# Patient Record
Sex: MALE | Race: WHITE | Employment: OTHER | URBAN - NONMETROPOLITAN AREA
[De-identification: names, ages, dates, MRNs, and addresses within clinical notes are randomized per-mention and may not be internally consistent; named-entity substitution may affect disease eponyms.]

---

## 2017-01-06 ENCOUNTER — OFFICE VISIT - GICH (OUTPATIENT)
Dept: INTERNAL MEDICINE | Facility: OTHER | Age: 82
End: 2017-01-06

## 2017-01-06 ENCOUNTER — HISTORY (OUTPATIENT)
Dept: INTERNAL MEDICINE | Facility: OTHER | Age: 82
End: 2017-01-06

## 2017-01-06 DIAGNOSIS — R10.9 ABDOMINAL PAIN: ICD-10-CM

## 2017-01-06 DIAGNOSIS — R63.4 ABNORMAL WEIGHT LOSS: ICD-10-CM

## 2017-01-06 DIAGNOSIS — G47.00 INSOMNIA: ICD-10-CM

## 2017-01-06 DIAGNOSIS — Z87.898 PERSONAL HISTORY OF OTHER SPECIFIED CONDITIONS (CODE): ICD-10-CM

## 2017-01-06 DIAGNOSIS — F41.9 ANXIETY DISORDER: ICD-10-CM

## 2017-01-06 DIAGNOSIS — G62.9 POLYNEUROPATHY: ICD-10-CM

## 2017-01-06 LAB
A/G RATIO - HISTORICAL: 1.5 (ref 1–2)
ABSOLUTE BASOPHILS - HISTORICAL: 0.1 THOU/CU MM
ABSOLUTE EOSINOPHILS - HISTORICAL: 0.2 THOU/CU MM
ABSOLUTE LYMPHOCYTES - HISTORICAL: 1.1 THOU/CU MM (ref 0.9–2.9)
ABSOLUTE MONOCYTES - HISTORICAL: 0.5 THOU/CU MM
ABSOLUTE NEUTROPHILS - HISTORICAL: 3.8 THOU/CU MM (ref 1.7–7)
ALBUMIN SERPL-MCNC: 4.1 G/DL (ref 3.5–5.7)
ALP SERPL-CCNC: 73 IU/L (ref 34–104)
ALT (SGPT) - HISTORICAL: 27 IU/L (ref 7–52)
AMYLASE SERPL-CCNC: 30 IU/L (ref 29–103)
ANION GAP - HISTORICAL: 7 (ref 5–18)
AST SERPL-CCNC: 23 IU/L (ref 13–39)
BASOPHILS # BLD AUTO: 1.3 %
BILIRUB SERPL-MCNC: 0.7 MG/DL (ref 0.3–1)
BUN SERPL-MCNC: 13 MG/DL (ref 7–25)
BUN/CREAT RATIO - HISTORICAL: 17
CALCIUM SERPL-MCNC: 9.7 MG/DL (ref 8.6–10.3)
CHLORIDE SERPLBLD-SCNC: 103 MMOL/L (ref 98–107)
CO2 SERPL-SCNC: 29 MMOL/L (ref 21–31)
CREAT SERPL-MCNC: 0.76 MG/DL (ref 0.7–1.3)
EOSINOPHIL NFR BLD AUTO: 3.5 %
ERYTHROCYTE [DISTWIDTH] IN BLOOD BY AUTOMATED COUNT: 12.5 % (ref 11.5–15.5)
GFR IF NOT AFRICAN AMERICAN - HISTORICAL: >60 ML/MIN/1.73M2
GLOBULIN - HISTORICAL: 2.7 G/DL (ref 2–3.7)
GLUCOSE SERPL-MCNC: 93 MG/DL (ref 70–105)
HCT VFR BLD AUTO: 41.9 % (ref 37–53)
HEMOGLOBIN: 14.5 G/DL (ref 13.5–17.5)
LYMPHOCYTES NFR BLD AUTO: 19.5 % (ref 20–44)
MCH RBC QN AUTO: 29.2 PG (ref 26–34)
MCHC RBC AUTO-ENTMCNC: 34.6 G/DL (ref 32–36)
MCV RBC AUTO: 85 FL (ref 80–100)
MONOCYTES NFR BLD AUTO: 8.1 %
NEUTROPHILS NFR BLD AUTO: 67.4 % (ref 42–72)
PLATELET # BLD AUTO: 131 THOU/CU MM (ref 140–440)
PMV BLD: 9.3 FL (ref 6.5–11)
POTASSIUM SERPL-SCNC: 4.3 MMOL/L (ref 3.5–5.1)
PROT SERPL-MCNC: 6.8 G/DL (ref 6.4–8.9)
RED BLOOD COUNT - HISTORICAL: 4.96 MIL/CU MM (ref 4.3–5.9)
SODIUM SERPL-SCNC: 139 MMOL/L (ref 133–143)
TSH - HISTORICAL: 1.25 UIU/ML (ref 0.34–5.6)
WHITE BLOOD COUNT - HISTORICAL: 5.6 THOU/CU MM (ref 4.5–11)

## 2017-01-06 ASSESSMENT — PATIENT HEALTH QUESTIONNAIRE - PHQ9: SUM OF ALL RESPONSES TO PHQ QUESTIONS 1-9: 15

## 2017-01-10 ENCOUNTER — COMMUNICATION - GICH (OUTPATIENT)
Dept: INTERNAL MEDICINE | Facility: OTHER | Age: 82
End: 2017-01-10

## 2017-01-12 ENCOUNTER — ANTICOAGULATION - GICH (OUTPATIENT)
Dept: INTERNAL MEDICINE | Facility: OTHER | Age: 82
End: 2017-01-12

## 2017-01-12 DIAGNOSIS — I48.0 PAROXYSMAL ATRIAL FIBRILLATION (H): ICD-10-CM

## 2017-01-12 LAB — INR - HISTORICAL: 2.4

## 2017-01-17 ENCOUNTER — HOSPITAL ENCOUNTER (OUTPATIENT)
Dept: RADIOLOGY | Facility: OTHER | Age: 82
End: 2017-01-17
Attending: INTERNAL MEDICINE

## 2017-01-17 ENCOUNTER — TRANSFERRED RECORDS (OUTPATIENT)
Dept: HEALTH INFORMATION MANAGEMENT | Facility: CLINIC | Age: 82
End: 2017-01-17

## 2017-01-17 DIAGNOSIS — R63.4 ABNORMAL WEIGHT LOSS: ICD-10-CM

## 2017-01-18 ENCOUNTER — COMMUNICATION - GICH (OUTPATIENT)
Dept: SURGERY | Facility: OTHER | Age: 82
End: 2017-01-18

## 2017-01-18 ENCOUNTER — COMMUNICATION - GICH (OUTPATIENT)
Dept: INTERNAL MEDICINE | Facility: OTHER | Age: 82
End: 2017-01-18

## 2017-01-18 DIAGNOSIS — R10.13 EPIGASTRIC PAIN: ICD-10-CM

## 2017-01-19 ENCOUNTER — COMMUNICATION - GICH (OUTPATIENT)
Dept: SURGERY | Facility: OTHER | Age: 82
End: 2017-01-19

## 2017-01-19 ENCOUNTER — COMMUNICATION - GICH (OUTPATIENT)
Dept: INTERNAL MEDICINE | Facility: OTHER | Age: 82
End: 2017-01-19

## 2017-01-27 ENCOUNTER — AMBULATORY - GICH (OUTPATIENT)
Dept: SCHEDULING | Facility: OTHER | Age: 82
End: 2017-01-27

## 2017-01-30 ENCOUNTER — COMMUNICATION - GICH (OUTPATIENT)
Dept: SURGERY | Facility: OTHER | Age: 82
End: 2017-01-30

## 2017-01-30 ENCOUNTER — HOSPITAL ENCOUNTER (OUTPATIENT)
Dept: SURGERY | Facility: OTHER | Age: 82
Discharge: HOME | End: 2017-01-30
Attending: SURGERY | Admitting: SURGERY

## 2017-01-30 ENCOUNTER — TRANSFERRED RECORDS (OUTPATIENT)
Dept: HEALTH INFORMATION MANAGEMENT | Facility: CLINIC | Age: 82
End: 2017-01-30

## 2017-01-30 ENCOUNTER — SURGERY (OUTPATIENT)
Dept: SURGERY | Facility: OTHER | Age: 82
End: 2017-01-30

## 2017-01-30 ENCOUNTER — COMMUNICATION - GICH (OUTPATIENT)
Dept: INTERNAL MEDICINE | Facility: OTHER | Age: 82
End: 2017-01-30

## 2017-01-30 DIAGNOSIS — R10.13 EPIGASTRIC PAIN: ICD-10-CM

## 2017-01-30 DIAGNOSIS — K29.60 OTHER GASTRITIS WITHOUT BLEEDING: ICD-10-CM

## 2017-01-30 LAB
INR - HISTORICAL: 1.2
PROTIME - HISTORICAL: 12.4 SEC (ref 11.9–15.2)

## 2017-02-02 ENCOUNTER — COMMUNICATION - GICH (OUTPATIENT)
Dept: INTERNAL MEDICINE | Facility: OTHER | Age: 82
End: 2017-02-02

## 2017-02-10 ENCOUNTER — COMMUNICATION - GICH (OUTPATIENT)
Dept: INTERNAL MEDICINE | Facility: OTHER | Age: 82
End: 2017-02-10

## 2017-02-10 ENCOUNTER — OFFICE VISIT - GICH (OUTPATIENT)
Dept: SURGERY | Facility: OTHER | Age: 82
End: 2017-02-10

## 2017-02-10 ENCOUNTER — ANTICOAGULATION - GICH (OUTPATIENT)
Dept: INTERNAL MEDICINE | Facility: OTHER | Age: 82
End: 2017-02-10

## 2017-02-10 ENCOUNTER — OFFICE VISIT - GICH (OUTPATIENT)
Dept: INTERNAL MEDICINE | Facility: OTHER | Age: 82
End: 2017-02-10

## 2017-02-10 DIAGNOSIS — G62.9 POLYNEUROPATHY: ICD-10-CM

## 2017-02-10 DIAGNOSIS — G47.00 INSOMNIA: ICD-10-CM

## 2017-02-10 DIAGNOSIS — I48.0 PAROXYSMAL ATRIAL FIBRILLATION (H): ICD-10-CM

## 2017-02-10 DIAGNOSIS — F41.9 ANXIETY DISORDER: ICD-10-CM

## 2017-02-10 DIAGNOSIS — F41.1 GENERALIZED ANXIETY DISORDER: ICD-10-CM

## 2017-02-10 LAB — INR - HISTORICAL: 1.9

## 2017-02-10 ASSESSMENT — ANXIETY QUESTIONNAIRES
2. NOT BEING ABLE TO STOP OR CONTROL WORRYING: NEARLY EVERY DAY
6. BECOMING EASILY ANNOYED OR IRRITABLE: NEARLY EVERY DAY
7. FEELING AFRAID AS IF SOMETHING AWFUL MIGHT HAPPEN: SEVERAL DAYS
5. BEING SO RESTLESS THAT IT IS HARD TO SIT STILL: NEARLY EVERY DAY
4. TROUBLE RELAXING: NEARLY EVERY DAY
1. FEELING NERVOUS, ANXIOUS, OR ON EDGE: NEARLY EVERY DAY
3. WORRYING TOO MUCH ABOUT DIFFERENT THINGS: NEARLY EVERY DAY
GAD7 TOTAL SCORE: 19

## 2017-02-10 ASSESSMENT — PATIENT HEALTH QUESTIONNAIRE - PHQ9: SUM OF ALL RESPONSES TO PHQ QUESTIONS 1-9: 17

## 2017-03-03 ENCOUNTER — COMMUNICATION - GICH (OUTPATIENT)
Dept: INTERNAL MEDICINE | Facility: OTHER | Age: 82
End: 2017-03-03

## 2017-03-03 DIAGNOSIS — G47.00 INSOMNIA: ICD-10-CM

## 2017-03-10 ENCOUNTER — ANTICOAGULATION - GICH (OUTPATIENT)
Dept: INTERNAL MEDICINE | Facility: OTHER | Age: 82
End: 2017-03-10

## 2017-03-10 ENCOUNTER — OFFICE VISIT - GICH (OUTPATIENT)
Dept: INTERNAL MEDICINE | Facility: OTHER | Age: 82
End: 2017-03-10

## 2017-03-10 ENCOUNTER — HISTORY (OUTPATIENT)
Dept: INTERNAL MEDICINE | Facility: OTHER | Age: 82
End: 2017-03-10

## 2017-03-10 ENCOUNTER — TRANSFERRED RECORDS (OUTPATIENT)
Dept: HEALTH INFORMATION MANAGEMENT | Facility: CLINIC | Age: 82
End: 2017-03-10

## 2017-03-10 DIAGNOSIS — G47.00 INSOMNIA: ICD-10-CM

## 2017-03-10 DIAGNOSIS — F41.1 GENERALIZED ANXIETY DISORDER: ICD-10-CM

## 2017-03-10 DIAGNOSIS — M10.00 IDIOPATHIC GOUT: ICD-10-CM

## 2017-03-10 DIAGNOSIS — G62.9 POLYNEUROPATHY: ICD-10-CM

## 2017-03-10 DIAGNOSIS — R10.13 EPIGASTRIC PAIN: ICD-10-CM

## 2017-03-10 DIAGNOSIS — I10 ESSENTIAL (PRIMARY) HYPERTENSION: ICD-10-CM

## 2017-03-10 DIAGNOSIS — I48.0 PAROXYSMAL ATRIAL FIBRILLATION (H): ICD-10-CM

## 2017-03-10 LAB — INR - HISTORICAL: 2.2

## 2017-03-20 ENCOUNTER — COMMUNICATION - GICH (OUTPATIENT)
Dept: INTERNAL MEDICINE | Facility: OTHER | Age: 82
End: 2017-03-20

## 2017-03-20 DIAGNOSIS — I48.0 PAROXYSMAL ATRIAL FIBRILLATION (H): ICD-10-CM

## 2017-03-22 ENCOUNTER — ANTICOAGULATION THERAPY VISIT (OUTPATIENT)
Dept: ANTICOAGULATION | Facility: CLINIC | Age: 82
End: 2017-03-22
Payer: MEDICARE

## 2017-03-22 ENCOUNTER — OFFICE VISIT (OUTPATIENT)
Dept: FAMILY MEDICINE | Facility: CLINIC | Age: 82
End: 2017-03-22
Payer: MEDICARE

## 2017-03-22 ENCOUNTER — RADIANT APPOINTMENT (OUTPATIENT)
Dept: GENERAL RADIOLOGY | Facility: CLINIC | Age: 82
End: 2017-03-22
Attending: FAMILY MEDICINE
Payer: MEDICARE

## 2017-03-22 VITALS
BODY MASS INDEX: 24.92 KG/M2 | SYSTOLIC BLOOD PRESSURE: 120 MMHG | TEMPERATURE: 97.3 F | WEIGHT: 146 LBS | HEIGHT: 64 IN | HEART RATE: 72 BPM | DIASTOLIC BLOOD PRESSURE: 70 MMHG

## 2017-03-22 DIAGNOSIS — I48.20 CHRONIC ATRIAL FIBRILLATION (H): ICD-10-CM

## 2017-03-22 DIAGNOSIS — G62.9 PERIPHERAL POLYNEUROPATHY: ICD-10-CM

## 2017-03-22 DIAGNOSIS — F41.9 ANXIETY: ICD-10-CM

## 2017-03-22 DIAGNOSIS — F10.10 ALCOHOL ABUSE: ICD-10-CM

## 2017-03-22 DIAGNOSIS — I48.20 CHRONIC ATRIAL FIBRILLATION (H): Primary | ICD-10-CM

## 2017-03-22 DIAGNOSIS — M1A.9XX0 CHRONIC GOUT, UNSPECIFIED CAUSE, UNSPECIFIED SITE: ICD-10-CM

## 2017-03-22 DIAGNOSIS — R07.89 CHEST WALL PAIN: Primary | ICD-10-CM

## 2017-03-22 DIAGNOSIS — Z79.01 LONG-TERM (CURRENT) USE OF ANTICOAGULANTS: ICD-10-CM

## 2017-03-22 DIAGNOSIS — Z79.01 CHRONIC ANTICOAGULATION: ICD-10-CM

## 2017-03-22 DIAGNOSIS — R07.89 CHEST WALL PAIN: ICD-10-CM

## 2017-03-22 DIAGNOSIS — R52 PAIN: ICD-10-CM

## 2017-03-22 DIAGNOSIS — R63.4 WEIGHT LOSS: ICD-10-CM

## 2017-03-22 DIAGNOSIS — I10 ESSENTIAL HYPERTENSION WITH GOAL BLOOD PRESSURE LESS THAN 140/90: ICD-10-CM

## 2017-03-22 DIAGNOSIS — E78.5 HYPERLIPIDEMIA LDL GOAL <130: ICD-10-CM

## 2017-03-22 PROBLEM — R39.9 LOWER URINARY TRACT SYMPTOMS (LUTS): Status: ACTIVE | Noted: 2017-03-22

## 2017-03-22 PROBLEM — G47.00 PERSISTENT INSOMNIA: Status: ACTIVE | Noted: 2017-03-22

## 2017-03-22 LAB
ALBUMIN SERPL-MCNC: 4 G/DL (ref 3.4–5)
ALP SERPL-CCNC: 93 U/L (ref 40–150)
ALT SERPL W P-5'-P-CCNC: 56 U/L (ref 0–70)
ANION GAP SERPL CALCULATED.3IONS-SCNC: 6 MMOL/L (ref 3–14)
AST SERPL W P-5'-P-CCNC: 29 U/L (ref 0–45)
BILIRUB SERPL-MCNC: 0.5 MG/DL (ref 0.2–1.3)
BUN SERPL-MCNC: 14 MG/DL (ref 7–30)
CALCIUM SERPL-MCNC: 8.7 MG/DL (ref 8.5–10.1)
CHLORIDE SERPL-SCNC: 102 MMOL/L (ref 94–109)
CHOLEST SERPL-MCNC: 166 MG/DL
CO2 SERPL-SCNC: 30 MMOL/L (ref 20–32)
CREAT SERPL-MCNC: 0.7 MG/DL (ref 0.66–1.25)
ERYTHROCYTE [DISTWIDTH] IN BLOOD BY AUTOMATED COUNT: 13.3 % (ref 10–15)
GFR SERPL CREATININE-BSD FRML MDRD: NORMAL ML/MIN/1.7M2
GLUCOSE SERPL-MCNC: 94 MG/DL (ref 70–99)
HCT VFR BLD AUTO: 42.7 % (ref 40–53)
HDLC SERPL-MCNC: 53 MG/DL
HGB BLD-MCNC: 14.1 G/DL (ref 13.3–17.7)
LDLC SERPL CALC-MCNC: 93 MG/DL
MCH RBC QN AUTO: 28.7 PG (ref 26.5–33)
MCHC RBC AUTO-ENTMCNC: 33 G/DL (ref 31.5–36.5)
MCV RBC AUTO: 87 FL (ref 78–100)
NONHDLC SERPL-MCNC: 113 MG/DL
PLATELET # BLD AUTO: 128 10E9/L (ref 150–450)
POTASSIUM SERPL-SCNC: 4.1 MMOL/L (ref 3.4–5.3)
PROT SERPL-MCNC: 7.3 G/DL (ref 6.8–8.8)
RBC # BLD AUTO: 4.92 10E12/L (ref 4.4–5.9)
SODIUM SERPL-SCNC: 138 MMOL/L (ref 133–144)
TRIGL SERPL-MCNC: 99 MG/DL
TSH SERPL DL<=0.05 MIU/L-ACNC: 1.51 MU/L (ref 0.4–4)
WBC # BLD AUTO: 6.1 10E9/L (ref 4–11)

## 2017-03-22 PROCEDURE — 80061 LIPID PANEL: CPT | Performed by: FAMILY MEDICINE

## 2017-03-22 PROCEDURE — 85027 COMPLETE CBC AUTOMATED: CPT | Performed by: FAMILY MEDICINE

## 2017-03-22 PROCEDURE — 82607 VITAMIN B-12: CPT | Performed by: FAMILY MEDICINE

## 2017-03-22 PROCEDURE — 71100 X-RAY EXAM RIBS UNI 2 VIEWS: CPT | Mod: RT

## 2017-03-22 PROCEDURE — 36415 COLL VENOUS BLD VENIPUNCTURE: CPT | Performed by: FAMILY MEDICINE

## 2017-03-22 PROCEDURE — 80053 COMPREHEN METABOLIC PANEL: CPT | Performed by: FAMILY MEDICINE

## 2017-03-22 PROCEDURE — 71020 XR CHEST 2 VW: CPT

## 2017-03-22 PROCEDURE — 84443 ASSAY THYROID STIM HORMONE: CPT | Performed by: FAMILY MEDICINE

## 2017-03-22 PROCEDURE — 99204 OFFICE O/P NEW MOD 45 MIN: CPT | Performed by: FAMILY MEDICINE

## 2017-03-22 PROCEDURE — 99207 ZZC NO CHARGE NURSE ONLY: CPT | Performed by: REGISTERED NURSE

## 2017-03-22 RX ORDER — PANTOPRAZOLE SODIUM 40 MG/1
40 TABLET, DELAYED RELEASE ORAL DAILY
Qty: 30 TABLET | Refills: 1 | COMMUNITY
Start: 2017-03-22 | End: 2017-05-12

## 2017-03-22 RX ORDER — MULTIPLE VITAMINS W/ MINERALS TAB 9MG-400MCG
1 TAB ORAL
Qty: 100 TABLET | Refills: 3 | COMMUNITY
Start: 2017-03-22

## 2017-03-22 RX ORDER — COLCHICINE 0.6 MG/1
0.6 TABLET ORAL DAILY
Qty: 30 TABLET | Refills: 0 | COMMUNITY
Start: 2017-03-22 | End: 2017-05-12

## 2017-03-22 RX ORDER — TEMAZEPAM 30 MG
30 CAPSULE ORAL
Qty: 15 CAPSULE | COMMUNITY
Start: 2017-03-22 | End: 2017-07-09

## 2017-03-22 RX ORDER — METOPROLOL SUCCINATE 25 MG/1
25 TABLET, EXTENDED RELEASE ORAL EVERY EVENING
Qty: 90 TABLET | Refills: 1 | COMMUNITY
Start: 2017-03-22 | End: 2018-11-30

## 2017-03-22 RX ORDER — WARFARIN SODIUM 2 MG/1
2 TABLET ORAL DAILY
Qty: 30 TABLET | COMMUNITY
Start: 2017-03-22 | End: 2017-04-24

## 2017-03-22 RX ORDER — NORTRIPTYLINE HCL 25 MG
25 CAPSULE ORAL AT BEDTIME
Qty: 30 CAPSULE | Refills: 1 | COMMUNITY
Start: 2017-03-22 | End: 2017-05-12

## 2017-03-22 ASSESSMENT — ANXIETY QUESTIONNAIRES
5. BEING SO RESTLESS THAT IT IS HARD TO SIT STILL: NEARLY EVERY DAY
3. WORRYING TOO MUCH ABOUT DIFFERENT THINGS: NEARLY EVERY DAY
GAD7 TOTAL SCORE: 18
2. NOT BEING ABLE TO STOP OR CONTROL WORRYING: NEARLY EVERY DAY
6. BECOMING EASILY ANNOYED OR IRRITABLE: MORE THAN HALF THE DAYS
1. FEELING NERVOUS, ANXIOUS, OR ON EDGE: NEARLY EVERY DAY
7. FEELING AFRAID AS IF SOMETHING AWFUL MIGHT HAPPEN: SEVERAL DAYS

## 2017-03-22 ASSESSMENT — PATIENT HEALTH QUESTIONNAIRE - PHQ9: 5. POOR APPETITE OR OVEREATING: NEARLY EVERY DAY

## 2017-03-22 NOTE — PROGRESS NOTES
ANTICOAGULATION FOLLOW-UP CLINIC VISIT    Patient Name:  Nabor Cunningham  Date:  3/22/2017  Contact Type:  new referral/chart review    SUBJECTIVE:     Patient Findings     Comments Per visit note today: '..Pacemaker placed 6 months ago for atrial fibrillation. Taking warfarin for stroke prevention..'           OBJECTIVE    No results found for: INR, [id number], [id number], 2AGN, F2    ASSESSMENT / PLAN  No question data found.  Anticoagulation Summary as of 3/22/2017     INR goal 2.0-3.0   Today's INR    Maintenance plan No maintenance plan   Full instructions No maintenance plan   Next INR check 3/30/2017   Target end date Indefinite    Indications   Chronic anticoagulation [Z79.01]  Chronic atrial fibrillation (H) [I48.2]  Long-term (current) use of anticoagulants [Z79.01] [Z79.01]         Anticoagulation Episode Summary     INR check location     Preferred lab     Send INR reminders to [facility] CLINIC POOL    Comments *      Anticoagulation Care Providers     Provider Role Specialty Phone number    Heron Mayo MD [facility] [phone]            See the Encounter Report to view Anticoagulation Flowsheet and Dosing Calendar (Go to Encounters tab in chart review, and find the Anticoagulation Therapy Visit)    ACC intro letter sent to pt.    Kirsyt Myers RN

## 2017-03-22 NOTE — MR AVS SNAPSHOT
After Visit Summary   3/22/2017    Nabor Cunningham    MRN: [id number]           Patient Information     Date Of Birth          1935        Visit Information        Provider Department      3/22/2017 11:00 AM Heron Mayo MD [facility]        Today's Diagnoses     Chest wall pain    -  1    Anxiety        Alcohol abuse        Chronic atrial fibrillation (H)        Weight loss        Peripheral polyneuropathy (H)        Chronic gout, unspecified cause, unspecified site        Essential hypertension with goal blood pressure less than 140/90        Hyperlipidemia LDL goal <130          Care Instructions    ASSESSMENT:   (R07.89) Chest wall pain  (primary encounter diagnosis)  Comment:   Plan: XR Ribs & Chest Bilateral G/E 4 Views        Check chest x-ray and rib pains.      (F41.9) Anxiety  Comment: not doing well  Plan: Medication can help.    We will obtain records.     (F10.10) Alcohol abuse  Comment:   Plan: I recommend avoiding alcohol.     (I48.2) Chronic atrial fibrillation (H)  Comment:   Plan: INR CLINIC REFERRAL        Schedule an appointment with INR clinic to monitor INR.     (R63.4) Weight loss  Comment: uncertain cause.   Plan: Check blood tests.     (G62.9) Peripheral polyneuropathy (H)  Comment: some chronic foot pain.     (M1A.9XX0) Chronic gout, unspecified cause, unspecified site  Comment: has been doing OK on the colchicine   Plan: No change in current treatment plan.     (I10) Essential hypertension with goal blood pressure less than 140/90  Comment: doing OK  Plan: No change in current treatment plan.     (E78.5) Hyperlipidemia LDL goal <130  Comment:   Plan: Check non-fasting blood tests today.         Follow-ups after your visit        Additional Services     INR CLINIC REFERRAL       Your provider has referred you to INR Services.    Please be aware that coverage of these services is subject to the terms and limitations of your health insurance plan.   "Call member services at your health plan with any benefit or coverage questions.    Indication for Anticoagulation: Atrial Fibrillation  If nonstandard INR is desired, indicate goal range and explanation:   Expected Duration of Therapy: Lifetime                  Who to contact     If you have questions or need follow up information about today's clinic visit or your schedule please contact [facility] directly at [phone].  Normal or non-critical lab and imaging results will be communicated to you by MyChart, letter or phone within 4 business days after the clinic has received the results. If you do not hear from us within 7 days, please contact the clinic through MyChart or phone. If you have a critical or abnormal lab result, we will notify you by phone as soon as possible.  Submit refill requests through [employer] or call your pharmacy and they will forward the refill request to us. Please allow 3 business days for your refill to be completed.          Additional Information About Your Visit        [employer] Information     [employer] lets you send messages to your doctor, view your test results, renew your prescriptions, schedule appointments and more. To sign up, go to [email or web address] . Click on \"Log in\" on the left side of the screen, which will take you to the Welcome page. Then click on \"Sign up Now\" on the right side of the page.     You will be asked to enter the access code listed below, as well as some personal information. Please follow the directions to create your username and password.     Your access code is: [email or web address]  Expires: 2017 12:20 PM     Your access code will  in 90 days. If you need help or a new code, please call your [place] clinic or [phone].        Care EveryWhere ID     This is your Care EveryWhere ID. This could be used by other organizations to access your [place] medical records  [id number]        Your Vitals Were     Pulse Temperature " "Height BMI (Body Mass Index)          72 97.3  F (36.3  C) (Tympanic) 5' 3.5\" (1.613 m) 25.46 kg/m2         Blood Pressure from Last 3 Encounters:   03/22/17 120/70    Weight from Last 3 Encounters:   03/22/17 146 lb (66.2 kg)              We Performed the Following     CBC with platelets     Comprehensive metabolic panel (BMP + Alb, Alk Phos, ALT, AST, Total. Bili, TP)     INR CLINIC REFERRAL     Lipid panel reflex to direct LDL     TSH     Vitamin B12        Primary Care Provider Office Phone # Fax #    Heron Mayo [phone]       [facility] [address] [facility] [address]        Thank you!     Thank you for choosing [facility]  for your care. Our goal is always to provide you with excellent care. Hearing back from our patients is one way we can continue to improve our services. Please take a few minutes to complete the written survey that you may receive in the mail after your visit with us. Thank you!             Your Updated Medication List - Protect others around you: Learn how to safely use, store and throw away your medicines at www.disposemymeds.org.          This list is accurate as of: 3/22/17 12:20 PM.  Always use your most recent med list.                   Brand Name Dispense Instructions for use    B-12 1000 MCG Tbcr     100 tablet    Take 1,000 mcg by mouth daily       COLCRYS 0.6 MG tablet   Generic drug:  colchicine     30 tablet    Take 1 tablet (0.6 mg) by mouth daily       metoprolol 25 MG 24 hr tablet    TOPROL-XL    90 tablet    Take 1 tablet (25 mg) by mouth daily       Multi-vitamin Tabs tablet     100 tablet    Take 1 tablet by mouth daily       nortriptyline 25 MG capsule    PAMELOR    30 capsule    Take 1 capsule (25 mg) by mouth At Bedtime       PROTONIX 40 MG EC tablet   Generic drug:  pantoprazole     30 tablet    Take 1 tablet (40 mg) by mouth daily Take 30-60 minutes before a meal.       temazepam 30 MG capsule    RESTORIL "    15 capsule    Take 1 capsule (30 mg) by mouth nightly as needed for sleep       warfarin 2 MG tablet    COUMADIN    30 tablet    Take 1 tablet (2 mg) by mouth daily Take 6 mg by mouth 3 days a week and 8 mg 4 days per week as directed by INR  Clinic.

## 2017-03-22 NOTE — PROGRESS NOTES
"  SUBJECTIVE:                                                    Nabor Cunningham is a 81 year old male who presents to clinic today for the following health issues:  Chief Complaint   Patient presents with     Establish Care    \"Chalino\".  New Patient/Transfer of Care.  Accompanied today by daughter Lexy.  Was living [place] miles out of [place], [place].  .  Spouse in [facility].   She has moved down to this area a couple weeks ago.   Now living with son.  [place] [facility]  Will have his own apartment there.     Current issues.   Ribs sore at surgery site.  He had surgery for non-cancer lung lump.  This was 36 years ago.  Pain started there in the past month or two.     Weight loss.  Usual weight has been about 172#.   He has been losing weight in the past 4 months or so.    Wt Readings from Last 5 Encounters:   03/22/17 146 lb (66.2 kg)   Eats when hungry.   Sounds erratic.       Drinking beer since teenage years.  Drinking 90 beers in a week (three cases of #30) per week.   Was off alcohol for a while in the past year.  Currently started 1-2 drinks a day.   Seems to be doing worse since he quit drinking beer a year ago.       Pacemaker placed 6 months ago for atrial fibrillation.   Taking warfarin for stroke prevention.     \"Nerves are shot\".  Feels shaky.  Depressed.  Frightened.  Scared of hospitals, pills.  Has to recheck billfold, faucets, doors.  Concerned about driving.   Hearing music in his head that does not stop.  No voices.    Has had panic attacks in the past.   Has not been treated for depression.    Has seen psychologist 3-4 months ago.  He has tried medication for this-multiple meds.  Has not found one that worked.  Has not seen psychiatrist.  Has been in the hospital for nerves.   Some concern for early dementia.   Does not recall if he has been on mirtazapine.     Can't see as well as he could.  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  Has new eyeglasses.  Has had three " "surgeries.      He has nerve pain in both feet.  Taking nortriptyline for pain.   He has numbness and pain.   Also taking sleeping pill at bedtime.  Takes temazepam for months.     He has a history of gout.  No flares since taking the colchicine.  Location: ball of foot.     There is no problem list on file for this patient.       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     ROS:General: POSITIVE for:, poor appetite.  Sleeps poorly.  Weight loss.  Unable to read-illiterate.    Eyes: POSITIVE for:, vision not so good.  Worse when he gets nervous.   ENT: No problems with ears, nose or throat.  No difficulty swallowing.  Throat feels \"funny\" with swallowing. (suprasternal notch area)   Resp: No coughing, wheezing or shortness of breath  CV: No chest pains or palpitations.  Has a history of afib.   GI: POSITIVE for:, heartburn or reflux.  Has had gastroscopy which was OK.  Has had appy.  Some hemorrhoids-surgery in the past. Takes mineral oil for constipation.  : No urinary frequency or dysuria, bladder or kidney problems  Musculoskeletal: POSITIVE  for:, pain in feet.   Neurologic: POSITIVE for:, peripheral neuropathy.  Occasional headache from not getting sleep.   Psychiatric: see above   Heme/immune/allergy: POSITIVE  for:, chronic anticoagulation.  Endocrine: No history of thyroid disease, diabetes or other endocrine disorders  Skin: No rashes,worrisome lesions or skin problems    OBJECTIVE:Blood pressure 120/70, pulse 72, temperature 97.3  F (36.3  C), temperature source Tympanic, height 5' 3.5\" (1.613 m), weight 146 lb (66.2 kg). BMI=Body mass index is 25.46 kg/(m^2).  GENERAL APPEARANCE ADULT: Alert, no acute distress  EYES:Left pupil irregular from previous surgery.  EOM normal, conjunctiva and lids normal  HENT: Ears and TMs normal, oral mucosa and posterior oropharynx " normal  NECK: No adenopathy,masses or thyromegaly  RESP: lungs clear to auscultation   CV: irregular rhythm-irregularly irregular, rate-normal, no murmur  ABDOMEN: soft, no organomegaly, masses or tenderness   (male): penis, scrotum, testes normal, no hernias  MS: no peripheral edema.  Feet warm and normal color.   Heberden's nodes on several DIP joints.   Scar right lateral chest wall from past surgery.   He has hammer toes.  Callous at ball of left foot.   NEURO: Alert, oriented, speech and mentation normal, Cranial nerves 2-12 are normal., Sensation is normal monofilament in feet.      ASSESSMENT:   (R07.89) Chest wall pain  (primary encounter diagnosis)  Comment:   Plan: XR Ribs & Chest Bilateral G/E 4 Views        Check chest x-ray and rib pains.      (F41.9) Anxiety  Comment: not doing well  Plan: Medication can help.  He states he has tried many meds and psychology.  Not interested in more talk therapy.  Does not like to take medication.   We will obtain records.  I would suggest trial medication again.  I can see what has been tried in the past.  mirtazapine might be a good choice.  Consider antipsychotic with the music.     (F10.10) Alcohol abuse  Comment:   Plan: I recommend avoiding alcohol.     (I48.2) Chronic atrial fibrillation (H)  Comment:   Plan: INR CLINIC REFERRAL        Schedule an appointment with INR clinic to monitor INR.     (R63.4) Weight loss  Comment: uncertain cause.   Plan: Check blood tests.     (G62.9) Peripheral polyneuropathy (H)  Comment: some chronic foot pain.   Callous trimmed today.     (M1A.9XX0) Chronic gout, unspecified cause, unspecified site  Comment: has been doing OK on the colchicine   Plan: No change in current treatment plan.     (I10) Essential hypertension with goal blood pressure less than 140/90  Comment: doing OK  Plan: No change in current treatment plan.     (E78.5) Hyperlipidemia LDL goal <130  Comment:   Plan: Check non-fasting blood tests today.    Time  spent during visit=55 minutes over half in obtaining history for new patient.

## 2017-03-22 NOTE — MR AVS SNAPSHOT
Nabor Cunningham   3/22/2017   Anticoagulation Therapy Visit    Description:  81 year old male   Provider:  Kirsty Myers, RN   Department:  [facility]           INR as of 3/22/2017     Today's INR No new INR was available at the time of this encounter.      Anticoagulation Summary as of 3/22/2017     INR goal 2.0-3.0   Today's INR No new INR was available at the time of this encounter.   Full instructions No maintenance plan   Next INR check 3/30/2017    Indications   Chronic anticoagulation [Z79.01]  Chronic atrial fibrillation (H) [I48.2]  Long-term (current) use of anticoagulants [Z79.01] [Z79.01]         Description     Take 1 tablet (2 mg) by mouth daily Take 6 mg by mouth 3 days a week and 8 mg 4 days per week as directed by INR  Clinic.      Your next Anticoagulation Clinic appointment(s)     Apr 06, 2017  2:00 PM CDT   [facility]agulation Visit with [facility]   [facility]    [address] [place] [id number]   [phone]              March 2017 Details    Sun Mon Tue Wed Thu Fri Sat        1               2               3               4                 5               6               7               8               9               10               11                 12               13               14               15               16               17               18                 19               20               21               22         See details      23               24               25                 26               27               28               29               30            31                 Date Details   03/22 This INR check       Date of next INR:  3/30/2017

## 2017-03-22 NOTE — NURSING NOTE
"Chief Complaint   Patient presents with     Establish Care       Initial /70  Pulse 72  Temp 97.3  F (36.3  C) (Tympanic)  Ht 5' 3.5\" (1.613 m)  Wt 146 lb (66.2 kg)  BMI 25.46 kg/m2 Estimated body mass index is 25.46 kg/(m^2) as calculated from the following:    Height as of this encounter: 5' 3.5\" (1.613 m).    Weight as of this encounter: 146 lb (66.2 kg).  Medication Reconciliation: complete    Health Maintenance that is potentially due pending provider review:          "

## 2017-03-22 NOTE — PATIENT INSTRUCTIONS
ASSESSMENT:   (R07.89) Chest wall pain  (primary encounter diagnosis)  Comment:   Plan: XR Ribs & Chest Bilateral G/E 4 Views        Check chest x-ray and rib pains.      (F41.9) Anxiety  Comment: not doing well  Plan: Medication can help.    We will obtain records.     (F10.10) Alcohol abuse  Comment:   Plan: I recommend avoiding alcohol.     (I48.2) Chronic atrial fibrillation (H)  Comment:   Plan: INR CLINIC REFERRAL        Schedule an appointment with INR clinic to monitor INR.     (R63.4) Weight loss  Comment: uncertain cause.   Plan: Check blood tests.     (G62.9) Peripheral polyneuropathy (H)  Comment: some chronic foot pain.     (M1A.9XX0) Chronic gout, unspecified cause, unspecified site  Comment: has been doing OK on the colchicine   Plan: No change in current treatment plan.     (I10) Essential hypertension with goal blood pressure less than 140/90  Comment: doing OK  Plan: No change in current treatment plan.     (E78.5) Hyperlipidemia LDL goal <130  Comment:   Plan: Check non-fasting blood tests today.

## 2017-03-23 LAB — VIT B12 SERPL-MCNC: 969 PG/ML (ref 193–986)

## 2017-03-23 ASSESSMENT — PATIENT HEALTH QUESTIONNAIRE - PHQ9: SUM OF ALL RESPONSES TO PHQ QUESTIONS 1-9: 24

## 2017-03-23 ASSESSMENT — ANXIETY QUESTIONNAIRES: GAD7 TOTAL SCORE: 18

## 2017-03-23 NOTE — PROGRESS NOTES
Please call daughter Lexy.  Chalino's chest x-ray did not show any rib abnormalities except for part of the 6th rib missing from previous surgery.  Heart mildly enlarged.   All his blood tests looked good including a B12 level, chemistries, blood counts and lipids.   PLAN: follow-up in a couple weeks as planned.  I did get records from his previous clinic.

## 2017-03-29 ENCOUNTER — COMMUNICATION - GICH (OUTPATIENT)
Dept: INTERNAL MEDICINE | Facility: OTHER | Age: 82
End: 2017-03-29

## 2017-03-29 DIAGNOSIS — M10.00 IDIOPATHIC GOUT: ICD-10-CM

## 2017-03-31 ENCOUNTER — COMMUNICATION - GICH (OUTPATIENT)
Dept: INTERNAL MEDICINE | Facility: OTHER | Age: 82
End: 2017-03-31

## 2017-04-06 ENCOUNTER — ANTICOAGULATION THERAPY VISIT (OUTPATIENT)
Dept: ANTICOAGULATION | Facility: CLINIC | Age: 82
End: 2017-04-06
Payer: MEDICARE

## 2017-04-06 DIAGNOSIS — Z79.01 CHRONIC ANTICOAGULATION: ICD-10-CM

## 2017-04-06 DIAGNOSIS — Z79.01 LONG-TERM (CURRENT) USE OF ANTICOAGULANTS: ICD-10-CM

## 2017-04-06 DIAGNOSIS — I48.20 CHRONIC ATRIAL FIBRILLATION (H): ICD-10-CM

## 2017-04-06 LAB — INR POINT OF CARE: 2.3 (ref 0.86–1.14)

## 2017-04-06 PROCEDURE — 36416 COLLJ CAPILLARY BLOOD SPEC: CPT

## 2017-04-06 PROCEDURE — 85610 PROTHROMBIN TIME: CPT | Mod: QW

## 2017-04-06 NOTE — PROGRESS NOTES
ANTICOAGULATION INITIAL CLINIC VISIT    Patient Name:  Nabor Cunningham  Date:  4/6/2017  Referred by: Dr. Mayo  Contact Type:  Face to Face    SUBJECTIVE:  Coumadin education was completed today.  Topics covered include:  -Introduction to coumadin Pt has been on warfarin 8 months so he refused education.  -Proper Administration-Takes warfarin in the evening  -INR Testing  -Sign/Symptoms of Bleeding  -Signs/Symptoms of Clot Formation or Stroke  -Dietary Intake of Vitamin K- Occasional V8 and lettuce but per his daughter he doesn't really eat greens  -Drug Interactions  -Anticoagulation Identification (bracelet, necklace or wallet card) Med card in wallet, however writer did not see it.  -Future Surgery  -Effects of Alcohol, Tobacco, and Exercise on Coumadin-pt's alcohol intake is unclear he states 14 beer weekly and his daughter coughed and rolled her eyes at this statement. His history is 90 beers weekly.    He did not want education nor the education material as he already has it.       Patient Findings     Comments Pt states he doesn't need education as he has been on warfarin 8 months or so. He presents today with his daughter Tejal. Patient was moved here from [facility]. His spouse is in [facility]. He lives by himself but relies on his children heavily. Tejal states that he is calling his son to his apartment two times a day for medication management and other things, she asks writer about Home Care. Nabor brought several of his medications in today and told writer to set them up for the next 4 weeks. This writer explained that it is not a service that would be provided during the regular 15 minute visit and advise the pt and his daughter to talk to Dr. Mayo about a home care referral if med set up is needed, writer set his warfarin today as a one time courtesy but will be unable to do this every visit moving forward.  Mr. Cunningham stated that he is confused  and that the way [place] does things is not the way [facility] did things and he mumbles under his breath. He was ken and rude several times during the visit, speaking rudely to his daughter and writer. Writer reviewed lifestyle habits pertaining to warfarin to get a cleat assessment of the patient. He was angry he had to answer questions. His daughter states he really does not eat any greens, he states he drinks V8 on occasion and eats some lettuce on sandwiches. He states he drinks about 14 beers per week now however, when he stated that, his daughter rolled her eyes and coughed loudly in disagreement.. Writer reviewed his chart regarding alcohol abuse, it states he formerly drank 90 beers weekly.           OBJECTIVE    INR Protime   Date Value Ref Range Status   04/06/2017 2.3 (A) 0.86 - 1.14 Final       ASSESSMENT / PLAN  No question data found.  Anticoagulation Summary as of 4/6/2017     INR goal 2.0-3.0   Today's INR 2.3   Maintenance plan 6 mg (2 mg x 3) on Mon, Wed, Fri; 8 mg (2 mg x 4) all other days   Full instructions 6 mg on Mon, Wed, Fri; 8 mg all other days   Weekly total 50 mg   Plan last modified Swathi Antonio RN (4/6/2017)   Next INR check 4/24/2017   Target end date Indefinite    Indications   Chronic anticoagulation [Z79.01]  Chronic atrial fibrillation (H) [I48.2]  Long-term (current) use of anticoagulants [Z79.01] [Z79.01]         Anticoagulation Episode Summary     INR check location     Preferred lab     Send INR reminders to [facility]    Comments * New patient letter sent 3-22-17      Anticoagulation Care Providers     Provider Role Specialty Phone number    Heron Mayo MD [facility] [phone]            See the Encounter Report to view Anticoagulation Flowsheet and Dosing Calendar (Go to Encounters tab in chart review, and find the Anticoagulation Therapy Visit)    Writer printed an extra copy of the AVS and had the  schedule  his next visit on Monday so his son could bring him.    Swathi Antonio RN

## 2017-04-06 NOTE — MR AVS SNAPSHOT
Nabor Cunningham   4/6/2017 2:00 PM   Anticoagulation Therapy Visit    Description:  81 year old male   Provider:  [facility]   Department:  [facility]           INR as of 4/6/2017     Today's INR 2.3      Anticoagulation Summary as of 4/6/2017     INR goal 2.0-3.0   Today's INR 2.3   Full instructions 6 mg on Mon, Wed, Fri; 8 mg all other days   Next INR check 4/24/2017    Indications   Chronic anticoagulation [Z79.01]  Chronic atrial fibrillation (H) [I48.2]  Long-term (current) use of anticoagulants [Z79.01] [Z79.01]         Description     Warfarin dose: 6mg MWF and 8mg the rest of the days of the week.        Contact Numbers     Please call [phone] to cancel and/or reschedule your appointment.  Please call [phone] with any problems or questions regarding your therapy          April 2017 Details    Sun Mon Tue Wed Thu Fri Sat           1                 2               3               4               5               6      8 mg   See details      7      6 mg         8      8 mg           9      8 mg         10      6 mg         11      8 mg         12      6 mg         13      8 mg         14      6 mg         15      8 mg           16      8 mg         17      6 mg         18      8 mg         19      6 mg         20      8 mg         21      6 mg         22      8 mg           23      8 mg         24            25               26               27               28               29                 30                      Date Details   04/06 This INR check       Date of next INR:  4/24/2017         How to take your warfarin dose     To take:  6 mg Take 3 of the 2 mg tablets.    To take:  8 mg Take 4 of the 2 mg tablets.

## 2017-04-07 ENCOUNTER — OFFICE VISIT (OUTPATIENT)
Dept: FAMILY MEDICINE | Facility: CLINIC | Age: 82
End: 2017-04-07
Payer: MEDICARE

## 2017-04-07 VITALS
SYSTOLIC BLOOD PRESSURE: 100 MMHG | BODY MASS INDEX: 25.7 KG/M2 | WEIGHT: 147.4 LBS | TEMPERATURE: 96.7 F | DIASTOLIC BLOOD PRESSURE: 50 MMHG | HEART RATE: 72 BPM

## 2017-04-07 DIAGNOSIS — F33.1 MODERATE EPISODE OF RECURRENT MAJOR DEPRESSIVE DISORDER (H): ICD-10-CM

## 2017-04-07 DIAGNOSIS — H93.13 TINNITUS, BILATERAL: ICD-10-CM

## 2017-04-07 DIAGNOSIS — G62.9 PERIPHERAL POLYNEUROPATHY: ICD-10-CM

## 2017-04-07 DIAGNOSIS — E53.8 VITAMIN B12 DEFICIENCY (NON ANEMIC): ICD-10-CM

## 2017-04-07 DIAGNOSIS — F41.9 ANXIETY: Primary | ICD-10-CM

## 2017-04-07 DIAGNOSIS — M79.672 LEFT FOOT PAIN: ICD-10-CM

## 2017-04-07 DIAGNOSIS — H91.93 HEARING LOSS, BILATERAL: ICD-10-CM

## 2017-04-07 PROCEDURE — 99213 OFFICE O/P EST LOW 20 MIN: CPT | Performed by: FAMILY MEDICINE

## 2017-04-07 RX ORDER — MIRTAZAPINE 15 MG/1
7.5 TABLET, FILM COATED ORAL AT BEDTIME
Qty: 15 TABLET | Refills: 1 | Status: SHIPPED | OUTPATIENT
Start: 2017-04-07 | End: 2017-04-14

## 2017-04-07 NOTE — MR AVS SNAPSHOT
After Visit Summary   4/7/2017    Nabor Cunningham    MRN: [id number]           Patient Information     Date Of Birth          1935        Visit Information        Provider Department      4/7/2017 9:40 AM Heron Mayo MD [facility]        Today's Diagnoses     Anxiety    -  1    Moderate episode of recurrent major depressive disorder (H)        Vitamin B12 deficiency (non anemic)        Tinnitus, bilateral        Hearing loss, bilateral        Peripheral polyneuropathy (H)        Left foot pain          Care Instructions    ASSESSMENT:   (F41.9) Anxiety  (primary encounter diagnosis)  Comment:   Plan: HOME CARE NURSING REFERRAL        Start mirtazapine at 1/2 pill (7.5mg) once daily at bedtime.  This will help for anxiety and depression.   See me again in a month.    Call if side effects or problems with medication.     (F33.1) Moderate episode of recurrent major depressive disorder (H)  Comment: see above   Plan: mirtazapine (REMERON) 15 MG tablet, HOME CARE         NURSING REFERRAL          (E53.8) Vitamin B12 deficiency (non anemic)  Comment:   Plan: Cyanocobalamin (B-12) 1000 MCG TBCR        Refills    (H93.13) Tinnitus, bilateral  Comment:   Plan: AUDIOLOGY ADULT REFERRAL        Schedule an appointment with audiologist for hearing evaluation     (H91.93) Hearing loss, bilateral  Comment:   Plan: AUDIOLOGY ADULT REFERRAL          (G62.9) Peripheral polyneuropathy (H)  Comment: chronic foot pain  Plan: Continue current nortriptyline.  We might in crease in future    (M79.832) Left foot pain  Comment:   Plan: PODIATRY/FOOT & ANKLE SURGERY REFERRAL        Schedule an appointment with the foot specialist.        Follow-ups after your visit        Additional Services     AUDIOLOGY ADULT REFERRAL       Your provider has referred you to: [facility] [phone]   [email or web address]    Specialty Testing:  Audiogram  w/Tymps and Reflexes (Comprehensive Audiology Evaluation)  Hearing loss, possible tinnitus.            HOME CARE NURSING REFERRAL       **Order classes of: [place] [facility], [facility] and [facility] will route to the Home Care and Hospice Referral Pool.  Home Care or Hospice will then contact the patient to schedule their appointment.**    If you do not hear from Home Care and Hospice, or you would like to call to schedule, please call the referring place of service that your provider has listed below.  ______________________________________________________________________    Your provider has referred you to: [facility]: [facility] [phone]   [email or web address]    Extended Emergency Contact Information  Primary Emergency Contact: [email or web address]  Address: [address]  Home Phone: [phone]  Relation: Daughter    Patient Anticipated Discharge Date: na   RN, PT, HHA to begin 24 - 48 hours after discharge.  PLEASE EVALUATE AND TREAT (Evaluation timeline is 24 - 48 hrs. Please call if there is need for a variance to this timeline).    REASON FOR REFERRAL: Assessment & Treatment: RN.  Needs help with med set up.     ADDITIONAL SERVICES NEEDED:     OTHER PERTINENT INFORMATION: Patient was last seen by provider on 4/7/2017 for multiple problems including depression and anxiety.    Current Outpatient Prescriptions:  Cyanocobalamin (B-12) 1000 MCG TBCR, Take 1,000 mcg by mouth daily, Disp: 100 tablet, Rfl: 3  mirtazapine (REMERON) 15 MG tablet, Take 0.5 tablets (7.5 mg) by mouth At Bedtime, Disp: 15 tablet, Rfl: 1  colchicine (COLCRYS) 0.6 MG tablet, Take 0.6 mg by mouth daily Reported on 4/7/2017.  Insurance did not cover, so taking another brand, Disp: 30 tablet, Rfl: 0  metoprolol (TOPROL-XL) 25 MG 24 hr tablet, Take 1 tablet (25 mg) by mouth daily, Disp: 90 tablet, Rfl: 1  multivitamin, therapeutic with  minerals (MULTI-VITAMIN) TABS tablet, Take 1 tablet by mouth daily, Disp: 100 tablet, Rfl: 3  nortriptyline (PAMELOR) 25 MG capsule, Take 1 capsule (25 mg) by mouth At Bedtime, Disp: 30 capsule, Rfl: 1  pantoprazole (PROTONIX) 40 MG EC tablet, Take 1 tablet (40 mg) by mouth daily Take 30-60 minutes before a meal., Disp: 30 tablet, Rfl: 1  temazepam (RESTORIL) 30 MG capsule, Take 1 capsule (30 mg) by mouth nightly as needed for sleep, Disp: 15 capsule, Rfl:   warfarin (COUMADIN) 2 MG tablet, Take 1 tablet (2 mg) by mouth daily Take 6 mg by mouth 3 days a week and 8 mg 4 days per week as directed by INR  Clinic., Disp: 30 tablet, Rfl:       Patient Active Problem List:     Anxiety     Alcohol abuse     Chronic atrial fibrillation (H)     Weight loss     Peripheral polyneuropathy (H)     Chronic gout, unspecified cause, unspecified site     Essential hypertension with goal blood pressure less than 140/90     Hyperlipidemia LDL goal <130     Persistent insomnia     Lower urinary tract symptoms (LUTS)     Chronic anticoagulation     Long-term (current) use of anticoagulants [Z79.01]      Documentation of Face to Face and Certification for Home Health Services    I certify that patient, Nabor Cunningham is under my care and that I, or a Nurse Practitioner or Physician's Assistant working with me, had a face-to-face encounter that meets the physician face-to-face encounter requirements with this patient on: 4/7/2017 .    This encounter with the patient was in whole, or in part, for the following medical condition, which is the primary reason for Home Health Care: depression and anxiety.    I certify that, based on my findings, the following services are medically necessary Home Health Services: Nursing    My clinical findings support the need for the above services because: Nurse is needed: To provide assessment and oversight required in the home to assure adherence to the medical plan due to: med compliance..    Further, I  certify that my clinical findings support that this patient is homebound (i.e. absences from home require considerable and taxing effort and are for medical reasons or [employer] services or infrequently or of short duration when for other reasons) because: does not drive, lives alone.    Based on the above findings, I certify that this patient is confined to the home and needs intermittent skilled nursing care, physical therapy and/or speech therapy.  The patient is under my care, and I have initiated the establishment of the plan of care.  This patient will be followed by a physician who will periodically review the plan of care.    Physician/Provider to provide follow up care: Heron Mayo certified Physician at time of discharge:     Please be aware that coverage of these services is subject to the terms and limitations of your health insurance plan.  Call member services at your health plan with any benefit or coverage questions.            PODIATRY/FOOT & ANKLE SURGERY REFERRAL       Your provider has referred you to: [facility]: [facility] [phone]   [email or web address]    Chronic metatarsal pain.     Please be aware that coverage of these services is subject to the terms and limitations of your health insurance plan.  Call member services at your health plan with any benefit or coverage questions.      Please bring the following to your appointment:  >>   Any x-rays, CTs or MRIs which have been performed.  Contact the facility where they were done to arrange for  prior to your scheduled appointment.    >>   List of current medications   >>   This referral request   >>   Any documents/labs given to you for this referral                  Your next 10 appointments already scheduled     Apr 24, 2017 11:15 AM CDT   Anticoagulation Visit with NB ANTI COAG   [facility]    [address] [place] [id number]   [phone]              Who to contact     If you have questions or need follow up information about today's clinic visit or your schedule please contact [facility] directly at [phone].  Normal or non-critical lab and imaging results will be communicated to you by MyChart, letter or phone within 4 business days after the clinic has received the results. If you do not hear from us within 7 days, please contact the clinic through MyChart or phone. If you have a critical or abnormal lab result, we will notify you by phone as soon as possible.  Submit refill requests through [employer] or call your pharmacy and they will forward the refill request to us. Please allow 3 business days for your refill to be completed.          Additional Information About Your Visit        MyChart Information     [employer] lets you send messages to your doctor, view your test results, renew your prescriptions, schedule appointments and more. To sign up, go to [email or web address] . Click on \"Log in\" on the left side of the screen, which will take you to the Welcome page. Then click on \"Sign up Now\" on the right side of the page.     You will be asked to enter the access code listed below, as well as some personal information. Please follow the directions to create your username and password.     Your access code is: [email or web address]  Expires: 2017 12:20 PM     Your access code will  in 90 days. If you need help or a new code, please call your [facility] or [phone].        Care EveryWhere ID     This is your Care EveryWhere ID. This could be used by other organizations to access your [place] medical records  [id number]        Your Vitals Were     Pulse Temperature BMI (Body Mass Index)             72 96.7  F (35.9  C) (Tympanic) 25.7 kg/m2          Blood Pressure from Last 3 Encounters:   17 100/50   17 120/70    Weight from Last 3 Encounters: "   04/07/17 147 lb 6.4 oz (66.9 kg)   03/22/17 146 lb (66.2 kg)              We Performed the Following     AUDIOLOGY ADULT REFERRAL     HOME CARE NURSING REFERRAL     PODIATRY/FOOT & ANKLE SURGERY REFERRAL          Today's Medication Changes          These changes are accurate as of: 4/7/17 10:54 AM.  If you have any questions, ask your nurse or doctor.               Start taking these medicines.        Dose/Directions    mirtazapine 15 MG tablet   Commonly known as:  REMERON   Used for:  Moderate episode of recurrent major depressive disorder (H)   Started by:  Heron Mayo MD        Dose:  7.5 mg   Take 0.5 tablets (7.5 mg) by mouth At Bedtime   Quantity:  15 tablet   Refills:  1            Where to get your medicines      These medications were sent to [id number] [place], [place] - [address]  [address] [address]    Hours:  Closed 10-16-08 business to [facility] Phone:  [phone]     B-12 1000 MCG Tbcr    mirtazapine 15 MG tablet                Primary Care Provider Office Phone # Fax #    Heron Mayo [phone]       [facility] [address] [facility] [address]        Thank you!     Thank you for choosing [facility]  for your care. Our goal is always to provide you with excellent care. Hearing back from our patients is one way we can continue to improve our services. Please take a few minutes to complete the written survey that you may receive in the mail after your visit with us. Thank you!             Your Updated Medication List - Protect others around you: Learn how to safely use, store and throw away your medicines at www.disposemymeds.org.          This list is accurate as of: 4/7/17 10:54 AM.  Always use your most recent med list.                   Brand Name Dispense Instructions for use    B-12 1000 MCG Tbcr     100 tablet    Take 1,000 mcg by mouth daily       COLCRYS 0.6 MG tablet   Generic  drug:  colchicine     30 tablet    Take 0.6 mg by mouth daily Reported on 4/7/2017. Insurance did not cover, so taking another brand       metoprolol 25 MG 24 hr tablet    TOPROL-XL    90 tablet    Take 1 tablet (25 mg) by mouth daily       mirtazapine 15 MG tablet    REMERON    15 tablet    Take 0.5 tablets (7.5 mg) by mouth At Bedtime       Multi-vitamin Tabs tablet     100 tablet    Take 1 tablet by mouth daily       nortriptyline 25 MG capsule    PAMELOR    30 capsule    Take 1 capsule (25 mg) by mouth At Bedtime       PROTONIX 40 MG EC tablet   Generic drug:  pantoprazole     30 tablet    Take 1 tablet (40 mg) by mouth daily Take 30-60 minutes before a meal.       temazepam 30 MG capsule    RESTORIL    15 capsule    Take 1 capsule (30 mg) by mouth nightly as needed for sleep       warfarin 2 MG tablet    COUMADIN    30 tablet    Take 1 tablet (2 mg) by mouth daily Take 6 mg by mouth 3 days a week and 8 mg 4 days per week as directed by INR  Clinic.

## 2017-04-07 NOTE — PROGRESS NOTES
"  SUBJECTIVE:                                                    Nabor Cunningham is a 81 year old male who presents to clinic today for the following health issues:    Joint Pain     Onset: past few days.     Description:   Location: right wrist/ hand   Character: Dull ache, stiffness     Intensity: moderate    Progression of Symptoms: same    Accompanying Signs & Symptoms:  Other symptoms: swelling and redness   History:   Previous similar pain: no       Precipitating factors:   Trauma or overuse: YES- was on the ground trying to find the cat under the bed.  Usually uses a pillow, though didn't this time.  Pushed himself up.     Alleviating factors:  Improved by: nothing       Therapies Tried and outcome:ice yesterday       Medication Followup of Nortriptyline    Taking Medication as prescribed: yes    Side Effects:  None    Medication Helping Symptoms:  NO-still having pain.  What about increasing to 30 mg?      Has pounding pain plantar left foot.    Aggravating factors: walking or standing.   Currently on 25mg.    Feels like a sharp pain.  More with weight bearing.     Referral for Home Care to set up medications?  Currently son setting up medications.  Has been needing reminders to take medication.   He has pill boxes.     \"Nerves\" still bother him.  If gets anxious-stomach bothers.  If \"calm and cool\" does better.  Gets anxious going out.     Living in [facility].    Drinking beer about one beer a day.     Needs refills on B12  Had bruising and swelling right forearm.   Continuing music in head.   He has not had hearing tested.   He hears tunes which can change.  One of the songs is the \"[place] Po[place]\".  Gets louder on weekends.  More of a problem when alone.     Onset of symptoms: 2 and 1/2 to 3 weeks.     Patient Active Problem List   Diagnosis     Anxiety     Alcohol abuse     Chronic atrial fibrillation (H)     Weight loss     Peripheral polyneuropathy (H)     Chronic gout, unspecified cause, unspecified " site     Essential hypertension with goal blood pressure less than 140/90     Hyperlipidemia LDL goal <130     Persistent insomnia     Lower urinary tract symptoms (LUTS)     Chronic anticoagulation     Long-term (current) use of anticoagulants [Z79.01]      OBJECTIVE:Blood pressure 100/50, pulse 72, temperature 96.7  F (35.9  C), temperature source Tympanic, weight 147 lb 6.4 oz (66.9 kg). BMI=Body mass index is 25.7 kg/(m^2).  GENERAL APPEARANCE ADULT: Alert, no acute distress  NECK: No adenopathy,masses or thyromegaly  RESP: lungs clear to auscultation   CV: normal rate, regular rhythm, no murmur or gallop  MS: extremities normal, no peripheral edema.  Callous ball of left foot and prominent metatarsal heads.  Left toes do not touch ground standing.  Tender metatarsal heads.   PSYCH: mentation appears normal., affect and mood normal, anxious     ASSESSMENT:   (F41.9) Anxiety  (primary encounter diagnosis)  Comment:   Plan: HOME CARE NURSING REFERRAL        Start mirtazapine at 1/2 pill (7.5mg) once daily at bedtime.  This will help for anxiety and depression.   See me again in a month.    Call if side effects or problems with medication.     (F33.1) Moderate episode of recurrent major depressive disorder (H)  Comment: see above   Plan: mirtazapine (REMERON) 15 MG tablet, HOME CARE         NURSING REFERRAL          (E53.8) Vitamin B12 deficiency (non anemic)  Comment:   Plan: Cyanocobalamin (B-12) 1000 MCG TBCR        Refills    (H93.13) Tinnitus, bilateral  Comment:   Plan: AUDIOLOGY ADULT REFERRAL        Schedule an appointment with audiologist for hearing evaluation     (H91.93) Hearing loss, bilateral  Comment:   Plan: AUDIOLOGY ADULT REFERRAL          (G62.9) Peripheral polyneuropathy (H)  Comment: chronic foot pain  Plan: Continue current nortriptyline.  We might in crease in future    (M79.672) Left foot pain  Comment:   Plan: PODIATRY/FOOT & ANKLE SURGERY REFERRAL        Schedule an appointment with the  foot specialist.

## 2017-04-07 NOTE — NURSING NOTE
"Chief Complaint   Patient presents with     Recheck Medication     Musculoskeletal Problem     right wrist     Referral     Home care        Initial /50 (BP Location: Right arm, Cuff Size: Adult Regular)  Pulse 72  Temp 96.7  F (35.9  C) (Tympanic)  Wt 147 lb 6.4 oz (66.9 kg)  BMI 25.7 kg/m2 Estimated body mass index is 25.7 kg/(m^2) as calculated from the following:    Height as of 3/22/17: 5' 3.5\" (1.613 m).    Weight as of this encounter: 147 lb 6.4 oz (66.9 kg).  Medication Reconciliation: complete    Health Maintenance that is potentially due pending provider review:  NONE    N/a  Pao Lambert M.A.        "

## 2017-04-07 NOTE — PATIENT INSTRUCTIONS
ASSESSMENT:   (F41.9) Anxiety  (primary encounter diagnosis)  Comment:   Plan: HOME CARE NURSING REFERRAL        Start mirtazapine at 1/2 pill (7.5mg) once daily at bedtime.  This will help for anxiety and depression.   See me again in a month.    Call if side effects or problems with medication.     (F33.1) Moderate episode of recurrent major depressive disorder (H)  Comment: see above   Plan: mirtazapine (REMERON) 15 MG tablet, HOME CARE         NURSING REFERRAL          (E53.8) Vitamin B12 deficiency (non anemic)  Comment:   Plan: Cyanocobalamin (B-12) 1000 MCG TBCR        Refills    (H93.13) Tinnitus, bilateral  Comment:   Plan: AUDIOLOGY ADULT REFERRAL        Schedule an appointment with audiologist for hearing evaluation     (H91.93) Hearing loss, bilateral  Comment:   Plan: AUDIOLOGY ADULT REFERRAL          (G62.9) Peripheral polyneuropathy (H)  Comment: chronic foot pain  Plan: Continue current nortriptyline.  We might in crease in future    (X99.755) Left foot pain  Comment:   Plan: PODIATRY/FOOT & ANKLE SURGERY REFERRAL        Schedule an appointment with the foot specialist.

## 2017-04-10 ENCOUNTER — OFFICE VISIT (OUTPATIENT)
Dept: FAMILY MEDICINE | Facility: CLINIC | Age: 82
End: 2017-04-10
Payer: MEDICARE

## 2017-04-10 ENCOUNTER — RADIANT APPOINTMENT (OUTPATIENT)
Dept: GENERAL RADIOLOGY | Facility: CLINIC | Age: 82
End: 2017-04-10
Attending: NURSE PRACTITIONER
Payer: MEDICARE

## 2017-04-10 ENCOUNTER — TELEPHONE (OUTPATIENT)
Dept: FAMILY MEDICINE | Facility: CLINIC | Age: 82
End: 2017-04-10

## 2017-04-10 VITALS
TEMPERATURE: 98.6 F | DIASTOLIC BLOOD PRESSURE: 84 MMHG | HEART RATE: 98 BPM | OXYGEN SATURATION: 96 % | SYSTOLIC BLOOD PRESSURE: 140 MMHG

## 2017-04-10 DIAGNOSIS — M79.89 SWELLING OF RIGHT HAND: ICD-10-CM

## 2017-04-10 DIAGNOSIS — M25.531 RIGHT WRIST PAIN: Primary | ICD-10-CM

## 2017-04-10 DIAGNOSIS — M25.531 RIGHT WRIST PAIN: ICD-10-CM

## 2017-04-10 DIAGNOSIS — M10.9 ACUTE GOUTY ARTHRITIS: ICD-10-CM

## 2017-04-10 PROBLEM — F33.1 MODERATE EPISODE OF RECURRENT MAJOR DEPRESSIVE DISORDER (H): Status: ACTIVE | Noted: 2017-04-10

## 2017-04-10 PROCEDURE — 73130 X-RAY EXAM OF HAND: CPT | Mod: RT

## 2017-04-10 PROCEDURE — 99213 OFFICE O/P EST LOW 20 MIN: CPT | Performed by: NURSE PRACTITIONER

## 2017-04-10 RX ORDER — PREDNISONE 20 MG/1
TABLET ORAL
Qty: 20 TABLET | Refills: 0 | Status: SHIPPED | OUTPATIENT
Start: 2017-04-10 | End: 2017-04-22

## 2017-04-10 NOTE — TELEPHONE ENCOUNTER
S-(situation): Pts family declined home care.    B-(background): A referral was sent through [employer] for the patient requesting a home care assessment.    A-(assessment): [facility] left messages for the patient's daughter over the weekend. This SN then talked with the daughter on the phone today. This SN talked with her about homecare requirements (homebound, skilled need). Per talking with the daughter, they are wanting someone to assist with medication set up (which is not a skilled need per Medicare). This SN stated that [facility] would be able to assess the patient in person to more fully determine whether he would qualify for services, but the daughter declined stating they aren't wanting anything beyond medication set up. This SN provided [facility] private pay rates and also provided contact information for [facility] and [facility] as two potential options for assistance with medication setup. This SN asked if the patient has any other paysource including [place]. Per the daughter, the patient had [place] when he lived in [place] different county, but he just moved to [facility] [place] [facility].    R-(recommendations): In talking with the family, the patient would benefit from care coordination by either SN/SW at the clinic level. Please enter an order for care coordination so that the patient/family can be assisted with finding out any additional community resources that would benefit them. Thank you for the referral.

## 2017-04-10 NOTE — PROGRESS NOTES
SUBJECTIVE:                                                    Nabor Cunningham is a 81 year old male who presents to clinic today for the following health issues:      Musculoskeletal problem/pain      Duration: happened last night     Description  Location: right hand, wrist and half way up arm     Intensity:  moderate    Accompanying signs and symptoms: numbness, warmth, swelling and redness    History  Previous similar problem: no   Previous evaluation:  none    Precipitating or alleviating factors:  Trauma or overuse: YES- put all his weight on this arm   Aggravating factors include: moving it too much     Therapies tried and outcome: ice, tylenol      Strained it a couple of days ago leaning on it looking under bed for his cat. Got worse last night after he stretched it around behind him to use his toilet paper. Now red and swollen.      Problem list and histories reviewed & adjusted, as indicated.  Additional history: as documented    Patient Active Problem List   Diagnosis     Anxiety     Alcohol abuse     Chronic atrial fibrillation (H)     Weight loss     Peripheral polyneuropathy (H)     Chronic gout, unspecified cause, unspecified site     Essential hypertension with goal blood pressure less than 140/90     Hyperlipidemia LDL goal <130     Persistent insomnia     Lower urinary tract symptoms (LUTS)     Chronic anticoagulation     Long-term (current) use of anticoagulants [Z79.01]     History reviewed. No pertinent surgical history.    Social History   Substance Use Topics     Smoking status: Former Smoker     Years: 20.00     Types: Cigarettes     Smokeless tobacco: Not on file     Alcohol use Not on file     History reviewed. No pertinent family history.      Current Outpatient Prescriptions   Medication Sig Dispense Refill     predniSONE (DELTASONE) 20 MG tablet Take 3 tabs (60 mg) by mouth daily x 3 days, 2 tabs (40 mg) daily x 3 days, 1 tab (20 mg) daily x 3 days, then 1/2 tab (10 mg) x 3 days. 20  tablet 0     Cyanocobalamin (B-12) 1000 MCG TBCR Take 1,000 mcg by mouth daily 100 tablet 3     mirtazapine (REMERON) 15 MG tablet Take 0.5 tablets (7.5 mg) by mouth At Bedtime 15 tablet 1     colchicine (COLCRYS) 0.6 MG tablet Take 0.6 mg by mouth daily Reported on 4/7/2017.  Insurance did not cover, so taking another brand 30 tablet 0     metoprolol (TOPROL-XL) 25 MG 24 hr tablet Take 1 tablet (25 mg) by mouth daily 90 tablet 1     multivitamin, therapeutic with minerals (MULTI-VITAMIN) TABS tablet Take 1 tablet by mouth daily 100 tablet 3     nortriptyline (PAMELOR) 25 MG capsule Take 1 capsule (25 mg) by mouth At Bedtime 30 capsule 1     pantoprazole (PROTONIX) 40 MG EC tablet Take 1 tablet (40 mg) by mouth daily Take 30-60 minutes before a meal. 30 tablet 1     temazepam (RESTORIL) 30 MG capsule Take 1 capsule (30 mg) by mouth nightly as needed for sleep 15 capsule      warfarin (COUMADIN) 2 MG tablet Take 1 tablet (2 mg) by mouth daily Take 6 mg by mouth 3 days a week and 8 mg 4 days per week as directed by INR  Clinic. 30 tablet      Allergies   Allergen Reactions     Penicillins      Trazodone      Labs reviewed in EPIC    Reviewed and updated as needed this visit by clinical staff  Tobacco  Allergies  Meds  Problems  Med Hx  Surg Hx  Fam Hx  Soc Hx        Reviewed and updated as needed this visit by Provider  Allergies  Meds  Problems         ROS:  Constitutional, HEENT, cardiovascular, pulmonary, GI, , musculoskeletal, neuro, skin, endocrine and psych systems are negative, except as otherwise noted.    OBJECTIVE:                                                    /84  Pulse 98  Temp 98.6  F (37  C) (Tympanic)  SpO2 96%  There is no height or weight on file to calculate BMI.  GENERAL: healthy, alert and no distress, nontoxic in appearance  EYES: Eyes grossly normal to inspection,  conjunctivae and sclerae normal  HENT: normocephalic  NECK: supple with full ROM  RESP: lungs clear to  auscultation - no rales, rhonchi or wheezes  CV: regular rate and rhythm, normal S1 S2, no S3 or S4, no murmur, click or rub, no peripheral edema ABDOMEN: soft, nontender  MS: right wrist is swollen with redness and warmth. Extends up forearm a couple of inches and down into hand which is also mildly swollen and hot.  Reviewed case and xrays with Dr. Mayo. Most likely gout flare. Will tx with pred since he is on coumadiin.    Diagnostic Test Results:  No results found for this or any previous visit (from the past 24 hour(s)).     ASSESSMENT/PLAN:                                                      Problem List Items Addressed This Visit     None      Visit Diagnoses     Right wrist pain    -  Primary    Acute gouty arthritis        Relevant Medications    predniSONE (DELTASONE) 20 MG tablet               Patient Instructions     Take your prednisone as directed.     Follow up with Dr. Mayo this week if your symptoms worsen.    Otherwise see him next week if symptoms do not completely resolve.      Indications for emergent return to emergency department discussed with patient, who verbalized good understanding and agreement.  Patient understands the limitations of today's evaluation.         Treating Gout Attacks  Gout is a disease that affects the joints. Left untreated, it can lead to painful foot deformity and even kidney problems. But, by treating gout early, you can relieve pain and help prevent future problems. Gout can usually be treated with medication and proper diet. In severe cases, surgery may be needed.  Gout attacks are painful and often happen more than once. Taking medications may reduce pain and prevent attacks in the future. There are also some things you can do at home to relieve symptoms.    Medications for Gout  Your health care provider may prescribe a daily medication to reduce levels of uric acid. This may help prevent gout attacks. Other medications can help relieve pain and swelling during  an attack. Be sure to take your medication as directed.  What You Can Do  Below are some things you can do at home to relieve gout symptoms. Your health care provider may have other tips.    Rest the painful joint as much as you can.    Raise the painful joint so it is at a level higher than your heart.  How Can I Prevent Gout?  With a little effort, you may be able to prevent gout attacks in the future. Here are some things you can do:    Avoid alcohol and foods that trigger gout.    Avoid foods high in purines    Certain meats (red meat, processed meat, turkey)    Organ meats (kidney, liver, sweetbread)    Shellfish (lobster, crab, shrimp, scallop, mussel)    Certain fish (anchovy, sardine, herring, mackerel)    Take any medications prescribed by your health care provider.    Lose weight if you need to.    Control blood pressure and cholesterol.    Drink plenty of water to help flush uric acid from your body.    5250-0820 [employer]. [address] [place] [address]. All rights reserved. This information is not intended as a substitute for professional medical care. Always follow your healthcare professional's instructions.        Gout Diet  Gout is a painful condition caused by an excess of uric acid, a waste product made by the body. Uric acid forms crystals that collect in the joints. This brings on symptoms of joint pain and swelling. This is called a gout attack. Often, medications and diet changes are combined to manage gout. Below are some guidelines for changing your diet to help you manage gout and prevent attacks. Your health care provider will help you determine the best eating plan for you.     Limiting or avoiding certain foods can help prevent gout attacks.   Eating to manage gout  Weight loss for those who are overweight may help reduce gout attacks.  Eat less of these foods  Eating too many foods containing purines may raise the levels of uric acid in your body. This raises  your risk for a gout attack. Try to limit these foods and drinks:    Alcohol, such as beer and red wine. You may be told to avoid alcohol completely.    Soft drinks that contain sugar or high fructose corn syrup    Certain fish, including anchovies, sardines, fish eggs, and herring    Certain meats, such as red meat, hot dogs, luncheon meats, and turkey    Organ meats, such as liver, kidneys, and sweetbreads    Legumes, such as dried beans and peas    Mushrooms, spinach, asparagus, and cauliflower    Other high fat foods such as gravy, whole milk, and high fat cheeses  Eat more of these foods  Other foods may be helpful for people with gout. Add some of these foods to your diet:    Dark berries, such as blueberries, blackberries, and cherries. These contain chemicals that may lower uric acid.    Tofu, a source of protein made from soy. Studies have shown that it may be a better choice than meat for people with gout.    Omega fatty acids. These are found in some fatty fish such as salmon, certain oils (flax, olive, or nut), and nuts themselves. Omega fatty acids may help prevent inflammation due to gout.    Dairy products that are low-fat or fat-free, such as cheese and yogurt    Complex carbohydrate foods, including whole grains, brown rice, oats, and beans    Coffee, in moderation  Follow-up care  Follow up with your health care provider, or as advised.  When to seek medical advice  Call your health care provider right away if any of these occur:    Return of gout symptoms, usually at night:    Severe pain, swelling, and heat in a joint, especially the base of the big toe    Affected joint is hard to move    Skin of the affected joint is purple or red    Fever of 100.4 F (38 C) or higher    Pain that doesn't get better even with prescribed medicine     5793-8144 [employer]. [address] [place] [address], [place] [address]. All rights reserved. This information is not intended as a substitute for  professional medical care. Always follow your healthcare professional's instructions.        Eating to Prevent Gout  Gout is a painful form of arthritis caused by an excess of uric acid. This is a waste product made by the body. It builds up in the body and forms crystals that collect in the joints, bringing on a gout attack. Alcohol and certain foods can trigger a gout attack. Below are some guidelines for changing your diet to help you manage gout. Your healthcare provider can work with you to determine the best eating plan for you. Know that diet is only one part of managing gout. Take your medicines as prescribed and follow the other guidelines your healthcare provider has given you.  Foods to limit  Eating too many foods containing purines may increase the levels of uric acid in your body and increase your risk for a gout attack. It may be best to limit these high-purine foods:    Alcohol (beer, red wine). You may be told to avoid alcohol completely.    Certain fish (anchovies, sardines, fish roes, herring, tuna, mussels, codfish, scallops, trout, and jim)    Certain meats (red meat, processed meat, hughes, turkey, wild game, and goose)    Sauces and gravies made with meat    Organ meats (such as liver, kidneys, sweetbreads, and tripe)    Legumes (such as dried beans, peas)    Mushrooms, spinach, asparagus, and cauliflower    Yeast and yeast extract supplements  Foods to try  Some foods may be helpful for people with gout. You may want to try adding some of the following foods to your diet:    Dark berries: These include blueberries, blackberries, and cherries. These berries contain chemicals that may lower uric acid.    Tofu: Tofu, which is made from soy, is a good source of protein. Studies have shown that it may be a better choice than meat for people with gout.    Omega fatty acids: These acids are found in fatty fish (such as salmon), certain oils (such as flax, olive, or nut oils), or nuts. They may  help prevent inflammation due to gout.  The following guidelines are recommended by the American Medical Association for people with gout. Your diet should be:    High in fiber, whole grains, fruits, and vegetables.    Low in protein (15% of calories should come from protein. Choose lean sources such as soy, lean meats, and poultry).    Low in fat (no more than 30% of calories should come from fat, with only 10% coming from animal fat).     9294-8453 [employer]. [address] [place] [address]. All rights reserved. This information is not intended as a substitute for professional medical care. Always follow your healthcare professional's instructions.          SAMUEL Harvey SAME DAY PROVIDER  [facility]

## 2017-04-10 NOTE — MR AVS SNAPSHOT
After Visit Summary   4/10/2017    Nabor Cunningham    MRN: [id number]           Patient Information     Date Of Birth          1935        Visit Information        Provider Department      4/10/2017 1:40 PM Shyanne Scott APRN [facility]        Today's Diagnoses     Right wrist pain    -  1    Acute gouty arthritis          Care Instructions    Take your prednisone as directed.     Follow up with Dr. Mayo this week if your symptoms worsen.    Otherwise see him next week if symptoms do not completely resolve.      Indications for emergent return to emergency department discussed with patient, who verbalized good understanding and agreement.  Patient understands the limitations of today's evaluation.         Treating Gout Attacks  Gout is a disease that affects the joints. Left untreated, it can lead to painful foot deformity and even kidney problems. But, by treating gout early, you can relieve pain and help prevent future problems. Gout can usually be treated with medication and proper diet. In severe cases, surgery may be needed.  Gout attacks are painful and often happen more than once. Taking medications may reduce pain and prevent attacks in the future. There are also some things you can do at home to relieve symptoms.    Medications for Gout  Your health care provider may prescribe a daily medication to reduce levels of uric acid. This may help prevent gout attacks. Other medications can help relieve pain and swelling during an attack. Be sure to take your medication as directed.  What You Can Do  Below are some things you can do at home to relieve gout symptoms. Your health care provider may have other tips.    Rest the painful joint as much as you can.    Raise the painful joint so it is at a level higher than your heart.  How Can I Prevent Gout?  With a little effort, you may be able to prevent gout attacks in the future. Here are some things you can  do:    Avoid alcohol and foods that trigger gout.    Avoid foods high in purines    Certain meats (red meat, processed meat, turkey)    Organ meats (kidney, liver, sweetbread)    Shellfish (lobster, crab, shrimp, scallop, mussel)    Certain fish (anchovy, sardine, herring, mackerel)    Take any medications prescribed by your health care provider.    Lose weight if you need to.    Control blood pressure and cholesterol.    Drink plenty of water to help flush uric acid from your body.    2029-2863 [employer]. [address] [place] [address]. All rights reserved. This information is not intended as a substitute for professional medical care. Always follow your healthcare professional's instructions.        Gout Diet  Gout is a painful condition caused by an excess of uric acid, a waste product made by the body. Uric acid forms crystals that collect in the joints. This brings on symptoms of joint pain and swelling. This is called a gout attack. Often, medications and diet changes are combined to manage gout. Below are some guidelines for changing your diet to help you manage gout and prevent attacks. Your health care provider will help you determine the best eating plan for you.     Limiting or avoiding certain foods can help prevent gout attacks.   Eating to manage gout  Weight loss for those who are overweight may help reduce gout attacks.  Eat less of these foods  Eating too many foods containing purines may raise the levels of uric acid in your body. This raises your risk for a gout attack. Try to limit these foods and drinks:    Alcohol, such as beer and red wine. You may be told to avoid alcohol completely.    Soft drinks that contain sugar or high fructose corn syrup    Certain fish, including anchovies, sardines, fish eggs, and herring    Certain meats, such as red meat, hot dogs, luncheon meats, and turkey    Organ meats, such as liver, kidneys, and sweetbreads    Legumes, such as dried  beans and peas    Mushrooms, spinach, asparagus, and cauliflower    Other high fat foods such as gravy, whole milk, and high fat cheeses  Eat more of these foods  Other foods may be helpful for people with gout. Add some of these foods to your diet:    Dark berries, such as blueberries, blackberries, and cherries. These contain chemicals that may lower uric acid.    Tofu, a source of protein made from soy. Studies have shown that it may be a better choice than meat for people with gout.    Omega fatty acids. These are found in some fatty fish such as salmon, certain oils (flax, olive, or nut), and nuts themselves. Omega fatty acids may help prevent inflammation due to gout.    Dairy products that are low-fat or fat-free, such as cheese and yogurt    Complex carbohydrate foods, including whole grains, brown rice, oats, and beans    Coffee, in moderation  Follow-up care  Follow up with your health care provider, or as advised.  When to seek medical advice  Call your health care provider right away if any of these occur:    Return of gout symptoms, usually at night:    Severe pain, swelling, and heat in a joint, especially the base of the big toe    Affected joint is hard to move    Skin of the affected joint is purple or red    Fever of 100.4 F (38 C) or higher    Pain that doesn't get better even with prescribed medicine     5024-4182 [employer]. [address] [place], [place] [id number]. All rights reserved. This information is not intended as a substitute for professional medical care. Always follow your healthcare professional's instructions.        Eating to Prevent Gout  Gout is a painful form of arthritis caused by an excess of uric acid. This is a waste product made by the body. It builds up in the body and forms crystals that collect in the joints, bringing on a gout attack. Alcohol and certain foods can trigger a gout attack. Below are some guidelines for changing your diet to help you manage  gout. Your healthcare provider can work with you to determine the best eating plan for you. Know that diet is only one part of managing gout. Take your medicines as prescribed and follow the other guidelines your healthcare provider has given you.  Foods to limit  Eating too many foods containing purines may increase the levels of uric acid in your body and increase your risk for a gout attack. It may be best to limit these high-purine foods:    Alcohol (beer, red wine). You may be told to avoid alcohol completely.    Certain fish (anchovies, sardines, fish roes, herring, tuna, mussels, codfish, scallops, trout, and jim)    Certain meats (red meat, processed meat, hughes, turkey, wild game, and goose)    Sauces and gravies made with meat    Organ meats (such as liver, kidneys, sweetbreads, and tripe)    Legumes (such as dried beans, peas)    Mushrooms, spinach, asparagus, and cauliflower    Yeast and yeast extract supplements  Foods to try  Some foods may be helpful for people with gout. You may want to try adding some of the following foods to your diet:    Dark berries: These include blueberries, blackberries, and cherries. These berries contain chemicals that may lower uric acid.    Tofu: Tofu, which is made from soy, is a good source of protein. Studies have shown that it may be a better choice than meat for people with gout.    Omega fatty acids: These acids are found in fatty fish (such as salmon), certain oils (such as flax, olive, or nut oils), or nuts. They may help prevent inflammation due to gout.  The following guidelines are recommended by the American Medical Association for people with gout. Your diet should be:    High in fiber, whole grains, fruits, and vegetables.    Low in protein (15% of calories should come from protein. Choose lean sources such as soy, lean meats, and poultry).    Low in fat (no more than 30% of calories should come from fat, with only 10% coming from animal fat).      1651-3467 [employer]. [address] [place] [address], [place] [address]. All rights reserved. This information is not intended as a substitute for professional medical care. Always follow your healthcare professional's instructions.              Follow-ups after your visit        Follow-up notes from your care team     See patient instructions section of the AVS Return in about 1 week (around 4/17/2017) for RN Blood Pressure Check, Follow up with your primary care provider.      Your next 10 appointments already scheduled     Apr 12, 2017 10:20 AM CDT   [facility] Chencho [facility]a D[facility]    [address] [place] [id number]   [phone]            Apr 24, 2017 11:15 AM CDT   Anticoagulation Visit with NB ANTI COAVIRIDIANA   [facility]    [address] [place] [id number] [phone]            May 05, 2017  9:40 AM CDT   SHORT with Heron Mayo MD   [facility]    [address] [place] [id number]   [phone]              Who to contact     If you have questions or need follow up information about today's clinic visit or your schedule please contact [facility] directly at [phone].  Normal or non-critical lab and imaging results will be communicated to you by MyChart, letter or phone within 4 business days after the clinic has received the results. If you do not hear from us within 7 days, please contact the clinic through MyChart or phone. If you have a critical or abnormal lab result, we will notify you by phone as soon as possible.  Submit refill requests through [employer] or call your pharmacy and they will forward the refill request to us. Please allow 3 business days for your refill to be completed.          Additional Information About Your Visit        MyChart Information   "   [employer] lets you send messages to your doctor, view your test results, renew your prescriptions, schedule appointments and more. To sign up, go to [email or web address] . Click on \"Log in\" on the left side of the screen, which will take you to the Welcome page. Then click on \"Sign up Now\" on the right side of the page.     You will be asked to enter the access code listed below, as well as some personal information. Please follow the directions to create your username and password.     Your access code is: [email or web address]  Expires: 2017 12:20 PM     Your access code will  in 90 days. If you need help or a new code, please call your [place] clinic or [phone].        Care EveryWhere ID     This is your Care EveryWhere ID. This could be used by other organizations to access your [place] medical records  [id number]        Your Vitals Were     Pulse Temperature Pulse Oximetry             98 98.6  F (37  C) (Tympanic) 96%          Blood Pressure from Last 3 Encounters:   04/10/17 140/84   17 100/50   17 120/70    Weight from Last 3 Encounters:   17 147 lb 6.4 oz (66.9 kg)   17 146 lb (66.2 kg)              Today, you had the following     No orders found for display         Today's Medication Changes          These changes are accurate as of: 4/10/17  3:06 PM.  If you have any questions, ask your nurse or doctor.               Start taking these medicines.        Dose/Directions    predniSONE 20 MG tablet   Commonly known as:  DELTASONE   Used for:  Acute gouty arthritis   Started by:  Shyanne Scott APRN CNP        Take 3 tabs (60 mg) by mouth daily x 3 days, 2 tabs (40 mg) daily x 3 days, 1 tab (20 mg) daily x 3 days, then 1/2 tab (10 mg) x 3 days.   Quantity:  20 tablet   Refills:  0            Where to get your medicines      These medications were sent to [id number] - [facility] [address] [facility]  [address] [facility] [address]    Hours:  " Closed 10-16-08 business to [facility] Phone:  [phone]     predniSONE 20 MG tablet                Primary Care Provider Office Phone # Fax #    Heron Mayo [phone]       [facility] 1935 [address] [facility] [address]        Thank you!     Thank you for choosing [facility]  for your care. Our goal is always to provide you with excellent care. Hearing back from our patients is one way we can continue to improve our services. Please take a few minutes to complete the written survey that you may receive in the mail after your visit with us. Thank you!             Your Updated Medication List - Protect others around you: Learn how to safely use, store and throw away your medicines at www.disposemymeds.org.          This list is accurate as of: 4/10/17  3:06 PM.  Always use your most recent med list.                   Brand Name Dispense Instructions for use    B-12 1000 MCG Tbcr     100 tablet    Take 1,000 mcg by mouth daily       COLCRYS 0.6 MG tablet   Generic drug:  colchicine     30 tablet    Take 0.6 mg by mouth daily Reported on 4/7/2017. Insurance did not cover, so taking another brand       metoprolol 25 MG 24 hr tablet    TOPROL-XL    90 tablet    Take 1 tablet (25 mg) by mouth daily       mirtazapine 15 MG tablet    REMERON    15 tablet    Take 0.5 tablets (7.5 mg) by mouth At Bedtime       Multi-vitamin Tabs tablet     100 tablet    Take 1 tablet by mouth daily       nortriptyline 25 MG capsule    PAMELOR    30 capsule    Take 1 capsule (25 mg) by mouth At Bedtime       predniSONE 20 MG tablet    DELTASONE    20 tablet    Take 3 tabs (60 mg) by mouth daily x 3 days, 2 tabs (40 mg) daily x 3 days, 1 tab (20 mg) daily x 3 days, then 1/2 tab (10 mg) x 3 days.       PROTONIX 40 MG EC tablet   Generic drug:  pantoprazole     30 tablet    Take 1 tablet (40 mg) by mouth daily Take 30-60 minutes before a meal.       temazepam 30 MG capsule     RESTORIL    15 capsule    Take 1 capsule (30 mg) by mouth nightly as needed for sleep       warfarin 2 MG tablet    COUMADIN    30 tablet    Take 1 tablet (2 mg) by mouth daily Take 6 mg by mouth 3 days a week and 8 mg 4 days per week as directed by INR  Clinic.

## 2017-04-10 NOTE — NURSING NOTE
"Chief Complaint   Patient presents with     Musculoskeletal Problem     /84  Pulse 98  Temp 98.6  F (37  C) (Tympanic)  SpO2 96% Estimated body mass index is 25.7 kg/(m^2) as calculated from the following:    Height as of 3/22/17: 5' 3.5\" (1.613 m).    Weight as of 4/7/17: 147 lb 6.4 oz (66.9 kg).  bp completed using cuff size: regular      Health Maintenance that is potentially due pending provider review:  Tetanus and pneumonia shot        "

## 2017-04-10 NOTE — PATIENT INSTRUCTIONS
Take your prednisone as directed.     Follow up with Dr. Mayo this week if your symptoms worsen.    Otherwise see him next week if symptoms do not completely resolve.      Indications for emergent return to emergency department discussed with patient, who verbalized good understanding and agreement.  Patient understands the limitations of today's evaluation.         Treating Gout Attacks  Gout is a disease that affects the joints. Left untreated, it can lead to painful foot deformity and even kidney problems. But, by treating gout early, you can relieve pain and help prevent future problems. Gout can usually be treated with medication and proper diet. In severe cases, surgery may be needed.  Gout attacks are painful and often happen more than once. Taking medications may reduce pain and prevent attacks in the future. There are also some things you can do at home to relieve symptoms.    Medications for Gout  Your health care provider may prescribe a daily medication to reduce levels of uric acid. This may help prevent gout attacks. Other medications can help relieve pain and swelling during an attack. Be sure to take your medication as directed.  What You Can Do  Below are some things you can do at home to relieve gout symptoms. Your health care provider may have other tips.    Rest the painful joint as much as you can.    Raise the painful joint so it is at a level higher than your heart.  How Can I Prevent Gout?  With a little effort, you may be able to prevent gout attacks in the future. Here are some things you can do:    Avoid alcohol and foods that trigger gout.    Avoid foods high in purines    Certain meats (red meat, processed meat, turkey)    Organ meats (kidney, liver, sweetbread)    Shellfish (lobster, crab, shrimp, scallop, mussel)    Certain fish (anchovy, sardine, herring, mackerel)    Take any medications prescribed by your health care provider.    Lose weight if you need to.    Control blood pressure  and cholesterol.    Drink plenty of water to help flush uric acid from your body.    4859-5480 [employer]. [address] [place] [address], [place] [address]. All rights reserved. This information is not intended as a substitute for professional medical care. Always follow your healthcare professional's instructions.        Gout Diet  Gout is a painful condition caused by an excess of uric acid, a waste product made by the body. Uric acid forms crystals that collect in the joints. This brings on symptoms of joint pain and swelling. This is called a gout attack. Often, medications and diet changes are combined to manage gout. Below are some guidelines for changing your diet to help you manage gout and prevent attacks. Your health care provider will help you determine the best eating plan for you.     Limiting or avoiding certain foods can help prevent gout attacks.   Eating to manage gout  Weight loss for those who are overweight may help reduce gout attacks.  Eat less of these foods  Eating too many foods containing purines may raise the levels of uric acid in your body. This raises your risk for a gout attack. Try to limit these foods and drinks:    Alcohol, such as beer and red wine. You may be told to avoid alcohol completely.    Soft drinks that contain sugar or high fructose corn syrup    Certain fish, including anchovies, sardines, fish eggs, and herring    Certain meats, such as red meat, hot dogs, luncheon meats, and turkey    Organ meats, such as liver, kidneys, and sweetbreads    Legumes, such as dried beans and peas    Mushrooms, spinach, asparagus, and cauliflower    Other high fat foods such as gravy, whole milk, and high fat cheeses  Eat more of these foods  Other foods may be helpful for people with gout. Add some of these foods to your diet:    Dark berries, such as blueberries, blackberries, and cherries. These contain chemicals that may lower uric acid.    Tofu, a source of protein made from  soy. Studies have shown that it may be a better choice than meat for people with gout.    Omega fatty acids. These are found in some fatty fish such as salmon, certain oils (flax, olive, or nut), and nuts themselves. Omega fatty acids may help prevent inflammation due to gout.    Dairy products that are low-fat or fat-free, such as cheese and yogurt    Complex carbohydrate foods, including whole grains, brown rice, oats, and beans    Coffee, in moderation  Follow-up care  Follow up with your health care provider, or as advised.  When to seek medical advice  Call your health care provider right away if any of these occur:    Return of gout symptoms, usually at night:    Severe pain, swelling, and heat in a joint, especially the base of the big toe    Affected joint is hard to move    Skin of the affected joint is purple or red    Fever of 100.4 F (38 C) or higher    Pain that doesn't get better even with prescribed medicine     8484-0286 [employer]. [address] [place] [address]. All rights reserved. This information is not intended as a substitute for professional medical care. Always follow your healthcare professional's instructions.        Eating to Prevent Gout  Gout is a painful form of arthritis caused by an excess of uric acid. This is a waste product made by the body. It builds up in the body and forms crystals that collect in the joints, bringing on a gout attack. Alcohol and certain foods can trigger a gout attack. Below are some guidelines for changing your diet to help you manage gout. Your healthcare provider can work with you to determine the best eating plan for you. Know that diet is only one part of managing gout. Take your medicines as prescribed and follow the other guidelines your healthcare provider has given you.  Foods to limit  Eating too many foods containing purines may increase the levels of uric acid in your body and increase your risk for a gout attack. It may  be best to limit these high-purine foods:    Alcohol (beer, red wine). You may be told to avoid alcohol completely.    Certain fish (anchovies, sardines, fish roes, herring, tuna, mussels, codfish, scallops, trout, and jim)    Certain meats (red meat, processed meat, hughes, turkey, wild game, and goose)    Sauces and gravies made with meat    Organ meats (such as liver, kidneys, sweetbreads, and tripe)    Legumes (such as dried beans, peas)    Mushrooms, spinach, asparagus, and cauliflower    Yeast and yeast extract supplements  Foods to try  Some foods may be helpful for people with gout. You may want to try adding some of the following foods to your diet:    Dark berries: These include blueberries, blackberries, and cherries. These berries contain chemicals that may lower uric acid.    Tofu: Tofu, which is made from soy, is a good source of protein. Studies have shown that it may be a better choice than meat for people with gout.    Omega fatty acids: These acids are found in fatty fish (such as salmon), certain oils (such as flax, olive, or nut oils), or nuts. They may help prevent inflammation due to gout.  The following guidelines are recommended by the American Medical Association for people with gout. Your diet should be:    High in fiber, whole grains, fruits, and vegetables.    Low in protein (15% of calories should come from protein. Choose lean sources such as soy, lean meats, and poultry).    Low in fat (no more than 30% of calories should come from fat, with only 10% coming from animal fat).     3281-0244 [employer]. [address] [place] [address], [place] [address]. All rights reserved. This information is not intended as a substitute for professional medical care. Always follow your healthcare professional's instructions.

## 2017-04-11 ENCOUNTER — CARE COORDINATION (OUTPATIENT)
Dept: CARE COORDINATION | Facility: CLINIC | Age: 82
End: 2017-04-11

## 2017-04-11 NOTE — LETTER
Health Care Home - Access Care Plan    About Me  Patient Name:  Nabor Cunningham    YOB: 1935  Age:                            81 year old   Mary MRN:         [id number] Telephone Information:     Home Phone [phone]   Mobile Not on file.       Address:    [address] [address] Email address:  No e-mail address on record      Emergency Contact(s)  Name Relationship Lgmaisha Grd Work Phone Home Phone Mobile Phone   JACKSON YATES Daughter   [phone]              Health Maintenance:      My Access Plan  Medical Emergency 911   Questions or concerns during clinic hours Primary Clinic Line, I will call the clinic directly: Primary Clinic: [facility]- [phone]   24 Hour Appointment Line [phone] or  [phone])  (toll free)   24 Hour Nurse Line [phone] (toll free)   Questions or concerns outside clinic hours 24 Hour Appointment Line, I will call the after-hours on-call line:   [facility] [phone] or [phone] ([phone]) (toll-free)   Preferred Urgent Care     Preferred Hospital     Preferred Pharmacy [id number] [place], [place] - [address]     Behavioral Health Crisis Line Crisis Connection, [phone] or [phone]     My Care Team Members  Patient Care Team       Relationship Specialty Notifications Start End    Heron Mayo MD PCP - General Family Practice  3/2/17     Phone: [phone] Fax: [phone]         [facility] [address] [facility] [address]    Rani Sarkar LSW Clinic Care Coordinator  Admissions 4/11/17     Phone: [phone] Fax: [phone]            My Medical and Care Information  Problem List   Patient Active Problem List   Diagnosis     Anxiety     Alcohol abuse     Chronic atrial fibrillation (H)     Weight loss     Peripheral polyneuropathy (H)     Chronic gout, unspecified cause, unspecified site     Essential hypertension with goal  blood pressure less than 140/90     Hyperlipidemia LDL goal <130     Persistent insomnia     Lower urinary tract symptoms (LUTS)     Chronic anticoagulation     Long-term (current) use of anticoagulants [Z79.01]     Moderate episode of recurrent major depressive disorder (H)      Current Medications and Allergies:  See printed Medication Report

## 2017-04-11 NOTE — PROGRESS NOTES
Clinic Care Coordination Contact  [facility]/Voicemail    Referral Source: Other, specify (home care - pt declined their services)  Clinical Data: Care Coordinator Outreach  Outreach attempted x 1.  Left message on voicemail of daughter as pt did not answerwith call back information and requested return call. - no authorization to discuss protected health information noted in the chart.   Plan: Care Coordinator will mail out care coordination introduction letter with care coordinator contact information and explanation of care coordination services. Care Coordinator will try to reach patient again in 1-2 business days.  TONE Lord, Clinic Care Coordinator 4/11/2017   3:13 PM  [phone]

## 2017-04-11 NOTE — LETTER
[place] [id number] [address] [address]  [phone]      April 11, 2017      Nabor Cunningham  [address] [address]      Dear Nabor,    I am the Clinic Care Coordinator that works with your primary care provider's clinic. I have been trying to reach you recently to introduce Clinic Care Coordination and to see if there was anything I could assist you with.  Below is a description of what Clinic Care Coordination is and how I can further assist you.     The Clinic Care Coordinator role is a Registered Nurse and/or  who understands the health care system. The goal of Clinic Care Coordination is to help you manage your health and improve access to the [facility] in the most efficient manner.  The Registered Nurse can assist you in meeting your health care goals by providing education, coordinating services, and strengthening the communication among your providers. The  can assist you with financial, behavioral, psychosocial, and chemical dependency and counseling/psychiatric resources.    Please feel free to keep this letter and contact information to contact me at [phone] with any further questions or concerns that may arise. We at [facility] are focused on providing you with the highest-quality healthcare experience possible and that all starts with you.     Sincerely,       Rani Sarkar, [facility]  Clinic Care Coordination  [phone]      Enclosed: I have enclosed a copy of a 24 Hour Access Plan. This has helpful phone numbers for you to call when needed. Please keep this in an easy to access place to use as needed.

## 2017-04-12 ENCOUNTER — OFFICE VISIT (OUTPATIENT)
Dept: PODIATRY | Facility: CLINIC | Age: 82
End: 2017-04-12
Payer: MEDICARE

## 2017-04-12 ENCOUNTER — ANTICOAGULATION - GICH (OUTPATIENT)
Dept: INTERNAL MEDICINE | Facility: OTHER | Age: 82
End: 2017-04-12

## 2017-04-12 VITALS — BODY MASS INDEX: 25.1 KG/M2 | HEIGHT: 64 IN | WEIGHT: 147 LBS

## 2017-04-12 DIAGNOSIS — L84 CALLUS OF FOOT: Primary | ICD-10-CM

## 2017-04-12 DIAGNOSIS — I48.0 PAROXYSMAL ATRIAL FIBRILLATION (H): ICD-10-CM

## 2017-04-12 DIAGNOSIS — M79.672 LEFT FOOT PAIN: ICD-10-CM

## 2017-04-12 DIAGNOSIS — M20.42 HAMMER TOE OF LEFT FOOT: ICD-10-CM

## 2017-04-12 PROCEDURE — 99203 OFFICE O/P NEW LOW 30 MIN: CPT | Performed by: PODIATRIST

## 2017-04-12 NOTE — PATIENT INSTRUCTIONS
CALLUS / CORNS / IPKs    When there is excessive friction or pressure on the skin, the body responds by making the skin thicker to protect the deeper structures from becoming exposed. While this works well to protect the deeper structures, the thickened skin can increase pressure and pain.    CALLUS: Flat, diffuse thickening are simple calluses and they are usually caused by friction. Often these are the result of rubbing on a shoe or going barefoot.    CORNS: Calluses with a central core between the toes are called corns. These result from prominent joints on adjacent toes rubbing together. Theses are a symptom of bone malalignment and will always recur unless the underlying bones are addressed surgically.    IPKs: Calluses with a central core on the ball of the foot are usually IPKs (intractable plantar keratosis). These are caused by excessive pressure from the metatarsals, the bones that make up the ball of the foot. Often one of these bones is too long or too prominent.  Again, these will always recur unless the underlying bone issue is addressed. There is no cure for these. They will either go away by themselves, recur, or more could develop.    ROUTINE MAINTENANCE  1. File them down with a pumice stone or callus file a couple times a week.   2. An electric callus removing device. Amope Pedi Perfect Electronic Pedicure Foot File and Callus Remover can be a good option.   3. Lotion can be applied to soften the callus. A urea based cream such as Kersal or Vanicream or thicker cream with shea butter are good options.  4. Toe spacers or toe covers can be used for corns, gel pads can be used for other lesions on the bottom of the foot.   If there is a surgical pathology noted, such as a prominent bone, often this needs to be addressed surgically to minimize recurrence. However, sometimes the lesion simply migrates to another spot after surgery, so it is not a guaranteed cure.

## 2017-04-12 NOTE — LETTER
4/12/2017       RE: Nabor Cunningham  [address] [facility] [address]           Dear Colleague,    Thank you for referring your patient, Nabor Cunningham, to the [facility]. Please see a copy of my visit note below.    PATIENT HISTORY:  Nabor Cunningham is a 81 year old male who presents to clinic for a painful left foot .  The patient describes the pain as sharp stabbing.  The patient relates pain is located on the ball of the left foot.  The patient relates the pain has been present for the past several months.  The patient relates pain with ambulation.  The patient has tried shaving the callus with little relief.       REVIEW OF SYSTEMS:  Constitutional, HEENT, cardiovascular, pulmonary, GI, , musculoskeletal, neuro, skin, endocrine and psych systems are negative, except as otherwise noted.     PAST MEDICAL HISTORY:   Past Medical History:   Diagnosis Date     Anxiety      Gout      Hyperlipemia      Hypertension      Insomnia      Paroxysmal atrial fibrillation (H)         PAST SURGICAL HISTORY: No past surgical history on file.     MEDICATIONS:   Current Outpatient Prescriptions:      predniSONE (DELTASONE) 20 MG tablet, Take 3 tabs (60 mg) by mouth daily x 3 days, 2 tabs (40 mg) daily x 3 days, 1 tab (20 mg) daily x 3 days, then 1/2 tab (10 mg) x 3 days., Disp: 20 tablet, Rfl: 0     Cyanocobalamin (B-12) 1000 MCG TBCR, Take 1,000 mcg by mouth daily, Disp: 100 tablet, Rfl: 3     mirtazapine (REMERON) 15 MG tablet, Take 0.5 tablets (7.5 mg) by mouth At Bedtime, Disp: 15 tablet, Rfl: 1     colchicine (COLCRYS) 0.6 MG tablet, Take 0.6 mg by mouth daily Reported on 4/7/2017. Insurance did not cover, so taking another brand, Disp: 30 tablet, Rfl: 0     metoprolol (TOPROL-XL) 25 MG 24 hr tablet, Take 1 tablet (25 mg) by mouth daily, Disp: 90 tablet, Rfl: 1     multivitamin, therapeutic with minerals (MULTI-VITAMIN) TABS tablet, Take 1 tablet by mouth daily, Disp: 100 tablet, Rfl: 3      "nortriptyline (PAMELOR) 25 MG capsule, Take 1 capsule (25 mg) by mouth At Bedtime, Disp: 30 capsule, Rfl: 1     pantoprazole (PROTONIX) 40 MG EC tablet, Take 1 tablet (40 mg) by mouth daily Take 30-60 minutes before a meal., Disp: 30 tablet, Rfl: 1     temazepam (RESTORIL) 30 MG capsule, Take 1 capsule (30 mg) by mouth nightly as needed for sleep, Disp: 15 capsule, Rfl:      warfarin (COUMADIN) 2 MG tablet, Take 1 tablet (2 mg) by mouth daily Take 6 mg by mouth 3 days a week and 8 mg 4 days per week as directed by INR  Clinic., Disp: 30 tablet, Rfl:      ALLERGIES:    Allergies   Allergen Reactions     Penicillins      Trazodone         SOCIAL HISTORY:   Social History     Social History     Marital status:      Spouse name: N/A     Number of children: N/A     Years of education: N/A     Occupational History     Not on file.     Social History Main Topics     Smoking status: Former Smoker     Years: 20.00     Types: Cigarettes     Smokeless tobacco: Not on file     Alcohol use Not on file     Drug use: No     Sexual activity: Not on file     Other Topics Concern     Not on file     Social History Narrative        FAMILY HISTORY: No family history on file.     EXAM:Vitals: Ht 1.613 m (5' 3.5\")  Wt 66.7 kg (147 lb)  BMI 25.63 kg/m2  BMI= Body mass index is 25.63 kg/(m^2).        General appearance: Patient is alert and fully cooperative with history & exam.  No sign of distress is noted during the visit.     Psychiatric: Affect is pleasant & appropriate.  Patient appears motivated to improve health.     Respiratory: Breathing is regular & unlabored while sitting.     HEENT: Hearing is intact to spoken word.  Speech is clear.  No gross evidence of visual impairment that would impact ambulation.     Dermatologic: Skin is intact to both lower extremities without significant lesions, rash or abrasion.  No paronychia or evidence of soft tissue infection is noted.     Vascular: DP & PT pulses are intact & regular " bilaterally.  No significant edema or varicosities noted.  CFT and skin temperature is normal to both lower extremities.     Neurologic: Lower extremity sensation is intact to light touch.  No evidence of weakness or contracture in the lower extremities.  No evidence of neuropathy.     Musculoskeletal: Patient is ambulatory without assistive device or brace.  No gross ankle deformity noted.  No foot or ankle joint effusion is noted.      Noted pain on palpation of hyperkeratotic skin lesion located on the plantar aspect of the left third metatarsophalangeal joint.  No surrounding erythema noted. No subdermal hemorrhage noted. One notes hammertoe deformities of the lesser toes on the left foot.       ASSESSMENT / PLAN:     ICD-10-CM    1. Callus of foot L84    2. Left foot pain M79.672    3. Hammer toe of left foot M20.42        I have explained to Nabor  about the conditions.  We discussed the nature of the condition as well as the treatment plan and expected length of recovery.  At this time, the hyperkeratotic skin lesion was sharply debrided with a #10 blade down to healthy epidermis. No bleeding noted. The patient was advised to wear silicone shoe insert to better offload the pressure to the underlying metatarsal heads. The patient was instructed to return to the office if the callus returns as painful.      Disclaimer: This note consists of symbols derived from keyboarding, dictation and/or voice recognition software. As a result, there may be errors in the script that have gone undetected. Please consider this when interpreting information found in this chart.       BEAU Guevara D.P.M., F.TOBIAS.C.F.A.S.        Again, thank you for allowing me to participate in the care of your patient.        Sincerely,              Chencho Guevara DPM

## 2017-04-12 NOTE — PROGRESS NOTES
PATIENT HISTORY:  Nabor Cunningham is a 81 year old male who presents to clinic for a painful left foot .  The patient describes the pain as sharp stabbing.  The patient relates pain is located on the ball of the left foot.  The patient relates the pain has been present for the past several months.  The patient relates pain with ambulation.  The patient has tried shaving the callus with little relief.       REVIEW OF SYSTEMS:  Constitutional, HEENT, cardiovascular, pulmonary, GI, , musculoskeletal, neuro, skin, endocrine and psych systems are negative, except as otherwise noted.     PAST MEDICAL HISTORY:   Past Medical History:   Diagnosis Date     Anxiety      Gout      Hyperlipemia      Hypertension      Insomnia      Paroxysmal atrial fibrillation (H)         PAST SURGICAL HISTORY: No past surgical history on file.     MEDICATIONS:   Current Outpatient Prescriptions:      predniSONE (DELTASONE) 20 MG tablet, Take 3 tabs (60 mg) by mouth daily x 3 days, 2 tabs (40 mg) daily x 3 days, 1 tab (20 mg) daily x 3 days, then 1/2 tab (10 mg) x 3 days., Disp: 20 tablet, Rfl: 0     Cyanocobalamin (B-12) 1000 MCG TBCR, Take 1,000 mcg by mouth daily, Disp: 100 tablet, Rfl: 3     mirtazapine (REMERON) 15 MG tablet, Take 0.5 tablets (7.5 mg) by mouth At Bedtime, Disp: 15 tablet, Rfl: 1     colchicine (COLCRYS) 0.6 MG tablet, Take 0.6 mg by mouth daily Reported on 4/7/2017. Insurance did not cover, so taking another brand, Disp: 30 tablet, Rfl: 0     metoprolol (TOPROL-XL) 25 MG 24 hr tablet, Take 1 tablet (25 mg) by mouth daily, Disp: 90 tablet, Rfl: 1     multivitamin, therapeutic with minerals (MULTI-VITAMIN) TABS tablet, Take 1 tablet by mouth daily, Disp: 100 tablet, Rfl: 3     nortriptyline (PAMELOR) 25 MG capsule, Take 1 capsule (25 mg) by mouth At Bedtime, Disp: 30 capsule, Rfl: 1     pantoprazole (PROTONIX) 40 MG EC tablet, Take 1 tablet (40 mg) by mouth daily Take 30-60 minutes before a meal., Disp: 30 tablet, Rfl:  "1     temazepam (RESTORIL) 30 MG capsule, Take 1 capsule (30 mg) by mouth nightly as needed for sleep, Disp: 15 capsule, Rfl:      warfarin (COUMADIN) 2 MG tablet, Take 1 tablet (2 mg) by mouth daily Take 6 mg by mouth 3 days a week and 8 mg 4 days per week as directed by INR  Clinic., Disp: 30 tablet, Rfl:      ALLERGIES:    Allergies   Allergen Reactions     Penicillins      Trazodone         SOCIAL HISTORY:   Social History     Social History     Marital status:      Spouse name: N/A     Number of children: N/A     Years of education: N/A     Occupational History     Not on file.     Social History Main Topics     Smoking status: Former Smoker     Years: 20.00     Types: Cigarettes     Smokeless tobacco: Not on file     Alcohol use Not on file     Drug use: No     Sexual activity: Not on file     Other Topics Concern     Not on file     Social History Narrative        FAMILY HISTORY: No family history on file.     EXAM:Vitals: Ht 1.613 m (5' 3.5\")  Wt 66.7 kg (147 lb)  BMI 25.63 kg/m2  BMI= Body mass index is 25.63 kg/(m^2).        General appearance: Patient is alert and fully cooperative with history & exam.  No sign of distress is noted during the visit.     Psychiatric: Affect is pleasant & appropriate.  Patient appears motivated to improve health.     Respiratory: Breathing is regular & unlabored while sitting.     HEENT: Hearing is intact to spoken word.  Speech is clear.  No gross evidence of visual impairment that would impact ambulation.     Dermatologic: Skin is intact to both lower extremities without significant lesions, rash or abrasion.  No paronychia or evidence of soft tissue infection is noted.     Vascular: DP & PT pulses are intact & regular bilaterally.  No significant edema or varicosities noted.  CFT and skin temperature is normal to both lower extremities.     Neurologic: Lower extremity sensation is intact to light touch.  No evidence of weakness or contracture in the lower " extremities.  No evidence of neuropathy.     Musculoskeletal: Patient is ambulatory without assistive device or brace.  No gross ankle deformity noted.  No foot or ankle joint effusion is noted.      Noted pain on palpation of hyperkeratotic skin lesion located on the plantar aspect of the left third metatarsophalangeal joint.  No surrounding erythema noted. No subdermal hemorrhage noted. One notes hammertoe deformities of the lesser toes on the left foot.       ASSESSMENT / PLAN:     ICD-10-CM    1. Callus of foot L84    2. Left foot pain M79.672    3. Hammer toe of left foot M20.42        I have explained to Nabor  about the conditions.  We discussed the nature of the condition as well as the treatment plan and expected length of recovery.  At this time, the hyperkeratotic skin lesion was sharply debrided with a #10 blade down to healthy epidermis. No bleeding noted. The patient was advised to wear silicone shoe insert to better offload the pressure to the underlying metatarsal heads. The patient was instructed to return to the office if the callus returns as painful.      Disclaimer: This note consists of symbols derived from keyboarding, dictation and/or voice recognition software. As a result, there may be errors in the script that have gone undetected. Please consider this when interpreting information found in this chart.       BEAU Guevara D.P.M., FTRINIDAD.C.F.A.S.

## 2017-04-12 NOTE — MR AVS SNAPSHOT
After Visit Summary   4/12/2017    Nabor Cunningham    MRN: [id number]           Patient Information     Date Of Birth          1935        Visit Information        Provider Department      4/12/2017 10:20 AM Chencho Guevara DPM [facility]        Today's Diagnoses     Callus of foot    -  1    Left foot pain        Hammer toe of left foot          Care Instructions    CALLUS / CORNS / IPKs    When there is excessive friction or pressure on the skin, the body responds by making the skin thicker to protect the deeper structures from becoming exposed. While this works well to protect the deeper structures, the thickened skin can increase pressure and pain.    CALLUS: Flat, diffuse thickening are simple calluses and they are usually caused by friction. Often these are the result of rubbing on a shoe or going barefoot.    CORNS: Calluses with a central core between the toes are called corns. These result from prominent joints on adjacent toes rubbing together. Theses are a symptom of bone malalignment and will always recur unless the underlying bones are addressed surgically.    IPKs: Calluses with a central core on the ball of the foot are usually IPKs (intractable plantar keratosis). These are caused by excessive pressure from the metatarsals, the bones that make up the ball of the foot. Often one of these bones is too long or too prominent.  Again, these will always recur unless the underlying bone issue is addressed. There is no cure for these. They will either go away by themselves, recur, or more could develop.    ROUTINE MAINTENANCE  1. File them down with a pumice stone or callus file a couple times a week.   2. An electric callus removing device. Amope Pedi Perfect Electronic Pedicure Foot File and Callus Remover can be a good option.   3. Lotion can be applied to soften the callus. A urea based cream such as Kersal or Vanicream or thicker cream with shea butter are  good options.  4. Toe spacers or toe covers can be used for corns, gel pads can be used for other lesions on the bottom of the foot.   If there is a surgical pathology noted, such as a prominent bone, often this needs to be addressed surgically to minimize recurrence. However, sometimes the lesion simply migrates to another spot after surgery, so it is not a guaranteed cure.                 Follow-ups after your visit        Follow-up notes from your care team     Return if symptoms worsen or fail to improve.      Your next 10 appointments already scheduled     Apr 17, 2017  8:40 AM CDT   SHORT with Heron Mayo MD   [facility] ([facility])    [address] [place] [address]   [phone]            Apr 24, 2017 11:15 AM CDT   Anticoagulation Visit with NB ANTI COAG   [facility] ([facility])    [address] [place] [address]   [phone]            May 05, 2017  9:40 AM CDT   SHORT with Heron Mayo MD   [facility] ([facility])    [address] [place] [address]   [phone]              Who to contact     If you have questions or need follow up information about today's clinic visit or your schedule please contact [facility] directly at [phone].  Normal or non-critical lab and imaging results will be communicated to you by MyChart, letter or phone within 4 business days after the clinic has received the results. If you do not hear from us within 7 days, please contact the clinic through MyChart or phone. If you have a critical or abnormal lab result, we will notify you by phone as soon as possible.  Submit refill requests through Digitwhiz or call your pharmacy and they will forward the refill request to us. Please allow 3 business days for your refill to be completed.          Additional Information About Your Visit       "  MyChart Information     [employer] lets you send messages to your doctor, view your test results, renew your prescriptions, schedule appointments and more. To sign up, go to [email or web address] . Click on \"Log in\" on the left side of the screen, which will take you to the Welcome page. Then click on \"Sign up Now\" on the right side of the page.     You will be asked to enter the access code listed below, as well as some personal information. Please follow the directions to create your username and password.     Your access code is: [email or web address]  Expires: 2017 12:20 PM     Your access code will  in 90 days. If you need help or a new code, please call your [facility] clinic or [phone].        Care EveryWhere ID     This is your Care EveryWhere ID. This could be used by other organizations to access your [facility] medical records  [id number]        Your Vitals Were     Height BMI (Body Mass Index)                1.613 m (5' 3.5\") 25.63 kg/m2           Blood Pressure from Last 3 Encounters:   04/10/17 140/84   17 100/50   17 120/70    Weight from Last 3 Encounters:   17 66.7 kg (147 lb)   17 66.9 kg (147 lb 6.4 oz)   17 66.2 kg (146 lb)              Today, you had the following     No orders found for display       Primary Care Provider Office Phone # Fax #    Heron Mayo [phone]       [facility] [address] [place] [address]        Thank you!     Thank you for choosing [facility]  for your care. Our goal is always to provide you with excellent care. Hearing back from our patients is one way we can continue to improve our services. Please take a few minutes to complete the written survey that you may receive in the mail after your visit with us. Thank you!             Your Updated Medication List - Protect others around you: Learn how to safely use, store and throw away your medicines at " www.disposemymeds.org.          This list is accurate as of: 4/12/17 11:17 AM.  Always use your most recent med list.                   Brand Name Dispense Instructions for use    B-12 1000 MCG Tbcr     100 tablet    Take 1,000 mcg by mouth daily       COLCRYS 0.6 MG tablet   Generic drug:  colchicine     30 tablet    Take 0.6 mg by mouth daily Reported on 4/7/2017. Insurance did not cover, so taking another brand       metoprolol 25 MG 24 hr tablet    TOPROL-XL    90 tablet    Take 1 tablet (25 mg) by mouth daily       mirtazapine 15 MG tablet    REMERON    15 tablet    Take 0.5 tablets (7.5 mg) by mouth At Bedtime       Multi-vitamin Tabs tablet     100 tablet    Take 1 tablet by mouth daily       nortriptyline 25 MG capsule    PAMELOR    30 capsule    Take 1 capsule (25 mg) by mouth At Bedtime       predniSONE 20 MG tablet    DELTASONE    20 tablet    Take 3 tabs (60 mg) by mouth daily x 3 days, 2 tabs (40 mg) daily x 3 days, 1 tab (20 mg) daily x 3 days, then 1/2 tab (10 mg) x 3 days.       PROTONIX 40 MG EC tablet   Generic drug:  pantoprazole     30 tablet    Take 1 tablet (40 mg) by mouth daily Take 30-60 minutes before a meal.       temazepam 30 MG capsule    RESTORIL    15 capsule    Take 1 capsule (30 mg) by mouth nightly as needed for sleep       warfarin 2 MG tablet    COUMADIN    30 tablet    Take 1 tablet (2 mg) by mouth daily Take 6 mg by mouth 3 days a week and 8 mg 4 days per week as directed by INR  Clinic.

## 2017-04-13 ENCOUNTER — TELEPHONE (OUTPATIENT)
Dept: FAMILY MEDICINE | Facility: CLINIC | Age: 82
End: 2017-04-13

## 2017-04-13 NOTE — PROGRESS NOTES
Clinic Care Coordination Contact  [facility]/Voicemail    Referral Source: Other, specify (home care - pt declined their services)  Clinical Data: Care Coordinator Outreach  Outreach attempted x 2.  Left message on voicemail with call back information and requested return call.  Plan: Care Coordinator mailed out care coordination introduction letter on 4-11-17. Care Coordinator will do no further outreaches at this time.  TONE Lord, Clinic Care Coordinator 4/13/2017   2:29 PM  [phone]

## 2017-04-13 NOTE — TELEPHONE ENCOUNTER
Spoke with Kya, she feels like this all started since they stopped his old gout medication because insurance wouldn't pay for it. He is going to come in tomorrow to see Dr. Mayo to discuss, feels like this is a side effect of the prednisone

## 2017-04-13 NOTE — TELEPHONE ENCOUNTER
Nabor is on a steroid since Monday and he is getting dizzy/weak /heavy legs.  Could this be from the steroid?    Please call Lexy at [phone].  Jammie [facility] [place]

## 2017-04-14 ENCOUNTER — OFFICE VISIT (OUTPATIENT)
Dept: FAMILY MEDICINE | Facility: CLINIC | Age: 82
End: 2017-04-14
Payer: MEDICARE

## 2017-04-14 VITALS — TEMPERATURE: 98.4 F | DIASTOLIC BLOOD PRESSURE: 73 MMHG | HEART RATE: 108 BPM | SYSTOLIC BLOOD PRESSURE: 134 MMHG

## 2017-04-14 DIAGNOSIS — R60.0 PERIPHERAL EDEMA: ICD-10-CM

## 2017-04-14 DIAGNOSIS — M1A.9XX0 CHRONIC GOUT, UNSPECIFIED CAUSE, UNSPECIFIED SITE: Primary | ICD-10-CM

## 2017-04-14 DIAGNOSIS — F41.9 ANXIETY: ICD-10-CM

## 2017-04-14 DIAGNOSIS — F33.1 MODERATE EPISODE OF RECURRENT MAJOR DEPRESSIVE DISORDER (H): ICD-10-CM

## 2017-04-14 PROCEDURE — 99213 OFFICE O/P EST LOW 20 MIN: CPT | Performed by: FAMILY MEDICINE

## 2017-04-14 RX ORDER — COLCHICINE 0.6 MG/1
0.6 TABLET ORAL DAILY
Qty: 30 TABLET | Refills: 5 | Status: SHIPPED | OUTPATIENT
Start: 2017-04-14 | End: 2017-09-26

## 2017-04-14 RX ORDER — MIRTAZAPINE 15 MG/1
15 TABLET, FILM COATED ORAL AT BEDTIME
Qty: 30 TABLET | Refills: 11 | Status: SHIPPED | OUTPATIENT
Start: 2017-04-14 | End: 2017-05-12

## 2017-04-14 NOTE — PATIENT INSTRUCTIONS
ASSESSMENT:  (M1A.9XX0) Chronic gout, unspecified cause, unspecified site  (primary encounter diagnosis)  Comment: HE has been on colchicine daily in the past for prevention but cost prohibitive.    Switching to allopurinol may have caused gout flare.    The gout is doing better no due to the prednisone which is anti-inflammatory.  But, some side effects on the prednisone.   Plan: colchicine (COLCRYS) 0.6 MG tablet          Continue the allopurinol once daily for gout prevention.  This is a good medication for preventing gout but can make gout flare when you first start on the medication.   If colchicine is covered, take one daily with the allopurinol for a few months to keep gout from coming on.    If not covered, we can try the allopurinol alone or add a small dose prednisone daily.   REcheck in a month.   Stop the prednisone for now.  If gout comes back we could restart at lower dose if needed.     (F41.9) Anxiety  Comment: doing better  Plan: Continue the mirtazapine 15mg daily.     (F33.1) Moderate episode of recurrent major depressive disorder (H)  Comment: improved  Plan: mirtazapine (REMERON) 15 MG tablet        Continue the mirtazapine.     (R60.9) Peripheral edema  Comment: might be from prednisone.   Plan: Peripheral edema (swelling in legs and feet) is common and has many possible causes.  Most common is poor vein circulation.  The blood returning to heart goes through valves that may not work well.  The valves can be impaired from previous injuries to the leg, blood clots, varicose veins and it can be hereditary.    Other causes include lung, heart, kidney, liver, thyroid problems.  Sleep apnea can also cause swelling.  Some medications can make swelling worse.   Things that help the swelling include limiting salt in the diet.  Elevate legs when possible (feet above the level of the pelvis).   Compression stockings are one of the most effective treatments.

## 2017-04-14 NOTE — MR AVS SNAPSHOT
After Visit Summary   4/14/2017    Nabor Cunningham    MRN: [id number]           Patient Information     Date Of Birth          1935        Visit Information        Provider Department      4/14/2017 2:20 PM Heron Mayo MD [facility]        Today's Diagnoses     Chronic gout, unspecified cause, unspecified site    -  1    Anxiety        Moderate episode of recurrent major depressive disorder (H)        Peripheral edema          Care Instructions    ASSESSMENT:  (M1A.9XX0) Chronic gout, unspecified cause, unspecified site  (primary encounter diagnosis)  Comment: HE has been on colchicine daily in the past for prevention but cost prohibitive.    Switching to allopurinol may have caused gout flare.    The gout is doing better no due to the prednisone which is anti-inflammatory.  But, some side effects on the prednisone.   Plan: colchicine (COLCRYS) 0.6 MG tablet          Continue the allopurinol once daily for gout prevention.  This is a good medication for preventing gout but can make gout flare when you first start on the medication.   If colchicine is covered, take one daily with the allopurinol for a few months to keep gout from coming on.    If not covered, we can try the allopurinol alone or add a small dose prednisone daily.   REcheck in a month.   Stop the prednisone for now.  If gout comes back we could restart at lower dose if needed.     (F41.9) Anxiety  Comment: doing better  Plan: Continue the mirtazapine 15mg daily.     (F33.1) Moderate episode of recurrent major depressive disorder (H)  Comment: improved  Plan: mirtazapine (REMERON) 15 MG tablet        Continue the mirtazapine.     (R60.9) Peripheral edema  Comment: might be from prednisone.   Plan: Peripheral edema (swelling in legs and feet) is common and has many possible causes.  Most common is poor vein circulation.  The blood returning to heart goes through valves that may not work well.  The valves can  be impaired from previous injuries to the leg, blood clots, varicose veins and it can be hereditary.    Other causes include lung, heart, kidney, liver, thyroid problems.  Sleep apnea can also cause swelling.  Some medications can make swelling worse.   Things that help the swelling include limiting salt in the diet.  Elevate legs when possible (feet above the level of the pelvis).   Compression stockings are one of the most effective treatments.          Follow-ups after your visit        Your next 10 appointments already scheduled     Apr 24, 2017 11:15 AM CDT   [facility]ticoagulation Visit with NB ANTI COAG   [facility]    [address] [place] [address]   [phone]            Apr 24, 2017  2:00 PM CDT   [facility] Visit with Julienne Vidal   [facility]    [address] [place] [address]   [phone]            Apr 24, 2017  2:30 PM CDT   New Visit with Delfin Campbell MD   [facility]    [address] [place] [address]   [phone]            May 05, 2017  9:40 AM CDT   SHORT with Heron Mayo MD   [facility]    [address] [place] [address]   [phone]              Who to contact     If you have questions or need follow up information about today's clinic visit or your schedule please contact [facility] directly at [phone].  Normal or non-critical lab and imaging results will be communicated to you by Yodiohart, letter or phone within 4 business days after the clinic has received the results. If you do not hear from us within 7 days, please contact the clinic through MyChart or phone. If you have a critical or abnormal lab result, we will notify you by phone as soon as possible.  Submit refill requests through [email or web address] or call your pharmacy  "and they will forward the refill request to us. Please allow 3 business days for your refill to be completed.          Additional Information About Your Visit        MyChart Information     KPA lets you send messages to your doctor, view your test results, renew your prescriptions, schedule appointments and more. To sign up, go to [email or web address] . Click on \"Log in\" on the left side of the screen, which will take you to the Welcome page. Then click on \"Sign up Now\" on the right side of the page.     You will be asked to enter the access code listed below, as well as some personal information. Please follow the directions to create your username and password.     Your access code is: [email or web address]  Expires: 2017 12:20 PM     Your access code will  in 90 days. If you need help or a new code, please call your [facility] clinic or [phone].        Care EveryWhere ID     This is your Care EveryWhere ID. This could be used by other organizations to access your [facility] medical records  [id number]        Your Vitals Were     Pulse Temperature                108 98.4  F (36.9  C) (Tympanic)           Blood Pressure from Last 3 Encounters:   17 134/73   04/10/17 140/84   17 100/50    Weight from Last 3 Encounters:   17 147 lb (66.7 kg)   17 147 lb 6.4 oz (66.9 kg)   17 146 lb (66.2 kg)              Today, you had the following     No orders found for display         Today's Medication Changes          These changes are accurate as of: 17  3:33 PM.  If you have any questions, ask your nurse or doctor.               These medicines have changed or have updated prescriptions.        Dose/Directions    * COLCRYS 0.6 MG tablet   This may have changed:  Another medication with the same name was added. Make sure you understand how and when to take each.   Generic drug:  colchicine   Changed by:  Heron Mayo MD        Dose:  0.6 mg   Take 0.6 mg by mouth daily " Reported on 4/14/2017   Quantity:  30 tablet   Refills:  0       * colchicine 0.6 MG tablet   Commonly known as:  COLCRYS   This may have changed:  You were already taking a medication with the same name, and this prescription was added. Make sure you understand how and when to take each.   Used for:  Chronic gout, unspecified cause, unspecified site   Changed by:  Heron Mayo MD        Dose:  0.6 mg   Take 1 tablet (0.6 mg) by mouth daily   Quantity:  30 tablet   Refills:  5       mirtazapine 15 MG tablet   Commonly known as:  REMERON   This may have changed:  how much to take   Used for:  Moderate episode of recurrent major depressive disorder (H)   Changed by:  Heron Mayo MD        Dose:  15 mg   Take 1 tablet (15 mg) by mouth At Bedtime   Quantity:  30 tablet   Refills:  11       * Notice:  This list has 2 medication(s) that are the same as other medications prescribed for you. Read the directions carefully, and ask your doctor or other care provider to review them with you.         Where to get your medicines      These medications were sent to [id number] [address] [facility]  [address] [facility] [address]    Hours:  Closed 10-16-08 business to [facility] Phone:  [phone]     colchicine 0.6 MG tablet    mirtazapine 15 MG tablet                Primary Care Provider Office Phone # Fax #    Heron Mayo [phone]       [facility] [address] [facility] [address]        Thank you!     Thank you for choosing [facility]  for your care. Our goal is always to provide you with excellent care. Hearing back from our patients is one way we can continue to improve our services. Please take a few minutes to complete the written survey that you may receive in the mail after your visit with us. Thank you!             Your Updated Medication List - Protect others around you: Learn how to safely use, store  and throw away your medicines at www.disposemymeds.org.          This list is accurate as of: 4/14/17  3:33 PM.  Always use your most recent med list.                   Brand Name Dispense Instructions for use    B-12 1000 MCG Tbcr     100 tablet    Take 1,000 mcg by mouth daily       * COLCRYS 0.6 MG tablet   Generic drug:  colchicine     30 tablet    Take 0.6 mg by mouth daily Reported on 4/14/2017       * colchicine 0.6 MG tablet    COLCRYS    30 tablet    Take 1 tablet (0.6 mg) by mouth daily       metoprolol 25 MG 24 hr tablet    TOPROL-XL    90 tablet    Take 1 tablet (25 mg) by mouth daily       mirtazapine 15 MG tablet    REMERON    30 tablet    Take 1 tablet (15 mg) by mouth At Bedtime       Multi-vitamin Tabs tablet     100 tablet    Take 1 tablet by mouth daily       nortriptyline 25 MG capsule    PAMELOR    30 capsule    Take 1 capsule (25 mg) by mouth At Bedtime       predniSONE 20 MG tablet    DELTASONE    20 tablet    Take 3 tabs (60 mg) by mouth daily x 3 days, 2 tabs (40 mg) daily x 3 days, 1 tab (20 mg) daily x 3 days, then 1/2 tab (10 mg) x 3 days.       PROTONIX 40 MG EC tablet   Generic drug:  pantoprazole     30 tablet    Take 1 tablet (40 mg) by mouth daily Take 30-60 minutes before a meal.       temazepam 30 MG capsule    RESTORIL    15 capsule    Take 1 capsule (30 mg) by mouth nightly as needed for sleep       warfarin 2 MG tablet    COUMADIN    30 tablet    Take 1 tablet (2 mg) by mouth daily Take 6 mg by mouth 3 days a week and 8 mg 4 days per week as directed by INR  Clinic.       * Notice:  This list has 2 medication(s) that are the same as other medications prescribed for you. Read the directions carefully, and ask your doctor or other care provider to review them with you.

## 2017-04-14 NOTE — PROGRESS NOTES
SUBJECTIVE:                                                    Nabor Cunningham is a 81 year old male who presents to clinic today for the following health issues:  Chief Complaint   Patient presents with     Recheck Medication        Last clinic visit: 4/10 for wrist pain. Started on prednisone.   I saw him on 4/7-notes below.   Medication Followup of Prednisone     Taking Medication as prescribed: yes    Side Effects:  Dizzy, shakey, confused.     Medication Helping Symptoms:  yes     Medication Followup of Allopurinol     Taking Medication as prescribed: yes    Side Effects:  Yes not helping, having some leg issues    Medication Helping Symptoms:  NO.  Colcrys preauthorization?     Problem 1: gout   He had swelling and pain in right forearm, wrist and hand.   He has been on colchicine one pill daily chronically.  Insurance would not cover. Has not been taking for the past 1 and 1/2 weeks.   His physician had recommended allopurinol instead.  Taking that for a couple weeks.    Right wrist improved.    Now swelling in both legs.  Some pain there.   NO further pain right arm.   Was taking prednisone starting at 3 pills a day.  On two pills a day now.    Seemed to cause dizziness, blurred vision.  Some disorientation.  Would seem better in the AM.     Problem 2: anxiety  Anxiety seems better.  Seems like mirtazapine has helped.     Will have hearing tested next week.      Some swelling in both legs.  A little tingling.  No pain.   Trace left and 1+ right     Patient Active Problem List   Diagnosis     Anxiety     Alcohol abuse     Chronic atrial fibrillation (H)     Weight loss     Peripheral polyneuropathy (H)     Chronic gout, unspecified cause, unspecified site     Essential hypertension with goal blood pressure less than 140/90     Hyperlipidemia LDL goal <130     Persistent insomnia     Lower urinary tract symptoms (LUTS)     Chronic anticoagulation     Long-term (current) use of anticoagulants [Z79.01]      Moderate episode of recurrent major depressive disorder (H)      OBJECTIVE:Blood pressure 134/73, pulse 108, temperature 98.4  F (36.9  C), temperature source Tympanic. BMI=There is no height or weight on file to calculate BMI.  GENERAL APPEARANCE ADULT: Alert, no acute distress  MS: HE has no erythema or swelling right forearm, wrist or hand.  Good range of motion and non-tender in all those areas.   He has trace edema left and 1+ right lower leg   PSYCH: mentation appears normal., affect and mood normal     ASSESSMENT:  (M1A.9XX0) Chronic gout, unspecified cause, unspecified site  (primary encounter diagnosis)  Comment: HE has been on colchicine daily in the past for prevention but cost prohibitive.    Switching to allopurinol may have caused gout flare.    The gout is doing better no due to the prednisone which is anti-inflammatory.  But, some side effects on the prednisone.   Plan: colchicine (COLCRYS) 0.6 MG tablet          Continue the allopurinol once daily for gout prevention.  This is a good medication for preventing gout but can make gout flare when you first start on the medication.   If colchicine is covered, take one daily with the allopurinol for a few months to keep gout from coming on.    If not covered, we can try the allopurinol alone or add a small dose prednisone daily.   REcheck in a month.   Stop the prednisone for now.  If gout comes back we could restart at lower dose if needed.     (F41.9) Anxiety  Comment: doing better  Plan: Continue the mirtazapine 15mg daily.     (F33.1) Moderate episode of recurrent major depressive disorder (H)  Comment: improved  Plan: mirtazapine (REMERON) 15 MG tablet        Continue the mirtazapine.     (R60.9) Peripheral edema  Comment: might be from prednisone.   Plan: Peripheral edema (swelling in legs and feet) is common and has many possible causes.  Most common is poor vein circulation.  The blood returning to heart goes through valves that may not work well.   The valves can be impaired from previous injuries to the leg, blood clots, varicose veins and it can be hereditary.    Other causes include lung, heart, kidney, liver, thyroid problems.  Sleep apnea can also cause swelling.  Some medications can make swelling worse.   Things that help the swelling include limiting salt in the diet.  Elevate legs when possible (feet above the level of the pelvis).   Compression stockings are one of the most effective treatments.             ===============================================  Last clinic visit: 4/7 notes:  ASSESSMENT:   (F41.9) Anxiety (primary encounter diagnosis)  Comment:   Plan: HOME CARE NURSING REFERRAL  Start mirtazapine at 1/2 pill (7.5mg) once daily at bedtime. This will help for anxiety and depression.   See me again in a month.   Call if side effects or problems with medication.      (F33.1) Moderate episode of recurrent major depressive disorder (H)  Comment: see above   Plan: mirtazapine (REMERON) 15 MG tablet, HOME CARE   NURSING REFERRAL      (E53.8) Vitamin B12 deficiency (non anemic)  Comment:   Plan: Cyanocobalamin (B-12) 1000 MCG TBCR  Refills     (H93.13) Tinnitus, bilateral  Comment:   Plan: AUDIOLOGY ADULT REFERRAL  Schedule an appointment with audiologist for hearing evaluation      (H91.93) Hearing loss, bilateral  Comment:   Plan: AUDIOLOGY ADULT REFERRAL      (G62.9) Peripheral polyneuropathy (H)  Comment: chronic foot pain  Plan: Continue current nortriptyline. We might in crease in future     (M79.322) Left foot pain  Comment:   Plan: PODIATRY/FOOT & ANKLE SURGERY REFERRAL  Schedule an appointment with the foot specialist.

## 2017-04-14 NOTE — NURSING NOTE
"Chief Complaint   Patient presents with     Recheck Medication       Initial /73 (BP Location: Right arm, Cuff Size: Adult Regular)  Pulse 108  Temp 98.4  F (36.9  C) (Tympanic) Estimated body mass index is 25.63 kg/(m^2) as calculated from the following:    Height as of 4/12/17: 5' 3.5\" (1.613 m).    Weight as of 4/12/17: 147 lb (66.7 kg).  Medication Reconciliation: complete    Health Maintenance that is potentially due pending provider review:  NONE    N/a  Pao Lambert M.A.          "

## 2017-04-19 ENCOUNTER — TELEPHONE (OUTPATIENT)
Dept: FAMILY MEDICINE | Facility: CLINIC | Age: 82
End: 2017-04-19

## 2017-04-19 NOTE — TELEPHONE ENCOUNTER
nortriptyline (PAMELOR) 25 MG capsule - Since her father started this medication he has been disoriented and feels very week and having trouble walking and vision is blurry - please call daughter

## 2017-04-19 NOTE — TELEPHONE ENCOUNTER
I did speak with daughter Lexy.    He was seen 3/22-was on nortriptyline, temazepam and colchicine.   She does not think he is drinking alcohol.   follow-up on 4/7.  Started mirtazapine, recommended seeing podiatry for foot callous, audiology and continued on nortriptyline.   4/10  fo gout of wrist, started on prednisone.   Last clinic visit: with me on 4.14.  Issues with colchicine too expensive.  He had been started on allopurinol in the interval by his physician up [place] which may have contributed to gout flare.    I had stoppeded his prednisone at that visit.  He was able to get another month of colchicine and has continued allopurinol.    Daughter notes more confusion, can get disoriented.  Has been lost.  Continuing leg swelling and leg weakness.      ASSESSMENT: Confusion-possible dementia.  Could be med related.  Has not improved off prednisone.  Other recent med additions are allopurinol and mirtazapine.  Neither is that likely to cause confusion or swelling.  Leg swelling May be from venous insufficiency.  Has had CT abdomen and pelvis and labs comprehensive, cbc, TSH.  Has not had BNP so far.     PLAN: Stop allopurinol and mirtazapine.  Stay off prednisone.    Continue the nortriptyline and colchicine which he has been on before.  He has follow-up in 2 weeks. For continued swelling consider BNP and compression stockings.  He does elevate legs now when possible.     VICTOR HUGO ART MD

## 2017-04-19 NOTE — TELEPHONE ENCOUNTER
S-(situation): leg weakness    B-(background): patient states he is not sure    A-(assessment):Spoke with the patient and patient states his legs are heavy and weak and this starts in his morning, not taking his normal walking pattern, takes smaller steps, can't remember when this started, family has told him that his voice changes when he is nervous, no breathing difficulties, no weakness on one side of the body, drools once in a while, lightheaded and dizzy but this goes away. Patient reports no memory difficulties.     R-(recommendations): Told patient to have this evaluated in the ER at [place] and the patient disagrees with advise, states he has been to too many doctors and is wanting to catch the bus to see his wife at the nursing home. Attempted to call the daughter and had to leave a message to have her call back to get further inforamtion.  Carolyn

## 2017-04-19 NOTE — TELEPHONE ENCOUNTER
Dr. Mayo, please see this note and the one below.    S-(situation): leg weakness, swelling, and confusion    B-(background): leg weakness last 2 weeks, confusion is recent, recently seen for these symptoms on 4-14-17.    A-(assessment): Spoke with daughter Sameera, patient was taken off prednisone for leg weakness, so has been off the prednisone since Thursday, but patient is still having the same symptoms of heaviness in legs,confusion, and blurred vision (had cornea replacement 2015). Patient has recently moved into [facility] and has been getting confused in the surrounding areas as to where he is at. Swelling in the legs that has not changed. Daughter states patient was able to get a cheaper prescription of Colcrys and is taking this. Daughter is concerned that symptoms are medication related.    R-(recommendations): Will route to provider to review. Daughter wants a call back.  Carolyn

## 2017-04-24 ENCOUNTER — OFFICE VISIT (OUTPATIENT)
Dept: AUDIOLOGY | Facility: CLINIC | Age: 82
End: 2017-04-24
Payer: MEDICARE

## 2017-04-24 ENCOUNTER — OFFICE VISIT (OUTPATIENT)
Dept: OTOLARYNGOLOGY | Facility: CLINIC | Age: 82
End: 2017-04-24
Payer: MEDICARE

## 2017-04-24 ENCOUNTER — ANTICOAGULATION THERAPY VISIT (OUTPATIENT)
Dept: ANTICOAGULATION | Facility: CLINIC | Age: 82
End: 2017-04-24
Payer: MEDICARE

## 2017-04-24 VITALS — SYSTOLIC BLOOD PRESSURE: 132 MMHG | HEART RATE: 82 BPM | DIASTOLIC BLOOD PRESSURE: 74 MMHG | RESPIRATION RATE: 12 BRPM

## 2017-04-24 DIAGNOSIS — Z79.01 CHRONIC ANTICOAGULATION: ICD-10-CM

## 2017-04-24 DIAGNOSIS — I48.20 CHRONIC ATRIAL FIBRILLATION (H): Primary | ICD-10-CM

## 2017-04-24 DIAGNOSIS — H93.13 TINNITUS, BILATERAL: Primary | ICD-10-CM

## 2017-04-24 DIAGNOSIS — Z79.01 LONG-TERM (CURRENT) USE OF ANTICOAGULANTS: ICD-10-CM

## 2017-04-24 DIAGNOSIS — I48.20 CHRONIC ATRIAL FIBRILLATION (H): ICD-10-CM

## 2017-04-24 DIAGNOSIS — H90.3 SENSORINEURAL HEARING LOSS, ASYMMETRICAL: ICD-10-CM

## 2017-04-24 DIAGNOSIS — H90.3 ASYMMETRICAL SENSORINEURAL HEARING LOSS: ICD-10-CM

## 2017-04-24 DIAGNOSIS — R42 VERTIGO: ICD-10-CM

## 2017-04-24 LAB — INR POINT OF CARE: 2.7 (ref 0.86–1.14)

## 2017-04-24 PROCEDURE — 99207 ZZC NO CHARGE LOS: CPT | Performed by: AUDIOLOGIST

## 2017-04-24 PROCEDURE — 92557 COMPREHENSIVE HEARING TEST: CPT | Performed by: AUDIOLOGIST

## 2017-04-24 PROCEDURE — 99207 ZZC NO CHARGE NURSE ONLY: CPT

## 2017-04-24 PROCEDURE — 85610 PROTHROMBIN TIME: CPT | Mod: QW

## 2017-04-24 PROCEDURE — 92550 TYMPANOMETRY & REFLEX THRESH: CPT | Performed by: AUDIOLOGIST

## 2017-04-24 PROCEDURE — 99203 OFFICE O/P NEW LOW 30 MIN: CPT | Performed by: OTOLARYNGOLOGY

## 2017-04-24 PROCEDURE — 36416 COLLJ CAPILLARY BLOOD SPEC: CPT

## 2017-04-24 RX ORDER — WARFARIN SODIUM 2 MG/1
TABLET ORAL
Qty: 100 TABLET | Refills: 2 | Status: SHIPPED | OUTPATIENT
Start: 2017-04-24 | End: 2017-09-18

## 2017-04-24 NOTE — PROGRESS NOTES
"AUDIOLOGY REPORT    SUBJECTIVE:  Nabor Cunningham is a 81 year old male who was seen in the Audiology Clinic at [place] for an audiologic evaluation, referred by Dr. Campbell. The patient reports bilateral tinnitus or \"music\" for the past 4 months, that lateralizes to the left ear. He also reports occasional vertigo, several eye surgeries (left), and his \"eardrum was sucked out\" around age 32. 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 The patient denies bilateral otalgia, bilateral drainage, bilateral aural fullness, bilateral hearing loss, and lex-surgeries. Patient's daughter (Samara) was present today and helped with case history. Samara reports that she has noticed hearing loss in her dad for several years, and it's gradually worsened. Samara also reports that her dad moved from [place] about 4 months ago because he didn't like being by himself anymore. Samara reports that he dad has \"extreme anxiety.\"    OBJECTIVE:  Otoscopic exam indicates ears are clear of cerumen bilaterally     Pure Tone Thresholds assessed using conventional audiometry with fair reliability (many false positives) from 250-8000 Hz bilaterally using insert earphones     RIGHT:  borderline-normal sensorineural hearing loss    LEFT:    mild to moderate sensorineural hearing loss    Tympanogram:    RIGHT: normal eardrum mobility    LEFT:   normal eardrum mobility    Reflexes (reported by stimulus ear):  RIGHT: Ipsilateral is present at normal levels  RIGHT: Contralateral is absent at frequencies tested  LEFT:   Ipsilateral is absent at frequencies tested  LEFT:   Contralateral is present at reduced sensation levels    Speech Reception Threshold:    RIGHT: 30 dB HL    LEFT:   55 dB HL    Word Recognition Score:     RIGHT: 82% at 65 dB HL using NU-6 recorded word list.    LEFT:   56% at 70 dB HL using NU-6 recorded word list.    LEFT:   48% " at 75 dB HL using NU-6 recorded 1/2 word list.    ASSESSMENT:   Asymmetrical sensorineural hearing loss.     Today s results were discussed with the patient in detail.     PLAN: It is recommended that the patient follow up with Dr. Campbell today as scheduled. Retest per ENT or if symptoms worsen. Patient was counseled regarding hearing loss and impact on communication. Patient is a good candidate for amplification following medical evaluation. A [facility] information packet and copy of today's audiogram were given to the patient. Please call this clinic with questions regarding these results or recommendations.      Julienne Vidal, -AAA   Clinical Audiologist, [place] #[id number]   4/24/2017

## 2017-04-24 NOTE — NURSING NOTE
"Chief Complaint   Patient presents with     Tinnitus     Hearing Problem     bilateral hearing loss       Initial /74 (BP Location: Right arm, Patient Position: Chair, Cuff Size: Adult Regular)  Pulse 82  Resp 12 Estimated body mass index is 25.63 kg/(m^2) as calculated from the following:    Height as of 4/12/17: 5' 3.5\" (1.613 m).    Weight as of 4/12/17: 147 lb (66.7 kg).  Medication Reconciliation: complete     Ani Lemus MA      "

## 2017-04-24 NOTE — PROGRESS NOTES
History of Present Illness - Nabor Cunningham is a 81 year old male who presents with a 2 month history of hearing a song in his left ear. He describes it to be complete with a band as well as vocals. He denies any steady nonpulsatile tinnitus. He denies any pulsation in the ear. He has a long history of right handed rifle shooting, not always with hearing protection. He has disabling anxiety right now, and has recently moved closer to his children so that he can be cared for. He has some peripheral neuropathy, and is a bit unsteady on his feet, but walks without assistance.    Past Medical History -   Patient Active Problem List   Diagnosis     Anxiety     Alcohol abuse     Chronic atrial fibrillation (H)     Weight loss     Peripheral polyneuropathy (H)     Chronic gout, unspecified cause, unspecified site     Essential hypertension with goal blood pressure less than 140/90     Hyperlipidemia LDL goal <130     Persistent insomnia     Lower urinary tract symptoms (LUTS)     Chronic anticoagulation     Long-term (current) use of anticoagulants [Z79.01]     Moderate episode of recurrent major depressive disorder (H)       Current Medications -   Current Outpatient Prescriptions:      warfarin (COUMADIN) 2 MG tablet, Take 6mg by mouth MWF and 8mg all other days or as directed by Anticoagulation Clinic, Disp: 100 tablet, Rfl: 2     colchicine (COLCRYS) 0.6 MG tablet, Take 1 tablet (0.6 mg) by mouth daily, Disp: 30 tablet, Rfl: 5     mirtazapine (REMERON) 15 MG tablet, Take 1 tablet (15 mg) by mouth At Bedtime, Disp: 30 tablet, Rfl: 11     Cyanocobalamin (B-12) 1000 MCG TBCR, Take 1,000 mcg by mouth daily, Disp: 100 tablet, Rfl: 3     colchicine (COLCRYS) 0.6 MG tablet, Take 0.6 mg by mouth daily Reported on 4/14/2017, Disp: 30 tablet, Rfl: 0     metoprolol (TOPROL-XL) 25 MG 24 hr tablet, Take 1 tablet (25 mg) by mouth daily, Disp: 90 tablet, Rfl: 1     multivitamin, therapeutic with minerals (MULTI-VITAMIN) TABS  tablet, Take 1 tablet by mouth daily, Disp: 100 tablet, Rfl: 3     nortriptyline (PAMELOR) 25 MG capsule, Take 1 capsule (25 mg) by mouth At Bedtime, Disp: 30 capsule, Rfl: 1     pantoprazole (PROTONIX) 40 MG EC tablet, Take 1 tablet (40 mg) by mouth daily Take 30-60 minutes before a meal., Disp: 30 tablet, Rfl: 1     temazepam (RESTORIL) 30 MG capsule, Take 1 capsule (30 mg) by mouth nightly as needed for sleep, Disp: 15 capsule, Rfl:      [DISCONTINUED] warfarin (COUMADIN) 2 MG tablet, Take 1 tablet (2 mg) by mouth daily Take 6 mg by mouth 3 days a week and 8 mg 4 days per week as directed by INR  Clinic., Disp: 30 tablet, Rfl:     Allergies -   Allergies   Allergen Reactions     Penicillins      Trazodone        Social History -   Social History     Social History     Marital status:      Spouse name: N/A     Number of children: N/A     Years of education: N/A     Social History Main Topics     Smoking status: Former Smoker     Years: 20.00     Types: Cigarettes     Smokeless tobacco: Not on file     Alcohol use Not on file     Drug use: No     Sexual activity: Not on file     Other Topics Concern     Not on file     Social History Narrative       Family History - No family history on file.    Review of Systems - As per HPI and PMHx, otherwise 7 system review of the head and neck negative. 10+ system review negative.    Physical Exam  /74 (BP Location: Right arm, Patient Position: Chair, Cuff Size: Adult Regular)  Pulse 82  Resp 12  General - The patient is well nourished and well developed, and appears to have good nutritional status.  Alert and oriented to person and place, answers questions and cooperates with examination appropriately.   Head and Face - Normocephalic and atraumatic, with no gross asymmetry noted of the contour of the facial features.  The facial nerve is intact, with strong symmetric movements.  Voice and Breathing - The patient was breathing comfortably without the use of  accessory muscles. There was no wheezing, stridor, or stertor.  The patients voice was clear and strong, and had appropriate pitch and quality.  Ears - Bilateral pinna and EACs with normal appearing overlying skin. Tympanic membrane intact with good mobility on pneumatic otoscopy bilaterally. Bony landmarks of the ossicular chain are normal. The tympanic membranes are normal in appearance. No retraction, perforation, or masses.  No fluid or purulence was seen in the external canal or the middle ear.   Eyes - Extraocular movements intact.  Sclera were not icteric or injected, conjunctiva were pink and moist.  Mouth - Examination of the oral cavity showed pink, healthy oral mucosa. No lesions or ulcerations noted.  The tongue was mobile and midline, and the dentition were in good condition.    Throat - The walls of the oropharynx were smooth, pink, moist, symmetric, and had no lesions or ulcerations.  The tonsillar pillars and soft palate were symmetric.  The uvula was midline on elevation.  Neck - Normal midline excursion of the laryngotracheal complex during swallowing.  Full range of motion on passive movement.  Palpation of the occipital, submental, submandibular, internal jugular chain, and supraclavicular nodes did not demonstrate any abnormal lymph nodes or masses.  The carotid pulse was palpable bilaterally.  Palpation of the thyroid was soft and smooth, with no nodules or goiter appreciated.  The trachea was mobile and midline.  Nose - External contour is symmetric, no gross deflection or scars.  Nasal mucosa is pink and moist with no abnormal mucus.  The septum was midline and non-obstructive, turbinates of normal size and position.  No polyps, masses, or purulence noted on examination.    Audiogram 04/24/17  Pure Tone Thresholds assessed using conventional audiometry with fair reliability (many false positives) from 250-8000 Hz bilaterally using insert earphones   RIGHT: borderline-normal sensorineural  hearing loss  LEFT: mild to moderate sensorineural hearing loss     Tympanogram:  RIGHT: normal eardrum mobility  LEFT: normal eardrum mobility     Reflexes (reported by stimulus ear):  RIGHT: Ipsilateral is present at normal levels  RIGHT: Contralateral is absent at frequencies tested  LEFT: Ipsilateral is absent at frequencies tested  LEFT: Contralateral is present at reduced sensation levels     Speech Reception Threshold:  RIGHT: 30 dB HL  LEFT: 55 dB HL     Word Recognition Score:   RIGHT: 82% at 65 dB HL using NU-6 recorded word list.  LEFT: 56% at 70 dB HL using NU-6 recorded word list.  LEFT: 48% at 75 dB HL using NU-6 recorded 1/2 word list.      Assessment - Nabor Cunningham is a 81 year old male with tinnitus vs auditory hallucination in the left ear, as well as asymmetric left sensorineural hearing loss of unclear duration. I think he would not handle undergoing an MRI Brain very well in light of his anxiety, which was apparent during exam today. I recommended we follow his hearing asymmetry with an audiogram in 6 months and f/u with me. I also discussed that auditory hallucinations may be a consequence of some of the medication he uses for sleep and anxiety. I encouraged him to consider masking therapies to help him sleep better.       Dr. Delfin Campbell MD  Otolaryngology  [facility]

## 2017-04-24 NOTE — MR AVS SNAPSHOT
"              After Visit Summary   4/24/2017    Nabor Cunningham    MRN: [id number]           Patient Information     Date Of Birth          1935        Visit Information        Provider Department      4/24/2017 2:00 PM Brandy Villela [facility]D [facility]        Today's Diagnoses     Tinnitus, bilateral    -  1    Sensorineural hearing loss, asymmetrical        Vertigo           Follow-ups after your visit        Your next 10 appointments already scheduled     May 12, 2017  9:40 AM CDT   SHORT with Heron Mayo MD   [facility] ([facility])    [address] [place] [address] [phone]            May 22, 2017 10:45 AM CDT   Anticoagulation Visit with NB ANTI COAG   [facility] ([facility])    [address] [place] [address] [phone]              Who to contact     If you have questions or need follow up information about today's clinic visit or your schedule please contact [facility] directly at [phone].  Normal or non-critical lab and imaging results will be communicated to you by [employer], letter or phone within 4 business days after the clinic has received the results. If you do not hear from us within 7 days, please contact the clinic through [employer] or phone. If you have a critical or abnormal lab result, we will notify you by phone as soon as possible.  Submit refill requests through [employer] or call your pharmacy and they will forward the refill request to us. Please allow 3 business days for your refill to be completed.          Additional Information About Your Visit        [employer] Information     [employer] lets you send messages to your doctor, view your test results, renew your prescriptions, schedule appointments and more. To sign up, go to [email or web address] . Click on \"Log in\" on the left side of the screen, which will take you to the Welcome " "page. Then click on \"Sign up Now\" on the right side of the page.     You will be asked to enter the access code listed below, as well as some personal information. Please follow the directions to create your username and password.     Your access code is: [email or web address]  Expires: 2017 12:20 PM     Your access code will  in 90 days. If you need help or a new code, please call your [place] clinic or [phone].        Care EveryWhere ID     This is your Care EveryWhere ID. This could be used by other organizations to access your [place] medical records  [id number]         Blood Pressure from Last 3 Encounters:   17 132/74   17 134/73   04/10/17 140/84    Weight from Last 3 Encounters:   17 147 lb (66.7 kg)   17 147 lb 6.4 oz (66.9 kg)   17 146 lb (66.2 kg)              We Performed the Following     AUDIOGRAM/TYMPANOGRAM - INTERFACE     COMPREHENSIVE HEARING TEST     TYMPANOMETRY AND REFLEX THRESHOLD MEASUREMENTS          Today's Medication Changes          These changes are accurate as of: 17  2:47 PM.  If you have any questions, ask your nurse or doctor.               These medicines have changed or have updated prescriptions.        Dose/Directions    warfarin 2 MG tablet   Commonly known as:  COUMADIN   This may have changed:    - how much to take  - how to take this  - when to take this  - additional instructions   Used for:  Chronic atrial fibrillation (H)   Changed by:  Kirsty Myers, RN        Take 6mg by mouth MWF and 8mg all other days or as directed by Anticoagulation Clinic   Quantity:  100 tablet   Refills:  2            Where to get your medicines      These medications were sent to [id number] [place], [place] - [address] [facility]  [address] [facility] [address]    Hours:  Closed 10-16-08 business to [facility] Phone:  [phone]     warfarin 2 MG tablet                Primary Care Provider Office Phone # Fax #    Heron Blackburn " MD Maria Esther [phone]       [facility] [address] [place] [id number]        Thank you!     Thank you for choosing [facility]  for your care. Our goal is always to provide you with excellent care. Hearing back from our patients is one way we can continue to improve our services. Please take a few minutes to complete the written survey that you may receive in the mail after your visit with us. Thank you!             Your Updated Medication List - Protect others around you: Learn how to safely use, store and throw away your medicines at www.disposemymeds.org.          This list is accurate as of: 4/24/17  2:47 PM.  Always use your most recent med list.                   Brand Name Dispense Instructions for use    B-12 1000 MCG Tbcr     100 tablet    Take 1,000 mcg by mouth daily       * COLCRYS 0.6 MG tablet   Generic drug:  colchicine     30 tablet    Take 0.6 mg by mouth daily Reported on 4/14/2017       * colchicine 0.6 MG tablet    COLCRYS    30 tablet    Take 1 tablet (0.6 mg) by mouth daily       metoprolol 25 MG 24 hr tablet    TOPROL-XL    90 tablet    Take 1 tablet (25 mg) by mouth daily       mirtazapine 15 MG tablet    REMERON    30 tablet    Take 1 tablet (15 mg) by mouth At Bedtime       Multi-vitamin Tabs tablet     100 tablet    Take 1 tablet by mouth daily       nortriptyline 25 MG capsule    PAMELOR    30 capsule    Take 1 capsule (25 mg) by mouth At Bedtime       PROTONIX 40 MG EC tablet   Generic drug:  pantoprazole     30 tablet    Take 1 tablet (40 mg) by mouth daily Take 30-60 minutes before a meal.       temazepam 30 MG capsule    RESTORIL    15 capsule    Take 1 capsule (30 mg) by mouth nightly as needed for sleep       warfarin 2 MG tablet    COUMADIN    100 tablet    Take 6mg by mouth MWF and 8mg all other days or as directed by Anticoagulation Clinic       * Notice:  This list has 2 medication(s) that are the same as other medications prescribed  for you. Read the directions carefully, and ask your doctor or other care provider to review them with you.

## 2017-04-24 NOTE — MR AVS SNAPSHOT
Nabor Cunningham   4/24/2017 11:15 AM   Anticoagulation Therapy Visit    Description:  81 year old male   Provider:  NB ANTI COAG   Department:  Nb Anticoag           INR as of 4/24/2017     Today's INR 2.7      Anticoagulation Summary as of 4/24/2017     INR goal 2.0-3.0   Today's INR 2.7   Full instructions 6 mg on Mon, Wed, Fri; 8 mg all other days   Next INR check 5/22/2017    Indications   Chronic anticoagulation [Z79.01]  Chronic atrial fibrillation (H) [I48.2]  Long-term (current) use of anticoagulants [Z79.01] [Z79.01]         Description     No change, recheck INR in 4 weeks.      Your next Anticoagulation Clinic appointment(s)     May 22, 2017 10:45 AM CDT   Anticoagulation Visit with NB ANTI COAG   [facility]    [address] [place] [address] [phone]              Contact Numbers     Please call [phone] to cancel and/or reschedule your appointment.  Please call [phone] with any problems or questions regarding your therapy          April 2017 Details    Sun Mon Tue Wed Thu Fri Sat           1                 2               3               4               5               6               7               8                 9               10               11               12               13               14               15                 16               17               18               19               20               21               22                 23               24      6 mg   See details      25      8 mg         26      6 mg         27      8 mg         28      6 mg         29      8 mg           30      8 mg                Date Details   04/24 This INR check               How to take your warfarin dose     To take:  6 mg Take 3 of the 2 mg tablets.    To take:  8 mg Take 4 of the 2 mg tablets.           May 2017 Details    Sun Mon Tue Wed Thu Fri Sat      1      6 mg         2      8 mg         3      6 mg          4      8 mg         5      6 mg         6      8 mg           7      8 mg         8      6 mg         9      8 mg         10      6 mg         11      8 mg         12      6 mg         13      8 mg           14      8 mg         15      6 mg         16      8 mg         17      6 mg         18      8 mg         19      6 mg         20      8 mg           21      8 mg         22            23               24               25               26               27                 28               29               30               31                   Date Details   No additional details    Date of next INR:  5/22/2017         How to take your warfarin dose     To take:  6 mg Take 3 of the 2 mg tablets.    To take:  8 mg Take 4 of the 2 mg tablets.

## 2017-04-24 NOTE — PROGRESS NOTES
"  ANTICOAGULATION FOLLOW-UP CLINIC VISIT    Patient Name:  Nabor Cunningham  Date:  4/24/2017  Contact Type:  Face to Face, accompanied by gerber Pugh    SUBJECTIVE:     Patient Findings     Positives No Problem Findings (none reported)    Comments Offered to fill Rx for 4mg tablets, pt refused. \"I ain't cutting no pills.\"           OBJECTIVE    INR Protime   Date Value Ref Range Status   04/24/2017 2.7 (A) 0.86 - 1.14 Final       ASSESSMENT / PLAN  INR assessment THER    Recheck INR In: 4 WEEKS    INR Location Clinic      Anticoagulation Summary as of 4/24/2017     INR goal 2.0-3.0   Today's INR 2.7   Maintenance plan 6 mg (2 mg x 3) on Mon, Wed, Fri; 8 mg (2 mg x 4) all other days   Full instructions 6 mg on Mon, Wed, Fri; 8 mg all other days   Weekly total 50 mg   No change documented Kirsty Myers RN   Plan last modified Swathi Antonio RN (4/6/2017)   Next INR check 5/22/2017   Target end date Indefinite    Indications   Chronic anticoagulation [Z79.01]  Chronic atrial fibrillation (H) [I48.2]  Long-term (current) use of anticoagulants [Z79.01] [Z79.01]         Anticoagulation Episode Summary     INR check location     Preferred lab     Send INR reminders to [facility]    Comments * 2mg tabs      Anticoagulation Care Providers     Provider Role Specialty Phone number    Heron Mayo MD [facility] [phone]            See the Encounter Report to view Anticoagulation Flowsheet and Dosing Calendar (Go to Encounters tab in chart review, and find the Anticoagulation Therapy Visit)    Kirsty Myers RN               "

## 2017-04-24 NOTE — MR AVS SNAPSHOT
After Visit Summary   4/24/2017    Nabor Cunningham    MRN: [id number]           Patient Information     Date Of Birth          1935        Visit Information        Provider Department      4/24/2017 2:30 PM Delfin Campbell MD [facility]        Today's Diagnoses     Tinnitus, bilateral    -  1    Asymmetrical sensorineural hearing loss          Care Instructions    Per Physician's instructions          Follow-ups after your visit        Additional Services     AUDIOLOGY ADULT REFERRAL       Your provider has referred you to: [facility]: [facility] [phone]   [email or web address]    Treatment:  Evaluation & Treatment  Specialty Testing:  Audiogram w/Tymps and Reflexes    Please be aware that coverage of these services is subject to the terms and limitations of your health insurance plan.  Call member services at your health plan with any benefit or coverage questions.      Please bring the following to your appointment:  >>   Any x-rays, CTs or MRIs which have been performed.  Contact the facility where they were done to arrange for  prior to your scheduled appointment.  Any new CT, MRI or other procedures ordered by your specialist must be performed at a [place] facility or coordinated by your clinic's referral office.   >>   List of current medications  >>   This referral request   >>   Any documents/labs given to you for this referral                  Your next 10 appointments already scheduled     May 12, 2017  9:40 AM CDT   SHORT with Heron Mayo MD   [facility]    [address] [place] [address] [phone]            May 22, 2017 10:45 AM CDT   Anticoagulation Visit with NB ANTI COAG   [facility])    [address] [place] [id number] [phone]              Who to contact     If you have  "questions or need follow up information about today's clinic visit or your schedule please contact [facility] directly at [phone].  Normal or non-critical lab and imaging results will be communicated to you by MyChart, letter or phone within 4 business days after the clinic has received the results. If you do not hear from us within 7 days, please contact the clinic through [employer] or phone. If you have a critical or abnormal lab result, we will notify you by phone as soon as possible.  Submit refill requests through [employer] or call your pharmacy and they will forward the refill request to us. Please allow 3 business days for your refill to be completed.          Additional Information About Your Visit        [employer] Information     [employer] lets you send messages to your doctor, view your test results, renew your prescriptions, schedule appointments and more. To sign up, go to [email or web address] . Click on \"Log in\" on the left side of the screen, which will take you to the Welcome page. Then click on \"Sign up Now\" on the right side of the page.     You will be asked to enter the access code listed below, as well as some personal information. Please follow the directions to create your username and password.     Your access code is: [email or web address]  Expires: 2017 12:20 PM     Your access code will  in 90 days. If you need help or a new code, please call your [place] clinic or [phone].        Care EveryWhere ID     This is your Care EveryWhere ID. This could be used by other organizations to access your [place] medical records  [id number]        Your Vitals Were     Pulse Respirations                82 12           Blood Pressure from Last 3 Encounters:   17 132/74   17 134/73   04/10/17 140/84    Weight from Last 3 Encounters:   17 66.7 kg (147 lb)   17 66.9 kg (147 lb 6.4 oz)   17 66.2 kg (146 lb)              We Performed the Following     " AUDIOLOGY ADULT REFERRAL          Today's Medication Changes          These changes are accurate as of: 4/24/17  4:02 PM.  If you have any questions, ask your nurse or doctor.               These medicines have changed or have updated prescriptions.        Dose/Directions    warfarin 2 MG tablet   Commonly known as:  COUMADIN   This may have changed:    - how much to take  - how to take this  - when to take this  - additional instructions   Used for:  Chronic atrial fibrillation (H)   Changed by:  Kirsty Myers, RN        Take 6mg by mouth MWF and 8mg all other days or as directed by Anticoagulation Clinic   Quantity:  100 tablet   Refills:  2            Where to get your medicines      These medications were sent to [id number] [address] [facility]  [address] [facility] [address]    Hours:  Closed 10-16-08 business to [facility] Phone:  [phone]     warfarin 2 MG tablet                Primary Care Provider Office Phone # Fax #    Heron Mayo [phone]       [facility] [address] [facility] [address]        Thank you!     Thank you for choosing [facility]  for your care. Our goal is always to provide you with excellent care. Hearing back from our patients is one way we can continue to improve our services. Please take a few minutes to complete the written survey that you may receive in the mail after your visit with us. Thank you!             Your Updated Medication List - Protect others around you: Learn how to safely use, store and throw away your medicines at www.disposemymeds.org.          This list is accurate as of: 4/24/17  4:02 PM.  Always use your most recent med list.                   Brand Name Dispense Instructions for use    B-12 1000 MCG Tbcr     100 tablet    Take 1,000 mcg by mouth daily       * COLCRYS 0.6 MG tablet   Generic drug:  colchicine     30 tablet    Take 0.6 mg by mouth daily Reported  on 4/14/2017       * colchicine 0.6 MG tablet    COLCRYS    30 tablet    Take 1 tablet (0.6 mg) by mouth daily       metoprolol 25 MG 24 hr tablet    TOPROL-XL    90 tablet    Take 1 tablet (25 mg) by mouth daily       mirtazapine 15 MG tablet    REMERON    30 tablet    Take 1 tablet (15 mg) by mouth At Bedtime       Multi-vitamin Tabs tablet     100 tablet    Take 1 tablet by mouth daily       nortriptyline 25 MG capsule    PAMELOR    30 capsule    Take 1 capsule (25 mg) by mouth At Bedtime       PROTONIX 40 MG EC tablet   Generic drug:  pantoprazole     30 tablet    Take 1 tablet (40 mg) by mouth daily Take 30-60 minutes before a meal.       temazepam 30 MG capsule    RESTORIL    15 capsule    Take 1 capsule (30 mg) by mouth nightly as needed for sleep       warfarin 2 MG tablet    COUMADIN    100 tablet    Take 6mg by mouth MWF and 8mg all other days or as directed by Anticoagulation Clinic       * Notice:  This list has 2 medication(s) that are the same as other medications prescribed for you. Read the directions carefully, and ask your doctor or other care provider to review them with you.

## 2017-05-12 ENCOUNTER — OFFICE VISIT (OUTPATIENT)
Dept: FAMILY MEDICINE | Facility: CLINIC | Age: 82
End: 2017-05-12
Payer: MEDICARE

## 2017-05-12 VITALS
TEMPERATURE: 97.5 F | HEART RATE: 84 BPM | SYSTOLIC BLOOD PRESSURE: 100 MMHG | DIASTOLIC BLOOD PRESSURE: 64 MMHG | WEIGHT: 145 LBS | BODY MASS INDEX: 25.28 KG/M2

## 2017-05-12 DIAGNOSIS — F41.9 ANXIETY: Primary | ICD-10-CM

## 2017-05-12 DIAGNOSIS — R44.0 AUDITORY HALLUCINATIONS: ICD-10-CM

## 2017-05-12 DIAGNOSIS — M1A.9XX0 CHRONIC GOUT, UNSPECIFIED CAUSE, UNSPECIFIED SITE: ICD-10-CM

## 2017-05-12 DIAGNOSIS — L72.9 SKIN CYST: ICD-10-CM

## 2017-05-12 DIAGNOSIS — L84 CORN OR CALLUS: ICD-10-CM

## 2017-05-12 PROCEDURE — 99214 OFFICE O/P EST MOD 30 MIN: CPT | Performed by: FAMILY MEDICINE

## 2017-05-12 RX ORDER — NORTRIPTYLINE HCL 25 MG
50 CAPSULE ORAL AT BEDTIME
Qty: 60 CAPSULE | Refills: 5 | Status: SHIPPED | OUTPATIENT
Start: 2017-05-12 | End: 2017-10-19

## 2017-05-12 ASSESSMENT — ANXIETY QUESTIONNAIRES
7. FEELING AFRAID AS IF SOMETHING AWFUL MIGHT HAPPEN: MORE THAN HALF THE DAYS
GAD7 TOTAL SCORE: 14
6. BECOMING EASILY ANNOYED OR IRRITABLE: MORE THAN HALF THE DAYS
2. NOT BEING ABLE TO STOP OR CONTROL WORRYING: MORE THAN HALF THE DAYS
3. WORRYING TOO MUCH ABOUT DIFFERENT THINGS: MORE THAN HALF THE DAYS
1. FEELING NERVOUS, ANXIOUS, OR ON EDGE: MORE THAN HALF THE DAYS
5. BEING SO RESTLESS THAT IT IS HARD TO SIT STILL: MORE THAN HALF THE DAYS
IF YOU CHECKED OFF ANY PROBLEMS ON THIS QUESTIONNAIRE, HOW DIFFICULT HAVE THESE PROBLEMS MADE IT FOR YOU TO DO YOUR WORK, TAKE CARE OF THINGS AT HOME, OR GET ALONG WITH OTHER PEOPLE: VERY DIFFICULT

## 2017-05-12 ASSESSMENT — PATIENT HEALTH QUESTIONNAIRE - PHQ9: 5. POOR APPETITE OR OVEREATING: MORE THAN HALF THE DAYS

## 2017-05-12 NOTE — PATIENT INSTRUCTIONS
ASSESSMENT:   (F41.9) Anxiety  (primary encounter diagnosis)  Comment: continues to have anxiety problems.  mirtazapine may have had side effects of confusion.  Hesitant to try other medications.   Plan: nortriptyline (PAMELOR) 25 MG capsule        Try increasing nortriptyline to 50mg in evening (two pills) for anxiety.  There are other medications that we could try if needed.   lorazepam is a close cousin of temazepam you are already taking.  It has addictive potential, causes drowsiness and increases risk for falls in seniors.  I do not recommend adding that at this time.     (L84) Corn or callus  Comment: plantar left foot.   Plan: Continue the shoe insert.    I did trim callous today  You can use pumice stone to sand down the hard corn/callous on bottom of foot.  Use after bathing when skin is softer.     (L72.9) Skin cyst  Comment: this is benign-not cancer.  It can be left alone or removed surgically if desired.    Plan: If you would like to see surgeon, let me know and we could do referral.     (M1A.9XX0) Chronic gout, unspecified cause, unspecified site  Comment: doing well on the colchicine.  Not on allopurinol.  Plan: No change in current treatment plan.     (R44.0) Auditory hallucinations  Comment:   Plan: There are medications that can help for this but if tolerating OK, I will not start other medication at this time.     Recheck in 1-2 months.

## 2017-05-12 NOTE — NURSING NOTE
"Chief Complaint   Patient presents with     Anxiety     recheck       Initial /64  Pulse 84  Temp 97.5  F (36.4  C) (Tympanic)  Wt 145 lb (65.8 kg)  BMI 25.28 kg/m2 Estimated body mass index is 25.28 kg/(m^2) as calculated from the following:    Height as of 4/12/17: 5' 3.5\" (1.613 m).    Weight as of this encounter: 145 lb (65.8 kg).  Medication Reconciliation: complete    Health Maintenance that is potentially due pending provider review:  NONE    n/a    "

## 2017-05-12 NOTE — MR AVS SNAPSHOT
After Visit Summary   5/12/2017    Nabor Cunningham    MRN: [id number]           Patient Information     Date Of Birth          1935        Visit Information        Provider Department      5/12/2017 9:40 AM Heron Mayo MD [facility]        Today's Diagnoses     Anxiety    -  1    Corn or callus        Skin cyst        Chronic gout, unspecified cause, unspecified site        Auditory hallucinations          Care Instructions    ASSESSMENT:   (F41.9) Anxiety  (primary encounter diagnosis)  Comment: continues to have anxiety problems.  mirtazapine may have had side effects of confusion.  Hesitant to try other medications.   Plan: nortriptyline (PAMELOR) 25 MG capsule        Try increasing nortriptyline to 50mg in evening (two pills) for anxiety.  There are other medications that we could try if needed.   lorazepam is a close cousin of temazepam you are already taking.  It has addictive potential, causes drowsiness and increases risk for falls in seniors.  I do not recommend adding that at this time.     (L84) Corn or callus  Comment: plantar left foot.   Plan: Continue the shoe insert.    I did trim callous today  You can use pumice stone to sand down the hard corn/callous on bottom of foot.  Use after bathing when skin is softer.     (L72.9) Skin cyst  Comment: this is benign-not cancer.  It can be left alone or removed surgically if desired.    Plan: If you would like to see surgeon, let me know and we could do referral.     (M1A.9XX0) Chronic gout, unspecified cause, unspecified site  Comment: doing well on the colchicine.  Not on allopurinol.  Plan: No change in current treatment plan.     (R44.0) Auditory hallucinations  Comment:   Plan: There are medications that can help for this but if tolerating OK, I will not start other medication at this time.     Recheck in 1-2 months.         Follow-ups after your visit        Your next 10 appointments already scheduled     May  "2017 10:45 AM CDT   Anticoagulation Visit with [facility]   [facility]    [address] [place] [address] [phone]              Who to contact     If you have questions or need follow up information about today's clinic visit or your schedule please contact [facility] directly at [phone].  Normal or non-critical lab and imaging results will be communicated to you by [employer], letter or phone within 4 business days after the clinic has received the results. If you do not hear from us within 7 days, please contact the clinic through [employer] or phone. If you have a critical or abnormal lab result, we will notify you by phone as soon as possible.  Submit refill requests through [facility] or call your pharmacy and they will forward the refill request to us. Please allow 3 business days for your refill to be completed.          Additional Information About Your Visit        [employer] Information     [facility] lets you send messages to your doctor, view your test results, renew your prescriptions, schedule appointments and more. To sign up, go to [email or web address] . Click on \"Log in\" on the left side of the screen, which will take you to the Welcome page. Then click on \"Sign up Now\" on the right side of the page.     You will be asked to enter the access code listed below, as well as some personal information. Please follow the directions to create your username and password.     Your access code is: [email or web address]  Expires: 2017 12:20 PM     Your access code will  in 90 days. If you need help or a new code, please call your [place] clinic or [phone].        Care EveryWhere ID     This is your Care EveryWhere ID. This could be used by other organizations to access your [place] medical records  [id number]        Your Vitals Were     Pulse Temperature BMI (Body Mass Index)             84 97.5  F (36.4  C) " (Tympanic) 25.28 kg/m2          Blood Pressure from Last 3 Encounters:   05/12/17 100/64   04/24/17 132/74   04/14/17 134/73    Weight from Last 3 Encounters:   05/12/17 145 lb (65.8 kg)   04/12/17 147 lb (66.7 kg)   04/07/17 147 lb 6.4 oz (66.9 kg)              Today, you had the following     No orders found for display         Today's Medication Changes          These changes are accurate as of: 5/12/17 10:38 AM.  If you have any questions, ask your nurse or doctor.               These medicines have changed or have updated prescriptions.        Dose/Directions    colchicine 0.6 MG tablet   Commonly known as:  COLCRYS   This may have changed:  Another medication with the same name was removed. Continue taking this medication, and follow the directions you see here.   Used for:  Chronic gout, unspecified cause, unspecified site   Changed by:  Heron Mayo MD        Dose:  0.6 mg   Take 1 tablet (0.6 mg) by mouth daily   Quantity:  30 tablet   Refills:  5       nortriptyline 25 MG capsule   Commonly known as:  PAMELOR   This may have changed:  how much to take   Used for:  Anxiety   Changed by:  Heron Mayo MD        Dose:  50 mg   Take 2 capsules (50 mg) by mouth At Bedtime   Quantity:  60 capsule   Refills:  5         Stop taking these medicines if you haven't already. Please contact your care team if you have questions.     PROTONIX 40 MG EC tablet   Generic drug:  pantoprazole   Stopped by:  Heron Mayo MD                Where to get your medicines      These medications were sent to [id number] [address] [facility]  [address] [facility] [address]    Hours:  Closed 10-16-08 business to [facility] Phone:  [phone]     nortriptyline 25 MG capsule                Primary Care Provider Office Phone # Fax #    Heron Mayo [phone]       [facility] [address] [facility] [address]        Thank you!     Thank you  for choosing [facility]  for your care. Our goal is always to provide you with excellent care. Hearing back from our patients is one way we can continue to improve our services. Please take a few minutes to complete the written survey that you may receive in the mail after your visit with us. Thank you!             Your Updated Medication List - Protect others around you: Learn how to safely use, store and throw away your medicines at www.disposemymeds.org.          This list is accurate as of: 5/12/17 10:38 AM.  Always use your most recent med list.                   Brand Name Dispense Instructions for use    B-12 1000 MCG Tbcr     100 tablet    Take 1,000 mcg by mouth daily       colchicine 0.6 MG tablet    COLCRYS    30 tablet    Take 1 tablet (0.6 mg) by mouth daily       metoprolol 25 MG 24 hr tablet    TOPROL-XL    90 tablet    Take 1 tablet (25 mg) by mouth daily       Multi-vitamin Tabs tablet     100 tablet    Take 1 tablet by mouth daily       nortriptyline 25 MG capsule    PAMELOR    60 capsule    Take 2 capsules (50 mg) by mouth At Bedtime       temazepam 30 MG capsule    RESTORIL    15 capsule    Take 1 capsule (30 mg) by mouth nightly as needed for sleep       warfarin 2 MG tablet    COUMADIN    100 tablet    Take 6mg by mouth MWF and 8mg all other days or as directed by Anticoagulation Clinic

## 2017-05-12 NOTE — PROGRESS NOTES
"  SUBJECTIVE:                                                    Nabor Cunningham is a 81 year old male who presents to clinic today for the following health issues:  Chief Complaint   Patient presents with     Anxiety     recheck    Accompanied today by son.    Last clinic visit: 4/14/2017.  He had follow-up on gout.  Treated with colchicine plus allopurinol.  Anxiety seemed better on mirtazapine at 15mg daily.  He had peripheral edema.   Depression and Anxiety Follow-Up    Status since last visit: he is starting to check things again    Other associated symptoms:None    Complicating factors:     Significant life event: No     Current substance abuse: None    PHQ-9 SCORE 3/22/2017   Total Score 24     IZAIAH-7 SCORE 3/22/2017   Total Score 18            Mood has been OK.  PHQ9 score today= 8.  Not feeling depressed.   generalized anxiety disorder scale score today= 14  I \"worry a little\".  Worries about going out places.  \"Once I'm there I'm OK\".   Stomach muscle get tight when nervous.  No nausea or vomiting.    Seems better since stopping some meds.     He has been taking temazepam every night.    Taking nortriptyline in the evening.   Stopped the mirtazapine after phone call on 4/19 due to some increased confusion.   Asking about lorazepam.  Daughter knows someone who uses that for anxiety.       Amount of exercise or physical activity: 6-7 days/week for an average of 30-45 minutes    Problems taking medications regularly: No    Medication side effects: does not like taking pills    Diet: regular (no restrictions)    Living in Senior apartments.   He had hearing tested.  He was not recommended hearing aids.   He continues to hear music.  He did sleep well last night.  Music in his ear seemed much lower.  He usually sleeps with the radio on so it blocks the music.  He hears a drum beat and tunes-the \"bunny hop\".  It is day and night.  It has been more troublesome at night when there is less surrounding noise.    He " has seen podiatrist.  Has shoe inserts.  Those have not helped.  Seemed to work initially.  Still has the soreness.      Taking colchicine once daily now for gout prevention.   He is off the allopurinol since 4/19.  He had been having confusion and allopurinol and mirtazapine stopped as they were the most recent medications to be added.  He had a coupon for the colchicine.    Questions about a lump on his back.  Present for years.  Not bothersome.    Location: low back midline-3cm    Pain left foot.     Not drinking alcohol more than once in a while.     Patient Active Problem List   Diagnosis     Anxiety     Alcohol abuse     Chronic atrial fibrillation (H)     Weight loss     Peripheral polyneuropathy (H)     Chronic gout, unspecified cause, unspecified site     Essential hypertension with goal blood pressure less than 140/90     Hyperlipidemia LDL goal <130     Persistent insomnia     Lower urinary tract symptoms (LUTS)     Chronic anticoagulation     Long-term (current) use of anticoagulants [Z79.01]     Moderate episode of recurrent major depressive disorder (H)      OBJECTIVE:Blood pressure 100/64, pulse 84, temperature 97.5  F (36.4  C), temperature source Tympanic, weight 145 lb (65.8 kg). BMI=Body mass index is 25.28 kg/(m^2).  GENERAL APPEARANCE ADULT: Alert, no acute distress, anxious  MS: He has a corn/callous plantar MTP area left foot.   SKIN: 3cm mobile mass lower midline back probable sebaceous cyst or lipoma.   PSYCH: mentation appears normal.     ASSESSMENT:   (F41.9) Anxiety  (primary encounter diagnosis)  Comment: continues to have anxiety problems.  mirtazapine may have had side effects of confusion.  Hesitant to try other medications.   Plan: nortriptyline (PAMELOR) 25 MG capsule        Try increasing nortriptyline to 50mg in evening (two pills) for anxiety.  There are other medications that we could try if needed.   lorazepam is a close cousin of temazepam you are already taking.  It has  addictive potential, causes drowsiness and increases risk for falls in seniors.  I do not recommend adding that at this time.     (L84) Corn or callus  Comment: plantar left foot.   Plan: Continue the shoe insert.    I did trim callous today  You can use pumice stone to sand down the hard corn/callous on bottom of foot.  Use after bathing when skin is softer.     (L72.9) Skin cyst  Comment: this is benign-not cancer.  It can be left alone or removed surgically if desired.    Plan: If you would like to see surgeon, let me know and we could do referral.     (M1A.9XX0) Chronic gout, unspecified cause, unspecified site  Comment: doing well on the colchicine.  Not on allopurinol.  Plan: No change in current treatment plan.     (R44.0) Auditory hallucinations  Comment:   Plan: There are medications that can help for this but if tolerating OK, I will not start other medication at this time.     Recheck in 1-2 months.

## 2017-05-13 ASSESSMENT — ANXIETY QUESTIONNAIRES: GAD7 TOTAL SCORE: 14

## 2017-05-13 ASSESSMENT — PATIENT HEALTH QUESTIONNAIRE - PHQ9: SUM OF ALL RESPONSES TO PHQ QUESTIONS 1-9: 8

## 2017-05-22 ENCOUNTER — COMMUNICATION - GICH (OUTPATIENT)
Dept: INTERNAL MEDICINE | Facility: OTHER | Age: 82
End: 2017-05-22

## 2017-05-22 ENCOUNTER — ANTICOAGULATION THERAPY VISIT (OUTPATIENT)
Dept: ANTICOAGULATION | Facility: CLINIC | Age: 82
End: 2017-05-22
Payer: MEDICARE

## 2017-05-22 DIAGNOSIS — Z79.01 CHRONIC ANTICOAGULATION: ICD-10-CM

## 2017-05-22 DIAGNOSIS — Z79.01 LONG-TERM (CURRENT) USE OF ANTICOAGULANTS: ICD-10-CM

## 2017-05-22 DIAGNOSIS — G47.00 INSOMNIA: ICD-10-CM

## 2017-05-22 DIAGNOSIS — I48.20 CHRONIC ATRIAL FIBRILLATION (H): ICD-10-CM

## 2017-05-22 LAB — INR POINT OF CARE: 2.7 (ref 0.86–1.14)

## 2017-05-22 PROCEDURE — 85610 PROTHROMBIN TIME: CPT | Mod: QW

## 2017-05-22 PROCEDURE — 99207 ZZC NO CHARGE NURSE ONLY: CPT

## 2017-05-22 PROCEDURE — 36416 COLLJ CAPILLARY BLOOD SPEC: CPT

## 2017-05-22 NOTE — MR AVS SNAPSHOT
Nabor Cunningham   5/22/2017 2:15 PM   Anticoagulation Therapy Visit    Description:  81 year old male   Provider:  NB ANTI COAG   Department:  Nb Anticoag           INR as of 5/22/2017     Today's INR 2.7      Anticoagulation Summary as of 5/22/2017     INR goal 2.0-3.0   Today's INR 2.7   Full instructions 6 mg on Mon, Wed, Fri; 8 mg all other days   Next INR check 6/26/2017    Indications   Chronic anticoagulation [Z79.01]  Chronic atrial fibrillation (H) [I48.2]  Long-term (current) use of anticoagulants [Z79.01] [Z79.01]         Description     No change, recheck INR in 5 weeks.      Your next Anticoagulation Clinic appointment(s)     Jun 26, 2017  2:15 PM CDT   Anticoagulation Visit with NB ANTI COAG   [facility]    [address] [place] [address] [phone]              Contact Numbers     Please call [phone] to cancel and/or reschedule your appointment.  Please call [phone] with any problems or questions regarding your therapy          May 2017 Details    Sun Mon Tue Wed Thu Fri Sat      1               2               3               4               5               6                 7               8               9               10               11               12               13                 14               15               16               17               18               19               20                 21               22      6 mg   See details      23      8 mg         24      6 mg         25      8 mg         26      6 mg         27      8 mg           28      8 mg         29      6 mg         30      8 mg         31      6 mg             Date Details   05/22 This INR check               How to take your warfarin dose     To take:  6 mg Take 3 of the 2 mg tablets.    To take:  8 mg Take 4 of the 2 mg tablets.           June 2017 Details    Sun Mon Tue Wed Thu Fri Sat         1      8 mg         2      6 mg          3      8 mg           4      8 mg         5      6 mg         6      8 mg         7      6 mg         8      8 mg         9      6 mg         10      8 mg           11      8 mg         12      6 mg         13      8 mg         14      6 mg         15      8 mg         16      6 mg         17      8 mg           18      8 mg         19      6 mg         20      8 mg         21      6 mg         22      8 mg         23      6 mg         24      8 mg           25      8 mg         26            27               28               29               30                 Date Details   No additional details    Date of next INR:  6/26/2017         How to take your warfarin dose     To take:  6 mg Take 3 of the 2 mg tablets.    To take:  8 mg Take 4 of the 2 mg tablets.

## 2017-05-22 NOTE — PROGRESS NOTES
"  ANTICOAGULATION FOLLOW-UP CLINIC VISIT    Patient Name:  Nabor Cunningham  Date:  5/22/2017  Contact Type:  Face to Face, accompanied by dtr    SUBJECTIVE:     Patient Findings     Positives Change in medications (5/12/17: Try increasing nortriptyline to 50mg in evening (two pills) for anxiety.)    Comments Seen by Dr. Mayo 5/12/17.   Reports the increased dose of nortriptyline is working, \"my stomach is not in knots like it was.\"            OBJECTIVE    INR Protime   Date Value Ref Range Status   05/22/2017 2.7 (A) 0.86 - 1.14 Final       ASSESSMENT / PLAN  INR assessment THER    Recheck INR In: 5 WEEKS    INR Location Clinic      Anticoagulation Summary as of 5/22/2017     INR goal 2.0-3.0   Today's INR 2.7   Maintenance plan 6 mg (2 mg x 3) on Mon, Wed, Fri; 8 mg (2 mg x 4) all other days   Full instructions 6 mg on Mon, Wed, Fri; 8 mg all other days   Weekly total 50 mg   No change documented Kirsty Myers RN   Plan last modified Swathi Antonio RN (4/6/2017)   Next INR check 6/26/2017   Target end date Indefinite    Indications   Chronic anticoagulation [Z79.01]  Chronic atrial fibrillation (H) [I48.2]  Long-term (current) use of anticoagulants [Z79.01] [Z79.01]         Anticoagulation Episode Summary     INR check location     Preferred lab     Send INR reminders to [facility] CLINIC POOL    Comments * 2mg tabs      Anticoagulation Care Providers     Provider Role Specialty Phone number    Heron Mayo MD [facility] [phone]            See the Encounter Report to view Anticoagulation Flowsheet and Dosing Calendar (Go to Encounters tab in chart review, and find the Anticoagulation Therapy Visit)    Kirsty Myers, SAURAV               "

## 2017-06-26 ENCOUNTER — ANTICOAGULATION THERAPY VISIT (OUTPATIENT)
Dept: ANTICOAGULATION | Facility: CLINIC | Age: 82
End: 2017-06-26
Payer: MEDICARE

## 2017-06-26 DIAGNOSIS — Z79.01 LONG-TERM (CURRENT) USE OF ANTICOAGULANTS: ICD-10-CM

## 2017-06-26 DIAGNOSIS — I48.20 CHRONIC ATRIAL FIBRILLATION (H): ICD-10-CM

## 2017-06-26 DIAGNOSIS — Z79.01 CHRONIC ANTICOAGULATION: ICD-10-CM

## 2017-06-26 LAB — INR POINT OF CARE: 2.7 (ref 0.86–1.14)

## 2017-06-26 PROCEDURE — 85610 PROTHROMBIN TIME: CPT | Mod: QW

## 2017-06-26 PROCEDURE — 36416 COLLJ CAPILLARY BLOOD SPEC: CPT

## 2017-06-26 PROCEDURE — 99207 ZZC NO CHARGE NURSE ONLY: CPT

## 2017-06-26 NOTE — PROGRESS NOTES
ANTICOAGULATION FOLLOW-UP CLINIC VISIT    Patient Name:  Nabor Cunningham  Date:  6/26/2017  Contact Type:  Face to Face, accompanied by dtr    SUBJECTIVE:     Patient Findings     Positives No Problem Findings    Comments States he still gets nervous but overall he is feeling less anxious and sleeping better.           OBJECTIVE    INR Protime   Date Value Ref Range Status   06/26/2017 2.7 (A) 0.86 - 1.14 Final       ASSESSMENT / PLAN  INR assessment THER    Recheck INR In: 6 WEEKS    INR Location Clinic      Anticoagulation Summary as of 6/26/2017     INR goal 2.0-3.0   Today's INR 2.7   Maintenance plan 6 mg (2 mg x 3) on Mon, Wed, Fri; 8 mg (2 mg x 4) all other days   Full instructions 6 mg on Mon, Wed, Fri; 8 mg all other days   Weekly total 50 mg   No change documented Kirsty Myers RN   Plan last modified Swathi Antonio RN (4/6/2017)   Next INR check 8/7/2017   Target end date Indefinite    Indications   Chronic anticoagulation [Z79.01]  Chronic atrial fibrillation (H) [I48.2]  Long-term (current) use of anticoagulants [Z79.01] [Z79.01]         Anticoagulation Episode Summary     INR check location     Preferred lab     Send INR reminders to [facility] CLINIC POOL    Comments * 2mg tabs      Anticoagulation Care Providers     Provider Role Specialty Phone number    Heron Mayo MD [facility] [phone]            See the Encounter Report to view Anticoagulation Flowsheet and Dosing Calendar (Go to Encounters tab in chart review, and find the Anticoagulation Therapy Visit)    Kirsty Myers, RN

## 2017-06-26 NOTE — MR AVS SNAPSHOT
Nabor Cunningham   6/26/2017 2:15 PM   Anticoagulation Therapy Visit    Description:  81 year old male   Provider:  NB ANTI COAG   Department:  Nb Anticoag           INR as of 6/26/2017     Today's INR 2.7      Anticoagulation Summary as of 6/26/2017     INR goal 2.0-3.0   Today's INR 2.7   Full instructions 6 mg on Mon, Wed, Fri; 8 mg all other days   Next INR check 8/7/2017    Indications   Chronic anticoagulation [Z79.01]  Chronic atrial fibrillation (H) [I48.2]  Long-term (current) use of anticoagulants [Z79.01] [Z79.01]         Description     No change, recheck INR in 6 weeks.      Your next Anticoagulation Clinic appointment(s)     Aug 07, 2017  2:15 PM CDT   Anticoagulation Visit with NB ANTI COAG   [facility]    [address] [place] [address] [phone]              Contact Numbers     Please call [phone] to cancel and/or reschedule your appointment.  Please call [phone] with any problems or questions regarding your therapy          June 2017 Details    Sun Mon Tue Wed Thu Fri Sat         1               2               3                 4               5               6               7               8               9               10                 11               12               13               14               15               16               17                 18               19               20               21               22               23               24                 25               26      6 mg   See details      27      8 mg         28      6 mg         29      8 mg         30      6 mg           Date Details   06/26 This INR check               How to take your warfarin dose     To take:  6 mg Take 3 of the 2 mg tablets.    To take:  8 mg Take 4 of the 2 mg tablets.           July 2017 Details    Sun Mon Tue Wed Thu Fri Sat           1      8 mg           2      8 mg         3      6 mg         4       8 mg         5      6 mg         6      8 mg         7      6 mg         8      8 mg           9      8 mg         10      6 mg         11      8 mg         12      6 mg         13      8 mg         14      6 mg         15      8 mg           16      8 mg         17      6 mg         18      8 mg         19      6 mg         20      8 mg         21      6 mg         22      8 mg           23      8 mg         24      6 mg         25      8 mg         26      6 mg         27      8 mg         28      6 mg         29      8 mg           30      8 mg         31      6 mg               Date Details   No additional details            How to take your warfarin dose     To take:  6 mg Take 3 of the 2 mg tablets.    To take:  8 mg Take 4 of the 2 mg tablets.           August 2017 Details    Sun Mon Tue Wed Thu Fri Sat       1      8 mg         2      6 mg         3      8 mg         4      6 mg         5      8 mg           6      8 mg         7            8               9               10               11               12                 13               14               15               16               17               18               19                 20               21               22               23               24               25               26                 27               28               29               30               31                  Date Details   No additional details    Date of next INR:  8/7/2017         How to take your warfarin dose     To take:  6 mg Take 3 of the 2 mg tablets.    To take:  8 mg Take 4 of the 2 mg tablets.

## 2017-07-07 ENCOUNTER — COMMUNICATION - GICH (OUTPATIENT)
Dept: INTERNAL MEDICINE | Facility: OTHER | Age: 82
End: 2017-07-07

## 2017-07-07 DIAGNOSIS — G47.00 INSOMNIA: ICD-10-CM

## 2017-07-09 DIAGNOSIS — G47.00 PERSISTENT INSOMNIA: Primary | ICD-10-CM

## 2017-07-10 RX ORDER — TEMAZEPAM 30 MG
30 CAPSULE ORAL
Qty: 15 CAPSULE | Refills: 0 | Status: SHIPPED | OUTPATIENT
Start: 2017-07-10 | End: 2017-07-17

## 2017-07-17 ENCOUNTER — OFFICE VISIT (OUTPATIENT)
Dept: FAMILY MEDICINE | Facility: CLINIC | Age: 82
End: 2017-07-17
Payer: MEDICARE

## 2017-07-17 VITALS
TEMPERATURE: 97.7 F | BODY MASS INDEX: 26.44 KG/M2 | RESPIRATION RATE: 22 BRPM | HEIGHT: 63 IN | DIASTOLIC BLOOD PRESSURE: 64 MMHG | HEART RATE: 74 BPM | WEIGHT: 149.2 LBS | SYSTOLIC BLOOD PRESSURE: 122 MMHG

## 2017-07-17 DIAGNOSIS — G47.00 PERSISTENT INSOMNIA: Primary | ICD-10-CM

## 2017-07-17 DIAGNOSIS — F33.1 MODERATE EPISODE OF RECURRENT MAJOR DEPRESSIVE DISORDER (H): ICD-10-CM

## 2017-07-17 DIAGNOSIS — F41.9 ANXIETY: ICD-10-CM

## 2017-07-17 PROCEDURE — 99214 OFFICE O/P EST MOD 30 MIN: CPT | Performed by: FAMILY MEDICINE

## 2017-07-17 RX ORDER — TEMAZEPAM 30 MG
30 CAPSULE ORAL
Qty: 30 CAPSULE | Refills: 5 | Status: SHIPPED | OUTPATIENT
Start: 2017-07-17 | End: 2018-01-29

## 2017-07-17 ASSESSMENT — ANXIETY QUESTIONNAIRES
2. NOT BEING ABLE TO STOP OR CONTROL WORRYING: SEVERAL DAYS
6. BECOMING EASILY ANNOYED OR IRRITABLE: MORE THAN HALF THE DAYS
5. BEING SO RESTLESS THAT IT IS HARD TO SIT STILL: SEVERAL DAYS
3. WORRYING TOO MUCH ABOUT DIFFERENT THINGS: NEARLY EVERY DAY
GAD7 TOTAL SCORE: 14
7. FEELING AFRAID AS IF SOMETHING AWFUL MIGHT HAPPEN: NEARLY EVERY DAY
IF YOU CHECKED OFF ANY PROBLEMS ON THIS QUESTIONNAIRE, HOW DIFFICULT HAVE THESE PROBLEMS MADE IT FOR YOU TO DO YOUR WORK, TAKE CARE OF THINGS AT HOME, OR GET ALONG WITH OTHER PEOPLE: SOMEWHAT DIFFICULT
1. FEELING NERVOUS, ANXIOUS, OR ON EDGE: NEARLY EVERY DAY

## 2017-07-17 ASSESSMENT — PAIN SCALES - GENERAL: PAINLEVEL: MILD PAIN (3)

## 2017-07-17 ASSESSMENT — PATIENT HEALTH QUESTIONNAIRE - PHQ9: 5. POOR APPETITE OR OVEREATING: SEVERAL DAYS

## 2017-07-17 NOTE — PATIENT INSTRUCTIONS
ASSESSMENT:   (G47.00) Persistent insomnia  (primary encounter diagnosis)  Comment: He has been taking temazepam 30mg every for months.  It has been working well.  He would like to stay on the same dose .  The 15mg pills did not help.   Plan: temazepam (RESTORIL) 30 MG capsule        REfills.  No change in current treatment plan.     (F41.9) Anxiety  Comment: improved.  Still an issue.  He would like to continue the same nortriptyline.  Plan: We can increase nortriptyline if you feel the anxiety is causing more problems.     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   Plan: No change in current treatment plan.

## 2017-07-17 NOTE — PROGRESS NOTES
SUBJECTIVE:                                                    Nabor Cunningham is a 81 year old male who presents to clinic today for the following health issues:  Chief Complaint   Patient presents with     Insomnia     Anxiety        Anxiety Follow-Up    Status since last visit: Improved less GI problems, sleeping better    Other associated symptoms:None    Complicating factors:   Significant life event: No   Current substance abuse: None  Depression symptoms: No  IZAIAH-7 SCORE 3/22/2017 5/12/2017 7/17/2017   Total Score 18 14 14     PHQ-9 SCORE 3/22/2017 5/12/2017 7/17/2017   Total Score 24 8 13     Still gets anxious at time.  Not as bad as in the past.    PHQ9 score today= 11.  Feels his depression is better than in the past.  Scores were 24 on 3/22 and 8 on 5/12  generalized anxiety disorder scale score today= 14.  3/22=18, 5/12=14    GAD7        Amount of exercise or physical activity: 6-7 days/week for an average of 15-30 minutes    Problems taking medications regularly: No    Medication side effects: lightheaded feelings but not sure if due to medications or his nerves.  Increase gas problems but has improved.    Diet: regular (no restrictions)    Problem 2:   Insomnia  Refill sleep med if able.    Duration: on and off for years- worse with anxiety.  Sleep meds helping now    Description  Frequency of insomnia:  Wakes up 1-2 times /night but goes back to sleep  Time to fall asleep: very fast now with sleeping pill  Middle of night awakening:  nightly  Early morning awakening:  several times a week    Accompanying signs and symptoms:  anxiety    History  Similar episodes in past:  YES  Previous evaluation/sleep study:  no     Precipitating or alleviating factors:  New stressful situation: YES- recent move  Caffeine intake after lunchtime: no   OTC decongestants: no   Any new medications: no     Therapies tried and outcome: Temazepam and Nortriptyline  He has been using temazepam every night.        Problem  "3: lateral left elbow can get sore.  Some pain with lifting.     He has been scraping callous under bottom of foot.     No gout spells.  He has been on colchicine for prevention.     He has auditory hallucinations.  He had hearing checked.  \"Drums\" less loud.  Music less loud. He leaves music on.     Patient Active Problem List   Diagnosis     Anxiety     Alcohol abuse     Chronic atrial fibrillation (H)     Weight loss     Peripheral polyneuropathy (H)     Chronic gout, unspecified cause, unspecified site     Essential hypertension with goal blood pressure less than 140/90     Hyperlipidemia LDL goal <130     Persistent insomnia     Lower urinary tract symptoms (LUTS)     Chronic anticoagulation     Long-term (current) use of anticoagulants [Z79.01]     Moderate episode of recurrent major depressive disorder (H)      OBJECTIVE:Blood pressure 122/64, pulse 74, temperature 97.7  F (36.5  C), temperature source Tympanic, resp. rate 22, height 5' 3.25\" (1.607 m), weight 149 lb 3.2 oz (67.7 kg). BMI=Body mass index is 26.22 kg/(m^2).  GENERAL APPEARANCE ADULT: Alert, no acute distress  MS: elbow exam: normal appearance, range of motion full and non-tender today  foot exam He has prominent bones at ball of foot-MTP heads but minimal callous.   PSYCH: mentation appears normal., anxious      ASSESSMENT:   (G47.00) Persistent insomnia  (primary encounter diagnosis)  Comment: He has been taking temazepam 30mg every for months.  It has been working well.  He would like to stay on the same dose .  The 15mg pills did not help.   Plan: temazepam (RESTORIL) 30 MG capsule        REfills.  No change in current treatment plan.     (F41.9) Anxiety  Comment: improved.  Still an issue.  He would like to continue the same nortriptyline.  Plan: We can increase nortriptyline if you feel the anxiety is causing more problems.     (F33.1) Moderate episode of recurrent major depressive disorder (H)  Comment: nortriptyline helps for depression " as well.   Plan: No change in current treatment plan.     Recheck in 3 months.

## 2017-07-17 NOTE — NURSING NOTE
"Chief Complaint   Patient presents with     Insomnia     Anxiety       Initial /64 (BP Location: Right arm, Patient Position: Chair, Cuff Size: Adult Regular)  Pulse 74  Temp 97.7  F (36.5  C) (Tympanic)  Resp 22  Ht 5' 3.25\" (1.607 m)  Wt 149 lb 3.2 oz (67.7 kg)  BMI 26.22 kg/m2 Estimated body mass index is 26.22 kg/(m^2) as calculated from the following:    Height as of this encounter: 5' 3.25\" (1.607 m).    Weight as of this encounter: 149 lb 3.2 oz (67.7 kg).  Medication Reconciliation: complete    Health Maintenance that is potentially due pending provider review:  HCD    HCD given to patient . Ellen Miranda CMA  .    "

## 2017-07-17 NOTE — MR AVS SNAPSHOT
After Visit Summary   7/17/2017    Nabor Cunningham    MRN: [id number]           Patient Information     Date Of Birth          1935        Visit Information        Provider Department      7/17/2017 2:00 PM Heron Mayo MD [facility]        Today's Diagnoses     Persistent insomnia    -  1    Anxiety        Moderate episode of recurrent major depressive disorder (H)          Care Instructions    ASSESSMENT:   (G47.00) Persistent insomnia  (primary encounter diagnosis)  Comment: He has been taking temazepam 30mg every for months.  It has been working well.  He would like to stay on the same dose .  The 15mg pills did not help.   Plan: temazepam (RESTORIL) 30 MG capsule        REfills.  No change in current treatment plan.     (F41.9) Anxiety  Comment: improved.  Still an issue.  He would like to continue the same nortriptyline.  Plan: We can increase nortriptyline if you feel the anxiety is causing more problems.     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   Plan: No change in current treatment plan.           Follow-ups after your visit        Your next 10 appointments already scheduled     Aug 07, 2017  2:15 PM CDT   Anticoagulation Visit with [facility] ANTI COAG   [facility]    [address] [place] [address] [phone]              Who to contact     If you have questions or need follow up information about today's clinic visit or your schedule please contact [facility] directly at [phone].  Normal or non-critical lab and imaging results will be communicated to you by MyChart, letter or phone within 4 business days after the clinic has received the results. If you do not hear from us within 7 days, please contact the clinic through MyChart or phone. If you have a critical or abnormal lab result, we will  "notify you by phone as soon as possible.  Submit refill requests through [employer] or call your pharmacy and they will forward the refill request to us. Please allow 3 business days for your refill to be completed.          Additional Information About Your Visit        [employer] Information     [employer] lets you send messages to your doctor, view your test results, renew your prescriptions, schedule appointments and more. To sign up, go to [email or web address] . Click on \"Log in\" on the left side of the screen, which will take you to the Welcome page. Then click on \"Sign up Now\" on the right side of the page.     You will be asked to enter the access code listed below, as well as some personal information. Please follow the directions to create your username and password.     Your access code is: [email or web address]  Expires: 10/15/2017  3:08 PM     Your access code will  in 90 days. If you need help or a new code, please call your [facility] clinic or [phone].        Care EveryWhere ID     This is your Care EveryWhere ID. This could be used by other organizations to access your [facility] medical records  [id number]        Your Vitals Were     Pulse Temperature Respirations Height BMI (Body Mass Index)       74 97.7  F (36.5  C) (Tympanic) 22 5' 3.25\" (1.607 m) 26.22 kg/m2        Blood Pressure from Last 3 Encounters:   17 122/64   17 100/64   17 132/74    Weight from Last 3 Encounters:   17 149 lb 3.2 oz (67.7 kg)   17 145 lb (65.8 kg)   17 147 lb (66.7 kg)              Today, you had the following     No orders found for display         Where to get your medicines      Some of these will need a paper prescription and others can be bought over the counter.  Ask your nurse if you have questions.     Bring a paper prescription for each of these medications     temazepam 30 MG capsule          Primary Care Provider Office Phone # Fax #    Heron Mayo [phone] " [phone]       [facility] [address] [facility] [address]        Equal Access to Services     SHIRA DELEON : Hadii aad ku hadamanmali Marandawojciech, waronenda luqadaha, qaybta kashaniceda laurencarlyle, waxmilagro amada douglasnaveed aguilarbhupendra tangbrittney david valerio. So [facility] [phone].    ATENCIÓN: Si habla español, tiene a garcia disposición servicios gratuitos de asistencia lingüística. Llame al [phone].    We comply with applicable federal civil rights laws and [place] laws. We do not discriminate on the basis of race, color, national origin, age, disability sex, sexual orientation or gender identity.            Thank you!     Thank you for choosing [facility]  for your care. Our goal is always to provide you with excellent care. Hearing back from our patients is one way we can continue to improve our services. Please take a few minutes to complete the written survey that you may receive in the mail after your visit with us. Thank you!             Your Updated Medication List - Protect others around you: Learn how to safely use, store and throw away your medicines at www.disposemymeds.org.          This list is accurate as of: 7/17/17  3:08 PM.  Always use your most recent med list.                   Brand Name Dispense Instructions for use Diagnosis    B-12 1000 MCG Tbcr     100 tablet    Take 1,000 mcg by mouth daily    Vitamin B12 deficiency (non anemic)       colchicine 0.6 MG tablet    COLCRYS    30 tablet    Take 1 tablet (0.6 mg) by mouth daily    Chronic gout, unspecified cause, unspecified site       metoprolol 25 MG 24 hr tablet    TOPROL-XL    90 tablet    Take 1 tablet (25 mg) by mouth daily        Multi-vitamin Tabs tablet     100 tablet    Take 1 tablet by mouth daily        nortriptyline 25 MG capsule    PAMELOR    60 capsule    Take 2 capsules (50 mg) by mouth At Bedtime    Anxiety       temazepam 30 MG capsule    RESTORIL    30 capsule    Take 1 capsule (30 mg) by mouth nightly as needed  for sleep    Persistent insomnia       warfarin 2 MG tablet    COUMADIN    100 tablet    Take 6mg by mouth MWF and 8mg all other days or as directed by Anticoagulation Clinic    Chronic atrial fibrillation (H)

## 2017-07-18 ASSESSMENT — ANXIETY QUESTIONNAIRES: GAD7 TOTAL SCORE: 14

## 2017-07-18 ASSESSMENT — PATIENT HEALTH QUESTIONNAIRE - PHQ9: SUM OF ALL RESPONSES TO PHQ QUESTIONS 1-9: 13

## 2017-08-07 ENCOUNTER — ANTICOAGULATION THERAPY VISIT (OUTPATIENT)
Dept: ANTICOAGULATION | Facility: CLINIC | Age: 82
End: 2017-08-07
Payer: MEDICARE

## 2017-08-07 DIAGNOSIS — Z79.01 CHRONIC ANTICOAGULATION: ICD-10-CM

## 2017-08-07 DIAGNOSIS — Z79.01 LONG-TERM (CURRENT) USE OF ANTICOAGULANTS: ICD-10-CM

## 2017-08-07 DIAGNOSIS — I48.20 CHRONIC ATRIAL FIBRILLATION (H): ICD-10-CM

## 2017-08-07 LAB — INR POINT OF CARE: 2.1 (ref 0.86–1.14)

## 2017-08-07 PROCEDURE — 36416 COLLJ CAPILLARY BLOOD SPEC: CPT

## 2017-08-07 PROCEDURE — 85610 PROTHROMBIN TIME: CPT | Mod: QW

## 2017-08-07 PROCEDURE — 99207 ZZC NO CHARGE NURSE ONLY: CPT

## 2017-08-07 NOTE — PROGRESS NOTES
ANTICOAGULATION FOLLOW-UP CLINIC VISIT    Patient Name:  Nabor Cunningham  Date:  8/7/2017  Contact Type:  Face to Face, accompanied by dtr     SUBJECTIVE:     Patient Findings     Positives Bruising (top of left hand), Med error (pt errantly took 6mg vs 8mg last Thurs), No Problem Findings    Comments Seen in clinic by Dr. Mayo 7/17/17.           OBJECTIVE    INR Protime   Date Value Ref Range Status   08/07/2017 2.1 (A) 0.86 - 1.14 Final       ASSESSMENT / PLAN  INR assessment THER    Recheck INR In: 6 WEEKS    INR Location Clinic      Anticoagulation Summary as of 8/7/2017     INR goal 2.0-3.0   Today's INR 2.1   Maintenance plan 6 mg (2 mg x 3) on Mon, Wed, Fri; 8 mg (2 mg x 4) all other days   Full instructions 6 mg on Mon, Wed, Fri; 8 mg all other days   Weekly total 50 mg   No change documented Kirsty Myers RN   Plan last modified Swathi Antonio RN (4/6/2017)   Next INR check 9/18/2017   Target end date Indefinite    Indications   Chronic anticoagulation [Z79.01]  Chronic atrial fibrillation (H) [I48.2]  Long-term (current) use of anticoagulants [Z79.01] [Z79.01]         Anticoagulation Episode Summary     INR check location     Preferred lab     Send INR reminders to [facility] CLINIC POOL    Comments * 2mg tabs      Anticoagulation Care Providers     Provider Role Specialty Phone number    Heron Mayo MD [facility] [phone]            See the Encounter Report to view Anticoagulation Flowsheet and Dosing Calendar (Go to Encounters tab in chart review, and find the Anticoagulation Therapy Visit)    Kirsty Myers, RN

## 2017-08-07 NOTE — MR AVS SNAPSHOT
Nabor Cunningham   8/7/2017 3:30 PM   Anticoagulation Therapy Visit    Description:  81 year old male   Provider:  NB ANTI COAG   Department:  Nb Anticoag           INR as of 8/7/2017     Today's INR 2.1      Anticoagulation Summary as of 8/7/2017     INR goal 2.0-3.0   Today's INR 2.1   Full instructions 6 mg on Mon, Wed, Fri; 8 mg all other days   Next INR check 9/18/2017    Indications   Chronic anticoagulation [Z79.01]  Chronic atrial fibrillation (H) [I48.2]  Long-term (current) use of anticoagulants [Z79.01] [Z79.01]         Description     No change, recheck INR in 6 weeks.      Your next Anticoagulation Clinic appointment(s)     Sep 18, 2017  2:30 PM CDT   Anticoagulation Visit with NB ANTI COAG   [facility]    [address] [place] [address] [phone]              Contact Numbers     Please call [phone] to cancel and/or reschedule your appointment.  Please call [phone] with any problems or questions regarding your therapy          August 2017 Details    Sun Mon Tue Wed Thu Fri Sat       1               2               3               4               5                 6               7      6 mg   See details      8      8 mg         9      6 mg         10      8 mg         11      6 mg         12      8 mg           13      8 mg         14      6 mg         15      8 mg         16      6 mg         17      8 mg         18      6 mg         19      8 mg           20      8 mg         21      6 mg         22      8 mg         23      6 mg         24      8 mg         25      6 mg         26      8 mg           27      8 mg         28      6 mg         29      8 mg         30      6 mg         31      8 mg            Date Details   08/07 This INR check               How to take your warfarin dose     To take:  6 mg Take 3 of the 2 mg tablets.    To take:  8 mg Take 4 of the 2 mg tablets.           September 2017 Details    Sun  Mon Tue Wed Thu Fri Sat          1      6 mg         2      8 mg           3      8 mg         4      6 mg         5      8 mg         6      6 mg         7      8 mg         8      6 mg         9      8 mg           10      8 mg         11      6 mg         12      8 mg         13      6 mg         14      8 mg         15      6 mg         16      8 mg           17      8 mg         18            19               20               21               22               23                 24               25               26               27               28               29               30                Date Details   No additional details    Date of next INR:  9/18/2017         How to take your warfarin dose     To take:  6 mg Take 3 of the 2 mg tablets.    To take:  8 mg Take 4 of the 2 mg tablets.

## 2017-09-18 ENCOUNTER — ANTICOAGULATION THERAPY VISIT (OUTPATIENT)
Dept: ANTICOAGULATION | Facility: CLINIC | Age: 82
End: 2017-09-18
Payer: MEDICARE

## 2017-09-18 DIAGNOSIS — Z79.01 CHRONIC ANTICOAGULATION: ICD-10-CM

## 2017-09-18 DIAGNOSIS — Z79.01 LONG-TERM (CURRENT) USE OF ANTICOAGULANTS: ICD-10-CM

## 2017-09-18 DIAGNOSIS — I48.20 CHRONIC ATRIAL FIBRILLATION (H): ICD-10-CM

## 2017-09-18 LAB — INR POINT OF CARE: 2.3 (ref 0.86–1.14)

## 2017-09-18 PROCEDURE — 85610 PROTHROMBIN TIME: CPT | Mod: QW

## 2017-09-18 PROCEDURE — 36416 COLLJ CAPILLARY BLOOD SPEC: CPT

## 2017-09-18 PROCEDURE — 99207 ZZC NO CHARGE NURSE ONLY: CPT

## 2017-09-18 RX ORDER — WARFARIN SODIUM 2 MG/1
TABLET ORAL
Qty: 300 TABLET | Refills: 0 | Status: SHIPPED | OUTPATIENT
Start: 2017-09-18 | End: 2017-11-21

## 2017-09-18 NOTE — MR AVS SNAPSHOT
Nabor Cunningham   9/18/2017 2:30 PM   Anticoagulation Therapy Visit    Description:  81 year old male   Provider:  NB ANTI COAG   Department:  Nb Anticoag           INR as of 9/18/2017     Today's INR 2.3      Anticoagulation Summary as of 9/18/2017     INR goal 2.0-3.0   Today's INR 2.3   Full instructions 6 mg on Mon, Wed, Fri; 8 mg all other days   Next INR check 10/30/2017    Indications   Chronic anticoagulation [Z79.01]  Chronic atrial fibrillation (H) [I48.2]  Long-term (current) use of anticoagulants [Z79.01] [Z79.01]         Description     No change, recheck INR in 6 weeks.      Your next Anticoagulation Clinic appointment(s)     Oct 30, 2017  2:30 PM CDT   [facility] with NB ANTI COAG   [facility]    [address] [place] [address] [phone]              Contact Numbers     Please call [phone] to cancel and/or reschedule your appointment.  Please call [phone] with any problems or questions regarding your therapy          September 2017 Details    Sun Mon Tue Wed Thu Fri Sat          1               2                 3               4               5               6               7               8               9                 10               11               12               13               14               15               16                 17               18      6 mg   See details      19      8 mg         20      6 mg         21      8 mg         22      6 mg         23      8 mg           24      8 mg         25      6 mg         26      8 mg         27      6 mg         28      8 mg         29      6 mg         30      8 mg          Date Details   09/18 This INR check               How to take your warfarin dose     To take:  6 mg Take 3 of the 2 mg tablets.    To take:  8 mg Take 4 of the 2 mg tablets.           October 2017 Details    Sun Mon Tue Wed Thu Fri Sat     1      8 mg         2      6 mg          3      8 mg         4      6 mg         5      8 mg         6      6 mg         7      8 mg           8      8 mg         9      6 mg         10      8 mg         11      6 mg         12      8 mg         13      6 mg         14      8 mg           15      8 mg         16      6 mg         17      8 mg         18      6 mg         19      8 mg         20      6 mg         21      8 mg           22      8 mg         23      6 mg         24      8 mg         25      6 mg         26      8 mg         27      6 mg         28      8 mg           29      8 mg         30            31                    Date Details   No additional details    Date of next INR:  10/30/2017         How to take your warfarin dose     To take:  6 mg Take 3 of the 2 mg tablets.    To take:  8 mg Take 4 of the 2 mg tablets.

## 2017-09-18 NOTE — PROGRESS NOTES
ANTICOAGULATION FOLLOW-UP CLINIC VISIT    Patient Name:  Nabor Cunningham  Date:  9/18/2017  Contact Type:  Face to Face, accompanied by dtr    SUBJECTIVE:     Patient Findings     Positives No Problem Findings           OBJECTIVE    INR Protime   Date Value Ref Range Status   09/18/2017 2.3 (A) 0.86 - 1.14 Final       ASSESSMENT / PLAN  INR assessment THER    Recheck INR In: 6 WEEKS    INR Location Clinic      Anticoagulation Summary as of 9/18/2017     INR goal 2.0-3.0   Today's INR 2.3   Maintenance plan 6 mg (2 mg x 3) on Mon, Wed, Fri; 8 mg (2 mg x 4) all other days   Full instructions 6 mg on Mon, Wed, Fri; 8 mg all other days   Weekly total 50 mg   No change documented Kirsty Myers RN   Plan last modified Swathi Antonio RN (4/6/2017)   Next INR check 10/30/2017   Target end date Indefinite    Indications   Chronic anticoagulation [Z79.01]  Chronic atrial fibrillation (H) [I48.2]  Long-term (current) use of anticoagulants [Z79.01] [Z79.01]         Anticoagulation Episode Summary     INR check location     Preferred lab     Send INR reminders to [facility]    Comments * 2mg tabs      Anticoagulation Care Providers     Provider Role Specialty Phone number    Heron Mayo MD [facility] [phone]            See the Encounter Report to view Anticoagulation Flowsheet and Dosing Calendar (Go to Encounters tab in chart review, and find the Anticoagulation Therapy Visit)    Kirsty Myers, RN

## 2017-09-26 DIAGNOSIS — M1A.9XX0 CHRONIC GOUT, UNSPECIFIED CAUSE, UNSPECIFIED SITE: ICD-10-CM

## 2017-09-26 RX ORDER — COLCHICINE 0.6 MG/1
TABLET ORAL
Qty: 30 TABLET | Refills: 0 | Status: SHIPPED | OUTPATIENT
Start: 2017-09-26 | End: 2017-10-19

## 2017-09-26 NOTE — TELEPHONE ENCOUNTER
colchicine (COLCRYS) 0.6 MG tablet       Last Written Prescription Date: 4/14/2017  Last Fill Quantity: 30, # refills: 5  Last Office Visit with G, P or [facility] prescribing provider:  7/17/2017        No results found for: URIC]  Creatinine   Date Value Ref Range Status   03/22/2017 0.70 0.66 - 1.25 mg/dL Final   ]  Lab Results   Component Value Date    WBC 6.1 03/22/2017     Lab Results   Component Value Date    RBC 4.92 03/22/2017     Lab Results   Component Value Date    HGB 14.1 03/22/2017     Lab Results   Component Value Date    HCT 42.7 03/22/2017     No components found for: MCT  Lab Results   Component Value Date    MCV 87 03/22/2017     Lab Results   Component Value Date    MCH 28.7 03/22/2017     Lab Results   Component Value Date    MCHC 33.0 03/22/2017     Lab Results   Component Value Date    RDW 13.3 03/22/2017     Lab Results   Component Value Date     03/22/2017     Lab Results   Component Value Date    AST 29 03/22/2017     Lab Results   Component Value Date    ALT 56 03/22/2017

## 2017-10-19 DIAGNOSIS — F41.9 ANXIETY: ICD-10-CM

## 2017-10-19 DIAGNOSIS — M1A.9XX0 CHRONIC GOUT, UNSPECIFIED CAUSE, UNSPECIFIED SITE: ICD-10-CM

## 2017-10-19 RX ORDER — NORTRIPTYLINE HCL 25 MG
CAPSULE ORAL
Qty: 60 CAPSULE | Refills: 0 | Status: SHIPPED | OUTPATIENT
Start: 2017-10-19 | End: 2017-11-21

## 2017-10-19 RX ORDER — COLCHICINE 0.6 MG/1
TABLET ORAL
Qty: 30 TABLET | Refills: 0 | Status: SHIPPED | OUTPATIENT
Start: 2017-10-19 | End: 2017-11-19

## 2017-10-30 ENCOUNTER — ANTICOAGULATION THERAPY VISIT (OUTPATIENT)
Dept: ANTICOAGULATION | Facility: CLINIC | Age: 82
End: 2017-10-30
Payer: MEDICARE

## 2017-10-30 ENCOUNTER — ALLIED HEALTH/NURSE VISIT (OUTPATIENT)
Dept: FAMILY MEDICINE | Facility: CLINIC | Age: 82
End: 2017-10-30
Payer: MEDICARE

## 2017-10-30 DIAGNOSIS — I48.20 CHRONIC ATRIAL FIBRILLATION (H): ICD-10-CM

## 2017-10-30 DIAGNOSIS — Z23 NEED FOR PROPHYLACTIC VACCINATION AND INOCULATION AGAINST INFLUENZA: Primary | ICD-10-CM

## 2017-10-30 DIAGNOSIS — Z79.01 LONG-TERM (CURRENT) USE OF ANTICOAGULANTS: ICD-10-CM

## 2017-10-30 DIAGNOSIS — Z79.01 CHRONIC ANTICOAGULATION: ICD-10-CM

## 2017-10-30 LAB — INR POINT OF CARE: 4.4 (ref 0.86–1.14)

## 2017-10-30 PROCEDURE — 85610 PROTHROMBIN TIME: CPT | Mod: QW

## 2017-10-30 PROCEDURE — 90662 IIV NO PRSV INCREASED AG IM: CPT

## 2017-10-30 PROCEDURE — G0008 ADMIN INFLUENZA VIRUS VAC: HCPCS

## 2017-10-30 PROCEDURE — 36416 COLLJ CAPILLARY BLOOD SPEC: CPT

## 2017-10-30 PROCEDURE — 99207 ZZC NO CHARGE NURSE ONLY: CPT

## 2017-10-30 NOTE — MR AVS SNAPSHOT
"              After Visit Summary   10/30/2017    Nabor Cunningham    MRN: [id number]           Patient Information     Date Of Birth          1935        Visit Information        Provider Department      10/30/2017 2:45 PM [place] NB CMA/LPN [facility]        Today's Diagnoses     Need for prophylactic vaccination and inoculation against influenza    -  1       Follow-ups after your visit        Your next 10 appointments already scheduled     Nov 06, 2017  2:00 PM CST   Anticoagulation Visit with NB ANTI COAG   [facility] ([facility]    [address] [place] [address] [phone]              Who to contact     If you have questions or need follow up information about today's clinic visit or your schedule please contact [facility] directly at [phone].  Normal or non-critical lab and imaging results will be communicated to you by MyChart, letter or phone within 4 business days after the clinic has received the results. If you do not hear from us within 7 days, please contact the clinic through MyChart or phone. If you have a critical or abnormal lab result, we will notify you by phone as soon as possible.  Submit refill requests through [employer] or call your pharmacy and they will forward the refill request to us. Please allow 3 business days for your refill to be completed.          Additional Information About Your Visit        MyChart Information     [employer] lets you send messages to your doctor, view your test results, renew your prescriptions, schedule appointments and more. To sign up, go to [email or web address] . Click on \"Log in\" on the left side of the screen, which will take you to the Welcome page. Then click on \"Sign up Now\" on the right side of the page.     You will be asked to enter the access code listed below, as well as some personal information. Please follow the directions to create your username " and password.     Your access code is: [email or web address]  Expires: 2018  2:55 PM     Your access code will  in 90 days. If you need help or a new code, please call your [place] clinic or [phone].        Care EveryWhere ID     This is your Care EveryWhere ID. This could be used by other organizations to access your [place] medical records  [id number]         Blood Pressure from Last 3 Encounters:   17 122/64   17 100/64   17 132/74    Weight from Last 3 Encounters:   17 149 lb 3.2 oz (67.7 kg)   17 145 lb (65.8 kg)   17 147 lb (66.7 kg)              We Performed the Following     FLU VACCINE, INCREASED ANTIGEN, PRESV FREE, AGE 65+ [77679]     Vaccine Administration, Initial [41920]        Primary Care Provider Office Phone # Fax #    Heron Mayo [phone] [address] [place], [place] [id number]        Equal Access to Services     [facility] HospitalGRAYSON : Hadii kenzie ku hadasho Sowojciech, waaxda luqadaha, qaybta kaalmabogdan dewitt, lili valerio. So [facility] [phone].    ATENCIÓN: Si habla español, tiene a garcia disposición servicios gratuitos de asistencia lingüística. [facility] [phone].    We comply with applicable federal civil rights laws and [place] laws. We do not discriminate on the basis of race, color, national origin, age, disability, sex, sexual orientation, or gender identity.            Thank you!     Thank you for choosing [facility]  for your care. Our goal is always to provide you with excellent care. Hearing back from our patients is one way we can continue to improve our services. Please take a few minutes to complete the written survey that you may receive in the mail after your visit with us. Thank you!             Your Updated Medication List - Protect others around you: Learn how to safely use, store and throw away your medicines at www.disposemymeds.org.          This list is  accurate as of: 10/30/17  2:55 PM.  Always use your most recent med list.                   Brand Name Dispense Instructions for use Diagnosis    B-12 1000 MCG Tbcr     100 tablet    Take 1,000 mcg by mouth daily    Vitamin B12 deficiency (non anemic)       colchicine 0.6 MG tablet     30 tablet    TAKE 1 TABLET BY MOUTH EVERY DAY    Chronic gout, unspecified cause, unspecified site       metoprolol 25 MG 24 hr tablet    TOPROL-XL    90 tablet    Take 1 tablet (25 mg) by mouth daily        Multi-vitamin Tabs tablet     100 tablet    Take 1 tablet by mouth daily        nortriptyline 25 MG capsule    PAMELOR    60 capsule    TAKE TWO CAPSULES BY MOUTH DAILY AT BEDTIME    Anxiety       temazepam 30 MG capsule    RESTORIL    30 capsule    Take 1 capsule (30 mg) by mouth nightly as needed for sleep    Persistent insomnia       warfarin 2 MG tablet    COUMADIN    300 tablet    Take 6mg by mouth MWF and 8mg all other days or as directed by Anticoagulation Clinic    Chronic atrial fibrillation (H)

## 2017-10-30 NOTE — PROGRESS NOTES
ANTICOAGULATION FOLLOW-UP CLINIC VISIT    Patient Name:  Nabor Cunningham  Date:  10/30/2017  Contact Type:  Face to Face accompanied by daughter    SUBJECTIVE:     Patient Findings     Positives Unexplained INR or factor level change    Comments Patient reports he has not changed anything. He denies active bleeding/increased bruising.            OBJECTIVE    INR Protime   Date Value Ref Range Status   10/30/2017 4.4 (A) 0.86 - 1.14 Final       ASSESSMENT / PLAN  INR assessment SUPRA    Recheck INR In: 1 WEEK    INR Location Clinic      Anticoagulation Summary as of 10/30/2017     INR goal 2.0-3.0   Today's INR 4.4!   Maintenance plan 6 mg (2 mg x 3) on Mon, Wed, Fri; 8 mg (2 mg x 4) all other days   Full instructions 10/30: Hold; Otherwise 6 mg on Mon, Wed, Fri; 8 mg all other days   Weekly total 50 mg   Plan last modified Swathi Antonio RN (4/6/2017)   Next INR check 11/6/2017   Target end date Indefinite    Indications   Chronic anticoagulation [Z79.01]  Chronic atrial fibrillation (H) [I48.2]  Long-term (current) use of anticoagulants [Z79.01] [Z79.01]         Anticoagulation Episode Summary     INR check location     Preferred lab     Send INR reminders to [facility]IN[facility] POOL    Comments * 2mg tabs      Anticoagulation Care Providers     Provider Role Specialty Phone number    Heron Mayo MD [facility] [phone]            See the Encounter Report to view Anticoagulation Flowsheet and Dosing Calendar (Go to Encounters tab in chart review, and find the Anticoagulation Therapy Visit)    Hold warfarin dose today, instructed to eat a serving of greens tonight then resume warfarin maintenance dose 6mg MWF and 8mg the rest of the days of the week. Daughter will bring pt in next week as that is what fits with her schedule.      Swathi Antonio RN

## 2017-10-30 NOTE — PROGRESS NOTES

## 2017-11-06 ENCOUNTER — ANTICOAGULATION THERAPY VISIT (OUTPATIENT)
Dept: ANTICOAGULATION | Facility: CLINIC | Age: 82
End: 2017-11-06
Payer: MEDICARE

## 2017-11-06 DIAGNOSIS — I48.20 CHRONIC ATRIAL FIBRILLATION (H): ICD-10-CM

## 2017-11-06 DIAGNOSIS — Z79.01 LONG-TERM (CURRENT) USE OF ANTICOAGULANTS: ICD-10-CM

## 2017-11-06 DIAGNOSIS — Z79.01 CHRONIC ANTICOAGULATION: ICD-10-CM

## 2017-11-06 LAB — INR POINT OF CARE: 1.8 (ref 0.86–1.14)

## 2017-11-06 PROCEDURE — 36416 COLLJ CAPILLARY BLOOD SPEC: CPT

## 2017-11-06 PROCEDURE — 99207 ZZC NO CHARGE NURSE ONLY: CPT

## 2017-11-06 PROCEDURE — 85610 PROTHROMBIN TIME: CPT | Mod: QW

## 2017-11-06 NOTE — MR AVS SNAPSHOT
Nabor Cunningham   11/6/2017 2:00 PM   Anticoagulation Therapy Visit    Description:  82 year old male   Provider:  NB ANTI COAG   Department:  Nb Anticoag           INR as of 11/6/2017     Today's INR 1.8!      Anticoagulation Summary as of 11/6/2017     INR goal 2.0-3.0   Today's INR 1.8!   Full instructions 6 mg on Mon, Wed, Fri; 8 mg all other days   Next INR check 12/18/2017    Indications   Chronic anticoagulation [Z79.01]  Chronic atrial fibrillation (H) [I48.2]  Long-term (current) use of anticoagulants [Z79.01] [Z79.01]         Your next Anticoagulation Clinic appointment(s)     Dec 18, 2017  2:00 PM CST   Anticoagulation Visit with NB ANTI COAG   [facility] ([facility])    [address] [place] [address] [phone]              Contact Numbers     Please call [phone] to cancel and/or reschedule your appointment.  Please call [phone] with any problems or questions regarding your therapy          November 2017 Details    Sun Mon Tue Wed Thu Fri Sat        1               2               3               4                 5               6      6 mg   See details      7      8 mg         8      6 mg         9      8 mg         10      6 mg         11      8 mg           12      8 mg         13      6 mg         14      8 mg         15      6 mg         16      8 mg         17      6 mg         18      8 mg           19      8 mg         20      6 mg         21      8 mg         22      6 mg         23      8 mg         24      6 mg         25      8 mg           26      8 mg         27      6 mg         28      8 mg         29      6 mg         30      8 mg            Date Details   11/06 This INR check               How to take your warfarin dose     To take:  6 mg Take 3 of the 2 mg tablets.    To take:  8 mg Take 4 of the 2 mg tablets.           December 2017 Details    Sun Mon Tue Wed Thu Fri Sat          1      6 mg         2      8 mg            3      8 mg         4      6 mg         5      8 mg         6      6 mg         7      8 mg         8      6 mg         9      8 mg           10      8 mg         11      6 mg         12      8 mg         13      6 mg         14      8 mg         15      6 mg         16      8 mg           17      8 mg         18            19               20               21               22               23                 24               25               26               27               28               29               30                 31                      Date Details   No additional details    Date of next INR:  12/18/2017         How to take your warfarin dose     To take:  6 mg Take 3 of the 2 mg tablets.    To take:  8 mg Take 4 of the 2 mg tablets.

## 2017-11-06 NOTE — PROGRESS NOTES
ANTICOAGULATION FOLLOW-UP CLINIC VISIT    Patient Name:  Nabor Cunningham  Date:  11/6/2017  Contact Type:  Face to Face    SUBJECTIVE:     Patient Findings     Positives Missed doses (he may have miss his dose Saturday)           OBJECTIVE    INR Protime   Date Value Ref Range Status   11/06/2017 1.8 (A) 0.86 - 1.14 Final       ASSESSMENT / PLAN  INR assessment SUB    Recheck INR In: 6 WEEKS    INR Location Clinic      Anticoagulation Summary as of 11/6/2017     INR goal 2.0-3.0   Today's INR 1.8!   Maintenance plan 6 mg (2 mg x 3) on Mon, Wed, Fri; 8 mg (2 mg x 4) all other days   Full instructions 6 mg on Mon, Wed, Fri; 8 mg all other days   Weekly total 50 mg   Plan last modified Swathi Antonio RN (4/6/2017)   Next INR check 12/18/2017   Target end date Indefinite    Indications   Chronic anticoagulation [Z79.01]  Chronic atrial fibrillation (H) [I48.2]  Long-term (current) use of anticoagulants [Z79.01] [Z79.01]         Anticoagulation Episode Summary     INR check location     Preferred lab     Send INR reminders to [facility] POOL    Comments * 2mg tabs      Anticoagulation Care Providers     Provider Role Specialty Phone number    Heron Mayo MD [facility] [phone]            See the Encounter Report to view Anticoagulation Flowsheet and Dosing Calendar (Go to Encounters tab in chart review, and find the Anticoagulation Therapy Visit)    Continue warfarin maintenance dose of 50mg weekly.    Swathi Antonio RN

## 2017-11-10 ENCOUNTER — HOSPITAL ENCOUNTER (OUTPATIENT)
Dept: ULTRASOUND IMAGING | Facility: CLINIC | Age: 82
Discharge: HOME | End: 2017-11-10
Attending: NURSE PRACTITIONER | Admitting: NURSE PRACTITIONER
Payer: MEDICARE

## 2017-11-10 ENCOUNTER — OFFICE VISIT (OUTPATIENT)
Dept: FAMILY MEDICINE | Facility: CLINIC | Age: 82
End: 2017-11-10
Payer: MEDICARE

## 2017-11-10 VITALS
DIASTOLIC BLOOD PRESSURE: 58 MMHG | RESPIRATION RATE: 20 BRPM | BODY MASS INDEX: 27.42 KG/M2 | TEMPERATURE: 97.5 F | HEART RATE: 88 BPM | WEIGHT: 156 LBS | SYSTOLIC BLOOD PRESSURE: 132 MMHG

## 2017-11-10 DIAGNOSIS — G89.29 CHRONIC PAIN OF LEFT KNEE: ICD-10-CM

## 2017-11-10 DIAGNOSIS — M79.89 RIGHT LEG SWELLING: ICD-10-CM

## 2017-11-10 DIAGNOSIS — M79.89 RIGHT LEG SWELLING: Primary | ICD-10-CM

## 2017-11-10 DIAGNOSIS — M10.9 ACUTE GOUTY ARTHRITIS: ICD-10-CM

## 2017-11-10 DIAGNOSIS — M25.562 CHRONIC PAIN OF LEFT KNEE: ICD-10-CM

## 2017-11-10 PROCEDURE — 99214 OFFICE O/P EST MOD 30 MIN: CPT | Performed by: NURSE PRACTITIONER

## 2017-11-10 PROCEDURE — 93971 EXTREMITY STUDY: CPT | Mod: RT

## 2017-11-10 RX ORDER — INDOMETHACIN 25 MG/1
25 CAPSULE ORAL 2 TIMES DAILY WITH MEALS
Qty: 42 CAPSULE | Refills: 1 | Status: CANCELLED | OUTPATIENT
Start: 2017-11-10

## 2017-11-10 RX ORDER — CALCIUM CARBONATE 500(1250)
1 TABLET ORAL
COMMUNITY
End: 2022-02-17 | Stop reason: ALTCHOICE

## 2017-11-10 RX ORDER — HYDROCODONE BITARTRATE AND ACETAMINOPHEN 5; 325 MG/1; MG/1
1 TABLET ORAL EVERY 6 HOURS PRN
Qty: 12 TABLET | Refills: 0 | Status: SHIPPED | OUTPATIENT
Start: 2017-11-10 | End: 2018-02-08

## 2017-11-10 NOTE — PROGRESS NOTES
SUBJECTIVE:   Nabor Cunningham is a 82 year old male who presents to clinic today for the following health issues:    Joint Pain    Onset: Yesterday     Description:   Location: Right foot, large greater toe   Character: Throbbing, pulsating, some tingling       Intensity: mild    Progression of Symptoms: same    Accompanying Signs & Symptoms:  Other symptoms: tingling, warmth and discoloration of Toe/ foot     History:   Previous similar pain: YES- has had gout before, tried to another med and had a worse reaction so went back to Colchicine 0.6 mg daily     Precipitating factors:   Trauma or overuse: no     Alleviating factors:  Improved by: rest/inactivity and generally does ok on daily medication.     Therapies Tried and outcome: Elevation, drinking a lot of water. On daily Colchicine 0.6 mg for gout.     Problem list and histories reviewed & adjusted, as indicated.  Additional history: as documented    Patient Active Problem List   Diagnosis     Anxiety     Alcohol abuse     Chronic atrial fibrillation (H)     Weight loss     Peripheral polyneuropathy     Chronic gout, unspecified cause, unspecified site     Essential hypertension with goal blood pressure less than 140/90     Hyperlipidemia LDL goal <130     Persistent insomnia     Lower urinary tract symptoms (LUTS)     Chronic anticoagulation     Long-term (current) use of anticoagulants [Z79.01]     Moderate episode of recurrent major depressive disorder (H)     History reviewed. No pertinent surgical history.    Social History   Substance Use Topics     Smoking status: Former Smoker     Years: 20.00     Types: Cigarettes     Smokeless tobacco: Never Used     Alcohol use No     History reviewed. No pertinent family history.      Current Outpatient Prescriptions   Medication Sig Dispense Refill     calcium carbonate (OS- MG [place]. [place]) 1250 MG tablet Take 1 tablet by mouth 2 times daily       HYDROcodone-acetaminophen (NORCO) 5-325 MG per tablet Take 1  tablet by mouth every 6 hours as needed for moderate to severe pain maximum 4 tablet(s) per day 12 tablet 0     order for DME True pull Knee Brace Left 1 Units 0     colchicine 0.6 MG tablet TAKE 1 TABLET BY MOUTH EVERY DAY 30 tablet 0     nortriptyline (PAMELOR) 25 MG capsule TAKE TWO CAPSULES BY MOUTH DAILY AT BEDTIME 60 capsule 0     warfarin (COUMADIN) 2 MG tablet Take 6mg by mouth MWF and 8mg all other days or as directed by Anticoagulation Clinic 300 tablet 0     temazepam (RESTORIL) 30 MG capsule Take 1 capsule (30 mg) by mouth nightly as needed for sleep 30 capsule 5     Cyanocobalamin (B-12) 1000 MCG TBCR Take 1,000 mcg by mouth daily 100 tablet 3     metoprolol (TOPROL-XL) 25 MG 24 hr tablet Take 1 tablet (25 mg) by mouth daily 90 tablet 1     multivitamin, therapeutic with minerals (MULTI-VITAMIN) TABS tablet Take 1 tablet by mouth daily 100 tablet 3     Allergies   Allergen Reactions     Penicillins      Trazodone          Reviewed and updated as needed this visit by clinical staff     Reviewed and updated as needed this visit by Provider         ROS:  Constitutional, HEENT, cardiovascular, pulmonary, gi and gu systems are negative, except as otherwise noted.      OBJECTIVE:   /58 (BP Location: Right arm, Cuff Size: Adult Regular)  Pulse 88  Temp 97.5  F (36.4  C) (Tympanic)  Resp 20  Wt 156 lb (70.8 kg)  BMI 27.42 kg/m2  Body mass index is 27.42 kg/(m^2).   GENERAL: healthy, alert and no distress  EYES: Eyes grossly normal to inspection, PERRL and conjunctivae and sclerae normal  HENT: ear canals and TM's normal, nose and mouth without ulcers or lesions  NECK: no adenopathy, no asymmetry, masses, or scars and thyroid normal to palpation  RESP: lungs clear to auscultation - no rales, rhonchi or wheezes  CV: regular rate and rhythm, normal S1 S2, no S3 or S4, no murmur, click or rub, no peripheral edema and peripheral pulses strong  SKIN: no suspicious lesions or rashes  NEURO: Normal strength  and tone, mentation intact and speech normal  PSYCH: mentation appears normal, affect normal/bright   Left Knee  Inspection: No effusion  Tender: MCL, LCL, lateral joint line, medial joint line, prepatellar bursa  Non-tender: lateral patellar facet, medial patellar facet, inferior pole patella, patella tendon, quadriceps insertion, medial femoral condyle, lateral femoral condyle, distal IT band, prepatellar bursa, popliteal region, pes anserine bursa  Active Range of Motion: Pain when he ambulates   Strength: quad  5/5, Hamstrings  5/5, Gastroc  5/5, Tibialis anterior  5/5 and Peroneals  5/5    ULTRASOUND  LOWER EXTREMITY VENOUS DUPLEX RIGHT 11/10/2017 4:12 PM      HISTORY: Unilateral leg swelling rule out deep venous thrombosis;  Right leg swelling.      FINDINGS: The deep veins in the right lower extremity are compressible  throughout. The deep veins demonstrate normal venous augmentation,  waveforms and color Doppler flow. No evidence of superficial  thrombophlebitis.         IMPRESSION: No evidence of DVT.    ASSESSMENT:       ICD-10-CM    1. Right leg swelling M79.89 US Lower Extremity Venous Duplex Right   2. Acute gouty arthritis M10.9 HYDROcodone-acetaminophen (NORCO) 5-325 MG per tablet   3. Chronic pain of left knee M25.562 order for DME    G89.29          PLAN:   Unilateral right leg swelling.  Right calf is 2 cm greater then left. With calf tenderness. Concern for blood clot will obtain ultra sound to rule out blood clot.     Ultra Sound negative symptoms most representative of gout.  Due to Medications unable to take prednisone or NSAID'S.  Will have him use Tylenol first line and norco for breakthrough pain.     Patient has history of chronic knee pain that is relieved with use of knee brace. Presently it has been flaring up. In review of the knee brace it is very outdated and lacks the correct support.  Recommend updating to a true pull knee brace or other one with good support.  Did not have the  correct size at the clinic today.  Will have him go to [place] to obtain knee brace.   Patient Instructions     To [place] for Ultra sound     Norco or tylenol for the gout pain.  Note there is tylenol in Norco. Do not take more then 4 grams in 24 hours.     Follow-up with your primary care provider next week and as needed.    Indications for emergent return to emergency department discussed with patient, who verbalized good understanding and agreement.  Patient understands the limitations of today's evaluation.         Gout    Gout is an inflammation of a joint due to a build-up of gout crystals in the joint fluid. This occurs when there is an excess of uric acid (a normal waste product) in the body. Uric acid builds up in the body when the kidneys are unable to filter enough of it from the blood. This may occur with age. It is also associated with kidney disease. Gout occurs more often in people with obesity, diabetes, high blood pressure, or high levels of fats in the blood. It may run in families. Gout tends to come and go. A flare up of gout is called an attack. Drinking alcohol or eating certain foods (such as shellfish or foods with additives such as high-fructose corn syrup) may increase uric acid levels in the blood and cause a gout attack.  During a gout attack, the affected joint may become a hot, red, swollen and painful. If you have had one attack of gout, you are likely to have another. An attack of gout can be treated with medicine. If these attacks become frequent, a daily medicine may be prescribed to help the kidneys remove uric acid from the body.  Home care  During a gout attack:    Rest painful joints. If gout affects the joints of your foot or leg, you may want to use crutches for the first few days to keep from bearing weight on the affected joint.    When sitting or lying down, raise the painful joint to a level higher than your heart.    Apply an ice pack (ice cubes in a plastic bag wrapped  in a thin towel) over the injured area for 20 minutes every 1 to 2 hours the first day for pain relief. Continue this 3 to 4 times a day for swelling and pain.    Avoid alcohol and foods listed below (see Preventing attacks) during a gout attack. Drink extra fluid to help flush the uric acid through your kidneys.    If you were prescribed a medicine to treat gout, take it as your healthcare provider has instructed. Don't skip doses.    Take anti-inflammatory medicine as directed.     If pain medicines have been prescribed, take them exactly as directed.    Preventing attacks    Minimize or avoid alcohol use. Excess alcohol intake can cause a gout attack.    Limit these foods and beverages:    Organ meats, such as kidneys and liver    Certain seafoods (anchovies, sardines, shrimp, scallops, herring, mackerel)    Wild game, meat extracts and meat gravies    Foods and beverages sweetened with high-fructose corn syrup, such as sodas    Eat a healthy diet including low-fat and nonfat dairy, whole grains, and vegetables.    If you are overweight, talk to your healthcare provider about a weight reduction plan. Avoid fasting or extreme low calorie diets (less than 900 calories per day). This will increase uric acid levels in the body.    If you have diabetes or high blood pressure, work with your doctor to manage these conditions.    Protect the joint from injury. Trauma can trigger a gout attack.  Follow-up care  Follow up with your healthcare provider, or as advised.   When to seek medical advice  Call your healthcare provider if you have any of the following:    Fever over 100.4 F (38. C) with worsening joint pain    Increasing redness around the joint    Pain developing in another joint    Repeated vomiting, abdominal pain, or blood in the vomit or stool (black or red color)  Date Last Reviewed: 3/1/2017    2279-7455 [employer]. [address] [place] [address], [place] [address]. All rights reserved. This  information is not intended as a substitute for professional medical care. Always follow your healthcare professional's instructions.        Treating Gout Attacks     Raising the joint above the level of your heart can help reduce gout symptoms.     Gout is a disease that affects the joints. It is caused by excess uric acid in your blood stream that may lead to crystals forming in your joints. Left untreated, it can lead to painful foot and joint deformities and even kidney problems. But, by treating gout early, you can relieve pain and help prevent future problems. Gout can usually be treated with medication and proper diet. In severe cases, surgery may be needed.  Gout attacks are painful and often happen more than once. Taking medications may reduce pain and prevent attacks in the future. There are also some things you can do at home to relieve symptoms.  Medications for gout  Your healthcare provider may prescribe a daily medication to reduce levels of uric acid. Reducing your uric acid levels may help prevent gout attacks. Allopurinol is one commonly used medication taken daily to reduce uric acid levels. Other medications can help relieve pain and swelling during an acute attack. Medicines such as NSAIDs (nonsteroidal anti-inflammatory medicines), steroids, and colchicine may be prescribed for intermittent use to relieve an acute gout attack. Be sure to take your medication as directed.  What you can do  Below are some things you can do at home to relieve gout symptoms. Your healthcare provider may have other tips.    Rest the painful joint as much as you can.    Raise the painful joint so it is at a level higher than your heart.    Use ice for 10 minutes every 1-2 hours as possible.  How can I prevent gout?  With a little effort, you may be able to prevent gout attacks in the future. Here are some things you can do:    Avoid foods high in purines    Certain meats (red meat, processed meat, turkey)    Organ  meats (kidney, liver, sweetbread)    Shellfish (lobster, crab, shrimp, scallop, mussel)    Certain fish (anchovy, sardine, herring, mackerel)    Take any medications prescribed by your healthcare provider.    Lose weight if you need to.    Reduce high fructose corn syrup in meals and drinks.    Reduce or eliminate consumption of alcohol, particularly beer, but also red wine and spirits.    Control blood pressure, diabetes, and cholesterol.    Drink plenty of water to help flush uric acid from your body.  Date Last Reviewed: 2/1/2016 2000-2017 [employer]. [address] [place] [address]. All rights reserved. This information is not intended as a substitute for professional medical care. Always follow your healthcare professional's instructions.        Leg Swelling in a Single Leg  Swelling of the arms, feet, ankles, and legs is called edema. It is caused by extra fluid collecting in the tissues. Because of gravity, extra fluid in the body settles to the lowest part. That is why the legs and feet are most affected. You have swelling in a single leg.  Some of the causes for swelling in only a single leg include:    Infection in the foot or leg    Long-term problem with a vein not working well (venous insufficiency)    Swollen, twisted vein in the leg (varicose veins)    Insect bite or sting on the foot or leg    Injury or recent surgery on the foot or leg    Blood clot in a deep vein of the leg (deep vein thrombosis or DVT)    Inflammation of the joints of the lower leg  Medical treatment will depend on what is causing your swelling.  Home care  Follow these guidelines when caring for yourself at home:    Don t wear tight clothing.    Keep your legs up while lying or sitting.    Take any medicines as directed.    If infection, injury, or recent surgery is the cause of your swelling, stay off your legs as much as possible until your symptoms get better.    If you have venous insufficiency or  varicose veins, don t sit or  one place for long periods of time. Take breaks and walk around every few hours. Talk with your healthcare provider about wearing support stockings to help lessen swelling during the day.    Wear compression stockings with your doctor's approval  Follow-up care  Follow up with your healthcare provider as advised.  Call 911  Call 911 if any of these occur:    Shortness of breath or trouble breathing    Chest pain    Coughing up blood    Fainting or loss of consciousness   When to seek medical advice  Call your healthcare provider right away if any of these occur:    Increased pain, swelling, warmth, or redness of the leg, ankle, or foot    Fever of 100.4 F (38 C) or higher, or as directed by your healthcare provider    Weakness or dizziness    Shaking chills    Drenching sweats  Date Last Reviewed: 4/11/2016 2000-2017 [employer]. [address] [place] [address]. All rights reserved. This information is not intended as a substitute for professional medical care. Always follow your healthcare professional's instructions.          AVERY Santana [facility]

## 2017-11-10 NOTE — PATIENT INSTRUCTIONS
To [place] for Ultra sound     Norco or tylenol for the gout pain.  Note there is tylenol in Norco. Do not take more then 4 grams in 24 hours.     Follow-up with your primary care provider next week and as needed.    Indications for emergent return to emergency department discussed with patient, who verbalized good understanding and agreement.  Patient understands the limitations of today's evaluation.         Gout    Gout is an inflammation of a joint due to a build-up of gout crystals in the joint fluid. This occurs when there is an excess of uric acid (a normal waste product) in the body. Uric acid builds up in the body when the kidneys are unable to filter enough of it from the blood. This may occur with age. It is also associated with kidney disease. Gout occurs more often in people with obesity, diabetes, high blood pressure, or high levels of fats in the blood. It may run in families. Gout tends to come and go. A flare up of gout is called an attack. Drinking alcohol or eating certain foods (such as shellfish or foods with additives such as high-fructose corn syrup) may increase uric acid levels in the blood and cause a gout attack.  During a gout attack, the affected joint may become a hot, red, swollen and painful. If you have had one attack of gout, you are likely to have another. An attack of gout can be treated with medicine. If these attacks become frequent, a daily medicine may be prescribed to help the kidneys remove uric acid from the body.  Home care  During a gout attack:    Rest painful joints. If gout affects the joints of your foot or leg, you may want to use crutches for the first few days to keep from bearing weight on the affected joint.    When sitting or lying down, raise the painful joint to a level higher than your heart.    Apply an ice pack (ice cubes in a plastic bag wrapped in a thin towel) over the injured area for 20 minutes every 1 to 2 hours the first day for pain relief. Continue  this 3 to 4 times a day for swelling and pain.    Avoid alcohol and foods listed below (see Preventing attacks) during a gout attack. Drink extra fluid to help flush the uric acid through your kidneys.    If you were prescribed a medicine to treat gout, take it as your healthcare provider has instructed. Don't skip doses.    Take anti-inflammatory medicine as directed.     If pain medicines have been prescribed, take them exactly as directed.    Preventing attacks    Minimize or avoid alcohol use. Excess alcohol intake can cause a gout attack.    Limit these foods and beverages:    Organ meats, such as kidneys and liver    Certain seafoods (anchovies, sardines, shrimp, scallops, herring, mackerel)    Wild game, meat extracts and meat gravies    Foods and beverages sweetened with high-fructose corn syrup, such as sodas    Eat a healthy diet including low-fat and nonfat dairy, whole grains, and vegetables.    If you are overweight, talk to your healthcare provider about a weight reduction plan. Avoid fasting or extreme low calorie diets (less than 900 calories per day). This will increase uric acid levels in the body.    If you have diabetes or high blood pressure, work with your doctor to manage these conditions.    Protect the joint from injury. Trauma can trigger a gout attack.  Follow-up care  Follow up with your healthcare provider, or as advised.   When to seek medical advice  Call your healthcare provider if you have any of the following:    Fever over 100.4 F (38. C) with worsening joint pain    Increasing redness around the joint    Pain developing in another joint    Repeated vomiting, abdominal pain, or blood in the vomit or stool (black or red color)  Date Last Reviewed: 3/1/2017    5077-1802 [employer]. [address] [place] [address], [place] [address]. All rights reserved. This information is not intended as a substitute for professional medical care. Always follow your healthcare professional's  instructions.        Treating Gout Attacks     Raising the joint above the level of your heart can help reduce gout symptoms.     Gout is a disease that affects the joints. It is caused by excess uric acid in your blood stream that may lead to crystals forming in your joints. Left untreated, it can lead to painful foot and joint deformities and even kidney problems. But, by treating gout early, you can relieve pain and help prevent future problems. Gout can usually be treated with medication and proper diet. In severe cases, surgery may be needed.  Gout attacks are painful and often happen more than once. Taking medications may reduce pain and prevent attacks in the future. There are also some things you can do at home to relieve symptoms.  Medications for gout  Your healthcare provider may prescribe a daily medication to reduce levels of uric acid. Reducing your uric acid levels may help prevent gout attacks. Allopurinol is one commonly used medication taken daily to reduce uric acid levels. Other medications can help relieve pain and swelling during an acute attack. Medicines such as NSAIDs (nonsteroidal anti-inflammatory medicines), steroids, and colchicine may be prescribed for intermittent use to relieve an acute gout attack. Be sure to take your medication as directed.  What you can do  Below are some things you can do at home to relieve gout symptoms. Your healthcare provider may have other tips.    Rest the painful joint as much as you can.    Raise the painful joint so it is at a level higher than your heart.    Use ice for 10 minutes every 1-2 hours as possible.  How can I prevent gout?  With a little effort, you may be able to prevent gout attacks in the future. Here are some things you can do:    Avoid foods high in purines    Certain meats (red meat, processed meat, turkey)    Organ meats (kidney, liver, sweetbread)    Shellfish (lobster, crab, shrimp, scallop, mussel)    Certain fish (anchovy, sardine,  herring, mackerel)    Take any medications prescribed by your healthcare provider.    Lose weight if you need to.    Reduce high fructose corn syrup in meals and drinks.    Reduce or eliminate consumption of alcohol, particularly beer, but also red wine and spirits.    Control blood pressure, diabetes, and cholesterol.    Drink plenty of water to help flush uric acid from your body.  Date Last Reviewed: 2/1/2016 2000-2017 [employer]. [address] [place] [address], [place] [address]. All rights reserved. This information is not intended as a substitute for professional medical care. Always follow your healthcare professional's instructions.        Leg Swelling in a Single Leg  Swelling of the arms, feet, ankles, and legs is called edema. It is caused by extra fluid collecting in the tissues. Because of gravity, extra fluid in the body settles to the lowest part. That is why the legs and feet are most affected. You have swelling in a single leg.  Some of the causes for swelling in only a single leg include:    Infection in the foot or leg    Long-term problem with a vein not working well (venous insufficiency)    Swollen, twisted vein in the leg (varicose veins)    Insect bite or sting on the foot or leg    Injury or recent surgery on the foot or leg    Blood clot in a deep vein of the leg (deep vein thrombosis or DVT)    Inflammation of the joints of the lower leg  Medical treatment will depend on what is causing your swelling.  Home care  Follow these guidelines when caring for yourself at home:    Don t wear tight clothing.    Keep your legs up while lying or sitting.    Take any medicines as directed.    If infection, injury, or recent surgery is the cause of your swelling, stay off your legs as much as possible until your symptoms get better.    If you have venous insufficiency or varicose veins, don t sit or  one place for long periods of time. Take breaks and walk around every few hours. Talk  with your healthcare provider about wearing support stockings to help lessen swelling during the day.    Wear compression stockings with your doctor's approval  Follow-up care  Follow up with your healthcare provider as advised.  Call 911  Call 911 if any of these occur:    Shortness of breath or trouble breathing    Chest pain    Coughing up blood    Fainting or loss of consciousness   When to seek medical advice  Call your healthcare provider right away if any of these occur:    Increased pain, swelling, warmth, or redness of the leg, ankle, or foot    Fever of 100.4 F (38 C) or higher, or as directed by your healthcare provider    Weakness or dizziness    Shaking chills    Drenching sweats  Date Last Reviewed: 4/11/2016 2000-2017 [employer]. [address] [place] [address], [place] [address]. All rights reserved. This information is not intended as a substitute for professional medical care. Always follow your healthcare professional's instructions.

## 2017-11-10 NOTE — NURSING NOTE
"Chief Complaint   Patient presents with     Musculoskeletal Problem       Initial /58 (BP Location: Right arm, Cuff Size: Adult Regular)  Pulse 88  Temp 97.5  F (36.4  C) (Tympanic)  Resp 20  Wt 156 lb (70.8 kg)  BMI 27.42 kg/m2 Estimated body mass index is 27.42 kg/(m^2) as calculated from the following:    Height as of 7/17/17: 5' 3.25\" (1.607 m).    Weight as of this encounter: 156 lb (70.8 kg).  Medication Reconciliation: complete    Health Maintenance that is potentially due pending provider review:  NONE    n/a    Is there anyone who you would like to be able to receive your results? Not Applicable  If yes have patient fill out K[facility]    Pao Lambert M.A.    "

## 2017-11-10 NOTE — MR AVS SNAPSHOT
After Visit Summary   11/10/2017    Nabor Cunningham    MRN: [id number]           Patient Information     Date Of Birth          1935        Visit Information        Provider Department      11/10/2017 1:00 PM Melissa Eng APRN [facility]        Today's Diagnoses     Right leg swelling    -  1    Acute gouty arthritis        Chronic pain of left knee          Care Instructions    To [place] [facility]     Norco or tylenol for the gout pain.  Note there is tylenol in Norco. Do not take more then 4 grams in 24 hours.     Follow-up with your primary care provider next week and as needed.    Indications for emergent return to emergency department discussed with patient, who verbalized good understanding and agreement.  Patient understands the limitations of today's evaluation.         Gout    Gout is an inflammation of a joint due to a build-up of gout crystals in the joint fluid. This occurs when there is an excess of uric acid (a normal waste product) in the body. Uric acid builds up in the body when the kidneys are unable to filter enough of it from the blood. This may occur with age. It is also associated with kidney disease. Gout occurs more often in people with obesity, diabetes, high blood pressure, or high levels of fats in the blood. It may run in families. Gout tends to come and go. A flare up of gout is called an attack. Drinking alcohol or eating certain foods (such as shellfish or foods with additives such as high-fructose corn syrup) may increase uric acid levels in the blood and cause a gout attack.  During a gout attack, the affected joint may become a hot, red, swollen and painful. If you have had one attack of gout, you are likely to have another. An attack of gout can be treated with medicine. If these attacks become frequent, a daily medicine may be prescribed to help the kidneys remove uric acid from the body.  Home care  During a gout  attack:    Rest painful joints. If gout affects the joints of your foot or leg, you may want to use crutches for the first few days to keep from bearing weight on the affected joint.    When sitting or lying down, raise the painful joint to a level higher than your heart.    Apply an ice pack (ice cubes in a plastic bag wrapped in a thin towel) over the injured area for 20 minutes every 1 to 2 hours the first day for pain relief. Continue this 3 to 4 times a day for swelling and pain.    Avoid alcohol and foods listed below (see Preventing attacks) during a gout attack. Drink extra fluid to help flush the uric acid through your kidneys.    If you were prescribed a medicine to treat gout, take it as your healthcare provider has instructed. Don't skip doses.    Take anti-inflammatory medicine as directed.     If pain medicines have been prescribed, take them exactly as directed.    Preventing attacks    Minimize or avoid alcohol use. Excess alcohol intake can cause a gout attack.    Limit these foods and beverages:    Organ meats, such as kidneys and liver    Certain seafoods (anchovies, sardines, shrimp, scallops, herring, mackerel)    Wild game, meat extracts and meat gravies    Foods and beverages sweetened with high-fructose corn syrup, such as sodas    Eat a healthy diet including low-fat and nonfat dairy, whole grains, and vegetables.    If you are overweight, talk to your healthcare provider about a weight reduction plan. Avoid fasting or extreme low calorie diets (less than 900 calories per day). This will increase uric acid levels in the body.    If you have diabetes or high blood pressure, work with your doctor to manage these conditions.    Protect the joint from injury. Trauma can trigger a gout attack.  Follow-up care  Follow up with your healthcare provider, or as advised.   When to seek medical advice  Call your healthcare provider if you have any of the following:    Fever over 100.4 F (38. C) with  worsening joint pain    Increasing redness around the joint    Pain developing in another joint    Repeated vomiting, abdominal pain, or blood in the vomit or stool (black or red color)  Date Last Reviewed: 3/1/2017    5293-2894 [employer]. [address] [place] [address], [place] [address]. All rights reserved. This information is not intended as a substitute for professional medical care. Always follow your healthcare professional's instructions.        Treating Gout Attacks     Raising the joint above the level of your heart can help reduce gout symptoms.     Gout is a disease that affects the joints. It is caused by excess uric acid in your blood stream that may lead to crystals forming in your joints. Left untreated, it can lead to painful foot and joint deformities and even kidney problems. But, by treating gout early, you can relieve pain and help prevent future problems. Gout can usually be treated with medication and proper diet. In severe cases, surgery may be needed.  Gout attacks are painful and often happen more than once. Taking medications may reduce pain and prevent attacks in the future. There are also some things you can do at home to relieve symptoms.  Medications for gout  Your healthcare provider may prescribe a daily medication to reduce levels of uric acid. Reducing your uric acid levels may help prevent gout attacks. Allopurinol is one commonly used medication taken daily to reduce uric acid levels. Other medications can help relieve pain and swelling during an acute attack. Medicines such as NSAIDs (nonsteroidal anti-inflammatory medicines), steroids, and colchicine may be prescribed for intermittent use to relieve an acute gout attack. Be sure to take your medication as directed.  What you can do  Below are some things you can do at home to relieve gout symptoms. Your healthcare provider may have other tips.    Rest the painful joint as much as you can.    Raise the painful joint so  it is at a level higher than your heart.    Use ice for 10 minutes every 1-2 hours as possible.  How can I prevent gout?  With a little effort, you may be able to prevent gout attacks in the future. Here are some things you can do:    Avoid foods high in purines    Certain meats (red meat, processed meat, turkey)    Organ meats (kidney, liver, sweetbread)    Shellfish (lobster, crab, shrimp, scallop, mussel)    Certain fish (anchovy, sardine, herring, mackerel)    Take any medications prescribed by your healthcare provider.    Lose weight if you need to.    Reduce high fructose corn syrup in meals and drinks.    Reduce or eliminate consumption of alcohol, particularly beer, but also red wine and spirits.    Control blood pressure, diabetes, and cholesterol.    Drink plenty of water to help flush uric acid from your body.  Date Last Reviewed: 2/1/2016 2000-2017 [employer]. [address] [place] [address]. All rights reserved. This information is not intended as a substitute for professional medical care. Always follow your healthcare professional's instructions.        Leg Swelling in a Single Leg  Swelling of the arms, feet, ankles, and legs is called edema. It is caused by extra fluid collecting in the tissues. Because of gravity, extra fluid in the body settles to the lowest part. That is why the legs and feet are most affected. You have swelling in a single leg.  Some of the causes for swelling in only a single leg include:    Infection in the foot or leg    Long-term problem with a vein not working well (venous insufficiency)    Swollen, twisted vein in the leg (varicose veins)    Insect bite or sting on the foot or leg    Injury or recent surgery on the foot or leg    Blood clot in a deep vein of the leg (deep vein thrombosis or DVT)    Inflammation of the joints of the lower leg  Medical treatment will depend on what is causing your swelling.  Home care  Follow these guidelines when  caring for yourself at home:    Don t wear tight clothing.    Keep your legs up while lying or sitting.    Take any medicines as directed.    If infection, injury, or recent surgery is the cause of your swelling, stay off your legs as much as possible until your symptoms get better.    If you have venous insufficiency or varicose veins, don t sit or  one place for long periods of time. Take breaks and walk around every few hours. Talk with your healthcare provider about wearing support stockings to help lessen swelling during the day.    Wear compression stockings with your doctor's approval  Follow-up care  Follow up with your healthcare provider as advised.  Call 911  Call 911 if any of these occur:    Shortness of breath or trouble breathing    Chest pain    Coughing up blood    Fainting or loss of consciousness   When to seek medical advice  Call your healthcare provider right away if any of these occur:    Increased pain, swelling, warmth, or redness of the leg, ankle, or foot    Fever of 100.4 F (38 C) or higher, or as directed by your healthcare provider    Weakness or dizziness    Shaking chills    Drenching sweats  Date Last Reviewed: 4/11/2016 2000-2017 [employer]. [address] [place] [address]. All rights reserved. This information is not intended as a substitute for professional medical care. Always follow your healthcare professional's instructions.                Follow-ups after your visit        Your next 10 appointments already scheduled     Nov 10, 2017  2:40 PM CST   US LOWER EXTREMITY VENOUS DUPLEX RIGHT with [address] Ultrasound ([facility])    [address] [facility] [id number] [phone]           Please bring a list of your medicines (including vitamins, minerals and over-the-counter drugs). Also, tell your doctor about any allergies you may have. Wear comfortable clothes and leave your valuables at home.  You  "do not need to do anything special to prepare for your exam.  Please call the Imaging Department at your exam site with any questions.            Dec 18, 2017  2:00 PM CST   [facility]    [address] [place] [id number]   [phone]              Future tests that were ordered for you today     Open Future Orders        Priority Expected Expires Ordered    US Lower Extremity Venous Duplex Right STAT  11/10/2018 11/10/2017            Who to contact     If you have questions or need follow up information about today's clinic visit or your schedule please contact [facility] directly at [phone].  Normal or non-critical lab and imaging results will be communicated to you by MyChart, letter or phone within 4 business days after the clinic has received the results. If you do not hear from us within 7 days, please contact the clinic through [employer] or phone. If you have a critical or abnormal lab result, we will notify you by phone as soon as possible.  Submit refill requests through [employer] or call your pharmacy and they will forward the refill request to us. Please allow 3 business days for your refill to be completed.          Additional Information About Your Visit        [employer] Information     AFTER-[employer] lets you send messages to your doctor, view your test results, renew your prescriptions, schedule appointments and more. To sign up, go to [email or web address] . Click on \"Log in\" on the left side of the screen, which will take you to the Welcome page. Then click on \"Sign up Now\" on the right side of the page.     You will be asked to enter the access code listed below, as well as some personal information. Please follow the directions to create your username and password.     Your access code is: [email or web address]  Expires: 2018  1:55 PM     Your access code will  in 90 days. If " you need help or a new code, please call your [facility] clinic or [phone].        Care EveryWhere ID     This is your Care EveryWhere ID. This could be used by other organizations to access your [facility] medical records  [id number]        Your Vitals Were     Pulse Temperature Respirations BMI (Body Mass Index)          88 97.5  F (36.4  C) (Tympanic) 20 27.42 kg/m2         Blood Pressure from Last 3 Encounters:   11/10/17 132/58   07/17/17 122/64   05/12/17 100/64    Weight from Last 3 Encounters:   11/10/17 156 lb (70.8 kg)   07/17/17 149 lb 3.2 oz (67.7 kg)   05/12/17 145 lb (65.8 kg)                 Today's Medication Changes          These changes are accurate as of: 11/10/17  2:00 PM.  If you have any questions, ask your nurse or doctor.               Start taking these medicines.        Dose/Directions    HYDROcodone-acetaminophen 5-325 MG per tablet   Commonly known as:  NORCO   Used for:  Acute gouty arthritis   Started by:  Melissa Eng APRN CNP        Dose:  1 tablet   Take 1 tablet by mouth every 6 hours as needed for moderate to severe pain maximum 4 tablet(s) per day   Quantity:  12 tablet   Refills:  0       order for DME   Used for:  Chronic pain of left knee   Started by:  Melissa Eng APRN CNP        True pull Knee Brace Left   Quantity:  1 Units   Refills:  0            Where to get your medicines      Some of these will need a paper prescription and others can be bought over the counter.  Ask your nurse if you have questions.     Bring a paper prescription for each of these medications     HYDROcodone-acetaminophen 5-325 MG per tablet    order for DME                Primary Care Provider Office Phone # Fax #    Heron Maoy [phone] [address] [place] [address]        Equal Access to Services     SHIRA DELEON AH: Flory Polanco, wadevonte luqadaha, qaybta kaalmabogdan dewitt, lili valerio. So [facility]  [phone].    ATENCIÓN: Si sadaf sanchez, tiene a garcia disposición servicios gratuitos de asistencia lingüística. Juan Antonio martinez [phone].    We comply with applicable federal civil rights laws and [place] laws. We do not discriminate on the basis of race, color, national origin, age, disability, sex, sexual orientation, or gender identity.            Thank you!     Thank you for choosing [facility]  for your care. Our goal is always to provide you with excellent care. Hearing back from our patients is one way we can continue to improve our services. Please take a few minutes to complete the written survey that you may receive in the mail after your visit with us. Thank you!             Your Updated Medication List - Protect others around you: Learn how to safely use, store and throw away your medicines at www.disposemymeds.org.          This list is accurate as of: 11/10/17  2:00 PM.  Always use your most recent med list.                   Brand Name Dispense Instructions for use Diagnosis    B-12 1000 MCG Tbcr     100 tablet    Take 1,000 mcg by mouth daily    Vitamin B12 deficiency (non anemic)       calcium carbonate 1250 MG tablet    OS- mg [place]. Ca     Take 1 tablet by mouth 2 times daily        colchicine 0.6 MG tablet     30 tablet    TAKE 1 TABLET BY MOUTH EVERY DAY    Chronic gout, unspecified cause, unspecified site       HYDROcodone-acetaminophen 5-325 MG per tablet    NORCO    12 tablet    Take 1 tablet by mouth every 6 hours as needed for moderate to severe pain maximum 4 tablet(s) per day    Acute gouty arthritis       metoprolol 25 MG 24 hr tablet    TOPROL-XL    90 tablet    Take 1 tablet (25 mg) by mouth daily        Multi-vitamin Tabs tablet     100 tablet    Take 1 tablet by mouth daily        nortriptyline 25 MG capsule    PAMELOR    60 capsule    TAKE TWO CAPSULES BY MOUTH DAILY AT BEDTIME    Anxiety       order for DME     1 Units    True pull Knee Brace Left     Chronic pain of left knee       temazepam 30 MG capsule    RESTORIL    30 capsule    Take 1 capsule (30 mg) by mouth nightly as needed for sleep    Persistent insomnia       warfarin 2 MG tablet    COUMADIN    300 tablet    Take 6mg by mouth MWF and 8mg all other days or as directed by Anticoagulation Clinic    Chronic atrial fibrillation (H)

## 2017-11-11 ENCOUNTER — DOCUMENTATION ONLY (OUTPATIENT)
Dept: FAMILY MEDICINE | Facility: CLINIC | Age: 82
End: 2017-11-11

## 2017-11-11 NOTE — PROGRESS NOTES
Son was with patient today and informed myself that the patient was seen roughly 2 weeks ago at [facility] in [place] for a pacemaker check.  It was discovered per son that patient is 70% of the time in A-Fib.  Patient saw  (sp.?) at [facility] and has a follow up appt with him December 15, 2017.      Patient's son said that [facility] was suppose to send the appt. Documentation to Dr. Mayo.  I notified him I would check into that and was also going to check with sister.    Spoke with Alice and Jammie [facility] About records as I was unable to find anything in patients chart and they also were unable to find anything and did not hear of anything also.      Just notifying provider of sons report of [facility] cardiology appt as records have not been found from them yet.       Pao Lambert M.A.

## 2017-11-14 ENCOUNTER — TELEPHONE (OUTPATIENT)
Dept: FAMILY MEDICINE | Facility: CLINIC | Age: 82
End: 2017-11-14

## 2017-11-14 NOTE — TELEPHONE ENCOUNTER
Discussed with his sister regarding the brace.  Did review that part of the brace would be covered by Medicare but unable to know the percentage.  Recommend they contact Medicare for out of pocket expense.  Would recommend true pull brace. Daughter will contact medicare to review out of pocket expense.  If the cost is to high would attempt to see if a knee sleeve would support the knee enough when walking.  They will be in later this week to obtain a knee brace once they review cost.     Melissa Eng CNP

## 2017-11-14 NOTE — TELEPHONE ENCOUNTER
Nabor says he saw Melissa Eng last week and was to get a knee brace but we didn't have his size so it was going to be ordered. He says he was also told she would check to see if his insurance covered this. I told him he could call his insurance company but he says he was told Melissa was going to check on this for him.  He wants to know if we found out if they cover it.   Please call him at [phone]

## 2017-11-16 ENCOUNTER — OFFICE VISIT (OUTPATIENT)
Dept: FAMILY MEDICINE | Facility: CLINIC | Age: 82
End: 2017-11-16
Payer: MEDICARE

## 2017-11-16 DIAGNOSIS — M25.562 CHRONIC PAIN OF LEFT KNEE: Primary | ICD-10-CM

## 2017-11-16 DIAGNOSIS — G89.29 CHRONIC PAIN OF LEFT KNEE: Primary | ICD-10-CM

## 2017-11-16 PROCEDURE — 99207 ZZC NO BILLABLE SERVICE THIS VISIT: CPT

## 2017-11-16 NOTE — PROGRESS NOTES
Patient her for knee brace for his chronic knee pain.  His old brace broke.      Attempted various knee braces. Knee sleeve not supportive enough.  True pull to tight.     Will have him follow-up with orthopedics for evaluation of knee brace.      Melissa Eng CNP     No charge

## 2017-11-16 NOTE — MR AVS SNAPSHOT
After Visit Summary   11/16/2017    Nabor Cunningham    MRN: [id number]           Patient Information     Date Of Birth          1935        Visit Information        Provider Department      11/16/2017 1:40 PM [place] NB RN [facility]        Today's Diagnoses     Chronic pain of left knee    -  1       Follow-ups after your visit        Additional Services     ORTHO  REFERRAL       [facility] is referring you to the Orthopedic  Services at [facility].       The  Representative will assist you in the coordination of your Orthopedic and Musculoskeletal Care as prescribed by your physician.    The  Representative will call you within 1 business day to help schedule your appointment, or you may contact the  Representative at:    All areas ~ [phone]     Type of Referral : Non Surgical Has chronic knee pain and unable to be fitted in a brace that is functional for him.Need evaluation for new knee brace        Timeframe requested: Within 2 weeks    Coverage of these services is subject to the terms and limitations of your health insurance plan.  Please call member services at your health plan with any benefit or coverage questions.      If X-rays, CT or MRI's have been performed, please contact the facility where they were done to arrange for , prior to your scheduled appointment.  Please bring this referral request to your appointment and present it to your specialist.                  Your next 10 appointments already scheduled     Dec 18, 2017  2:00 PM CST   Anticoagulation Visit with NB ANTI COAG   [facility]    [address] [place] [id number] [phone]              Who to contact     If you have questions or need follow up information about today's clinic visit or your schedule please contact [facility]  "directly at [phone].  Normal or non-critical lab and imaging results will be communicated to you by MyChart, letter or phone within 4 business days after the clinic has received the results. If you do not hear from us within 7 days, please contact the clinic through [employer] or phone. If you have a critical or abnormal lab result, we will notify you by phone as soon as possible.  Submit refill requests through [employer] or call your pharmacy and they will forward the refill request to us. Please allow 3 business days for your refill to be completed.          Additional Information About Your Visit        [employer] Information     [employer] lets you send messages to your doctor, view your test results, renew your prescriptions, schedule appointments and more. To sign up, go to [email or web address] . Click on \"Log in\" on the left side of the screen, which will take you to the Welcome page. Then click on \"Sign up Now\" on the right side of the page.     You will be asked to enter the access code listed below, as well as some personal information. Please follow the directions to create your username and password.     Your access code is: [email or web address]  Expires: 2018  1:55 PM     Your access code will  in 90 days. If you need help or a new code, please call your [facility] clinic or [phone].        Care EveryWhere ID     This is your Care EveryWhere ID. This could be used by other organizations to access your [facility] medical records  [id number]         Blood Pressure from Last 3 Encounters:   11/10/17 132/58   17 122/64   17 100/64    Weight from Last 3 Encounters:   11/10/17 156 lb (70.8 kg)   17 149 lb 3.2 oz (67.7 kg)   17 145 lb (65.8 kg)              We Performed the Following     ORTHO  REFERRAL        Primary Care Provider Office Phone # Fax #    Heron Mayo [phone] [address] [place] [address]        Equal Access to Services "     [facility] : Hadii aad ku clay Polanco, waaxda luqadaha, qaybta kaalmada adecarlyle, lili amada hayconstantin beckwithtusharmarialuisa hernandez . So [facility] [phone].    ATENCIÓN: Si habla laura, tiene a garcia disposición servicios gratuitos de asistencia lingüística. Llame al [phone].    We comply with applicable federal civil rights laws and [place] laws. We do not discriminate on the basis of race, color, national origin, age, disability, sex, sexual orientation, or gender identity.            Thank you!     Thank you for choosing [facility]  for your care. Our goal is always to provide you with excellent care. Hearing back from our patients is one way we can continue to improve our services. Please take a few minutes to complete the written survey that you may receive in the mail after your visit with us. Thank you!             Your Updated Medication List - Protect others around you: Learn how to safely use, store and throw away your medicines at www.disposemymeds.org.          This list is accurate as of: 11/16/17  2:17 PM.  Always use your most recent med list.                   Brand Name Dispense Instructions for use Diagnosis    B-12 1000 MCG Tbcr     100 tablet    Take 1,000 mcg by mouth daily    Vitamin B12 deficiency (non anemic)       calcium carbonate 1250 MG tablet    OS- mg Tribe. Ca     Take 1 tablet by mouth 2 times daily        colchicine 0.6 MG tablet     30 tablet    TAKE 1 TABLET BY MOUTH EVERY DAY    Chronic gout, unspecified cause, unspecified site       HYDROcodone-acetaminophen 5-325 MG per tablet    NORCO    12 tablet    Take 1 tablet by mouth every 6 hours as needed for moderate to severe pain maximum 4 tablet(s) per day    Acute gouty arthritis       metoprolol 25 MG 24 hr tablet    TOPROL-XL    90 tablet    Take 1 tablet (25 mg) by mouth daily        Multi-vitamin Tabs tablet     100 tablet    Take 1 tablet by mouth daily        nortriptyline 25 MG capsule     PAMELOR    60 capsule    TAKE TWO CAPSULES BY MOUTH DAILY AT BEDTIME    Anxiety       order for DME     1 Units    True pull Knee Brace Left    Chronic pain of left knee       temazepam 30 MG capsule    RESTORIL    30 capsule    Take 1 capsule (30 mg) by mouth nightly as needed for sleep    Persistent insomnia       warfarin 2 MG tablet    COUMADIN    300 tablet    Take 6mg by mouth MWF and 8mg all other days or as directed by Anticoagulation Clinic    Chronic atrial fibrillation (H)

## 2017-11-19 DIAGNOSIS — M1A.9XX0 CHRONIC GOUT, UNSPECIFIED CAUSE, UNSPECIFIED SITE: ICD-10-CM

## 2017-11-20 RX ORDER — COLCHICINE 0.6 MG/1
TABLET ORAL
Qty: 30 TABLET | Refills: 0 | Status: SHIPPED | OUTPATIENT
Start: 2017-11-20 | End: 2017-12-23

## 2017-11-21 DIAGNOSIS — F41.9 ANXIETY: ICD-10-CM

## 2017-11-21 RX ORDER — NORTRIPTYLINE HCL 25 MG
CAPSULE ORAL
Qty: 60 CAPSULE | Refills: 0 | Status: SHIPPED | OUTPATIENT
Start: 2017-11-21 | End: 2018-01-03

## 2017-12-15 ENCOUNTER — TRANSFERRED RECORDS (OUTPATIENT)
Dept: HEALTH INFORMATION MANAGEMENT | Facility: CLINIC | Age: 82
End: 2017-12-15

## 2017-12-18 ENCOUNTER — ANTICOAGULATION THERAPY VISIT (OUTPATIENT)
Dept: ANTICOAGULATION | Facility: CLINIC | Age: 82
End: 2017-12-18
Payer: MEDICARE

## 2017-12-18 DIAGNOSIS — Z79.01 LONG-TERM (CURRENT) USE OF ANTICOAGULANTS: ICD-10-CM

## 2017-12-18 DIAGNOSIS — I48.20 CHRONIC ATRIAL FIBRILLATION (H): ICD-10-CM

## 2017-12-18 DIAGNOSIS — Z79.01 CHRONIC ANTICOAGULATION: ICD-10-CM

## 2017-12-18 LAB — INR POINT OF CARE: 3 (ref 0.86–1.14)

## 2017-12-18 PROCEDURE — 36416 COLLJ CAPILLARY BLOOD SPEC: CPT

## 2017-12-18 PROCEDURE — 85610 PROTHROMBIN TIME: CPT | Mod: QW

## 2017-12-18 PROCEDURE — 99207 ZZC NO CHARGE NURSE ONLY: CPT

## 2017-12-18 NOTE — PROGRESS NOTES
ANTICOAGULATION FOLLOW-UP CLINIC VISIT    Patient Name:  Nabor Cunningham  Date:  12/18/2017  Contact Type:  Face to Face    SUBJECTIVE:     Patient Findings     Positives No Problem Findings           OBJECTIVE    INR Protime   Date Value Ref Range Status   12/18/2017 3.0 (A) 0.86 - 1.14 Final       ASSESSMENT / PLAN  INR assessment THER    Recheck INR In: 6 WEEKS    INR Location Clinic      Anticoagulation Summary as of 12/18/2017     INR goal 2.0-3.0   Today's INR 3.0   Maintenance plan 6 mg (2 mg x 3) on Mon, Wed, Fri; 8 mg (2 mg x 4) all other days   Full instructions 6 mg on Mon, Wed, Fri; 8 mg all other days   Weekly total 50 mg   No change documented Swathi Antonio RN   Plan last modified Swathi Antonio RN (4/6/2017)   Next INR check 1/29/2018   Target end date Indefinite    Indications   Chronic anticoagulation [Z79.01]  Chronic atrial fibrillation (H) [I48.2]  Long-term (current) use of anticoagulants [Z79.01] [Z79.01]         Anticoagulation Episode Summary     INR check location     Preferred lab     Send INR reminders to [facility]    Comments * 2mg tabs      Anticoagulation Care Providers     Provider Role Specialty Phone number    Heron Mayo MD [facility] [phone]            See the Encounter Report to view Anticoagulation Flowsheet and Dosing Calendar (Go to Encounters tab in chart review, and find the Anticoagulation Therapy Visit)    Patient to continue the same warfarin maintenance dose, INR therapeutic.      Swathi Antonio RN

## 2017-12-18 NOTE — MR AVS SNAPSHOT
Nabor Cunningham   12/18/2017 2:00 PM   Anticoagulation Therapy Visit    Description:  82 year old male   Provider:  NB ANTI COAVIRIDIANA   Department:  Nb Anticoag           INR as of 12/18/2017     Today's INR 3.0      Anticoagulation Summary as of 12/18/2017     INR goal 2.0-3.0   Today's INR 3.0   Full instructions 6 mg on Mon, Wed, Fri; 8 mg all other days   Next INR check 1/29/2018    Indications   Chronic anticoagulation [Z79.01]  Chronic atrial fibrillation (H) [I48.2]  Long-term (current) use of anticoagulants [Z79.01] [Z79.01]         Your next Anticoagulation Clinic appointment(s)     Jan 29, 2018  2:00 PM CST   Anticoagulation Visit with NB ANTI COAG   [facility] ([facility])    [address] [place] [id number]   [phone]              December 2017 Details    Sun Mon Tue Wed Thu Fri Sat          1               2                 3               4               5               6               7               8               9                 10               11               12               13               14               15               16                 17               18      6 mg   See details      19      8 mg         20      6 mg         21      8 mg         22      6 mg         23      8 mg           24      8 mg         25      6 mg         26      8 mg         27      6 mg         28      8 mg         29      6 mg         30      8 mg           31      8 mg                Date Details   12/18 This INR check               How to take your warfarin dose     To take:  6 mg Take 3 of the 2 mg tablets.    To take:  8 mg Take 4 of the 2 mg tablets.           January 2018 Details    Sun Mon Tue Wed Thu Fri Sat      1      6 mg         2      8 mg         3      6 mg         4      8 mg         5      6 mg         6      8 mg           7      8 mg         8      6 mg         9      8 mg         10      6 mg         11      8 mg         12      6 mg          13      8 mg           14      8 mg         15      6 mg         16      8 mg         17      6 mg         18      8 mg         19      6 mg         20      8 mg           21      8 mg         22      6 mg         23      8 mg         24      6 mg         25      8 mg         26      6 mg         27      8 mg           28      8 mg         29            30               31                   Date Details   No additional details    Date of next INR:  1/29/2018         How to take your warfarin dose     To take:  6 mg Take 3 of the 2 mg tablets.    To take:  8 mg Take 4 of the 2 mg tablets.

## 2017-12-23 DIAGNOSIS — M1A.9XX0 CHRONIC GOUT, UNSPECIFIED CAUSE, UNSPECIFIED SITE: ICD-10-CM

## 2017-12-23 NOTE — TELEPHONE ENCOUNTER
Requested Prescriptions   Pending Prescriptions Disp Refills     colchicine 0.6 MG tablet   Last Written Prescription Date:  11/20/17  Last Fill Quantity: 30,  # refills: 0   Last Office Visit with [facility], [facility] or [facility] prescribing provider:  7/17/17   Future Office Visit:      30 tablet 0     Sig: TAKE 1 TABLET BY MOUTH EVERY DAY    Gout Agents Protocol Passed    12/23/2017  3:39 AM       Passed - CBC on file in past 12 months    Recent Labs   Lab Test  03/22/17   1240   WBC  6.1   RBC  4.92   HGB  14.1   HCT  42.7   PLT  128*            Passed - ALT on file in past 12 months    Recent Labs   Lab Test  03/22/17   1240   ALT  56            Passed - Uric acid on file in past 12 months    No lab results found.  If level is 6mg/dL or greater, ok to refill one time and refer to provider.          Passed - Recent or future visit with authorizing provider's specialty    Patient had office visit in the last year or has a visit in the next 30 days with authorizing provider.  See chart review.              Passed - Patient is age 18 or older       Passed - Normal serum creatinine on file in the past 12 months    Recent Labs   Lab Test  03/22/17   1240   CR  0.70

## 2017-12-26 RX ORDER — COLCHICINE 0.6 MG/1
TABLET ORAL
Qty: 30 TABLET | Refills: 0 | Status: SHIPPED | OUTPATIENT
Start: 2017-12-26 | End: 2018-01-29

## 2018-01-02 NOTE — TELEPHONE ENCOUNTER
Patient Information     Patient Name MRN Sex Nabor Parisi [id number] Male 1935      Telephone Encounter by Javier Man at 1/10/2017  9:51 AM     Author:  Javier Man Service:  (none) Author Type:  (none)     Filed:  1/10/2017 10:07 AM Encounter Date:  1/10/2017 Status:  Signed     :  Javier Man            Son called looking for lab results and stated that his Dad is having extreme pain a short while after taking his Neltrexone.It seems to get better in the afternoon.It started to get worse 2 days ago. They would like to stop taking it along with the Vitamins and B 12 for a few days to see if this helps.   They also haven't picked up the new rx from Friday. Are wondering if this would be ok or if he should go to the ED.  Javier Man LPN ....................  1/10/2017   10:05 AM

## 2018-01-02 NOTE — NURSING NOTE
Patient Information     Patient Name MRN Nabor Alves [id number] Male 1935      Nursing Note by Lindsey Henson LPN at 2017  9:30 AM     Author:  Lindsey Henson LPN Service:  (none) Author Type:  NURS- Licensed Practical Nurse     Filed:  2017  9:25 AM Encounter Date:  2017 Status:  Signed     :  Lindsey Henson LPN (NURS- Licensed Practical Nurse)            The patient is here today to have a one month follow up.  Lindsey Henson LPN......2017  9:14 AM

## 2018-01-02 NOTE — PROGRESS NOTES
Patient Information     Patient Name MRN Sex Nabor Parisi [id number] Male 1935      Progress Notes by Amadou Salas MD at 2017  9:30 AM     Author:  Amadou Salas MD Service:  (none) Author Type:  Physician     Filed:  2017  9:50 AM Encounter Date:  2017 Status:  Signed     :  Amadou Salas MD (Physician)            SUBJECTIVE:    Nabor Cunningham is a 81 y.o. male who presents for follow up on anxiety.    HPI Comments: He is here today for follow-up. His anxiety is still extremely bothersome. He perseverates on things. He questions everything any worries about everything. His weight is down. He complains of abdominal pain. I told him we should probably investigate that but then he doesn't want to have testing done. I did convince him to have some lab work as well as a CT. He is refusing an EGD at this point. I spent time reconciling his medications. He is no longer drinking any tells me that he doesn't plan to drink again. I told him that we should probably get him off the naltrexone at this point in time and he is in favor that because he doesn't want to be on any medications, certainly if not needed.      Allergies      Allergen   Reactions     Amoxicillin  Hives     Pcn [Penicillins]  Dizziness and Hypotension     weakness      Trazodone  Hallucinations   ,   Current Outpatient Prescriptions     Medication  Sig     albuterol HFA 90 mcg/actuation inhaler Inhale 2 Puffs by mouth 4 times daily if needed.     ascorbic acid, vitamin C, (VITAMIN C) 500 mg tablet Take 2 tablets by mouth once daily.     calcium carbonate-vitamin D3 600 mg (1,500 mg)-800 unit chew Take  by mouth.     colchicine (COLCRYS, COLCHICINE,) 0.6 mg tablet Take 1 tablet by mouth once daily.     cyanocobalamin (VITAMIN B-12) 1,000 mcg tablet Take 1 tablet by mouth once daily.     hydrOXYzine pamoate (VISTARIL) 25 mg capsule Take 1 capsule by mouth 4 times daily if needed for Anxiety.     metoprolol succinate  (TOPROL XL) 25 mg Sustained-Release tablet Take 1 tablet by mouth once daily.     multivitamin (MVI) tablet Take 1 tablet by mouth once daily.     naltrexone (REVIA) 50 mg tablet Take 0.5 tablets by mouth 2 times daily after meals.     temazepam (RESTORIL) 7.5 mg capsule Take 2 capsules by mouth at bedtime if needed for Sleep.     warfarin (COUMADIN) 2 mg tablet Take 6 mg x 3 days and 8 mg x 4 days/week or as directed     No current facility-administered medications for this visit.      Medications have been reviewed by me and are current to the best of my knowledge and ability. ,   Past Medical History      Diagnosis   Date     Anxiety       Atrial fibrillation (HC)  2/19/2010     Secondary to alcohol, recurrent 2016      BPH (benign prostatic hyperplasia)       Gout       History of alcohol abuse       History of rectal fissure       repaired      Hyperlipidemia       Hypertension       Insomnia     ,   Patient Active Problem List       Diagnosis  Date Noted     Warfarin anticoagulation  10/28/2016     Anxiety disorder  08/22/2016     Paroxysmal atrial fibrillation (HC)  06/22/2016     Lower urinary tract symptoms (LUTS)  01/26/2016     History of alcohol abuse       Peripheral neuropathy (HC)  01/28/2013     GOUT  05/18/2012     HYPERLIPIDEMIA  01/12/2012     INSOMNIA       Controlled with Amitriptyline          HYPERTENSION     ,   Past Surgical History       Procedure   Laterality Date     Colonoscopy screening   91,1998,2003,2013     Colonoscopy.  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      OBJECTIVE:  /68  Pulse 72  Wt 62.6 kg (138 lb)  BMI 22.96 kg/m2    EXAM:   Physical Exam   Nursing note and vitals reviewed.      ASSESSMENT/PLAN:    ICD-10-CM    1. Anxiety disorder, unspecified type F41.9 sertraline (ZOLOFT) 25 mg tablet   2. Insomnia, unspecified type G47.00 temazepam (RESTORIL) 7.5 mg capsule   3. Peripheral polyneuropathy (HC) G62.9    4. History of alcohol abuse Z87.898    5. Weight loss R63.4 CT ABDOMEN PELVIS W      COMP METABOLIC PANEL      CBC AND DIFFERENTIAL      TSH   6. Abdominal pain, unspecified location R10.9 AMYLASE        Plan:  His anxiety is severe at this point in time and he has absolutely no insight into this. I am going to have him wean off of his naltrexone and I'm going to start him on sertraline at 25 mg daily. Recheck with me in 3 weeks. He will have seen the neurologist by then. Complete lab is drawn and pending and CT scan of the abdomen is scheduled. I'll let him know the results in the interim. He may need upper GI endoscopy. He does not want to have the endoscopy done and he does not want to be on any new medications, making all of this very difficult.

## 2018-01-02 NOTE — PATIENT INSTRUCTIONS
Patient Information     Patient Name MRN Sex Nabor Parisi [id number] Male 1935      Patient Instructions by Amadou Salas MD at 2017  9:30 AM     Author:  Amadou Salas MD Service:  (none) Author Type:  Physician     Filed:  2017  9:40 AM Encounter Date:  2017 Status:  Signed     :  Amadou Salas MD (Physician)            Start sertraline 25 mg daily for anxiety  Decrease naltrexone to 1/2 daily for 1 week then stop  Lab today  CT scan of abdomen  Recheck in 3 weeks

## 2018-01-03 DIAGNOSIS — F41.9 ANXIETY: ICD-10-CM

## 2018-01-03 RX ORDER — NORTRIPTYLINE HCL 25 MG
CAPSULE ORAL
Qty: 60 CAPSULE | Refills: 0 | Status: SHIPPED | OUTPATIENT
Start: 2018-01-03 | End: 2018-01-29

## 2018-01-03 NOTE — TELEPHONE ENCOUNTER
Patient Information     Patient Name MRN Nabor Alves [id number] Male 1935      Telephone Encounter by Lindsey Henson LPN at 2017 10:11 AM     Author:  Lindsey Henson LPN Service:  (none) Author Type:  NURS- Licensed Practical Nurse     Filed:  2017 10:11 AM Encounter Date:  2017 Status:  Signed     :  Lindsey Henson LPN (NURS- Licensed Practical Nurse)            Left a message for the patients dwayne myles to call back.  Lindsey Henson LPN......2017  10:11 AM

## 2018-01-03 NOTE — TELEPHONE ENCOUNTER
Patient Information     Patient Name MRN Nabor Alves [id number] Male 1935      Telephone Encounter by Elizabeth Manzo MD at 2017  2:20 PM     Author:  Elizabeth Manzo MD Service:  (none) Author Type:  Physician     Filed:  2017  2:21 PM Encounter Date:  2017 Status:  Signed     :  Elizabeth Manzo MD (Physician)            Ok to schedule. He will need to be off is coumadin for 2 days for the EGD if that's ok with Dr. Salas. Thanks! Elizabeth Manzo MD ....................  2017   2:20 PM

## 2018-01-03 NOTE — TELEPHONE ENCOUNTER
Patient Information     Patient Name MRN Sex Nabor Parisi [id number] Male 1935      Telephone Encounter by Cleo Camarena RN at 2/10/2017  8:31 AM     Author:  Cleo Camarena RN Service:  (none) Author Type:  NURS- Registered Nurse     Filed:  2/10/2017  8:33 AM Encounter Date:  2/10/2017 Status:  Signed     :  Cleo Camarena RN (NURS- Registered Nurse)            Anticoagulant    Office visit in the past 12 months.    Last visit with PRAKASH PRADO was on: 2017 in [facility]  Next visit with PRAKASH PRADO is on: No future appointment listed with this provider  Next visit with Internal Medicine is on: No future appointment listed in this department    Lab tests:  PT/INR at least monthly    INR (no units)    Date Value   2017 1.2     HEMOGLOBIN                (g/dL)    Date Value   2017 14.5         Prescription refilled per RN Medication Refill Policy.................... Cleo Camarena RN ....................  2/10/2017   8:31 AM

## 2018-01-03 NOTE — TELEPHONE ENCOUNTER
Patient Information     Patient Name MRN Sex Nabor Parisi [id number] Male 1935      Telephone Encounter by Lindsey Henson LPN at 2017  9:59 AM     Author:  Lindsey Henson LPN Service:  (none) Author Type:  NURS- Licensed Practical Nurse     Filed:  2017 10:05 AM Encounter Date:  2017 Status:  Signed     :  Lindsey Henson LPN (NURS- Licensed Practical Nurse)            Contacted the patients son shar he stated they received results of the patients ct scan and it was good. They are wondering if he should have a endoscopy done next or not. The son also wants to know if a CT scan would be able to detect polyps, ulcers, or signs of cancer somewhere. He is also wondering if lab work would find polyps or cancer.  Lindsey Henson LPN......2017  10:04 AM

## 2018-01-03 NOTE — TELEPHONE ENCOUNTER
Patient Information     Patient Name MRN Sex Nbaor Parisi [id number] Male 1935      Telephone Encounter by Cleo Camarena RN at 3/20/2017  8:19 AM     Author:  Cleo Camarena RN Service:  (none) Author Type:  NURS- Registered Nurse     Filed:  3/20/2017  8:21 AM Encounter Date:  3/20/2017 Status:  Signed     :  Cleo Camarena RN (NURS- Registered Nurse)            Anticoagulant    Office visit in the past 12 months.    Last visit with PRAKASH PRADO was on: 03/10/2017 in [facility]  Next visit with PRAKASH PRADO is on: No future appointment listed with this provider  Next visit with Internal Medicine is on: No future appointment listed in this department    Lab tests:  PT/INR at least monthly    INR (no units)    Date Value   03/10/2017 2.2 (H)     HEMOGLOBIN                (g/dL)    Date Value   2017 14.5         Prescription refilled per RN Medication Refill Policy.................... Cleo Camarena RN ....................  3/20/2017   8:19 AM

## 2018-01-03 NOTE — PROGRESS NOTES
Patient Information     Patient Name MRN Sex Nabor Parisi [id number] Male 1935      Progress Notes by Amadou Salas MD at 2/10/2017 11:20 AM     Author:  Amadou Salas MD Service:  (none) Author Type:  Physician     Filed:  2/10/2017 11:46 AM Encounter Date:  2/10/2017 Status:  Signed     :  Amadou Salas MD (Physician)            SUBJECTIVE:    Nabor Cunningham is a 81 y.o. male who presents for follow-up on issues including his anxiety.    HPI Comments: He comes in today for follow-up. He has seen the neurologist. He was told to take some over-the-counter supplements for his neuropathy. He tried these but he finds that they burned his stomach. I told him to stop them. The neurologist had suggested maybe trying some Pamelor. Because he is having such problems with his anxiety and his neuropathy he is interested in trying that now, he was previously opposed to it. No other abnormalities or findings were discovered with neurology and they confirmed that his neuropathy and cognitive issues were all likely related to his chronic alcohol abuse.    His recent upper GI endoscopy was done and were reviewed today. It was basically normal. He was put on Protonix as well as Carafate. He doesn't like taking the Carafate and he doesn't think it's helping at all. He will be seeing surgery later today. I told him he can certainly stop the Carafate at this point in time, the degree of abnormality on his endoscopy did not sound to be very impressive.    He would like to try taking half a beer or a whole beer each afternoon to see if it helps calm his nerves. He is wondering if that is something that would be okay to do.      Allergies      Allergen   Reactions     Amoxicillin  Hives     Pcn [Penicillins]  Dizziness and Hypotension     weakness      Trazodone  Hallucinations   ,   Current Outpatient Prescriptions     Medication  Sig     albuterol HFA 90 mcg/actuation inhaler Inhale 2 Puffs by mouth 4 times  daily if needed.     calcium carbonate-vitamin D3 600 mg (1,500 mg)-800 unit chew Take 1 tablet by mouth once daily.     colchicine (COLCRYS, COLCHICINE,) 0.6 mg tablet Take 1 tablet by mouth once daily.     cyanocobalamin (VITAMIN B-12) 1,000 mcg tablet Take 1 tablet by mouth once daily.     metoprolol succinate (TOPROL XL) 25 mg Sustained-Release tablet Take 1 tablet by mouth once daily.     multivitamin (MVI) tablet Take 1 tablet by mouth once daily.     nortriptyline (PAMELOR) 10 mg capsule Take 1 capsule by mouth at bedtime.     pantoprazole (PROTONIX) 40 mg delayed-release tablet Take 1 tablet by mouth once daily before a meal.     temazepam (RESTORIL) 7.5 mg capsule Take 2 capsules by mouth at bedtime if needed for Sleep.     warfarin (COUMADIN) 2 mg tablet TAKE 6MG BY MOUTH 3 DAYS PER WEEK AND 8MG 4 DAYS PER WEEK OR AS DIRECTED     No current facility-administered medications for this visit.      Medications have been reviewed by me and are current to the best of my knowledge and ability. ,   Past Medical History      Diagnosis   Date     Anxiety       Atrial fibrillation (HC)  2/19/2010     Secondary to alcohol, recurrent 2016      BPH (benign prostatic hyperplasia)       Gout       History of alcohol abuse       History of rectal fissure       repaired      Hyperlipidemia       Hypertension       Insomnia     ,   Patient Active Problem List       Diagnosis  Date Noted     Warfarin anticoagulation  10/28/2016     Anxiety disorder  08/22/2016     Paroxysmal atrial fibrillation (HC)  06/22/2016     Lower urinary tract symptoms (LUTS)  01/26/2016     History of alcohol abuse       Peripheral neuropathy (HC)  01/28/2013     GOUT  05/18/2012     HYPERLIPIDEMIA  01/12/2012     INSOMNIA       Controlled with Amitriptyline          HYPERTENSION     ,   Past Surgical History       Procedure   Laterality Date     Colonoscopy screening   91,1998,2003,2013     Colonoscopy.  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      OBJECTIVE:  There were no vitals taken for this visit.    EXAM:   Physical Exam   Constitutional:   Quite anxious as usual   Psychiatric:   Mildly confused, unchanged from previous visits   Nursing note and vitals reviewed.      ASSESSMENT/PLAN:    ICD-10-CM    1. Peripheral polyneuropathy (HC) G62.9 nortriptyline (PAMELOR) 10 mg capsule   2. Anxiety disorder, unspecified type F41.9    3. Insomnia, unspecified type G47.00         Plan:  Discontinue the Carafate as he does not think it is helping at all and he does not like taking it 4 times daily. Continue on the Protonix. Trial of Pamelor 10 mg each evening, warned of possible side effects. This may help with sleep, anxiety as well as his neuropathy. Other meds were reconciled and updated today. Recheck with me in 3 weeks. We will continue to titrate the Pamelor as needed. He was wondering if it is okay to have 1 beer daily, I told him that would be fine if he can keep it to that.

## 2018-01-03 NOTE — TELEPHONE ENCOUNTER
Patient Information     Patient Name MRN Sex Nabor Parisi [id number] Male 1935      Telephone Encounter by Concepción Costello at 2017  1:14 PM     Author:  Concepción Costello Service:  (none) Author Type:  (none)     Filed:  2017  1:18 PM Encounter Date:  2017 Status:  Signed     :  Concepción Costello            Debi at [facility] says that the liquid carafate is not covered by his insurance.  Do you want to use tablets or it may take several days for liquid to get possible prior authorization.    Concepción Costello LPN ....................  2017   1:18 PM

## 2018-01-03 NOTE — PROGRESS NOTES
Patient Information     Patient Name MRN Sex Nabor Parisi [id number] Male 1935      Progress Notes by Amadou Salas MD at 3/10/2017 11:00 AM     Author:  Amadou Salas MD Service:  (none) Author Type:  Physician     Filed:  3/10/2017 11:25 AM Encounter Date:  3/10/2017 Status:  Signed     :  Amadou Salas MD (Physician)            SUBJECTIVE:    Nabor Cunningham is a 81 y.o. male who presents for follow-up evaluation.    HPI Comments: He comes in today for follow-up. See previous note. The Pamelor is possibly helping slightly for him. He is sleeping a little bit better but needs to take 30 mg of Restoril in the evening. At present, he is not doing well. He is also stressed and anxious about her move. Family is moving him to [place] to live near his daughter. He will establish care in that area as well.    He just had his INR done and that was acceptable at 2.2. He will need some refills on his medications for the move. His son brought in all of his meds today and those are reconciled.      Allergies      Allergen   Reactions     Amoxicillin  Hives     Pcn [Penicillins]  Dizziness and Hypotension     weakness      Trazodone  Hallucinations   ,   Current Outpatient Prescriptions     Medication  Sig     albuterol HFA 90 mcg/actuation inhaler Inhale 2 Puffs by mouth 4 times daily if needed.     calcium carbonate-vitamin D3 600 mg (1,500 mg)-800 unit chew Take 1 tablet by mouth once daily.     colchicine (COLCRYS, COLCHICINE,) 0.6 mg tablet Take 1 tablet by mouth once daily.     cyanocobalamin (VITAMIN B-12) 1,000 mcg tablet Take 1 tablet by mouth once daily.     metoprolol succinate (TOPROL XL) 25 mg Sustained-Release tablet Take 1 tablet by mouth once daily.     multivitamin (MVI) tablet Take 1 tablet by mouth once daily.     nortriptyline (PAMELOR) 25 mg capsule Take 1 capsule by mouth at bedtime.     pantoprazole (PROTONIX) 40 mg delayed-release tablet Take 1 tablet by mouth once daily  before a meal.     temazepam (RESTORIL) 30 mg capsule Take 1 capsule by mouth at bedtime if needed for Sleep.     warfarin (COUMADIN) 2 mg tablet TAKE 6MG BY MOUTH 3 DAYS PER WEEK AND 8MG 4 DAYS PER WEEK OR AS DIRECTED     No current facility-administered medications for this visit.      Medications have been reviewed by me and are current to the best of my knowledge and ability. ,   Past Medical History      Diagnosis   Date     Anxiety       Atrial fibrillation (HC)  2/19/2010     Secondary to alcohol, recurrent 2016      BPH (benign prostatic hyperplasia)       Gout       History of alcohol abuse       History of rectal fissure       repaired      Hyperlipidemia       Hypertension       Insomnia     ,   Patient Active Problem List       Diagnosis  Date Noted     Warfarin anticoagulation  10/28/2016     Anxiety disorder  08/22/2016     Paroxysmal atrial fibrillation (HC)  06/22/2016     Lower urinary tract symptoms (LUTS)  01/26/2016     History of alcohol abuse       Peripheral neuropathy (HC)  01/28/2013     GOUT  05/18/2012     HYPERLIPIDEMIA  01/12/2012     INSOMNIA       Controlled with Amitriptyline          HYPERTENSION      and   Social History       Substance Use Topics         Smoking status:   Former Smoker     Types:  Cigarettes     Quit date:  1/28/1980     Smokeless tobacco:   Never Used     Alcohol use   No      Comment: former        REVIEW OF SYSTEMS:  Review of Systems   All other systems reviewed and are negative.      OBJECTIVE:  /80  Pulse 68    EXAM:   Physical Exam   Neurological: He is alert.   Psychiatric:   Today he is perseverating and focusing on how much sugar he needs to eat keep his blood sugar up.   Nursing note and vitals reviewed.      ASSESSMENT/PLAN:    ICD-10-CM    1. Generalized anxiety disorder F41.1    2. Epigastric pain R10.13    3. Peripheral polyneuropathy (HC) G62.9 nortriptyline (PAMELOR) 25 mg capsule   4. Idiopathic gout, unspecified chronicity, unspecified site  M10.00 colchicine (COLCRYS, COLCHICINE,) 0.6 mg tablet   5. Insomnia, unspecified type G47.00 temazepam (RESTORIL) 30 mg capsule   6. HYPERTENSION I10 metoprolol succinate (TOPROL XL) 25 mg Sustained-Release tablet        Plan:  I reviewed the situation with the patient and his son. We will try increasing the nortriptyline to 25 mg daily at bedtime per the recommendations of neurology. I also did change the dose of the temazepam to 30 mg each evening since he is essentially taking that every night anyway. Other medications will remain the same. He will be establishing care somewhere in the [facility]. If they don't have access to [facility], the family will send for his records to be transferred down there. They will notify me if there are any problems with this. Over 50% of a 25 minute visit spent in counseling, reassurance and coordination of care.

## 2018-01-03 NOTE — NURSING NOTE
Patient Information     Patient Name MRN Sex Nabor Parisi [id number] Male 1935      Nursing Note by Concepción Costello at 2/10/2017 12:00 PM     Author:  Concepción Costello Service:  (none) Author Type:  (none)     Filed:  2/10/2017 12:13 PM Encounter Date:  2/10/2017 Status:  Signed     :  Concepción Costello            Patient comes in for follow up on EGD.  Concepción Costello LPN ....................  2/10/2017   12:13 PM

## 2018-01-03 NOTE — TELEPHONE ENCOUNTER
Patient Information     Patient Name MRN Nabor Alves [id number] Male 1935      Telephone Encounter by Concepción Costello at 2017  1:41 PM     Author:  Concepción Costello Service:  (none) Author Type:  (none)     Filed:  2017  1:42 PM Encounter Date:  2017 Status:  Signed     :  Concepción Costello            Patient waiting at [facility], wondering if they can give him tablets?  Concepción Costello LPN ....................  2017   1:42 PM

## 2018-01-03 NOTE — TELEPHONE ENCOUNTER
Patient Information     Patient Name MRN Nabor Alves [id number] Male 1935      Telephone Encounter by aHrry Singh MD at 3/3/2017 10:12 AM     Author:  Harry Singh MD Service:  (none) Author Type:  Physician     Filed:  3/3/2017 10:13 AM Encounter Date:  3/3/2017 Status:  Signed     :  Harry Singh MD (Physician)            Noted.     1. Insomnia, unspecified type    Okay to use:   - temazepam (RESTORIL) 15 mg capsule; Take 1-2 capsules by mouth at bedtime if needed for Sleep.      Harry Singh MD

## 2018-01-03 NOTE — TELEPHONE ENCOUNTER
Patient Information     Patient Name MRN Sex Nabor Parisi [id number] Male 1935      Telephone Encounter by Amadou Salas MD at 1/10/2017 10:11 AM     Author:  Amadou Salas MD Service:  (none) Author Type:  Physician     Filed:  1/10/2017 10:11 AM Encounter Date:  1/10/2017 Status:  Signed     :  Amadou Salas MD (Physician)            That sounds fine however he will need to go to the emergency room if his pain worsens.

## 2018-01-03 NOTE — TELEPHONE ENCOUNTER
Patient Information     Patient Name MRN Sex Nabor Parisi [id number] Male 1935      Telephone Encounter by Amadou Salas MD at 2017  2:24 PM     Author:  Amadou Salas MD Service:  (none) Author Type:  Physician     Filed:  2017  2:24 PM Encounter Date:  2017 Status:  Signed     :  Amadou Salas MD (Physician)            That is fine

## 2018-01-03 NOTE — TELEPHONE ENCOUNTER
Patient Information     Patient Name MRN Sex Nabor Parisi [id number] Male 1935      Telephone Encounter by Katharine Koehler at 2017  4:05 PM     Author:  Katharine Koehler Service:  (none) Author Type:  (none)     Filed:  2017  4:08 PM Encounter Date:  2017 Status:  Signed     :  Katharine Koehler            Notified with results.  Katharine Koehler LPN..........2017  4:08 PM

## 2018-01-03 NOTE — TELEPHONE ENCOUNTER
Patient Information     Patient Name MRN Sex Nabor Parisi [id number] Male 1935      Telephone Encounter by Jackie Gage RN at 3/3/2017  9:15 AM     Author:  Jackie Gage RN Service:  (none) Author Type:  NURS- Registered Nurse     Filed:  3/3/2017  9:23 AM Encounter Date:  3/3/2017 Status:  Signed     :  Jackie Gage RN (NURS- Registered Nurse)            Son states the patient is already taking a 15mg tablet, not the 7.5mg listed in the chart.  The son is wondering if there is any chance increasing this dose, as he would have to come by ambulance if needed to be seen as the family is 160 miles away.  Amadou Salas MD is out today, will send to [facility] doctor.  JACKIE GAGE RN ....................  3/3/2017   9:18 AM

## 2018-01-03 NOTE — TELEPHONE ENCOUNTER
Patient Information     Patient Name MRN Sex Nabor Parisi [id number] Male 1935      Telephone Encounter by Amadou Salas MD at 2017 10:07 AM     Author:  Amadou Salas MD Service:  (none) Author Type:  Physician     Filed:  2017 10:08 AM Encounter Date:  2017 Status:  Signed     :  Amadou Salas MD (Physician)            The purpose of having endoscopy done would be to look for ulcers and polyps, as they do not show up on blood work or CT scanning. I am not going to schedule these unless he is going to agree to have them done.

## 2018-01-03 NOTE — TELEPHONE ENCOUNTER
Patient Information     Patient Name MRN Nabor Alves [id number] Male 1935      Telephone Encounter by Evelyn Cody at 2/10/2017  2:23 PM     Author:  Evelyn Cody Service:  (none) Author Type:  (none)     Filed:  2/10/2017  2:24 PM Encounter Date:  2/10/2017 Status:  Signed     :  Evelyn Cody - PLEASE CALL PATIENT'S SON ELOY, HE WAS WITH PATIENT TODAY FOR APPOINTMENT. PATIENT WAS SCHEDULE TO COME BACK ON  Friday THAT DR. PRADO WILL NOT BE IN. PATIENT HAS INR DRAW A WEEK EARLIER BUT CANNOT MAKE THE LONG TRIP TWO TIMES. PLEASE CALL SON WITH INSTRUCTIONS FOR ANOTHER WEEK IF POSSIBLE.   Evelyn Cody ....................  2/10/2017   2:24 PM

## 2018-01-03 NOTE — TELEPHONE ENCOUNTER
Patient Information     Patient Name MRN Sex Nabor Parisi [id number] Male 1935      Telephone Encounter by Amadou Salas MD at 2017  1:41 PM     Author:  Amadou Salas MD Service:  (none) Author Type:  Physician     Filed:  2017  1:41 PM Encounter Date:  2017 Status:  Signed     :  Amadou Salas MD (Physician)            Yes

## 2018-01-03 NOTE — PROCEDURES
Patient Information     Patient Name MRN Sex Nabor Parisi [id number] Male 1935      Procedures by Marge Gomez DO at 2017 12:09 PM     Author:  Marge Gomez DO Service:  (none) Author Type:  PHYS- Osteopathic     Filed:  2017 12:13 PM Date of Service:  2017 12:09 PM Status:  Signed     :  Marge Gomez DO (PHYS- Osteopathic)                                                                                               EGD Op Report      PRE-OPERATIVE DIAGNOSIS: epigastric pain   POST-OPERATIVE DIAGNOSIS: hiatal hernia/esophagitis/bile reflux gastritis     PROCEDURE: EGD with. Bx     SURGEON: Marge Gomez DO     ESTIMATED BLOOD LOSS: None     COMPLICATIONS:   The patient tolerated the procedure well without complication.     INDICATIONS:   Nabor Cunningham seen at the request of Amadou Salas MD , who is here today for an egd for     ICD-10-CM    1. Epigastric pain R10.13 sodium chloride 0.9% 5 mL syringe (NORMAL SALINE)      lidocaine (1%) injection 0.1-1 mL      lactated ringers infusion      sodium chloride 0.9% 5 mL syringe (NORMAL SALINE)      lidocaine (1%) injection 0.1-1 mL      lactated ringers infusion   . Informed consent was obtained, explaining the risks and benefits of the procedure, including but not limited to bleeding, infection, perforation, aspiration, complications from the anesthesia.   ?  DESCRIPTION OF PROCEDURE:   The patient was brought to the endoscopy suite and placed in left lateral decubitus position. IV and topical anesthesia was administered per the Department of Anesthesia, with constant monitoring of all vital signs.   The previously lubricated Olympus scope was inserted into the oropharynx and passed down into the esophagus. There are no esophageal erosions,varix,diverticuli or strictures apparent. The scope is passed into the stomach and through the pylorus- which is freely patent. The duodenum is normal. The scope is passed into  the second portion of the duodenum. Bile is visualized and appears grossly normal. Bx is taken of the duodenal bulb. The stomach is grossly normal no polyps/masses/gastris. Bx are taken of the, antrum, and greater curvature.  There is some bile reflux into the stomach.  Bx are taken of the G-E junction and of the distal esophagus.   He does have a small hiatal hernia and slight amount of esophagitis.  All the specimens were retrieved, and there was no bleeding noted. The cords appeared normal. The patient tolerated the procedure well and transferred to the recovery room in stable condition.     Nabor Cunningham will follow up in the office for results of the biopsy.  ?  ADAL POLO D.O.  01/30/17      CC: Amadou Salas MD

## 2018-01-03 NOTE — TELEPHONE ENCOUNTER
Patient Information     Patient Name MRN Nabor Alves [id number] Male 1935      Telephone Encounter by Lindsey Henson LPN at 2017  1:01 PM     Author:  Lindsey Henson LPN Service:  (none) Author Type:  NURS- Licensed Practical Nurse     Filed:  2017  1:10 PM Encounter Date:  2017 Status:  Signed     :  Lindsey Henson LPN (NURS- Licensed Practical Nurse)            Left a message for the patients dwayne myles to call back.  Lindsey Henson LPN......2017  1:09 PM

## 2018-01-03 NOTE — PROGRESS NOTES
"Patient Information     Patient Name MRN Sex Nabor Parisi [id number] Male 1935      Progress Notes by Marge Gomez DO at 2/10/2017 12:00 PM     Author:  Marge Gomez DO Service:  (none) Author Type:  PHYS- Osteopathic     Filed:  2017 12:27 PM Encounter Date:  2/10/2017 Status:  Signed     :  Marge Gomez DO (PHYS- Osteopathic)            CONSULTATION NOTE  Nabor Cunningham   [id number] [facility] [address]  81 y.o.  male  Admission Date/Time: No admission date for patient encounter.          HPI:   Patient is here today for follow up from recent esophogastroduodenoscopy.  Esophogastroduodenoscopy showed minor hiatl hernia and biopsy were all normal.  He did have a CT which I reviewed w/ radiology.  The celiac access and sma show minimal plaque- but no signs of artery insuff.  There was no signs of ischemia on the scope.  Patient still has abdominal pain and nausea w/ eating.  No appetite.  C/o \"nerves\".    Patient /son think medications might be helping some.  However, dr. Salas discontinued b/c concerned for absorption of other medications.    Patient stopped drinking 8 months ago.  Now his nerves are bad.  Can't drive b/c of poor eye site.  Wife is in SNF secondary to parkinsons .  Patient is very sad and lonely.  Not able to do the things he used secondary to eye site and OA.  Does have a pet.    Reviewed some options for helping w/ nerves.  Is f/u w/ PCP regarding this issue as well.    Nothing from GI standpoint.  thinnk problems stem more from nerves      Patient Active Problem List       Diagnosis  Date Noted     Warfarin anticoagulation  10/28/2016     Anxiety disorder  2016     Paroxysmal atrial fibrillation (HC)  2016     Lower urinary tract symptoms (LUTS)  2016     History of alcohol abuse       Peripheral neuropathy (HC)  2013     GOUT  2012     HYPERLIPIDEMIA  2012     INSOMNIA       Controlled with Amitriptyline      "     HYPERTENSION         Past Medical History      Diagnosis   Date     Anxiety       Atrial fibrillation (HC)  2/19/2010     Secondary to alcohol, recurrent 2016      BPH (benign prostatic hyperplasia)       Gout       History of alcohol abuse       History of rectal fissure       repaired      Hyperlipidemia       Hypertension       Insomnia         Past Surgical History       Procedure   Laterality Date     Colonoscopy screening   91,1998,2003,2013     Colonoscopy.  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  3 children.  The patient lives in [place].  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  His wife is in the nursing home ([facility]) with severe rheumatoid arthritis and Parkinson's.                     Social History     Social History Main Topics         Smoking status:   Former Smoker     Types:  Cigarettes     Quit date:  1/28/1980     Smokeless tobacco:   Never Used     Alcohol use   No      Comment: former      Drug use:   No     Sexual activity:   Not on file       Current Outpatient Prescriptions       Medication  Sig Dispense Refill     albuterol HFA 90 mcg/actuation inhaler Inhale 2 Puffs by mouth 4 times daily if needed. 18 g 0     calcium carbonate-vitamin D3 600 mg (1,500 mg)-800 unit chew Take 1 tablet by mouth once daily.  0     colchicine (COLCRYS, COLCHICINE,) 0.6 mg tablet Take 1 tablet by mouth once daily. 90 tablet 3      cyanocobalamin (VITAMIN B-12) 1,000 mcg tablet Take 1 tablet by mouth once daily. 90 tablet 3     metoprolol succinate (TOPROL XL) 25 mg Sustained-Release tablet Take 1 tablet by mouth once daily.  0     multivitamin (MVI) tablet Take 1 tablet by mouth once daily.  0     nortriptyline (PAMELOR) 10 mg capsule Take 1 capsule by mouth at bedtime. 30 capsule 0     pantoprazole (PROTONIX) 40 mg delayed-release tablet Take 1 tablet by mouth once daily before a meal. 30 tablet 11     temazepam (RESTORIL) 7.5 mg capsule Take 2 capsules by mouth at bedtime if needed for Sleep. 90 capsule 0     warfarin (COUMADIN) 2 mg tablet TAKE 6MG BY MOUTH 3 DAYS PER WEEK AND 8MG 4 DAYS PER WEEK OR AS DIRECTED 300 tablet 0     No current facility-administered medications for this visit.      Medications have been reviewed by me and are current to the best of my knowledge and ability.      ALLERGIES/SENSITIVITIES:   Allergies      Allergen   Reactions     Amoxicillin  Hives     Pcn [Penicillins]  Dizziness and Hypotension     weakness      Trazodone  Hallucinations         REVIEW OF SYSTEMS  GENERAL: No fevers or chills. Denies fatigue, + recent weight loss.  HEENT: No sinus drainage. No changes with vision or hearing. No difficulty swallowing.   LYMPHATICS:  No swollen nodes in axilla, neck or groin.  CARDIOVASCULAR: Denies chest pain, palpitations and dyspnea on exertion.  Gets shortness of breathe but more due to anxiety.   PULMONARY: No shortness of breath or cough. No increase in sputum production.  GI:  See HPI  : No dysuria or hematuria.  SKIN: No recent rashes or ulcers.   HEMATOLOGY:  On coumadin   ENDOCRINE:  No history of diabetes or thyroid problems.  NEUROLOGY:  No history of seizures or headaches. No motor or sensory changes.      PHYSICAL EXAM:         Visit Vitals       /60     Temp 98.2  F (36.8  C) (Temporal)     Wt 63.5 kg (140 lb)     BMI 23.3 kg/m2     Body mass index is 23.3 kg/(m^2).                     PHYSICAL EXAM  GENERAL APPEARANCE: Alert, healthy appearance, oriented, in no acute distress  Sad and tearfull  SKIN: No hyperpigmentation, vitiligo, or suspicious lesions No rashes.  Temporal muscle wasting.   HYDRATION: Well hydrated  HEAD, EYES, EARS, NECK, AND THROAT: Head is normocephalic, pupils equal, round, reactive to light and accommodation, ocular movement intact, sclera clear and no jaundice. Dentition intact.  NECK: Supple, no lymphadenopathy. Trachea midline.  LUNGS: normal respiration, clear to auscultation  HEART: Regular rate and rhythm,   EXTREMITY: No edema or cyanosis  ABDOMEN:  non tender to palpation, no hernias. Normal bowel sounds  NEURO: no focal neuro deficits.               Recent Labs        02/10/17   1249  01/30/17   1121   INR  1.9 H  1.2     No results for input(s): SODIUM, POTASSIUM, CHLORIDE, GI0GMWBT, BUN, CREATININE, GLUCOSE, CALCIUM, MAGNESIUM in the last 720 hours.  No results for input(s): PHOSPHORUS, ALBUMIN, BILITOTAL, BILIDIRECT, ALT, AST, PROTEIN in the last 720 hours.  No results for input(s): TROPONINI in the last 720 hours.  Invalid input(s): UDS, BAL                CONSULTATION ASSESSMENT AND PLAN:      Patient Active Problem List     Diagnosis  Code     INSOMNIA G47.00     HYPERTENSION I10     HYPERLIPIDEMIA E78.5     GOUT M10.9     Peripheral neuropathy (HC) G62.9     History of alcohol abuse Z87.898     Lower urinary tract symptoms (LUTS) R39.9     Paroxysmal atrial fibrillation (HC) I48.0     Anxiety disorder F41.9     Warfarin anticoagulation Z79.01

## 2018-01-03 NOTE — TELEPHONE ENCOUNTER
Patient Information     Patient Name MRN Nabor Alves [id number] Male 1935      Telephone Encounter by Jackie Gage RN at 3/3/2017  8:41 AM     Author:  Jackie Gage RN Service:  (none) Author Type:  NURS- Registered Nurse     Filed:  3/3/2017  8:48 AM Encounter Date:  3/3/2017 Status:  Signed     :  Jackie Gage RN (NURS- Registered Nurse)            Son states that the patient has not been sleeping well, as he is having some anxiety,  as he will be moving next week.  They are wondering if he can take double dose of restoril.  Son states he has only been taking one capsule at bedtime, and the chart has historically documented as take 2 7.5 mg  capsules by mouth at bedtime as needed for sleep.  He states they will follow the current directions, with 2 at bedtime.  JACKIE GAGE RN ....................  3/3/2017   8:48 AM

## 2018-01-03 NOTE — PROGRESS NOTES
Patient Information     Patient Name MRN Sex Nabor Parisi [id number] Male 1935      Progress Notes by Marge Gomez DO at 2017  3:54 PM     Author:  Marge Gomez DO Service:  (none) Author Type:  PHYS- Osteopathic     Filed:  2017  3:54 PM Date of Service:  2017  3:54 PM Status:  Signed     :  Marge Gomez DO (PHYS- Osteopathic)            Biopsy were normal  Follow up with myself or Amadou Salas MD to see if medications are helping

## 2018-01-03 NOTE — NURSING NOTE
Patient Information     Patient Name MRN Sex Nabor Parisi [id number] Male 1935      Nursing Note by Anna Marie Elizabeth at 2/10/2017 11:20 AM     Author:  Anna Marie Elizabeth Service:  (none) Author Type:  (none)     Filed:  2/10/2017 11:20 AM Encounter Date:  2/10/2017 Status:  Signed     :  Anna Marie Elizabeth            Pt here with son Ariel for a f/u on his test results.  Anna Marie Elizabeth CMA (AAMA)......................2/10/2017  10:57 AM

## 2018-01-03 NOTE — PATIENT INSTRUCTIONS
Patient Information     Patient Name MRN Nabor Alves [id number] Male 1935      Patient Instructions by Cleo Camarena RN at 2017 10:30 AM     Author:  Cleo Camarena RN Service:  (none) Author Type:  NURS- Registered Nurse     Filed:  2017 10:26 AM Encounter Date:  2017 Status:  Signed     :  Cleo Camarena RN (NURS- Registered Nurse)            2017 Details    Sun Mon Tue Wed Thu Fri Sat     1               2               3               4               5               6               7                 8               9               10               11               12      8 mg   See details      13      6 mg         14      8 mg           15      8 mg         16      6 mg         17      8 mg         18      6 mg         19      8 mg         20      6 mg         21      8 mg           22      8 mg         23      6 mg         24      8 mg         25      6 mg         26      8 mg         27      6 mg         28      8 mg           29      8 mg         30      6 mg         31      8 mg              Date Details    This INR check               How to take your warfarin dose     To take:  6 mg Take three of the 2 mg tablets.    To take:  8 mg Take four of the 2 mg tablets.           2017 Details    Sun Mon Tue Wed Thu Fri Sat        1      6 mg         2      8 mg         3      6 mg         4      8 mg           5      8 mg         6      6 mg         7      8 mg         8      6 mg         9      8 mg         10      6 mg         11                 12               13               14               15               16               17               18                 19               20               21               22               23               24               25                 26               27               28                    Date Details   No additional details    Date of next INR:  2/10/2017         How to take your warfarin  dose     To take:  6 mg Take three of the 2 mg tablets.    To take:  8 mg Take four of the 2 mg tablets.             Description          Continue same dose and recheck in 2 weeks. ...............Cleo Camarena RN    12/30/2016    9:50 AM    Meds set up for him.                          Anticoagulation Summary as of 1/12/2017     INR goal 2.0-3.0    Today's INR 2.4    Next INR check 2/10/2017          Call your Anticoagulation Clinic at Dept: [phone]   if:   1. Any medications are started, stopped, or there is a change in dose.  2. You experience any bleeding that is not easily stopped or if it is recurrent.  3. You notice an increase in bruising or any bruising that does not heal.  You are scheduled for surgery, colonoscopy, dental extraction or any other procedure where you may need to stop your Coumadin (warfarin).

## 2018-01-03 NOTE — PROGRESS NOTES
Patient Information     Patient Name MRN Sex Nabor Parisi [id number] Male 1935      Progress Notes by Evelyn Manzanares at 2017  8:14 AM     Author:  Evelyn Manzanares Service:  (none) Author Type:  Other Clinical Staff     Filed:  2017  8:14 AM Date of Service:  2017  8:14 AM Status:  Signed     :  Evelyn Manzanares (Other Clinical Staff)            IV Contrast- Discharge Instructions After Your CT Scan      The IV contrast you received today will be filtered from your bloodstream by your kidneys during the next 24 hours and pass from the body in urine.  You will not be aware of this process and your urine will not change in color.  To help this process you should drink at least 4 additional glasses of water or juice today.  This reduces stress on your kidneys.    Most contrast reactions are immediate.  Should you develop symptoms of concern after discharge, contact the department at the number below.  After hours you should contact your personal physician.  If you develop breathing distress or wheezing, call 911.

## 2018-01-03 NOTE — OR POSTOP
Patient Information     Patient Name MRN Sex Nabor Parisi [id number] Male 1935      OR PostOp by Nirmala Delvalle RN at 2017  1:14 PM     Author:  Nirmala Delvalle RN Service:  (none) Author Type:  NURS- Registered Nurse     Filed:  2017  1:15 PM Date of Service:  2017  1:14 PM Status:  Signed     :  Nirmala Delvalle RN (NURS- Registered Nurse)            Discharge Note    Data:  Nabor Cunningham has been discharged home at 1313 via ambulatory accompanied by Registered Nurse.      Action:  Written discharge/follow-up instructions were provided to patient. Prescriptions were sent to patients pharmacy.  Belongings sent with patient. Medications from home sent with patient/family: Not Applicable  Equipment none .     Response:  Patient verbalized understanding of discharge instructions, reason for discharge, and necessary follow-up appointments.  NIRMALA DELVALLE RN ....................  2017   1:15 PM

## 2018-01-03 NOTE — TELEPHONE ENCOUNTER
Patient Information     Patient Name MRN Sex Nabor Parisi [id number] Male 1935      Telephone Encounter by Katharine Koehler at 2017 12:56 PM     Author:  Katharine Koehler Service:  (none) Author Type:  (none)     Filed:  2017  1:02 PM Encounter Date:  2017 Status:  Signed     :  Katharine Koehler            Notified Son because he thought there may be a bowel prep fpr the upper endoscopy.   Does not need that prep.  Son understands.  Katharine Koehler LPN..........2017  1:02 PM

## 2018-01-03 NOTE — H&P
Patient Information     Patient Name MRN Sex Nabor Parisi [id number] Male 1935      H&P by Marge Gomez DO at 2017 10:49 AM     Author:  Marge Gomez DO Service:  (none) Author Type:  PHYS- Osteopathic     Filed:  2017 10:59 AM Date of Service:  2017 10:49 AM Status:  Signed     :  Marge Gomez DO (PHYS- Osteopathic)            Consultation for GERD     Patient complains of:   Symptoms have been present for approximately  See below.   . Studies performed so far include no.   Bowels are ok- no constipation if takes metamucil.   No n/v.    Week in the morning when he first stands up.    Stomach has been hurting for many months.  Hurts more when he eats.  Epigastric position.  No pain or difficut swallowing.  + weight loss.  No n/v.  No regurg.  Having pain where he had his chest surgery.         Asa use  no  NSAID use:  no  Tobbacco:  no  ETOH:   no  Caffeine: none         Patient needs to be MAC because of: Medical conditions/airway control and   Pain control       Past Medical History      Diagnosis   Date     Anxiety       Atrial fibrillation (HC)  2010     Secondary to alcohol, recurrent 2016      BPH (benign prostatic hyperplasia)       Gout       History of alcohol abuse       History of rectal fissure       repaired      Hyperlipidemia       Hypertension       Insomnia         Past Surgical History       Procedure   Laterality Date     Colonoscopy screening   91,,,     Colonoscopy.  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      Current Outpatient Prescriptions on File Prior to Encounter       Medication  Sig Dispense Refill     albuterol HFA 90 mcg/actuation inhaler Inhale 2 Puffs by mouth 4 times daily if needed. 18 g 0     ascorbic acid, vitamin C, (VITAMIN C) 500 mg tablet Take 2 tablets by mouth once daily.  0     calcium carbonate-vitamin D3 600 mg (1,500 mg)-800 unit chew Take  by mouth.  0     colchicine (COLCRYS, COLCHICINE,) 0.6 mg tablet Take 1 tablet by mouth once daily. 90 tablet 3     cyanocobalamin (VITAMIN B-12) 1,000 mcg tablet Take 1 tablet by mouth once daily. 90 tablet 3     hydrOXYzine pamoate (VISTARIL) 25 mg capsule Take 1 capsule by mouth 4 times daily if needed for Anxiety. 60 capsule 1     metoprolol succinate (TOPROL XL) 25 mg Sustained-Release tablet Take 1 tablet by mouth once daily.  0     multivitamin (MVI) tablet Take 1 tablet by mouth once daily.  0     sertraline (ZOLOFT) 25 mg tablet Take 1 tablet by mouth once daily. 30 tablet 0     temazepam (RESTORIL) 7.5 mg capsule Take 2 capsules by mouth at bedtime if needed for Sleep. 90 capsule 0     warfarin (COUMADIN) 2 mg tablet Take 6 mg x 3 days and 8 mg x 4 days/week or as directed 300 tablet 0       Allergies      Allergen   Reactions     Amoxicillin  Hives     Pcn [Penicillins]  Dizziness and Hypotension     weakness      Trazodone  Hallucinations       Social History     Social History        Marital status:       Spouse name: N/A     Number of children:  N/A     Years of education:  N/A     Occupational History      Not on file.     Social History Main Topics         Smoking status:   Former Smoker     Types:  Cigarettes     Quit date:  1/28/1980     Smokeless tobacco:   Never Used     Alcohol use   No      Comment: former      Drug use:   No     Sexual activity:   Not on file     Other Topics  Concern     Not on file      Social History Narrative     .  3 children.  The  patient lives in [place].  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  His wife is in the nursing home ([facility]) with severe rheumatoid arthritis and Parkinson's.                    Family History       Problem   Relation Age of Onset     Other  Brother      appendicitis age 7         ROS: 4 point ROS neg other than the symptoms noted above in the HPI.      PHYSICAL EXAM  GENERAL APPEARANCE: Alert, healthy appearance, oriented, in no acute distress  SKIN: No hyperpigmentation, vitiligo, or suspicious lesions  HYDRATION: Well hydrated  HEAD, EYES, EARS, NECK, AND THROAT: Head is normocephalic, pupils equal, round, reactive to light and accommodation, ocular movement intact, sclera clear and no jaundice. Dentition intact. No oral erosions  Able to open jaw fully.    NECK: Supple, no lymphadenopathy, no thyromegaly. Trachea midline.  No limitations to range of motion or prior surgery   LUNGS: normal respiration, clear to auscultation  HEART: Regular rate and rhythm, normal heart sounds, no murmur  EXTREMITY: No edema or cyanosis, foot pulses intact  ABDOMEN: soft and non tender  NEURO: CN: Intact.                               Impression:      Patient Active Problem List     Diagnosis  Code     INSOMNIA G47.00     HYPERTENSION I10     HYPERLIPIDEMIA E78.5     GOUT M10.9     Peripheral neuropathy (HC) G62.9     History of alcohol abuse Z87.898     Lower urinary tract symptoms (LUTS) R39.9     Paroxysmal atrial fibrillation (HC) I48.0     Anxiety disorder F41.9     Warfarin anticoagulation Z79.01           Plan:  egd with biopsy      I reviewed with the patient what He could expect during the procedure and post procedure/recovery time. We discussed risks and benefits and alternatives to the procedure. Risks include, but are not limited to: bleeding, infection, pneumonia, blood clots, perforation, and possible laparotomy, MI, CVA, death, aspiration, complications of anesthesia and other  unfortold complications of the procedure. We also, discussed the risk of benefit ratio and at this time I feel benefits outweigh the risks.        Cc:Amadou Salas MD

## 2018-01-03 NOTE — TELEPHONE ENCOUNTER
Patient Information     Patient Name MRN Sex Nabor Parisi [id number] Male 1935      Telephone Encounter by Katharine Koehler at 2017  4:20 PM     Author:  Katharine Koehler Service:  (none) Author Type:  (none)     Filed:  2017  4:20 PM Encounter Date:  2017 Status:  Signed     :  Katharine Koehler            Notified Son and we will call as soon as results come back from pathology.  Katharine Koehler LPN..........2017  4:20 PM

## 2018-01-03 NOTE — PATIENT INSTRUCTIONS
Patient Information     Patient Name MRN Nabor Alves [id number] Male 1935      Patient Instructions by Cleo Camarena RN at 3/10/2017 10:45 AM     Author:  Cleo Camarena RN Service:  (none) Author Type:  NURS- Registered Nurse     Filed:  3/10/2017 10:51 AM Encounter Date:  3/10/2017 Status:  Signed     :  Cleo Camarena RN (NURS- Registered Nurse)            2017 Details    Sun Mon Tue Wed Thu Fri Sat        1               2               3               4                 5               6               7               8               9               10      6 mg   See details      11      8 mg           12      8 mg         13      6 mg         14      8 mg         15      6 mg         16      8 mg         17      6 mg         18      8 mg           19      8 mg         20      6 mg         21      8 mg         22      6 mg         23      8 mg         24      6 mg         25      8 mg           26      8 mg         27      6 mg         28      8 mg         29      6 mg         30      8 mg         31      6 mg           Date Details   03/10 This INR check               How to take your warfarin dose     To take:  6 mg Take three of the 2 mg tablets.    To take:  8 mg Take four of the 2 mg tablets.           2017 Details    Sun Mon Tue Wed Thu Fri Sat           1      8 mg           2      8 mg         3      6 mg         4      8 mg         5      6 mg         6      8 mg         7      6 mg         8                 9               10               11               12               13               14               15                 16               17               18               19               20               21               22                 23               24               25               26               27               28               29                 30                      Date Details   No additional details    Date of next INR:  2017          How to take your warfarin dose     To take:  6 mg Take three of the 2 mg tablets.    To take:  8 mg Take four of the 2 mg tablets.             Description          Continue same Warfarin dose and recheck in 1 month.  Cleo Camarena RN   3/10/2017    10:45 AM      Meds set up for him.                          Anticoagulation Summary as of 3/10/2017     INR goal 2.0-3.0    Today's INR 2.2    Next INR check 4/7/2017          Call your Anticoagulation Clinic at Dept: [phone]   if:   1. Any medications are started, stopped, or there is a change in dose.  2. You experience any bleeding that is not easily stopped or if it is recurrent.  3. You notice an increase in bruising or any bruising that does not heal.  You are scheduled for surgery, colonoscopy, dental extraction or any other procedure where you may need to stop your Coumadin (warfarin).

## 2018-01-03 NOTE — PATIENT INSTRUCTIONS
Patient Information     Patient Name MRN Nabor Alves [id number] Male 1935      Patient Instructions by Cleo Camarena RN at 2/10/2017 11:00 AM     Author:  Cleo Camarena RN Service:  (none) Author Type:  NURS- Registered Nurse     Filed:  2/10/2017 11:44 AM Encounter Date:  2/10/2017 Status:  Signed     :  Cleo Camarena RN (NURS- Registered Nurse)            2017 Details    Sun Mon Tue Wed Thu Fri Sat        1               2               3               4                 5               6               7               8               9               10      6 mg   See details      11      8 mg           12      8 mg         13      6 mg         14      8 mg         15      6 mg         16      8 mg         17      6 mg         18      8 mg           19      8 mg         20      6 mg         21      8 mg         22      6 mg         23      8 mg         24      6 mg         25      8 mg           26      8 mg         27      6 mg         28      8 mg              Date Details   02/10 This INR check               How to take your warfarin dose     To take:  6 mg Take three of the 2 mg tablets.    To take:  8 mg Take four of the 2 mg tablets.           2017 Details    Sun Mon Tue Wed Thu Fri Sat        1      6 mg         2      8 mg         3      6 mg         4                 5               6               7               8               9               10               11                 12               13               14               15               16               17               18                 19               20               21               22               23               24               25                 26               27               28               29               30               31                 Date Details   No additional details    Date of next INR:  3/3/2017         How to take your warfarin dose     To take:  6 mg Take three  of the 2 mg tablets.    To take:  8 mg Take four of the 2 mg tablets.             Description          Continue same dose and recheck in 3 weeks. Cleo Camarena RN    2/10/2017  11:43 AM    Meds set up for him.                          Anticoagulation Summary as of 2/10/2017     INR goal 2.0-3.0    Today's INR 1.9    Next INR check 3/3/2017          Call your Anticoagulation Clinic at Dept: [phone]   if:   1. Any medications are started, stopped, or there is a change in dose.  2. You experience any bleeding that is not easily stopped or if it is recurrent.  3. You notice an increase in bruising or any bruising that does not heal.  You are scheduled for surgery, colonoscopy, dental extraction or any other procedure where you may need to stop your Coumadin (warfarin).

## 2018-01-03 NOTE — TELEPHONE ENCOUNTER
Patient Information     Patient Name MRN Nabor Alves [id number] Male 1935      Telephone Encounter by Aixa Moore at 2017  4:15 PM     Author:  Aixa Moore Service:  (none) Author Type:  (none)     Filed:  2017  4:16 PM Encounter Date:  2017 Status:  Signed     :  Aixa Moore            Patient's son called to schedule EGD. Please call Florencio back to schedule.     Aixa Moore ....................  2017   4:16 PM

## 2018-01-03 NOTE — PROGRESS NOTES
Patient Information     Patient Name MRN Sex Nabor Parisi [id number] Male 1935      Progress Notes by Evelyn Manzanares at 2017  8:14 AM     Author:  Evelyn Manzanares Service:  (none) Author Type:  Other Clinical Staff     Filed:  2017  8:14 AM Date of Service:  2017  8:14 AM Status:  Signed     :  Evelyn Manzanares (Other Clinical Staff)            1.  Has the patient had a previous reaction to IV contrast? No    2.  Does the patient have kidney disease? No    3.  Is the patient on dialysis? No    If YES to any of these questions, exam will be reviewed with a Radiologist before administering contrast.

## 2018-01-03 NOTE — OR ANESTHESIA
"Patient Information     Patient Name MRN Sex Nabor Parisi [id number] Male 1935      OR Anesthesia by Fermin Moss DO at 2017 10:20 AM     Author:  Fermin Moss DO Service:  (none) Author Type:  PHYS- Anesthesiologist     Filed:  2017 10:21 AM Date of Service:  2017 10:20 AM Status:  Signed     :  Fermin Moss DO (PHYS- Anesthesiologist)            ANESTHESIAPREOP    PREANESTHETIC EXAM    Nabor Cunningham is a 81 y.o. male    Visit Vitals       /79     Pulse 59     Temp 97.8  F (36.6  C)     Resp 16     Ht 1.651 m (5' 5\")     Wt 62.6 kg (138 lb)     SpO2 99%     BMI 22.96 kg/m2     Body mass index is 22.96 kg/(m^2).    ALLERGIES    Amoxicillin; Pcn [penicillins]; and Trazodone    PAST MEDICAL HISTORY    Past Medical History      Diagnosis   Date     Anxiety       Atrial fibrillation (HC)  2010     Secondary to alcohol, recurrent 2016      BPH (benign prostatic hyperplasia)       Gout       History of alcohol abuse       History of rectal fissure       repaired      Hyperlipidemia       Hypertension       Insomnia         Patient Active Problem List     Diagnosis  Code     INSOMNIA G47.00     HYPERTENSION I10     HYPERLIPIDEMIA E78.5     GOUT M10.9     Peripheral neuropathy (HC) G62.9     History of alcohol abuse Z87.898     Lower urinary tract symptoms (LUTS) R39.9     Paroxysmal atrial fibrillation (HC) I48.0     Anxiety disorder F41.9     Warfarin anticoagulation Z79.01       Family History       Problem   Relation Age of Onset     Other  Brother      appendicitis age 7         Past Surgical History       Procedure   Laterality Date     Colonoscopy screening   ,,,     Colonoscopy.  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    Prescriptions Prior to Admission       Medication  Sig Dispense Refill     albuterol HFA 90 mcg/actuation inhaler Inhale 2 Puffs by mouth 4 times daily if needed. 18 g 0     ascorbic acid, vitamin C, (VITAMIN C) 500 mg tablet Take 2 tablets by mouth once daily.  0     calcium carbonate-vitamin D3 600 mg (1,500 mg)-800 unit chew Take  by mouth.  0     colchicine (COLCRYS, COLCHICINE,) 0.6 mg tablet Take 1 tablet by mouth once daily. 90 tablet 3     cyanocobalamin (VITAMIN B-12) 1,000 mcg tablet Take 1 tablet by mouth once daily. 90 tablet 3     hydrOXYzine pamoate (VISTARIL) 25 mg capsule Take 1 capsule by mouth 4 times daily if needed for Anxiety. 60 capsule 1     metoprolol succinate (TOPROL XL) 25 mg Sustained-Release tablet Take 1 tablet by mouth once daily.  0     multivitamin (MVI) tablet Take 1 tablet by mouth once daily.  0     sertraline (ZOLOFT) 25 mg tablet Take 1 tablet by mouth once daily. 30 tablet 0     temazepam (RESTORIL) 7.5 mg capsule Take 2 capsules by mouth at bedtime if needed for Sleep. 90 capsule 0     warfarin (COUMADIN) 2 mg tablet Take 6 mg x 3 days and 8 mg x 4 days/week or as directed 300 tablet 0       Recent Labs    No results found for this visit on 01/30/17.    NPO Status Noted:  Yes    Airway Class:  2    ASA Physical Status: 3    Anesthetic Plan: MAC    The risks, benefits, and alternatives of the procedure were discussed.    PHYSICIAN ELECTRONIC SIGNATURE  Fermin EMMANUEL  DO Isa ....................  1/30/2017   10:20 AM

## 2018-01-03 NOTE — TELEPHONE ENCOUNTER
Patient Information     Patient Name MRN Nabor Alves [id number] Male 1935      Telephone Encounter by Jackie Gage RN at 3/3/2017 10:15 AM     Author:  Jackie Gage RN Service:  (none) Author Type:  NURS- Registered Nurse     Filed:  3/3/2017 10:20 AM Encounter Date:  3/3/2017 Status:  Signed     :  Jackie Gage RN (NURS- Registered Nurse)            Call place to son with this information, and he states the patient might stay with his daughter for the weekend.  JACKIE GAGE RN ....................  3/3/2017   10:19 AM

## 2018-01-03 NOTE — TELEPHONE ENCOUNTER
Patient Information     Patient Name MRN Sex Nabor Parisi [id number] Male 1935      Telephone Encounter by Ame Eric at 2017  8:55 AM     Author:  Ame Eric Service:  (none) Author Type:  (none)     Filed:  2017  9:01 AM Encounter Date:  2017 Status:  Signed     :  Ame Eric            Screening Questions for the Scheduling of Screening Colonoscopies   (If Colonoscopy is diagnostic, Provider should review the chart before scheduling.)  Are you younger than 50 or older than 80?  NO   Do you take aspirin or fish oil?  NO (if yes, tell patient to stop 1 week prior to Colonoscopy)  Do you take warfarin (Coumadin), clopidogrel (Plavix), apixaban (Eliquis), dabigatram (Pradaxa), rivaroxaban (Xarelto) or any blood thinner? WARFARIN  PATIENT WILL HOLD 2 DAYS PRIOR PER DR PRADO   Do you use oxygen at home?  NO  Do you have kidney disease? NO  Are you on dialysis? NO  Have you had a stroke or heart attack in the last year? NO  Have you had a stent in your heart or any blood vessel in the last year? NO  Have you had a transplant of any organ? NO  Have you had a colonoscopy or upper endoscopy (EGD) before?  NO          When?  NO  Date of scheduled EGD   2017  Provider Elyssafregori   Pharmacy

## 2018-01-03 NOTE — TELEPHONE ENCOUNTER
Patient Information     Patient Name MRN Sex Nabor Parisi [id number] Male 1935      Telephone Encounter by Ame Eric at 2017 11:47 AM     Author:  Ame Eric Service:  (none) Author Type:  (none)     Filed:  2017 11:48 AM Encounter Date:  2017 Status:  Signed     :  Ame Erci            Patient referred by Dr. Salas for a EGD ,  Diagnosis is Epigastric pain .  Please advise .  Thanks

## 2018-01-03 NOTE — NURSING NOTE
Patient Information     Patient Name MRN Nabor Alves [id number] Male 1935      Nursing Note by Lindsey Henson LPN at 3/10/2017 11:00 AM     Author:  Lindsey Henson LPN Service:  (none) Author Type:  NURS- Licensed Practical Nurse     Filed:  3/10/2017 11:01 AM Encounter Date:  3/10/2017 Status:  Signed     :  Lindsey Henson LPN (NURS- Licensed Practical Nurse)            The patient is here today to have a three month follow up.  Lindsey Henson LPN......3/10/2017  10:54 AM

## 2018-01-03 NOTE — TELEPHONE ENCOUNTER
Patient Information     Patient Name MRN Nabor Alves [id number] Male 1935      Telephone Encounter by Lindsey Henson LPN at 1/10/2017 10:36 AM     Author:  Lindsey Henson LPN Service:  (none) Author Type:  NURS- Licensed Practical Nurse     Filed:  1/10/2017 10:37 AM Encounter Date:  1/10/2017 Status:  Signed     :  Lindsey Henson LPN (NURS- Licensed Practical Nurse)            Contacted the patients son shar and gave him the information below.  Lindsey Henson LPN......1/10/2017  10:37 AM

## 2018-01-03 NOTE — TELEPHONE ENCOUNTER
Patient Information     Patient Name MRN Sex Nabor Parisi [id number] Male 1935      Telephone Encounter by Lindsey Henson LPN at 2017  1:52 PM     Author:  Lindsey Henson LPN Service:  (none) Author Type:  NURS- Licensed Practical Nurse     Filed:  2017  1:54 PM Encounter Date:  2017 Status:  Signed     :  Lindsey Henson LPN (NURS- Licensed Practical Nurse)            Contacted the patients son and gave him the information below about starting the warfarin. The patients son is also wondering if there were any ulcers, polyps or abnormalities found during his endoscopy today.  Lindsey Henson LPN......2017  1:54 PM

## 2018-01-03 NOTE — TELEPHONE ENCOUNTER
Patient Information     Patient Name MRN Sex Nabor Parisi [id number] Male 1935      Telephone Encounter by Lindsey Henson LPN at 2017  1:28 PM     Author:  Lindsey Henson LPN Service:  (none) Author Type:  NURS- Licensed Practical Nurse     Filed:  2017  1:29 PM Encounter Date:  2017 Status:  Signed     :  Lindsey Henson LPN (NURS- Licensed Practical Nurse)            The patients son is wondering if the patient can start back on his warfarin after his endoscopy today?  Lindsey Henson LPN......2017  1:29 PM

## 2018-01-03 NOTE — TELEPHONE ENCOUNTER
Patient Information     Patient Name MRN Sex Nabor Parisi [id number] Male 1935      Telephone Encounter by Lindsey Henson LPN at 2017 11:18 AM     Author:  Lindsey Henson LPN Service:  (none) Author Type:  NURS- Licensed Practical Nurse     Filed:  2017 11:21 AM Encounter Date:  2017 Status:  Signed     :  Lindsey Henson LPN (NURS- Licensed Practical Nurse)            Spoke to the son and gave him the information below. The patients son shar stated he has spoken with the patient and they would like to continue with the endoscopy.  Lindsey Henson LPN......2017  11:20 AM

## 2018-01-04 NOTE — TELEPHONE ENCOUNTER
Patient Information     Patient Name MRN Sex Nabor Parisi [id number] Male 1935      Telephone Encounter by Tri Malagon at 3/29/2017  9:58 AM     Author:  Tri Malagon Service:  (none) Author Type:  (none)     Filed:  3/29/2017 10:01 AM Encounter Date:  3/29/2017 Status:  Signed     :  Tri Malagon            Patient has been Colchicine for years for gout.  His insurance formulary dropped it and it's too expensive to pay cash.  Are there other alternatives he could use?      He is in the process of moving to [place] and has not found a new physician yet.    Can you send something else in?    Tri Malagon LPN....................3/29/2017 10:01 AM

## 2018-01-04 NOTE — TELEPHONE ENCOUNTER
Patient Information     Patient Name MRN Sex Nabor Parisi [id number] Male 1935      Telephone Encounter by Lindsey Henson LPN at 3/31/2017  2:09 PM     Author:  Lindsey Henson LPN Service:  (none) Author Type:  NURS- Licensed Practical Nurse     Filed:  3/31/2017  2:16 PM Encounter Date:  3/31/2017 Status:  Signed     :  Lindsey Henson LPN (NURS- Licensed Practical Nurse)            gabrielko called and wanted to make sure the allopurinol was ordered to replace the colchicine since it was not covered by the patients insurance.  Lindsey Henson LPN......3/31/2017  2:16 PM

## 2018-01-05 NOTE — TELEPHONE ENCOUNTER
Patient Information     Patient Name MRN Sex Nabor Parisi [id number] Male 1935      Telephone Encounter by Mattie Mendoza RN at 2017  3:00 PM     Author:  Mattie Mendoza RN Service:  (none) Author Type:  NURS- Registered Nurse     Filed:  2017  3:01 PM Encounter Date:  2017 Status:  Signed     :  Mattie Mendoza RN (NURS- Registered Nurse)            temazepam (RESTORIL) 30 mg capsule  TAKE ONE CAPSULE BY MOUTH NIGHTLY AT BEDTIME AS NEEDED FOR SLEEP  Disp: 90 capsule (Pharmacy requested 90)     Refills: 0     Class: eRx Start: 2017    For: Insomnia, unspecified type  Documented:2 months ago  Last refill:3/12/2017  To be filled at: [id number] [address] [phone]    Last visit with PRAKASH PRADO was on: 03/10/2017 in [facility]  PCP:  Prakash Prado MD     Unable to complete prescription refill per RN Medication Refill Policy.................... MATTIE MENDOZA RN ....................  2017   3:00 PM

## 2018-01-23 ENCOUNTER — TELEPHONE (OUTPATIENT)
Dept: FAMILY MEDICINE | Facility: CLINIC | Age: 83
End: 2018-01-23

## 2018-01-25 VITALS
HEART RATE: 72 BPM | BODY MASS INDEX: 23.27 KG/M2 | DIASTOLIC BLOOD PRESSURE: 68 MMHG | SYSTOLIC BLOOD PRESSURE: 102 MMHG | WEIGHT: 138 LBS

## 2018-01-25 VITALS — SYSTOLIC BLOOD PRESSURE: 118 MMHG | HEART RATE: 68 BPM | DIASTOLIC BLOOD PRESSURE: 80 MMHG

## 2018-01-25 VITALS
TEMPERATURE: 98.2 F | BODY MASS INDEX: 23.3 KG/M2 | SYSTOLIC BLOOD PRESSURE: 104 MMHG | DIASTOLIC BLOOD PRESSURE: 60 MMHG | WEIGHT: 140 LBS

## 2018-01-28 DIAGNOSIS — M1A.9XX0 CHRONIC GOUT, UNSPECIFIED CAUSE, UNSPECIFIED SITE: ICD-10-CM

## 2018-01-28 RX ORDER — COLCHICINE 0.6 MG/1
TABLET ORAL
Qty: 30 TABLET | Refills: 0 | Status: CANCELLED | OUTPATIENT
Start: 2018-01-28

## 2018-01-29 ENCOUNTER — ANTICOAGULATION THERAPY VISIT (OUTPATIENT)
Dept: ANTICOAGULATION | Facility: CLINIC | Age: 83
End: 2018-01-29
Payer: MEDICARE

## 2018-01-29 ENCOUNTER — ALLIED HEALTH/NURSE VISIT (OUTPATIENT)
Dept: FAMILY MEDICINE | Facility: CLINIC | Age: 83
End: 2018-01-29
Payer: MEDICARE

## 2018-01-29 DIAGNOSIS — M1A.9XX0 CHRONIC GOUT, UNSPECIFIED CAUSE, UNSPECIFIED SITE: ICD-10-CM

## 2018-01-29 DIAGNOSIS — G47.00 PERSISTENT INSOMNIA: ICD-10-CM

## 2018-01-29 DIAGNOSIS — Z79.01 CHRONIC ANTICOAGULATION: ICD-10-CM

## 2018-01-29 DIAGNOSIS — Z79.01 LONG-TERM (CURRENT) USE OF ANTICOAGULANTS: ICD-10-CM

## 2018-01-29 DIAGNOSIS — F41.9 ANXIETY: ICD-10-CM

## 2018-01-29 DIAGNOSIS — I48.20 CHRONIC ATRIAL FIBRILLATION (H): ICD-10-CM

## 2018-01-29 LAB — INR POINT OF CARE: 2.9 (ref 0.86–1.14)

## 2018-01-29 PROCEDURE — 85610 PROTHROMBIN TIME: CPT | Mod: QW

## 2018-01-29 PROCEDURE — 99207 ZZC NO CHARGE NURSE ONLY: CPT

## 2018-01-29 PROCEDURE — 36416 COLLJ CAPILLARY BLOOD SPEC: CPT

## 2018-01-29 RX ORDER — WARFARIN SODIUM 2 MG/1
TABLET ORAL
Qty: 300 TABLET | Refills: 0 | Status: SHIPPED | OUTPATIENT
Start: 2018-01-29 | End: 2018-05-20

## 2018-01-29 RX ORDER — COLCHICINE 0.6 MG/1
0.6 TABLET ORAL DAILY
Qty: 30 TABLET | Refills: 0 | Status: SHIPPED | OUTPATIENT
Start: 2018-01-29 | End: 2018-02-08

## 2018-01-29 RX ORDER — TEMAZEPAM 30 MG
30 CAPSULE ORAL
Qty: 30 CAPSULE | Refills: 0 | Status: SHIPPED | OUTPATIENT
Start: 2018-01-29 | End: 2018-02-08

## 2018-01-29 NOTE — TELEPHONE ENCOUNTER
"Requested Prescriptions   Pending Prescriptions Disp Refills     nortriptyline (PAMELOR) 25 MG capsule [Pharmacy Med Name: NORTRIPTYLIN 25 MG  CAP TARO] 60 capsule 0     Sig: TAKE TWO CAPSULES BY MOUTH DAILY AT BEDTIME    Tricyclic Antidepressants Protocol Passed    1/29/2018  8:56 AM       Passed - Blood pressure under 140/90    BP Readings from Last 3 Encounters:   11/10/17 132/58   07/17/17 122/64   05/12/17 100/64                Passed - Recent (12 mo) or future (30 d) visit with authorizing provider's specialty     Patient had office visit in the last year or has a visit in the next 30 days with authorizing provider.  See \"Patient Info\" tab in inbasket, or \"Choose Columns\" in Meds & Orders section of the refill encounter.     Last Written Prescription Date:  1/3/18  Last Fill Quantity: 60,  # refills: 0   Last Office Visit with [facility] provider:  11/16/17   Future Office Visit:                Passed - Patient is age 18 or older        temazepam (RESTORIL) 30 MG capsule [Pharmacy Med Name: TEMAZEPAM 30 MG     CAP ASCE] 30 capsule 4     Sig: TAKE 1 CAP (30 MG)BY MOUTH NIGHTLY AS NEEDED FOR SLEEP, FILL NO MORE THAN ONCE MONTHLY    There is no refill protocol information for this order      Last Written Prescription Date:  7/17/17  Last Fill Quantity: 30,  # refills: 5   Last Office Visit with [facility] provider:  11/16/17   Future Office Visit:         "

## 2018-01-29 NOTE — TELEPHONE ENCOUNTER
Last seen 7-17-17 by Dr aMyo.  This is the plan:     ASSESSMENT:   (G47.00) Persistent insomnia  (primary encounter diagnosis)  Comment: He has been taking temazepam 30mg every for months.  It has been working well.  He would like to stay on the same dose .  The 15mg pills did not help.   Plan: temazepam (RESTORIL) 30 MG capsule        REfills.  No change in current treatment plan.      (F41.9) Anxiety  Comment: improved.  Still an issue.  He would like to continue the same nortriptyline.  Plan: We can increase nortriptyline if you feel the anxiety is causing more problems.      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   Plan: No change in current treatment plan.      Recheck in 3 months.     Needs refill today.  I talked to his daughter, Lexy and told her that he is due to see Dr Maria Esther Perez RN

## 2018-01-29 NOTE — PROGRESS NOTES
ANTICOAGULATION FOLLOW-UP CLINIC VISIT    Patient Name:  Nabor Cunningham  Date:  1/29/2018  Contact Type:  Face to Face  Accompanied by daughter    SUBJECTIVE:     Patient Findings     Positives No Problem Findings           OBJECTIVE    INR Protime   Date Value Ref Range Status   01/29/2018 2.9 (A) 0.86 - 1.14 Final       ASSESSMENT / PLAN  INR assessment THER    Recheck INR In: 8 WEEKS    INR Location Clinic      Anticoagulation Summary as of 1/29/2018     INR goal 2.0-3.0   Today's INR 2.9   Maintenance plan 6 mg (2 mg x 3) on Mon, Wed, Fri; 8 mg (2 mg x 4) all other days   Full instructions 6 mg on Mon, Wed, Fri; 8 mg all other days   Weekly total 50 mg   No change documented Swathi Antonio RN   Plan last modified Swathi Antonio RN (4/6/2017)   Next INR check 3/19/2018   Target end date Indefinite    Indications   Chronic anticoagulation [Z79.01]  Chronic atrial fibrillation (H) [I48.2]  Long-term (current) use of anticoagulants [Z79.01] [Z79.01]         Anticoagulation Episode Summary     INR check location     Preferred lab     Send INR reminders to [facility]    Comments * 2mg tabs      Anticoagulation Care Providers     Provider Role Specialty Phone number    Heron Mayo MD [facility] [phone]            See the Encounter Report to view Anticoagulation Flowsheet and Dosing Calendar (Go to Encounters tab in chart review, and find the Anticoagulation Therapy Visit)    Patient advised to continue the same warfarin maintenance dose, INR therapeutic.      Swathi Antonio RN                no

## 2018-01-29 NOTE — MR AVS SNAPSHOT
"              After Visit Summary   1/29/2018    Nabor Cunningham    MRN: [id number]           Patient Information     Date Of Birth          1935        Visit Information        Provider Department      1/29/2018 2:30 PM FL NB RN [facility]        Today's Diagnoses     Chronic gout, unspecified cause, unspecified site           Follow-ups after your visit        Your next 10 appointments already scheduled     Feb 08, 2018  2:00 PM CST   SHORT with Heron Mayo MD   [facility]    [address] [place] [address] [phone]            Mar 19, 2018  2:00 PM CDT   Anticoagulation Visit with NB ANTI COAG   [facility]    [address] [place] [address] [phone]              Who to contact     If you have questions or need follow up information about today's clinic visit or your schedule please contact [facility] directly at [phone].  Normal or non-critical lab and imaging results will be communicated to you by FRM Study Coursehart, letter or phone within 4 business days after the clinic has received the results. If you do not hear from us within 7 days, please contact the clinic through HiringSolved[employer] or phone. If you have a critical or abnormal lab result, we will notify you by phone as soon as possible.  Submit refill requests through [employer] or call your pharmacy and they will forward the refill request to us. Please allow 3 business days for your refill to be completed.          Additional Information About Your Visit        FRM Study Coursehart Information     [employer] lets you send messages to your doctor, view your test results, renew your prescriptions, schedule appointments and more. To sign up, go to [email or web address] . Click on \"Log in\" on the left side of the screen, which will take you to the Welcome page. Then click on \"Sign up Now\" on the right " side of the page.     You will be asked to enter the access code listed below, as well as some personal information. Please follow the directions to create your username and password.     Your access code is: [email or web address]  Expires: 2018  2:56 PM     Your access code will  in 90 days. If you need help or a new code, please call your [place] clinic or [phone].        Care EveryWhere ID     This is your Care EveryWhere ID. This could be used by other organizations to access your [place] medical records  [id number]         Blood Pressure from Last 3 Encounters:   11/10/17 132/58   17 122/64   17 100/64    Weight from Last 3 Encounters:   11/10/17 156 lb (70.8 kg)   17 149 lb 3.2 oz (67.7 kg)   17 145 lb (65.8 kg)              Today, you had the following     No orders found for display         Today's Medication Changes          These changes are accurate as of 18  2:56 PM.  If you have any questions, ask your nurse or doctor.               These medicines have changed or have updated prescriptions.        Dose/Directions    colchicine 0.6 MG tablet   This may have changed:  See the new instructions.   Used for:  Chronic gout, unspecified cause, unspecified site        Dose:  0.6 mg   Take 1 tablet (0.6 mg) by mouth daily   Quantity:  30 tablet   Refills:  0            Where to get your medicines      These medications were sent to [facility][id number] - [facility] [address] [facility]  [address] [facility] [address]    Hours:  Closed 10-16-08 business to [facility] Phone:  [phone]     warfarin 2 MG tablet         These medications were sent to [facility] [address] [place], [place] [address] [facility] AT [facility] OF [address] [place], [place] [address] [facility] [address]     Phone:  [phone]     colchicine 0.6 MG tablet         Some of these will need a paper prescription and others can be bought over the counter.  Ask your nurse if  you have questions.     Bring a paper prescription for each of these medications     temazepam 30 MG capsule                Primary Care Provider Office Phone # Fax #    Heron Mayo [phone] [address] [place] [address]        Equal Access to Services     SHIRA DELEON : Hadii kenzie woodard jonathano Sojuniali, waaxda luqadaha, qaybta kaalmada adeegyada, lili amada douglasnaveed loredo issacmarialuisa valerio. So [facility] [phone].    ATENCIÓN: Si habla español, tiene a garcia disposición servicios gratuitos de asistencia lingüística. Llame al [phone].    We comply with applicable federal civil rights laws and [place] laws. We do not discriminate on the basis of race, color, national origin, age, disability, sex, sexual orientation, or gender identity.            Thank you!     Thank you for choosing [facility]  for your care. Our goal is always to provide you with excellent care. Hearing back from our patients is one way we can continue to improve our services. Please take a few minutes to complete the written survey that you may receive in the mail after your visit with us. Thank you!             Your Updated Medication List - Protect others around you: Learn how to safely use, store and throw away your medicines at www.disposemymeds.org.          This list is accurate as of 1/29/18  2:56 PM.  Always use your most recent med list.                   Brand Name Dispense Instructions for use Diagnosis    B-12 1000 MCG Tbcr     100 tablet    Take 1,000 mcg by mouth daily    Vitamin B12 deficiency (non anemic)       calcium carbonate 1250 MG tablet    OS- mg [place]. Ca     Take 1 tablet by mouth 2 times daily        colchicine 0.6 MG tablet     30 tablet    Take 1 tablet (0.6 mg) by mouth daily    Chronic gout, unspecified cause, unspecified site       HYDROcodone-acetaminophen 5-325 MG per tablet    NORCO    12 tablet    Take 1 tablet by mouth every 6 hours as needed for  moderate to severe pain maximum 4 tablet(s) per day    Acute gouty arthritis       metoprolol succinate 25 MG 24 hr tablet    TOPROL-XL    90 tablet    Take 1 tablet (25 mg) by mouth daily        Multi-vitamin Tabs tablet     100 tablet    Take 1 tablet by mouth daily        nortriptyline 25 MG capsule    PAMELOR    60 capsule    TAKE TWO CAPSULES BY MOUTH DAILY AT BEDTIME    Anxiety       order for DME     1 Units    True pull Knee Brace Left    Chronic pain of left knee       temazepam 30 MG capsule    RESTORIL    30 capsule    Take 1 capsule (30 mg) by mouth nightly as needed for sleep    Persistent insomnia       warfarin 2 MG tablet    COUMADIN    300 tablet    Take 6 mg on Mon, Wed, Fri; 8 mg all other days or as directed by Anticoagulation Clinic    Chronic atrial fibrillation (H)

## 2018-01-29 NOTE — TELEPHONE ENCOUNTER
"Pt's daughter says he need his Temazepam today.  [facility]    Requested Prescriptions   Pending Prescriptions Disp Refills     colchicine 0.6 MG tablet [Pharmacy Med Name: COLCHICINE 0.6MG TABLETS] 30 tablet 0     Sig: TAKE 1 TABLET BY MOUTH EVERY DAY    Gout Agents Protocol Failed    1/29/2018  8:19 AM       Failed - Uric acid greater than or equal to 6 on file in past 12 months    No lab results found.  If level is 6mg/dL or greater, ok to refill one time and refer to provider.          Passed - CBC on file in past 12 months    Recent Labs   Lab Test  03/22/17   1240   WBC  6.1   RBC  4.92   HGB  14.1   HCT  42.7   PLT  128*            Passed - ALT on file in past 12 months    Recent Labs   Lab Test  03/22/17   1240   ALT  56            Passed - Recent or future visit with authorizing provider's specialty    Patient had office visit in the last year or has a visit in the next 30 days with authorizing provider.  See \"Patient Info\" tab in inbasket, or \"Choose Columns\" in Meds & Orders section of the refill encounter.            Passed - Patient is age 18 or older       Passed - Normal serum creatinine on file in the past 12 months    Recent Labs   Lab Test  03/22/17   1240   CR  0.70             Last Written Prescription Date:  7/17/17  Last Fill Quantity: 30,  # refills: 5   Last Office Visit with [facility] provider:  11/10/17   Future Office Visit:       "

## 2018-01-30 RX ORDER — NORTRIPTYLINE HCL 25 MG
CAPSULE ORAL
Qty: 60 CAPSULE | Refills: 0 | Status: SHIPPED | OUTPATIENT
Start: 2018-01-30 | End: 2018-02-08

## 2018-01-30 RX ORDER — TEMAZEPAM 30 MG
CAPSULE ORAL
Qty: 30 CAPSULE | Refills: 4 | OUTPATIENT
Start: 2018-01-30

## 2018-01-31 ASSESSMENT — PATIENT HEALTH QUESTIONNAIRE - PHQ9
SUM OF ALL RESPONSES TO PHQ QUESTIONS 1-9: 17
SUM OF ALL RESPONSES TO PHQ QUESTIONS 1-9: 15

## 2018-01-31 ASSESSMENT — ANXIETY QUESTIONNAIRES: GAD7 TOTAL SCORE: 19

## 2018-02-08 ENCOUNTER — OFFICE VISIT (OUTPATIENT)
Dept: FAMILY MEDICINE | Facility: CLINIC | Age: 83
End: 2018-02-08
Payer: MEDICARE

## 2018-02-08 VITALS
SYSTOLIC BLOOD PRESSURE: 110 MMHG | DIASTOLIC BLOOD PRESSURE: 64 MMHG | TEMPERATURE: 97.6 F | BODY MASS INDEX: 27.64 KG/M2 | HEIGHT: 63 IN | RESPIRATION RATE: 18 BRPM | WEIGHT: 156 LBS | HEART RATE: 72 BPM

## 2018-02-08 DIAGNOSIS — G62.9 PERIPHERAL POLYNEUROPATHY: ICD-10-CM

## 2018-02-08 DIAGNOSIS — F41.9 ANXIETY: Primary | ICD-10-CM

## 2018-02-08 DIAGNOSIS — G47.00 PERSISTENT INSOMNIA: ICD-10-CM

## 2018-02-08 DIAGNOSIS — M1A.9XX0 CHRONIC GOUT, UNSPECIFIED CAUSE, UNSPECIFIED SITE: ICD-10-CM

## 2018-02-08 PROCEDURE — 99214 OFFICE O/P EST MOD 30 MIN: CPT | Performed by: FAMILY MEDICINE

## 2018-02-08 RX ORDER — NORTRIPTYLINE HCL 25 MG
CAPSULE ORAL
Qty: 60 CAPSULE | Refills: 11 | Status: SHIPPED | OUTPATIENT
Start: 2018-02-08 | End: 2018-05-24

## 2018-02-08 RX ORDER — COLCHICINE 0.6 MG/1
0.6 TABLET ORAL DAILY
Qty: 30 TABLET | Refills: 11 | Status: SHIPPED | OUTPATIENT
Start: 2018-02-08 | End: 2018-07-24

## 2018-02-08 RX ORDER — TEMAZEPAM 30 MG
30 CAPSULE ORAL
Qty: 30 CAPSULE | Refills: 5 | Status: SHIPPED | OUTPATIENT
Start: 2018-02-28 | End: 2018-02-25

## 2018-02-08 NOTE — MR AVS SNAPSHOT
After Visit Summary   2/8/2018    Nabor Cunningham    MRN: [id number]           Patient Information     Date Of Birth          1935        Visit Information        Provider Department      2/8/2018 2:00 PM Heron Mayo MD [facility]        Today's Diagnoses     Anxiety    -  1    Persistent insomnia        Chronic gout, unspecified cause, unspecified site        Peripheral polyneuropathy          Care Instructions    ASSESSMENT:   (F41.9) Anxiety  (primary encounter diagnosis)  Comment: doing oK.  Plan: nortriptyline (PAMELOR) 25 MG capsule        REfills. . No change in current treatment plan.     (G47.00) Persistent insomnia  Comment:   Plan: temazepam (RESTORIL) 30 MG capsule        REfills.     (M1A.9XX0) Chronic gout, unspecified cause, unspecified site  Comment: has been doing well on the colchicine   Plan: colchicine 0.6 MG tablet        REfills.     (G62.9) Peripheral polyneuropathy  Comment: impaired sensation in both feet from nerve damage          Follow-ups after your visit        Your next 10 appointments already scheduled     Mar 19, 2018  2:00 PM CDT   Anticoagulation Visit with NB ANTI COAG   [facility]    [address] [place] [address] [phone]              Who to contact     If you have questions or need follow up information about today's clinic visit or your schedule please contact [facility] directly at [phone].  Normal or non-critical lab and imaging results will be communicated to you by MyChart, letter or phone within 4 business days after the clinic has received the results. If you do not hear from us within 7 days, please contact the clinic through MyChart or phone. If you have a critical or abnormal lab result, we will notify you by phone as soon as possible.  Submit refill requests through [employer] or call your pharmacy and they will forward the refill  "request to us. Please allow 3 business days for your refill to be completed.          Additional Information About Your Visit        MyChart Information     [employer] lets you send messages to your doctor, view your test results, renew your prescriptions, schedule appointments and more. To sign up, go to [email or web address] . Click on \"Log in\" on the left side of the screen, which will take you to the Welcome page. Then click on \"Sign up Now\" on the right side of the page.     You will be asked to enter the access code listed below, as well as some personal information. Please follow the directions to create your username and password.     Your access code is: [email or web address]  Expires: 2018  2:56 PM     Your access code will  in 90 days. If you need help or a new code, please call your [place] clinic or [phone].        Care EveryWhere ID     This is your Care EveryWhere ID. This could be used by other organizations to access your [place] medical records  [id number]        Your Vitals Were     Pulse Temperature Respirations Height BMI (Body Mass Index)       72 97.6  F (36.4  C) (Tympanic) 18 5' 3.25\" (1.607 m) 27.42 kg/m2        Blood Pressure from Last 3 Encounters:   18 110/64   11/10/17 132/58   17 122/64    Weight from Last 3 Encounters:   18 156 lb (70.8 kg)   11/10/17 156 lb (70.8 kg)   17 149 lb 3.2 oz (67.7 kg)              Today, you had the following     No orders found for display         Today's Medication Changes          These changes are accurate as of 18  3:01 PM.  If you have any questions, ask your nurse or doctor.               These medicines have changed or have updated prescriptions.        Dose/Directions    nortriptyline 25 MG capsule   Commonly known as:  PAMELOR   This may have changed:  See the new instructions.   Used for:  Anxiety   Changed by:  Heron Mayo MD        TAKE TWO CAPSULES BY MOUTH DAILY AT BEDTIME   Quantity:  60 capsule "   Refills:  11            Where to get your medicines      These medications were sent to [facility] #[id number] - [place], [place] - [address]  [address] [place] [address]    Hours:  Closed 10-16-08 business to [facility] Phone:  [phone]     nortriptyline 25 MG capsule         Some of these will need a paper prescription and others can be bought over the counter.  Ask your nurse if you have questions.     Bring a paper prescription for each of these medications     colchicine 0.6 MG tablet    temazepam 30 MG capsule                Primary Care Provider Office Phone # Fax #    Amadou Salas -[phone] [address]  [facility] [address]        Equal Access to Services     SHIRA DELEON : Hadmarisela castilloo Sowojciech, waaxda luqadaha, qaybta kaalmada facundoyabogdan, lili hernandez . So [facility] [phone].    ATENCIÓN: Si habla español, tiene a garcia disposición servicios gratuitos de asistencia lingüística. [facility] [phone].    We comply with applicable federal civil rights laws and [place] laws. We do not discriminate on the basis of race, color, national origin, age, disability, sex, sexual orientation, or gender identity.            Thank you!     Thank you for choosing [facility]  for your care. Our goal is always to provide you with excellent care. Hearing back from our patients is one way we can continue to improve our services. Please take a few minutes to complete the written survey that you may receive in the mail after your visit with us. Thank you!             Your Updated Medication List - Protect others around you: Learn how to safely use, store and throw away your medicines at www.disposemymeds.org.          This list is accurate as of 2/8/18  3:01 PM.  Always use your most recent med list.                   Brand Name Dispense Instructions for use Diagnosis    B-12 1000 MCG Tbcr     100 tablet     Take 1,000 mcg by mouth daily    Vitamin B12 deficiency (non anemic)       calcium carbonate 1250 MG tablet    OS- mg [place]. Ca     Take 1 tablet by mouth 2 times daily        colchicine 0.6 MG tablet     30 tablet    Take 1 tablet (0.6 mg) by mouth daily    Chronic gout, unspecified cause, unspecified site       metoprolol succinate 25 MG 24 hr tablet    TOPROL-XL    90 tablet    Take 1 tablet (25 mg) by mouth daily        Multi-vitamin Tabs tablet     100 tablet    Take 1 tablet by mouth daily        nortriptyline 25 MG capsule    PAMELOR    60 capsule    TAKE TWO CAPSULES BY MOUTH DAILY AT BEDTIME    Anxiety       order for DME     1 Units    True pull Knee Brace Left    Chronic pain of left knee       temazepam 30 MG capsule   Start taking on:  2/28/2018    RESTORIL    30 capsule    Take 1 capsule (30 mg) by mouth nightly as needed for sleep    Persistent insomnia       warfarin 2 MG tablet    COUMADIN    300 tablet    Take 6 mg on Mon, Wed, Fri; 8 mg all other days or as directed by Anticoagulation Clinic    Chronic atrial fibrillation (H)

## 2018-02-08 NOTE — NURSING NOTE
"Chief Complaint   Patient presents with     Sleep Problem     Needs refill of medication     Arthritis     Needs refill of Gout       Initial /64  Pulse 72  Temp 97.6  F (36.4  C) (Tympanic)  Resp 18  Ht 5' 3.25\" (1.607 m)  Wt 156 lb (70.8 kg)  BMI 27.42 kg/m2 Estimated body mass index is 27.42 kg/(m^2) as calculated from the following:    Height as of this encounter: 5' 3.25\" (1.607 m).    Weight as of this encounter: 156 lb (70.8 kg).      Health Maintenance that is potentially due pending provider review:          Is there anyone who you would like to be able to receive your results?   If yes have patient fill out [facility]    "

## 2018-02-08 NOTE — PROGRESS NOTES
"  SUBJECTIVE:   Nabor Cunningham is a 82 year old male who presents to clinic today for the following health issues:  Chief Complaint   Patient presents with     Sleep Problem     Needs refill of medication     Arthritis     Needs refill of Gout      Medication Followup of Gout and Sleep    Taking Medication as prescribed: yes    Side Effects:  None    Medication Helping Symptoms:  yes       Some tingling in feet.  No pain.  Leg or foot jumps a times.   He gets toenails trimmed.   Living in [facility].     Mood OK.  Anxiety more often in the evening.   Poor appetite in the evening.  Weight is up.   Sleeping OK.   No gout spells.  He has been on colchicine.\  Left knee pain once in a while.  Limps.      No gout episodes.  Has been doing well on colchicine and can afford right now.     No tobacco.   Alcohol:little.     Patient Active Problem List   Diagnosis     Anxiety     Alcohol abuse     Chronic atrial fibrillation (H)     Weight loss     Peripheral polyneuropathy     Chronic gout, unspecified cause, unspecified site     Essential hypertension with goal blood pressure less than 140/90     Hyperlipidemia LDL goal <130     Persistent insomnia     Lower urinary tract symptoms (LUTS)     Chronic anticoagulation     Long-term (current) use of anticoagulants [Z79.01]     Moderate episode of recurrent major depressive disorder (H)      OBJECTIVE:Blood pressure 110/64, pulse 72, temperature 97.6  F (36.4  C), temperature source Tympanic, resp. rate 18, height 5' 3.25\" (1.607 m), weight 156 lb (70.8 kg). BMI=Body mass index is 27.42 kg/(m^2).  GENERAL APPEARANCE ADULT: Alert, no acute distress, anxious  NECK: No adenopathy,masses or thyromegaly  CV: normal rate, regular rhythm, no murmur or gallop  MS: HE has no edema right and 1+ right.    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    Heberden's nodes on finger.      ASSESSMENT:   (F41.9) Anxiety  (primary encounter diagnosis)  Comment: doing oK.  Plan: nortriptyline (PAMELOR) 25 " MG capsule        REfills. . No change in current treatment plan.     (G47.00) Persistent insomnia  Comment:   Plan: temazepam (RESTORIL) 30 MG capsule        REfills.     (M1A.9XX0) Chronic gout, unspecified cause, unspecified site  Comment: has been doing well on the colchicine   Plan: colchicine 0.6 MG tablet        REfills.     (G62.9) Peripheral polyneuropathy  Comment: impaired sensation in both feet from nerve damage

## 2018-02-08 NOTE — PATIENT INSTRUCTIONS
ASSESSMENT:   (F41.9) Anxiety  (primary encounter diagnosis)  Comment: doing oK.  Plan: nortriptyline (PAMELOR) 25 MG capsule        REfills. . No change in current treatment plan.     (G47.00) Persistent insomnia  Comment:   Plan: temazepam (RESTORIL) 30 MG capsule        REfills.     (M1A.9XX0) Chronic gout, unspecified cause, unspecified site  Comment: has been doing well on the colchicine   Plan: colchicine 0.6 MG tablet        REfills.     (G62.9) Peripheral polyneuropathy  Comment: impaired sensation in both feet from nerve damage

## 2018-02-25 DIAGNOSIS — M1A.9XX0 CHRONIC GOUT, UNSPECIFIED CAUSE, UNSPECIFIED SITE: ICD-10-CM

## 2018-02-25 DIAGNOSIS — G47.00 PERSISTENT INSOMNIA: ICD-10-CM

## 2018-02-26 RX ORDER — COLCHICINE 0.6 MG/1
TABLET ORAL
Qty: 30 TABLET | Refills: 0 | Status: SHIPPED | OUTPATIENT
Start: 2018-02-26 | End: 2018-03-26

## 2018-02-27 NOTE — TELEPHONE ENCOUNTER
Patient's wife called and was upset that his Temazepam Rx that they requested fill for has not been sent to [facility]. I told her that Dr Mayo had written this Rx for temazepam (RESTORIL) 30 MG capsule Rx was written on 2/8 for #30 and 5 refills.  I called [facility] and they didn't have it. I called her back and asked if he was given this Rx at his visit on 2/8 and she remembered that he did give it to them but they lost it. Pt fill date is tomorrow 2/28. He has one left for tonight. Pt asking if this Rx can be sent to [facility] when Dr Mayo is back in the office tomorrow.

## 2018-02-28 RX ORDER — TEMAZEPAM 30 MG
CAPSULE ORAL
Qty: 30 CAPSULE | Refills: 0 | Status: SHIPPED | OUTPATIENT
Start: 2018-02-28 | End: 2018-03-27

## 2018-03-19 ENCOUNTER — ANTICOAGULATION THERAPY VISIT (OUTPATIENT)
Dept: ANTICOAGULATION | Facility: CLINIC | Age: 83
End: 2018-03-19
Payer: MEDICARE

## 2018-03-19 DIAGNOSIS — Z79.01 CHRONIC ANTICOAGULATION: ICD-10-CM

## 2018-03-19 DIAGNOSIS — Z79.01 LONG-TERM (CURRENT) USE OF ANTICOAGULANTS: ICD-10-CM

## 2018-03-19 DIAGNOSIS — I48.20 CHRONIC ATRIAL FIBRILLATION (H): ICD-10-CM

## 2018-03-19 LAB — INR POINT OF CARE: 3.1 (ref 0.86–1.14)

## 2018-03-19 PROCEDURE — 36416 COLLJ CAPILLARY BLOOD SPEC: CPT

## 2018-03-19 PROCEDURE — 99207 ZZC NO CHARGE NURSE ONLY: CPT

## 2018-03-19 PROCEDURE — 85610 PROTHROMBIN TIME: CPT | Mod: QW

## 2018-03-19 NOTE — PROGRESS NOTES
ANTICOAGULATION FOLLOW-UP CLINIC VISIT    Patient Name:  Nbaor Cunningham  Date:  3/19/2018  Contact Type:  Face to Face accompanied by daughter    SUBJECTIVE:     Patient Findings     Positives No Problem Findings           OBJECTIVE    INR Protime   Date Value Ref Range Status   03/19/2018 3.1 (A) 0.86 - 1.14 Final       ASSESSMENT / PLAN  INR assessment THER    Recheck INR In: 6 WEEKS    INR Location Clinic      Anticoagulation Summary as of 3/19/2018     INR goal 2.0-3.0   Today's INR 3.1!   Maintenance plan 6 mg (2 mg x 3) on Mon, Wed, Fri; 8 mg (2 mg x 4) all other days   Full instructions 6 mg on Mon, Wed, Fri; 8 mg all other days   Weekly total 50 mg   No change documented Swathi Antonio RN   Plan last modified Swathi Antonio RN (4/6/2017)   Next INR check 4/30/2018   Target end date Indefinite    Indications   Chronic anticoagulation [Z79.01]  Chronic atrial fibrillation (H) [I48.2]  Long-term (current) use of anticoagulants [Z79.01] [Z79.01]         Anticoagulation Episode Summary     INR check location     Preferred lab     Send INR reminders to [facility]    Comments * 2mg tabs      Anticoagulation Care Providers     Provider Role Specialty Phone number    Heron Mayo MD [facility] [phone]            See the Encounter Report to view Anticoagulation Flowsheet and Dosing Calendar (Go to Encounters tab in chart review, and find the Anticoagulation Therapy Visit)    Patient advised to continue the same warfarin maintenance dose, INR therapeutic.      Swathi Antonio RN

## 2018-03-19 NOTE — MR AVS SNAPSHOT
Nabor Cunningham   3/19/2018 2:00 PM   Anticoagulation Therapy Visit    Description:  82 year old male   Provider:  NB ANTI COAG   Department:  [facility] Antico[facility]           INR as of 3/19/2018     Today's INR 3.1!      Anticoagulation Summary as of 3/19/2018     INR goal 2.0-3.0   Today's INR 3.1!   Full instructions 6 mg on Mon, Wed, Fri; 8 mg all other days   Next INR check 4/30/2018    Indications   Chronic anticoagulation [Z79.01]  Chronic atrial fibrillation (H) [I48.2]  Long-term (current) use of anticoagulants [Z79.01] [Z79.01]         Your next Anticoagulation Clinic appointment(s)     Apr 30, 2018  2:00 PM CDT   Anticoagulation Visit with NB ANTI COAG   [facility]    [address] [place] [address] [phone]              Contact Numbers     Please call [phone] with any problems or questions regarding your therapy.    If you need to cancel and/or reschedule your appointment please call one of the following numbers:  [facility] - [phone]  [facility] [phone]  [facility] [phone]  [facility] [phone]  [place] - [phone]            March 2018 Details    Sun Mon Tue Wed Thu Fri Sat         1               2               3                 4               5               6               7               8               9               10                 11               12               13               14               15               16               17                 18               19      6 mg   See details      20      8 mg         21      6 mg         22      8 mg         23      6 mg         24      8 mg           25      8 mg         26      6 mg         27      8 mg         28      6 mg         29      8 mg         30      6 mg         31      8 mg          Date Details   03/19 This INR check               How to take your warfarin dose     To take:  6 mg Take 3 of the 2 mg tablets.    To take:  8 mg Take  4 of the 2 mg tablets.           April 2018 Details    Sun Mon Tue Wed Thu Fri Sat     1      8 mg         2      6 mg         3      8 mg         4      6 mg         5      8 mg         6      6 mg         7      8 mg           8      8 mg         9      6 mg         10      8 mg         11      6 mg         12      8 mg         13      6 mg         14      8 mg           15      8 mg         16      6 mg         17      8 mg         18      6 mg         19      8 mg         20      6 mg         21      8 mg           22      8 mg         23      6 mg         24      8 mg         25      6 mg         26      8 mg         27      6 mg         28      8 mg           29      8 mg         30                  Date Details   No additional details    Date of next INR:  4/30/2018         How to take your warfarin dose     To take:  6 mg Take 3 of the 2 mg tablets.    To take:  8 mg Take 4 of the 2 mg tablets.

## 2018-03-26 ENCOUNTER — RADIANT APPOINTMENT (OUTPATIENT)
Dept: GENERAL RADIOLOGY | Facility: CLINIC | Age: 83
End: 2018-03-26
Attending: FAMILY MEDICINE
Payer: MEDICARE

## 2018-03-26 ENCOUNTER — OFFICE VISIT (OUTPATIENT)
Dept: FAMILY MEDICINE | Facility: CLINIC | Age: 83
End: 2018-03-26
Payer: MEDICARE

## 2018-03-26 VITALS
TEMPERATURE: 98.7 F | HEART RATE: 88 BPM | BODY MASS INDEX: 27.42 KG/M2 | SYSTOLIC BLOOD PRESSURE: 148 MMHG | RESPIRATION RATE: 20 BRPM | DIASTOLIC BLOOD PRESSURE: 78 MMHG | WEIGHT: 156 LBS

## 2018-03-26 DIAGNOSIS — G89.29 CHRONIC PAIN OF LEFT KNEE: ICD-10-CM

## 2018-03-26 DIAGNOSIS — M25.512 CHRONIC LEFT SHOULDER PAIN: ICD-10-CM

## 2018-03-26 DIAGNOSIS — M25.562 CHRONIC PAIN OF LEFT KNEE: Primary | ICD-10-CM

## 2018-03-26 DIAGNOSIS — G89.29 CHRONIC PAIN OF LEFT KNEE: Primary | ICD-10-CM

## 2018-03-26 DIAGNOSIS — M25.562 CHRONIC PAIN OF LEFT KNEE: ICD-10-CM

## 2018-03-26 DIAGNOSIS — G89.29 CHRONIC LEFT SHOULDER PAIN: ICD-10-CM

## 2018-03-26 PROCEDURE — 99213 OFFICE O/P EST LOW 20 MIN: CPT | Mod: 25 | Performed by: FAMILY MEDICINE

## 2018-03-26 PROCEDURE — 20610 DRAIN/INJ JOINT/BURSA W/O US: CPT | Mod: LT | Performed by: FAMILY MEDICINE

## 2018-03-26 PROCEDURE — 73560 X-RAY EXAM OF KNEE 1 OR 2: CPT | Mod: LT

## 2018-03-26 RX ORDER — TRIAMCINOLONE ACETONIDE 40 MG/ML
40 INJECTION, SUSPENSION INTRA-ARTICULAR; INTRAMUSCULAR ONCE
Qty: 1 ML | Refills: 0 | OUTPATIENT
Start: 2018-03-26 | End: 2018-03-26

## 2018-03-26 NOTE — MR AVS SNAPSHOT
After Visit Summary   3/26/2018    Nabor Cunningham    MRN: [id number]           Patient Information     Date Of Birth          1935        Visit Information        Provider Department      3/26/2018 2:00 PM Heron Mayo MD [facility]        Today's Diagnoses     Chronic pain of left knee    -  1    Chronic left shoulder pain          Care Instructions    ASSESSMENT:   (M25.562,  G89.29) Chronic pain of left knee  (primary encounter diagnosis)  Comment: osteoarthritis left knee  Plan: XR Knee Standing AP Bilat & Lateral Left,         DRAIN/INJECT LARGE JOINT/BURSA        Possible risks of joint injection include; damage to tendons and ligaments, skin atrophy (thinning of skin and lighter skin color) and infection of the joint.     The benefit of injecting the joint is to relieve pain.   Procedure:  The joint was prepped with Betadyne and injected with 1 cc Kenalog (40 mg) and 2 cc 1% xylocaine.  I used sterile technique and anteromedial approach. No complications.    Bandaid dressing was applied.      Sometimes the injected joint feels worse in the first 24 hours, but if the injection is successful, the pain should be better within the next 3 days or so.  Go easy on the joint for the next several days after injection.    If injection is helpful, it could be repeated 3-4 times a year (at least 3 months apart).   Let me know if injection not helping, we can consider other options for treatment.       (M25512,  G89.29) Chronic left shoulder pain  Comment:   Try cutting back on the arm workouts which could be aggravating shoulder pain.     Plan: Discussed options for treatment of shoulder pain which have some proven effectiveness including, appropriate doses of an anti-inflammatory medication like ibuprofen or naproxen.  Typicaly these are taken on a regular basis for a couple weeks.     Ibuprofen 200mg OTC may be taken up to 4 pills 4 times a day to the maximum of 3200mg or  "16 pills daily as needed for pain.   Take with food.  Don't take with aspirin, Aleve or other antiinflammatory medication or with warfarin.   Naproxen (Aleve) OTC may be taken up to 2 pills 2 times a day to the maximum of 4 pills daily as needed for pain.   Take with food.  Don't take with aspirin, ibuprofen or other antiinflammatory medication or with warfarin.   Physical therapy is another way to improve shoulder pain and function.    Recheck if he is having persistent pain. Corticosteroid injection may be an option as well if not improving.           Follow-ups after your visit        Your next 10 appointments already scheduled     Apr 30, 2018  2:00 PM CDT   Anticoagulation Visit with NB ANTI COAG   [facility]    [address] [place] [id number] [phone]              Who to contact     If you have questions or need follow up information about today's clinic visit or your schedule please contact [facility] directly at [phone].  Normal or non-critical lab and imaging results will be communicated to you by [employer], letter or phone within 4 business days after the clinic has received the results. If you do not hear from us within 7 days, please contact the clinic through [employer] or phone. If you have a critical or abnormal lab result, we will notify you by phone as soon as possible.  Submit refill requests through [employer] or call your pharmacy and they will forward the refill request to us. Please allow 3 business days for your refill to be completed.          Additional Information About Your Visit        [employer] Information     [employer] lets you send messages to your doctor, view your test results, renew your prescriptions, schedule appointments and more. To sign up, go to [email or web address] . Click on \"Log in\" on the left side of the screen, which will take you to the Welcome page. Then click on \"Sign up Now\" on the " right side of the page.     You will be asked to enter the access code listed below, as well as some personal information. Please follow the directions to create your username and password.     Your access code is: [email or web address]  Expires: 2018  3:56 PM     Your access code will  in 90 days. If you need help or a new code, please call your [place] clinic or [phone].        Care EveryWhere ID     This is your Care EveryWhere ID. This could be used by other organizations to access your [place] medical records  [id number]        Your Vitals Were     Pulse Temperature Respirations BMI (Body Mass Index)          88 98.7  F (37.1  C) (Tympanic) 20 27.42 kg/m2         Blood Pressure from Last 3 Encounters:   18 148/78   18 110/64   11/10/17 132/58    Weight from Last 3 Encounters:   18 156 lb (70.8 kg)   18 156 lb (70.8 kg)   11/10/17 156 lb (70.8 kg)              We Performed the Following     DRAIN/INJECT LARGE JOINT/BURSA          Today's Medication Changes          These changes are accurate as of 3/26/18  3:29 PM.  If you have any questions, ask your nurse or doctor.               These medicines have changed or have updated prescriptions.        Dose/Directions    colchicine 0.6 MG tablet   This may have changed:  Another medication with the same name was removed. Continue taking this medication, and follow the directions you see here.   Used for:  Chronic gout, unspecified cause, unspecified site   Changed by:  Heron Mayo MD        Dose:  0.6 mg   Take 1 tablet (0.6 mg) by mouth daily   Quantity:  30 tablet   Refills:  11                Primary Care Provider Office Phone # Fax #    Amadou Salas [phone] [address] [facility] [id number]        Equal Access to Services     [facility]: Flory Polanco, waaxda luqadaha, qaybta kaalmada esdras, lili valerio. So [facility]  [phone].    ATENCIÓN: Si sadaf sanchez, tiene a garcia disposición servicios gratuitos de asistencia lingüística. Juan Antonio martinez [phone].    We comply with applicable federal civil rights laws and [place] laws. We do not discriminate on the basis of race, color, national origin, age, disability, sex, sexual orientation, or gender identity.            Thank you!     Thank you for choosing [facility]  for your care. Our goal is always to provide you with excellent care. Hearing back from our patients is one way we can continue to improve our services. Please take a few minutes to complete the written survey that you may receive in the mail after your visit with us. Thank you!             Your Updated Medication List - Protect others around you: Learn how to safely use, store and throw away your medicines at www.disposemymeds.org.          This list is accurate as of 3/26/18  3:29 PM.  Always use your most recent med list.                   Brand Name Dispense Instructions for use Diagnosis    B-12 1000 MCG Tbcr     100 tablet    Take 1,000 mcg by mouth daily    Vitamin B12 deficiency (non anemic)       calcium carbonate 1250 MG tablet    OS- mg [place]. Ca     Take 1 tablet by mouth 2 times daily        colchicine 0.6 MG tablet     30 tablet    Take 1 tablet (0.6 mg) by mouth daily    Chronic gout, unspecified cause, unspecified site       metoprolol succinate 25 MG 24 hr tablet    TOPROL-XL    90 tablet    Take 1 tablet (25 mg) by mouth daily        Multi-vitamin Tabs tablet     100 tablet    Take 1 tablet by mouth daily        nortriptyline 25 MG capsule    PAMELOR    60 capsule    TAKE TWO CAPSULES BY MOUTH DAILY AT BEDTIME    Anxiety       order for DME     1 Units    True pull Knee Brace Left    Chronic pain of left knee       temazepam 30 MG capsule    RESTORIL    30 capsule    TAKE ONE CAPSULE BY MOUTH NIGHTLY AT BEDTIME AS NEEDED FOR SLEEP    Persistent insomnia       warfarin 2 MG  tablet    COUMADIN    300 tablet    Take 6 mg on Mon, Wed, Fri; 8 mg all other days or as directed by Anticoagulation Clinic    Chronic atrial fibrillation (H)

## 2018-03-26 NOTE — PROGRESS NOTES
SUBJECTIVE:   Nabor Cunningham is a 82 year old male who presents to clinic today for the following health issues:  Chief Complaint   Patient presents with     Knee Pain     Shoulder Pain      Knee Pain    Onset: x 6 months    Description:   Location: left shoulder, Patient states it only hurts when he twist or turns his knee.  Character: Dull ache and sometimes sharp    Intensity: 0/10    Progression of Symptoms: intermittent    Accompanying Signs & Symptoms:  Other symptoms: tingling in his feet    History:   Previous similar pain: no       Precipitating factors:   Trauma or overuse: no - patient was a  for 35 years.    Alleviating factors:  Improved by: ice and vicodin    Therapies Tried and outcome: ice, Vicodin, and tylenol  Location: anteromedial.    Can snap with movement.   Aggravating factors: twists or turns knee.  OK with walking.    Some days OK.   He has had right knee replacement.       Shoulder Pain  Duration of complaint: Shoulder pain, Patient states it has been hurting for awhile off and on. Patient states when he moves his left shoulder he can hear it cracks.   Onset of symptoms: 6 months.   Aggravating factors: Exercise equipment with use of arms.  Lifting something heavy.    Alleviating factors: acetaminophen.     Patient Active Problem List   Diagnosis     Anxiety     Alcohol abuse     Chronic atrial fibrillation (H)     Weight loss     Peripheral polyneuropathy     Chronic gout, unspecified cause, unspecified site     Essential hypertension with goal blood pressure less than 140/90     Hyperlipidemia LDL goal <130     Persistent insomnia     Lower urinary tract symptoms (LUTS)     Chronic anticoagulation     Long-term (current) use of anticoagulants [Z79.01]     Moderate episode of recurrent major depressive disorder (H)      OBJECTIVE:Blood pressure 148/78, pulse 88, temperature 98.7  F (37.1  C), temperature source Tympanic, resp. rate 20, weight 156 lb (70.8 kg). BMI=Body  mass index is 27.42 kg/(m^2).  GENERAL APPEARANCE ADULT: Alert, no acute distress  MS: shoulder exam: appearance normal, range of motion: painful arc above 90 degrees ion abduction and flexion, impingement signs present, tenderness all around shoulder, strength in abduction decreased.   knee exam:There is no erythema, deformity, swelling or effusion.  Mildly tender mediao joint space.   Full extension but moderate limitation in flexion.   Knee xrays reveal severe loss of medial joint space by my interpretation, I independently visualized the xrays.  Joint replacement on right knee.      ASSESSMENT:   (M25.562,  G89.29) Chronic pain of left knee  (primary encounter diagnosis)  Comment: osteoarthritis left knee  Plan: XR Knee Standing AP Bilat & Lateral Left,         DRAIN/INJECT LARGE JOINT/BURSA        Possible risks of joint injection include; damage to tendons and ligaments, skin atrophy (thinning of skin and lighter skin color) and infection of the joint.     The benefit of injecting the joint is to relieve pain.   Procedure:  The joint was prepped with Betadyne and injected with 1 cc Kenalog (40 mg) and 2 cc 1% xylocaine.  I used sterile technique and anteromedial approach. No complications.    Bandaid dressing was applied.      Sometimes the injected joint feels worse in the first 24 hours, but if the injection is successful, the pain should be better within the next 3 days or so.  Go easy on the joint for the next several days after injection.    If injection is helpful, it could be repeated 3-4 times a year (at least 3 months apart).   Let me know if injection not helping, we can consider other options for treatment.       (M25.512,  G89.29) Chronic left shoulder pain  Comment:   Try cutting back on the arm workouts which could be aggravating shoulder pain.     Plan: Discussed options for treatment of shoulder pain which have some proven effectiveness including, appropriate doses of an anti-inflammatory  medication like ibuprofen or naproxen.  Typicaly these are taken on a regular basis for a couple weeks.     Ibuprofen 200mg OTC may be taken up to 4 pills 4 times a day to the maximum of 3200mg or 16 pills daily as needed for pain.   Take with food.  Don't take with aspirin, Aleve or other antiinflammatory medication or with warfarin.   Naproxen (Aleve) OTC may be taken up to 2 pills 2 times a day to the maximum of 4 pills daily as needed for pain.   Take with food.  Don't take with aspirin, ibuprofen or other antiinflammatory medication or with warfarin.   Physical therapy is another way to improve shoulder pain and function.    Recheck if he is having persistent pain. Corticosteroid injection may be an option as well if not improving.

## 2018-03-26 NOTE — NURSING NOTE
"Chief Complaint   Patient presents with     Knee Pain     Shoulder Pain       Initial /78  Pulse 88  Temp 98.7  F (37.1  C) (Tympanic)  Resp 20  Wt 156 lb (70.8 kg)  BMI 27.42 kg/m2 Estimated body mass index is 27.42 kg/(m^2) as calculated from the following:    Height as of 2/8/18: 5' 3.25\" (1.607 m).    Weight as of this encounter: 156 lb (70.8 kg).  Medication Reconciliation: complete    "

## 2018-03-26 NOTE — PATIENT INSTRUCTIONS
ASSESSMENT:   (M25.562,  G89.29) Chronic pain of left knee  (primary encounter diagnosis)  Comment: osteoarthritis left knee  Plan: XR Knee Standing AP Bilat & Lateral Left,         DRAIN/INJECT LARGE JOINT/BURSA        Possible risks of joint injection include; damage to tendons and ligaments, skin atrophy (thinning of skin and lighter skin color) and infection of the joint.     The benefit of injecting the joint is to relieve pain.   Procedure:  The joint was prepped with Betadyne and injected with 1 cc Kenalog (40 mg) and 2 cc 1% xylocaine.  I used sterile technique and anteromedial approach. No complications.    Bandaid dressing was applied.      Sometimes the injected joint feels worse in the first 24 hours, but if the injection is successful, the pain should be better within the next 3 days or so.  Go easy on the joint for the next several days after injection.    If injection is helpful, it could be repeated 3-4 times a year (at least 3 months apart).   Let me know if injection not helping, we can consider other options for treatment.       (M25.512,  G89.29) Chronic left shoulder pain  Comment:   Try cutting back on the arm workouts which could be aggravating shoulder pain.     Plan: Discussed options for treatment of shoulder pain which have some proven effectiveness including, appropriate doses of an anti-inflammatory medication like ibuprofen or naproxen.  Typicaly these are taken on a regular basis for a couple weeks.     Ibuprofen 200mg OTC may be taken up to 4 pills 4 times a day to the maximum of 3200mg or 16 pills daily as needed for pain.   Take with food.  Don't take with aspirin, Aleve or other antiinflammatory medication or with warfarin.   Naproxen (Aleve) OTC may be taken up to 2 pills 2 times a day to the maximum of 4 pills daily as needed for pain.   Take with food.  Don't take with aspirin, ibuprofen or other antiinflammatory medication or with warfarin.   Physical therapy is another way to  improve shoulder pain and function.    Recheck if he is having persistent pain. Corticosteroid injection may be an option as well if not improving.

## 2018-03-27 DIAGNOSIS — M1A.9XX0 CHRONIC GOUT, UNSPECIFIED CAUSE, UNSPECIFIED SITE: ICD-10-CM

## 2018-03-27 DIAGNOSIS — G47.00 PERSISTENT INSOMNIA: ICD-10-CM

## 2018-03-28 RX ORDER — COLCHICINE 0.6 MG/1
TABLET ORAL
Qty: 30 TABLET | Refills: 0 | Status: SHIPPED | OUTPATIENT
Start: 2018-03-28 | End: 2018-04-28

## 2018-03-28 RX ORDER — TEMAZEPAM 30 MG
CAPSULE ORAL
Qty: 30 CAPSULE | Refills: 0 | Status: SHIPPED | OUTPATIENT
Start: 2018-03-28 | End: 2018-04-20

## 2018-03-28 NOTE — TELEPHONE ENCOUNTER
Requested Prescriptions   Pending Prescriptions Disp Refills     temazepam (RESTORIL) 30 MG capsule [Pharmacy Med Name: TEMAZEPAM 30 MG     CAP ASCE] 30 capsule 0     Sig: TAKE ONE CAPSULE BY MOUTH AT BEDTIME AS NEEDED FOR SLEEP    There is no refill protocol information for this order          Last Written Prescription Date:  2/28/18  Last Fill Quantity: 30,   # refills: 0  Last Office Visit: 3/26/18  Future Office visit:       Routing refill request to provider for review/approval because:  Drug not on the [facility], [facility] or [facility] refill protocol or controlled substance

## 2018-03-28 NOTE — TELEPHONE ENCOUNTER
"Requested Prescriptions   Pending Prescriptions Disp Refills     colchicine 0.6 MG tablet [Pharmacy Med Name: COLCHICINE 0.6MG TABLETS] 30 tablet 0     Sig: TAKE 1 TABLET(0.6 MG) BY MOUTH DAILY    Gout Agents Protocol Failed    3/27/2018  6:15 PM       Failed - CBC on file in past 12 months    Recent Labs   Lab Test  03/22/17   1240   WBC  6.1   RBC  4.92   HGB  14.1   HCT  42.7   PLT  128*            Failed - ALT on file in past 12 months    Recent Labs   Lab Test  03/22/17   1240   ALT  56            Failed - Uric acid greater than or equal to 6 on file in past 12 months    Recent Labs   Lab Test  07/06/16   1947   URIC  7.5     If level is 6mg/dL or greater, ok to refill one time and refer to provider.          Failed - Normal serum creatinine on file in the past 12 months    Recent Labs   Lab Test  03/22/17   1240   CR  0.70            Passed - Recent (12 mo) or future (30 days) visit within the authorizing provider's specialty    Patient had office visit in the last 12 months or has a visit in the next 30 days with authorizing provider or within the authorizing provider's specialty.  See \"Patient Info\" tab in inbasket, or \"Choose Columns\" in Meds & Orders section of the refill encounter.           Passed - Patient is age 18 or older        colchicine 0.6 MG tablet  Last Written Prescription Date:  02/08/2018  Last Fill Quantity: 30 tablet,  # refills: 11   Last office visit: 3/26/2018 with prescribing provider:  DAPHNIE Mayo   Future Office Visit:      Brandy LEHMAN (R) (M)    "

## 2018-04-11 ENCOUNTER — TELEPHONE (OUTPATIENT)
Dept: FAMILY MEDICINE | Facility: CLINIC | Age: 83
End: 2018-04-11

## 2018-04-11 NOTE — TELEPHONE ENCOUNTER
Reason for call:  Patient reporting a symptom    Symptom or request: Daughter Kya says her dad saw Dr Mayo 3/26/18 for left knee pain and he told her Dr Mayo did a cortisone injection in his knee. He continues to have knee pain so wonders if he can get another cortisone injection. His daughter thought if he just got one, it's probably too soon so wonders what he can do.     Have you been treated for this before? Yes    Additional comments: none    Phone Number patient can be reached at:  Kya  [phone]    Best Time:  anytime    Can we leave a detailed message on this number:  YES    Call taken on 4/11/2018 at 9:41 AM by Marla Grace

## 2018-04-11 NOTE — TELEPHONE ENCOUNTER
I talked with Lexy and she will tell dad to give this a little more time.  Injection can be given 3-4 times a year, about every 3 months.  Will call if further questions.  Ellen Perez RN

## 2018-04-20 DIAGNOSIS — E53.8 VITAMIN B 12 DEFICIENCY: Primary | ICD-10-CM

## 2018-04-20 DIAGNOSIS — G47.00 PERSISTENT INSOMNIA: ICD-10-CM

## 2018-04-23 RX ORDER — LANOLIN ALCOHOL/MO/W.PET/CERES
CREAM (GRAM) TOPICAL
Qty: 30 TABLET | Refills: 11 | Status: SHIPPED | OUTPATIENT
Start: 2018-04-23 | End: 2020-02-28

## 2018-04-23 RX ORDER — TEMAZEPAM 30 MG
CAPSULE ORAL
Qty: 30 CAPSULE | Refills: 0 | Status: SHIPPED | OUTPATIENT
Start: 2018-04-23 | End: 2018-05-20

## 2018-04-28 DIAGNOSIS — M1A.9XX0 CHRONIC GOUT, UNSPECIFIED CAUSE, UNSPECIFIED SITE: ICD-10-CM

## 2018-04-29 NOTE — TELEPHONE ENCOUNTER
"Requested Prescriptions   Pending Prescriptions Disp Refills     colchicine 0.6 MG tablet   Last Written Prescription Date:  3/28/18  Last Fill Quantity: 30,  # refills: 0   Last office visit: 3/26/2018 with prescribing provider:  GEORGINA Mayo   Future Office Visit:     30 tablet 0     Sig: TAKE 1 TABLET(0.6 MG) BY MOUTH DAILY    Gout Agents Protocol Failed    4/28/2018 12:00 PM       Failed - CBC on file in past 12 months    Recent Labs   Lab Test  03/22/17   1240   WBC  6.1   RBC  4.92   HGB  14.1   HCT  42.7   PLT  128*            Failed - ALT on file in past 12 months    Recent Labs   Lab Test  03/22/17   1240   ALT  56            Failed - Uric acid greater than or equal to 6 on file in past 12 months    Recent Labs   Lab Test  07/06/16   1947   URIC  7.5     If level is 6mg/dL or greater, ok to refill one time and refer to provider.          Failed - Normal serum creatinine on file in the past 12 months    Recent Labs   Lab Test  03/22/17   1240   CR  0.70            Passed - Recent (12 mo) or future (30 days) visit within the authorizing provider's specialty    Patient had office visit in the last 12 months or has a visit in the next 30 days with authorizing provider or within the authorizing provider's specialty.  See \"Patient Info\" tab in inbasket, or \"Choose Columns\" in Meds & Orders section of the refill encounter.           Passed - Patient is age 18 or older          "

## 2018-04-30 ENCOUNTER — ANTICOAGULATION THERAPY VISIT (OUTPATIENT)
Dept: ANTICOAGULATION | Facility: CLINIC | Age: 83
End: 2018-04-30
Payer: MEDICARE

## 2018-04-30 DIAGNOSIS — I48.20 CHRONIC ATRIAL FIBRILLATION (H): ICD-10-CM

## 2018-04-30 DIAGNOSIS — Z79.01 CHRONIC ANTICOAGULATION: ICD-10-CM

## 2018-04-30 DIAGNOSIS — Z79.01 LONG-TERM (CURRENT) USE OF ANTICOAGULANTS: ICD-10-CM

## 2018-04-30 LAB
INR POINT OF CARE: 3.1 (ref 0.86–1.14)
INR POINT OF CARE: 3.1 (ref 0.86–1.14)

## 2018-04-30 PROCEDURE — 85610 PROTHROMBIN TIME: CPT | Mod: QW

## 2018-04-30 PROCEDURE — 99207 ZZC NO CHARGE NURSE ONLY: CPT

## 2018-04-30 PROCEDURE — 36416 COLLJ CAPILLARY BLOOD SPEC: CPT

## 2018-04-30 RX ORDER — COLCHICINE 0.6 MG/1
TABLET ORAL
Qty: 30 TABLET | Refills: 5 | Status: SHIPPED | OUTPATIENT
Start: 2018-04-30 | End: 2018-05-24

## 2018-04-30 NOTE — MR AVS SNAPSHOT
Nabor Cunningham   4/30/2018 2:00 PM   Anticoagulation Therapy Visit    Description:  82 year old male   Provider:  NB ANTI COAG   Department:  [facility]           INR as of 4/30/2018     Today's INR 3.1!      Anticoagulation Summary as of 4/30/2018     INR goal 2.0-3.0   Today's INR 3.1!   Full instructions 6 mg on Mon, Wed, Fri; 8 mg all other days   Next INR check 6/11/2018    Indications   Chronic anticoagulation [Z79.01]  Chronic atrial fibrillation (H) [I48.2]  Long-term (current) use of anticoagulants [Z79.01] [Z79.01]         Your next Anticoagulation Clinic appointment(s)     Jun 11, 2018  2:00 PM CDT   [facility]   [facility]    [address] [place] [address] [phone]              Contact Numbers     Please call [phone] with any problems or questions regarding your therapy.    If you need to cancel and/or reschedule your appointment please call one of the following numbers:  [facility] - [phone]  [facility] [phone]  [facility] [phone]  [facility] [phone]  [place] - [phone]            April 2018 Details    Sun Mon Tue Wed Thu Fri Sat     1               2               3               4               5               6               7                 8               9               10               11               12               13               14                 15               16               17               18               19               20               21                 22               23               24               25               26               27               28                 29               30      6 mg   See details            Date Details   04/30 This INR check               How to take your warfarin dose     To take:  6 mg Take 3 of the 2 mg tablets.           May 2018 Details    Sun Mon Tue Wed Thu Fri Sat       1      8 mg         2       6 mg         3      8 mg         4      6 mg         5      8 mg           6      8 mg         7      6 mg         8      8 mg         9      6 mg         10      8 mg         11      6 mg         12      8 mg           13      8 mg         14      6 mg         15      8 mg         16      6 mg         17      8 mg         18      6 mg         19      8 mg           20      8 mg         21      6 mg         22      8 mg         23      6 mg         24      8 mg         25      6 mg         26      8 mg           27      8 mg         28      6 mg         29      8 mg         30      6 mg         31      8 mg            Date Details   No additional details            How to take your warfarin dose     To take:  6 mg Take 3 of the 2 mg tablets.    To take:  8 mg Take 4 of the 2 mg tablets.           June 2018 Details    Sun Mon Tue Wed Thu Fri Sat          1      6 mg         2      8 mg           3      8 mg         4      6 mg         5      8 mg         6      6 mg         7      8 mg         8      6 mg         9      8 mg           10      8 mg         11            12               13               14               15               16                 17               18               19               20               21               22               23                 24               25               26               27               28               29               30                Date Details   No additional details    Date of next INR:  6/11/2018         How to take your warfarin dose     To take:  6 mg Take 3 of the 2 mg tablets.    To take:  8 mg Take 4 of the 2 mg tablets.

## 2018-04-30 NOTE — PROGRESS NOTES
ANTICOAGULATION FOLLOW-UP CLINIC VISIT    Patient Name:  Nabor Cunningham  Date:  4/30/2018  Contact Type:  Face to Face accompanied by his daughter      SUBJECTIVE:     Patient Findings     Positives No Problem Findings    Comments Patient denies any changes to diet, activity or medications.  Patient advised to continue the same warfarin maintenance dose, INR therapeutic.   Patient verbalizes understanding and agrees to plan. No further questions or concerns.             OBJECTIVE    INR Protime   Date Value Ref Range Status   04/30/2018 3.1 (A) 0.86 - 1.14 Final   04/30/2018 3.1 (A) 0.86 - 1.14 Final       ASSESSMENT / PLAN  INR assessment THER    Recheck INR In: 6 WEEKS    INR Location Clinic      Anticoagulation Summary as of 4/30/2018     INR goal 2.0-3.0   Today's INR 3.1!   Maintenance plan 6 mg (2 mg x 3) on Mon, Wed, Fri; 8 mg (2 mg x 4) all other days   Full instructions 6 mg on Mon, Wed, Fri; 8 mg all other days   Weekly total 50 mg   No change documented Swathi Atnonio RN   Plan last modified Swathi Antonio RN (4/6/2017)   Next INR check 6/11/2018   Target end date Indefinite    Indications   Chronic anticoagulation [Z79.01]  Chronic atrial fibrillation (H) [I48.2]  Long-term (current) use of anticoagulants [Z79.01] [Z79.01]         Anticoagulation Episode Summary     INR check location     Preferred lab     Send INR reminders to [facility] POOL    Comments * 2mg tabs      Anticoagulation Care Providers     Provider Role Specialty Phone number    Heron Mayo MD [facility] [phone]            See the Encounter Report to view Anticoagulation Flowsheet and Dosing Calendar (Go to Encounters tab in chart review, and find the Anticoagulation Therapy Visit)        Swathi Antonio RN

## 2018-05-18 ENCOUNTER — OFFICE VISIT (OUTPATIENT)
Dept: FAMILY MEDICINE | Facility: CLINIC | Age: 83
End: 2018-05-18
Payer: MEDICARE

## 2018-05-18 VITALS
RESPIRATION RATE: 18 BRPM | DIASTOLIC BLOOD PRESSURE: 64 MMHG | HEART RATE: 68 BPM | TEMPERATURE: 97.1 F | WEIGHT: 150 LBS | SYSTOLIC BLOOD PRESSURE: 112 MMHG | BODY MASS INDEX: 26.36 KG/M2

## 2018-05-18 DIAGNOSIS — M17.12 OSTEOARTHRITIS OF LEFT KNEE, UNSPECIFIED OSTEOARTHRITIS TYPE: Primary | ICD-10-CM

## 2018-05-18 PROCEDURE — 99213 OFFICE O/P EST LOW 20 MIN: CPT | Performed by: FAMILY MEDICINE

## 2018-05-18 RX ORDER — CELECOXIB 200 MG/1
200 CAPSULE ORAL DAILY
Qty: 30 CAPSULE | Refills: 5 | Status: ON HOLD | OUTPATIENT
Start: 2018-05-18 | End: 2018-09-07

## 2018-05-18 ASSESSMENT — PAIN SCALES - GENERAL: PAINLEVEL: NO PAIN (0)

## 2018-05-18 NOTE — PATIENT INSTRUCTIONS
ASSESSMENT:   (M17.12) Osteoarthritis of left knee, unspecified osteoarthritis type  (primary encounter diagnosis)  Comment: cortisone injection did not help.  I think the pain is from osteoarthritis in the knee joint.   Plan: celecoxib (CELEBREX) 200 MG capsule, ORTHO          REFERRAL          For the knee pain,  OK to take acetaminophen as needed.  See dosing information below.     Acetaminophen (Tylenol) may be taken as needed for pain and fever.  The maximum doses are listed below, around 3000mg a day.  Regular strength pills are 325mg.  The maximum daily dose is two pills (650mg) every 4 to 6 hours up to 3250mg a day.   Extra strength pills are 500mg each.  The maximum dose is two pills (1000mg) every 6 hours as needed up to 3000mg a day.   Extended release pills are 650mg each.  The maximum dose is two pills (1300mg) every 8 hours as needed up to 3900mg a day.     Watch out for acetaminophen in other over the counter or prescription medications.      Too much acetaminophen can lead to serious liver damage.     You can try celebrex 200mg once daily as alternative medication for the knee pain.  Take with food as it can irritate stomach.  If it helps and you will stay on this medication, have your INR checked to make sure blood is not too thin.     Schedule an appointment with orthopedic physician to discuss other knee arthritis treatments like injections, physical therapy.

## 2018-05-18 NOTE — PROGRESS NOTES
SUBJECTIVE:   Nabor Cunningham is a 82 year old male who presents to clinic today for the following health issues:  Chief Complaint   Patient presents with     Musculoskeletal Problem     Knee Pain      Accompanied today by son Florencio.     Last clinic visit: 3/26/2018 for left shoulder and left knee pain.    He had xrays of that knee:  IMPRESSION: Moderate left knee osteoarthritis    His left knee was injected with triamcinolone    The injection did not help.   Knee Pain    Onset: Ongoing Issue, last seen 3/26/18    Description:   Location: left knee  Character: Sharp    Intensity: States that he notices a sharp     Progression of Symptoms: worse    Accompanying Signs & Symptoms:  Other symptoms: none    History:   Previous similar pain: no       Precipitating factors:   Trauma or overuse: no     Alleviating factors:  Improved by: Injection last OV - did not notice a difference    Therapies Tried and outcome: Tylenol - PRN    Location: pain around anterior knee.  Can radiate to distal thigh-quad tendon area.   He can ride exercise bicycle.    Aggravating factors: walking.  Turns when walking.     He has had prior right knee replacement.     Patient Active Problem List   Diagnosis     Anxiety     Alcohol abuse     Chronic atrial fibrillation (H)     Weight loss     Peripheral polyneuropathy     Chronic gout, unspecified cause, unspecified site     Essential hypertension with goal blood pressure less than 140/90     Hyperlipidemia LDL goal <130     Persistent insomnia     Lower urinary tract symptoms (LUTS)     Chronic anticoagulation     Long-term (current) use of anticoagulants [Z79.01]     Moderate episode of recurrent major depressive disorder (H)      OBJECTIVE:Blood pressure 112/64, pulse 68, temperature 97.1  F (36.2  C), temperature source Tympanic, resp. rate 18, weight 150 lb (68 kg). BMI=Body mass index is 26.36 kg/(m^2).  GENERAL APPEARANCE ADULT: Alert, no acute distress  MS: knee exam right knee with  scar and moderate limitation in flexion to 100 degrees.   Left knee with no erythema, swelling.  Full extension but limitation in flexion to 110 degrees.    ASSESSMENT:   (M17.12) Osteoarthritis of left knee, unspecified osteoarthritis type  (primary encounter diagnosis)  Comment: cortisone injection did not help.  I think the pain is from osteoarthritis in the knee joint.   Plan: celecoxib (CELEBREX) 200 MG capsule, ORTHO          REFERRAL         For the knee pain,  OK to take acetaminophen as needed.  See dosing information below.     Acetaminophen (Tylenol) may be taken as needed for pain and fever.  The maximum doses are listed below, around 3000mg a day.  Regular strength pills are 325mg.  The maximum daily dose is two pills (650mg) every 4 to 6 hours up to 3250mg a day.   Extra strength pills are 500mg each.  The maximum dose is two pills (1000mg) every 6 hours as needed up to 3000mg a day.   Extended release pills are 650mg each.  The maximum dose is two pills (1300mg) every 8 hours as needed up to 3900mg a day.     Watch out for acetaminophen in other over the counter or prescription medications.      Too much acetaminophen can lead to serious liver damage.     You can try celebrex 200mg once daily as alternative medication for the knee pain.  Take with food as it can irritate stomach.  If it helps and you will stay on this medication, have your INR checked to make sure blood is not too thin.     Schedule an appointment with orthopedic physician to discuss other knee arthritis treatments like injections, physical therapy.

## 2018-05-18 NOTE — NURSING NOTE
"Chief Complaint   Patient presents with     Musculoskeletal Problem     Knee Pain       Initial /64 (BP Location: Right arm, Patient Position: Chair, Cuff Size: Adult Regular)  Pulse 68  Temp 97.1  F (36.2  C) (Tympanic)  Resp 18  Wt 150 lb (68 kg)  BMI 26.36 kg/m2 Estimated body mass index is 26.36 kg/(m^2) as calculated from the following:    Height as of 2/8/18: 5' 3.25\" (1.607 m).    Weight as of this encounter: 150 lb (68 kg).      Health Maintenance that is potentially due pending provider review:  NONE    Zainab Romero MA  9:21 AM 5/18/2018  .        "

## 2018-05-18 NOTE — MR AVS SNAPSHOT
After Visit Summary   5/18/2018    Nabor Cunningham    MRN: [id number]           Patient Information     Date Of Birth          1935        Visit Information        Provider Department      5/18/2018 9:00 AM Heron Mayo MD [facility]        Today's Diagnoses     Osteoarthritis of left knee, unspecified osteoarthritis type    -  1      Care Instructions    ASSESSMENT:   (M17.12) Osteoarthritis of left knee, unspecified osteoarthritis type  (primary encounter diagnosis)  Comment: cortisone injection did not help.  I think the pain is from osteoarthritis in the knee joint.   Plan: celecoxib (CELEBREX) 200 MG capsule, ORTHO         [facility] REFERRAL          For the knee pain,  OK to take acetaminophen as needed.  See dosing information below.     Acetaminophen (Tylenol) may be taken as needed for pain and fever.  The maximum doses are listed below, around 3000mg a day.  Regular strength pills are 325mg.  The maximum daily dose is two pills (650mg) every 4 to 6 hours up to 3250mg a day.   Extra strength pills are 500mg each.  The maximum dose is two pills (1000mg) every 6 hours as needed up to 3000mg a day.   Extended release pills are 650mg each.  The maximum dose is two pills (1300mg) every 8 hours as needed up to 3900mg a day.     Watch out for acetaminophen in other over the counter or prescription medications.      Too much acetaminophen can lead to serious liver damage.     You can try celebrex 200mg once daily as alternative medication for the knee pain.  Take with food as it can irritate stomach.  If it helps and you will stay on this medication, have your INR checked to make sure blood is not too thin.     Schedule an appointment with orthopedic physician to discuss other knee arthritis treatments like injections, physical therapy.            Follow-ups after your visit        Additional Services     ORTHO  REFERRAL       [facility] is  referring you to the Orthopedic  Services at [facility].       The  Representative will assist you in the coordination of your Orthopedic and Musculoskeletal Care as prescribed by your physician.    The  Representative will call you within 1 business day to help schedule your appointment, or you may contact the  Representative at:    All areas ~ [phone]     Type of Referral : Non Surgical  Knee osteoarthritis.   Not interested in surgery.  Considering hyaluronic acid injections.       Timeframe requested: Routine    Coverage of these services is subject to the terms and limitations of your health insurance plan.  Please call member services at your health plan with any benefit or coverage questions.      If X-rays, CT or MRI's have been performed, please contact the facility where they were done to arrange for , prior to your scheduled appointment.  Please bring this referral request to your appointment and present it to your specialist.                  Your next 10 appointments already scheduled     Jun 11, 2018  2:00 PM CDT   [facility] with [facility]   [facility]    [address] [place] [address] [phone]              Who to contact     If you have questions or need follow up information about today's clinic visit or your schedule please contact [facility] directly at [phone].  Normal or non-critical lab and imaging results will be communicated to you by MyChart, letter or phone within 4 business days after the clinic has received the results. If you do not hear from us within 7 days, please contact the clinic through MyChart or phone. If you have a critical or abnormal lab result, we will notify you by phone as soon as possible.  Submit refill requests through [employer] or call your pharmacy and they will forward the refill request  "to us. Please allow 3 business days for your refill to be completed.          Additional Information About Your Visit        [employer] Information     [employer] lets you send messages to your doctor, view your test results, renew your prescriptions, schedule appointments and more. To sign up, go to [email or web address] . Click on \"Log in\" on the left side of the screen, which will take you to the Welcome page. Then click on \"Sign up Now\" on the right side of the page.     You will be asked to enter the access code listed below, as well as some personal information. Please follow the directions to create your username and password.     Your access code is: [email or web address]  Expires: 2018 10:06 AM     Your access code will  in 90 days. If you need help or a new code, please call your [facility] clinic or [phone].        Care EveryWhere ID     This is your [facility] EveryWhere ID. This could be used by other organizations to access your [place] medical records  [id number]        Your Vitals Were     Pulse Temperature Respirations BMI (Body Mass Index)          68 97.1  F (36.2  C) (Tympanic) 18 26.36 kg/m2         Blood Pressure from Last 3 Encounters:   18 112/64   18 148/78   18 110/64    Weight from Last 3 Encounters:   18 150 lb (68 kg)   18 156 lb (70.8 kg)   18 156 lb (70.8 kg)              We Performed the Following     ORTHO  REFERRAL          Today's Medication Changes          These changes are accurate as of 18 10:06 AM.  If you have any questions, ask your nurse or doctor.               Start taking these medicines.        Dose/Directions    celecoxib 200 MG capsule   Commonly known as:  celeBREX   Used for:  Osteoarthritis of left knee, unspecified osteoarthritis type   Started by:  Heron Mayo MD        Dose:  200 mg   Take 1 capsule (200 mg) by mouth daily Take with food.   Quantity:  30 capsule   Refills:  5            Where to get your " medicines      These medications were sent to [id number] - [place], [place] - [address]  [address], [place] [address]    Hours:  Closed 10-16-08 business to [facility] Phone:  [phone]     celecoxib 200 MG capsule                Primary Care Provider Office Phone # Fax #    Amadou Salas [phone] [address]  [place] [address]        Equal Access to Services     [facility] : Hadii aad ku hadasho Soomaali, waaxda luqadaha, qaybta kaalmada adeegyada, waxay idiin hayaan facundo hernandez . So [facility] [phone].    ATENCIÓN: Si habla español, tiene a garcia disposición servicios gratuitos de asistencia lingüística. [facility] [phone].    We comply with applicable federal civil rights laws and [place] laws. We do not discriminate on the basis of race, color, national origin, age, disability, sex, sexual orientation, or gender identity.            Thank you!     Thank you for choosing [facility]  for your care. Our goal is always to provide you with excellent care. Hearing back from our patients is one way we can continue to improve our services. Please take a few minutes to complete the written survey that you may receive in the mail after your visit with us. Thank you!             Your Updated Medication List - Protect others around you: Learn how to safely use, store and throw away your medicines at www.disposemymeds.org.          This list is accurate as of 5/18/18 10:06 AM.  Always use your most recent med list.                   Brand Name Dispense Instructions for use Diagnosis    * B-12 1000 MCG Tbcr     100 tablet    Take 1,000 mcg by mouth daily    Vitamin B12 deficiency (non anemic)       * cyanocobalamin 1000 MCG tablet    vitamin  B-12    30 tablet    TAKE ONE TABLET BY MOUTH DAILY    Vitamin B 12 deficiency       calcium carbonate 500 MG tablet    OS- mg [place]. [place]     Take 1 tablet by mouth 2 times  daily        celecoxib 200 MG capsule    celeBREX    30 capsule    Take 1 capsule (200 mg) by mouth daily Take with food.    Osteoarthritis of left knee, unspecified osteoarthritis type       * colchicine 0.6 MG tablet    COLCYRS    30 tablet    Take 1 tablet (0.6 mg) by mouth daily    Chronic gout, unspecified cause, unspecified site       * colchicine 0.6 MG tablet    COLCYRS    30 tablet    TAKE 1 TABLET(0.6 MG) BY MOUTH DAILY    Chronic gout, unspecified cause, unspecified site       metoprolol succinate 25 MG 24 hr tablet    TOPROL-XL    90 tablet    Take 1 tablet (25 mg) by mouth daily        Multi-vitamin Tabs tablet     100 tablet    Take 1 tablet by mouth daily        nortriptyline 25 MG capsule    PAMELOR    60 capsule    TAKE TWO CAPSULES BY MOUTH DAILY AT BEDTIME    Anxiety       order for DME     1 Units    True pull Knee Brace Left    Chronic pain of left knee       temazepam 30 MG capsule    RESTORIL    30 capsule    TAKE ONE CAPSULE BY MOUTH AT BEDTIME AS NEEDED FOR SLEEP    Persistent insomnia       warfarin 2 MG tablet    COUMADIN    300 tablet    Take 6 mg on Mon, Wed, Fri; 8 mg all other days or as directed by Anticoagulation Clinic    Chronic atrial fibrillation (H)       * Notice:  This list has 4 medication(s) that are the same as other medications prescribed for you. Read the directions carefully, and ask your doctor or other care provider to review them with you.

## 2018-05-20 DIAGNOSIS — I48.20 CHRONIC ATRIAL FIBRILLATION (H): ICD-10-CM

## 2018-05-20 DIAGNOSIS — G47.00 PERSISTENT INSOMNIA: ICD-10-CM

## 2018-05-21 RX ORDER — WARFARIN SODIUM 2 MG/1
TABLET ORAL
Qty: 300 TABLET | Refills: 0 | Status: SHIPPED | OUTPATIENT
Start: 2018-05-21 | End: 2018-07-12

## 2018-05-21 RX ORDER — TEMAZEPAM 30 MG
CAPSULE ORAL
Qty: 30 CAPSULE | Refills: 5 | Status: SHIPPED | OUTPATIENT
Start: 2018-05-21 | End: 2018-09-07

## 2018-05-22 ENCOUNTER — TELEPHONE (OUTPATIENT)
Dept: FAMILY MEDICINE | Facility: CLINIC | Age: 83
End: 2018-05-22

## 2018-05-22 DIAGNOSIS — F41.9 ANXIETY: ICD-10-CM

## 2018-05-22 DIAGNOSIS — M1A.9XX0 CHRONIC GOUT, UNSPECIFIED CAUSE, UNSPECIFIED SITE: ICD-10-CM

## 2018-05-22 NOTE — TELEPHONE ENCOUNTER
S-(situation): Nabor Matias's son is calling to report that dad gets jittery and stomach tightens up at 1400.  He is asking if could increase the notriptyline or give small dose mid day.    B-(background): Nabor takes the nortriptyline at 1800 and takes the temazepam at 2000.  He goes to bed around 2030 and is up for the day at 0330.  At 1400 he tells his son that he feels jittery and stomach tightens.    A-(assessment): jittery mid afternoon    R-(recommendations): I told Florencio that I would discuss this with Dr Mayo tomorrow and then call him back  Ellen Perez RN

## 2018-05-22 NOTE — TELEPHONE ENCOUNTER
Reason for call:  Patient reporting a symptom    Symptom or request: at 2 pm stomach starts tightening up, gets jittery. Son Florencio is wondering can they increase his Nortriptyline as it wears off at 2 pm.  Pt takes two at 6 pm   Please Advise.    Duration (how long have symptoms been present): 3 months.     Have you been treated for this before? Yes    Phone Number patient can be reached at: Florencio [phone]    Best Time:  Any Time      Can we leave a detailed message on this number:  YES    Call taken on 5/22/2018 at 12:38 PM by Cee Michel

## 2018-05-23 NOTE — TELEPHONE ENCOUNTER
Please call.    PLAN: Add nortriptyline 25mg during the day and continue 2 pills at bedtime.   VICTOR HUGO ART MD

## 2018-05-24 RX ORDER — COLCHICINE 0.6 MG/1
TABLET ORAL
Qty: 90 TABLET | Refills: 1 | Status: SHIPPED | OUTPATIENT
Start: 2018-05-24 | End: 2018-07-24

## 2018-05-24 RX ORDER — NORTRIPTYLINE HCL 25 MG
CAPSULE ORAL
Qty: 270 CAPSULE | Refills: 3 | Status: SHIPPED | OUTPATIENT
Start: 2018-05-24 | End: 2018-11-30

## 2018-05-29 ENCOUNTER — TELEPHONE (OUTPATIENT)
Dept: FAMILY MEDICINE | Facility: CLINIC | Age: 83
End: 2018-05-29

## 2018-05-29 DIAGNOSIS — M1A.9XX0 CHRONIC GOUT, UNSPECIFIED CAUSE, UNSPECIFIED SITE: Primary | ICD-10-CM

## 2018-05-29 RX ORDER — COLCHICINE 0.6 MG/1
0.6 TABLET ORAL DAILY
Qty: 90 TABLET | Refills: 0 | Status: SHIPPED | OUTPATIENT
Start: 2018-05-29 | End: 2018-07-24

## 2018-05-29 NOTE — TELEPHONE ENCOUNTER
Pharmacy sent a request for a medication change -    Colchine 0.6mg tablets are not covered under the patients plan - The preferred alternative is Colcrystabmg,mitigarecapmg - Please send a new RX - sheet given to Jammie CHERRY

## 2018-06-08 ENCOUNTER — OFFICE VISIT (OUTPATIENT)
Dept: ORTHOPEDICS | Facility: CLINIC | Age: 83
End: 2018-06-08
Payer: MEDICARE

## 2018-06-08 VITALS
WEIGHT: 152 LBS | DIASTOLIC BLOOD PRESSURE: 82 MMHG | SYSTOLIC BLOOD PRESSURE: 138 MMHG | BODY MASS INDEX: 26.93 KG/M2 | HEIGHT: 63 IN

## 2018-06-08 DIAGNOSIS — M17.12 ARTHRITIS OF LEFT KNEE: Primary | ICD-10-CM

## 2018-06-08 PROCEDURE — 99203 OFFICE O/P NEW LOW 30 MIN: CPT | Performed by: PEDIATRICS

## 2018-06-08 NOTE — MR AVS SNAPSHOT
After Visit Summary   6/8/2018    Nabor Cunningham    MRN: [id number]           Patient Information     Date Of Birth          1935        Visit Information        Provider Department      6/8/2018 10:20 AM Britt Davidson MD [facility] [place]        Today's Diagnoses     Arthritis of left knee    -  1      Care Instructions      Plan:  - Today's Plan of Care:  Orthotics order placed for knee brace  Home exercise program provided    -We also discussed other future treatment options:  Referral to orthopedic surgeon, , Steroid injection of left knee or Synvisc One injection of left knee, Physical therapy  Synvisc one injection code  check with insurance on coverage    Follow Up: as needed    If you have any further questions for your physician or physician s care team you can call [phone] and use option 3 to leave a voice message. Calls received during business hours will be returned same day.              Follow-ups after your visit        Additional Services     ORTHOTICS REFERRAL       **This referral order prints off in the [facility]  (Orthotics & Prosthetics) Central Scheduling Office**    The [facility] Central Scheduling Staff will contact the patient to schedule appointments.     Central Scheduling Contact Information: [phone] ([place])    Orthotics: Left off  Knee Brace    Please be aware that coverage of these services is subject to the terms and limitations of your health insurance plan.  Call member services at your health plan with any benefit or coverage questions.      Please bring the following to your appointment:    >>   Any x-rays, CTs or MRIs which have been performed.  Contact the facility where they were done to arrange for  prior to your scheduled appointment.    >>   List of current medications   >>   This referral request   >>   Any documents/labs given to you for this referral                 "  Your next 10 appointments already scheduled     2018  2:00 PM CDT   Anticoagulation Visit with NB ANTI COAG   [facility]    [address] [place] [address] [phone]              Who to contact     If you have questions or need follow up information about today's clinic visit or your schedule please contact [facility] directly at [phone].  Normal or non-critical lab and imaging results will be communicated to you by [employer], letter or phone within 4 business days after the clinic has received the results. If you do not hear from us within 7 days, please contact the clinic through [employer] or phone. If you have a critical or abnormal lab result, we will notify you by phone as soon as possible.  Submit refill requests through [employer] or call your pharmacy and they will forward the refill request to us. Please allow 3 business days for your refill to be completed.          Additional Information About Your Visit        LE[employer] Product[employer]ar[employer]DEL Information     [employer] lets you send messages to your doctor, view your test results, renew your prescriptions, schedule appointments and more. To sign up, go to [email or web address] . Click on \"Log in\" on the left side of the screen, which will take you to the Welcome page. Then click on \"Sign up Now\" on the right side of the page.     You will be asked to enter the access code listed below, as well as some personal information. Please follow the directions to create your username and password.     Your access code is: [email or web address]  Expires: 2018 10:06 AM     Your access code will  in 90 days. If you need help or a new code, please call your [facility] clinic or [phone].        Care EveryWhere ID     This is your Care EveryWhere ID. This could be used by other organizations to access your [facility] medical records  [id number]        Your Vitals Were     Height " "BMI (Body Mass Index)                5' 3\" (1.6 m) 26.93 kg/m2           Blood Pressure from Last 3 Encounters:   06/08/18 138/82   05/18/18 112/64   03/26/18 148/78    Weight from Last 3 Encounters:   06/08/18 152 lb (68.9 kg)   05/18/18 150 lb (68 kg)   03/26/18 156 lb (70.8 kg)              We Performed the Following     ORTHOTICS REFERRAL        Primary Care Provider Office Phone # Fax #    Amadou Salas [phone] [address] [facility] [address]        Equal Access to Services     [facility] : Hadii kenzie Polanco, wadevonte watters, keli dewitt, lili hernandez . So [facility] [phone].    ATENCIÓN: Si habla español, tiene a garcia disposición servicios gratuitos de asistencia lingüística. [facility] [phone].    We comply with applicable federal civil rights laws and [place] laws. We do not discriminate on the basis of race, color, national origin, age, disability, sex, sexual orientation, or gender identity.            Thank you!     Thank you for choosing [facility]  for your care. Our goal is always to provide you with excellent care. Hearing back from our patients is one way we can continue to improve our services. Please take a few minutes to complete the written survey that you may receive in the mail after your visit with us. Thank you!             Your Updated Medication List - Protect others around you: Learn how to safely use, store and throw away your medicines at www.disposemymeds.org.          This list is accurate as of 6/8/18 11:21 AM.  Always use your most recent med list.                   Brand Name Dispense Instructions for use Diagnosis    * B-12 1000 MCG Tbcr     100 tablet    Take 1,000 mcg by mouth daily    Vitamin B12 deficiency (non anemic)       * cyanocobalamin 1000 MCG tablet    vitamin  B-12    30 tablet    TAKE ONE TABLET BY MOUTH DAILY    Vitamin B 12 deficiency "       calcium carbonate 500 MG tablet    OS- mg [place]. Ca     Take 1 tablet by mouth 2 times daily        celecoxib 200 MG capsule    celeBREX    30 capsule    Take 1 capsule (200 mg) by mouth daily Take with food.    Osteoarthritis of left knee, unspecified osteoarthritis type       * colchicine 0.6 MG tablet    COLCYRS    30 tablet    Take 1 tablet (0.6 mg) by mouth daily    Chronic gout, unspecified cause, unspecified site       * colchicine 0.6 MG tablet    COLCYRS    90 tablet    TAKE 1 TABLET(0.6 MG) BY MOUTH DAILY    Chronic gout, unspecified cause, unspecified site       * colchicine 0.6 MG tablet    COLCYRS    90 tablet    Take 1 tablet (0.6 mg) by mouth daily    Chronic gout, unspecified cause, unspecified site       metoprolol succinate 25 MG 24 hr tablet    TOPROL-XL    90 tablet    Take 1 tablet (25 mg) by mouth daily        Multi-vitamin Tabs tablet     100 tablet    Take 1 tablet by mouth daily        nortriptyline 25 MG capsule    PAMELOR    270 capsule    TAKE ONE CAPSULE MID MORNING AND TWO CAPSULES BY MOUTH DAILY AT BEDTIME    Anxiety       order for DME     1 Units    True pull Knee Brace Left    Chronic pain of left knee       temazepam 30 MG capsule    RESTORIL    30 capsule    TAKE ONE CAPSULE BY MOUTH AT BEDTIME AS NEEDED FOR SLEEP    Persistent insomnia       warfarin 2 MG tablet    COUMADIN    300 tablet    TAKE 3 TABS BY MOUTH ON MON, WED, FRI AND 4 TABS ALL OTHER DAYS OR AS DIRECTED BY ANTICOAGULATION CLINIC    Chronic atrial fibrillation (H)       * Notice:  This list has 5 medication(s) that are the same as other medications prescribed for you. Read the directions carefully, and ask your doctor or other care provider to review them with you.

## 2018-06-08 NOTE — PROGRESS NOTES
Sports Medicine Clinic Visit    PCP: Amadou Salas Octavio is a 82 year old male who is seen  in consultation at the request of  Heron Mayo M.D. presenting with left knee pain. Pain is chronic. He reports having a steroid injection in the left knee ~ 1 month ago with minimal pain relief. No physical therapy. Most pain is with twisting and change of direction.    Injury: Chronic knee pain    Location of Pain: left anterior knee pain  Duration of Pain: 1 year(s)  Rating of Pain at worst: 8/10  Rating of Pain Currently: 0/10  Symptoms are better with: Rest and biking  Symptoms are worse with: standing and stairs walking  Additional Features:   Positive: swelling, grinding and weakness   Negative: bruising, popping, locking, instability, paresthesias and numbness  Other evaluation and/or treatments so far consists of: Steroid injection by Dr Mayo.   Prior History of related problems: nothing    Social History: retired    Review of Systems  Skin: no bruising, no swelling  Musculoskeletal: as above  Neurologic: no numbness, paresthesias  Remainder of review of systems is negative including constitutional, CV, pulmonary, GI, except as noted in HPI or medical history.    Patient's current problem list, past medical and surgical history, and family history were reviewed.    Patient Active Problem List   Diagnosis     Anxiety     Alcohol abuse     Chronic atrial fibrillation (H)     Weight loss     Peripheral polyneuropathy     Chronic gout, unspecified cause, unspecified site     Essential hypertension with goal blood pressure less than 140/90     Hyperlipidemia LDL goal <130     Persistent insomnia     Lower urinary tract symptoms (LUTS)     Chronic anticoagulation     Long-term (current) use of anticoagulants [Z79.01]     Moderate episode of recurrent major depressive disorder (H)     Past Medical History:   Diagnosis Date     Anxiety      Gout      Hyperlipemia      Hypertension      Insomnia       "Paroxysmal atrial fibrillation (H)      No past surgical history on file.  No family history on file.      Objective  /82 (BP Location: Left arm, Patient Position: Chair, Cuff Size: Adult Regular)  Ht 5' 3\" (1.6 m)  Wt 152 lb (68.9 kg)  BMI 26.93 kg/m2    GENERAL APPEARANCE: healthy, alert and no distress   GAIT: NORMAL  SKIN: no suspicious lesions or rashes  HEENT: Sclera clear, anicteric  CV: good peripheral pulses  RESP: Breathing not labored  NEURO: Normal strength and tone, mentation intact and speech normal  PSYCH:  mentation appears normal and affect normal/bright    Left Knee exam  Inspection:      no effusion, swelling of bruising left    Patella:      Mobility -       hypomobile left    Tender:      None - points to medial joint line    Non Tender:      remainder of knee area bilateral    Knee ROM:      Range of motion limited on the left    Strength:      4/5 with knee extension left    Special Tests:     positive (+) Garima left       neg (-) varus at 0 deg and 30 deg left       neg (-) valgus at 0 deg and 30 deg left       Neg (-) anterior and posterior drawer    Gait:      normal    Neurovascular:      2+ peripheral pulses bilaterally and brisk capillary refill       sensation grossly intact    Radiology  I visualized and reviewed these images with the patient  XR KNEE STANDING AP BILAT AND LATERAL LEFT 3/26/2018 3:21 PM  HISTORY: Chronic left knee pain.  COMPARISON: None.  FINDINGS: Right knee arthroplasty hardware components appear  well-seated. Images of the left knee show complete loss of medial  tibiofemoral compartment joint space and moderate patellofemoral  compartment osteophyte formation. No joint effusion. No acute fracture  or malalignment. Dense vascular calcification noted on the lateral  view.  IMPRESSION: Moderate left knee osteoarthritis.    Assessment:  1. Arthritis of left knee      Discussed nature of degenerative arthrosis of the knee. Discussed symptom treatment with " over-the-counter medications, ice or heat, topical treatments, and rest if needed. Discussed use of sleeve or wrap for comfort. Discussed benefits of exercise and weight loss to reduce pressure at the knee. Discussed injection therapy. Also briefly discussed future consideration of referral to orthopedic surgery for further evaluation and discussion of arthroplasty.  - Could return for repeat steroid injection 3-4 months after last if desired    Plan:  - Today's Plan of Care:  Orthotics order placed for knee brace  Home exercise program provided    -We also discussed other future treatment options:  Referral to orthopedic surgeon, , Steroid injection of left knee or Synvisc One injection of left knee, Physical therapy  Synvisc one injection code  check with insurance on coverage    Follow Up: as needed    Concerning signs and symptoms were reviewed.  The patient expressed understanding of this management plan and all questions were answered at this time.    Thanks for the opportunity to participate in the care of this patient, I will keep you updated on their progress.    CC: Heron Davidson MD CAQ  Primary Care Sports Medicine  [facility]

## 2018-06-08 NOTE — PATIENT INSTRUCTIONS
Plan:  - Today's Plan of Care:  Orthotics order placed for knee brace  Home exercise program provided    -We also discussed other future treatment options:  Referral to orthopedic surgeon, , Steroid injection of left knee or Synvisc One injection of left knee, Physical therapy  Synvisc one injection code  check with insurance on coverage    Follow Up: as needed    If you have any further questions for your physician or physician s care team you can call [phone] and use option 3 to leave a voice message. Calls received during business hours will be returned same day.

## 2018-06-08 NOTE — LETTER
6/8/2018         RE: Nabor Cunningham  [address] [facility] [address]        Dear Colleague,    Thank you for referring your patient, Nabor Cunningham, to the [facility] [place]. Please see a copy of my visit note below.    Sports Medicine Clinic Visit    PCP: Amadou Salas    Nabor Cunningham is a 82 year old male who is seen  in consultation at the request of  Heron Mayo M.D. presenting with left knee pain. Pain is chronic. He reports having a steroid injection in the left knee ~ 1 month ago with minimal pain relief. No physical therapy. Most pain is with twisting and change of direction.    Injury: Chronic knee pain    Location of Pain: left anterior knee pain  Duration of Pain: 1 year(s)  Rating of Pain at worst: 8/10  Rating of Pain Currently: 0/10  Symptoms are better with: Rest and biking  Symptoms are worse with: standing and stairs walking  Additional Features:   Positive: swelling, grinding and weakness   Negative: bruising, popping, locking, instability, paresthesias and numbness  Other evaluation and/or treatments so far consists of: Steroid injection by Dr Mayo.   Prior History of related problems: nothing    Social History: retired    Review of Systems  Skin: no bruising, no swelling  Musculoskeletal: as above  Neurologic: no numbness, paresthesias  Remainder of review of systems is negative including constitutional, CV, pulmonary, GI, except as noted in HPI or medical history.    Patient's current problem list, past medical and surgical history, and family history were reviewed.    Patient Active Problem List   Diagnosis     Anxiety     Alcohol abuse     Chronic atrial fibrillation (H)     Weight loss     Peripheral polyneuropathy     Chronic gout, unspecified cause, unspecified site     Essential hypertension with goal blood pressure less than 140/90     Hyperlipidemia LDL goal <130     Persistent insomnia     Lower urinary tract symptoms (LUTS)      "Chronic anticoagulation     Long-term (current) use of anticoagulants [Z79.01]     Moderate episode of recurrent major depressive disorder (H)     Past Medical History:   Diagnosis Date     Anxiety      Gout      Hyperlipemia      Hypertension      Insomnia      Paroxysmal atrial fibrillation (H)      No past surgical history on file.  No family history on file.      Objective  /82 (BP Location: Left arm, Patient Position: Chair, Cuff Size: Adult Regular)  Ht 5' 3\" (1.6 m)  Wt 152 lb (68.9 kg)  BMI 26.93 kg/m2    GENERAL APPEARANCE: healthy, alert and no distress   GAIT: NORMAL  SKIN: no suspicious lesions or rashes  HEENT: Sclera clear, anicteric  CV: good peripheral pulses  RESP: Breathing not labored  NEURO: Normal strength and tone, mentation intact and speech normal  PSYCH:  mentation appears normal and affect normal/bright    Left Knee exam  Inspection:      no effusion, swelling of bruising left    Patella:      Mobility -       hypomobile left    Tender:      None - points to medial joint line    Non Tender:      remainder of knee area bilateral    Knee ROM:      Range of motion limited on the left    Strength:      4/5 with knee extension left    Special Tests:     positive (+) Garima left       neg (-) varus at 0 deg and 30 deg left       neg (-) valgus at 0 deg and 30 deg left       Neg (-) anterior and posterior drawer    Gait:      normal    Neurovascular:      2+ peripheral pulses bilaterally and brisk capillary refill       sensation grossly intact    Radiology  I visualized and reviewed these images with the patient  XR KNEE STANDING AP BILAT AND LATERAL LEFT 3/26/2018 3:21 PM  HISTORY: Chronic left knee pain.  COMPARISON: None.  FINDINGS: Right knee arthroplasty hardware components appear  well-seated. Images of the left knee show complete loss of medial  tibiofemoral compartment joint space and moderate patellofemoral  compartment osteophyte formation. No joint effusion. No acute " fracture  or malalignment. Dense vascular calcification noted on the lateral  view.  IMPRESSION: Moderate left knee osteoarthritis.    Assessment:  1. Arthritis of left knee      Discussed nature of degenerative arthrosis of the knee. Discussed symptom treatment with over-the-counter medications, ice or heat, topical treatments, and rest if needed. Discussed use of sleeve or wrap for comfort. Discussed benefits of exercise and weight loss to reduce pressure at the knee. Discussed injection therapy. Also briefly discussed future consideration of referral to orthopedic surgery for further evaluation and discussion of arthroplasty.  - Could return for repeat steroid injection 3-4 months after last if desired    Plan:  - Today's Plan of Care:  Orthotics order placed for knee brace  Home exercise program provided    -We also discussed other future treatment options:  Referral to orthopedic surgeon, , Steroid injection of left knee or Synvisc One injection of left knee, Physical therapy  Synvisc one injection code  check with insurance on coverage    Follow Up: as needed    Concerning signs and symptoms were reviewed.  The patient expressed understanding of this management plan and all questions were answered at this time.    Thanks for the opportunity to participate in the care of this patient, I will keep you updated on their progress.    CC: Heron Davidson MD CAQ  Primary Care Sports Medicine  [facility]    Again, thank you for allowing me to participate in the care of your patient.        Sincerely,        Britt Davidson MD

## 2018-06-11 ENCOUNTER — ANTICOAGULATION THERAPY VISIT (OUTPATIENT)
Dept: ANTICOAGULATION | Facility: CLINIC | Age: 83
End: 2018-06-11
Payer: MEDICARE

## 2018-06-11 DIAGNOSIS — Z79.01 LONG-TERM (CURRENT) USE OF ANTICOAGULANTS: ICD-10-CM

## 2018-06-11 DIAGNOSIS — Z79.01 CHRONIC ANTICOAGULATION: ICD-10-CM

## 2018-06-11 DIAGNOSIS — I48.20 CHRONIC ATRIAL FIBRILLATION (H): ICD-10-CM

## 2018-06-11 LAB — INR POINT OF CARE: 3.6 (ref 0.86–1.14)

## 2018-06-11 PROCEDURE — 99207 ZZC NO CHARGE NURSE ONLY: CPT

## 2018-06-11 PROCEDURE — 85610 PROTHROMBIN TIME: CPT | Mod: QW

## 2018-06-11 PROCEDURE — 36416 COLLJ CAPILLARY BLOOD SPEC: CPT

## 2018-06-11 NOTE — PROGRESS NOTES
ANTICOAGULATION FOLLOW-UP CLINIC VISIT    Patient Name:  Nabor Cunningham  Date:  6/11/2018  Contact Type:  Face to Face/Accompanied by daughter.    SUBJECTIVE:     Patient Findings     Positives Change in medications    Comments Patient's Nortriptyline dose was increased. According to literature this can cause an increase in bleed risk when combined with Warfarin. I adjusted patient's dose on Monday's to 4 mg for the next 2 weeks. He will follow his maintenance plan all other days of the week. Recheck in 2 weeks. Patient denies signs or symptoms of bleeding. Writer educated patient regarding increased bleed risk and when to seek immediate medical attention. Patient verbalized understanding.             OBJECTIVE    INR Protime   Date Value Ref Range Status   06/11/2018 3.6 (A) 0.86 - 1.14 Final       ASSESSMENT / PLAN  INR assessment SUPRA    Recheck INR In: 2 WEEKS    INR Location Clinic      Anticoagulation Summary as of 6/11/2018     INR goal 2.0-3.0   Today's INR 3.6!   Warfarin maintenance plan 6 mg (2 mg x 3) on Mon, Wed, Fri; 8 mg (2 mg x 4) all other days   Full warfarin instructions 6/11: 4 mg; 6/18: 4 mg; Otherwise 6 mg on Mon, Wed, Fri; 8 mg all other days   Weekly warfarin total 50 mg   Plan last modified Swathi Antonio, RN (4/6/2017)   Next INR check 6/25/2018   Target end date Indefinite    Indications   Chronic anticoagulation [Z79.01]  Chronic atrial fibrillation (H) [I48.2]  Long-term (current) use of anticoagulants [Z79.01] [Z79.01]         Anticoagulation Episode Summary     INR check location     Preferred lab     Send INR reminders to [facility] CLINIC POOL    Comments * 2mg tabs      Anticoagulation Care Providers     Provider Role Specialty Phone number    Heron Mayo MD [facility] [phone]            See the Encounter Report to view Anticoagulation Flowsheet and Dosing Calendar (Go to Encounters tab in chart review, and find the Anticoagulation Therapy  Visit)        Daphne Lamar RN

## 2018-06-11 NOTE — MR AVS SNAPSHOT
Nabor Cunningham   6/11/2018 2:00 PM   Anticoagulation Therapy Visit    Description:  82 year old male   Provider:  NB ANTI COAG   Department:  [facility]           INR as of 6/11/2018     Today's INR 3.6!      Anticoagulation Summary as of 6/11/2018     INR goal 2.0-3.0   Today's INR 3.6!   Full warfarin instructions 6/11: 4 mg; 6/18: 4 mg; Otherwise 6 mg on Mon, Wed, Fri; 8 mg all other days   Next INR check 6/25/2018    Indications   Chronic anticoagulation [Z79.01]  Chronic atrial fibrillation (H) [I48.2]  Long-term (current) use of anticoagulants [Z79.01] [Z79.01]         Your next Anticoagulation Clinic appointment(s)     Jun 25, 2018  2:00 PM CDT   Anticoagulation Visit with NB ANTI COAG   [facility]    [address] [place] [address] [phone]              Contact Numbers     Please call [phone] with any problems or questions regarding your therapy.    If you need to cancel and/or reschedule your appointment please call one of the following numbers:  [facility] - [phone]  [facility] [phone]  [facility] [phone]  [facility] [phone]  [place] - [phone]            June 2018 Details    Sun Mon Tue Wed Thu Fri Sat          1               2                 3               4               5               6               7               8               9                 10               11      4 mg   See details      12      8 mg         13      6 mg         14      8 mg         15      6 mg         16      8 mg           17      8 mg         18      4 mg         19      8 mg         20      6 mg         21      8 mg         22      6 mg         23      8 mg           24      8 mg         25            26               27               28               29               30                Date Details   06/11 This INR check       Date of next INR:  6/25/2018         How to take your warfarin dose     To take:   4 mg Take 2 of the 2 mg tablets.    To take:  6 mg Take 3 of the 2 mg tablets.    To take:  8 mg Take 4 of the 2 mg tablets.

## 2018-06-25 ENCOUNTER — ANTICOAGULATION THERAPY VISIT (OUTPATIENT)
Dept: ANTICOAGULATION | Facility: CLINIC | Age: 83
End: 2018-06-25
Payer: MEDICARE

## 2018-06-25 DIAGNOSIS — Z79.01 LONG-TERM (CURRENT) USE OF ANTICOAGULANTS: ICD-10-CM

## 2018-06-25 DIAGNOSIS — Z79.01 CHRONIC ANTICOAGULATION: ICD-10-CM

## 2018-06-25 DIAGNOSIS — I48.20 CHRONIC ATRIAL FIBRILLATION (H): ICD-10-CM

## 2018-06-25 LAB — INR POINT OF CARE: 3.2 (ref 0.86–1.14)

## 2018-06-25 PROCEDURE — 99207 ZZC NO CHARGE NURSE ONLY: CPT

## 2018-06-25 PROCEDURE — 85610 PROTHROMBIN TIME: CPT | Mod: QW

## 2018-06-25 PROCEDURE — 36416 COLLJ CAPILLARY BLOOD SPEC: CPT

## 2018-06-25 NOTE — PROGRESS NOTES
ANTICOAGULATION FOLLOW-UP CLINIC VISIT    Patient Name:  Nabor Cunningham  Date:  6/25/2018  Contact Type:  Face to Face/Accompanied by daughter    SUBJECTIVE:     Patient Findings     Positives Change in medications (Nortryptiline)    Comments Patient had 48 mg in the past 7 days. INR is still slightly elevated. I will decrease dose by 4% to 46 mg by the next INR check in 2 weeks.            OBJECTIVE    INR Protime   Date Value Ref Range Status   06/25/2018 3.2 (A) 0.86 - 1.14 Final       ASSESSMENT / PLAN  INR assessment SUPRA    Recheck INR In: 2 WEEKS    INR Location Clinic      Anticoagulation Summary as of 6/25/2018     INR goal 2.0-3.0   Today's INR 3.2!   Warfarin maintenance plan 6 mg (2 mg x 3) on Mon, Wed, Fri; 8 mg (2 mg x 4) all other days   Full warfarin instructions 6/26: 6 mg; 6/28: 6 mg; 7/3: 6 mg; 7/5: 6 mg; Otherwise 6 mg on Mon, Wed, Fri; 8 mg all other days   Weekly warfarin total 50 mg   Plan last modified Swathi Antonio RN (4/6/2017)   Next INR check 7/9/2018   Target end date Indefinite    Indications   Chronic anticoagulation [Z79.01]  Chronic atrial fibrillation (H) [I48.2]  Long-term (current) use of anticoagulants [Z79.01] [Z79.01]         Anticoagulation Episode Summary     INR check location     Preferred lab     Send INR reminders to [facility] CLINIC POOL    Comments * 2mg tabs      Anticoagulation Care Providers     Provider Role Specialty Phone number    Heron Mayo MD [facility] [phone]            See the Encounter Report to view Anticoagulation Flowsheet and Dosing Calendar (Go to Encounters tab in chart review, and find the Anticoagulation Therapy Visit)        Daphne Lamar RN

## 2018-06-25 NOTE — MR AVS SNAPSHOT
Nabor Cunningham   6/25/2018 2:00 PM   Anticoagulation Therapy Visit    Description:  82 year old male   Provider:  NB ANTI COAG   Department:  [facility] Anticoag           INR as of 6/25/2018     Today's INR 3.2!      Anticoagulation Summary as of 6/25/2018     INR goal 2.0-3.0   Today's INR 3.2!   Full warfarin instructions 6/26: 6 mg; 6/28: 6 mg; 7/3: 6 mg; 7/5: 6 mg; Otherwise 6 mg on Mon, Wed, Fri; 8 mg all other days   Next INR check 7/9/2018    Indications   Chronic anticoagulation [Z79.01]  Chronic atrial fibrillation (H) [I48.2]  Long-term (current) use of anticoagulants [Z79.01] [Z79.01]         Your next Anticoagulation Clinic appointment(s)     Jul 09, 2018  2:00 PM CDT   Anticoagulation Visit with NB ANTI COAG   [facility]    [address] [place] [address] [phone]              Contact Numbers     Please call [phone] with any problems or questions regarding your therapy.    If you need to cancel and/or reschedule your appointment please call one of the following numbers:  [facility] - [phone]  [place] - [phone]  [facility] [phone]  [facility] [phone]  [place] - [phone]            June 2018 Details    Sun Mon Tue Wed Thu Fri Sat          1               2                 3               4               5               6               7               8               9                 10               11               12               13               14               15               16                 17               18               19               20               21               22               23                 24               25      6 mg   See details      26      6 mg         27      6 mg         28      6 mg         29      6 mg         30      8 mg          Date Details   06/25 This INR check               How to take your warfarin dose     To take:  6 mg Take 3 of the 2 mg tablets.     To take:  8 mg Take 4 of the 2 mg tablets.           July 2018 Details    Sun Mon Tue Wed Thu Fri Sat     1      8 mg         2      6 mg         3      6 mg         4      6 mg         5      6 mg         6      6 mg         7      8 mg           8      8 mg         9            10               11               12               13               14                 15               16               17               18               19               20               21                 22               23               24               25               26               27               28                 29               30               31                    Date Details   No additional details    Date of next INR:  7/9/2018         How to take your warfarin dose     To take:  6 mg Take 3 of the 2 mg tablets.    To take:  8 mg Take 4 of the 2 mg tablets.

## 2018-06-29 ENCOUNTER — OFFICE VISIT (OUTPATIENT)
Dept: ORTHOPEDICS | Facility: CLINIC | Age: 83
End: 2018-06-29
Payer: MEDICARE

## 2018-06-29 VITALS — SYSTOLIC BLOOD PRESSURE: 128 MMHG | DIASTOLIC BLOOD PRESSURE: 69 MMHG

## 2018-06-29 DIAGNOSIS — M17.12 ARTHRITIS OF LEFT KNEE: Primary | ICD-10-CM

## 2018-06-29 PROCEDURE — 99213 OFFICE O/P EST LOW 20 MIN: CPT | Mod: 25 | Performed by: PEDIATRICS

## 2018-06-29 PROCEDURE — 20610 DRAIN/INJ JOINT/BURSA W/O US: CPT | Mod: LT | Performed by: PEDIATRICS

## 2018-06-29 RX ORDER — TRIAMCINOLONE ACETONIDE 40 MG/ML
40 INJECTION, SUSPENSION INTRA-ARTICULAR; INTRAMUSCULAR
Status: DISCONTINUED | OUTPATIENT
Start: 2018-06-29 | End: 2018-09-07

## 2018-06-29 RX ORDER — LIDOCAINE HYDROCHLORIDE 10 MG/ML
2 INJECTION, SOLUTION INFILTRATION; PERINEURAL
Status: DISCONTINUED | OUTPATIENT
Start: 2018-06-29 | End: 2019-12-18

## 2018-06-29 RX ADMIN — LIDOCAINE HYDROCHLORIDE 2 ML: 10 INJECTION, SOLUTION INFILTRATION; PERINEURAL at 10:02

## 2018-06-29 RX ADMIN — TRIAMCINOLONE ACETONIDE 40 MG: 40 INJECTION, SUSPENSION INTRA-ARTICULAR; INTRAMUSCULAR at 10:02

## 2018-06-29 NOTE — PATIENT INSTRUCTIONS
Plan:  - Today's Plan of Care:  Steroid injection of the left knee was performed today in clinic    -We also discussed other future treatment options:  Referral to Orthopedic surgeon, Rehab: Physical Therapy  Medical Equipment: Bracing or Synvisc One injection of knee    Follow Up: as needed    If you have any further questions for your physician or physician s care team you can call [phone] and use option 3 to leave a voice message. Calls received during business hours will be returned same day.    After the Injection     After the injection, strenuous and repetitive activity should be minimized for approximately 48 hours.   Ice should be applied to the injected area at least for the next 48 hours.   Apply ice to the injected area at least 3 - 4 times a day for 20 minutes each time for the next 48 hours. This can reduce the painful  flare  reaction that can follow an injection the next day. This reaction can cause the area that was injected to hurt more the next day just from the injection. This will resolve within a day if it does occur.     Use over-the-counter pain medications such as Tylenol to help with the pain if necessary.     After 48 hours, icing the area may be continued if you find it beneficial.     The lidocaine or marcaine (commonly called Novocain) is an anesthetic agent that is injected with the steroid will typically relieve your pain for a few hours following the injection. If the  Novocain  and steroid are injected near a nerve, you may experience local numbness or weakness from the nerve block until it wears off. After this wears off your pain may return until the steroid takes effect.   The steroid may be effective immediately after the injection. Do not be concerned if the injection is not effective in relieving your symptoms immediately. In some cases, it may take up to two weeks for the steroid to work.   If you are diabetic, the corticosteroid may cause your blood sugar to become  elevated for several days following the injection. This response usually lasts about 2-4 days before it returns to your normal level.   You should report any adverse reaction to you doctor. Call if there are any questions.

## 2018-06-29 NOTE — MR AVS SNAPSHOT
After Visit Summary   6/29/2018    Nabor Cunningham    MRN: [id number]           Patient Information     Date Of Birth          1935        Visit Information        Provider Department      6/29/2018 9:20 AM Britt Davidson MD [facility] [place]        Today's Diagnoses     Arthritis of left knee    -  1      Care Instructions        Plan:  - Today's Plan of Care:  Steroid injection of the left knee was performed today in clinic    -We also discussed other future treatment options:  Referral to Orthopedic surgeon, Rehab: Physical Therapy  Medical Equipment: Bracing or Synvisc One injection of knee    Follow Up: as needed    If you have any further questions for your physician or physician s care team you can call [phone] and use option 3 to leave a voice message. Calls received during business hours will be returned same day.    After the Injection     After the injection, strenuous and repetitive activity should be minimized for approximately 48 hours.   Ice should be applied to the injected area at least for the next 48 hours.   Apply ice to the injected area at least 3 - 4 times a day for 20 minutes each time for the next 48 hours. This can reduce the painful  flare  reaction that can follow an injection the next day. This reaction can cause the area that was injected to hurt more the next day just from the injection. This will resolve within a day if it does occur.     Use over-the-counter pain medications such as Tylenol to help with the pain if necessary.     After 48 hours, icing the area may be continued if you find it beneficial.     The lidocaine or marcaine (commonly called Novocain) is an anesthetic agent that is injected with the steroid will typically relieve your pain for a few hours following the injection. If the  Novocain  and steroid are injected near a nerve, you may experience local numbness or weakness from the nerve block until it wears off.  After this wears off your pain may return until the steroid takes effect.   The steroid may be effective immediately after the injection. Do not be concerned if the injection is not effective in relieving your symptoms immediately. In some cases, it may take up to two weeks for the steroid to work.   If you are diabetic, the corticosteroid may cause your blood sugar to become elevated for several days following the injection. This response usually lasts about 2-4 days before it returns to your normal level.   You should report any adverse reaction to you doctor. Call if there are any questions.             Follow-ups after your visit        Your next 10 appointments already scheduled     Jul 09, 2018  2:00 PM CDT   Anticoagulation Visit with NB ANTI COAG   [facility]    [address] [place] [address] [phone]              Who to contact     If you have questions or need follow up information about today's clinic visit or your schedule please contact [facility] [place] directly at [phone].  Normal or non-critical lab and imaging results will be communicated to you by MyChart, letter or phone within 4 business days after the clinic has received the results. If you do not hear from us within 7 days, please contact the clinic through MyChart or phone. If you have a critical or abnormal lab result, we will notify you by phone as soon as possible.  Submit refill requests through [employer] or call your pharmacy and they will forward the refill request to us. Please allow 3 business days for your refill to be completed.          Additional Information About Your Visit        Care EveryWhere ID     This is your Care EveryWhere ID. This could be used by other organizations to access your [facility] medical records  [id number]         Blood Pressure from Last 3 Encounters:   06/29/18 128/69   06/08/18 138/82   05/18/18 112/64     Weight from Last 3 Encounters:   06/08/18 152 lb (68.9 kg)   05/18/18 150 lb (68 kg)   03/26/18 156 lb (70.8 kg)              We Performed the Following     Large Joint Injection/Arthocentesis        Primary Care Provider Office Phone # Fax #    Amadou Salas [phone] [address] [facility] [address]        Equal Access to Services     SHIRA DELEON : Hadii aad ku hadasho Soomaali, waaxda luqadaha, qaybta kaalmada adeegyada, waxay caitlinin haygarryn facundo beckwithtusharmarialuisa hernandez . So [facility] [phone].    ATENCIÓN: Si sadaf sanchez, tiene a garcia disposición servicios gratuitos de asistencia lingüística. Llame al [phone].    We comply with applicable federal civil rights laws and [place] laws. We do not discriminate on the basis of race, color, national origin, age, disability, sex, sexual orientation, or gender identity.            Thank you!     Thank you for choosing [facility]  for your care. Our goal is always to provide you with excellent care. Hearing back from our patients is one way we can continue to improve our services. Please take a few minutes to complete the written survey that you may receive in the mail after your visit with us. Thank you!             Your Updated Medication List - Protect others around you: Learn how to safely use, store and throw away your medicines at www.disposemymeds.org.          This list is accurate as of 6/29/18 10:06 AM.  Always use your most recent med list.                   Brand Name Dispense Instructions for use Diagnosis    * B-12 1000 MCG Tbcr     100 tablet    Take 1,000 mcg by mouth daily    Vitamin B12 deficiency (non anemic)       * cyanocobalamin 1000 MCG tablet    vitamin  B-12    30 tablet    TAKE ONE TABLET BY MOUTH DAILY    Vitamin B 12 deficiency       calcium carbonate 500 MG tablet    OS- mg [place]. Ca     Take 1 tablet by mouth 2 times daily        celecoxib 200 MG capsule    celeBREX     30 capsule    Take 1 capsule (200 mg) by mouth daily Take with food.    Osteoarthritis of left knee, unspecified osteoarthritis type       * colchicine 0.6 MG tablet    COLCYRS    30 tablet    Take 1 tablet (0.6 mg) by mouth daily    Chronic gout, unspecified cause, unspecified site       * colchicine 0.6 MG tablet    COLCYRS    90 tablet    TAKE 1 TABLET(0.6 MG) BY MOUTH DAILY    Chronic gout, unspecified cause, unspecified site       * colchicine 0.6 MG tablet    COLCYRS    90 tablet    Take 1 tablet (0.6 mg) by mouth daily    Chronic gout, unspecified cause, unspecified site       metoprolol succinate 25 MG 24 hr tablet    TOPROL-XL    90 tablet    Take 1 tablet (25 mg) by mouth daily        Multi-vitamin Tabs tablet     100 tablet    Take 1 tablet by mouth daily        nortriptyline 25 MG capsule    PAMELOR    270 capsule    TAKE ONE CAPSULE MID MORNING AND TWO CAPSULES BY MOUTH DAILY AT BEDTIME    Anxiety       order for DME     1 Units    True pull Knee Brace Left    Chronic pain of left knee       temazepam 30 MG capsule    RESTORIL    30 capsule    TAKE ONE CAPSULE BY MOUTH AT BEDTIME AS NEEDED FOR SLEEP    Persistent insomnia       warfarin 2 MG tablet    COUMADIN    300 tablet    TAKE 3 TABS BY MOUTH ON MON, WED, FRI AND 4 TABS ALL OTHER DAYS OR AS DIRECTED BY ANTICOAGULATION CLINIC    Chronic atrial fibrillation (H)       * Notice:  This list has 5 medication(s) that are the same as other medications prescribed for you. Read the directions carefully, and ask your doctor or other care provider to review them with you.

## 2018-06-29 NOTE — PROGRESS NOTES
Sports Medicine Clinic Visit - Interim History June 29, 2018    PCP: Amadou Salas Octavio is a 82 year old male who is seen in f/u up for Arthritis of left knee. Since last visit on 6/8/18, patient has had no change in his knee pain.  Here to review treatment options.  - Difficult history, patient says he has achy pain all the time, walks with a limp, worst pain is with twisting or turning in the knee.  Prior steroid injection didn't help, however, would consider another trial.  - presents today with son    Location of Pain: left anterior knee pain  Duration of Pain: 1 year(s)  Rating of Pain at worst: 8/10  Rating of Pain Currently: 0/10  Symptoms are better with: Rest and biking  Symptoms are worse with: standing and stairs walking  Additional Features:                        Positive: swelling, grinding and weakness                        Negative: bruising, popping, locking, instability, paresthesias and numbness  Other evaluation and/or treatments so far consists of: Steroid injection by Dr Mayo.   Prior History of related problems: nothing    Social History: retired    Review of Systems  Skin: no bruising, no swelling  Musculoskeletal: as above  Neurologic: no numbness, paresthesias  Remainder of review of systems is negative including constitutional, CV, pulmonary, GI, except as noted in HPI or medical history.    Patient's current problem list, past medical and surgical history, and family history were reviewed.    Patient Active Problem List   Diagnosis     Anxiety     Alcohol abuse     Chronic atrial fibrillation (H)     Weight loss     Peripheral polyneuropathy     Chronic gout, unspecified cause, unspecified site     Essential hypertension with goal blood pressure less than 140/90     Hyperlipidemia LDL goal <130     Persistent insomnia     Lower urinary tract symptoms (LUTS)     Chronic anticoagulation     Long-term (current) use of anticoagulants [Z79.01]     Moderate episode of recurrent  major depressive disorder (H)     Past Medical History:   Diagnosis Date     Anxiety      Gout      Hyperlipemia      Hypertension      Insomnia      Paroxysmal atrial fibrillation (H)      No past surgical history on file.  No family history on file.    Objective  /69 (BP Location: Right arm, Patient Position: Chair, Cuff Size: Adult Regular)    GENERAL APPEARANCE: healthy, alert and no distress   GAIT: antalgic  SKIN: no suspicious lesions or rashes  HEENT: Sclera clear, anicteric  CV: good peripheral pulses  RESP: Breathing not labored  NEURO: Normal strength and tone, mentation intact and speech normal  PSYCH:  mentation appears normal and affect normal/bright    Left Knee exam  Inspection:      no effusion, swelling of bruising left     Patella:      Mobility -       hypomobile left     Tender:      None - points to medial joint line     Non Tender:      remainder of knee area bilateral     Knee ROM:      Range of motion limited on the left     Strength:      4/5 with knee extension left     Special Tests:     positive (+) Garima left       neg (-) varus at 0 deg and 30 deg left       neg (-) valgus at 0 deg and 30 deg left       Neg (-) anterior and posterior drawer     Gait:      normal     Neurovascular:      2+ peripheral pulses bilaterally and brisk capillary refill       sensation grossly intact    Radiology  I visualized and reviewed these images with the patient  XR KNEE STANDING AP BILAT AND LATERAL LEFT 3/26/2018 3:21 PM  HISTORY: Chronic left knee pain.  COMPARISON: None.  FINDINGS: Right knee arthroplasty hardware components appear  well-seated. Images of the left knee show complete loss of medial  tibiofemoral compartment joint space and moderate patellofemoral  compartment osteophyte formation. No joint effusion. No acute fracture  or malalignment. Dense vascular calcification noted on the lateral  view.  IMPRESSION: Moderate left knee osteoarthritis.    Assessment:  1. Arthritis of left  knee      Discussed nature of degenerative arthrosis of the knee. Discussed symptom treatment with over-the-counter medications, ice or heat, topical treatments, and rest if needed. Discussed use of sleeve or wrap for comfort. Discussed benefits of exercise and weight loss to reduce pressure at the knee. Discussed injection therapy. Also briefly discussed future consideration of referral to orthopedic surgery for further evaluation and discussion of arthroplasty.  - Long discussion about various options for treatment and goals of different options (PT, bracing, injections).  Patient elects to trial another corticosteroid injection today.    Plan:  - Today's Plan of Care:  Steroid injection of the left knee was performed today in clinic    -We also discussed other future treatment options:  Referral to Orthopedic surgeon, Rehab: Physical Therapy  Medical Equipment: Bracing or Synvisc One injection of knee    Follow Up: as needed    Large Joint Injection/Arthocentesis  Date/Time: 6/29/2018 10:02 AM  Performed by: ARLINE SHERMAN  Authorized by: ARLINE SHERMAN     Indications:  Pain  Needle Size:  25 G  Guidance: landmark guided    Approach:  Anterolateral  Location:  Knee  Site:  L knee joint  Medications:  40 mg triamcinolone acetonide 40 MG/ML; 2 mL lidocaine 1 %  Consent Given by:  Patient  Prep: patient was prepped and draped in usual sterile fashion     The risks, benefits and complications of steroid injection were discussed with the patient (including but not limited to: bleeding, infection, pain, scar, damage to adjacent structures, atrophy or necrosis of soft tissue, skin blanching, failure to relieve symptoms, worsening of symptoms, allergic reaction). After this discussion all questions were addressed and answered and the patient elected to proceed. The patient tolerated the procedure well without complications.  Also discussed that if diabetic, recommend close monitoring of blood sugars over the next week  as cortisone injections can temporarily elevate blood sugars.      Concerning signs and symptoms were reviewed.  The patient expressed understanding of this management plan and all questions were answered at this time.    Britt Davidson MD CAQ  Primary Care Sports Medicine  [facility]

## 2018-07-12 ENCOUNTER — ANTICOAGULATION THERAPY VISIT (OUTPATIENT)
Dept: ANTICOAGULATION | Facility: CLINIC | Age: 83
End: 2018-07-12
Payer: MEDICARE

## 2018-07-12 DIAGNOSIS — I48.20 CHRONIC ATRIAL FIBRILLATION (H): ICD-10-CM

## 2018-07-12 DIAGNOSIS — Z79.01 LONG-TERM (CURRENT) USE OF ANTICOAGULANTS: ICD-10-CM

## 2018-07-12 DIAGNOSIS — Z79.01 CHRONIC ANTICOAGULATION: ICD-10-CM

## 2018-07-12 LAB — INR POINT OF CARE: 2.4 (ref 0.86–1.14)

## 2018-07-12 PROCEDURE — 36416 COLLJ CAPILLARY BLOOD SPEC: CPT

## 2018-07-12 PROCEDURE — 85610 PROTHROMBIN TIME: CPT | Mod: QW

## 2018-07-12 PROCEDURE — 99207 ZZC NO CHARGE NURSE ONLY: CPT

## 2018-07-12 RX ORDER — WARFARIN SODIUM 2 MG/1
TABLET ORAL
Qty: 300 TABLET | Refills: 0 | COMMUNITY
Start: 2018-07-12 | End: 2018-08-17

## 2018-07-12 NOTE — PROGRESS NOTES
ANTICOAGULATION FOLLOW-UP CLINIC VISIT    Patient Name:  Nabor Cunningham  Date:  7/12/2018  Contact Type:  Face to Face    SUBJECTIVE:     Patient Findings     Positives No Problem Findings    Comments No changes in diet, activity level, medications (including over the counter), or health. No missed doses of warfarin. Patient took dosing as prescribed. No signs of clots or bleeding concerns. Patient will continue recently reduced maintenance warfarin dosing but writer will spread out the larger doses more evenly throughout the week. Recheck in 4 weeks.              OBJECTIVE    INR Protime   Date Value Ref Range Status   07/12/2018 2.4 (A) 0.86 - 1.14 Final       ASSESSMENT / PLAN  INR assessment THER    Recheck INR In: 4 WEEKS    INR Location Clinic      Anticoagulation Summary as of 7/12/2018     INR goal 2.0-3.0   Today's INR 2.4   Warfarin maintenance plan 8 mg (2 mg x 4) on Mon, Thu; 6 mg (2 mg x 3) all other days   Full warfarin instructions 8 mg on Mon, Thu; 6 mg all other days   Weekly warfarin total 46 mg   Plan last modified Belia Jones RN (7/12/2018)   Next INR check 8/9/2018   Target end date Indefinite    Indications   Chronic anticoagulation [Z79.01]  Chronic atrial fibrillation (H) [I48.2]  Long-term (current) use of anticoagulants [Z79.01] [Z79.01]         Anticoagulation Episode Summary     INR check location     Preferred lab     Send INR reminders to [facility] CLINIC POOL    Comments * 2mg tabs      Anticoagulation Care Providers     Provider Role Specialty Phone number    Heron Mayo MD [facility] [phone]            See the Encounter Report to view Anticoagulation Flowsheet and Dosing Calendar (Go to Encounters tab in chart review, and find the Anticoagulation Therapy Visit)        Belia Jones RN

## 2018-07-12 NOTE — MR AVS SNAPSHOT
Nabor Cunningham   7/12/2018 3:15 PM   Anticoagulation Therapy Visit    Description:  82 year old male   Provider:  NB ANTI COAG   Department:  [facility] Anticoag           INR as of 7/12/2018     Today's INR 2.4      Anticoagulation Summary as of 7/12/2018     INR goal 2.0-3.0   Today's INR 2.4   Full warfarin instructions 8 mg on Mon, Thu; 6 mg all other days   Next INR check 8/9/2018    Indications   Chronic anticoagulation [Z79.01]  Chronic atrial fibrillation (H) [I48.2]  Long-term (current) use of anticoagulants [Z79.01] [Z79.01]         Your next Anticoagulation Clinic appointment(s)     Aug 09, 2018  3:15 PM CDT   [facility]   [facility]    [address] [place] [address] [phone]              Contact Numbers     Please call [phone] with any problems or questions regarding your therapy.    If you need to cancel and/or reschedule your appointment please call one of the following numbers:  [facility] - [phone]  [facility] [phone]  [facility] [phone]  [facility] [phone]  [place] - [phone]            July 2018 Details    Sun Mon Tue Wed Thu Fri Sat     1               2               3               4               5               6               7                 8               9               10               11               12      8 mg   See details      13      6 mg         14      6 mg           15      6 mg         16      8 mg         17      6 mg         18      6 mg         19      8 mg         20      6 mg         21      6 mg           22      6 mg         23      8 mg         24      6 mg         25      6 mg         26      8 mg         27      6 mg         28      6 mg           29      6 mg         30      8 mg         31      6 mg              Date Details   07/12 This INR check               How to take your warfarin dose     To take:  6 mg Take 3 of the 2 mg  tablets.    To take:  8 mg Take 4 of the 2 mg tablets.           August 2018 Details    Sun Mon Tue Wed Thu Fri Sat        1      6 mg         2      8 mg         3      6 mg         4      6 mg           5      6 mg         6      8 mg         7      6 mg         8      6 mg         9            10               11                 12               13               14               15               16               17               18                 19               20               21               22               23               24               25                 26               27               28               29               30               31                 Date Details   No additional details    Date of next INR:  8/9/2018         How to take your warfarin dose     To take:  6 mg Take 3 of the 2 mg tablets.    To take:  8 mg Take 4 of the 2 mg tablets.

## 2018-07-18 DIAGNOSIS — M1A.9XX0 CHRONIC GOUT, UNSPECIFIED CAUSE, UNSPECIFIED SITE: ICD-10-CM

## 2018-07-19 RX ORDER — COLCHICINE 0.6 MG/1
TABLET ORAL
Qty: 90 TABLET | Refills: 5 | OUTPATIENT
Start: 2018-07-19

## 2018-07-19 NOTE — TELEPHONE ENCOUNTER
Duplicate.  Medication Detail      Disp Refills Start End ISHAN   colchicine (COLCYRS) 0.6 MG tablet 90 tablet 1 5/24/2018  No   Sig: TAKE 1 TABLET(0.6 MG) BY MOUTH DAILY   Class: E-Prescribe   Notes to Pharmacy: Would like 90 days supply please   Order: [id number]   E-Prescribing Status: Receipt confirmed by pharmacy (5/24/2018  8:32 AM CDT)     Kirsty RG RN

## 2018-07-19 NOTE — TELEPHONE ENCOUNTER
"Requested Prescriptions   Pending Prescriptions Disp Refills     colchicine (COLCYRS) 0.6 MG tablet [Pharmacy Med Name: COLCHICINE 0.6MG TABLETS] 90 tablet 5     Sig: TAKE 1 TABLET(0.6 MG) BY MOUTH DAILY    Gout Agents Protocol Failed    7/18/2018  6:47 PM       Failed - CBC on file in past 12 months    Recent Labs   Lab Test  03/22/17   1240  01/06/17   1008   WBC  6.1   --    [id number]   --   5.6   RBC  4.92   --    [id number]   --   4.96   HGB  14.1  14.5   HCT  42.7  41.9   PLT  128*  131*       For [facility] ONLY: WHPR123 = WBC, THNR636 = RBC         Failed - ALT on file in past 12 months    Recent Labs   Lab Test  03/22/17   1240   ALT  56            Failed - Has Uric Acid on file in past 12 months and value is less than 6    Recent Labs   Lab Test  07/06/16   1947   URIC  7.5     If level is 6mg/dL or greater, ok to refill one time and refer to provider.          Failed - Normal serum creatinine on file in the past 12 months    Recent Labs   Lab Test  03/22/17   1240   CR  0.70            Passed - Recent (12 mo) or future (30 days) visit within the authorizing provider's specialty    Patient had office visit in the last 12 months or has a visit in the next 30 days with authorizing provider or within the authorizing provider's specialty.  See \"Patient Info\" tab in inbasket, or \"Choose Columns\" in Meds & Orders section of the refill encounter.           Passed - Patient is age 18 or older        Last Written Prescription Date:  5/29/18  Last Fill Quantity: 90,  # refills: 0   Last office visit: 5/18/2018 with prescribing provider:     Future Office Visit:      "

## 2018-07-24 ENCOUNTER — TELEPHONE (OUTPATIENT)
Dept: FAMILY MEDICINE | Facility: CLINIC | Age: 83
End: 2018-07-24

## 2018-07-24 DIAGNOSIS — M1A.9XX0 CHRONIC GOUT, UNSPECIFIED CAUSE, UNSPECIFIED SITE: Primary | ICD-10-CM

## 2018-07-24 RX ORDER — COLCHICINE 0.6 MG/1
0.6 TABLET ORAL DAILY
Qty: 30 TABLET | Refills: 0 | Status: ON HOLD | OUTPATIENT
Start: 2018-07-24 | End: 2018-09-07

## 2018-07-24 NOTE — PROGRESS NOTES
Patient Information     Patient Name  Nabor Cunningham MRN  [id number] Sex  Male   1935      Letter by Amadou Salas MD at      Author:  Amadou Salas MD Service:  (none) Author Type:  (none)    Filed:   Encounter Date:  2017 Status:  (Other)           Nabor Cunningham  [address] [place] [address]          2017    Dear Mr. Cunningham:    Following are the tests completed during your last clinic visit:    Results for orders placed or performed in visit on 17      COMP METABOLIC PANEL      Result  Value Ref Range    SODIUM 139 133 - 143 mmol/L    POTASSIUM 4.3 3.5 - 5.1 mmol/L    CHLORIDE 103 98 - 107 mmol/L    CO2,TOTAL 29 21 - 31 mmol/L    ANION GAP 7 5 - 18                    GLUCOSE 93 70 - 105 mg/dL    CALCIUM 9.7 8.6 - 10.3 mg/dL    BUN 13 7 - 25 mg/dL    CREATININE 0.76 0.70 - 1.30 mg/dL    BUN/CREAT RATIO           17                    GFR if African American >60 >60 ml/min/1.73m2    GFR if not African American >60 >60 ml/min/1.73m2    ALBUMIN 4.1 3.5 - 5.7 g/dL    PROTEIN,TOTAL 6.8 6.4 - 8.9 g/dL    GLOBULIN                  2.7 2.0 - 3.7 g/dL    A/G RATIO 1.5 1.0 - 2.0                    BILIRUBIN,TOTAL 0.7 0.3 - 1.0 mg/dL    ALK PHOSPHATASE 73 34 - 104 IU/L    ALT (SGPT) 27 7 - 52 IU/L    AST (SGOT) 23 13 - 39 IU/L   TSH      Result  Value Ref Range    TSH 1.25 0.34 - 5.60 uIU/mL   AMYLASE      Result  Value Ref Range    AMYLASE 30 29 - 103 IU/L   CBC WITH AUTO DIFFERENTIAL      Result  Value Ref Range    WHITE BLOOD COUNT         5.6 4.5 - 11.0 thou/cu mm    RED BLOOD COUNT           4.96 4.30 - 5.90 mil/cu mm    HEMOGLOBIN                14.5 13.5 - 17.5 g/dL    HEMATOCRIT                41.9 37.0 - 53.0 %    MCV                       85 80 - 100 fL    MCH                       29.2 26.0 - 34.0 pg    MCHC                      34.6 32.0 - 36.0 g/dL    RDW                       12.5 11.5 - 15.5 %    PLATELET COUNT            131 (L) 140 - 440 thou/cu mm    MPV                        9.3 6.5 - 11.0 fL    NEUTROPHILS               67.4 42.0 - 72.0 %    LYMPHOCYTES               19.5 (L) 20.0 - 44.0 %    MONOCYTES                 8.1 <12.0 %    EOSINOPHILS               3.5 <8.0 %    BASOPHILS                 1.3 <3.0 %    ABSOLUTE NEUTROPHILS      3.8 1.7 - 7.0 thou/cu mm    ABSOLUTE LYMPHOCYTES      1.1 0.9 - 2.9 thou/cu mm    ABSOLUTE MONOCYTES        0.5 <0.9 thou/cu mm    ABSOLUTE EOSINOPHILS      0.2 <0.5 thou/cu mm    ABSOLUTE BASOPHILS        0.1 <0.3 thou/cu mm         Your blood tests are normal. There is nothing to explain your abdominal pain or your weight loss. We will review this further when you return for a follow-up visit.    Sincerely,      Amadou Salas MD  Internal Medicine  [facility]

## 2018-07-24 NOTE — TELEPHONE ENCOUNTER
Per pharmacy Colchicine 0.6mg Capsules - Take 1 Tablet by mouth daily - qty 30 last 06/25/18    Drug is not covered by patients plan - The preferred alternative is Colcrystab,MITGARECAPMG - Please advise

## 2018-08-09 ENCOUNTER — OFFICE VISIT (OUTPATIENT)
Dept: FAMILY MEDICINE | Facility: CLINIC | Age: 83
End: 2018-08-09
Payer: MEDICARE

## 2018-08-09 ENCOUNTER — ANTICOAGULATION THERAPY VISIT (OUTPATIENT)
Dept: ANTICOAGULATION | Facility: CLINIC | Age: 83
End: 2018-08-09
Payer: MEDICARE

## 2018-08-09 VITALS
RESPIRATION RATE: 16 BRPM | HEART RATE: 84 BPM | SYSTOLIC BLOOD PRESSURE: 118 MMHG | BODY MASS INDEX: 29.87 KG/M2 | WEIGHT: 168.6 LBS | DIASTOLIC BLOOD PRESSURE: 64 MMHG

## 2018-08-09 DIAGNOSIS — L03.319 CELLULITIS AND ABSCESS OF TRUNK: Primary | ICD-10-CM

## 2018-08-09 DIAGNOSIS — I48.20 CHRONIC ATRIAL FIBRILLATION (H): ICD-10-CM

## 2018-08-09 DIAGNOSIS — Z79.01 LONG-TERM (CURRENT) USE OF ANTICOAGULANTS: ICD-10-CM

## 2018-08-09 DIAGNOSIS — Z79.01 CHRONIC ANTICOAGULATION: ICD-10-CM

## 2018-08-09 DIAGNOSIS — L02.219 CELLULITIS AND ABSCESS OF TRUNK: Primary | ICD-10-CM

## 2018-08-09 LAB — INR POINT OF CARE: 3.3 (ref 0.86–1.14)

## 2018-08-09 PROCEDURE — 36416 COLLJ CAPILLARY BLOOD SPEC: CPT

## 2018-08-09 PROCEDURE — 85610 PROTHROMBIN TIME: CPT | Mod: QW

## 2018-08-09 PROCEDURE — 99213 OFFICE O/P EST LOW 20 MIN: CPT | Performed by: NURSE PRACTITIONER

## 2018-08-09 PROCEDURE — 99207 ZZC NO CHARGE NURSE ONLY: CPT

## 2018-08-09 RX ORDER — SULFAMETHOXAZOLE/TRIMETHOPRIM 800-160 MG
1 TABLET ORAL 2 TIMES DAILY
Qty: 14 TABLET | Refills: 0 | Status: SHIPPED | OUTPATIENT
Start: 2018-08-09 | End: 2018-08-16

## 2018-08-09 ASSESSMENT — PAIN SCALES - GENERAL: PAINLEVEL: NO PAIN (0)

## 2018-08-09 NOTE — MR AVS SNAPSHOT
After Visit Summary   8/9/2018    Nabor Cunningham    MRN: [id number]           Patient Information     Date Of Birth          1935        Visit Information        Provider Department      8/9/2018 2:20 PM Rani Baker NP [facility]        Today's Diagnoses     Cellulitis and abscess of trunk    -  1      Care Instructions    1.  Keep area clean.  2.  Take antibiotic as directed.  3.  Apply antibiotic ointment over the counter with dressing changes at least once daily.  4.  Follow-up in one week for recheck.          Follow-ups after your visit        Follow-up notes from your care team     Return in about 1 week (around 8/16/2018).      Your next 10 appointments already scheduled     Aug 09, 2018  3:15 PM CDT   Anticoagulation Visit with NB ANTI COAG   [facility] ([facility])    [address] [place] [address] [phone]              Who to contact     If you have questions or need follow up information about today's clinic visit or your schedule please contact [facility] directly at [phone].  Normal or non-critical lab and imaging results will be communicated to you by MyChart, letter or phone within 4 business days after the clinic has received the results. If you do not hear from us within 7 days, please contact the clinic through MyChart or phone. If you have a critical or abnormal lab result, we will notify you by phone as soon as possible.  Submit refill requests through [employer] or call your pharmacy and they will forward the refill request to us. Please allow 3 business days for your refill to be completed.          Additional Information About Your Visit        Care EveryWhere ID     This is your Care EveryWhere ID. This could be used by other organizations to access your [facility] medical records  [id number]        Your Vitals Were     Pulse Respirations BMI (Body Mass Index)              84 16 29.87 kg/m2          Blood Pressure from Last 3 Encounters:   08/09/18 118/64   06/29/18 128/69   06/08/18 138/82    Weight from Last 3 Encounters:   08/09/18 168 lb 9.6 oz (76.5 kg)   06/08/18 152 lb (68.9 kg)   05/18/18 150 lb (68 kg)              Today, you had the following     No orders found for display         Today's Medication Changes          These changes are accurate as of 8/9/18  2:47 PM.  If you have any questions, ask your nurse or doctor.               Start taking these medicines.        Dose/Directions    sulfamethoxazole-trimethoprim 800-160 MG per tablet   Commonly known as:  BACTRIM DS/SEPTRA DS   Used for:  Cellulitis and abscess of trunk   Started by:  Rani Baker NP        Dose:  1 tablet   Take 1 tablet by mouth 2 times daily for 7 days   Quantity:  14 tablet   Refills:  0            Where to get your medicines      These medications were sent to [id number] [address] [facility]  [address] [facility] [address]    Hours:  Closed 10-16-08 business to [facility] Phone:  [phone]     sulfamethoxazole-trimethoprim 800-160 MG per tablet                Primary Care Provider Office Phone # Fax #    Amadou Salas [phone] [address] [facility] [address]        Equal Access to Services     [facility] AH: Hadii kenzie woodard hadamano Sowojciech, waaxda luqadaha, qaybta kaalmada adeegyada, waxay amada hernandez . So [facility] [phone].    ATENCIÓN: Si habla español, tiene a garcia disposición servicios gratuitos de asistencia lingüística. Juan Antonio al [phone].    We comply with applicable federal civil rights laws and [place] laws. We do not discriminate on the basis of race, color, national origin, age, disability, sex, sexual orientation, or gender identity.            Thank you!     Thank you for choosing [facility]  for your care. Our goal is always to provide you  with excellent care. Hearing back from our patients is one way we can continue to improve our services. Please take a few minutes to complete the written survey that you may receive in the mail after your visit with us. Thank you!             Your Updated Medication List - Protect others around you: Learn how to safely use, store and throw away your medicines at www.disposemymeds.org.          This list is accurate as of 8/9/18  2:47 PM.  Always use your most recent med list.                   Brand Name Dispense Instructions for use Diagnosis    * B-12 1000 MCG Tbcr     100 tablet    Take 1,000 mcg by mouth daily    Vitamin B12 deficiency (non anemic)       * cyanocobalamin 1000 MCG tablet    vitamin  B-12    30 tablet    TAKE ONE TABLET BY MOUTH DAILY    Vitamin B 12 deficiency       calcium carbonate 500 MG tablet    OS- mg [place]. [place]     Take 1 tablet by mouth 2 times daily        celecoxib 200 MG capsule    celeBREX    30 capsule    Take 1 capsule (200 mg) by mouth daily Take with food.    Osteoarthritis of left knee, unspecified osteoarthritis type       colchicine 0.6 MG tablet    COLCYRS    30 tablet    Take 1 tablet (0.6 mg) by mouth daily    Chronic gout, unspecified cause, unspecified site       metoprolol succinate 25 MG 24 hr tablet    TOPROL-XL    90 tablet    Take 1 tablet (25 mg) by mouth daily        Multi-vitamin Tabs tablet     100 tablet    Take 1 tablet by mouth daily        nortriptyline 25 MG capsule    PAMELOR    270 capsule    TAKE ONE CAPSULE MID MORNING AND TWO CAPSULES BY MOUTH DAILY AT BEDTIME    Anxiety       order for DME     1 Units    True pull Knee Brace Left    Chronic pain of left knee       sulfamethoxazole-trimethoprim 800-160 MG per tablet    BACTRIM DS/SEPTRA DS    14 tablet    Take 1 tablet by mouth 2 times daily for 7 days    Cellulitis and abscess of trunk       temazepam 30 MG capsule    RESTORIL    30 capsule    TAKE ONE CAPSULE BY MOUTH AT BEDTIME AS NEEDED FOR  SLEEP    Persistent insomnia       warfarin 2 MG tablet    COUMADIN    300 tablet    Take 8 mg Mon/Thurs; 6 mg all other days or as instructed by [facility]    Chronic atrial fibrillation (H)       * Notice:  This list has 2 medication(s) that are the same as other medications prescribed for you. Read the directions carefully, and ask your doctor or other care provider to review them with you.

## 2018-08-09 NOTE — PROGRESS NOTES
ANTICOAGULATION FOLLOW-UP CLINIC VISIT    Patient Name:  Nabor Cunningham  Date:  8/9/2018  Contact Type:  Face to Face    SUBJECTIVE:     Patient Findings     Positives Inflammation, Antibiotic use or infection (bactrim starting today for cellulitis on back)    Comments Patient has an infection on his lower back. Per patient's daughter, it is pretty swollen which is why INR is high. He is starting bactrim so will lower dose slightly today and also Saturday, when that medication should be peaking. Recheck on Monday. No other changes or concerns. No bleeding signs.           OBJECTIVE    INR Protime   Date Value Ref Range Status   08/09/2018 3.3 (A) 0.86 - 1.14 Final       ASSESSMENT / PLAN  INR assessment SUPRA cellulitis   Recheck INR In: 4 DAYS    INR Location Clinic      Anticoagulation Summary as of 8/9/2018     INR goal 2.0-3.0   Today's INR 3.3!   Warfarin maintenance plan 8 mg (2 mg x 4) on Mon, Thu; 6 mg (2 mg x 3) all other days   Full warfarin instructions 8/9: 6 mg; 8/11: 4 mg; Otherwise 8 mg on Mon, Thu; 6 mg all other days   Weekly warfarin total 46 mg   Plan last modified Belia Jones RN (7/12/2018)   Next INR check 8/13/2018   Target end date Indefinite    Indications   Chronic anticoagulation [Z79.01]  Chronic atrial fibrillation (H) [I48.2]  Long-term (current) use of anticoagulants [Z79.01] [Z79.01]         Anticoagulation Episode Summary     INR check location     Preferred lab     Send INR reminders to [facility] CLIN[facility] POOL    Comments * 2mg tabs      Anticoagulation Care Providers     Provider Role Specialty Phone number    Heron Mayo MD [facility] [phone]            See the Encounter Report to view Anticoagulation Flowsheet and Dosing Calendar (Go to Encounters tab in chart review, and find the Anticoagulation Therapy Visit)        Belia Jones RN

## 2018-08-09 NOTE — PATIENT INSTRUCTIONS
1.  Keep area clean.  2.  Take antibiotic as directed.  3.  Apply antibiotic ointment over the counter with dressing changes at least once daily.  4.  Follow-up in one week for recheck.

## 2018-08-09 NOTE — MR AVS SNAPSHOT
Nabor Cunningham   8/9/2018 3:15 PM   Anticoagulation Therapy Visit    Description:  82 year old male   Provider:  NB ANTI COAG   Department:  Nb Anticoag           INR as of 8/9/2018     Today's INR 3.3!      Anticoagulation Summary as of 8/9/2018     INR goal 2.0-3.0   Today's INR 3.3!   Full warfarin instructions 8/9: 6 mg; 8/11: 4 mg; Otherwise 8 mg on Mon, Thu; 6 mg all other days   Next INR check 8/13/2018    Indications   Chronic anticoagulation [Z79.01]  Chronic atrial fibrillation (H) [I48.2]  Long-term (current) use of anticoagulants [Z79.01] [Z79.01]         Your next Anticoagulation Clinic appointment(s)     Aug 09, 2018  3:15 PM CDT   Anticoagulation Visit with NB ANTI COAG   [facility] ([facility])    [address] [place] [address] [phone]              Contact Numbers     Please call [phone] with any problems or questions regarding your therapy.    If you need to cancel and/or reschedule your appointment please call one of the following numbers:  [facility] - [phone]  [facility] [phone]  [facility] [phone]  [facility] [phone]  [place] - [phone]            August 2018 Details    Sun Mon Tue Wed Thu Fri Sat        1               2               3               4                 5               6               7               8               9      6 mg   See details      10      6 mg         11      4 mg           12      6 mg         13            14               15               16               17               18                 19               20               21               22               23               24               25                 26               27               28               29               30               31                 Date Details   08/09 This INR check       Date of next INR:  8/13/2018         How to take your warfarin dose     To take:  4 mg Take 2 of the 2 mg  tablets.    To take:  6 mg Take 3 of the 2 mg tablets.    To take:  8 mg Take 4 of the 2 mg tablets.

## 2018-08-09 NOTE — PROGRESS NOTES
SUBJECTIVE:   Nabor Cunningham is a 82 year old male who presents to clinic today for the following health issues:      Concern - Lump on Left Lower Back  Onset: x 2 weeks    Description:   Pt noticed a lump on his lower back. On his belt line. Daughter states is red and a little weepy    Intensity: mild    Progression of Symptoms:  same    Accompanying Signs & Symptoms:  Hard lump, redness    Previous history of similar problem:   No     Precipitating factors:   Worsened by: NA    Alleviating factors:  Improved by: NA    Therapies Tried and outcome: none    Problem list and histories reviewed & adjusted, as indicated.  Additional history: as documented    Patient Active Problem List   Diagnosis     Anxiety     Alcohol abuse     Chronic atrial fibrillation (H)     Weight loss     Peripheral polyneuropathy     Chronic gout, unspecified cause, unspecified site     Essential hypertension with goal blood pressure less than 140/90     Hyperlipidemia LDL goal <130     Persistent insomnia     Lower urinary tract symptoms (LUTS)     Chronic anticoagulation     Long-term (current) use of anticoagulants [Z79.01]     Moderate episode of recurrent major depressive disorder (H)     History reviewed. No pertinent surgical history.    Social History   Substance Use Topics     Smoking status: Former Smoker     Years: 20.00     Types: Cigarettes     Smokeless tobacco: Never Used     Alcohol use No     History reviewed. No pertinent family history.      Current Outpatient Prescriptions   Medication Sig Dispense Refill     calcium carbonate (OS- MG [place]. [place]) 1250 MG tablet Take 1 tablet by mouth 2 times daily       colchicine (COLCYRS) 0.6 MG tablet Take 1 tablet (0.6 mg) by mouth daily 30 tablet 0     Cyanocobalamin (B-12) 1000 MCG TBCR Take 1,000 mcg by mouth daily 100 tablet 3     cyanocobalamin (VITAMIN  B-12) 1000 MCG tablet TAKE ONE TABLET BY MOUTH DAILY 30 tablet 11     metoprolol (TOPROL-XL) 25 MG 24 hr tablet Take 1  tablet (25 mg) by mouth daily 90 tablet 1     multivitamin, therapeutic with minerals (MULTI-VITAMIN) TABS tablet Take 1 tablet by mouth daily 100 tablet 3     nortriptyline (PAMELOR) 25 MG capsule TAKE ONE CAPSULE MID MORNING AND TWO CAPSULES BY MOUTH DAILY AT BEDTIME 270 capsule 3     sulfamethoxazole-trimethoprim (BACTRIM DS/SEPTRA DS) 800-160 MG per tablet Take 1 tablet by mouth 2 times daily for 7 days 14 tablet 0     temazepam (RESTORIL) 30 MG capsule TAKE ONE CAPSULE BY MOUTH AT BEDTIME AS NEEDED FOR SLEEP 30 capsule 5     warfarin (COUMADIN) 2 MG tablet Take 8 mg Mon/Thurs; 6 mg all other days or as instructed by  tablet 0     celecoxib (CELEBREX) 200 MG capsule Take 1 capsule (200 mg) by mouth daily Take with food. (Patient not taking: Reported on 6/8/2018) 30 capsule 5     order for DME True pull Knee Brace Left (Patient not taking: Reported on 8/9/2018) 1 Units 0     Allergies   Allergen Reactions     Penicillins      Trazodone        Reviewed and updated as needed this visit by clinical staff  Tobacco  Allergies  Meds  Problems  Med Hx  Surg Hx  Fam Hx  Soc Hx        Reviewed and updated as needed this visit by Provider  Allergies  Meds  Problems         ROS:  CONSTITUTIONAL: NEGATIVE for fever, chills, change in weight  INTEGUMENTARY/SKIN: POSITIVE for sore on left upper buttock/lower back with redness and mild pain  RESP: NEGATIVE for significant cough or SOB  CV: NEGATIVE for chest pain, palpitations or peripheral edema  PSYCHIATRIC: NEGATIVE for changes in mood or affect  ROS otherwise negative    OBJECTIVE:     /64 (BP Location: Right arm, Patient Position: Chair, Cuff Size: Adult Regular)  Pulse 84  Resp 16  Wt 168 lb 9.6 oz (76.5 kg)  BMI 29.87 kg/m2  Body mass index is 29.87 kg/(m^2).  GENERAL: healthy, alert and no distress  RESP: lungs clear to auscultation - no rales, rhonchi or wheezes  CV: regular rate and rhythm, normal S1 S2, no S3 or S4, no murmur, click or rub,  no peripheral edema and peripheral pulses strong  SKIN: Abscess appreciated on left upper buttock/lower back at belt line that is approximately 2-3 cm in diameter with induration and redness with small cystic center that produced minimal drainage when pressure applied to express.    Diagnostic Test Results:  none     ASSESSMENT/PLAN:     1. Cellulitis and abscess of trunk  Unable to get a culture.  Staring antibiotics twice daily for 7 days.  Patient has good kidney function on last CMP.  He is on Coumadin and will follow-up [facility] clinic today and get INR on Monday and possibly Thursday for evaluation while on antibiotics.  Plan to follow-up in one week to re-evaluate this area.  Recommended keeping this clean and using topical antibiotic with dressing changes at least once daily.  - sulfamethoxazole-trimethoprim (BACTRIM DS/SEPTRA DS) 800-160 MG per tablet; Take 1 tablet by mouth 2 times daily for 7 days  Dispense: 14 tablet; Refill: 0    See Patient Instructions    Rani Baker NP  [facility]

## 2018-08-09 NOTE — NURSING NOTE
"Chief Complaint   Patient presents with     Mass     left lower back       Initial /64 (BP Location: Right arm, Patient Position: Chair, Cuff Size: Adult Regular)  Pulse 84  Resp 16  Wt 168 lb 9.6 oz (76.5 kg)  BMI 29.87 kg/m2 Estimated body mass index is 29.87 kg/(m^2) as calculated from the following:    Height as of 6/8/18: 5' 3\" (1.6 m).    Weight as of this encounter: 168 lb 9.6 oz (76.5 kg).      Health Maintenance that is potentially due pending provider review:  NONE    Zainab Romero MA  2:31 PM 8/9/2018  .        "

## 2018-08-13 ENCOUNTER — TELEPHONE (OUTPATIENT)
Dept: ORTHOPEDICS | Facility: CLINIC | Age: 83
End: 2018-08-13

## 2018-08-13 ENCOUNTER — ANTICOAGULATION THERAPY VISIT (OUTPATIENT)
Dept: ANTICOAGULATION | Facility: CLINIC | Age: 83
End: 2018-08-13
Payer: MEDICARE

## 2018-08-13 DIAGNOSIS — Z79.01 LONG-TERM (CURRENT) USE OF ANTICOAGULANTS: ICD-10-CM

## 2018-08-13 DIAGNOSIS — Z79.01 CHRONIC ANTICOAGULATION: ICD-10-CM

## 2018-08-13 DIAGNOSIS — I48.20 CHRONIC ATRIAL FIBRILLATION (H): ICD-10-CM

## 2018-08-13 LAB — INR POINT OF CARE: 3.3 (ref 0.86–1.14)

## 2018-08-13 PROCEDURE — 36416 COLLJ CAPILLARY BLOOD SPEC: CPT

## 2018-08-13 PROCEDURE — 99207 ZZC NO CHARGE NURSE ONLY: CPT

## 2018-08-13 PROCEDURE — 85610 PROTHROMBIN TIME: CPT | Mod: QW

## 2018-08-13 NOTE — PROGRESS NOTES
ANTICOAGULATION FOLLOW-UP CLINIC VISIT    Patient Name:  Nabor Cunningham  Date:  8/13/2018  Contact Type:  Face to Face/Daughter present    SUBJECTIVE:     Patient Findings     Positives Inflammation, Antibiotic use or infection    Comments Patient will be on Bactrim until Thursday. He has an appointment to see his PCP on Thursday. INR today was still elevated at 3.3. I adjusted dose so patient will have 40 mg by the next INR in 1 week. Patient denies signs or symptoms of bleeding. Writer educated patient regarding increased bleed risk and when to seek immediate medical attention. Patient verbalized understanding.             OBJECTIVE    INR Protime   Date Value Ref Range Status   08/13/2018 3.3 (A) 0.86 - 1.14 Final       ASSESSMENT / PLAN  INR assessment SUPRA    Recheck INR In: 1 WEEK    INR Location Clinic      Anticoagulation Summary as of 8/13/2018     INR goal 2.0-3.0   Today's INR 3.3!   Warfarin maintenance plan 8 mg (2 mg x 4) on Mon, Thu; 6 mg (2 mg x 3) all other days   Full warfarin instructions 8/13: 6 mg; 8/16: 6 mg; 8/18: 4 mg; Otherwise 8 mg on Mon, Thu; 6 mg all other days   Weekly warfarin total 46 mg   Plan last modified Belia Jones RN (7/12/2018)   Next INR check 8/20/2018   Target end date Indefinite    Indications   Chronic anticoagulation [Z79.01]  Chronic atrial fibrillation (H) [I48.2]  Long-term (current) use of anticoagulants [Z79.01] [Z79.01]         Anticoagulation Episode Summary     INR check location     Preferred lab     Send INR reminders to [facility]    Comments * 2mg tabs      Anticoagulation Care Providers     Provider Role Specialty Phone number    Heron Mayo MD [facility] [phone]            See the Encounter Report to view Anticoagulation Flowsheet and Dosing Calendar (Go to Encounters tab in chart review, and find the Anticoagulation Therapy Visit)        Daphne Lamar RN

## 2018-08-13 NOTE — TELEPHONE ENCOUNTER
Patient scheduled with Dr. Edwards for a procedure.      Called number listed as patient.  Is son Florencio.  There is a consent to communicate on file.    Did explain that patient was correctly schedule for an injection with Dr. Edwadrs.  Explained that Dr. Davidson did discuss Synvisc.  Gave son the J code to discuss with insurance company.   Answered questions about coverage for synvsic and steroid.  Will discuss with other family members and will contact clinic if needed.    Arielle Stewart MS ATC

## 2018-08-13 NOTE — MR AVS SNAPSHOT
Nabor Cunningham   8/13/2018 8:30 AM   Anticoagulation Therapy Visit    Description:  82 year old male   Provider:  NB ANTI COAG   Department:  Nb Anticoag           INR as of 8/13/2018     Today's INR 3.3!      Anticoagulation Summary as of 8/13/2018     INR goal 2.0-3.0   Today's INR 3.3!   Full warfarin instructions 8/13: 6 mg; 8/16: 6 mg; 8/18: 4 mg; Otherwise 8 mg on Mon, Thu; 6 mg all other days   Next INR check 8/20/2018    Indications   Chronic anticoagulation [Z79.01]  Chronic atrial fibrillation (H) [I48.2]  Long-term (current) use of anticoagulants [Z79.01] [Z79.01]         Your next Anticoagulation Clinic appointment(s)     Aug 13, 2018  8:30 AM CDT   [facility]agulation Visit with NB ANTI JADA   [facility]    [address] [place] [address]   [phone]            Aug 20, 2018  2:15 PM CDT   Anticoagulation Visit with NB ANTI COAVIRIDIANA   [facility]    [address] [place] [address]   [phone]              Contact Numbers     Please call [phone] with any problems or questions regarding your therapy.    If you need to cancel and/or reschedule your appointment please call one of the following numbers:  [facility] - [phone]  [place] - [phone]  [place] - [phone]  [place] - [phone]  [place] - [phone]            August 2018 Details    Sun Mon Tue Wed Thu Fri Sat        1               2               3               4                 5               6               7               8               9               10               11                 12               13      6 mg   See details      14      6 mg         15      6 mg         16      6 mg         17      6 mg         18      4 mg           19      6 mg         20            21               22               23               24               25                 26               27                28               29               30               31                 Date Details   08/13 This INR check       Date of next INR:  8/20/2018         How to take your warfarin dose     To take:  4 mg Take 2 of the 2 mg tablets.    To take:  6 mg Take 3 of the 2 mg tablets.    To take:  8 mg Take 4 of the 2 mg tablets.

## 2018-08-13 NOTE — TELEPHONE ENCOUNTER
"Reason for call:  Patient reporting a symptom    Symptom or request: Pt states he was just in recently for injection - end of June.  States Dr. Davidson suggested another type of injection - believes it was an U/S injection.  Thinks he already made an appt for this but wants to make sure he is scheduled correctly.  States he \"wants to go to the next level\" doesn't want to repeat the injection he just had.    Duration (how long have symptoms been present):     Have you been treated for this before? Yes    Additional comments:     Phone Number patient can be reached at:  Cell number on file:    Telephone Information:   Mobile [phone]     Best Time:  any    Can we leave a detailed message on this number:  YES    Call taken on 8/13/2018 at 9:40 AM by Maya Perdue    "

## 2018-08-16 ENCOUNTER — OFFICE VISIT (OUTPATIENT)
Dept: FAMILY MEDICINE | Facility: CLINIC | Age: 83
End: 2018-08-16
Payer: MEDICARE

## 2018-08-16 VITALS
HEART RATE: 80 BPM | OXYGEN SATURATION: 94 % | DIASTOLIC BLOOD PRESSURE: 70 MMHG | RESPIRATION RATE: 20 BRPM | SYSTOLIC BLOOD PRESSURE: 130 MMHG | WEIGHT: 169 LBS | HEIGHT: 63 IN | TEMPERATURE: 98.5 F | BODY MASS INDEX: 29.95 KG/M2

## 2018-08-16 DIAGNOSIS — L02.219 CELLULITIS AND ABSCESS OF TRUNK: Primary | ICD-10-CM

## 2018-08-16 DIAGNOSIS — L03.319 CELLULITIS AND ABSCESS OF TRUNK: Primary | ICD-10-CM

## 2018-08-16 LAB
GRAM STN SPEC: NORMAL
GRAM STN SPEC: NORMAL
SPECIMEN SOURCE: NORMAL

## 2018-08-16 PROCEDURE — 99213 OFFICE O/P EST LOW 20 MIN: CPT | Performed by: NURSE PRACTITIONER

## 2018-08-16 PROCEDURE — 87070 CULTURE OTHR SPECIMN AEROBIC: CPT | Performed by: NURSE PRACTITIONER

## 2018-08-16 PROCEDURE — 87077 CULTURE AEROBIC IDENTIFY: CPT | Performed by: NURSE PRACTITIONER

## 2018-08-16 PROCEDURE — 87205 SMEAR GRAM STAIN: CPT | Performed by: NURSE PRACTITIONER

## 2018-08-16 RX ORDER — SULFAMETHOXAZOLE/TRIMETHOPRIM 800-160 MG
1 TABLET ORAL 2 TIMES DAILY
Qty: 14 TABLET | Refills: 0 | Status: ON HOLD | OUTPATIENT
Start: 2018-08-16 | End: 2018-09-07

## 2018-08-16 NOTE — PROGRESS NOTES
SUBJECTIVE:   Nabor Cunningham is a 82 year old male who presents to clinic today for the following health issues:      Chief Complaint   Patient presents with     Lesion     Pt was seen in clinic for an abscess on 8/9/18. He was given antibiotics and told to f/u in 1 week. Pt reports that it is not painful and has stopped bleeding but there is still discharge (yellow) with some redness.     Problem list and histories reviewed & adjusted, as indicated.  Additional history: as documented    Patient Active Problem List   Diagnosis     Anxiety     Alcohol abuse     Chronic atrial fibrillation (H)     Weight loss     Peripheral polyneuropathy     Chronic gout, unspecified cause, unspecified site     Essential hypertension with goal blood pressure less than 140/90     Hyperlipidemia LDL goal <130     Persistent insomnia     Lower urinary tract symptoms (LUTS)     Chronic anticoagulation     Long-term (current) use of anticoagulants [Z79.01]     Moderate episode of recurrent major depressive disorder (H)     History reviewed. No pertinent surgical history.    Social History   Substance Use Topics     Smoking status: Former Smoker     Years: 20.00     Types: Cigarettes     Smokeless tobacco: Never Used     Alcohol use No     History reviewed. No pertinent family history.      Current Outpatient Prescriptions   Medication Sig Dispense Refill     calcium carbonate (OS- MG [place]. [place]) 1250 MG tablet Take 1 tablet by mouth 2 times daily       celecoxib (CELEBREX) 200 MG capsule Take 1 capsule (200 mg) by mouth daily Take with food. 30 capsule 5     colchicine (COLCYRS) 0.6 MG tablet Take 1 tablet (0.6 mg) by mouth daily 30 tablet 0     Cyanocobalamin (B-12) 1000 MCG TBCR Take 1,000 mcg by mouth daily 100 tablet 3     cyanocobalamin (VITAMIN  B-12) 1000 MCG tablet TAKE ONE TABLET BY MOUTH DAILY 30 tablet 11     metoprolol (TOPROL-XL) 25 MG 24 hr tablet Take 1 tablet (25 mg) by mouth daily 90 tablet 1     multivitamin,  "therapeutic with minerals (MULTI-VITAMIN) TABS tablet Take 1 tablet by mouth daily 100 tablet 3     nortriptyline (PAMELOR) 25 MG capsule TAKE ONE CAPSULE MID MORNING AND TWO CAPSULES BY MOUTH DAILY AT BEDTIME 270 capsule 3     order for DME True pull Knee Brace Left 1 Units 0     sulfamethoxazole-trimethoprim (BACTRIM DS/SEPTRA DS) 800-160 MG per tablet Take 1 tablet by mouth 2 times daily 14 tablet 0     temazepam (RESTORIL) 30 MG capsule TAKE ONE CAPSULE BY MOUTH AT BEDTIME AS NEEDED FOR SLEEP 30 capsule 5     warfarin (COUMADIN) 2 MG tablet Take 8 mg Mon/Thurs; 6 mg all other days or as instructed by  tablet 0     Allergies   Allergen Reactions     Penicillins      Trazodone        Reviewed and updated as needed this visit by clinical staff  Tobacco  Allergies  Meds  Problems  Med Hx  Surg Hx  Fam Hx  Soc Hx        Reviewed and updated as needed this visit by Provider  Allergies  Meds  Problems         ROS:  CONSTITUTIONAL: NEGATIVE for fever, chills, change in weight  INTEGUMENTARY/SKIN: Abscess on left lower back is getting smaller but still red and swollen.  RESP: NEGATIVE for significant cough or SOB  CV: NEGATIVE for chest pain, palpitations or peripheral edema  PSYCHIATRIC: NEGATIVE for changes in mood or affect    OBJECTIVE:     /70  Pulse 80  Temp 98.5  F (36.9  C) (Tympanic)  Resp 20  Ht 5' 3\" (1.6 m)  Wt 169 lb (76.7 kg)  SpO2 94%  BMI 29.94 kg/m2  Body mass index is 29.94 kg/(m^2).  GENERAL: healthy, alert and no distress  RESP: lungs clear to auscultation - no rales, rhonchi or wheezes  CV: regular rate and rhythm, normal S1 S2, no S3 or S4, no murmur, click or rub, no peripheral edema and peripheral pulses strong  SKIN: Patients rash is healing and down to about 1 in or less in size and is circular with no outlying redness.  It is indurated and red over induration with tenderness when palpated.  It continues to drain small amount of drainage and culture was taken " today.  PSYCH: mentation appears normal, affect normal/bright    Diagnostic Test Results:  none     ASSESSMENT/PLAN:     1. Cellulitis and abscess of trunk  Patient is on coumadin and I&D is not able to be accomplished on the blood thinner, however I do not feel it is needed due to the fact that it is responding to antibiotics.  Continue antibiotic for another week.  F/U with coumadin clinic as scheduled.  I will call with culture results to his daughter.  Follow-up in 1 week for recheck.  - sulfamethoxazole-trimethoprim (BACTRIM DS/SEPTRA DS) 800-160 MG per tablet; Take 1 tablet by mouth 2 times daily  Dispense: 14 tablet; Refill: 0  - Wound Culture Aerobic Bacterial; Future  - Gram stain; Future  - Wound Culture Aerobic Bacterial  - Gram stain    Rani Baker NP  [facility]

## 2018-08-16 NOTE — MR AVS SNAPSHOT
After Visit Summary   8/16/2018    Nabor Cunningham    MRN: [id number]           Patient Information     Date Of Birth          1935        Visit Information        Provider Department      8/16/2018 3:20 PM Rani Baker NP [facility]        Today's Diagnoses     Cellulitis and abscess of trunk    -  1      Care Instructions    1.  Continue antibiotic for 1 week and f/u if persistent.  2.  Culture was obtained and I will call you with results.  3.  Follow-up in 1 week.  Come in sooner if symptoms are worse.            Follow-ups after your visit        Follow-up notes from your care team     Return in about 1 week (around 8/23/2018).      Your next 10 appointments already scheduled     Aug 20, 2018  2:15 PM CDT   Anticoagulation Visit with NB ANTI COAG   [facility] ([facility])    [address] [place] [address]   [phone]            Sep 07, 2018  9:00 AM CDT   PROCEDURE with Daniel Edwards,    [facility] ([facility]    [address]  [place] [id number] [phone]              Who to contact     If you have questions or need follow up information about today's clinic visit or your schedule please contact [facility] directly at [phone].  Normal or non-critical lab and imaging results will be communicated to you by MyChart, letter or phone within 4 business days after the clinic has received the results. If you do not hear from us within 7 days, please contact the clinic through MyChart or phone. If you have a critical or abnormal lab result, we will notify you by phone as soon as possible.  Submit refill requests through [employer] or call your pharmacy and they will forward the refill request to us. Please allow 3 business days for your refill to be completed.          Additional Information About Your  "Visit        Care EveryWhere ID     This is your Care EveryWhere ID. This could be used by other organizations to access your [place] medical records  [id number]        Your Vitals Were     Pulse Temperature Respirations Height Pulse Oximetry BMI (Body Mass Index)    80 98.5  F (36.9  C) (Tympanic) 20 5' 3\" (1.6 m) 94% 29.94 kg/m2       Blood Pressure from Last 3 Encounters:   08/16/18 130/70   08/09/18 118/64   06/29/18 128/69    Weight from Last 3 Encounters:   08/16/18 169 lb (76.7 kg)   08/09/18 168 lb 9.6 oz (76.5 kg)   06/08/18 152 lb (68.9 kg)              Today, you had the following     No orders found for display         Where to get your medicines      These medications were sent to [id number] - [place], [place] - [address]  [address] [address]    Hours:  Closed 10-16-08 business to [facility] Phone:  [phone]     sulfamethoxazole-trimethoprim 800-160 MG per tablet          Primary Care Provider Office Phone # Fax #    Amadou Salas [phone] [address] [facility] [address]        Equal Access to Services     SHIRA DELEON AH: Hadii kenzie castilloo Soomaali, waaxda luqadaha, qaybta kaalmada adeegyada, lili hernandez . So [facility] [phone].    ATENCIÓN: Si habla español, tiene a garcia disposición servicios gratuitos de asistencia lingüística. [facility] [phone].    We comply with applicable federal civil rights laws and [place] laws. We do not discriminate on the basis of race, color, national origin, age, disability, sex, sexual orientation, or gender identity.            Thank you!     Thank you for choosing [facility]  for your care. Our goal is always to provide you with excellent care. Hearing back from our patients is one way we can continue to improve our services. Please take a few minutes to complete the written survey that you may receive in the mail after your visit " with us. Thank you!             Your Updated Medication List - Protect others around you: Learn how to safely use, store and throw away your medicines at www.disposemymeds.org.          This list is accurate as of 8/16/18  3:55 PM.  Always use your most recent med list.                   Brand Name Dispense Instructions for use Diagnosis    * B-12 1000 MCG Tbcr     100 tablet    Take 1,000 mcg by mouth daily    Vitamin B12 deficiency (non anemic)       * cyanocobalamin 1000 MCG tablet    vitamin  B-12    30 tablet    TAKE ONE TABLET BY MOUTH DAILY    Vitamin B 12 deficiency       calcium carbonate 500 MG tablet    OS- mg [place]. [place]     Take 1 tablet by mouth 2 times daily        celecoxib 200 MG capsule    celeBREX    30 capsule    Take 1 capsule (200 mg) by mouth daily Take with food.    Osteoarthritis of left knee, unspecified osteoarthritis type       colchicine 0.6 MG tablet    COLCYRS    30 tablet    Take 1 tablet (0.6 mg) by mouth daily    Chronic gout, unspecified cause, unspecified site       metoprolol succinate 25 MG 24 hr tablet    TOPROL-XL    90 tablet    Take 1 tablet (25 mg) by mouth daily        Multi-vitamin Tabs tablet     100 tablet    Take 1 tablet by mouth daily        nortriptyline 25 MG capsule    PAMELOR    270 capsule    TAKE ONE CAPSULE MID MORNING AND TWO CAPSULES BY MOUTH DAILY AT BEDTIME    Anxiety       order for DME     1 Units    True pull Knee Brace Left    Chronic pain of left knee       sulfamethoxazole-trimethoprim 800-160 MG per tablet    BACTRIM DS/SEPTRA DS    14 tablet    Take 1 tablet by mouth 2 times daily    Cellulitis and abscess of trunk       temazepam 30 MG capsule    RESTORIL    30 capsule    TAKE ONE CAPSULE BY MOUTH AT BEDTIME AS NEEDED FOR SLEEP    Persistent insomnia       warfarin 2 MG tablet    COUMADIN    300 tablet    Take 8 mg Mon/Thurs; 6 mg all other days or as instructed by ACC    Chronic atrial fibrillation (H)       * Notice:  This list has 2  medication(s) that are the same as other medications prescribed for you. Read the directions carefully, and ask your doctor or other care provider to review them with you.

## 2018-08-16 NOTE — PATIENT INSTRUCTIONS
1.  Continue antibiotic for 1 week and f/u if persistent.  2.  Culture was obtained and I will call you with results.  3.  Follow-up in 1 week.  Come in sooner if symptoms are worse.

## 2018-08-17 DIAGNOSIS — I48.20 CHRONIC ATRIAL FIBRILLATION (H): ICD-10-CM

## 2018-08-17 RX ORDER — WARFARIN SODIUM 2 MG/1
TABLET ORAL
Qty: 300 TABLET | Refills: 0 | Status: ON HOLD | OUTPATIENT
Start: 2018-08-17 | End: 2018-09-07

## 2018-08-19 LAB
BACTERIA SPEC CULT: ABNORMAL
SPECIMEN SOURCE: ABNORMAL

## 2018-08-20 ENCOUNTER — ANTICOAGULATION THERAPY VISIT (OUTPATIENT)
Dept: ANTICOAGULATION | Facility: CLINIC | Age: 83
End: 2018-08-20
Payer: MEDICARE

## 2018-08-20 DIAGNOSIS — I48.20 CHRONIC ATRIAL FIBRILLATION (H): ICD-10-CM

## 2018-08-20 DIAGNOSIS — Z79.01 CHRONIC ANTICOAGULATION: ICD-10-CM

## 2018-08-20 DIAGNOSIS — Z79.01 LONG-TERM (CURRENT) USE OF ANTICOAGULANTS: ICD-10-CM

## 2018-08-20 LAB — INR POINT OF CARE: 3.5 (ref 0.86–1.14)

## 2018-08-20 PROCEDURE — 85610 PROTHROMBIN TIME: CPT | Mod: QW

## 2018-08-20 PROCEDURE — 36416 COLLJ CAPILLARY BLOOD SPEC: CPT

## 2018-08-20 PROCEDURE — 99207 ZZC NO CHARGE NURSE ONLY: CPT

## 2018-08-20 NOTE — PROGRESS NOTES
ANTICOAGULATION FOLLOW-UP CLINIC VISIT    Patient Name:  Nabor Cunningham  Date:  8/20/2018  Contact Type:  Face to Face/Daughter      SUBJECTIVE:     Patient Findings     Positives Inflammation (Cellulitis), Antibiotic use or infection (Bactrim)    Comments Patient remains on antibiotics r/t cellulitis on the back. Patient had 40 mg in the last 7 days. I adjusted his dose so he will have 36 mg by the next INR in 10 days. Patient had another appointment with his PCP on 8/23. Patient denies signs or symptoms of bleeding. Writer educated patient regarding increased bleed risk and when to seek immediate medical attention. Patient verbalized understanding.             OBJECTIVE    INR Protime   Date Value Ref Range Status   08/20/2018 3.5 (A) 0.86 - 1.14 Final       ASSESSMENT / PLAN  INR assessment SUPRA    Recheck INR In: 10 DAYS    INR Location Clinic      Anticoagulation Summary as of 8/20/2018     INR goal 2.0-3.0   Today's INR 3.5!   Warfarin maintenance plan 8 mg (2 mg x 4) on Mon, Thu; 6 mg (2 mg x 3) all other days   Full warfarin instructions 8/20: 4 mg; 8/21: 4 mg; 8/23: 4 mg; 8/27: 4 mg; 8/28: 4 mg; Otherwise 8 mg on Mon, Thu; 6 mg all other days   Weekly warfarin total 46 mg   Plan last modified Belia Jones RN (7/12/2018)   Next INR check 8/30/2018   Target end date Indefinite    Indications   Chronic anticoagulation [Z79.01]  Chronic atrial fibrillation (H) [I48.2]  Long-term (current) use of anticoagulants [Z79.01] [Z79.01]         Anticoagulation Episode Summary     INR check location     Preferred lab     Send INR reminders to [facility] POOL    Comments * 2mg tabs      Anticoagulation Care Providers     Provider Role Specialty Phone number    Heron Mayo MD [facility] [phone]            See the Encounter Report to view Anticoagulation Flowsheet and Dosing Calendar (Go to Encounters tab in chart review, and find the Anticoagulation Therapy Visit)        [facility]  Ciarra Lamar RN

## 2018-08-20 NOTE — MR AVS SNAPSHOT
Nabor Cunningham   8/20/2018 2:15 PM   Anticoagulation Therapy Visit    Description:  82 year old male   Provider:  NB ANTI COAG   Department:  Nb Anticoag           INR as of 8/20/2018     Today's INR 3.5!      Anticoagulation Summary as of 8/20/2018     INR goal 2.0-3.0   Today's INR 3.5!   Full warfarin instructions 8/20: 4 mg; 8/21: 4 mg; 8/23: 4 mg; 8/27: 4 mg; 8/28: 4 mg; Otherwise 8 mg on Mon, Thu; 6 mg all other days   Next INR check 8/30/2018    Indications   Chronic anticoagulation [Z79.01]  Chronic atrial fibrillation (H) [I48.2]  Long-term (current) use of anticoagulants [Z79.01] [Z79.01]         Your next Anticoagulation Clinic appointment(s)     Aug 30, 2018  2:30 PM CDT   Anticoagulation Visit with NB ANTI COAG   [facility]    [address] [place] [address] [phone]              Contact Numbers     Please call [phone] with any problems or questions regarding your therapy.    If you need to cancel and/or reschedule your appointment please call one of the following numbers:  [facility] - [phone]  [place] - [phone]  [place] - [phone]  [facility] [phone]  [place] - [phone]            August 2018 Details    Sun Mon Tue Wed Thu Fri Sat        1               2               3               4                 5               6               7               8               9               10               11                 12               13               14               15               16               17               18                 19               20      4 mg   See details      21      4 mg         22      6 mg         23      4 mg         24      6 mg         25      6 mg           26      6 mg         27      4 mg         28      4 mg         29      6 mg         30            31                 Date Details   08/20 This INR check       Date of next INR:  8/30/2018         How to take  your warfarin dose     To take:  4 mg Take 2 of the 2 mg tablets.    To take:  6 mg Take 3 of the 2 mg tablets.    To take:  8 mg Take 4 of the 2 mg tablets.

## 2018-08-23 ENCOUNTER — TELEPHONE (OUTPATIENT)
Dept: ORTHOPEDICS | Facility: CLINIC | Age: 83
End: 2018-08-23

## 2018-08-23 ENCOUNTER — OFFICE VISIT (OUTPATIENT)
Dept: FAMILY MEDICINE | Facility: CLINIC | Age: 83
End: 2018-08-23
Payer: MEDICARE

## 2018-08-23 VITALS
HEIGHT: 63 IN | TEMPERATURE: 98.7 F | HEART RATE: 73 BPM | WEIGHT: 169 LBS | DIASTOLIC BLOOD PRESSURE: 60 MMHG | BODY MASS INDEX: 29.95 KG/M2 | RESPIRATION RATE: 16 BRPM | SYSTOLIC BLOOD PRESSURE: 130 MMHG | OXYGEN SATURATION: 96 %

## 2018-08-23 DIAGNOSIS — B95.7 COAGULASE-NEGATIVE STAPHYLOCOCCAL INFECTION: Primary | ICD-10-CM

## 2018-08-23 DIAGNOSIS — L03.319 CELLULITIS AND ABSCESS OF TRUNK: ICD-10-CM

## 2018-08-23 DIAGNOSIS — L02.219 CELLULITIS AND ABSCESS OF TRUNK: ICD-10-CM

## 2018-08-23 PROCEDURE — 99213 OFFICE O/P EST LOW 20 MIN: CPT | Performed by: NURSE PRACTITIONER

## 2018-08-23 RX ORDER — VANCOMYCIN HYDROCHLORIDE 250 MG/1
250 CAPSULE ORAL 4 TIMES DAILY
Qty: 28 CAPSULE | Refills: 0 | Status: SHIPPED | OUTPATIENT
Start: 2018-08-23 | End: 2018-08-23

## 2018-08-23 NOTE — TELEPHONE ENCOUNTER
Reason for Call:  Other     Detailed comments: Pt's son, Florencio, calling (consent on file to communicate). Pt has procedure ( Visco supplementation or Synvisc) scheduled on 09/07 - he was told to call pt's insurance (BC/BS and Medicare) to verify coverage. They were told that the doctor's office needs to call to get this covered. - Please advise    This is the info received by patient: Medicare said they cannot tell me if and how much will be covered, provider needs to call Medicare coverage line to see how much coverage pt will get    Phone Number Patient can be reached at: Please contact Florencio @ [phone]    Best Time: Any    Can we leave a detailed message on this number? YES    Call taken on 8/23/2018 at 9:43 AM by Denise Behrendt

## 2018-08-23 NOTE — MR AVS SNAPSHOT
After Visit Summary   8/23/2018    Nabor Cunningham    MRN: [id number]           Patient Information     Date Of Birth          1935        Visit Information        Provider Department      8/23/2018 9:00 AM Rani Baker NP [facility]        Today's Diagnoses     Coagulase-negative staphylococcal infection    -  1    Cellulitis and abscess of leg, except foot           Follow-ups after your visit        Additional Services     DERMATOLOGY REFERRAL       Your provider has referred you to: [facility]: [facility] [phone]   [email or web address]    Please be aware that coverage of these services is subject to the terms and limitations of your health insurance plan.  Call member services at your health plan with any benefit or coverage questions.      Please bring the following with you to your appointment:    (1) Any X-Rays, CTs or MRIs which have been performed.  Contact the facility where they were done to arrange for  prior to your scheduled appointment.    (2) List of current medications  (3) This referral request   (4) Any documents/labs given to you for this referral                  Your next 10 appointments already scheduled     Aug 27, 2018  2:00 PM CDT   New Visit with Fady Boyer MD   [facility]    [address] [facility] [address]   [phone]            Aug 30, 2018  2:30 PM CDT   Anticoagulation Visit with NB ANTI COAG   [facility] ([facility])    [address] [place] [id number] [phone]            Sep 07, 2018  9:00 AM CDT   PROCEDURE with Daniel Edwards,    [facility] ([facility]    [address]  [address]  [place] [address] [phone]              Who to contact     If you have questions or need follow up  "information about today's clinic visit or your schedule please contact [facility] directly at [phone].  Normal or non-critical lab and imaging results will be communicated to you by MyChart, letter or phone within 4 business days after the clinic has received the results. If you do not hear from us within 7 days, please contact the clinic through MyChart or phone. If you have a critical or abnormal lab result, we will notify you by phone as soon as possible.  Submit refill requests through Domosite or call your pharmacy and they will forward the refill request to us. Please allow 3 business days for your refill to be completed.          Additional Information About Your Visit        Care EveryWhere ID     This is your Care EveryWhere ID. This could be used by other organizations to access your [facility] medical records  [id number]        Your Vitals Were     Pulse Temperature Respirations Height Pulse Oximetry BMI (Body Mass Index)    73 98.7  F (37.1  C) (Tympanic) 16 5' 3\" (1.6 m) 96% 29.94 kg/m2       Blood Pressure from Last 3 Encounters:   08/23/18 130/60   08/16/18 130/70   08/09/18 118/64    Weight from Last 3 Encounters:   08/23/18 169 lb (76.7 kg)   08/16/18 169 lb (76.7 kg)   08/09/18 168 lb 9.6 oz (76.5 kg)              We Performed the Following     DERMATOLOGY REFERRAL          Today's Medication Changes          These changes are accurate as of 8/23/18  9:56 AM.  If you have any questions, ask your nurse or doctor.               Start taking these medicines.        Dose/Directions    vancomycin 250 MG capsule   Commonly known as:  VANCOCIN   Used for:  Coagulase-negative staphylococcal infection, Cellulitis and abscess of leg, except foot   Started by:  Rani Baker, NP        Dose:  250 mg   Take 1 capsule (250 mg) by mouth 4 times daily for 7 days   Quantity:  28 capsule   Refills:  0            Where to get your medicines      These medications were sent to [id number] - [place], [place] - [address]  [address] [address]    Hours:  Closed 10-16-08 business to [facility] Phone:  [phone]     vancomycin 250 MG capsule                Primary Care Provider Office Phone # Fax #    Amadou Salas [phone]       [address]  [place] [address]        Equal Access to Services     TRUDY DELEON : Hadii aad ku hadasho Soomaali, waaxda luqadaha, qaybta kaalmada adeegyada, waxay idiin hayaan adeeg rafa hernandez . So [facility] [phone].    ATENCIÓN: Si sdaaf sanchez, tiene a garcia disposición servicios gratuitos de asistencia lingüística. [facility] [phone].    We comply with applicable federal civil rights laws and [place] laws. We do not discriminate on the basis of race, color, national origin, age, disability, sex, sexual orientation, or gender identity.            Thank you!     Thank you for choosing [facility]  for your care. Our goal is always to provide you with excellent care. Hearing back from our patients is one way we can continue to improve our services. Please take a few minutes to complete the written survey that you may receive in the mail after your visit with us. Thank you!             Your Updated Medication List - Protect others around you: Learn how to safely use, store and throw away your medicines at www.disposemymeds.org.          This list is accurate as of 8/23/18  9:56 AM.  Always use your most recent med list.                   Brand Name Dispense Instructions for use Diagnosis    * B-12 1000 MCG Tbcr     100 tablet    Take 1,000 mcg by mouth daily    Vitamin B12 deficiency (non anemic)       * cyanocobalamin 1000 MCG tablet    vitamin  B-12    30 tablet    TAKE ONE TABLET BY MOUTH DAILY    Vitamin B 12 deficiency       calcium carbonate 500 MG tablet    OS- mg [place]. [place]     Take 1 tablet by mouth 2 times daily        celecoxib 200 MG capsule    celeBREX     30 capsule    Take 1 capsule (200 mg) by mouth daily Take with food.    Osteoarthritis of left knee, unspecified osteoarthritis type       colchicine 0.6 MG tablet    COLCYRS    30 tablet    Take 1 tablet (0.6 mg) by mouth daily    Chronic gout, unspecified cause, unspecified site       metoprolol succinate 25 MG 24 hr tablet    TOPROL-XL    90 tablet    Take 1 tablet (25 mg) by mouth daily        Multi-vitamin Tabs tablet     100 tablet    Take 1 tablet by mouth daily        nortriptyline 25 MG capsule    PAMELOR    270 capsule    TAKE ONE CAPSULE MID MORNING AND TWO CAPSULES BY MOUTH DAILY AT BEDTIME    Anxiety       order for DME     1 Units    True pull Knee Brace Left    Chronic pain of left knee       sulfamethoxazole-trimethoprim 800-160 MG per tablet    BACTRIM DS/SEPTRA DS    14 tablet    Take 1 tablet by mouth 2 times daily    Cellulitis and abscess of trunk       temazepam 30 MG capsule    RESTORIL    30 capsule    TAKE ONE CAPSULE BY MOUTH AT BEDTIME AS NEEDED FOR SLEEP    Persistent insomnia       vancomycin 250 MG capsule    VANCOCIN    28 capsule    Take 1 capsule (250 mg) by mouth 4 times daily for 7 days    Coagulase-negative staphylococcal infection, Cellulitis and abscess of leg, except foot       warfarin 2 MG tablet    COUMADIN    300 tablet    Take 8 mg Mon/Thurs; 6 mg all other days or as instructed by ACC    Chronic atrial fibrillation (H)       * Notice:  This list has 2 medication(s) that are the same as other medications prescribed for you. Read the directions carefully, and ask your doctor or other care provider to review them with you.

## 2018-08-23 NOTE — PROGRESS NOTES
SUBJECTIVE:   Nabor Cunningham is a 82 year old male who presents to clinic today for the following health issues:      Chief Complaint   Patient presents with     Lesion     Pt has an abscess on his lower back that he is already being treated for. He was advised to FU in 1 week. Pt states that there is still discharge but it is not painful.      Continues to have same size and induration with drainage that is expressed.      Problem list and histories reviewed & adjusted, as indicated.  Additional history: as documented    Patient Active Problem List   Diagnosis     Anxiety     Alcohol abuse     Chronic atrial fibrillation (H)     Weight loss     Peripheral polyneuropathy     Chronic gout, unspecified cause, unspecified site     Essential hypertension with goal blood pressure less than 140/90     Hyperlipidemia LDL goal <130     Persistent insomnia     Lower urinary tract symptoms (LUTS)     Chronic anticoagulation     Long-term (current) use of anticoagulants [Z79.01]     Moderate episode of recurrent major depressive disorder (H)     No past surgical history on file.    Social History   Substance Use Topics     Smoking status: Former Smoker     Years: 20.00     Types: Cigarettes     Smokeless tobacco: Never Used     Alcohol use No     No family history on file.      Current Outpatient Prescriptions   Medication Sig Dispense Refill     calcium carbonate (OS- MG [place]. [place]) 1250 MG tablet Take 1 tablet by mouth 2 times daily       celecoxib (CELEBREX) 200 MG capsule Take 1 capsule (200 mg) by mouth daily Take with food. 30 capsule 5     colchicine (COLCYRS) 0.6 MG tablet Take 1 tablet (0.6 mg) by mouth daily 30 tablet 0     Cyanocobalamin (B-12) 1000 MCG TBCR Take 1,000 mcg by mouth daily 100 tablet 3     cyanocobalamin (VITAMIN  B-12) 1000 MCG tablet TAKE ONE TABLET BY MOUTH DAILY 30 tablet 11     metoprolol (TOPROL-XL) 25 MG 24 hr tablet Take 1 tablet (25 mg) by mouth daily 90 tablet 1     multivitamin,  "therapeutic with minerals (MULTI-VITAMIN) TABS tablet Take 1 tablet by mouth daily 100 tablet 3     nortriptyline (PAMELOR) 25 MG capsule TAKE ONE CAPSULE MID MORNING AND TWO CAPSULES BY MOUTH DAILY AT BEDTIME 270 capsule 3     order for DME True pull Knee Brace Left 1 Units 0     sulfamethoxazole-trimethoprim (BACTRIM DS/SEPTRA DS) 800-160 MG per tablet Take 1 tablet by mouth 2 times daily 14 tablet 0     temazepam (RESTORIL) 30 MG capsule TAKE ONE CAPSULE BY MOUTH AT BEDTIME AS NEEDED FOR SLEEP 30 capsule 5     vancomycin (VANCOCIN) 250 MG capsule Take 1 capsule (250 mg) by mouth 4 times daily for 7 days 28 capsule 0     warfarin (COUMADIN) 2 MG tablet Take 8 mg Mon/Thurs; 6 mg all other days or as instructed by  tablet 0     Allergies   Allergen Reactions     Penicillins      Trazodone        Reviewed and updated as needed this visit by clinical staff       Reviewed and updated as needed this visit by Provider         ROS:  CONSTITUTIONAL: NEGATIVE for fever, chills, change in weight  INTEGUMENTARY/SKIN: Abscess on left lower back that is not resolving on antibiotic treatment.  This is smaller than initial exam but is not healing on its own.  RESP: NEGATIVE for significant cough or SOB  CV: NEGATIVE for chest pain, palpitations or peripheral edema  PSYCHIATRIC: NEGATIVE for changes in mood or affect  ROS otherwise negative    OBJECTIVE:     /60  Pulse 73  Temp 98.7  F (37.1  C) (Tympanic)  Resp 16  Ht 5' 3\" (1.6 m)  Wt 169 lb (76.7 kg)  SpO2 96%  BMI 29.94 kg/m2  Body mass index is 29.94 kg/(m^2).  GENERAL: healthy, alert and no distress  RESP: lungs clear to auscultation - no rales, rhonchi or wheezes  CV: regular rate and rhythm, normal S1 S2, no S3 or S4, no murmur, click or rub, no peripheral edema and peripheral pulses strong  SKIN:  1 inch induration with redness and able to express small amount of drainage.  There is increased irritation redness around the site from application and " reapplication of bandaid.    PSYCH: mentation appears normal, affect normal/bright    Diagnostic Test Results:  Results for orders placed or performed in visit on 08/20/18   INR point of care   Result Value Ref Range    INR Protime 3.5 (A) 0.86 - 1.14       ASSESSMENT/PLAN:     1. Coagulase-negative staphylococcal infection  Culture came back with Coagulase negative Staphylococcus and sulfa should be covering this however according to up-to-date vancomycin as treatment.,  Patient's insurance does not cover the Vanco.  We will plan on continuing sulfa until I&D can be completed.  Patient scheduled with Surgery for I & D of this abscess.  CHADS VASC score 3 and no need for bridging.  Surgery does not require him to come off coumadin for the procedure.  Surgical consult placed due to this abscess not responding to antibiotic.   - GENERAL SURG ADULT REFERRAL    2. Cellulitis and abscess of trunk  See note above.    See Patient Instructions    Rani Baker NP  [facility]

## 2018-08-23 NOTE — PATIENT INSTRUCTIONS
1.  Take antibiotic as directed.  2.  Appointment with Dr. Lopez for I&D.  3.  Stay on Coumadin.  4.  Follow-up in clinic as needed.

## 2018-08-23 NOTE — TELEPHONE ENCOUNTER
Spoke to son discussed that Medicare usually does not require PA for SynviscOne injection.  Advised that INR check would need to be completed within 48 hours, given that patient is on coumadin.  Patient's son as noted that patient currently treating staph infection with scheduled wound irrigation for next week.  Advised that patient should off antibiotic therapy for at least 7 - 10 days prior to completing Synvisc injection.  Previously scheduled appointment cx'd at this time.    Herbert Coats ATC

## 2018-08-27 ENCOUNTER — OFFICE VISIT (OUTPATIENT)
Dept: SURGERY | Facility: CLINIC | Age: 83
End: 2018-08-27
Payer: MEDICARE

## 2018-08-27 VITALS
WEIGHT: 169 LBS | DIASTOLIC BLOOD PRESSURE: 76 MMHG | BODY MASS INDEX: 29.95 KG/M2 | SYSTOLIC BLOOD PRESSURE: 126 MMHG | HEIGHT: 63 IN | HEART RATE: 84 BPM | TEMPERATURE: 98.2 F

## 2018-08-27 DIAGNOSIS — L02.31 ABSCESS OF BUTTOCK, LEFT: Primary | ICD-10-CM

## 2018-08-27 PROCEDURE — 10060 I&D ABSCESS SIMPLE/SINGLE: CPT | Performed by: SURGERY

## 2018-08-27 ASSESSMENT — PAIN SCALES - GENERAL: PAINLEVEL: NO PAIN (0)

## 2018-08-27 NOTE — PROGRESS NOTES
The patient is an 82-year-old man who's had a draining wound in his left buttock for approximately a month. Cultures showed a coag-negative staph. It doesn't bother him very much. He is on Coumadin, and that has continued.    On exam: There is a purplish-colored. Of skin over the left lateral buttock area. When this is squeeze there is clearly some purulent material coming out. No discrete masses palpated.    I recommended incision and drainage, and he consented.    Procedure: Incision and drainage left buttock abscess  Anesthesia: 1% Xylocaine with epinephrine 15 ml    With the patient's permission he was placed on the exam table in the right lateral decubitus position. The lesion was prepped with alcohol and then anesthetized with Xylocaine with epinephrine. An elliptical incision was made about the open area and the tissue immediately underneath was excised down to the fascia level. At the conclusion of the procedure there was no evidence of cyst or any other abnormality palpable.    The wound was packed with quarter-inch packing gauze and covered with 4 x 4's and tape. Instructions were given to the patient in terms of changing the dressing. I would recommend that it be packed on a twice daily basis for the next week, and then it can be left alone to heal. I would anticipate approximately 2-3 weeks for complete healing.    The patient was instructed to contact us with any questions.    Fady Boyer MD FACS

## 2018-08-27 NOTE — LETTER
8/27/2018         RE: Nabor Cunningham  [address] [facility] [address]        Dear Colleague,    Thank you for referring your patient, Nabor Cunningham, to the [facility]. Please see a copy of my visit note below.    The patient is an 82-year-old man who's had a draining wound in his left buttock for approximately a month. Cultures showed a coag-negative staph. It doesn't bother him very much. He is on Coumadin, and that has continued.    On exam: There is a purplish-colored. Of skin over the left lateral buttock area. When this is squeeze there is clearly some purulent material coming out. No discrete masses palpated.    I recommended incision and drainage, and he consented.    Procedure: Incision and drainage left buttock abscess  Anesthesia: 1% Xylocaine with epinephrine 15 ml    With the patient's permission he was placed on the exam table in the right lateral decubitus position. The lesion was prepped with alcohol and then anesthetized with Xylocaine with epinephrine. An elliptical incision was made about the open area and the tissue immediately underneath was excised down to the fascia level. At the conclusion of the procedure there was no evidence of cyst or any other abnormality palpable.    The wound was packed with quarter-inch packing gauze and covered with 4 x 4's and tape. Instructions were given to the patient in terms of changing the dressing. I would recommend that it be packed on a twice daily basis for the next week, and then it can be left alone to heal. I would anticipate approximately 2-3 weeks for complete healing.    The patient was instructed to contact us with any questions.    Fady Boyer MD FACS    Again, thank you for allowing me to participate in the care of your patient.        Sincerely,        Fady Boyer MD

## 2018-08-27 NOTE — NURSING NOTE
"Initial /76 (BP Location: Right arm, Patient Position: Right side, Cuff Size: Adult Regular)  Pulse 84  Temp 98.2  F (36.8  C) (Oral)  Ht 1.6 m (5' 3\")  Wt 76.7 kg (169 lb)  BMI 29.94 kg/m2 Estimated body mass index is 29.94 kg/(m^2) as calculated from the following:    Height as of this encounter: 1.6 m (5' 3\").    Weight as of this encounter: 76.7 kg (169 lb). .    Irene Christina CMA    "

## 2018-08-27 NOTE — MR AVS SNAPSHOT
"              After Visit Summary   8/27/2018    Nabor Cunningham    MRN: [id number]           Patient Information     Date Of Birth          1935        Visit Information        Provider Department      8/27/2018 2:00 PM Fady Boyer MD [facility]        Today's Diagnoses     Abscess of buttock, left    -  1      Care Instructions    Per Physician's instructions            Follow-ups after your visit        Your next 10 appointments already scheduled     Aug 30, 2018  2:30 PM CDT   Anticoagulation Visit with NB ANTI COAG   [facility]    [address] [place] [address] [phone]              Who to contact     If you have questions or need follow up information about today's clinic visit or your schedule please contact [facility] directly at [phone].  Normal or non-critical lab and imaging results will be communicated to you by MyChart, letter or phone within 4 business days after the clinic has received the results. If you do not hear from us within 7 days, please contact the clinic through MyChart or phone. If you have a critical or abnormal lab result, we will notify you by phone as soon as possible.  Submit refill requests through ToutApp or call your pharmacy and they will forward the refill request to us. Please allow 3 business days for your refill to be completed.          Additional Information About Your Visit        Care EveryWhere ID     This is your Care EveryWhere ID. This could be used by other organizations to access your [facility] medical records  [id number]        Your Vitals Were     Pulse Temperature Height BMI (Body Mass Index)          84 98.2  F (36.8  C) (Oral) 1.6 m (5' 3\") 29.94 kg/m2         Blood Pressure from Last 3 Encounters:   08/27/18 126/76   08/23/18 130/60   08/16/18 130/70    Weight from Last 3 Encounters:   08/27/18 76.7 kg (169 lb)   08/23/18 76.7 kg (169 lb) "   08/16/18 76.7 kg (169 lb)              Today, you had the following     No orders found for display       Primary Care Provider Office Phone # Fax #    Amadou Salas [phone] [address]   [place] [address]        Equal Access to Services     [facility] PANCHO : Hadii aad ku hadasho Soomaali, waaxda luqadaha, qaybta kaalmada adeegyada, waxay amada douglasnaveed beckwithtusharmarialuisa hernandez . So [facility] [phone].    ATENCIÓN: Si habla español, tiene a garcia disposición servicios gratuitos de asistencia lingüística. [facility] [phone].    We comply with applicable federal civil rights laws and [place] laws. We do not discriminate on the basis of race, color, national origin, age, disability, sex, sexual orientation, or gender identity.            Thank you!     Thank you for choosing [facility]  for your care. Our goal is always to provide you with excellent care. Hearing back from our patients is one way we can continue to improve our services. Please take a few minutes to complete the written survey that you may receive in the mail after your visit with us. Thank you!             Your Updated Medication List - Protect others around you: Learn how to safely use, store and throw away your medicines at www.disposemymeds.org.          This list is accurate as of 8/27/18  2:49 PM.  Always use your most recent med list.                   Brand Name Dispense Instructions for use Diagnosis    * B-12 1000 MCG Tbcr     100 tablet    Take 1,000 mcg by mouth daily    Vitamin B12 deficiency (non anemic)       * cyanocobalamin 1000 MCG tablet    vitamin  B-12    30 tablet    TAKE ONE TABLET BY MOUTH DAILY    Vitamin B 12 deficiency       calcium carbonate 500 mg {elemental} 500 MG tablet    OS-[place]     Take 1 tablet by mouth 2 times daily        celecoxib 200 MG capsule    celeBREX    30 capsule    Take 1 capsule (200 mg) by mouth daily Take with food.    Osteoarthritis of left knee,  unspecified osteoarthritis type       colchicine 0.6 MG tablet    COLCYRS    30 tablet    Take 1 tablet (0.6 mg) by mouth daily    Chronic gout, unspecified cause, unspecified site       metoprolol succinate 25 MG 24 hr tablet    TOPROL-XL    90 tablet    Take 1 tablet (25 mg) by mouth daily        Multi-vitamin Tabs tablet     100 tablet    Take 1 tablet by mouth daily        nortriptyline 25 MG capsule    PAMELOR    270 capsule    TAKE ONE CAPSULE MID MORNING AND TWO CAPSULES BY MOUTH DAILY AT BEDTIME    Anxiety       order for DME     1 Units    True pull Knee Brace Left    Chronic pain of left knee       sulfamethoxazole-trimethoprim 800-160 MG per tablet    BACTRIM DS/SEPTRA DS    14 tablet    Take 1 tablet by mouth 2 times daily    Cellulitis and abscess of trunk       temazepam 30 MG capsule    RESTORIL    30 capsule    TAKE ONE CAPSULE BY MOUTH AT BEDTIME AS NEEDED FOR SLEEP    Persistent insomnia       warfarin 2 MG tablet    COUMADIN    300 tablet    Take 8 mg Mon/Thurs; 6 mg all other days or as instructed by ACC    Chronic atrial fibrillation (H)       * Notice:  This list has 2 medication(s) that are the same as other medications prescribed for you. Read the directions carefully, and ask your doctor or other care provider to review them with you.

## 2018-08-28 ENCOUNTER — HOSPITAL ENCOUNTER (EMERGENCY)
Facility: CLINIC | Age: 83
Discharge: HOME | End: 2018-08-28
Attending: FAMILY MEDICINE | Admitting: FAMILY MEDICINE
Payer: MEDICARE

## 2018-08-28 VITALS
SYSTOLIC BLOOD PRESSURE: 123 MMHG | OXYGEN SATURATION: 96 % | HEART RATE: 82 BPM | TEMPERATURE: 98.3 F | RESPIRATION RATE: 18 BRPM | DIASTOLIC BLOOD PRESSURE: 87 MMHG

## 2018-08-28 DIAGNOSIS — T14.8XXA BLEEDING FROM WOUND: ICD-10-CM

## 2018-08-28 PROCEDURE — 99283 EMERGENCY DEPT VISIT LOW MDM: CPT | Performed by: FAMILY MEDICINE

## 2018-08-28 PROCEDURE — 99283 EMERGENCY DEPT VISIT LOW MDM: CPT | Mod: Z6 | Performed by: FAMILY MEDICINE

## 2018-08-28 NOTE — ED AVS SNAPSHOT
[facility] Emergency Department    [address] [address]    Phone:  [phone]    Fax:  [phone]                                       Nabor Cunningham   MRN: [id number]    Department:  [facility] Emergency Department   Date of Visit:  8/28/2018           Patient Information     Date Of Birth          1935        Your diagnoses for this visit were:     Bleeding from wound        You were seen by Reggie Arambula MD.        Discharge Instructions       Return to the Emergency Room if the following occurs:     Severely worsened bleeding, fainting, fever >101, expanding redness/swelling, or for any concern at anytime.    Or, follow-up with the following provider as we discussed:     Return to your surgeon for reevaluation as previously recommended, or return to them in 7-10 days for reevaluation and suture removal.    Medications discussed:    None new.  No changes.  My dressing over the wound as needed for oozing of blood.    If you received pain-relieving or sedating medication during your time in the ER, avoid alcohol, driving automobiles, or working with machinery.  Also, a responsible adult must stay with you.      Your next 10 appointments already scheduled     Aug 30, 2018  2:30 PM CDT   Anticoagulation Visit with [facility] ANTI COAG   [facility]    [address] [place] [address] [phone]              24 Hour Appointment Hotline       To make an appointment at any [facility], call [phone] ([phone]). If you don't have a family doctor or clinic, we will help you find one. [facility] are conveniently located to serve the needs of you and your family.             Review of your medicines      Our records show that you are taking the medicines listed below. If these are incorrect, please call your family doctor or clinic.        Dose / Directions Last dose taken    * B-12 1000 MCG Tbcr    Dose:  1000 mcg   Quantity:  100 tablet        Take 1,000 mcg by mouth daily   Refills:  3        * cyanocobalamin 1000 MCG tablet   Commonly known as:  vitamin  B-12   Quantity:  30 tablet        TAKE ONE TABLET BY MOUTH DAILY   Refills:  11        calcium carbonate 500 mg {elemental} 500 MG tablet   Commonly known as:  OS-ALY   Dose:  1 tablet        Take 1 tablet by mouth 2 times daily   Refills:  0        celecoxib 200 MG capsule   Commonly known as:  celeBREX   Dose:  200 mg   Quantity:  30 capsule        Take 1 capsule (200 mg) by mouth daily Take with food.   Refills:  5        colchicine 0.6 MG tablet   Commonly known as:  COLCYRS   Dose:  0.6 mg   Quantity:  30 tablet        Take 1 tablet (0.6 mg) by mouth daily   Refills:  0        metoprolol succinate 25 MG 24 hr tablet   Commonly known as:  TOPROL-XL   Dose:  25 mg   Quantity:  90 tablet        Take 1 tablet (25 mg) by mouth daily   Refills:  1        Multi-vitamin Tabs tablet   Dose:  1 tablet   Quantity:  100 tablet        Take 1 tablet by mouth daily   Refills:  3        nortriptyline 25 MG capsule   Commonly known as:  PAMELOR   Quantity:  270 capsule        TAKE ONE CAPSULE MID MORNING AND TWO CAPSULES BY MOUTH DAILY AT BEDTIME   Refills:  3        order for DME   Quantity:  1 Units        True pull Knee Brace Left   Refills:  0        sulfamethoxazole-trimethoprim 800-160 MG per tablet   Commonly known as:  BACTRIM DS/SEPTRA DS   Dose:  1 tablet   Quantity:  14 tablet        Take 1 tablet by mouth 2 times daily   Refills:  0        temazepam 30 MG capsule   Commonly known as:  RESTORIL   Quantity:  30 capsule        TAKE ONE CAPSULE BY MOUTH AT BEDTIME AS NEEDED FOR SLEEP   Refills:  5        warfarin 2 MG tablet   Commonly known as:  COUMADIN   Quantity:  300 tablet        Take 8 mg Mon/Thurs; 6 mg all other days or as instructed by [facility]   Refills:  0        * Notice:  This list has 2 medication(s) that are the same as other medications  prescribed for you. Read the directions carefully, and ask your doctor or other care provider to review them with you.            Orders Needing Specimen Collection     None      Pending Results     No orders found from 8/26/2018 to 8/29/2018.            Pending Culture Results     No orders found from 8/26/2018 to 8/29/2018.            Pending Results Instructions     If you had any lab results that were not finalized at the time of your Discharge, you can call the ED Lab Result RN at [phone]. You will be contacted by this team for any positive Lab results or changes in treatment. The nurses are available 7 days a week from 10A to 6:30P.  You can leave a message 24 hours per day and they will return your call.        Test Results From Your Hospital Stay               Thank you for choosing [facility]       Thank you for choosing [facility] for your care. Our goal is always to provide you with excellent care. Hearing back from our patients is one way we can continue to improve our services. Please take a few minutes to complete the written survey that you may receive in the mail after you visit with us. Thank you!        Care EveryWhere ID     This is your Care EveryWhere ID. This could be used by other organizations to access your [facility] medical records  [id number]        Equal Access to Services     SHIRA DELEON AH: Hadii kenzie Polanco, rosalba watters, keli dewitt, lili valerio. So [facility] [phone].    ATENCIÓN: Si habla español, tiene a garcia disposición servicios gratuitos de asistencia lingüística. Llame al [phone].    We comply with applicable federal civil rights laws and [place] laws. We do not discriminate on the basis of race, color, national origin, age, disability, sex, sexual orientation, or gender identity.            After Visit Summary       This is your record. Keep this with you and show to your community pharmacist(s) and doctor(s) at  your next visit.

## 2018-08-28 NOTE — ED AVS SNAPSHOT
[facility] Emergency Department    [address] [address]    Phone:  [phone]    Fax:  [phone]                                       Nabor Cunningham   MRN: [id number]    Department:  [facility] Emergency Department   Date of Visit:  8/28/2018           After Visit Summary Signature Page     I have received my discharge instructions, and my questions have been answered. I have discussed any challenges I see with this plan with the nurse or doctor.    ..........................................................................................................................................  Patient/Patient Representative Signature      ..........................................................................................................................................  Patient Representative Print Name and Relationship to Patient    ..................................................               ................................................  Date                                            Time    ..........................................................................................................................................  Reviewed by Signature/Title    ...................................................              ..............................................  Date                                                            Time          22EPIC Rev 08/18

## 2018-08-29 NOTE — ED NOTES
Dressing removed, pt soaked 3 4x4 gauze pads with blood since daughter changed dressing before coming to ED. Pt cont oozing blood from packing. New gauze placed with ABD pad over.

## 2018-08-29 NOTE — ED PROVIDER NOTES
HPI  Patient is an 82-year-old male presenting with bleeding from a recent I&D of an abscess on his left lateral buttocks.  He has a known history of atrial fibrillation and takes Coumadin.  Other past medical history reviewed.  Medications reviewed.    The patient had an incision and drainage performed by Dr. Boyer yesterday and the general surgery clinic.  Packing was placed.  He provided recommendations to change the packing twice a day.  The patient's daughter is here and admits that she had not changed the packing yet.  She recognized bleeding from the wound this evening.  No fever.  No complaint of chest pain.  No shortness of breath.  No fainting or lightheadedness.    ROS: All other review of systems are negative other than that noted above.      Past Medical History:   Diagnosis Date     Anxiety      Gout      Hyperlipemia      Hypertension      Insomnia      Paroxysmal atrial fibrillation (H)      No past surgical history on file.  No family history on file.  Social History   Substance Use Topics     Smoking status: Former Smoker     Years: 20.00     Types: Cigarettes     Smokeless tobacco: Never Used     Alcohol use No         PHYSICAL  /87  Pulse 82  Temp 98.3  F (36.8  C) (Oral)  Resp 18  SpO2 96%  General: Patient is alert and in no distress.  Neurological: Alert.  Moving upper and lower extremities equally, bilaterally.  Head / Neck: Atraumatic.  Ears: Not done.  Eyes: Pupils are equal, round, and reactive.  Normal conjunctiva.  Nose: Midline.  No epistaxis.  Mouth / Throat:  Moist. Respiratory: No respiratory distress.  Cardiovascular: Regular rhythm.  Peripheral extremities are warm.  Abdomen / Pelvis: Not done.  Genitalia: Not done.  Musculoskeletal: Not done.  Skin: The dressing was removed and there was a curvilinear incision on the patient's left lateral buttocks.  There was a packing in place.  There was a small amount of oozing coming from the wound.      PHYSICIAN  2042.  The  patient has been on antibiotics for about 2 weeks in hopes that this abscess would resolve.  The patient reports that he has 3 more days of antibiotics available.  There is no evidence for purulent drainage.  There is no evidence for surrounding redness or expanding swelling, tenderness.  No systemic symptoms to suggest sepsis.  The patient is on Coumadin and appears to be bleeding from the wound.  The packing is removed.  There is still minimal bleeding present.  I anesthetized the skin edges and then placed #3 simple interrupted stitches using 3-0 Ethilon suture.  We then placed a pressure dressing over the top of it.  There was local swelling because of bleeding into the open space/tissue.  I suspect this will continue over the next few days if not weeks.  Pressure dressing recommended.  Continue with antibiotics over the next 3 days.  Follow-up for reevaluation in 7 days.      IMPRESSION    ICD-10-CM    1. Bleeding from wound T14.8XXA                   Reggie Arambula MD  08/28/18 2049

## 2018-08-29 NOTE — ED NOTES
Pressure dressing applied. DC instructions reviewed with pt and daughter, sent with dressing supplies for tomorrows dressing change.

## 2018-08-29 NOTE — ED NOTES
Pt presents to ED with concerns of post op bleeding. Pt had a staph abscess removed yesterday. Pt's daughter tried to change the packing in the wound, but was concerned about significant bleeding from the wound when packing was removed. Pt is alert and oriented.

## 2018-08-29 NOTE — DISCHARGE INSTRUCTIONS
Return to the Emergency Room if the following occurs:     Severely worsened bleeding, fainting, fever >101, expanding redness/swelling, or for any concern at anytime.    Or, follow-up with the following provider as we discussed:     Return to your surgeon for reevaluation as previously recommended, or return to them in 7-10 days for reevaluation and suture removal.    Medications discussed:    None new.  No changes.  My dressing over the wound as needed for oozing of blood.    If you received pain-relieving or sedating medication during your time in the ER, avoid alcohol, driving automobiles, or working with machinery.  Also, a responsible adult must stay with you.

## 2018-08-30 ENCOUNTER — ANTICOAGULATION THERAPY VISIT (OUTPATIENT)
Dept: ANTICOAGULATION | Facility: CLINIC | Age: 83
End: 2018-08-30
Payer: MEDICARE

## 2018-08-30 DIAGNOSIS — Z79.01 LONG-TERM (CURRENT) USE OF ANTICOAGULANTS: ICD-10-CM

## 2018-08-30 DIAGNOSIS — Z79.01 CHRONIC ANTICOAGULATION: ICD-10-CM

## 2018-08-30 DIAGNOSIS — I48.20 CHRONIC ATRIAL FIBRILLATION (H): ICD-10-CM

## 2018-08-30 LAB — INR POINT OF CARE: 3.1 (ref 0.86–1.14)

## 2018-08-30 PROCEDURE — 99207 ZZC NO CHARGE NURSE ONLY: CPT

## 2018-08-30 PROCEDURE — 85610 PROTHROMBIN TIME: CPT | Mod: QW

## 2018-08-30 PROCEDURE — 36416 COLLJ CAPILLARY BLOOD SPEC: CPT

## 2018-08-30 NOTE — PROGRESS NOTES
"  ANTICOAGULATION FOLLOW-UP CLINIC VISIT    Patient Name:  Nabor Cunningham  Date:  8/30/2018  Contact Type:  Face to Face    SUBJECTIVE:     Patient Findings     Positives Other complaints (buttock abscess), Antibiotic use or infection (patient has one day left of bactrim)    Comments Patient had an I & D to buttock abscess on 8-27-18. He was in the ED on 8-28 for bleeding from this. ED notes state \"The packing is removed.  There is still minimal bleeding present.  I anesthetized the skin edges and then placed #3 simple interrupted stitches using 3-0 Ethilon suture.  We then placed a pressure dressing over the top of it.  There was local swelling because of bleeding into the open space/tissue.  I suspect this will continue over the next few days if not weeks.  Pressure dressing recommended.  Continue with antibiotics over the next 3 days.  Follow-up for reevaluation in 7 days.\"    Patient has one day left of Bactrim which has an 8-11 hour half life. He has had 36 mg/week and usual dosing is 46 mg. Will continue with reduced dosing the next 2 days. Patient will have had 38 mg/week by next recheck in one week. No other bleeding concerns. Patient and daughter state there is no visible bleeding through bandaging.            OBJECTIVE    INR Protime   Date Value Ref Range Status   08/30/2018 3.1 (A) 0.86 - 1.14 Final       ASSESSMENT / PLAN  INR assessment THER    Recheck INR In: 1 WEEK    INR Location Clinic      Anticoagulation Summary as of 8/30/2018     INR goal 2.0-3.0   Today's INR 3.1!   Warfarin maintenance plan 8 mg (2 mg x 4) on Mon, Thu; 6 mg (2 mg x 3) all other days   Full warfarin instructions 8/30: 4 mg; 8/31: 4 mg; 9/3: 6 mg; Otherwise 8 mg on Mon, Thu; 6 mg all other days   Weekly warfarin total 46 mg   Plan last modified Belia Jones RN (7/12/2018)   Next INR check 9/6/2018   Target end date Indefinite    Indications   Chronic anticoagulation [Z79.01]  Chronic atrial fibrillation (H) " [I48.2]  Long-term (current) use of anticoagulants [Z79.01] [Z79.01]         Anticoagulation Episode Summary     INR check location     Preferred lab     Send INR reminders to [facility] POOL    Comments * 2mg tabs      Anticoagulation Care Providers     Provider Role Specialty Phone number    Heron Mayo MD [facility] [phone]            See the Encounter Report to view Anticoagulation Flowsheet and Dosing Calendar (Go to Encounters tab in chart review, and find the Anticoagulation Therapy Visit)        Belia Jones RN

## 2018-08-30 NOTE — MR AVS SNAPSHOT
Nabor Cunningham   8/30/2018 2:30 PM   Anticoagulation Therapy Visit    Description:  82 year old male   Provider:  NB ANTI COAG   Department:  [facility]           INR as of 8/30/2018     Today's INR 3.1!      Anticoagulation Summary as of 8/30/2018     INR goal 2.0-3.0   Today's INR 3.1!   Full warfarin instructions 8/30: 4 mg; 8/31: 4 mg; 9/3: 6 mg; Otherwise 8 mg on Mon, Thu; 6 mg all other days   Next INR check 9/6/2018    Indications   Chronic anticoagulation [Z79.01]  Chronic atrial fibrillation (H) [I48.2]  Long-term (current) use of anticoagulants [Z79.01] [Z79.01]         Your next Anticoagulation Clinic appointment(s)     Sep 06, 2018  3:15 PM CDT   [facility] with NB ANTI COAG   [facility]    [address] [place] [address] [phone]              Contact Numbers     Please call [phone] with any problems or questions regarding your therapy.    If you need to cancel and/or reschedule your appointment please call one of the following numbers:  [facility] - [phone]  [place] - [phone]  [facility] [phone]  [facility] [phone]  [place] - [phone]            August 2018 Details    Sun Mon Tue Wed Thu Fri Sat        1               2               3               4                 5               6               7               8               9               10               11                 12               13               14               15               16               17               18                 19               20               21               22               23               24               25                 26               27               28               29               30      4 mg   See details      31      4 mg           Date Details   08/30 This INR check               How to take your warfarin dose     To take:  4 mg Take 2 of the 2 mg tablets.            September 2018 Details    Sun Mon Tue Wed Thu Fri Sat           1      6 mg           2      6 mg         3      6 mg         4      6 mg         5      6 mg         6            7               8                 9               10               11               12               13               14               15                 16               17               18               19               20               21               22                 23               24               25               26               27               28               29                 30                      Date Details   No additional details    Date of next INR:  9/6/2018         How to take your warfarin dose     To take:  6 mg Take 3 of the 2 mg tablets.    To take:  8 mg Take 4 of the 2 mg tablets.

## 2018-08-31 ENCOUNTER — TELEPHONE (OUTPATIENT)
Dept: SURGERY | Facility: CLINIC | Age: 83
End: 2018-08-31

## 2018-08-31 NOTE — TELEPHONE ENCOUNTER
Reason for Call:  Other wound infection    Detailed comments: Florencio, pt son states pt seen in ER for bleeding that would not stop at wound site , wound now red and swollen and hot to touch and painful, please call son right away to advise what to do     Phone Number Patient can be reached at: [phone]    Best Time: today    Can we leave a detailed message on this number? YES    Call taken on 8/31/2018 at 11:11 AM by Candice Kaiser

## 2018-08-31 NOTE — TELEPHONE ENCOUNTER
"Tuesday ED stitched up the open wound.    When son went to change the dressing this morning it looked better,  They then made a trip to [facility] and when returned home 40 minutes ago, it looks \"angry\" and irritated.  Increased in size, oozing still (not dripping) and  outer 7 inches- out from sutures is red/ irritated and hot to touch. Pt expresses increased pain.  Son noted it in on the belt line area and maybe activity set it off.  Today was last Abx.    Denies fever or significant bleeding and feels fine other than the increased discomfort.  Advised Abx should have still been covering for infection.  Suggested try Ice/Compression and rest, (R-I-C-E)  and Tylenol is ok to try and lessen the discomfort and settle the irritated area down and reassess in 1 hour.    _________________________________________    Reviewed ED AVS with son (was not at the ED visit):  Included REST-  Need to decrease HR and circulation,(replacement of elevation)  as activity and rubbing will increase the blood to the area and can cause Ice - for pain and swelling control as well.more blood filling to wound pocket and  activity causing more swelling and inflammation.  Compression-   will increase the amount of blood oozing at this point and to expect the dressing to have more blood on it= but should not be dripping out of wound when changing dressings.  With follow up.  And reasons to  \"return to ED\"    Reassured to call if any further questions.    Cee Foy RN  [facility]    "

## 2018-09-02 ENCOUNTER — APPOINTMENT (OUTPATIENT)
Dept: GENERAL RADIOLOGY | Facility: CLINIC | Age: 83
DRG: 907 | End: 2018-09-02
Attending: STUDENT IN AN ORGANIZED HEALTH CARE EDUCATION/TRAINING PROGRAM
Payer: MEDICARE

## 2018-09-02 ENCOUNTER — HOSPITAL ENCOUNTER (INPATIENT)
Facility: CLINIC | Age: 83
LOS: 5 days | Discharge: NURSING, REHAB OR LONG-TERM CARE | DRG: 907 | End: 2018-09-07
Attending: STUDENT IN AN ORGANIZED HEALTH CARE EDUCATION/TRAINING PROGRAM | Admitting: FAMILY MEDICINE
Payer: MEDICARE

## 2018-09-02 ENCOUNTER — APPOINTMENT (OUTPATIENT)
Dept: CT IMAGING | Facility: CLINIC | Age: 83
DRG: 907 | End: 2018-09-02
Attending: STUDENT IN AN ORGANIZED HEALTH CARE EDUCATION/TRAINING PROGRAM
Payer: MEDICARE

## 2018-09-02 DIAGNOSIS — R41.0 ACUTE DELIRIUM: ICD-10-CM

## 2018-09-02 DIAGNOSIS — F41.9 ANXIETY: ICD-10-CM

## 2018-09-02 DIAGNOSIS — G47.00 PERSISTENT INSOMNIA: ICD-10-CM

## 2018-09-02 DIAGNOSIS — Z79.01 LONG-TERM (CURRENT) USE OF ANTICOAGULANTS: ICD-10-CM

## 2018-09-02 DIAGNOSIS — Z87.891 PERSONAL HISTORY OF TOBACCO USE, PRESENTING HAZARDS TO HEALTH: ICD-10-CM

## 2018-09-02 DIAGNOSIS — J16.8 PNEUMONIA DUE TO OTHER SPECIFIED INFECTIOUS ORGANISMS: ICD-10-CM

## 2018-09-02 DIAGNOSIS — L76.22 POSTOPERATIVE HEMORRHAGE OF SUBCUTANEOUS TISSUE FOLLOWING NON-DERMATOLOGIC PROCEDURE: ICD-10-CM

## 2018-09-02 DIAGNOSIS — R41.0 CONFUSION: ICD-10-CM

## 2018-09-02 DIAGNOSIS — T14.8XXA BLEEDING FROM WOUND: Primary | ICD-10-CM
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
## 2018-09-02 DIAGNOSIS — J18.9 PNEUMONIA OF RIGHT LOWER LOBE DUE TO INFECTIOUS ORGANISM: ICD-10-CM

## 2018-09-02 DIAGNOSIS — M1A.9XX0 CHRONIC GOUT, UNSPECIFIED CAUSE, UNSPECIFIED SITE: ICD-10-CM

## 2018-09-02 LAB
ALBUMIN UR-MCNC: NEGATIVE MG/DL
ANION GAP SERPL CALCULATED.3IONS-SCNC: 5 MMOL/L (ref 3–14)
APPEARANCE UR: CLEAR
BASOPHILS # BLD AUTO: 0 10E9/L (ref 0–0.2)
BASOPHILS # BLD AUTO: 0 10E9/L (ref 0–0.2)
BASOPHILS NFR BLD AUTO: 0.6 %
BASOPHILS NFR BLD AUTO: 0.7 %
BILIRUB UR QL STRIP: NEGATIVE
BUN SERPL-MCNC: 12 MG/DL (ref 7–30)
CALCIUM SERPL-MCNC: 8.8 MG/DL (ref 8.5–10.1)
CHLORIDE SERPL-SCNC: 102 MMOL/L (ref 94–109)
CO2 SERPL-SCNC: 29 MMOL/L (ref 20–32)
COLOR UR AUTO: YELLOW
CREAT SERPL-MCNC: 0.85 MG/DL (ref 0.66–1.25)
CRP SERPL-MCNC: 31.5 MG/L (ref 0–8)
DIFFERENTIAL METHOD BLD: ABNORMAL
DIFFERENTIAL METHOD BLD: ABNORMAL
EOSINOPHIL # BLD AUTO: 0.2 10E9/L (ref 0–0.7)
EOSINOPHIL # BLD AUTO: 0.2 10E9/L (ref 0–0.7)
EOSINOPHIL NFR BLD AUTO: 4.2 %
EOSINOPHIL NFR BLD AUTO: 4.2 %
ERYTHROCYTE [DISTWIDTH] IN BLOOD BY AUTOMATED COUNT: 13.2 % (ref 10–15)
ERYTHROCYTE [DISTWIDTH] IN BLOOD BY AUTOMATED COUNT: 13.4 % (ref 10–15)
GFR SERPL CREATININE-BSD FRML MDRD: 86 ML/MIN/1.7M2
GLUCOSE SERPL-MCNC: 96 MG/DL (ref 70–99)
GLUCOSE UR STRIP-MCNC: NEGATIVE MG/DL
HCT VFR BLD AUTO: 33.8 % (ref 40–53)
HCT VFR BLD AUTO: 39.6 % (ref 40–53)
HGB BLD-MCNC: 11.1 G/DL (ref 13.3–17.7)
HGB BLD-MCNC: 12.8 G/DL (ref 13.3–17.7)
HGB UR QL STRIP: NEGATIVE
IMM GRANULOCYTES # BLD: 0 10E9/L (ref 0–0.4)
IMM GRANULOCYTES # BLD: 0 10E9/L (ref 0–0.4)
IMM GRANULOCYTES NFR BLD: 0.2 %
IMM GRANULOCYTES NFR BLD: 0.2 %
INR PPP: 2.26 (ref 0.86–1.14)
KETONES UR STRIP-MCNC: NEGATIVE MG/DL
LEUKOCYTE ESTERASE UR QL STRIP: NEGATIVE
LYMPHOCYTES # BLD AUTO: 0.7 10E9/L (ref 0.8–5.3)
LYMPHOCYTES # BLD AUTO: 0.7 10E9/L (ref 0.8–5.3)
LYMPHOCYTES NFR BLD AUTO: 12.7 %
LYMPHOCYTES NFR BLD AUTO: 14.7 %
MCH RBC QN AUTO: 27.6 PG (ref 26.5–33)
MCH RBC QN AUTO: 28 PG (ref 26.5–33)
MCHC RBC AUTO-ENTMCNC: 32.3 G/DL (ref 31.5–36.5)
MCHC RBC AUTO-ENTMCNC: 32.8 G/DL (ref 31.5–36.5)
MCV RBC AUTO: 85 FL (ref 78–100)
MCV RBC AUTO: 85 FL (ref 78–100)
MONOCYTES # BLD AUTO: 0.3 10E9/L (ref 0–1.3)
MONOCYTES # BLD AUTO: 0.4 10E9/L (ref 0–1.3)
MONOCYTES NFR BLD AUTO: 7.2 %
MONOCYTES NFR BLD AUTO: 7.9 %
NEUTROPHILS # BLD AUTO: 3.3 10E9/L (ref 1.6–8.3)
NEUTROPHILS # BLD AUTO: 3.9 10E9/L (ref 1.6–8.3)
NEUTROPHILS NFR BLD AUTO: 73 %
NEUTROPHILS NFR BLD AUTO: 74.4 %
NITRATE UR QL: NEGATIVE
NRBC # BLD AUTO: 0 10*3/UL
NRBC # BLD AUTO: 0 10*3/UL
NRBC BLD AUTO-RTO: 0 /100
NRBC BLD AUTO-RTO: 0 /100
PH UR STRIP: 7 PH (ref 5–7)
PLATELET # BLD AUTO: 138 10E9/L (ref 150–450)
PLATELET # BLD AUTO: 155 10E9/L (ref 150–450)
POTASSIUM SERPL-SCNC: 4 MMOL/L (ref 3.4–5.3)
PROCALCITONIN SERPL-MCNC: <0.05 NG/ML
RBC # BLD AUTO: 3.97 10E12/L (ref 4.4–5.9)
RBC # BLD AUTO: 4.64 10E12/L (ref 4.4–5.9)
SODIUM SERPL-SCNC: 136 MMOL/L (ref 133–144)
SOURCE: NORMAL
SP GR UR STRIP: 1 (ref 1–1.03)
UROBILINOGEN UR STRIP-MCNC: 0 MG/DL (ref 0–2)
WBC # BLD AUTO: 4.6 10E9/L (ref 4–11)
WBC # BLD AUTO: 5.2 10E9/L (ref 4–11)

## 2018-09-02 PROCEDURE — 99285 EMERGENCY DEPT VISIT HI MDM: CPT | Mod: 25 | Performed by: STUDENT IN AN ORGANIZED HEALTH CARE EDUCATION/TRAINING PROGRAM

## 2018-09-02 PROCEDURE — 25000128 H RX IP 250 OP 636: Performed by: FAMILY MEDICINE

## 2018-09-02 PROCEDURE — 12000007 ZZH R&B INTERMEDIATE

## 2018-09-02 PROCEDURE — A9270 NON-COVERED ITEM OR SERVICE: HCPCS | Mod: GY | Performed by: STUDENT IN AN ORGANIZED HEALTH CARE EDUCATION/TRAINING PROGRAM

## 2018-09-02 PROCEDURE — 80048 BASIC METABOLIC PNL TOTAL CA: CPT | Performed by: STUDENT IN AN ORGANIZED HEALTH CARE EDUCATION/TRAINING PROGRAM

## 2018-09-02 PROCEDURE — 25000132 ZZH RX MED GY IP 250 OP 250 PS 637: Mod: GY | Performed by: STUDENT IN AN ORGANIZED HEALTH CARE EDUCATION/TRAINING PROGRAM

## 2018-09-02 PROCEDURE — 86140 C-REACTIVE PROTEIN: CPT | Performed by: INTERNAL MEDICINE

## 2018-09-02 PROCEDURE — 00000146 ZZHCL STATISTIC GLUCOSE BY METER IP

## 2018-09-02 PROCEDURE — 96365 THER/PROPH/DIAG IV INF INIT: CPT | Performed by: STUDENT IN AN ORGANIZED HEALTH CARE EDUCATION/TRAINING PROGRAM

## 2018-09-02 PROCEDURE — 25000128 H RX IP 250 OP 636: Performed by: STUDENT IN AN ORGANIZED HEALTH CARE EDUCATION/TRAINING PROGRAM

## 2018-09-02 PROCEDURE — 74177 CT ABD & PELVIS W/CONTRAST: CPT

## 2018-09-02 PROCEDURE — 99221 1ST HOSP IP/OBS SF/LOW 40: CPT | Performed by: SURGERY

## 2018-09-02 PROCEDURE — 25000128 H RX IP 250 OP 636: Performed by: INTERNAL MEDICINE

## 2018-09-02 PROCEDURE — 71046 X-RAY EXAM CHEST 2 VIEWS: CPT

## 2018-09-02 PROCEDURE — A9270 NON-COVERED ITEM OR SERVICE: HCPCS | Mod: GY | Performed by: INTERNAL MEDICINE

## 2018-09-02 PROCEDURE — 25000125 ZZHC RX 250: Performed by: INTERNAL MEDICINE

## 2018-09-02 PROCEDURE — 81001 URINALYSIS AUTO W/SCOPE: CPT | Performed by: STUDENT IN AN ORGANIZED HEALTH CARE EDUCATION/TRAINING PROGRAM

## 2018-09-02 PROCEDURE — 25000125 ZZHC RX 250: Performed by: STUDENT IN AN ORGANIZED HEALTH CARE EDUCATION/TRAINING PROGRAM

## 2018-09-02 PROCEDURE — 84145 PROCALCITONIN (PCT): CPT | Performed by: STUDENT IN AN ORGANIZED HEALTH CARE EDUCATION/TRAINING PROGRAM

## 2018-09-02 PROCEDURE — G0378 HOSPITAL OBSERVATION PER HR: HCPCS

## 2018-09-02 PROCEDURE — 36415 COLL VENOUS BLD VENIPUNCTURE: CPT | Performed by: STUDENT IN AN ORGANIZED HEALTH CARE EDUCATION/TRAINING PROGRAM

## 2018-09-02 PROCEDURE — 87040 BLOOD CULTURE FOR BACTERIA: CPT | Performed by: STUDENT IN AN ORGANIZED HEALTH CARE EDUCATION/TRAINING PROGRAM

## 2018-09-02 PROCEDURE — 85025 COMPLETE CBC W/AUTO DIFF WBC: CPT | Performed by: STUDENT IN AN ORGANIZED HEALTH CARE EDUCATION/TRAINING PROGRAM

## 2018-09-02 PROCEDURE — 85610 PROTHROMBIN TIME: CPT | Performed by: STUDENT IN AN ORGANIZED HEALTH CARE EDUCATION/TRAINING PROGRAM

## 2018-09-02 PROCEDURE — 25000132 ZZH RX MED GY IP 250 OP 250 PS 637: Mod: GY | Performed by: INTERNAL MEDICINE

## 2018-09-02 PROCEDURE — 99220 ZZC INITIAL OBSERVATION CARE,LEVL III: CPT | Performed by: INTERNAL MEDICINE

## 2018-09-02 RX ORDER — PREDNISOLONE ACETATE 10 MG/ML
1 SUSPENSION/ DROPS OPHTHALMIC DAILY
Refills: 2 | COMMUNITY
Start: 2018-06-08

## 2018-09-02 RX ORDER — ACETAMINOPHEN 325 MG/1
650 TABLET ORAL ONCE
Status: COMPLETED | OUTPATIENT
Start: 2018-09-02 | End: 2018-09-02

## 2018-09-02 RX ORDER — MAGNESIUM CARB/ALUMINUM HYDROX 105-160MG
5 TABLET,CHEWABLE ORAL DAILY
COMMUNITY
End: 2019-10-14

## 2018-09-02 RX ORDER — ONDANSETRON 4 MG/1
4 TABLET, ORALLY DISINTEGRATING ORAL EVERY 6 HOURS PRN
Status: DISCONTINUED | OUTPATIENT
Start: 2018-09-02 | End: 2018-09-07 | Stop reason: HOSPADM

## 2018-09-02 RX ORDER — NALOXONE HYDROCHLORIDE 0.4 MG/ML
.1-.4 INJECTION, SOLUTION INTRAMUSCULAR; INTRAVENOUS; SUBCUTANEOUS
Status: DISCONTINUED | OUTPATIENT
Start: 2018-09-02 | End: 2018-09-07 | Stop reason: HOSPADM

## 2018-09-02 RX ORDER — METOPROLOL SUCCINATE 25 MG/1
25 TABLET, EXTENDED RELEASE ORAL EVERY EVENING
Status: DISCONTINUED | OUTPATIENT
Start: 2018-09-02 | End: 2018-09-07 | Stop reason: HOSPADM

## 2018-09-02 RX ORDER — LEVOFLOXACIN 5 MG/ML
750 INJECTION, SOLUTION INTRAVENOUS EVERY 24 HOURS
Status: DISCONTINUED | OUTPATIENT
Start: 2018-09-02 | End: 2018-09-02

## 2018-09-02 RX ORDER — ONDANSETRON 2 MG/ML
4 INJECTION INTRAMUSCULAR; INTRAVENOUS EVERY 6 HOURS PRN
Status: DISCONTINUED | OUTPATIENT
Start: 2018-09-02 | End: 2018-09-02

## 2018-09-02 RX ORDER — MAGNESIUM CARB/ALUMINUM HYDROX 105-160MG
22.5 TABLET,CHEWABLE ORAL DAILY
Status: DISCONTINUED | OUTPATIENT
Start: 2018-09-02 | End: 2018-09-07 | Stop reason: HOSPADM

## 2018-09-02 RX ORDER — NORTRIPTYLINE HCL 25 MG
50 CAPSULE ORAL AT BEDTIME
Status: DISCONTINUED | OUTPATIENT
Start: 2018-09-02 | End: 2018-09-06

## 2018-09-02 RX ORDER — NALOXONE HYDROCHLORIDE 0.4 MG/ML
.1-.4 INJECTION, SOLUTION INTRAMUSCULAR; INTRAVENOUS; SUBCUTANEOUS
Status: DISCONTINUED | OUTPATIENT
Start: 2018-09-02 | End: 2018-09-02

## 2018-09-02 RX ORDER — NORTRIPTYLINE HCL 25 MG
25 CAPSULE ORAL EVERY MORNING
Status: DISCONTINUED | OUTPATIENT
Start: 2018-09-02 | End: 2018-09-07 | Stop reason: HOSPADM

## 2018-09-02 RX ORDER — PREDNISOLONE ACETATE 10 MG/ML
1 SUSPENSION/ DROPS OPHTHALMIC 2 TIMES DAILY
Status: DISCONTINUED | OUTPATIENT
Start: 2018-09-02 | End: 2018-09-07 | Stop reason: HOSPADM

## 2018-09-02 RX ORDER — COLCHICINE 0.6 MG/1
0.6 TABLET ORAL DAILY
Status: DISCONTINUED | OUTPATIENT
Start: 2018-09-02 | End: 2018-09-07 | Stop reason: HOSPADM

## 2018-09-02 RX ORDER — ONDANSETRON 2 MG/ML
4 INJECTION INTRAMUSCULAR; INTRAVENOUS EVERY 6 HOURS PRN
Status: DISCONTINUED | OUTPATIENT
Start: 2018-09-02 | End: 2018-09-07 | Stop reason: HOSPADM

## 2018-09-02 RX ORDER — ACETAMINOPHEN 650 MG/1
650 SUPPOSITORY RECTAL EVERY 4 HOURS PRN
Status: DISCONTINUED | OUTPATIENT
Start: 2018-09-02 | End: 2018-09-07 | Stop reason: HOSPADM

## 2018-09-02 RX ORDER — SODIUM CHLORIDE 9 MG/ML
INJECTION, SOLUTION INTRAVENOUS CONTINUOUS
Status: DISCONTINUED | OUTPATIENT
Start: 2018-09-02 | End: 2018-09-04

## 2018-09-02 RX ORDER — TEMAZEPAM 7.5 MG/1
30 CAPSULE ORAL
Status: DISCONTINUED | OUTPATIENT
Start: 2018-09-02 | End: 2018-09-07 | Stop reason: HOSPADM

## 2018-09-02 RX ORDER — ACETAMINOPHEN 325 MG/1
650 TABLET ORAL EVERY 4 HOURS PRN
Status: DISCONTINUED | OUTPATIENT
Start: 2018-09-02 | End: 2018-09-07 | Stop reason: HOSPADM

## 2018-09-02 RX ORDER — CELECOXIB 200 MG/1
200 CAPSULE ORAL DAILY
Status: DISCONTINUED | OUTPATIENT
Start: 2018-09-02 | End: 2018-09-02 | Stop reason: CLARIF

## 2018-09-02 RX ORDER — CLINDAMYCIN PHOSPHATE 900 MG/50ML
900 INJECTION, SOLUTION INTRAVENOUS EVERY 8 HOURS
Status: DISCONTINUED | OUTPATIENT
Start: 2018-09-02 | End: 2018-09-02

## 2018-09-02 RX ORDER — IOPAMIDOL 755 MG/ML
78 INJECTION, SOLUTION INTRAVASCULAR ONCE
Status: COMPLETED | OUTPATIENT
Start: 2018-09-02 | End: 2018-09-02

## 2018-09-02 RX ORDER — ACETAMINOPHEN 325 MG/1
650 TABLET ORAL EVERY 4 HOURS PRN
Status: DISCONTINUED | OUTPATIENT
Start: 2018-09-02 | End: 2018-09-02

## 2018-09-02 RX ORDER — ONDANSETRON 4 MG/1
4 TABLET, ORALLY DISINTEGRATING ORAL EVERY 6 HOURS PRN
Status: DISCONTINUED | OUTPATIENT
Start: 2018-09-02 | End: 2018-09-02

## 2018-09-02 RX ADMIN — PREDNISOLONE ACETATE 1 DROP: 10 SUSPENSION/ DROPS OPHTHALMIC at 12:54

## 2018-09-02 RX ADMIN — MINERAL OIL 22.5 ML: 99.9 LIQUID ORAL; TOPICAL at 17:53

## 2018-09-02 RX ADMIN — LEVOFLOXACIN 750 MG: 5 INJECTION, SOLUTION INTRAVENOUS at 07:21

## 2018-09-02 RX ADMIN — Medication 1 MG: at 20:34

## 2018-09-02 RX ADMIN — PHYTONADIONE 2 MG: 2 INJECTION, EMULSION INTRAMUSCULAR; INTRAVENOUS; SUBCUTANEOUS at 12:52

## 2018-09-02 RX ADMIN — SODIUM CHLORIDE 59 ML: 9 INJECTION, SOLUTION INTRAVENOUS at 05:47

## 2018-09-02 RX ADMIN — METOPROLOL SUCCINATE 25 MG: 25 TABLET, EXTENDED RELEASE ORAL at 17:52

## 2018-09-02 RX ADMIN — NORTRIPTYLINE HYDROCHLORIDE 50 MG: 25 CAPSULE ORAL at 20:34

## 2018-09-02 RX ADMIN — ACETAMINOPHEN 650 MG: 325 TABLET, FILM COATED ORAL at 07:12

## 2018-09-02 RX ADMIN — IOPAMIDOL 78 ML: 755 INJECTION, SOLUTION INTRAVENOUS at 05:47

## 2018-09-02 RX ADMIN — SODIUM CHLORIDE: 9 INJECTION, SOLUTION INTRAVENOUS at 08:33

## 2018-09-02 RX ADMIN — TEMAZEPAM 30 MG: 7.5 CAPSULE ORAL at 18:15

## 2018-09-02 RX ADMIN — COLCHICINE 0.6 MG: 0.6 TABLET, FILM COATED ORAL at 12:54

## 2018-09-02 RX ADMIN — PREDNISOLONE ACETATE 1 DROP: 10 SUSPENSION/ DROPS OPHTHALMIC at 20:34

## 2018-09-02 RX ADMIN — SODIUM CHLORIDE: 9 INJECTION, SOLUTION INTRAVENOUS at 19:54

## 2018-09-02 RX ADMIN — NORTRIPTYLINE HYDROCHLORIDE 25 MG: 25 CAPSULE ORAL at 12:52

## 2018-09-02 RX ADMIN — CLINDAMYCIN IN 5 PERCENT DEXTROSE 900 MG: 18 INJECTION, SOLUTION INTRAVENOUS at 08:33

## 2018-09-02 ASSESSMENT — ACTIVITIES OF DAILY LIVING (ADL)
TOILETING: 0-->INDEPENDENT
AMBULATION: 0-->INDEPENDENT
DRESS: 0-->INDEPENDENT
SWALLOWING: 0-->SWALLOWS FOODS/LIQUIDS WITHOUT DIFFICULTY
FALL_HISTORY_WITHIN_LAST_SIX_MONTHS: NO
RETIRED_EATING: 0-->INDEPENDENT
ADLS_ACUITY_SCORE: 11
COGNITION: 0 - NO COGNITION ISSUES REPORTED
RETIRED_COMMUNICATION: 0-->UNDERSTANDS/COMMUNICATES WITHOUT DIFFICULTY
BATHING: 0-->INDEPENDENT
TRANSFERRING: 0-->INDEPENDENT

## 2018-09-02 NOTE — IP AVS SNAPSHOT
"          [facility] SURGICAL: [phone]                                              INTERAGENCY TRANSFER FORM - PHYSICIAN ORDERS   2018                    Hospital Admission Date: 2018  NICOLE PEREA   : 1935  Sex: Male        Attending Provider: Chencho Purcell MD     Allergies:  Amoxicillin, Penicillins, Trazodone    Infection:  None   Service:  HOSPITALIST    Ht:  1.626 m (5' 4\")   Wt:  73.4 kg (161 lb 13.1 oz)   Admission Wt:  72.6 kg (160 lb)    BMI:  27.78 kg/m 2   BSA:  1.82 m 2            Patient PCP Information     Provider PCP Type    PRAKASH PRADO MD General      ED Clinical Impression     Diagnosis Description Comment Added By Time Added    Bleeding from wound [T14.8XXA] Bleeding from wound [T14.8XXA]  Heron Powell, DO 2018  6:49 AM    Pneumonia of right lower lobe due to infectious organism (H) [J18.1] Pneumonia of right lower lobe due to infectious organism (H) [J18.1]  Heron Powell, DO 2018  6:49 AM    Confusion [R41.0] Confusion [R41.0]  Heron Pwoell, DO 2018  6:49 AM      Hospital Problems as of 2018              Priority Class Noted POA    Pneumonia Medium  2018 Yes    Bleeding from wound Medium  2018 Yes    Postoperative hemorrhage involving circulatory system following non-circulatory system procedure Medium  9/3/2018 Yes      Non-Hospital Problems as of 2018              Priority Class Noted    Anxiety Medium  3/22/2017    Alcohol abuse Medium  3/22/2017    Chronic atrial fibrillation (H) Medium  3/22/2017    Weight loss Medium  3/22/2017    Peripheral polyneuropathy Medium  3/22/2017    Chronic gout, unspecified cause, unspecified site Medium  3/22/2017    Essential hypertension with goal blood pressure less than 140/90 Medium  3/22/2017    Hyperlipidemia LDL goal <130 Medium  3/22/2017    Persistent insomnia Medium  3/22/2017    Lower urinary tract symptoms (LUTS) Medium  3/22/2017    Chronic anticoagulation " Medium  3/22/2017    Long-term (current) use of anticoagulants [Z79.01] Medium  3/22/2017    Moderate episode of recurrent major depressive disorder (H) Medium  4/10/2017      Code Status History     This patient does not have a recorded code status. Please follow your organizational policy for patients in this situation.         Medication Review      CONTINUE these medications which have NOT CHANGED        Dose / Directions Comments    calcium carbonate 500 mg {elemental} 500 MG tablet   Commonly known as:  OS-ALY        Dose:  1 tablet   Take 1 tablet by mouth 2 times daily   Refills:  0        cyanocobalamin 1000 MCG tablet   Commonly known as:  vitamin  B-12   Used for:  Vitamin B 12 deficiency        TAKE ONE TABLET BY MOUTH DAILY   Quantity:  30 tablet   Refills:  11        hypromellose 0.4 % Soln ophthalmic solution   Commonly known as:  ARTIFICIAL TEARS        Dose:  1 drop   Place 1 drop into both eyes every hour as needed for dry eyes   Refills:  0        metoprolol succinate 25 MG 24 hr tablet   Commonly known as:  TOPROL-XL        Dose:  25 mg   Take 25 mg by mouth every evening   Quantity:  90 tablet   Refills:  1        mineral oil oil        Dose:  5 mL   Take 5 mLs by mouth daily   Refills:  0        Multi-vitamin Tabs tablet        Dose:  1 tablet   Take 1 tablet by mouth daily   Quantity:  100 tablet   Refills:  3        nortriptyline 25 MG capsule   Commonly known as:  PAMELOR   Used for:  Anxiety        TAKE ONE CAPSULE MID MORNING AND TWO CAPSULES BY MOUTH DAILY AT BEDTIME   Quantity:  270 capsule   Refills:  3    This is a dose increase       prednisoLONE acetate 1 % ophthalmic susp   Commonly known as:  PRED FORTE        Dose:  1 drop   Place 1 drop Into the left eye 2 times daily   Refills:  2        temazepam 30 MG capsule   Commonly known as:  RESTORIL   Used for:  Persistent insomnia        TAKE ONE CAPSULE BY MOUTH AT BEDTIME AS NEEDED FOR SLEEP   Quantity:  30 capsule   Refills:  5     Fill not more than once monthly, see start date.         STOP taking     celecoxib 200 MG capsule   Commonly known as:  celeBREX           colchicine 0.6 MG tablet   Commonly known as:  COLCYRS           order for DME           sulfamethoxazole-trimethoprim 800-160 MG per tablet   Commonly known as:  BACTRIM DS/SEPTRA DS           warfarin 2 MG tablet   Commonly known as:  COUMADIN                     Further instructions from your care team       As a system [facility] wants to ensure that across the care continuum that you have support through care coordination services that include nurses and social workers in the outpatient setting.  Due to your pneumonia and abscess I feel it is important you have this support when you discharge.  They will be calling you within 24-48 hours of your discharge.   A brochure describing the services was provided.  If you have questions you can reach out to your clinic directly and ask for the Care Coordinator assigned to your care.  Tri Bolton RN, Care Coordinator [phone]    Wound Care -   Leave bandage in place until Monday.  If needed for leaking bandage may remove only the cover gauze, (do not touching packing material in the wound,) and recover with gauze and tape as a pressure dressing, (fold 4 layers of 4x4 gauze in half and stack over wound then lay 2 layers of unfolded 4x4 gauze over and secure with tape).  If wound continues to ooze bright red blood requiring more than daily cares then return to ER for evaluation.      May lay on the dressing to apply more pressure to area to control bleeding but should not lay on it all the time.    Follow-up Monday in [facility] with Dr. Boyer and wound care nurse.    Monica Lundberg RN, CWOCN [phone]      Pt needs a primary care follow up with in two weeks for a repeat cxr to follow-up possible pneumonia.  Pt will need to restart coumadin next week on Thursday if there if no further bleeding.      After Care     Activity -  Up with nursing assistance           Advance Diet as Tolerated       Follow this diet upon discharge:      Regular Diet Adult       General info for SNF       Length of Stay Estimate: Short Term Care: Estimated # of Days <30  Condition at Discharge: Stable  Level of care:skilled   Rehabilitation Potential: Good  Admission H&P remains valid and up-to-date: Yes  Recent Chemotherapy: N/A  Use Nursing Home Standing Orders: Yes       Mantoux instructions       Give two-step Mantoux (PPD) Per Facility Policy Yes       Wound care (specify)       Site:   Buttox, current dressing to remain in place until Monday when seen in surgery/wound clinic             Referrals     Occupational Therapy Adult Consult       Evaluate and treat as clinically indicated.    Reason:  weak       Physical Therapy Adult Consult       Evaluate and treat as clinically indicated.    Reason:  Weak             Your next 10 appointments already scheduled     Sep 10, 2018 10:30 AM CDT   Office Visit with Et Nurse - [facility] Wound Ostomy ([facility])    [address] [address]   [phone]           Bring a current list of meds and any records pertaining to this visit. For Physicals, please bring immunization records and any forms needing to be filled out. Please arrive 10 minutes early to complete paperwork.            Sep 10, 2018 11:15 AM CDT   [facility]    [address] [address]   [phone]              Statement of Approval     Ordered          09/07/18 1404  I have reviewed and agree with all the recommendations and orders detailed in this document.  EFFECTIVE NOW     Approved and electronically signed by:  Chencho Purcell MD

## 2018-09-02 NOTE — UTILIZATION REVIEW
"[facility]     Admission Status; Secondary Review Determination     Admission Date: 9/2/2018  4:47 AM      Under the authority of the Utilization Management Committee, the utilization review process indicated a secondary review on the above patient.  The review outcome is based on review of the medical records, discussions with staff, and applying clinical experience noted on the date of the review.          (x) Observation Status Appropriate - This patient does not meet hospital inpatient criteria and is placed in observation status. If this patient's primary payer is Medicare and was admitted as an inpatient, Condition Code 44 should be used and patient status changed to \"observation\".     RATIONALE FOR DETERMINATION   82-year-old male admitted overnight with bleeding from a left buttock wound from recent I&D.  Surgery has performed bedside wound exploration and wound care.  He required INR reversal for bleeding.  Patient is otherwise doing well and will likely discharge tomorrow.  At the time of this review, the patient does not meet medical necessity for inpatient hospitalization.     The severity of illness, intensity of service provided, expected LOS and risk for adverse outcome make the care appropriate for further observation; however, doesn't meet criteria for hospital inpatient admission.  Dr Bryson already changed to observation status.        The information on this document is developed by the utilization review team in order for the business office to ensure compliance.  This only denotes the appropriateness of proper admission status and does not reflect the quality of care rendered.         The definitions of Inpatient Status and Observation Status used in making the determination above are those provided in the CMS Coverage Manual, Chapter 1 and Chapter 6, section 70.4.      Sincerely,     Chencho Middleton DO MPH   Physician Advisor  Utilization Review  [facility]      "

## 2018-09-02 NOTE — IP AVS SNAPSHOT
` ` Patient Information     Patient Name Sex Nabor Parisi ([id number]) Male 1935       Room Bed    2307 6089-13      Patient Demographics     Address Phone    [address] [address] [phone] (Home)  [phone] (Mobile)      Patient Ethnicity & Race     Ethnic Group Patient Race    American White      Emergency Contact(s)     Name Relation Home Work Mobile    Lexy Juarez Daughter [phone]    Florencio Cunningham  Son   [phone]    Patricia Cunningham Relative [phone]        Documents on File        Status Date Received Description       Documents for the Patient    Consent for EHR Access Received 17     Insurance Card Received () 17 medicare,bcbs    External Medication Information Consent Accepted 17     Patient ID Received 18 na    [facility] Specified Other       Consent for Services/Privacy Notice - Hospital/Clinic Received () 17     Privacy Notice - [place] Received 17     Consent to Communicate Received 17     Consent to Communicate  17 AUTHORIZATION TO DISCUSS PROTECTED HEALTH INFORMATION 17    Care Everywhere Prospective Auth Received 10/27/17     HIM KVNG Authorization  17 [facility]    Consent for Services/Privacy Notice - Hospital/Clinic Received 18     Consent for Services - Hospital and Clinic Received 18     HIE Auth Received 18     Insurance Card Received 18 bcbs senior gold    Insurance Card Viewed Electronic Insurance Card 18 medicare    Insurance Card Received 18     Insurance Card Received 18 medicare new #    Patient Photo   Photo of Patient       Documents for the Encounter    CMS IM for Patient Signature Received 18     Observation Notice Received 18     CMS [place] for Patient Signature Received 18     CMS IM for Patient Signature Received 18       Admission Information     Attending Provider  Admitting Provider Admission Type Admission Date/Time    Chencho Purcell MD Eikens, John Patrick, MD Emergency 09/02/18  0447    Discharge Date Hospital Service Auth/Cert Status Service Area     Hospitalist [facility]    Unit Room/Bed Admission Status       [facility] 2309/2309-01 Admission (Confirmed)       Admission     Complaint    Pneumonia, Bleeding from wound, Postoperative hemorrhage involving circulatory system following non-circulatory system procedure, Buttock bleed      Hospital Account     Name Acct ID Class Status Primary Coverage    Nabor Cunningham [id number] Inpatient Open MEDICARE - MEDICARE            Guarantor Account (for Hospital Account #[id number])     Name Relation to Pt Service Area Active? Acct Type    Nabor Cunningham Self FCS Yes Personal/Family    Address Phone          [address] [place] [address] [phone](H)              Coverage Information (for Hospital Account #[id number])     1. MEDICARE/MEDICARE     F/O Payor/Plan Precert #    MEDICARE/MEDICARE     Subscriber Subscriber #    Nabor Cunningham [id number]    Address Phone    [employer]  [address]  [place] [address] [phone]          2. [place]     F/O Payor/Plan Precert #    [place]     Subscriber Subscriber #    Nabor Cunningham [id number]    Address Phone    [address] [address]  [place], [place] [id number] [phone]

## 2018-09-02 NOTE — ED PROVIDER NOTES
"  History     Chief Complaint   Patient presents with     Coagulation Disorder     HPI  Nabor Cunningham is a 82 year old male with past medical history which includes essential hypertension, hyperlipidemia, atrial fibrillation and chronic anticoagulation with warfarin therapy who presents with his son for evaluation of bleeding from previously incised left gluteal abscess.  Records confirm the patient had an incision and drainage performed of left gluteus at surgery clinic by Dr. Boyer on 8/27/18.  He was seen in the department the following day for persistent bleeding from incision site, provider sutured the incision closed and patient has been getting regular dressing changes performed by family.  A large area of bruising developed around the incision site but has been relatively unchanged the past couple of days.  Patient awoke early this morning with extensive bleeding from the wound so called his son to bring him in for evaluation.  In the department he continues to have bleeding from the I&D site.  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  The patient denies recent fever, chills, dizziness, lightheadedness, fall or injury.  No active pain or other complaints.    Problem List:    Patient Active Problem List    Diagnosis Date Noted     Moderate episode of recurrent major depressive disorder (H) 04/10/2017     Priority: Medium     Anxiety 03/22/2017     Priority: Medium     3/23/2017:He has been on amitriptyline, nortriptyline, buspirone, sertraline and Lexapro in the past year.        Alcohol abuse 03/22/2017     Priority: Medium     Chronic atrial fibrillation (H) 03/22/2017     Priority: Medium     Weight loss 03/22/2017     Priority: Medium     Peripheral polyneuropathy 03/22/2017     Priority: Medium     4/7/2017:Abnormal monofilament in distal plantar feet bilateral.  He has had chronic pain in both feet.        Chronic gout, unspecified cause, unspecified site " 03/22/2017     Priority: Medium     Essential hypertension with goal blood pressure less than 140/90 03/22/2017     Priority: Medium     Hyperlipidemia LDL goal <130 03/22/2017     Priority: Medium     Persistent insomnia 03/22/2017     Priority: Medium     Lower urinary tract symptoms (LUTS) 03/22/2017     Priority: Medium     Chronic anticoagulation 03/22/2017     Priority: Medium     Long-term (current) use of anticoagulants [Z79.01] 03/22/2017     Priority: Medium        Past Medical History:    Past Medical History:   Diagnosis Date     Anxiety      Gout      Hyperlipemia      Hypertension      Insomnia      Paroxysmal atrial fibrillation (H)        Past Surgical History:    No past surgical history on file.    Family History:    No family history on file.    Social History:  Marital Status:   [2]  Social History   Substance Use Topics     Smoking status: Former Smoker     Years: 20.00     Types: Cigarettes     Smokeless tobacco: Never Used     Alcohol use No        Medications:      calcium carbonate (OS- MG [place]. [place]) 1250 MG tablet   celecoxib (CELEBREX) 200 MG capsule   colchicine (COLCYRS) 0.6 MG tablet   Cyanocobalamin (B-12) 1000 MCG TBCR   cyanocobalamin (VITAMIN  B-12) 1000 MCG tablet   metoprolol (TOPROL-XL) 25 MG 24 hr tablet   multivitamin, therapeutic with minerals (MULTI-VITAMIN) TABS tablet   nortriptyline (PAMELOR) 25 MG capsule   order for DME   sulfamethoxazole-trimethoprim (BACTRIM DS/SEPTRA DS) 800-160 MG per tablet   temazepam (RESTORIL) 30 MG capsule   warfarin (COUMADIN) 2 MG tablet         Review of Systems  Constitutional:  Negative for fever or recent illness.  Cardiovascular:  Negative for chest pain.  Respiratory:  Negative for cough or shortness of breath.  Gastrointestinal:  Negative for abdominal pain, vomiting, or diarrhea.  Denies blood with bowel movements.   Musculoskeletal:  Negative for back pain, recent fall or injury.  Neurological:  Negative for headache,  dizziness or feeling faint.  Skin: Positive for persistent bleeding from left luteal wound.    All others reviewed and are negative.      Physical Exam   BP: (!) 144/93  Heart Rate: 83  Temp: 98.2  F (36.8  C)  Resp: 16  Weight: 72.6 kg (160 lb)  SpO2: 97 %      Physical Exam  Constitutional:  Well developed, well nourished.  Appears nontoxic and in no acute distress.    HENT:  Normocephalic and atraumatic.  Symmetric in appearance.  Eyes:  Conjunctivae are normal.  Neck:  Neck supple.  Cardiovascular:  No cyanosis.  Irregularly irregular rhythm.  No audible murmurs noted.   Respiratory:  Effort normal without sign of respiratory distress.  CTAB without diminished regions.    Gastrointestinal:  Soft, nondistended abdomen.  Nontender and without guarding.  No rigidity or rebound tenderness.  Negative Drake's sign.  Negative McBurney's point.    Musculoskeletal:  Moves extremities spontaneously.  Neurological:  Patient is alert.  Skin:  Skin is warm and dry.  Sutures remain intact over left gluteal wound but persistent bright red blood drains from the wound, no pulsatile bleeding.  Psychiatric:  Normal mood and affect.      ED Course     ED Course     Procedures              Critical Care time:  none               Results for orders placed or performed during the hospital encounter of 09/02/18 (from the past 24 hour(s))   CBC with platelets, differential   Result Value Ref Range    WBC 5.2 4.0 - 11.0 10e9/L    RBC Count 4.64 4.4 - 5.9 10e12/L    Hemoglobin 12.8 (L) 13.3 - 17.7 g/dL    Hematocrit 39.6 (L) 40.0 - 53.0 %    MCV 85 78 - 100 fl    MCH 27.6 26.5 - 33.0 pg    MCHC 32.3 31.5 - 36.5 g/dL    RDW 13.4 10.0 - 15.0 %    Platelet Count 155 150 - 450 10e9/L    Diff Method Automated Method     % Neutrophils 74.4 %    % Lymphocytes 12.7 %    % Monocytes 7.9 %    % Eosinophils 4.2 %    % Basophils 0.6 %    % Immature Granulocytes 0.2 %    Nucleated RBCs 0 0 /100    Absolute Neutrophil 3.9 1.6 - 8.3 10e9/L    Absolute  Lymphocytes 0.7 (L) 0.8 - 5.3 10e9/L    Absolute Monocytes 0.4 0.0 - 1.3 10e9/L    Absolute Eosinophils 0.2 0.0 - 0.7 10e9/L    Absolute Basophils 0.0 0.0 - 0.2 10e9/L    Abs Immature Granulocytes 0.0 0 - 0.4 10e9/L    Absolute Nucleated RBC 0.0    Basic metabolic panel   Result Value Ref Range    Sodium 136 133 - 144 mmol/L    Potassium 4.0 3.4 - 5.3 mmol/L    Chloride 102 94 - 109 mmol/L    Carbon Dioxide 29 20 - 32 mmol/L    Anion Gap 5 3 - 14 mmol/L    Glucose 96 70 - 99 mg/dL    Urea Nitrogen 12 7 - 30 mg/dL    Creatinine 0.85 0.66 - 1.25 mg/dL    GFR Estimate 86 >60 mL/min/1.7m2    GFR Estimate If Black >90 >60 mL/min/1.7m2    Calcium 8.8 8.5 - 10.1 mg/dL   INR   Result Value Ref Range    INR 2.26 (H) 0.86 - 1.14   CT Abdomen Pelvis w Contrast    Narrative    CT ABDOMEN/PELVIS WITH CONTRAST 9/2/2018 6:01 AM     HISTORY: Left gluteal I&D 5 days ago, now heavy bleeding.     TECHNIQUE: Volumetric acquisition through abdomen and pelvis with  IV  contrast,  78 mL Isovue-370.  Radiation dose for this scan was reduced  using automated exposure control, adjustment of the mA and/or kV  according to patient size, or iterative reconstruction technique.    COMPARISON: None.    FINDINGS: The liver, gallbladder, spleen, pancreas, adrenal glands and  kidneys demonstrate no worrisome findings. A few tiny hypodense foci  in the spleen are too small to characterize, but of doubtful  significance.    Extensive area of groundglass infiltrate in the right mid and lower  lung.    Enlarged prostate. Vascular calcification. Soft tissue thickening in  the left internal canal, which is likely an incompletely descended  left testicle. Soft tissue stranding and a small hematoma in the left  gluteal region. The hematoma contains a fluid/hematocrit level and  measures approximately 2.3 x 4.3 cm. Small subcutaneous cyst in the  subcutaneous fat of the lower midline back at the level of the sacrum  is probably a sebaceous cyst. No bowel  obstruction, ascites or other  acute findings. Multilevel degenerative disc disease throughout the  visualized spine. Degenerative changes of both hips with sclerotic  focus in the left femoral head compatible with AVN. Possible changes  of AVN in the right hip also.      Impression    IMPRESSION:  1. Left gluteal subcutaneous mass and surrounding soft tissue  stranding most consistent with contusion and a small hematoma.  2. Undescended left testicle.  3. Enlarged prostate.  4. Extensive right lung infiltrate, probably infectious or  inflammatory, but short-term follow-up recommended to ensure  resolution.  5. 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    EDUARDO ALEXANDRA MD   UA reflex to Microscopic and Culture   Result Value Ref Range    Color Urine Yellow     Appearance Urine Clear     Glucose Urine Negative NEG^Negative mg/dL    Bilirubin Urine Negative NEG^Negative    Ketones Urine Negative NEG^Negative mg/dL    Specific Gravity Urine 1.005 1.003 - 1.035    Blood Urine Negative NEG^Negative    pH Urine 7.0 5.0 - 7.0 pH    Protein Albumin Urine Negative NEG^Negative mg/dL    Urobilinogen mg/dL 0.0 0.0 - 2.0 mg/dL    Nitrite Urine Negative NEG^Negative    Leukocyte Esterase Urine Negative NEG^Negative    Source Unspecified Urine    Chest XR,  PA & LAT    Narrative    CHEST TWO VIEWS    9/2/2018 6:58 AM     INDICATION: Delirium.    COMPARISON: 3/22/2017.      Impression    IMPRESSION: New hazy infiltrate in the right lung, most prominent in  the mid and lower lung as seen on the CT. This may be infectious or  inflammatory, but follow-up to resolution recommended. Cardiac pacer.  Mild cardiomegaly. Postoperative changes right hemithorax.       Medications   clindamycin (CLEOCIN) infusion 900 mg (not administered)   levofloxacin (LEVAQUIN) infusion 750 mg (not administered)   acetaminophen (TYLENOL) tablet 650 mg (not administered)   iopamidol (ISOVUE-370) solution 78 mL (78 mLs Intravenous Given  9/2/18 0547)   sodium chloride 0.9 % bag 500mL for CT scan flush use (59 mLs Intravenous Given 9/2/18 0547)       Assessments & Plan (with Medical Decision Making)   Nabor Cunningham is a 82 year old male who presents to the department with son for evaluation of extensive bleeding from left gluteal wound.  The son reports that the patient called him due to bleeding from his wound, when the son arrived he found blood all over the patient's room.  Patient had significant bleeding in the department as well which was controlled with pressure bandage.  CBC reveals only mild anemia at hemoglobin of 12 but previous value on file was >14.  He has remained comfortable without tachycardia or hypotension.  CT imaging independently reviewed, radiologist notes left gluteal subcutaneous mass and surrounding soft tissue stranding consistent with contusion and small hematoma.  Suspect he may have had drainage from previously encapsulated hematoma as there is no persistent bleeding at this time.  Incidentally the patient has an extensive right middle and lower lobe infiltrate concerning for pneumonia.  He lives alone, is confused and may be delirious, quite possible he is suffering from right-sided aspiration pneumonia.  Consulted hospitalist Dr. Bryson who agrees with plan for medical admission and antibiotic therapy covering aspiration pneumonia, unfortunately patient has penicillin allergy listed so will be started on clindamycin and levofloxacin.  Also consulted on-call surgeon Dr. Alfred regarding patient's bleeding wound, he agrees to surgical consultation and will evaluate.  No further recommendations.    The patient and accompanying son seem comfortable with admission plan, understand workup results and all questions answered.      Disclaimer:  This note consists of symbols derived from keyboarding, dictation, and/or voice recognition software.  As a result, there may be errors in the script that have gone undetected.   Please consider this when interpreting information found in the chart.        I have reviewed the nursing notes.    I have reviewed the findings, diagnosis, plan and need for follow up with the patient.       Current Discharge Medication List          Final diagnoses:   Bleeding from wound   Pneumonia of right lower lobe due to infectious organism (H)   Confusion       9/2/2018   [facility] EMERGENCY DEPARTMENT     Heron Powell DO  09/02/18 0709

## 2018-09-02 NOTE — H&P
[facility]    History and Physical  Hospital Medicine       Date of Admission:  9/2/2018  Date of Service: 9/2/2018     Assessment & Plan   Nabor Cunningham is a 82 year old male who presents on 9/2/2018 with bleeding from left buttock wound.      Bleeding from wound   Left buttock abscess s/p I&D 6 days ago developed bleeding postop day 1 and was seen in the ED where the wound was sutured.  Returns early this morning due to bleeding felt to have had a hematoma that is now coming out of the wound when seen in the ED.  On the floor he continues to have some oozing.  He is on warfarin for A. fib with therapeutic INR 2.26. Patient just completed course of bactrim for the buttock infection.    - surgery consulted, wound being evacuated at the bedside and then packed   - reverse INR with vitamin K, can resume in a week if doing well      Suspect pneumonia or pneumonitis, right middle and lower lobe   Incidental finding on CT and chest x-ray. No fever, cough, chest pain, leucocytosis. CRP mild up at 31. Infiltrate on right side but no known aspiration (pt had only local anesthesia for abscess drainage last week). On Bactrim for the abscess so possible this has treated any infection given patient appears asymptomatic. Procalcitonin pending.  Started on clindamycin and levofloxacin in the ED for possible aspiration.   - Given that he does not clinically appear very ill, will stop clindamycin and treat with 5 day course of levofloxacin 750 mg.    - Needs outpatient follow up imaging in a few weeks to document resolution      Anemia, due to acute blood loss, mild   Hgb 12.8 and 11.1. Previously 14. Bleeding is not brisk.    - recheck in the morning      Atrial fibrillation, s/p PPM   Therapeutic INR on admission. Need to hold/reverse due to bleeding.   - Resume warfarin when bleeding risk for wound is resolved   - continue metoprolol XL      Anxiety disorder   Baseline significant anxiety.  On  nortriptyline though also has neuropathy.      H/o alcohol abuse   In remission 2-3 years, though patient says he has an occasional drink of wine less than once a week - this was news to the daughter.       Gout   On colchicine      Insomnia   On restoril      DVT Prophylaxis: Low Risk/Ambulatory with no VTE prophylaxis indicated  Code Status:     Lines: PIV  Funk catheter: None  Restraints: none    Disposition: Anticipate discharge tomorrow day(s) once bleeding stops. Appropriate for observation care    Sorin Bryson MD  [facility]        Primary Care Physician   Amadou Salas [phone]    History is obtained from the patient who is a fair historian, daughter by phone, discussion with ED physician and review of old records via the EMR.    Past Medical History    Past Medical History:   Diagnosis Date     Anxiety      Gout      Hyperlipemia      Hypertension      Insomnia      Paroxysmal atrial fibrillation (H)        Moderate episode of recurrent major depressive disorder (H) 04/10/2017     Priority: Medium     Anxiety 03/22/2017     Priority: Medium     3/23/2017:He has been on amitriptyline, nortriptyline, buspirone, sertraline and Lexapro in the past year.        Alcohol abuse 03/22/2017     Priority: Medium     Chronic atrial fibrillation (H) 03/22/2017     Priority: Medium     Weight loss 03/22/2017     Priority: Medium     Peripheral polyneuropathy 03/22/2017     Priority: Medium     4/7/2017:Abnormal monofilament in distal plantar feet bilateral.  He has had chronic pain in both feet.        Chronic gout, unspecified cause, unspecified site 03/22/2017     Priority: Medium     Essential hypertension with goal blood pressure less than 140/90 03/22/2017     Priority: Medium     Hyperlipidemia LDL goal <130 03/22/2017     Priority: Medium     Persistent insomnia 03/22/2017     Priority: Medium     Lower urinary tract symptoms (LUTS) 03/22/2017     Priority: Medium     Chronic anticoagulation  03/22/2017     Priority: Medium     Long-term (current) use of anticoagulants [Z79.01] 03/22/2017     Priority: Medium       Past Surgical History   No past surgical history on file.     History of Present Illness   Nabor Cunningham is a 82 year old male who presents with bleeding from buttocks wound.  Patient had a left buttock abscess that was opened and drained by Dr. Boyer 6 days ago.  Next day patient was found to have significant bleeding from the wound was taken to the ER.  There the wound was sutured.  Early this morning the patient noticed significant amounts of bleeding coming from the wound with blood on his bed and the floor.  No dizziness or lightheadedness.  He is brought to the emergency department by ambulance.  In the emergency department patient undergoes a CT scan shows fluid collection consistent with hematoma.  It was felt that the wound was no longer bleeding.  Incidentally seen on the CAT scan is right middle and lower lobe infiltrate concerning for pneumonia or other inflammatory condition.  Patient has had no cough, fever, chills is not feeling ill.  Patient recalls no aspiration events.  His I&D of the abscess was done under local in the clinic.  There has been no shortness of breath.  Hemoptysis.  No weight loss.    Prior to Admission Medications   Prior to Admission Medications   Prescriptions Last Dose Informant Patient Reported? Taking?   calcium carbonate (OS- MG [place]. CA) 1250 MG tablet 9/1/2018 at pm Daughter Yes Yes   Sig: Take 1 tablet by mouth 2 times daily   celecoxib (CELEBREX) 200 MG capsule More than a month at Unknown time Daughter No No   Sig: Take 1 capsule (200 mg) by mouth daily Take with food.   colchicine (COLCYRS) 0.6 MG tablet 9/1/2018 at am Daughter No Yes   Sig: Take 1 tablet (0.6 mg) by mouth daily   cyanocobalamin (VITAMIN  B-12) 1000 MCG tablet 9/1/2018 at am Daughter No Yes   Sig: TAKE ONE TABLET BY MOUTH DAILY   hypromellose (ARTIFICIAL TEARS) 0.4 % SOLN  ophthalmic solution 2018 at Unknown time Daughter Yes Yes   Sig: Place 1 drop into both eyes every hour as needed for dry eyes   metoprolol (TOPROL-XL) 25 MG 24 hr tablet 2018 at pm Daughter Yes Yes   Sig: Take 25 mg by mouth every evening    mineral oil oil 2018 at am Daughter Yes Yes   Sig: Take 5 mLs by mouth daily   multivitamin, therapeutic with minerals (MULTI-VITAMIN) TABS tablet 2018 at am Daughter Yes Yes   Sig: Take 1 tablet by mouth daily   nortriptyline (PAMELOR) 25 MG capsule 2018 at 1800 Daughter No Yes   Sig: TAKE ONE CAPSULE MID MORNING AND TWO CAPSULES BY MOUTH DAILY AT BEDTIME   prednisoLONE acetate (PRED FORTE) 1 % ophthalmic susp 2018 at pm Pharmacy Yes Yes   Sig: Place 1 drop Into the left eye 2 times daily    sulfamethoxazole-trimethoprim (BACTRIM DS/SEPTRA DS) 800-160 MG per tablet  Daughter No No   Sig: Take 1 tablet by mouth 2 times daily   temazepam (RESTORIL) 30 MG capsule 2018 at hs Daughter No Yes   Sig: TAKE ONE CAPSULE BY MOUTH AT BEDTIME AS NEEDED FOR SLEEP   warfarin (COUMADIN) 2 MG tablet 2018 at pm Daughter No Yes   Sig: Take 8 mg Mon/Th; 6 mg all other days or as instructed by [facility]   Patient taking differently: Take 8 mg Mon/Th; 6 mg all other days or as instructed by ACC  : 4 mg; : 4 mg; 9/3: 6 mg; Otherwise 8 mg on Mon, Th; 6 mg all other days      Facility-Administered Medications Last Administration Doses Remaining   lidocaine 1 % injection 2 mL 2018 10:02 AM    triamcinolone acetonide (KENALOG-40) injection 40 mg 2018 10:02 AM         Allergies   Allergies   Allergen Reactions     Amoxicillin Hives     Penicillins Other (See Comments)     Dizzy       Trazodone Other (See Comments)     Hallucinations         Family History    Mother  young of consultations of gallbladder surgery.  Father  in his 70s of unknown cause.  One sister  of lung cancer.  Patient is unsure of cause of death in his remaining siblings  "but he is a last one.    Social History   Social History     Social History     Marital status: Recently      Spouse name: N/A     Number of children: N/A     Years of education: N/A     Occupational History     Not on file.     Social History Main Topics     Smoking status: Former Smoker     Years: 20.00     Types: Cigarettes     Smokeless tobacco: Never Used     Alcohol use No     Drug use: No     Sexual activity: Not Currently     Other Topics Concern     [place] work, retired     Social History Narrative    ** Merged History Encounter **        Patient has a lot of anxiety and was moved to assisted living near his children the last year or so.  He was  one month ago and after his wife had lived 13 years in the nursing home due to rheumatoid arthritis.  He quit tobacco 40 years ago.  Used to drink alcohol heavily and stopped this 2-1/2-3 years ago.  He does note he occasionally drinks a glass of wine less than once a week.    Review of Systems   The 10 point Review of Systems is negative other than noted in the HPI or here.  Complains of significant anxiety which has been for many years.  He has neuropathy in his feet.  He has some trouble starting urination but feels that he empties his bladder.  He had lost some weight when living alone but has been gaining that back since living in assisted living.    Physical Exam   /58 (BP Location: Right arm)  Pulse 83  Temp 97.8  F (36.6  C) (Oral)  Resp 16  Ht 1.626 m (5' 4\")  Wt 73.4 kg (161 lb 13.1 oz)  SpO2 98%  BMI 27.78 kg/m2     Weight: 161 lbs 13.08 oz Body mass index is 27.78 kg/(m^2).     Constitutional: Alert, oriented, cooperative, no apparent distress, appears nontoxic.  He is a little nervous and tremulous but cooperative and appropriate.  Eyes: Eyes are clear, pupils are reactive.  HEENT: Oropharynx is clear and moist. No evidence of cranial trauma.  Lymph/Hematologic: No epitrochlear, axillary, anterior or posterior cervical, or " supraclavicular lymphadenopathy is appreciated.  Cardiovascular: Regular rate and rhythm, normal S1 and S2, and no murmur noted. JVP is normal. Good peripheral pulses in wrists bilaterally.  Trace extremity edema.  Respiratory:  There are mild to moderate scattered crackles on the right midlung and lower lobe but lungs otherwise clear to auscultation bilaterally.   GI: Soft, non-tender, normal bowel sounds, no hepatosplenomegaly.  Genitourinary: Deferred  Musculoskeletal: Normal muscle bulk and tone.  Left buttock wound is sutured.  Presently no active blood though the small amount of blood was on the pressure dressing.  There is a firm mass underneath the sutured incision that is only mildly tender.  No erythema to suggest cellulitis.  The area is ecchymotic.  Skin: Warm and dry, no rashes.   Neurologic: Neck supple. Cranial nerves are grossly intact.  is symmetric. Mild-moderate intention tremor in upper extremities    Data   Data reviewed today:     Recent Labs  Lab 09/02/18  0845 09/02/18  0505 08/30/18   WBC 4.6 5.2  --    HGB 11.1* 12.8*  --    MCV 85 85  --    * 155  --    INR  --  2.26* 3.1*   NA  --  136  --    POTASSIUM  --  4.0  --    CHLORIDE  --  102  --    CO2  --  29  --    BUN  --  12  --    CR  --  0.85  --    ANIONGAP  --  5  --    ALY  --  8.8  --    GLC  --  96  --        Recent Results (from the past 24 hour(s))   CT Abdomen Pelvis w Contrast    Narrative    CT ABDOMEN/PELVIS WITH CONTRAST 9/2/2018 6:01 AM     HISTORY: Left gluteal I&D 5 days ago, now heavy bleeding.     TECHNIQUE: Volumetric acquisition through abdomen and pelvis with  IV  contrast,  78 mL Isovue-370.  Radiation dose for this scan was reduced  using automated exposure control, adjustment of the mA and/or kV  according to patient size, or iterative reconstruction technique.    COMPARISON: None.    FINDINGS: The liver, gallbladder, spleen, pancreas, adrenal glands and  kidneys demonstrate no worrisome findings. A few  tiny hypodense foci  in the spleen are too small to characterize, but of doubtful  significance.    Extensive area of groundglass infiltrate in the right mid and lower  lung.    Enlarged prostate. Vascular calcification. Soft tissue thickening in  the left internal canal, which is likely an incompletely descended  left testicle. Soft tissue stranding and a small hematoma in the left  gluteal region. The hematoma contains a fluid/hematocrit level and  measures approximately 2.3 x 4.3 cm. Small subcutaneous cyst in the  subcutaneous fat of the lower midline back at the level of the sacrum  is probably a sebaceous cyst. No bowel obstruction, ascites or other  acute findings. Multilevel degenerative disc disease throughout the  visualized spine. Degenerative changes of both hips with sclerotic  focus in the left femoral head compatible with AVN. Possible changes  of AVN in the right hip also.      Impression    IMPRESSION:  1. Left gluteal subcutaneous mass and surrounding soft tissue  stranding most consistent with contusion and a small hematoma.  2. Undescended left testicle.  3. Enlarged prostate.  4. Extensive right lung infiltrate, probably infectious or  inflammatory, but short-term follow-up recommended to ensure  resolution.  5. 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    EDUARDO ALEXANDRA MD   Chest XR,  PA & LAT    Narrative    CHEST TWO VIEWS    9/2/2018 6:58 AM     INDICATION: Delirium.    COMPARISON: 3/22/2017.      Impression    IMPRESSION: New hazy infiltrate in the right mid and lower lung as  seen on the CT. This may be infectious or inflammatory. Follow-up to  resolution recommended. Cardiac pacer. Mild cardiomegaly.  Postoperative changes right hemithorax.    EDUARDO ALEXANDRA MD       I personally reviewed the chest x-ray image(s) showing Right middle and lower lobe infiltrates and the chest CT image(s) showing Right middle and lower lobe infiltrates that are fairly well  demarcated..    Sorin Bryson MD  [facility]

## 2018-09-02 NOTE — IP AVS SNAPSHOT
` `     [facility] SURGICAL: [phone]            Medication Administration Report for Nabor Cunningham as of 09/07/18 1502   Legend:    Given Hold Not Given Due Canceled Entry Other Actions    Time Time (Time) Time  Time-Action       Inactive    Active    Linked        Medications 09/01/18 09/02/18 09/03/18 09/04/18 09/05/18 09/06/18 09/07/18    acetaminophen (TYLENOL) Suppository 650 mg  Dose: 650 mg  Freq: EVERY 4 HOURS PRN Route: RE  PRN Reason: mild pain  Start: 09/02/18 1157   Admin Instructions: Alternate ibuprofen (if ordered) with acetaminophen.  Maximum acetaminophen dose from all sources = 75 mg/kg/day not to exceed 4 grams/day.    Admin. Amount: 1 suppository (1 × 650 mg suppository)  Dispense Loc: [facility]          1255-Auto Hold       1543-Unhold            acetaminophen (TYLENOL) tablet 650 mg  Dose: 650 mg  Freq: EVERY 4 HOURS PRN Route: PO  PRN Reason: mild pain  Start: 09/02/18 1157   Admin Instructions: Alternate ibuprofen (if ordered) with acetaminophen.  Maximum acetaminophen dose from all sources = 75 mg/kg/day not to exceed 4 grams/day.    Admin. Amount: 2 tablet (2 × 325 mg tablet)  Last Admin: 09/06/18 1725  Dispense Loc: [facility]       2048 (650 mg)-Given        2030 (650 mg)-Given         1255-Auto Hold       1543-Unhold       1725 (650 mg)-Given            colchicine (COLCYRS) tablet 0.6 mg  Dose: 0.6 mg  Freq: DAILY Route: PO  Start: 09/02/18 1215   Admin. Amount: 1 tablet (1 × 0.6 mg tablet)  Last Admin: 09/07/18 0852  Dispense Loc: [facility]      1254 (0.6 mg)-Given        0924 (0.6 mg)-Given        0909 (0.6 mg)-Given        0809 (0.6 mg)-Given        0805 (0.6 mg)-Given       1255-Auto Hold       1543-Unhold        0852 (0.6 mg)-Given           levofloxacin (LEVAQUIN) tablet 750 mg  Dose: 750 mg  Freq: DAILY Route: PO  Indications of Use: COMMUNITY ACQUIRED PNEUMONIA  Start: 09/03/18 0800   Admin Instructions: Administer at least 2 hrs before or 4  hrs after aluminum, calcium, iron, zinc or magnesium containing products.    Admin. Amount: 1 tablet (1 × 250 mg tablet) + 1 tablet (1 × 500 mg tablet)  Last Admin: 09/07/18 0851  Dispense Loc: [facility]   Mixture Administration Information:   Medication Type Amount   levofloxacin 250 MG TABS Medications 250 mg   levofloxacin 500 MG TABS Medications 500 mg               0923 (750 mg)-Given        0909 (750 mg)-Given        0810 (750 mg)-Given        0803 (750 mg)-Given       1255-Auto Hold       1543-Unhold        0851 (750 mg)-Given           melatonin tablet 1 mg  Dose: 1 mg  Freq: AT BEDTIME PRN Route: PO  PRN Reason: sleep  Start: 09/02/18 1157   Admin Instructions: Do not give unless at least 6 hours of uninterrupted sleep is expected.    Admin. Amount: 1 tablet (1 × 1 mg tablet)  Last Admin: 09/03/18 2048  Dispense Loc: [facility] 400      2034 (1 mg)-Given        2048 (1 mg)-Given          1255-Auto Hold       1543-Unhold            metoprolol succinate (TOPROL-XL) 24 hr tablet 25 mg  Dose: 25 mg  Freq: EVERY EVENING Route: PO  Start: 09/02/18 1730   Admin Instructions: DO NOT CRUSH. Tablet may be split in half along score line.    Admin. Amount: 1 tablet (1 × 25 mg tablet)  Last Admin: 09/06/18 1828  Dispense Loc: [facility] 200      1752 (25 mg)-Given        1748 (25 mg)-Given        1730 (25 mg)-Given        1754 (25 mg)-Given        1255-Auto Hold       1543-Unhold       1828 (25 mg)-Given        [ ] 1730           mineral oil oil 22.5 mL  Dose: 22.5 mL  Freq: DAILY Route: PO  Start: 09/02/18 1530   Admin. Amount: 22.5 mL  Last Admin: 09/07/18 0853  Dispense Loc: [facility]  Volume: 22.5 mL      1753 (22.5 mL)-Given        0923 (22.5 mL)-Given        0909 (22.5 mL)-Given        0810 (22.5 mL)-Given        0806 (22.5 mL)-Given       1255-Auto Hold       1543-Unhold        0853 (22.5 mL)-Given             Dose: 0.1-0.4 mg  Freq: EVERY 2 MIN PRN Route: IV  PRN Reason: opioid reversal  Start:  09/06/18 1430   End: 09/07/18 1429   Admin Instructions: For apnea or imminent respiratory arrest: give 0.4 mg IV undiluted Q 2 minutes PRN until desired degree of reversal is obtained, stop opioid and notify provider. Continue monitoring until discharge criteria are met for a minimum of 2 hours.  For severe sedation, decrease in respiratory depth, quality or respiratory rate less than 8: give 0.1 mg IV Q 2 minutes x 3 doses, stop opioid and notify provider.  Try to minimize reversal of analgesia especially in end-of-life patients  For ordered doses up to 2mg give IVP. Give each 0.4mg over 15 seconds in emergency situations. For non-emergent situations further dilute in 9mL of NS to facilitate titration of response.    Admin. Amount: 0.1-0.4 mg = 0.25-1 mL Conc: 0.4 mg/mL  Dispense Loc: [facility]  Volume: 1 mL  POC: Post-procedure           1429-Med Discontinued       naloxone (NARCAN) injection 0.1-0.4 mg  Dose: 0.1-0.4 mg  Freq: EVERY 2 MIN PRN Route: IV  PRN Reason: opioid reversal  Start: 09/02/18 1157   Admin Instructions: For respiratory rate LESS than or EQUAL to 8.  Partial reversal dose:  0.1 mg titrated q 2 minutes for Analgesia Side Effects Monitoring Sedation Level of 3 (frequently drowsy, arousable, drifts to sleep during conversation).Full reversal dose:  0.4 mg bolus for Analgesia Side Effects Monitoring Sedation Level of 4 (somnolent, minimal or no response to stimulation).  For ordered doses up to 2mg give IVP. Give each 0.4mg over 15 seconds in emergency situations. For non-emergent situations further dilute in 9mL of NS to facilitate titration of response.    Admin. Amount: 0.1-0.4 mg = 0.25-1 mL Conc: 0.4 mg/mL  Dispense Loc: [facility]  Volume: 1 mL          1255-Auto Hold       1543-Unhold            nortriptyline (PAMELOR) capsule 25 mg  Dose: 25 mg  Freq: EVERY MORNING Route: PO  Start: 09/02/18 1215   Admin. Amount: 1 capsule (1 × 25 mg capsule)  Last Admin: 09/07/18  0851  Dispense Loc: [facility]      1252 (25 mg)-Given        0923 (25 mg)-Given        0909 (25 mg)-Given        0811 (25 mg)-Given        0805 (25 mg)-Given       1255-Auto Hold       1543-Unhold        0851 (25 mg)-Given           ondansetron (ZOFRAN-ODT) ODT tab 4 mg  Dose: 4 mg  Freq: EVERY 6 HOURS PRN Route: PO  PRN Reasons: nausea,vomiting  Start: 09/02/18 1157   Admin Instructions: This is Step 1 of nausea and vomiting management.  If nausea not resolved in 15 minutes, go to Step 2 prochlorperazine (COMPAZINE). Do not push through foil backing. Peel back foil and gently remove. Place on tongue immediately. Administration with liquid unnecessary  With dry hands, peel back foil backing and gently remove tablet; do not push oral disintegrating tablet through foil backing; administer immediately on tongue and oral disintegrating tablet dissolves in seconds; then swallow with saliva; liquid not required.    Admin. Amount: 1 tablet (1 × 4 mg tablet)  Dispense Loc: [facility]          1255-Auto Hold       1543-Unhold           Or  ondansetron (ZOFRAN) injection 4 mg  Dose: 4 mg  Freq: EVERY 6 HOURS PRN Route: IV  PRN Reasons: nausea,vomiting  Start: 09/02/18 1157   Admin Instructions: This is Step 1 of nausea and vomiting management.  If nausea not resolved in 15 minutes, go to Step 2 prochlorperazine (COMPAZINE).  Irritant. For ordered doses up to 4 mg, give IV Push undiluted over 2-5 minutes.    Admin. Amount: 4 mg = 2 mL Conc: 4 mg/2 mL  Dispense Loc: [facility]  Infused Over: 2-5 Minutes  Volume: 2 mL          1255-Auto Hold       1543-Unhold            prednisoLONE acetate (PRED FORTE) 1 % ophthalmic susp 1 drop  Dose: 1 drop  Freq: 2 TIMES DAILY Route: LEFT EYE  Start: 09/02/18 1215   Admin. Amount: 1 drop  Last Admin: 09/07/18 0851  Dispense Loc: [facility]  Volume: 1 mL      1254 (1 drop)-Given       2034 (1 drop)-Given        0926 (1 drop)-Given       2053 (1 drop)-Given        0909  (1 drop)-Given       2029 (1 drop)-Given        0811 (1 drop)-Given       1754 (1 drop)-Given               0806 (1 drop)-Given       1255-Auto Hold       1543-Unhold       (1958)-Not Given [C]        0851 (1 drop)-Given       [ ] 2000           sodium chloride (PF) 0.9% PF flush 3 mL  Dose: 3 mL  Freq: EVERY 8 HOURS Route: IK  Start: 09/06/18 1600   Admin. Amount: 3 mL  Last Admin: 09/07/18 0856  Dispense Loc: [facility]  Volume: 3 mL          1551 (3 mL)-Given       2016 (3 mL)-Given               0856 (3 mL)-Given       [ ] 1600           temazepam (RESTORIL) capsule 30 mg  Dose: 30 mg  Freq: AT BEDTIME PRN Route: PO  PRN Reason: sleep  Start: 09/02/18 1156   Admin. Amount: 4 capsule (4 × 7.5 mg capsule)  Last Admin: 09/06/18 2008  Dispense Loc: [facility] 400      1815 (30 mg)-Given        1748 (30 mg)-Given         2008 (30 mg)-Given        1255-Auto Hold       1543-Unhold       2008 (30 mg)-Given           Future Medications  Medications 09/01/18 09/02/18 09/03/18 09/04/18 09/05/18 09/06/18 09/07/18       nortriptyline (PAMELOR) capsule 50 mg  Dose: 50 mg  Freq: AT BEDTIME Route: PO  Start: 09/07/18 1800   Admin. Amount: 2 capsule (2 × 25 mg capsule)  Dispense Loc: [facility] 400           [ ] 1800          Discontinued Medications  Medications 09/01/18 09/02/18 09/03/18 09/04/18 09/05/18 09/06/18 09/07/18         Dose: 2.5 mg  Freq: EVERY 4 HOURS PRN Route: NEBULIZATION  PRN Reason: shortness of breath / dyspnea  Start: 09/06/18 1428   End: 09/06/18 1543   Admin. Amount: 2.5 mg = 3 mL Conc: 2.5 mg/3 mL  Dispense Loc: [facility]  Volume: 3 mL  POC: PACU          1543-Med Discontinued          Freq: PRN  Start: 09/06/18 1402   End: 09/06/18 1543   Last Admin: 09/06/18 1402  POC: Intra-procedure          1402 (20 mL)-Given [C]       1543-Med Discontinued          Dose: 4 mg  Freq: ONCE PRN Route: IV  PRN Reason: nausea  PRN Comment: nausea  (If not administered in the OR and Post Op Nausea and Vomiting  persists)  Start: 18   End: 18   Admin Instructions: For ordered doses up to 20 mg, give IV Push undiluted over 1 minute.    Admin. Amount: 4 mg = 1 mL Conc: 4 mg/mL  Dispense Loc: [facility]  Infused Over: 1 Minutes  Administrations Remainin  Volume: 1 mL  POC: PACU          1543-Med Discontinued          Dose: 4 mg  Freq: ONCE PRN Route: IV  PRN Reason: other  PRN Comment: nausea  (If not administered in the OR and Post Op Nausea and Vomiting persists)  Start: 18   End: 18   Admin Instructions: May repeat times 1  For ordered doses up to 20 mg, give IV Push undiluted over 1 minute.    Admin. Amount: 4 mg = 1 mL Conc: 4 mg/mL  Dispense Loc: [facility]  Infused Over: 1 Minutes  Administrations Remainin  Volume: 1 mL  POC: PACU          1543-Med Discontinued          Dose: 25-50 mcg  Freq: EVERY 2 MIN PRN Route: IV  PRN Reason: other  PRN Comment: acute pain  Start: 18   End: 18   Admin Instructions: MAX cumulative dose = 250 mcg.    Use Fentanyl initially, as a short acting agent for acute pain control.  If insufficient, or a longer acting agent is needed, begin Morphine or Hydromorphone if ordered.  For ordered doses up to 100 mcg give IV Push undiluted over a minimum of 3-5 minutes.    Admin. Amount: 25-50 mcg = 0.5-1 mL Conc: 50 mcg/mL  Dispense Loc: [facility]  Volume: 2 mL  POC: PACU          1543-Med Discontinued          Dose: 0.3-0.5 mg  Freq: EVERY 5 MIN PRN Route: IV  PRN Reason: other  PRN Comment: acute pain.  May administer if Respiratory Rate is greater than 10  Start: 18   End: 18   Admin Instructions: If fentanyl is also ordered, use HYDROmorphone if pain control insufficient with fentanyl or a longer acting agent is needed.   Max cumulative dose = 2 mg  For ordered doses up to 4 mg give IV Push undiluted. Administer each 2mg over 2-5 minutes.    Admin. Amount: 0.3-0.5 mg  Dispense Loc: [facility]  "PACU  POC: PACU          1543-Med Discontinued          Rate: 100 mL/hr   Freq: CONTINUOUS Route: IV  Start: 18 1430   End: 18 1543   Admin Instructions: Continue until IV catheter is weaned    Dispense Loc: [facility]  Volume: 1,000 mL  POC: PACU                 1543-Med Discontinued          Rate: 10 mL/hr   Freq: CONTINUOUS Route: IV  Start: 18 1300   End: 18   Last Admin: 18 1310  Dispense Loc: [facility]  Volume: 1,000 mL  POC: Pre-procedure          1310 ( )-New Bag       1425-Med Discontinued          Freq: EVERY 1 HOUR PRN Route: Top  PRN Reason: pain  PRN Comment: with VAD insertion or accessing implanted port.  Start: 18   End: 18   Admin Instructions: Do NOT give if patient has a history of allergy to any local anesthetic or any \"alicia\" product.   Apply 30 minutes prior to VAD insertion or port access.  MAX Dose:  2.5 g (  of 5 g tube)    Dispense Loc: [facility]  POC: Pre-procedure          1425-Med Discontinued          Dose: 1 mL  Freq: EVERY 1 HOUR PRN Route: OTHER  PRN Comment: mild pain with VAD insertion or accessing implanted port  Start: 18   End: 18   Admin Instructions: Do NOT give if patient has a history of allergy to any local anesthetic or any \"alicia\" product. MAX dose 1 mL subcutaneous OR intradermal in divided doses.    Admin. Amount: 1 mL  Dispense Loc: [facility]  Volume: 2 mL  POC: Pre-procedure          1425-Med Discontinued          Dose: 12.5 mg  Freq: EVERY 5 MIN PRN Route: IV  PRN Comment: post anesthesia shivering if Respiratory Rate greater than 10  Start: 18   End: 18   Admin Instructions: Give IV Push undiluted. 10-40 mg over 2-3 minutes, up to 125 mg over 3-15 minutes    Admin. Amount: 12.5 mg = 0.25 mL Conc: 50 mg/mL  Dispense Loc: [facility] PACU  Administrations Remainin  Volume: 1 mL  POC: PACU          1543-Med Discontinued          Dose: 50 mg  Freq: AT " BEDTIME Route: PO  Start: 18   End: 18   Admin. Amount: 2 capsule (2 × 25 mg capsule)  Last Admin: 18  Dispense Loc: [facility] 400       (50 mg)-Given         (50 mg)-Given         (50 mg)-Given        1754 (50 mg)-Given               1255-Auto Hold       1543-Unhold       1828 (50 mg)-Given       1834-Med Discontinued                Dose: 4 mg  Freq: EVERY 30 MIN PRN Route: PO  PRN Reason: nausea  Start: 18   End: 18   Admin Instructions: MAX total dose = 8 mg, including OR dosing. If not resolved in 15 minutes, then go to step 2 [prochlorperazine (COMPAZINE), if ordered].  With dry hands, peel back foil backing and gently remove tablet; do not push oral disintegrating tablet through foil backing; administer immediately on tongue and oral disintegrating tablet dissolves in seconds; then swallow with saliva; liquid not required.    Admin. Amount: 1 tablet (1 × 4 mg tablet)  Dispense Loc: [facility]  Administrations Remainin  POC: PACU          1543-Med Discontinued       Or    Dose: 4 mg  Freq: EVERY 30 MIN PRN Route: IV  PRN Reason: nausea  Start: 18   End: 18   Admin Instructions: MAX total dose = 8 mg, including OR dosing. If not resolved in 15 minutes, then go to step 2 [prochlorperazine (COMPAZINE), if ordered].  Irritant. For ordered doses up to 4 mg, give IV Push undiluted over 2-5 minutes.    Admin. Amount: 4 mg = 2 mL Conc: 4 mg/2 mL  Dispense Loc: [facility] PACU  Infused Over: 2-5 Minutes  Administrations Remainin  Volume: 2 mL  POC: PACU          1543-Med Discontinued          Dose: 1 each  Freq: CONTINUOUS Route: AS INSTRUCTE  Start: 18 1130   End: 18 1425   Admin. Amount: 1 each  Dispense Loc: [facility]  POC: Pre-procedure                 1425-Med Discontinued          Dose: 3 mL  Freq: EVERY 8 HOURS Route: IK  Start: 18 1300   End: 18 1425   Admin Instructions: And Q1H  PRN, to lock peripheral IV dormant line.    Admin. Amount: 3 mL  Dispense Loc: [facility]  Volume: 3 mL  POC: Pre-procedure                 1425-Med Discontinued          Dose: 3 mL  Freq: EVERY 1 HOUR PRN Route: IK  PRN Reason: line flush  PRN Comment: for peripheral IV flush post IV meds  Start: 09/06/18 1258   End: 09/06/18 1425   Admin. Amount: 3 mL  Dispense Loc: [facility]  Volume: 3 mL  POC: Pre-procedure          1425-Med Discontinued     Medications 09/01/18 09/02/18 09/03/18 09/04/18 09/05/18 09/06/18 09/07/18

## 2018-09-02 NOTE — LETTER
Transition Communication Hand-off for Care Transitions to Next Level of Care Provider    Name: Nabor Cunningham  : 1935  MRN #: [id number]  Primary Care Provider: PRAKASH PRADO  Primary Care MD Name: Prakash Prado  Primary Clinic: [address]  [place] [address]  Primary Care Clinic Name: [facility]  Reason for Hospitalization:  Confusion [R41.0]  Bleeding from wound [T14.8XXA]  Pneumonia of right lower lobe due to infectious organism (H) [J18.1]  Admit Date/Time: 2018  4:47 AM  Discharge Date: 18  Payor Source: Payor: MEDICARE / Plan: MEDICARE / Product Type: Medicare /     Readmission Assessment Measure (JUANCARLOS) Risk Score/category: Average        Reason for Communication Hand-off Referral: Admission diagnoses: PN .  Gluteal abscess was also treated.    Discharge Plan:  [facility] (Phone: [phone] Fax: [phone])       Concern for non-adherence with plan of care:   Y/N No  Discharge Needs Assessment:  Needs       Most Recent Value    Home Care [facility] [phone], Fax: [phone]    Skilled Nursing Facility [facility] [phone], Fax: [phone]          Follow-up plan:  Future Appointments  Date Time Provider Department Center   9/10/2018 10:30 AM Et Nurse [facility]   9/10/2018 11:15 AM Fady Boyer MD [facility]             Muñoz Recommendations:  The patient lives alone at [facility] in [place].  He does not have any community services.  Patient is active, he walks daily until his gluteal abscess developed.  His three children are available to assist if needed.  Patient would like [facility], referral placed.  Provided pneumonia education resources.          Tri Bolton RN, Care Coordinator    AVS/Discharge Summary is the source of truth; this is a helpful guide for improved communication of patient story

## 2018-09-02 NOTE — PROGRESS NOTES
Skin affirmation note    Admitting nurse completed full skin assessment, Jarad score and Jarad interventions. This writer agrees with the initial skin assessment findings.

## 2018-09-02 NOTE — ED NOTES
"Nabor Cunningham is a 82 year old male who presents to the ED VIA EMS with uncontrolled bleeds to an incised wound to the left lower back/upper buttocks. Placed in room 9, in a gown, on the gurney and on the monitor. PT rolled onto right side and is noted to have large bulky dressings applied to said area. Dressings removed by MD and wound noted to spray across the room.   6 days ago PT had a cyst that was incised and removed. 5 days ago came to the ED due to bleeding that would not stop. PT received 5 sutures and sent home. Family changed dressing yesterday at 1400 and \"bruising\" and discoloration was noted around the effected area. PT states at 3 am when he woke he noted his bed covered in blood and called son.   PT denies pain at this time, denies all symptoms associated with hypovolemia. PT is noted to be A&OX4, appears to be in NAD with no SOB noted at this time. All needs are being assessed and will be met and all comfort measures ar being addressed. Awaiting orders at this time.    "

## 2018-09-02 NOTE — ED NOTES
PT continues to lay on left side placing pressure to wound. No further changes in PT condition from previous assessments. All needs are being met and all comfort measures are being addressed. Awaiting MD dispo at this time. I will continue to assess and monitor PT.

## 2018-09-02 NOTE — PLAN OF CARE
Problem: Patient Care Overview  Goal: Plan of Care/Patient Progress Review  Outcome: Improving  Patient takes mineral I\oil 4 and 1/2 teaspoons daily. Updated Dr. Bryson via vidya.

## 2018-09-02 NOTE — IP AVS SNAPSHOT
"    [facility] SURGICAL: [phone]                                              INTERAGENCY TRANSFER FORM - LAB / IMAGING / EKG / EMG RESULTS   2018                    Hospital Admission Date: 2018  NICOLE PEREA   : 1935  Sex: Male        Attending Provider: Chencho Purcell MD     Allergies:  Amoxicillin, Penicillins, Trazodone    Infection:  None   Service:  HOSPITALIST    Ht:  1.626 m (5' 4\")   Wt:  73.4 kg (161 lb 13.1 oz)   Admission Wt:  72.6 kg (160 lb)    BMI:  27.78 kg/m 2   BSA:  1.82 m 2            Patient PCP Information     Provider PCP Type    PRAKASH PRADO MD General         Lab Results - 3 Days      INR [[id number]] (Abnormal)  Resulted: 18 0617, Result status: Final result    Ordering provider: Chencho Purcell MD  18 0000 Resulting lab: [facility]    Specimen Information    Type Source Collected On   Blood  18 0557          Components       Value Reference Range Flag Lab   INR 1.18 0.86 - 1.14 H 59            CBC with platelets [[id number]] (Abnormal)  Resulted: 18 0605, Result status: Final result    Ordering provider: Chencho Purcell MD  18 0000 Resulting lab: [facility]    Specimen Information    Type Source Collected On   Blood  18 0557          Components       Value Reference Range Flag Lab   WBC 7.1 4.0 - 11.0 10e9/L  59   RBC Count 3.47 4.4 - 5.9 10e12/L L 59   Hemoglobin 9.7 13.3 - 17.7 g/dL L 59   Hematocrit 29.6 40.0 - 53.0 % L 59   MCV 85 78 - 100 fl  59   MCH 28.0 26.5 - 33.0 pg  59   MCHC 32.8 31.5 - 36.5 g/dL  59   RDW 13.5 10.0 - 15.0 %  59   Platelet Count 160 150 - 450 10e9/L  59            Blood culture [[id number]]  Resulted: 18 0302, Result status: Preliminary result    Ordering provider: Heron Powell DO  18 0649 Resulting lab: [facility]    Specimen Information    Type Source Collected On   Blood Arm, Right " 09/02/18 0715   Comment:  Right Arm          Components       Value Reference Range Flag Lab   Specimen Description Blood Right Arm      Special Requests Aerobic and anaerobic bottles received   59   Culture Micro No growth after 5 days   75            Blood culture [[id number]]  Resulted: 09/07/18 0302, Result status: Preliminary result    Ordering provider: Heron Powell DO  09/02/18 0649 Resulting lab: [facility]    Specimen Information    Type Source Collected On   Blood Arm, Forearm Only, Right 09/02/18 0705   Comment:  Right Arm          Components       Value Reference Range Flag Lab   Specimen Description Blood Right Arm      Special Requests Aerobic and anaerobic bottles received   59   Culture Micro No growth after 5 days   75            Partial thromboplastin time [[id number]] (Abnormal)  Resulted: 09/06/18 1128, Result status: Final result    Ordering provider: Chencho Purcell MD  09/06/18 1053 Resulting lab: [facility]    Specimen Information    Type Source Collected On   Blood  09/06/18 0623          Components       Value Reference Range Flag Lab   PTT 43 22 - 37 sec H 59            CBC with platelets [[id number]] (Abnormal)  Resulted: 09/06/18 0640, Result status: Final result    Ordering provider: Chencho Purcell MD  09/06/18 0001 Resulting lab: [facility]    Specimen Information    Type Source Collected On   Blood  09/06/18 0623          Components       Value Reference Range Flag Lab   WBC 5.5 4.0 - 11.0 10e9/L  59   RBC Count 3.51 4.4 - 5.9 10e12/L L 59   Hemoglobin 10.0 13.3 - 17.7 g/dL L 59   Hematocrit 30.0 40.0 - 53.0 % L 59   MCV 86 78 - 100 fl  59   MCH 28.5 26.5 - 33.0 pg  59   MCHC 33.3 31.5 - 36.5 g/dL  59   RDW 13.7 10.0 - 15.0 %  59   Platelet Count 150 150 - 450 10e9/L  59            INR [[id number]] (Abnormal)  Resulted: 09/06/18 0637, Result status: Final result    Ordering provider: Chencho Pucrell  MD Armaan  09/06/18 0001 Resulting lab: [facility]    Specimen Information    Type Source Collected On   Blood  09/06/18 0623          Components       Value Reference Range Flag Lab   INR 1.21 0.86 - 1.14 H 59            INR [[id number]] (Abnormal)  Resulted: 09/05/18 0607, Result status: Final result    Ordering provider: Chencho Purcell MD  09/05/18 0000 Resulting lab: [facility]    Specimen Information    Type Source Collected On   Blood  09/05/18 0553          Components       Value Reference Range Flag Lab   INR 1.22 0.86 - 1.14 H 59            CBC with platelets [[id number]] (Abnormal)  Resulted: 09/05/18 0559, Result status: Final result    Ordering provider: Chencho Purcell MD  09/05/18 0000 Resulting lab: [facility]    Specimen Information    Type Source Collected On   Blood  09/05/18 0553          Components       Value Reference Range Flag Lab   WBC 6.8 4.0 - 11.0 10e9/L  59   RBC Count 3.97 4.4 - 5.9 10e12/L L 59   Hemoglobin 11.0 13.3 - 17.7 g/dL L 59   Hematocrit 34.0 40.0 - 53.0 % L 59   MCV 86 78 - 100 fl  59   MCH 27.7 26.5 - 33.0 pg  59   MCHC 32.4 31.5 - 36.5 g/dL  59   RDW 13.4 10.0 - 15.0 %  59   Platelet Count 167 150 - 450 10e9/L  59            INR [[id number]] (Abnormal)  Resulted: 09/04/18 0616, Result status: Final result    Ordering provider: Chencho Purcell MD  09/04/18 0000 Resulting lab: [facility]    Specimen Information    Type Source Collected On   Blood  09/04/18 0602          Components       Value Reference Range Flag Lab   INR 1.25 0.86 - 1.14 H 59            CBC with platelets [[id number]] (Abnormal)  Resulted: 09/04/18 0608, Result status: Final result    Ordering provider: Chencho Purcell MD  09/04/18 0000 Resulting lab: [facility]    Specimen Information    Type Source Collected On   Blood  09/04/18 0602          Components       Value Reference Range Flag Lab    WBC 5.0 4.0 - 11.0 10e9/L  59   RBC Count 3.78 4.4 - 5.9 10e12/L L 59   Hemoglobin 10.5 13.3 - 17.7 g/dL L 59   Hematocrit 32.5 40.0 - 53.0 % L 59   MCV 86 78 - 100 fl  59   MCH 27.8 26.5 - 33.0 pg  59   MCHC 32.3 31.5 - 36.5 g/dL  59   RDW 13.5 10.0 - 15.0 %  59   Platelet Count 129 150 - 450 10e9/L L 59            Testing Performed By     Lab - Abbreviation Name Director Address Valid Date Range    59 - Unknown [facility] Unknown [address] [address] 12/31/14 1006 - Present    75 - Unknown [facility] Unknown [address] [address] 01/15/15 1019 - Present            Unresulted Labs (24h ago through future)    Start       Ordered    09/04/18 0600  INR  DAILY,   Routine      09/03/18 1151    09/04/18 0600  CBC with platelets  DAILY,   Routine     Comments:  Last Lab Result: Hemoglobin (g/dL)       Date                     Value                 09/03/2018               10.0 (L)         ----------    09/03/18 1151      Encounter-Level Documents:     There are no encounter-level documents.      Order-Level Documents:     There are no order-level documents.

## 2018-09-02 NOTE — PLAN OF CARE
Problem: Patient Care Overview  Goal: Plan of Care/Patient Progress Review  Outcome: No Change  Patient alert and cooperative. Tolerated dinner tray and denied any nausea.      Problem: Wound (Includes Pressure Injury) (Adult)  Goal: Signs and Symptoms of Listed Potential Problems Will be Absent, Minimized or Managed (Wound)  Signs and symptoms of listed potential problems will be absent, minimized or managed by discharge/transition of care (reference Wound (Includes Pressure Injury) (Adult) CPG).   Outcome: No Change  Dressing on left buttock incision is intact with no drainage noted. Patient denied feeling dizzy or lightheaded. Vitals signs stable.

## 2018-09-02 NOTE — PLAN OF CARE
Problem: Patient Care Overview  Goal: Plan of Care/Patient Progress Review  Outcome: Improving  Dr. Alfred removed sutures and kerlix packing was inserted into wound, ABD applied and taped by Dr. Alfred This dressing was saturated with a large amount of bloody drainage at around 1400. ABD changed, packing left in place and foam tape applied. Patient received VIT K. Updated Dr. Alfred. Will continue to monitor.

## 2018-09-02 NOTE — PLAN OF CARE
"Problem: Patient Care Overview  Goal: Plan of Care/Patient Progress Review  Outcome: No Change  [place] [facility] ADMISSION NOTE    Patient admitted to room 2309 at approximately 0810 via cart from emergency room. Patient was accompanied by transport tech.     Verbal SBAR report received from Kirsty prior to patient arrival.     Patient trasferred to bed via air alejandra. Patient alert and oriented X 3. The patient is not having any pain. 0-10 Pain Scale: 0. Admission vital signs: Blood pressure 114/56, pulse 76, temperature 97.3  F (36.3  C), temperature source Oral, resp. rate 16, height 1.626 m (5' 4\"), weight 73.4 kg (161 lb 13.1 oz), SpO2 97 %. Patient was oriented to plan of care, call light, bed controls, tv, telephone, bathroom and visiting hours.     Risk Assessment    The following safety risks were identified during admission: fall. Yellow risk band applied: YES.     Skin Initial Assessment    This writer admitted this patient and completed a full skin assessment and Jarad score in the Adult PCS flowsheet. Appropriate interventions initiated as needed.     Secondary skin check completed by Mya MG.    Skin  Skin WDL:  WDL except incised wound to the left lower back/upper buttocks. Extensive bruising in area. Active bleeding when dressing removed. New pressure dressing applied. Scattered bruising, mostly to bilateral lower extremities.     Jarad Risk Assessment  Sensory Perception: 3-->slightly limited  Moisture: 3-->occasionally moist  Activity: 4-->walks frequently  Mobility: 3-->slightly limited  Nutrition: 3-->adequate  Friction and Shear: 3-->no apparent problem  Jarad Score: 19    Jessica Iqbal        "

## 2018-09-02 NOTE — PLAN OF CARE
Problem: Patient Care Overview  Goal: Plan of Care/Patient Progress Review  Outcome: Improving  Patient is not taking celebrex. Updated Dr. Bryson and received ok to discontinue.

## 2018-09-02 NOTE — IP AVS SNAPSHOT
"` `           [facility] SURGICAL: [phone]                                              INTERAGENCY TRANSFER FORM - NURSING   2018                    Hospital Admission Date: 2018  NICOLE PEREA   : 1935  Sex: Male        Attending Provider: Chencho Purcell MD     Allergies:  Amoxicillin, Penicillins, Trazodone    Infection:  None   Service:  HOSPITALIST    Ht:  1.626 m (5' 4\")   Wt:  73.4 kg (161 lb 13.1 oz)   Admission Wt:  72.6 kg (160 lb)    BMI:  27.78 kg/m 2   BSA:  1.82 m 2            Patient PCP Information     Provider PCP Kimberly PRADO MD General      Current Code Status     This patient does not have a recorded code status. Please follow your organizational policy for patients in this situation.      Code Status History     This patient does not have a recorded code status. Please follow your organizational policy for patients in this situation.      Advance Directives        Scanned docmt in ACP Activity?           No scanned doc        Hospital Problems as of 2018              Priority Class Noted POA    Pneumonia Medium  2018 Yes    Bleeding from wound Medium  2018 Yes    Postoperative hemorrhage involving circulatory system following non-circulatory system procedure Medium  9/3/2018 Yes      Non-Hospital Problems as of 2018              Priority Class Noted    Anxiety Medium  3/22/2017    Alcohol abuse Medium  3/22/2017    Chronic atrial fibrillation (H) Medium  3/22/2017    Weight loss Medium  3/22/2017    Peripheral polyneuropathy Medium  3/22/2017    Chronic gout, unspecified cause, unspecified site Medium  3/22/2017    Essential hypertension with goal blood pressure less than 140/90 Medium  3/22/2017    Hyperlipidemia LDL goal <130 Medium  3/22/2017    Persistent insomnia Medium  3/22/2017    Lower urinary tract symptoms (LUTS) Medium  3/22/2017    Chronic anticoagulation Medium  3/22/2017    Long-term (current) use of " anticoagulants [Z79.01] Medium  3/22/2017    Moderate episode of recurrent major depressive disorder (H) Medium  4/10/2017      Immunizations     Name Date      Influenza (High Dose) 3 valent vaccine 10/30/17     Influenza (High Dose) 3 valent vaccine 11/11/16     Pneumo Conj 13-V (2010&after) 01/26/16     Pneumococcal 23 valent 01/28/13     TDAP Vaccine (Boostrix) 01/12/12          END      ASSESSMENT     Discharge Profile Flowsheet     EXPECTED DISCHARGE     Skilled Nursing Facility  [facility] [place] [phone], Fax: [phone] 09/07/18 1350    Expected Discharge Date  09/07/18 09/07/18 1358   SKIN      DISCHARGE NEEDS ASSESSMENT     Inspection of bony prominences  Full 09/07/18 1052    Primary Care Clinic Name  [facility] 09/04/18 1423   Inspection under devices  Full except (identify device(s) not inspected) 09/06/18 0922    Primary Care MD Name  Amadou Salas 09/04/18 1423   Not Inspected under devices  Other (surgical dressing) 09/06/18 0922    GASTROINTESTINAL (ADULT,PEDIATRIC,OB)     Skin WDL  ex;characteristics 09/07/18 1045    GI WDL  WDL 09/07/18 1045   Skin Integrity  bruise(s);incision(s) 09/07/18 1045    Last Bowel Movement  09/06/18 09/07/18 1413   Additional Documentation  Incision (LDA) 09/07/18 0820    Passing flatus  yes 09/07/18 1052   Full except areas not inspected   Buttock, left 09/06/18 1648    COMMUNICATION ASSESSMENT     Skin Temperature  warm 09/07/18 1052    Patient's communication style  spoken language (English or Bilingual) 09/02/18 0453   SAFETY      FINAL RESOURCES     Safety WDL  WDL;safety factors 09/07/18 1045    Resources List  Skilled Nursing Facility 09/07/18 1350   All Alarms  alarm(s) activated and audible 09/07/18 1052    Home Care  [facility] [phone], Fax: [phone] 09/04/18 1423   Safety Factors  ID band on;upper side rails raised x 2;call light in reach;wheels locked;bed in low position 09/07/18 1045           "       Assessment WDL (Within Defined Limits) Definitions           Safety WDL     Effective: 09/28/15    Row Information: <b>WDL Definition:</b> Bed in low position, wheels locked; call light in reach; upper side rails up x 2; ID band on<br> <font color=\"gray\"><i>Item=AS safety wdl>>List=AS safety wdl>>Version=F14</i></font>      Skin WDL     Effective: 09/28/15    Row Information: <b>WDL Definition:</b> Warm; dry; intact; elastic; without discoloration; pressure points without redness<br> <font color=\"gray\"><i>Item=AS skin wdl>>List=AS skin wdl>>Version=F14</i></font>      Vitals     Vital Signs Flowsheet     VITAL SIGNS     Functioning  can do everything I need to 09/02/18 0454    Temp  97.7  F (36.5  C) 09/07/18 0735   Sleep  normal sleep 09/02/18 0454    Temp src  Oral 09/07/18 0735   ANALGESIA SIDE EFFECTS MONITORING      Resp  16 09/07/18 0735   Side Effects Monitoring: Respiratory Quality  R 09/06/18 1529    Pulse  77 09/07/18 0735   Side Effects Monitoring: Respiratory Depth  N 09/06/18 1529    Heart Rate  78 09/06/18 1529   Side Effects Monitoring: Sedation Level  1 09/06/18 1529    Pulse/Heart Rate Source  Monitor 09/07/18 0735   HEIGHT AND WEIGHT      BP  113/65 09/07/18 0735   Height  1.626 m (5' 4\") 09/02/18 0814    BP Location  Right arm 09/06/18 1827   Height Method  Stated 09/02/18 0814    Patient Position  Lying 09/06/18 1434   Height Method  Stated 09/02/18 0454    OXYGEN THERAPY     Weight  73.4 kg (161 lb 13.1 oz) 09/02/18 0814    SpO2  96 % 09/07/18 0735   Weight Method  Bed scale 09/02/18 0814    O2 Device  None (Room air) 09/07/18 0735   BSA (Calculated - sq m)  1.82 09/02/18 0814    Oxygen Delivery  1 LPM 09/06/18 1529   BMI (Calculated)  27.83 09/02/18 0814    PACEMAKER     POSITIONING      Pacemaker  Permanent 09/07/18 1045   Body Position  independently positioning 09/07/18 1222    PAIN/COMFORT     Head of Bed (HOB)  HOB at 30 degrees 09/06/18 1842    Patient Currently in Pain  denies " 09/07/18 0809   Chair  Upright in chair 09/07/18 1052    Preferred Pain Scale  CAPA (Clinically Aligned Pain Assessment) ([facility] Adults Only) 09/06/18 1434   DAILY CARE      Patient's Stated Pain Goal  3 09/02/18 0607   Activity Management  activity adjusted per tolerance 09/07/18 1222    0-10 Pain Scale  4 09/02/18 0712   Activity Assistance Provided  assistance, stand-by 09/07/18 1222    Pain Intervention(s)  Medication (See eMAR) 09/06/18 1725   MARGARITA COMA SCALE      Response to Interventions  Increase in pain 09/06/18 1808   Best Eye Response  4-->(E4) spontaneous 09/07/18 1045    CLINICALLY ALIGNED PAIN ASSESSMENT (CAPA) ([facility] ADULTS ONLY)     Best Verbal Response  5-->(V5) oriented 09/07/18 1045    Comfort  comfortably manageable 09/06/18 1725   ECG      Change in Pain  getting better 09/02/18 0454   ECG Rhythm  Normal sinus rhythm;Paced rhythm 09/06/18 1529    Pain Control  fully effective 09/02/18 0454   Lead Monitored  Lead II 09/06/18 1529            Patient Lines/Drains/Airways Status    Active LINES/DRAINS/AIRWAYS     Name: Placement date: Placement time: Site: Days: Last dressing change:    Incision/Surgical Site 09/02/18 09/02/18   0820    5     Incision/Surgical Site 09/06/18 Left Buttocks 09/06/18   1406    1             Patient Lines/Drains/Airways Status    Active PICC/CVC     None            Intake/Output Detail Report     Date Intake     Output   Net    Shift P.O. I.V. IV Piggyback Total Urine Blood Total       Day 09/06/18 0700 - 09/06/18 0205 017 7800 -- 1680 -- 20 20 1660    Samreen 09/06/18 1500 - 09/06/18 2259 700 10.83 -- 710.83 -- -- -- 710.83    Noc 09/06/18 2300 - 09/07/18 0659 -- -- -- -- -- -- -- 0    Day 09/07/18 0700 - 09/07/18 1459 -- -- -- -- -- -- -- 0    Samreen 09/07/18 1500 - 09/07/18 2259 -- -- -- -- -- -- -- 0      Last Void/BM       Most Recent Value    Urine Occurrence 1 at 09/07/2018 0626    Stool Occurrence       Case Management/Discharge  Planning     Case Management/Discharge Planning Flowsheet     REFERRAL INFORMATION     EXPECTED DISCHARGE      Did the Initial Social Work Assessment result in a Social Work Case?  No 09/04/18 1423   Expected Discharge Date  09/07/18 09/07/18 1358    Reason For Consult  discharge planning 09/04/18 1423   FINAL RESOURCES      [facility] 09/04/18 1423   Resources List  Skilled Nursing Facility 09/07/18 1350    Primary Care MD Name  Amadou Josue 09/04/18 1423   Home Care  [facility] [phone], Fax: [phone] 09/04/18 1423    LIVING ENVIRONMENT     Skilled [facility] Facility  [facility] [address] [phone], Fax: [phone] 09/07/18 1350    Lives With  alone 09/04/18 1423   ABUSE RISK SCREEN      Living Arrangements  apartment 09/04/18 1423   QUESTION TO PATIENT:  Has a member of your family or a partner(now or in the past) intimidated, hurt, manipulated, or controlled you in any way?  no 09/02/18 0454    Able to Return to Prior Living Arrangements  yes 09/04/18 1423   QUESTION TO PATIENT: Do you feel safe going back to the place where you are living?  yes 09/02/18 0454    ASSESSMENT OF FAMILY/SOCIAL SUPPORT     OBSERVATION: Is there reason to believe there has been maltreatment of a vulnerable adult (ie. Physical/Sexual/Emotional abuse, self neglect, lack of adequate food, shelter, medical care, or financial exploitation)?  no 09/02/18 0454    Who is your support system?  Children 09/04/18 1423   OTHER      Description of Support System  Supportive;Involved 09/04/18 1423   Are you depressed or being treated for depression?  Yes 09/02/18 0832    Support Assessment  Adequate family and caregiver support;Adequate social supports 09/04/18 1423   HOMICIDE RISK      COPING/STRESS     Feels Like Hurting Others  no 09/02/18 0454    Major Change/Loss/Stressor  death 09/02/18 0832

## 2018-09-02 NOTE — IP AVS SNAPSHOT
` `           [facility] SURGICAL: [phone]                 INTERAGENCY TRANSFER FORM - NOTES (H&P, Discharge Summary, Consults, Procedures, Therapies)   2018                    Hospital Admission Date: 2018  [place]   : 1935  Sex: Male        Patient PCP Information     Provider PCP Type    PRAKASH PRADO MD General         History & Physicals      H&P by Sorin Bryson MD at 2018 12:04 PM     Author:  Sorin Bryson MD Service:  Hospitalist Author Type:  Physician    Filed:  2018 12:38 PM Date of Service:  2018 12:04 PM Creation Time:  2018 12:04 PM    Status:  Addendum :  Sorin Bryson MD (Physician)         [facility]    History and Physical  Hospital Medicine       Date of Admission:  2018  Date of Service: 2018     Assessment & Plan   Nabor Cunningham is a 82 year old male who presents on 2018 with bleeding from left buttock wound.      Bleeding from wound   Left buttock abscess s/p I&D 6 days ago developed bleeding postop day 1 and was seen in the ED where the wound was sutured.  Returns early this morning due to bleeding felt to have had a hematoma that is now coming out of the wound when seen in the ED.  On the floor he continues to have some oozing.  He is on warfarin for A. fib with therapeutic INR 2.26. Patient just completed course of bactrim for the buttock infection.    - surgery consulted, wound being evacuated at the bedside and then packed   - reverse INR with vitamin K, can resume in a week if doing well      Suspect pneumonia or pneumonitis, right middle and lower lobe   Incidental finding on CT and chest x-ray. No fever, cough, chest pain, leucocytosis. CRP mild up at 31. Infiltrate on right side but no known aspiration (pt had only local anesthesia for abscess drainage last week). On Bactrim for the abscess so possible this has treated any infection given patient appears  asymptomatic. Procalcitonin pending.  Started on clindamycin and levofloxacin in the ED for possible aspiration.   - Given that he does not clinically appear very ill, will stop clindamycin and treat with 5 day course of levofloxacin 750 mg.    - Needs outpatient follow up imaging in a few weeks to document resolution[JH1.1]      Anemia, due to acute blood loss, mild   Hgb 12.8 and 11.1. Previously 14. Bleeding is not brisk.    - recheck in the morning[JH1.2]      Atrial fibrillation, s/p PPM   Therapeutic INR on admission. Need to hold/reverse due to bleeding.   - Resume warfarin when bleeding risk for wound is resolved   - continue metoprolol XL      Anxiety disorder   Baseline significant anxiety.  On nortriptyline though also has neuropathy.      H/o alcohol abuse   In remission 2-3 years, though patient says he has an occasional drink of wine less than once a week - this was news to the daughter.       Gout   On colchicine      Insomnia   On restoril      DVT Prophylaxis: Low Risk/Ambulatory with no VTE prophylaxis indicated  Code Status:     Lines: PIV  Funk catheter: None  Restraints: none    Disposition: Anticipate discharge tomorrow day(s) once bleeding stops. Appropriate for observation care    Sorin Bryson MD  [facility] Medicine        Primary Care Physician   Amadou Salas [phone]    History is obtained from the patient who is a fair historian, daughter by phone, discussion with ED physician and review of old records via the EMR.    Past Medical History    Past Medical History:   Diagnosis Date     Anxiety      Gout      Hyperlipemia      Hypertension      Insomnia      Paroxysmal atrial fibrillation (H)        Moderate episode of recurrent major depressive disorder (H) 04/10/2017     Priority: Medium     Anxiety 03/22/2017     Priority: Medium     3/23/2017:He has been on amitriptyline, nortriptyline, buspirone, sertraline and Lexapro in the past year.        Alcohol abuse 03/22/2017     Priority:  Medium     Chronic atrial fibrillation (H) 03/22/2017     Priority: Medium     Weight loss 03/22/2017     Priority: Medium     Peripheral polyneuropathy 03/22/2017     Priority: Medium     4/7/2017:Abnormal monofilament in distal plantar feet bilateral.  He has had chronic pain in both feet.        Chronic gout, unspecified cause, unspecified site 03/22/2017     Priority: Medium     Essential hypertension with goal blood pressure less than 140/90 03/22/2017     Priority: Medium     Hyperlipidemia LDL goal <130 03/22/2017     Priority: Medium     Persistent insomnia 03/22/2017     Priority: Medium     Lower urinary tract symptoms (LUTS) 03/22/2017     Priority: Medium     Chronic anticoagulation 03/22/2017     Priority: Medium     Long-term (current) use of anticoagulants [Z79.01] 03/22/2017     Priority: Medium       Past Surgical History   No past surgical history on file.     History of Present Illness   Nabor Cunningham is a 82 year old male who presents with bleeding from buttocks wound.  Patient had a left buttock abscess that was opened and drained by Dr. Boyer 6 days ago.  Next day patient was found to have significant bleeding from the wound was taken to the ER.  There the wound was sutured.  Early this morning the patient noticed significant amounts of bleeding coming from the wound with blood on his bed and the floor.  No dizziness or lightheadedness.  He is brought to the emergency department by ambulance.  In the emergency department patient undergoes a CT scan shows fluid collection consistent with hematoma.  It was felt that the wound was no longer bleeding.  Incidentally seen on the CAT scan is right middle and lower lobe infiltrate concerning for pneumonia or other inflammatory condition.  Patient has had no cough, fever, chills is not feeling ill.  Patient recalls no aspiration events.  His I&D of the abscess was done under local in the clinic.  There has been no shortness of breath.  Hemoptysis.  No  weight loss.    Prior to Admission Medications   Prior to Admission Medications   Prescriptions Last Dose Informant Patient Reported? Taking?   calcium carbonate (OS- MG [place]. CA) 1250 MG tablet 9/1/2018 at pm Daughter Yes Yes   Sig: Take 1 tablet by mouth 2 times daily   celecoxib (CELEBREX) 200 MG capsule More than a month at Unknown time Daughter No No   Sig: Take 1 capsule (200 mg) by mouth daily Take with food.   colchicine (COLCYRS) 0.6 MG tablet 9/1/2018 at am Daughter No Yes   Sig: Take 1 tablet (0.6 mg) by mouth daily   cyanocobalamin (VITAMIN  B-12) 1000 MCG tablet 9/1/2018 at am Daughter No Yes   Sig: TAKE ONE TABLET BY MOUTH DAILY   hypromellose (ARTIFICIAL TEARS) 0.4 % SOLN ophthalmic solution 9/1/2018 at Unknown time Daughter Yes Yes   Sig: Place 1 drop into both eyes every hour as needed for dry eyes   metoprolol (TOPROL-XL) 25 MG 24 hr tablet 9/1/2018 at pm Daughter Yes Yes   Sig: Take 25 mg by mouth every evening    mineral oil oil 9/1/2018 at am Daughter Yes Yes   Sig: Take 5 mLs by mouth daily   multivitamin, therapeutic with minerals (MULTI-VITAMIN) TABS tablet 9/1/2018 at am Daughter Yes Yes   Sig: Take 1 tablet by mouth daily   nortriptyline (PAMELOR) 25 MG capsule 9/1/2018 at 1800 Daughter No Yes   Sig: TAKE ONE CAPSULE MID MORNING AND TWO CAPSULES BY MOUTH DAILY AT BEDTIME   prednisoLONE acetate (PRED FORTE) 1 % ophthalmic susp 9/1/2018 at pm Pharmacy Yes Yes   Sig: Place 1 drop Into the left eye 2 times daily    sulfamethoxazole-trimethoprim (BACTRIM DS/SEPTRA DS) 800-160 MG per tablet  Daughter No No   Sig: Take 1 tablet by mouth 2 times daily   temazepam (RESTORIL) 30 MG capsule 9/1/2018 at hs Daughter No Yes   Sig: TAKE ONE CAPSULE BY MOUTH AT BEDTIME AS NEEDED FOR SLEEP   warfarin (COUMADIN) 2 MG tablet 9/1/2018 at pm Daughter No Yes   Sig: Take 8 mg Mon/Thurs; 6 mg all other days or as instructed by ACC   Patient taking differently: Take 8 mg Mon/Thurs; 6 mg all other days or as  instructed by ACC  : 4 mg; : 4 mg; 9/3: 6 mg; Otherwise 8 mg on Mon, Thu; 6 mg all other days      Facility-Administered Medications Last Administration Doses Remaining   lidocaine 1 % injection 2 mL 2018 10:02 AM    triamcinolone acetonide (KENALOG-40) injection 40 mg 2018 10:02 AM         Allergies   Allergies   Allergen Reactions     Amoxicillin Hives     Penicillins Other (See Comments)     Dizzy       Trazodone Other (See Comments)     Hallucinations         Family History    Mother  young of consultations of gallbladder surgery.  Father  in his 70s of unknown cause.  One sister  of lung cancer.  Patient is unsure of cause of death in his remaining siblings but he is a last one.    Social History   Social History     Social History     Marital status: Recently      Spouse name: N/A     Number of children: N/A     Years of education: N/A     Occupational History     Not on file.     Social History Main Topics     Smoking status: Former Smoker     Years: 20.00     Types: Cigarettes     Smokeless tobacco: Never Used     Alcohol use No     Drug use: No     Sexual activity: Not Currently     Other Topics Concern     [employer] work, retired     Social History Narrative    ** Merged History Encounter **        Patient has a lot of anxiety and was moved to assisted living near his children the last year or so.  He was  one month ago and after his wife had lived 13 years in the nursing home due to rheumatoid arthritis.  He quit tobacco 40 years ago.  Used to drink alcohol heavily and stopped this 2-1/2-3 years ago.  He does note he occasionally drinks a glass of wine less than once a week.    Review of Systems   The 10 point Review of Systems is negative other than noted in the HPI or here.  Complains of significant anxiety which has been for many years.  He has neuropathy in his feet.  He has some trouble starting urination but feels that he empties his bladder.  He had lost  "some weight when living alone but has been gaining that back since living in assisted living.    Physical Exam   /58 (BP Location: Right arm)  Pulse 83  Temp 97.8  F (36.6  C) (Oral)  Resp 16  Ht 1.626 m (5' 4\")  Wt 73.4 kg (161 lb 13.1 oz)  SpO2 98%  BMI 27.78 kg/m2     Weight: 161 lbs 13.08 oz Body mass index is 27.78 kg/(m^2).     Constitutional: Alert, oriented, cooperative, no apparent distress, appears nontoxic.  He is a little nervous and tremulous but cooperative and appropriate.  Eyes: Eyes are clear, pupils are reactive.  HEENT: Oropharynx is clear and moist. No evidence of cranial trauma.  Lymph/Hematologic: No epitrochlear, axillary, anterior or posterior cervical, or supraclavicular lymphadenopathy is appreciated.  Cardiovascular: Regular rate and rhythm, normal S1 and S2, and no murmur noted. JVP is normal. Good peripheral pulses in wrists bilaterally.  Trace extremity edema.  Respiratory:  There are mild to moderate scattered crackles on the right midlung and lower lobe but lungs otherwise clear to auscultation bilaterally.   GI: Soft, non-tender, normal bowel sounds, no hepatosplenomegaly.  Genitourinary: Deferred  Musculoskeletal: Normal muscle bulk and tone.  Left buttock wound is sutured.  Presently no active blood though the small amount of blood was on the pressure dressing.  There is a firm mass underneath the sutured incision that is only mildly tender.  No erythema to suggest cellulitis.  The area is ecchymotic.  Skin: Warm and dry, no rashes.   Neurologic: Neck supple. Cranial nerves are grossly intact.  is symmetric. Mild-moderate intention tremor in upper extremities    Data   Data reviewed today:     Recent Labs  Lab 09/02/18  0845 09/02/18  0505 08/30/18   WBC 4.6 5.2  --    HGB 11.1* 12.8*  --    MCV 85 85  --    * 155  --    INR  --  2.26* 3.1*   NA  --  136  --    POTASSIUM  --  4.0  --    CHLORIDE  --  102  --    CO2  --  29  --    BUN  --  12  --    CR  --  " 0.85  --    ANIONGAP  --  5  --    ALY  --  8.8  --    GLC  --  96  --        Recent Results (from the past 24 hour(s))   CT Abdomen Pelvis w Contrast    Narrative    CT ABDOMEN/PELVIS WITH CONTRAST 9/2/2018 6:01 AM     HISTORY: Left gluteal I&D 5 days ago, now heavy bleeding.     TECHNIQUE: Volumetric acquisition through abdomen and pelvis with  IV  contrast,  78 mL Isovue-370.  Radiation dose for this scan was reduced  using automated exposure control, adjustment of the mA and/or kV  according to patient size, or iterative reconstruction technique.    COMPARISON: None.    FINDINGS: The liver, gallbladder, spleen, pancreas, adrenal glands and  kidneys demonstrate no worrisome findings. A few tiny hypodense foci  in the spleen are too small to characterize, but of doubtful  significance.    Extensive area of groundglass infiltrate in the right mid and lower  lung.    Enlarged prostate. Vascular calcification. Soft tissue thickening in  the left internal canal, which is likely an incompletely descended  left testicle. Soft tissue stranding and a small hematoma in the left  gluteal region. The hematoma contains a fluid/hematocrit level and  measures approximately 2.3 x 4.3 cm. Small subcutaneous cyst in the  subcutaneous fat of the lower midline back at the level of the sacrum  is probably a sebaceous cyst. No bowel obstruction, ascites or other  acute findings. Multilevel degenerative disc disease throughout the  visualized spine. Degenerative changes of both hips with sclerotic  focus in the left femoral head compatible with AVN. Possible changes  of AVN in the right hip also.      Impression    IMPRESSION:  1. Left gluteal subcutaneous mass and surrounding soft tissue  stranding most consistent with contusion and a small hematoma.  2. Undescended left testicle.  3. Enlarged prostate.  4. Extensive right lung infiltrate, probably infectious or  inflammatory, but short-term follow-up recommended to  ensure  resolution.  5. 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    EDUARDO ALEXANDRA MD   Chest XR,  PA & LAT    Narrative    CHEST TWO VIEWS    9/2/2018 6:58 AM     INDICATION: Delirium.    COMPARISON: 3/22/2017.      Impression    IMPRESSION: New hazy infiltrate in the right mid and lower lung as  seen on the CT. This may be infectious or inflammatory. Follow-up to  resolution recommended. Cardiac pacer. Mild cardiomegaly.  Postoperative changes right hemithorax.    EDUARDO ALEXANDRA MD       I personally reviewed the chest x-ray image(s) showing Right middle and lower lobe infiltrates and the chest CT image(s) showing Right middle and lower lobe infiltrates that are fairly well demarcated..    Sorin Bryson MD  [facility] Medicine[JH1.1]            Revision History        User Key Date/Time User Provider Type Action    > JH1.2 9/2/2018 12:38 PM Sorin Bryson MD Physician Addend     JH1.1 9/2/2018 12:36 PM Sorin Bryson MD Physician Sign                  Discharge Summaries     No notes of this type exist for this encounter.         Consult Notes      Consults by Tri Bolton RN at 9/4/2018  3:29 PM     Author:  Tri Bolton RN Service:  (none) Author Type:      Filed:  9/4/2018  3:29 PM Date of Service:  9/4/2018  3:29 PM Creation Time:  9/4/2018  2:25 PM    Status:  Signed :  Tri Bolton RN ()     Consult Orders:    1. Care Transition RN/SW IP Consult [[id number]] ordered by Chencho Purcell MD at 09/03/18 1036                Care Transition Initial Assessment - RN  Reason For Consult: discharge planning   Met with: Patient and spoke with daughter, Kya and son, Florencio.    DATA[LS1.1]   Active Problems:    Pneumonia    Bleeding from wound    Postoperative hemorrhage involving circulatory system following non-circulatory system procedure[LS1.2]       Primary Care Clinic [facility]: [facility]  Primary Care MD Name: Amadou  Josue  Contact information and PCP information verified: Yes    ASSESSMENT  Cognitive Status: awake, alert and oriented.       Resources List: Home Care     Lives With: alone  Living Arrangements: apartment     Description of Support System: Supportive, Involved   Who is your support system?: Children   Support Assessment: Adequate family and caregiver support, Adequate social supports   Insurance Concerns: No Insurance issues identified      This writer met with pt and spoke with daughter Lexy and son Florencio with patient's permission, introduced self and role.  Discussed discharge planning and Medicare guidelines in regards to home care, TCU and LTC. The patient lives alone at [facility] in [place].  He does not have any community services.  Patient is active, he walks daily until his gluteal abscess developed.  His three children are available to assist if needed.  Patient would like [facility], referral placed.  Provided pneumonia education resources.  Discussed home care, Pt was provided with Medicare certified home care list. Pt chooses to use [facility] ([phone] Fax: [phone]).  A referral was placed for home care.  Also discussed TCU, patient was provided with Medicare certified nursing home list. Pts choices are as follows [facility] (Phone: [phone] Fax: [phone]), [facility] (Main: [phone] Admissions: [phone] Fax: [phone]) and [facility] Phone: ([phone]) Fax: ([phone]).  TCU referrals are pending.  Patient agreed to Clinic Care Coordination.      PLAN    Home with [facility] vs TCU[LS1.1]      Discharge Planner[LS1.2]   Discharge Plans in progress: HHC vs TCU  Barriers to discharge plan: Medical stability  Follow up plan: Referral to Clinic Care Coordinator       Entered by:[LS1.1] Tri Bolton 09/04/2018 2:27 PM[LS1.2]           Tri Bolton RN, Care Coordinator  [phone][LS1.1]         Revision History        User Key Date/Time User Provider Type Action    > LS1.2 9/4/2018  3:29 PM Tri Bolton RN Case Manager Sign     LS1.1 9/4/2018  2:25 PM Tri Bolton RN Case Manager             Consults by Pavel Alfred MD at 9/2/2018 12:45 PM     Author:  Pavel Alfred MD Service:  Surgery Author Type:  Physician    Filed:  9/2/2018 12:45 PM Date of Service:  9/2/2018 12:45 PM Creation Time:  9/2/2018 12:33 PM    Status:  Signed :  aPvel Alfred MD (Physician)     Consult Orders:    1. Surgery General IP Consult: Patient to be seen: Routine - within 24 hours; bleeding from wound/hematoma; Consultant may enter orders: Yes [[id number]] ordered by Heron Powell DO at 09/02/18 0651                [facility] Surgery Consultation    Nabor Cunningham MRN# [id number]   Age: 82 year old YOB: 1935     Date of Admission:  9/2/2018    Reason for consult: Bleeding       Requesting physician: Dr Powell       Level of consult: Consult, follow and place orders           Assessment and Recommendation:   Assessment:   Bleeding post I&D of left gluteal abscess      Recommendations/Plan:   Wound opened and explored. Some fresh blood but mostly clot expressed. Suspect ooze from raw underlying muscle no bleeding vessel seen. Wound packed tightly with kerlex and covered with gauze/ABD.  Recommend INR reversal given ongoing bleeding. This in combination with good pressure on the area from packing may be enough to stop the bleeding. Will monitor  If bleeding continues then may need to return to OR for more wound exploration and hemorrhage control             Chief Complaint:   Bleeding     Patient with I&D of left gluteal abscess in surgery clinic with Dr Boyer on 8/27. Had incisional bleeding and presented to ER the following day where the wound was closed. Area became more bruised then this morning had more bleeding from the wound, described  as large amount. He was brought back to the ER and subsequently admitted          Past Medical History:[MT1.1]     Past Medical History:   Diagnosis Date     Anxiety      Gout      Hyperlipemia      Hypertension      Insomnia      Paroxysmal atrial fibrillation (H)[MT1.2]              Past Surgical History:[MT1.1]   No past surgical history on file.[MT1.2]   I&D as above          Social History:[MT1.1]     Social History   Substance Use Topics     Smoking status: Former Smoker     Years: 20.00     Types: Cigarettes     Smokeless tobacco: Never Used     Alcohol use No[MT1.2]             Family History:[MT1.1]   No family history on file.[MT1.2]              Allergies:[MT1.1]     Allergies   Allergen Reactions     Amoxicillin Hives     Penicillins Other (See Comments)     Dizzy       Trazodone Other (See Comments)     Hallucinations[MT1.3]               Medications:[MT1.1]     Facility-Administered Medications Prior to Admission   Medication Dose Route Frequency Provider Last Rate Last Dose     lidocaine 1 % injection 2 mL  2 mL   Britt Davidson MD   2 mL at 06/29/18 1002     triamcinolone acetonide (KENALOG-40) injection 40 mg  40 mg   Britt Davidson MD   40 mg at 06/29/18 1002     Prescriptions Prior to Admission   Medication Sig Dispense Refill Last Dose     calcium carbonate (OS- MG [place]. CA) 1250 MG tablet Take 1 tablet by mouth 2 times daily   9/1/2018 at pm     colchicine (COLCYRS) 0.6 MG tablet Take 1 tablet (0.6 mg) by mouth daily 30 tablet 0 9/1/2018 at am     cyanocobalamin (VITAMIN  B-12) 1000 MCG tablet TAKE ONE TABLET BY MOUTH DAILY 30 tablet 11 9/1/2018 at am     hypromellose (ARTIFICIAL TEARS) 0.4 % SOLN ophthalmic solution Place 1 drop into both eyes every hour as needed for dry eyes   9/1/2018 at Unknown time     metoprolol (TOPROL-XL) 25 MG 24 hr tablet Take 25 mg by mouth every evening  90 tablet 1 9/1/2018 at pm     mineral oil oil Take 5 mLs by mouth daily   9/1/2018 at am      multivitamin, therapeutic with minerals (MULTI-VITAMIN) TABS tablet Take 1 tablet by mouth daily 100 tablet 3 9/1/2018 at am     nortriptyline (PAMELOR) 25 MG capsule TAKE ONE CAPSULE MID MORNING AND TWO CAPSULES BY MOUTH DAILY AT BEDTIME 270 capsule 3 9/1/2018 at 1800     prednisoLONE acetate (PRED FORTE) 1 % ophthalmic susp Place 1 drop Into the left eye 2 times daily   2 9/1/2018 at pm     temazepam (RESTORIL) 30 MG capsule TAKE ONE CAPSULE BY MOUTH AT BEDTIME AS NEEDED FOR SLEEP 30 capsule 5 9/1/2018 at hs     warfarin (COUMADIN) 2 MG tablet Take 8 mg Mon/Thurs; 6 mg all other days or as instructed by ACC (Patient taking differently: Take 8 mg Mon/Thurs; 6 mg all other days or as instructed by ACC  8/30: 4 mg; 8/31: 4 mg; 9/3: 6 mg; Otherwise 8 mg on Mon, Thu; 6 mg all other days) 300 tablet 0 9/1/2018 at pm     celecoxib (CELEBREX) 200 MG capsule Take 1 capsule (200 mg) by mouth daily Take with food. 30 capsule 5 More than a month at Unknown time     sulfamethoxazole-trimethoprim (BACTRIM DS/SEPTRA DS) 800-160 MG per tablet Take 1 tablet by mouth 2 times daily 14 tablet 0 Taking[MT1.3]             Review of Systems:   Patient denies additional trauma to area, no fever, chills, lightheadedness. Does say he has lost some weight. Bleeding as noted above          Physical Exam:[MT1.1]   Temp: 97.8  F (36.6  C) Temp src: Oral BP: 115/58 Pulse: 83 Heart Rate: 84 Resp: 16 SpO2: 98 % O2 Device: None (Room air)[MT1.4]    Patient alter no acute distress laying in bed  Patient rolled onto right side to evaluate left gluteal wound. Wound itself about 4 cm long with 3 nylon sutures closing. There is buldging at the wound and large area of bruising various stages. Sutures removed and clot expressed as well as some brighter red blood. I used a gauze to sweep the cavity clear. I noted minor ooze but no vessel bleeding. I think packed the wound tightly with kerlex and dressed with gauze.          Data:[MT1.1]     Results for  orders placed or performed during the hospital encounter of 09/02/18 (from the past 24 hour(s))   CBC with platelets, differential   Result Value Ref Range    WBC 5.2 4.0 - 11.0 10e9/L    RBC Count 4.64 4.4 - 5.9 10e12/L    Hemoglobin 12.8 (L) 13.3 - 17.7 g/dL    Hematocrit 39.6 (L) 40.0 - 53.0 %    MCV 85 78 - 100 fl    MCH 27.6 26.5 - 33.0 pg    MCHC 32.3 31.5 - 36.5 g/dL    RDW 13.4 10.0 - 15.0 %    Platelet Count 155 150 - 450 10e9/L    Diff Method Automated Method     % Neutrophils 74.4 %    % Lymphocytes 12.7 %    % Monocytes 7.9 %    % Eosinophils 4.2 %    % Basophils 0.6 %    % Immature Granulocytes 0.2 %    Nucleated RBCs 0 0 /100    Absolute Neutrophil 3.9 1.6 - 8.3 10e9/L    Absolute Lymphocytes 0.7 (L) 0.8 - 5.3 10e9/L    Absolute Monocytes 0.4 0.0 - 1.3 10e9/L    Absolute Eosinophils 0.2 0.0 - 0.7 10e9/L    Absolute Basophils 0.0 0.0 - 0.2 10e9/L    Abs Immature Granulocytes 0.0 0 - 0.4 10e9/L    Absolute Nucleated RBC 0.0    Basic metabolic panel   Result Value Ref Range    Sodium 136 133 - 144 mmol/L    Potassium 4.0 3.4 - 5.3 mmol/L    Chloride 102 94 - 109 mmol/L    Carbon Dioxide 29 20 - 32 mmol/L    Anion Gap 5 3 - 14 mmol/L    Glucose 96 70 - 99 mg/dL    Urea Nitrogen 12 7 - 30 mg/dL    Creatinine 0.85 0.66 - 1.25 mg/dL    GFR Estimate 86 >60 mL/min/1.7m2    GFR Estimate If Black >90 >60 mL/min/1.7m2    Calcium 8.8 8.5 - 10.1 mg/dL   INR   Result Value Ref Range    INR 2.26 (H) 0.86 - 1.14   CRP inflammation   Result Value Ref Range    CRP Inflammation 31.5 (H) 0.0 - 8.0 mg/L   CT Abdomen Pelvis w Contrast    Narrative    CT ABDOMEN/PELVIS WITH CONTRAST 9/2/2018 6:01 AM     HISTORY: Left gluteal I&D 5 days ago, now heavy bleeding.     TECHNIQUE: Volumetric acquisition through abdomen and pelvis with  IV  contrast,  78 mL Isovue-370.  Radiation dose for this scan was reduced  using automated exposure control, adjustment of the mA and/or kV  according to patient size, or iterative reconstruction  technique.    COMPARISON: None.    FINDINGS: The liver, gallbladder, spleen, pancreas, adrenal glands and  kidneys demonstrate no worrisome findings. A few tiny hypodense foci  in the spleen are too small to characterize, but of doubtful  significance.    Extensive area of groundglass infiltrate in the right mid and lower  lung.    Enlarged prostate. Vascular calcification. Soft tissue thickening in  the left internal canal, which is likely an incompletely descended  left testicle. Soft tissue stranding and a small hematoma in the left  gluteal region. The hematoma contains a fluid/hematocrit level and  measures approximately 2.3 x 4.3 cm. Small subcutaneous cyst in the  subcutaneous fat of the lower midline back at the level of the sacrum  is probably a sebaceous cyst. No bowel obstruction, ascites or other  acute findings. Multilevel degenerative disc disease throughout the  visualized spine. Degenerative changes of both hips with sclerotic  focus in the left femoral head compatible with AVN. Possible changes  of AVN in the right hip also.      Impression    IMPRESSION:  1. Left gluteal subcutaneous mass and surrounding soft tissue  stranding most consistent with contusion and a small hematoma.  2. Undescended left testicle.  3. Enlarged prostate.  4. Extensive right lung infiltrate, probably infectious or  inflammatory, but short-term follow-up recommended to ensure  resolution.  5. 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    EDUARDO ALEXANDRA MD   UA reflex to Microscopic and Culture   Result Value Ref Range    Color Urine Yellow     Appearance Urine Clear     Glucose Urine Negative NEG^Negative mg/dL    Bilirubin Urine Negative NEG^Negative    Ketones Urine Negative NEG^Negative mg/dL    Specific Gravity Urine 1.005 1.003 - 1.035    Blood Urine Negative NEG^Negative    pH Urine 7.0 5.0 - 7.0 pH    Protein Albumin Urine Negative NEG^Negative mg/dL    Urobilinogen mg/dL 0.0 0.0 - 2.0 mg/dL     Nitrite Urine Negative NEG^Negative    Leukocyte Esterase Urine Negative NEG^Negative    Source Unspecified Urine    Chest XR,  PA & LAT    Narrative    CHEST TWO VIEWS    9/2/2018 6:58 AM     INDICATION: Delirium.    COMPARISON: 3/22/2017.      Impression    IMPRESSION: New hazy infiltrate in the right mid and lower lung as  seen on the CT. This may be infectious or inflammatory. Follow-up to  resolution recommended. Cardiac pacer. Mild cardiomegaly.  Postoperative changes right hemithorax.    EDUARDO ALEXANDRA MD   CBC with platelets differential   Result Value Ref Range    WBC 4.6 4.0 - 11.0 10e9/L    RBC Count 3.97 (L) 4.4 - 5.9 10e12/L    Hemoglobin 11.1 (L) 13.3 - 17.7 g/dL    Hematocrit 33.8 (L) 40.0 - 53.0 %    MCV 85 78 - 100 fl    MCH 28.0 26.5 - 33.0 pg    MCHC 32.8 31.5 - 36.5 g/dL    RDW 13.2 10.0 - 15.0 %    Platelet Count 138 (L) 150 - 450 10e9/L    Diff Method Automated Method     % Neutrophils 73.0 %    % Lymphocytes 14.7 %    % Monocytes 7.2 %    % Eosinophils 4.2 %    % Basophils 0.7 %    % Immature Granulocytes 0.2 %    Nucleated RBCs 0 0 /100    Absolute Neutrophil 3.3 1.6 - 8.3 10e9/L    Absolute Lymphocytes 0.7 (L) 0.8 - 5.3 10e9/L    Absolute Monocytes 0.3 0.0 - 1.3 10e9/L    Absolute Eosinophils 0.2 0.0 - 0.7 10e9/L    Absolute Basophils 0.0 0.0 - 0.2 10e9/L    Abs Immature Granulocytes 0.0 0 - 0.4 10e9/L    Absolute Nucleated RBC 0.0[MT1.5]        Attestation:  I have reviewed today's vital signs, notes, medications, labs and imaging.  Amount of time performed on this consult: 30 minutes.    Pavel Alfred MD[MT1.1]          Revision History        User Key Date/Time User Provider Type Action    > MT1.5 9/2/2018 12:45 PM Pavel Alfred MD Physician Sign     MT1.4 9/2/2018 12:41 PM Pavel Alfred MD Physician      MT1.3 9/2/2018 12:40 PM Pavel Alfred MD Physician      MT1.2 9/2/2018 12:39 PM Pavel Alfred MD Physician      MT1.1 9/2/2018 12:33 PM  Pavel Alfred MD Physician                      Progress Notes - Physician (Notes from 09/04/18 through 09/07/18)      Progress Notes by Fady Boyer MD at 9/7/2018  2:01 PM     Author:  Fady Boyer MD Service:  Surgery Author Type:  Physician    Filed:  9/7/2018  2:05 PM Date of Service:  9/7/2018  2:01 PM Creation Time:  9/7/2018  2:01 PM    Status:  Signed :  Fady Boyer MD (Physician)         Asked by Dr. Purcell to see the patient again today. He had his dressing changed this morning by , and there was no significant bleeding noted. However, before a shower at approximately 11 AM the dressing was changed and there was fair amount of blood on the dressing.    On exam: The dressing was removed and there was some clot noted but for 2-1/2 hours it wasn't significant amount. There was some oozing on the medial corner of the wound. No obvious source.    I elected to fill the wound with FloSeal and packed that corner of the wound additionally with 2 small pieces of Surgicel. We then placed a pressure dressing and asked the patient to lay on it to put additional pressure.    My plan would be to let him go to the transitional care unit, in see him back on Monday in the clinic with the combined surgery and wound care visit. I would ask that nobody change the dressing until then. Hopefully that will allow a firm clot to form.    Fady Boyer MD FACS[DA1.1]     Revision History        User Key Date/Time User Provider Type Action    > DA1.1 9/7/2018  2:05 PM Fady Boyer MD Physician Sign            Progress Notes by Tri Bolton RN at 9/7/2018  1:54 PM     Author:  Tri Bolton RN Service:  (none) Author Type:      Filed:  9/7/2018  1:55 PM Date of Service:  9/7/2018  1:54 PM Creation Time:  9/7/2018  1:54 PM    Status:  Signed :  Tri Bolton RN ()         Care Transitions Discharge:    Name: Nabor Cunningham    MRN: [id number]    Reason for  Hospitalization: Confusion [R41.0]  Bleeding from wound [T14.8XXA]  Pneumonia of right lower lobe due to infectious organism (H) [J18.1]    Cognitive/Behavioral Status: awake, alert and oriented    Follow-up Appointments: Future Appointments  Date Time Provider Department Center   9/10/2018 10:30 AM [facility]   9/10/2018 11:15 AM Fady Boyer MD [facility]       Discharge Date:[LS1.1]  9/7/2018[LS1.2]    Patient/Care Partner in agreement and understands the discharge plan:  Yes    Discharge Disposition:  [place] on [facility] (Phone: [phone] Fax: [phone])[LS1.1]    Discharge Planner[LS1.2]   Discharge Plans in progress: TCU  Barriers to discharge plan: None  Follow up plan: Follow up with PCP       Entered by:[LS1.1] Tri Bolton 09/07/2018 1:55 PM[LS1.2]         Tri Bolton RN Care Coordinator  [phone][LS1.1]         Revision History        User Key Date/Time User Provider Type Action    > LS1.2 9/7/2018  1:55 PM Tri Bolton RN Case Manager Sign     LS1.1 9/7/2018  1:54 PM Tri Bolton RN Case Manager             Progress Notes by Chencho Purcell MD at 9/7/2018 12:31 PM     Author:  Chencho Purcell MD Service:  Hospitalist Author Type:  Physician    Filed:  9/7/2018 12:35 PM Date of Service:  9/7/2018 12:31 PM Creation Time:  9/7/2018 12:31 PM    Status:  Signed :  Chencho Purcell MD (Physician)         [facility] Hospitalist Service      Subjective:  No new complaints  Some additional bleeding after dressing changed by Dr Davis    Review of Systems:  CONSTITUTIONAL: NEGATIVE for fever, chills, change in weight  INTEGUMENTARY/SKIN: NEGATIVE for worrisome rashes, moles or lesions  EYES: NEGATIVE for vision changes or irritation  ENT/MOUTH: NEGATIVE for ear, mouth and throat problems  RESP: NEGATIVE for significant cough or SOB  BREAST: NEGATIVE for masses, tenderness or discharge  CV: NEGATIVE for chest pain, palpitations or  peripheral edema  GI: NEGATIVE for nausea, abdominal pain, heartburn, or change in bowel habits  : NEGATIVE for frequency, dysuria, or hematuria  MUSCULOSKELETAL: NEGATIVE for significant arthralgias or myalgia  NEURO: NEGATIVE for weakness, dizziness or paresthesias  ENDOCRINE: NEGATIVE for temperature intolerance, skin/hair changes  HEME: NEGATIVE for bleeding problems  PSYCHIATRIC: NEGATIVE for changes in mood or affect    Physical Exam:  Vitals Were Reviewed    Patient Vitals for the past 16 hrs:   BP Temp Temp src Pulse Resp SpO2   09/07/18 0734 113/65 97.7  F (36.5  C) Oral 77 16 96 %   09/06/18 2245 136/79 97.5  F (36.4  C) Oral 77 16 96 %         Intake/Output Summary (Last 24 hours) at 09/07/18 1231  Last data filed at 09/06/18 1800   Gross per 24 hour   Intake          1910.83 ml   Output               20 ml   Net          1890.83 ml       GENERAL APPEARANCE: healthy, alert and no distress  RESP: lungs clear to auscultation - no rales, rhonchi or wheezes  CV: regular rate and rhythm, normal S1 S2, no S3 or S4 and no murmur, click or rub   ABDOMEN: soft, nontender, no HSM or masses and bowel sounds normal  MS: there was still some ooze from the wound when I examined him  SKIN: clear without significant rashes or lesions    Lab:  Recent Labs   Lab Test  09/02/18   0505  03/22/17   1240   NA  136  138   POTASSIUM  4.0  4.1   CHLORIDE  102  102   CO2  29  30   ANIONGAP  5  6   GLC  96  94   BUN  12  14   CR  0.85  0.70   ALY  8.8  8.7     CBC RESULTS:   Recent Labs   Lab Test  09/07/18   0557  09/06/18   0623   WBC  7.1  5.5   RBC  3.47*  3.51*   HGB  9.7*  10.0*   HCT  29.6*  30.0*   PLT  160  150       Results for orders placed or performed during the hospital encounter of 09/02/18 (from the past 24 hour(s))   INR   Result Value Ref Range    INR 1.18 (H) 0.86 - 1.14   CBC with platelets   Result Value Ref Range    WBC 7.1 4.0 - 11.0 10e9/L    RBC Count 3.47 (L) 4.4 - 5.9 10e12/L    Hemoglobin 9.7 (L) 13.3  - 17.7 g/dL    Hematocrit 29.6 (L) 40.0 - 53.0 %    MCV 85 78 - 100 fl    MCH 28.0 26.5 - 33.0 pg    MCHC 32.8 31.5 - 36.5 g/dL    RDW 13.5 10.0 - 15.0 %    Platelet Count 160 150 - 450 10e9/L       Assessment and Plan:    Nabor Cunningham is a 82 year old male who presents on 9/2/2018 with bleeding from left buttock wound.      Bleeding from wound  Left buttock abscess s/p I&D 6 days ago developed bleeding postop day 1 and was seen in the ED where the wound was sutured.  Returns early this morning due to bleeding felt to have had a hematoma that is now coming out of the wound when seen in the ED.  On the floor he continues to have some oozing.  He is on warfarin for A. fib with therapeutic INR 2.26. Patient just completed course of bactrim for the buttock infection.   After two doses of vitamin k INR is 1.22 on 09/05/18.  INR 1.[JE1.1]18[JE1.2] on 09/0[JE1.1]7[JE1.3]/18       Suspect pneumonia or pneumonitis, right middle and lower lobe  Incidental finding on CT and chest x-ray. No fever, cough, chest pain, leucocytosis. CRP mild up at 31. Infiltrate on right side but no known aspiration (pt had only local anesthesia for abscess drainage last week). On Bactrim for the abscess so possible this has treated any infection given patient appears asymptomatic. Procalcitonin low.  Started on clindamycin and levofloxacin in the ED for possible aspiration.  - Given that he does not clinically appear very ill,  treat with 5 day course of levofloxacin 750 mg.   - Needs outpatient follow up imaging in a few weeks to document resolution  -currently no symptoms      Anemia, due to acute blood loss, mild  Hgb 12.8 and 11.1. Previously 14. Bleeding is not brisk.   hgb 10.0 09/03/18   hgb 11.0 09/05/18   hgb 10.0 09/06/18[JE1.1]   hgb 9.7[JE1.2]  09/07/18[JE1.4]       Atrial fibrillation, s/p PPM  Therapeutic INR on admission. Need to hold/reverse due to bleeding.   - Resume warfarin when bleeding risk for wound is resolved   -  continue metoprolol XL      Anxiety disorder  Baseline significant anxiety.  On nortriptyline though also has neuropathy.      r/o alcohol abuse   In remission 2-3 years, though patient says he has an occasional drink of wine less than once a week - this was news to the daughter.       Gout   On colchicine      Insomnia  On restoril    [JE1.1]  F[JE1.2]EN- lock  DVT Prophylaxis: Low Risk/Ambulatory with no VTE prophylaxis indicated  Code Status:       Lines: PIV  Funk catheter: None  Restraints: none      Disposition:  some more bleeding today after surgeon[JE1.1] changed dressing, call in to surgery to get there input.[JE1.2]                 [JE1.1]             Revision History        User Key Date/Time User Provider Type Action    > JE1.4 9/7/2018 12:35 PM Chencho Purcell MD Physician Sign     JE1.2 9/7/2018 12:33 PM Chencho Purcell MD Physician      JE1.3 9/7/2018 12:32 PM Chencho Purcell MD Physician      JE1.1 9/7/2018 12:31 PM Chencho Purcell MD Physician             Progress Notes by Dain Davis MD at 9/7/2018  6:51 AM     Author:  Dain Davis MD Service:  Surgery Author Type:  Physician    Filed:  9/7/2018  6:56 AM Date of Service:  9/7/2018  6:51 AM Creation Time:  9/7/2018  6:51 AM    Status:  Signed :  Dain Davis MD (Physician)         POD#1    Dressing change without incident.  Wound still oozing venous blood from skin edges but no longer has arterial blood.  Patient will need twice daily dressing changes with lightly moist Kerlix gauze or whatever is recommended by wound care nurses.    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    OK to discharge.    Dain Davis MD[JC1.1]      Revision History        User Key Date/Time User Provider Type Action    > JC1.1 9/7/2018  6:56 AM Dain Davis MD Physician Sign             Progress Notes by Anna Marie Archer RN at 9/6/2018  4:41 PM     Author:  Anna Marie Archer RN Service:  (none) Author Type:  Registered Nurse    Filed:  9/6/2018  4:42 PM Date of Service:  9/6/2018  4:41 PM Creation Time:  9/6/2018  4:41 PM    Status:  Signed :  Anna Marie Archer RN (Registered Nurse)         [facility] TRANSPORT NOTE  Data:   Reason for Transport:  Return from surgery    Nabor Cunningham was transported from PACU to room 2309 via cart at 1535.  Patient was accompanied by Nursing Assistant. Equipment used for transport: None. Family was aware of reason for transport: yes    Action:  Report: received from Martha    Response:  Patient's condition upon return was stable, dressing clean, dry and intact.    Anna Marie Archer[DF1.1]     Revision History        User Key Date/Time User Provider Type Action    > DF1.1 9/6/2018  4:42 PM Anna Marie Archer RN Registered Nurse Sign            Progress Notes by Tri Bolton RN at 9/6/2018  2:45 PM     Author:  Tri Bolton RN Service:  (none) Author Type:      Filed:  9/6/2018  3:15 PM Date of Service:  9/6/2018  2:45 PM Creation Time:  9/6/2018  2:45 PM    Status:  Signed :  Tri Bolton RN ()         Care Transitions Progress Note:    Reason for Follow up:  Discharge Planning. The patient has been accepted at [place] [facility] (Phone: [phone] Fax: [phone]) if[LS1.1] patient agrees[LS1.2] upon discharge.[LS1.1]  Discussed discharge options with daughter, Lexy and son, Florencio.  Family will provide transportation upon discharge.  Updated Maureen RICHARD [facility] Admission regarding discharge plan.[LS1.2]    Discharge Plan:[LS1.1]  Home with [facility] vs [place] [facility][LS1.3]    Discharge Planner[LS1.4]   Discharge Plans in progress:[LS1.1] HHC vs TCU[LS1.3]  Barriers to discharge plan:[LS1.1] Medical stability[LS1.3]  Follow up plan:[LS1.1] Referral to Clinic Care Coordinator[LS1.3]       Entered  by:[LS1.1] Tri Bolton 09/06/2018 2:46 PM[LS1.4]         Tri Bolton RN, Care Coordinator  [phone][LS1.1]     Revision History        User Key Date/Time User Provider Type Action    > LS1.2 9/6/2018  3:15 PM Tri Bolton, RN Case Manager Sign     LS1.3 9/6/2018  2:53 PM Tri Bolton RN Case Manager      LS1.4 9/6/2018  2:46 PM Tri Bolton RN Case Manager      LS1.1 9/6/2018  2:45 PM Tri Bolton RN Case Manager             Progress Notes by Samara Gibson RN at 9/6/2018  1:28 PM     Author:  Ihrke, Samara, RN Service:  Ambulatory Surgery Author Type:  Registered Nurse    Filed:  9/6/2018  1:29 PM Date of Service:  9/6/2018  1:28 PM Creation Time:  9/6/2018  1:28 PM    Status:  Signed :  Samara Gibson RN (Registered Nurse)         Pt in SDS waiting for surgery.[VI1.1]     Revision History        User Key Date/Time User Provider Type Action    > VI1.1 9/6/2018  1:29 PM Samara Gibson RN Registered Nurse Sign            Progress Notes by Chencho Purcell MD at 9/6/2018 10:25 AM     Author:  Chencho Purcell MD Service:  Hospitalist Author Type:  Physician    Filed:  9/6/2018 10:51 AM Date of Service:  9/6/2018 10:25 AM Creation Time:  9/6/2018 10:25 AM    Status:  Addendum :  Chencho Purcell MD (Physician)         [facility]      Subjective:[JE1.1]  No new complaints[JE1.2]    Review of Systems:[JE1.1]  CONSTITUTIONAL: NEGATIVE for fever, chills, change in weight  INTEGUMENTARY/SKIN: NEGATIVE for worrisome rashes, moles or lesions  EYES: NEGATIVE for vision changes or irritation  ENT/MOUTH: NEGATIVE for ear, mouth and throat problems  RESP: NEGATIVE for significant cough or SOB  BREAST: NEGATIVE for masses, tenderness or discharge  CV: NEGATIVE for chest pain, palpitations or peripheral edema  GI: NEGATIVE for nausea, abdominal pain, heartburn, or change in bowel habits  : NEGATIVE for frequency, dysuria, or hematuria  MUSCULOSKELETAL:buttox  pain and bleeding  NEURO: NEGATIVE for weakness, dizziness or paresthesias  ENDOCRINE: NEGATIVE for temperature intolerance, skin/hair changes  HEME: NEGATIVE for bleeding problems  PSYCHIATRIC: NEGATIVE for changes in mood or affect    Physical Ex[JE1.2]am:  Vitals Were Reviewed    Patient Vitals for the past 16 hrs:   BP Temp Temp src Pulse Heart Rate Resp SpO2   09/06/18 0743 102/57 97.9  F (36.6  C) Oral - 76 18 96 %   09/06/18 0538 119/57 97  F (36.1  C) Oral 80 - 18 95 %         Intake/Output Summary (Last 24 hours) at 09/06/18 1025  Last data filed at 09/06/18 0900   Gross per 24 hour   Intake              480 ml   Output                0 ml   Net              480 ml[JE1.1]       GENERAL APPEARANCE: healthy, alert and no distress  EYES: conjunctiva clear, eyes grossly normal  RESP: lungs clear to auscultation - no rales, rhonchi or wheezes  CV: regular rate and rhythm, normal S1 S2, no S3 or S4 and no murmur, click or rub   ABDOMEN: soft, nontender, no HSM or masses and bowel sounds normal  MS: after packing removed this am-bleeding  SKIN: clear without significant rashes or lesions    Lab:[JE1.2]  Recent Labs   Lab Test  09/02/18   0505  03/22/17   1240   NA  136  138   POTASSIUM  4.0  4.1   CHLORIDE  102  102   CO2  29  30   ANIONGAP  5  6   GLC  96  94   BUN  12  14   CR  0.85  0.70   ALY  8.8  8.7     CBC RESULTS:   Recent Labs   Lab Test  09/06/18   0623  09/05/18   0553   WBC  5.5  6.8   RBC  3.51*  3.97*   HGB  10.0*  11.0*   HCT  30.0*  34.0*   PLT  150  167       Results for orders placed or performed during the hospital encounter of 09/02/18 (from the past 24 hour(s))   INR   Result Value Ref Range    INR 1.21 (H) 0.86 - 1.14   CBC with platelets   Result Value Ref Range    WBC 5.5 4.0 - 11.0 10e9/L    RBC Count 3.51 (L) 4.4 - 5.9 10e12/L    Hemoglobin 10.0 (L) 13.3 - 17.7 g/dL    Hematocrit 30.0 (L) 40.0 - 53.0 %    MCV 86 78 - 100 fl    MCH 28.5 26.5 - 33.0 pg    MCHC 33.3 31.5 - 36.5 g/dL    RDW  13.7 10.0 - 15.0 %    Platelet Count 150 150 - 450 10e9/L       Assessment and Plan:[JE1.1]    Nabor Cunningham is a 82 year old male who presents on 9/2/2018 with bleeding from left buttock wound.      Bleeding from wound  Left buttock abscess s/p I&D 6 days ago developed bleeding postop day 1 and was seen in the ED where the wound was sutured.  Returns early this morning due to bleeding felt to have had a hematoma that is now coming out of the wound when seen in the ED.  On the floor he continues to have some oozing.  He is on warfarin for A. fib with therapeutic INR 2.26. Patient just completed course of bactrim for the buttock infection.   After two doses of vitamin k INR is 1.22 on 09/05/18.[JE1.2]  INR 1.21 on[JE1.3] 09/06/18[JE1.4]       Suspect pneumonia or pneumonitis, right middle and lower lobe  Incidental finding on CT and chest x-ray. No fever, cough, chest pain, leucocytosis. CRP mild up at 31. Infiltrate on right side but no known aspiration (pt had only local anesthesia for abscess drainage last week). On Bactrim for the abscess so possible this has treated any infection given patient appears asymptomatic. Procalcitonin low.  Started on clindamycin and levofloxacin in the ED for possible aspiration.  - Given that he does not clinically appear very ill,  treat with 5 day course of levofloxacin 750 mg.   - Needs outpatient follow up imaging in a few weeks to document resolution  -currently no symptoms      Anemia, due to acute blood loss, mild  Hgb 12.8 and 11.1. Previously 14. Bleeding is not brisk.   hgb 10.0 09/03/18   hgb 11.0 09/05/18   hgb 10.0[JE1.2] 09/06/18[JE1.3]       Atrial fibrillation, s/p PPM  Therapeutic INR on admission. Need to hold/reverse due to bleeding.   - Resume warfarin when bleeding risk for wound is resolved   - continue metoprolol XL      Anxiety disorder  Baseline significant anxiety.  On nortriptyline though also has neuropathy.      r/o alcohol abuse   In remission 2-3  years, though patient says he has an occasional drink of wine less than once a week - this was news to the daughter.       Gout   On colchicine      Insomnia  On restoril      MALU- lock  DVT Prophylaxis: Low Risk/Ambulatory with no VTE prophylaxis indicated  Code Status:       Lines: PIV  Funk catheter: None  Restraints: none      Disposition:  some more bleeding today after surgeon pulled packing, will discuss with them.    [JE1.2]  10:42 AM[JE1.5] discussed with surgery , they tentatively will make pt npo and do wound exploration under anesthesia later today[JE1.3]                [JE1.2]     10:50 AM discussed with daughter Lexy[JE1.6]     Revision History        User Key Date/Time User Provider Type Action    > JE1.6 9/6/2018 10:51 AM Chencho Purcell MD Physician Addend     JE1.5 9/6/2018 10:42 AM Chencho Purcell MD Physician Addend     JE1.4 9/6/2018 10:34 AM Chencho Purcell MD Physician      JE1.3 9/6/2018 10:33 AM Chencho Purcell MD Physician Sign     JE1.2 9/6/2018 10:31 AM Chencho Purcell MD Physician      JE1.1 9/6/2018 10:25 AM Chencho Purcell MD Physician             Progress Notes by Dain Davis MD at 9/6/2018  7:05 AM     Author:  Dain Davis MD Service:  Surgery Author Type:  Physician    Filed:  9/6/2018  7:08 AM Date of Service:  9/6/2018  7:05 AM Creation Time:  9/6/2018  7:05 AM    Status:  Signed :  Dain Davis MD (Physician)         Repacked the wound this morning.  Dressing was covered in clotted blood.  No significant bleeding during dressing change.  Packed with lightly moist gauze.      Recommend staying off Coumadin for at least a week to fully allow the bleeding to stop as he has already shown a propensity to bleed while on Coumadin.      Twice daily dressing changes with lightly moist gauze while home.    Dain Davis MD[JC1.1]      Revision History        User Key Date/Time User Provider Type Action    > JC1.1 9/6/2018  7:08 AM Ryan  "Dain IZAGUIRRE MD Physician Sign            Progress Notes by Marietta Hendricks LPN at 9/5/2018 12:19 PM     Author:  Marietta Hendricks LPN Service:  Ancillary,Case Management Author Type:  Coordinator    Filed:  9/5/2018 12:20 PM Date of Service:  9/5/2018 12:19 PM Creation Time:  9/5/2018 12:19 PM    Status:  Signed :  Marietta Hendricks LPN (Coordinator)         [id number] done.September 05th, 2018[SA1.1]     Revision History        User Key Date/Time User Provider Type Action    > SA1.1 9/5/2018 12:20 PM Marietta Hendricks LPN Coordinator Sign            Progress Notes by Pavle Alfred MD at 9/5/2018  7:20 AM     Author:  Pavel Alfred MD Service:  Surgery Author Type:  Physician    Filed:  9/5/2018  7:24 AM Date of Service:  9/5/2018  7:20 AM Creation Time:  9/5/2018  7:20 AM    Status:  Signed :  Pavel Alfred MD (Physician)         General Surgery    S: Feeling well, no pain. Dressing reinforced midday yesterday then apparently none futher through day/overnight    O:  Vital signs:  Temp: 97.6  F (36.4  C) Temp src: Oral BP: 119/57 Pulse: 82   Resp: 18 SpO2: 95 % O2 Device: None (Room air)   Height: 162.6 cm (5' 4\") Weight: 73.4 kg (161 lb 13.1 oz)  Estimated body mass index is 27.78 kg/(m^2) as calculated from the following:    Height as of this encounter: 1.626 m (5' 4\").    Weight as of this encounter: 73.4 kg (161 lb 13.1 oz).      Gen: AAOx3, NAD  Left buttock dressing removed, blood in ABD (about 1/2 full), original dressing saturated with clot. Packing material also with clotted blood. No dripping or blood able to be expressed from wound. Packing left in place today    Labs/Imaging  Results for orders placed or performed during the hospital encounter of 09/02/18 (from the past 24 hour(s))   INR   Result Value Ref Range    INR 1.22 (H) 0.86 - 1.14   CBC with platelets   Result Value Ref Range    WBC 6.8 4.0 - 11.0 10e9/L    RBC Count 3.97 (L) 4.4 - 5.9 10e12/L    Hemoglobin " 11.0 (L) 13.3 - 17.7 g/dL    Hematocrit 34.0 (L) 40.0 - 53.0 %    MCV 86 78 - 100 fl    MCH 27.7 26.5 - 33.0 pg    MCHC 32.4 31.5 - 36.5 g/dL    RDW 13.4 10.0 - 15.0 %    Platelet Count 167 150 - 450 10e9/L           A: Bleeding from I&D wound left buttock. Hg continues to rise    P: Seemed like the bleeding would restart post packing change so left in place today to allow more time to clot. If does appear as there is in fact ongoing bleeding then consider OR to explore though am hopeful will stop with these conservative measures[MT1.1]    Pavel Alfred MD[MT1.2]         Revision History        User Key Date/Time User Provider Type Action    > MT1.2 9/5/2018  7:24 AM Pavel Alfred MD Physician Sign     MT1.1 9/5/2018  7:20 AM Pavel Alfred MD Physician             Progress Notes by Chencho Purcell MD at 9/5/2018  7:00 AM     Author:  Chencho Purcell MD Service:  Hospitalist Author Type:  Physician    Filed:  9/5/2018  7:05 AM Date of Service:  9/5/2018  7:00 AM Creation Time:  9/5/2018  7:00 AM    Status:  Signed :  Chencho Purcell MD (Physician)         [facility]      Subjective:  No pain  Breathing ok  No cough    Review of Systems:  CONSTITUTIONAL: NEGATIVE for fever, chills, change in weight  INTEGUMENTARY/SKIN: NEGATIVE for worrisome rashes, moles or lesions  EYES: NEGATIVE for vision changes or irritation  ENT/MOUTH: NEGATIVE for ear, mouth and throat problems  RESP: NEGATIVE for significant cough or SOB  BREAST: NEGATIVE for masses, tenderness or discharge  CV: NEGATIVE for chest pain, palpitations or peripheral edema  GI: NEGATIVE for nausea, abdominal pain, heartburn, or change in bowel habits  : NEGATIVE for frequency, dysuria, or hematuria  MUSCULOSKELETAL:no bleeding since yesterday during day  NEURO: NEGATIVE for weakness, dizziness or paresthesias  ENDOCRINE: NEGATIVE for temperature intolerance, skin/hair changes  HEME:  NEGATIVE for bleeding problems  PSYCHIATRIC: NEGATIVE for changes in mood or affect    Physical Exam:  Vitals Were Reviewed    Patient Vitals for the past 16 hrs:   BP Temp Temp src Pulse Resp SpO2   09/04/18 2340 119/57 97.6  F (36.4  C) Oral 82 18 95 %   09/04/18 1531 127/57 97.7  F (36.5  C) Oral 84 18 97 %         Intake/Output Summary (Last 24 hours) at 09/05/18 0700  Last data filed at 09/04/18 1757   Gross per 24 hour   Intake             1908 ml   Output              300 ml   Net             1608 ml       GENERAL APPEARANCE: healthy, alert and no distress  EYES: conjunctiva clear, eyes grossly normal  RESP: lungs clear to auscultation - no rales, rhonchi or wheezes  CV: regular rate and rhythm, normal S1 S2, no S3 or S4 and no murmur, click or rub   ABDOMEN: soft, nontender, no HSM or masses and bowel sounds normal  MS: wound dressing removed by Dr ELLER, surgicel and kerlex left in place since that was no bleeding, no ooze while we examined him, hematoma on buttox.  SKIN: clear without significant rashes or lesions  NEURO: Normal strength and tone, sensory exam grossly normal, mentation intact and speech normal    Lab:  Recent Labs   Lab Test  09/02/18   0505  03/22/17   1240   NA  136  138   POTASSIUM  4.0  4.1   CHLORIDE  102  102   CO2  29  30   ANIONGAP  5  6   GLC  96  94   BUN  12  14   CR  0.85  0.70   ALY  8.8  8.7     CBC RESULTS:   Recent Labs   Lab Test  09/05/18   0553  09/04/18   0602   WBC  6.8  5.0   RBC  3.97*  3.78*   HGB  11.0*  10.5*   HCT  34.0*  32.5*   PLT  167  129*       Results for orders placed or performed during the hospital encounter of 09/02/18 (from the past 24 hour(s))   INR   Result Value Ref Range    INR 1.22 (H) 0.86 - 1.14   CBC with platelets   Result Value Ref Range    WBC 6.8 4.0 - 11.0 10e9/L    RBC Count 3.97 (L) 4.4 - 5.9 10e12/L    Hemoglobin 11.0 (L) 13.3 - 17.7 g/dL    Hematocrit 34.0 (L) 40.0 - 53.0 %    MCV 86 78 - 100 fl    MCH 27.7 26.5 - 33.0 pg    MCHC 32.4  31.5 - 36.5 g/dL    RDW 13.4 10.0 - 15.0 %    Platelet Count 167 150 - 450 10e9/L       Assessment and Plan:    Nabor Cunningham is a 82 year old male who presents on 9/2/2018 with bleeding from left buttock wound.      Bleeding from wound  Left buttock abscess s/p I&D 6 days ago developed bleeding postop day 1 and was seen in the ED where the wound was sutured.  Returns early this morning due to bleeding felt to have had a hematoma that is now coming out of the wound when seen in the ED.  On the floor he continues to have some oozing.  He is on warfarin for A. fib with therapeutic INR 2.26. Patient just completed course of bactrim for the buttock infection.   After two doses of vitamin k INR is 1.22 on 09/05/18.      Suspect pneumonia or pneumonitis, right middle and lower lobe  Incidental finding on CT and chest x-ray. No fever, cough, chest pain, leucocytosis. CRP mild up at 31. Infiltrate on right side but no known aspiration (pt had only local anesthesia for abscess drainage last week). On Bactrim for the abscess so possible this has treated any infection given patient appears asymptomatic. Procalcitonin low.  Started on clindamycin and levofloxacin in the ED for possible aspiration.  - Given that he does not clinically appear very ill,  treat with 5 day course of levofloxacin 750 mg.   - Needs outpatient follow up imaging in a few weeks to document resolution  -currently no symptoms      Anemia, due to acute blood loss, mild  Hgb 12.8 and 11.1. Previously 14. Bleeding is not brisk.   hgb 10.0 09/03/18   hgb 11.0[JE1.1] 09/05/18[JE1.2]       Atrial fibrillation, s/p PPM  Therapeutic INR on admission. Need to hold/reverse due to bleeding.   - Resume warfarin when bleeding risk for wound is resolved   - continue metoprolol XL      Anxiety disorder  Baseline significant anxiety.  On nortriptyline though also has neuropathy.      r/o alcohol abuse   In remission 2-3 years, though patient says he has an occasional  drink of wine less than once a week - this was news to the daughter.       Gout   On colchicine      Insomnia  On restoril      MALU- lock  DVT Prophylaxis: Low Risk/Ambulatory with no VTE prophylaxis indicated  Code Status:       Lines: PIV  Funk catheter: None  Restraints: none      Disposition:  continued ongoing bleeding yesterday,..Continue to rx for presumed PN-needs out pt follow-up x-ray Dr ELLER recommends ongoing in pt  management.    [JE1.1]       Revision History        User Key Date/Time User Provider Type Action    > JE1.2 9/5/2018  7:05 AM Chencho Purcell MD Physician Sign     JE1.1 9/5/2018  7:00 AM Chencho Purcell MD Physician             Progress Notes by Rosangela Ching RN at 9/4/2018  6:47 PM     Author:  Rosangela Ching RN Service:  (none) Author Type:  Registered Nurse    Filed:  9/4/2018  6:48 PM Date of Service:  9/4/2018  6:47 PM Creation Time:  9/4/2018  6:47 PM    Status:  Signed :  Rosangela Ching RN (Registered Nurse)         No change in pressure dressing status. Patient up independently in his room. Denying any pain. Diminished/clear lung sounds. Patient's son vocalized concerns over discharge situation. Son is hoping patient will discharge to TCU to get adequate support at discharge with dressing changes.[LR1.1]      Revision History        User Key Date/Time User Provider Type Action    > LR1.1 9/4/2018  6:48 PM Rosangela Ching RN Registered Nurse Sign            Progress Notes by Rosangela Ching RN at 9/4/2018  2:54 PM     Author:  Rosangela Ching RN Service:  (none) Author Type:  Registered Nurse    Filed:  9/4/2018  2:55 PM Date of Service:  9/4/2018  2:54 PM Creation Time:  9/4/2018  2:54 PM    Status:  Signed :  Rosangela Ching RN (Registered Nurse)         Called to update surgeon on need for second reinforcement of dressing this late morning. Currently there is a 2 inch area noted of drainage under the pressure dressing. Will continue to  monitor. Hospitalist aware.[LR1.1]      Revision History        User Key Date/Time User Provider Type Action    > LR1.1 9/4/2018  2:55 PM Rosangela Ching RN Registered Nurse Sign            Progress Notes by Rosangela Ching RN at 9/4/2018 11:06 AM     Author:  Rosangela Ching RN Service:  (none) Author Type:  Registered Nurse    Filed:  9/4/2018 11:08 AM Date of Service:  9/4/2018 11:06 AM Creation Time:  9/4/2018 11:06 AM    Status:  Signed :  Rosangela Ching RN (Registered Nurse)         Incision site significant drainage. New ABD placed with pressure dressing. Spica wrap removed. Advised pt to rest on left side for additional pressure on the left buttock.[LR1.1]     Revision History        User Key Date/Time User Provider Type Action    > LR1.1 9/4/2018 11:08 AM Rosangela Ching RN Registered Nurse Sign            Progress Notes by Chencho Purcell MD at 9/4/2018 10:30 AM     Author:  Chencho Purcell MD Service:  Hospitalist Author Type:  Physician    Filed:  9/4/2018 10:41 AM Date of Service:  9/4/2018 10:30 AM Creation Time:  9/4/2018 10:30 AM    Status:  Signed :  Chencho Purcell MD (Physician)         [facility] Service      Subjective:[JE1.1]  Apparently heavy bleeding after it was redressed this AM by surgery[JE1.2]    Review of Systems:[JE1.1]  CONSTITUTIONAL: NEGATIVE for fever, chills, change in weight  ENT/MOUTH: NEGATIVE for ear, mouth and throat problems  RESP: NEGATIVE for significant cough or SOB  CV: NEGATIVE for chest pain, palpitations or peripheral edema    P[JE1.2]hysical Exam:  Vitals Were Reviewed[JE1.1]    Patient Vitals for the past 16 hrs:   BP Temp Temp src Pulse Heart Rate Resp SpO2   09/04/18 0751 103/49 97.4  F (36.3  C) Oral 71 - 18 96 %   09/03/18 2350 127/71 98.4  F (36.9  C) Oral - 82 18 97 %[JE1.3]         Intake/Output Summary (Last 24 hours) at 09/04/18 1031  Last data filed at 09/04/18 0908   Gross per 24 hour   Intake              2430 ml   Output              300 ml   Net             2130 ml[JE1.1]       GENERAL APPEARANCE: anxious  RESP: lungs clear to auscultation - no rales, rhonchi or wheezes  CV: regular rate and rhythm, normal S1 S2, no S3 or S4 and no murmur, click or rub   ABDOMEN: soft, nontender, no HSM or masses and bowel sounds normal  MS: I did not remove dressing for fear of more bleeding    La[JE1.2]b:  Recent Labs   Lab Test  09/02/18   0505  03/22/17   1240   NA  136  138   POTASSIUM  4.0  4.1   CHLORIDE  102  102   CO2  29  30   ANIONGAP  5  6   GLC  96  94   BUN  12  14   CR  0.85  0.70   ALY  8.8  8.7     CBC RESULTS:   Recent Labs   Lab Test  09/04/18   0602  09/03/18   0535   WBC  5.0  5.1   RBC  3.78*  3.64*   HGB  10.5*  10.0*   HCT  32.5*  31.4*   PLT  129*  125*[JE1.1]       Results for orders placed or performed during the hospital encounter of 09/02/18 (from the past 24 hour(s))   INR   Result Value Ref Range    INR 1.25 (H) 0.86 - 1.14   CBC with platelets   Result Value Ref Range    WBC 5.0 4.0 - 11.0 10e9/L    RBC Count 3.78 (L) 4.4 - 5.9 10e12/L    Hemoglobin 10.5 (L) 13.3 - 17.7 g/dL    Hematocrit 32.5 (L) 40.0 - 53.0 %    MCV 86 78 - 100 fl    MCH 27.8 26.5 - 33.0 pg    MCHC 32.3 31.5 - 36.5 g/dL    RDW 13.5 10.0 - 15.0 %    Platelet Count 129 (L) 150 - 450 10e9/L[JE1.3]       Assessment and Plan:    Nabor Cunningham is a 82 year old male who presents on 9/2/2018 with bleeding from left buttock wound.      Bleeding from wound  Left buttock abscess s/p I&D 6 days ago developed bleeding postop day 1 and was seen in the ED where the wound was sutured.  Returns early this morning due to bleeding felt to have had a hematoma that is now coming out of the wound when seen in the ED.  On the floor he continues to have some oozing.  He is on warfarin for A. fib with therapeutic INR 2.26. Patient just completed course of bactrim for the buttock infection.   After two doses of vitamin k INR is 1.25 on[JE1.1]  09/04/18[JE1.3].      Suspect pneumonia or pneumonitis, right middle and lower lobe  Incidental finding on CT and chest x-ray. No fever, cough, chest pain, leucocytosis. CRP mild up at 31. Infiltrate on right side but no known aspiration (pt had only local anesthesia for abscess drainage last week). On Bactrim for the abscess so possible this has treated any infection given patient appears asymptomatic. Procalcitonin low.  Started on clindamycin and levofloxacin in the ED for possible aspiration.  - Given that he does not clinically appear very ill,  treat with 5 day course of levofloxacin 750 mg.   - Needs outpatient follow up imaging in a few weeks to document resolution  -currently no symptoms      Anemia, due to acute blood loss, mild  Hgb 12.8 and 11.1. Previously 14. Bleeding is not brisk.   hgb 10.0 09/03/18   hgb 10.5     Atrial fibrillation, s/p PPM  Therapeutic INR on admission. Need to hold/reverse due to bleeding.   - Resume warfarin when bleeding risk for wound is resolved   - continue metoprolol XL      Anxiety disorder  Baseline significant anxiety.  On nortriptyline though also has neuropathy.      r/o alcohol abuse   In remission 2-3 years, though patient says he has an occasional drink of wine less than once a week - this was news to the daughter.       Gout   On colchicine      Insomnia  On restoril      MALU- lock  DVT Prophylaxis: Low Risk/Ambulatory with no VTE prophylaxis indicated  Code Status:       Lines: PIV  Funk catheter: None  Restraints: none      Disposition: admit, continued ongoing bleeding, dressing just redone --so I did not on do it.[JE1.1]C[JE1.2]ontinue to rx for presumed PN-needs out pt follow-up x-ray .[JE1.1] I have not been able to examine area-so I don't know if there is a general ooze or more focal bleeding.  Will rely on surgery to manage.[JE1.2]    [JE1.1]       Revision History        User Key Date/Time User Provider Type Action    > JE1.2 9/4/2018 10:41 AM Binh  "Chencho Crook MD Physician Sign     JE1.3 9/4/2018 10:31 AM Chencho Purcell MD Physician      JE1.1 9/4/2018 10:30 AM Chencho Purcell MD Physician             Progress Notes by Rosangela Ching RN at 9/4/2018  8:42 AM     Author:  Rosangela Ching RN Service:  (none) Author Type:  Registered Nurse    Filed:  9/4/2018  8:56 AM Date of Service:  9/4/2018  8:42 AM Creation Time:  9/4/2018  8:42 AM    Status:  Addendum :  Rosangela Ching RN (Registered Nurse)         This writer was called into the room, as the patient needed to go to the bathroom, but it was noted that his recent dressing change completed by the surgeon had leaked blood into his bed.[LR1.1] A[LR1.2]n ABD and hip[LR1.3] spica[LR1.1] wrap[LR1.3] was wrapped around pts leg and buttocks to apply pressure. Ice applied to left hip. Updated surgeon.[LR1.1]      Revision History        User Key Date/Time User Provider Type Action    > LR1.2 9/4/2018  8:56 AM Rosangela Ching RN Registered Nurse Addend     LR1.3 9/4/2018  8:54 AM Rosangela Ching RN Registered Nurse Addend     LR1.1 9/4/2018  8:44 AM Rosangela Ching RN Registered Nurse Sign            Progress Notes by Pavel Alfred MD at 9/4/2018  7:29 AM     Author:  Pavel Alfred MD Service:  Surgery Author Type:  Physician    Filed:  9/4/2018  7:33 AM Date of Service:  9/4/2018  7:29 AM Creation Time:  9/4/2018  7:29 AM    Status:  Signed :  Pavel Alfred MD (Physician)         General Surgery    S: Patient feeling well no buttock pain. RNs did have to reinforce the dressing a few times yesterday afternoon though seemed to stay intact through the evening    O:  Vital signs:  Temp: 98.4  F (36.9  C) Temp src: Oral BP: 127/71 Pulse: 77 Heart Rate: 82 Resp: 18 SpO2: 97 % O2 Device: None (Room air)   Height: 162.6 cm (5' 4\") Weight: 73.4 kg (161 lb 13.1 oz)  Estimated body mass index is 27.78 kg/(m^2) as calculated from the following:    Height as of " "this encounter: 1.626 m (5' 4\").    Weight as of this encounter: 73.4 kg (161 lb 13.1 oz).      Gen: AAOx3, NAD  Left buttock wound dressing/packing removed. Again mostly clot on packing. Raw muscle at base clean minimal oozing so improved again from yesterday. Repacked including piece of sugicel    Labs/Imaging  Results for orders placed or performed during the hospital encounter of 09/02/18 (from the past 24 hour(s))   INR   Result Value Ref Range    INR 1.25 (H) 0.86 - 1.14   CBC with platelets   Result Value Ref Range    WBC 5.0 4.0 - 11.0 10e9/L    RBC Count 3.78 (L) 4.4 - 5.9 10e12/L    Hemoglobin 10.5 (L) 13.3 - 17.7 g/dL    Hematocrit 32.5 (L) 40.0 - 53.0 %    MCV 86 78 - 100 fl    MCH 27.8 26.5 - 33.0 pg    MCHC 32.3 31.5 - 36.5 g/dL    RDW 13.5 10.0 - 15.0 %    Platelet Count 129 (L) 150 - 450 10e9/L           A: Bleeding from I&D left buttock wound, improving. Hg up today    P: Given additional dressing reinforcement needed again yesterday wound packed with some surgicel in addition to kerlex. Does appear to be more minimal bleeding today regardless. Keep current packing in until tomorrow then suspect will be able to transition to normal wet to dry dressings[MT1.1]    Pavel Alfred MD[MT1.2]         Revision History        User Key Date/Time User Provider Type Action    > MT1.2 9/4/2018  7:33 AM Pavel Alfred MD Physician Sign     MT1.1 9/4/2018  7:29 AM Pavel Alfred MD Physician                   Procedure Notes     No notes of this type exist for this encounter.      Progress Notes - Therapies (Notes from 09/04/18 through 09/07/18)     No notes of this type exist for this encounter.      "

## 2018-09-02 NOTE — CONSULTS
[facility] Surgery Consultation    Nabor Cunningham MRN# [id number]   Age: 82 year old YOB: 1935     Date of Admission:  9/2/2018    Reason for consult: Bleeding       Requesting physician: Dr Powell       Level of consult: Consult, follow and place orders           Assessment and Recommendation:   Assessment:   Bleeding post I&D of left gluteal abscess      Recommendations/Plan:   Wound opened and explored. Some fresh blood but mostly clot expressed. Suspect ooze from raw underlying muscle no bleeding vessel seen. Wound packed tightly with kerlex and covered with gauze/ABD.  Recommend INR reversal given ongoing bleeding. This in combination with good pressure on the area from packing may be enough to stop the bleeding. Will monitor  If bleeding continues then may need to return to OR for more wound exploration and hemorrhage control             Chief Complaint:   Bleeding     Patient with I&D of left gluteal abscess in surgery clinic with Dr Boyer on 8/27. Had incisional bleeding and presented to ER the following day where the wound was closed. Area became more bruised then this morning had more bleeding from the wound, described as large amount. He was brought back to the ER and subsequently admitted          Past Medical History:     Past Medical History:   Diagnosis Date     Anxiety      Gout      Hyperlipemia      Hypertension      Insomnia      Paroxysmal atrial fibrillation (H)              Past Surgical History:   No past surgical history on file.   I&D as above          Social History:     Social History   Substance Use Topics     Smoking status: Former Smoker     Years: 20.00     Types: Cigarettes     Smokeless tobacco: Never Used     Alcohol use No             Family History:   No family history on file.              Allergies:     Allergies   Allergen Reactions     Amoxicillin Hives     Penicillins Other (See Comments)     Dizzy       Trazodone Other (See Comments)      Hallucinations               Medications:     Facility-Administered Medications Prior to Admission   Medication Dose Route Frequency Provider Last Rate Last Dose     lidocaine 1 % injection 2 mL  2 mL   Britt Davidson MD   2 mL at 06/29/18 1002     triamcinolone acetonide (KENALOG-40) injection 40 mg  40 mg   Britt Davidson MD   40 mg at 06/29/18 1002     Prescriptions Prior to Admission   Medication Sig Dispense Refill Last Dose     calcium carbonate (OS- MG [place]. [place]) 1250 MG tablet Take 1 tablet by mouth 2 times daily   9/1/2018 at pm     colchicine (COLCYRS) 0.6 MG tablet Take 1 tablet (0.6 mg) by mouth daily 30 tablet 0 9/1/2018 at am     cyanocobalamin (VITAMIN  B-12) 1000 MCG tablet TAKE ONE TABLET BY MOUTH DAILY 30 tablet 11 9/1/2018 at am     hypromellose (ARTIFICIAL TEARS) 0.4 % SOLN ophthalmic solution Place 1 drop into both eyes every hour as needed for dry eyes   9/1/2018 at Unknown time     metoprolol (TOPROL-XL) 25 MG 24 hr tablet Take 25 mg by mouth every evening  90 tablet 1 9/1/2018 at pm     mineral oil oil Take 5 mLs by mouth daily   9/1/2018 at am     multivitamin, therapeutic with minerals (MULTI-VITAMIN) TABS tablet Take 1 tablet by mouth daily 100 tablet 3 9/1/2018 at am     nortriptyline (PAMELOR) 25 MG capsule TAKE ONE CAPSULE MID MORNING AND TWO CAPSULES BY MOUTH DAILY AT BEDTIME 270 capsule 3 9/1/2018 at 1800     prednisoLONE acetate (PRED FORTE) 1 % ophthalmic susp Place 1 drop Into the left eye 2 times daily   2 9/1/2018 at pm     temazepam (RESTORIL) 30 MG capsule TAKE ONE CAPSULE BY MOUTH AT BEDTIME AS NEEDED FOR SLEEP 30 capsule 5 9/1/2018 at hs     warfarin (COUMADIN) 2 MG tablet Take 8 mg Mon/Thurs; 6 mg all other days or as instructed by ACC (Patient taking differently: Take 8 mg Mon/Thurs; 6 mg all other days or as instructed by ACC  8/30: 4 mg; 8/31: 4 mg; 9/3: 6 mg; Otherwise 8 mg on Mon, Thu; 6 mg all other days) 300 tablet 0 9/1/2018 at pm     celecoxib (CELEBREX)  200 MG capsule Take 1 capsule (200 mg) by mouth daily Take with food. 30 capsule 5 More than a month at Unknown time     sulfamethoxazole-trimethoprim (BACTRIM DS/SEPTRA DS) 800-160 MG per tablet Take 1 tablet by mouth 2 times daily 14 tablet 0 Taking             Review of Systems:   Patient denies additional trauma to area, no fever, chills, lightheadedness. Does say he has lost some weight. Bleeding as noted above          Physical Exam:   Temp: 97.8  F (36.6  C) Temp src: Oral BP: 115/58 Pulse: 83 Heart Rate: 84 Resp: 16 SpO2: 98 % O2 Device: None (Room air)    Patient alter no acute distress laying in bed  Patient rolled onto right side to evaluate left gluteal wound. Wound itself about 4 cm long with 3 nylon sutures closing. There is buldging at the wound and large area of bruising various stages. Sutures removed and clot expressed as well as some brighter red blood. I used a gauze to sweep the cavity clear. I noted minor ooze but no vessel bleeding. I think packed the wound tightly with kerlex and dressed with gauze.          Data:     Results for orders placed or performed during the hospital encounter of 09/02/18 (from the past 24 hour(s))   CBC with platelets, differential   Result Value Ref Range    WBC 5.2 4.0 - 11.0 10e9/L    RBC Count 4.64 4.4 - 5.9 10e12/L    Hemoglobin 12.8 (L) 13.3 - 17.7 g/dL    Hematocrit 39.6 (L) 40.0 - 53.0 %    MCV 85 78 - 100 fl    MCH 27.6 26.5 - 33.0 pg    MCHC 32.3 31.5 - 36.5 g/dL    RDW 13.4 10.0 - 15.0 %    Platelet Count 155 150 - 450 10e9/L    Diff Method Automated Method     % Neutrophils 74.4 %    % Lymphocytes 12.7 %    % Monocytes 7.9 %    % Eosinophils 4.2 %    % Basophils 0.6 %    % Immature Granulocytes 0.2 %    Nucleated RBCs 0 0 /100    Absolute Neutrophil 3.9 1.6 - 8.3 10e9/L    Absolute Lymphocytes 0.7 (L) 0.8 - 5.3 10e9/L    Absolute Monocytes 0.4 0.0 - 1.3 10e9/L    Absolute Eosinophils 0.2 0.0 - 0.7 10e9/L    Absolute Basophils 0.0 0.0 - 0.2 10e9/L    Abs  Immature Granulocytes 0.0 0 - 0.4 10e9/L    Absolute Nucleated RBC 0.0    Basic metabolic panel   Result Value Ref Range    Sodium 136 133 - 144 mmol/L    Potassium 4.0 3.4 - 5.3 mmol/L    Chloride 102 94 - 109 mmol/L    Carbon Dioxide 29 20 - 32 mmol/L    Anion Gap 5 3 - 14 mmol/L    Glucose 96 70 - 99 mg/dL    Urea Nitrogen 12 7 - 30 mg/dL    Creatinine 0.85 0.66 - 1.25 mg/dL    GFR Estimate 86 >60 mL/min/1.7m2    GFR Estimate If Black >90 >60 mL/min/1.7m2    Calcium 8.8 8.5 - 10.1 mg/dL   INR   Result Value Ref Range    INR 2.26 (H) 0.86 - 1.14   CRP inflammation   Result Value Ref Range    CRP Inflammation 31.5 (H) 0.0 - 8.0 mg/L   CT Abdomen Pelvis w Contrast    Narrative    CT ABDOMEN/PELVIS WITH CONTRAST 9/2/2018 6:01 AM     HISTORY: Left gluteal I&D 5 days ago, now heavy bleeding.     TECHNIQUE: Volumetric acquisition through abdomen and pelvis with  IV  contrast,  78 mL Isovue-370.  Radiation dose for this scan was reduced  using automated exposure control, adjustment of the mA and/or kV  according to patient size, or iterative reconstruction technique.    COMPARISON: None.    FINDINGS: The liver, gallbladder, spleen, pancreas, adrenal glands and  kidneys demonstrate no worrisome findings. A few tiny hypodense foci  in the spleen are too small to characterize, but of doubtful  significance.    Extensive area of groundglass infiltrate in the right mid and lower  lung.    Enlarged prostate. Vascular calcification. Soft tissue thickening in  the left internal canal, which is likely an incompletely descended  left testicle. Soft tissue stranding and a small hematoma in the left  gluteal region. The hematoma contains a fluid/hematocrit level and  measures approximately 2.3 x 4.3 cm. Small subcutaneous cyst in the  subcutaneous fat of the lower midline back at the level of the sacrum  is probably a sebaceous cyst. No bowel obstruction, ascites or other  acute findings. Multilevel degenerative disc disease  throughout the  visualized spine. Degenerative changes of both hips with sclerotic  focus in the left femoral head compatible with AVN. Possible changes  of AVN in the right hip also.      Impression    IMPRESSION:  1. Left gluteal subcutaneous mass and surrounding soft tissue  stranding most consistent with contusion and a small hematoma.  2. Undescended left testicle.  3. Enlarged prostate.  4. Extensive right lung infiltrate, probably infectious or  inflammatory, but short-term follow-up recommended to ensure  resolution.  5. 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    EDUARDO ALEXANDRA MD   UA reflex to Microscopic and Culture   Result Value Ref Range    Color Urine Yellow     Appearance Urine Clear     Glucose Urine Negative NEG^Negative mg/dL    Bilirubin Urine Negative NEG^Negative    Ketones Urine Negative NEG^Negative mg/dL    Specific Gravity Urine 1.005 1.003 - 1.035    Blood Urine Negative NEG^Negative    pH Urine 7.0 5.0 - 7.0 pH    Protein Albumin Urine Negative NEG^Negative mg/dL    Urobilinogen mg/dL 0.0 0.0 - 2.0 mg/dL    Nitrite Urine Negative NEG^Negative    Leukocyte Esterase Urine Negative NEG^Negative    Source Unspecified Urine    Chest XR,  PA & LAT    Narrative    CHEST TWO VIEWS    9/2/2018 6:58 AM     INDICATION: Delirium.    COMPARISON: 3/22/2017.      Impression    IMPRESSION: New hazy infiltrate in the right mid and lower lung as  seen on the CT. This may be infectious or inflammatory. Follow-up to  resolution recommended. Cardiac pacer. Mild cardiomegaly.  Postoperative changes right hemithorax.    EDUARDO ALEXANDRA MD   CBC with platelets differential   Result Value Ref Range    WBC 4.6 4.0 - 11.0 10e9/L    RBC Count 3.97 (L) 4.4 - 5.9 10e12/L    Hemoglobin 11.1 (L) 13.3 - 17.7 g/dL    Hematocrit 33.8 (L) 40.0 - 53.0 %    MCV 85 78 - 100 fl    MCH 28.0 26.5 - 33.0 pg    MCHC 32.8 31.5 - 36.5 g/dL    RDW 13.2 10.0 - 15.0 %    Platelet Count 138 (L) 150 - 450 10e9/L     Diff Method Automated Method     % Neutrophils 73.0 %    % Lymphocytes 14.7 %    % Monocytes 7.2 %    % Eosinophils 4.2 %    % Basophils 0.7 %    % Immature Granulocytes 0.2 %    Nucleated RBCs 0 0 /100    Absolute Neutrophil 3.3 1.6 - 8.3 10e9/L    Absolute Lymphocytes 0.7 (L) 0.8 - 5.3 10e9/L    Absolute Monocytes 0.3 0.0 - 1.3 10e9/L    Absolute Eosinophils 0.2 0.0 - 0.7 10e9/L    Absolute Basophils 0.0 0.0 - 0.2 10e9/L    Abs Immature Granulocytes 0.0 0 - 0.4 10e9/L    Absolute Nucleated RBC 0.0        Attestation:  I have reviewed today's vital signs, notes, medications, labs and imaging.  Amount of time performed on this consult: 30 minutes.    Pavel Alfred MD

## 2018-09-03 PROBLEM — I97.620: Status: ACTIVE | Noted: 2018-09-03

## 2018-09-03 LAB
ERYTHROCYTE [DISTWIDTH] IN BLOOD BY AUTOMATED COUNT: 13.4 % (ref 10–15)
GLUCOSE BLDC GLUCOMTR-MCNC: 92 MG/DL (ref 70–99)
HCT VFR BLD AUTO: 31.4 % (ref 40–53)
HGB BLD-MCNC: 10 G/DL (ref 13.3–17.7)
INR PPP: 1.66 (ref 0.86–1.14)
MCH RBC QN AUTO: 27.5 PG (ref 26.5–33)
MCHC RBC AUTO-ENTMCNC: 31.8 G/DL (ref 31.5–36.5)
MCV RBC AUTO: 86 FL (ref 78–100)
PLATELET # BLD AUTO: 125 10E9/L (ref 150–450)
RBC # BLD AUTO: 3.64 10E12/L (ref 4.4–5.9)
WBC # BLD AUTO: 5.1 10E9/L (ref 4–11)

## 2018-09-03 PROCEDURE — 12000000 ZZH R&B MED SURG/OB

## 2018-09-03 PROCEDURE — 25000132 ZZH RX MED GY IP 250 OP 250 PS 637: Mod: GY | Performed by: INTERNAL MEDICINE

## 2018-09-03 PROCEDURE — 36415 COLL VENOUS BLD VENIPUNCTURE: CPT | Performed by: INTERNAL MEDICINE

## 2018-09-03 PROCEDURE — 99233 SBSQ HOSP IP/OBS HIGH 50: CPT | Performed by: FAMILY MEDICINE

## 2018-09-03 PROCEDURE — 25000128 H RX IP 250 OP 636: Performed by: FAMILY MEDICINE

## 2018-09-03 PROCEDURE — 99231 SBSQ HOSP IP/OBS SF/LOW 25: CPT | Performed by: SURGERY

## 2018-09-03 PROCEDURE — G0378 HOSPITAL OBSERVATION PER HR: HCPCS

## 2018-09-03 PROCEDURE — 25000128 H RX IP 250 OP 636: Performed by: STUDENT IN AN ORGANIZED HEALTH CARE EDUCATION/TRAINING PROGRAM

## 2018-09-03 PROCEDURE — 85027 COMPLETE CBC AUTOMATED: CPT | Performed by: INTERNAL MEDICINE

## 2018-09-03 PROCEDURE — A9270 NON-COVERED ITEM OR SERVICE: HCPCS | Mod: GY | Performed by: INTERNAL MEDICINE

## 2018-09-03 PROCEDURE — 85610 PROTHROMBIN TIME: CPT | Performed by: INTERNAL MEDICINE

## 2018-09-03 RX ADMIN — ACETAMINOPHEN 650 MG: 325 TABLET, FILM COATED ORAL at 20:48

## 2018-09-03 RX ADMIN — METOPROLOL SUCCINATE 25 MG: 25 TABLET, EXTENDED RELEASE ORAL at 17:48

## 2018-09-03 RX ADMIN — PHYTONADIONE 2 MG: 2 INJECTION, EMULSION INTRAMUSCULAR; INTRAVENOUS; SUBCUTANEOUS at 13:10

## 2018-09-03 RX ADMIN — SODIUM CHLORIDE: 9 INJECTION, SOLUTION INTRAVENOUS at 05:56

## 2018-09-03 RX ADMIN — NORTRIPTYLINE HYDROCHLORIDE 25 MG: 25 CAPSULE ORAL at 09:23

## 2018-09-03 RX ADMIN — SODIUM CHLORIDE: 9 INJECTION, SOLUTION INTRAVENOUS at 21:01

## 2018-09-03 RX ADMIN — TEMAZEPAM 30 MG: 7.5 CAPSULE ORAL at 17:48

## 2018-09-03 RX ADMIN — COLCHICINE 0.6 MG: 0.6 TABLET, FILM COATED ORAL at 09:24

## 2018-09-03 RX ADMIN — MINERAL OIL 22.5 ML: 99.9 LIQUID ORAL; TOPICAL at 09:23

## 2018-09-03 RX ADMIN — NORTRIPTYLINE HYDROCHLORIDE 50 MG: 25 CAPSULE ORAL at 20:48

## 2018-09-03 RX ADMIN — PREDNISOLONE ACETATE 1 DROP: 10 SUSPENSION/ DROPS OPHTHALMIC at 09:26

## 2018-09-03 RX ADMIN — PREDNISOLONE ACETATE 1 DROP: 10 SUSPENSION/ DROPS OPHTHALMIC at 20:53

## 2018-09-03 RX ADMIN — LEVOFLOXACIN 750 MG: 250 TABLET, FILM COATED ORAL at 09:23

## 2018-09-03 RX ADMIN — Medication 1 MG: at 20:48

## 2018-09-03 ASSESSMENT — ACTIVITIES OF DAILY LIVING (ADL)
ADLS_ACUITY_SCORE: 11

## 2018-09-03 NOTE — PLAN OF CARE
Problem: Patient Care Overview  Goal: Plan of Care/Patient Progress Review  Outcome: Improving  Pt's dressing is CDI. Pressure tape applied by previous nurse is still intact with no drainage visible. Pt denies pain. Up with stand by assist.

## 2018-09-03 NOTE — PROGRESS NOTES
"General Surgery    S: No buttock pain, dressing appears to have stayed clean after some reinforcement yesterday    O:  Vital signs:  Temp: 98  F (36.7  C) Temp src: Oral BP: 116/70 Pulse: 77   Resp: 18 SpO2: 95 % O2 Device: None (Room air)   Height: 162.6 cm (5' 4\") Weight: 73.4 kg (161 lb 13.1 oz)  Estimated body mass index is 27.78 kg/(m^2) as calculated from the following:    Height as of this encounter: 1.626 m (5' 4\").    Weight as of this encounter: 73.4 kg (161 lb 13.1 oz).      Gen: AAOx3, NAD  Left buttock dressing removed. Packing with clotted blood. Wound still with some minor oozing but overall appears less/slower than yesterday. Repacked and dressed    Labs/Imaging  Results for orders placed or performed during the hospital encounter of 09/02/18 (from the past 24 hour(s))   CBC with platelets   Result Value Ref Range    WBC 5.1 4.0 - 11.0 10e9/L    RBC Count 3.64 (L) 4.4 - 5.9 10e12/L    Hemoglobin 10.0 (L) 13.3 - 17.7 g/dL    Hematocrit 31.4 (L) 40.0 - 53.0 %    MCV 86 78 - 100 fl    MCH 27.5 26.5 - 33.0 pg    MCHC 31.8 31.5 - 36.5 g/dL    RDW 13.4 10.0 - 15.0 %    Platelet Count 125 (L) 150 - 450 10e9/L   INR   Result Value Ref Range    INR 1.66 (H) 0.86 - 1.14           A: Bleeding from I&D wound. Appears to be improving    P: Continue local pressure from packing and dressing. INR down to 1.66. Will need ongoing packing to wound at least daily while heals. Looks like will not need surgical intervention but will continue to monitor    Pavel Alfred MD      "

## 2018-09-03 NOTE — PROGRESS NOTES
[facility] Hospitalist Service      Subjective:  Bleeding continues per nursing staff  Saturated dressing just removed and redone  No other complaints    Review of Systems:  CONSTITUTIONAL: NEGATIVE for fever, chills, change in weight  INTEGUMENTARY/SKIN: NEGATIVE for worrisome rashes, moles or lesions  EYES: NEGATIVE for vision changes or irritation  ENT/MOUTH: NEGATIVE for ear, mouth and throat problems  RESP: NEGATIVE for significant cough or SOB  BREAST: NEGATIVE for masses, tenderness or discharge  CV: NEGATIVE for chest pain, palpitations or peripheral edema  GI: NEGATIVE for nausea, abdominal pain, heartburn, or change in bowel habits  : NEGATIVE for frequency, dysuria, or hematuria  MUSCULOSKELETAL:above  ENDOCRINE: NEGATIVE for temperature intolerance, skin/hair changes  HEME: NEGATIVE for bleeding problems  PSYCHIATRIC: NEGATIVE for changes in mood or affect    Physical Exam:  Vitals Were Reviewed    Patient Vitals for the past 16 hrs:   BP Temp Temp src Pulse Resp SpO2   09/03/18 0747 116/70 98  F (36.7  C) Oral 77 18 95 %   09/02/18 2311 114/65 98.1  F (36.7  C) Oral 84 18 96 %         Intake/Output Summary (Last 24 hours) at 09/03/18 1128  Last data filed at 09/02/18 1450   Gross per 24 hour   Intake           628.33 ml   Output              125 ml   Net           503.33 ml       GENERAL APPEARANCE: healthy, alert and no distress  EYES: conjunctiva clear, eyes grossly normal  RESP: lungs clear to auscultation - no rales, rhonchi or wheezes  CV: regular rate and rhythm, normal S1 S2, no S3 or S4 and no murmur, click or rub   ABDOMEN: soft, nontender, no HSM or masses and bowel sounds normal   (male): pressure dressing left buttox    Lab:  Recent Labs   Lab Test  09/02/18   0505  03/22/17   1240   NA  136  138   POTASSIUM  4.0  4.1   CHLORIDE  102  102   CO2  29  30   ANIONGAP  5  6   GLC  96  94   BUN  12  14   CR  0.85  0.70   ALY  8.8  8.7     CBC RESULTS:   Recent Labs   Lab Test  09/03/18    0535  09/02/18   0845   WBC  5.1  4.6   RBC  3.64*  3.97*   HGB  10.0*  11.1*   HCT  31.4*  33.8*   PLT  125*  138*       Results for orders placed or performed during the hospital encounter of 09/02/18 (from the past 24 hour(s))   CBC with platelets   Result Value Ref Range    WBC 5.1 4.0 - 11.0 10e9/L    RBC Count 3.64 (L) 4.4 - 5.9 10e12/L    Hemoglobin 10.0 (L) 13.3 - 17.7 g/dL    Hematocrit 31.4 (L) 40.0 - 53.0 %    MCV 86 78 - 100 fl    MCH 27.5 26.5 - 33.0 pg    MCHC 31.8 31.5 - 36.5 g/dL    RDW 13.4 10.0 - 15.0 %    Platelet Count 125 (L) 150 - 450 10e9/L   INR   Result Value Ref Range    INR 1.66 (H) 0.86 - 1.14       Assessment and Plan:    Nabor Cunningham is a 82 year old male who presents on 9/2/2018 with bleeding from left buttock wound.       Bleeding from wound   Left buttock abscess s/p I&D 6 days ago developed bleeding postop day 1 and was seen in the ED where the wound was sutured.  Returns early this morning due to bleeding felt to have had a hematoma that is now coming out of the wound when seen in the ED.  On the floor he continues to have some oozing.  He is on warfarin for A. fib with therapeutic INR 2.26. Patient just completed course of bactrim for the buttock infection.   INR reversed with vit k 5 mg, INR 1.66--2 more mg of vitamin K given after discussion with pharmacy due to ongoing bleeding       Suspect pneumonia or pneumonitis, right middle and lower lobe   Incidental finding on CT and chest x-ray. No fever, cough, chest pain, leucocytosis. CRP mild up at 31. Infiltrate on right side but no known aspiration (pt had only local anesthesia for abscess drainage last week). On Bactrim for the abscess so possible this has treated any infection given patient appears asymptomatic. Procalcitonin low.  Started on clindamycin and levofloxacin in the ED for possible aspiration.   - Given that he does not clinically appear very ill,  treat with 5 day course of levofloxacin 750 mg.    - Needs  outpatient follow up imaging in a few weeks to document resolution  -currently no symptoms       Anemia, due to acute blood loss, mild   Hgb 12.8 and 11.1. Previously 14. Bleeding is not brisk.   hgb 10.0 09/03/18       Atrial fibrillation, s/p PPM   Therapeutic INR on admission. Need to hold/reverse due to bleeding.   - Resume warfarin when bleeding risk for wound is resolved   - continue metoprolol XL       Anxiety disorder   Baseline significant anxiety.  On nortriptyline though also has neuropathy.       H/o alcohol abuse   In remission 2-3 years, though patient says he has an occasional drink of wine less than once a week - this was news to the daughter.        Gout   On colchicine       Insomnia   On restoril     MALU- fluids at 50   DVT Prophylaxis: Low Risk/Ambulatory with no VTE prophylaxis indicated  Code Status:      Lines: PIV  Funk catheter: None  Restraints: none     Disposition: admit, continued ongoing bleeding, dressing just redone --so I did not on do it. Vit k 2 mg iv now. Follow hgb, continue to rx for presumed PN-needs out pt follow-up x-ray .

## 2018-09-03 NOTE — PLAN OF CARE
Problem: Patient Care Overview  Goal: Plan of Care/Patient Progress Review  Outcome: No Change  Patient alert and cooperative. Tolerating diet and denied any nausea. Up with stand by assist. Vital signs stable. Care transition consulted for family concerns at discharge regarding incisional dressing care.       Problem: Wound (Includes Pressure Injury) (Adult)  Goal: Signs and Symptoms of Listed Potential Problems Will be Absent, Minimized or Managed (Wound)  Signs and symptoms of listed potential problems will be absent, minimized or managed by discharge/transition of care (reference Wound (Includes Pressure Injury) (Adult) CPG).   Outcome: No Change  Dressing and packing was changed by general surgeon today. Approximately 20 minutes after the dressing was changed, it was soaked with blood and blood was on the bed. General surgeon made aware and stated to reinforce dressing with an ABD pad. An ABD pad was applied with foam tape. Approximately 3 hours later, the ABD pad was visibly soaked with blood. The soaked ABD pad was removed and additional gauze pads were placed on the original dressing with an ABD pad on top. New dressing was secured with foam tape. Will continue to monitor.

## 2018-09-04 LAB
ERYTHROCYTE [DISTWIDTH] IN BLOOD BY AUTOMATED COUNT: 13.5 % (ref 10–15)
HCT VFR BLD AUTO: 32.5 % (ref 40–53)
HGB BLD-MCNC: 10.5 G/DL (ref 13.3–17.7)
INR PPP: 1.25 (ref 0.86–1.14)
MCH RBC QN AUTO: 27.8 PG (ref 26.5–33)
MCHC RBC AUTO-ENTMCNC: 32.3 G/DL (ref 31.5–36.5)
MCV RBC AUTO: 86 FL (ref 78–100)
PLATELET # BLD AUTO: 129 10E9/L (ref 150–450)
RBC # BLD AUTO: 3.78 10E12/L (ref 4.4–5.9)
WBC # BLD AUTO: 5 10E9/L (ref 4–11)

## 2018-09-04 PROCEDURE — 25000132 ZZH RX MED GY IP 250 OP 250 PS 637: Mod: GY | Performed by: INTERNAL MEDICINE

## 2018-09-04 PROCEDURE — 99232 SBSQ HOSP IP/OBS MODERATE 35: CPT | Performed by: FAMILY MEDICINE

## 2018-09-04 PROCEDURE — 12000000 ZZH R&B MED SURG/OB

## 2018-09-04 PROCEDURE — 99231 SBSQ HOSP IP/OBS SF/LOW 25: CPT | Performed by: SURGERY

## 2018-09-04 PROCEDURE — 36415 COLL VENOUS BLD VENIPUNCTURE: CPT | Performed by: FAMILY MEDICINE

## 2018-09-04 PROCEDURE — A9270 NON-COVERED ITEM OR SERVICE: HCPCS | Mod: GY | Performed by: INTERNAL MEDICINE

## 2018-09-04 PROCEDURE — 85610 PROTHROMBIN TIME: CPT | Performed by: FAMILY MEDICINE

## 2018-09-04 PROCEDURE — 99207 ZZC CDG-MDM COMPONENT: MEETS LOW - DOWN CODED: CPT | Performed by: FAMILY MEDICINE

## 2018-09-04 PROCEDURE — 85027 COMPLETE CBC AUTOMATED: CPT | Performed by: FAMILY MEDICINE

## 2018-09-04 RX ADMIN — PREDNISOLONE ACETATE 1 DROP: 10 SUSPENSION/ DROPS OPHTHALMIC at 20:29

## 2018-09-04 RX ADMIN — MINERAL OIL 22.5 ML: 99.9 LIQUID ORAL; TOPICAL at 09:09

## 2018-09-04 RX ADMIN — COLCHICINE 0.6 MG: 0.6 TABLET, FILM COATED ORAL at 09:09

## 2018-09-04 RX ADMIN — LEVOFLOXACIN 750 MG: 250 TABLET, FILM COATED ORAL at 09:09

## 2018-09-04 RX ADMIN — NORTRIPTYLINE HYDROCHLORIDE 25 MG: 25 CAPSULE ORAL at 09:09

## 2018-09-04 RX ADMIN — ACETAMINOPHEN 650 MG: 325 TABLET, FILM COATED ORAL at 20:30

## 2018-09-04 RX ADMIN — PREDNISOLONE ACETATE 1 DROP: 10 SUSPENSION/ DROPS OPHTHALMIC at 09:09

## 2018-09-04 RX ADMIN — NORTRIPTYLINE HYDROCHLORIDE 50 MG: 25 CAPSULE ORAL at 20:29

## 2018-09-04 RX ADMIN — METOPROLOL SUCCINATE 25 MG: 25 TABLET, EXTENDED RELEASE ORAL at 17:30

## 2018-09-04 ASSESSMENT — ACTIVITIES OF DAILY LIVING (ADL)
ADLS_ACUITY_SCORE: 11

## 2018-09-04 NOTE — PROGRESS NOTES
No change in pressure dressing status. Patient up independently in his room. Denying any pain. Diminished/clear lung sounds. Patient's son vocalized concerns over discharge situation. Son is hoping patient will discharge to TCU to get adequate support at discharge with dressing changes.

## 2018-09-04 NOTE — PROGRESS NOTES
This writer was called into the room, as the patient needed to go to the bathroom, but it was noted that his recent dressing change completed by the surgeon had leaked blood into his bed. An ABD and hip spica wrap was wrapped around pts leg and buttocks to apply pressure. Ice applied to left hip. Updated surgeon.

## 2018-09-04 NOTE — PROGRESS NOTES
Called to update surgeon on need for second reinforcement of dressing this late morning. Currently there is a 2 inch area noted of drainage under the pressure dressing. Will continue to monitor. Hospitalist aware.

## 2018-09-04 NOTE — CONSULTS
Care Transition Initial Assessment - RN  Reason For Consult: discharge planning   Met with: Patient and spoke with daughterKya and sonFlorencio.    DATA   Active Problems:    Pneumonia    Bleeding from wound    Postoperative hemorrhage involving circulatory system following non-circulatory system procedure       Primary Care Clinic Name: [facility]  Primary Care MD Name: Amadou Salas  Contact information and PCP information verified: Yes    ASSESSMENT  Cognitive Status: awake, alert and oriented.       Resources List: Home Care     Lives With: alone  Living Arrangements: apartment     Description of Support System: Supportive, Involved   Who is your support system?: Children   Support Assessment: Adequate family and caregiver support, Adequate social supports   Insurance Concerns: No Insurance issues identified      This writer met with pt and spoke with daughter Lexy and son Florencio with patient's permission, introduced self and role.  Discussed discharge planning and Medicare guidelines in regards to home care, TCU and LTC. The patient lives alone at [facility] in [place].  He does not have any community services.  Patient is active, he walks daily until his gluteal abscess developed.  His three children are available to assist if needed.  Patient would like [facility], referral placed.  Provided pneumonia education resources.  Discussed home care, Pt was provided with Medicare certified home care list. Pt chooses to use [facility] ([phone] Fax: [phone]).  A referral was placed for home care.  Also discussed TCU, patient was provided with Medicare certified nursing home list. Pts choices are as follows [place] [facility] (Phone: [phone] Fax: [phone]), [facility] (Main: [phone] Admissions: [phone] Fax: [phone]) and [facility] Phone: ([phone]) Fax: ([phone]).  TCU referrals are pending.  Patient  agreed to Clinic Care Coordination.      PLAN    Home with [facility] Care vs TCU      Discharge Planner   Discharge Plans in progress: C vs TCU  Barriers to discharge plan: Medical stability  Follow up plan: Referral to Clinic Care Coordinator       Entered by: Tri Bolton 09/04/2018 2:27 PM           Tri Bolton RN, Care Coordinator [phone]

## 2018-09-04 NOTE — PLAN OF CARE
Problem: Patient Care Overview  Goal: Plan of Care/Patient Progress Review  Outcome: Improving  Patient A/O x4; at time seems a bit confused regarding living situation, struggles to articulate where he lives. Unsure of why he is here; states he does not have pneumonia and this is not why he is here. Dressing has remained c,d,i this shift; no drainage noted. Resting comfortably this shift. VSS

## 2018-09-04 NOTE — PROGRESS NOTES
[facility] Hospitalist Service      Subjective:  Apparently heavy bleeding after it was redressed this AM by surgery    Review of Systems:  CONSTITUTIONAL: NEGATIVE for fever, chills, change in weight  ENT/MOUTH: NEGATIVE for ear, mouth and throat problems  RESP: NEGATIVE for significant cough or SOB  CV: NEGATIVE for chest pain, palpitations or peripheral edema    Physical Exam:  Vitals Were Reviewed    Patient Vitals for the past 16 hrs:   BP Temp Temp src Pulse Heart Rate Resp SpO2   09/04/18 0751 103/49 97.4  F (36.3  C) Oral 71 - 18 96 %   09/03/18 2350 127/71 98.4  F (36.9  C) Oral - 82 18 97 %         Intake/Output Summary (Last 24 hours) at 09/04/18 1031  Last data filed at 09/04/18 0908   Gross per 24 hour   Intake             2430 ml   Output              300 ml   Net             2130 ml       GENERAL APPEARANCE: anxious  RESP: lungs clear to auscultation - no rales, rhonchi or wheezes  CV: regular rate and rhythm, normal S1 S2, no S3 or S4 and no murmur, click or rub   ABDOMEN: soft, nontender, no HSM or masses and bowel sounds normal  MS: I did not remove dressing for fear of more bleeding    Lab:  Recent Labs   Lab Test  09/02/18   0505  03/22/17   1240   NA  136  138   POTASSIUM  4.0  4.1   CHLORIDE  102  102   CO2  29  30   ANIONGAP  5  6   GLC  96  94   BUN  12  14   CR  0.85  0.70   ALY  8.8  8.7     CBC RESULTS:   Recent Labs   Lab Test  09/04/18   0602  09/03/18   0535   WBC  5.0  5.1   RBC  3.78*  3.64*   HGB  10.5*  10.0*   HCT  32.5*  31.4*   PLT  129*  125*       Results for orders placed or performed during the hospital encounter of 09/02/18 (from the past 24 hour(s))   INR   Result Value Ref Range    INR 1.25 (H) 0.86 - 1.14   CBC with platelets   Result Value Ref Range    WBC 5.0 4.0 - 11.0 10e9/L    RBC Count 3.78 (L) 4.4 - 5.9 10e12/L    Hemoglobin 10.5 (L) 13.3 - 17.7 g/dL    Hematocrit 32.5 (L) 40.0 - 53.0 %    MCV 86 78 - 100 fl    MCH 27.8 26.5 - 33.0 pg    MCHC 32.3 31.5 - 36.5  g/dL    RDW 13.5 10.0 - 15.0 %    Platelet Count 129 (L) 150 - 450 10e9/L       Assessment and Plan:    Nabor Cunningham is a 82 year old male who presents on 9/2/2018 with bleeding from left buttock wound.      Bleeding from wound  Left buttock abscess s/p I&D 6 days ago developed bleeding postop day 1 and was seen in the ED where the wound was sutured.  Returns early this morning due to bleeding felt to have had a hematoma that is now coming out of the wound when seen in the ED.  On the floor he continues to have some oozing.  He is on warfarin for A. fib with therapeutic INR 2.26. Patient just completed course of bactrim for the buttock infection.   After two doses of vitamin k INR is 1.25 on 09/04/18.      Suspect pneumonia or pneumonitis, right middle and lower lobe  Incidental finding on CT and chest x-ray. No fever, cough, chest pain, leucocytosis. CRP mild up at 31. Infiltrate on right side but no known aspiration (pt had only local anesthesia for abscess drainage last week). On Bactrim for the abscess so possible this has treated any infection given patient appears asymptomatic. Procalcitonin low.  Started on clindamycin and levofloxacin in the ED for possible aspiration.  - Given that he does not clinically appear very ill,  treat with 5 day course of levofloxacin 750 mg.   - Needs outpatient follow up imaging in a few weeks to document resolution  -currently no symptoms      Anemia, due to acute blood loss, mild  Hgb 12.8 and 11.1. Previously 14. Bleeding is not brisk.   hgb 10.0 09/03/18   hgb 10.5     Atrial fibrillation, s/p PPM  Therapeutic INR on admission. Need to hold/reverse due to bleeding.   - Resume warfarin when bleeding risk for wound is resolved   - continue metoprolol XL      Anxiety disorder  Baseline significant anxiety.  On nortriptyline though also has neuropathy.      r/o alcohol abuse   In remission 2-3 years, though patient says he has an occasional drink of wine less than once a week  - this was news to the daughter.       Gout   On colchicine      Insomnia  On restoril      MALU- lock  DVT Prophylaxis: Low Risk/Ambulatory with no VTE prophylaxis indicated  Code Status:       Lines: PIV  Funk catheter: None  Restraints: none      Disposition: admit, continued ongoing bleeding, dressing just redone --so I did not on do it.Continue to rx for presumed PN-needs out pt follow-up x-ray . I have not been able to examine area-so I don't know if there is a general ooze or more focal bleeding.  Will rely on surgery to manage.

## 2018-09-04 NOTE — PROGRESS NOTES
Incision site significant drainage. New ABD placed with pressure dressing. Spica wrap removed. Advised pt to rest on left side for additional pressure on the left buttock.

## 2018-09-04 NOTE — PROGRESS NOTES
"General Surgery    S: Patient feeling well no buttock pain. RNs did have to reinforce the dressing a few times yesterday afternoon though seemed to stay intact through the evening    O:  Vital signs:  Temp: 98.4  F (36.9  C) Temp src: Oral BP: 127/71 Pulse: 77 Heart Rate: 82 Resp: 18 SpO2: 97 % O2 Device: None (Room air)   Height: 162.6 cm (5' 4\") Weight: 73.4 kg (161 lb 13.1 oz)  Estimated body mass index is 27.78 kg/(m^2) as calculated from the following:    Height as of this encounter: 1.626 m (5' 4\").    Weight as of this encounter: 73.4 kg (161 lb 13.1 oz).      Gen: AAOx3, NAD  Left buttock wound dressing/packing removed. Again mostly clot on packing. Raw muscle at base clean minimal oozing so improved again from yesterday. Repacked including piece of sugicel    Labs/Imaging  Results for orders placed or performed during the hospital encounter of 09/02/18 (from the past 24 hour(s))   INR   Result Value Ref Range    INR 1.25 (H) 0.86 - 1.14   CBC with platelets   Result Value Ref Range    WBC 5.0 4.0 - 11.0 10e9/L    RBC Count 3.78 (L) 4.4 - 5.9 10e12/L    Hemoglobin 10.5 (L) 13.3 - 17.7 g/dL    Hematocrit 32.5 (L) 40.0 - 53.0 %    MCV 86 78 - 100 fl    MCH 27.8 26.5 - 33.0 pg    MCHC 32.3 31.5 - 36.5 g/dL    RDW 13.5 10.0 - 15.0 %    Platelet Count 129 (L) 150 - 450 10e9/L           A: Bleeding from I&D left buttock wound, improving. Hg up today    P: Given additional dressing reinforcement needed again yesterday wound packed with some surgicel in addition to kerlex. Does appear to be more minimal bleeding today regardless. Keep current packing in until tomorrow then suspect will be able to transition to normal wet to dry dressings    Pavel Alfred MD      "

## 2018-09-05 LAB
ERYTHROCYTE [DISTWIDTH] IN BLOOD BY AUTOMATED COUNT: 13.4 % (ref 10–15)
HCT VFR BLD AUTO: 34 % (ref 40–53)
HGB BLD-MCNC: 11 G/DL (ref 13.3–17.7)
INR PPP: 1.22 (ref 0.86–1.14)
MCH RBC QN AUTO: 27.7 PG (ref 26.5–33)
MCHC RBC AUTO-ENTMCNC: 32.4 G/DL (ref 31.5–36.5)
MCV RBC AUTO: 86 FL (ref 78–100)
PLATELET # BLD AUTO: 167 10E9/L (ref 150–450)
RBC # BLD AUTO: 3.97 10E12/L (ref 4.4–5.9)
WBC # BLD AUTO: 6.8 10E9/L (ref 4–11)

## 2018-09-05 PROCEDURE — 99232 SBSQ HOSP IP/OBS MODERATE 35: CPT | Performed by: FAMILY MEDICINE

## 2018-09-05 PROCEDURE — 99207 ZZC CDG-MDM COMPONENT: MEETS LOW - DOWN CODED: CPT | Performed by: FAMILY MEDICINE

## 2018-09-05 PROCEDURE — 85610 PROTHROMBIN TIME: CPT | Performed by: FAMILY MEDICINE

## 2018-09-05 PROCEDURE — 25000132 ZZH RX MED GY IP 250 OP 250 PS 637: Mod: GY | Performed by: INTERNAL MEDICINE

## 2018-09-05 PROCEDURE — 36415 COLL VENOUS BLD VENIPUNCTURE: CPT | Performed by: FAMILY MEDICINE

## 2018-09-05 PROCEDURE — 12000000 ZZH R&B MED SURG/OB

## 2018-09-05 PROCEDURE — A9270 NON-COVERED ITEM OR SERVICE: HCPCS | Mod: GY | Performed by: INTERNAL MEDICINE

## 2018-09-05 PROCEDURE — 85027 COMPLETE CBC AUTOMATED: CPT | Performed by: FAMILY MEDICINE

## 2018-09-05 PROCEDURE — 99231 SBSQ HOSP IP/OBS SF/LOW 25: CPT | Performed by: SURGERY

## 2018-09-05 RX ADMIN — METOPROLOL SUCCINATE 25 MG: 25 TABLET, EXTENDED RELEASE ORAL at 17:54

## 2018-09-05 RX ADMIN — COLCHICINE 0.6 MG: 0.6 TABLET, FILM COATED ORAL at 08:09

## 2018-09-05 RX ADMIN — TEMAZEPAM 30 MG: 7.5 CAPSULE ORAL at 20:08

## 2018-09-05 RX ADMIN — PREDNISOLONE ACETATE 1 DROP: 10 SUSPENSION/ DROPS OPHTHALMIC at 08:11

## 2018-09-05 RX ADMIN — LEVOFLOXACIN 750 MG: 250 TABLET, FILM COATED ORAL at 08:10

## 2018-09-05 RX ADMIN — PREDNISOLONE ACETATE 1 DROP: 10 SUSPENSION/ DROPS OPHTHALMIC at 17:54

## 2018-09-05 RX ADMIN — MINERAL OIL 22.5 ML: 99.9 LIQUID ORAL; TOPICAL at 08:10

## 2018-09-05 RX ADMIN — NORTRIPTYLINE HYDROCHLORIDE 25 MG: 25 CAPSULE ORAL at 08:11

## 2018-09-05 RX ADMIN — NORTRIPTYLINE HYDROCHLORIDE 50 MG: 25 CAPSULE ORAL at 17:54

## 2018-09-05 ASSESSMENT — ACTIVITIES OF DAILY LIVING (ADL)
ADLS_ACUITY_SCORE: 11

## 2018-09-05 NOTE — PROGRESS NOTES
"General Surgery    S: Feeling well, no pain. Dressing reinforced midday yesterday then apparently none futher through day/overnight    O:  Vital signs:  Temp: 97.6  F (36.4  C) Temp src: Oral BP: 119/57 Pulse: 82   Resp: 18 SpO2: 95 % O2 Device: None (Room air)   Height: 162.6 cm (5' 4\") Weight: 73.4 kg (161 lb 13.1 oz)  Estimated body mass index is 27.78 kg/(m^2) as calculated from the following:    Height as of this encounter: 1.626 m (5' 4\").    Weight as of this encounter: 73.4 kg (161 lb 13.1 oz).      Gen: AAOx3, NAD  Left buttock dressing removed, blood in ABD (about 1/2 full), original dressing saturated with clot. Packing material also with clotted blood. No dripping or blood able to be expressed from wound. Packing left in place today    Labs/Imaging  Results for orders placed or performed during the hospital encounter of 09/02/18 (from the past 24 hour(s))   INR   Result Value Ref Range    INR 1.22 (H) 0.86 - 1.14   CBC with platelets   Result Value Ref Range    WBC 6.8 4.0 - 11.0 10e9/L    RBC Count 3.97 (L) 4.4 - 5.9 10e12/L    Hemoglobin 11.0 (L) 13.3 - 17.7 g/dL    Hematocrit 34.0 (L) 40.0 - 53.0 %    MCV 86 78 - 100 fl    MCH 27.7 26.5 - 33.0 pg    MCHC 32.4 31.5 - 36.5 g/dL    RDW 13.4 10.0 - 15.0 %    Platelet Count 167 150 - 450 10e9/L           A: Bleeding from I&D wound left buttock. Hg continues to rise    P: Seemed like the bleeding would restart post packing change so left in place today to allow more time to clot. If does appear as there is in fact ongoing bleeding then consider OR to explore though am hopeful will stop with these conservative measures    Pavel Alfred MD      "

## 2018-09-05 NOTE — PLAN OF CARE
Problem: Wound (Includes Pressure Injury) (Adult)  Goal: Signs and Symptoms of Listed Potential Problems Will be Absent, Minimized or Managed (Wound)  Signs and symptoms of listed potential problems will be absent, minimized or managed by discharge/transition of care (reference Wound (Includes Pressure Injury) (Adult) CPG).   Outcome: Improving  Dressing is dry and intact no complaints of pain vital signs are stable

## 2018-09-05 NOTE — PLAN OF CARE
Problem: Patient Care Overview  Goal: Plan of Care/Patient Progress Review  Outcome: Improving  Patient A/O x4. Independent in room. VSS. Reinforced dressing, c,d,i. Encouraged pt the benefit of going to a TCU as it will assist his healing and assure proper care of his wound. Pt did not expresses resistance to plan.

## 2018-09-05 NOTE — PROGRESS NOTES
[facility]      Subjective:  No pain  Breathing ok  No cough    Review of Systems:  CONSTITUTIONAL: NEGATIVE for fever, chills, change in weight  INTEGUMENTARY/SKIN: NEGATIVE for worrisome rashes, moles or lesions  EYES: NEGATIVE for vision changes or irritation  ENT/MOUTH: NEGATIVE for ear, mouth and throat problems  RESP: NEGATIVE for significant cough or SOB  BREAST: NEGATIVE for masses, tenderness or discharge  CV: NEGATIVE for chest pain, palpitations or peripheral edema  GI: NEGATIVE for nausea, abdominal pain, heartburn, or change in bowel habits  : NEGATIVE for frequency, dysuria, or hematuria  MUSCULOSKELETAL:no bleeding since yesterday during day  NEURO: NEGATIVE for weakness, dizziness or paresthesias  ENDOCRINE: NEGATIVE for temperature intolerance, skin/hair changes  HEME: NEGATIVE for bleeding problems  PSYCHIATRIC: NEGATIVE for changes in mood or affect    Physical Exam:  Vitals Were Reviewed    Patient Vitals for the past 16 hrs:   BP Temp Temp src Pulse Resp SpO2   09/04/18 2340 119/57 97.6  F (36.4  C) Oral 82 18 95 %   09/04/18 1531 127/57 97.7  F (36.5  C) Oral 84 18 97 %         Intake/Output Summary (Last 24 hours) at 09/05/18 0700  Last data filed at 09/04/18 1757   Gross per 24 hour   Intake             1908 ml   Output              300 ml   Net             1608 ml       GENERAL APPEARANCE: healthy, alert and no distress  EYES: conjunctiva clear, eyes grossly normal  RESP: lungs clear to auscultation - no rales, rhonchi or wheezes  CV: regular rate and rhythm, normal S1 S2, no S3 or S4 and no murmur, click or rub   ABDOMEN: soft, nontender, no HSM or masses and bowel sounds normal  MS: wound dressing removed by Dr ELLER, surgicel and kerlex left in place since that was no bleeding, no ooze while we examined him, hematoma on buttox.  SKIN: clear without significant rashes or lesions  NEURO: Normal strength and tone, sensory exam grossly normal, mentation intact and speech  normal    Lab:  Recent Labs   Lab Test  09/02/18   0505  03/22/17   1240   NA  136  138   POTASSIUM  4.0  4.1   CHLORIDE  102  102   CO2  29  30   ANIONGAP  5  6   GLC  96  94   BUN  12  14   CR  0.85  0.70   ALY  8.8  8.7     CBC RESULTS:   Recent Labs   Lab Test  09/05/18   0553  09/04/18   0602   WBC  6.8  5.0   RBC  3.97*  3.78*   HGB  11.0*  10.5*   HCT  34.0*  32.5*   PLT  167  129*       Results for orders placed or performed during the hospital encounter of 09/02/18 (from the past 24 hour(s))   INR   Result Value Ref Range    INR 1.22 (H) 0.86 - 1.14   CBC with platelets   Result Value Ref Range    WBC 6.8 4.0 - 11.0 10e9/L    RBC Count 3.97 (L) 4.4 - 5.9 10e12/L    Hemoglobin 11.0 (L) 13.3 - 17.7 g/dL    Hematocrit 34.0 (L) 40.0 - 53.0 %    MCV 86 78 - 100 fl    MCH 27.7 26.5 - 33.0 pg    MCHC 32.4 31.5 - 36.5 g/dL    RDW 13.4 10.0 - 15.0 %    Platelet Count 167 150 - 450 10e9/L       Assessment and Plan:    Nabor Cunningham is a 82 year old male who presents on 9/2/2018 with bleeding from left buttock wound.      Bleeding from wound  Left buttock abscess s/p I&D 6 days ago developed bleeding postop day 1 and was seen in the ED where the wound was sutured.  Returns early this morning due to bleeding felt to have had a hematoma that is now coming out of the wound when seen in the ED.  On the floor he continues to have some oozing.  He is on warfarin for A. fib with therapeutic INR 2.26. Patient just completed course of bactrim for the buttock infection.   After two doses of vitamin k INR is 1.22 on 09/05/18.      Suspect pneumonia or pneumonitis, right middle and lower lobe  Incidental finding on CT and chest x-ray. No fever, cough, chest pain, leucocytosis. CRP mild up at 31. Infiltrate on right side but no known aspiration (pt had only local anesthesia for abscess drainage last week). On Bactrim for the abscess so possible this has treated any infection given patient appears asymptomatic. Procalcitonin low.   Started on clindamycin and levofloxacin in the ED for possible aspiration.  - Given that he does not clinically appear very ill,  treat with 5 day course of levofloxacin 750 mg.   - Needs outpatient follow up imaging in a few weeks to document resolution  -currently no symptoms      Anemia, due to acute blood loss, mild  Hgb 12.8 and 11.1. Previously 14. Bleeding is not brisk.   hgb 10.0 09/03/18   hgb 11.0 09/05/18       Atrial fibrillation, s/p PPM  Therapeutic INR on admission. Need to hold/reverse due to bleeding.   - Resume warfarin when bleeding risk for wound is resolved   - continue metoprolol XL      Anxiety disorder  Baseline significant anxiety.  On nortriptyline though also has neuropathy.      r/o alcohol abuse   In remission 2-3 years, though patient says he has an occasional drink of wine less than once a week - this was news to the daughter.       Gout   On colchicine      Insomnia  On restoril      MALU- lock  DVT Prophylaxis: Low Risk/Ambulatory with no VTE prophylaxis indicated  Code Status:       Lines: PIV  Funk catheter: None  Restraints: none      Disposition:  continued ongoing bleeding yesterday,..Continue to rx for presumed PN-needs out pt follow-up x-ray Dr ELLER recommends ongoing in pt  management.

## 2018-09-06 ENCOUNTER — ANESTHESIA EVENT (OUTPATIENT)
Dept: SURGERY | Facility: CLINIC | Age: 83
DRG: 907 | End: 2018-09-06
Payer: MEDICARE

## 2018-09-06 ENCOUNTER — SURGERY (OUTPATIENT)
Age: 83
End: 2018-09-06

## 2018-09-06 ENCOUNTER — ANESTHESIA (OUTPATIENT)
Dept: SURGERY | Facility: CLINIC | Age: 83
DRG: 907 | End: 2018-09-06
Payer: MEDICARE

## 2018-09-06 LAB
APTT PPP: 43 SEC (ref 22–37)
ERYTHROCYTE [DISTWIDTH] IN BLOOD BY AUTOMATED COUNT: 13.7 % (ref 10–15)
HCT VFR BLD AUTO: 30 % (ref 40–53)
HGB BLD-MCNC: 10 G/DL (ref 13.3–17.7)
INR PPP: 1.21 (ref 0.86–1.14)
MCH RBC QN AUTO: 28.5 PG (ref 26.5–33)
MCHC RBC AUTO-ENTMCNC: 33.3 G/DL (ref 31.5–36.5)
MCV RBC AUTO: 86 FL (ref 78–100)
PLATELET # BLD AUTO: 150 10E9/L (ref 150–450)
RBC # BLD AUTO: 3.51 10E12/L (ref 4.4–5.9)
WBC # BLD AUTO: 5.5 10E9/L (ref 4–11)

## 2018-09-06 PROCEDURE — A9270 NON-COVERED ITEM OR SERVICE: HCPCS | Mod: GY | Performed by: INTERNAL MEDICINE

## 2018-09-06 PROCEDURE — 36000050 ZZH SURGERY LEVEL 2 1ST 30 MIN: Performed by: SURGERY

## 2018-09-06 PROCEDURE — 25000128 H RX IP 250 OP 636: Performed by: NURSE ANESTHETIST, CERTIFIED REGISTERED

## 2018-09-06 PROCEDURE — 85730 THROMBOPLASTIN TIME PARTIAL: CPT | Performed by: FAMILY MEDICINE

## 2018-09-06 PROCEDURE — 37000008 ZZH ANESTHESIA TECHNICAL FEE, 1ST 30 MIN: Performed by: SURGERY

## 2018-09-06 PROCEDURE — 0Y310ZZ CONTROL BLEEDING IN LEFT BUTTOCK, OPEN APPROACH: ICD-10-PCS | Performed by: SURGERY

## 2018-09-06 PROCEDURE — 99232 SBSQ HOSP IP/OBS MODERATE 35: CPT | Performed by: FAMILY MEDICINE

## 2018-09-06 PROCEDURE — 40000305 ZZH STATISTIC PRE PROC ASSESS I: Performed by: SURGERY

## 2018-09-06 PROCEDURE — 25000132 ZZH RX MED GY IP 250 OP 250 PS 637: Mod: GY | Performed by: INTERNAL MEDICINE

## 2018-09-06 PROCEDURE — 36415 COLL VENOUS BLD VENIPUNCTURE: CPT | Performed by: FAMILY MEDICINE

## 2018-09-06 PROCEDURE — 25000125 ZZHC RX 250: Performed by: NURSE ANESTHETIST, CERTIFIED REGISTERED

## 2018-09-06 PROCEDURE — 85027 COMPLETE CBC AUTOMATED: CPT | Performed by: FAMILY MEDICINE

## 2018-09-06 PROCEDURE — 12000007 ZZH R&B INTERMEDIATE

## 2018-09-06 PROCEDURE — 36000052 ZZH SURGERY LEVEL 2 EA 15 ADDTL MIN: Performed by: SURGERY

## 2018-09-06 PROCEDURE — 12000000 ZZH R&B MED SURG/OB

## 2018-09-06 PROCEDURE — 0HD8XZZ EXTRACTION OF BUTTOCK SKIN, EXTERNAL APPROACH: ICD-10-PCS | Performed by: SURGERY

## 2018-09-06 PROCEDURE — 71000012 ZZH RECOVERY PHASE 1 LEVEL 1 FIRST HR: Performed by: SURGERY

## 2018-09-06 PROCEDURE — 99231 SBSQ HOSP IP/OBS SF/LOW 25: CPT | Performed by: SURGERY

## 2018-09-06 PROCEDURE — 27210794 ZZH OR GENERAL SUPPLY STERILE: Performed by: SURGERY

## 2018-09-06 PROCEDURE — 85610 PROTHROMBIN TIME: CPT | Performed by: FAMILY MEDICINE

## 2018-09-06 PROCEDURE — 10140 I&D HMTMA SEROMA/FLUID COLLJ: CPT | Performed by: SURGERY

## 2018-09-06 PROCEDURE — 37000009 ZZH ANESTHESIA TECHNICAL FEE, EACH ADDTL 15 MIN: Performed by: SURGERY

## 2018-09-06 PROCEDURE — 25000125 ZZHC RX 250: Performed by: SURGERY

## 2018-09-06 RX ORDER — ONDANSETRON 4 MG/1
4 TABLET, ORALLY DISINTEGRATING ORAL EVERY 30 MIN PRN
Status: DISCONTINUED | OUTPATIENT
Start: 2018-09-06 | End: 2018-09-06 | Stop reason: HOSPADM

## 2018-09-06 RX ORDER — LIDOCAINE 40 MG/G
CREAM TOPICAL
Status: DISCONTINUED | OUTPATIENT
Start: 2018-09-06 | End: 2018-09-06 | Stop reason: HOSPADM

## 2018-09-06 RX ORDER — ONDANSETRON 2 MG/ML
4 INJECTION INTRAMUSCULAR; INTRAVENOUS EVERY 30 MIN PRN
Status: DISCONTINUED | OUTPATIENT
Start: 2018-09-06 | End: 2018-09-06 | Stop reason: HOSPADM

## 2018-09-06 RX ORDER — GLYCOPYRROLATE 0.2 MG/ML
INJECTION, SOLUTION INTRAMUSCULAR; INTRAVENOUS PRN
Status: DISCONTINUED | OUTPATIENT
Start: 2018-09-06 | End: 2018-09-06

## 2018-09-06 RX ORDER — HYDROMORPHONE HYDROCHLORIDE 1 MG/ML
.3-.5 INJECTION, SOLUTION INTRAMUSCULAR; INTRAVENOUS; SUBCUTANEOUS EVERY 5 MIN PRN
Status: DISCONTINUED | OUTPATIENT
Start: 2018-09-06 | End: 2018-09-06 | Stop reason: HOSPADM

## 2018-09-06 RX ORDER — PROPOFOL 10 MG/ML
INJECTION, EMULSION INTRAVENOUS CONTINUOUS PRN
Status: DISCONTINUED | OUTPATIENT
Start: 2018-09-06 | End: 2018-09-06

## 2018-09-06 RX ORDER — DEXAMETHASONE SODIUM PHOSPHATE 4 MG/ML
4 INJECTION, SOLUTION INTRA-ARTICULAR; INTRALESIONAL; INTRAMUSCULAR; INTRAVENOUS; SOFT TISSUE
Status: DISCONTINUED | OUTPATIENT
Start: 2018-09-06 | End: 2018-09-06 | Stop reason: HOSPADM

## 2018-09-06 RX ORDER — SODIUM CHLORIDE, SODIUM LACTATE, POTASSIUM CHLORIDE, CALCIUM CHLORIDE 600; 310; 30; 20 MG/100ML; MG/100ML; MG/100ML; MG/100ML
INJECTION, SOLUTION INTRAVENOUS CONTINUOUS
Status: DISCONTINUED | OUTPATIENT
Start: 2018-09-06 | End: 2018-09-06 | Stop reason: HOSPADM

## 2018-09-06 RX ORDER — NALOXONE HYDROCHLORIDE 0.4 MG/ML
.1-.4 INJECTION, SOLUTION INTRAMUSCULAR; INTRAVENOUS; SUBCUTANEOUS
Status: ACTIVE | OUTPATIENT
Start: 2018-09-06 | End: 2018-09-07

## 2018-09-06 RX ORDER — FENTANYL CITRATE 50 UG/ML
INJECTION, SOLUTION INTRAMUSCULAR; INTRAVENOUS PRN
Status: DISCONTINUED | OUTPATIENT
Start: 2018-09-06 | End: 2018-09-06

## 2018-09-06 RX ORDER — NORTRIPTYLINE HCL 25 MG
50 CAPSULE ORAL AT BEDTIME
Status: DISCONTINUED | OUTPATIENT
Start: 2018-09-07 | End: 2018-09-07 | Stop reason: HOSPADM

## 2018-09-06 RX ORDER — ALBUTEROL SULFATE 0.83 MG/ML
2.5 SOLUTION RESPIRATORY (INHALATION) EVERY 4 HOURS PRN
Status: DISCONTINUED | OUTPATIENT
Start: 2018-09-06 | End: 2018-09-06 | Stop reason: HOSPADM

## 2018-09-06 RX ORDER — FENTANYL CITRATE 50 UG/ML
25-50 INJECTION, SOLUTION INTRAMUSCULAR; INTRAVENOUS
Status: DISCONTINUED | OUTPATIENT
Start: 2018-09-06 | End: 2018-09-06 | Stop reason: HOSPADM

## 2018-09-06 RX ORDER — ONDANSETRON 2 MG/ML
INJECTION INTRAMUSCULAR; INTRAVENOUS PRN
Status: DISCONTINUED | OUTPATIENT
Start: 2018-09-06 | End: 2018-09-06

## 2018-09-06 RX ORDER — BUPIVACAINE HYDROCHLORIDE AND EPINEPHRINE 5; 5 MG/ML; UG/ML
INJECTION, SOLUTION PERINEURAL PRN
Status: DISCONTINUED | OUTPATIENT
Start: 2018-09-06 | End: 2018-09-06 | Stop reason: HOSPADM

## 2018-09-06 RX ORDER — LIDOCAINE HYDROCHLORIDE 10 MG/ML
INJECTION, SOLUTION INFILTRATION; PERINEURAL PRN
Status: DISCONTINUED | OUTPATIENT
Start: 2018-09-06 | End: 2018-09-06

## 2018-09-06 RX ORDER — SODIUM CHLORIDE, SODIUM LACTATE, POTASSIUM CHLORIDE, CALCIUM CHLORIDE 600; 310; 30; 20 MG/100ML; MG/100ML; MG/100ML; MG/100ML
INJECTION, SOLUTION INTRAVENOUS CONTINUOUS PRN
Status: DISCONTINUED | OUTPATIENT
Start: 2018-09-06 | End: 2018-09-06

## 2018-09-06 RX ORDER — MEPERIDINE HYDROCHLORIDE 50 MG/ML
12.5 INJECTION INTRAMUSCULAR; INTRAVENOUS; SUBCUTANEOUS EVERY 5 MIN PRN
Status: DISCONTINUED | OUTPATIENT
Start: 2018-09-06 | End: 2018-09-06 | Stop reason: HOSPADM

## 2018-09-06 RX ORDER — DEXAMETHASONE SODIUM PHOSPHATE 4 MG/ML
INJECTION, SOLUTION INTRA-ARTICULAR; INTRALESIONAL; INTRAMUSCULAR; INTRAVENOUS; SOFT TISSUE PRN
Status: DISCONTINUED | OUTPATIENT
Start: 2018-09-06 | End: 2018-09-06

## 2018-09-06 RX ADMIN — COLCHICINE 0.6 MG: 0.6 TABLET, FILM COATED ORAL at 08:05

## 2018-09-06 RX ADMIN — FENTANYL CITRATE 50 MCG: 50 INJECTION, SOLUTION INTRAMUSCULAR; INTRAVENOUS at 13:48

## 2018-09-06 RX ADMIN — MIDAZOLAM 1 MG: 1 INJECTION INTRAMUSCULAR; INTRAVENOUS at 13:39

## 2018-09-06 RX ADMIN — MIDAZOLAM 1 MG: 1 INJECTION INTRAMUSCULAR; INTRAVENOUS at 13:44

## 2018-09-06 RX ADMIN — NORTRIPTYLINE HYDROCHLORIDE 25 MG: 25 CAPSULE ORAL at 08:05

## 2018-09-06 RX ADMIN — PHENYLEPHRINE HYDROCHLORIDE 200 MCG: 10 INJECTION, SOLUTION INTRAMUSCULAR; INTRAVENOUS; SUBCUTANEOUS at 14:16

## 2018-09-06 RX ADMIN — PROPOFOL 50 MCG/KG/MIN: 10 INJECTION, EMULSION INTRAVENOUS at 13:42

## 2018-09-06 RX ADMIN — TEMAZEPAM 30 MG: 7.5 CAPSULE ORAL at 20:08

## 2018-09-06 RX ADMIN — GLYCOPYRROLATE 0.1 MG: 0.2 INJECTION, SOLUTION INTRAMUSCULAR; INTRAVENOUS at 13:42

## 2018-09-06 RX ADMIN — NORTRIPTYLINE HYDROCHLORIDE 50 MG: 25 CAPSULE ORAL at 18:28

## 2018-09-06 RX ADMIN — PREDNISOLONE ACETATE 1 DROP: 10 SUSPENSION/ DROPS OPHTHALMIC at 08:06

## 2018-09-06 RX ADMIN — PHENYLEPHRINE HYDROCHLORIDE 200 MCG: 10 INJECTION, SOLUTION INTRAMUSCULAR; INTRAVENOUS; SUBCUTANEOUS at 14:08

## 2018-09-06 RX ADMIN — SODIUM CHLORIDE, POTASSIUM CHLORIDE, SODIUM LACTATE AND CALCIUM CHLORIDE: 600; 310; 30; 20 INJECTION, SOLUTION INTRAVENOUS at 13:38

## 2018-09-06 RX ADMIN — MINERAL OIL 22.5 ML: 99.9 LIQUID ORAL; TOPICAL at 08:06

## 2018-09-06 RX ADMIN — LEVOFLOXACIN 750 MG: 250 TABLET, FILM COATED ORAL at 08:03

## 2018-09-06 RX ADMIN — LIDOCAINE HYDROCHLORIDE 100 MG: 10 INJECTION, SOLUTION INFILTRATION; PERINEURAL at 13:42

## 2018-09-06 RX ADMIN — SODIUM CHLORIDE, POTASSIUM CHLORIDE, SODIUM LACTATE AND CALCIUM CHLORIDE: 600; 310; 30; 20 INJECTION, SOLUTION INTRAVENOUS at 13:10

## 2018-09-06 RX ADMIN — METOPROLOL SUCCINATE 25 MG: 25 TABLET, EXTENDED RELEASE ORAL at 18:28

## 2018-09-06 RX ADMIN — BUPIVACAINE HYDROCHLORIDE AND EPINEPHRINE BITARTRATE 20 ML: 5; .005 INJECTION, SOLUTION PERINEURAL at 14:02

## 2018-09-06 RX ADMIN — ACETAMINOPHEN 650 MG: 325 TABLET, FILM COATED ORAL at 17:25

## 2018-09-06 RX ADMIN — PHENYLEPHRINE HYDROCHLORIDE 100 MCG: 10 INJECTION, SOLUTION INTRAMUSCULAR; INTRAVENOUS; SUBCUTANEOUS at 14:12

## 2018-09-06 RX ADMIN — DEXAMETHASONE SODIUM PHOSPHATE 4 MG: 4 INJECTION, SOLUTION INTRA-ARTICULAR; INTRALESIONAL; INTRAMUSCULAR; INTRAVENOUS; SOFT TISSUE at 13:46

## 2018-09-06 RX ADMIN — ONDANSETRON 4 MG: 2 INJECTION INTRAMUSCULAR; INTRAVENOUS at 13:46

## 2018-09-06 RX ADMIN — PHENYLEPHRINE HYDROCHLORIDE 100 MCG: 10 INJECTION, SOLUTION INTRAMUSCULAR; INTRAVENOUS; SUBCUTANEOUS at 14:02

## 2018-09-06 RX ADMIN — FENTANYL CITRATE 50 MCG: 50 INJECTION, SOLUTION INTRAMUSCULAR; INTRAVENOUS at 13:42

## 2018-09-06 ASSESSMENT — ENCOUNTER SYMPTOMS: DYSRHYTHMIAS: 1

## 2018-09-06 ASSESSMENT — ACTIVITIES OF DAILY LIVING (ADL)
ADLS_ACUITY_SCORE: 11

## 2018-09-06 ASSESSMENT — LIFESTYLE VARIABLES: TOBACCO_USE: 1

## 2018-09-06 NOTE — PLAN OF CARE
[facility] TRANSPORT NOTE  Data:   Reason for Transport:  surgery    Nabor Cunningham was transported to pacu via wheel chair at 1220.  Patient was accompanied by Nursing Assistant. Equipment used for transport: None. Family was aware of reason for transport: yes    Action:  Report: given to cong    Response:  Patient's condition when transferred off unit was stable.    Jackson Fay

## 2018-09-06 NOTE — ANESTHESIA POSTPROCEDURE EVALUATION
Patient: Nabor Cunningham    Procedure(s):  incision and cauterization of left buttock bleed. - Wound Class: II-Clean Contaminated    Diagnosis:Buttock bleed  Diagnosis Additional Information: No value filed.    Anesthesia Type:  MAC    Note:  Anesthesia Post Evaluation    Patient location during evaluation: PACU  Patient participation: Able to fully participate in evaluation  Level of consciousness: awake  Pain management: adequate  Airway patency: patent  Cardiovascular status: acceptable and hemodynamically stable  Respiratory status: acceptable, spontaneous ventilation and nasal cannula  Hydration status: acceptable  PONV: none     Anesthetic complications: None          Last vitals:  Vitals:    09/06/18 0538 09/06/18 0743 09/06/18 1300   BP: 119/57 102/57 113/66   Pulse: 80     Resp: 18 18 18   Temp: 36.1  C (97  F) 36.6  C (97.9  F) 36.4  C (97.6  F)   SpO2: 95% 96%          Electronically Signed By: AVERY Li CRNA  September 6, 2018  2:29 PM

## 2018-09-06 NOTE — PROGRESS NOTES
[facility] Hospitalist Service      Subjective:  No new complaints    Review of Systems:  CONSTITUTIONAL: NEGATIVE for fever, chills, change in weight  INTEGUMENTARY/SKIN: NEGATIVE for worrisome rashes, moles or lesions  EYES: NEGATIVE for vision changes or irritation  ENT/MOUTH: NEGATIVE for ear, mouth and throat problems  RESP: NEGATIVE for significant cough or SOB  BREAST: NEGATIVE for masses, tenderness or discharge  CV: NEGATIVE for chest pain, palpitations or peripheral edema  GI: NEGATIVE for nausea, abdominal pain, heartburn, or change in bowel habits  : NEGATIVE for frequency, dysuria, or hematuria  MUSCULOSKELETAL:buttox pain and bleeding  NEURO: NEGATIVE for weakness, dizziness or paresthesias  ENDOCRINE: NEGATIVE for temperature intolerance, skin/hair changes  HEME: NEGATIVE for bleeding problems  PSYCHIATRIC: NEGATIVE for changes in mood or affect    Physical Exam:  Vitals Were Reviewed    Patient Vitals for the past 16 hrs:   BP Temp Temp src Pulse Heart Rate Resp SpO2   09/06/18 0743 102/57 97.9  F (36.6  C) Oral - 76 18 96 %   09/06/18 0538 119/57 97  F (36.1  C) Oral 80 - 18 95 %         Intake/Output Summary (Last 24 hours) at 09/06/18 1025  Last data filed at 09/06/18 0900   Gross per 24 hour   Intake              480 ml   Output                0 ml   Net              480 ml       GENERAL APPEARANCE: healthy, alert and no distress  EYES: conjunctiva clear, eyes grossly normal  RESP: lungs clear to auscultation - no rales, rhonchi or wheezes  CV: regular rate and rhythm, normal S1 S2, no S3 or S4 and no murmur, click or rub   ABDOMEN: soft, nontender, no HSM or masses and bowel sounds normal  MS: after packing removed this am-bleeding  SKIN: clear without significant rashes or lesions    Lab:  Recent Labs   Lab Test  09/02/18   0505  03/22/17   1240   NA  136  138   POTASSIUM  4.0  4.1   CHLORIDE  102  102   CO2  29  30   ANIONGAP  5  6   GLC  96  94   BUN  12  14   CR  0.85  0.70   ALY  8.8   8.7     CBC RESULTS:   Recent Labs   Lab Test  09/06/18   0623  09/05/18   0553   WBC  5.5  6.8   RBC  3.51*  3.97*   HGB  10.0*  11.0*   HCT  30.0*  34.0*   PLT  150  167       Results for orders placed or performed during the hospital encounter of 09/02/18 (from the past 24 hour(s))   INR   Result Value Ref Range    INR 1.21 (H) 0.86 - 1.14   CBC with platelets   Result Value Ref Range    WBC 5.5 4.0 - 11.0 10e9/L    RBC Count 3.51 (L) 4.4 - 5.9 10e12/L    Hemoglobin 10.0 (L) 13.3 - 17.7 g/dL    Hematocrit 30.0 (L) 40.0 - 53.0 %    MCV 86 78 - 100 fl    MCH 28.5 26.5 - 33.0 pg    MCHC 33.3 31.5 - 36.5 g/dL    RDW 13.7 10.0 - 15.0 %    Platelet Count 150 150 - 450 10e9/L       Assessment and Plan:    Nabor Cunningham is a 82 year old male who presents on 9/2/2018 with bleeding from left buttock wound.      Bleeding from wound  Left buttock abscess s/p I&D 6 days ago developed bleeding postop day 1 and was seen in the ED where the wound was sutured.  Returns early this morning due to bleeding felt to have had a hematoma that is now coming out of the wound when seen in the ED.  On the floor he continues to have some oozing.  He is on warfarin for A. fib with therapeutic INR 2.26. Patient just completed course of bactrim for the buttock infection.   After two doses of vitamin k INR is 1.22 on 09/05/18.  INR 1.21 on 09/06/18       Suspect pneumonia or pneumonitis, right middle and lower lobe  Incidental finding on CT and chest x-ray. No fever, cough, chest pain, leucocytosis. CRP mild up at 31. Infiltrate on right side but no known aspiration (pt had only local anesthesia for abscess drainage last week). On Bactrim for the abscess so possible this has treated any infection given patient appears asymptomatic. Procalcitonin low.  Started on clindamycin and levofloxacin in the ED for possible aspiration.  - Given that he does not clinically appear very ill,  treat with 5 day course of levofloxacin 750 mg.   - Needs  outpatient follow up imaging in a few weeks to document resolution  -currently no symptoms      Anemia, due to acute blood loss, mild  Hgb 12.8 and 11.1. Previously 14. Bleeding is not brisk.   hgb 10.0 09/03/18   hgb 11.0 09/05/18   hgb 10.0 09/06/18       Atrial fibrillation, s/p PPM  Therapeutic INR on admission. Need to hold/reverse due to bleeding.   - Resume warfarin when bleeding risk for wound is resolved   - continue metoprolol XL      Anxiety disorder  Baseline significant anxiety.  On nortriptyline though also has neuropathy.      r/o alcohol abuse   In remission 2-3 years, though patient says he has an occasional drink of wine less than once a week - this was news to the daughter.       Gout   On colchicine      Insomnia  On restoril      MALU- lock  DVT Prophylaxis: Low Risk/Ambulatory with no VTE prophylaxis indicated  Code Status:       Lines: PIV  Funk catheter: None  Restraints: none      Disposition:  some more bleeding today after surgeon pulled packing, will discuss with them.      10:42 AM discussed with surgery , they tentatively will make pt npo and do wound exploration under anesthesia later today                     10:50 AM discussed with daughter Lexy

## 2018-09-06 NOTE — PROGRESS NOTES
Repacked the wound this morning.  Dressing was covered in clotted blood.  No significant bleeding during dressing change.  Packed with lightly moist gauze.      Recommend staying off Coumadin for at least a week to fully allow the bleeding to stop as he has already shown a propensity to bleed while on Coumadin.      Twice daily dressing changes with lightly moist gauze while home.    Dain Davis MD

## 2018-09-06 NOTE — PROGRESS NOTES
Care Transitions Progress Note:    Reason for Follow up:  Discharge Planning. The patient has been accepted at [facility] (Phone: [phone] Fax: [phone]) if patient agrees upon discharge.  Discussed discharge options with daughter, Lexy and sonFlorencio.  Family will provide transportation upon discharge.  Updated Maureen RICHARD, [facility] Admission regarding discharge plan.    Discharge Plan:  Home with [facility] vs [facility]    Discharge Planner   Discharge Plans in progress: [facility] vs TCU  Barriers to discharge plan: Medical stability  Follow up plan: Referral to Clinic Care Coordinator       Entered by: Tri Bolton 09/06/2018 2:46 PM         Tri Bolton RN, Care Coordinator  [phone]

## 2018-09-06 NOTE — PLAN OF CARE
Problem: Patient Care Overview  Goal: Plan of Care/Patient Progress Review  Outcome: Improving  Patient A/O x3; at times seems to be confused regarding situation. Dressing marked by previous RN where drainage occurred; has not spread beyond marked area. VSS. Independent in room. Denies pain.    Continue to monitor and implement poc

## 2018-09-06 NOTE — OP NOTE
Preop diagnosis: Uncontrolled bleeding from left buttock wound    Postop diagnosis same    Procedure: Control of bleeding and debridement of left buttock wound    Surgeon: Ryan    Anesthesia: Monitored anesthesia care Pio CRNA    Procedure: Patient placed into the right lateral decubitus position and his left buttock cleaned and draped in sterile manner.  Gauze packing removed from wound and immediate bright red blood noted to ooze up from the base of the wound.  Entire area injected with 1/4% Marcaine with epinephrine.  Rim of necrotic skin removed from edges of wound using 15 blade scalpel.  Incision opened up medially approximate 3 cm and additional skin removed to completely expose the bleeding source.  After removing clotted blood, bleeding was noted to be from the muscle fibers themselves somewhat deep.  Electrocautery applied but this did not control the bleeding adequately and therefore a 3-0 Vicryl figure-of-eight stitch was placed into the muscle through the fascia.  2 of these figure-of-eight stitches were used to control the bleeding which was successful.  Entire wound irrigated with hydrogen peroxide and the normal saline.  Wound then packed with lightly moist Kerlix gauze.  Wound covered with dry gauze and tape.    Estimated blood loss: 20 mL's    IV fluid: 600 mL

## 2018-09-06 NOTE — ANESTHESIA CARE TRANSFER NOTE
Patient: Nabor Cunningham    Procedure(s):  incision and cauterization of left buttock bleed. - Wound Class: II-Clean Contaminated    Diagnosis: Buttock bleed  Diagnosis Additional Information: No value filed.    Anesthesia Type:   MAC     Note:  Airway :Nasal Cannula  Patient transferred to:PACU  Handoff Report: Identifed the Patient, Identified the Reponsible Provider, Reviewed the pertinent medical history, Discussed the surgical course, Reviewed Intra-OP anesthesia mangement and issues during anesthesia, Set expectations for post-procedure period and Allowed opportunity for questions and acknowledgement of understanding      Vitals: (Last set prior to Anesthesia Care Transfer)    CRNA VITALS  9/6/2018 1351 - 9/6/2018 1429      9/6/2018             Pulse: 81    SpO2: 100 %                Electronically Signed By: AVERY Li CRNA  September 6, 2018  2:29 PM

## 2018-09-06 NOTE — PROGRESS NOTES
[facility] TRANSPORT NOTE  Data:   Reason for Transport:  Return from surgery    Nabor Cunningham was transported from PACU to room 2309 via cart at 1535.  Patient was accompanied by Nursing Assistant. Equipment used for transport: None. Family was aware of reason for transport: yes    Action:  Report: received from Martha    Response:  Patient's condition upon return was stable, dressing clean, dry and intact.    Anna Marie Archer

## 2018-09-06 NOTE — ANESTHESIA PREPROCEDURE EVALUATION
Anesthesia Evaluation     . Pt has had prior anesthetic. Type: General and MAC    No history of anesthetic complications          ROS/MED HX    ENT/Pulmonary:     (+)tobacco use, Past use , recent URI unresolved Currently on abx: . .    Neurologic:     (+)neuropathy - BLE,    : BLE.   Cardiovascular:     (+) Dyslipidemia, hypertension----. Taking blood thinners : . . . pacemaker Reason placed: AF:. dysrhythmias a-fib, . Previous cardiac testing Echodate:1-2016results:Final Impressions:   1. Normal left ventricular size, borderline wall thickness, normal global systolic function, calculated EF of 71 %.   2. Mildly enlarged left atrium.   3. Right ventricular cavity size is normal, global systolic RV function is normal.   4. The aortic valve is tricuspid and sclerotic, no stenosis and mild regurgitation.   5. The ascending aorta is dilated with a maximal diameter of 4.6 cm.  date: results:ECG reviewed date: results: date: results:          METS/Exercise Tolerance:  3 - Able to walk 1-2 blocks without stopping   Hematologic:     (+) Anemia, -      Musculoskeletal:   (+) arthritis, , , -       GI/Hepatic:     (+) Other GI/Hepatic h/o ETOH      Renal/Genitourinary:  - ROS Renal section negative       Endo:     (+) Other Endocrine Disorder gout.      Psychiatric:     (+) psychiatric history anxiety and depression      Infectious Disease:   (+) Other Infectious Disease buttock infection      Malignancy:      - no malignancy   Other:    - neg other ROS                 Physical Exam  Normal systems: cardiovascular and pulmonary    Airway   Mallampati: I  TM distance: >3 FB  Neck ROM: full    Dental   (+) missing, caps and implants    Cardiovascular       Pulmonary                     Anesthesia Plan      History & Physical Review  History and physical reviewed and following examination; no interval change.    ASA Status:  3 emergent.    NPO Status:  > 6 hours    Plan for MAC Maintenance will be Balanced.  Reason for  MAC:  Deep or markedly invasive procedure (G8)  PONV prophylaxis:  Ondansetron (or other 5HT-3) and Dexamethasone or Solumedrol       Postoperative Care  Postoperative pain management:  IV analgesics and Oral pain medications.      Consents  Anesthetic plan, risks, benefits and alternatives discussed with:  Patient..                          .

## 2018-09-07 ENCOUNTER — PATIENT OUTREACH (OUTPATIENT)
Dept: CARE COORDINATION | Facility: CLINIC | Age: 83
End: 2018-09-07

## 2018-09-07 VITALS
DIASTOLIC BLOOD PRESSURE: 65 MMHG | WEIGHT: 161.82 LBS | SYSTOLIC BLOOD PRESSURE: 113 MMHG | HEART RATE: 77 BPM | BODY MASS INDEX: 27.63 KG/M2 | OXYGEN SATURATION: 96 % | TEMPERATURE: 97.7 F | RESPIRATION RATE: 16 BRPM | HEIGHT: 64 IN

## 2018-09-07 LAB
ERYTHROCYTE [DISTWIDTH] IN BLOOD BY AUTOMATED COUNT: 13.5 % (ref 10–15)
HCT VFR BLD AUTO: 29.6 % (ref 40–53)
HGB BLD-MCNC: 9.7 G/DL (ref 13.3–17.7)
INR PPP: 1.18 (ref 0.86–1.14)
MCH RBC QN AUTO: 28 PG (ref 26.5–33)
MCHC RBC AUTO-ENTMCNC: 32.8 G/DL (ref 31.5–36.5)
MCV RBC AUTO: 85 FL (ref 78–100)
PLATELET # BLD AUTO: 160 10E9/L (ref 150–450)
RBC # BLD AUTO: 3.47 10E12/L (ref 4.4–5.9)
WBC # BLD AUTO: 7.1 10E9/L (ref 4–11)

## 2018-09-07 PROCEDURE — G0463 HOSPITAL OUTPT CLINIC VISIT: HCPCS

## 2018-09-07 PROCEDURE — 99239 HOSP IP/OBS DSCHRG MGMT >30: CPT | Performed by: FAMILY MEDICINE

## 2018-09-07 PROCEDURE — A9270 NON-COVERED ITEM OR SERVICE: HCPCS | Mod: GY | Performed by: INTERNAL MEDICINE

## 2018-09-07 PROCEDURE — 25000132 ZZH RX MED GY IP 250 OP 250 PS 637: Mod: GY | Performed by: INTERNAL MEDICINE

## 2018-09-07 PROCEDURE — 99207 ZZC CDG-CODE CATEGORY CHANGED: CPT | Performed by: FAMILY MEDICINE

## 2018-09-07 PROCEDURE — 85610 PROTHROMBIN TIME: CPT | Performed by: SURGERY

## 2018-09-07 PROCEDURE — 85027 COMPLETE CBC AUTOMATED: CPT | Performed by: SURGERY

## 2018-09-07 PROCEDURE — 36415 COLL VENOUS BLD VENIPUNCTURE: CPT | Performed by: SURGERY

## 2018-09-07 RX ORDER — COLCHICINE 0.6 MG/1
0.6 TABLET ORAL DAILY
Qty: 30 TABLET | Refills: 0 | Status: SHIPPED | OUTPATIENT
Start: 2018-09-07 | End: 2018-11-16

## 2018-09-07 RX ORDER — TEMAZEPAM 30 MG
CAPSULE ORAL
Qty: 30 CAPSULE | Refills: 0 | Status: SHIPPED | OUTPATIENT
Start: 2018-09-07 | End: 2018-11-30

## 2018-09-07 RX ADMIN — MINERAL OIL 22.5 ML: 99.9 LIQUID ORAL; TOPICAL at 08:53

## 2018-09-07 RX ADMIN — COLCHICINE 0.6 MG: 0.6 TABLET, FILM COATED ORAL at 08:52

## 2018-09-07 RX ADMIN — NORTRIPTYLINE HYDROCHLORIDE 25 MG: 25 CAPSULE ORAL at 08:51

## 2018-09-07 RX ADMIN — PREDNISOLONE ACETATE 1 DROP: 10 SUSPENSION/ DROPS OPHTHALMIC at 08:51

## 2018-09-07 RX ADMIN — LEVOFLOXACIN 750 MG: 250 TABLET, FILM COATED ORAL at 08:51

## 2018-09-07 ASSESSMENT — ACTIVITIES OF DAILY LIVING (ADL)
ADLS_ACUITY_SCORE: 11
ADLS_ACUITY_SCORE: 11
DEPENDENT_IADLS:: INDEPENDENT
ADLS_ACUITY_SCORE: 11
ADLS_ACUITY_SCORE: 11

## 2018-09-07 NOTE — PROGRESS NOTES
[facility]      Subjective:  No new complaints  Some additional bleeding after dressing changed by Dr Davis    Review of Systems:  CONSTITUTIONAL: NEGATIVE for fever, chills, change in weight  INTEGUMENTARY/SKIN: NEGATIVE for worrisome rashes, moles or lesions  EYES: NEGATIVE for vision changes or irritation  ENT/MOUTH: NEGATIVE for ear, mouth and throat problems  RESP: NEGATIVE for significant cough or SOB  BREAST: NEGATIVE for masses, tenderness or discharge  CV: NEGATIVE for chest pain, palpitations or peripheral edema  GI: NEGATIVE for nausea, abdominal pain, heartburn, or change in bowel habits  : NEGATIVE for frequency, dysuria, or hematuria  MUSCULOSKELETAL: NEGATIVE for significant arthralgias or myalgia  NEURO: NEGATIVE for weakness, dizziness or paresthesias  ENDOCRINE: NEGATIVE for temperature intolerance, skin/hair changes  HEME: NEGATIVE for bleeding problems  PSYCHIATRIC: NEGATIVE for changes in mood or affect    Physical Exam:  Vitals Were Reviewed    Patient Vitals for the past 16 hrs:   BP Temp Temp src Pulse Resp SpO2   09/07/18 0734 113/65 97.7  F (36.5  C) Oral 77 16 96 %   09/06/18 2245 136/79 97.5  F (36.4  C) Oral 77 16 96 %         Intake/Output Summary (Last 24 hours) at 09/07/18 1231  Last data filed at 09/06/18 1800   Gross per 24 hour   Intake          1910.83 ml   Output               20 ml   Net          1890.83 ml       GENERAL APPEARANCE: healthy, alert and no distress  RESP: lungs clear to auscultation - no rales, rhonchi or wheezes  CV: regular rate and rhythm, normal S1 S2, no S3 or S4 and no murmur, click or rub   ABDOMEN: soft, nontender, no HSM or masses and bowel sounds normal  MS: there was still some ooze from the wound when I examined him  SKIN: clear without significant rashes or lesions    Lab:  Recent Labs   Lab Test  09/02/18   0505  03/22/17   1240   NA  136  138   POTASSIUM  4.0  4.1   CHLORIDE  102  102   CO2  29  30   ANIONGAP  5  6   GLC  96   94   BUN  12  14   CR  0.85  0.70   ALY  8.8  8.7     CBC RESULTS:   Recent Labs   Lab Test  09/07/18   0557  09/06/18   0623   WBC  7.1  5.5   RBC  3.47*  3.51*   HGB  9.7*  10.0*   HCT  29.6*  30.0*   PLT  160  150       Results for orders placed or performed during the hospital encounter of 09/02/18 (from the past 24 hour(s))   INR   Result Value Ref Range    INR 1.18 (H) 0.86 - 1.14   CBC with platelets   Result Value Ref Range    WBC 7.1 4.0 - 11.0 10e9/L    RBC Count 3.47 (L) 4.4 - 5.9 10e12/L    Hemoglobin 9.7 (L) 13.3 - 17.7 g/dL    Hematocrit 29.6 (L) 40.0 - 53.0 %    MCV 85 78 - 100 fl    MCH 28.0 26.5 - 33.0 pg    MCHC 32.8 31.5 - 36.5 g/dL    RDW 13.5 10.0 - 15.0 %    Platelet Count 160 150 - 450 10e9/L       Assessment and Plan:    Nabor Cunningham is a 82 year old male who presents on 9/2/2018 with bleeding from left buttock wound.      Bleeding from wound  Left buttock abscess s/p I&D 6 days ago developed bleeding postop day 1 and was seen in the ED where the wound was sutured.  Returns early this morning due to bleeding felt to have had a hematoma that is now coming out of the wound when seen in the ED.  On the floor he continues to have some oozing.  He is on warfarin for A. fib with therapeutic INR 2.26. Patient just completed course of bactrim for the buttock infection.   After two doses of vitamin k INR is 1.22 on 09/05/18.  INR 1.18 on 09/07/18       Suspect pneumonia or pneumonitis, right middle and lower lobe  Incidental finding on CT and chest x-ray. No fever, cough, chest pain, leucocytosis. CRP mild up at 31. Infiltrate on right side but no known aspiration (pt had only local anesthesia for abscess drainage last week). On Bactrim for the abscess so possible this has treated any infection given patient appears asymptomatic. Procalcitonin low.  Started on clindamycin and levofloxacin in the ED for possible aspiration.  - Given that he does not clinically appear very ill,  treat with 5 day  course of levofloxacin 750 mg.   - Needs outpatient follow up imaging in a few weeks to document resolution  -currently no symptoms      Anemia, due to acute blood loss, mild  Hgb 12.8 and 11.1. Previously 14. Bleeding is not brisk.   hgb 10.0 09/03/18   hgb 11.0 09/05/18   hgb 10.0 09/06/18   hgb 9.7  09/07/18       Atrial fibrillation, s/p PPM  Therapeutic INR on admission. Need to hold/reverse due to bleeding.   - Resume warfarin when bleeding risk for wound is resolved   - continue metoprolol XL      Anxiety disorder  Baseline significant anxiety.  On nortriptyline though also has neuropathy.      r/o alcohol abuse   In remission 2-3 years, though patient says he has an occasional drink of wine less than once a week - this was news to the daughter.       Gout   On colchicine      Insomnia  On restoril      FEN- lock  DVT Prophylaxis: Low Risk/Ambulatory with no VTE prophylaxis indicated  Code Status:       Lines: PIV  Funk catheter: None  Restraints: none      Disposition:  some more bleeding today after surgeon changed dressing, call in to surgery to get there input.

## 2018-09-07 NOTE — PROGRESS NOTES
Transition Communication Hand-off for Care Transitions to Next Level of Care Provider     Name: Nabor Cunningham  : 1935  MRN #: [id number]  Primary Care Provider: PRAKASH PRADO  Primary Care MD Name: Prakash Prado  Primary Clinic: [address]  [place] [address]  Primary Care Clinic Name: [facility]  Reason for Hospitalization:  Confusion [R41.0]  Bleeding from wound [T14.8XXA]  Pneumonia of right lower lobe due to infectious organism (H) [J18.1]  Admit Date/Time: 2018  4:47 AM  Discharge Date: 18  Payor Source: Payor: MEDICARE / Plan: MEDICARE / Product Type: Medicare /      Readmission Assessment Measure (JUANCARLOS) Risk Score/category: Average      Reason for Communication Hand-off Referral: Admission diagnoses: PN .  Gluteal abscess was also treated.     Discharge Plan:  [place] [facility] (Phone: [phone] Fax: [phone])         Concern for non-adherence with plan of care:                           Y/N No  Discharge Needs Assessment:  Needs        Most Recent Value     Home Care [facility] [phone], Fax: [phone]     Skilled Nursing Facility [facility] [phone], Fax: [phone]            Follow-up plan:  Future Appointments  Date Time Provider Department Center   9/10/2018 10:30 AM Et Nurse [facility]   9/10/2018 11:15 AM Fady Boyer MD [facility]                Muñoz Recommendations:  The patient lives alone at [facility] in [place].  He does not have any community services.  Patient is active, he walks daily until his gluteal abscess developed.  His three children are available to assist if needed.  Patient would like [facility], referral placed.  Provided pneumonia education resources.             Tri Bolton RN, Care Coordinator     AVS/Discharge Summary is the source of truth; this is a helpful guide for improved communication of patient story

## 2018-09-07 NOTE — PROGRESS NOTES
Care Transitions Discharge:    Name: Nabor Cunningham    MRN: [id number]    Reason for Hospitalization: Confusion [R41.0]  Bleeding from wound [T14.8XXA]  Pneumonia of right lower lobe due to infectious organism (H) [J18.1]    Cognitive/Behavioral Status: awake, alert and oriented    Follow-up Appointments: Future Appointments  Date Time Provider Department Center   9/10/2018 10:30 AM Et Nurse [facility]   9/10/2018 11:15 AM Fady Boyer MD [facility]       Discharge Date:  9/7/2018    Patient/Care Partner in agreement and understands the discharge plan:  Yes    Discharge Disposition:  [place] on [facility] (Phone: [phone] Fax: [phone])    Discharge Planner   Discharge Plans in progress: TCU  Barriers to discharge plan: None  Follow up plan: Follow up with PCP       Entered by: Tri Bolton 09/07/2018 1:55 PM         Tri Bolton RN Care Coordinator  [phone]

## 2018-09-07 NOTE — PROGRESS NOTES
POD#1    Dressing change without incident.  Wound still oozing venous blood from skin edges but no longer has arterial blood.  Patient will need twice daily dressing changes with lightly moist Kerlix gauze or whatever is recommended by wound care nurses.    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    OK to discharge.    Dain Davis MD

## 2018-09-07 NOTE — PROGRESS NOTES
Pt seen briefly with Dr. Boyer for left gluteal abscess wound that is s/p I&D on 8/27/18 with Dr. Boyer.  Pt has had issues with bleeding from this wound requiring 2 ED visits and most recently was treated in OR with Dr. Davis yesterday including debridement, cautery and sutures.  Wound continues to ooze some blood from 3:00 edge so was filled with surgicel and FloSeal today by Dr. Boyer and pressure dressing applied by Dr. Boyer.  Writer visualized wound at bedside while Dr. Boyer performing cares, appears to be about 2cm x 4.5cmx 2cm, red tissue and cautery noted in base.  Bruising noted on distal buttock below wound.      [facility]  The following plan was made in discussion with Dr. Boyer and pt's daughter:  Wound Care -   Leave bandage in place until Monday.  If needed for leaking bandage may remove only the cover gauze, (do not touching packing material in the wound,) and recover with gauze and tape as a pressure dressing, (fold 4 layers of 4x4 gauze in half and stack over wound then lay 2 layers of unfolded 4x4 gauze over and secure with tape).  If wound continues to ooze bright red blood requiring more than daily cares then return to ER for evaluation.      May lay on the dressing to apply more pressure to area to control bleeding but should not lay on it all the time.    Follow-up Monday in [facility] Clinic with Dr. Boyer and wound care nurse.    Monica Lundberg RN, CWOCN [phone]

## 2018-09-07 NOTE — PLAN OF CARE
Problem: Patient Care Overview  Goal: Discharge Needs Assessment  Outcome: Adequate for Discharge Date Met: 09/07/18  [facility] DISCHARGE NOTE    Patient discharged to transitional care unit at 3:14 PM via wheel chair. Accompanied by son and daughter and staff. Discharge instructions reviewed with nurse Evelyn smith [facility], opportunity offered to ask questions.  All belongings sent with patient.    Kalyani Yun

## 2018-09-07 NOTE — LETTER
Hand-off  for Care Coordination  What is Care Coordination?  [facility] Care Coordination Services are available to people in complex situations,   for example medical, social or financial. The Care Coordinator, a SW or an RN, works with the   patient and their doctor to determine health goals, obtain resources, achieve outcomes,   and develop plans to coordinate care across settings.      o Patient Name:   Nabor Cunningham  o Patient :     1935  o Patient PCP:     PRAKASH PRADO MD    o Patient Primary Clinic:   [address] [place] [id number]  o D/C Facility: _____________________________________________   o TCU Contact Info for questions: ___________________________  o D/C Date:  ______________________________________________  o Follow-up Apt with PCP after TCU D/C:   ____________________  o Other Follow-up Apt s:      _________________________________________  Additional information (concerns, and Home Care, ect ):   __________________________________________________________________  __________________________________________________________________    Care Coordinator to Contact  **Please fax this sheet back to me when pt is ready to discharge**   Adrienne Adams RN    Clinic Care Coordinator  [facility]  Fax: [phone]  or e-mail [email or web address]  Phone: [phone]

## 2018-09-07 NOTE — PROGRESS NOTES
Asked by Dr. Purcell to see the patient again today. He had his dressing changed this morning by , and there was no significant bleeding noted. However, before a shower at approximately 11 AM the dressing was changed and there was fair amount of blood on the dressing.    On exam: The dressing was removed and there was some clot noted but for 2-1/2 hours it wasn't significant amount. There was some oozing on the medial corner of the wound. No obvious source.    I elected to fill the wound with FloSeal and packed that corner of the wound additionally with 2 small pieces of Surgicel. We then placed a pressure dressing and asked the patient to lay on it to put additional pressure.    My plan would be to let him go to the transitional care unit, in see him back on Monday in the clinic with the combined surgery and wound care visit. I would ask that nobody change the dressing until then. Hopefully that will allow a firm clot to form.    Fady Boyer MD FACS

## 2018-09-07 NOTE — PLAN OF CARE
"Problem: Patient Care Overview  Goal: Plan of Care/Patient Progress Review  Outcome: No Change  Patient is alert, forgetful. Family visiting this evening. Has ambulated to bathroom twice to void since return from surgery. Dressing to left buttock clean, dry and intact. Patient reported a couple \"twinges of pain\" , medicated with tylenol. Eating and drinking well. Bed alarm on for safety.       "

## 2018-09-07 NOTE — DISCHARGE INSTRUCTIONS
As a system [facility] wants to ensure that across the care continuum that you have support through care coordination services that include nurses and social workers in the outpatient setting.  Due to your pneumonia and abscess I feel it is important you have this support when you discharge.  They will be calling you within 24-48 hours of your discharge.   A brochure describing the services was provided.  If you have questions you can reach out to your clinic directly and ask for the Care Coordinator assigned to your care.  Tri Bolton RN, Care Coordinator [phone]    Wound Care -   Leave bandage in place until Monday.  If needed for leaking bandage may remove only the cover gauze, (do not touching packing material in the wound,) and recover with gauze and tape as a pressure dressing, (fold 4 layers of 4x4 gauze in half and stack over wound then lay 2 layers of unfolded 4x4 gauze over and secure with tape).  If wound continues to ooze bright red blood requiring more than daily cares then return to ER for evaluation.      May lay on the dressing to apply more pressure to area to control bleeding but should not lay on it all the time.    Follow-up Monday in [facility] with Dr. Boyer and wound care nurse.    Monica Lundberg RN, CWOCN [phone]      Pt needs a primary care follow up with in two weeks for a repeat cxr to follow-up possible pneumonia.  Pt will need to restart coumadin next week on Thursday if there if no further bleeding.

## 2018-09-07 NOTE — PLAN OF CARE
Problem: Patient Care Overview  Goal: Plan of Care/Patient Progress Review  Outcome: No Change  Pt slept t/o night, up to BR w/ SBA, alert and approp. No c/o pain. LS clear.

## 2018-09-07 NOTE — PROGRESS NOTES
Discharge medication review for this patient is complete. Pharmacist assisted with medication reconciliation of discharge medications with prior to admission medications.     The following changes were made to the discharge medication list based on pharmacist review:  none

## 2018-09-07 NOTE — PROGRESS NOTES
Clinic Care Coordination Contact  Care Coordination Transition Communication    Referral Source: IP Handoff    Clinical Data: Patient was hospitalized at [facility] from 9/2/18 to 9/7/18 with diagnosis of Pneumonia, confusion, gluteal abscess.     Transition to Facility:              Facility Name: [facility]              Contact name and phone number/fax: Maureen RICHARD  [phone] fax [phone]    Plan: RN/SW Care Coordinator will await notification from facility staff informing RN/SW Care Coordinator of patient's discharge plans/needs. RN/SW Care Coordinator will review chart and outreach to facility staff every 4 weeks and as needed.     Adrienne Adams RN, [facility]  RN Care Coordinator  [facility]  Phone # [phone]  9/7/2018 3:44 PM

## 2018-09-07 NOTE — DISCHARGE SUMMARY
Admit Date:     09/02/2018   Discharge Date:     09/07/2018      HISTORY OF PRESENT ILLNESS:  Nabor Cunningham is an 82-year-old male with a past history of anxiety, gout, hyperlipidemia, hypertension, chronic anticoagulation and paroxysmal atrial fibrillation.  He also has a history of alcohol abuse and peripheral neuropathy.  He presented with bleeding from a left buttocks abscess that was opened and drained by Dr. Boyer 6 days prior to admission.  The day after his surgery, he had bleeding.  He went to the ER.  He had the wound sutured.  On the morning of admission, he had significant amounts of bleeding.  He was brought to the emergency room.  CAT scan showed a fluid collection consistent with a hematoma.  Incidentally, he was thought to have a right middle and lower lobe infiltrate concerning for pneumonia or some other type of inflammatory condition.  He really had no fever, cough or respiratory symptoms, but he was empirically put on Levaquin.      The patient's INR was elevated upon admission at 2.26 from his Coumadin.  Ultimately, this was reversed.  His INR at discharge was 1.18.  He had a normal platelet count.  His PTT was 43 in the hospital.  He had bleeding from no other sites.      The patient proved to be very challenging because he had intermittent persistent bleeding from the wound.  He was actually seen initially by Dr. Alfred, then by Dr. Diaz and then ultimately by Dr. Boyer.  Despite having his INR reversed with vitamin K, he would have bleeding.  It usually would occur after the surgeons had redone the dressing and they would leave and then he would get significant bleeding.  A number of different techniques were tried, including Surgicel.  Ultimately, Dr. Diaz took him to the OR, thought he was bleeding from some of the muscle, and he used a suture to control it.  On the day of discharge, Dr. Diaz came by and the wound seemed dry, but he changed the dressing and then he had some more bleeding.   I asked Dr. Boyer to come up, who was then on-call.  Dr. Boyer came up and placed some FloSeal, Surgicel, packing and a pressure dressing, and he felt he could discharge.  He is going to leave this dressing in place over the weekend and see him back on Monday.  It remained unclear why he had so much trouble with bleeding.  His hemoglobin did drop to 9.7 in the hospital.      He had no pneumonia symptoms, but he did have an abnormal CT and chest x-ray with possible infiltrate.  He was given a 5-day course of Levaquin.  The plan is that he would have a followup chest x-ray in the future, so we are arranging this with primary care.      ASSESSMENT:   1.  Postoperative bleeding from an abscess I and D site in the left buttocks.   2.  Chronic anticoagulation with Coumadin for atrial fibrillation, reversed during this hospitalization.   3.  Pneumonia or pneumonitis, right middle and lower lobes without symptoms, treated with 5 days of Levaquin, needs followup imaging.   4.  Acute blood loss anemia.   5.  Atrial fibrillation with anticoagulation.   6.  Anxiety disorder.   7.  History of alcohol abuse.  Apparently, this was in remission for 2-3 years, although patient reports occasional wine use.   8.  Gout.   9.  Insomnia.      PLAN:  This proved to be a difficult case for the surgeons, but at this time the patient is going to go to a TCU.  Dressings can be left in place till Monday and he is going to see the wound nurse and Dr. Diaz on Monday.  Tentatively, I think Coumadin can be restarted on Thursday if there is no more bleeding.  He would need then INR followup.  I did also discontinue his Celebrex because of the bleeding.     Discharge Medication List as of 9/7/2018  3:02 PM      CONTINUE these medications which have NOT CHANGED    Details   calcium carbonate (OS- MG [place]. [place]) 1250 MG tablet Take 1 tablet by mouth 2 times daily, Historical      cyanocobalamin (VITAMIN  B-12) 1000 MCG tablet TAKE ONE TABLET BY  MOUTH DAILY, Disp-30 tablet, R-11, E-Prescribe      hypromellose (ARTIFICIAL TEARS) 0.4 % SOLN ophthalmic solution Place 1 drop into both eyes every hour as needed for dry eyes, Historical      metoprolol (TOPROL-XL) 25 MG 24 hr tablet Take 25 mg by mouth every evening , Disp-90 tablet, R-1, Historical      mineral oil oil Take 5 mLs by mouth dailyHistorical      multivitamin, therapeutic with minerals (MULTI-VITAMIN) TABS tablet Take 1 tablet by mouth daily, Disp-100 tablet, R-3, Historical      nortriptyline (PAMELOR) 25 MG capsule TAKE ONE CAPSULE MID MORNING AND TWO CAPSULES BY MOUTH DAILY AT BEDTIME, Disp-270 capsule, R-3, E-PrescribeThis is a dose increase      prednisoLONE acetate (PRED FORTE) 1 % ophthalmic susp Place 1 drop Into the left eye 2 times daily , R-2, Historical      temazepam (RESTORIL) 30 MG capsule TAKE ONE CAPSULE BY MOUTH AT BEDTIME AS NEEDED FOR SLEEP, Disp-30 capsule, R-5, Local PrintFill not more than once monthly, see start date.         STOP taking these medications       celecoxib (CELEBREX) 200 MG capsule Comments:   Reason for Stopping:         colchicine (COLCYRS) 0.6 MG tablet Comments:   Reason for Stopping:         order for DME Comments:   Reason for Stopping:         sulfamethoxazole-trimethoprim (BACTRIM DS/SEPTRA DS) 800-160 MG per tablet Comments:   Reason for Stopping:         warfarin (COUMADIN) 2 MG tablet Comments:   Reason for Stopping:             Unresulted Labs Ordered in the Past 30 Days of this Admission     No orders found from 2018 to 9/3/2018.              TOTAL TIME SPENT:  Greater than 30 minutes spent on this.         RENETTA LUKE MD             D: 2018   T: 2018   MT: SILVIO      Name:     NICOLE PEREA   MRN:      [id number]        Account:        [id number]   :      1935           Admit Date:     2018                                  Discharge Date: 2018      Document: [id number]       cc: Amadou Salas MD

## 2018-09-08 LAB
BACTERIA SPEC CULT: NO GROWTH
BACTERIA SPEC CULT: NO GROWTH
Lab: NORMAL
Lab: NORMAL
SPECIMEN SOURCE: NORMAL
SPECIMEN SOURCE: NORMAL

## 2018-09-10 ENCOUNTER — HOSPITAL ENCOUNTER (OUTPATIENT)
Dept: WOUND CARE | Facility: CLINIC | Age: 83
Discharge: HOME | End: 2018-09-10
Attending: SURGERY | Admitting: SURGERY
Payer: MEDICARE

## 2018-09-10 ENCOUNTER — NURSING HOME VISIT (OUTPATIENT)
Dept: GERIATRICS | Facility: CLINIC | Age: 83
End: 2018-09-10
Payer: MEDICARE

## 2018-09-10 ENCOUNTER — OFFICE VISIT (OUTPATIENT)
Dept: SURGERY | Facility: CLINIC | Age: 83
End: 2018-09-10
Payer: MEDICARE

## 2018-09-10 ENCOUNTER — ANTICOAGULATION THERAPY VISIT (OUTPATIENT)
Dept: ANTICOAGULATION | Facility: CLINIC | Age: 83
End: 2018-09-10

## 2018-09-10 VITALS
OXYGEN SATURATION: 94 % | BODY MASS INDEX: 28.15 KG/M2 | RESPIRATION RATE: 18 BRPM | WEIGHT: 164 LBS | DIASTOLIC BLOOD PRESSURE: 66 MMHG | TEMPERATURE: 98.7 F | HEART RATE: 76 BPM | SYSTOLIC BLOOD PRESSURE: 96 MMHG

## 2018-09-10 VITALS
DIASTOLIC BLOOD PRESSURE: 63 MMHG | BODY MASS INDEX: 28 KG/M2 | SYSTOLIC BLOOD PRESSURE: 125 MMHG | HEIGHT: 64 IN | WEIGHT: 164 LBS | HEART RATE: 90 BPM | TEMPERATURE: 97.9 F

## 2018-09-10 DIAGNOSIS — L02.31 LEFT BUTTOCK ABSCESS: Primary | ICD-10-CM

## 2018-09-10 DIAGNOSIS — F41.9 ANXIETY: ICD-10-CM

## 2018-09-10 DIAGNOSIS — F33.1 MODERATE EPISODE OF RECURRENT MAJOR DEPRESSIVE DISORDER (H): ICD-10-CM

## 2018-09-10 DIAGNOSIS — Z79.01 CHRONIC ANTICOAGULATION: ICD-10-CM

## 2018-09-10 DIAGNOSIS — I10 ESSENTIAL HYPERTENSION WITH GOAL BLOOD PRESSURE LESS THAN 140/90: ICD-10-CM

## 2018-09-10 DIAGNOSIS — Z79.01 LONG-TERM (CURRENT) USE OF ANTICOAGULANTS: ICD-10-CM

## 2018-09-10 DIAGNOSIS — L02.31 ABSCESS OF BUTTOCK, LEFT: Primary | ICD-10-CM

## 2018-09-10 DIAGNOSIS — M17.12 PRIMARY OSTEOARTHRITIS OF LEFT KNEE: ICD-10-CM

## 2018-09-10 DIAGNOSIS — G62.9 PERIPHERAL POLYNEUROPATHY: ICD-10-CM

## 2018-09-10 DIAGNOSIS — G47.00 PERSISTENT INSOMNIA: ICD-10-CM

## 2018-09-10 DIAGNOSIS — I48.20 CHRONIC ATRIAL FIBRILLATION (H): ICD-10-CM

## 2018-09-10 DIAGNOSIS — M1A.9XX0 CHRONIC GOUT, UNSPECIFIED CAUSE, UNSPECIFIED SITE: ICD-10-CM

## 2018-09-10 DIAGNOSIS — K59.01 SLOW TRANSIT CONSTIPATION: ICD-10-CM

## 2018-09-10 DIAGNOSIS — J18.9 PNEUMONIA OF RIGHT LOWER LOBE DUE TO INFECTIOUS ORGANISM: ICD-10-CM

## 2018-09-10 DIAGNOSIS — T14.8XXA BLEEDING FROM WOUND: ICD-10-CM

## 2018-09-10 PROCEDURE — 99207 ZZC NO CHARGE NURSE ONLY: CPT

## 2018-09-10 PROCEDURE — 99024 POSTOP FOLLOW-UP VISIT: CPT | Performed by: SURGERY

## 2018-09-10 PROCEDURE — G0463 HOSPITAL OUTPT CLINIC VISIT: HCPCS

## 2018-09-10 PROCEDURE — 99310 SBSQ NF CARE HIGH MDM 45: CPT | Performed by: NURSE PRACTITIONER

## 2018-09-10 ASSESSMENT — PAIN SCALES - GENERAL: PAINLEVEL: MILD PAIN (2)

## 2018-09-10 NOTE — PROGRESS NOTES
ANTICOAGULATION FOLLOW-UP CLINIC VISIT    Patient Name:  Nabor Cunningham  Date:  9/10/2018  Contact Type:  Chart Review    SUBJECTIVE:     Patient Findings     Comments Bleeding from wound  Left buttock abscess s/p I&D 6 days ago developed bleeding postop day 1 and was seen in the ED where the wound was sutured.  Returns early this morning due to bleeding felt to have had a hematoma that is now coming out of the wound when seen in the ED.  On the floor he continues to have some oozing.  He is on warfarin for A. fib with therapeutic INR 2.26. Patient just completed course of bactrim for the buttock infection.   After two doses of vitamin k INR is 1.22 on 09/05/18.  INR 1.18 on 09/07/18   Coumadin Discontinued at this time.                 OBJECTIVE    INR   Date Value Ref Range Status   09/07/2018 1.18 (H) 0.86 - 1.14 Final       ASSESSMENT / PLAN  No question data found.  Anticoagulation Summary as of 9/10/2018     INR goal 2.0-3.0   Today's INR No new INR was available at the time of this encounter.   Warfarin maintenance plan 8 mg (2 mg x 4) on Mon, Thu; 6 mg (2 mg x 3) all other days   Full warfarin instructions 8 mg on Mon, Thu; 6 mg all other days   Weekly warfarin total 46 mg   Plan last modified Belia Jones RN (7/12/2018)   Next INR check    Target end date Indefinite    Indications   Chronic anticoagulation [Z79.01]  Chronic atrial fibrillation (H) [I48.2]  Long-term (current) use of anticoagulants [Z79.01] [Z79.01]         Anticoagulation Episode Summary     INR check location     Preferred lab     Resolved date 9/10/2018    Resolved reason Therapy  Complete    Send INR reminders to [facility] CLINIC POOL    Comments * 2mg tabs      Anticoagulation Care Providers     Provider Role Specialty Phone number    Heron Mayo MD [facility] [phone]            See the Encounter Report to view Anticoagulation Flowsheet and Dosing Calendar (Go to Encounters tab in chart review, and  find the Anticoagulation Therapy Visit)        Daphne Lamar RN

## 2018-09-10 NOTE — NURSING NOTE
"Initial /63 (BP Location: Right arm, Patient Position: Sitting, Cuff Size: Adult Regular)  Pulse 90  Temp 97.9  F (36.6  C) (Oral)  Ht 1.626 m (5' 4\")  Wt 74.4 kg (164 lb)  BMI 28.15 kg/m2 Estimated body mass index is 28.15 kg/(m^2) as calculated from the following:    Height as of this encounter: 1.626 m (5' 4\").    Weight as of this encounter: 74.4 kg (164 lb). .    Irene Christina CMA    "

## 2018-09-10 NOTE — MR AVS SNAPSHOT
Nabor Cunningham   9/10/2018   Anticoagulation Therapy Visit    Description:  82 year old male   Provider:  Daphne Lamar, RN   Department:  [facility]           INR as of 9/10/2018     Today's INR No new INR was available at the time of this encounter.      Anticoagulation Summary as of 9/10/2018     INR goal 2.0-3.0   Today's INR No new INR was available at the time of this encounter.   Full warfarin instructions 8 mg on Mon, Thu; 6 mg all other days   Next INR check     Indications   Chronic anticoagulation [Z79.01]  Chronic atrial fibrillation (H) [I48.2]  Long-term (current) use of anticoagulants [Z79.01] [Z79.01]         Anticoagulation Episode Summary     Resolved date 9/10/2018    Resolved reason Therapy  Complete

## 2018-09-10 NOTE — PROGRESS NOTES
[facility]  PRIMARY CARE PROVIDER AND CLINIC:  Amadou Salas [address] / [place] [address]  Chief Complaint   Patient presents with     Hospital F/U     [facility] Medical Record Number:  [id number]    HPI:    aNbor Cunningham is a 82 year old  (1935),admitted to the [facility] from [facility].  Hospital stay 9//2/18 through 9/7/18.  Admitted to this facility for  rehab, medical management and nursing care.  HPI information obtained from: facility chart records, facility staff, patient report and [facility] chart review.      From hospital discharge summary: Nabor Cunningham is an 82-year-old male with a past history of anxiety, gout, hyperlipidemia, hypertension, chronic anticoagulation and paroxysmal atrial fibrillation.  He also has a history of alcohol abuse and peripheral neuropathy.  He presented with bleeding from a left buttocks abscess that was opened and drained by Dr. Boyer 6 days prior to admission.  The day after his surgery, he had bleeding.  He went to the ER.  He had the wound sutured.  On the morning of admission, he had significant amounts of bleeding.  He was brought to the emergency room.  CAT scan showed a fluid collection consistent with a hematoma.  Incidentally, he was thought to have a right middle and lower lobe infiltrate concerning for pneumonia or some other type of inflammatory condition.  He really had no fever, cough or respiratory symptoms, but he was empirically put on Levaquin.       The patient's INR was elevated upon admission at 2.26 from his Coumadin.  Ultimately, this was reversed.  His INR at discharge was 1.18.  He had a normal platelet count.  His PTT was 43 in the hospital.  He had bleeding from no other sites.       The patient proved to be very challenging because he had intermittent persistent bleeding from the wound.  He was actually seen initially by Dr. Alfred, then by Dr. Diaz and then ultimately by  Dr. Boyer.  Despite having his INR reversed with vitamin K, he would have bleeding.  It usually would occur after the surgeons had redone the dressing and they would leave and then he would get significant bleeding.  A number of different techniques were tried, including Surgicel.  Ultimately, Dr. Diaz took him to the OR, thought he was bleeding from some of the muscle, and he used a suture to control it.  On the day of discharge, Dr. Diaz came by and the wound seemed dry, but he changed the dressing and then he had some more bleeding.  I asked Dr. Boyer to come up, who was then on-call.  Dr. Boyer came up and placed some FloSeal, Surgicel, packing and a pressure dressing, and he felt he could discharge.  He is going to leave this dressing in place over the weekend and see him back on Monday.  It remained unclear why he had so much trouble with bleeding.  His hemoglobin did drop to 9.7 in the hospital.       He had no pneumonia symptoms, but he did have an abnormal CT and chest x-ray with possible infiltrate.  He was given a 5-day course of Levaquin.  The plan is that he would have a followup chest x-ray in the future, so we are arranging this with primary care.    Current issues are:      Left buttock abscess  Bleeding from wound  No bleeding from wound noted over the weekend, no shadowing noted on dressing today, has f/u with surgeon Dr. Merlin velarde. Reports only mild pain to wound with palpation.     Pneumonia of right lower lobe due to infectious organism (H)  Reports he had no symptoms, has completed abx therapy. Denies any SOB, chest pain, cough, fevers of chills.     Chronic atrial fibrillation (H)  Essential hypertension with goal blood pressure less than 140/90  Coumadin remains on hold until cleared by surgery to resume. He is on metoprolol XL 25mg BID for rate control. Has had soft BP's in TCU. Denies any chest pain, SOB, lightheadedness dizziness, or palpitations. HR 63-92.         Slow transit  constipation  Reports chronic issues with constipation at home. He is currently on mineral oil 5m daily, report he was taking 4 tsp at home, would like his dose increased to this. Did have a BM in TCU.     Primary osteoarthritis of left knee  Reports he gets knee injections to left knee, right knee previously replaced. He does not take anything for pain, reports knee uncomfortable at times. He is working with PT and OT in TCU, does not use an assistive device.       CODE STATUS/ADVANCE DIRECTIVES DISCUSSION:   CPR/Full code   Patient's living condition: lives alone    ALLERGIES:Amoxicillin; Penicillins; and Trazodone  PAST MEDICAL HISTORY:  has a past medical history of Anxiety; Gout; Hyperlipemia; Hypertension; Insomnia; and Paroxysmal atrial fibrillation (H).  PAST SURGICAL HISTORY:  has a past surgical history that includes Incision and drainage trunk, combined (N/A, 9/6/2018).  FAMILY HISTORY: family history is not on file.  SOCIAL HISTORY:  reports that he has quit smoking. His smoking use included Cigarettes. He quit after 20.00 years of use. He has never used smokeless tobacco. He reports that he does not drink alcohol or use illicit drugs.    Post Discharge Medication Reconciliation Status: discharge medications reconciled and changed, per note/orders (see AVS).  Current Outpatient Prescriptions   Medication Sig Dispense Refill     ACETAMINOPHEN PO Take 650 mg by mouth every 4 hours as needed for pain       calcium carbonate (OS- MG [place]. [place]) 1250 MG tablet Take 1 tablet by mouth 2 times daily       colchicine (COLCRYS) 0.6 MG tablet Take 1 tablet (0.6 mg) by mouth daily 30 tablet 0     cyanocobalamin (VITAMIN  B-12) 1000 MCG tablet TAKE ONE TABLET BY MOUTH DAILY 30 tablet 11     hypromellose (ARTIFICIAL TEARS) 0.4 % SOLN ophthalmic solution Place 1 drop into both eyes every hour as needed for dry eyes       metoprolol (TOPROL-XL) 25 MG 24 hr tablet Take 25 mg by mouth every evening  90 tablet 1      mineral oil oil Take 5 mLs by mouth daily       multivitamin, therapeutic with minerals (MULTI-VITAMIN) TABS tablet Take 1 tablet by mouth daily 100 tablet 3     nortriptyline (PAMELOR) 25 MG capsule TAKE ONE CAPSULE MID MORNING AND TWO CAPSULES BY MOUTH DAILY AT BEDTIME 270 capsule 3     prednisoLONE acetate (PRED FORTE) 1 % ophthalmic susp Place 1 drop Into the left eye 2 times daily   2     temazepam (RESTORIL) 30 MG capsule TAKE ONE CAPSULE BY MOUTH AT BEDTIME AS NEEDED FOR SLEEP 30 capsule 0       ROS:  10 point ROS of systems including Constitutional, Eyes, Respiratory, Cardiovascular, Gastroenterology, Genitourinary, Integumentary, Muscularskeletal, Psychiatric were all negative except for pertinent positives noted in my HPI.    Exam:  BP 96/66  Pulse 76  Temp 98.7  F (37.1  C)  Resp 18  Wt 164 lb (74.4 kg)  SpO2 94%  BMI 28.15 kg/m2  GENERAL APPEARANCE:  Alert, in no distress, oriented, thin, cooperative  RESP:  respiratory effort and palpation of chest normal, lungs clear to auscultation , no respiratory distress  CV:  Palpation and auscultation of heart done , regular rate and rhythm, no murmur, rub, or gallop, +2 pedal pulses, peripheral edema 1+ in pitting BLE  ABDOMEN:  normal bowel sounds, soft, nontender, no hepatosplenomegaly or other masses, no guarding or rebound  M/S:   enlarged left knee joint, crepitus present, walks with slight left sided limp, no device,   SKIN:  wound healing well, no signs of infection left buttock wound C/D/I, no drainage or shadowing noted on dressin, mild tenderess   NEURO:   Cranial nerves 2-12 are normal tested and grossly at patient's baseline  PSYCH:  oriented X 3, normal insight, judgement and memory, affect and mood normal    Lab/Diagnostic data:     CBC RESULTS:   Recent Labs   Lab Test  09/07/18   0557  09/06/18   0623   WBC  7.1  5.5   RBC  3.47*  3.51*   HGB  9.7*  10.0*   HCT  29.6*  30.0*   MCV  85  86   MCH  28.0  28.5   MCHC  32.8  33.3   RDW  13.5   13.7   PLT  160  150       Last Basic Metabolic Panel:  Recent Labs   Lab Test  09/02/18   0505  03/22/17   1240   NA  136  138   POTASSIUM  4.0  4.1   CHLORIDE  102  102   ALY  8.8  8.7   CO2  29  30   BUN  12  14   CR  0.85  0.70   GLC  96  94       Liver Function Studies -   Recent Labs   Lab Test  03/22/17   1240  01/06/17   1028   PROTTOTAL  7.3  6.8   ALBUMIN  4.0  4.1   BILITOTAL  0.5  0.7   ALKPHOS  93  73   AST  29   --    ALT  56   --        TSH   Date Value Ref Range Status   03/22/2017 1.51 0.40 - 4.00 mU/L Final       ASSESSMENT/PLAN:  (L02.31) Left buttock abscess  (primary encounter diagnosis)  (T14.8XXA) Bleeding from wound  Comment: No bleeding noted from wound, no s/s of infection, pain controlled  Plan: wound care per [facility] nurse/surgery. HGb to check for stability    (J18.1) Pneumonia of right lower lobe due to infectious organism (H)  Comment: resolved  Plan: monitor, recommend f/u CXR in 2 weeks    (F41.9) Anxiety  Comment: stable, does have history of alcohol use.   Plan: continue temazepam at bedtime PRN.    (I48.2) Chronic atrial fibrillation (H)  Comment: rate controlled, BP labile at times  Plan: hold coumadin until OK'd by surgery to resume, continue metoprolol with hold parameters. Monitor and adjust PRN.     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     (G62.9) Peripheral polyneuropathy  Comment: chronic, stable  Plan: monitor    (M1A.9XX0) Chronic gout, unspecified cause, unspecified site  Comment: report typically flare-ups to left ankle  Plan: continue colchicine daily.     (I10) Essential hypertension with goal blood pressure less than 140/90  Comment: labile at times  Plan: continue metoprolol XL 25mg BID with hold parameters, monitroa nd adjust     (K59.01) Slow transit  constipation  Comment: chronic  Plan: increase mineral oil to 20ml daily per patient request    (M17.12) Primary osteoarthritis of left knee  Comment: chronic, pain controlled  Plan: add PRN APAP, PT/OT, monitor.     Orders:  1. Increase miralax to 20 mL PO int the Am Dx: Constipation  2. Hold Metoprolol for SBP <100, HR <60 Dx: HTN, Afib  3. Hgb on 9/12 Dx: Anemia  4. Tylenol 650 mg PO every 4 hours PRN Dx: Pain    Total time spent with patient visit at the skilled nursing facility was 35 min including patient visit and review of past records. Greater than 50% of total time spent with counseling and coordinating care due to discussion or recent medical history, medication review, discussion of plan of care in TCU    Electronically signed by:  AVERY Bacon CNP

## 2018-09-10 NOTE — LETTER
9/10/2018        RE: Nabor Cunningham  [address] [facility] [place] [address]        [facility]  PRIMARY CARE PROVIDER AND CLINIC:  Amadou Salas [address] /  [place] [address]  Chief Complaint   Patient presents with     Hospital F/U     [facility] Medical Record Number:  [id number]    HPI:    Nabor Cunningham is a 82 year old  (1935),admitted to the [facility] from [facility].  Hospital stay 9//2/18 through 9/7/18.  Admitted to this facility for  rehab, medical management and nursing care.  HPI information obtained from: facility chart records, facility staff, patient report and [facility] chart review.      From hospital discharge summary: Nabor Cunningham is an 82-year-old male with a past history of anxiety, gout, hyperlipidemia, hypertension, chronic anticoagulation and paroxysmal atrial fibrillation.  He also has a history of alcohol abuse and peripheral neuropathy.  He presented with bleeding from a left buttocks abscess that was opened and drained by Dr. Boyer 6 days prior to admission.  The day after his surgery, he had bleeding.  He went to the ER.  He had the wound sutured.  On the morning of admission, he had significant amounts of bleeding.  He was brought to the emergency room.  CAT scan showed a fluid collection consistent with a hematoma.  Incidentally, he was thought to have a right middle and lower lobe infiltrate concerning for pneumonia or some other type of inflammatory condition.  He really had no fever, cough or respiratory symptoms, but he was empirically put on Levaquin.       The patient's INR was elevated upon admission at 2.26 from his Coumadin.  Ultimately, this was reversed.  His INR at discharge was 1.18.  He had a normal platelet count.  His PTT was 43 in the hospital.  He had bleeding from no other sites.       The patient proved to be very challenging because he had intermittent persistent bleeding from the  wound.  He was actually seen initially by Dr. Alfred, then by Dr. Diaz and then ultimately by Dr. Boyer.  Despite having his INR reversed with vitamin K, he would have bleeding.  It usually would occur after the surgeons had redone the dressing and they would leave and then he would get significant bleeding.  A number of different techniques were tried, including Surgicel.  Ultimately, Dr. Diaz took him to the OR, thought he was bleeding from some of the muscle, and he used a suture to control it.  On the day of discharge, Dr. Diaz came by and the wound seemed dry, but he changed the dressing and then he had some more bleeding.  I asked Dr. Boyer to come up, who was then on-call.  Dr. Boyer came up and placed some FloSeal, Surgicel, packing and a pressure dressing, and he felt he could discharge.  He is going to leave this dressing in place over the weekend and see him back on Monday.  It remained unclear why he had so much trouble with bleeding.  His hemoglobin did drop to 9.7 in the hospital.       He had no pneumonia symptoms, but he did have an abnormal CT and chest x-ray with possible infiltrate.  He was given a 5-day course of Levaquin.  The plan is that he would have a followup chest x-ray in the future, so we are arranging this with primary care.    Current issues are:      Left buttock abscess  Bleeding from wound  No bleeding from wound noted over the weekend, no shadowing noted on dressing today, has f/u with surgeon Dr. Merlin velarde. Reports only mild pain to wound with palpation.     Pneumonia of right lower lobe due to infectious organism (H)  Reports he had no symptoms, has completed abx therapy. Denies any SOB, chest pain, cough, fevers of chills.     Chronic atrial fibrillation (H)  Essential hypertension with goal blood pressure less than 140/90  Coumadin remains on hold until cleared by surgery to resume. He is on metoprolol XL 25mg BID for rate control. Has had soft BP's in TCU. Denies any chest  pain, SOB, lightheadedness dizziness, or palpitations. HR 63-92.         Slow transit constipation  Reports chronic issues with constipation at home. He is currently on mineral oil 5m daily, report he was taking 4 tsp at home, would like his dose increased to this. Did have a BM in TCU.     Primary osteoarthritis of left knee  Reports he gets knee injections to left knee, right knee previously replaced. He does not take anything for pain, reports knee uncomfortable at times. He is working with PT and OT in TCU, does not use an assistive device.       CODE STATUS/ADVANCE DIRECTIVES DISCUSSION:   CPR/Full code   Patient's living condition: lives alone    ALLERGIES:Amoxicillin; Penicillins; and Trazodone  PAST MEDICAL HISTORY:  has a past medical history of Anxiety; Gout; Hyperlipemia; Hypertension; Insomnia; and Paroxysmal atrial fibrillation (H).  PAST SURGICAL HISTORY:  has a past surgical history that includes Incision and drainage trunk, combined (N/A, 9/6/2018).  FAMILY HISTORY: family history is not on file.  SOCIAL HISTORY:  reports that he has quit smoking. His smoking use included Cigarettes. He quit after 20.00 years of use. He has never used smokeless tobacco. He reports that he does not drink alcohol or use illicit drugs.    Post Discharge Medication Reconciliation Status: discharge medications reconciled and changed, per note/orders (see AVS).  Current Outpatient Prescriptions   Medication Sig Dispense Refill     ACETAMINOPHEN PO Take 650 mg by mouth every 4 hours as needed for pain       calcium carbonate (OS- MG [place]. CA) 1250 MG tablet Take 1 tablet by mouth 2 times daily       colchicine (COLCRYS) 0.6 MG tablet Take 1 tablet (0.6 mg) by mouth daily 30 tablet 0     cyanocobalamin (VITAMIN  B-12) 1000 MCG tablet TAKE ONE TABLET BY MOUTH DAILY 30 tablet 11     hypromellose (ARTIFICIAL TEARS) 0.4 % SOLN ophthalmic solution Place 1 drop into both eyes every hour as needed for dry eyes        metoprolol (TOPROL-XL) 25 MG 24 hr tablet Take 25 mg by mouth every evening  90 tablet 1     mineral oil oil Take 5 mLs by mouth daily       multivitamin, therapeutic with minerals (MULTI-VITAMIN) TABS tablet Take 1 tablet by mouth daily 100 tablet 3     nortriptyline (PAMELOR) 25 MG capsule TAKE ONE CAPSULE MID MORNING AND TWO CAPSULES BY MOUTH DAILY AT BEDTIME 270 capsule 3     prednisoLONE acetate (PRED FORTE) 1 % ophthalmic susp Place 1 drop Into the left eye 2 times daily   2     temazepam (RESTORIL) 30 MG capsule TAKE ONE CAPSULE BY MOUTH AT BEDTIME AS NEEDED FOR SLEEP 30 capsule 0       ROS:  10 point ROS of systems including Constitutional, Eyes, Respiratory, Cardiovascular, Gastroenterology, Genitourinary, Integumentary, Muscularskeletal, Psychiatric were all negative except for pertinent positives noted in my HPI.    Exam:  BP 96/66  Pulse 76  Temp 98.7  F (37.1  C)  Resp 18  Wt 164 lb (74.4 kg)  SpO2 94%  BMI 28.15 kg/m2  GENERAL APPEARANCE:  Alert, in no distress, oriented, thin, cooperative  RESP:  respiratory effort and palpation of chest normal, lungs clear to auscultation , no respiratory distress  CV:  Palpation and auscultation of heart done , regular rate and rhythm, no murmur, rub, or gallop, +2 pedal pulses, peripheral edema 1+ in pitting BLE  ABDOMEN:  normal bowel sounds, soft, nontender, no hepatosplenomegaly or other masses, no guarding or rebound  M/S:   enlarged left knee joint, crepitus present, walks with slight left sided limp, no device,   SKIN:  wound healing well, no signs of infection left buttock wound C/D/I, no drainage or shadowing noted on dressin, mild tenderess   NEURO:   Cranial nerves 2-12 are normal tested and grossly at patient's baseline  PSYCH:  oriented X 3, normal insight, judgement and memory, affect and mood normal    Lab/Diagnostic data:     CBC RESULTS:   Recent Labs   Lab Test  09/07/18   0557  09/06/18   0623   WBC  7.1  5.5   RBC  3.47*  3.51*   HGB  9.7*   10.0*   HCT  29.6*  30.0*   MCV  85  86   MCH  28.0  28.5   MCHC  32.8  33.3   RDW  13.5  13.7   PLT  160  150       Last Basic Metabolic Panel:  Recent Labs   Lab Test  09/02/18   0505  03/22/17   1240   NA  136  138   POTASSIUM  4.0  4.1   CHLORIDE  102  102   ALY  8.8  8.7   CO2  29  30   BUN  12  14   CR  0.85  0.70   GLC  96  94       Liver Function Studies -   Recent Labs   Lab Test  03/22/17   1240  01/06/17   1028   PROTTOTAL  7.3  6.8   ALBUMIN  4.0  4.1   BILITOTAL  0.5  0.7   ALKPHOS  93  73   AST  29   --    ALT  56   --        TSH   Date Value Ref Range Status   03/22/2017 1.51 0.40 - 4.00 mU/L Final       ASSESSMENT/PLAN:  (L02.31) Left buttock abscess  (primary encounter diagnosis)  (T14.8XXA) Bleeding from wound  Comment: No bleeding noted from wound, no s/s of infection, pain controlled  Plan: wound care per [facility] nurse/surgery. HGb to check for stability    (J18.1) Pneumonia of right lower lobe due to infectious organism (H)  Comment: resolved  Plan: monitor, recommend f/u CXR in 2 weeks    (F41.9) Anxiety  Comment: stable, does have history of alcohol use.   Plan: continue temazepam at bedtime PRN.    (I48.2) Chronic atrial fibrillation (H)  Comment: rate controlled, BP labile at times  Plan: hold coumadin until OK'd by surgery to resume, continue metoprolol with hold parameters. Monitor and adjust PRN.     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     (G62.9) Peripheral polyneuropathy  Comment: chronic, stable  Plan: monitor    (M1A.9XX0) Chronic gout, unspecified cause, unspecified site  Comment: report typically flare-ups to left ankle  Plan: continue colchicine daily.     (I10) Essential hypertension with goal blood pressure less than 140/90  Comment: labile at times  Plan: continue  metoprolol XL 25mg BID with hold parameters, monitroa nd adjust     (K59.01) Slow transit constipation  Comment: chronic  Plan: increase mineral oil to 20ml daily per patient request    (M17.12) Primary osteoarthritis of left knee  Comment: chronic, pain controlled  Plan: add PRN APAP, PT/OT, monitor.     Orders:  1. Increase miralax to 20 mL PO int the Am Dx: Constipation  2. Hold Metoprolol for SBP <100, HR <60 Dx: HTN, Afib  3. Hgb on 9/12 Dx: Anemia  4. Tylenol 650 mg PO every 4 hours PRN Dx: Pain    Total time spent with patient visit at the skilled nursing facility was 35 min including patient visit and review of past records. Greater than 50% of total time spent with counseling and coordinating care due to discussion or recent medical history, medication review, discussion of plan of care in TCU    Electronically signed by:  AVERY Bacon CNP      Sincerely,        AVERY Bacon CNP

## 2018-09-10 NOTE — MR AVS SNAPSHOT
"              After Visit Summary   9/10/2018    Nabor Cunningham    MRN: [id number]           Patient Information     Date Of Birth          1935        Visit Information        Provider Department      9/10/2018 11:15 AM Fady Boyer MD [facility]        Today's Diagnoses     Abscess of buttock, left    -  1      Care Instructions    Per Physician's instructions            Follow-ups after your visit        Your next 10 appointments already scheduled     Sep 10, 2018 11:15 AM CDT   [facility] Visit with Fady [facility]er MD   [facility]    [address] [facility] [address]   [phone]            Sep 11, 2018  1:30 PM CDT   Nursing Home with Romelia Verma MD   [facility] ([facility])    [address] [place] [address] [phone]              Who to contact     If you have questions or need follow up information about today's clinic visit or your schedule please contact [facility] directly at [phone].  Normal or non-critical lab and imaging results will be communicated to you by MyChart, letter or phone within 4 business days after the clinic has received the results. If you do not hear from us within 7 days, please contact the clinic through MyChart or phone. If you have a critical or abnormal lab result, we will notify you by phone as soon as possible.  Submit refill requests through codetag or call your pharmacy and they will forward the refill request to us. Please allow 3 business days for your refill to be completed.          Additional Information About Your Visit        Care EveryWhere ID     This is your Care EveryWhere ID. This could be used by other organizations to access your [facility] medical records  [id number]        Your Vitals Were     Pulse Temperature Height BMI (Body Mass Index)          90 97.9  F (36.6  C) (Oral) 1.626 m (5' 4\") 28.15 kg/m2         Blood " Pressure from Last 3 Encounters:   09/10/18 125/63   09/07/18 113/65   08/28/18 123/87    Weight from Last 3 Encounters:   09/10/18 74.4 kg (164 lb)   09/02/18 73.4 kg (161 lb 13.1 oz)   08/27/18 76.7 kg (169 lb)              Today, you had the following     No orders found for display       Primary Care Provider Office Phone # Fax #    Amadou Salas [phone] [address] [facility] [address]        Equal Access to Services     [facility]: Hadii kenzie woodard hadoxana Polanco, wadevonte watters, keli dewitt, lili hernandez . So [facility] [phone].    ATENCIÓN: Si habla español, tiene a garcia disposición servicios gratuitos de asistencia lingüística. [facility] [phone].    We comply with applicable federal civil rights laws and [place] laws. We do not discriminate on the basis of race, color, national origin, age, disability, sex, sexual orientation, or gender identity.            Thank you!     Thank you for choosing [facility]  for your care. Our goal is always to provide you with excellent care. Hearing back from our patients is one way we can continue to improve our services. Please take a few minutes to complete the written survey that you may receive in the mail after your visit with us. Thank you!             Your Updated Medication List - Protect others around you: Learn how to safely use, store and throw away your medicines at www.disposemymeds.org.          This list is accurate as of 9/10/18 11:05 AM.  Always use your most recent med list.                   Brand Name Dispense Instructions for use Diagnosis    calcium carbonate 500 mg {elemental} 500 MG tablet    OS-ALY     Take 1 tablet by mouth 2 times daily        colchicine 0.6 MG tablet    COLCRYS    30 tablet    Take 1 tablet (0.6 mg) by mouth daily    Chronic gout, unspecified cause, unspecified site       cyanocobalamin 1000 MCG tablet    vitamin  B-12    30  tablet    TAKE ONE TABLET BY MOUTH DAILY    Vitamin B 12 deficiency       hypromellose 0.4 % Soln ophthalmic solution    ARTIFICIAL TEARS     Place 1 drop into both eyes every hour as needed for dry eyes        metoprolol succinate 25 MG 24 hr tablet    TOPROL-XL    90 tablet    Take 25 mg by mouth every evening        mineral oil oil      Take 5 mLs by mouth daily        Multi-vitamin Tabs tablet     100 tablet    Take 1 tablet by mouth daily        nortriptyline 25 MG capsule    PAMELOR    270 capsule    TAKE ONE CAPSULE MID MORNING AND TWO CAPSULES BY MOUTH DAILY AT BEDTIME    Anxiety       prednisoLONE acetate 1 % ophthalmic susp    PRED FORTE     Place 1 drop Into the left eye 2 times daily        temazepam 30 MG capsule    RESTORIL    30 capsule    TAKE ONE CAPSULE BY MOUTH AT BEDTIME AS NEEDED FOR SLEEP    Persistent insomnia

## 2018-09-10 NOTE — DISCHARGE INSTRUCTIONS
Irrigate wound with wound cleanser or saline and pat dry with gauze, (no aggressive rubbing). Cleanse surrounding skin with  or saline and dry.  Cut Aquacel Ag to fit into wound base and loosely fill to skin level taking care to not overlap onto intact skin, will take 2-3 layers.  Cover with Adhesive foam dressing, recommend silicone foam, such as Mepilex, for fragile skin.  Change every 2-3 days and as needed for drainage.    There is an extra foam dressing covering tape stripping injury, this may be healed by next dressing change - it not continue to cover to protect and heal.  This dressing is waterproof and may stay in place when showering.    Follow up in two weeks or sooner if concerns such as bleeding or infection.    Signs and symptoms of infection:  Green drainage  Foul odor  Increased pain to wound  Redness or swelling at the site of the wound  Fever    Monica Lundberg RN, CWOCN [phone]

## 2018-09-10 NOTE — PROGRESS NOTES
Patient returns for wound care.    2 weeks ago we performed an excision of a chronically infected area over his left buttock. Unfortunately, he has some postop bleeding problems because of his being on Coumadin. This led to emergency room visits, hospital admission, and eventual surgery. Despite surgical intervention the wound continued to lose mainly from the medial aspect of the wound.    Just before discharge last Friday I placed FloSeal in the wound and packed it with some Surgicel.    Patient was in for wound visit. Care center reports no problems with bleeding. The patient has not noted any either.    The dressing was removed. The FloSeal had returned to a plug which was removed. The wound was a little bit soupy, but fortunately no bleeding was seen.    Dressing care will be per wound nurse recommendations. I will see the patient back with wound care probably in a week.    Fady Boyer MD FACS

## 2018-09-10 NOTE — IP AVS SNAPSHOT
MRN:[id number]                      After Visit Summary   9/10/2018    Nabor Cunningham    MRN: [id number]           Visit Information        Provider Department      9/10/2018 10:30 AM Mikhail Chambers Nurse - [place]           Review of your medicines      UNREVIEWED medicines. Ask your doctor about these medicines        Dose / Directions    calcium carbonate 500 mg {elemental} 500 MG tablet   Commonly known as:  OS-ALY        Dose:  1 tablet   Take 1 tablet by mouth 2 times daily   Refills:  0       colchicine 0.6 MG tablet   Commonly known as:  COLCRYS   Used for:  Chronic gout, unspecified cause, unspecified site        Dose:  0.6 mg   Take 1 tablet (0.6 mg) by mouth daily   Quantity:  30 tablet   Refills:  0       cyanocobalamin 1000 MCG tablet   Commonly known as:  vitamin  B-12   Used for:  Vitamin B 12 deficiency        TAKE ONE TABLET BY MOUTH DAILY   Quantity:  30 tablet   Refills:  11       hypromellose 0.4 % Soln ophthalmic solution   Commonly known as:  ARTIFICIAL TEARS        Dose:  1 drop   Place 1 drop into both eyes every hour as needed for dry eyes   Refills:  0       metoprolol succinate 25 MG 24 hr tablet   Commonly known as:  TOPROL-XL        Dose:  25 mg   Take 25 mg by mouth every evening   Quantity:  90 tablet   Refills:  1       mineral oil oil        Dose:  5 mL   Take 5 mLs by mouth daily   Refills:  0       Multi-vitamin Tabs tablet        Dose:  1 tablet   Take 1 tablet by mouth daily   Quantity:  100 tablet   Refills:  3       nortriptyline 25 MG capsule   Commonly known as:  PAMELOR   Used for:  Anxiety        TAKE ONE CAPSULE MID MORNING AND TWO CAPSULES BY MOUTH DAILY AT BEDTIME   Quantity:  270 capsule   Refills:  3       prednisoLONE acetate 1 % ophthalmic susp   Commonly known as:  PRED FORTE        Dose:  1 drop   Place 1 drop Into the left eye 2 times daily   Refills:  2       temazepam 30 MG capsule   Commonly known as:  RESTORIL   Used for:   Persistent insomnia        TAKE ONE CAPSULE BY MOUTH AT BEDTIME AS NEEDED FOR SLEEP   Quantity:  30 capsule   Refills:  0                Protect others around you: Learn how to safely use, store and throw away your medicines at www.disposemymeds.org.         Follow-ups after your visit        Your next 10 appointments already scheduled     Sep 10, 2018 11:15 AM CDT   [facility]   [facility]    [address] [address]   [phone]            Sep 11, 2018  1:30 PM CDT   Nursing Home with Romelia Verma MD   [facility] ([facility])    [facility] [address] [address]   [phone]            Sep 28, 2018  9:00 AM CDT   [facility]    [address] [facility] [address]   [phone]           Bring a current list of meds and any records pertaining to this visit. For Physicals, please bring immunization records and any forms needing to be filled out. Please arrive 10 minutes early to complete paperwork.               Care Instructions        Further instructions from your care team       Irrigate wound with wound cleanser or saline and pat dry with gauze, (no aggressive rubbing). Cleanse surrounding skin with  or saline and dry.  Cut Aquacel Ag to fit into wound base and loosely fill to skin level taking care to not overlap onto intact skin, will take 2-3 layers.  Cover with Adhesive foam dressing, recommend silicone foam, such as Mepilex, for fragile skin.  Change every 2-3 days and as needed for drainage.    There is an extra foam dressing covering tape stripping injury, this may be healed by next dressing change - it not continue to cover to protect and heal.  This dressing is waterproof and may stay in place when showering.    Follow up in two weeks or sooner if concerns such as bleeding  or infection.    Signs and symptoms of infection:  Green drainage  Foul odor  Increased pain to wound  Redness or swelling at the site of the wound  Fever    Monica Lundberg RN, [phone]         Additional Information About Your Visit        Care EveryWhere ID     This is your Care EveryWhere ID. This could be used by other organizations to access your [facility] medical records  [id number]         Primary Care Provider Office Phone # Fax #    Amadou Salas [phone]      Equal Access to Services     [facility]: Hadii aad ku hadasho Soomaali, waaxda luqadaha, qaybta kaalmada adeegyada, lili hernandez . So [facility] [phone].    ATENCIÓN: Si habla español, tiene a garcia disposición servicios gratuitos de asistencia lingüística. Llame al [phone].    We comply with applicable federal civil rights laws and [place] laws. We do not discriminate on the basis of race, color, national origin, age, disability, sex, sexual orientation, or gender identity.            Thank you!     Thank you for choosing [facility] for your care. Our goal is always to provide you with excellent care. Hearing back from our patients is one way we can continue to improve our services. Please take a few minutes to complete the written survey that you may receive in the mail after you visit with us. Thank you!             Medication List: This is a list of all your medications and when to take them. Check marks below indicate your daily home schedule. Keep this list as a reference.      Medications           Morning Afternoon Evening Bedtime As Needed    calcium carbonate 500 mg {elemental} 500 MG tablet   Commonly known as:  OS-ALY   Take 1 tablet by mouth 2 times daily                                colchicine 0.6 MG tablet   Commonly known as:  COLCRYS   Take 1 tablet (0.6 mg) by mouth daily                                cyanocobalamin 1000 MCG tablet   Commonly known as:  vitamin  B-12    TAKE ONE TABLET BY MOUTH DAILY                                hypromellose 0.4 % Soln ophthalmic solution   Commonly known as:  ARTIFICIAL TEARS   Place 1 drop into both eyes every hour as needed for dry eyes                                metoprolol succinate 25 MG 24 hr tablet   Commonly known as:  TOPROL-XL   Take 25 mg by mouth every evening                                mineral oil oil   Take 5 mLs by mouth daily                                Multi-vitamin Tabs tablet   Take 1 tablet by mouth daily                                nortriptyline 25 MG capsule   Commonly known as:  PAMELOR   TAKE ONE CAPSULE MID MORNING AND TWO CAPSULES BY MOUTH DAILY AT BEDTIME                                prednisoLONE acetate 1 % ophthalmic susp   Commonly known as:  PRED FORTE   Place 1 drop Into the left eye 2 times daily                                temazepam 30 MG capsule   Commonly known as:  RESTORIL   TAKE ONE CAPSULE BY MOUTH AT BEDTIME AS NEEDED FOR SLEEP

## 2018-09-10 NOTE — LETTER
9/10/2018         RE: Nabor Cunningham  [address] [address]        Dear Colleague,    Thank you for referring your patient, Nabor Cunningham, to the [facility]. Please see a copy of my visit note below.    Patient returns for wound care.    2 weeks ago we performed an excision of a chronically infected area over his left buttock. Unfortunately, he has some postop bleeding problems because of his being on Coumadin. This led to emergency room visits, hospital admission, and eventual surgery. Despite surgical intervention the wound continued to lose mainly from the medial aspect of the wound.    Just before discharge last Friday I placed FloSeal in the wound and packed it with some Surgicel.    Patient was in for wound visit. Care center reports no problems with bleeding. The patient has not noted any either.    The dressing was removed. The FloSeal had returned to a plug which was removed. The wound was a little bit soupy, but fortunately no bleeding was seen.    Dressing care will be per wound nurse recommendations. I will see the patient back with wound care probably in a week.    Fady Boyer MD FACS    Again, thank you for allowing me to participate in the care of your patient.        Sincerely,        Fady Boyer MD

## 2018-09-11 ENCOUNTER — NURSING HOME VISIT (OUTPATIENT)
Dept: GERIATRICS | Facility: CLINIC | Age: 83
End: 2018-09-11
Payer: MEDICARE

## 2018-09-11 VITALS
SYSTOLIC BLOOD PRESSURE: 121 MMHG | DIASTOLIC BLOOD PRESSURE: 72 MMHG | HEART RATE: 71 BPM | OXYGEN SATURATION: 94 % | BODY MASS INDEX: 28.15 KG/M2 | RESPIRATION RATE: 20 BRPM | TEMPERATURE: 97.1 F | WEIGHT: 164 LBS

## 2018-09-11 DIAGNOSIS — I48.20 CHRONIC ATRIAL FIBRILLATION (H): ICD-10-CM

## 2018-09-11 DIAGNOSIS — R91.8 LUNG INFILTRATE: ICD-10-CM

## 2018-09-11 DIAGNOSIS — M10.9 GOUT, UNSPECIFIED CAUSE, UNSPECIFIED CHRONICITY, UNSPECIFIED SITE: ICD-10-CM

## 2018-09-11 DIAGNOSIS — K59.01 SLOW TRANSIT CONSTIPATION: ICD-10-CM

## 2018-09-11 DIAGNOSIS — I10 BENIGN ESSENTIAL HYPERTENSION: ICD-10-CM

## 2018-09-11 DIAGNOSIS — D62 ANEMIA DUE TO BLOOD LOSS, ACUTE: ICD-10-CM

## 2018-09-11 DIAGNOSIS — R53.81 PHYSICAL DECONDITIONING: ICD-10-CM

## 2018-09-11 DIAGNOSIS — T14.8XXA BLEEDING FROM WOUND: Primary | ICD-10-CM

## 2018-09-11 DIAGNOSIS — G62.9 PERIPHERAL POLYNEUROPATHY: ICD-10-CM

## 2018-09-11 PROCEDURE — 99306 1ST NF CARE HIGH MDM 50: CPT | Performed by: INTERNAL MEDICINE

## 2018-09-11 NOTE — PROGRESS NOTES
[facility]  INITIAL VISIT NOTE  September 11, 2018    PRIMARY CARE PROVIDER AND CLINIC:  [facility] [address] [place] [address]    Chief Complaint   Patient presents with     Hospital F/U       HPI:    Nabor Cunningham is a 82 year old  (1935) male [facility]. Medical history is notable for HTN, atrial fibrillation and peripheral neuropathy. He had an I&D of a left buttock abscess on 8/27/18 in clinic. He was seen in the [facility] ER on 8/28/18 with bleeding from that wound. He was then hospitalized at [facility] from 9/2/18 to 9/6/18 with ongoing bleeding. CT with hematoma. His INR was reversed. Hemostasis was difficult and he developed acute blood loss anemia (Hgb 12.8 --> 9.7). Etiology of ongoing bleeding was not clear. Imaging also with incidental finding of RML and RLL infiltrate and he was treated for a PNA, though did not have clinical symptoms of this. Radiology is recommending a follow up CXR as an outpatient. He was admitted to this facility for medical management and rehab.     Today, Mr. Cunningham is seen in his room. Overall is doing well. No chest pain or dyspnea. No abdominal pain. Was out to surgery clinic and can restart warfarin later this week. He is going home on Thursday and is very pleased about this. Working with therapies. No concerns per nursing.     CODE STATUS:   CPR/Full code     ALLERGIES:     Allergies   Allergen Reactions     Amoxicillin Hives     Penicillins Other (See Comments)     Dizzy       Trazodone Other (See Comments)     Hallucinations         PAST MEDICAL HISTORY:   Past Medical History:   Diagnosis Date     Anxiety      Gout      Hyperlipemia      Hypertension      Insomnia      Paroxysmal atrial fibrillation (H)        PAST SURGICAL HISTORY:   Past Surgical History:   Procedure Laterality Date     INCISION AND DRAINAGE TRUNK, COMBINED N/A 9/6/2018    Procedure:  COMBINED INCISION AND DRAINAGE TRUNK;  incision and cauterization of left buttock bleed.;  Surgeon: Dain Davis MD;  Location: [facility] OR       FAMILY HISTORY:   Reviewed and non-contributory    SOCIAL HISTORY:   Lives alone     MEDICATIONS:  Current Outpatient Prescriptions   Medication Sig Dispense Refill     ACETAMINOPHEN PO Take 650 mg by mouth every 4 hours as needed for pain       calcium carbonate (OS- MG [place]. [place]) 1250 MG tablet Take 1 tablet by mouth 2 times daily       colchicine (COLCRYS) 0.6 MG tablet Take 1 tablet (0.6 mg) by mouth daily 30 tablet 0     cyanocobalamin (VITAMIN  B-12) 1000 MCG tablet TAKE ONE TABLET BY MOUTH DAILY 30 tablet 11     hypromellose (ARTIFICIAL TEARS) 0.4 % SOLN ophthalmic solution Place 1 drop into both eyes every hour as needed for dry eyes       metoprolol (TOPROL-XL) 25 MG 24 hr tablet Take 25 mg by mouth every evening  90 tablet 1     mineral oil oil Take 5 mLs by mouth daily       multivitamin, therapeutic with minerals (MULTI-VITAMIN) TABS tablet Take 1 tablet by mouth daily 100 tablet 3     nortriptyline (PAMELOR) 25 MG capsule TAKE ONE CAPSULE MID MORNING AND TWO CAPSULES BY MOUTH DAILY AT BEDTIME 270 capsule 3     prednisoLONE acetate (PRED FORTE) 1 % ophthalmic susp Place 1 drop Into the left eye 2 times daily   2     temazepam (RESTORIL) 30 MG capsule TAKE ONE CAPSULE BY MOUTH AT BEDTIME AS NEEDED FOR SLEEP 30 capsule 0       Post Discharge Medication Reconciliation Status: medication reconcilation previously completed during another office visit.    ROS:  10 point ROS neg other than the symptoms noted above in the HPI.    PHYSICAL EXAM:  /72  Pulse 71  Temp 97.1  F (36.2  C)  Resp 20  Wt 164 lb (74.4 kg)  SpO2 94%  BMI 28.15 kg/m2   Gen: sitting on edge of bed, alert, cooperative and in no acute distress  HEENT: normocephalic; oropharynx clear  Card: RRR, S1, S2, no murmurs  Resp: lungs clear to auscultation bilaterally  GI: abdomen soft,  not-tender  MSK: normal muscle tone, no LE edema  Neuro: CX II-XII grossly in tact; ROM in all four extremities grossly in tact  Psych: alert and oriented x3; normal affect    LABORATORY/IMAGING DATA:  Reviewed as per Epic    ASSESSMENT/PLAN:    Left Buttock Abscess s/p I&D (8/27/18) w/ Subsequent Bleeding  He was seen in surgery clinic yesterday for a dressing change and bleeding appears to have ceased.   -- wound cares as ordered  -- follow up in surgery clinic as scheduled on 9/28/18    Acute Blood Loss Anemia  Secondary to above. No evidence of ongoing bleeding. Hgb 12.8 --> 9.7  -- repeat Hgb 9/12 to ensure stability    RML/RLL Infiltrate  Incidental finding on imaging. Was treated for a PNA, but clinically didn't have many symptoms of this. Rads recommending repeat CXR to ensure resolution.  -- repeat CXR as an outpatient     Chronic Atrial Fibrillation  HR 70s  -- rate controlled with metoprolol XL 25 mg daily  -- will restart warfarin on Thurs   -- will have home RN drawn INR on Fri 9/14    HTN  SBPs 110s  -- continues on metoprolol XL 25 mg daily  -- follow BPs and adjust medications as needed    Peripheral Neuropathy  -- continues on nortriptyline 25 mg qam and 50 mg at bedtime    Gout  By history. No signs of flare.   -- continues on colchicine 0.6 mg daily     Slow Transit Constipation  -- continues on Miralax daily and mineral oil daily   -- adjust bowel regimen as needed    Physical Deconditioning  In setting of hospitalization and underlying medical conditions  -- ongoing PT/OT      FGS TIME SPENT: Total time spent with patient visit at the skilled nursing facility was >45 min including patient visit, review of past records and discussion with nursing and NP. Greater than 50% of total time spent with counseling and coordinating care due to bleeding buttock wound, atrial fibrillation and timing of restart warfarin, discharge planning, HTN, physical deconditioning    Electronically signed by:  Romelia  Abdelrahman Verma MD            Documentation of Face to Face and Certification for Home Health Services    I certify that patient, Nabor Cunningham is under my care and that I, or a Nurse Practitioner or Physician's Assistant working with me, had a face-to-face encounter that meets the physician face-to-face encounter requirements with this patient on: 9/11/2018.    This encounter with the patient was in whole, or in part, for the following medical condition, which is the primary reason for Home Health Care: bleeding buttock wound, atrial fibrillation about to resume warfarin, HTN, physical deconditioning     I certify that, based on my findings, the following services are medically necessary Home Health Services: Nursing, Occupational Therapy and Physical Therapy    My clinical findings support the need for the above services because: Nurse is needed: med set up/education, labs/INR draw on 9/14/18 and then per INR., Occupational Therapy Services are needed to assess and treat cognitive ability and address ADL safety due to impairment in ongoing strength, balance, endurance and ADLs. and Physical Therapy Services are needed to assess and treat the following functional impairments: ongoing strength, balance, endurance.    Further, I certify that my clinical findings support that this patient is homebound (i.e. absences from home require considerable and taxing effort and are for medical reasons or [employer] witness services or infrequently or of short duration when for other reasons) because: unable to leave home without assistance    Based on the above findings, I certify that this patient is confined to the home and needs intermittent skilled nursing care, physical therapy and/or speech therapy.  The patient is under my care, and I have initiated the establishment of the plan of care.  This patient will be followed by a physician who will periodically review the plan of care.    Physician/Provider to provide follow up care:  Heron Mayo A    [facility]ible [facility] certified Physician at time of discharge: Romelia Verma MD  Electronically Signed by: Romelia Verma MD

## 2018-09-12 ENCOUNTER — HOSPITAL LABORATORY (OUTPATIENT)
Facility: OTHER | Age: 83
End: 2018-09-12

## 2018-09-12 LAB — HGB BLD-MCNC: 10.5 G/DL (ref 13.3–17.7)

## 2018-09-14 ENCOUNTER — ANTICOAGULATION THERAPY VISIT (OUTPATIENT)
Dept: ANTICOAGULATION | Facility: CLINIC | Age: 83
End: 2018-09-14
Payer: MEDICARE

## 2018-09-14 DIAGNOSIS — Z79.01 LONG-TERM (CURRENT) USE OF ANTICOAGULANTS: ICD-10-CM

## 2018-09-14 DIAGNOSIS — I48.20 CHRONIC ATRIAL FIBRILLATION (H): ICD-10-CM

## 2018-09-14 DIAGNOSIS — Z79.01 CHRONIC ANTICOAGULATION: ICD-10-CM

## 2018-09-14 LAB — INR PPP: 1.1

## 2018-09-14 PROCEDURE — 99207 ZZC NO CHARGE NURSE ONLY: CPT

## 2018-09-14 NOTE — MR AVS SNAPSHOT
Nabor Cunningham   9/14/2018   Anticoagulation Therapy Visit    Description:  82 year old male   Provider:  Belia Jones, RN   [facility]           INR as of 9/14/2018     Today's INR 1.1!      Anticoagulation Summary as of 9/14/2018     INR goal 2.0-3.0   Today's INR 1.1!   Full warfarin instructions 9/14: 6 mg; 9/15: 6 mg; 9/16: 4 mg   Next INR check 9/17/2018    Indications   Chronic anticoagulation [Z79.01]  Chronic atrial fibrillation (H) [I48.2]  Long-term (current) use of anticoagulants [Z79.01] [Z79.01]         September 2018 Details    Sun Mon Tue Wed Thu Fri Sat           1                 2               3               4               5               6               7               8                 9               10               11               12               13               14      6 mg   See details      15      6 mg           16      4 mg         17            18               19               20               21               22                 23               24               25               26               27               28               29                 30                      Date Details   09/14 This INR check       Date of next INR:  9/17/2018         How to take your warfarin dose     To take:  4 mg Take 2 of the 2 mg tablets.    To take:  6 mg Take 3 of the 2 mg tablets.

## 2018-09-14 NOTE — PROGRESS NOTES
ANTICOAGULATION FOLLOW-UP CLINIC VISIT    Patient Name:  Nabor Cunningham  Date:  9/14/2018  Contact Type:  Telephone/ spoke with George at [facility]    SUBJECTIVE:     Patient Findings     Positives Other complaints (left buttock wound), Intentional hold of therapy (9-3 to 9-12)    Comments Patient recently discharged from TCU to home. He had a buttock abscess that developed a hematoma and bled. Warfarin was held for more than a week and just resumed yesterday. No bridging. George with home care reports no bleeding from wound at this time. Wound does not show any signs of infection. No other medication changes. Will increase slowly due to recent bleeding concerns and pt still has healing wound. Recheck in 3 days. No signs of clots.           OBJECTIVE    INR   Date Value Ref Range Status   09/14/2018 1.1  Final       ASSESSMENT / PLAN  INR assessment SUB recent hold   Recheck INR In: 3 DAYS    INR Location Homecare INR      Anticoagulation Summary as of 9/14/2018     INR goal 2.0-3.0   Today's INR 1.1!   Warfarin maintenance plan No maintenance plan   Full warfarin instructions 9/14: 6 mg; 9/15: 6 mg; 9/16: 4 mg   Weekly warfarin total 46 mg   Plan last modified Belia Jones RN (9/14/2018)   Next INR check 9/17/2018   Target end date Indefinite    Indications   Chronic anticoagulation [Z79.01]  Chronic atrial fibrillation (H) [I48.2]  Long-term (current) use of anticoagulants [Z79.01] [Z79.01]         Anticoagulation Episode Summary     INR check location     Preferred lab     Send INR reminders to [facility] PHONE [facility] POOL    Comments * 2mg tabs.  Home Care.      Anticoagulation Care Providers     Provider Role Specialty Phone number    Heron Mayo MD [facility] [phone]            See the Encounter Report to view Anticoagulation Flowsheet and Dosing Calendar (Go to Encounters tab in chart review, and find the Anticoagulation Therapy Visit)        Belia Jones RN

## 2018-09-17 ENCOUNTER — ANTICOAGULATION THERAPY VISIT (OUTPATIENT)
Dept: ANTICOAGULATION | Facility: CLINIC | Age: 83
End: 2018-09-17
Payer: MEDICARE

## 2018-09-17 DIAGNOSIS — I48.20 CHRONIC ATRIAL FIBRILLATION (H): ICD-10-CM

## 2018-09-17 DIAGNOSIS — Z79.01 LONG-TERM (CURRENT) USE OF ANTICOAGULANTS: ICD-10-CM

## 2018-09-17 DIAGNOSIS — Z79.01 CHRONIC ANTICOAGULATION: ICD-10-CM

## 2018-09-17 LAB — INR PPP: 1.6

## 2018-09-17 PROCEDURE — 99207 ZZC NO CHARGE NURSE ONLY: CPT

## 2018-09-17 NOTE — PROGRESS NOTES
"  ANTICOAGULATION FOLLOW-UP CLINIC VISIT    Patient Name:  Nabor Cunningham  Date:  9/17/2018  Contact Type:  Telephone/ [facility]    SUBJECTIVE:     Patient Findings     Positives Initiation of therapy    Comments Patient was restarted on warfarin in the last week. Patient drinks ensure, \"a couple of swallows per day\". Patient was also drinking V8, however he was really inconsistent with it so he decided he was going to just avoid it.     No changes to medications per homecare nurse report. Will recheck the INR on Friday. Patient currently has homecare MWF.            OBJECTIVE    INR   Date Value Ref Range Status   09/17/2018 1.6  Final       ASSESSMENT / PLAN  INR assessment SUB    Recheck INR In: 4 DAYS    [facility]     Anticoagulation Summary as of 9/17/2018     INR goal 2.0-3.0   Today's INR 1.6!   Warfarin maintenance plan No maintenance plan   Full warfarin instructions 9/17: 6 mg; 9/18: 4 mg; 9/19: 6 mg; 9/20: 4 mg   Weekly warfarin total 46 mg   Plan last modified Belia Jones RN (9/14/2018)   Next INR check 9/21/2018   Priority INR   Target end date Indefinite    Indications   Chronic anticoagulation [Z79.01]  Chronic atrial fibrillation (H) [I48.2]  Long-term (current) use of anticoagulants [Z79.01] [Z79.01]         Anticoagulation Episode Summary     INR check location     Preferred lab     Send INR reminders to [facility] PHONE [facility] POOL    Comments * 2mg tabs. [facility].      Anticoagulation Care Providers     Provider Role Specialty Phone number    Heron Mayo MD [facility] [phone]            See the Encounter Report to view Anticoagulation Flowsheet and Dosing Calendar (Go to Encounters tab in chart review, and find the Anticoagulation Therapy Visit)        Ada Isaac RN, CACP              "

## 2018-09-17 NOTE — MR AVS SNAPSHOT
Nabor Cunningham   9/17/2018   Anticoagulation Therapy Visit    Description:  82 year old male   Provider:  Ada Isaac, RN   Department:  [facility]           INR as of 9/17/2018     Today's INR 1.6!      Anticoagulation Summary as of 9/17/2018     INR goal 2.0-3.0   Today's INR 1.6!   Full warfarin instructions 9/17: 6 mg; 9/18: 4 mg; 9/19: 6 mg; 9/20: 4 mg   Next INR check 9/21/2018    Indications   Chronic anticoagulation [Z79.01]  Chronic atrial fibrillation (H) [I48.2]  Long-term (current) use of anticoagulants [Z79.01] [Z79.01]         September 2018 Details    Sun Mon Tue Wed Thu Fri Sat           1                 2               3               4               5               6               7               8                 9               10               11               12               13               14               15                 16               17      6 mg   See details      18      4 mg         19      6 mg         20      4 mg         21            22                 23               24               25               26               27               28               29                 30                      Date Details   09/17 This INR check       Date of next INR:  9/21/2018         How to take your warfarin dose     To take:  4 mg Take 2 of the 2 mg tablets.    To take:  6 mg Take 3 of the 2 mg tablets.

## 2018-09-21 ENCOUNTER — OFFICE VISIT (OUTPATIENT)
Dept: FAMILY MEDICINE | Facility: CLINIC | Age: 83
End: 2018-09-21
Payer: MEDICARE

## 2018-09-21 ENCOUNTER — RADIANT APPOINTMENT (OUTPATIENT)
Dept: GENERAL RADIOLOGY | Facility: CLINIC | Age: 83
End: 2018-09-21
Attending: FAMILY MEDICINE
Payer: MEDICARE

## 2018-09-21 ENCOUNTER — ANTICOAGULATION THERAPY VISIT (OUTPATIENT)
Dept: ANTICOAGULATION | Facility: CLINIC | Age: 83
End: 2018-09-21

## 2018-09-21 VITALS
OXYGEN SATURATION: 97 % | SYSTOLIC BLOOD PRESSURE: 110 MMHG | BODY MASS INDEX: 28.49 KG/M2 | WEIGHT: 166 LBS | DIASTOLIC BLOOD PRESSURE: 60 MMHG | TEMPERATURE: 97.3 F | RESPIRATION RATE: 18 BRPM | HEART RATE: 72 BPM

## 2018-09-21 DIAGNOSIS — L30.9 ECZEMA, UNSPECIFIED TYPE: ICD-10-CM

## 2018-09-21 DIAGNOSIS — I97.620 POSTOPERATIVE HEMORRHAGE INVOLVING CIRCULATORY SYSTEM FOLLOWING NON-CIRCULATORY SYSTEM PROCEDURE: Primary | ICD-10-CM

## 2018-09-21 DIAGNOSIS — Z79.01 LONG-TERM (CURRENT) USE OF ANTICOAGULANTS: ICD-10-CM

## 2018-09-21 DIAGNOSIS — J18.9 PNEUMONIA DUE TO INFECTIOUS ORGANISM, UNSPECIFIED LATERALITY, UNSPECIFIED PART OF LUNG: ICD-10-CM

## 2018-09-21 DIAGNOSIS — I48.20 CHRONIC ATRIAL FIBRILLATION (H): ICD-10-CM

## 2018-09-21 DIAGNOSIS — Z79.01 CHRONIC ANTICOAGULATION: ICD-10-CM

## 2018-09-21 DIAGNOSIS — F33.1 MODERATE EPISODE OF RECURRENT MAJOR DEPRESSIVE DISORDER (H): ICD-10-CM

## 2018-09-21 DIAGNOSIS — Z23 NEED FOR PROPHYLACTIC VACCINATION AND INOCULATION AGAINST INFLUENZA: ICD-10-CM

## 2018-09-21 DIAGNOSIS — S31.829D WOUND OF LEFT BUTTOCK, SUBSEQUENT ENCOUNTER: ICD-10-CM

## 2018-09-21 DIAGNOSIS — G47.00 PERSISTENT INSOMNIA: ICD-10-CM

## 2018-09-21 LAB
ERYTHROCYTE [DISTWIDTH] IN BLOOD BY AUTOMATED COUNT: 14 % (ref 10–15)
HCT VFR BLD AUTO: 34.5 % (ref 40–53)
HGB BLD-MCNC: 11.1 G/DL (ref 13.3–17.7)
INR PPP: 2
MCH RBC QN AUTO: 28 PG (ref 26.5–33)
MCHC RBC AUTO-ENTMCNC: 32.2 G/DL (ref 31.5–36.5)
MCV RBC AUTO: 87 FL (ref 78–100)
PLATELET # BLD AUTO: 163 10E9/L (ref 150–450)
RBC # BLD AUTO: 3.97 10E12/L (ref 4.4–5.9)
WBC # BLD AUTO: 4.4 10E9/L (ref 4–11)

## 2018-09-21 PROCEDURE — 99207 ZZC NO CHARGE NURSE ONLY: CPT

## 2018-09-21 PROCEDURE — 36415 COLL VENOUS BLD VENIPUNCTURE: CPT | Performed by: FAMILY MEDICINE

## 2018-09-21 PROCEDURE — 99495 TRANSJ CARE MGMT MOD F2F 14D: CPT | Performed by: FAMILY MEDICINE

## 2018-09-21 PROCEDURE — 85027 COMPLETE CBC AUTOMATED: CPT | Performed by: FAMILY MEDICINE

## 2018-09-21 PROCEDURE — 90662 IIV NO PRSV INCREASED AG IM: CPT | Performed by: FAMILY MEDICINE

## 2018-09-21 PROCEDURE — G0008 ADMIN INFLUENZA VIRUS VAC: HCPCS | Performed by: FAMILY MEDICINE

## 2018-09-21 PROCEDURE — 71046 X-RAY EXAM CHEST 2 VIEWS: CPT | Mod: FY

## 2018-09-21 RX ORDER — BETAMETHASONE DIPROPIONATE 0.5 MG/G
CREAM TOPICAL 2 TIMES DAILY
Qty: 45 G | Refills: 0 | Status: SHIPPED | OUTPATIENT
Start: 2018-09-21 | End: 2019-02-15

## 2018-09-21 ASSESSMENT — PAIN SCALES - GENERAL: PAINLEVEL: NO PAIN (0)

## 2018-09-21 NOTE — PROGRESS NOTES
ANTICOAGULATION FOLLOW-UP CLINIC VISIT    Patient Name:  Nabor Cunningham  Date:  9/21/2018  Contact Type:  Telephone/ Iveth RN-[facility]    SUBJECTIVE:     Patient Findings     Positives No Problem Findings    Comments Per RN patient denies any changes. Patient had 26 mg in the past 7 days. Writer adjusted dose so patient will have 38 mg by the next INR in 3 days.            OBJECTIVE    INR   Date Value Ref Range Status   09/21/2018 2.0  Final       ASSESSMENT / PLAN  INR assessment THER    Recheck INR In: 3 DAYS    INR Location [facility] INR      Anticoagulation Summary as of 9/21/2018     INR goal 2.0-3.0   Today's INR 2.0   Warfarin maintenance plan No maintenance plan   Full warfarin instructions 9/21: 6 mg; 9/22: 6 mg; 9/23: 6 mg   Weekly warfarin total 46 mg   Plan last modified Belia Jones RN (9/14/2018)   Next INR check 9/24/2018   Priority INR   Target end date Indefinite    Indications   Chronic anticoagulation [Z79.01]  Chronic atrial fibrillation (H) [I48.2]  Long-term (current) use of anticoagulants [Z79.01] [Z79.01]         Anticoagulation Episode Summary     INR check location     Preferred lab     Send INR reminders to [place] [facility] POOL    Comments * 2mg tabs. [facility].      Anticoagulation Care Providers     Provider Role Specialty Phone number    Heron Mayo MD [facility] [phone]            See the Encounter Report to view Anticoagulation Flowsheet and Dosing Calendar (Go to Encounters tab in chart review, and find the Anticoagulation Therapy Visit)        Daphne Lamar RN

## 2018-09-21 NOTE — MR AVS SNAPSHOT
Nabor Cunningham   9/21/2018   Anticoagulation Therapy Visit    Description:  82 year old male   Provider:  Daphne Lamar, RN   Department:  [facility]           INR as of 9/21/2018     Today's INR 2.0      Anticoagulation Summary as of 9/21/2018     INR goal 2.0-3.0   Today's INR 2.0   Full warfarin instructions 9/21: 6 mg; 9/22: 6 mg; 9/23: 6 mg   Next INR check 9/24/2018    Indications   Chronic anticoagulation [Z79.01]  Chronic atrial fibrillation (H) [I48.2]  Long-term (current) use of anticoagulants [Z79.01] [Z79.01]         September 2018 Details    Sun Mon Tue Wed Thu Fri Sat           1                 2               3               4               5               6               7               8                 9               10               11               12               13               14               15                 16               17               18               19               20               21      6 mg   See details      22      6 mg           23      6 mg         24            25               26               27               28               29                 30                      Date Details   09/21 This INR check       Date of next INR:  9/24/2018         How to take your warfarin dose     To take:  6 mg Take 3 of the 2 mg tablets.

## 2018-09-21 NOTE — PROGRESS NOTES
SUBJECTIVE:   Nabor Cunningham is a 82 year old male who presents to clinic today for the following health issues:  Chief Complaint   Patient presents with     Hospital F/U     [facility]- 9/2/18- 9/7/18- Post op bleeding.      Hospital Follow-up Visit:    Hospital/Nursing Home/IP Rehab Facility: [facility], [place]  Date of Admission: 9/2/2018  Date of Discharge: 9/7/2018  Reason(s) for Admission: post op bleeding from abscess on left buttock.            Problems taking medications regularly:  None       Medication changes since discharge: None       Problems adhering to non-medication therapy:  None    Summary of hospitalization:  [facility] discharge summary reviewed  See abstracted notes from recent hospital stay below.   Diagnostic Tests/Treatments reviewed.  Follow up needed: none  Other Healthcare Providers Involved in Patient s Care:         None  Update since discharge: improved.     Post Discharge Medication Reconciliation: discharge medications reconciled, continue medications without change.  Plan of care communicated with patient and son     Coding guidelines for this visit:  Type of Medical   Decision Making Face-to-Face Visit       within 7 Days of discharge Face-to-Face Visit        within 14 days of discharge   Moderate Complexity [id number]   High Complexity [id number]        Mr. Cunningham is in for a follow-up after hospital stay 9/2-9/7 bleeding from the left buttocks abscess.  He had an abscess drained about 6 days prior to his hospital stay (8/27).  The wound continued to bleed intermittently and was difficult to control with his hospital stay.  His Coumadin and Celebrex were discontinued.  His INR was reversed.  He was discharged off the Celebrex and off warfarin.  He was also treated for asymptomatic pneumonia with 5 day course of Levaquin during his hospital stay.    He was discharged to a transitional care unit.  He was discharged from transitional care on 913.  See hospital discharge  summary below.    He has been back his apt.  [employer].  He has been feeling well.   He has had no further bleeding.  He has a wound care nurse who is coming out three times a week.    No signs of infection.   No pain in buttock.    He is back on warfarin.  He has INR checked.   No fever.   Last HGB=9.7 on 9/7    No respiratory symptoms.  No cough.  No shortness of breath.  He has completed antibiotic.    CXR 9/2:  IMPRESSION: New hazy infiltrate in the right mid and lower lung as  seen on the CT. This may be infectious or inflammatory. Follow-up to  resolution recommended. Cardiac pacer. Mild cardiomegaly.  Postoperative changes right hemithorax.    Energy has been back to normal.   Appetite oK.  Sleep not so good-chronic.    Patient Active Problem List   Diagnosis     Anxiety     Alcohol abuse     Chronic atrial fibrillation (H)     Weight loss     Peripheral polyneuropathy     Chronic gout, unspecified cause, unspecified site     Essential hypertension with goal blood pressure less than 140/90     Hyperlipidemia LDL goal <130     Persistent insomnia     Lower urinary tract symptoms (LUTS)     Chronic anticoagulation     Long-term (current) use of anticoagulants [Z79.01]     Moderate episode of recurrent major depressive disorder (H)     Pneumonia     Bleeding from wound     Postoperative hemorrhage involving circulatory system following non-circulatory system procedure      ROS:General: No change in weight, sleep or appetite.  Normal energy.  No fever or chills  GI: No nausea, vomiting,  heartburn, abdominal pain, diarrhea, constipation or change in bowel habits  : POSITIVE for:, slow urinary stream.  Sits down to urinate  Feels he empties OK.   Musculoskeletal: POSITIVE  for:, pain in knee-chronic.   He would like shot in knee with hyaluronic acid injections.  He has been seeing sports medicine physician.    Psychiatric: No problems with anxiety, depression or mental health  temazepam no longer helpful.   He had been taking daily.  Does not nap daytime.     OBJECTIVE:Blood pressure 110/60, pulse 72, temperature 97.3  F (36.3  C), temperature source Tympanic, resp. rate 18, weight 166 lb (75.3 kg), SpO2 97 %. BMI=Body mass index is 28.49 kg/(m^2).  GENERAL APPEARANCE ADULT: Alert, no acute distress  NECK: No adenopathy,masses or thyromegaly  RESP: lungs clear to auscultation   CV: normal rate, regular rhythm, no murmur or gallop  ABDOMEN: soft, no organomegaly, masses or tenderness  MS:  Left upper buttock wound with dressing.  Small spot of blood.  No tenderness, no erythema.     ASSESSMENT:   (I97.620) Postoperative hemorrhage involving circulatory system following non-circulatory system procedure  (primary encounter diagnosis)  Comment: Has not had bleeding.  BAck on warfarin.    Plan: CBC with platelets        Check blood count today.     (J18.9) Pneumonia due to infectious organism, unspecified laterality, unspecified part of lung  Comment: has had no symptoms either initially or currently.   Plan: XR Chest 2 Views        Recheck CXR,  If persistent abnormality will need CT chest.     (S31.852D) Wound of left buttock, subsequent encounter  Comment:   Plan: Continue with the wound care as you have been     (F33.1) Moderate episode of recurrent major depressive disorder (H)    (L30.9) Eczema, unspecified type  Comment: wants refill on betamethasone diproprionate .05% cream for leg rash-itching.   Plan: Corticosteroid creams, ointments and lotions can be applied to skin twice daily as needed.  Long term use can cause skin atrophy (thinning or lightening of the skin color).  The more potent the preparation, the shorter time period it takes to cause problems.  Usually low and medium potency preparations can be used for a few weeks without a problem.    Use sparingly twice daily as needed for leg itching.     (Z79.01) Long-term (current) use of anticoagulants [Z79.01]  Comment: back on warfarin   Plan: No change in  current treatment plan.  Check INR as you have been.     (G47.00) Persistent insomnia  Comment:   Plan: I suggest stopping the temazepam for a couple weeks.  It works better on an internmittent basis.    We could do sleep consultation if continuing problems as you have been on other medications in the past.           ===========================================  See abstracted notes from recent hospital stay below.     Discharge Summaries      Admit Date:     09/02/2018   Discharge Date:     09/07/2018       HISTORY OF PRESENT ILLNESS:  Nabor Cunningham is an 82-year-old male with a past history of anxiety, gout, hyperlipidemia, hypertension, chronic anticoagulation and paroxysmal atrial fibrillation.  He also has a history of alcohol abuse and peripheral neuropathy.  He presented with bleeding from a left buttocks abscess that was opened and drained by Dr. Boyer 6 days prior to admission.  The day after his surgery, he had bleeding.  He went to the ER.  He had the wound sutured.  On the morning of admission, he had significant amounts of bleeding.  He was brought to the emergency room.  CAT scan showed a fluid collection consistent with a hematoma.  Incidentally, he was thought to have a right middle and lower lobe infiltrate concerning for pneumonia or some other type of inflammatory condition.  He really had no fever, cough or respiratory symptoms, but he was empirically put on Levaquin.       The patient's INR was elevated upon admission at 2.26 from his Coumadin.  Ultimately, this was reversed.  His INR at discharge was 1.18.  He had a normal platelet count.  His PTT was 43 in the hospital.  He had bleeding from no other sites.       The patient proved to be very challenging because he had intermittent persistent bleeding from the wound.  He was actually seen initially by Dr. Alfred, then by Dr. Diaz and then ultimately by Dr. Boyer.  Despite having his INR reversed with vitamin K, he would have bleeding.  It  usually would occur after the surgeons had redone the dressing and they would leave and then he would get significant bleeding.  A number of different techniques were tried, including Surgicel.  Ultimately, Dr. Diaz took him to the OR, thought he was bleeding from some of the muscle, and he used a suture to control it.  On the day of discharge, Dr. Diaz came by and the wound seemed dry, but he changed the dressing and then he had some more bleeding.  I asked Dr. Boyer to come up, who was then on-call.  Dr. Boyer came up and placed some FloSeal, Surgicel, packing and a pressure dressing, and he felt he could discharge.  He is going to leave this dressing in place over the weekend and see him back on Monday.  It remained unclear why he had so much trouble with bleeding.  His hemoglobin did drop to 9.7 in the hospital.       He had no pneumonia symptoms, but he did have an abnormal CT and chest x-ray with possible infiltrate.  He was given a 5-day course of Levaquin.  The plan is that he would have a followup chest x-ray in the future, so we are arranging this with primary care.       ASSESSMENT:   1.  Postoperative bleeding from an abscess I and D site in the left buttocks.   2.  Chronic anticoagulation with Coumadin for atrial fibrillation, reversed during this hospitalization.   3.  Pneumonia or pneumonitis, right middle and lower lobes without symptoms, treated with 5 days of Levaquin, needs followup imaging.   4.  Acute blood loss anemia.   5.  Atrial fibrillation with anticoagulation.   6.  Anxiety disorder.   7.  History of alcohol abuse.  Apparently, this was in remission for 2-3 years, although patient reports occasional wine use.   8.  Gout.   9.  Insomnia.       PLAN:  This proved to be a difficult case for the surgeons, but at this time the patient is going to go to a TCU.  Dressings can be left in place till Monday and he is going to see the wound nurse and Dr. Diaz on Monday.  Tentatively, I think  Coumadin can be restarted on Thursday if there is no more bleeding.  He would need then INR followup.  I did also discontinue his Celebrex because of the bleeding.

## 2018-09-21 NOTE — NURSING NOTE
"Chief Complaint   Patient presents with     Hospital F/U     [facility]- 9/2/18- 9/7/18- Post op bleeding.       Initial /60 (BP Location: Right arm, Patient Position: Chair, Cuff Size: Adult Regular)  Pulse 72  Temp 97.3  F (36.3  C) (Tympanic)  Resp 18  Wt 166 lb (75.3 kg)  SpO2 97%  BMI 28.49 kg/m2 Estimated body mass index is 28.49 kg/(m^2) as calculated from the following:    Height as of 9/10/18: 5' 4\" (1.626 m).    Weight as of this encounter: 166 lb (75.3 kg).    Patient presents to the clinic using No DME    Health Maintenance that is potentially due pending provider review:  PHQ9    Possibly completing today per provider review.    Is there anyone who you would like to be able to receive your results? Yes  If yes have patient fill out [facility]  Ellen Miranda CMA    "

## 2018-09-21 NOTE — PROGRESS NOTES
Please call daughter Lexy.    His chest x-ray is still abnormal perhaps a little improved.   He has anemia but improved since last test.   PLAN: I recommend recheck in a month with chest x-ray.  If it remains abnormal, we may need to do CT scan of chest.

## 2018-09-21 NOTE — PATIENT INSTRUCTIONS
ASSESSMENT:   (I97.620) Postoperative hemorrhage involving circulatory system following non-circulatory system procedure  (primary encounter diagnosis)  Comment: Has not had bleeding.  BAck on warfarin.    Plan: CBC with platelets        Check blood count today.     (J18.9) Pneumonia due to infectious organism, unspecified laterality, unspecified part of lung  Comment: has had no symptoms either initially or currently.   Plan: XR Chest 2 Views        Recheck CXR,  If persistent abnormality will need CT chest.     (S31.829D) Wound of left buttock, subsequent encounter  Comment:   Plan: Continue with the wound care as you have been     (F33.1) Moderate episode of recurrent major depressive disorder (H)    (L30.9) Eczema, unspecified type  Comment: wants refill on betamethasone diproprionate .05% cream for leg rash-itching.   Plan: Corticosteroid creams, ointments and lotions can be applied to skin twice daily as needed.  Long term use can cause skin atrophy (thinning or lightening of the skin color).  The more potent the preparation, the shorter time period it takes to cause problems.  Usually low and medium potency preparations can be used for a few weeks without a problem.    Use sparingly twice daily as needed for leg itching.     (Z79.01) Long-term (current) use of anticoagulants [Z79.01]  Comment: back on warfarin   Plan: No change in current treatment plan.  Check INR as you have been.     (G47.00) Persistent insomnia  Comment:   Plan: I suggest stopping the temazepam for a couple weeks.  It works better on an internmittent basis.    We could do sleep consultation if continuing problems as you have been on other medications in the past.

## 2018-09-22 ASSESSMENT — PATIENT HEALTH QUESTIONNAIRE - PHQ9: SUM OF ALL RESPONSES TO PHQ QUESTIONS 1-9: 3

## 2018-09-24 ENCOUNTER — ANTICOAGULATION THERAPY VISIT (OUTPATIENT)
Dept: ANTICOAGULATION | Facility: CLINIC | Age: 83
End: 2018-09-24
Payer: MEDICARE

## 2018-09-24 DIAGNOSIS — Z79.01 CHRONIC ANTICOAGULATION: ICD-10-CM

## 2018-09-24 DIAGNOSIS — I48.20 CHRONIC ATRIAL FIBRILLATION (H): ICD-10-CM

## 2018-09-24 DIAGNOSIS — Z79.01 LONG-TERM (CURRENT) USE OF ANTICOAGULANTS: ICD-10-CM

## 2018-09-24 LAB — INR PPP: 2.8

## 2018-09-24 PROCEDURE — 99207 ZZC NO CHARGE NURSE ONLY: CPT

## 2018-09-24 NOTE — PROGRESS NOTES
ADDENDUM: Patient has wound care in clinic on Friday so he will be going to lab instead of having homecare come out. An INR order was placed, [facility] to call homecare  at [phone] for warfarin dosing instructions / INR recheck date.     Rustam MCNEIL RN, CACP 9/26/2018 at 2:16 PM       ANTICOAGULATION FOLLOW-UP CLINIC VISIT    Patient Name:  Nabor Cunningham  Date:  9/24/2018  Contact Type:  Telephone/ [facility]    SUBJECTIVE:     Patient Findings     Positives No Problem Findings    Comments Patient had 38mg in the previous 7 days, will adjust dose to keep weekly mg total the same by the next INR check on Friday.    No changes noted per homecare nurse, patient took warfarin as prescribed. No concerns of acute infection, he has wound care 3 times per week.            OBJECTIVE    INR   Date Value Ref Range Status   09/24/2018 2.8  Final       ASSESSMENT / PLAN  No question data found.  Anticoagulation Summary as of 9/24/2018     INR goal 2.0-3.0   Today's INR 2.8   Warfarin maintenance plan No maintenance plan   Full warfarin instructions 9/24: 6 mg; 9/25: 4 mg; 9/26: 6 mg; 9/27: 4 mg   Weekly warfarin total 46 mg   Plan last modified Belia Jones RN (9/14/2018)   Next INR check 9/28/2018   Priority INR   Target end date Indefinite    Indications   Chronic anticoagulation [Z79.01]  Chronic atrial fibrillation (H) [I48.2]  Long-term (current) use of anticoagulants [Z79.01] [Z79.01]         Anticoagulation Episode Summary     INR check location     Preferred lab     Send INR reminders to [place] PH[facility] POOL    Comments * 2mg tabs. [facility]      Anticoagulation Care Providers     Provider Role Specialty Phone number    Heron Mayo MD Responsible Family Practice [phone]            See the Encounter Report to view Anticoagulation Flowsheet and Dosing Calendar (Go to Encounters tab in chart review, and find the Anticoagulation Therapy Visit)        Ada Isaac RN, CACP

## 2018-09-24 NOTE — MR AVS SNAPSHOT
Nabor Cunningham   9/24/2018   Anticoagulation Therapy Visit    Description:  82 year old male   Provider:  Ada Isaac, RN   Department:  [facility]           INR as of 9/24/2018     Today's INR 2.8      Anticoagulation Summary as of 9/24/2018     INR goal 2.0-3.0   Today's INR 2.8   Full warfarin instructions 9/24: 6 mg; 9/25: 4 mg; 9/26: 6 mg; 9/27: 4 mg   Next INR check 9/28/2018    Indications   Chronic anticoagulation [Z79.01]  Chronic atrial fibrillation (H) [I48.2]  Long-term (current) use of anticoagulants [Z79.01] [Z79.01]         September 2018 Details    Sun Mon Tue Wed Thu Fri Sat           1                 2               3               4               5               6               7               8                 9               10               11               12               13               14               15                 16               17               18               19               20               21               22                 23               24      6 mg   See details      25      4 mg         26      6 mg         27      4 mg         28            29                 30                      Date Details   09/24 This INR check       Date of next INR:  9/28/2018         How to take your warfarin dose     To take:  4 mg Take 2 of the 2 mg tablets.    To take:  6 mg Take 3 of the 2 mg tablets.

## 2018-09-26 DIAGNOSIS — Z79.01 LONG-TERM (CURRENT) USE OF ANTICOAGULANTS: ICD-10-CM

## 2018-09-26 DIAGNOSIS — I48.20 CHRONIC ATRIAL FIBRILLATION (H): Primary | ICD-10-CM

## 2018-09-28 ENCOUNTER — ANTICOAGULATION THERAPY VISIT (OUTPATIENT)
Dept: ANTICOAGULATION | Facility: CLINIC | Age: 83
End: 2018-09-28

## 2018-09-28 ENCOUNTER — HOSPITAL ENCOUNTER (OUTPATIENT)
Dept: WOUND CARE | Facility: CLINIC | Age: 83
Discharge: HOME | End: 2018-09-28
Attending: SURGERY | Admitting: SURGERY
Payer: MEDICARE

## 2018-09-28 DIAGNOSIS — I48.20 CHRONIC ATRIAL FIBRILLATION (H): ICD-10-CM

## 2018-09-28 DIAGNOSIS — Z79.01 LONG-TERM (CURRENT) USE OF ANTICOAGULANTS: ICD-10-CM

## 2018-09-28 DIAGNOSIS — Z79.01 CHRONIC ANTICOAGULATION: ICD-10-CM

## 2018-09-28 LAB — INR PPP: 2.82 (ref 0.86–1.14)

## 2018-09-28 PROCEDURE — 99207 ZZC NO CHARGE NURSE ONLY: CPT

## 2018-09-28 PROCEDURE — 85610 PROTHROMBIN TIME: CPT | Performed by: FAMILY MEDICINE

## 2018-09-28 PROCEDURE — G0463 HOSPITAL OUTPT CLINIC VISIT: HCPCS

## 2018-09-28 PROCEDURE — 36415 COLL VENOUS BLD VENIPUNCTURE: CPT | Performed by: FAMILY MEDICINE

## 2018-09-28 NOTE — MR AVS SNAPSHOT
Nabor Cunningham   9/28/2018   Anticoagulation Therapy Visit    Description:  82 year old male   Provider:  Jahaira Jones RN   Department:  [facility]           INR as of 9/28/2018     Today's INR 2.82      Anticoagulation Summary as of 9/28/2018     INR goal 2.0-3.0   Today's INR 2.82   Full warfarin instructions 4 mg on Mon, Thu; 6 mg all other days   Next INR check 10/5/2018    Indications   Chronic anticoagulation [Z79.01]  Chronic atrial fibrillation (H) [I48.2]  Long-term (current) use of anticoagulants [Z79.01] [Z79.01]         September 2018 Details    Sun Mon Tue Wed Thu Fri Sat           1                 2               3               4               5               6               7               8                 9               10               11               12               13               14               15                 16               17               18               19               20               21               22                 23               24               25               26               27               28      6 mg   See details      29      6 mg           30      6 mg                Date Details   09/28 This INR check               How to take your warfarin dose     To take:  6 mg Take 3 of the 2 mg tablets.           October 2018 Details    Sun Mon Tue Wed Thu Fri Sat      1      4 mg         2      6 mg         3      6 mg         4      4 mg         5            6                 7               8               9               10               11               12               13                 14               15               16               17               18               19               20                 21               22               23               24               25               26               27                 28               29               30               31                   Date Details   No additional details    Date of next  INR:  10/5/2018         How to take your warfarin dose     To take:  4 mg Take 2 of the 2 mg tablets.    To take:  6 mg Take 3 of the 2 mg tablets.

## 2018-09-28 NOTE — PROGRESS NOTES
ANTICOAGULATION FOLLOW-UP CLINIC VISIT    Patient Name:  Nabor Cunningham  Date:  9/28/2018  Contact Type:  Telephone/ caregiver    SUBJECTIVE:     Patient Findings     Positives No Problem Findings    Comments No changes in medications, diet, or activity. No concerns with bleeding or bruising. Took warfarin as prescribed. Wound continues, homecare seeing 3x per week Mon, Wed, Fri. Homecare to recheck INR Friday.  Continue same  warfarin dose. Maintenance dose established. Will recheck INR in 1 week.   Patient caregiver verbalizes understanding and agrees with plan. No further questions at this time.                  OBJECTIVE    INR   Date Value Ref Range Status   09/28/2018 2.82 (H) 0.86 - 1.14 Final       ASSESSMENT / PLAN  INR assessment THER    Recheck INR In: 1 WEEK    INR Location Outside lab      Anticoagulation Summary as of 9/28/2018     INR goal 2.0-3.0   Today's INR 2.82   Warfarin maintenance plan 4 mg (2 mg x 2) on Mon, Thu; 6 mg (2 mg x 3) all other days   Full warfarin instructions 4 mg on Mon, Thu; 6 mg all other days   Weekly warfarin total 38 mg   Plan last modified Jahaira Jones RN (9/28/2018)   Next INR check 10/5/2018   Priority INR   Target end date Indefinite    Indications   Chronic anticoagulation [Z79.01]  Chronic atrial fibrillation (H) [I48.2]  Long-term (current) use of anticoagulants [Z79.01] [Z79.01]         Anticoagulation Episode Summary     INR check location     Preferred lab     Send INR reminders to [place] [facility] POOL    Comments * 2mg tabs. [facility]      Anticoagulation Care Providers     Provider Role Specialty Phone number    Heron Mayo MD [facility] [phone]            See the Encounter Report to view Anticoagulation Flowsheet and Dosing Calendar (Go to Encounters tab in chart review, and find the Anticoagulation Therapy Visit)        Jahaira Jones, SAURAV

## 2018-09-28 NOTE — IP AVS SNAPSHOT
MRN:[id number]                      After Visit Summary   9/28/2018    Nabor Cunningham    MRN: [id number]           Visit Information        Provider Department      9/28/2018  9:00 AM Mikhail Chambers Nurse - [place] [facility]           Review of your medicines      UNREVIEWED medicines. Ask your doctor about these medicines        Dose / Directions    ACETAMINOPHEN PO        Dose:  650 mg   Take 650 mg by mouth every 4 hours as needed for pain   Refills:  0       betamethasone dipropionate 0.05 % cream   Commonly known as:  DIPROSONE   Used for:  Eczema, unspecified type        Apply topically 2 times daily For leg rash   Quantity:  45 g   Refills:  0       calcium carbonate 500 mg {elemental} 500 MG tablet   Commonly known as:  OS-ALY        Dose:  1 tablet   Take 1 tablet by mouth 2 times daily   Refills:  0       colchicine 0.6 MG tablet   Commonly known as:  COLCRYS   Used for:  Chronic gout, unspecified cause, unspecified site        Dose:  0.6 mg   Take 1 tablet (0.6 mg) by mouth daily   Quantity:  30 tablet   Refills:  0       COUMADIN PO        Take by mouth daily Take as directed per INR results   Refills:  0       cyanocobalamin 1000 MCG tablet   Commonly known as:  vitamin  B-12   Used for:  Vitamin B 12 deficiency        TAKE ONE TABLET BY MOUTH DAILY   Quantity:  30 tablet   Refills:  11       hypromellose 0.4 % Soln ophthalmic solution   Commonly known as:  ARTIFICIAL TEARS        Dose:  1 drop   Place 1 drop into both eyes every hour as needed for dry eyes   Refills:  0       metoprolol succinate 25 MG 24 hr tablet   Commonly known as:  TOPROL-XL        Dose:  25 mg   Take 25 mg by mouth every evening   Quantity:  90 tablet   Refills:  1       mineral oil oil        Dose:  5 mL   Take 5 mLs by mouth daily   Refills:  0       Multi-vitamin Tabs tablet        Dose:  1 tablet   Take 1 tablet by mouth daily   Quantity:  100 tablet   Refills:  3       nortriptyline 25 MG capsule    Commonly known as:  PAMELOR   Used for:  Anxiety        TAKE ONE CAPSULE MID MORNING AND TWO CAPSULES BY MOUTH DAILY AT BEDTIME   Quantity:  270 capsule   Refills:  3       prednisoLONE acetate 1 % ophthalmic susp   Commonly known as:  PRED FORTE        Dose:  1 drop   Place 1 drop Into the left eye 2 times daily   Refills:  2       temazepam 30 MG capsule   Commonly known as:  RESTORIL   Used for:  Persistent insomnia        TAKE ONE CAPSULE BY MOUTH AT BEDTIME AS NEEDED FOR SLEEP   Quantity:  30 capsule   Refills:  0                Protect others around you: Learn how to safely use, store and throw away your medicines at www.disposemymeds.org.         Follow-ups after your visit        Your next 10 appointments already scheduled     Oct 11, 2018  9:00 AM CDT   Office Visit with Et Nurse - [facility] [place] ([facility])    [address] [id number] [phone]           Bring a current list of meds and any records pertaining to this visit. For Physicals, please bring immunization records and any forms needing to be filled out. Please arrive 10 minutes early to complete paperwork.            Oct 26, 2018  9:00 AM CDT   XR CHEST 2 VIEWS with [facility]   [facility]    [address] [place] [id number] [phone]           How do I prepare for my exam? (Food and drink instructions) No Food and Drink Restrictions.  How do I prepare for my exam? (Other instructions) You do not need to do anything special for this exam.  What should I wear: Wear comfortable clothes.  How long does the exam take: Most scans take less than 5 minutes.  What should I bring: Bring a list of your medicines, including vitamins, minerals and over-the-counter drugs. It is safest to leave personal items at home.  Do I need a :  No  is needed.  What do I need to tell my doctor: Tell your doctor if there s any chance you  "are pregnant.  What should I do after the exam: No restrictions, You may resume normal activities.  What is this test: An image of a specific body part shown in shades of black and white.  Who should I call with questions: If you have any questions, please call the Imaging Department where you will have your exam. Directions, parking instructions, and other information is available on our website, [email or web address].               Care Instructions        Further instructions from your care team       Continue with 3 x per week dressing changes.    The week week of , Homecare to change the dressing on Monday and leave in place until Wound care RN visit on Thursday in clinic.      Add Thick moisture barrier cream to red area of periwound skin and apply the 6x6 Mepilex over for absorption.    Call with question or problems.    Appointment scheduled attached.    Ame Velazquez RN CWON   [phone]     Additional Information About Your Visit        MyChart Information     [facility] lets you send messages to your doctor, view your test results, renew your prescriptions, schedule appointments and more. To sign up, go to [email or web address] . Click on \"Log in\" on the left side of the screen, which will take you to the Welcome page. Then click on \"Sign up Now\" on the right side of the page.     You will be asked to enter the access code listed below, as well as some personal information. Please follow the directions to create your username and password.     Your access code is: [email or web address]  Expires: 2018  9:10 AM     Your access code will  in 90 days. If you need help or a new code, please call your [facility] clinic or [phone].        Care EveryWhere ID     This is your Care EveryWhere ID. This could be used by other organizations to access your [facility] medical records  [id number]         Primary Care Provider Office Phone # Fax #    Heron Mayo [phone]    "   Equal Access to Services     [facility]: Hadii aad ku hadamanmali Polanco, waronenda luraikarolynha, qamaki linshanicelili hahn. So [facility] [phone].    ATENCIÓN: Si habla español, tiene a garcia disposición servicios gratuitos de asistencia lingüística. Llame al [phone].    We comply with applicable federal civil rights laws and [place] laws. We do not discriminate on the basis of race, color, national origin, age, disability, sex, sexual orientation, or gender identity.            Thank you!     Thank you for choosing [facility] for your care. Our goal is always to provide you with excellent care. Hearing back from our patients is one way we can continue to improve our services. Please take a few minutes to complete the written survey that you may receive in the mail after you visit with us. Thank you!             Medication List: This is a list of all your medications and when to take them. Check marks below indicate your daily home schedule. Keep this list as a reference.      Medications           Morning Afternoon Evening Bedtime As Needed    ACETAMINOPHEN PO   Take 650 mg by mouth every 4 hours as needed for pain                                betamethasone dipropionate 0.05 % cream   Commonly known as:  DIPROSONE   Apply topically 2 times daily For leg rash                                calcium carbonate 500 mg {elemental} 500 MG tablet   Commonly known as:  OS-ALY   Take 1 tablet by mouth 2 times daily                                colchicine 0.6 MG tablet   Commonly known as:  COLCRYS   Take 1 tablet (0.6 mg) by mouth daily                                COUMADIN PO   Take by mouth daily Take as directed per INR results                                cyanocobalamin 1000 MCG tablet   Commonly known as:  vitamin  B-12   TAKE ONE TABLET BY MOUTH DAILY                                hypromellose 0.4 % Soln ophthalmic solution   Commonly known as:  ARTIFICIAL TEARS   Place  1 drop into both eyes every hour as needed for dry eyes                                metoprolol succinate 25 MG 24 hr tablet   Commonly known as:  TOPROL-XL   Take 25 mg by mouth every evening                                mineral oil oil   Take 5 mLs by mouth daily                                Multi-vitamin Tabs tablet   Take 1 tablet by mouth daily                                nortriptyline 25 MG capsule   Commonly known as:  PAMELOR   TAKE ONE CAPSULE MID MORNING AND TWO CAPSULES BY MOUTH DAILY AT BEDTIME                                prednisoLONE acetate 1 % ophthalmic susp   Commonly known as:  PRED FORTE   Place 1 drop Into the left eye 2 times daily                                temazepam 30 MG capsule   Commonly known as:  RESTORIL   TAKE ONE CAPSULE BY MOUTH AT BEDTIME AS NEEDED FOR SLEEP

## 2018-09-28 NOTE — PROGRESS NOTES
Reason For Visit: Nabor Cunningham, 82 year old male, seen as outpatient to RE-evaluate and treat left buttock wound. Referred by Dr. Boyer. Patient presents with daughter today.  Pt is home with Homecare nursing for wound care.     History: Pt was seen by Dr. Boyer for left gluteal abscess wound on 8/27/18 at which time he had incision and debridement.  Pt had issues with bleeding from this wound requiring ED visit day after I&D and a trip to OR with Dr. Davis 9/6/18 including debridement, cautery and sutures.  Pt was discharged from [facility] inpatient care on 9/7/18 after Dr. Boyer consulted for ongoing wound edge bleeding which he treated with FloSeal and Surgicel and pressure dressing.  Pt discharged to [facility] and has been home for a while with [facility] coming for wound care.      Signficant co-morbidities include Hx of ETOH abuse, a fib on coumadin, (which is on hold due to bleeding,) peripheral polyneuropathy, depression, and weight loss.    Personal/social history: Home with Homecare and daughter is very supportive.      Objective:   Physical appearance: elderly, thin  Ambulation: independant  Current treatment plan: see above  Last changed: Wednesday     Wound #1 Left upper buttock  Stage/tissue depth: full thickness  Approximately 1 cm L x 3.5 cm W x 4mm D  Tunneling: no  Undermining: no  Wound bed type/amount: 100% red and moist   Wound Edges: attached  Periwound: 1cm of erythema around the wound from drainage sitting on the intact skin.    Drainage: moderate serosanguinous  Odor: no  Pain: minimal      Diet: states his appetite is ok, he is eating ok and he is getting 3 meals per day  Smoking:     Education: topical plan of care, moist wound healing, importance of good nutrition      Assessment:  Left buttock abscess wound s/p suturing, debridement and cautery due to multiple episodes of bleeding, now in inflammatory stage of wound healing.    Depth significantly improved since last visit and wound well  granulated.        Plan:  Continue with Aquacel AG to fill wound depth and mepilex border dressing for absorption.  Decrease dressing changes to 2-3 times per week.       Topical care:Aquacel Ag for filler and Mepilex border 4x4 cover dressing, change every 2-3 times per week      Additional recommendations: Added [employer] Thick barrier cream to periwound skin to protect from moisture.      Wound Care: Wound cleansed with Microklenz and gauze, patted dry. Barrier Ointment applied to periwound skin,  Wound base filled with 2 layers of Aquacel Ag. Covered with Mepilex border 6x6    Discussed plan of care with patient and daughter.  Cares to be done at home by HC RN.     Follow up in two weeks or sooner if concerns such as bleeding or infection.    Signs and symptoms of infection:  Green drainage  Foul odor  Increased pain to wound  Redness or swelling at the site of the wound  Fever    Return visit: 2 weeks to see WOC RN     Verbal, written, & demonstrative education provided.  Face to face time (excluding procedure): approximately 30 minutes.    Ame Velazquez RN [facility]    [phone]

## 2018-09-28 NOTE — DISCHARGE INSTRUCTIONS
Continue with 3 x per week dressing changes.    The week week of October 9th, Homecare to change the dressing on Monday and leave in place until Wound care RN visit on Thursday in clinic.      Add Thick moisture barrier cream to red area of periwound skin and apply the 6x6 Mepilex over for absorption.    Call with question or problems.    Appointment scheduled attached.    Ame Velazquez RN CWON   [phone]

## 2018-09-28 NOTE — IP AVS SNAPSHOT
[facility]    [address] [facility] [address] [address]    Phone:  [phone]    Fax:  [phone]                                       After Visit Summary   9/28/2018    aNbor Cunningham    MRN: [id number]           After Visit Summary Signature Page     I have received my discharge instructions, and my questions have been answered. I have discussed any challenges I see with this plan with the nurse or doctor.    ..........................................................................................................................................  Patient/Patient Representative Signature      ..........................................................................................................................................  Patient Representative Print Name and Relationship to Patient    ..................................................               ................................................  Date                                   Time    ..........................................................................................................................................  Reviewed by Signature/Title    ...................................................              ..............................................  Date                                               Time          22EPIC Rev 08/18

## 2018-10-05 ENCOUNTER — ANTICOAGULATION THERAPY VISIT (OUTPATIENT)
Dept: ANTICOAGULATION | Facility: CLINIC | Age: 83
End: 2018-10-05
Payer: MEDICARE

## 2018-10-05 DIAGNOSIS — Z79.01 CHRONIC ANTICOAGULATION: ICD-10-CM

## 2018-10-05 DIAGNOSIS — I48.20 CHRONIC ATRIAL FIBRILLATION (H): ICD-10-CM

## 2018-10-05 LAB — INR PPP: 3.4

## 2018-10-05 PROCEDURE — 99207 ZZC NO CHARGE NURSE ONLY: CPT

## 2018-10-05 NOTE — PROGRESS NOTES
ANTICOAGULATION FOLLOW-UP CLINIC VISIT    Patient Name:  Nabor Cunningham  Date:  10/5/2018  Contact Type:  Telephone/ [facility]    SUBJECTIVE:     Patient Findings     Positives No Problem Findings    Comments Per RN, patient denies any changes in health, medication, diet, or activity. Patient is eating 2 salads a week. Writer instructed RN to have the patient take 4 mg today then resume maintenance dose. Recheck in 3 days. Patient denies signs or symptoms of bleeding. Writer educated patient regarding increased bleed risk and when to seek immediate medical attention. Patient verbalized understanding.             OBJECTIVE    INR   Date Value Ref Range Status   10/05/2018 3.4  Final       ASSESSMENT / PLAN  INR assessment SUPRA    Recheck INR In: 3 DAYS    INR Location Outside lab      Anticoagulation Summary as of 10/5/2018     INR goal 2.0-3.0   Today's INR 3.4!   Warfarin maintenance plan 4 mg (2 mg x 2) on Mon, Thu; 6 mg (2 mg x 3) all other days   Full warfarin instructions 10/5: 4 mg; Otherwise 4 mg on Mon, Thu; 6 mg all other days   Weekly warfarin total 38 mg   Plan last modified Jahaira Jones RN (9/28/2018)   Next INR check 10/8/2018   Priority INR   Target end date Indefinite    Indications   Chronic anticoagulation [Z79.01]  Chronic atrial fibrillation (H) [I48.2]  Long-term (current) use of anticoagulants [Z79.01] [Z79.01]         Anticoagulation Episode Summary     INR check location     Preferred lab     Send INR reminders to [place] [facility] POOL    Comments * 2mg tabs. [facility]      Anticoagulation Care Providers     Provider Role Specialty Phone number    Heron Mayo MD [facility] [phone]            See the Encounter Report to view Anticoagulation Flowsheet and Dosing Calendar (Go to Encounters tab in chart review, and find the Anticoagulation Therapy Visit)        Daphne Lamar, RN

## 2018-10-05 NOTE — MR AVS SNAPSHOT
Nabor Cunningham   10/5/2018   Anticoagulation Therapy Visit    Description:  82 year old male   Provider:  Daphne Lamar, RN   Department:  [facility]           INR as of 10/5/2018     Today's INR 3.4!      Anticoagulation Summary as of 10/5/2018     INR goal 2.0-3.0   Today's INR 3.4!   Full warfarin instructions 10/5: 4 mg; Otherwise 4 mg on Mon, Thu; 6 mg all other days   Next INR check 10/8/2018    Indications   Chronic anticoagulation [Z79.01]  Chronic atrial fibrillation (H) [I48.2]  Long-term (current) use of anticoagulants [Z79.01] [Z79.01]         October 2018 Details    Sun Mon Tue Wed Thu Fri Sat      1               2               3               4               5      4 mg   See details      6      6 mg           7      6 mg         8            9               10               11               12               13                 14               15               16               17               18               19               20                 21               22               23               24               25               26               27                 28               29               30               31                   Date Details   10/05 This INR check       Date of next INR:  10/8/2018         How to take your warfarin dose     To take:  4 mg Take 2 of the 2 mg tablets.    To take:  6 mg Take 3 of the 2 mg tablets.

## 2018-10-08 ENCOUNTER — ANTICOAGULATION THERAPY VISIT (OUTPATIENT)
Dept: ANTICOAGULATION | Facility: CLINIC | Age: 83
End: 2018-10-08
Payer: MEDICARE

## 2018-10-08 ENCOUNTER — HOSPITAL ENCOUNTER (EMERGENCY)
Facility: CLINIC | Age: 83
Discharge: HOME | End: 2018-10-08
Attending: EMERGENCY MEDICINE | Admitting: EMERGENCY MEDICINE
Payer: MEDICARE

## 2018-10-08 VITALS
HEART RATE: 92 BPM | DIASTOLIC BLOOD PRESSURE: 81 MMHG | SYSTOLIC BLOOD PRESSURE: 127 MMHG | WEIGHT: 165 LBS | RESPIRATION RATE: 18 BRPM | OXYGEN SATURATION: 95 % | BODY MASS INDEX: 28.32 KG/M2 | TEMPERATURE: 97.6 F

## 2018-10-08 DIAGNOSIS — Z79.01 CHRONIC ANTICOAGULATION: ICD-10-CM

## 2018-10-08 DIAGNOSIS — I48.20 CHRONIC ATRIAL FIBRILLATION (H): ICD-10-CM

## 2018-10-08 DIAGNOSIS — L08.9 WOUND INFECTION: ICD-10-CM

## 2018-10-08 DIAGNOSIS — T14.8XXA WOUND INFECTION: ICD-10-CM

## 2018-10-08 LAB
ANION GAP SERPL CALCULATED.3IONS-SCNC: 7 MMOL/L (ref 3–14)
BASOPHILS # BLD AUTO: 0 10E9/L (ref 0–0.2)
BASOPHILS NFR BLD AUTO: 0.4 %
BUN SERPL-MCNC: 11 MG/DL (ref 7–30)
CALCIUM SERPL-MCNC: 8.2 MG/DL (ref 8.5–10.1)
CHLORIDE SERPL-SCNC: 101 MMOL/L (ref 94–109)
CO2 SERPL-SCNC: 29 MMOL/L (ref 20–32)
CREAT SERPL-MCNC: 0.91 MG/DL (ref 0.66–1.25)
DIFFERENTIAL METHOD BLD: ABNORMAL
EOSINOPHIL # BLD AUTO: 0.2 10E9/L (ref 0–0.7)
EOSINOPHIL NFR BLD AUTO: 3.1 %
ERYTHROCYTE [DISTWIDTH] IN BLOOD BY AUTOMATED COUNT: 13.2 % (ref 10–15)
GFR SERPL CREATININE-BSD FRML MDRD: 80 ML/MIN/1.7M2
GLUCOSE SERPL-MCNC: 102 MG/DL (ref 70–99)
HCT VFR BLD AUTO: 36.2 % (ref 40–53)
HGB BLD-MCNC: 11.4 G/DL (ref 13.3–17.7)
IMM GRANULOCYTES # BLD: 0 10E9/L (ref 0–0.4)
IMM GRANULOCYTES NFR BLD: 0.1 %
INR PPP: 4.1
LYMPHOCYTES # BLD AUTO: 0.8 10E9/L (ref 0.8–5.3)
LYMPHOCYTES NFR BLD AUTO: 12.1 %
MCH RBC QN AUTO: 26.4 PG (ref 26.5–33)
MCHC RBC AUTO-ENTMCNC: 31.5 G/DL (ref 31.5–36.5)
MCV RBC AUTO: 84 FL (ref 78–100)
MONOCYTES # BLD AUTO: 0.6 10E9/L (ref 0–1.3)
MONOCYTES NFR BLD AUTO: 8.4 %
NEUTROPHILS # BLD AUTO: 5.2 10E9/L (ref 1.6–8.3)
NEUTROPHILS NFR BLD AUTO: 75.9 %
NRBC # BLD AUTO: 0 10*3/UL
NRBC BLD AUTO-RTO: 0 /100
PLATELET # BLD AUTO: 154 10E9/L (ref 150–450)
POTASSIUM SERPL-SCNC: 3.8 MMOL/L (ref 3.4–5.3)
RBC # BLD AUTO: 4.32 10E12/L (ref 4.4–5.9)
SODIUM SERPL-SCNC: 137 MMOL/L (ref 133–144)
WBC # BLD AUTO: 6.9 10E9/L (ref 4–11)

## 2018-10-08 PROCEDURE — 85025 COMPLETE CBC W/AUTO DIFF WBC: CPT | Performed by: EMERGENCY MEDICINE

## 2018-10-08 PROCEDURE — 99283 EMERGENCY DEPT VISIT LOW MDM: CPT | Performed by: EMERGENCY MEDICINE

## 2018-10-08 PROCEDURE — 99284 EMERGENCY DEPT VISIT MOD MDM: CPT | Mod: Z6 | Performed by: EMERGENCY MEDICINE

## 2018-10-08 PROCEDURE — 99207 ZZC NO CHARGE NURSE ONLY: CPT

## 2018-10-08 PROCEDURE — 80048 BASIC METABOLIC PNL TOTAL CA: CPT | Performed by: EMERGENCY MEDICINE

## 2018-10-08 RX ORDER — CEPHALEXIN 500 MG/1
500 CAPSULE ORAL 4 TIMES DAILY
Qty: 28 CAPSULE | Refills: 0 | Status: SHIPPED | OUTPATIENT
Start: 2018-10-08 | End: 2019-02-15

## 2018-10-08 NOTE — PROGRESS NOTES
ANTICOAGULATION FOLLOW-UP CLINIC VISIT    Patient Name:  Nabor Cunningham  Date:  10/8/2018  Contact Type:  Telephone/ [facility]    SUBJECTIVE:     Patient Findings     Positives Inflammation    Comments Patient has an abscess on his hip that homecare is dressing. Today it has increased swelling, warmth. Homecare nurse is sending patient to the ED today to have it looked at for a likely infection. Due to likely starting antibiotics, will plan to recheck again on Wednesday when homecare is there next. No issues with bleeding or unusual bruising noted.            OBJECTIVE    INR   Date Value Ref Range Status   10/08/2018 4.1  Final       ASSESSMENT / PLAN  No question data found.  Anticoagulation Summary as of 10/8/2018     INR goal 2.0-3.0   Today's INR 4.1!   Warfarin maintenance plan 4 mg (2 mg x 2) on Mon, Thu; 6 mg (2 mg x 3) all other days   Full warfarin instructions 10/8: Hold; Otherwise 4 mg on Mon, Thu; 6 mg all other days   Weekly warfarin total 38 mg   Plan last modified Jahaira Jones RN (9/28/2018)   Next INR check 10/10/2018   Priority INR   Target end date Indefinite    Indications   Chronic anticoagulation [Z79.01]  Chronic atrial fibrillation (H) [I48.2]  Long-term (current) use of anticoagulants [Z79.01] [Z79.01]         Anticoagulation Episode Summary     INR check location     Preferred lab     Send INR reminders to [place] [facility] POOL    Comments * 2mg tabs. [facility] MWF      Anticoagulation Care Providers     Provider Role Specialty Phone number    Heron Mayo MD [facility] [phone]            See the Encounter Report to view Anticoagulation Flowsheet and Dosing Calendar (Go to Encounters tab in chart review, and find the Anticoagulation Therapy Visit)        Ada Isaac, SAURAV, CACP

## 2018-10-08 NOTE — ED PROVIDER NOTES
History     Chief Complaint   Patient presents with     Wound Infection     Dr. Davis has been treating a wound on pt's buttocks, staph infection, returning for sx of infection returning.     PAULO Cunningham is a 82 year old male who presents to the Emergency Department with concern for wound infection. Patient developed an abscess on the right iliac crest which required I&D by Dr. Boyer on 8/27/18 in surgery clinic. Persistent drainage resulted in hospitalization and trip to OR on 9/6/18 for debridement and control of bleeding with Dr. Davis. He has since been following with surgery clinic who last saw him 9/10/18 and wound care who saw him 9/28/18. Home care RN changed dressing today and expressed concern for possible infection given increased swelling and redness surrounding the border of the wound, prompting evaluation today. No purulent or foul smelling drainage noted. Patient denies fever or chills. Patient is otherwise feeling well. Patient's son notes patient's INR was 4.1 today and wonders if this is a sign of infection. Patient directed by INR clinic to hold coumadin today, take 6 mg tomorrow then recheck in two days on Wednesday.     Problem List:    Patient Active Problem List    Diagnosis Date Noted     Postoperative hemorrhage involving circulatory system following non-circulatory system procedure 09/03/2018     Priority: Medium     Pneumonia 09/02/2018     Priority: Medium     Bleeding from wound 09/02/2018     Priority: Medium     Moderate episode of recurrent major depressive disorder (H) 04/10/2017     Priority: Medium     Anxiety 03/22/2017     Priority: Medium     3/23/2017:He has been on amitriptyline, nortriptyline, buspirone, sertraline and Lexapro in the past year.        Alcohol abuse 03/22/2017     Priority: Medium     Chronic atrial fibrillation (H) 03/22/2017     Priority: Medium     Weight loss 03/22/2017     Priority: Medium     Peripheral polyneuropathy 03/22/2017     Priority:  Medium     4/7/2017:Abnormal monofilament in distal plantar feet bilateral.  He has had chronic pain in both feet.        Chronic gout, unspecified cause, unspecified site 03/22/2017     Priority: Medium     Essential hypertension with goal blood pressure less than 140/90 03/22/2017     Priority: Medium     Hyperlipidemia LDL goal <130 03/22/2017     Priority: Medium     Persistent insomnia 03/22/2017     Priority: Medium     Lower urinary tract symptoms (LUTS) 03/22/2017     Priority: Medium     Chronic anticoagulation 03/22/2017     Priority: Medium     Long-term (current) use of anticoagulants [Z79.01] 03/22/2017     Priority: Medium        Past Medical History:    Past Medical History:   Diagnosis Date     Anxiety      Gout      Hyperlipemia      Hypertension      Insomnia      Paroxysmal atrial fibrillation (H)        Past Surgical History:    Past Surgical History:   Procedure Laterality Date     INCISION AND DRAINAGE TRUNK, COMBINED N/A 9/6/2018    Procedure: COMBINED INCISION AND DRAINAGE TRUNK;  incision and cauterization of left buttock bleed.;  Surgeon: Dain Davis MD;  Location: [facility] OR       Family History:    No family history on file.    Social History:  Marital Status:   [2]  Social History   Substance Use Topics     Smoking status: Former Smoker     Years: 20.00     Types: Cigarettes     Smokeless tobacco: Never Used     Alcohol use No        Medications:      cephALEXin (KEFLEX) 500 MG capsule   ACETAMINOPHEN PO   betamethasone dipropionate (DIPROSONE) 0.05 % cream   calcium carbonate (OS- MG [place]. [place]) 1250 MG tablet   colchicine (COLCRYS) 0.6 MG tablet   cyanocobalamin (VITAMIN  B-12) 1000 MCG tablet   hypromellose (ARTIFICIAL TEARS) 0.4 % SOLN ophthalmic solution   metoprolol (TOPROL-XL) 25 MG 24 hr tablet   mineral oil oil   multivitamin, therapeutic with minerals (MULTI-VITAMIN) TABS tablet   nortriptyline (PAMELOR) 25 MG capsule   prednisoLONE acetate (PRED FORTE) 1 % ophthalmic  susp   temazepam (RESTORIL) 30 MG capsule   Warfarin Sodium (COUMADIN PO)       Review of Systems  All other systems are reviewed and are negative.    Physical Exam   BP: 144/77  Pulse: 92  Heart Rate: 82  Temp: 97.6  F (36.4  C)  Resp: 16  Weight: 74.8 kg (165 lb)  SpO2: 96 %      Physical Exam  Nontoxic appearing no respiratory distress alert and oriented ×3  Head atraumatic normocephalic  Left buttock shows granulating central wound with 2 cm surrounding erythema, minimally tender, indurated, no fluctuance, no purulent discharge from the wound.        ED Course     ED Course     Procedures               Critical Care time:  none               Results for orders placed or performed during the hospital encounter of 10/08/18 (from the past 24 hour(s))   CBC with platelets, differential   Result Value Ref Range    WBC 6.9 4.0 - 11.0 10e9/L    RBC Count 4.32 (L) 4.4 - 5.9 10e12/L    Hemoglobin 11.4 (L) 13.3 - 17.7 g/dL    Hematocrit 36.2 (L) 40.0 - 53.0 %    MCV 84 78 - 100 fl    MCH 26.4 (L) 26.5 - 33.0 pg    MCHC 31.5 31.5 - 36.5 g/dL    RDW 13.2 10.0 - 15.0 %    Platelet Count 154 150 - 450 10e9/L    Diff Method Automated Method     % Neutrophils 75.9 %    % Lymphocytes 12.1 %    % Monocytes 8.4 %    % Eosinophils 3.1 %    % Basophils 0.4 %    % Immature Granulocytes 0.1 %    Nucleated RBCs 0 0 /100    Absolute Neutrophil 5.2 1.6 - 8.3 10e9/L    Absolute Lymphocytes 0.8 0.8 - 5.3 10e9/L    Absolute Monocytes 0.6 0.0 - 1.3 10e9/L    Absolute Eosinophils 0.2 0.0 - 0.7 10e9/L    Absolute Basophils 0.0 0.0 - 0.2 10e9/L    Abs Immature Granulocytes 0.0 0 - 0.4 10e9/L    Absolute Nucleated RBC 0.0    Basic metabolic panel   Result Value Ref Range    Sodium 137 133 - 144 mmol/L    Potassium 3.8 3.4 - 5.3 mmol/L    Chloride 101 94 - 109 mmol/L    Carbon Dioxide 29 20 - 32 mmol/L    Anion Gap 7 3 - 14 mmol/L    Glucose 102 (H) 70 - 99 mg/dL    Urea Nitrogen 11 7 - 30 mg/dL    Creatinine 0.91 0.66 - 1.25 mg/dL    GFR  Estimate 80 >60 mL/min/1.7m2    GFR Estimate If Black >90 >60 mL/min/1.7m2    Calcium 8.2 (L) 8.5 - 10.1 mg/dL       Medications - No data to display    6:05 PM Patient assessed.     Assessments & Plan (with Medical Decision Making)  82-year-old male slowly healing left buttock wound after incision and drainage, subsequent revision and debridement, presents with erythema surrounding his wound.  This is consistent with possible cellulitis.  Treat with cephalexin.  Warm packs.  Continue dressings per wound cares.  Return criteria reviewed     I have reviewed the nursing notes.    I have reviewed the findings, diagnosis, plan and need for follow up with the patient.       Discharge Medication List as of 10/8/2018  7:37 PM      START taking these medications    Details   cephALEXin (KEFLEX) 500 MG capsule Take 1 capsule (500 mg) by mouth 4 times daily for 7 days, Disp-28 capsule, R-0, Local Print             Final diagnoses:   Wound infection     This document serves as a record of the services and decisions personally performed and made by Daniel Lundberg MD. It was created on his behalf by Shakira May, a trained medical scribe. The creation of this document is based the provider's statements to the medical scribe.  Shakira May 6:00 PM 10/8/2018    Provider:   The information in this document, created by the medical scribe for me, accurately reflects the services I personally performed and the decisions made by me. I have reviewed and approved this document for accuracy prior to leaving the patient care area.  Daniel Lundberg MD 6:00 PM 10/8/2018    10/8/2018   [facility] EMERGENCY DEPARTMENT     Daniel Lundberg MD  10/08/18 3639

## 2018-10-08 NOTE — MR AVS SNAPSHOT
Nabor Cunningham   10/8/2018   Anticoagulation Therapy Visit    Description:  82 year old male   Provider:  Ada Isaac, RN   Department:  [facility]           INR as of 10/8/2018     Today's INR 4.1!      Anticoagulation Summary as of 10/8/2018     INR goal 2.0-3.0   Today's INR 4.1!   Full warfarin instructions 10/8: Hold; Otherwise 4 mg on Mon, Thu; 6 mg all other days   Next INR check 10/10/2018    Indications   Chronic anticoagulation [Z79.01]  Chronic atrial fibrillation (H) [I48.2]  Long-term (current) use of anticoagulants [Z79.01] [Z79.01]         October 2018 Details    Sun Mon Tue Wed Thu Fri Sat      1               2               3               4               5               6                 7               8      Hold   See details      9      6 mg         10            11               12               13                 14               15               16               17               18               19               20                 21               22               23               24               25               26               27                 28               29               30               31                   Date Details   10/08 This INR check       Date of next INR:  10/10/2018         How to take your warfarin dose     To take:  6 mg Take 3 of the 2 mg tablets.    Hold Do not take your warfarin dose. See the Details table to the right for additional instructions.

## 2018-10-08 NOTE — ED AVS SNAPSHOT
[facility] Emergency Department    [address] [address]    Phone:  [phone]    Fax:  [phone]                                       Nabor Cunningham   MRN: [id number]    Department:  [facility] Emergency Department   Date of Visit:  10/8/2018           After Visit Summary Signature Page     I have received my discharge instructions, and my questions have been answered. I have discussed any challenges I see with this plan with the nurse or doctor.    ..........................................................................................................................................  Patient/Patient Representative Signature      ..........................................................................................................................................  Patient Representative Print Name and Relationship to Patient    ..................................................               ................................................  Date                                   Time    ..........................................................................................................................................  Reviewed by Signature/Title    ...................................................              ..............................................  Date                                               Time          22EPIC Rev 08/18

## 2018-10-08 NOTE — ED NOTES
Pt states that he has a L iliac crest wound that has been around for 2 months. Dr Davis has  Worked with it. There initially was a lot of bleeding. It is not actively bleeding now. His INR was 4 today. The pt states that it is swollen and redder. He states that it is infested. He has a bandage on it now.

## 2018-10-08 NOTE — ED AVS SNAPSHOT
[facility] Emergency Department    [address] [address]    Phone:  [phone]    Fax:  [phone]                                       Nabor Cunningham   MRN: [id number]    Department:  [facility] Emergency Department   Date of Visit:  10/8/2018           Patient Information     Date Of Birth          1935        Your diagnoses for this visit were:     Wound infection        You were seen by Daniel Lundberg MD.        Discharge Instructions         Wound Check, Infection  You have a wound that has become infected. The wound will not heal properly unless the infection is cleared. Infection in a wound may also spread if it is not treated. In most cases, antibiotic medicines are prescribed to treat a wound infection.   Symptoms of a wound infection include:    Redness or swelling around the wound    Warmth coming from the wound    New or worsening pain    Red streaks around the wound    Draining pus    Fever  Home care  Follow all directions you are given to treat the infection.  Medicines  Take all medicines as prescribed.     If you were given antibiotics, take them until they are gone or your healthcare provider tells you to stop. It is vital to finish the antibiotics even if you feel better. If you do not finish them, the infection may come back and be harder to treat.    If your infection is not responding to the medicines you are taking, you may be prescribed new medicines.    Take medicine for pain as directed by your healthcare provider.  Wound care  Care for your wound as directed by your healthcare provider.    Apply a warm compress (clean cloth soaked in hot water) to the infected area for about 5 to 10 minutes at a time. Be very careful not to burn yourself. Test the cloth on a non-infected area to make sure it is not too hot.    Continue to change the dressing daily. If it becomes wet, stained with wound fluid, or dirty, change it sooner. To change it:  ? Wash  your hands with soap and water before changing the dressing.  ? Carefully remove the dressing and tape. If it sticks to the wound, you may need to wet it a little to remove it. (Do not do this if your healthcare provider has told you not to.)  ? Gently clean the wound with clean water (or saline) using gauze, a clean washcloth, or cotton swab.  ? Do not use soap, alcohol, peroxide or other cleansers.  ? If you were told to dry the wound before putting on a new dressing, gently pat. Do not rub.  ? Throw out the old dressing.  ? Wash your hands again before opening the new, clean dressing.  ? Wash your hands again when you are done.  Follow-up care  Follow up with your healthcare provider as advised. If a culture was done, you will be notified if your treatment needs to change. Call as directed for the results.  When to seek medical advice  Call your health care provider right away if any of these occur:    Symptoms of infection don't start to improve within 2 days of starting antibiotics    Symptoms of infection get worse    New symptoms, such as red streaks around the wound    Fever of 100.4 F (38.0 C) or higher for more than 2 days after starting the antibiotics  Date Last Reviewed: 8/10/2015    2693-2018 [employer]. [address] [place] [address]. All rights reserved. This information is not intended as a substitute for professional medical care. Always follow your healthcare professional's instructions.      Follow-up with wound cares.  Take antibiotic as prescribed, dressing changes as previously indicated.  Return here as outlined above.    Your next 10 appointments already scheduled     Oct 11, 2018  9:00 AM CDT   Office Visit with Et Nurse - [facility] [place] [facility] ([facility])    [address] [address]   [phone]           Bring a current list of meds and any records pertaining to this visit. For Physicals, please bring  immunization records and any forms needing to be filled out. Please arrive 10 minutes early to complete paperwork.            Oct 26, 2018  9:00 AM CDT   XR CHEST 2 VIEWS with NBXR1   [facility] ([facility])    [address] [place] [address]   [phone]           How do I prepare for my exam? (Food and drink instructions) No Food and Drink Restrictions.  How do I prepare for my exam? (Other instructions) You do not need to do anything special for this exam.  What should I wear: Wear comfortable clothes.  How long does the exam take: Most scans take less than 5 minutes.  What should I bring: Bring a list of your medicines, including vitamins, minerals and over-the-counter drugs. It is safest to leave personal items at home.  Do I need a :  No  is needed.  What do I need to tell my doctor: Tell your doctor if there s any chance you are pregnant.  What should I do after the exam: No restrictions, You may resume normal activities.  What is this test: An image of a specific body part shown in shades of black and white.  Who should I call with questions: If you have any questions, please call the Imaging Department where you will have your exam. Directions, parking instructions, and other information is available on our website, [email or web address].              24 Hour Appointment Hotline       To make an appointment at any [facility], call [phone] ([phone]). If you don't have a family doctor or clinic, we will help you find one. [facility] are conveniently located to serve the needs of you and your family.             Review of your medicines      START taking        Dose / Directions Last dose taken    cephALEXin 500 MG capsule   Commonly known as:  KEFLEX   Dose:  500 mg   Quantity:  28 capsule        Take 1 capsule (500 mg) by mouth 4 times daily for 7 days   Refills:  0          Our records show that you are taking the  medicines listed below. If these are incorrect, please call your family doctor or clinic.        Dose / Directions Last dose taken    ACETAMINOPHEN PO   Dose:  650 mg        Take 650 mg by mouth every 4 hours as needed for pain   Refills:  0        betamethasone dipropionate 0.05 % cream   Commonly known as:  DIPROSONE   Quantity:  45 g        Apply topically 2 times daily For leg rash   Refills:  0        calcium carbonate 500 mg (elemental) 500 MG tablet   Commonly known as:  OS-ALY   Dose:  1 tablet        Take 1 tablet by mouth 2 times daily   Refills:  0        colchicine 0.6 MG tablet   Commonly known as:  COLCRYS   Dose:  0.6 mg   Quantity:  30 tablet        Take 1 tablet (0.6 mg) by mouth daily   Refills:  0        COUMADIN PO        Take by mouth daily Take as directed per INR results   Refills:  0        cyanocobalamin 1000 MCG tablet   Commonly known as:  vitamin  B-12   Quantity:  30 tablet        TAKE ONE TABLET BY MOUTH DAILY   Refills:  11        hypromellose 0.4 % Soln ophthalmic solution   Commonly known as:  ARTIFICIAL TEARS   Dose:  1 drop        Place 1 drop into both eyes every hour as needed for dry eyes   Refills:  0        metoprolol succinate 25 MG 24 hr tablet   Commonly known as:  TOPROL-XL   Dose:  25 mg   Quantity:  90 tablet        Take 25 mg by mouth every evening   Refills:  1        mineral oil oil   Dose:  5 mL        Take 5 mLs by mouth daily   Refills:  0        Multi-vitamin Tabs tablet   Dose:  1 tablet   Quantity:  100 tablet        Take 1 tablet by mouth daily   Refills:  3        nortriptyline 25 MG capsule   Commonly known as:  PAMELOR   Quantity:  270 capsule        TAKE ONE CAPSULE MID MORNING AND TWO CAPSULES BY MOUTH DAILY AT BEDTIME   Refills:  3        prednisoLONE acetate 1 % ophthalmic susp   Commonly known as:  PRED FORTE   Dose:  1 drop        Place 1 drop Into the left eye 2 times daily   Refills:  2        temazepam 30 MG capsule   Commonly known as:  RESTORIL    Quantity:  30 capsule        TAKE ONE CAPSULE BY MOUTH AT BEDTIME AS NEEDED FOR SLEEP   Refills:  0                Prescriptions were sent or printed at these locations (1 Prescription)                   Other Prescriptions                Printed at Department/Unit printer (1 of 1)         cephALEXin (KEFLEX) 500 MG capsule                Procedures and tests performed during your visit     Basic metabolic panel    CBC with platelets, differential      Orders Needing Specimen Collection     None      Pending Results     No orders found from 10/6/2018 to 10/9/2018.            Pending Culture Results     No orders found from 10/6/2018 to 10/9/2018.            Pending Results Instructions     If you had any lab results that were not finalized at the time of your Discharge, you can call the ED Lab Result RN at [phone]. You will be contacted by this team for any positive Lab results or changes in treatment. The nurses are available 7 days a week from 10A to 6:30P.  You can leave a message 24 hours per day and they will return your call.        Test Results From Your Hospital Stay        10/8/2018  6:56 PM      Component Results     Component Value Ref Range & Units Status    WBC 6.9 4.0 - 11.0 10e9/L Final    RBC Count 4.32 (L) 4.4 - 5.9 10e12/L Final    Hemoglobin 11.4 (L) 13.3 - 17.7 g/dL Final    Hematocrit 36.2 (L) 40.0 - 53.0 % Final    MCV 84 78 - 100 fl Final    MCH 26.4 (L) 26.5 - 33.0 pg Final    MCHC 31.5 31.5 - 36.5 g/dL Final    RDW 13.2 10.0 - 15.0 % Final    Platelet Count 154 150 - 450 10e9/L Final    Diff Method Automated Method  Final    % Neutrophils 75.9 % Final    % Lymphocytes 12.1 % Final    % Monocytes 8.4 % Final    % Eosinophils 3.1 % Final    % Basophils 0.4 % Final    % Immature Granulocytes 0.1 % Final    Nucleated RBCs 0 0 /100 Final    Absolute Neutrophil 5.2 1.6 - 8.3 10e9/L Final    Absolute Lymphocytes 0.8 0.8 - 5.3 10e9/L Final    Absolute Monocytes 0.6 0.0 - 1.3 10e9/L Final     "Absolute Eosinophils 0.2 0.0 - 0.7 10e9/L Final    Absolute Basophils 0.0 0.0 - 0.2 10e9/L Final    Abs Immature Granulocytes 0.0 0 - 0.4 10e9/L Final    Absolute Nucleated RBC 0.0  Final         10/8/2018  7:12 PM      Component Results     Component Value Ref Range & Units Status    Sodium 137 133 - 144 mmol/L Final    Potassium 3.8 3.4 - 5.3 mmol/L Final    Chloride 101 94 - 109 mmol/L Final    Carbon Dioxide 29 20 - 32 mmol/L Final    Anion Gap 7 3 - 14 mmol/L Final    Glucose 102 (H) 70 - 99 mg/dL Final    Urea Nitrogen 11 7 - 30 mg/dL Final    Creatinine 0.91 0.66 - 1.25 mg/dL Final    GFR Estimate 80 >60 mL/min/1.7m2 Final    Non  GFR Calc    GFR Estimate If Black >90 >60 mL/min/1.7m2 Final    African American GFR Calc    Calcium 8.2 (L) 8.5 - 10.1 mg/dL Final                Thank you for choosing [facility]       Thank you for choosing [facility] for your care. Our goal is always to provide you with excellent care. Hearing back from our patients is one way we can continue to improve our services. Please take a few minutes to complete the written survey that you may receive in the mail after you visit with us. Thank you!        [facility] Information     [facility] lets you send messages to your doctor, view your test results, renew your prescriptions, schedule appointments and more. To sign up, go to [email or web address] . Click on \"Log in\" on the left side of the screen, which will take you to the Welcome page. Then click on \"Sign up Now\" on the right side of the page.     You will be asked to enter the access code listed below, as well as some personal information. Please follow the directions to create your username and password.     Your access code is: [email or web address]  Expires: 2018  9:10 AM     Your access code will  in 90 days. If you need help or a new code, please call your [facility] clinic or [phone].        Care EveryWhere ID     This is your Care EveryWhere ID. This could be " used by other organizations to access your [facility] medical records  [id number]        Equal Access to Services     SHIRA DELEON : Hadii kenzie Polanco, rosalba watters, keli dewitt, lili valerio. So [facility] [place] [phone].    ATENCIÓN: Si habla español, tiene a garcia disposición servicios gratuitos de asistencia lingüística. Llame al [phone].    We comply with applicable federal civil rights laws and [place] laws. We do not discriminate on the basis of race, color, national origin, age, disability, sex, sexual orientation, or gender identity.            After Visit Summary       This is your record. Keep this with you and show to your community pharmacist(s) and doctor(s) at your next visit.

## 2018-10-09 ENCOUNTER — PATIENT OUTREACH (OUTPATIENT)
Dept: CARE COORDINATION | Facility: CLINIC | Age: 83
End: 2018-10-09

## 2018-10-09 ASSESSMENT — ACTIVITIES OF DAILY LIVING (ADL): DEPENDENT_IADLS:: INDEPENDENT

## 2018-10-09 NOTE — PROGRESS NOTES
Clinic Care Coordination Contact  [facility]/Voicemail    Referral Source: ED Follow-Up    Clinical Data: Care Coordinator Outreach, ED f/u 10/8 for buttock wound infection, has home care following next visit is 10/10/18. Wound care OV 10/11/18.    Outreach attempted x 1 for patient.  Left message on voicemail with call back information and requested return call.    Plan: Care Coordinator will mail out care coordination introduction letter with care coordinator contact information and explanation of care coordination services. Care Coordinator will try to reach patient again in 1-2 business days.    Adrienne Adams RN, [facility]  RN Care Coordinator  [facility], [facility]  Phone # [phone]  10/9/2018 9:29 AM

## 2018-10-09 NOTE — LETTER
[facility] CARE COORDINATION  [address] [address]  [phone]    October 9, 2018    Nabor Cunningham  [address] [facility] [address]      Dear Nabor,    I am a clinic care coordinator who works with Heron Mayo MD at [facility]. I recently tried to call and was unable to reach you. I wanted to introduce myself and provide you with my contact information so that you can call me with questions or concerns about your health care. Below is a description of clinic care coordination and how I can further assist you.     The clinic care coordinator is a registered nurse and/or  who understand the health care system. The goal of clinic care coordination is to help you manage your health and improve access to the [facility] in the most efficient manner. The registered nurse can assist you in meeting your health care goals by providing education, coordinating services, and strengthening the communication among your providers. The  can assist you with financial, behavioral, psychosocial, chemical dependency, counseling, and/or psychiatric resources.    Please feel free to contact me at [phone], with any questions or concerns. We at [facility] are focused on providing you with the highest-quality healthcare experience possible and that all starts with you.     Sincerely,     Adrienne Adams    Enclosed: I have enclosed a copy of a 24 Hour Access Plan. This has helpful phone numbers for you to call when needed. Please keep this in an easy to access place to use as needed.

## 2018-10-09 NOTE — DISCHARGE INSTRUCTIONS
Wound Check, Infection  You have a wound that has become infected. The wound will not heal properly unless the infection is cleared. Infection in a wound may also spread if it is not treated. In most cases, antibiotic medicines are prescribed to treat a wound infection.   Symptoms of a wound infection include:    Redness or swelling around the wound    Warmth coming from the wound    New or worsening pain    Red streaks around the wound    Draining pus    Fever  Home care  Follow all directions you are given to treat the infection.  Medicines  Take all medicines as prescribed.     If you were given antibiotics, take them until they are gone or your healthcare provider tells you to stop. It is vital to finish the antibiotics even if you feel better. If you do not finish them, the infection may come back and be harder to treat.    If your infection is not responding to the medicines you are taking, you may be prescribed new medicines.    Take medicine for pain as directed by your healthcare provider.  Wound care  Care for your wound as directed by your healthcare provider.    Apply a warm compress (clean cloth soaked in hot water) to the infected area for about 5 to 10 minutes at a time. Be very careful not to burn yourself. Test the cloth on a non-infected area to make sure it is not too hot.    Continue to change the dressing daily. If it becomes wet, stained with wound fluid, or dirty, change it sooner. To change it:  ? Wash your hands with soap and water before changing the dressing.  ? Carefully remove the dressing and tape. If it sticks to the wound, you may need to wet it a little to remove it. (Do not do this if your healthcare provider has told you not to.)  ? Gently clean the wound with clean water (or saline) using gauze, a clean washcloth, or cotton swab.  ? Do not use soap, alcohol, peroxide or other cleansers.  ? If you were told to dry the wound before putting on a new dressing, gently pat. Do not  rub.  ? Throw out the old dressing.  ? Wash your hands again before opening the new, clean dressing.  ? Wash your hands again when you are done.  Follow-up care  Follow up with your healthcare provider as advised. If a culture was done, you will be notified if your treatment needs to change. Call as directed for the results.  When to seek medical advice  Call your health care provider right away if any of these occur:    Symptoms of infection don't start to improve within 2 days of starting antibiotics    Symptoms of infection get worse    New symptoms, such as red streaks around the wound    Fever of 100.4 F (38.0 C) or higher for more than 2 days after starting the antibiotics  Date Last Reviewed: 8/10/2015    8053-3444 [employer]. [address] [place] [address], [place] [address]. All rights reserved. This information is not intended as a substitute for professional medical care. Always follow your healthcare professional's instructions.      Follow-up with wound cares.  Take antibiotic as prescribed, dressing changes as previously indicated.  Return here as outlined above.

## 2018-10-09 NOTE — PROGRESS NOTES
Clinic Care Coordination Contact  [facility]/Voicemail    Referral Source: ED Follow-Up    Clinical Data: Care Coordinator Outreach, ED f/u 10/8 for buttock wound infection, has home care following next visit is 10/10/18. Wound care OV 10/11/18.    Outreach attempted x 1 for daughter Kya.  Left message on voicemail with call back information and requested return call.    Plan: Care Coordinator will mail out care coordination introduction letter with care coordinator contact information and explanation of care coordination services. Care Coordinator will try to reach patient again in 1-2 business days.    Adrienne Adams RN, [facility]  RN Care Coordinator  [facility], [place], [facility]  Phone # [phone]  10/9/2018 9:28 AM

## 2018-10-09 NOTE — LETTER
Health Care Home - Access Care Plan    About Me  Patient Name:  Nabor Perea    YOB: 1935  Age:                             82 year old   Mary MRN:            [id number] Telephone Information:     Home Phone [phone]   Mobile [phone]   Home Phone [phone]       [address]    [address] [facility] [address] Email address:  No e-mail address on record      Emergency Contact(s)  Name Relationship Lgl Grd Work Phone Home Phone Mobile Phone   1. JACKSON VAN Daughter No  [phone]   2. ELOY PEREA  Son No   [phone]   3. LISANDRA PEREA Relative No  [phone]              Health Maintenance: Routine Health maintenance Reviewed: Up to date    My Access Plan  Medical Emergency 911   Questions or concerns during clinic hours Primary Clinic Line, I will call the clinic directly: [facility] - [phone]   24 Hour Appointment Line [phone] or  [phone] [phone]) (toll free)   24 Hour Nurse Line [phone] (toll free)   Questions or concerns outside clinic hours 24 Hour Appointment Line, I will call the after-hours on-call line:   [facility] [phone] or [phone]) (toll-free)   Preferred Urgent Care [facility], [phone]   Preferred Hospital [place], [place]  [phone]   Preferred Pharmacy [id number] [place], [place] - [address]     Behavioral Health Crisis Line The National Suicide Prevention Lifeline at 1-795.826.9606 or 911     My Care Team Members  Patient Care Team       Relationship Specialty Notifications Start End    Heron Mayo MD PCP - General Family Practice  9/11/18     Phone: [phone] Fax: [phone] [address] [place] [address]    Heron Mayo MD   Family Practice  1/29/18     Comment:  Merged    Phone: [phone] Fax: [phone] [address] [place] [address]     [facility], [facility] ([place])  9/4/18     Phone: [phone]         Adrienne Adams RN Clinic Care Coordinator Primary Care - [facility] Admissions 9/7/18     Phone: [phone] Fax: [phone]               My Medical and Care Information  Problem List   Patient Active Problem List   Diagnosis     Anxiety     Alcohol abuse     Chronic atrial fibrillation (H)     Weight loss     Peripheral polyneuropathy     Chronic gout, unspecified cause, unspecified site     Essential hypertension with goal blood pressure less than 140/90     Hyperlipidemia LDL goal <130     Persistent insomnia     Lower urinary tract symptoms (LUTS)     Chronic anticoagulation     Long-term (current) use of anticoagulants [Z79.01]     Moderate episode of recurrent major depressive disorder (H)     Pneumonia     Bleeding from wound     Postoperative hemorrhage involving circulatory system following non-circulatory system procedure      Current Medications and Allergies:  See printed Medication Report

## 2018-10-10 ENCOUNTER — ANTICOAGULATION THERAPY VISIT (OUTPATIENT)
Dept: ANTICOAGULATION | Facility: CLINIC | Age: 83
End: 2018-10-10
Payer: MEDICARE

## 2018-10-10 DIAGNOSIS — I48.20 CHRONIC ATRIAL FIBRILLATION (H): ICD-10-CM

## 2018-10-10 DIAGNOSIS — Z79.01 CHRONIC ANTICOAGULATION: ICD-10-CM

## 2018-10-10 LAB — INR PPP: 2.2

## 2018-10-10 PROCEDURE — 99207 ZZC NO CHARGE NURSE ONLY: CPT

## 2018-10-10 ASSESSMENT — ACTIVITIES OF DAILY LIVING (ADL): DEPENDENT_IADLS:: INDEPENDENT

## 2018-10-10 NOTE — MR AVS SNAPSHOT
Nabor Cunningham   10/10/2018   Anticoagulation Therapy Visit    Description:  82 year old male   Provider:  Ada Isaac, RN   Department:  [facility]           INR as of 10/10/2018     Today's INR 2.2      Anticoagulation Summary as of 10/10/2018     INR goal 2.0-3.0   Today's INR 2.2   Full warfarin instructions 4 mg on Mon, Thu; 6 mg all other days   Next INR check 10/12/2018    Indications   Chronic anticoagulation [Z79.01]  Chronic atrial fibrillation (H) [I48.2]  Long-term (current) use of anticoagulants [Z79.01] [Z79.01]         October 2018 Details    Sun Mon Tue Wed Thu Fri Sat      1               2               3               4               5               6                 7               8               9               10      6 mg   See details      11      4 mg         12            13                 14               15               16               17               18               19               20                 21               22               23               24               25               26               27                 28               29               30               31                   Date Details   10/10 This INR check       Date of next INR:  10/12/2018         How to take your warfarin dose     To take:  4 mg Take 2 of the 2 mg tablets.    To take:  6 mg Take 3 of the 2 mg tablets.

## 2018-10-10 NOTE — PROGRESS NOTES
ANTICOAGULATION FOLLOW-UP CLINIC VISIT    Patient Name:  Nabor Cunningham  Date:  10/10/2018  Contact Type:  Telephone/ Mary Davis [facility]    SUBJECTIVE:     Patient Findings     Positives Antibiotic use or infection (keflex for 7 days (started on Monday) for hip wound infection), Intentional hold of therapy (Instructed to hold his warfarin on Monday for supratherapeutic INR), Missed doses (Missed his dose on Tuesday)    Comments No issues with bleeding or unusual bruising noted. Will continue with patient's usual warfarin dose and recheck again on Friday. Patient has been on Keflex the last 2 days, has 5 more days left.            OBJECTIVE    INR   Date Value Ref Range Status   10/10/2018 2.2  Final       ASSESSMENT / PLAN  No question data found.  Anticoagulation Summary as of 10/10/2018     INR goal 2.0-3.0   Today's INR 2.2   Warfarin maintenance plan 4 mg (2 mg x 2) on Mon, Thu; 6 mg (2 mg x 3) all other days   Full warfarin instructions 4 mg on Mon, Thu; 6 mg all other days   Weekly warfarin total 38 mg   No change documented Ada Isaac RN   Plan last modified Jahaira Jones RN (9/28/2018)   Next INR check 10/12/2018   Priority INR   Target end date Indefinite    Indications   Chronic anticoagulation [Z79.01]  Chronic atrial fibrillation (H) [I48.2]  Long-term (current) use of anticoagulants [Z79.01] [Z79.01]         Anticoagulation Episode Summary     INR check location     Preferred lab     Send INR reminders to [place] PHONE ANTICO POOL    Comments * 2mg tabs. [facility]      Anticoagulation Care Providers     Provider Role Specialty Phone number    Heron Mayo MD [facility] [phone]            See the Encounter Report to view Anticoagulation Flowsheet and Dosing Calendar (Go to Encounters tab in chart review, and find the Anticoagulation Therapy Visit)        Ada Isaac RN, CACP

## 2018-10-10 NOTE — PROGRESS NOTES
Clinic Care Coordination Contact  [facility]/Voicemail    Referral Source: ED Follow-Up    Clinical Data: Care Coordinator Outreach, ED f/u 10/8 for buttock wound infection, has home care following next visit is 10/10/18. Wound care OV 10/11/18.    Outreach attempted x 2 for patient. No answer, phone was busy x 2.    Plan: Care Coordinator mailed out care coordination introduction letter on 10/9/18. Care Coordinator will do no further outreaches at this time.    Adrienne Adams RN, [facility]  RN Care Coordinator  [facility], [facility]  Phone # [phone]  10/10/2018 10:03 AM

## 2018-10-10 NOTE — PROGRESS NOTES
Clinic Care Coordination Contact  [facility]/Voicemail    Referral Source: ED Follow-Up    Clinical Data: Care Coordinator Outreach, ED f/u 10/8 for buttock wound infection, has home care following next visit is 10/10/18. Wound care OV 10/11/18.    Outreach attempted x 2 for daughter Kya.  Left message on voicemail with call back information and requested return call.    Plan: Care Coordinator mailed out care coordination introduction letter on 10/9/18. Care Coordinator will do no further outreaches at this time.      Adrienne Adams RN, [facility]  RN Care Coordinator  [facility], [facility]  Phone # [phone]  10/10/2018 10:05 AM

## 2018-10-11 ENCOUNTER — HOSPITAL ENCOUNTER (OUTPATIENT)
Dept: WOUND CARE | Facility: CLINIC | Age: 83
Discharge: HOME | End: 2018-10-11
Attending: SURGERY | Admitting: SURGERY
Payer: MEDICARE

## 2018-10-11 PROCEDURE — G0463 HOSPITAL OUTPT CLINIC VISIT: HCPCS

## 2018-10-11 NOTE — PROGRESS NOTES
Reason For Visit/Interval History: Nabor Cunningham, 82 year old male, seen as outpatient to RE-evaluate and treat left buttock wound. Referred by Dr. Boyer. Patient presents with daughter today.  Pt is home with Homecare nursing for wound care. Pt was een in the ER on 10/8/18 due to concerns for infection as there was erythema around wound and INR had gone up to 4.1.  He is now on keflex and thinks it's better, denies pain but states was not painful when in ER either.  Daughter is asking me for more Mepilex bandages.     History: Pt was seen by Dr. Boyer for left gluteal abscess wound on 8/27/18 at which time he had incision and debridement.  Pt had issues with bleeding from this wound requiring ED visit day after I&D and a trip to OR with Dr. Davis 9/6/18 including debridement, cautery and sutures.  Pt was discharged from [facility] inpatient care on 9/7/18 after Dr. Boyer consulted for ongoing wound edge bleeding which he treated with FloSeal and Surgicel and pressure dressing.  Pt discharged to [facility] and has been home for a while with [facility] coming for wound care.      Signficant co-morbidities include Hx of ETOH abuse, a fib on coumadin, (which is on hold due to bleeding,) peripheral polyneuropathy, depression, and weight loss.    Personal/social history: Home with Homecare and daughter is very supportive.      Objective:   Physical appearance: elderly, thin  Ambulation: independant  Current treatment plan: Aquacel Ag and Mepilex border 6x6, supposed to be barrier cream on periwound skin but does not seem to be any on  Last changed: yesterday     Wound #1 Left upper buttock  Stage/tissue depth: full thickness  1.2 cm L x 3 cm W x  Slightly raised and friable most of wound bed to 1.4cm deep near 9:00 wound edge  Tunneling: no  Undermining: no  Wound bed type/amount: 100% red and moist and hypergranular at surface   Wound Edges: attached  Periwound: 1.8 cm of erythema around the wound, this appears lighter  then photo from ER from 2 days ago.  Buttock above wound is firm over a large area up to at least 1 1/2 to 2 inches above wound but is not painful or red.    Drainage: Heavy creamy serosanguinous with drainage on skin from saturated Aquacel Ag.  Odor: no  Pain: minimal, some with palpation of deeper area of wound    INR 4.1 on 10/8/18, was 2.2 yesterday.  Labs from ED noted on 10/8/18, WBC was WNL.    Diet: Prior visit - states his appetite is ok, he is eating ok and he is getting 3 meals per day  Smoking:     Education: topical plan of care, moist wound healing, importance of good nutrition      Assessment:  Left buttock abscess wound s/p suturing, debridement and cautery due to multiple episodes of bleeding, had been granulating and filling in well but today is hypergranular with some depth, periwound erythema.        Plan:  Hypergranular tissue cauterized.  Discontinue Aquacel AG and begin  to fill wound with hydrofera blue for control of hypergranulation, tuck into depth and cover remainder of wound as well, barrier cream to periwound and cover with Mepilex border 6x6 for absorption, change 3 times per week at minimum.  [facility] is changing, directions sent with pt and will send to [facility].  Pt scheduled for follow-up in one week with [facility] nurse and Dr. Davis, may need more cautery and rule out deeper infection depending on response to Keflex, cautery and wound care plan changes made this week.      Wound Care: Done as per instructions    Instructions sent with pt:  Change dressing at a minimum 3 times a week and as needed for drainage.  To change:  1.) Irrigate wound with wound cleanser and dry, wipe gently to remove loose debris  2.) Cut a square of Hydrofera blue then moisten with saline, squeeze out excess.   Cut again into one strip to tuck into the deeper pocket at 9:00 and a second piece to cover remainder of wound (cut to fit wound)  3.) Apply barrier cream around wound  4.) Cover with Mepilex border  6x6.    Follow-up in one week with wound care nurse and Dr. Davis.    Discussed plan of care with patient and daughter.      Return visit: scheduled for 10/19/19    Verbal, written, & demonstrative education provided.  Face to face time  approximately 30 minutes.    Monica Lundberg RN, CWOCN     [phone]

## 2018-10-11 NOTE — IP AVS SNAPSHOT
MRN:[id number]                      After Visit Summary   10/11/2018    Nabor Cunningham    MRN: [id number]           Visit Information        Provider Department      10/11/2018  9:00 AM Mikhail Chambers Nurse - [facility]           Review of your medicines      UNREVIEWED medicines. Ask your doctor about these medicines        Dose / Directions    ACETAMINOPHEN PO        Dose:  650 mg   Take 650 mg by mouth every 4 hours as needed for pain   Refills:  0       betamethasone dipropionate 0.05 % cream   Commonly known as:  DIPROSONE   Used for:  Eczema, unspecified type        Apply topically 2 times daily For leg rash   Quantity:  45 g   Refills:  0       calcium carbonate 500 mg (elemental) 500 MG tablet   Commonly known as:  OS-ALY        Dose:  1 tablet   Take 1 tablet by mouth 2 times daily   Refills:  0       cephALEXin 500 MG capsule   Commonly known as:  KEFLEX        Dose:  500 mg   Take 1 capsule (500 mg) by mouth 4 times daily for 7 days   Quantity:  28 capsule   Refills:  0       colchicine 0.6 MG tablet   Commonly known as:  COLCRYS   Used for:  Chronic gout, unspecified cause, unspecified site        Dose:  0.6 mg   Take 1 tablet (0.6 mg) by mouth daily   Quantity:  30 tablet   Refills:  0       COUMADIN PO        Take by mouth daily Take as directed per INR results   Refills:  0       cyanocobalamin 1000 MCG tablet   Commonly known as:  vitamin  B-12   Used for:  Vitamin B 12 deficiency        TAKE ONE TABLET BY MOUTH DAILY   Quantity:  30 tablet   Refills:  11       hypromellose 0.4 % Soln ophthalmic solution   Commonly known as:  ARTIFICIAL TEARS        Dose:  1 drop   Place 1 drop into both eyes every hour as needed for dry eyes   Refills:  0       metoprolol succinate 25 MG 24 hr tablet   Commonly known as:  TOPROL-XL        Dose:  25 mg   Take 25 mg by mouth every evening   Quantity:  90 tablet   Refills:  1       mineral oil oil        Dose:  5 mL   Take 5 mLs by  mouth daily   Refills:  0       Multi-vitamin Tabs tablet        Dose:  1 tablet   Take 1 tablet by mouth daily   Quantity:  100 tablet   Refills:  3       nortriptyline 25 MG capsule   Commonly known as:  PAMELOR   Used for:  Anxiety        TAKE ONE CAPSULE MID MORNING AND TWO CAPSULES BY MOUTH DAILY AT BEDTIME   Quantity:  270 capsule   Refills:  3       prednisoLONE acetate 1 % ophthalmic susp   Commonly known as:  PRED FORTE        Dose:  1 drop   Place 1 drop Into the left eye 2 times daily   Refills:  2       temazepam 30 MG capsule   Commonly known as:  RESTORIL   Used for:  Persistent insomnia        TAKE ONE CAPSULE BY MOUTH AT BEDTIME AS NEEDED FOR SLEEP   Quantity:  30 capsule   Refills:  0                Protect others around you: Learn how to safely use, store and throw away your medicines at www.disposemymeds.org.         Follow-ups after your visit        Your next 10 appointments already scheduled     Oct 19, 2018  8:30 AM CDT   Return Visit with Dain Davis MD   [facility] ([facility]    [address] [facility] [address]   [phone]            Oct 19, 2018  8:45 AM CDT   Office Visit with Et Nurse - [facility] ([facility])    [address] [facility] [address]   [phone]           Bring a current list of meds and any records pertaining to this visit. For Physicals, please bring immunization records and any forms needing to be filled out. Please arrive 10 minutes early to complete paperwork.            Oct 26, 2018  9:00 AM CDT   XR CHEST 2 VIEWS with [facility]   [facility]    [address] [place] [address]   [phone]           How do I prepare for my exam? (Food and drink instructions) No Food and Drink Restrictions.  How do I prepare for my exam? (Other instructions) You do not need to do anything special for this exam.  What should I  "wear: Wear comfortable clothes.  How long does the exam take: Most scans take less than 5 minutes.  What should I bring: Bring a list of your medicines, including vitamins, minerals and over-the-counter drugs. It is safest to leave personal items at home.  Do I need a :  No  is needed.  What do I need to tell my doctor: Tell your doctor if there s any chance you are pregnant.  What should I do after the exam: No restrictions, You may resume normal activities.  What is this test: An image of a specific body part shown in shades of black and white.  Who should I call with questions: If you have any questions, please call the Imaging Department where you will have your exam. Directions, parking instructions, and other information is available on our website, [email or web address].               Care Instructions        Further instructions from your care team       Change dressing at a minimum 3 times a week and as needed for drainage.  To change:  1.) Irrigate wound with wound cleanser and dry, wipe gently to remove loose debris  2.) Cut a square of Hydrofera blue then moisten with saline, squeeze out excess.   Cut again into one strip to tuck into the deeper pocket at 9:00 and a second piece to cover remainder of wound (cut to fit wound)  3.) Apply barrier cream around wound  4.) Cover with Mepilex border 6x6.    Follow-up in one week with wound care nurse and Dr. Davis.    Monica Lundberg RN, CWOCN [phone]     Additional Information About Your Visit        MyChart Information     [facility] lets you send messages to your doctor, view your test results, renew your prescriptions, schedule appointments and more. To sign up, go to [email or web address] . Click on \"Log in\" on the left side of the screen, which will take you to the Welcome page. Then click on \"Sign up Now\" on the right side of the page.     You will be asked to enter the access code listed below, as well as some personal information. Please " follow the directions to create your username and password.     Your access code is: [email or web address]  Expires: 2018  9:10 AM     Your access code will  in 90 days. If you need help or a new code, please call your [facility] clinic or [phone].        Care EveryWhere ID     This is your Care EveryWhere ID. This could be used by other organizations to access your [facility] medical records  [id number]         Primary Care Provider Office Phone # Fax #    Heron Mayo [phone]      Equal Access to Services     [facility]: Hadii kenzie woodard hadasho Sojuniali, waaxda luqadaha, qaybta kaalmada esdras, lili hernandez . So [facility] [phone].    ATENCIÓN: Si habla español, tiene a garcia disposición servicios gratuitos de asistencia lingüística. [facility] [phone].    We comply with applicable federal civil rights laws and [place] laws. We do not discriminate on the basis of race, color, national origin, age, disability, sex, sexual orientation, or gender identity.            Thank you!     Thank you for choosing [facility] for your care. Our goal is always to provide you with excellent care. Hearing back from our patients is one way we can continue to improve our services. Please take a few minutes to complete the written survey that you may receive in the mail after you visit with us. Thank you!             Medication List: This is a list of all your medications and when to take them. Check marks below indicate your daily home schedule. Keep this list as a reference.      Medications           Morning Afternoon Evening Bedtime As Needed    ACETAMINOPHEN PO   Take 650 mg by mouth every 4 hours as needed for pain                                betamethasone dipropionate 0.05 % cream   Commonly known as:  DIPROSONE   Apply topically 2 times daily For leg rash                                calcium carbonate 500 mg (elemental) 500 MG tablet   Commonly known as:   OS-ALY   Take 1 tablet by mouth 2 times daily                                cephALEXin 500 MG capsule   Commonly known as:  KEFLEX   Take 1 capsule (500 mg) by mouth 4 times daily for 7 days                                colchicine 0.6 MG tablet   Commonly known as:  COLCRYS   Take 1 tablet (0.6 mg) by mouth daily                                COUMADIN PO   Take by mouth daily Take as directed per INR results                                cyanocobalamin 1000 MCG tablet   Commonly known as:  vitamin  B-12   TAKE ONE TABLET BY MOUTH DAILY                                hypromellose 0.4 % Soln ophthalmic solution   Commonly known as:  ARTIFICIAL TEARS   Place 1 drop into both eyes every hour as needed for dry eyes                                metoprolol succinate 25 MG 24 hr tablet   Commonly known as:  TOPROL-XL   Take 25 mg by mouth every evening                                mineral oil oil   Take 5 mLs by mouth daily                                Multi-vitamin Tabs tablet   Take 1 tablet by mouth daily                                nortriptyline 25 MG capsule   Commonly known as:  PAMELOR   TAKE ONE CAPSULE MID MORNING AND TWO CAPSULES BY MOUTH DAILY AT BEDTIME                                prednisoLONE acetate 1 % ophthalmic susp   Commonly known as:  PRED FORTE   Place 1 drop Into the left eye 2 times daily                                temazepam 30 MG capsule   Commonly known as:  RESTORIL   TAKE ONE CAPSULE BY MOUTH AT BEDTIME AS NEEDED FOR SLEEP

## 2018-10-11 NOTE — DISCHARGE INSTRUCTIONS
Change dressing at a minimum 3 times a week and as needed for drainage.  To change:  1.) Irrigate wound with wound cleanser and dry, wipe gently to remove loose debris  2.) Cut a square of Hydrofera blue then moisten with saline, squeeze out excess.   Cut again into one strip to tuck into the deeper pocket at 9:00 and a second piece to cover remainder of wound (cut to fit wound)  3.) Apply barrier cream around wound  4.) Cover with Mepilex border 6x6.    Follow-up in one week with wound care nurse and Dr. Davis.    Monica Lundberg RN, CWOCN [phone]

## 2018-10-12 ENCOUNTER — ANTICOAGULATION THERAPY VISIT (OUTPATIENT)
Dept: ANTICOAGULATION | Facility: CLINIC | Age: 83
End: 2018-10-12
Payer: MEDICARE

## 2018-10-12 DIAGNOSIS — Z79.01 CHRONIC ANTICOAGULATION: ICD-10-CM

## 2018-10-12 DIAGNOSIS — I48.20 CHRONIC ATRIAL FIBRILLATION (H): ICD-10-CM

## 2018-10-12 LAB — INR PPP: 1.5

## 2018-10-12 PROCEDURE — 99207 ZZC NO CHARGE NURSE ONLY: CPT

## 2018-10-12 NOTE — MR AVS SNAPSHOT
Nabor Cunningham   10/12/2018   Anticoagulation Therapy Visit    Description:  82 year old male   Provider:  Belia Joens, RN   Department:  [facility]           INR as of 10/12/2018     Today's INR 1.5!      Anticoagulation Summary as of 10/12/2018     INR goal 2.0-3.0   Today's INR 1.5!   Full warfarin instructions 10/12: 10 mg; Otherwise 4 mg on Mon, Thu; 6 mg all other days   Next INR check 10/15/2018    Indications   Chronic anticoagulation [Z79.01]  Chronic atrial fibrillation (H) [I48.2]  Long-term (current) use of anticoagulants [Z79.01] [Z79.01]         October 2018 Details    Sun Mon Tue Wed Thu Fri Sat      1               2               3               4               5               6                 7               8               9               10               11               12      10 mg   See details      13      6 mg           14      6 mg         15            16               17               18               19               20                 21               22               23               24               25               26               27                 28               29               30               31                   Date Details   10/12 This INR check       Date of next INR:  10/15/2018         How to take your warfarin dose     To take:  4 mg Take 2 of the 2 mg tablets.    To take:  6 mg Take 3 of the 2 mg tablets.    To take:  10 mg Take 5 of the 2 mg tablets.

## 2018-10-12 NOTE — PROGRESS NOTES
ANTICOAGULATION FOLLOW-UP CLINIC VISIT    Patient Name:  Nabor Cunningham  Date:  10/12/2018  Contact Type:  Telephone/ spoke with Iveth at  Home Care    SUBJECTIVE:     Patient Findings     Positives Antibiotic use or infection (on keflex for hip wound infection), Unexplained INR or factor level change    Comments Patient held his warfarin on Monday this week and missed his dose on Tues. INR on Wed was in range at 2.2. Per Iveth with home care, pt took warfarin the last 2 days and it was missing from his pill box. No increase in greens and no other changes. It is not entirely clear why INR is so low today. The held and missed dose should have been reflected in Wednesday's INR. Will give a dose increase today, resume maintenance dose and recheck on 10-15-18.           OBJECTIVE    INR   Date Value Ref Range Status   10/12/2018 1.5  Final       ASSESSMENT / PLAN  INR assessment SUB    Recheck INR In: 3 DAYS    INR Location [facility] INR      Anticoagulation Summary as of 10/12/2018     INR goal 2.0-3.0   Today's INR 1.5!   Warfarin maintenance plan 4 mg (2 mg x 2) on Mon, Thu; 6 mg (2 mg x 3) all other days   Full warfarin instructions 10/12: 10 mg; Otherwise 4 mg on Mon, Thu; 6 mg all other days   Weekly warfarin total 38 mg   Plan last modified Jahaira Jones RN (9/28/2018)   Next INR check 10/15/2018   Priority INR   Target end date Indefinite    Indications   Chronic anticoagulation [Z79.01]  Chronic atrial fibrillation (H) [I48.2]  Long-term (current) use of anticoagulants [Z79.01] [Z79.01]         Anticoagulation Episode Summary     INR check location     Preferred lab     Send INR reminders to [facility] POOL    Comments * 2mg tabs.  Home Care [facility]      Anticoagulation Care Providers     Provider Role Specialty Phone number    Heron Mayo MD [facility] [phone]            See the Encounter Report to view Anticoagulation Flowsheet and Dosing Calendar (Go to Encounters tab in  chart review, and find the Anticoagulation Therapy Visit)        Belia Jones RN

## 2018-10-15 ENCOUNTER — ANTICOAGULATION THERAPY VISIT (OUTPATIENT)
Dept: ANTICOAGULATION | Facility: CLINIC | Age: 83
End: 2018-10-15
Payer: MEDICARE

## 2018-10-15 DIAGNOSIS — I48.20 CHRONIC ATRIAL FIBRILLATION (H): ICD-10-CM

## 2018-10-15 DIAGNOSIS — Z79.01 CHRONIC ANTICOAGULATION: ICD-10-CM

## 2018-10-15 LAB — INR PPP: 2.5

## 2018-10-15 PROCEDURE — 99207 ZZC NO CHARGE NURSE ONLY: CPT

## 2018-10-15 NOTE — MR AVS SNAPSHOT
Nabor Cunningham   10/15/2018   Anticoagulation Therapy Visit    Description:  82 year old male   Provider:  Ada Isaac RN   Department:  [facility]           INR as of 10/15/2018     Today's INR 2.5      Anticoagulation Summary as of 10/15/2018     INR goal 2.0-3.0   Today's INR 2.5   Full warfarin instructions 10/15: 6 mg; 10/16: 4 mg; 10/17: 6 mg; 10/18: 4 mg; 10/19: 6 mg; 10/20: 4 mg; 10/21: 4 mg   Next INR check 10/22/2018    Indications   Chronic anticoagulation [Z79.01]  Chronic atrial fibrillation (H) [I48.2]  Long-term (current) use of anticoagulants [Z79.01] [Z79.01]         October 2018 Details    Sun Mon Tue Wed Thu Fri Sat      1               2               3               4               5               6                 7               8               9               10               11               12               13                 14               15      6 mg   See details      16      4 mg         17      6 mg         18      4 mg         19      6 mg         20      4 mg           21      4 mg         22            23               24               25               26               27                 28               29               30               31                   Date Details   10/15 This INR check       Date of next INR:  10/22/2018         How to take your warfarin dose     To take:  4 mg Take 2 of the 2 mg tablets.    To take:  6 mg Take 3 of the 2 mg tablets.

## 2018-10-15 NOTE — PROGRESS NOTES
ANTICOAGULATION FOLLOW-UP CLINIC VISIT    Patient Name:  Nabor Cunningham  Date:  10/15/2018  Contact Type:  Telephone/ [facility]    SUBJECTIVE:     Patient Findings     Positives Antibiotic use or infection (last day of keflex is today, hip infection improved), No Problem Findings    Comments Patient has had 32mg in the past week, patient's last keflex is today. Per homecare nurse his hip infection is improved. Patient's prior maintenance dose was 38mg/week. Will plan to recheck again in 1 week, increase his warfarin dose to 34mg/week now that his infection is improving. Keeping patient on this lower dose will likely drop his INR. Before he was started on keflex he had 2 INRs therapeutic on 36 or 38mg/week.    Patient has his re-certification for homecare on Wednesday, this is too soon to recheck his INR due to his held doses last Mon/Tues. Due to this we will plan to recheck again in 1 week. Patient is seeing wound care on Friday in the clinic. Writer requested patient call [facility] if there are any medication changes. Homecare nurse will writer a note for patient's son as well.           OBJECTIVE    INR   Date Value Ref Range Status   10/15/2018 2.5  Final       ASSESSMENT / PLAN  INR assessment THER    Recheck INR In: 1 WEEK    INR Location Home[facility] INR [facility]     Anticoagulation Summary as of 10/15/2018     INR goal 2.0-3.0   Today's INR 2.5   Warfarin maintenance plan No maintenance plan   Full warfarin instructions 10/15: 6 mg; 10/16: 4 mg; 10/17: 6 mg; 10/18: 4 mg; 10/19: 6 mg; 10/20: 4 mg; 10/21: 4 mg   Weekly warfarin total 38 mg   Plan last modified Ada Isaac RN (10/15/2018)   Next INR check 10/22/2018   Priority INR   Target end date Indefinite    Indications   Chronic anticoagulation [Z79.01]  Chronic atrial fibrillation (H) [I48.2]  Long-term (current) use of anticoagulants [Z79.01] [Z79.01]         Anticoagulation Episode Summary     INR check location     Preferred lab      Send INR reminders to [facility] POOL    Comments * 2mg tabs. [facility] MWF      Anticoagulation Care Providers     Provider Role Specialty Phone number    Heron Mayo MD [facility] [phone]            See the Encounter Report to view Anticoagulation Flowsheet and Dosing Calendar (Go to Encounters tab in chart review, and find the Anticoagulation Therapy Visit)        Ada Isaac RN, CACP

## 2018-10-19 ENCOUNTER — APPOINTMENT (OUTPATIENT)
Dept: LAB | Facility: CLINIC | Age: 83
End: 2018-10-19
Payer: MEDICARE

## 2018-10-19 ENCOUNTER — OFFICE VISIT (OUTPATIENT)
Dept: SURGERY | Facility: CLINIC | Age: 83
End: 2018-10-19
Payer: MEDICARE

## 2018-10-19 ENCOUNTER — HOSPITAL ENCOUNTER (OUTPATIENT)
Dept: WOUND CARE | Facility: CLINIC | Age: 83
Discharge: HOME | End: 2018-10-19
Attending: SURGERY | Admitting: SURGERY
Payer: MEDICARE

## 2018-10-19 VITALS
DIASTOLIC BLOOD PRESSURE: 76 MMHG | SYSTOLIC BLOOD PRESSURE: 129 MMHG | WEIGHT: 165 LBS | TEMPERATURE: 97.8 F | HEIGHT: 64 IN | HEART RATE: 80 BPM | BODY MASS INDEX: 28.17 KG/M2

## 2018-10-19 DIAGNOSIS — I48.21 PERMANENT ATRIAL FIBRILLATION (H): Primary | ICD-10-CM

## 2018-10-19 DIAGNOSIS — Z09 POSTOP CHECK: ICD-10-CM

## 2018-10-19 LAB — INR PPP: 2.67 (ref 0.86–1.14)

## 2018-10-19 PROCEDURE — 36415 COLL VENOUS BLD VENIPUNCTURE: CPT | Performed by: SURGERY

## 2018-10-19 PROCEDURE — 99024 POSTOP FOLLOW-UP VISIT: CPT | Performed by: SURGERY

## 2018-10-19 PROCEDURE — 85610 PROTHROMBIN TIME: CPT | Performed by: SURGERY

## 2018-10-19 PROCEDURE — G0463 HOSPITAL OUTPT CLINIC VISIT: HCPCS

## 2018-10-19 NOTE — MR AVS SNAPSHOT
After Visit Summary   10/19/2018    Nabor Cunningham    MRN: [id number]           Patient Information     Date Of Birth          1935        Visit Information        Provider Department      10/19/2018 8:30 AM Dain Davis MD [facility]        Today's Diagnoses     Permanent atrial fibrillation (H)    -  1    Postop check          Care Instructions    Per Dr. Davis's instructions          Follow-ups after your visit        Your next 10 appointments already scheduled     Oct 19, 2018  9:35 AM CDT   LAB with [facility] ([facility])    [address] [facility] [address]   [phone]           Please do not eat 10-12 hours before your appointment if you are coming in fasting for labs on lipids, cholesterol, or glucose (sugar). This does not apply to pregnant women. Water, hot tea and black coffee (with nothing added) are okay. Do not drink other fluids, diet soda or chew gum.            Oct 26, 2018  9:00 AM CDT   XR CHEST 2 VIEWS with NBXR1   [facility] ([facility])    [address] [place] [address]   [phone]           How do I prepare for my exam? (Food and drink instructions) No Food and Drink Restrictions.  How do I prepare for my exam? (Other instructions) You do not need to do anything special for this exam.  What should I wear: Wear comfortable clothes.  How long does the exam take: Most scans take less than 5 minutes.  What should I bring: Bring a list of your medicines, including vitamins, minerals and over-the-counter drugs. It is safest to leave personal items at home.  Do I need a :  No  is needed.  What do I need to tell my doctor: Tell your doctor if there s any chance you are pregnant.  What should I do after the exam: No restrictions, You may resume normal activities.  What is this test: An image of a specific body part shown in shades of black and  "white.  Who should I call with questions: If you have any questions, please call the Imaging Department where you will have your exam. Directions, parking instructions, and other information is available on our website, [email or web address].              Who to contact     If you have questions or need follow up information about today's clinic visit or your schedule please contact [facility] directly at [phone].  Normal or non-critical lab and imaging results will be communicated to you by [email or web address], letter or phone within 4 business days after the clinic has received the results. If you do not hear from us within 7 days, please contact the clinic through [email or web address] or phone. If you have a critical or abnormal lab result, we will notify you by phone as soon as possible.  Submit refill requests through [employer] or call your pharmacy and they will forward the refill request to us. Please allow 3 business days for your refill to be completed.          Additional Information About Your Visit        [email or web address] Information     Patsnap lets you send messages to your doctor, view your test results, renew your prescriptions, schedule appointments and more. To sign up, go to [email or web address] . Click on \"Log in\" on the left side of the screen, which will take you to the Welcome page. Then click on \"Sign up Now\" on the right side of the page.     You will be asked to enter the access code listed below, as well as some personal information. Please follow the directions to create your username and password.     Your access code is: [email or web address]  Expires: 2018  9:10 AM     Your access code will  in 90 days. If you need help or a new code, please call your [facility] clinic or [phone].        Care EveryWhere ID     This is your Care EveryWhere ID. This could be used by other organizations to access your [facility] medical records  [id number]        Your Vitals Were     Pulse Temperature Height BMI " "(Body Mass Index)          80 97.8  F (36.6  C) (Oral) 1.626 m (5' 4\") 28.32 kg/m2         Blood Pressure from Last 3 Encounters:   10/19/18 129/76   10/08/18 127/81   09/21/18 110/60    Weight from Last 3 Encounters:   10/19/18 74.8 kg (165 lb)   10/08/18 74.8 kg (165 lb)   09/21/18 75.3 kg (166 lb)              We Performed the Following     INR        Primary Care Provider Office Phone # Fax #    Heron Mayo [phone] [address] [place], [place] [address]        Equal Access to Services     SHIRA DELEON : Flory Polanco, rosalba watters, keli dewitt, lili valerio. So [facility] [phone].    ATENCIÓN: Si habla español, tiene a garcia disposición servicios gratuitos de asistencia lingüística. Llame al [phone].    We comply with applicable federal civil rights laws and [place] laws. We do not discriminate on the basis of race, color, national origin, age, disability, sex, sexual orientation, or gender identity.            Thank you!     Thank you for choosing [facility]  for your care. Our goal is always to provide you with excellent care. Hearing back from our patients is one way we can continue to improve our services. Please take a few minutes to complete the written survey that you may receive in the mail after your visit with us. Thank you!             Your Updated Medication List - Protect others around you: Learn how to safely use, store and throw away your medicines at www.disposemymeds.org.          This list is accurate as of 10/19/18  9:08 AM.  Always use your most recent med list.                   Brand Name Dispense Instructions for use Diagnosis    ACETAMINOPHEN PO      Take 650 mg by mouth every 4 hours as needed for pain        betamethasone dipropionate 0.05 % cream    DIPROSONE    45 g    Apply topically 2 times daily For leg rash    Eczema, unspecified type       calcium carbonate 500 mg " (elemental) 500 MG tablet    OS-ALY     Take 1 tablet by mouth 2 times daily        colchicine 0.6 MG tablet    COLCRYS    30 tablet    Take 1 tablet (0.6 mg) by mouth daily    Chronic gout, unspecified cause, unspecified site       COUMADIN PO      Take by mouth daily Take as directed per INR results        cyanocobalamin 1000 MCG tablet    vitamin  B-12    30 tablet    TAKE ONE TABLET BY MOUTH DAILY    Vitamin B 12 deficiency       hypromellose 0.4 % Soln ophthalmic solution    ARTIFICIAL TEARS     Place 1 drop into both eyes every hour as needed for dry eyes        metoprolol succinate 25 MG 24 hr tablet    TOPROL-XL    90 tablet    Take 25 mg by mouth every evening        mineral oil oil      Take 5 mLs by mouth daily        Multi-vitamin Tabs tablet     100 tablet    Take 1 tablet by mouth daily        nortriptyline 25 MG capsule    PAMELOR    270 capsule    TAKE ONE CAPSULE MID MORNING AND TWO CAPSULES BY MOUTH DAILY AT BEDTIME    Anxiety       prednisoLONE acetate 1 % ophthalmic susp    PRED FORTE     Place 1 drop Into the left eye 2 times daily        temazepam 30 MG capsule    RESTORIL    30 capsule    TAKE ONE CAPSULE BY MOUTH AT BEDTIME AS NEEDED FOR SLEEP    Persistent insomnia

## 2018-10-19 NOTE — IP AVS SNAPSHOT
MRN:[id number]                      After Visit Summary   10/19/2018    Nabor Cunningham    MRN: [id number]           Visit Information        Provider Department      10/19/2018  8:45 AM Et Nurse Reyes - [facility] Wound Ostomy           Review of your medicines      UNREVIEWED medicines. Ask your doctor about these medicines        Dose / Directions    ACETAMINOPHEN PO        Dose:  650 mg   Take 650 mg by mouth every 4 hours as needed for pain   Refills:  0       betamethasone dipropionate 0.05 % cream   Commonly known as:  DIPROSONE   Used for:  Eczema, unspecified type        Apply topically 2 times daily For leg rash   Quantity:  45 g   Refills:  0       calcium carbonate 500 mg (elemental) 500 MG tablet   Commonly known as:  OS-ALY        Dose:  1 tablet   Take 1 tablet by mouth 2 times daily   Refills:  0       colchicine 0.6 MG tablet   Commonly known as:  COLCRYS   Used for:  Chronic gout, unspecified cause, unspecified site        Dose:  0.6 mg   Take 1 tablet (0.6 mg) by mouth daily   Quantity:  30 tablet   Refills:  0       COUMADIN PO        Take by mouth daily Take as directed per INR results   Refills:  0       cyanocobalamin 1000 MCG tablet   Commonly known as:  vitamin  B-12   Used for:  Vitamin B 12 deficiency        TAKE ONE TABLET BY MOUTH DAILY   Quantity:  30 tablet   Refills:  11       hypromellose 0.4 % Soln ophthalmic solution   Commonly known as:  ARTIFICIAL TEARS        Dose:  1 drop   Place 1 drop into both eyes every hour as needed for dry eyes   Refills:  0       metoprolol succinate 25 MG 24 hr tablet   Commonly known as:  TOPROL-XL        Dose:  25 mg   Take 25 mg by mouth every evening   Quantity:  90 tablet   Refills:  1       mineral oil oil        Dose:  5 mL   Take 5 mLs by mouth daily   Refills:  0       Multi-vitamin Tabs tablet        Dose:  1 tablet   Take 1 tablet by mouth daily   Quantity:  100 tablet   Refills:  3       nortriptyline 25 MG  capsule   Commonly known as:  PAMELOR   Used for:  Anxiety        TAKE ONE CAPSULE MID MORNING AND TWO CAPSULES BY MOUTH DAILY AT BEDTIME   Quantity:  270 capsule   Refills:  3       prednisoLONE acetate 1 % ophthalmic susp   Commonly known as:  PRED FORTE        Dose:  1 drop   Place 1 drop Into the left eye 2 times daily   Refills:  2       temazepam 30 MG capsule   Commonly known as:  RESTORIL   Used for:  Persistent insomnia        TAKE ONE CAPSULE BY MOUTH AT BEDTIME AS NEEDED FOR SLEEP   Quantity:  30 capsule   Refills:  0                Protect others around you: Learn how to safely use, store and throw away your medicines at www.disposemymeds.org.         Follow-ups after your visit        Your next 10 appointments already scheduled     Oct 19, 2018  9:35 AM CDT   LAB with [facility] ([facility])    [address] [facility] [address]   [phone]           Please do not eat 10-12 hours before your appointment if you are coming in fasting for labs on lipids, cholesterol, or glucose (sugar). This does not apply to pregnant women. Water, hot tea and black coffee (with nothing added) are okay. Do not drink other fluids, diet soda or chew gum.            Oct 26, 2018  9:00 AM CDT   XR CHEST 2 VIEWS with NBXR1   [facility] ([facility])    [address] [place] [address] [phone]           How do I prepare for my exam? (Food and drink instructions) No Food and Drink Restrictions.  How do I prepare for my exam? (Other instructions) You do not need to do anything special for this exam.  What should I wear: Wear comfortable clothes.  How long does the exam take: Most scans take less than 5 minutes.  What should I bring: Bring a list of your medicines, including vitamins, minerals and over-the-counter drugs. It is safest to leave personal items at home.  Do I need a :  No  is needed.  What do I need  "to tell my doctor: Tell your doctor if there s any chance you are pregnant.  What should I do after the exam: No restrictions, You may resume normal activities.  What is this test: An image of a specific body part shown in shades of black and white.  Who should I call with questions: If you have any questions, please call the Imaging Department where you will have your exam. Directions, parking instructions, and other information is available on our website, [email or web address].               Care Instructions        Further instructions from your care team       Wound has improved drastically and should be healed soon.      Continue same cares, use the 6x6 bandage as is better retained and more comfortable.      Continue the barrier cream to periwound skin and the hydrofera blue, may discontinue the hydrofera blue when the wound has filled in.    Follow-up as needed for non-healing.    Monica Lundberg RN, CWOCN [phone]     Additional Information About Your Visit        MyChart Information     [employer] lets you send messages to your doctor, view your test results, renew your prescriptions, schedule appointments and more. To sign up, go to [email or web address] . Click on \"Log in\" on the left side of the screen, which will take you to the Welcome page. Then click on \"Sign up Now\" on the right side of the page.     You will be asked to enter the access code listed below, as well as some personal information. Please follow the directions to create your username and password.     Your access code is: [email or web address]  Expires: 2018  9:10 AM     Your access code will  in 90 days. If you need help or a new code, please call your [facility] clinic or [phone].        Care EveryWhere ID     This is your Care EveryWhere ID. This could be used by other organizations to access your [facility] medical records  [id number]         Primary Care Provider Office Phone # Fax #    Heron Mayo [phone] " [phone]      Equal Access to Services     [facility]: Hadii aad ku hadamanmali Polanco, waronenbogdan watters, genesislili rome. So [facility] [phone].    ATENCIÓN: Si habla español, tiene a garcia disposición servicios gratuitos de asistencia lingüística. [facility] [phone].    We comply with applicable federal civil rights laws and [place] laws. We do not discriminate on the basis of race, color, national origin, age, disability, sex, sexual orientation, or gender identity.            Thank you!     Thank you for choosing [facility] for your care. Our goal is always to provide you with excellent care. Hearing back from our patients is one way we can continue to improve our services. Please take a few minutes to complete the written survey that you may receive in the mail after you visit with us. Thank you!             Medication List: This is a list of all your medications and when to take them. Check marks below indicate your daily home schedule. Keep this list as a reference.      Medications           Morning Afternoon Evening Bedtime As Needed    ACETAMINOPHEN PO   Take 650 mg by mouth every 4 hours as needed for pain                                betamethasone dipropionate 0.05 % cream   Commonly known as:  DIPROSONE   Apply topically 2 times daily For leg rash                                calcium carbonate 500 mg (elemental) 500 MG tablet   Commonly known as:  OS-ALY   Take 1 tablet by mouth 2 times daily                                colchicine 0.6 MG tablet   Commonly known as:  COLCRYS   Take 1 tablet (0.6 mg) by mouth daily                                COUMADIN PO   Take by mouth daily Take as directed per INR results                                cyanocobalamin 1000 MCG tablet   Commonly known as:  vitamin  B-12   TAKE ONE TABLET BY MOUTH DAILY                                hypromellose 0.4 % Soln ophthalmic solution   Commonly known as:   ARTIFICIAL TEARS   Place 1 drop into both eyes every hour as needed for dry eyes                                metoprolol succinate 25 MG 24 hr tablet   Commonly known as:  TOPROL-XL   Take 25 mg by mouth every evening                                mineral oil oil   Take 5 mLs by mouth daily                                Multi-vitamin Tabs tablet   Take 1 tablet by mouth daily                                nortriptyline 25 MG capsule   Commonly known as:  PAMELOR   TAKE ONE CAPSULE MID MORNING AND TWO CAPSULES BY MOUTH DAILY AT BEDTIME                                prednisoLONE acetate 1 % ophthalmic susp   Commonly known as:  PRED FORTE   Place 1 drop Into the left eye 2 times daily                                temazepam 30 MG capsule   Commonly known as:  RESTORIL   TAKE ONE CAPSULE BY MOUTH AT BEDTIME AS NEEDED FOR SLEEP

## 2018-10-19 NOTE — DISCHARGE INSTRUCTIONS
Wound has improved drastically and should be healed soon.      Continue same cares, use the 6x6 bandage as is better retained and more comfortable.      Continue the barrier cream to periwound skin and the hydrofera blue, may discontinue the hydrofera blue when the wound has filled in.    Follow-up as needed for non-healing.    Monica Lundberg RN, CWOCN [phone]

## 2018-10-19 NOTE — NURSING NOTE
"Initial /76 (BP Location: Left arm, Patient Position: Sitting, Cuff Size: Adult Regular)  Pulse 80  Temp 97.8  F (36.6  C) (Oral)  Ht 1.626 m (5' 4\")  Wt 74.8 kg (165 lb)  BMI 28.32 kg/m2 Estimated body mass index is 28.32 kg/(m^2) as calculated from the following:    Height as of this encounter: 1.626 m (5' 4\").    Weight as of this encounter: 74.8 kg (165 lb). .    Rafia Lemos MA    "

## 2018-10-19 NOTE — LETTER
10/19/2018         RE: Nabor Cunningham  [address] [address]        Dear Colleague,    Thank you for referring your patient, Nabor Cunningham, to the [facility]. Please see a copy of my visit note below.    Patient seen with wound care nurse.  Did not require any additional surgical intervention.    Dain Davis MD     Again, thank you for allowing me to participate in the care of your patient.        Sincerely,        Dain Davis MD

## 2018-10-19 NOTE — PROGRESS NOTES
Patient seen with wound care nurse.  Did not require any additional surgical intervention.    Dain Davis MD

## 2018-10-21 ENCOUNTER — HOSPITAL ENCOUNTER (EMERGENCY)
Facility: CLINIC | Age: 83
Discharge: HOME | End: 2018-10-21
Attending: NURSE PRACTITIONER | Admitting: NURSE PRACTITIONER
Payer: MEDICARE

## 2018-10-21 VITALS
DIASTOLIC BLOOD PRESSURE: 72 MMHG | SYSTOLIC BLOOD PRESSURE: 127 MMHG | RESPIRATION RATE: 16 BRPM | OXYGEN SATURATION: 96 % | TEMPERATURE: 97 F | WEIGHT: 165 LBS | BODY MASS INDEX: 28.32 KG/M2 | HEART RATE: 75 BPM

## 2018-10-21 DIAGNOSIS — L08.9 INFECTED SEBACEOUS CYST: ICD-10-CM

## 2018-10-21 DIAGNOSIS — L72.3 INFECTED SEBACEOUS CYST: ICD-10-CM

## 2018-10-21 PROCEDURE — G0463 HOSPITAL OUTPT CLINIC VISIT: HCPCS | Performed by: NURSE PRACTITIONER

## 2018-10-21 PROCEDURE — 99213 OFFICE O/P EST LOW 20 MIN: CPT | Mod: Z6 | Performed by: NURSE PRACTITIONER

## 2018-10-21 RX ORDER — CEPHALEXIN 500 MG/1
500 CAPSULE ORAL 4 TIMES DAILY
Qty: 40 CAPSULE | Refills: 0 | Status: SHIPPED | OUTPATIENT
Start: 2018-10-21 | End: 2018-11-01

## 2018-10-21 ASSESSMENT — ENCOUNTER SYMPTOMS
VOMITING: 0
DIZZINESS: 0
COUGH: 0
CHILLS: 0
ABDOMINAL PAIN: 0
NAUSEA: 0
HEADACHES: 0
WOUND: 1
FEVER: 0
LIGHT-HEADEDNESS: 0
FATIGUE: 0

## 2018-10-21 NOTE — ED AVS SNAPSHOT
[facility] Emergency Department    [address] [facility] [place] [address]    Phone:  [phone]    Fax:  [phone]                                       Nabor Cunningham   MRN: [id number]    Department:  [facility] Emergency Department   Date of Visit:  10/21/2018           Patient Information     Date Of Birth          1935        Your diagnoses for this visit were:     Infected sebaceous cyst- right mid back        You were seen by Shila Martinez APRN CNP.      Follow-up Information     Schedule an appointment as soon as possible for a visit with Dain Davis MD.    Specialty:  Surgery    Contact information:    [address] [facility] [address]  [phone]          Discharge Instructions         Keflex 500 mg four times a day for 10 days.  Warm compresses to the area 2-3x/day.  Call tomorrow morning to make appointment in surgery clinic this week (Dr. Davis or one of his partners). [phone]  Have nurse recheck the wound on Tomorrow.   Return to the emergency department for fever, increased redness, swelling, or pain.      Epidermoid Cyst (Sebaceous Cyst), Infected (Antibiotic Treatment)  You have an epidermoid cyst. This is a small, painless lump under your skin. An epidermoid cyst (often called a sebaceous cyst, epidermal cyst, or epidermal inclusion cyst) is a term most often used for 2 similar types of cysts:    Epidermoid cysts. These cysts form slowly under the skin. They can be found on most parts of the body. But they are most often found on areas with more hair such as the scalp, face, upper back, and genitals.    Pilar cysts. These are similar to epidermoid cysts. But they start from a different part of the hair follicle. They are more likely to be on the scalp.  Here are some general facts about these cysts:    A cyst is a sac filled with material that is often cheesy, fatty, oily, or stringy. The material inside can be thick. Or it can be a liquid.    You can  usually move the cyst slightly if you try.    The cysts can be smaller than a pea or as large as a few inches.    The cysts are usually not painful, unless they become inflamed or infected.    The area around the cyst may smell bad. If the cyst breaks open, the material inside it often smells bad as well.  Your cyst became infected and your healthcare provider wanted to treat it with antibiotics. If the antibiotics don t clear up the infection, the cyst will need to be drained by making a small cut (incision). Local anesthesia will be used to numb the area.  Home care    Resist the temptation to squeeze or pop the cyst, stick a needle in it, or cut it open. This often leads to a worsening infection and scarring.    Take the antibiotic as directed until it is all used up.    Soak the affected area in hot water or apply a hot pack (a thin, clean towel soaked in hot water) for 20 minutes at a time. Do this 3 to 4 times a day.    Apply antibiotic cream or ointment 3 times a day.    You may use over-the-counter pain medicine to control pain, unless another medicine was given. If you have chronic liver or kidney disease or ever had a stomach ulcer or GI bleeding, talk with your healthcare provider before using these medicines.  Prevention  Once this infection has healed, reduce the risk of future infections by:    Keeping the cyst area clean by bathing or showering daily    Avoiding tight-fitting clothing in the cyst area  Follow-up care  Follow up with your healthcare provider, or as advised. If a gauze packing was put in your wound, it should be removed in a few days as advised by your healthcare provider. Check your wound every day for the signs listed below.  When to seek medical advice  Call your healthcare provider right away if any of these occur:    Pus coming from the cyst    Increasing redness around the wound    Increasing local pain or swelling    Fever of 100.4 F (38 C) or higher, or as directed by your  provider  Date Last Reviewed: 10/5/2016    2330-0053 [employer] [facility]. [address] [place] [address], [place] [address]. All rights reserved. This information is not intended as a substitute for professional medical care. Always follow your healthcare professional's instructions.          Your next 10 appointments already scheduled     Oct 26, 2018  9:00 AM CDT   XR CHEST 2 VIEWS with NBXR[facility]   [facility]    [address] [place] [address]   [phone]           How do I prepare for my exam? (Food and drink instructions) No Food and Drink Restrictions.  How do I prepare for my exam? (Other instructions) You do not need to do anything special for this exam.  What should I wear: Wear comfortable clothes.  How long does the exam take: Most scans take less than 5 minutes.  What should I bring: Bring a list of your medicines, including vitamins, minerals and over-the-counter drugs. It is safest to leave personal items at home.  Do I need a :  No  is needed.  What do I need to tell my doctor: Tell your doctor if there s any chance you are pregnant.  What should I do after the exam: No restrictions, You may resume normal activities.  What is this test: An image of a specific body part shown in shades of black and white.  Who should I call with questions: If you have any questions, please call the Imaging Department where you will have your exam. Directions, parking instructions, and other information is available on our website, [email or web address].              24 Hour Appointment Hotline       To make an appointment at any [facility], call [phone] ([phone]). If you don't have a family doctor or clinic, we will help you find one. [facility] are conveniently located to serve the needs of you and your family.             Review of your medicines      START taking        Dose / Directions Last dose taken    cephALEXin 500  MG capsule   Commonly known as:  KEFLEX   Dose:  500 mg   Quantity:  40 capsule        Take 1 capsule (500 mg) by mouth 4 times daily for 10 days   Refills:  0          Our records show that you are taking the medicines listed below. If these are incorrect, please call your family doctor or clinic.        Dose / Directions Last dose taken    ACETAMINOPHEN PO   Dose:  650 mg        Take 650 mg by mouth every 4 hours as needed for pain   Refills:  0        betamethasone dipropionate 0.05 % cream   Commonly known as:  DIPROSONE   Quantity:  45 g        Apply topically 2 times daily For leg rash   Refills:  0        calcium carbonate 500 mg (elemental) 500 MG tablet   Commonly known as:  OS-ALY   Dose:  1 tablet        Take 1 tablet by mouth 2 times daily   Refills:  0        colchicine 0.6 MG tablet   Commonly known as:  COLCRYS   Dose:  0.6 mg   Quantity:  30 tablet        Take 1 tablet (0.6 mg) by mouth daily   Refills:  0        COUMADIN PO        Take by mouth daily Take as directed per INR results   Refills:  0        cyanocobalamin 1000 MCG tablet   Commonly known as:  vitamin  B-12   Quantity:  30 tablet        TAKE ONE TABLET BY MOUTH DAILY   Refills:  11        hypromellose 0.4 % Soln ophthalmic solution   Commonly known as:  ARTIFICIAL TEARS   Dose:  1 drop        Place 1 drop into both eyes every hour as needed for dry eyes   Refills:  0        metoprolol succinate 25 MG 24 hr tablet   Commonly known as:  TOPROL-XL   Dose:  25 mg   Quantity:  90 tablet        Take 25 mg by mouth every evening   Refills:  1        mineral oil oil   Dose:  5 mL        Take 5 mLs by mouth daily   Refills:  0        Multi-vitamin Tabs tablet   Dose:  1 tablet   Quantity:  100 tablet        Take 1 tablet by mouth daily   Refills:  3        nortriptyline 25 MG capsule   Commonly known as:  PAMELOR   Quantity:  270 capsule        TAKE ONE CAPSULE MID MORNING AND TWO CAPSULES BY MOUTH DAILY AT BEDTIME   Refills:  3         "prednisoLONE acetate 1 % ophthalmic susp   Commonly known as:  PRED FORTE   Dose:  1 drop        Place 1 drop Into the left eye 2 times daily   Refills:  2        temazepam 30 MG capsule   Commonly known as:  RESTORIL   Quantity:  30 capsule        TAKE ONE CAPSULE BY MOUTH AT BEDTIME AS NEEDED FOR SLEEP   Refills:  0                Prescriptions were sent or printed at these locations (1 Prescription)                   [id number] [place], [place] - [address]   [address] [facility] [address]    Telephone:  [phone]   Fax:  [phone]   Hours:  Closed 10-16-08 business to [facility]                E-Prescribed (1 of 1)         cephALEXin (KEFLEX) 500 MG capsule                Orders Needing Specimen Collection     None      Pending Results     No orders found from 10/19/2018 to 10/22/2018.            Pending Culture Results     No orders found from 10/19/2018 to 10/22/2018.            Pending Results Instructions     If you had any lab results that were not finalized at the time of your Discharge, you can call the ED Lab Result RN at [phone]. You will be contacted by this team for any positive Lab results or changes in treatment. The nurses are available 7 days a week from 10A to 6:30P.  You can leave a message 24 hours per day and they will return your call.        Test Results From Your Hospital Stay               Thank you for choosing [facility]       Thank you for choosing [facility] for your care. Our goal is always to provide you with excellent care. Hearing back from our patients is one way we can continue to improve our services. Please take a few minutes to complete the written survey that you may receive in the mail after you visit with us. Thank you!        [employer] Information     [email or web address] lets you send messages to your doctor, view your test results, renew your prescriptions, schedule appointments and more. To sign up, go to [email or web address] - 3Pt . Click on \"Log " "in\" on the left side of the screen, which will take you to the Welcome page. Then click on \"Sign up Now\" on the right side of the page.     You will be asked to enter the access code listed below, as well as some personal information. Please follow the directions to create your username and password.     Your access code is: [email or web address]  Expires: 2018  9:10 AM     Your access code will  in 90 days. If you need help or a new code, please call your [facility] clinic or [phone].        Care EveryWhere ID     This is your Care EveryWhere ID. This could be used by other organizations to access your [facility] medical records  [id number]        Equal Access to Services     SHIRA DELEON : Flory Polanco, wadevonte watters, qamaki kaalmabogdan dewitt, lili valerio. So [facility] [phone].    ATENCIÓN: Si habla español, tiene a garcia disposición servicios gratuitos de asistencia lingüística. Llame al [phone].    We comply with applicable federal civil rights laws and [place] laws. We do not discriminate on the basis of race, color, national origin, age, disability, sex, sexual orientation, or gender identity.            After Visit Summary       This is your record. Keep this with you and show to your community pharmacist(s) and doctor(s) at your next visit.                  "

## 2018-10-21 NOTE — DISCHARGE INSTRUCTIONS
Keflex 500 mg four times a day for 10 days.  Warm compresses to the area 2-3x/day.  Call tomorrow morning to make appointment in surgery clinic this week (Dr. Davis or one of his partners). [phone]  Have nurse recheck the wound on Tomorrow.   Return to the emergency department for fever, increased redness, swelling, or pain.      Epidermoid Cyst (Sebaceous Cyst), Infected (Antibiotic Treatment)  You have an epidermoid cyst. This is a small, painless lump under your skin. An epidermoid cyst (often called a sebaceous cyst, epidermal cyst, or epidermal inclusion cyst) is a term most often used for 2 similar types of cysts:    Epidermoid cysts. These cysts form slowly under the skin. They can be found on most parts of the body. But they are most often found on areas with more hair such as the scalp, face, upper back, and genitals.    Pilar cysts. These are similar to epidermoid cysts. But they start from a different part of the hair follicle. They are more likely to be on the scalp.  Here are some general facts about these cysts:    A cyst is a sac filled with material that is often cheesy, fatty, oily, or stringy. The material inside can be thick. Or it can be a liquid.    You can usually move the cyst slightly if you try.    The cysts can be smaller than a pea or as large as a few inches.    The cysts are usually not painful, unless they become inflamed or infected.    The area around the cyst may smell bad. If the cyst breaks open, the material inside it often smells bad as well.  Your cyst became infected and your healthcare provider wanted to treat it with antibiotics. If the antibiotics don t clear up the infection, the cyst will need to be drained by making a small cut (incision). Local anesthesia will be used to numb the area.  Home care    Resist the temptation to squeeze or pop the cyst, stick a needle in it, or cut it open. This often leads to a worsening infection and scarring.    Take the antibiotic  as directed until it is all used up.    Soak the affected area in hot water or apply a hot pack (a thin, clean towel soaked in hot water) for 20 minutes at a time. Do this 3 to 4 times a day.    Apply antibiotic cream or ointment 3 times a day.    You may use over-the-counter pain medicine to control pain, unless another medicine was given. If you have chronic liver or kidney disease or ever had a stomach ulcer or GI bleeding, talk with your healthcare provider before using these medicines.  Prevention  Once this infection has healed, reduce the risk of future infections by:    Keeping the cyst area clean by bathing or showering daily    Avoiding tight-fitting clothing in the cyst area  Follow-up care  Follow up with your healthcare provider, or as advised. If a gauze packing was put in your wound, it should be removed in a few days as advised by your healthcare provider. Check your wound every day for the signs listed below.  When to seek medical advice  Call your healthcare provider right away if any of these occur:    Pus coming from the cyst    Increasing redness around the wound    Increasing local pain or swelling    Fever of 100.4 F (38 C) or higher, or as directed by your provider  Date Last Reviewed: 10/5/2016    1297-5960 [employer]. [address] [place] [address], [place] [address]. All rights reserved. This information is not intended as a substitute for professional medical care. Always follow your healthcare professional's instructions.

## 2018-10-21 NOTE — ED PROVIDER NOTES
History     Chief Complaint   Patient presents with     Wound Check     red are on back     HPI  Nabor Cunningham is a 82 year old male with history of anxiety, alcohol abuse, chronic A-fib (on Warfarin), peripheral polyneuropathy, htn, gout, hyperlipidemia,  who presents to urgent care accompanied by his son for evaluation of a sebaceous cyst on his back that started to get red in the last couple days. Patient has had this cyst for many years and no history of problems with it until now. Patient unaware if he bumped or scraped the area. Denies pain unless it is touched/pressure applied. Denies fever or chills. Of concern:  Patient had an I & D of the left buttock abscess on 8/27/18 and developed post-operative bleeding and infection requiring hospitalization last month.  patient following with wound care for a left buttock abscess that is healing.  Wound nurse will be seeing him tomorrow.    Problem List:    Patient Active Problem List    Diagnosis Date Noted     Postoperative hemorrhage involving circulatory system following non-circulatory system procedure 09/03/2018     Priority: Medium     Pneumonia 09/02/2018     Priority: Medium     Bleeding from wound 09/02/2018     Priority: Medium     Moderate episode of recurrent major depressive disorder (H) 04/10/2017     Priority: Medium     Anxiety 03/22/2017     Priority: Medium     3/23/2017:He has been on amitriptyline, nortriptyline, buspirone, sertraline and Lexapro in the past year.        Alcohol abuse 03/22/2017     Priority: Medium     Chronic atrial fibrillation (H) 03/22/2017     Priority: Medium     Weight loss 03/22/2017     Priority: Medium     Peripheral polyneuropathy 03/22/2017     Priority: Medium     4/7/2017:Abnormal monofilament in distal plantar feet bilateral.  He has had chronic pain in both feet.        Chronic gout, unspecified cause, unspecified site 03/22/2017     Priority: Medium     Essential hypertension with goal blood pressure less  than 140/90 03/22/2017     Priority: Medium     Hyperlipidemia LDL goal <130 03/22/2017     Priority: Medium     Persistent insomnia 03/22/2017     Priority: Medium     Lower urinary tract symptoms (LUTS) 03/22/2017     Priority: Medium     Chronic anticoagulation 03/22/2017     Priority: Medium     Long-term (current) use of anticoagulants [Z79.01] 03/22/2017     Priority: Medium        Past Medical History:    Past Medical History:   Diagnosis Date     Anxiety      Gout      Hyperlipemia      Hypertension      Insomnia      Paroxysmal atrial fibrillation (H)        Past Surgical History:    Past Surgical History:   Procedure Laterality Date     INCISION AND DRAINAGE TRUNK, COMBINED N/A 9/6/2018    Procedure: COMBINED INCISION AND DRAINAGE TRUNK;  incision and cauterization of left buttock bleed.;  Surgeon: Dain Davis MD;  Location: [facility] OR       Family History:    No family history on file.    Social History:  Marital Status:   [2]  Social History   Substance Use Topics     Smoking status: Former Smoker     Years: 20.00     Types: Cigarettes     Smokeless tobacco: Never Used     Alcohol use No        Medications:      ACETAMINOPHEN PO   betamethasone dipropionate (DIPROSONE) 0.05 % cream   calcium carbonate (OS- MG "[employer]. [place]) 1250 MG tablet   colchicine (COLCRYS) 0.6 MG tablet   cyanocobalamin (VITAMIN  B-12) 1000 MCG tablet   hypromellose (ARTIFICIAL TEARS) 0.4 % SOLN ophthalmic solution   metoprolol (TOPROL-XL) 25 MG 24 hr tablet   mineral oil oil   multivitamin, therapeutic with minerals (MULTI-VITAMIN) TABS tablet   nortriptyline (PAMELOR) 25 MG capsule   prednisoLONE acetate (PRED FORTE) 1 % ophthalmic susp   temazepam (RESTORIL) 30 MG capsule   Warfarin Sodium (COUMADIN PO)         Review of Systems   Constitutional: Negative for chills, fatigue and fever.   HENT: Negative for congestion.    Respiratory: Negative for cough.    Cardiovascular: Negative for chest pain.   Gastrointestinal:  Negative for abdominal pain, nausea and vomiting.   Skin: Positive for wound (left buttock and right back).   Neurological: Negative for dizziness, light-headedness and headaches.       Physical Exam   BP: 127/72  Pulse: 75  Temp: 97  F (36.1  C)  Resp: 16  Weight: 74.8 kg (165 lb)  SpO2: 96 %      Physical Exam   Constitutional: He is oriented to person, place, and time. He appears well-developed and well-nourished. No distress.   HENT:   Head: Normocephalic and atraumatic.   Right Ear: External ear normal.   Left Ear: External ear normal.   Nose: Nose normal.   Mouth/Throat: Oropharynx is clear and moist.   Cardiovascular: Normal rate, regular rhythm and normal heart sounds.    No murmur heard.  Pulmonary/Chest: Effort normal and breath sounds normal.   Neurological: He is alert and oriented to person, place, and time.   Skin: Skin is warm and dry.            ED Course     ED Course     Procedures             No results found for this or any previous visit (from the past 24 hour(s)).      Assessments & Plan (with Medical Decision Making)   I discussed with patient and his son that normally I would recommend incision and drainage of an infected sebaceous cyst and starting oral antibiotics.  This could be done here today.  However, she and son and daughter are concerned about the risks of bleeding.  They have been informed that patient does have an increased risk of post-procedure bleeding given he is on warfarin.  Family also has some reluctance to have the procedure done in urgent care today because of patient's previous left buttock wound that had complications with postoperative bleeding and requiring hospitalization.  An alternative to I&D, I did discuss conservative measures with oral antibiotics only and warm packs with close follow-up.  Patient has a wound care nurse visit tomorrow so this could be rechecked at that time.  The sebaceous cyst on his back was marked around the areas of erythema.  Patient was  given a prescription of Keflex.  Son was given contact information for the surgery clinic to make an appointment.  Instructed to return for worsening redness, swelling, pain, or fever.   I have reviewed the nursing notes.    I have reviewed the findings, diagnosis, plan and need for follow up with the patient.    New Prescriptions    No medications on file       Final diagnoses:   Infected sebaceous cyst- right mid back       10/21/2018   [facility] EMERGENCY DEPARTMENT     Shila Martinez, AVERY CNP  10/21/18 3044

## 2018-10-21 NOTE — ED AVS SNAPSHOT
[facility] Emergency Department    [address] [facility] [place] [address]    Phone:  [phone]    Fax:  [phone]                                       Nabor Cunningham   MRN: [id number]    Department:  [facility] Emergency Department   Date of Visit:  10/21/2018           After Visit Summary Signature Page     I have received my discharge instructions, and my questions have been answered. I have discussed any challenges I see with this plan with the nurse or doctor.    ..........................................................................................................................................  Patient/Patient Representative Signature      ..........................................................................................................................................  Patient Representative Print Name and Relationship to Patient    ..................................................               ................................................  Date                                   Time    ..........................................................................................................................................  Reviewed by Signature/Title    ...................................................              ..............................................  Date                                               Time          22EPIC Rev 08/18

## 2018-10-22 ENCOUNTER — ANTICOAGULATION THERAPY VISIT (OUTPATIENT)
Dept: ANTICOAGULATION | Facility: CLINIC | Age: 83
End: 2018-10-22
Payer: MEDICARE

## 2018-10-22 DIAGNOSIS — Z79.01 CHRONIC ANTICOAGULATION: ICD-10-CM

## 2018-10-22 DIAGNOSIS — I48.20 CHRONIC ATRIAL FIBRILLATION (H): ICD-10-CM

## 2018-10-22 LAB — INR PPP: 2.4

## 2018-10-22 PROCEDURE — 99207 ZZC NO CHARGE NURSE ONLY: CPT

## 2018-10-22 NOTE — PROGRESS NOTES
ANTICOAGULATION FOLLOW-UP CLINIC VISIT    Patient Name:  Nabor Cunningham  Date:  10/22/2018  Contact Type:  Telephone/ [facility]    SUBJECTIVE:     Patient Findings     Positives Inflammation (Abcess on back), Antibiotic use or infection (Keflex 10/21/18)    Comments Patient was in the ED on 10/21/18 for an infected abccess. This has been an on-going chronic problem. Patient was started on Keflex. Writer instructed RN to have the patient take 6 mg today and 4 mg on Tuesday then recheck the INR on Wednesday.            OBJECTIVE    INR   Date Value Ref Range Status   10/22/2018 2.4  Final       ASSESSMENT / PLAN  INR assessment THER    Recheck INR In: 2 DAYS    INR Location [facility]      Anticoagulation Summary as of 10/22/2018     INR goal 2.0-3.0   Today's INR 2.4   Warfarin maintenance plan No maintenance plan   Full warfarin instructions 10/22: 6 mg; 10/23: 4 mg   Weekly warfarin total 38 mg   Plan last modified Ada Isaac RN (10/15/2018)   Next INR check 10/24/2018   Priority INR   Target end date Indefinite    Indications   Chronic anticoagulation [Z79.01]  Chronic atrial fibrillation (H) [I48.2]  Long-term (current) use of anticoagulants [Z79.01] [Z79.01]         Anticoagulation Episode Summary     INR check location     Preferred lab     Send INR reminders to [place] PHONE [facility] POOL    Comments * 2mg tabs. [facility]      Anticoagulation Care Providers     Provider Role Specialty Phone number    Heron Mayo MD [facility] [phone]            See the Encounter Report to view Anticoagulation Flowsheet and Dosing Calendar (Go to Encounters tab in chart review, and find the Anticoagulation Therapy Visit)        Daphne Lamar RN

## 2018-10-22 NOTE — MR AVS SNAPSHOT
Nabor Cunningham   10/22/2018   Anticoagulation Therapy Visit    Description:  82 year old male   Provider:  Daphne Lamar, RN   Department:  [facility]           INR as of 10/22/2018     Today's INR 2.4      Anticoagulation Summary as of 10/22/2018     INR goal 2.0-3.0   Today's INR 2.4   Full warfarin instructions 10/22: 6 mg; 10/23: 4 mg   Next INR check 10/24/2018    Indications   Chronic anticoagulation [Z79.01]  Chronic atrial fibrillation (H) [I48.2]  Long-term (current) use of anticoagulants [Z79.01] [Z79.01]         October 2018 Details    Sun Mon Tue Wed Thu Fri Sat      1               2               3               4               5               6                 7               8               9               10               11               12               13                 14               15               16               17               18               19               20                 21               22      6 mg   See details      23      4 mg         24            25               26               27                 28               29               30               31                   Date Details   10/22 This INR check       Date of next INR:  10/24/2018         How to take your warfarin dose     To take:  4 mg Take 2 of the 2 mg tablets.    To take:  6 mg Take 3 of the 2 mg tablets.

## 2018-10-24 ENCOUNTER — OFFICE VISIT (OUTPATIENT)
Dept: SURGERY | Facility: CLINIC | Age: 83
End: 2018-10-24
Payer: MEDICARE

## 2018-10-24 ENCOUNTER — ANTICOAGULATION THERAPY VISIT (OUTPATIENT)
Dept: ANTICOAGULATION | Facility: CLINIC | Age: 83
End: 2018-10-24
Payer: MEDICARE

## 2018-10-24 VITALS
SYSTOLIC BLOOD PRESSURE: 126 MMHG | DIASTOLIC BLOOD PRESSURE: 86 MMHG | WEIGHT: 165 LBS | BODY MASS INDEX: 28.17 KG/M2 | RESPIRATION RATE: 18 BRPM | HEIGHT: 64 IN | HEART RATE: 79 BPM

## 2018-10-24 DIAGNOSIS — I48.20 CHRONIC ATRIAL FIBRILLATION (H): ICD-10-CM

## 2018-10-24 DIAGNOSIS — L72.3 SEBACEOUS CYST: ICD-10-CM

## 2018-10-24 DIAGNOSIS — Z79.01 CHRONIC ANTICOAGULATION: ICD-10-CM

## 2018-10-24 DIAGNOSIS — G89.18 POST-OP PAIN: Primary | ICD-10-CM

## 2018-10-24 LAB — INR PPP: 2.7

## 2018-10-24 PROCEDURE — 99207 ZZC NO CHARGE NURSE ONLY: CPT

## 2018-10-24 PROCEDURE — 11403 EXC TR-EXT B9+MARG 2.1-3CM: CPT | Performed by: SURGERY

## 2018-10-24 RX ORDER — HYDROCODONE BITARTRATE AND ACETAMINOPHEN 5; 325 MG/1; MG/1
1-2 TABLET ORAL EVERY 6 HOURS PRN
Qty: 20 TABLET | Refills: 0 | Status: SHIPPED | OUTPATIENT
Start: 2018-10-24 | End: 2018-11-10

## 2018-10-24 NOTE — NURSING NOTE
"Chief Complaint   Patient presents with     Consult     Cyst on upper back        Initial /86 (BP Location: Left arm, Patient Position: Chair, Cuff Size: Adult Regular)  Pulse 79  Resp 18  Ht 1.626 m (5' 4\")  Wt 74.8 kg (165 lb)  BMI 28.32 kg/m2 Estimated body mass index is 28.32 kg/(m^2) as calculated from the following:    Height as of this encounter: 1.626 m (5' 4\").    Weight as of this encounter: 74.8 kg (165 lb).  BP completed using cuff size: regular  Medications and allergies reviewed.      Gillian IZAGUIRRE CMA     "

## 2018-10-24 NOTE — PROGRESS NOTES
82-year-old male here for evaluation of right back mass.  Patient has been on Keflex for the past several days.  He reports the mass is red and inflamed.  It is drained slightly.    Exam:    3 cm deep sebaceous cyst overlying the right scapula with skin changes consistent with superficial infection    Procedure: Area anesthetized with 1% lidocaine with epinephrine.  3 cm oblique incision made and sebaceous cyst excised sharply in its entirety.  Bleeding controlled with direct pressure.  Skin closed using 4-0 Vicryl running subcuticular stitch and dressed with Dermabond.    Assessment and plan: Status post excision of sebaceous cyst.  Patient advised to wash wound daily with soap and water and to follow-up with me as necessary patient's daughter advised to look for signs of infection such as increasing redness pain or swelling.  Return to clinic sooner if that happens.  Patient understood.    Dain Davis MD

## 2018-10-24 NOTE — MR AVS SNAPSHOT
Nabor Cunningham   10/24/2018   Anticoagulation Therapy Visit    Description:  82 year old male   Provider:  Ada Isaac RN   Department:  [facility]           INR as of 10/24/2018     Today's INR 2.7      Anticoagulation Summary as of 10/24/2018     INR goal 2.0-3.0   Today's INR 2.7   Full warfarin instructions 10/24: 6 mg; 10/25: 4 mg; 10/26: 6 mg; 10/27: 4 mg; 10/28: 4 mg   Next INR check 10/29/2018    Indications   Chronic anticoagulation [Z79.01]  Chronic atrial fibrillation (H) [I48.2]  Long-term (current) use of anticoagulants [Z79.01] [Z79.01]         October 2018 Details    Sun Mon Tue Wed Thu Fri Sat      1               2               3               4               5               6                 7               8               9               10               11               12               13                 14               15               16               17               18               19               20                 21               22               23               24      6 mg   See details      25      4 mg         26      6 mg         27      4 mg           28      4 mg         29            30               31                   Date Details   10/24 This INR check       Date of next INR:  10/29/2018         How to take your warfarin dose     To take:  4 mg Take 2 of the 2 mg tablets.    To take:  6 mg Take 3 of the 2 mg tablets.

## 2018-10-24 NOTE — LETTER
10/24/2018         RE: Nabor Cunningham  [address] [address]        Dear Colleague,    Thank you for referring your patient, Nabor Cunningham, to the [facility]. Please see a copy of my visit note below.    82-year-old male here for evaluation of right back mass.  Patient has been on Keflex for the past several days.  He reports the mass is red and inflamed.  It is drained slightly.    Exam:    3 cm deep sebaceous cyst overlying the right scapula with skin changes consistent with superficial infection    Procedure: Area anesthetized with 1% lidocaine with epinephrine.  3 cm oblique incision made and sebaceous cyst excised sharply in its entirety.  Bleeding controlled with direct pressure.  Skin closed using 4-0 Vicryl running subcuticular stitch and dressed with Dermabond.    Assessment and plan: Status post excision of sebaceous cyst.  Patient advised to wash wound daily with soap and water and to follow-up with me as necessary patient's daughter advised to look for signs of infection such as increasing redness pain or swelling.  Return to clinic sooner if that happens.  Patient understood.    Dain Davis MD     Again, thank you for allowing me to participate in the care of your patient.        Sincerely,        Dain Davis MD

## 2018-10-24 NOTE — MR AVS SNAPSHOT
After Visit Summary   10/24/2018    Nabor Cunningham    MRN: [id number]           Patient Information     Date Of Birth          1935        Visit Information        Provider Department      10/24/2018 1:30 PM Dain Davis MD [facility]        Today's Diagnoses     Post-op pain    -  1    Sebaceous cyst           Follow-ups after your visit        Your next 10 appointments already scheduled     Oct 26, 2018  9:00 AM CDT   XR CHEST 2 VIEWS with [facility]   [facility]    [address] [place] [id number]   [phone]           How do I prepare for my exam? (Food and drink instructions) No Food and Drink Restrictions.  How do I prepare for my exam? (Other instructions) You do not need to do anything special for this exam.  What should I wear: Wear comfortable clothes.  How long does the exam take: Most scans take less than 5 minutes.  What should I bring: Bring a list of your medicines, including vitamins, minerals and over-the-counter drugs. It is safest to leave personal items at home.  Do I need a :  No  is needed.  What do I need to tell my doctor: Tell your doctor if there s any chance you are pregnant.  What should I do after the exam: No restrictions, You may resume normal activities.  What is this test: An image of a specific body part shown in shades of black and white.  Who should I call with questions: If you have any questions, please call the Imaging Department where you will have your exam. Directions, parking instructions, and other information is available on our website, [email or web address].              Who to contact     If you have questions or need follow up information about today's clinic visit or your schedule please contact [facility] directly at [phone].  Normal or non-critical lab and imaging results will be communicated to you by MyChart, letter or phone within 4  "business days after the clinic has received the results. If you do not hear from us within 7 days, please contact the clinic through [employer] or phone. If you have a critical or abnormal lab result, we will notify you by phone as soon as possible.  Submit refill requests through [employer] or call your pharmacy and they will forward the refill request to us. Please allow 3 business days for your refill to be completed.          Additional Information About Your Visit        [employer] Information     [employer] lets you send messages to your doctor, view your test results, renew your prescriptions, schedule appointments and more. To sign up, go to [email or web address] . Click on \"Log in\" on the left side of the screen, which will take you to the Welcome page. Then click on \"Sign up Now\" on the right side of the page.     You will be asked to enter the access code listed below, as well as some personal information. Please follow the directions to create your username and password.     Your access code is: [email or web address]  Expires: 2018  9:10 AM     Your access code will  in 90 days. If you need help or a new code, please call your [facility] clinic or [phone].        Care EveryWhere ID     This is your Care EveryWhere ID. This could be used by other organizations to access your [facility] medical records  [id number]        Your Vitals Were     Pulse Respirations Height BMI (Body Mass Index)          79 18 1.626 m (5' 4\") 28.32 kg/m2         Blood Pressure from Last 3 Encounters:   10/24/18 126/86   10/21/18 127/72   10/19/18 129/76    Weight from Last 3 Encounters:   10/24/18 74.8 kg (165 lb)   10/21/18 74.8 kg (165 lb)   10/19/18 74.8 kg (165 lb)              Today, you had the following     No orders found for display         Today's Medication Changes          These changes are accurate as of 10/24/18  1:59 PM.  If you have any questions, ask your nurse or doctor.               Start taking these medicines.  "       Dose/Directions    HYDROcodone-acetaminophen 5-325 MG per tablet   Commonly known as:  NORCO   Used for:  Post-op pain   Started by:  Dain Davis MD        Dose:  1-2 tablet   Take 1-2 tablets by mouth every 6 hours as needed   Quantity:  20 tablet   Refills:  0            Where to get your medicines      Some of these will need a paper prescription and others can be bought over the counter.  Ask your nurse if you have questions.     Bring a paper prescription for each of these medications     HYDROcodone-acetaminophen 5-325 MG per tablet               Information about OPIOIDS     PRESCRIPTION OPIOIDS: WHAT YOU NEED TO KNOW   We gave you an opioid (narcotic) pain medicine. It is important to manage your pain, but opioids are not always the best choice. You should first try all the other options your care team gave you. Take this medicine for as short a time (and as few doses) as possible.    Some activities can increase your pain, such as bandage changes or therapy sessions. It may help to take your pain medicine 30 to 60 minutes before these activities. Reduce your stress by getting enough sleep, working on hobbies you enjoy and practicing relaxation or meditation. Talk to your care team about ways to manage your pain beyond prescription opioids.    These medicines have risks:    DO NOT drive when on new or higher doses of pain medicine. These medicines can affect your alertness and reaction times, and you could be arrested for driving under the influence (DUI). If you need to use opioids long-term, talk to your care team about driving.    DO NOT operate heavy machinery    DO NOT do any other dangerous activities while taking these medicines.    DO NOT drink any alcohol while taking these medicines.     If the opioid prescribed includes acetaminophen, DO NOT take with any other medicines that contain acetaminophen. Read all labels carefully. Look for the word  acetaminophen  or  Tylenol.  Ask your  pharmacist if you have questions or are unsure.    You can get addicted to pain medicines, especially if you have a history of addiction (chemical, alcohol or substance dependence). Talk to your care team about ways to reduce this risk.    All opioids tend to cause constipation. Drink plenty of water and eat foods that have a lot of fiber, such as fruits, vegetables, prune juice, apple juice and high-fiber cereal. Take a laxative (Miralax, milk of magnesia, Colace, Senna) if you don t move your bowels at least every other day. Other side effects include upset stomach, sleepiness, dizziness, throwing up, tolerance (needing more of the medicine to have the same effect), physical dependence and slowed breathing.    Store your pills in a secure place, locked if possible. We will not replace any lost or stolen medicine. If you don t finish your medicine, please throw away (dispose) as directed by your pharmacist. The [place] Pollution Control [employer] has more information about safe disposal: https://www.pca.[facility].mn.us/living-green/managing-unwanted-medications         Primary Care Provider Office Phone # Fax #    Heron Mayo [phone]       [address] [place], [place] [address]        Equal Access to Services     SHIRA DELEON : Flory Polanco, wadevonte watters, qaybta kaalmada esdras, lili valerio. So [facility] [phone].    ATENCIÓN: Si habla español, tiene a garcia disposición servicios gratuitos de asistencia lingüística. Llame al [phone].    We comply with applicable federal civil rights laws and [place] laws. We do not discriminate on the basis of race, color, national origin, age, disability, sex, sexual orientation, or gender identity.            Thank you!     Thank you for choosing [facility]  for your care. Our goal is always to provide you with excellent care. Hearing back from our patients is one way we can continue to  improve our services. Please take a few minutes to complete the written survey that you may receive in the mail after your visit with us. Thank you!             Your Updated Medication List - Protect others around you: Learn how to safely use, store and throw away your medicines at www.disposemymeds.org.          This list is accurate as of 10/24/18  1:59 PM.  Always use your most recent med list.                   Brand Name Dispense Instructions for use Diagnosis    ACETAMINOPHEN PO      Take 650 mg by mouth every 4 hours as needed for pain        betamethasone dipropionate 0.05 % cream    DIPROSONE    45 g    Apply topically 2 times daily For leg rash    Eczema, unspecified type       calcium carbonate 500 mg (elemental) 500 MG tablet    OS-ALY     Take 1 tablet by mouth 2 times daily        cephALEXin 500 MG capsule    KEFLEX    40 capsule    Take 1 capsule (500 mg) by mouth 4 times daily for 10 days        colchicine 0.6 MG tablet    COLCRYS    30 tablet    Take 1 tablet (0.6 mg) by mouth daily    Chronic gout, unspecified cause, unspecified site       COUMADIN PO      Take by mouth daily Take as directed per INR results        cyanocobalamin 1000 MCG tablet    vitamin  B-12    30 tablet    TAKE ONE TABLET BY MOUTH DAILY    Vitamin B 12 deficiency       HYDROcodone-acetaminophen 5-325 MG per tablet    NORCO    20 tablet    Take 1-2 tablets by mouth every 6 hours as needed    Post-op pain       hypromellose 0.4 % Soln ophthalmic solution    ARTIFICIAL TEARS     Place 1 drop into both eyes every hour as needed for dry eyes        metoprolol succinate 25 MG 24 hr tablet    TOPROL-XL    90 tablet    Take 25 mg by mouth every evening        mineral oil oil      Take 5 mLs by mouth daily        Multi-vitamin Tabs tablet     100 tablet    Take 1 tablet by mouth daily        nortriptyline 25 MG capsule    PAMELOR    270 capsule    TAKE ONE CAPSULE MID MORNING AND TWO CAPSULES BY MOUTH DAILY AT BEDTIME    Anxiety        prednisoLONE acetate 1 % ophthalmic susp    PRED FORTE     Place 1 drop Into the left eye 2 times daily        temazepam 30 MG capsule    RESTORIL    30 capsule    TAKE ONE CAPSULE BY MOUTH AT BEDTIME AS NEEDED FOR SLEEP    Persistent insomnia

## 2018-10-24 NOTE — PROGRESS NOTES
ANTICOAGULATION FOLLOW-UP CLINIC VISIT    Patient Name:  Nabor Cunningham  Date:  10/24/2018  Contact Type:  Telephone/ [facility]    SUBJECTIVE:     Patient Findings     Positives Bruising (small bruised area on his left hand - no conerns of ongoing bleeding), No Problem Findings    Comments Patient continues on Keflex. He is seeing Dr. Davis today for a boil on his shoulder that is likely getting lanced today. His wound on his buttock is significantly improving according to the homecare nurse. Patient has had 34mg of warfarin in the past 7 days. Will plan to continue on this same warfarin dose by his next INR check on Monday.            OBJECTIVE    INR   Date Value Ref Range Status   10/24/2018 2.7  Final       ASSESSMENT / PLAN  No question data found.  Anticoagulation Summary as of 10/24/2018     INR goal 2.0-3.0   Today's INR 2.7   Warfarin maintenance plan No maintenance plan   Full warfarin instructions 10/24: 6 mg; 10/25: 4 mg; 10/26: 6 mg; 10/27: 4 mg; 10/28: 4 mg   Weekly warfarin total 38 mg   Plan last modified Ada Isaac RN (10/15/2018)   Next INR check 10/29/2018   Priority INR   Target end date Indefinite    Indications   Chronic anticoagulation [Z79.01]  Chronic atrial fibrillation (H) [I48.2]  Long-term (current) use of anticoagulants [Z79.01] [Z79.01]         Anticoagulation Episode Summary     INR check location     Preferred lab     Send INR reminders to [place] PHONE [facility] POOL    Comments * 2mg tabs. [facility] M[facility]      Anticoagulation Care Providers     Provider Role Specialty Phone number    Heron Mayo MD [facility] [phone]            See the Encounter Report to view Anticoagulation Flowsheet and Dosing Calendar (Go to Encounters tab in chart review, and find the Anticoagulation Therapy Visit)        Ada Isaac, SAURAV, CACP

## 2018-10-26 ENCOUNTER — TELEPHONE (OUTPATIENT)
Dept: SURGERY | Facility: CLINIC | Age: 83
End: 2018-10-26

## 2018-10-26 ENCOUNTER — RADIANT APPOINTMENT (OUTPATIENT)
Dept: GENERAL RADIOLOGY | Facility: CLINIC | Age: 83
End: 2018-10-26
Attending: FAMILY MEDICINE
Payer: MEDICARE

## 2018-10-26 DIAGNOSIS — J18.9 PNEUMONIA DUE TO INFECTIOUS ORGANISM, UNSPECIFIED LATERALITY, UNSPECIFIED PART OF LUNG: Primary | ICD-10-CM

## 2018-10-26 DIAGNOSIS — J18.9 PNEUMONIA DUE TO INFECTIOUS ORGANISM, UNSPECIFIED LATERALITY, UNSPECIFIED PART OF LUNG: ICD-10-CM

## 2018-10-26 PROCEDURE — 71046 X-RAY EXAM CHEST 2 VIEWS: CPT | Mod: FY

## 2018-10-26 NOTE — TELEPHONE ENCOUNTER
Per office visit- patient to wash daily with soap and water and monitor for signs of infection  Homecare instructed of orders, patient can cover if shirt bothersome on site  No further questions    Darlene ALMEIDA RN   Specialty Clinics

## 2018-10-26 NOTE — PROGRESS NOTES
Please call.  His chest x-ray has not changed.    PLAN: I recommend CT chest for follow-up to make sure there is no cancer and figure out the cause of the abnormal chest x-ray.   Please arrange for orders.

## 2018-10-26 NOTE — TELEPHONE ENCOUNTER
Pt was seen 10/24 for cyst removal. Home care needs to know if there are any wound care orders that they are supposed to be following - Please advise    Transferred to RN

## 2018-10-29 ENCOUNTER — HOSPITAL ENCOUNTER (EMERGENCY)
Facility: CLINIC | Age: 83
Discharge: HOME | End: 2018-10-29
Attending: EMERGENCY MEDICINE | Admitting: EMERGENCY MEDICINE
Payer: MEDICARE

## 2018-10-29 ENCOUNTER — TELEPHONE (OUTPATIENT)
Dept: SURGERY | Facility: CLINIC | Age: 83
End: 2018-10-29

## 2018-10-29 ENCOUNTER — ANTICOAGULATION THERAPY VISIT (OUTPATIENT)
Dept: ANTICOAGULATION | Facility: CLINIC | Age: 83
End: 2018-10-29
Payer: MEDICARE

## 2018-10-29 VITALS
DIASTOLIC BLOOD PRESSURE: 77 MMHG | OXYGEN SATURATION: 95 % | TEMPERATURE: 98.1 F | SYSTOLIC BLOOD PRESSURE: 131 MMHG | RESPIRATION RATE: 18 BRPM

## 2018-10-29 DIAGNOSIS — Z79.01 CHRONIC ANTICOAGULATION: ICD-10-CM

## 2018-10-29 DIAGNOSIS — I48.20 CHRONIC ATRIAL FIBRILLATION (H): ICD-10-CM

## 2018-10-29 DIAGNOSIS — T14.8XXA HEMATOMA OF SKIN: ICD-10-CM

## 2018-10-29 LAB — INR PPP: 3

## 2018-10-29 PROCEDURE — 76604 US EXAM CHEST: CPT | Performed by: EMERGENCY MEDICINE

## 2018-10-29 PROCEDURE — 99284 EMERGENCY DEPT VISIT MOD MDM: CPT | Mod: 25 | Performed by: EMERGENCY MEDICINE

## 2018-10-29 PROCEDURE — 76604 US EXAM CHEST: CPT | Mod: 26 | Performed by: EMERGENCY MEDICINE

## 2018-10-29 PROCEDURE — 99207 ZZC NO CHARGE NURSE ONLY: CPT

## 2018-10-29 NOTE — TELEPHONE ENCOUNTER
Spoke with [facility] and they have been doing daily dressing changes on pt.  When she pulled off the dressing today she noticed a large lump 9 cm by 9 cm lump that is baseball size that is hard as a rock and feels like a ball under his skin. She stated it is very painful for the pt to touch. She stated there is some redness around the incisional site, warmth to the touch, and a lot of bruising under his armpit and some bloody drainage. Has two doses of antibiotic left.  Marge stated she is concerned about the size.  Please advise. Should pt go to ED or clinic visit?  Rajni GARCIA RN BSN PHN  Specialty Clinics

## 2018-10-29 NOTE — ED NOTES
Pt had cyst drained on Wednesday, increased swelling again, home nurse noticed it, sent in for eval.

## 2018-10-29 NOTE — ED AVS SNAPSHOT
[facility]    [address] [facility] [address]    Phone:  [phone]    Fax:  [phone]                                       Nabor Cunningham   MRN: [id number]    Department:  [facility]   Date of Visit:  10/29/2018           After Visit Summary Signature Page     I have received my discharge instructions, and my questions have been answered. I have discussed any challenges I see with this plan with the nurse or doctor.    ..........................................................................................................................................  Patient/Patient Representative Signature      ..........................................................................................................................................  Patient Representative Print Name and Relationship to Patient    ..................................................               ................................................  Date                                   Time    ..........................................................................................................................................  Reviewed by Signature/Title    ...................................................              ..............................................  Date                                               Time          22EPIC Rev 08/18

## 2018-10-29 NOTE — ED NOTES
Discharge instructions reviewed with pt and son, questions answered. Unable to make f/u appt for pt, pt to call in AM for appt with surgeon.

## 2018-10-29 NOTE — MR AVS SNAPSHOT
Nabor Cunningham   10/29/2018   Anticoagulation Therapy Visit    Description:  82 year old male   Provider:  Ada Isaac RN   Department:  [facility]           INR as of 10/29/2018     Today's INR 3.0      Anticoagulation Summary as of 10/29/2018     INR goal 2.0-3.0   Today's INR 3.0   Full warfarin instructions 10/29: 4 mg; 10/30: 4 mg; 10/31: 6 mg; 11/1: 4 mg   Next INR check 11/2/2018    Indications   Chronic anticoagulation [Z79.01]  Chronic atrial fibrillation (H) [I48.2]  Long-term (current) use of anticoagulants [Z79.01] [Z79.01]         Description             October 2018 Details    Sun Mon Tue Wed Thu Fri Sat      1               2               3               4               5               6                 7               8               9               10               11               12               13                 14               15               16               17               18               19               20                 21               22               23               24               25               26               27                 28               29      4 mg   See details      30      4 mg         31      6 mg             Date Details   10/29 This INR check               How to take your warfarin dose     To take:  4 mg Take 2 of the 2 mg tablets.    To take:  6 mg Take 3 of the 2 mg tablets.           November 2018 Details    Sun Mon Tue Wed Thu Fri Sat         1      4 mg         2            3                 4               5               6               7               8               9               10                 11               12               13               14               15               16               17                 18               19               20               21               22               23               24                 25               26               27               28               29               30                  Date Details   No additional details    Date of next INR:  11/2/2018         How to take your warfarin dose     To take:  4 mg Take 2 of the 2 mg tablets.

## 2018-10-29 NOTE — PROGRESS NOTES
ADDENDUM: Patient went into the ED on 11/1/18, discharge instructions from Dr. Davis state the following:        ADDENDUM: Patient was seen today in the clinic to have a sebaceous cyst excised. His son called [facility] to report he was having a lot of bleeding from the site and he is concerned with him being on the warfarin. Patient's son reports the last time this occurred he needed to be hospitalized for 5 days due to the bleeding. Patient's son is asking to have the warfarin held for 3 days to avoid the same problem from happening. Due to the time of day, writer gave the instruction to hold tonight with the plan to follow up tomorrow with the plan for the rest of the week.     Rustam MCNEIL RN, CACP 10/31/2018 at 4:16 PM       ANTICOAGULATION FOLLOW-UP CLINIC VISIT    Patient Name:  Nabor Cunningham  Date:  10/29/2018  Contact Type:  Telephone/ [facility]    SUBJECTIVE:     Patient Findings     Positives Bruising (bruising under the armpit / rib area), Inflammation (baseball sized lump by site of cyst excision from last week)    Comments Patient had 34mg in the previous 7 days, will decrease dose to 32mg by the next INR check on Friday (~6% decrease).    Patient had excision of sebaceous cyst on his back last week and now has a hard baseball sized lump in the area with red defined lines. There is a lot of bruising under his armpit area and on his ribs Homecare nurse has a call in to the MD. Writer requested she call [facility] back if any new medications, including antibiotics are prescribed.            OBJECTIVE    INR   Date Value Ref Range Status   10/29/2018 3.0  Final       ASSESSMENT / PLAN  INR assessment THER    Recheck INR In: 4 DAYS    INR Location Home[facility] INR [facility]     Anticoagulation Summary as of 10/29/2018     INR goal 2.0-3.0   Today's INR 3.0   Warfarin maintenance plan No maintenance plan   Full warfarin instructions 10/29: 4 mg; 10/30: 4 mg; 10/31: 6 mg; 11/1: 4 mg   Weekly warfarin total 38  mg   Plan last modified Ada Isaac RN (10/15/2018)   Next INR check 11/2/2018   Priority INR   Target end date Indefinite    Indications   Chronic anticoagulation [Z79.01]  Chronic atrial fibrillation (H) [I48.2]  Long-term (current) use of anticoagulants [Z79.01] [Z79.01]         Anticoagulation Episode Summary     INR check location     Preferred lab     Send INR reminders to [facility] PHONE ANTICOAG POOL    Comments * 2mg tabs. [facility] MWF      Anticoagulation Care Providers     Provider Role Specialty Phone number    Heron Mayo MD [facility] [phone]            See the Encounter Report to view Anticoagulation Flowsheet and Dosing Calendar (Go to Encounters tab in chart review, and find the Anticoagulation Therapy Visit)        Ada Isaac RN CACP

## 2018-10-29 NOTE — TELEPHONE ENCOUNTER
Marge from [facility] pt has baseball sized hard lump under incision, redness, please call, RN concerned, [phone]

## 2018-10-29 NOTE — TELEPHONE ENCOUNTER
Pt's son called and stated he was on his way to  the pt and bring him him.  He stated the pt is complaining of pain the the site where the cyst was removed. Spoke with provider and pt is to go to the ED to be seen.  Called son back and advised of plan. Called Marge's RN voicemail of plan to go to ED and was unable to leave voicemail as inbox was full.  Rajni GARCIA RN BSN PHN  Specialty Clinics

## 2018-10-29 NOTE — ED AVS SNAPSHOT
[facility] Emergency Department    [address] [address]    Phone:  [phone]    Fax:  [phone]                                       Nabor Cunningham   MRN: [id number]    Department:  [facility] Emergency Department   Date of Visit:  10/29/2018           Patient Information     Date Of Birth          1935        Your diagnoses for this visit were:     Hematoma of skin        You were seen by Daniel Lundberg MD.      Follow-up Information     Call Dain Davis MD.    Specialty:  Surgery    Contact information:    [address] [facility] [address]  [phone]        Discharge References/Attachments     HEMATOMA (ENGLISH)      Your next 10 appointments already scheduled     Nov 02, 2018  9:15 AM CDT   CT CHEST W CONTRAST with [facility]   [facility] CT ([facility])    [address] [id number] [phone]           How do I prepare for my exam? (Food and drink instructions) **You will have contrast for this exam.** Do not eat or drink for 2 hours before your exam. If you need to take medicine, you may take it with small sips of water. (We may ask you to take liquid medicine as well.)  The day before your exam, drink extra fluids at least six 8-ounce glasses (unless your doctor tells you to restrict your fluids).  How do I prepare for my exam? (Other instructions) Patients over 70 or patients with diabetes or kidney problems: If you haven t had a blood test (creatinine test) within the last 30 days, the Cardiologist/Radiologist may require you to get this test prior to your exam.  What should I wear: Please wear loose clothing, such as a sweat suit or jogging clothes.  Avoid snaps, zippers and other metal. We may ask you to undress and put on a hospital gown.  How long does the exam take: Most scans take less than 20 minutes.  What should I bring: Please bring any scans or X-rays taken at other hospitals, if similar tests were  done. Also bring a list of your medicines, including vitamins, minerals and over-the-counter drugs. It is safest to leave personal items at home.  Do I need a :  No  is needed.  What do I need to tell my doctor? Be sure to tell your doctor: * If you have any allergies. * If there s any chance you are pregnant. * If you are breastfeeding. * If you have diabetes as your medication may need to be adjusted for this exam.  What should I do after the exam: No restrictions, You may resume normal activities.  What is this test: A CT (computed tomography) scan is a series of pictures that allows us to look inside your body. The scanner creates images of the body in cross sections, much like slices of bread. This helps us see any problems more clearly. You may receive contrast (X-ray dye) before or during your scan. Contrast is given through an IV (small needle in your arm).  Who should I call with questions: If you have any questions, please call the Imaging Department where you will have your exam. Directions, parking instructions, and other information is available on our website, [email or web address].              24 Hour Appointment Hotline       To make an appointment at any [facility] clinic, call [phone] ([phone]). If you don't have a family doctor or clinic, we will help you find one. [facility] clinics are conveniently located to serve the needs of you and your family.             Review of your medicines      Our records show that you are taking the medicines listed below. If these are incorrect, please call your family doctor or clinic.        Dose / Directions Last dose taken    ACETAMINOPHEN PO   Dose:  650 mg        Take 650 mg by mouth every 4 hours as needed for pain   Refills:  0        betamethasone dipropionate 0.05 % cream   Commonly known as:  DIPROSONE   Quantity:  45 g        Apply topically 2 times daily For leg rash   Refills:  0        calcium carbonate 500 mg (elemental) 500  MG tablet   Commonly known as:  OS-ALY   Dose:  1 tablet        Take 1 tablet by mouth 2 times daily   Refills:  0        cephALEXin 500 MG capsule   Commonly known as:  KEFLEX   Dose:  500 mg   Quantity:  40 capsule        Take 1 capsule (500 mg) by mouth 4 times daily for 10 days   Refills:  0        colchicine 0.6 MG tablet   Commonly known as:  COLCRYS   Dose:  0.6 mg   Quantity:  30 tablet        Take 1 tablet (0.6 mg) by mouth daily   Refills:  0        COUMADIN PO        Take by mouth daily Take as directed per INR results   Refills:  0        cyanocobalamin 1000 MCG tablet   Commonly known as:  vitamin  B-12   Quantity:  30 tablet        TAKE ONE TABLET BY MOUTH DAILY   Refills:  11        HYDROcodone-acetaminophen 5-325 MG per tablet   Commonly known as:  NORCO   Dose:  1-2 tablet   Quantity:  20 tablet        Take 1-2 tablets by mouth every 6 hours as needed   Refills:  0        hypromellose 0.4 % Soln ophthalmic solution   Commonly known as:  ARTIFICIAL TEARS   Dose:  1 drop        Place 1 drop into both eyes every hour as needed for dry eyes   Refills:  0        metoprolol succinate 25 MG 24 hr tablet   Commonly known as:  TOPROL-XL   Dose:  25 mg   Quantity:  90 tablet        Take 25 mg by mouth every evening   Refills:  1        mineral oil oil   Dose:  5 mL        Take 5 mLs by mouth daily   Refills:  0        Multi-vitamin Tabs tablet   Dose:  1 tablet   Quantity:  100 tablet        Take 1 tablet by mouth daily   Refills:  3        nortriptyline 25 MG capsule   Commonly known as:  PAMELOR   Quantity:  270 capsule        TAKE ONE CAPSULE MID MORNING AND TWO CAPSULES BY MOUTH DAILY AT BEDTIME   Refills:  3        prednisoLONE acetate 1 % ophthalmic susp   Commonly known as:  PRED FORTE   Dose:  1 drop        Place 1 drop Into the left eye 2 times daily   Refills:  2        temazepam 30 MG capsule   Commonly known as:  RESTORIL   Quantity:  30 capsule        TAKE ONE CAPSULE BY MOUTH AT BEDTIME AS  "NEEDED FOR SLEEP   Refills:  0                Orders Needing Specimen Collection     None      Pending Results     No orders found from 10/27/2018 to 10/30/2018.            Pending Culture Results     No orders found from 10/27/2018 to 10/30/2018.            Pending Results Instructions     If you had any lab results that were not finalized at the time of your Discharge, you can call the ED Lab Result RN at [phone]. You will be contacted by this team for any positive Lab results or changes in treatment. The nurses are available 7 days a week from 10A to 6:30P.  You can leave a message 24 hours per day and they will return your call.        Test Results From Your Hospital Stay               Thank you for choosing [facility]       Thank you for choosing [facility] for your care. Our goal is always to provide you with excellent care. Hearing back from our patients is one way we can continue to improve our services. Please take a few minutes to complete the written survey that you may receive in the mail after you visit with us. Thank you!        [employer] Information     [employer] lets you send messages to your doctor, view your test results, renew your prescriptions, schedule appointments and more. To sign up, go to [email or web address] . Click on \"Log in\" on the left side of the screen, which will take you to the Welcome page. Then click on \"Sign up Now\" on the right side of the page.     You will be asked to enter the access code listed below, as well as some personal information. Please follow the directions to create your username and password.     Your access code is: [email or web address]  Expires: 2018  9:10 AM     Your access code will  in 90 days. If you need help or a new code, please call your [facility] clinic or [phone].        Care EveryWhere ID     This is your Care EveryWhere ID. This could be used by other organizations to access your [facility] medical records  [id number]        Equal Access to " Services     [facility]: Flory Polanco, waronenda luqadaha, qaybta kalili martinez. So [facility] [phone].    ATENCIÓN: Si habla español, tiene a garcia disposición servicios gratuitos de asistencia lingüística. Llame al [phone].    We comply with applicable federal civil rights laws and [place] laws. We do not discriminate on the basis of race, color, national origin, age, disability, sex, sexual orientation, or gender identity.            After Visit Summary       This is your record. Keep this with you and show to your community pharmacist(s) and doctor(s) at your next visit.

## 2018-10-31 ENCOUNTER — OFFICE VISIT (OUTPATIENT)
Dept: SURGERY | Facility: CLINIC | Age: 83
End: 2018-10-31
Payer: MEDICARE

## 2018-10-31 ENCOUNTER — TELEPHONE (OUTPATIENT)
Dept: ANTICOAGULATION | Facility: CLINIC | Age: 83
End: 2018-10-31

## 2018-10-31 VITALS
TEMPERATURE: 98.3 F | DIASTOLIC BLOOD PRESSURE: 68 MMHG | HEART RATE: 76 BPM | SYSTOLIC BLOOD PRESSURE: 126 MMHG | BODY MASS INDEX: 28.17 KG/M2 | HEIGHT: 64 IN | WEIGHT: 165 LBS

## 2018-10-31 DIAGNOSIS — T14.8XXA HEMATOMA OF SKIN: Primary | ICD-10-CM

## 2018-10-31 DIAGNOSIS — T14.8XXA HEMATOMA: Primary | ICD-10-CM

## 2018-10-31 PROCEDURE — 99024 POSTOP FOLLOW-UP VISIT: CPT | Performed by: SURGERY

## 2018-10-31 NOTE — NURSING NOTE
"Initial /68 (BP Location: Right arm, Patient Position: Sitting, Cuff Size: Adult Regular)  Pulse 76  Temp 98.3  F (36.8  C) (Tympanic)  Ht 1.626 m (5' 4\")  Wt 74.8 kg (165 lb)  BMI 28.32 kg/m2 Estimated body mass index is 28.32 kg/(m^2) as calculated from the following:    Height as of this encounter: 1.626 m (5' 4\").    Weight as of this encounter: 74.8 kg (165 lb). .    Rafia Lemos MA    "

## 2018-10-31 NOTE — ED PROVIDER NOTES
Chief complaint swollen area on the back    82-year-old male presents after seeing Dr. Davis general surgery on 10/24, excision of 3 cm right upper back subcutaneous cyst.  Chronic anticoagulation, INR 3 today.  Presents secondary to home nurse evaluating postoperative wound, concerned that there has been some interval swelling and the area is harder to palpation than it was previously.  Patient denies significant pain.  No fever.  Scant drainage since excision.    Past medical history, medications, allergies, social history, family history all reviewed.  Patient Active Problem List   Diagnosis     Anxiety     Alcohol abuse     Chronic atrial fibrillation (H)     Weight loss     Peripheral polyneuropathy     Chronic gout, unspecified cause, unspecified site     Essential hypertension with goal blood pressure less than 140/90     Hyperlipidemia LDL goal <130     Persistent insomnia     Lower urinary tract symptoms (LUTS)     Chronic anticoagulation     Long-term (current) use of anticoagulants [Z79.01]     Moderate episode of recurrent major depressive disorder (H)     Pneumonia     Bleeding from wound     Postoperative hemorrhage involving circulatory system following non-circulatory system procedure     Problem focused review of systems otherwise negative    /77  Temp 98.1  F (36.7  C) (Oral)  Resp 18  SpO2 95%  Nontoxic-appearing alert and oriented  Examination of the back shows ecchymosis two thirds of the way on the right side extending down to costal margin, surgical wound is dry without drainage, 6 x 4 cm area of central swelling, minimal to no tenderness, no associated erythema, or fluctuance, the mass is firm to palpation.  Bedside ultrasound shows heterogeneous hyperechoic debris throughout the field, no obvious fluid collection, findings consistent with hematoma.    MDM: 82-year-old male 5 days out from a right upper back subcutaneous cyst excision, chronically anticoagulated on Coumadin INR 3.0  presents with swelling and fullness at the site of cyst excision.  Findings at bedside consistent with subcutaneous hematoma.  I am reluctant to open this, it looks better than it did yesterday compared to the pictures that his son shows me, he is chronically anticoagulated, he is not having pain or tenderness which I would expect for this infected.  Recommend follow-up with Dr. Davis general surgery, return here if progressive pain, redness, swelling, fever or any other concern.    Impression: Postoperative hematoma, chronic anticoagulation     Daniel Lundberg MD  10/31/18 0119

## 2018-10-31 NOTE — LETTER
10/31/2018         RE: Nabor Cunningham  [address] [address]        Dear Colleague,    Thank you for referring your patient, Nabor Cunningham, to the [facility]. Please see a copy of my visit note below.    Patient here for evaluation of a large mass under his skin where a sebaceous cyst was excised.  Patient was seen in the emergency room yesterday and was given supportive care with dry gauze dressings and told to follow-up with me today.  He reports blood oozing from the wound.  He also has significant ecchymosis down his right flank.    Exam:  Right incision site with palpable 8 cm mass beneath the skin.  Weeping bloody fluid      Procedure: Area anesthetized with 1% lidocaine with epinephrine.  A 15 blade scalpel used to open up wound and large amount of liquefied blood expressed.  Wound packed with dry gauze and cover with dry gauze and tape.      Assessment and plan: 82-year-old male with hematoma of right subscapular area.  Patient will need dressing changes which will be started by wound care nurses in 2 days time.  Follow-up with me in 1 week.    Dain Davis MD     Again, thank you for allowing me to participate in the care of your patient.        Sincerely,        Dain Davis MD

## 2018-10-31 NOTE — PROGRESS NOTES
Patient here for evaluation of a large mass under his skin where a sebaceous cyst was excised.  Patient was seen in the emergency room yesterday and was given supportive care with dry gauze dressings and told to follow-up with me today.  He reports blood oozing from the wound.  He also has significant ecchymosis down his right flank.    Exam:  Right incision site with palpable 8 cm mass beneath the skin.  Weeping bloody fluid      Procedure: Area anesthetized with 1% lidocaine with epinephrine.  A 15 blade scalpel used to open up wound and large amount of liquefied blood expressed.  Wound packed with dry gauze and cover with dry gauze and tape.      Assessment and plan: 82-year-old male with hematoma of right subscapular area.  Patient will need dressing changes which will be started by wound care nurses in 2 days time.  Follow-up with me in 1 week.    Dain Davis MD

## 2018-10-31 NOTE — TELEPHONE ENCOUNTER
Patient was seen today in the clinic to have a sebaceous cyst excised. His son called [facility] to report he was having a lot of bleeding from the site and he is concerned with him being on the warfarin. Patient's son reports the last time this occurred (Sept 2018) he needed to be hospitalized for 5 days due to the bleeding. Patient's son is asking to have the warfarin held for 3 days to avoid the same problem from happening. Due to the time of day, writer gave the instruction to hold tonight with the plan to follow up tomorrow with the plan for the rest of the week.    Dr. Mayo (or covering provider), are you okay with patient holding warfarin for 3 days to avoid ongoing bleeding? His son wants to avoid any bleeding that would require hospitalization as it did the beginning of September.    Rustam MCNEIL RN, CACP 10/31/2018 at 4:18 PM

## 2018-10-31 NOTE — MR AVS SNAPSHOT
After Visit Summary   10/31/2018    Nabor Cunningham    MRN: [id number]           Patient Information     Date Of Birth          1935        Visit Information        Provider Department      10/31/2018 2:30 PM Dain Davis MD [facility]        Today's Diagnoses     Hematoma    -  1      Care Instructions    Per Dr. Davis's instructions          Follow-ups after your visit        Your next 10 appointments already scheduled     Nov 02, 2018  9:15 AM CDT   CT CHEST W CONTRAST with [facility]   [facility] CT ([facility])    [address] [address]   [phone]           How do I prepare for my exam? (Food and drink instructions) **You will have contrast for this exam.** Do not eat or drink for 2 hours before your exam. If you need to take medicine, you may take it with small sips of water. (We may ask you to take liquid medicine as well.)  The day before your exam, drink extra fluids at least six 8-ounce glasses (unless your doctor tells you to restrict your fluids).  How do I prepare for my exam? (Other instructions) Patients over 70 or patients with diabetes or kidney problems: If you haven t had a blood test (creatinine test) within the last 30 days, the Cardiologist/Radiologist may require you to get this test prior to your exam.  What should I wear: Please wear loose clothing, such as a sweat suit or jogging clothes.  Avoid snaps, zippers and other metal. We may ask you to undress and put on a hospital gown.  How long does the exam take: Most scans take less than 20 minutes.  What should I bring: Please bring any scans or X-rays taken at other hospitals, if similar tests were done. Also bring a list of your medicines, including vitamins, minerals and over-the-counter drugs. It is safest to leave personal items at home.  Do I need a :  No  is needed.  What do I need to tell my doctor? Be sure to tell your doctor: * If you have any  allergies. * If there s any chance you are pregnant. * If you are breastfeeding. * If you have diabetes as your medication may need to be adjusted for this exam.  What should I do after the exam: No restrictions, You may resume normal activities.  What is this test: A CT (computed tomography) scan is a series of pictures that allows us to look inside your body. The scanner creates images of the body in cross sections, much like slices of bread. This helps us see any problems more clearly. You may receive contrast (X-ray dye) before or during your scan. Contrast is given through an IV (small needle in your arm).  Who should I call with questions: If you have any questions, please call the Imaging Department where you will have your exam. Directions, parking instructions, and other information is available on our website, [email or web address] [facility]imaging.            Nov 07, 2018  9:00 AM CST   Office Visit with Et Nurse - [facility] Wound Ostomy ([facility])    [address] [address] [phone]           Bring a current list of meds and any records pertaining to this visit. For Physicals, please bring immunization records and any forms needing to be filled out. Please arrive 10 minutes early to complete paperwork.              Who to contact     If you have questions or need follow up information about today's clinic visit or your schedule please contact [facility] directly at [phone].  Normal or non-critical lab and imaging results will be communicated to you by MyChart, letter or phone within 4 business days after the clinic has received the results. If you do not hear from us within 7 days, please contact the clinic through MyChart or phone. If you have a critical or abnormal lab result, we will notify you by phone as soon as possible.  Submit refill requests through [employer] or call your pharmacy and they will forward the refill request to us. Please allow  "3 business days for your refill to be completed.          Additional Information About Your Visit        Care EveryWhere ID     This is your Care EveryWhere ID. This could be used by other organizations to access your [place] medical records  [id number]        Your Vitals Were     Pulse Temperature Height BMI (Body Mass Index)          76 98.3  F (36.8  C) (Tympanic) 1.626 m (5' 4\") 28.32 kg/m2         Blood Pressure from Last 3 Encounters:   10/31/18 126/68   10/29/18 131/77   10/24/18 126/86    Weight from Last 3 Encounters:   10/31/18 74.8 kg (165 lb)   10/24/18 74.8 kg (165 lb)   10/21/18 74.8 kg (165 lb)              Today, you had the following     No orders found for display       Primary Care Provider Office Phone # Fax #    Heron Mayo [phone] [address] [place] [address]        Equal Access to Services     SHIRA DELEON : Hadii aad ku hadasho Soomaali, waaxda luqadaha, qaybta kaalmada adeegyada, waxay idiin hayconstantin hernandez . So [facility] [phone].    ATENCIÓN: Si habla español, tiene a garcia disposición servicios gratuitos de asistencia lingüística. Llame al [phone].    We comply with applicable federal civil rights laws and [place] laws. We do not discriminate on the basis of race, color, national origin, age, disability, sex, sexual orientation, or gender identity.            Thank you!     Thank you for choosing [facility]  for your care. Our goal is always to provide you with excellent care. Hearing back from our patients is one way we can continue to improve our services. Please take a few minutes to complete the written survey that you may receive in the mail after your visit with us. Thank you!             Your Updated Medication List - Protect others around you: Learn how to safely use, store and throw away your medicines at www.disposemymeds.org.          This list is accurate as of 10/31/18  3:47 PM.  Always use your most " recent med list.                   Brand Name Dispense Instructions for use Diagnosis    ACETAMINOPHEN PO      Take 650 mg by mouth every 4 hours as needed for pain        betamethasone dipropionate 0.05 % cream    DIPROSONE    45 g    Apply topically 2 times daily For leg rash    Eczema, unspecified type       calcium carbonate 500 mg (elemental) 500 MG tablet    OS-ALY     Take 1 tablet by mouth 2 times daily        cephALEXin 500 MG capsule    KEFLEX    40 capsule    Take 1 capsule (500 mg) by mouth 4 times daily for 10 days        colchicine 0.6 MG tablet    COLCRYS    30 tablet    Take 1 tablet (0.6 mg) by mouth daily    Chronic gout, unspecified cause, unspecified site       COUMADIN PO      Take by mouth daily Take as directed per INR results        cyanocobalamin 1000 MCG tablet    vitamin  B-12    30 tablet    TAKE ONE TABLET BY MOUTH DAILY    Vitamin B 12 deficiency       HYDROcodone-acetaminophen 5-325 MG per tablet    NORCO    20 tablet    Take 1-2 tablets by mouth every 6 hours as needed    Post-op pain       hypromellose 0.4 % Soln ophthalmic solution    ARTIFICIAL TEARS     Place 1 drop into both eyes every hour as needed for dry eyes        metoprolol succinate 25 MG 24 hr tablet    TOPROL-XL    90 tablet    Take 25 mg by mouth every evening        mineral oil oil      Take 5 mLs by mouth daily        Multi-vitamin Tabs tablet     100 tablet    Take 1 tablet by mouth daily        nortriptyline 25 MG capsule    PAMELOR    270 capsule    TAKE ONE CAPSULE MID MORNING AND TWO CAPSULES BY MOUTH DAILY AT BEDTIME    Anxiety       prednisoLONE acetate 1 % ophthalmic susp    PRED FORTE     Place 1 drop Into the left eye 2 times daily        temazepam 30 MG capsule    RESTORIL    30 capsule    TAKE ONE CAPSULE BY MOUTH AT BEDTIME AS NEEDED FOR SLEEP    Persistent insomnia

## 2018-11-01 ENCOUNTER — TELEPHONE (OUTPATIENT)
Dept: SURGERY | Facility: CLINIC | Age: 83
End: 2018-11-01

## 2018-11-01 ENCOUNTER — SURGERY (OUTPATIENT)
Age: 83
End: 2018-11-01

## 2018-11-01 ENCOUNTER — ANESTHESIA EVENT (OUTPATIENT)
Dept: SURGERY | Facility: CLINIC | Age: 83
End: 2018-11-01
Payer: MEDICARE

## 2018-11-01 ENCOUNTER — HOSPITAL ENCOUNTER (OUTPATIENT)
Facility: CLINIC | Age: 83
Discharge: HOME | End: 2018-11-01
Attending: PHYSICIAN ASSISTANT | Admitting: SURGERY
Payer: MEDICARE

## 2018-11-01 ENCOUNTER — ANESTHESIA (OUTPATIENT)
Dept: SURGERY | Facility: CLINIC | Age: 83
End: 2018-11-01
Payer: MEDICARE

## 2018-11-01 VITALS
TEMPERATURE: 98.1 F | WEIGHT: 165 LBS | DIASTOLIC BLOOD PRESSURE: 55 MMHG | OXYGEN SATURATION: 94 % | HEART RATE: 74 BPM | BODY MASS INDEX: 28.32 KG/M2 | RESPIRATION RATE: 14 BRPM | SYSTOLIC BLOOD PRESSURE: 114 MMHG

## 2018-11-01 DIAGNOSIS — Z79.01 CHRONIC ANTICOAGULATION: ICD-10-CM

## 2018-11-01 DIAGNOSIS — I97.620 POSTOPERATIVE HEMORRHAGE INVOLVING CIRCULATORY SYSTEM FOLLOWING NON-CIRCULATORY SYSTEM PROCEDURE: ICD-10-CM

## 2018-11-01 DIAGNOSIS — G89.18 POST-OP PAIN: Primary | ICD-10-CM

## 2018-11-01 LAB
BASOPHILS # BLD AUTO: 0 10E9/L (ref 0–0.2)
BASOPHILS NFR BLD AUTO: 0.4 %
DIFFERENTIAL METHOD BLD: ABNORMAL
EOSINOPHIL # BLD AUTO: 0.2 10E9/L (ref 0–0.7)
EOSINOPHIL NFR BLD AUTO: 3.5 %
ERYTHROCYTE [DISTWIDTH] IN BLOOD BY AUTOMATED COUNT: 13.9 % (ref 10–15)
HCT VFR BLD AUTO: 33.7 % (ref 40–53)
HGB BLD-MCNC: 10.8 G/DL (ref 13.3–17.7)
IMM GRANULOCYTES # BLD: 0 10E9/L (ref 0–0.4)
IMM GRANULOCYTES NFR BLD: 0.2 %
INR PPP: 1.88 (ref 0.86–1.14)
LYMPHOCYTES # BLD AUTO: 0.8 10E9/L (ref 0.8–5.3)
LYMPHOCYTES NFR BLD AUTO: 16.3 %
MCH RBC QN AUTO: 26.4 PG (ref 26.5–33)
MCHC RBC AUTO-ENTMCNC: 32 G/DL (ref 31.5–36.5)
MCV RBC AUTO: 82 FL (ref 78–100)
MONOCYTES # BLD AUTO: 0.3 10E9/L (ref 0–1.3)
MONOCYTES NFR BLD AUTO: 7.4 %
NEUTROPHILS # BLD AUTO: 3.3 10E9/L (ref 1.6–8.3)
NEUTROPHILS NFR BLD AUTO: 72.2 %
NRBC # BLD AUTO: 0 10*3/UL
NRBC BLD AUTO-RTO: 0 /100
PLATELET # BLD AUTO: 165 10E9/L (ref 150–450)
RBC # BLD AUTO: 4.09 10E12/L (ref 4.4–5.9)
WBC # BLD AUTO: 4.6 10E9/L (ref 4–11)

## 2018-11-01 PROCEDURE — 27210794 ZZH OR GENERAL SUPPLY STERILE: Performed by: SURGERY

## 2018-11-01 PROCEDURE — 25000128 H RX IP 250 OP 636: Performed by: SURGERY

## 2018-11-01 PROCEDURE — 25000125 ZZHC RX 250: Performed by: SURGERY

## 2018-11-01 PROCEDURE — 85025 COMPLETE CBC W/AUTO DIFF WBC: CPT | Performed by: EMERGENCY MEDICINE

## 2018-11-01 PROCEDURE — 25000128 H RX IP 250 OP 636: Performed by: NURSE ANESTHETIST, CERTIFIED REGISTERED

## 2018-11-01 PROCEDURE — 71000027 ZZH RECOVERY PHASE 2 EACH 15 MINS: Performed by: SURGERY

## 2018-11-01 PROCEDURE — 40000305 ZZH STATISTIC PRE PROC ASSESS I: Performed by: SURGERY

## 2018-11-01 PROCEDURE — 85610 PROTHROMBIN TIME: CPT | Performed by: EMERGENCY MEDICINE

## 2018-11-01 PROCEDURE — 99285 EMERGENCY DEPT VISIT HI MDM: CPT | Mod: 25 | Performed by: EMERGENCY MEDICINE

## 2018-11-01 PROCEDURE — 10140 I&D HMTMA SEROMA/FLUID COLLJ: CPT | Mod: 79 | Performed by: SURGERY

## 2018-11-01 PROCEDURE — 99285 EMERGENCY DEPT VISIT HI MDM: CPT | Mod: Z6 | Performed by: EMERGENCY MEDICINE

## 2018-11-01 PROCEDURE — 96361 HYDRATE IV INFUSION ADD-ON: CPT | Mod: 59 | Performed by: EMERGENCY MEDICINE

## 2018-11-01 PROCEDURE — 36000050 ZZH SURGERY LEVEL 2 1ST 30 MIN: Performed by: SURGERY

## 2018-11-01 PROCEDURE — 96360 HYDRATION IV INFUSION INIT: CPT | Mod: 59 | Performed by: EMERGENCY MEDICINE

## 2018-11-01 PROCEDURE — 37000008 ZZH ANESTHESIA TECHNICAL FEE, 1ST 30 MIN: Performed by: SURGERY

## 2018-11-01 RX ORDER — ONDANSETRON 2 MG/ML
INJECTION INTRAMUSCULAR; INTRAVENOUS PRN
Status: DISCONTINUED | OUTPATIENT
Start: 2018-11-01 | End: 2018-11-01

## 2018-11-01 RX ORDER — DEXAMETHASONE SODIUM PHOSPHATE 4 MG/ML
INJECTION, SOLUTION INTRA-ARTICULAR; INTRALESIONAL; INTRAMUSCULAR; INTRAVENOUS; SOFT TISSUE PRN
Status: DISCONTINUED | OUTPATIENT
Start: 2018-11-01 | End: 2018-11-01

## 2018-11-01 RX ORDER — LIDOCAINE 40 MG/G
CREAM TOPICAL
Status: DISCONTINUED | OUTPATIENT
Start: 2018-11-01 | End: 2018-11-01 | Stop reason: HOSPADM

## 2018-11-01 RX ORDER — PROPOFOL 10 MG/ML
INJECTION, EMULSION INTRAVENOUS CONTINUOUS PRN
Status: DISCONTINUED | OUTPATIENT
Start: 2018-11-01 | End: 2018-11-01

## 2018-11-01 RX ORDER — MEPERIDINE HYDROCHLORIDE 25 MG/ML
12.5 INJECTION INTRAMUSCULAR; INTRAVENOUS; SUBCUTANEOUS
Status: CANCELLED | OUTPATIENT
Start: 2018-11-01

## 2018-11-01 RX ORDER — ONDANSETRON 2 MG/ML
4 INJECTION INTRAMUSCULAR; INTRAVENOUS EVERY 30 MIN PRN
Status: CANCELLED | OUTPATIENT
Start: 2018-11-01

## 2018-11-01 RX ORDER — HYDROMORPHONE HYDROCHLORIDE 1 MG/ML
.3-.5 INJECTION, SOLUTION INTRAMUSCULAR; INTRAVENOUS; SUBCUTANEOUS EVERY 10 MIN PRN
Status: CANCELLED | OUTPATIENT
Start: 2018-11-01

## 2018-11-01 RX ORDER — CEFAZOLIN SODIUM 2 G/100ML
2 INJECTION, SOLUTION INTRAVENOUS
Status: COMPLETED | OUTPATIENT
Start: 2018-11-01 | End: 2018-11-01

## 2018-11-01 RX ORDER — CEFAZOLIN SODIUM 1 G/50ML
1 INJECTION, SOLUTION INTRAVENOUS SEE ADMIN INSTRUCTIONS
Status: DISCONTINUED | OUTPATIENT
Start: 2018-11-01 | End: 2018-11-01 | Stop reason: HOSPADM

## 2018-11-01 RX ORDER — HYDROCODONE BITARTRATE AND ACETAMINOPHEN 5; 325 MG/1; MG/1
1-2 TABLET ORAL EVERY 6 HOURS PRN
Qty: 20 TABLET | Refills: 0 | Status: SHIPPED | OUTPATIENT
Start: 2018-11-01 | End: 2019-02-15

## 2018-11-01 RX ORDER — FENTANYL CITRATE 50 UG/ML
25-50 INJECTION, SOLUTION INTRAMUSCULAR; INTRAVENOUS
Status: CANCELLED | OUTPATIENT
Start: 2018-11-01

## 2018-11-01 RX ORDER — ONDANSETRON 4 MG/1
4 TABLET, ORALLY DISINTEGRATING ORAL EVERY 30 MIN PRN
Status: CANCELLED | OUTPATIENT
Start: 2018-11-01

## 2018-11-01 RX ORDER — FENTANYL CITRATE 50 UG/ML
INJECTION, SOLUTION INTRAMUSCULAR; INTRAVENOUS PRN
Status: DISCONTINUED | OUTPATIENT
Start: 2018-11-01 | End: 2018-11-01

## 2018-11-01 RX ORDER — BUPIVACAINE HYDROCHLORIDE AND EPINEPHRINE 5; 5 MG/ML; UG/ML
INJECTION, SOLUTION PERINEURAL PRN
Status: DISCONTINUED | OUTPATIENT
Start: 2018-11-01 | End: 2018-11-01 | Stop reason: HOSPADM

## 2018-11-01 RX ORDER — SODIUM CHLORIDE, SODIUM LACTATE, POTASSIUM CHLORIDE, CALCIUM CHLORIDE 600; 310; 30; 20 MG/100ML; MG/100ML; MG/100ML; MG/100ML
INJECTION, SOLUTION INTRAVENOUS CONTINUOUS
Status: DISCONTINUED | OUTPATIENT
Start: 2018-11-01 | End: 2018-11-01 | Stop reason: HOSPADM

## 2018-11-01 RX ORDER — SODIUM CHLORIDE, SODIUM LACTATE, POTASSIUM CHLORIDE, CALCIUM CHLORIDE 600; 310; 30; 20 MG/100ML; MG/100ML; MG/100ML; MG/100ML
INJECTION, SOLUTION INTRAVENOUS CONTINUOUS
Status: CANCELLED | OUTPATIENT
Start: 2018-11-01

## 2018-11-01 RX ORDER — NALOXONE HYDROCHLORIDE 0.4 MG/ML
.1-.4 INJECTION, SOLUTION INTRAMUSCULAR; INTRAVENOUS; SUBCUTANEOUS
Status: CANCELLED | OUTPATIENT
Start: 2018-11-01 | End: 2018-11-02

## 2018-11-01 RX ADMIN — PROPOFOL 25 MCG/KG/MIN: 10 INJECTION, EMULSION INTRAVENOUS at 18:24

## 2018-11-01 RX ADMIN — MIDAZOLAM 1 MG: 1 INJECTION INTRAMUSCULAR; INTRAVENOUS at 18:16

## 2018-11-01 RX ADMIN — CEFAZOLIN SODIUM 2 G: 2 INJECTION, SOLUTION INTRAVENOUS at 18:16

## 2018-11-01 RX ADMIN — BUPIVACAINE HYDROCHLORIDE AND EPINEPHRINE BITARTRATE 50 ML: 5; .005 INJECTION, SOLUTION PERINEURAL at 18:48

## 2018-11-01 RX ADMIN — DEXAMETHASONE SODIUM PHOSPHATE 4 MG: 4 INJECTION, SOLUTION INTRA-ARTICULAR; INTRALESIONAL; INTRAMUSCULAR; INTRAVENOUS; SOFT TISSUE at 18:20

## 2018-11-01 RX ADMIN — FENTANYL CITRATE 50 MCG: 50 INJECTION, SOLUTION INTRAMUSCULAR; INTRAVENOUS at 18:20

## 2018-11-01 RX ADMIN — ONDANSETRON 4 MG: 2 INJECTION INTRAMUSCULAR; INTRAVENOUS at 18:20

## 2018-11-01 RX ADMIN — SODIUM CHLORIDE, POTASSIUM CHLORIDE, SODIUM LACTATE AND CALCIUM CHLORIDE 1 ML: 600; 310; 30; 20 INJECTION, SOLUTION INTRAVENOUS at 17:08

## 2018-11-01 RX ADMIN — FENTANYL CITRATE 50 MCG: 50 INJECTION, SOLUTION INTRAMUSCULAR; INTRAVENOUS at 18:16

## 2018-11-01 RX ADMIN — MIDAZOLAM 1 MG: 1 INJECTION INTRAMUSCULAR; INTRAVENOUS at 18:19

## 2018-11-01 ASSESSMENT — ENCOUNTER SYMPTOMS
BRUISES/BLEEDS EASILY: 1
BACK PAIN: 0
CONSTITUTIONAL NEGATIVE: 1
COLOR CHANGE: 0

## 2018-11-01 ASSESSMENT — LIFESTYLE VARIABLES: TOBACCO_USE: 1

## 2018-11-01 NOTE — H&P
82-year-old male underwent excision of a large back sebaceous cyst by me over a week ago.  Patient reported increasing bleeding in the area and went to the emergency room where dry gauze was placed over the wound which was weeping blood and he was sent to my clinic for follow-up.  At that time there is no signs of infection.  The following day in clinic I removed the gauze and open up the wound and a large hematoma was evacuated.  The wound was then packed.  Unfortunately, patient continued to bleed and presented to the emergency room with a recurrent hematoma.  Patient is on Coumadin which had been held but he was now taking it again.  Reports lightheadedness but denies other symptoms.    Patient Active Problem List   Diagnosis     Anxiety     Alcohol abuse     Chronic atrial fibrillation (H)     Weight loss     Peripheral polyneuropathy     Chronic gout, unspecified cause, unspecified site     Essential hypertension with goal blood pressure less than 140/90     Hyperlipidemia LDL goal <130     Persistent insomnia     Lower urinary tract symptoms (LUTS)     Chronic anticoagulation     Long-term (current) use of anticoagulants [Z79.01]     Moderate episode of recurrent major depressive disorder (H)     Pneumonia     Bleeding from wound     Postoperative hemorrhage involving circulatory system following non-circulatory system procedure       Past Medical History:   Diagnosis Date     Anxiety      Gout      Hyperlipemia      Hypertension      Insomnia      Paroxysmal atrial fibrillation (H)        Past Surgical History:   Procedure Laterality Date     INCISION AND DRAINAGE TRUNK, COMBINED N/A 9/6/2018    Procedure: COMBINED INCISION AND DRAINAGE TRUNK;  incision and cauterization of left buttock bleed.;  Surgeon: Dain Davis MD;  Location: [facility] OR       No family history on file.    Social History   Substance Use Topics     Smoking status: Former Smoker     Years: 20.00     Types: Cigarettes     Smokeless tobacco:  Never Used     Alcohol use No        History   Drug Use No       Current Outpatient Prescriptions   Medication Sig Dispense Refill     ACETAMINOPHEN PO Take 650 mg by mouth every 4 hours as needed for pain       betamethasone dipropionate (DIPROSONE) 0.05 % cream Apply topically 2 times daily For leg rash 45 g 0     calcium carbonate (OS- MG [place]. [place]) 1250 MG tablet Take 1 tablet by mouth 2 times daily       colchicine (COLCRYS) 0.6 MG tablet Take 1 tablet (0.6 mg) by mouth daily 30 tablet 0     cyanocobalamin (VITAMIN  B-12) 1000 MCG tablet TAKE ONE TABLET BY MOUTH DAILY 30 tablet 11     HYDROcodone-acetaminophen (NORCO) 5-325 MG per tablet Take 1-2 tablets by mouth every 6 hours as needed 20 tablet 0     hypromellose (ARTIFICIAL TEARS) 0.4 % SOLN ophthalmic solution Place 1 drop into both eyes every hour as needed for dry eyes       metoprolol (TOPROL-XL) 25 MG 24 hr tablet Take 25 mg by mouth every evening  90 tablet 1     mineral oil oil Take 5 mLs by mouth daily       multivitamin, therapeutic with minerals (MULTI-VITAMIN) TABS tablet Take 1 tablet by mouth daily 100 tablet 3     nortriptyline (PAMELOR) 25 MG capsule TAKE ONE CAPSULE MID MORNING AND TWO CAPSULES BY MOUTH DAILY AT BEDTIME 270 capsule 3     prednisoLONE acetate (PRED FORTE) 1 % ophthalmic susp Place 1 drop Into the left eye 2 times daily   2     temazepam (RESTORIL) 30 MG capsule TAKE ONE CAPSULE BY MOUTH AT BEDTIME AS NEEDED FOR SLEEP 30 capsule 0     Warfarin Sodium (COUMADIN PO) Take by mouth daily Take as directed per INR results         Allergies   Allergen Reactions     Amoxicillin Hives     Penicillins Other (See Comments)     Dizzy       Trazodone Other (See Comments)     Hallucinations        CBC  Recent Labs   Lab Test  11/01/18   1325   WBC  4.6   RBC  4.09*   HGB  10.8*   HCT  33.7*   MCV  82   MCH  26.4*   MCHC  32.0   RDW  13.9   PLT  165       BMP  Recent Labs   Lab Test  10/08/18   1822   NA  137   POTASSIUM  3.8   ALY   8.2*   CHLORIDE  101   CO2  29   BUN  11   CR  0.91   GLC  102*       LFTs  Recent Labs   Lab Test  03/22/17   1240   PROTTOTAL  7.3   ALBUMIN  4.0   BILITOTAL  0.5   ALKPHOS  93   AST  29   ALT  56     ROS  CV - No CP or SOB  Resp - No SOB or dyspnea  GI - No nausea or vomiting   - No difficulty with urination    Exam:  AXO3 NAD  Neuro - Strength 5/5 all major groups, sensation intact, PERRL  Lungs - CTA  Back-open wound bleeding profusely venous and arterial blood.  CV - RRR  Abd - Soft, non-distended, non-tender, +BS  Extr - No edema    Assessment and plan: 82-year-old male with bleeding wound from excisional sebaceous cyst area.  This complicated by being on Coumadin although his INR today is close to normal.  We will go ahead and take to the operating room for debridement with control of hemorrhage and then packing.  Be discharged following surgery. PATIENT IS CLEARED FOR SURGERY.    Dain Davis MD

## 2018-11-01 NOTE — TELEPHONE ENCOUNTER
Reason for Call:  Form, our goal is to have forms completed with 72 hours, however, some forms may require a visit or additional information.    Type of letter, form or note:  medical    Who is the form from?: Home care    Where did the form come from: form was faxed in    What clinic location was the form placed at?: [facility] (ENT, Neurology, Rheumatology, Surgery, Urology, Vascular Surgery)    Where the form was placed: 's Box - Dr. Davis    What number is listed as a contact on the form?: [phone]       Additional comments: Fax signed form to: [phone]    Call taken on 11/1/2018 at 2:25 PM by Denise Behrendt

## 2018-11-01 NOTE — TELEPHONE ENCOUNTER
Reason for Call:  Other call back    Detailed comments: [facility] calling stating pt was seen yesterday for cyst. Cant get  Wound to stop bleeding. Would like to know if pt can come in for an appt today    Phone Number Patient can be reached at: Other phone number:  [phone] - [facility] or personal cell [phone]*    Best Time: any     Can we leave a detailed message on this number? YES    Call taken on 11/1/2018 at 10:42 AM by Beatrice Shafer

## 2018-11-01 NOTE — ED NOTES
Pt presents to ED with concerns of continued bleeding from shoulder surgery that he had done yesterday. A home nurse came out to the house today to do a dressing change, but said the bleeding was uncontrolled and he needed to be seen. Family member at bedside states his INR yesterday was 3.1. Dr vargas has been contacted and said he would come to the ED to cauterize it. Bleeding is controlled with bandage. No other concerns.

## 2018-11-01 NOTE — ED PROVIDER NOTES
History     Chief Complaint   Patient presents with     hematoma     hematoma, seen in ED for same sx, taking coumadin, continued bleeding     HPI  Nabor Cunningham is a 82 year old male who is 8 days status post excision of a right thoracic back sebaceous cyst which had become inflamed and infected despite Keflex, who presents with persistent bleeding from the wound.  He is on Coumadin for atrial fibrillation with last INR 3.0 three days ago.  He held Coumadin yesterday.  Now presents today for persistent bleeding from the surgical wound.  He was seen for postoperative check yesterday and bleeding source was felt to be controlled.  He has had persistent bleeding.  No other bleeding problems.    Problem List:    Patient Active Problem List    Diagnosis Date Noted     Postoperative hemorrhage involving circulatory system following non-circulatory system procedure 09/03/2018     Priority: Medium     Pneumonia 09/02/2018     Priority: Medium     Bleeding from wound 09/02/2018     Priority: Medium     Moderate episode of recurrent major depressive disorder (H) 04/10/2017     Priority: Medium     Anxiety 03/22/2017     Priority: Medium     3/23/2017:He has been on amitriptyline, nortriptyline, buspirone, sertraline and Lexapro in the past year.        Alcohol abuse 03/22/2017     Priority: Medium     Chronic atrial fibrillation (H) 03/22/2017     Priority: Medium     Weight loss 03/22/2017     Priority: Medium     Peripheral polyneuropathy 03/22/2017     Priority: Medium     4/7/2017:Abnormal monofilament in distal plantar feet bilateral.  He has had chronic pain in both feet.        Chronic gout, unspecified cause, unspecified site 03/22/2017     Priority: Medium     Essential hypertension with goal blood pressure less than 140/90 03/22/2017     Priority: Medium     Hyperlipidemia LDL goal <130 03/22/2017     Priority: Medium     Persistent insomnia 03/22/2017     Priority: Medium     Lower urinary tract symptoms  (LUTS) 03/22/2017     Priority: Medium     Chronic anticoagulation 03/22/2017     Priority: Medium     Long-term (current) use of anticoagulants [Z79.01] 03/22/2017     Priority: Medium        Past Medical History:    Past Medical History:   Diagnosis Date     Anxiety      Gout      Hyperlipemia      Hypertension      Insomnia      Paroxysmal atrial fibrillation (H)        Past Surgical History:    Past Surgical History:   Procedure Laterality Date     INCISION AND DRAINAGE TRUNK, COMBINED N/A 9/6/2018    Procedure: COMBINED INCISION AND DRAINAGE TRUNK;  incision and cauterization of left buttock bleed.;  Surgeon: Dain Davis MD;  Location: [facility] OR       Family History:    No family history on file.    Social History:  Marital Status:   [2]  Social History   Substance Use Topics     Smoking status: Former Smoker     Years: 20.00     Types: Cigarettes     Smokeless tobacco: Never Used     Alcohol use No        Medications:      ACETAMINOPHEN PO   betamethasone dipropionate (DIPROSONE) 0.05 % cream   calcium carbonate (OS- MG [place]. [place]) 1250 MG tablet   colchicine (COLCRYS) 0.6 MG tablet   cyanocobalamin (VITAMIN  B-12) 1000 MCG tablet   HYDROcodone-acetaminophen (NORCO) 5-325 MG per tablet   hypromellose (ARTIFICIAL TEARS) 0.4 % SOLN ophthalmic solution   metoprolol (TOPROL-XL) 25 MG 24 hr tablet   mineral oil oil   multivitamin, therapeutic with minerals (MULTI-VITAMIN) TABS tablet   nortriptyline (PAMELOR) 25 MG capsule   prednisoLONE acetate (PRED FORTE) 1 % ophthalmic susp   temazepam (RESTORIL) 30 MG capsule   Warfarin Sodium (COUMADIN PO)       Review of Systems   Constitutional: Negative.    Musculoskeletal: Negative for back pain.   Skin: Negative.  Negative for color change, pallor and rash.   Hematological: Bruises/bleeds easily.       Physical Exam   BP: 122/67  Pulse: 95  Temp: 97  F (36.1  C)  Resp: 16  Weight: 74.8 kg (165 lb)  SpO2: 93 %      Physical Exam   Constitutional: He is oriented  to person, place, and time. He appears well-developed and well-nourished. No distress.   HENT:   Head: Normocephalic and atraumatic.   Mouth/Throat: Oropharynx is clear and moist.   Eyes: Conjunctivae and EOM are normal. No scleral icterus.   Neck: Normal range of motion. Neck supple. No tracheal deviation present.   Cardiovascular: Normal rate, regular rhythm and normal heart sounds.  Exam reveals no gallop and no friction rub.    No murmur heard.  Pulmonary/Chest: Effort normal and breath sounds normal. No respiratory distress. He has no wheezes. He has no rales.   Musculoskeletal: Normal range of motion. He exhibits no edema.        Back:    Neurological: He is alert and oriented to person, place, and time.   Skin: Skin is warm and dry. No rash noted. He is not diaphoretic. No erythema. No pallor.   Psychiatric: He has a normal mood and affect. His behavior is normal.   Nursing note and vitals reviewed.      ED Course     ED Course     Procedures  Surgical wound examination:  Pressure bandage with Ace wrap around the thorax was removed and packing removed.  There is a slow continuous oozing from an unclear or unidentified source in the wound.  No pulsatile bleeding.  No evidence of wound infection per             Results for orders placed or performed during the hospital encounter of 11/01/18 (from the past 24 hour(s))   INR   Result Value Ref Range    INR 1.88 (H) 0.86 - 1.14   CBC with platelets, differential   Result Value Ref Range    WBC 4.6 4.0 - 11.0 10e9/L    RBC Count 4.09 (L) 4.4 - 5.9 10e12/L    Hemoglobin 10.8 (L) 13.3 - 17.7 g/dL    Hematocrit 33.7 (L) 40.0 - 53.0 %    MCV 82 78 - 100 fl    MCH 26.4 (L) 26.5 - 33.0 pg    MCHC 32.0 31.5 - 36.5 g/dL    RDW 13.9 10.0 - 15.0 %    Platelet Count 165 150 - 450 10e9/L    Diff Method Automated Method     % Neutrophils 72.2 %    % Lymphocytes 16.3 %    % Monocytes 7.4 %    % Eosinophils 3.5 %    % Basophils 0.4 %    % Immature Granulocytes 0.2 %    Nucleated  RBCs 0 0 /100    Absolute Neutrophil 3.3 1.6 - 8.3 10e9/L    Absolute Lymphocytes 0.8 0.8 - 5.3 10e9/L    Absolute Monocytes 0.3 0.0 - 1.3 10e9/L    Absolute Eosinophils 0.2 0.0 - 0.7 10e9/L    Absolute Basophils 0.0 0.0 - 0.2 10e9/L    Abs Immature Granulocytes 0.0 0 - 0.4 10e9/L    Absolute Nucleated RBC 0.0      Hemoglobin   Date Value Ref Range Status   11/01/2018 10.8 (L) 13.3 - 17.7 g/dL Final   10/08/2018 11.4 (L) 13.3 - 17.7 g/dL Final   09/21/2018 11.1 (L) 13.3 - 17.7 g/dL Final   09/12/2018 10.5 (L) 13.3 - 17.7 g/dL Final   09/07/2018 9.7 (L) 13.3 - 17.7 g/dL Final       Medications - No data to display    Patient's surgeon is on-call today, Dr. Davis.  He came to the emergency department and we evaluated the patient's wound together.  He has a deep, 3 cm + deep wound with slow oozing of blood of unclear source.  Dr. Davis elected to take patient to the OR for definitive care of the postoperative bleeding.    Assessments & Plan (with Medical Decision Making)   Elderly patient on Coumadin for atrial fibrillation who is 8 days status post right thoracic back sebaceous excision with persistent bleeding from the surgical wound. He held Coumadin yesterday and repeat INR is 1.88 today. Patient's surgeon is on-call today, Dr. Davis.  He came to the emergency department and we evaluated the patient's wound together.  He has a deep, 3 cm + deep wound with slow oozing of blood of unclear source.  Dr. Davis elected to take patient to the OR for definitive care of the postoperative bleeding.  Hemodynamically stable.  Hemoglobin has decreased only 0.6 since last checked 24 days ago (10.8 versus 11.4).    I have reviewed the nursing notes.    I have reviewed the findings, diagnosis, plan and need for follow up with the patient.    New Prescriptions    No medications on file       Final diagnoses:   Postoperative hemorrhage involving circulatory system following non-circulatory system procedure   Chronic anticoagulation -  Coumadin therapy for atrial fibrillation       11/1/2018   [facility] EMERGENCY DEPARTMENT     Brian Casas MD  11/01/18 [id number]

## 2018-11-01 NOTE — TELEPHONE ENCOUNTER
If having much bleeding, I do suggest that he call the surgeon to see if anything more can be done to control bleeding at the wound site. The surgeon may wish to see him back.      That said, given history of bad bleeding, he can hold coumadin for the 3 days if needed.  (There is slight risk of stroke with holding coumadin.)

## 2018-11-01 NOTE — OP NOTE
Preop diagnosis: Postop hematoma    Postop diagnosis: Same    Procedure: Debridement of right scapular hematoma, control of bleeding and repacking    Surgeon: Ryan    Anesthesia: [facility] MAC    Procedure: Patient was placed in the left lateral decubitus position and monitored anesthesia care was initiated.  His right scapula was cleaned and draped in a sterile manner.  A surgical timeout was performed.  He received Ancef as perioperative antibiotics.  The old packing was removed and using suction the hematoma removed from the pocket which measured approximately 6 cm in circumference at the base.  The base over lay the serratus anterior muscle but did not penetrate the fascia.  The skin edges were debrided with a scalpel to clean them up.  The entire cavity was injected with 1/2% Marcaine with epinephrine.  The remaining blood was cleaned out using peroxide solution irrigation.  A few small bleeding spots were identified but no obvious large bleeding sources were located.  The entire cavity was then cauterized.  Cavity was then packed with lightly moist Kerlix gauze covered with dry gauze and tape.    Estimated blood loss: 10 mL    IV fluid: 600 mL

## 2018-11-01 NOTE — TELEPHONE ENCOUNTER
I spoke to Amy today. I told her that I spoke to Dr Davis. He is in endo procedures all day and advised that Nabor go to the ER. Amy said that Nabor's son is already planning this. Nabor ended up in the hospital for 5 days in Sept 2018 due to bleeding.  Sonam CHAMORRO Rn

## 2018-11-01 NOTE — IP AVS SNAPSHOT
MRN:[id number]                      After Visit Summary   11/1/2018    Nabor Cunningham    MRN: [id number]           Thank you!     Thank you for choosing [facility] for your care. Our goal is always to provide you with excellent care. Hearing back from our patients is one way we can continue to improve our services. Please take a few minutes to complete the written survey that you may receive in the mail after you visit with us. Thank you!        Patient Information     Date Of Birth          1935        About your hospital stay     You were admitted on:  N/A You last received care in the:  [facility] PreOP/Phase II    You were discharged on:  November 1, 2018       Who to Call     For medical emergencies, please call 911.  For non-urgent questions about your medical care, please call your primary care provider or clinic, [phone]  For questions related to your surgery, please call your surgery clinic        Attending Provider     Provider Specialty    Lindsey Clemente PA-C Physician Assistant    Brian Casas MD Emergency Medicine       Primary Care Provider Office Phone # Fax #    Heron Alexey Mayo [phone]      Your next 10 appointments already scheduled     Nov 07, 2018  9:00 AM CST   Office Visit with Et Nurse - [facility] Wound Ostomy ([facility])    [address] [address] [phone]           Bring a current list of meds and any records pertaining to this visit. For Physicals, please bring immunization records and any forms needing to be filled out. Please arrive 10 minutes early to complete paperwork.              Further instructions from your care team       Dr. Davis's discharge instructions:    Remove gauze and packing starting tomorrow morning and repack daily with lightly moist Kerlix gauze covering with dry gauze and tape.    When showering, remove all packing and allow soap and water to run into the  wound.    Home care wound care nurses may change this dressing protocol per their expertise.    Follow-up with Dr. Davis in 1 week.    Use Norco as necessary for pain control.    Stop Coumadin for one week.                        Same Day Surgery Discharge Instructions  Special Precautions After Surgery - Adult    1. It is not unusual to feel lightheaded or faint, up to 24 hours after surgery or while taking pain medication.  If you have these symptoms; sit for a few minutes before standing and have someone assist you when getting up.  2. You should rest and relax for the next 24 hours and must have someone stay with you for at least 24 hours after your discharge.  3. DO NOT DRIVE any vehicle or operate mechanical equipment for 24 hours following the end of your surgery.  DO NOT DRIVE while taking narcotic pain medications that have been prescribed by your physician.  If you had a limb operated on, you must be able to use it fully to drive.  4. DO NOT drink alcoholic beverages for 24 hours following surgery or while taking prescription pain medication.  5. Drink clear liquids (apple juice, ginger ale, broth, 7-Up, etc.).  Progress to your regular diet as you feel able.  6. Any questions call your physician and do not make important decisions for 24 hours.    ACTIVITY  ? Rest today.  No activity or diet restrictions.  ? Resume activity as tolerated.  ? Restrictions: per DR berkowitz orders       Call for an appointment to return to the clinic as directed    Medications:  ? Acetaminophen (Tylenol):  Next dose: as needed.  ? Hydrocodone (Norco, Vicodin):  Next dose: as needed.  ? Stool softeners to prevent constipation  ? Follow the instructions on the bottle.     Additional discharge instructions: None  __________________________________________________________________________________________________________________________________  IMPORTANT NUMBERS:    [facility] Main Number:  [phone], [phone]  Pharmacy:   [phone]  Same Day Surgery:  [phone], Monday - Friday until 8:30 p.m.  Urgent Care:  [phone]  Emergency Room:  [phone]      Surgery Specialty Clinic:  [phone]                 Pending Results     No orders found from 10/30/2018 to 11/2/2018.            Admission Information     Date & Time Department Dept. Phone    11/1/2018 [facility] PreOP/Phase [phone]      Your Vitals Were     Blood Pressure Pulse Temperature Respirations Weight Pulse Oximetry    128/66 74 98.1  F (36.7  C) (Oral) 14 74.8 kg (165 lb) 93%    BMI (Body Mass Index)                   28.32 kg/m2           Care EveryWhere ID     This is your Care EveryWhere ID. This could be used by other organizations to access your [facility] medical records  [id number]        Equal Access to Services     SHIRA DELEON : Flory Polanco, wadevonte watters, qaybynes kaalmada esdras, lili hernandez . [facility] [phone].    ATENCIÓN: Si habla español, tiene a garcia disposición servicios gratuitos de asistencia lingüística. Juan Antonio al [phone].    We comply with applicable federal civil rights laws and [place] laws. We do not discriminate on the basis of race, color, national origin, age, disability, sex, sexual orientation, or gender identity.               Review of your medicines      CONTINUE these medicines which may have CHANGED, or have new prescriptions. If we are uncertain of the size of tablets/capsules you have at home, strength may be listed as something that might have changed.        Dose / Directions    * HYDROcodone-acetaminophen 5-325 MG per tablet   Commonly known as:  NORCO   This may have changed:  Another medication with the same name was added. Make sure you understand how and when to take each.   Used for:  Post-op pain        Dose:  1-2 tablet   Take 1-2 tablets by mouth every 6 hours as needed   Quantity:  20 tablet   Refills:  0       * HYDROcodone-acetaminophen 5-325  MG per tablet   Commonly known as:  NORCO   This may have changed:  You were already taking a medication with the same name, and this prescription was added. Make sure you understand how and when to take each.   Used for:  Post-op pain        Dose:  1-2 tablet   Take 1-2 tablets by mouth every 6 hours as needed   Quantity:  20 tablet   Refills:  0       * Notice:  This list has 2 medication(s) that are the same as other medications prescribed for you. Read the directions carefully, and ask your doctor or other care provider to review them with you.      CONTINUE these medicines which have NOT CHANGED        Dose / Directions    ACETAMINOPHEN PO        Dose:  1000 mg   Take 1,000 mg by mouth every 6 hours as needed for pain   Refills:  0       betamethasone dipropionate 0.05 % cream   Commonly known as:  DIPROSONE   Used for:  Eczema, unspecified type        Apply topically 2 times daily For leg rash   Quantity:  45 g   Refills:  0       calcium carbonate 500 mg (elemental) 500 MG tablet   Commonly known as:  OS-ALY        Dose:  1 tablet   Take 1 tablet by mouth 2 times daily   Refills:  0       colchicine 0.6 MG tablet   Commonly known as:  COLCRYS   Used for:  Chronic gout, unspecified cause, unspecified site        Dose:  0.6 mg   Take 1 tablet (0.6 mg) by mouth daily   Quantity:  30 tablet   Refills:  0       COUMADIN PO        Take by mouth daily Take as directed per INR results   Refills:  0       cyanocobalamin 1000 MCG tablet   Commonly known as:  vitamin  B-12   Used for:  Vitamin B 12 deficiency        TAKE ONE TABLET BY MOUTH DAILY   Quantity:  30 tablet   Refills:  11       hypromellose 0.4 % Soln ophthalmic solution   Commonly known as:  ARTIFICIAL TEARS        Dose:  1 drop   Place 1 drop into both eyes every hour as needed for dry eyes   Refills:  0       metoprolol succinate 25 MG 24 hr tablet   Commonly known as:  TOPROL-XL        Dose:  25 mg   Take 25 mg by mouth every evening   Quantity:  90  tablet   Refills:  1       mineral oil oil        Dose:  5 mL   Take 5 mLs by mouth daily   Refills:  0       Multi-vitamin Tabs tablet        Dose:  1 tablet   Take 1 tablet by mouth daily   Quantity:  100 tablet   Refills:  3       nortriptyline 25 MG capsule   Commonly known as:  PAMELOR   Used for:  Anxiety        TAKE ONE CAPSULE MID MORNING AND TWO CAPSULES BY MOUTH DAILY AT BEDTIME   Quantity:  270 capsule   Refills:  3       prednisoLONE acetate 1 % ophthalmic susp   Commonly known as:  PRED FORTE        Dose:  1 drop   Place 1 drop Into the left eye 2 times daily   Refills:  2       temazepam 30 MG capsule   Commonly known as:  RESTORIL   Used for:  Persistent insomnia        TAKE ONE CAPSULE BY MOUTH AT BEDTIME AS NEEDED FOR SLEEP   Quantity:  30 capsule   Refills:  0            Where to get your medicines      Some of these will need a paper prescription and others can be bought over the counter. Ask your nurse if you have questions.     Bring a paper prescription for each of these medications     HYDROcodone-acetaminophen 5-325 MG per tablet                Protect others around you: Learn how to safely use, store and throw away your medicines at www.disposemymeds.org.        Information about OPIOIDS     PRESCRIPTION OPIOIDS: WHAT YOU NEED TO KNOW   We gave you an opioid (narcotic) pain medicine. It is important to manage your pain, but opioids are not always the best choice. You should first try all the other options your care team gave you. Take this medicine for as short a time (and as few doses) as possible.    Some activities can increase your pain, such as bandage changes or therapy sessions. It may help to take your pain medicine 30 to 60 minutes before these activities. Reduce your stress by getting enough sleep, working on hobbies you enjoy and practicing relaxation or meditation. Talk to your care team about ways to manage your pain beyond prescription opioids.    These medicines have  risks:    DO NOT drive when on new or higher doses of pain medicine. These medicines can affect your alertness and reaction times, and you could be arrested for driving under the influence (DUI). If you need to use opioids long-term, talk to your care team about driving.    DO NOT operate heavy machinery    DO NOT do any other dangerous activities while taking these medicines.    DO NOT drink any alcohol while taking these medicines.     If the opioid prescribed includes acetaminophen, DO NOT take with any other medicines that contain acetaminophen. Read all labels carefully. Look for the word  acetaminophen  or  Tylenol.  Ask your pharmacist if you have questions or are unsure.    You can get addicted to pain medicines, especially if you have a history of addiction (chemical, alcohol or substance dependence). Talk to your care team about ways to reduce this risk.    All opioids tend to cause constipation. Drink plenty of water and eat foods that have a lot of fiber, such as fruits, vegetables, prune juice, apple juice and high-fiber cereal. Take a laxative (Miralax, milk of magnesia, Colace, Senna) if you don t move your bowels at least every other day. Other side effects include upset stomach, sleepiness, dizziness, throwing up, tolerance (needing more of the medicine to have the same effect), physical dependence and slowed breathing.    Store your pills in a secure place, locked if possible. We will not replace any lost or stolen medicine. If you don t finish your medicine, please throw away (dispose) as directed by your pharmacist. The [place] [employer]ution [employer] has more information about safe disposal: https://www.pca.[facility].us/living-green/managing-unwanted-medications             Medication List: This is a list of all your medications and when to take them. Check marks below indicate your daily home schedule. Keep this list as a reference.      Medications           Morning Afternoon Evening Bedtime  As Needed    ACETAMINOPHEN PO   Take 1,000 mg by mouth every 6 hours as needed for pain                                betamethasone dipropionate 0.05 % cream   Commonly known as:  DIPROSONE   Apply topically 2 times daily For leg rash                                calcium carbonate 500 mg (elemental) 500 MG tablet   Commonly known as:  OS-ALY   Take 1 tablet by mouth 2 times daily                                colchicine 0.6 MG tablet   Commonly known as:  COLCRYS   Take 1 tablet (0.6 mg) by mouth daily                                COUMADIN PO   Take by mouth daily Take as directed per INR results                                cyanocobalamin 1000 MCG tablet   Commonly known as:  vitamin  B-12   TAKE ONE TABLET BY MOUTH DAILY                                * HYDROcodone-acetaminophen 5-325 MG per tablet   Commonly known as:  NORCO   Take 1-2 tablets by mouth every 6 hours as needed                                * HYDROcodone-acetaminophen 5-325 MG per tablet   Commonly known as:  NORCO   Take 1-2 tablets by mouth every 6 hours as needed                                hypromellose 0.4 % Soln ophthalmic solution   Commonly known as:  ARTIFICIAL TEARS   Place 1 drop into both eyes every hour as needed for dry eyes                                metoprolol succinate 25 MG 24 hr tablet   Commonly known as:  TOPROL-XL   Take 25 mg by mouth every evening                                mineral oil oil   Take 5 mLs by mouth daily                                Multi-vitamin Tabs tablet   Take 1 tablet by mouth daily                                nortriptyline 25 MG capsule   Commonly known as:  PAMELOR   TAKE ONE CAPSULE MID MORNING AND TWO CAPSULES BY MOUTH DAILY AT BEDTIME                                prednisoLONE acetate 1 % ophthalmic susp   Commonly known as:  PRED FORTE   Place 1 drop Into the left eye 2 times daily                                temazepam 30 MG capsule   Commonly known as:  RESTORIL   TAKE  ONE CAPSULE BY MOUTH AT BEDTIME AS NEEDED FOR SLEEP                                * Notice:  This list has 2 medication(s) that are the same as other medications prescribed for you. Read the directions carefully, and ask your doctor or other care provider to review them with you.

## 2018-11-01 NOTE — ANESTHESIA PREPROCEDURE EVALUATION
Anesthesia Evaluation     . Pt has had prior anesthetic.            ROS/MED HX    ENT/Pulmonary: Comment: History of pneumonia  Worked up for pneumonia    (+)tobacco use, Past use , . .    Neurologic: Comment: insomnia    (+)neuropathy - peripheral,     Cardiovascular: Comment: Parox atrial fib  Chronic atrial fib  Aortic aneurysm    (+) Dyslipidemia, hypertension----. Taking blood thinners : . . . pacemaker :. . Previous cardiac testing Echodate:results:Final Impressions:   1. Normal left ventricular size, borderline wall thickness, normal global systolic function, calculated EF of 71 %.   2. Mildly enlarged left atrium.   3. Right ventricular cavity size is normal, global systolic RV function is normal.   4. The aortic valve is tricuspid and sclerotic, no stenosis and mild regurgitation.   5. The ascending aorta is dilated with a maximal diameter of 4.6 cm.date: results:ECG reviewed date:1-27-16 results:Final Impressions:   1. Normal left ventricular size, borderline wall thickness, normal global systolic function, calculated EF of 71 %.   2. Mildly enlarged left atrium.   3. Right ventricular cavity size is normal, global systolic RV function is normal.   4. The aortic valve is tricuspid and sclerotic, no stenosis and mild regurgitation.   5. The ascending aorta is dilated with a maximal diameter of 4.6 cm. date: results:          METS/Exercise Tolerance:  3 - Able to walk 1-2 blocks without stopping   Hematologic:         Musculoskeletal:  - neg musculoskeletal ROS       GI/Hepatic:  - neg GI/hepatic ROS      (-) liver disease   Renal/Genitourinary: Comment: LUTS    (+) BPH,       Endo:  - neg endo ROS       Psychiatric:     (+) psychiatric history anxiety and depression      Infectious Disease:  - neg infectious disease ROS       Malignancy:      - no malignancy   Other: Comment: EtOH abuse  Weight loss  Gout                      Physical Exam  Normal systems: cardiovascular and pulmonary    Airway    Mallampati: II    Dental   (+) missing    Cardiovascular       Pulmonary                     Anesthesia Plan      History & Physical Review  History and physical reviewed and following examination; no interval change.    ASA Status:  3 emergent.    NPO Status:  > 6 hours    Plan for MAC Reason for MAC:  Deep or markedly invasive procedure (G8)  PONV prophylaxis:  Ondansetron (or other 5HT-3) and Dexamethasone or Solumedrol       Postoperative Care      Consents  Anesthetic plan, risks, benefits and alternatives discussed with:  Patient..                          .

## 2018-11-01 NOTE — IP AVS SNAPSHOT
[facility] PreOP/Phase II    [address] [address]    Phone:  [phone]    Fax:  [phone]                                       After Visit Summary   11/1/2018    Nabor Cunningham    MRN: [id number]           After Visit Summary Signature Page     I have received my discharge instructions, and my questions have been answered. I have discussed any challenges I see with this plan with the nurse or doctor.    ..........................................................................................................................................  Patient/Patient Representative Signature      ..........................................................................................................................................  Patient Representative Print Name and Relationship to Patient    ..................................................               ................................................  Date                                   Time    ..........................................................................................................................................  Reviewed by Signature/Title    ...................................................              ..............................................  Date                                               Time          22EPIC Rev 08/18

## 2018-11-01 NOTE — ANESTHESIA POSTPROCEDURE EVALUATION
Patient: Nabor Cunningham    Procedure(s):   INCISION AND DRAINAGE of Back Wound    Diagnosis:other  Diagnosis Additional Information: No value filed.    Anesthesia Type:  MAC    Note:  Anesthesia Post Evaluation    Patient location during evaluation: Phase 2 and Bedside  Patient participation: Able to fully participate in evaluation  Level of consciousness: awake  Pain management: adequate  Airway patency: patent  Cardiovascular status: acceptable  Respiratory status: acceptable  Hydration status: acceptable  PONV: none     Anesthetic complications: None          Last vitals:  Vitals:    11/01/18 1400 11/01/18 1433 11/01/18 1505   BP: 120/68  117/63   Pulse:  74    Resp:  16 16   Temp:   36.7  C (98.1  F)   SpO2:  95% 97%         Electronically Signed By: J Luis Boyer CRNA, AVERY BELL  November 1, 2018  6:47 PM

## 2018-11-02 ENCOUNTER — HOSPITAL ENCOUNTER (EMERGENCY)
Facility: CLINIC | Age: 83
Discharge: HOME | End: 2018-11-02
Attending: EMERGENCY MEDICINE | Admitting: EMERGENCY MEDICINE
Payer: MEDICARE

## 2018-11-02 VITALS
DIASTOLIC BLOOD PRESSURE: 107 MMHG | WEIGHT: 165 LBS | OXYGEN SATURATION: 93 % | BODY MASS INDEX: 26.52 KG/M2 | SYSTOLIC BLOOD PRESSURE: 140 MMHG | HEIGHT: 66 IN | TEMPERATURE: 97.7 F

## 2018-11-02 DIAGNOSIS — I97.620 POSTOPERATIVE HEMORRHAGE INVOLVING CIRCULATORY SYSTEM FOLLOWING NON-CIRCULATORY SYSTEM PROCEDURE: ICD-10-CM

## 2018-11-02 PROCEDURE — 99283 EMERGENCY DEPT VISIT LOW MDM: CPT | Mod: 25 | Performed by: EMERGENCY MEDICINE

## 2018-11-02 PROCEDURE — 12020 TX SUPFC WND DEHSN SMPL CLSR: CPT | Performed by: EMERGENCY MEDICINE

## 2018-11-02 PROCEDURE — 99284 EMERGENCY DEPT VISIT MOD MDM: CPT | Mod: Z6 | Performed by: EMERGENCY MEDICINE

## 2018-11-02 PROCEDURE — 25000125 ZZHC RX 250: Performed by: EMERGENCY MEDICINE

## 2018-11-02 RX ORDER — TRANEXAMIC ACID 100 MG/ML
500 INJECTION, SOLUTION INTRAVENOUS ONCE
Status: COMPLETED | OUTPATIENT
Start: 2018-11-02 | End: 2018-11-02

## 2018-11-02 RX ADMIN — TRANEXAMIC ACID 500 MG: 1 INJECTION, SOLUTION INTRAVENOUS at 12:43

## 2018-11-02 NOTE — ED NOTES
Bed: ED21  Expected date:   Expected time:   Means of arrival: Ambulance  Comments:  Manjula Tomlin   
Discharge instructions reviewed in detail.  Pt verbalized understanding.  No further questions or concerns.     
Dr. Davis here with his surgical equipment and staff to repair the bleeding wound to the. Right posterior chest wall.   
Dr. Ryan linton wit assess the patient. The patient had a sebaceous cyst removed and yesterday had a hematoma drained. The wound continues to bleed.   
23 Hour(s) 21 Minute(s)

## 2018-11-02 NOTE — ED PROVIDER NOTES
History     Chief Complaint   Patient presents with     Post-op Problem     surgery yesterday to remove hematoma.  now bleeding again      HPI  Nabor Cunningham is a 82 year old male who presents for postoperative bleeding.  History obtained from the patient, his son, and review of the chart.  The patient had an infected sebaceous cyst along his right thoracic back that was subsequently excised.  He developed large hematoma and bleeding from this required removal in this has had subsequent bleeding.  He was seen for this yesterday and is back today with continued bleeding.  He is usually on warfarin but has not taken it over the last 2 days.  He denies any pain.  No shortness of breath.  No fever, chills, rash.    Problem List:    Patient Active Problem List    Diagnosis Date Noted     Postoperative hemorrhage involving circulatory system following non-circulatory system procedure 09/03/2018     Priority: Medium     Pneumonia 09/02/2018     Priority: Medium     Bleeding from wound 09/02/2018     Priority: Medium     Moderate episode of recurrent major depressive disorder (H) 04/10/2017     Priority: Medium     Anxiety 03/22/2017     Priority: Medium     3/23/2017:He has been on amitriptyline, nortriptyline, buspirone, sertraline and Lexapro in the past year.        Alcohol abuse 03/22/2017     Priority: Medium     Chronic atrial fibrillation (H) 03/22/2017     Priority: Medium     Weight loss 03/22/2017     Priority: Medium     Peripheral polyneuropathy 03/22/2017     Priority: Medium     4/7/2017:Abnormal monofilament in distal plantar feet bilateral.  He has had chronic pain in both feet.        Chronic gout, unspecified cause, unspecified site 03/22/2017     Priority: Medium     Essential hypertension with goal blood pressure less than 140/90 03/22/2017     Priority: Medium     Hyperlipidemia LDL goal <130 03/22/2017     Priority: Medium     Persistent insomnia 03/22/2017     Priority: Medium     Lower urinary  tract symptoms (LUTS) 03/22/2017     Priority: Medium     Chronic anticoagulation 03/22/2017     Priority: Medium     Long-term (current) use of anticoagulants [Z79.01] 03/22/2017     Priority: Medium        Past Medical History:    Past Medical History:   Diagnosis Date     Anxiety      Gout      Hyperlipemia      Hypertension      Insomnia      Paroxysmal atrial fibrillation (H)        Past Surgical History:    Past Surgical History:   Procedure Laterality Date     INCISION AND DRAINAGE TRUNK, COMBINED N/A 9/6/2018    Procedure: COMBINED INCISION AND DRAINAGE TRUNK;  incision and cauterization of left buttock bleed.;  Surgeon: Dain Davis MD;  Location: [facility]     INCISION AND DRAINAGE TRUNK, COMBINED N/A 11/1/2018    Procedure:  INCISION AND DRAINAGE of Back Wound;  Surgeon: Dain Davis MD;  Location: [facility]       Family History:    No family history on file.    Social History:  Marital Status:   [2]  Social History   Substance Use Topics     Smoking status: Former Smoker     Years: 20.00     Types: Cigarettes     Smokeless tobacco: Never Used     Alcohol use No        Medications:      ACETAMINOPHEN PO   betamethasone dipropionate (DIPROSONE) 0.05 % cream   calcium carbonate (OS- MG [place]. [place]) 1250 MG tablet   colchicine (COLCRYS) 0.6 MG tablet   cyanocobalamin (VITAMIN  B-12) 1000 MCG tablet   HYDROcodone-acetaminophen (NORCO) 5-325 MG per tablet   HYDROcodone-acetaminophen (NORCO) 5-325 MG per tablet   hypromellose (ARTIFICIAL TEARS) 0.4 % SOLN ophthalmic solution   metoprolol (TOPROL-XL) 25 MG 24 hr tablet   mineral oil oil   multivitamin, therapeutic with minerals (MULTI-VITAMIN) TABS tablet   nortriptyline (PAMELOR) 25 MG capsule   prednisoLONE acetate (PRED FORTE) 1 % ophthalmic susp   temazepam (RESTORIL) 30 MG capsule   Warfarin Sodium (COUMADIN PO)         Review of Systems  A 4 point review of systems was performed. All pertinent positives and negatives were listed in the HPI and rest  "of ROS were otherwise negative.    Physical Exam   BP: (!) 135/92  Heart Rate: 95  Temp: 97.7  F (36.5  C)  Height: 167.6 cm (5' 6\")  Weight: 74.8 kg (165 lb)  SpO2: 93 %      Physical Exam   Constitutional: He is oriented to person, place, and time. He appears well-developed and well-nourished. No distress.   HENT:   Head: Normocephalic and atraumatic.   Eyes: No scleral icterus.   Neck: Normal range of motion. Neck supple.   Pulmonary/Chest: Effort normal. No respiratory distress.   Neurological: He is alert and oriented to person, place, and time.   Skin: Skin is warm and dry. No rash noted. He is not diaphoretic. No erythema. No pallor.   Right thoracic back there is a large postoperative wound with packing in place that is soaked in blood.       ED Course     ED Course     Procedures               Critical Care time:  none               Results for orders placed or performed during the hospital encounter of 11/01/18 (from the past 24 hour(s))   INR   Result Value Ref Range    INR 1.88 (H) 0.86 - 1.14   CBC with platelets, differential   Result Value Ref Range    WBC 4.6 4.0 - 11.0 10e9/L    RBC Count 4.09 (L) 4.4 - 5.9 10e12/L    Hemoglobin 10.8 (L) 13.3 - 17.7 g/dL    Hematocrit 33.7 (L) 40.0 - 53.0 %    MCV 82 78 - 100 fl    MCH 26.4 (L) 26.5 - 33.0 pg    MCHC 32.0 31.5 - 36.5 g/dL    RDW 13.9 10.0 - 15.0 %    Platelet Count 165 150 - 450 10e9/L    Diff Method Automated Method     % Neutrophils 72.2 %    % Lymphocytes 16.3 %    % Monocytes 7.4 %    % Eosinophils 3.5 %    % Basophils 0.4 %    % Immature Granulocytes 0.2 %    Nucleated RBCs 0 0 /100    Absolute Neutrophil 3.3 1.6 - 8.3 10e9/L    Absolute Lymphocytes 0.8 0.8 - 5.3 10e9/L    Absolute Monocytes 0.3 0.0 - 1.3 10e9/L    Absolute Eosinophils 0.2 0.0 - 0.7 10e9/L    Absolute Basophils 0.0 0.0 - 0.2 10e9/L    Abs Immature Granulocytes 0.0 0 - 0.4 10e9/L    Absolute Nucleated RBC 0.0        Medications   tranexamic acid (CYKLOKAPRON) spray 500 mg (not " administered)       Assessments & Plan (with Medical Decision Making)   82-year-old male who presents for postoperative bleeding.  Heart rate 95, SPO2 is 93% on room air, temperature is 97.7 F.  I have removed the packing and there is some oozing of blood in several places.  Applied tranexamic soaked gauze to the area and let this sit for several minutes.  The patient's surgeon, Dr. Davis did come to evaluate the patient and he performed cautery and placed several stitches afterwards the bleeding was controlled.  At this point the patient is safe to discharge with instructions to return if he has worsening symptoms or concerns.  He is instructed to restart his warfarin in 5 days.  The patient is in agreement with this plan peer    I have reviewed the nursing notes.    I have reviewed the findings, diagnosis, plan and need for follow up with the patient.       New Prescriptions    No medications on file       Final diagnoses:   Postoperative hemorrhage involving circulatory system following non-circulatory system procedure       11/2/2018   [facility] EMERGENCY DEPARTMENT     Sarath Davidson MD  11/02/18 [id number]

## 2018-11-02 NOTE — DISCHARGE INSTRUCTIONS
Keep the dressing on until reevaluated tomorrow.  Return to the emergency department if you have fever, difficulty breathing, chest pain, or worsening bleeding.  Otherwise follow-up in clinic.  Start taking your warfarin in 5 days.

## 2018-11-02 NOTE — ED AVS SNAPSHOT
[facility] Emergency Department    [address] [address]    Phone:  [phone]    Fax:  [phone]                                       Nabor Cunningham   MRN: [id number]    Department:  [facility] Emergency Department   Date of Visit:  11/2/2018           After Visit Summary Signature Page     I have received my discharge instructions, and my questions have been answered. I have discussed any challenges I see with this plan with the nurse or doctor.    ..........................................................................................................................................  Patient/Patient Representative Signature      ..........................................................................................................................................  Patient Representative Print Name and Relationship to Patient    ..................................................               ................................................  Date                                   Time    ..........................................................................................................................................  Reviewed by Signature/Title    ...................................................              ..............................................  Date                                               Time          22EPIC Rev 08/18

## 2018-11-02 NOTE — DISCHARGE INSTRUCTIONS
Dr. Davis's discharge instructions:    Remove gauze and packing starting tomorrow morning and repack daily with lightly moist Kerlix gauze covering with dry gauze and tape.    When showering, remove all packing and allow soap and water to run into the wound.    Home care wound care nurses may change this dressing protocol per their expertise.    Follow-up with Dr. Davis in 1 week.    Use Norco as necessary for pain control.    Stop Coumadin for one week.                        Same Day Surgery Discharge Instructions  Special Precautions After Surgery - Adult    1. It is not unusual to feel lightheaded or faint, up to 24 hours after surgery or while taking pain medication.  If you have these symptoms; sit for a few minutes before standing and have someone assist you when getting up.  2. You should rest and relax for the next 24 hours and must have someone stay with you for at least 24 hours after your discharge.  3. DO NOT DRIVE any vehicle or operate mechanical equipment for 24 hours following the end of your surgery.  DO NOT DRIVE while taking narcotic pain medications that have been prescribed by your physician.  If you had a limb operated on, you must be able to use it fully to drive.  4. DO NOT drink alcoholic beverages for 24 hours following surgery or while taking prescription pain medication.  5. Drink clear liquids (apple juice, ginger ale, broth, 7-Up, etc.).  Progress to your regular diet as you feel able.  6. Any questions call your physician and do not make important decisions for 24 hours.    ACTIVITY  ? Rest today.  No activity or diet restrictions.  ? Resume activity as tolerated.  ? Restrictions: per DR berkowitz orders       Call for an appointment to return to the clinic as directed    Medications:  ? Acetaminophen (Tylenol):  Next dose: as needed.  ? Hydrocodone (Norco, Vicodin):  Next dose: as needed.  ? Stool softeners to prevent constipation  ? Follow the instructions on the bottle.     Additional  discharge instructions: None  __________________________________________________________________________________________________________________________________  IMPORTANT NUMBERS:    [facility] Main Number:  [phone], [phone]  Pharmacy:  [phone]  Same Day Surgery:  [phone], Monday - Friday until 8:30 p.m.  Urgent Care:  [phone]  Emergency Room:  [phone]      Surgery Specialty Clinic:  [phone]

## 2018-11-02 NOTE — ED AVS SNAPSHOT
[facility] Emergency Department    [address] [address]    Phone:  [phone]    Fax:  [phone]                                       Nabor Cunningham   MRN: [id number]    Department:  [facility] Emergency Department   Date of Visit:  11/2/2018           Patient Information     Date Of Birth          1935        Your diagnoses for this visit were:     Postoperative hemorrhage involving circulatory system following non-circulatory system procedure        You were seen by Sarath Davidson MD.        Discharge Instructions       Keep the dressing on until reevaluated tomorrow.  Return to the emergency department if you have fever, difficulty breathing, chest pain, or worsening bleeding.  Otherwise follow-up in clinic.  Start taking your warfarin in 5 days.    Your next 10 appointments already scheduled     Nov 07, 2018  9:00 AM CST   Office Visit with Et Nurse - [facility] ([facility])    [address] [id number] [phone]           Bring a current list of meds and any records pertaining to this visit. For Physicals, please bring immunization records and any forms needing to be filled out. Please arrive 10 minutes early to complete paperwork.              24 Hour Appointment Hotline       To make an appointment at any [facility] clinic, call [phone] ([phone]). If you don't have a family doctor or clinic, we will help you find one. [facility] clinics are conveniently located to serve the needs of you and your family.             Review of your medicines      Our records show that you are taking the medicines listed below. If these are incorrect, please call your family doctor or clinic.        Dose / Directions Last dose taken    ACETAMINOPHEN PO   Dose:  1000 mg        Take 1,000 mg by mouth every 6 hours as needed for pain   Refills:  0        betamethasone dipropionate 0.05 % cream   Commonly  known as:  DIPROSONE   Quantity:  45 g        Apply topically 2 times daily For leg rash   Refills:  0        calcium carbonate 500 mg (elemental) 500 MG tablet   Commonly known as:  OS-ALY   Dose:  1 tablet        Take 1 tablet by mouth 2 times daily   Refills:  0        colchicine 0.6 MG tablet   Commonly known as:  COLCRYS   Dose:  0.6 mg   Quantity:  30 tablet        Take 1 tablet (0.6 mg) by mouth daily   Refills:  0        COUMADIN PO        Take by mouth daily Take as directed per INR results   Refills:  0        cyanocobalamin 1000 MCG tablet   Commonly known as:  vitamin  B-12   Quantity:  30 tablet        TAKE ONE TABLET BY MOUTH DAILY   Refills:  11        * HYDROcodone-acetaminophen 5-325 MG per tablet   Commonly known as:  NORCO   Dose:  1-2 tablet   Quantity:  20 tablet        Take 1-2 tablets by mouth every 6 hours as needed   Refills:  0        * HYDROcodone-acetaminophen 5-325 MG per tablet   Commonly known as:  NORCO   Dose:  1-2 tablet   Quantity:  20 tablet        Take 1-2 tablets by mouth every 6 hours as needed   Refills:  0        hypromellose 0.4 % Soln ophthalmic solution   Commonly known as:  ARTIFICIAL TEARS   Dose:  1 drop        Place 1 drop into both eyes every hour as needed for dry eyes   Refills:  0        metoprolol succinate 25 MG 24 hr tablet   Commonly known as:  TOPROL-XL   Dose:  25 mg   Quantity:  90 tablet        Take 25 mg by mouth every evening   Refills:  1        mineral oil oil   Dose:  5 mL        Take 5 mLs by mouth daily   Refills:  0        Multi-vitamin Tabs tablet   Dose:  1 tablet   Quantity:  100 tablet        Take 1 tablet by mouth daily   Refills:  3        nortriptyline 25 MG capsule   Commonly known as:  PAMELOR   Quantity:  270 capsule        TAKE ONE CAPSULE MID MORNING AND TWO CAPSULES BY MOUTH DAILY AT BEDTIME   Refills:  3        prednisoLONE acetate 1 % ophthalmic susp   Commonly known as:  PRED FORTE   Dose:  1 drop        Place 1 drop Into the left  eye 2 times daily   Refills:  2        temazepam 30 MG capsule   Commonly known as:  RESTORIL   Quantity:  30 capsule        TAKE ONE CAPSULE BY MOUTH AT BEDTIME AS NEEDED FOR SLEEP   Refills:  0        * Notice:  This list has 2 medication(s) that are the same as other medications prescribed for you. Read the directions carefully, and ask your doctor or other care provider to review them with you.            Orders Needing Specimen Collection     None      Pending Results     No orders found from 10/31/2018 to 11/3/2018.            Pending Culture Results     No orders found from 10/31/2018 to 11/3/2018.            Pending Results Instructions     If you had any lab results that were not finalized at the time of your Discharge, you can call the ED Lab Result RN at [phone]. You will be contacted by this team for any positive Lab results or changes in treatment. The nurses are available 7 days a week from 10A to 6:30P.  You can leave a message 24 hours per day and they will return your call.        Test Results From Your Hospital Stay               Thank you for choosing [facility]       Thank you for choosing [facility] for your care. Our goal is always to provide you with excellent care. Hearing back from our patients is one way we can continue to improve our services. Please take a few minutes to complete the written survey that you may receive in the mail after you visit with us. Thank you!        Care EveryWhere ID     This is your Care EveryWhere ID. This could be used by other organizations to access your [facility] medical records  [id number]        Equal Access to Services     SHIRA DELEON AH: Hadii kenzie Polanco, rosalba watters, qalili packer . So [facility] [phone].    ATENCIÓN: Si habla español, tiene a garcia disposición servicios gratuitos de asistencia lingüística. Llame al [phone].    We comply with applicable federal civil rights laws  and [place] laws. We do not discriminate on the basis of race, color, national origin, age, disability, sex, sexual orientation, or gender identity.            After Visit Summary       This is your record. Keep this with you and show to your community pharmacist(s) and doctor(s) at your next visit.

## 2018-11-06 ENCOUNTER — TELEPHONE (OUTPATIENT)
Dept: SURGERY | Facility: CLINIC | Age: 83
End: 2018-11-06

## 2018-11-06 ENCOUNTER — ANTICOAGULATION THERAPY VISIT (OUTPATIENT)
Dept: ANTICOAGULATION | Facility: CLINIC | Age: 83
End: 2018-11-06
Payer: MEDICARE

## 2018-11-06 DIAGNOSIS — I48.20 CHRONIC ATRIAL FIBRILLATION (H): ICD-10-CM

## 2018-11-06 DIAGNOSIS — Z79.01 CHRONIC ANTICOAGULATION: ICD-10-CM

## 2018-11-06 PROCEDURE — 99207 ZZC NO CHARGE NURSE ONLY: CPT

## 2018-11-06 NOTE — MR AVS SNAPSHOT
Nabor Cunningham   11/6/2018   Anticoagulation Therapy Visit    Description:  83 year old male   Provider:  Jahaira Jones, RN   Department:  [facility]           INR as of 11/6/2018     Today's INR       Anticoagulation Summary as of 11/6/2018     INR goal 2.0-3.0   Today's INR    Full warfarin instructions 11/6: Hold; 11/7: 6 mg; 11/8: 4 mg   Next INR check 11/9/2018    Indications   Chronic anticoagulation [Z79.01]  Chronic atrial fibrillation (H) [I48.2]  Long-term (current) use of anticoagulants [Z79.01] [Z79.01]         November 2018 Details    Sun Mon Tue Wed Thu Fri Sat         1               2               3                 4               5               6      Hold   See details      7      6 mg         8      4 mg         9            10                 11               12               13               14               15               16               17                 18               19               20               21               22               23               24                 25               26               27               28               29               30                 Date Details   11/06 This INR check       Date of next INR:  11/9/2018         How to take your warfarin dose     To take:  4 mg Take 2 of the 2 mg tablets.    To take:  6 mg Take 3 of the 2 mg tablets.    Hold Do not take your warfarin dose. See the Details table to the right for additional instructions.

## 2018-11-06 NOTE — TELEPHONE ENCOUNTER
Reason for Call:  Form, our goal is to have forms completed with 72 hours, however, some forms may require a visit or additional information.    Type of letter, form or note:  medical    Who is the form from?: Home care    Where did the form come from: form was faxed in    What clinic location was the form placed at?: [facility] (ENT, Neurology, Rheumatology, Surgery, Urology, Vascular Surgery)    Where the form was placed: 's Box - Dr. Davis    What number is listed as a contact on the form?: [phone]       Additional comments: Fax signed orders to: [phone]    Call taken on 11/6/2018 at 12:58 PM by Denise Behrendt

## 2018-11-06 NOTE — PROGRESS NOTES
ANTICOAGULATION FOLLOW-UP CLINIC VISIT    Patient Name:  Nabor Cunningham  Date:  11/6/2018  Contact Type:  Telephone/ [facility]    SUBJECTIVE:     Patient Findings     Comments No INR available at this time. Patient had 7 day hold of coumadin. Restarted at maintenance dose due to Hx bleeding.   Recheck INR 11/9/18.           OBJECTIVE    INR   Date Value Ref Range Status   11/01/2018 1.88 (H) 0.86 - 1.14 Final       ASSESSMENT / PLAN  Recheck INR In: 3 DAYS      Anticoagulation Summary as of 11/6/2018     INR goal 2.0-3.0   Today's INR    Warfarin maintenance plan No maintenance plan   Full warfarin instructions 11/6: Hold; 11/7: 6 mg; 11/8: 4 mg   Weekly warfarin total 38 mg   Plan last modified Ada Isaac RN (10/15/2018)   Next INR check 11/9/2018   Priority INR   Target end date Indefinite    Indications   Chronic anticoagulation [Z79.01]  Chronic atrial fibrillation (H) [I48.2]  Long-term (current) use of anticoagulants [Z79.01] [Z79.01]         Anticoagulation Episode Summary     INR check location     Preferred lab     Send INR reminders to [facility] PHONE [facility]TICO[facility] POOL    Comments * 2mg tabs. [facility] MWF      Anticoagulation Care Providers     Provider Role Specialty Phone number    Heron Mayo MD [facility] [phone]            See the Encounter Report to view Anticoagulation Flowsheet and Dosing Calendar (Go to Encounters tab in chart review, and find the Anticoagulation Therapy Visit)        Jahaira Jones RN

## 2018-11-08 ENCOUNTER — TELEPHONE (OUTPATIENT)
Dept: FAMILY MEDICINE | Facility: CLINIC | Age: 83
End: 2018-11-08

## 2018-11-08 ENCOUNTER — TELEPHONE (OUTPATIENT)
Dept: PALLIATIVE MEDICINE | Facility: CLINIC | Age: 83
End: 2018-11-08

## 2018-11-08 ENCOUNTER — TELEPHONE (OUTPATIENT)
Dept: SURGERY | Facility: CLINIC | Age: 83
End: 2018-11-08

## 2018-11-08 DIAGNOSIS — G62.9 NEUROPATHY: Primary | ICD-10-CM

## 2018-11-08 DIAGNOSIS — R20.2 TINGLING OF BOTH FEET: ICD-10-CM

## 2018-11-08 NOTE — TELEPHONE ENCOUNTER
Reason for Call:  Form, our goal is to have forms completed with 72 hours, however, some forms may require a visit or additional information.    Type of letter, form or note:  medical    Who is the form from?: Home care    Where did the form come from: form was faxed in    What clinic location was the form placed at?: [facility] (ENT, Neurology, Rheumatology, Surgery, Urology, Vascular Surgery)    Where the form was placed: 's Box - Dr. Davis    What number is listed as a contact on the form?: [phone]       Additional comments: Fax signed orders to: [phone]    Call taken on 11/8/2018 at 12:01 PM by Denise Behrendt

## 2018-11-08 NOTE — TELEPHONE ENCOUNTER
Reason for Call: Request for an order or referral:    Order or referral being requested: Florencio Portillo is requesting a Neurology Referral. Son said Pt has Tingling in his feet neuropathy. Pt has just been taking meds at first now they are not helping.     Date needed: as soon as possible    Has the patient been seen by the PCP for this problem? YES    Phone number Patient can be reached at:    Florencio Portillo  [phone]    Best Time:  Any Time      Can we leave a detailed message on this number?  YES    Call taken on 11/8/2018 at 11:38 AM by Cee Michel

## 2018-11-08 NOTE — TELEPHONE ENCOUNTER
Reason for Call:  Other     Detailed comments: Pt's son, Florencio, calling to see if patient can be treated for peripheral neuropathy in the pain clinic. He says pt's one leg was injured 15 years ago and has nerve damage from that - can the neuropathy still be treated? Pt is having trouble sleeping - Please advise    Phone Number Patient can be reached at: Cell number on file:    Telephone Information:   Mobile [phone]       Best Time: Any    Can we leave a detailed message on this number? YES    Call taken on 11/8/2018 at 3:33 PM by Denise Behrendt

## 2018-11-09 ENCOUNTER — TELEPHONE (OUTPATIENT)
Dept: NEUROLOGY | Facility: CLINIC | Age: 83
End: 2018-11-09

## 2018-11-09 ENCOUNTER — TELEPHONE (OUTPATIENT)
Dept: FAMILY MEDICINE | Facility: CLINIC | Age: 83
End: 2018-11-09

## 2018-11-09 ENCOUNTER — NURSE TRIAGE (OUTPATIENT)
Dept: NURSING | Facility: CLINIC | Age: 83
End: 2018-11-09

## 2018-11-09 ENCOUNTER — ANTICOAGULATION THERAPY VISIT (OUTPATIENT)
Dept: ANTICOAGULATION | Facility: CLINIC | Age: 83
End: 2018-11-09
Payer: MEDICARE

## 2018-11-09 DIAGNOSIS — R63.4 WEIGHT LOSS: ICD-10-CM

## 2018-11-09 DIAGNOSIS — G47.00 PERSISTENT INSOMNIA: ICD-10-CM

## 2018-11-09 DIAGNOSIS — I48.20 CHRONIC ATRIAL FIBRILLATION (H): ICD-10-CM

## 2018-11-09 DIAGNOSIS — I10 ESSENTIAL HYPERTENSION WITH GOAL BLOOD PRESSURE LESS THAN 140/90: ICD-10-CM

## 2018-11-09 DIAGNOSIS — G62.9 PERIPHERAL POLYNEUROPATHY: ICD-10-CM

## 2018-11-09 DIAGNOSIS — Z79.01 CHRONIC ANTICOAGULATION: ICD-10-CM

## 2018-11-09 DIAGNOSIS — F10.10 ALCOHOL ABUSE: ICD-10-CM

## 2018-11-09 DIAGNOSIS — F41.9 ANXIETY: Primary | ICD-10-CM

## 2018-11-09 DIAGNOSIS — M1A.9XX0 CHRONIC GOUT, UNSPECIFIED CAUSE, UNSPECIFIED SITE: ICD-10-CM

## 2018-11-09 LAB — INR PPP: 1.2

## 2018-11-09 PROCEDURE — 99207 ZZC NO CHARGE NURSE ONLY: CPT

## 2018-11-09 NOTE — TELEPHONE ENCOUNTER
I spoke to Florencio and let him know that the referral from Dr Mayo is appropriate for his father. Neurologist do see patients with numbness and tingling. Sonam CHAMORRO Rn

## 2018-11-09 NOTE — TELEPHONE ENCOUNTER
"  Reason for Disposition    SEVERE leg swelling (e.g., swelling extends above knee, entire leg is swollen, weeping fluid)     [facility] returning clinic call from earlier today (see note per epic)/\" I saw this patient today and he  Was having increased anxiety over the past day or two. So he took an extra dose of nortriptyline (PAMELOR) 25 MG capsule 270 capsule 3 5/24/2018  No  Sig: TAKE ONE CAPSULE MID MORNING AND TWO CAPSULES BY MOUTH DAILY AT BEDTIME    I I advised against it, advised he call PCP for possible dose increase or appt. Also when I assessed him today he has new bilateral leg edema. It's a 3+. It's more than usually.He does not have any other sx .\" Triaged using RN protocol, patient should be seen within 4 hrs. Advised Mona of this. She will contact patient.    Additional Information    Negative: Severe difficulty breathing (e.g., struggling for each breath, speaks in single words)    Negative: Looks like a broken bone or dislocated joint (e.g., crooked or deformed)    Negative: Sounds like a life-threatening emergency to the triager    Negative: Difficulty breathing at rest    Negative: Entire foot is cool or blue in comparison to other side    Negative: [1] Can't walk or can barely walk AND [2] new onset    Negative: [1] Difficulty breathing with exertion (e.g., walking) AND [2] new onset or worsening    Negative: [1] Red area or streak AND [2] fever    Negative: [1] Swelling is painful to touch AND [2] fever    Negative: [1] Cast on leg or ankle AND [2] now increased pain    Negative: Patient sounds very sick or weak to the triager    Negative: Chest pain    Negative: Followed a leg injury    Negative: [1] Small area of swelling AND [2] followed an insect bite to the area    Negative: Swelling of one ankle joint    Negative: Swelling of knee is main symptom    Negative: Pregnant    Negative: Postpartum (< 1 month since delivery)    Protocols used: LEG SWELLING AND EDEMA-ADULT-    "

## 2018-11-09 NOTE — TELEPHONE ENCOUNTER
Reason for Call:  Other call back    Detailed comments: pt son Florencio calling stating pt was referred to neurology for neuropathy in feet. Would like to make sure this is the correct route to go for this type of symptoms.     Phone Number Patient can be reached at: Other phone number:  [phone] florencio - consent on file     Best Time: any     Can we leave a detailed message on this number? YES    Call taken on 11/9/2018 at 12:27 PM by Beatrice Shafer

## 2018-11-09 NOTE — PROGRESS NOTES
ANTICOAGULATION FOLLOW-UP CLINIC VISIT    Patient Name:  Nabor Cunningham  Date:  11/9/2018  Contact Type:  Telephone/ Mona MG, [facility]    SUBJECTIVE:     Patient Findings     Positives Intentional hold of therapy (Resumed Coumadin on 11/7 after an intentional 7 day hold.)           OBJECTIVE    INR   Date Value Ref Range Status   11/09/2018 1.2  Final       ASSESSMENT / PLAN  INR assessment SUB    Recheck INR In: 3 DAYS    INR Location [facility]      Anticoagulation Summary as of 11/9/2018     INR goal 2.0-3.0   Today's INR 1.2!   Warfarin maintenance plan No maintenance plan   Full warfarin instructions 11/9: 6 mg; 11/10: 4 mg; 11/11: 6 mg   Weekly warfarin total 38 mg   Plan last modified Ada Isaac RN (10/15/2018)   Next INR check 11/12/2018   Priority INR   Target end date Indefinite    Indications   Chronic anticoagulation [Z79.01]  Chronic atrial fibrillation (H) [I48.2]  Long-term (current) use of anticoagulants [Z79.01] [Z79.01]         Anticoagulation Episode Summary     INR check location     Preferred lab     Send INR reminders to [place] PHONE ANTICOAG POOL    Comments * 2mg tabs. [facility]      Anticoagulation Care Providers     Provider Role Specialty Phone number    Heron Mayo MD [facility] [phone]            See the Encounter Report to view Anticoagulation Flowsheet and Dosing Calendar (Go to Encounters tab in chart review, and find the Anticoagulation Therapy Visit)    Dosage adjustment made based on physician directed care plan.    Aixa Lundberg [facility]

## 2018-11-09 NOTE — TELEPHONE ENCOUNTER
Mona from Home care is calling to inform Dr. Mayo that she visited Nabor today and he seems like he has more anxiety today.  Secondly he has 3+ bilateral edema that started yesterday.  Please call and advise.     [facility]  Clinic [facility] [place]

## 2018-11-09 NOTE — MR AVS SNAPSHOT
Nabor Cunningham   11/9/2018   Anticoagulation Therapy Visit    Description:  83 year old male   Provider:  Aixa Lundberg [facility]   Department:  [place] [facility]           INR as of 11/9/2018     Today's INR 1.2!      Anticoagulation Summary as of 11/9/2018     INR goal 2.0-3.0   Today's INR 1.2!   Full warfarin instructions 11/9: 6 mg; 11/10: 4 mg; 11/11: 6 mg   Next INR check 11/12/2018    Indications   Chronic anticoagulation [Z79.01]  Chronic atrial fibrillation (H) [I48.2]  Long-term (current) use of anticoagulants [Z79.01] [Z79.01]         Description     Take Coumadin 6 mg on Fri, 4 mg on Sat, & 6 mg on Sun. Recheck INR on Mon, 11/12/18.      November 2018 Details    Sun Mon Tue Wed Thu Fri Sat         1               2               3                 4               5               6               7               8               9      6 mg   See details      10      4 mg           11      6 mg         12            13               14               15               16               17                 18               19               20               21               22               23               24                 25               26               27               28               29               30                 Date Details   11/09 This INR check       Date of next INR:  11/12/2018         How to take your warfarin dose     To take:  4 mg Take 2 of the 2 mg tablets.    To take:  6 mg Take 3 of the 2 mg tablets.

## 2018-11-10 ENCOUNTER — APPOINTMENT (OUTPATIENT)
Dept: GENERAL RADIOLOGY | Facility: CLINIC | Age: 83
End: 2018-11-10
Attending: EMERGENCY MEDICINE
Payer: MEDICARE

## 2018-11-10 ENCOUNTER — HOSPITAL ENCOUNTER (EMERGENCY)
Facility: CLINIC | Age: 83
Discharge: HOME | End: 2018-11-10
Attending: EMERGENCY MEDICINE | Admitting: EMERGENCY MEDICINE
Payer: MEDICARE

## 2018-11-10 VITALS
WEIGHT: 179 LBS | HEART RATE: 85 BPM | TEMPERATURE: 97.3 F | BODY MASS INDEX: 28.89 KG/M2 | RESPIRATION RATE: 1 BRPM | DIASTOLIC BLOOD PRESSURE: 74 MMHG | OXYGEN SATURATION: 95 % | SYSTOLIC BLOOD PRESSURE: 127 MMHG

## 2018-11-10 DIAGNOSIS — R60.0 LOWER EXTREMITY EDEMA: ICD-10-CM

## 2018-11-10 LAB
ALBUMIN SERPL-MCNC: 3.3 G/DL (ref 3.4–5)
ALP SERPL-CCNC: 127 U/L (ref 40–150)
ALT SERPL W P-5'-P-CCNC: 25 U/L (ref 0–70)
ANION GAP SERPL CALCULATED.3IONS-SCNC: 7 MMOL/L (ref 3–14)
AST SERPL W P-5'-P-CCNC: 23 U/L (ref 0–45)
BASOPHILS # BLD AUTO: 0 10E9/L (ref 0–0.2)
BASOPHILS NFR BLD AUTO: 0.2 %
BILIRUB SERPL-MCNC: 0.4 MG/DL (ref 0.2–1.3)
BUN SERPL-MCNC: 13 MG/DL (ref 7–30)
CALCIUM SERPL-MCNC: 8.4 MG/DL (ref 8.5–10.1)
CHLORIDE SERPL-SCNC: 101 MMOL/L (ref 94–109)
CO2 SERPL-SCNC: 28 MMOL/L (ref 20–32)
CREAT SERPL-MCNC: 0.85 MG/DL (ref 0.66–1.25)
DIFFERENTIAL METHOD BLD: ABNORMAL
EOSINOPHIL # BLD AUTO: 0.1 10E9/L (ref 0–0.7)
EOSINOPHIL NFR BLD AUTO: 2.7 %
ERYTHROCYTE [DISTWIDTH] IN BLOOD BY AUTOMATED COUNT: 14.1 % (ref 10–15)
GFR SERPL CREATININE-BSD FRML MDRD: 86 ML/MIN/1.7M2
GLUCOSE SERPL-MCNC: 105 MG/DL (ref 70–99)
HCT VFR BLD AUTO: 29.7 % (ref 40–53)
HGB BLD-MCNC: 9.2 G/DL (ref 13.3–17.7)
IMM GRANULOCYTES # BLD: 0 10E9/L (ref 0–0.4)
IMM GRANULOCYTES NFR BLD: 0.5 %
INR PPP: 1.29 (ref 0.86–1.14)
LYMPHOCYTES # BLD AUTO: 0.7 10E9/L (ref 0.8–5.3)
LYMPHOCYTES NFR BLD AUTO: 15.3 %
MCH RBC QN AUTO: 25.6 PG (ref 26.5–33)
MCHC RBC AUTO-ENTMCNC: 31 G/DL (ref 31.5–36.5)
MCV RBC AUTO: 83 FL (ref 78–100)
MONOCYTES # BLD AUTO: 0.5 10E9/L (ref 0–1.3)
MONOCYTES NFR BLD AUTO: 11.5 %
NEUTROPHILS # BLD AUTO: 3.1 10E9/L (ref 1.6–8.3)
NEUTROPHILS NFR BLD AUTO: 69.8 %
NRBC # BLD AUTO: 0 10*3/UL
NRBC BLD AUTO-RTO: 0 /100
NT-PROBNP SERPL-MCNC: 1750 PG/ML (ref 0–1800)
PLATELET # BLD AUTO: 151 10E9/L (ref 150–450)
POTASSIUM SERPL-SCNC: 4.1 MMOL/L (ref 3.4–5.3)
PROT SERPL-MCNC: 7.3 G/DL (ref 6.8–8.8)
RBC # BLD AUTO: 3.59 10E12/L (ref 4.4–5.9)
SODIUM SERPL-SCNC: 136 MMOL/L (ref 133–144)
WBC # BLD AUTO: 4.4 10E9/L (ref 4–11)

## 2018-11-10 PROCEDURE — 85025 COMPLETE CBC W/AUTO DIFF WBC: CPT | Performed by: EMERGENCY MEDICINE

## 2018-11-10 PROCEDURE — 71046 X-RAY EXAM CHEST 2 VIEWS: CPT

## 2018-11-10 PROCEDURE — 93005 ELECTROCARDIOGRAM TRACING: CPT | Performed by: EMERGENCY MEDICINE

## 2018-11-10 PROCEDURE — 80053 COMPREHEN METABOLIC PANEL: CPT | Performed by: EMERGENCY MEDICINE

## 2018-11-10 PROCEDURE — 85610 PROTHROMBIN TIME: CPT | Performed by: EMERGENCY MEDICINE

## 2018-11-10 PROCEDURE — 99284 EMERGENCY DEPT VISIT MOD MDM: CPT | Mod: Z6 | Performed by: EMERGENCY MEDICINE

## 2018-11-10 PROCEDURE — 83880 ASSAY OF NATRIURETIC PEPTIDE: CPT | Performed by: EMERGENCY MEDICINE

## 2018-11-10 PROCEDURE — 99285 EMERGENCY DEPT VISIT HI MDM: CPT | Mod: 25 | Performed by: EMERGENCY MEDICINE

## 2018-11-10 NOTE — ED NOTES
Pt here for bilateral LE edema. Per pt's Daughter the patient was told by the RN that changes his bandages on his back to come in because his heart could stop beating. Per pt the LE edema is worse than normal but not significantly. Denies pain. No redness noted. Denies SOB.

## 2018-11-10 NOTE — DISCHARGE INSTRUCTIONS
Coping with Edema  What is edema?  Edema is the build-up of fluid in the body, which causes swelling. Swelling most commonly occurs in the feet, ankles, lower legs or hands.  Swelling can occur in the belly or chest may be a sign of a more severe problem.  Certain medicines or conditions can make the swelling worse.  Symptoms include:    Feet and lower legs get larger when you sit or walk.    Hands feel tight when you make a fist.    When you push on the skin, skin stays dented.    Shiny, tight skin.    Fast weight gain.  How is it treated?  Your care team may give you a medicine to reduce the swelling.  They may also suggest that you meet with a dietitian. He or she can help with food choices to reduce the swelling.  What can I do about the swelling?    Place your feet above your heart 3 times a day: Sit with your feet up on a stool with a pillow. Sit on the bed or couch with two pillows under your feet.    Do not stand for long periods of time.    Wear loose-fitting clothes.    Do not cross your legs.    Reduce the salt in your diet.   These foods are high in salt:  ? Chips, soup  ? Frozen meals, TV dinners  ? Edouard, lunch meat, ham  ? Sauces (soy, canned spaghetti sauce)    Walk or do other exercise.    Wear compression stockings.    Drink water as normal.    Weigh yourself every day at the same time to keep track of weight gain.  When should I call my care team?  Call your care team if:    You have a hard time breathing.    You gained 5 pounds or more in 1 week.    Your hands or feet feel cold when you touch them.    You are peeing very little or not at all.    Swelling is moving up your arms or legs.    Your tongue is swelling.    You cannot eat for more than a day  If you have any side effects, call us. We can help you manage these problems.    Recommend elevating lower extremities when down.  Follow-up primary care for further evaluation.

## 2018-11-10 NOTE — ED AVS SNAPSHOT
[facility] Emergency Department    [address]    [facility] [address]    Phone:  [phone]    Fax:  [phone]                                       Nabor Cunningham   MRN: [id number]    Department:  [facility] Emergency Department   Date of Visit:  11/10/2018           Patient Information     Date Of Birth          1935        Your diagnoses for this visit were:     Lower extremity edema        You were seen by Daniel Lundberg MD.      Follow-up Information     Follow up with Heron Mayo MD.    Specialty:  Family Practice    Contact information:    [address] [place] [id number]  [phone]          Discharge Instructions       Coping with Edema  What is edema?  Edema is the build-up of fluid in the body, which causes swelling. Swelling most commonly occurs in the feet, ankles, lower legs or hands.  Swelling can occur in the belly or chest may be a sign of a more severe problem.  Certain medicines or conditions can make the swelling worse.  Symptoms include:    Feet and lower legs get larger when you sit or walk.    Hands feel tight when you make a fist.    When you push on the skin, skin stays dented.    Shiny, tight skin.    Fast weight gain.  How is it treated?  Your care team may give you a medicine to reduce the swelling.  They may also suggest that you meet with a dietitian. He or she can help with food choices to reduce the swelling.  What can I do about the swelling?    Place your feet above your heart 3 times a day: Sit with your feet up on a stool with a pillow. Sit on the bed or couch with two pillows under your feet.    Do not stand for long periods of time.    Wear loose-fitting clothes.    Do not cross your legs.    Reduce the salt in your diet.   These foods are high in salt:  ? Chips, soup  ? Frozen meals, TV dinners  ? Edouard, lunch meat, ham  ? Sauces (soy, canned spaghetti sauce)    Walk or do other exercise.    Wear compression stockings.    Drink water  as normal.    Weigh yourself every day at the same time to keep track of weight gain.  When should I call my care team?  Call your care team if:    You have a hard time breathing.    You gained 5 pounds or more in 1 week.    Your hands or feet feel cold when you touch them.    You are peeing very little or not at all.    Swelling is moving up your arms or legs.    Your tongue is swelling.    You cannot eat for more than a day  If you have any side effects, call us. We can help you manage these problems.    Recommend elevating lower extremities when down.  Follow-up primary care for further evaluation.    Your next 10 appointments already scheduled     Nov 12, 2018  2:00 PM CST   [facility]    [address] [facility] [id number] [phone]           Bring a current list of meds and any records pertaining to this visit. For Physicals, please bring immunization records and any forms needing to be filled out. Please arrive 10 minutes early to complete paperwork.            Jan 17, 2019  8:45 AM CST   New Visit with Kae Rodriguez MD   [facility]    [address] [facility] [address] [phone]              24 Hour Appointment Hotline       To make an appointment at any [facility], call [phone] ([phone]). If you don't have a family doctor or clinic, we will help you find one. [facility] are conveniently located to serve the needs of you and your family.             Review of your medicines      Our records show that you are taking the medicines listed below. If these are incorrect, please call your family doctor or clinic.        Dose / Directions Last dose taken    ACETAMINOPHEN PO   Dose:  1000 mg        Take 1,000 mg by mouth every 6 hours as needed for pain   Refills:  0        betamethasone dipropionate 0.05 % cream    Commonly known as:  DIPROSONE   Quantity:  45 g        Apply topically 2 times daily For leg rash   Refills:  0        calcium carbonate 500 mg (elemental) 500 MG tablet   Commonly known as:  OS-ALY   Dose:  1 tablet        Take 1 tablet by mouth 2 times daily   Refills:  0        colchicine 0.6 MG tablet   Commonly known as:  COLCRYS   Dose:  0.6 mg   Quantity:  30 tablet        Take 1 tablet (0.6 mg) by mouth daily   Refills:  0        COUMADIN PO        Take by mouth daily Take as directed per INR results   Refills:  0        cyanocobalamin 1000 MCG tablet   Commonly known as:  vitamin  B-12   Quantity:  30 tablet        TAKE ONE TABLET BY MOUTH DAILY   Refills:  11        HYDROcodone-acetaminophen 5-325 MG per tablet   Commonly known as:  NORCO   Dose:  1-2 tablet   Quantity:  20 tablet        Take 1-2 tablets by mouth every 6 hours as needed   Refills:  0        hypromellose 0.4 % Soln ophthalmic solution   Commonly known as:  ARTIFICIAL TEARS   Dose:  1 drop        Place 1 drop into both eyes every hour as needed for dry eyes   Refills:  0        metoprolol succinate 25 MG 24 hr tablet   Commonly known as:  TOPROL-XL   Dose:  25 mg   Quantity:  90 tablet        Take 25 mg by mouth every evening   Refills:  1        mineral oil oil   Dose:  5 mL        Take 5 mLs by mouth daily   Refills:  0        Multi-vitamin Tabs tablet   Dose:  1 tablet   Quantity:  100 tablet        Take 1 tablet by mouth daily   Refills:  3        nortriptyline 25 MG capsule   Commonly known as:  PAMELOR   Quantity:  270 capsule        TAKE ONE CAPSULE MID MORNING AND TWO CAPSULES BY MOUTH DAILY AT BEDTIME   Refills:  3        prednisoLONE acetate 1 % ophthalmic susp   Commonly known as:  PRED FORTE   Dose:  1 drop        Place 1 drop Into the left eye 2 times daily   Refills:  2        temazepam 30 MG capsule   Commonly known as:  RESTORIL   Quantity:  30 capsule        TAKE ONE CAPSULE BY MOUTH AT BEDTIME AS NEEDED FOR SLEEP    Refills:  0                Procedures and tests performed during your visit     CBC with platelets differential    Comprehensive metabolic panel    EKG 12-lead, tracing only    INR    Nt probnp inpatient (BNP)    XR Chest 2 Views      Orders Needing Specimen Collection     None      Pending Results     No orders found from 11/8/2018 to 11/11/2018.            Pending Culture Results     No orders found from 11/8/2018 to 11/11/2018.            Pending Results Instructions     If you had any lab results that were not finalized at the time of your Discharge, you can call the ED Lab Result RN at [phone]. You will be contacted by this team for any positive Lab results or changes in treatment. The nurses are available 7 days a week from 10A to 6:30P.  You can leave a message 24 hours per day and they will return your call.        Test Results From Your Hospital Stay        11/10/2018  1:39 PM      Component Results     Component Value Ref Range & Units Status    WBC 4.4 4.0 - 11.0 10e9/L Final    RBC Count 3.59 (L) 4.4 - 5.9 10e12/L Final    Hemoglobin 9.2 (L) 13.3 - 17.7 g/dL Final    Hematocrit 29.7 (L) 40.0 - 53.0 % Final    MCV 83 78 - 100 fl Final    MCH 25.6 (L) 26.5 - 33.0 pg Final    MCHC 31.0 (L) 31.5 - 36.5 g/dL Final    RDW 14.1 10.0 - 15.0 % Final    Platelet Count 151 150 - 450 10e9/L Final    Diff Method Automated Method  Final    % Neutrophils 69.8 % Final    % Lymphocytes 15.3 % Final    % Monocytes 11.5 % Final    % Eosinophils 2.7 % Final    % Basophils 0.2 % Final    % Immature Granulocytes 0.5 % Final    Nucleated RBCs 0 0 /100 Final    Absolute Neutrophil 3.1 1.6 - 8.3 10e9/L Final    Absolute Lymphocytes 0.7 (L) 0.8 - 5.3 10e9/L Final    Absolute Monocytes 0.5 0.0 - 1.3 10e9/L Final    Absolute Eosinophils 0.1 0.0 - 0.7 10e9/L Final    Absolute Basophils 0.0 0.0 - 0.2 10e9/L Final    Abs Immature Granulocytes 0.0 0 - 0.4 10e9/L Final    Absolute Nucleated RBC 0.0  Final         11/10/2018  2:09  PM      Component Results     Component Value Ref Range & Units Status    Sodium 136 133 - 144 mmol/L Final    Potassium 4.1 3.4 - 5.3 mmol/L Final    Chloride 101 94 - 109 mmol/L Final    Carbon Dioxide 28 20 - 32 mmol/L Final    Anion Gap 7 3 - 14 mmol/L Final    Glucose 105 (H) 70 - 99 mg/dL Final    Urea Nitrogen 13 7 - 30 mg/dL Final    Creatinine 0.85 0.66 - 1.25 mg/dL Final    GFR Estimate 86 >60 mL/min/1.7m2 Final    Non  GFR Calc    GFR Estimate If Black >90 >60 mL/min/1.7m2 Final    African American GFR Calc    Calcium 8.4 (L) 8.5 - 10.1 mg/dL Final    Bilirubin Total 0.4 0.2 - 1.3 mg/dL Final    Albumin 3.3 (L) 3.4 - 5.0 g/dL Final    Protein Total 7.3 6.8 - 8.8 g/dL Final    Alkaline Phosphatase 127 40 - 150 U/L Final    ALT 25 0 - 70 U/L Final    AST 23 0 - 45 U/L Final         11/10/2018  2:09 PM      Component Results     Component Value Ref Range & Units Status    N-Terminal Pro BNP Inpatient 1750 0 - 1800 pg/mL Final       Reference range shown and results flagged as abnormal are suggested inpatient   cut points for confirming diagnosis if CHF in an acute setting. Establishing a   baseline value for each individual patient is useful for follow-up. An   inpatient or emergency department NT-proPBNP <300 pg/mL effectively rules out   acute CHF, with 99% negative predictive value.  The outpatient non-acute reference range for ruling out CHF is:   0-125 pg/mL (age 18 to less than 75)   0-450 pg/mL (age 75 yrs and older)           11/10/2018  2:08 PM      Narrative     CHEST TWO VIEWS   11/10/2018 1:57 PM     HISTORY: Peripheral edema.     COMPARISON: 10/26/2018        Impression     IMPRESSION: Lung volumes are unchanged. Patchy consolidation  throughout the right mid lung and right lower lobe have increased  compared to the prior exam. These opacities are suspicious for  infection/aspiration (favored) or edema. Probable small right pleural  effusion. Cardiac silhouette is enlarged,  unchanged. Dual lead cardiac  pacing device is unchanged.    EMELY DELUCA MD         11/10/2018  1:45 PM      Component Results     Component Value Ref Range & Units Status    INR 1.29 (H) 0.86 - 1.14 Final                Thank you for choosing [facility]       Thank you for choosing [facility] for your care. Our goal is always to provide you with excellent care. Hearing back from our patients is one way we can continue to improve our services. Please take a few minutes to complete the written survey that you may receive in the mail after you visit with us. Thank you!        Care EveryWhere ID     This is your Care EveryWhere ID. This could be used by other organizations to access your [facility] medical records  [id number]        Equal Access to Services     SHIRA DELEON : Flory Polanco, rosalba watters, keli dewitt, lili valerio. So [facility] [phone].    ATENCIÓN: Si habla español, tiene a garcia disposición servicios gratuitos de asistencia lingüística. Llame al [phone].    We comply with applicable federal civil rights laws and [place] laws. We do not discriminate on the basis of race, color, national origin, age, disability, sex, sexual orientation, or gender identity.            After Visit Summary       This is your record. Keep this with you and show to your community pharmacist(s) and doctor(s) at your next visit.

## 2018-11-10 NOTE — ED AVS SNAPSHOT
[facility] Emergency Department    [address] [facility] [address]    Phone:  [phone]    Fax:  [phone]                                       Nabor Cunningham   MRN: [id number]    Department:  [facility] Emergency Department   Date of Visit:  11/10/2018           After Visit Summary Signature Page     I have received my discharge instructions, and my questions have been answered. I have discussed any challenges I see with this plan with the nurse or doctor.    ..........................................................................................................................................  Patient/Patient Representative Signature      ..........................................................................................................................................  Patient Representative Print Name and Relationship to Patient    ..................................................               ................................................  Date                                   Time    ..........................................................................................................................................  Reviewed by Signature/Title    ...................................................              ..............................................  Date                                               Time          22EPIC Rev 08/18

## 2018-11-10 NOTE — ED PROVIDER NOTES
History     Chief Complaint   Patient presents with     Leg Swelling     HPI  Nabor Cunningham is a 83 year old male who has a history of alcohol abuse, chronic atrial fibrillation, chronic anticoagulation no coumadin, peripheral polyneuropathy, chronic gout, hypertension, and hyperlipidemia who presents to the ED for evaluation of leg swelling. Patient has increased swelling of the bilateral legs in the last few days. He notes that this is slightly increased from baseline swelling. Patient has had swelling similar to today many years ago. Patient states that he is feeling well.   He restarted coumadin three days ago, after having been stopped for 7 days due to cysts on the back. His most recent INR was 1.2 as of yesterday. He has no history of blood clots in his legs or pelvis. He has no history of heart failure. The patient had an infected sebaceous cyst along his right thoracic back that was subsequently excised.  He developed large hematoma and bleeding from this required removal in this has had subsequent bleeding. He states that his back is healing well.     Problem List:    Patient Active Problem List    Diagnosis Date Noted     Postoperative hemorrhage involving circulatory system following non-circulatory system procedure 09/03/2018     Priority: Medium     Pneumonia 09/02/2018     Priority: Medium     Bleeding from wound 09/02/2018     Priority: Medium     Moderate episode of recurrent major depressive disorder (H) 04/10/2017     Priority: Medium     Anxiety 03/22/2017     Priority: Medium     3/23/2017:He has been on amitriptyline, nortriptyline, buspirone, sertraline and Lexapro in the past year.        Alcohol abuse 03/22/2017     Priority: Medium     Chronic atrial fibrillation (H) 03/22/2017     Priority: Medium     Weight loss 03/22/2017     Priority: Medium     Peripheral polyneuropathy 03/22/2017     Priority: Medium     4/7/2017:Abnormal monofilament in distal plantar feet bilateral.  He has had  chronic pain in both feet.        Chronic gout, unspecified cause, unspecified site 03/22/2017     Priority: Medium     Essential hypertension with goal blood pressure less than 140/90 03/22/2017     Priority: Medium     Hyperlipidemia LDL goal <130 03/22/2017     Priority: Medium     Persistent insomnia 03/22/2017     Priority: Medium     Lower urinary tract symptoms (LUTS) 03/22/2017     Priority: Medium     Chronic anticoagulation 03/22/2017     Priority: Medium     Long-term (current) use of anticoagulants [Z79.01] 03/22/2017     Priority: Medium        Past Medical History:    Past Medical History:   Diagnosis Date     Anxiety      Gout      Hyperlipemia      Hypertension      Insomnia      Paroxysmal atrial fibrillation (H)        Past Surgical History:    Past Surgical History:   Procedure Laterality Date     INCISION AND DRAINAGE TRUNK, COMBINED N/A 9/6/2018    Procedure: COMBINED INCISION AND DRAINAGE TRUNK;  incision and cauterization of left buttock bleed.;  Surgeon: Dain Davis MD;  Location: [facility]     INCISION AND DRAINAGE TRUNK, COMBINED N/A 11/1/2018    Procedure:  INCISION AND DRAINAGE of Back Wound;  Surgeon: Dain Davis MD;  Location: [facility]       Family History:    No family history on file.    Social History:  Marital Status:   [2]  Social History   Substance Use Topics     Smoking status: Former Smoker     Years: 20.00     Types: Cigarettes     Smokeless tobacco: Never Used     Alcohol use No        Medications:      ACETAMINOPHEN PO   betamethasone dipropionate (DIPROSONE) 0.05 % cream   calcium carbonate (OS- [employer]. CA) 1250 MG tablet   colchicine (COLCRYS) 0.6 MG tablet   cyanocobalamin (VITAMIN  B-12) 1000 MCG tablet   HYDROcodone-acetaminophen (NORCO) 5-325 MG per tablet   hypromellose (ARTIFICIAL TEARS) 0.4 % SOLN ophthalmic solution   metoprolol (TOPROL-XL) 25 MG 24 hr tablet   mineral oil oil   multivitamin, therapeutic with minerals (MULTI-VITAMIN) TABS tablet    nortriptyline (PAMELOR) 25 MG capsule   prednisoLONE acetate (PRED FORTE) 1 % ophthalmic susp   temazepam (RESTORIL) 30 MG capsule   Warfarin Sodium (COUMADIN PO)         Review of Systems    All other systems are reviewed and are negative.    Physical Exam   BP: 127/74  Pulse: 85  Temp: 97.3  F (36.3  C)  Resp: (!) 1  Weight: 81.2 kg (179 lb)  SpO2: 95 %      Physical Exam  Nontoxic appearing no respiratory distress alert and oriented to self and place  Head atraumatic normocephalic   Neck supple full active painless range of motion  Lungs clear to auscultation  Heart irregularly irregular no murmur  Abdomen soft nontender bowel sounds positive no masses or HSM  Strength and sensation grossly intact throughout the extremities, gait and station normal  Speech is fluent, good eye contact, thought processes are rational  Lower extremities with pitting edema from the knees down bilaterally  Pedal pulses symmetrical and strong    ED Course     ED Course     Procedures  EKG atrial fibrillation rate 77, left bundle branch block, unchanged from previous, read by Dr. Daniel Lundberg             Critical Care time:  none               No results found for this or any previous visit (from the past 24 hour(s)).    Medications - No data to display    1:10 PM Patient assessed.    Assessments & Plan (with Medical Decision Making)  83-year-old male acute on chronic lower extremity edema.  Less active lately secondary to difficulties with abscesses and subsequent complications after incision and drainage.  Patient does not have history of congestive failure, denies chest pain or shortness of air, no history of significant kidney disease or liver disease.  No history of DVT/PE.  Exam no tenderness lower extremities or redness or warmth.  His workup is unrevealing including ECG, CBC, BMP, BNP.  INR elevated 1.29 history of atrial fibrillation goal INR 2-3.  Chest x-ray chronic changes by my read as well as reviewed radiology read.   Recommend elevating legs, follow-up primary care, return criteria reviewed.     I have reviewed the nursing notes.    I have reviewed the findings, diagnosis, plan and need for follow up with the patient.       Discharge Medication List as of 11/10/2018  2:18 PM          Final diagnoses:   Lower extremity edema     This document serves as a record of the services and decisions personally performed and made by Daniel Lundberg MD. It was created on HIS/HER behalf by   Sonia Atwood, a trained medical scribe. The creation of this document is based the provider's statements to the medical scribe.  Sonia Atwood 1:10 PM 11/10/2018    Provider:   The information in this document, created by the medical scribe for me, accurately reflects the services I personally performed and the decisions made by me. I have reviewed and approved this document for accuracy prior to leaving the patient care area.  Daniel Lundberg MD 1:10 PM 11/10/2018    11/10/2018   [facility] EMERGENCY DEPARTMENT     Daniel Lundberg MD  11/10/18 1954       Daniel Lundberg MD  11/15/18 1520

## 2018-11-12 ENCOUNTER — ANTICOAGULATION THERAPY VISIT (OUTPATIENT)
Dept: ANTICOAGULATION | Facility: CLINIC | Age: 83
End: 2018-11-12

## 2018-11-12 DIAGNOSIS — Z79.01 CHRONIC ANTICOAGULATION: ICD-10-CM

## 2018-11-12 DIAGNOSIS — I48.20 CHRONIC ATRIAL FIBRILLATION (H): ICD-10-CM

## 2018-11-12 LAB — INR PPP: 1.5

## 2018-11-12 PROCEDURE — 99207 ZZC NO CHARGE NURSE ONLY: CPT

## 2018-11-12 NOTE — TELEPHONE ENCOUNTER
Son called. States they brought pt to ED over the weekend. F/u appointment made with Dr Mayo and a care coordination referral was made as pt is getting confused in the evening and family is looking at resources and options for care. They would prefer to keep pt in his home. Romelia Bertrand RN

## 2018-11-12 NOTE — PROGRESS NOTES
ANTICOAGULATION FOLLOW-UP CLINIC VISIT    Patient Name:  Nabor Cunningham  Date:  11/12/2018  Contact Type:  Telephone/ GUERO Bird RN    SUBJECTIVE:     Patient Findings     Positives Inflammation (LE edema)    Comments Patient recently seen in ED for LE edema. Patient sent home with no changes in medications, per HC nurse Marge, patient advised to elevate LE. No wound infection per HC nurse, no S/Sx bleeding.  No changes in medications, diet, or activity. No concerns with bleeding or bruising. Took warfarin as prescribed.   Dosing instructions given per calendar.   Recheck INR in 3 days.   Patient and nurse Marge verbalize understanding and agrees with plan. No further questions at this time.              OBJECTIVE    INR   Date Value Ref Range Status   11/12/2018 1.5  Final       ASSESSMENT / PLAN  INR assessment SUB    Recheck INR In: 3 DAYS    INR Location [facility]      Anticoagulation Summary as of 11/12/2018     INR goal 2.0-3.0   Today's INR 1.5!   Warfarin maintenance plan No maintenance plan   Full warfarin instructions 11/12: 6 mg; 11/13: 6 mg; 11/14: 4 mg   Weekly warfarin total 38 mg   Plan last modified Ada Isaac RN (10/15/2018)   Next INR check 11/15/2018   Priority INR   Target end date Indefinite    Indications   Chronic anticoagulation [Z79.01]  Chronic atrial fibrillation (H) [I48.2]  Long-term (current) use of anticoagulants [Z79.01] [Z79.01]         Anticoagulation Episode Summary     INR check location     Preferred lab     Send INR reminders to [facility] PHONE ANTICOAG POOL    Comments * 2mg tabs. [facility]      Anticoagulation Care Providers     Provider Role Specialty Phone number    Heron Mayo MD [facility] [phone]            See the Encounter Report to view Anticoagulation Flowsheet and Dosing Calendar (Go to Encounters tab in chart review, and find the Anticoagulation Therapy Visit)        Jahaira Jones, SAURAV

## 2018-11-12 NOTE — MR AVS SNAPSHOT
Nabor Cunningham   11/12/2018   Anticoagulation Therapy Visit    Description:  83 year old male   Provider:  Jahaira Jones RN   Department:  [facility]           INR as of 11/12/2018     Today's INR 1.5!      Anticoagulation Summary as of 11/12/2018     INR goal 2.0-3.0   Today's INR 1.5!   Full warfarin instructions 11/12: 6 mg; 11/13: 6 mg; 11/14: 4 mg   Next INR check 11/15/2018    Indications   Chronic anticoagulation [Z79.01]  Chronic atrial fibrillation (H) [I48.2]  Long-term (current) use of anticoagulants [Z79.01] [Z79.01]         November 2018 Details    Sun Mon Tue Wed Thu Fri Sat         1               2               3                 4               5               6               7               8               9               10                 11               12      6 mg   See details      13      6 mg         14      4 mg         15            16               17                 18               19               20               21               22               23               24                 25               26               27               28               29               30                 Date Details   11/12 This INR check       Date of next INR:  11/15/2018         How to take your warfarin dose     To take:  4 mg Take 2 of the 2 mg tablets.    To take:  6 mg Take 3 of the 2 mg tablets.

## 2018-11-13 ENCOUNTER — PATIENT OUTREACH (OUTPATIENT)
Dept: CARE COORDINATION | Facility: CLINIC | Age: 83
End: 2018-11-13

## 2018-11-13 ASSESSMENT — ACTIVITIES OF DAILY LIVING (ADL): DEPENDENT_IADLS:: INDEPENDENT

## 2018-11-13 NOTE — LETTER
[facility]ORD[facility] - [place] [address] [address] [address]  [phone]     November 13, 2018    Nabor Cunningham C/O Florencio Cunningham  [address]  [place] [address]      Dear Nabor/Florencio,    I am a clinic care coordinator who works with Heron Mayo MD at [facility]. I wanted to thank you for spending the time to talk with me.  I wanted to introduce myself and provide you with my contact information so that you can call me with questions or concerns about your health care. Below is a description of clinic care coordination and how I can further assist you.     The clinic care coordinator is a registered nurse and/or  who understand the health care system. The goal of clinic care coordination is to help you manage your health and improve access to the [facility] system in the most efficient manner. The registered nurse can assist you in meeting your health care goals by providing education, coordinating services, and strengthening the communication among your providers. The  can assist you with financial, behavioral, psychosocial, chemical dependency, counseling, and/or psychiatric resources.    Please feel free to contact me at [phone], with any questions or concerns. We at [facility] are focused on providing you with the highest-quality healthcare experience possible and that all starts with you.     Sincerely,     Rani Sarkar    Enclosed: I have enclosed a copy of a 24 Hour Access Plan. This has helpful phone numbers for you to call when needed. Please keep this in an easy to access place to use as needed.

## 2018-11-13 NOTE — LETTER
Health Care Home - Access Care Plan    About Me  Patient Name:  Nabor Perea    YOB: 1935  Age:                             83 year old   [place] MRN:            [id number] Telephone Information:     Home Phone [phone]   Mobile [phone]   Home Phone [phone]       Address:    [address]  [facility] [address] Email address:  No e-mail address on record      Emergency Contact(s)  Name Relationship Lgl Grd Work Phone Home Phone Mobile Phone   1. JACKSON VAN Daughter No  [phone]   2. ELOY PEREA  Son No   [phone]   3. LISANDRA PEREA Relative No  [phone]              Health Maintenance:      My Access Plan  Medical Emergency 911   Questions or concerns during clinic hours Primary Clinic Line, I will call the clinic directly: [facility] - [phone]   24 Hour Appointment Line [phone] or  [phone]) (toll free)   24 Hour Nurse Line [phone] (toll free)   Questions or concerns outside clinic hours 24 Hour Appointment Line, I will call the after-hours on-call line:   [facility] [phone] or [phone]) (toll-free)   Preferred Urgent Care [facility], [phone]   Preferred Hospital [place], [place]  [phone]   Preferred Pharmacy [id number] [place], [place] - [address]     Behavioral Health Crisis Line The National Suicide Prevention Lifeline at 1-185.227.1484 or 910     My Care Team Members  Patient Care Team       Relationship Specialty Notifications Start End    Heron Mayo MD PCP - General Family Practice  9/11/18     Phone: [phone] Fax: [phone] [address] [place] [address]    Heron Mayo MD   Family Practice  1/29/18     Comment:  Merged    Phone: [phone] Fax: [phone] [address] [place] [address]    Care, [facility]  AGENCY ([facility] ([place])  9/4/18     Phone: [phone]                My Medical and Care Information  Problem List   Patient Active Problem List   Diagnosis     Anxiety     Alcohol abuse     Chronic atrial fibrillation (H)     Weight loss     Peripheral polyneuropathy     Chronic gout, unspecified cause, unspecified site     Essential hypertension with goal blood pressure less than 140/90     Hyperlipidemia LDL goal <130     Persistent insomnia     Lower urinary tract symptoms (LUTS)     Chronic anticoagulation     Long-term (current) use of anticoagulants [Z79.01]     Moderate episode of recurrent major depressive disorder (H)     Pneumonia     Bleeding from wound     Postoperative hemorrhage involving circulatory system following non-circulatory system procedure      Current Medications and Allergies:  See printed Medication Report

## 2018-11-13 NOTE — PROGRESS NOTES
Clinic Care Coordination Contact    Clinic Care Coordination Contact  OUTREACH    Referral Information:  Referral Source: PCP    Primary Diagnosis: Other (include Comment box) (wound infection)    Chief Complaint   Patient presents with     Clinic Care Coordination - Initial     sw        Universal Utilization: no concerns  Clinic Utilization  Difficulty keeping appointments:: No  Compliance Concerns: No  No-Show Concerns: No  Utilization    Last refreshed: 11/12/2018 12:32 PM:  No Show Count (past year) 0       Last refreshed: 11/12/2018 12:32 PM:  ED visits 6       Last refreshed: 11/12/2018 12:32 PM:  Hospital admissions 1          Current as of: 11/12/2018 12:32 PM             Clinical Concerns:  Current Medical Concerns:  Pt is experiencing some changes in cognition per family.  He is appearing to have worsening cognition about 2:30PM.  Pt is having some neuropathy and will be seeing neurology.    Current Behavioral Concerns: potential alzheimers/dementia signs/symptoms per son.      Education Provided to patient: discussed talking with PCP and neurology about the changes they are seeing.       Health Maintenance Reviewed:    Clinical Pathway: None    Medication Management:  Not discussed     Functional Status:  Dependent ADLs:: Independent  Dependent IADLs:: Independent  Bed or wheelchair confined:: No  Mobility Status: Independent    Living Situation:  Current living arrangement:: I live alone  Type of residence:: Independent Senior Living    Diet/Exercise/Sleep:  Diet:: Regular  Inadequate nutrition (GOAL):: No  Food Insecurity: No  Tube Feeding: No  Exercise:: Currently not exercising  Inadequate activity/exercise (GOAL):: No  Significant changes in sleep pattern (GOAL): No    Transportation:  Transportation concerns (GOAL):: No  Transportation means:: Family, Friend, Regular car     Psychosocial:  Druze or spiritual beliefs that impact treatment:: No  Mental health DX:: Yes  Mental health DX how  managed:: None  Mental health management concern (GOAL):: No  Informal Support system:: Children, Family     Financial/Insurance:   Financial/Insurance concerns (GOAL):: No  Son is concerned with pt's safety at home.  They want to keep pt at home as long as possible.  Discussed options.  Encouraged family to talk with PCP and neurology about changes they are seeing.  Also talked about the senior linkage line .  Provided son with the phone number to Rafia Barber [phone] Ext [phone]     Resources and Interventions:  Current Resources:   List of home care services:: Skilled Nursing, Home Health Aid;   Community Resources: Home Care  Supplies used at home:: Wound Care Supplies  Equipment Currently Used at Home: none    Advance Care Plan/Directive  Advanced Care Plans/Directives on file:: No    Referrals Placed: senior linkage line      Goals:     n/a    Patient/Caregiver understanding: n/a    Outreach Frequency: weekly  Future Appointments              In 6 days Heron Mayo MD [facility]    In 2 months Kae Ward MD [facility]          Plan: Sw will send access plan and introduction letter to dwayne Matias.  Florencio will call regarding .  Sw will call in about one week.     TONE Lord, Clinic Care Coordinator 11/13/2018   4:32 PM  [phone]

## 2018-11-14 ENCOUNTER — ANTICOAGULATION THERAPY VISIT (OUTPATIENT)
Dept: ANTICOAGULATION | Facility: CLINIC | Age: 83
End: 2018-11-14
Payer: MEDICARE

## 2018-11-14 DIAGNOSIS — Z79.01 CHRONIC ANTICOAGULATION: ICD-10-CM

## 2018-11-14 DIAGNOSIS — I48.20 CHRONIC ATRIAL FIBRILLATION (H): ICD-10-CM

## 2018-11-14 LAB — INR POINT OF CARE: 2.1 (ref 0.86–1.14)

## 2018-11-14 PROCEDURE — 36416 COLLJ CAPILLARY BLOOD SPEC: CPT

## 2018-11-14 PROCEDURE — 85610 PROTHROMBIN TIME: CPT | Mod: QW

## 2018-11-14 PROCEDURE — 99207 ZZC NO CHARGE NURSE ONLY: CPT

## 2018-11-14 NOTE — PROGRESS NOTES
ANTICOAGULATION FOLLOW-UP CLINIC VISIT    Patient Name:  Nabor Cunningham  Date:  11/14/2018  Contact Type:  Telephone/ Iveth MG, [facility]    SUBJECTIVE:     Patient Findings     Positives No Problem Findings    Comments No changes in medications, activity, or diet noted. No bleeding or increased bruising noted. Took warfarin as prescribed.  Patient had 38 mg in the last 7 days. Writer will increase the dose so patient will have 40 mg by the next INR in 2 days. This is a 5% increase to slightly bump the INR up.   Recheck in 2 days.            OBJECTIVE    INR Protime   Date Value Ref Range Status   11/14/2018 2.1 (A) 0.86 - 1.14 Final       ASSESSMENT / PLAN  INR assessment THER    Recheck INR In: 2 DAYS    [facility] Location [facility]      Anticoagulation Summary as of 11/14/2018     INR goal 2.0-3.0   Today's INR 2.1   Warfarin maintenance plan No maintenance plan   Full warfarin instructions 11/14: 6 mg; 11/15: 6 mg   Weekly warfarin total 38 mg   Plan last modified Ada Isaac RN (10/15/2018)   Next INR check 11/16/2018   Priority INR   Target end date Indefinite    Indications   Chronic anticoagulation [Z79.01]  Chronic atrial fibrillation (H) [I48.2]  Long-term (current) use of anticoagulants [Z79.01] [Z79.01]         Anticoagulation Episode Summary     INR check location     Preferred lab     Send INR reminders to [facility] PHONE ANTICOAG POOL    Comments * 2mg tabs. [facility]      Anticoagulation Care Providers     Provider Role Specialty Phone number    Heron Mayo MD [facility] [phone]            See the Encounter Report to view Anticoagulation Flowsheet and Dosing Calendar (Go to Encounters tab in chart review, and find the Anticoagulation Therapy Visit)        Daphne Lamar RN

## 2018-11-14 NOTE — MR AVS SNAPSHOT
Nabor Cunningham   11/14/2018   Anticoagulation Therapy Visit    Description:  83 year old male   Provider:  Daphne Lamar, RN   [facility]           INR as of 11/14/2018     Today's INR 2.1      Anticoagulation Summary as of 11/14/2018     INR goal 2.0-3.0   Today's INR 2.1   Full warfarin instructions 11/14: 6 mg; 11/15: 6 mg   Next INR check 11/16/2018    Indications   Chronic anticoagulation [Z79.01]  Chronic atrial fibrillation (H) [I48.2]  Long-term (current) use of anticoagulants [Z79.01] [Z79.01]         November 2018 Details    Sun Mon Tue Wed Thu Fri Sat         1               2               3                 4               5               6               7               8               9               10                 11               12               13               14      6 mg   See details      15      6 mg         16            17                 18               19               20               21               22               23               24                 25               26               27               28               29               30                 Date Details   11/14 This INR check       Date of next INR:  11/16/2018         How to take your warfarin dose     To take:  6 mg Take 3 of the 2 mg tablets.

## 2018-11-16 ENCOUNTER — ANTICOAGULATION THERAPY VISIT (OUTPATIENT)
Dept: ANTICOAGULATION | Facility: CLINIC | Age: 83
End: 2018-11-16
Payer: MEDICARE

## 2018-11-16 DIAGNOSIS — M1A.9XX0 CHRONIC GOUT, UNSPECIFIED CAUSE, UNSPECIFIED SITE: ICD-10-CM

## 2018-11-16 DIAGNOSIS — I48.20 CHRONIC ATRIAL FIBRILLATION (H): ICD-10-CM

## 2018-11-16 DIAGNOSIS — I48.0 PAROXYSMAL ATRIAL FIBRILLATION (H): Primary | ICD-10-CM

## 2018-11-16 DIAGNOSIS — Z79.01 CHRONIC ANTICOAGULATION: ICD-10-CM

## 2018-11-16 LAB — INR PPP: 2.6

## 2018-11-16 PROCEDURE — 99207 ZZC NO CHARGE NURSE ONLY: CPT

## 2018-11-16 NOTE — TELEPHONE ENCOUNTER
"Requested Prescriptions   Pending Prescriptions Disp Refills     colchicine (COLCRYS) 0.6 MG tablet 30 tablet 0     Sig: Take 1 tablet (0.6 mg) by mouth daily    Gout Agents Protocol Failed    11/16/2018  9:27 AM       Failed - Has Uric Acid on file in past 12 months and value is less than 6    Recent Labs   Lab Test  07/06/16   1947   URIC  7.5     If level is 6mg/dL or greater, ok to refill one time and refer to provider.          Passed - CBC on file in past 12 months    Recent Labs   Lab Test  11/10/18   1331   01/06/17   1008   WBC  4.4   < >   --    GICHWBC   --    --   5.6   RBC  3.59*   < >   --    GICHRBC   --    --   4.96   HGB  9.2*   < >  14.5   HCT  29.7*   < >  41.9   PLT  151   < >  131*    < > = values in this interval not displayed.                Passed - ALT on file in past 12 months    Recent Labs   Lab Test  11/10/18   1331   01/06/17   1028   ALT  25   < >   --    GICHALT   --    --   27    < > = values in this interval not displayed.            Passed - Recent (12 mo) or future (30 days) visit within the authorizing provider's specialty    Patient had office visit in the last 12 months or has a visit in the next 30 days with authorizing provider or within the authorizing provider's specialty.  See \"Patient Info\" tab in inbasket, or \"Choose Columns\" in Meds & Orders section of the refill encounter.             Passed - Patient is age 18 or older       Passed - Normal serum creatinine on file in the past 12 months    Recent Labs   Lab Test  11/10/18   1331   CR  0.85           colchicine (COLCRYS) 0.6 MG tablet  Last Written Prescription Date:  09/07/2018  Last Fill Quantity: 30 tablet,  # refills: 0   Last office visit: 9/21/2018 with prescribing provider:  DAPHNIE Mayo   Future Office Visit:   Next 5 appointments (look out 90 days)     Nov 19, 2018  5:00 PM CST   Office Visit with Heron Mayo MD   [facility] ([facility])    [address] [place] [address] " [place] [address]   [phone]                 Brandy Harkins RT (R) (M)

## 2018-11-16 NOTE — MR AVS SNAPSHOT
Nabor Cunningham   11/16/2018   Anticoagulation Therapy Visit    Description:  83 year old male   Provider:  Daphne Lamar, RN   Department:  [facility]           INR as of 11/16/2018     Today's INR 2.6      Anticoagulation Summary as of 11/16/2018     INR goal 2.0-3.0   Today's INR 2.6   Full warfarin instructions 11/16: 4 mg; 11/17: 6 mg; 11/18: 6 mg   Next INR check 11/19/2018    Indications   Chronic anticoagulation [Z79.01]  Chronic atrial fibrillation (H) [I48.2]  Long-term (current) use of anticoagulants [Z79.01] [Z79.01]         November 2018 Details    Sun Mon Tue Wed Thu Fri Sat         1               2               3                 4               5               6               7               8               9               10                 11               12               13               14               15               16      4 mg   See details      17      6 mg           18      6 mg         19            20               21               22               23               24                 25               26               27               28               29               30                 Date Details   11/16 This INR check       Date of next INR:  11/19/2018         How to take your warfarin dose     To take:  4 mg Take 2 of the 2 mg tablets.    To take:  6 mg Take 3 of the 2 mg tablets.

## 2018-11-16 NOTE — PROGRESS NOTES
ANTICOAGULATION FOLLOW-UP CLINIC VISIT    Patient Name:  Nabor Cunningham  Date:  11/16/2018  Contact Type:  Telephone/ [facility]    SUBJECTIVE:     Patient Findings     Positives Change in medications (Lasix), Inflammation (Edema), CHF control change    Comments Per home care: Patient was diagnosed with CHF. Started on Lasix. No documentation noted in Epic.   Patient had 40 mg in the last 7 days. Writer adjusted dose to 40 mg by the next INR in 3 days.             OBJECTIVE    INR   Date Value Ref Range Status   11/16/2018 2.6  Final       ASSESSMENT / PLAN  INR assessment THER    Recheck INR In: 3 DAYS    INR Location [facility] INR      Anticoagulation Summary as of 11/16/2018     INR goal 2.0-3.0   Today's INR 2.6   Warfarin maintenance plan No maintenance plan   Full warfarin instructions 11/16: 4 mg; 11/17: 6 mg; 11/18: 6 mg   Weekly warfarin total 38 mg   Plan last modified Ada Isaac RN (10/15/2018)   Next INR check 11/19/2018   Priority INR   Target end date Indefinite    Indications   Chronic anticoagulation [Z79.01]  Chronic atrial fibrillation (H) [I48.2]  Long-term (current) use of anticoagulants [Z79.01] [Z79.01]         Anticoagulation Episode Summary     INR check location     Preferred lab     Send INR reminders to [place] [facility]    Comments * 2mg tabs. [facility]      Anticoagulation Care Providers     Provider Role Specialty Phone number    Heron Mayo MD [facility] [phone]            See the Encounter Report to view Anticoagulation Flowsheet and Dosing Calendar (Go to Encounters tab in chart review, and find the Anticoagulation Therapy Visit)        Daphne Lamar RN

## 2018-11-19 ENCOUNTER — OFFICE VISIT (OUTPATIENT)
Dept: FAMILY MEDICINE | Facility: CLINIC | Age: 83
End: 2018-11-19
Payer: MEDICARE

## 2018-11-19 ENCOUNTER — ANTICOAGULATION THERAPY VISIT (OUTPATIENT)
Dept: ANTICOAGULATION | Facility: CLINIC | Age: 83
End: 2018-11-19
Payer: MEDICARE

## 2018-11-19 VITALS
TEMPERATURE: 98.2 F | SYSTOLIC BLOOD PRESSURE: 120 MMHG | HEIGHT: 66 IN | RESPIRATION RATE: 24 BRPM | DIASTOLIC BLOOD PRESSURE: 66 MMHG | OXYGEN SATURATION: 96 % | WEIGHT: 179 LBS | BODY MASS INDEX: 28.77 KG/M2 | HEART RATE: 86 BPM

## 2018-11-19 DIAGNOSIS — D64.9 ANEMIA, UNSPECIFIED TYPE: ICD-10-CM

## 2018-11-19 DIAGNOSIS — R60.0 PERIPHERAL EDEMA: Primary | ICD-10-CM

## 2018-11-19 DIAGNOSIS — I48.20 CHRONIC ATRIAL FIBRILLATION (H): ICD-10-CM

## 2018-11-19 DIAGNOSIS — I50.40 COMBINED SYSTOLIC AND DIASTOLIC CONGESTIVE HEART FAILURE, UNSPECIFIED HF CHRONICITY (H): ICD-10-CM

## 2018-11-19 DIAGNOSIS — I48.0 PAROXYSMAL ATRIAL FIBRILLATION (H): ICD-10-CM

## 2018-11-19 DIAGNOSIS — Z79.01 CHRONIC ANTICOAGULATION: ICD-10-CM

## 2018-11-19 LAB
ANION GAP SERPL CALCULATED.3IONS-SCNC: 8 MMOL/L (ref 3–14)
BASOPHILS # BLD AUTO: 0 10E9/L (ref 0–0.2)
BASOPHILS NFR BLD AUTO: 0.4 %
BUN SERPL-MCNC: 15 MG/DL (ref 7–30)
CALCIUM SERPL-MCNC: 8.6 MG/DL (ref 8.5–10.1)
CHLORIDE SERPL-SCNC: 97 MMOL/L (ref 94–109)
CO2 SERPL-SCNC: 31 MMOL/L (ref 20–32)
CREAT SERPL-MCNC: 0.93 MG/DL (ref 0.66–1.25)
DIFFERENTIAL METHOD BLD: ABNORMAL
EOSINOPHIL # BLD AUTO: 0.1 10E9/L (ref 0–0.7)
EOSINOPHIL NFR BLD AUTO: 1.9 %
ERYTHROCYTE [DISTWIDTH] IN BLOOD BY AUTOMATED COUNT: 14.8 % (ref 10–15)
GFR SERPL CREATININE-BSD FRML MDRD: 78 ML/MIN/1.7M2
GLUCOSE SERPL-MCNC: 103 MG/DL (ref 70–99)
HCT VFR BLD AUTO: 30.4 % (ref 40–53)
HGB BLD-MCNC: 9.3 G/DL (ref 13.3–17.7)
INR PPP: 3.55 (ref 0.86–1.14)
INR PPP: 4
LYMPHOCYTES # BLD AUTO: 0.5 10E9/L (ref 0.8–5.3)
LYMPHOCYTES NFR BLD AUTO: 9.3 %
MCH RBC QN AUTO: 24.6 PG (ref 26.5–33)
MCHC RBC AUTO-ENTMCNC: 30.6 G/DL (ref 31.5–36.5)
MCV RBC AUTO: 80 FL (ref 78–100)
MONOCYTES # BLD AUTO: 0.5 10E9/L (ref 0–1.3)
MONOCYTES NFR BLD AUTO: 8.9 %
NEUTROPHILS # BLD AUTO: 4.3 10E9/L (ref 1.6–8.3)
NEUTROPHILS NFR BLD AUTO: 79.5 %
NT-PROBNP SERPL-MCNC: 2731 PG/ML (ref 0–450)
PLATELET # BLD AUTO: 173 10E9/L (ref 150–450)
POTASSIUM SERPL-SCNC: 4.1 MMOL/L (ref 3.4–5.3)
RBC # BLD AUTO: 3.78 10E12/L (ref 4.4–5.9)
RETICS # AUTO: 35.4 10E9/L (ref 25–95)
RETICS/RBC NFR AUTO: 1 % (ref 0.5–2)
SODIUM SERPL-SCNC: 136 MMOL/L (ref 133–144)
WBC # BLD AUTO: 5.4 10E9/L (ref 4–11)

## 2018-11-19 PROCEDURE — 82728 ASSAY OF FERRITIN: CPT | Performed by: FAMILY MEDICINE

## 2018-11-19 PROCEDURE — 83540 ASSAY OF IRON: CPT | Performed by: FAMILY MEDICINE

## 2018-11-19 PROCEDURE — 85060 BLOOD SMEAR INTERPRETATION: CPT | Performed by: FAMILY MEDICINE

## 2018-11-19 PROCEDURE — 85610 PROTHROMBIN TIME: CPT | Performed by: NURSE PRACTITIONER

## 2018-11-19 PROCEDURE — 85045 AUTOMATED RETICULOCYTE COUNT: CPT | Performed by: FAMILY MEDICINE

## 2018-11-19 PROCEDURE — 85025 COMPLETE CBC W/AUTO DIFF WBC: CPT | Performed by: FAMILY MEDICINE

## 2018-11-19 PROCEDURE — 83880 ASSAY OF NATRIURETIC PEPTIDE: CPT | Performed by: FAMILY MEDICINE

## 2018-11-19 PROCEDURE — 83550 IRON BINDING TEST: CPT | Performed by: FAMILY MEDICINE

## 2018-11-19 PROCEDURE — 80048 BASIC METABOLIC PNL TOTAL CA: CPT | Performed by: NURSE PRACTITIONER

## 2018-11-19 PROCEDURE — 99214 OFFICE O/P EST MOD 30 MIN: CPT | Performed by: FAMILY MEDICINE

## 2018-11-19 PROCEDURE — 99207 ZZC NO CHARGE NURSE ONLY: CPT

## 2018-11-19 PROCEDURE — 36415 COLL VENOUS BLD VENIPUNCTURE: CPT | Performed by: FAMILY MEDICINE

## 2018-11-19 PROCEDURE — 40000847 ZZHCL STATISTIC MORPHOLOGY W/INTERP HISTOLOGY TC 85060: Performed by: FAMILY MEDICINE

## 2018-11-19 RX ORDER — FUROSEMIDE 20 MG
40 TABLET ORAL DAILY
COMMUNITY
Start: 2018-11-16 | End: 2018-11-19

## 2018-11-19 RX ORDER — FUROSEMIDE 40 MG
80 TABLET ORAL DAILY
Qty: 60 TABLET | Refills: 1 | Status: SHIPPED | OUTPATIENT
Start: 2018-11-19 | End: 2018-12-21

## 2018-11-19 RX ORDER — COLCHICINE 0.6 MG/1
0.6 TABLET ORAL DAILY
Qty: 30 TABLET | Refills: 0 | Status: SHIPPED | OUTPATIENT
Start: 2018-11-19 | End: 2019-02-15

## 2018-11-19 ASSESSMENT — PAIN SCALES - GENERAL: PAINLEVEL: NO PAIN (0)

## 2018-11-19 NOTE — PROGRESS NOTES
SUBJECTIVE:   Nabor Cunningham is a 83 year old male who presents to clinic today for the following health issues:  Chief Complaint   Patient presents with     Edema     Seen by Cardiologist on 11/16/2018 in [place] and Furosemide 40 mg daily added.     Eye Problem     left eye redness noted today.     Lab Only     Has outside orders from another provider.      Edema in legs and chest and neck      Duration: 2 plus weeks    Description (location/character/radiation): bilateral legs and neck    Intensity:  severe    Accompanying signs and symptoms: Issue walking even 12-15 steps without getting SOB.    History (similar episodes/previous evaluation): yes    Precipitating or alleviating factors: ???    Therapies tried and outcome: Furosemide 20 mg takes 40 mg daily      11/16/2018 visit with cardiology in [place].  See notes below.  His weight was 165# on cardiology notes but he thinks they did not check it there.   He notes leg swelling is the same.   He has dyspnea on exertion walking 15-20 steps.    No orthopnea or PND.     He has noted these symptoms in the past month.     He has been on warfarin.  temazepam for sleep.   metoprolol 25mg daily.     Patient Active Problem List   Diagnosis     Anxiety     Alcohol abuse     Chronic atrial fibrillation (H)     Weight loss     Peripheral polyneuropathy     Chronic gout, unspecified cause, unspecified site     Essential hypertension with goal blood pressure less than 140/90     Hyperlipidemia LDL goal <130     Persistent insomnia     Lower urinary tract symptoms (LUTS)     Chronic anticoagulation     Long-term (current) use of anticoagulants [Z79.01]     Moderate episode of recurrent major depressive disorder (H)     Pneumonia     Bleeding from wound     Postoperative hemorrhage involving circulatory system following non-circulatory system procedure      ROS:  No gout recently.   Bowels OK.     OBJECTIVE:Blood pressure 120/66, pulse 86, temperature 98.2  F (36.8  C),  "temperature source Tympanic, resp. rate 24, height 5' 6\" (1.676 m), weight 179 lb (81.2 kg), SpO2 96 %. BMI=Body mass index is 28.89 kg/(m^2).  GENERAL APPEARANCE ADULT: Alert, no acute distress, unsteady gait, walks with a cane.   EYES: PERRL, EOM normal, left eye inferior subconjunctival hemorrhage  NECK: No adenopathy,masses or thyromegaly  RESP: rales scant at bases clearing with cough  CV: normal rate, regular rhythm, no murmur or gallop  ABDOMEN: soft, no organomegaly, masses or tenderness  MS: He has 3+ edema in lower extremities below knees.      ASSESSMENT:     ICD-10-CM    1. Peripheral edema R60.9 Basic metabolic panel   2. Combined systolic and diastolic congestive heart failure, unspecified HF chronicity (H) I50.40 BNP-N terminal pro   3. Anemia, unspecified type D64.9 Ferritin     Iron and iron binding capacity     Blood Morphology Pathologist Review     CBC with platelets differential     Reticulocyte Count      Several problems.    Anemia since cyst removal and hematoma.  I think it is likely iron deficiency.   You have had evidence for congestive heart failure on the echocardiogram in October in [place].  This plus anemia can contribute to having the swelling in your legs.     PLAN: Check tests today for congestive heart failure and causes of anemia.   You have a stress test coming up in [place] (12/6) to check your heart and causes for the congestive heart failure like low blood supply to heart-blocked blood vessels.     Increase your furosemide to 80mg once daily.   See me again in 7-10 days.   Recheck if weight increases over 2# in a day or 5# in a week.   Schedule an appointment with physical therapy for lymphedema clinic and treating fluid in legs.         ================================  Cardiology visit 11/16/2018:    Echocardiogram was completed in October, however, he did not have follow up with Dr. Ford. Echo at that time with mildly reduced LVEF of 49%, diastolic dysfunction, reduced RV " systolic function, RV enlargement and RV systolic pressure of 47 mmHg. I advised follow up with Dr. Ford to discuss echo changes.      Impression/Plan:    1. Acute on chronic combined systolic and diastolic heart failure: He has symptoms and exam findings to suggest significant fluid retention. Will have him start lasix 40 mg daily. He is already scheduled to see his PCP on Monday will repeat a BMP at that time. I have asked him to start weighing himself daily contact us for weight gain of 2-3 pounds in 24 hours, 5 pounds or more in a week. I also had my nurse clinician provide education on sodium with recommendation to limit sodium to 1987-3527 mg daily. He is on a beta blocker, EF 49% no indication for ACEI/ARB.  2. Chest pressure: Given recent decline in LVEF on echo will get a vasodilator cardiolite stress test to evaluate for ischemia.   3. Persistent Atrial fibrillation: Paroxysmal now per review of pacemaker evaluation appears to be more persistent. Rate controlled. Asymptomatic.  Anticoagulation Plan  [id number] Score (Thromboembolic Risk Stratification for Non-valvular Atrial Fibrillation)    [id number] risks for [place]: Hypertension = 1 and Age greater than or equal to 75 = 2 for an overall score for this patient of 3.    [place] will remain on warfarin/jantoven therapy with an INR goal range of 2.0-3.0. Last INR was therapeutic.   4. Ascending aortic aneurysm: Most recent echocardiogram would suggest enlargement from 4.2 cm to 4.4 cm since 2016. Will repeat an echocardiogram in 1 year.   5. Sick sinus syndrome: s/p PPM continue to follow with pacemaker clinic as scheduled. As per above I note that he is now in persistent atrial fibrillation, 49%-51% RV paced on most recent evaluations, this could be contributing to cardiomyopathy, I am also evaluating for ischemia. Will have him continue with beta blocker for now given previous history of NSVT.  6. RV dysfunction: Likely secondary to left sided heart  failure.     Addendum: Discussed with Dr. Ford would recommend EP consult for possible pacemaker induced cardiomyopathy if cardiolite is negative for ischemia.

## 2018-11-19 NOTE — NURSING NOTE
"Chief Complaint   Patient presents with     Edema     Seen by Cardiologist on 11/16/2018 in [place] and Furosimide 40 mg daily added.       Initial /66  Pulse 86  Temp 98.2  F (36.8  C) (Tympanic)  Resp 24  Ht 5' 6\" (1.676 m)  Wt 179 lb (81.2 kg)  SpO2 96%  BMI 28.89 kg/m2 Estimated body mass index is 28.89 kg/(m^2) as calculated from the following:    Height as of this encounter: 5' 6\" (1.676 m).    Weight as of this encounter: 179 lb (81.2 kg).    Patient presents to the clinic using [employer] Maintenance that is potentially due pending provider review:          Is there anyone who you would like to be able to receive your results? If yes have patient fill out K[facility]    "

## 2018-11-19 NOTE — MR AVS SNAPSHOT
Nabor Cunningham   11/19/2018   Anticoagulation Therapy Visit    Description:  83 year old male   Provider:  Ada Isaac, RN   Department:  [facility]           INR as of 11/19/2018     Today's INR 4.0!      Anticoagulation Summary as of 11/19/2018     INR goal 2.0-3.0   Today's INR 4.0!   Full warfarin instructions 11/19: Hold; 11/20: 6 mg; 11/21: 6 mg; 11/22: 6 mg   Next INR check 11/23/2018    Indications   Chronic anticoagulation [Z79.01]  Chronic atrial fibrillation (H) [I48.2]  Long-term (current) use of anticoagulants [Z79.01] [Z79.01]         November 2018 Details    Sun Mon Tue Wed Thu Fri Sat         1               2               3                 4               5               6               7               8               9               10                 11               12               13               14               15               16               17                 18               19      Hold   See details      20      6 mg         21      6 mg         22      6 mg         23            24                 25               26               27               28               29               30                 Date Details   11/19 This INR check       Date of next INR:  11/23/2018         How to take your warfarin dose     To take:  6 mg Take 3 of the 2 mg tablets.    Hold Do not take your warfarin dose. See the Details table to the right for additional instructions.

## 2018-11-19 NOTE — PROGRESS NOTES
ADDENDUM: As of 11/22, patient hospitalized.    Note from Care Everywhere:      Rustam MCNEIL RN, CACP 11/23/2018 at 4:03 PM     ANTICOAGULATION FOLLOW-UP CLINIC VISIT    Patient Name:  Nabor Cunningham  Date:  11/19/2018  Contact Type:  Telephone/ [facility]    SUBJECTIVE:     Patient Findings     Positives Unexplained INR or factor level change    Comments Patient had 40mg in the previous 7 days, will decrease dose to 34mg by the next INR check on Friday (~15% decrease).    Patient had some edema, was started on lasix over a week ago according to homecare nurse. No changes to other medications noted, denies any concerns of bleeding or abnormal bruising.            OBJECTIVE    INR   Date Value Ref Range Status   11/19/2018 4.0  Final       ASSESSMENT / PLAN  No question data found.  Anticoagulation Summary as of 11/19/2018     INR goal 2.0-3.0   Today's INR 4.0!   Warfarin maintenance plan No maintenance plan   Full warfarin instructions 11/19: Hold; 11/20: 6 mg; 11/21: 6 mg; 11/22: 6 mg   Weekly warfarin total 38 mg   Plan last modified Ada Isaac RN (10/15/2018)   Next INR check 11/23/2018   Priority INR   Target end date Indefinite    Indications   Chronic anticoagulation [Z79.01]  Chronic atrial fibrillation (H) [I48.2]  Long-term (current) use of anticoagulants [Z79.01] [Z79.01]         Anticoagulation Episode Summary     INR check location     Preferred lab     Send INR reminders to [place] [facility] PO[facility]    Comments * 2mg tabs. [facility]      Anticoagulation Care Providers     Provider Role Specialty Phone number    Heron Mayo MD [facility] [phone]            See the Encounter Report to view Anticoagulation Flowsheet and Dosing Calendar (Go to Encounters tab in chart review, and find the Anticoagulation Therapy Visit)        Ada Isaac RN CACP

## 2018-11-19 NOTE — MR AVS SNAPSHOT
After Visit Summary   11/19/2018    Nabor Cunningham    MRN: [id number]           Patient Information     Date Of Birth          1935        Visit Information        Provider Department      11/19/2018 5:00 PM Heron Mayo MD [facility]        Today's Diagnoses     Peripheral edema    -  1    Combined systolic and diastolic congestive heart failure, unspecified HF chronicity (H)        Anemia, unspecified type          Care Instructions    ASSESSMENT:     ICD-10-CM    1. Peripheral edema R60.9 Basic metabolic panel   2. Combined systolic and diastolic congestive heart failure, unspecified HF chronicity (H) I50.40 BNP-N terminal pro   3. Anemia, unspecified type D64.9 Ferritin     Iron and iron binding capacity     Blood Morphology Pathologist Review     CBC with platelets differential     Reticulocyte Count      Several problems.    Anemia since cyst removal and hematoma.  I think it is likely iron deficiency.   You have had evidence for congestive heart failure on the echocardiogram in October in [place].  This plus anemia can contribute to having the swelling in your legs.     PLAN: Check tests today for congestive heart failure and causes of anemia.   You have a stress test coming up in [place] (12/6) to check your heart and causes for the congestive heart failure like low blood supply to heart-blocked blood vessels.     Increase your furosemide to 80mg once daily.   See me again in 7-10 days.   Recheck if weight increases over 2# in a day or 5# in a week.   Schedule an appointment with physical therapy for lymphedema clinic and treating fluid in legs.           Follow-ups after your visit        Additional Services     PHYSICAL THERAPY REFERRAL       If you have not heard from the scheduling office within 2 business days, please call [phone] for all locations, with the exception of [place], please call [phone] and [facility], please call  "[phone].    Please be aware that coverage of these services is subject to the terms and limitations of your health insurance plan.  Call member services at your health plan with any benefit or coverage questions.                  Follow-up notes from your care team     Return in about 7 days (around 11/26/2018).      Your next 10 appointments already scheduled     Jan 17, 2019  8:45 AM CST   [facility] Visit [facility] Kae Rodriguez [facility]   [facility]    [address] [address] [phone]              Future tests that were ordered for you today     Open Future Orders        Priority Expected Expires Ordered    PHYSICAL THERAPY REFERRAL Routine  11/19/2019 11/19/2018            Who to contact     If you have questions or need follow up information about today's clinic visit or your schedule please contact [facility] directly at [phone].  Normal or non-critical lab and imaging results will be communicated to you by MyChart, letter or phone within 4 business days after the clinic has received the results. If you do not hear from us within 7 days, please contact the clinic through MyChart or phone. If you have a critical or abnormal lab result, we will notify you by phone as soon as possible.  Submit refill requests through [employer] or call your pharmacy and they will forward the refill request to us. Please allow 3 business days for your refill to be completed.          Additional Information About Your Visit        Care EveryWhere ID     This is your Care EveryWhere ID. This could be used by other organizations to access your [place] medical records  [id number]        Your Vitals Were     Pulse Temperature Respirations Height Pulse Oximetry BMI (Body Mass Index)    86 98.2  F (36.8  C) (Tympanic) 24 5' 6\" (1.676 m) 96% 28.89 kg/m2       Blood Pressure from Last 3 Encounters:   11/19/18 120/66   11/10/18 127/74 "   11/02/18 (!) 140/107    Weight from Last 3 Encounters:   11/19/18 179 lb (81.2 kg)   11/10/18 179 lb (81.2 kg)   11/02/18 165 lb (74.8 kg)              We Performed the Following     Basic metabolic panel     Blood Morphology Pathologist Review     BNP-N terminal pro     CBC with platelets differential     Ferritin     Iron and iron binding capacity     Reticulocyte Count          Today's Medication Changes          These changes are accurate as of 11/19/18  6:30 PM.  If you have any questions, ask your nurse or doctor.               These medicines have changed or have updated prescriptions.        Dose/Directions    furosemide 40 MG tablet   Commonly known as:  LASIX   This may have changed:    - medication strength  - how much to take   Used for:  Peripheral edema, Combined systolic and diastolic congestive heart failure, unspecified HF chronicity (H)   Changed by:  Heron Mayo MD        Dose:  80 mg   Take 2 tablets (80 mg) by mouth daily   Quantity:  60 tablet   Refills:  1            Where to get your medicines      These medications were sent to [id number] [address] [address]    Hours:  Closed 10-16-08 business to [facility] Phone:  [phone]     furosemide 40 MG tablet                Primary Care Provider Office Phone # Fax #    Heron Mayo [phone] [address] [facility] [address]        Equal Access to Services     SHIRA DELEON AH: Flory williamson Sowojciech, waaxda luqadaha, qaybta kaalmabogdan dewitt, lili valerio. So [facility] [phone].    ATENCIÓN: Si habla español, tiene a garcia disposición servicios gratuitos de asistencia lingüística. Juan Antonio al [phone].    We comply with applicable federal civil rights laws and [place] laws. We do not discriminate on the basis of race, color, national origin, age, disability, sex, sexual orientation, or gender  identity.            Thank you!     Thank you for choosing [facility]  for your care. Our goal is always to provide you with excellent care. Hearing back from our patients is one way we can continue to improve our services. Please take a few minutes to complete the written survey that you may receive in the mail after your visit with us. Thank you!             Your Updated Medication List - Protect others around you: Learn how to safely use, store and throw away your medicines at www.disposemymeds.org.          This list is accurate as of 11/19/18  6:30 PM.  Always use your most recent med list.                   Brand Name Dispense Instructions for use Diagnosis    ACETAMINOPHEN PO      Take 1,000 mg by mouth every 6 hours as needed for pain        betamethasone dipropionate 0.05 % cream    DIPROSONE    45 g    Apply topically 2 times daily For leg rash    Eczema, unspecified type       calcium carbonate 500 mg (elemental) 500 MG tablet    OS-ALY     Take 1 tablet by mouth 2 times daily        colchicine 0.6 MG tablet    COLCRYS    30 tablet    Take 1 tablet (0.6 mg) by mouth daily    Chronic gout, unspecified cause, unspecified site       COUMADIN PO      Take by mouth daily Take as directed per INR results        cyanocobalamin 1000 MCG tablet    vitamin  B-12    30 tablet    TAKE ONE TABLET BY MOUTH DAILY    Vitamin B 12 deficiency       furosemide 40 MG tablet    LASIX    60 tablet    Take 2 tablets (80 mg) by mouth daily    Peripheral edema, Combined systolic and diastolic congestive heart failure, unspecified HF chronicity (H)       HYDROcodone-acetaminophen 5-325 MG per tablet    NORCO    20 tablet    Take 1-2 tablets by mouth every 6 hours as needed    Post-op pain       hypromellose 0.4 % Soln ophthalmic solution    ARTIFICIAL TEARS     Place 1 drop into both eyes every hour as needed for dry eyes        metoprolol succinate 25 MG 24 hr tablet    TOPROL-XL    90 tablet    Take 25 mg by  mouth every evening        mineral oil oil      Take 5 mLs by mouth daily        Multi-vitamin Tabs tablet     100 tablet    Take 1 tablet by mouth daily        nortriptyline 25 MG capsule    PAMELOR    270 capsule    TAKE ONE CAPSULE MID MORNING AND TWO CAPSULES BY MOUTH DAILY AT BEDTIME    Anxiety       prednisoLONE acetate 1 % ophthalmic susp    PRED FORTE     Place 1 drop Into the left eye 2 times daily        temazepam 30 MG capsule    RESTORIL    30 capsule    TAKE ONE CAPSULE BY MOUTH AT BEDTIME AS NEEDED FOR SLEEP    Persistent insomnia

## 2018-11-20 ENCOUNTER — PATIENT OUTREACH (OUTPATIENT)
Dept: CARE COORDINATION | Facility: CLINIC | Age: 83
End: 2018-11-20

## 2018-11-20 ASSESSMENT — ACTIVITIES OF DAILY LIVING (ADL): DEPENDENT_IADLS:: INDEPENDENT

## 2018-11-20 NOTE — PROGRESS NOTES
Clinic Care Coordination Contact    Clinic Care Coordination Contact  OUTREACH    Referral Information:  Referral Source: PCP    Primary Diagnosis: Other (include Comment box) (wound infection)    Chief Complaint   Patient presents with     Clinic Care Coordination - Follow-up             Universal Utilization: no concerns  Clinic Utilization  Difficulty keeping appointments:: No  Compliance Concerns: No  No-Show Concerns: No  Utilization    Last refreshed: 11/20/2018  6:57 AM:  No Show Count (past year) 0       Last refreshed: 11/20/2018  6:57 AM:  ED visits 6       Last refreshed: 11/20/2018  6:57 AM:  Hospital admissions 1          Current as of: 11/20/2018  6:57 AM             Clinical Concerns:  Current Medical Concerns:  Son was concerned about any lab reports from yesterdays appointment.  Reviewed the results and shared that I could not tell them what they mean.      Current Behavioral Concerns: not discussed    Education Provided to patient: reviewed with RN who said that if any of the labs were critical then the family would have gotten a call.  Reviewed this with son Florencio and he was appreciative of the response.        Health Maintenance Reviewed: Due/Overdue   Health Maintenance Due   Topic Date Due     HF ACTION PLAN Q3 YR  1935     LIPID MONITORING Q1 YEAR  03/22/2018       Clinical Pathway: None    Medication Management:  No changes from previous     Functional Status:  Dependent ADLs:: Independent  Dependent IADLs:: Independent  Bed or wheelchair confined:: No  Mobility Status: Independent    Living Situation:  Current living arrangement:: I live alone  Type of residence:: Independent Senior Living    Diet/Exercise/Sleep:  Diet:: Regular  Inadequate nutrition (GOAL):: No  Food Insecurity: No  Tube Feeding: No  Exercise:: Currently not exercising  Inadequate activity/exercise (GOAL):: No  Significant changes in sleep pattern (GOAL): No    Transportation:  Transportation concerns (GOAL)::  No  Transportation means:: Family, Friend, Regular car     Psychosocial:  Congregational or spiritual beliefs that impact treatment:: No  Mental health DX:: Yes  Mental health DX how managed:: None  Mental health management concern (GOAL):: No  Informal Support system:: Children, Family     Financial/Insurance:   Financial/Insurance concerns (GOAL):: No  Son has not contacted Rafia Barber and does plan to call out this week.  He feels pt is not improving and there are concerns yet they want pt to be able to remain in his home as long as possible.      Resources and Interventions:  Current Resources:   List of home care services:: Skilled Nursing, Home Health Aid;   Community Resources: Home Care  Supplies used at home:: Wound Care Supplies  Equipment Currently Used at Home: none    Advance Care Plan/Directive  Advanced Care Plans/Directives on file:: No    Referrals Placed: Senior Linkage Line     Goals:         Patient/Caregiver understanding: n/a    Outreach Frequency: weekly  Future Appointments              In 1 week Heron Mayo MD [facility]    In 1 month Kae Ward MD [facility]          Plan: son to call Rafia Barber.  Sw to follow up in about one week.  Will set goal at that time.     TONE Lord, Clinic Care Coordinator 11/20/2018   9:18 AM  [phone]

## 2018-11-20 NOTE — PROGRESS NOTES
Clinic Care Coordination Contact  [facility]/Voicemail    Referral Source: PCP  Clinical Data: Care Coordinator Outreach  Outreach attempted x 1.  Left message on voicemail with call back information and requested return call.  Plan:  Care Coordinator will try to reach patient again in 5-8 business days.  TONE Lord, Clinic Care Coordinator 11/20/2018   9:02 AM  [phone]

## 2018-11-20 NOTE — PATIENT INSTRUCTIONS
ASSESSMENT:     ICD-10-CM    1. Peripheral edema R60.9 Basic metabolic panel   2. Combined systolic and diastolic congestive heart failure, unspecified HF chronicity (H) I50.40 BNP-N terminal pro   3. Anemia, unspecified type D64.9 Ferritin     Iron and iron binding capacity     Blood Morphology Pathologist Review     CBC with platelets differential     Reticulocyte Count      Several problems.    Anemia since cyst removal and hematoma.  I think it is likely iron deficiency.   You have had evidence for congestive heart failure on the echocardiogram in October in [place].  This plus anemia can contribute to having the swelling in your legs.     PLAN: Check tests today for congestive heart failure and causes of anemia.   You have a stress test coming up in [place] (12/6) to check your heart and causes for the congestive heart failure like low blood supply to heart-blocked blood vessels.     Increase your furosemide to 80mg once daily.   See me again in 7-10 days.   Recheck if weight increases over 2# in a day or 5# in a week.   Schedule an appointment with physical therapy for lymphedema clinic and treating fluid in legs.

## 2018-11-21 DIAGNOSIS — E61.1 LOW IRON: Primary | ICD-10-CM

## 2018-11-21 LAB
COPATH REPORT: NORMAL
FERRITIN SERPL-MCNC: 181 NG/ML (ref 26–388)
IRON SATN MFR SERPL: 5 % (ref 15–46)
IRON SERPL-MCNC: 19 UG/DL (ref 35–180)
TIBC SERPL-MCNC: 374 UG/DL (ref 240–430)

## 2018-11-21 RX ORDER — FERROUS GLUCONATE 324(38)MG
324 TABLET ORAL DAILY
Qty: 100 TABLET | Refills: 1 | Status: SHIPPED | OUTPATIENT
Start: 2018-11-21 | End: 2019-09-23

## 2018-11-21 NOTE — PROGRESS NOTES
Please call.  His blood cells look OK and it looks like low iron anemia related to recent hematoma bleeding from his wound.   BNP test for congestive heart failure was abnormal.   PLAN: I do suggest eating foods high in iron and taking iron supplement like ferrous gluconate 325mg once daily.   This over the counter medication is available without a prescription.   We can send as prescription as insurance will sometimes cover.  Rx #100 with a refill.  Recheck in 7-10 days as planned.

## 2018-11-21 NOTE — TELEPHONE ENCOUNTER
Notes Recorded by Heron Mayo MD on 11/21/2018 at 12:38 PM  Please call.  His blood cells look OK and it looks like low iron anemia related to recent hematoma bleeding from his wound.   BNP test for congestive heart failure was abnormal.   PLAN: I do suggest eating foods high in iron and taking iron supplement like ferrous gluconate 325mg once daily.   This over the counter medication is available without a prescription.   We can send as prescription as insurance will sometimes cover.  Rx #100 with a refill.  Recheck in 7-10 days as planned.    Pended medication.  Selected [facility] pharmacy.   Pao Lambert M.A.

## 2018-11-22 ENCOUNTER — TRANSFERRED RECORDS (OUTPATIENT)
Dept: HEALTH INFORMATION MANAGEMENT | Facility: CLINIC | Age: 83
End: 2018-11-22

## 2018-11-23 LAB
ALT SERPL-CCNC: 18 IU/L (ref 6–40)
AST SERPL-CCNC: 18 IU/L (ref 10–40)

## 2018-11-25 LAB
CREAT SERPL-MCNC: 0.99 MG/DL (ref 0.7–1.2)
GFR SERPL CREATININE-BSD FRML MDRD: >60 ML/MIN/1.73M2
GLUCOSE SERPL-MCNC: 112 MG/DL (ref 70–99)
INR PPP: 2.4 (ref 0.9–1.1)
POTASSIUM SERPL-SCNC: 3.5 MEQ/L (ref 3.4–5.1)

## 2018-11-26 ENCOUNTER — ANTICOAGULATION THERAPY VISIT (OUTPATIENT)
Dept: ANTICOAGULATION | Facility: CLINIC | Age: 83
End: 2018-11-26
Payer: MEDICARE

## 2018-11-26 DIAGNOSIS — I48.20 CHRONIC ATRIAL FIBRILLATION (H): ICD-10-CM

## 2018-11-26 DIAGNOSIS — Z79.01 CHRONIC ANTICOAGULATION: ICD-10-CM

## 2018-11-26 PROCEDURE — 99207 ZZC NO CHARGE NURSE ONLY: CPT

## 2018-11-26 NOTE — PROGRESS NOTES
ANTICOAGULATION FOLLOW-UP CLINIC VISIT    Patient Name:  Nabor Cunningham  Date:  11/26/2018  Contact Type:  Chart Review    SUBJECTIVE:     Patient Findings     Comments Discharge Diagnoses:   Principal Problem:  Acute on chronic systolic CHF (congestive heart failure) (HCC)  Active Problems:  Atrial fibrillation (HCC)  Anxiety disorder  Essential hypertension  Hyponatremia  Chronic anemia  Resolved Problems:  * No resolved hospital problems. *  R upper back surgical wound  Hyponatremia  Consultants:   IP CONSULT WOUND/OSTOMY  PHARMACY TO DOSE MEDICATION    Procedures:   Wound care packing to R upper back surgical wound    Imaging:   Chest X-ray 11-22-18:   IMPRESSION:  The heart is enlarged. Interstitial edema and small pleural effusions are compatible with CHF. Small airspace opacity at the right lung base could represent atelectasis or pneumonia. 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    Hospital Course:   Nabor Cunningham is a 83 year old male who had his furosemide increase to 80 mg per day about two days prior to hospitalization who was admitted with CHF exacerbation. He improved gradually over a couple days with diuresis with lasix 40 mg IV BID. His weight improved and his swelling improved to near baseline and he reports his breathing is near baseline at discharge.     Nabor was discharged from [facility] to Home.   Nabor was seen and examined on the date of discharge. Pertinent history and exam findings at discharge include patient reports that his breathing is near baseline. Slept well. No tingling in toes or headache this a.m. Swelling in legs better. NO orthopnea or PND. Feeling ready to go home. Feels he sleeps better on lorazepam than temazepam. Anxiety today is well controlled. Says he has home care M,W,F from [place].      Warfarin Dose Instructions: Today, 11/25/2018, take 6 mg warfarin by mouth at home following discharge. Tomorrow, 11/26/18, take 6 mg warfarin by mouth in the  evening. On Tues, 11/27/18, take 4 mg warfarin. Starting Wed, 11/28/18, take as directed by [facility] after INR is drawn.  Follow-up INR due date : 11/28/18  Location of INR draw: At home with home care               OBJECTIVE    INR   Date Value Ref Range Status   11/19/2018 3.55 (H) 0.86 - 1.14 Final       ASSESSMENT / PLAN  No question data found.  Anticoagulation Summary as of 11/26/2018     INR goal 2.0-3.0   Today's INR No new INR was available at the time of this encounter.   Warfarin maintenance plan No maintenance plan   Full warfarin instructions 11/26: 6 mg; 11/27: 4 mg   Weekly warfarin total 38 mg   Plan last modified Ada Isaac RN (10/15/2018)   Next INR check 11/28/2018   Priority INR   Target end date Indefinite    Indications   Chronic anticoagulation [Z79.01]  Chronic atrial fibrillation (H) [I48.2]  Long-term (current) use of anticoagulants [Z79.01] [Z79.01]         Anticoagulation Episode Summary     INR check location     Preferred lab     Send INR reminders to [place] PHONE [facility] POOL    Comments * 2mg tabs. [facility] MWF      Anticoagulation Care Providers     Provider Role Specialty Phone number    Heron Mayo MD [facility] [phone]            See the Encounter Report to view Anticoagulation Flowsheet and Dosing Calendar (Go to Encounters tab in chart review, and find the Anticoagulation Therapy Visit)        Daphne Lamar RN

## 2018-11-26 NOTE — MR AVS SNAPSHOT
Nabor Cunningham   11/26/2018   Anticoagulation Therapy Visit    Description:  83 year old male   Provider:  Daphne Lamar, RN   Department:  [facility]           INR as of 11/26/2018     Today's INR No new INR was available at the time of this encounter.      Anticoagulation Summary as of 11/26/2018     INR goal 2.0-3.0   Today's INR No new INR was available at the time of this encounter.   Full warfarin instructions 11/26: 6 mg; 11/27: 4 mg   Next INR check 11/28/2018    Indications   Chronic anticoagulation [Z79.01]  Chronic atrial fibrillation (H) [I48.2]  Long-term (current) use of anticoagulants [Z79.01] [Z79.01]         November 2018 Details    Sun Mon Tue Wed Thu Fri Sat         1               2               3                 4               5               6               7               8               9               10                 11               12               13               14               15               16               17                 18               19               20               21               22               23               24                 25               26      6 mg   See details      27      4 mg         28            29               30                 Date Details   11/26 This INR check       Date of next INR:  11/28/2018         How to take your warfarin dose     To take:  4 mg Take 2 of the 2 mg tablets.    To take:  6 mg Take 3 of the 2 mg tablets.

## 2018-11-27 ENCOUNTER — ANTICOAGULATION THERAPY VISIT (OUTPATIENT)
Dept: ANTICOAGULATION | Facility: CLINIC | Age: 83
End: 2018-11-27
Payer: MEDICARE

## 2018-11-27 DIAGNOSIS — Z79.01 CHRONIC ANTICOAGULATION: ICD-10-CM

## 2018-11-27 DIAGNOSIS — I48.20 CHRONIC ATRIAL FIBRILLATION (H): ICD-10-CM

## 2018-11-27 LAB — INR PPP: 4.1 (ref 0.9–1.1)

## 2018-11-27 PROCEDURE — 99207 ZZC NO CHARGE NURSE ONLY: CPT

## 2018-11-27 NOTE — PROGRESS NOTES
ANTICOAGULATION FOLLOW-UP CLINIC VISIT    Patient Name:  Nabor Cunningham  Date:  11/27/2018  Contact Type:  Telephone/ [facility]    SUBJECTIVE:     Patient Findings     Positives Change in medications (increased furosemide to 40mg, changed temazepam to lorazepam, decreased nortriptyline), Antibiotic use or infection (wound looking better, but still present), CHF control change (hospitalized for CHF over weekend, nurse reports +1 edema on legs (was 4+ at admission))           OBJECTIVE    INR   Date Value Ref Range Status   11/27/2018 4.1 (A) 0.9 - 1.1 Final       ASSESSMENT / PLAN  INR assessment SUPRA    Recheck INR In: 4 DAYS    INR Location Homecare INR      Anticoagulation Summary as of 11/27/2018     INR goal 2.0-3.0   Today's INR 4.1!   Warfarin maintenance plan No maintenance plan   Full warfarin instructions 11/27: 2 mg; 11/28: 4 mg; 11/29: 4 mg   Weekly warfarin total 38 mg   Plan last modified Ada Isaac RN (10/15/2018)   Next INR check 11/30/2018   Priority INR   Target end date Indefinite    Indications   Chronic anticoagulation [Z79.01]  Chronic atrial fibrillation (H) [I48.2]  Long-term (current) use of anticoagulants [Z79.01] [Z79.01]         Anticoagulation Episode Summary     INR check location     Preferred lab     Send INR reminders to [place] PHONE [facility] POOL    Comments * 2mg tabs. [facility]      Anticoagulation Care Providers     Provider Role Specialty Phone number    Heron Mayo MD [facility] [phone]            See the Encounter Report to view Anticoagulation Flowsheet and Dosing Calendar (Go to Encounters tab in chart review, and find the Anticoagulation Therapy Visit)    Was hospitalized at [facility] over weekend.    Rani Lundberg [facility]

## 2018-11-27 NOTE — MR AVS SNAPSHOT
Nabor Cunningham   11/27/2018   Anticoagulation Therapy Visit    Description:  83 year old male   Provider:  Rani Lundberg [facility]   Department:  [facility]           INR as of 11/27/2018     Today's INR 4.1!      Anticoagulation Summary as of 11/27/2018     INR goal 2.0-3.0   Today's INR 4.1!   Full warfarin instructions 11/27: 2 mg; 11/28: 4 mg; 11/29: 4 mg   Next INR check 11/30/2018    Indications   Chronic anticoagulation [Z79.01]  Chronic atrial fibrillation (H) [I48.2]  Long-term (current) use of anticoagulants [Z79.01] [Z79.01]         November 2018 Details    Sun Mon Tue Wed Thu Fri Sat         1               2               3                 4               5               6               7               8               9               10                 11               12               13               14               15               16               17                 18               19               20               21               22               23               24                 25               26               27      2 mg   See details      28      4 mg         29      4 mg         30              Date Details   11/27 This INR check       Date of next INR:  11/30/2018         How to take your warfarin dose     To take:  2 mg Take 1 of the 2 mg tablets.    To take:  4 mg Take 2 of the 2 mg tablets.

## 2018-11-28 ENCOUNTER — PATIENT OUTREACH (OUTPATIENT)
Dept: CARE COORDINATION | Facility: CLINIC | Age: 83
End: 2018-11-28

## 2018-11-28 ASSESSMENT — ACTIVITIES OF DAILY LIVING (ADL): DEPENDENT_IADLS:: INDEPENDENT

## 2018-11-28 NOTE — LETTER
[place] [id number] [address]  [phone]      11/28/2018      Nabor Cunningham  [address] [facility] [address]      Dear  Florencio Tomlin, and Lexy,    It was a pleasure to speak with Florencio today.  I would like to provide you with the enclosed information for your records.  As part of care coordination, we are developing care plans to assist in accomplishing your health care goals.  When we speak next, please feel free to let me know if you want to add or change any information on your care plans.    As always, feel free to contact me if you have any questions or concerns.  I look forward to working with you in the effort to achieve your health care and wellness goals .        Sincerely,      Rani Sarkar, [facility]  Clinic Care Coordination  [phone]

## 2018-11-28 NOTE — PROGRESS NOTES
Clinic Care Coordination Contact    Clinic Care Coordination Contact  OUTREACH    Referral Information:  Referral Source: PCP    Primary Diagnosis: Other (include Comment box) (wound infection)    Chief Complaint   Patient presents with     Clinic Care Coordination - [facility]     SW        [place] Utilization: pt recently hospitalized due to acute on chronic CHF.  This was outside of [place]  Clinic Utilization  Difficulty keeping appointments:: No  Compliance Concerns: No  No-Show Concerns: No  Utilization    Last refreshed: 11/27/2018 10:10 AM:  No Show Count (past year) 0       Last refreshed: 11/27/2018 10:10 AM:  ED visits 6       Last refreshed: 11/27/2018 10:10 AM:  Hospital admissions 1          Current as of: 11/27/2018 10:10 AM             Clinical Concerns:  Current Medical Concerns:  Per son pt is doing better.  He is breathing back at baseline.  Per son they also have home care out to see pt and had no questions or concerns.     Current Behavioral Concerns: not discussed. Pt does have diagnosis of anxiety and alcohol abuse.  Will discuss on future calls.      Education Provided to patient: pt has appointment with pcp on Friday and family aware.       Health Maintenance Reviewed: Due/Overdue   Health Maintenance Due   Topic Date Due     HF ACTION PLAN Q3 YR  1935     LIPID MONITORING Q1 YEAR  03/22/2018       Clinical Pathway: None    Medication Management:  Home care RN is overseeing at this time.      Functional Status:  Dependent ADLs:: Independent  Dependent IADLs:: Independent  Bed or wheelchair confined:: No  Mobility Status: Independent  Fallen 2 or more times in the past year?: No  Any fall with injury in the past year?: No    Living Situation:  Current living arrangement:: I live alone  Type of residence:: Independent Senior Living    Diet/Exercise/Sleep:  Diet:: Regular  Inadequate nutrition (GOAL):: No  Food Insecurity: No  Tube Feeding: No  Exercise:: Currently not  exercising  Inadequate activity/exercise (GOAL):: No  Significant changes in sleep pattern (GOAL): No    Transportation:  Transportation concerns (GOAL):: No  Transportation means:: Family, Friend, Regular car     Psychosocial:  Uatsdin or spiritual beliefs that impact treatment:: No  Mental health DX:: Yes  Mental health DX how managed:: None  Mental health management concern (GOAL):: No  Informal Support system:: Children, Family     Financial/Insurance:   Financial/Insurance concerns (GOAL):: No  Family/pt has met with Rafia Barber .  Rafia is preparing a report of options and recommendations.  Family would like some kind of electronic device for safety and Rafia is researching these for them.      Son said they felt comfortable at this time with the plan.     Resources and Interventions:  Current Resources:   List of home care services:: Skilled Nursing, Home Health Aid;   Community Resources: Home Care  Supplies used at home:: Wound Care Supplies  Equipment Currently Used at Home: none    Advance Care Plan/Directive  Advanced Care Plans/Directives on file:: No    Referrals Placed: Senior Linkage Line     Goals:   Goals        General    Psychosocial (pt-stated)     Notes - Note created  11/28/2018 11:09 AM by Rani Sarkar LSW    Goal Statement: pt to remain safe in his own home as long as possible, safe meaning no injuries from falls or avoidable health issues.  Measure of Success: no falls with injuries or avoidable health issues  Supportive Steps to Achieve: home care, , family  Barriers: age, health  Strengths: supportive family  Date to Achieve By: 6-30-19  Patient expressed understanding of goal: yes                  Patient/Caregiver understanding: family to continue to support and make adjustments as needed for pt's safety.    Outreach Frequency: weekly  Future Appointments              In 2 days Heron Mayo MD [facility],  [place]    In 1 month Kae Ward MD [facility]          Plan: family to meet with Rafia Barber to review recommendations and options.  SW to send complex care plan and follow up with son in about 3 weeks to review recommendations and help fill in any gaps.     TONE Lord, Clinic Care Coordinator 11/28/2018   11:20 AM  [phone]

## 2018-11-28 NOTE — LETTER
[facility]  Complex Care Plan  About Me  Patient Name:  Nabor Perea    YOB: 1935  Age:     83 year old   Mary MRN:   [id number] Telephone Information:    Home Phone [phone]   Mobile [phone]   Home Phone [phone]       Address:    [address]  [place] [facility] [place] [address] Email address:  No e-mail address on record      Emergency Contact(s)  Name Relationship Lgl Grd Work Phone Home Phone Mobile Phone   1. JACKSON VAN Daughter No  [phone]   2. ELOY PEREA  Son No   [phone]   3. LISANDRA PEREA Relative No  [phone]            Primary language:  English     needed? No   [place] [employer] [facility]:  [phone] op. 1  Other communication barriers: Glasses, Cognitive impairment  Preferred Method of Communication:  Do Not Contact  Current living arrangement: I live alone  Mobility Status/ Medical Equipment: Independent    Health Maintenance  Health Maintenance Reviewed: Due/Overdue   Health Maintenance Due   Topic Date Due     HF ACTION PLAN Q3 YR  1935     LIPID MONITORING Q1 YEAR  03/22/2018     Please talk with your provider about what is needed or can continue to wait.      My Access Plan  Medical Emergency 911   Primary Clinic Line [facility] - [phone]   24 Hour Appointment Line [phone] or  [phone]) (toll-free)   24 Hour Nurse Line [phone] (toll-free)   Preferred Urgent Care [facility], [phone]   Preferred Hospital [place], [place]  [phone]   Preferred Pharmacy [id number] - [place], [place] - [address]     Behavioral Health Crisis Line The National Suicide Prevention Lifeline at 1-753.116.5664 or 911     My Care Team Members    Patient Care Team       Relationship Specialty Notifications Start End    Heron Mayo MD PCP - General Family Practice  9/11/18     Phone: [phone] Fax:  [phone]         [address] [place] [address]    Heron Mayo MD   Family Practice  1/29/18     Comment:  Merged    Phone: [phone] Fax: [phone]         [address] [place] [address]    Care, [facility]   HOME HEALTH AGENCY ([facility] ([place])  9/4/18     Phone: [phone]         Rani Sarkar LSW Lead Care Coordinator Primary Care - CC Admissions 11/13/18     Phone: [phone] Fax: [phone]                My Care Plans  Self Management and Treatment Plan  Goals and (Comments)  Goals        General    Psychosocial (pt-stated)     Notes - Note created  11/28/2018 11:09 AM by Rani Sarkar LSW    Goal Statement: pt to remain safe in his own home as long as possible, safe meaning no injuries from falls or avoidable health issues.  Measure of Success: no falls with injuries or avoidable health issues  Supportive Steps to Achieve: home care, , family  Barriers: age, health  Strengths: supportive family  Date to Achieve By: 6-30-19  Patient expressed understanding of goal: yes                 Action Plans on File:                       Advance Care Plans/Directives Type:        My Medical and Care Information  Problem List   Patient Active Problem List   Diagnosis     Anxiety     Alcohol abuse     Chronic atrial fibrillation (H)     Weight loss     Peripheral polyneuropathy     Chronic gout, unspecified cause, unspecified site     Essential hypertension with goal blood pressure less than 140/90     Hyperlipidemia LDL goal <130     Persistent insomnia     Lower urinary tract symptoms (LUTS)     Chronic anticoagulation     Long-term (current) use of anticoagulants [Z79.01]     Moderate episode of recurrent major depressive disorder (H)     Pneumonia     Bleeding from wound     Postoperative hemorrhage involving circulatory system following non-circulatory system procedure      Current Medications and Allergies:  See printed Medication  Report.    Care Coordination Start Date: 11/28/2018   Frequency of Care Coordination: weekly   Form Last Updated: 11/28/2018

## 2018-11-30 ENCOUNTER — ANTICOAGULATION THERAPY VISIT (OUTPATIENT)
Dept: ANTICOAGULATION | Facility: CLINIC | Age: 83
End: 2018-11-30
Payer: MEDICARE

## 2018-11-30 ENCOUNTER — OFFICE VISIT (OUTPATIENT)
Dept: FAMILY MEDICINE | Facility: CLINIC | Age: 83
End: 2018-11-30
Payer: MEDICARE

## 2018-11-30 VITALS
RESPIRATION RATE: 16 BRPM | HEART RATE: 96 BPM | OXYGEN SATURATION: 94 % | DIASTOLIC BLOOD PRESSURE: 62 MMHG | HEIGHT: 66 IN | TEMPERATURE: 97.5 F | WEIGHT: 162.2 LBS | BODY MASS INDEX: 26.07 KG/M2 | SYSTOLIC BLOOD PRESSURE: 107 MMHG

## 2018-11-30 DIAGNOSIS — I48.20 CHRONIC ATRIAL FIBRILLATION (H): ICD-10-CM

## 2018-11-30 DIAGNOSIS — G47.00 PERSISTENT INSOMNIA: ICD-10-CM

## 2018-11-30 DIAGNOSIS — I10 ESSENTIAL HYPERTENSION WITH GOAL BLOOD PRESSURE LESS THAN 140/90: ICD-10-CM

## 2018-11-30 DIAGNOSIS — I50.43 ACUTE ON CHRONIC COMBINED SYSTOLIC AND DIASTOLIC CONGESTIVE HEART FAILURE (H): Primary | ICD-10-CM

## 2018-11-30 DIAGNOSIS — F41.9 ANXIETY: ICD-10-CM

## 2018-11-30 DIAGNOSIS — Z79.01 CHRONIC ANTICOAGULATION: ICD-10-CM

## 2018-11-30 DIAGNOSIS — G62.9 PERIPHERAL POLYNEUROPATHY: ICD-10-CM

## 2018-11-30 DIAGNOSIS — D64.9 ANEMIA, UNSPECIFIED TYPE: ICD-10-CM

## 2018-11-30 LAB
ANION GAP SERPL CALCULATED.3IONS-SCNC: 6 MMOL/L (ref 3–14)
BUN SERPL-MCNC: 20 MG/DL (ref 7–30)
CALCIUM SERPL-MCNC: 8.3 MG/DL (ref 8.5–10.1)
CHLORIDE SERPL-SCNC: 102 MMOL/L (ref 94–109)
CO2 SERPL-SCNC: 33 MMOL/L (ref 20–32)
CREAT SERPL-MCNC: 0.96 MG/DL (ref 0.66–1.25)
ERYTHROCYTE [DISTWIDTH] IN BLOOD BY AUTOMATED COUNT: 15.1 % (ref 10–15)
GFR SERPL CREATININE-BSD FRML MDRD: 75 ML/MIN/1.7M2
GLUCOSE SERPL-MCNC: 103 MG/DL (ref 70–99)
HCT VFR BLD AUTO: 34.6 % (ref 40–53)
HGB BLD-MCNC: 10.7 G/DL (ref 13.3–17.7)
INR PPP: 2.1
MAGNESIUM SERPL-MCNC: 2.3 MG/DL (ref 1.6–2.3)
MCH RBC QN AUTO: 23.9 PG (ref 26.5–33)
MCHC RBC AUTO-ENTMCNC: 30.9 G/DL (ref 31.5–36.5)
MCV RBC AUTO: 77 FL (ref 78–100)
PLATELET # BLD AUTO: 204 10E9/L (ref 150–450)
POTASSIUM SERPL-SCNC: 3.6 MMOL/L (ref 3.4–5.3)
RBC # BLD AUTO: 4.47 10E12/L (ref 4.4–5.9)
SODIUM SERPL-SCNC: 141 MMOL/L (ref 133–144)
WBC # BLD AUTO: 5.9 10E9/L (ref 4–11)

## 2018-11-30 PROCEDURE — 99495 TRANSJ CARE MGMT MOD F2F 14D: CPT | Performed by: FAMILY MEDICINE

## 2018-11-30 PROCEDURE — 80048 BASIC METABOLIC PNL TOTAL CA: CPT | Performed by: FAMILY MEDICINE

## 2018-11-30 PROCEDURE — 83735 ASSAY OF MAGNESIUM: CPT | Performed by: FAMILY MEDICINE

## 2018-11-30 PROCEDURE — 85027 COMPLETE CBC AUTOMATED: CPT | Performed by: FAMILY MEDICINE

## 2018-11-30 PROCEDURE — 99207 ZZC NO CHARGE NURSE ONLY: CPT

## 2018-11-30 PROCEDURE — 36415 COLL VENOUS BLD VENIPUNCTURE: CPT | Performed by: FAMILY MEDICINE

## 2018-11-30 RX ORDER — LORAZEPAM 1 MG/1
1 TABLET ORAL
COMMUNITY
Start: 2018-11-25 | End: 2018-11-30

## 2018-11-30 RX ORDER — LISINOPRIL 2.5 MG/1
2.5 TABLET ORAL DAILY
Qty: 30 TABLET | Refills: 1 | Status: SHIPPED | OUTPATIENT
Start: 2018-11-30 | End: 2018-12-21

## 2018-11-30 RX ORDER — NORTRIPTYLINE HCL 25 MG
25 CAPSULE ORAL AT BEDTIME
Qty: 30 CAPSULE | Refills: 11 | Status: SHIPPED | OUTPATIENT
Start: 2018-11-30 | End: 2019-12-01

## 2018-11-30 RX ORDER — NORTRIPTYLINE HCL 25 MG
25 CAPSULE ORAL AT BEDTIME
Qty: 30 CAPSULE | Refills: 11 | Status: SHIPPED | OUTPATIENT
Start: 2018-11-30 | End: 2018-11-30

## 2018-11-30 RX ORDER — FUROSEMIDE 40 MG
40 TABLET ORAL
COMMUNITY
Start: 2018-11-25 | End: 2018-12-21

## 2018-11-30 RX ORDER — METOPROLOL SUCCINATE 25 MG/1
25 TABLET, EXTENDED RELEASE ORAL EVERY EVENING
Qty: 90 TABLET | Refills: 1 | Status: SHIPPED | OUTPATIENT
Start: 2018-11-30 | End: 2019-05-13

## 2018-11-30 RX ORDER — PANTOPRAZOLE SODIUM 40 MG/1
40 TABLET, DELAYED RELEASE ORAL
COMMUNITY
Start: 2018-11-25 | End: 2018-12-21

## 2018-11-30 RX ORDER — DOXEPIN HYDROCHLORIDE 10 MG/1
10 CAPSULE ORAL AT BEDTIME
Qty: 30 CAPSULE | Refills: 1 | Status: SHIPPED | OUTPATIENT
Start: 2018-11-30 | End: 2018-12-03

## 2018-11-30 NOTE — NURSING NOTE
"Initial /62  Pulse 96  Temp 97.5  F (36.4  C) (Tympanic)  Resp 16  Ht 5' 6\" (1.676 m)  Wt 162 lb 3.2 oz (73.6 kg)  SpO2 94%  BMI 26.18 kg/m2 Estimated body mass index is 26.18 kg/(m^2) as calculated from the following:    Height as of this encounter: 5' 6\" (1.676 m).    Weight as of this encounter: 162 lb 3.2 oz (73.6 kg). .    Belia Javier, LORETA    "
Abdomen soft, nontender, nondistended, bowel sounds present in all 4 quadrants.

## 2018-11-30 NOTE — PATIENT INSTRUCTIONS
ASSESSMENT:   (I50.43) 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)  (primary encounter diagnosis)  Comment: much improved.    Plan: metoprolol succinate ER (TOPROL-XL) 25 MG 24 hr        tablet, lisinopril (PRINIVIL/ZESTRIL) 2.5 MG         tablet          Continue the furosemide at 40mg twice daily s you have been  Continue the metoprolol as you have been  Add lisinopril 2.5mg once daily for heart failure.    REcheck in a month.     (F41.9) Anxiety  Comment:   Plan: nortriptyline (PAMELOR) 25 MG capsule        Take only one pill at bedtime.  Higher doses can affect heart electrical activity.     (I48.2) Chronic atrial fibrillation (H)    (I10) Essential hypertension with goal blood pressure less than 140/90  Comment: doing well  Plan: metoprolol succinate ER (TOPROL-XL) 25 MG 24 hr        tablet, Basic metabolic panel, Magnesium        No change in current treatment plan.     (G47.00) Persistent insomnia  Comment: lorazepam helped for only a few days.  temazepam in the past helped some.  Both of these can increase risk for falls and affect memory.    Plan: doxepin (SINEQUAN) 10 MG capsule        Stay off both the lorazepam and the temazepam.    Try doxepin 10mg at bedtime instead.  Another alternative is zolpidem.  All these sleeping medications have risks for sedation.     (D64.9) Anemia, unspecified type  Comment:   Plan: CBC with platelets        REcheck today.

## 2018-11-30 NOTE — MR AVS SNAPSHOT
Nabor Cunningham   11/30/2018   Anticoagulation Therapy Visit    Description:  83 year old male   Provider:  Daphne Lamar, RN   Department:  [facility]           INR as of 11/30/2018     Today's INR 2.1      Anticoagulation Summary as of 11/30/2018     INR goal 2.0-3.0   Today's INR 2.1   Full warfarin instructions 11/30: 6 mg; 12/1: 6 mg; 12/2: 4 mg   Next INR check 12/3/2018    Indications   Chronic anticoagulation [Z79.01]  Chronic atrial fibrillation (H) [I48.2]  Long-term (current) use of anticoagulants [Z79.01] [Z79.01]         November 2018 Details    Sun Mon Tue Wed Thu Fri Sat         1               2               3                 4               5               6               7               8               9               10                 11               12               13               14               15               16               17                 18               19               20               21               22               23               24                 25               26               27               28               29               30      6 mg   See details        Date Details   11/30 This INR check               How to take your warfarin dose     To take:  6 mg Take 3 of the 2 mg tablets.           December 2018 Details    Sun Mon Tue Wed Thu Fri Sat           1      6 mg           2      4 mg         3            4               5               6               7               8                 9               10               11               12               13               14               15                 16               17               18               19               20               21               22                 23               24               25               26               27               28               29                 30               31                     Date Details   No additional details    Date of next INR:  12/3/2018          How to take your warfarin dose     To take:  4 mg Take 2 of the 2 mg tablets.    To take:  6 mg Take 3 of the 2 mg tablets.

## 2018-11-30 NOTE — MR AVS SNAPSHOT
After Visit Summary   11/30/2018    Nabor Cunningham    MRN: [id number]           Patient Information     Date Of Birth          1935        Visit Information        Provider Department      11/30/2018 9:00 AM Heron Mayo MD [facility]        Today's Diagnoses     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)    -  1    Anxiety        Chronic atrial fibrillation (H)        Essential hypertension with goal blood pressure less than 140/90        Persistent insomnia        Anemia, unspecified type          Care Instructions    ASSESSMENT:   (I50.43) 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)  (primary encounter diagnosis)  Comment: much improved.    Plan: metoprolol succinate ER (TOPROL-XL) 25 MG 24 hr        tablet, lisinopril (PRINIVIL/ZESTRIL) 2.5 MG         tablet          Continue the furosemide at 40mg twice daily s you have been  Continue the metoprolol as you have been  Add lisinopril 2.5mg once daily for heart failure.    REcheck in a month.     (F41.9) Anxiety  Comment:   Plan: nortriptyline (PAMELOR) 25 MG capsule        Take only one pill at bedtime.  Higher doses can affect heart electrical activity.     (I48.2) Chronic atrial fibrillation (H)    (I10) Essential hypertension with goal blood pressure less than 140/90  Comment: doing well  Plan: metoprolol succinate ER (TOPROL-XL) 25 MG 24 hr        tablet, Basic metabolic panel, Magnesium        No change in current treatment plan.     (G47.00) Persistent insomnia  Comment: lorazepam helped for only a few days.  temazepam in the past helped some.  Both of these can increase risk for falls and affect memory.    Plan: doxepin (SINEQUAN) 10 MG capsule        Stay off both the lorazepam and the temazepam.    Try doxepin 10mg at bedtime instead.  Another alternative is zolpidem.  All these sleeping medications have risks for sedation.     (D64.9)  "Anemia, unspecified type  Comment:   Plan: CBC with platelets        REcheck today.          Follow-ups after your visit        Follow-up notes from your care team     Return in about 1 month (around 12/30/2018).      Your next 10 appointments already scheduled     Jan 11, 2019  8:40 AM CST   [facility] with Heron [facility] MD   [facility]    [address] [place] [id number] [phone]            Jan 17, 2019  8:45 AM CST   New Visit with Kae Rodriguez MD   [facility]    [address] [facility] [address] [phone]              Who to contact     If you have questions or need follow up information about today's clinic visit or your schedule please contact [facility] directly at [phone].  Normal or non-critical lab and imaging results will be communicated to you by MyChart, letter or phone within 4 business days after the clinic has received the results. If you do not hear from us within 7 days, please contact the clinic through MyChart or phone. If you have a critical or abnormal lab result, we will notify you by phone as soon as possible.  Submit refill requests through eyeOS or call your pharmacy and they will forward the refill request to us. Please allow 3 business days for your refill to be completed.          Additional Information About Your Visit        Care EveryWhere ID     This is your Care EveryWhere ID. This could be used by other organizations to access your [place] medical records  [id number]        Your Vitals Were     Pulse Temperature Respirations Height Pulse Oximetry BMI (Body Mass Index)    96 97.5  F (36.4  C) (Tympanic) 16 5' 6\" (1.676 m) 94% 26.18 kg/m2       Blood Pressure from Last 3 Encounters:   11/30/18 107/62   11/19/18 120/66   11/10/18 127/74    Weight from Last 3 Encounters:   11/30/18 162 lb 3.2 oz (73.6 kg) "   11/19/18 179 lb (81.2 kg)   11/10/18 179 lb (81.2 kg)              We Performed the Following     Basic metabolic panel     CBC with platelets     Magnesium          Today's Medication Changes          These changes are accurate as of 11/30/18 10:18 AM.  If you have any questions, ask your nurse or doctor.               Start taking these medicines.        Dose/Directions    doxepin 10 MG capsule   Commonly known as:  SINEquan   Used for:  Persistent insomnia   Started by:  Heron Mayo MD        Dose:  10 mg   Take 1 capsule (10 mg) by mouth At Bedtime For sleep   Quantity:  30 capsule   Refills:  1       lisinopril 2.5 MG tablet   Commonly known as:  PRINIVIL/Zestril   Used for:  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)   Started by:  Heron Mayo MD        Dose:  2.5 mg   Take 1 tablet (2.5 mg) by mouth daily   Quantity:  30 tablet   Refills:  1         These medicines have changed or have updated prescriptions.        Dose/Directions    nortriptyline 25 MG capsule   Commonly known as:  PAMELOR   This may have changed:    - how much to take  - how to take this  - when to take this   Used for:  Anxiety   Changed by:  Heron Mayo MD        Dose:  25 mg   Take 1 capsule (25 mg) by mouth At Bedtime TAKE ONE CAPSULE MID MORNING AND TWO CAPSULES BY MOUTH DAILY AT BEDTIME   Quantity:  30 capsule   Refills:  11         Stop taking these medicines if you haven't already. Please contact your care team if you have questions.     LORazepam 1 MG tablet   Commonly known as:  ATIVAN   Stopped by:  Heron Mayo MD           temazepam 30 MG capsule   Commonly known as:  RESTORIL   Stopped by:  Heron Mayo MD                Where to get your medicines      These medications were sent to [id number] [place], [place] - [address] [facility]  [address] [facility] [address]    Hours:  Closed 10-16-08 business to [facility] Phone:  [phone]      doxepin 10 MG capsule    lisinopril 2.5 MG tablet    metoprolol succinate ER 25 MG 24 hr tablet    nortriptyline 25 MG capsule                Primary Care Provider Office Phone # Fax #    Heron Mayo [phone] [address] [place] [address]        Goals        General    Psychosocial (pt-stated)     Notes - Note created  11/28/2018 11:09 AM by Rani Sarkar LSW    Goal Statement: pt to remain safe in his own home as long as possible, safe meaning no injuries from falls or avoidable health issues.  Measure of Success: no falls with injuries or avoidable health issues  Supportive Steps to Achieve: home care, , family  Barriers: age, health  Strengths: supportive family  Date to Achieve By: 6-30-19  Patient expressed understanding of goal: yes            Equal Access to Services     SHIRA DELEON : Hadii kenzie williamson Sowojciech, waaxda luqadaha, qaybta kaalmada adeegyada, lili hernandez . So [facility] [phone].    ATENCIÓN: Si habla español, tiene a garcia disposición servicios gratuitos de asistencia lingüística. Llame al [phone].    We comply with applicable federal civil rights laws and [place] laws. We do not discriminate on the basis of race, color, national origin, age, disability, sex, sexual orientation, or gender identity.            Thank you!     Thank you for choosing [facility]  for your care. Our goal is always to provide you with excellent care. Hearing back from our patients is one way we can continue to improve our services. Please take a few minutes to complete the written survey that you may receive in the mail after your visit with us. Thank you!             Your Updated Medication List - Protect others around you: Learn how to safely use, store and throw away your medicines at www.disposemymeds.org.          This list is accurate as of 11/30/18 10:18 AM.  Always use your most  recent med list.                   Brand Name Dispense Instructions for use Diagnosis    ACETAMINOPHEN PO      Take 1,000 mg by mouth every 6 hours as needed for pain        betamethasone dipropionate 0.05 % external cream    DIPROSONE    45 g    Apply topically 2 times daily For leg rash    Eczema, unspecified type       calcium carbonate 500 MG tablet    OS-ALY     Take 1 tablet by mouth 2 times daily        colchicine 0.6 MG tablet    COLCRYS    30 tablet    Take 1 tablet (0.6 mg) by mouth daily    Chronic gout, unspecified cause, unspecified site       COUMADIN PO      Take by mouth daily Take as directed per INR results        doxepin 10 MG capsule    SINEquan    30 capsule    Take 1 capsule (10 mg) by mouth At Bedtime For sleep    Persistent insomnia       ferrous gluconate 324 (38 Fe) MG tablet    FERGON    100 tablet    Take 1 tablet (324 mg) by mouth daily    Low iron       * furosemide 40 MG tablet    LASIX    60 tablet    Take 2 tablets (80 mg) by mouth daily    Peripheral edema, Combined systolic and diastolic congestive heart failure, unspecified HF chronicity (H)       * furosemide 40 MG tablet    LASIX     Take 40 mg by mouth        HYDROcodone-acetaminophen 5-325 MG tablet    NORCO    20 tablet    Take 1-2 tablets by mouth every 6 hours as needed    Post-op pain       hypromellose 0.4 % Soln ophthalmic solution    ARTIFICIAL TEARS     Place 1 drop into both eyes every hour as needed for dry eyes        lisinopril 2.5 MG tablet    PRINIVIL/Zestril    30 tablet    Take 1 tablet (2.5 mg) by mouth daily    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)       metoprolol succinate ER 25 MG 24 hr tablet    TOPROL-XL    90 tablet    Take 1 tablet (25 mg) by mouth every evening    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), Essential hypertension with goal blood pressure less than 140/90       mineral oil liquid      Take 5 mLs by mouth daily         Multi-vitamin tablet     100 tablet    Take 1 tablet by mouth daily        nortriptyline 25 MG capsule    PAMELOR    30 capsule    Take 1 capsule (25 mg) by mouth At Bedtime TAKE ONE CAPSULE MID MORNING AND TWO CAPSULES BY MOUTH DAILY AT BEDTIME    Anxiety       pantoprazole 40 MG EC tablet    PROTONIX     Take 40 mg by mouth        prednisoLONE acetate 1 % ophthalmic suspension    PRED FORTE     Place 1 drop Into the left eye 2 times daily        vitamin B-12 1000 MCG tablet    CYANOCOBALAMIN    30 tablet    TAKE ONE TABLET BY MOUTH DAILY    Vitamin B 12 deficiency       * Notice:  This list has 2 medication(s) that are the same as other medications prescribed for you. Read the directions carefully, and ask your doctor or other care provider to review them with you.

## 2018-11-30 NOTE — PROGRESS NOTES
SUBJECTIVE:   Nabor Cunningham is a 83 year old male who presents to clinic today for the following health issues:  Chief Complaint   Patient presents with     Edema        SUBJECTIVE:   Nabor Cunningham is a 83 year old male who presents to clinic today for the following health issues:        Hospital Follow-up Visit:    Hospital/Nursing Home/IP Rehab Facility: [place], [place]  Date of Admission: 11/22/2018  Date of Discharge: 11/25/21018  Reason(s) for Admission: Acute chronic systolic CHF            Problems taking medications regularly:  None       Medication changes since discharge: None       Problems adhering to non-medication therapy:  None    Summary of hospitalization:  See outside records, reviewed and scanned  Diagnostic Tests/Treatments reviewed.  Follow up needed: chem8  Other Healthcare Providers Involved in Patient s Care:         cardiology  Update since discharge: improved.     Post Discharge Medication Reconciliation: discharge medications reconciled, continue medications without change.  Plan of care communicated with patient and son     Coding guidelines for this visit:  Type of Medical   Decision Making Face-to-Face Visit       within 7 Days of discharge Face-to-Face Visit        within 14 days of discharge   Moderate Complexity [id number]   High Complexity [id number]          Medication Followup of  ferrous gluconate 325mg once daily    Taking Medication as prescribed: yes    Side Effects:  None    Medication Helping Symptoms:  yes       Medication Followup of Lortazepam    Taking Medication as prescribed: yes    Side Effects:  Bad dreams    Medication Helping Symptoms:  yes     Hospitalized 11/22-11/25 in [place] for congestive heart failure.  He had weight gain, edema and dyspnea on exertion.   Treated with IV furosemide.    His nortriptyline was decreased due to longer QT.      He feels like he has been doing well.  Breathing is much improved.  No wheezing.  Not having  "dyspnea on exertion.   Energy still low.   Leg swelling has disappeared.    Neuropathy seems improved with decreased swelling.  Has less tingling in legs.   He has been off temazepam.   He was given lorazepam in the hospital which worked better.  The benefits of this medication decreased over time.      Memory seems not as good.  Some confusion at times.  Has called his son at odd hours like during the night.     Living in his apartment.  He gets meals 5 days a week.     Patient Active Problem List   Diagnosis     Anxiety     Alcohol abuse     Chronic atrial fibrillation (H)     Weight loss     Peripheral polyneuropathy     Chronic gout, unspecified cause, unspecified site     Essential hypertension with goal blood pressure less than 140/90     Hyperlipidemia LDL goal <130     Persistent insomnia     Lower urinary tract symptoms (LUTS)     Chronic anticoagulation     Long-term (current) use of anticoagulants [Z79.01]     Moderate episode of recurrent major depressive disorder (H)     Pneumonia     Bleeding from wound     Postoperative hemorrhage involving circulatory system following non-circulatory system procedure     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)      ROS:General: POSITIVE for:, low energy.   CV: No chest pains or palpitations  GI: No nausea, vomiting,  heartburn, abdominal pain, diarrhea, constipation or change in bowel habits.  Stomach was \"hard\" when he had the swelling.  Improved.   : No urinary frequency or dysuria, bladder or kidney problems  Musculoskeletal: No significant muscle or joint pains.  No gout recently.   Psychiatric: No problems with anxiety, depression or mental health.     OBJECTIVE:Blood pressure 107/62, pulse 96, temperature 97.5  F (36.4  C), temperature source Tympanic, resp. rate 16, height 5' 6\" (1.676 m), weight 162 lb 3.2 oz (73.6 kg), SpO2 94 %. BMI=Body mass index is 26.18 kg/(m^2).  GENERAL APPEARANCE ADULT: Alert, no acute " distress  NECK: No adenopathy,masses or thyromegaly  RESP: lungs clear to auscultation   CV: irregular rhythm-irregularly irregular, rate-normal, no murmur  ABDOMEN: soft, no organomegaly, masses or tenderness  MS: He has 1+ edema bilateral.      ASSESSMENT:   (I50.43) 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)  (primary encounter diagnosis)  Comment: much improved.    Plan: metoprolol succinate ER (TOPROL-XL) 25 MG 24 hr        tablet, lisinopril (PRINIVIL/ZESTRIL) 2.5 MG         tablet          Continue the furosemide at 40mg twice daily s you have been  Continue the metoprolol as you have been  Add lisinopril 2.5mg once daily for heart failure.    REcheck in a month.     (F41.9) Anxiety  Comment:   Plan: nortriptyline (PAMELOR) 25 MG capsule        Take only one pill at bedtime.  Higher doses can affect heart electrical activity.     (I48.2) Chronic atrial fibrillation (H)    (I10) Essential hypertension with goal blood pressure less than 140/90  Comment: doing well  Plan: metoprolol succinate ER (TOPROL-XL) 25 MG 24 hr        tablet, Basic metabolic panel, Magnesium        No change in current treatment plan.     (G47.00) Persistent insomnia  Comment: lorazepam helped for only a few days.  temazepam in the past helped some.  Both of these can increase risk for falls and affect memory.    Plan: doxepin (SINEQUAN) 10 MG capsule        Stay off both the lorazepam and the temazepam.    Try doxepin 10mg at bedtime instead.  Another alternative is zolpidem.  All these sleeping medications have risks for sedation.     (D64.9) Anemia, unspecified type  Comment:   Plan: CBC with platelets        REcheck today.          ==================================================  See abstracted notes from recent hospital stay below.:  Herbert Eid MD - 11/25/2018 8:51 AM CST  Formatting of this note might be different from the original.  11/25/2018 HOSPITAL DISCHARGE SUMMARY Herbert Eid,  MD    Patient Name: Nabor Cunningham MRN: [id number]   YOB: 1935 Age: 83 year old   Primary Physician: Heron Mayo MD Phone: [phone]   Admitting Physician: ROZINA Ghotra Admission Date:11/22/2018   Discharging Physician: Herbert Eid MD Discharge Date: 11/25/18      Discharge Diagnoses:   Principal Problem:  Acute on chronic systolic CHF (congestive heart failure) (HCC)  Active Problems:  Atrial fibrillation (HCC)  Anxiety disorder  Essential hypertension  Hyponatremia  Chronic anemia  Resolved Problems:  * No resolved hospital problems. *  R upper back surgical wound  Hyponatremia  Consultants:   IP CONSULT WOUND/OSTOMY  PHARMACY TO DOSE MEDICATION    Hospital Course:   Nabor Cunningham is a 83 year old male who had his furosemide increase to 80 mg per day about two days prior to hospitalization who was admitted with CHF exacerbation. He improved gradually over a couple days with diuresis with lasix 40 mg IV BID. His weight improved and his swelling improved to near baseline and he reports his breathing is near baseline at discharge.       Acute on chronic systolic CHF. See ECHO below (from October). He improved with diuresis - WE WILL INCREASE DIURETIC TO FUROSEMIDE 40 MG PO BID, MAY NEED ADJUSTMENT BY DR. MAYO. DETAILED INSTRUCTIONS PROVIDED TO PATIENT ABOUT SALT INTAKE, DAILY WEIGHTS. Should consider ace inhibitor or arb, but not implemented during hospitalization. Stress test Dec. 6, ordered by Dr. Ford per patient, can proceed with that test.       Hyponatremia. Resolved. Should have BMP with Dr. Mayo on 11-30-18.      Chronic anemia. Hgb reached joshua of 8.9, now improved to 9.8, work up as outpatient with Dr. Mayo. No bleeding, but on coumadin.     Anticoagulation. INR 2.4 day of discharge, pharmacy to provide recommendations for coumadin, follow-up INR.      Anxiety disorder. Decreased nortriptyline to 25 mg poqhs. Stay on that dose for now. This was due to long QT on EKG.       Insomnia. Was on restoril, did much better with lorazepam 1 mg poqhs, will prescribe this for 7 days, further refills per Dr. Mayo. Maybe he can get by on monotherapy with nortriptyline at bedtime?     GI prophylaxis. Restarted pantoprazole due to anemia, may be discontinued in near future.      Wound on R side of back. Continue packing every three days per wound care recs. [facility] to change starting tomorrow, son will notify them. Should have follow-up with surgery as scheduled.      Social: Has home care, meals on wheels and  consult on 11-27-18.      Other med issues: For neuropathy, consider gabapentin.      ECHO: (October 2018)  Interpretation Summary   The left ventricle is normal size.   There is mild global hypokinesis of the left ventricle.   Abnormal septal motion secondary to pacemaker activity.   Compared to the prior echo, left ventricular systolic function is decreased.   There is left ventricular diastolic dysfunction.   The right ventricle is mildly enlarged and systolic function is mildly reduced.   Severe biatrial enlargement.   Moderate mitral valve annular calcification.   Mild mitral regurgitation.   Mild tricuspid regurgitation.   Inferior vena cava size normal and collapsibility < 50% indicating mildly elevated right atrial pressure (8 mm Hg).   Calculated RV systolic pressure was estimated at 47 mm Hg; mild pulmonary hypertension.   The aortic root is normal in size for body surface area.   Proximal ascending aorta is moderately enlarged measuring 4.4 cm in diameter increased from 4.2 cm on 2016 exam.

## 2018-11-30 NOTE — PROGRESS NOTES
ANTICOAGULATION FOLLOW-UP CLINIC VISIT    Patient Name:  Nabor Cunningham  Date:  11/30/2018  Contact Type:  Telephone/ [facility]    SUBJECTIVE:     Patient Findings     Comments No changes in medications, activity, or diet noted. No bleeding or increased bruising noted. Took warfarin as prescribed.  Writer instructed patient to take 6 mg today and tomorrow then 4 mg on Sunday.   Recheck INR in 3 days.             OBJECTIVE    INR   Date Value Ref Range Status   11/30/2018 2.1  Final       ASSESSMENT / PLAN  INR assessment THER    Recheck INR In: 3 DAYS    INR Location [facility]      Anticoagulation Summary as of 11/30/2018     INR goal 2.0-3.0   Today's INR 2.1   Warfarin maintenance plan No maintenance plan   Full warfarin instructions 11/30: 6 mg; 12/1: 6 mg; 12/2: 4 mg   Weekly warfarin total 38 mg   Plan last modified Ada Isaac RN (10/15/2018)   Next INR check 12/3/2018   Priority INR   Target end date Indefinite    Indications   Chronic anticoagulation [Z79.01]  Chronic atrial fibrillation (H) [I48.2]  Long-term (current) use of anticoagulants [Z79.01] [Z79.01]         Anticoagulation Episode Summary     INR check location     Preferred lab     Send INR reminders to [place] [facility] POOL    Comments * 2mg tabs. [facility] MWF      Anticoagulation Care Providers     Provider Role Specialty Phone number    Heron Mayo MD [facility] [phone]            See the Encounter Report to view Anticoagulation Flowsheet and Dosing Calendar (Go to Encounters tab in chart review, and find the Anticoagulation Therapy Visit)        Daphne Lamar RN

## 2018-12-01 NOTE — PROGRESS NOTES
Please call son.  Minor changes in chemistries.  Magnesium is OK.   Anemia (hemoglobin) has improved.    PLAN: No new changes in treatment recommended..

## 2018-12-03 ENCOUNTER — TELEPHONE (OUTPATIENT)
Dept: CARE COORDINATION | Facility: CLINIC | Age: 83
End: 2018-12-03

## 2018-12-03 ENCOUNTER — TELEPHONE (OUTPATIENT)
Dept: FAMILY MEDICINE | Facility: CLINIC | Age: 83
End: 2018-12-03

## 2018-12-03 ENCOUNTER — ANTICOAGULATION THERAPY VISIT (OUTPATIENT)
Dept: ANTICOAGULATION | Facility: CLINIC | Age: 83
End: 2018-12-03
Payer: MEDICARE

## 2018-12-03 DIAGNOSIS — Z79.01 CHRONIC ANTICOAGULATION: ICD-10-CM

## 2018-12-03 DIAGNOSIS — I48.20 CHRONIC ATRIAL FIBRILLATION (H): ICD-10-CM

## 2018-12-03 DIAGNOSIS — G47.00 PERSISTENT INSOMNIA: ICD-10-CM

## 2018-12-03 LAB — INR PPP: 2.7

## 2018-12-03 PROCEDURE — 99207 ZZC NO CHARGE NURSE ONLY: CPT

## 2018-12-03 RX ORDER — TEMAZEPAM 30 MG
CAPSULE ORAL
Qty: 30 CAPSULE | Refills: 0 | Status: SHIPPED | OUTPATIENT
Start: 2018-12-03 | End: 2019-02-15 | Stop reason: ALTCHOICE

## 2018-12-03 RX ORDER — DOXEPIN 3 MG/1
3-6 TABLET, FILM COATED ORAL
Qty: 60 TABLET | Refills: 5 | Status: SHIPPED | OUTPATIENT
Start: 2018-12-03 | End: 2018-12-21

## 2018-12-03 NOTE — TELEPHONE ENCOUNTER
S: home care visit completed on 12/3/2018  B: patient is seen 3x weekly for wound are and INRs, patient was in clinic on Friday to see Dr. Mayo  A: Patient has had a really rough 3 days since he started taking doxepin.  Patient takes at 8pm and does fall asleep but then wakes up at midnight in a panic, he has been calling family all hours of the night, patient tearful and shaky during visit. Patient feels his heart racing and cannot find a comfortable position to sit or lay.  Patient trembling talking about how the last 3 days have been the worse he can remember.  Patient says I've tried deep breathing and thinking good thoughts but I can't do that anymore.  VS bp120/62, p106, r18 t96.9 02 95 percent. Weight 162 holding steady.  Patient feels the ativan has been the only thing that has really helped his symptoms.   R: Do you have any other options for patient, he is on the verge of a panic attack.    Thanks  Marge Watters RN Case Manager  [facility]

## 2018-12-03 NOTE — TELEPHONE ENCOUNTER
I was unable to reach Lexy.  I did speak with Nabor.   He has been on temazepam in the past which has helped some.  He tried lorazepam in the hospital which worked initially, then not helping.   PLAN: I suggested going back on the temazepam.  It does not linger as long in his system as the lorazepam.  I sent prescription to [facility].    Consider sleep consultation if continuing problems.    zolpidem would be an option.   Another option is antipsychotic like quetiapine (Seroquel) but again, significant side effects.   VICTOR HUGO ART MD

## 2018-12-03 NOTE — PROGRESS NOTES
ANTICOAGULATION FOLLOW-UP CLINIC VISIT    Patient Name:  Nabor Cunningham  Date:  12/3/2018  Contact Type:  Telephone/ [facility]    SUBJECTIVE:     Patient Findings     Positives Change in medications (doxepin started over the weekend due to anxiety. It is not working at all for the patient, he has been very anxious), No Problem Findings    Comments Patient had 32mg in the previous 7 days, will adjust dose to keep weekly mg total the same by the next INR check on Friday.    Homecare nurse has a telephone call into PCP about patient's anxiety.            OBJECTIVE    INR   Date Value Ref Range Status   12/03/2018 2.7  Final       ASSESSMENT / PLAN  No question data found.  Anticoagulation Summary as of 12/3/2018     INR goal 2.0-3.0   Today's INR 2.7   Warfarin maintenance plan No maintenance plan   Full warfarin instructions 12/3: 4 mg; 12/4: 4 mg; 12/5: 4 mg; 12/6: 4 mg   Weekly warfarin total 38 mg   Plan last modified Ada Isaac RN (10/15/2018)   Next INR check 12/7/2018   Priority INR   Target end date Indefinite    Indications   Chronic anticoagulation [Z79.01]  Chronic atrial fibrillation (H) [I48.2]  Long-term (current) use of anticoagulants [Z79.01] [Z79.01]         Anticoagulation Episode Summary     INR check location     Preferred lab     Send INR reminders to [place] [facility] POOL    Comments * 2mg tabs. [facility] MWF      Anticoagulation Care Providers     Provider Role Specialty Phone number    Heron Mayo MD [facility] [phone]            See the Encounter Report to view Anticoagulation Flowsheet and Dosing Calendar (Go to Encounters tab in chart review, and find the Anticoagulation Therapy Visit)        Ada Isaac RN [facility]

## 2018-12-03 NOTE — MR AVS SNAPSHOT
Nabor Cunningham   12/3/2018   Anticoagulation Therapy Visit    Description:  83 year old male   Provider:  Ada Isaac, RN   Department:  [facility]           INR as of 12/3/2018     Today's INR 2.7      Anticoagulation Summary as of 12/3/2018     INR goal 2.0-3.0   Today's INR 2.7   Full warfarin instructions 12/3: 4 mg; 12/4: 4 mg; 12/5: 4 mg; 12/6: 4 mg   Next INR check 12/7/2018    Indications   Chronic anticoagulation [Z79.01]  Chronic atrial fibrillation (H) [I48.2]  Long-term (current) use of anticoagulants [Z79.01] [Z79.01]         December 2018 Details    Sun Mon Tue Wed Thu Fri Sat           1                 2               3      4 mg   See details      4      4 mg         5      4 mg         6      4 mg         7            8                 9               10               11               12               13               14               15                 16               17               18               19               20               21               22                 23               24               25               26               27               28               29                 30               31                     Date Details   12/03 This INR check       Date of next INR:  12/7/2018         How to take your warfarin dose     To take:  4 mg Take 2 of the 2 mg tablets.

## 2018-12-03 NOTE — TELEPHONE ENCOUNTER
Silenor 10mg is not covered (high risk).  Preferred are the Silenor 3 and 6mg.  Please fax new rx if OK to change.

## 2018-12-05 RX ORDER — LORAZEPAM 1 MG/1
1 TABLET ORAL
Qty: 30 TABLET | Status: CANCELLED | OUTPATIENT
Start: 2018-12-05

## 2018-12-05 NOTE — TELEPHONE ENCOUNTER
Homecare RN called. States the last 2 nights pt is taking Doxepin. He is sleeping poorly yet. He has not trie d Restoril yet. Advised nurse to hold the Doxepin as it is prn and try the restoril the next few night and see if that helps. Romelia Bertrand RN

## 2018-12-05 NOTE — TELEPHONE ENCOUNTER
Controlled Substance Refill Request for LORazepam (ATIVAN) 1 MG tablet (Discontinued)  Problem List Complete:  Yes   checked in past 3 months?  No, route to RN    LORazepam (ATIVAN) 1 MG tablet (Discontinued)   11/25/2018 11/30/2018 --   Sig - Route: Take 1 mg by mouth - Oral   Class: Historical   Order: [id number]

## 2018-12-07 ENCOUNTER — ANTICOAGULATION THERAPY VISIT (OUTPATIENT)
Dept: ANTICOAGULATION | Facility: CLINIC | Age: 83
End: 2018-12-07
Payer: MEDICARE

## 2018-12-07 DIAGNOSIS — Z79.01 CHRONIC ANTICOAGULATION: ICD-10-CM

## 2018-12-07 DIAGNOSIS — I48.20 CHRONIC ATRIAL FIBRILLATION (H): ICD-10-CM

## 2018-12-07 LAB — INR PPP: 2.2 (ref 0.9–1.1)

## 2018-12-07 PROCEDURE — 99207 ZZC NO CHARGE NURSE ONLY: CPT

## 2018-12-07 NOTE — PROGRESS NOTES
ANTICOAGULATION FOLLOW-UP CLINIC VISIT    Patient Name:  Nabor Cunningham  Date:  12/7/2018  Contact Type:  Telephone/ spoke with Marge at  Home Care    SUBJECTIVE:     Patient Findings     Positives Other complaints (declining weight)    Comments Per Marge RN with home care, the pt is steadily losing weight. He is eating okay but is also taking large doses of lasix for edema and CHF. No other changes or concerns. Will have INR checked on Wed of next week.            OBJECTIVE    INR   Date Value Ref Range Status   12/07/2018 2.2 (A) 0.9 - 1.1 Final       ASSESSMENT / PLAN  INR assessment THER    Recheck INR In: 5 DAYS    INR Location [facility]      Anticoagulation Summary as of 12/7/2018     INR goal 2.0-3.0   Today's INR 2.2   Warfarin maintenance plan No maintenance plan   Full warfarin instructions 12/7: 6 mg; 12/8: 4 mg; 12/9: 4 mg; 12/10: 4 mg; 12/11: 4 mg   Weekly warfarin total 38 mg   Plan last modified Ada Isaac RN (10/15/2018)   Next INR check 12/12/2018   Priority INR   Target end date Indefinite    Indications   Chronic anticoagulation [Z79.01]  Chronic atrial fibrillation (H) [I48.2]  Long-term (current) use of anticoagulants [Z79.01] [Z79.01]         Anticoagulation Episode Summary     INR check location     Preferred lab     Send [place] reminders to [place] [facility]    Comments * 2mg tabs.  [facility]      Anticoagulation Care Providers     Provider Role Specialty Phone number    Heron Mayo MD [facility] [phone]            See the Encounter Report to view Anticoagulation Flowsheet and Dosing Calendar (Go to Encounters tab in chart review, and find the Anticoagulation Therapy Visit)        Belia Jones RN

## 2018-12-07 NOTE — MR AVS SNAPSHOT
Nabor Cunningham   12/7/2018   Anticoagulation Therapy Visit    Description:  83 year old male   Provider:  Belia Jones, RN   Department:  [place] [facility]           INR as of 12/7/2018     Today's INR 2.2      Anticoagulation Summary as of 12/7/2018     INR goal 2.0-3.0   Today's INR 2.2   Full warfarin instructions 12/7: 6 mg; 12/8: 4 mg; 12/9: 4 mg; 12/10: 4 mg; 12/11: 4 mg   Next INR check 12/12/2018    Indications   Chronic anticoagulation [Z79.01]  Chronic atrial fibrillation (H) [I48.2]  Long-term (current) use of anticoagulants [Z79.01] [Z79.01]         December 2018 Details    Sun Mon Tue Wed Thu Fri Sat           1                 2               3               4               5               6               7      6 mg   See details      8      4 mg           9      4 mg         10      4 mg         11      4 mg         12            13               14               15                 16               17               18               19               20               21               22                 23               24               25               26               27               28               29                 30               31                     Date Details   12/07 This INR check       Date of next INR:  12/12/2018         How to take your warfarin dose     To take:  4 mg Take 2 of the 2 mg tablets.    To take:  6 mg Take 3 of the 2 mg tablets.

## 2018-12-11 ENCOUNTER — TELEPHONE (OUTPATIENT)
Dept: FAMILY MEDICINE | Facility: CLINIC | Age: 83
End: 2018-12-11

## 2018-12-11 NOTE — TELEPHONE ENCOUNTER
[facility] requesting a refill on Lorazepam 1mg qty 7 last fill 11/25/18.     FYI:     Temazepam and Silenor  was approved 12/3/18.

## 2018-12-12 ENCOUNTER — ANTICOAGULATION THERAPY VISIT (OUTPATIENT)
Dept: ANTICOAGULATION | Facility: CLINIC | Age: 83
End: 2018-12-12
Payer: MEDICARE

## 2018-12-12 DIAGNOSIS — Z79.01 CHRONIC ANTICOAGULATION: ICD-10-CM

## 2018-12-12 DIAGNOSIS — I48.20 CHRONIC ATRIAL FIBRILLATION (H): ICD-10-CM

## 2018-12-12 LAB — INR PPP: 2.3 (ref 2–3)

## 2018-12-12 PROCEDURE — 99207 ZZC NO CHARGE NURSE ONLY: CPT

## 2018-12-12 NOTE — PROGRESS NOTES
ANTICOAGULATION FOLLOW-UP CLINIC VISIT    Patient Name:  Nabor Cunningham  Date:  2018  Contact Type:  Telephone/ [facility]    SUBJECTIVE:     Patient Findings     Positives:   No Problem Findings    Comments:   No changes in medications, activity, or diet noted. No bleeding or increased bruising noted. Took warfarin as prescribed.  Patient had 30 mg in the last 7 days. Writer adjusted dose so patient will have 30 mg by the next INR.  Recheck INR in 1 week.              OBJECTIVE    INR   Date Value Ref Range Status   2018 2.3 2.0 - 3.0 Final       ASSESSMENT / PLAN  INR assessment THER    Recheck INR In: 1 WEEK    INR Location [facility]      Anticoagulation Summary  As of 2018    INR goal:   2.0-3.0   TTR:   60.6 % (1.7 y)   INR used for dosin.3 (2018)   Warfarin maintenance plan:   No maintenance plan   Full warfarin instructions:   : 4 mg; : 4 mg; : 6 mg; 12/15: 4 mg; : 4 mg; : 4 mg; : 4 mg   Weekly warfarin total:   38 mg   Plan last modified:   Ada Isaac RN (10/15/2018)   Next INR check:   2018   Priority:   INR   Target end date:   Indefinite    Indications    Chronic anticoagulation [Z79.01]  Chronic atrial fibrillation (H) [I48.2]  Long-term (current) use of anticoagulants [Z79.01] [Z79.01]             Anticoagulation Episode Summary     INR check location:       Preferred lab:       Send INR reminders to:   [place] [facility] POOL    Comments:   * 2mg tabs. [facility]      Anticoagulation Care Providers     Provider Role Specialty Phone number    Heron Mayo MD [facility] [phone]            See the Encounter Report to view Anticoagulation Flowsheet and Dosing Calendar (Go to Encounters tab in chart review, and find the Anticoagulation Therapy Visit)        Daphne Lamar RN

## 2018-12-19 ENCOUNTER — ANTICOAGULATION THERAPY VISIT (OUTPATIENT)
Dept: ANTICOAGULATION | Facility: CLINIC | Age: 83
End: 2018-12-19
Payer: MEDICARE

## 2018-12-19 DIAGNOSIS — Z79.01 CHRONIC ANTICOAGULATION: ICD-10-CM

## 2018-12-19 DIAGNOSIS — I48.20 CHRONIC ATRIAL FIBRILLATION (H): ICD-10-CM

## 2018-12-19 LAB — INR PPP: 2.8

## 2018-12-19 PROCEDURE — 99207 ZZC NO CHARGE NURSE ONLY: CPT

## 2018-12-19 NOTE — PROGRESS NOTES
18    Iveth from [facility] contacted [facility]. Patient has blood in the corner of his left eye. This just occurred today. Patient does put drops in the left eye. It is possible he hit his eye when he placed the drops, but the patient does not recall. Writer suggested to have family observe it over the weekend and if it appears to get worse, to go to urgent care.     Daphne Lamar RN, BSN, PHN  Anticoagulation Clinic   [phone]        ANTICOAGULATION FOLLOW-UP CLINIC VISIT    Patient Name:  Nabor Cunningham  Date:  2018  Contact Type:  Telephone/ [facility]    SUBJECTIVE:     Patient Findings     Positives:   No Problem Findings    Comments:   Patient had 30mg in the previous 7 days, will adjust maintenance dose to keep weekly mg total the same by the next INR check in 1 week.     No changes in medications, diet, or activity noted. Took warfarin as instructed, denies any missed doses.            OBJECTIVE    INR   Date Value Ref Range Status   2018 2.8  Final       ASSESSMENT / PLAN  No question data found.  Anticoagulation Summary  As of 2018    INR goal:   2.0-3.0   TTR:   61.0 % (1.7 y)   INR used for dosin.8 (2018)   Warfarin maintenance plan:   6 mg (2 mg x 3) every Fri; 4 mg (2 mg x 2) all other days   Full warfarin instructions:   6 mg every Fri; 4 mg all other days   Weekly warfarin total:   30 mg   Plan last modified:   Ada Isaac RN (2018)   Next INR check:   2018   Priority:   INR   Target end date:   Indefinite    Indications    Chronic anticoagulation [Z79.01]  Chronic atrial fibrillation (H) [I48.2]  Long-term (current) use of anticoagulants [Z79.01] [Z79.01]             Anticoagulation Episode Summary     INR check location:       Preferred lab:       Send INR reminders to:   [place] PH[facility] [employer] POOL    Comments:   * 2mg tabs.  [facility] MWF      Anticoagulation Care Providers     Provider Role Specialty Phone number    Heron Mayo,  MD [facility] [phone]            See the Encounter Report to view Anticoagulation Flowsheet and Dosing Calendar (Go to Encounters tab in chart review, and find the Anticoagulation Therapy Visit)        Ada Isaac [facility]

## 2018-12-20 ENCOUNTER — PATIENT OUTREACH (OUTPATIENT)
Dept: CARE COORDINATION | Facility: CLINIC | Age: 83
End: 2018-12-20

## 2018-12-20 ASSESSMENT — ACTIVITIES OF DAILY LIVING (ADL): DEPENDENT_IADLS:: INDEPENDENT

## 2018-12-20 NOTE — PATIENT INSTRUCTIONS
Patient Education     Understanding Anxiety Disorders    Almost everyone gets nervous now and then. It s normal to have knots in your stomach before a test, or for your heart to race on a first date. But an anxiety disorder is much more than a case of nerves. In fact, its symptoms may be overwhelming. But treatment can relieve many of these symptoms. Talking to your healthcare provider is the first step.  What are anxiety disorders?  An anxiety disorder causes intense feelings of panic and fear. These feelings may arise for no apparent reason. And they tend to recur again and again. They may prevent you from coping with life and cause you great distress. As a result, you may avoid anything that triggers your fear. In extreme cases, you may never leave the house. Anxiety disorders may cause other symptoms, such as:    Obsessive thoughts you can t control    Constant nightmares or painful thoughts of the past    Nausea, sweating, and muscle tension    Trouble sleeping or concentrating  What causes anxiety disorders?  Anxiety disorders tend to run in families. For some people, childhood abuse or neglect may play a role. For others, stressful life events or trauma may trigger anxiety disorders. Anxiety can trigger low self-esteem and poor coping skills.  Common anxiety disorders    Panic disorder. This causes an intense fear of being in danger.    Phobias. These are extreme fears of certain objects, places, or events.    Obsessive-compulsive disorder. This causes you to have unwanted thoughts and urges. You also may perform certain actions over and over.    Posttraumatic stress disorder. This occurs in people who have survived a terrible ordeal. It can cause nightmares and flashbacks about the event.    Generalized anxiety disorder. This causes constant worry that can greatly disrupt your life.   Getting better  You may believe that nothing can help you. Or, you might fear what others may think. But most anxiety  symptoms can be eased. Having an anxiety disorder is nothing to be ashamed of. Most people do best with treatment that combines medicine and therapy. These aren t cures. But they can help you live a healthier life.  Date Last Reviewed: 2/1/2017 2000-2018 [employer]. [address] [place] [address]. All rights reserved. This information is not intended as a substitute for professional medical care. Always follow your healthcare professional's instructions.           Patient Education     Your Body s Response to Anxiety    Normal anxiety is part of the body s natural defense system. It's an alert to a threat that is unknown, vague, or comes from your own internal fears. While you re in this state, your feelings can range from a vague sense of worry to physical sensations such as a pounding heartbeat. These feelings make you want to react to the threat. An anxiety response is normal in many situations. But when you have an anxiety disorder, the same response can occur at the wrong times.  Anxiety can be helpful  Normal anxiety is a signal from your brain that warns you of a threat and is a normal response to help you prevent something or decrease the bad effects of something you can't control. For example, anxiety is a normal response to situations that might damage your body, separate you from a loved one, or lose your job. The symptoms of anxiety can be physical and mental.  How does it feel?  At certain times, people with anxiety may have:    Dizziness    Muscle tension or pain    Restlessness    Sleeplessness    Trouble concentrating    Racing heartbeat    Fast breathing    Shaking or trembling    Stomachache    Diarrhea    Loss of energy    Sweating    Cold, clammy hands    Chest pain    Dry mouth  Anxiety can also be a problem  Anxiety can become a problem when it is hard to control, occurs for months, and interferes with important parts of your life. With an anxiety disorder, your  "body has the response described above, but in inappropriate ways. The response a person has depends on the anxiety disorder he or she has. With some disorders, the anxiety is way out of proportion to the threat that triggers it. With others, anxiety may occur even when there isn t a clear threat or trigger.  Who does it affect?  Some people are more prone to persistent anxiety than others. It tends to run in families, and it affects more younger people than older people, and more women than men. But no age, race, or gender is immune to anxiety problems.  Anxiety can be treated  The good news is that the anxiety that s disrupting your life can be treated. Check with your healthcare provider and rule out any physical problems that may be causing the anxiety symptoms. If an anxiety disorder is diagnosed seek mental healthcare. This is an illness and it can respond to treatment. Most types of anxiety disorders will respond to \"talk therapy\" and medicines. Working with your doctor or other healthcare provider, you can develop skills to help you cope with anxiety. You can also gain the perspective you need to overcome your fears. Note: Good sources of support or guidance can be found at your local hospital, mental health clinic, or an employee assistance program.  How to cope with anxiety  If anxiety is wearing you down, here are some things you can do to cope:    Keep in mind that you can t control everything about a situation. Change what you can and let the rest take its course.    Exercise it s a great way to relieve tension and help your body feel relaxed.    Avoid caffeine and nicotine, which can make anxiety symptoms worse.    Fight the temptation to turn to alcohol or unprescribed drugs for relief. They only make things worse in the long run.    Educate yourself about anxiety disorders. Keep track of helpful online resources and books you can use during stressful periods.    Try stress management techniques such " as meditation.    Consider online or in-person support groups.   Date Last Reviewed: 1/1/2017 2000-2018 [employer]. [address] [place] [address], [place] [address]. All rights reserved. This information is not intended as a substitute for professional medical care. Always follow your healthcare professional's instructions.           Patient Education     Treating Anxiety Disorders with Therapy    If you have an anxiety disorder, you don t have to suffer anymore. Treatment is available. Therapy (also called counseling) is often a helpful treatment for anxiety disorders. With therapy, a specially trained professional (therapist) helps you face and learn to manage your anxiety. Therapy can be short-term or long-term depending on your needs. In some cases, medicine may also be prescribed with therapy. It may take time before you notice how much therapy is helping, but stick with it. With therapy, you can feel better.  Cognitive behavioral therapy (CBT)  Cognitive behavioral therapy (CBT) teaches you to manage anxiety. It does this by helping you understand how you think and act when you re anxious. Research has shown CBT to be a very effective treatment for anxiety disorders. How CBT is run is almost like a class. It involves homework and activities to build skills that teach you to cope with anxiety step by step. It can be done in a group or one-on-one, and often takes place for a set number of sessions. CBT has two main parts:    Cognitive therapy helps you identify the negative, irrational thoughts that occur with your anxiety. You ll learn to replace these with more positive, realistic thoughts.    Behavioral therapy helps you change how you react to anxiety. You ll learn coping skills and methods for relaxing to help you better deal with anxiety.  Other forms of therapy  Other therapy methods may work better for you than CBT. Or, you may move from CBT to another form of therapy as your treatment needs  change. This may mean meeting with a therapist by yourself or in a group. Therapy can also help you work through problems in your life, such as drug or alcohol dependence, that may be making your anxiety worse.  Getting better takes time  Therapy will help you feel better and teach you skills to help manage anxiety long term. But change doesn t happen right away. It takes a commitment from you. And treatment only works if you learn to face the causes of your anxiety. So, you might feel worse before you feel better. This can sometimes make it hard to stick with it. But remember: Therapy is a very effective treatment. The results will be well worth it.  Helping yourself  If anxiety is wearing you down, here are some things you can do to cope:    Check with your doctor and rule out any physical problems that may be causing the anxiety symptoms.    If an anxiety disorder is diagnosed, seek mental healthcare. This is an illness and it can respond to treatment. Most types of anxiety disorders will respond to talk therapy and medicine.    Educate yourself about anxiety disorders. Keep track of helpful online resources and books you can use during stressful periods.    Try stress management techniques such as meditation.    Consider online or in-person support groups.    Don t fight your feelings. Anxiety feeds itself. The more you worry about it, the worse it gets. Instead, try to identify what might have triggered your anxiety. Then try to put this threat in perspective.    Keep in mind that you can t control everything about a situation. Change what you can and let the rest take its course.    Exercise   it s a great way to relieve tension and help your body feel relaxed.    Examine your life for stress, and try to find ways to reduce it.    Avoid caffeine and nicotine, which can make anxiety symptoms worse.    Fight the temptation to turn to alcohol or unprescribed drugs for relief. They only make things worse in the  long run.   Date Last Reviewed: 1/1/2017 2000-2018 [employer]. [address] [facility], [place] [address]. All rights reserved. This information is not intended as a substitute for professional medical care. Always follow your healthcare professional's instructions.           Patient Education     Anxiety Reaction  Anxiety is the feeling we all get when we think something bad might happen. It is a normal response to stress and usually causes only a mild reaction. When anxiety becomes more severe, it can interfere with daily life. In some cases, you may not even be aware of what it is you re anxious about. There may also be a genetic link or it may be a learned behavior in the home.  Both psychological and physical triggers cause stress reaction. It's often a response to fear or emotional stress, real or imagined. This stress may come from home, family, work, or social relationships.  During an anxiety reaction, you may feel:    Helpless    Nervous    Depressed    Irritable  Your body may show signs of anxiety in many ways. You may experience:    Dry mouth    Shakiness    Dizziness    Weakness    Trouble breathing    Breathing fast (hyperventilating)    Chest pressure    Sweating    Headache    Nausea    Diarrhea    Tiredness    Inability to sleep    Sexual problems  Home care    Try to locate the sources of stress in your life. They may not be obvious. These may include:  ? Daily hassles of life (such as traffic jams, missed appointments, or car troubles)  ? Major life changes, both good (new baby or job promotion) and bad (loss of job or loss of loved one)  ? Overload: feeling that you have too many responsibilities and can't take care of all of them at once  ? Feeling helpless or feeling that your problems are beyond what you re able to solve    Notice how your body reacts to stress. Learn to listen to your body signals. This will help you take action before the stress becomes severe.    When you  can, do something about the source of your stress. (Avoid hassles, limit the amount of change that happens in your life at one time and take a break when you feel overloaded).    Unfortunately, many stressful situations can't be avoided. It is necessary to learn how to better manage stress. There are many proven methods that will reduce your anxiety. These include simple things like exercise, good nutrition, and adequate rest. Also, there are certain techniques that are helpful:  ? Relaxation  ? Breathing exercises  ? Visualization  ? Biofeedback  ? Meditation  For more information about this, consult your healthcare provider or go to a local bookstore and review the many books and tapes available on this subject.  Follow-up care  If you feel that your anxiety is not responding to self-help measures, contact your healthcare provider or make an appointment with a counselor. You may need short-term psychological counseling and temporary medicine to help you manage stress.  Call 911  Call 911 if any of these happen:    Trouble breathing    Confusion    Drowsiness or trouble wakening    Fainting or loss of consciousness    Rapid heart rate    Seizure    New chest pain that becomes more severe, lasts longer, or spreads into your shoulder, arm, neck, jaw, or back  When to seek medical advice  Call your healthcare provider right away if any of these happen:    Your symptoms get worse    Severe headache not relieved by rest and mild pain reliever  Date Last Reviewed: 10/1/2017    4749-2759 [employer]. [address] [place] [address], [place] [address]. All rights reserved. This information is not intended as a substitute for professional medical care. Always follow your healthcare professional's instructions.

## 2018-12-20 NOTE — LETTER
[address] [address]  [phone]      12/20/2018      Florencio ASTORGA [address]  [place] [address]      Dear  Florencio,    It was nice talking with you today.  The resources we discussed are:    [facility]  [phone]    [email or web address]    [phone]    I have also enclosed articles about anxiety that you may find helpful.  Please feel free to call me if you have any questions.      Sincerely,      Rani Sarkar, \[facility]\""  Clinic Care Coordination  [phone]

## 2018-12-20 NOTE — PROGRESS NOTES
Clinic Care Coordination Contact    Clinic Care Coordination Contact  OUTREACH    Referral Information:  Referral Source: PCP    Primary Diagnosis: Other (include Comment box)(wound infection)    Chief Complaint   Patient presents with     Clinic Care Coordination - Follow-up             Universal Utilization: pt does go to cardiology outside of   Clinic Utilization  Difficulty keeping appointments:: No  Compliance Concerns: No  No-Show Concerns: No  No PCP office visit in Past Year: No  Utilization    Last refreshed: 12/19/2018  5:35 PM:  Hospital Admissions 1           Last refreshed: 12/19/2018  5:35 PM:  ED Visits 6           Last refreshed: 12/19/2018  5:35 PM:  No Show Count (past year) 0              Current as of: 12/19/2018  5:35 PM              Clinical Concerns:  Current Medical Concerns:  Pt has been to cardiology.  Per son and review of care everywhere they are notations that they feel his mental health is a contributing factor.       Current Behavioral Concerns: per son pt has anxiety and panic attacks.  Reviewed information from [employer] for symptoms and son feels pt has many if not all of them.  Son said that about 3 years ago when pt quit drinking he has noticed the anxiety and panic. Pt will often call son in the middle of the night and after talking about the different signs/symptoms of anxiety/panic attacks son feels this is what is initiating the calls.     KWAME-7 SCORE 3/22/2017 5/12/2017 7/17/2017   Total Score 18 14 14     Last Kwame 7 was in 7-2017 that showed moderate-severe anxiety. When reviewing the questions with son Florencio, he feels pt experiences all of the symptoms.  He also feels pt would under rate his own symptoms when asked.        Education Provided to patient: long discussion on Anxiety and Panic attacks.  Son feels pt's anxiety is a main contributing factor in his sleep and with trying to manage his daily life.  Discussed some signs/symptms per [employer]  articles and son feels pt exhibits many of these if not all that were noted.      Discussed physical symptoms that can be associated with Anxiety/panic attacks and son noted that they were similar to the symptoms the pt calls with.  Strongly encouraged son to talk with PCP about Anxiety and panic attacks and how to control them better.   Also talked about some ways to try and manage the anxiety without medications or to compliment medications.     Discussed counseling:  Son feels pt may not go and in home counseling would be best.  Resource provided.    Discussed talking with PCP about medication options for the anxiety and son will talk with Provider tomorrow.  Discussed educating himself (son) about anxiety so he can better help his dad manage and recognize the anxiety.  Discussed a couple of tools to use when in an anxiety attack: deep breathing, focus on one thing, ask his dad to identify all the things that have a certain color in his field of vision to help focus on just one thing, etc.   Discussed contacting [employer] to get additional education or video resources to help pt understand anxiety better  Offered to send [employer] articles on Anxiety and depression to son to better understand.   Son is open to all of the above.     Health Maintenance Reviewed: Due/Overdue   Health Maintenance Due   Topic Date Due     HF ACTION PLAN Q3 YR  1935     ZOSTER IMMUNIZATION (1 of 2) 11/03/1987     LIPID MONITORING Q1 YEAR  03/22/2018       Clinical Pathway: None will send anxiety information to pt's son.  Son did not feel pt would be able to read due to his vision and he will read and share with pt.     Medication Management:  At this time is getting support from home care.      Functional Status:  Dependent ADLs:: Independent  Dependent IADLs:: Independent  Bed or wheelchair confined:: No  Mobility Status: Independent  Fallen 2 or more times in the past year?: No  Any fall with injury in the past year?: No    Living  Situation:  Current living arrangement:: I live alone  Type of residence:: [facility]    Diet/Exercise/Sleep:  Diet:: Regular  Inadequate nutrition (GOAL):: No  Food Insecurity: No  Tube Feeding: No  Exercise:: Currently not exercising  Inadequate activity/exercise (GOAL):: No  Significant changes in sleep pattern (GOAL): No    Transportation:  Transportation concerns (GOAL):: No  Transportation means:: Family, Friend, Regular car     Psychosocial:  Sabianist or spiritual beliefs that impact treatment:: No  Mental health DX:: Yes  Mental health DX how managed:: None  Mental health management concern (GOAL):: No  Informal Support system:: Children, Family     Financial/Insurance:   Financial/Insurance concerns (GOAL):: No  Family is meeting with Rafia Barber tomorrow at 2 PM to discuss options for safe community living.      Resources and Interventions:    Current Resources: [facility] [phone]    [email or web address]    [phone]    Multiple articles noted in Patient instructions were printed and mailed to son.  List of home care services:: Skilled Nursing, Home Health Aid;   Community Resources: Home Care  Supplies used at home:: Wound Care Supplies  Equipment Currently Used at Home: none    Advance Care Plan/Directive  Advanced Care Plans/Directives on file:: No    Referrals Placed: Senior Linkage Line     Goals:   Goals        General    Psychosocial (pt-stated)     Notes - Note created  11/28/2018 11:09 AM by Rani Sarkar LSW    Goal Statement: pt to remain safe in his own home as long as possible, safe meaning no injuries from falls or avoidable health issues.  Measure of Success: no falls with injuries or avoidable health issues  Supportive Steps to Achieve: home care, , family  Barriers: age, health  Strengths: supportive family  Date to Achieve By: 6-30-19  Patient expressed understanding of goal: yes                   Patient/Caregiver understanding: meet with Rafia Barber as planned.    Outreach Frequency: weekly  Future Appointments              Tomorrow Heron Mayo MD [facility]    In 3 weeks Heron Mayo MD [facility]    In 4 weeks Kae Ward MD [facility]          Plan: Sw will send [employer] articles about anxiety to son.  Will also send resources that were given on the phone today.  SW will call son in about two weeks.  Son to attend appointment with PCP to discuss anxiety/panic attacks.      TONE Lord, Clinic Care Coordinator 12/20/2018   4:43 PM  [phone]

## 2018-12-21 ENCOUNTER — OFFICE VISIT (OUTPATIENT)
Dept: FAMILY MEDICINE | Facility: CLINIC | Age: 83
End: 2018-12-21
Payer: MEDICARE

## 2018-12-21 VITALS
SYSTOLIC BLOOD PRESSURE: 110 MMHG | OXYGEN SATURATION: 97 % | WEIGHT: 167 LBS | TEMPERATURE: 96.1 F | BODY MASS INDEX: 26.95 KG/M2 | HEART RATE: 83 BPM | RESPIRATION RATE: 16 BRPM | DIASTOLIC BLOOD PRESSURE: 56 MMHG

## 2018-12-21 DIAGNOSIS — I48.20 CHRONIC ATRIAL FIBRILLATION (H): ICD-10-CM

## 2018-12-21 DIAGNOSIS — I50.43 ACUTE ON CHRONIC COMBINED SYSTOLIC AND DIASTOLIC CONGESTIVE HEART FAILURE (H): ICD-10-CM

## 2018-12-21 DIAGNOSIS — G47.00 PERSISTENT INSOMNIA: ICD-10-CM

## 2018-12-21 DIAGNOSIS — F41.9 ANXIETY: Primary | ICD-10-CM

## 2018-12-21 DIAGNOSIS — K21.9 GASTROESOPHAGEAL REFLUX DISEASE WITHOUT ESOPHAGITIS: Primary | ICD-10-CM

## 2018-12-21 PROCEDURE — 99214 OFFICE O/P EST MOD 30 MIN: CPT | Performed by: FAMILY MEDICINE

## 2018-12-21 RX ORDER — PANTOPRAZOLE SODIUM 40 MG/1
40 TABLET, DELAYED RELEASE ORAL DAILY
Qty: 30 TABLET | Refills: 11 | Status: SHIPPED | OUTPATIENT
Start: 2018-12-21 | End: 2019-07-12

## 2018-12-21 RX ORDER — MIRTAZAPINE 7.5 MG/1
7.5 TABLET, FILM COATED ORAL AT BEDTIME
Qty: 30 TABLET | Refills: 1 | Status: SHIPPED | OUTPATIENT
Start: 2018-12-21 | End: 2019-01-18

## 2018-12-21 RX ORDER — FUROSEMIDE 40 MG
40 TABLET ORAL 2 TIMES DAILY
Qty: 180 TABLET | Refills: 3
Start: 2018-12-21 | End: 2019-01-21

## 2018-12-21 RX ORDER — LISINOPRIL 5 MG/1
5 TABLET ORAL DAILY
Qty: 90 TABLET | Refills: 3
Start: 2018-12-21 | End: 2019-01-21

## 2018-12-21 ASSESSMENT — PATIENT HEALTH QUESTIONNAIRE - PHQ9
5. POOR APPETITE OR OVEREATING: MORE THAN HALF THE DAYS
SUM OF ALL RESPONSES TO PHQ QUESTIONS 1-9: 12

## 2018-12-21 ASSESSMENT — ANXIETY QUESTIONNAIRES
5. BEING SO RESTLESS THAT IT IS HARD TO SIT STILL: SEVERAL DAYS
2. NOT BEING ABLE TO STOP OR CONTROL WORRYING: NEARLY EVERY DAY
7. FEELING AFRAID AS IF SOMETHING AWFUL MIGHT HAPPEN: SEVERAL DAYS
IF YOU CHECKED OFF ANY PROBLEMS ON THIS QUESTIONNAIRE, HOW DIFFICULT HAVE THESE PROBLEMS MADE IT FOR YOU TO DO YOUR WORK, TAKE CARE OF THINGS AT HOME, OR GET ALONG WITH OTHER PEOPLE: VERY DIFFICULT
GAD7 TOTAL SCORE: 12
6. BECOMING EASILY ANNOYED OR IRRITABLE: NOT AT ALL
3. WORRYING TOO MUCH ABOUT DIFFERENT THINGS: NEARLY EVERY DAY
1. FEELING NERVOUS, ANXIOUS, OR ON EDGE: MORE THAN HALF THE DAYS

## 2018-12-21 NOTE — TELEPHONE ENCOUNTER
"Requested Prescriptions   Pending Prescriptions Disp Refills     pantoprazole (PROTONIX) 40 MG EC tablet       Sig: Take 1 tablet (40 mg) by mouth    PPI Protocol Passed - 12/21/2018  3:19 PM       Passed - Not on Clopidogrel (unless Pantoprazole ordered)       Passed - No diagnosis of osteoporosis on record       Passed - Recent (12 mo) or future (30 days) visit within the authorizing provider's specialty    Patient had office visit in the last 12 months or has a visit in the next 30 days with authorizing provider or within the authorizing provider's specialty.  See \"Patient Info\" tab in inbasket, or \"Choose Columns\" in Meds & Orders section of the refill encounter.             Passed - Patient is age 18 or older        pantoprazole (PROTONIX) 40 MG EC tablet  Last Written Prescription Date:  11/30/2018  Last Fill Quantity: ?,  # refills: ?   Last office visit: No previous visit found with prescribing provider:  11/30/2018 DAPHNIE Mayo   & 12/21/2018 DAPHNIE Myao   Future Office Visit:   Next 5 appointments (look out 90 days)    Jan 11, 2019  8:40 AM CST  SHORT with Heron Mayo MD  [facility] [address] [place] [id number] [phone]           Brandy LEHMAN (R) (M)    "

## 2018-12-21 NOTE — NURSING NOTE
"No chief complaint on file.      Initial /56   Pulse 83   Temp 96.1  F (35.6  C) (Tympanic)   Resp 16   Wt 75.8 kg (167 lb)   SpO2 97%   BMI 26.95 kg/m   Estimated body mass index is 26.95 kg/m  as calculated from the following:    Height as of 11/30/18: 1.676 m (5' 6\").    Weight as of this encounter: 75.8 kg (167 lb).    Patient presents to the clinic using Adea that is potentially due pending provider review:  Lipid monitoring     Patient will get lipids checked today per provider review. Dinesh Solorio cma    Is there anyone who you would like to be able to receive your results? No  If yes have patient fill out KVNG        "

## 2018-12-21 NOTE — TELEPHONE ENCOUNTER
Routing refill request to provider for review/approval because:  Medication is reported/historical  Kirsty RG RN

## 2018-12-21 NOTE — PATIENT INSTRUCTIONS
ASSESSMENT:   (F41.9) Anxiety  (primary encounter diagnosis)  Comment: continuing anxiety and depression problems.   Plan: mirtazapine (REMERON) 7.5 MG tablet          Start mirtazapine 7.5mg once daily at bedtime. This can help for sleep, depression and anxiety.   Stop the Doxepin.   Continue the temazepam.  See me again in 2-3 weeks.   We can consider adding quetiapine (Seroquel) at some time  I recommend seeing psychiatrist.  See list.     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   Plan: furosemide (LASIX) 40 MG tablet, lisinopril         (PRINIVIL/ZESTRIL) 5 MG tablet        No change in current treatment plan.      For leg neuropathy, we can try gabapentin but I don't want to try too many things at once in case of side effects.     Here are some psychiatric providers.     Angela Wade in [place] -- [phone]  [id number] ([facility], other locations)-- [phone]   [facility] ([place]) -- [phone]  [facility]) -- [phone]  Dr. Aixa Solis, psychiatrist in [place]--[phone]  [facility]) -- [phone]  [facility] ([place]) -- [phone] or [phone]- psychiatrist will only see [facility] patients  [facility] - [phone]  [facility] ([place]) -- [phone]  [facility] ([facility], Located within [facility] locations) -- [phone]  [facility] - [facility]: [facility] [phone]    Use Fast Tracker to help find other psychological services and providers [email or web address]    *Remember to check your insurance coverage.*    For emergencies, see the emergency room, call 911,  or call a crisis line below:    [facility] crisis line at [phone].  [place] [facility] - [phone] or [phone]   [facility] crisis line - [phone]   [facility]: [phone]   [facility]ical [facility]: [phone]

## 2018-12-21 NOTE — TELEPHONE ENCOUNTER
"Requested Prescriptions   Pending Prescriptions Disp Refills     pantoprazole (PROTONIX) 40 MG EC tablet       Sig: Take 1 tablet (40 mg) by mouth    PPI Protocol Passed - 12/21/2018  3:19 PM       Passed - Not on Clopidogrel (unless Pantoprazole ordered)       Passed - No diagnosis of osteoporosis on record       Passed - Recent (12 mo) or future (30 days) visit within the authorizing provider's specialty    Patient had office visit in the last 12 months or has a visit in the next 30 days with authorizing provider or within the authorizing provider's specialty.  See \"Patient Info\" tab in inbasket, or \"Choose Columns\" in Meds & Orders section of the refill encounter.             Passed - Patient is age 18 or older        Last Written Prescription Date:  11/25/2018  Last Fill Quantity: 0,  # refills: 0   Last office visit: No previous visit found with prescribing provider:  leaf   Future Office Visit:   Next 5 appointments (look out 90 days)    Jan 11, 2019  8:40 AM CST  SHORT with Heron Mayo MD  [facility] [address] [place] [id number] [phone]           "

## 2018-12-21 NOTE — PROGRESS NOTES
"  SUBJECTIVE:   Nabor Cunningham is a 83 year old male who presents to clinic today for the following health issues:  Chief Complaint   Patient presents with     Sleep Problem     medication follow up      Anxiety        Anxiety Follow-Up    Status since last visit: Worsened- mood swings, easily annoyed and irritable, patient also feels down. Has a hard time sleeping because of constant worrying.     Other associated symptoms:None    Complicating factors:   Significant life event: Nothing since July.   Current substance abuse: None  Depression symptoms: Yes-  Feeling down and not doing things that he enjoys. Cannot get around like he used to and has congestive heart failure so that put him down.   IZAIAH-7 SCORE 3/22/2017 5/12/2017 7/17/2017   Total Score 18 14 14       IZAIAH-7    Amount of exercise or physical activity: 6-7 days/week for an average of 15-30 minutes    Problems taking medications regularly: No    Medication side effects: thinks so because he is down a little bit more since taking the sleeping medication.     Diet: low salt with low saturated fats.       Sleeping problem/med follow up       Duration: 12/3/18     Description (location/character/radiation): Sleep problem     Intensity:  moderate    Accompanying signs and symptoms: anxiety takes over so makes the sleeping pills less effective. He has been sleeping a little bit better since the med change to Doxepin 10 mg.     History (similar episodes/previous evaluation): None    Precipitating or alleviating factors: None    Therapies tried and outcome: Doxepin 10mg- seems to be working      Increasing anxiety problems.   Anxious all the time.  \"Scared\".  Worries about his cat, laundry.    Energy is low.  Worse since he has had the CHF.   Cardiology has thought anxiety causing more problems than heart.   Sleeping a little better.  Taking both doxepin and temazepam.  Also on nortriptyline at bedtime.   Worries about getting up at a certain time.   More " problems in the past 3 years.    Having to check light switches and fawcets.     PHQ9 score today= 12  generalized anxiety disorder scale score today= 12    He has appointment to see neurology in mid January.    He has seen psychiatry in [place] in the past.   He has not been to sleep clinic.   Son calls him twice daily.     Problem 2: CHF  No shortness of breath.   Chest pain once in a while when having anxiety.  Gets tight throat, restless.     not having dyspnea on exertion.   Swelling hsa improved.    Patient Active Problem List   Diagnosis     Anxiety     Alcohol abuse     Chronic atrial fibrillation (H)     Weight loss     Peripheral polyneuropathy     Chronic gout, unspecified cause, unspecified site     Essential hypertension with goal blood pressure less than 140/90     Hyperlipidemia LDL goal <130     Persistent insomnia     Lower urinary tract symptoms (LUTS)     Chronic anticoagulation     Long-term (current) use of anticoagulants [Z79.01]     Moderate episode of recurrent major depressive disorder (H)     Pneumonia     Bleeding from wound     Postoperative hemorrhage involving circulatory system following non-circulatory system procedure     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)     Cardiac pacemaker in situ      ROS:  increased stools up to 4 times a day.   Feeling down.   Less active.  Does stationary bicycle twice daily.   Continues on warfarin.     OBJECTIVE:Blood pressure 110/56, pulse 83, temperature 96.1  F (35.6  C), temperature source Tympanic, resp. rate 16, weight 75.8 kg (167 lb), SpO2 97 %. BMI=Body mass index is 26.95 kg/m .  GENERAL APPEARANCE ADULT: Alert, no acute distress, anxious, not talking a lot  NECK: No adenopathy,masses or thyromegaly  RESP: lungs clear to auscultation   CV: normal rate, regular rhythm, no murmur or gallop  MS: trace to 1+ edema bilateral.      ASSESSMENT:   (F41.9) Anxiety  (primary encounter diagnosis)  Comment:  continuing anxiety and depression problems.   He has OCD features.   I think he needs to be on serotonin specific reuptake inhibitor for anxiety and depression.  He has tried a number already (sertraline, mirtazapine and Lexapro).  mirtazapine a good choice as it may help with sleep.   May need antipsychotic.   He has been on amitriptyline in the past and now on nortriptyline.  Plan: mirtazapine (REMERON) 7.5 MG tablet          Start mirtazapine 7.5mg once daily at bedtime. This can help for sleep, depression and anxiety.   Stop the Doxepin.   Continue the temazepam.  See me again in 2-3 weeks.   We can consider adding quetiapine (Seroquel) at some time  I recommend seeing psychiatrist.  See list.     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   Plan: furosemide (LASIX) 40 MG tablet, lisinopril         (PRINIVIL/ZESTRIL) 5 MG tablet        No change in current treatment plan.      For leg neuropathy, we can try gabapentin but I don't want to try too many things at once in case of side effects.

## 2018-12-22 ASSESSMENT — ANXIETY QUESTIONNAIRES: GAD7 TOTAL SCORE: 12

## 2018-12-24 ENCOUNTER — TELEPHONE (OUTPATIENT)
Dept: FAMILY MEDICINE | Facility: CLINIC | Age: 83
End: 2018-12-24

## 2018-12-26 ENCOUNTER — ANTICOAGULATION THERAPY VISIT (OUTPATIENT)
Dept: ANTICOAGULATION | Facility: CLINIC | Age: 83
End: 2018-12-26
Payer: MEDICARE

## 2018-12-26 DIAGNOSIS — I48.20 CHRONIC ATRIAL FIBRILLATION (H): ICD-10-CM

## 2018-12-26 DIAGNOSIS — Z79.01 CHRONIC ANTICOAGULATION: ICD-10-CM

## 2018-12-26 LAB — INR PPP: 2.3

## 2018-12-26 PROCEDURE — 99207 ZZC NO CHARGE NURSE ONLY: CPT

## 2018-12-26 NOTE — PROGRESS NOTES
ADDENDUM: Homecare could not check patient's INR today.     According to care everywhere notes, patient will be holding his warfarin for 3 days prior to an angiogram on Monday, 19. Patient will start to hold on 19 as directed by [facility]. Since holding instructions came from an outside facility, writer is not going to give post-procedure dosing instructions. Writer requested homecare to recheck the INR on the first day of resumption of care for homecare. SAURAV Whitmore  with [facility] will make a note of this.     Will route to PCP as ALBA MCNEIL RN, CACP 2019 at 2:52 PM       ANTICOAGULATION FOLLOW-UP CLINIC VISIT    Patient Name:  Nabor Cunningham  Date:  2018  Contact Type:  Telephone/ [facility]    SUBJECTIVE:     Patient Findings     Positives:   Change in medications (mirtazapine (REMERON) started last week), No Problem Findings    Comments:   No changes diet or activity noted. Took warfarin as instructed, denies any missed doses.             OBJECTIVE    INR   Date Value Ref Range Status   2018 2.3  Final       ASSESSMENT / PLAN  No question data found.  Anticoagulation Summary  As of 2018    INR goal:   2.0-3.0   TTR:   61.5 % (1.7 y)   INR used for dosin.3 (2018)   Warfarin maintenance plan:   6 mg (2 mg x 3) every Fri; 4 mg (2 mg x 2) all other days   Full warfarin instructions:   6 mg every Fri; 4 mg all other days   Weekly warfarin total:   30 mg   No change documented:   Ada Isaac RN   Plan last modified:   Ada Isaac RN (2018)   Next INR check:   2019   Priority:   INR   Target end date:   Indefinite    Indications    Chronic anticoagulation [Z79.01]  Chronic atrial fibrillation (H) [I48.2]  Long-term (current) use of anticoagulants [Z79.01] [Z79.01]             Anticoagulation Episode Summary     INR check location:       Preferred lab:       Send INR reminders to:    [place] PHONE [facility]    Comments:   * 2mg tabs. [facility] MWF      Anticoagulation Care Providers     Provider Role Specialty Phone number    Heron Mayo MD [facility] [phone]            See the Encounter Report to view Anticoagulation Flowsheet and Dosing Calendar (Go to Encounters tab in chart review, and find the Anticoagulation Therapy Visit)        Ada [facility]

## 2018-12-26 NOTE — Clinical Note
FYI -- patient will be holding warfarin for 3 days prior to his angiogram on Monday. [facility] did not give any perioperative anticoagulation instructions since these directions are coming from [facility]. Please advise if you have any further recommendations. Rustam MCNEIL RN, CACP

## 2018-12-28 PROBLEM — I50.9 CHF (CONGESTIVE HEART FAILURE) (H): Status: ACTIVE | Noted: 2018-12-28

## 2019-01-03 ENCOUNTER — ANTICOAGULATION THERAPY VISIT (OUTPATIENT)
Dept: ANTICOAGULATION | Facility: CLINIC | Age: 84
End: 2019-01-03
Payer: MEDICARE

## 2019-01-03 ENCOUNTER — PATIENT OUTREACH (OUTPATIENT)
Dept: CARE COORDINATION | Facility: CLINIC | Age: 84
End: 2019-01-03

## 2019-01-03 DIAGNOSIS — Z79.01 CHRONIC ANTICOAGULATION: ICD-10-CM

## 2019-01-03 DIAGNOSIS — I48.20 CHRONIC ATRIAL FIBRILLATION (H): ICD-10-CM

## 2019-01-03 LAB — INR PPP: 1.8

## 2019-01-03 PROCEDURE — 99207 ZZC NO CHARGE NURSE ONLY: CPT

## 2019-01-03 ASSESSMENT — ACTIVITIES OF DAILY LIVING (ADL): DEPENDENT_IADLS:: INDEPENDENT

## 2019-01-03 NOTE — PROGRESS NOTES
Clinic Care Coordination Contact    Clinic Care Coordination Contact  OUTREACH    Referral Information:  Referral Source: PCP    Primary Diagnosis: Other (include Comment box)(wound infection)    Chief Complaint   Patient presents with     Clinic Care Coordination - Follow-up             Universal Utilization: no concerns  Clinic Utilization  Difficulty keeping appointments:: No  Compliance Concerns: No  No-Show Concerns: No  No PCP office visit in Past Year: No  Utilization    Last refreshed: 1/3/2019 11:10 AM:  Hospital Admissions 1           Last refreshed: 1/3/2019 11:10 AM:  ED Visits 6           Last refreshed: 1/3/2019 11:10 AM:  No Show Count (past year) 0              Current as of: 1/3/2019 11:10 AM              Clinical Concerns:  Current Medical Concerns:  Pt has an appointment on 1-7-19 with cardiology.  Per son one side of the heart is enlarged and pumping faster then the other side.  Per son they may need to put in a stint.   Current Behavioral Concerns: pt started on a new anxiety med and reports to son it works some times and not others.     Education Provided to patient: discussed how his cardiac symptoms and anxiety symptoms may be be co-occurring which could be why he is not sure if the medication is helping.  Son voiced agreement.      Son has been calling around looking for support group or counseling.  He has not had much luck finding anything.  He did get a name from the home care RN which is of someone within walking distance for pt.  He is waiting for a call back.      Health Maintenance Reviewed: Due/Overdue   Health Maintenance Due   Topic Date Due     HF ACTION PLAN Q3 YR  1935     ZOSTER IMMUNIZATION (1 of 2) 11/03/1987     LIPID MONITORING Q1 YEAR  03/22/2018       Clinical Pathway: None    Medication Management:  Son assists.     Functional Status:  Dependent ADLs:: Independent  Dependent IADLs:: Independent  Bed or wheelchair confined:: No  Mobility Status:  Independent  Fallen 2 or more times in the past year?: No  Any fall with injury in the past year?: No    Living Situation:  Current living arrangement:: I live alone  Type of residence:: Independent Senior Living    Diet/Exercise/Sleep:  Diet:: Regular  Inadequate nutrition (GOAL):: No  Food Insecurity: No  Tube Feeding: No  Exercise:: Currently not exercising  Inadequate activity/exercise (GOAL):: No  Significant changes in sleep pattern (GOAL): No    Transportation:  Transportation concerns (GOAL):: No  Transportation means:: Family, Friend, Regular car     Psychosocial:  Baptism or spiritual beliefs that impact treatment:: No  Mental health DX:: Yes  Mental health DX how managed:: None  Mental health management concern (GOAL):: No  Informal Support system:: Children, Family     Financial/Insurance:   Financial/Insurance concerns (GOAL):: No       Resources and Interventions:  Current Resources:   List of home care services:: Skilled Nursing, Home Health Aid;   Community Resources: Home Care  Supplies used at home:: Wound Care Supplies  Equipment Currently Used at Home: none    Advance Care Plan/Directive  Advanced Care Plans/Directives on file:: No    Referrals Placed: Senior Linkage Line     Goals:   Goals        General    Psychosocial (pt-stated)     Notes - Note created  11/28/2018 11:09 AM by Rani Sarkar LSW    Goal Statement: pt to remain safe in his own home as long as possible, safe meaning no injuries from falls or avoidable health issues.  Measure of Success: no falls with injuries or avoidable health issues  Supportive Steps to Achieve: home care, , family  Barriers: age, health  Strengths: supportive family  Date to Achieve By: 6-30-19  Patient expressed understanding of goal: yes                  Patient/Caregiver understanding: son to continue to oversee and support pt.    Outreach Frequency: 2 weeks  Future Appointments              In 1 week Heron Mayo  MD [facility]    In 2 weeks Kae Ward MD [facility]          Plan: pt to attend cardiac appointment as scheduled.  SW to call in about two weeks to check in on cardiac appointment and ongoing education/support for anxiety.     TONE Lord, Clinic Care Coordinator 1/3/2019   12:55 PM  [phone]

## 2019-01-03 NOTE — PROGRESS NOTES
ANTICOAGULATION FOLLOW-UP CLINIC VISIT    Patient Name:  Nabor Cunningham  Date:  1/3/2019  Contact Type:  Telephone/ [facility]    SUBJECTIVE:     Patient Findings     Positives:   Unexplained INR or factor level change    Comments:   No changes in medications, diet, or activity noted. Took warfarin as instructed, denies any missed doses. It is not clear why patient's INR is on the low today.    Since patient is having an angiogram next Monday, writer will not increase his warfarin dose today. Patient will begin holding tomorrow as instructed by [facility]. [facility] did not give any holding / dosing recommendations in regards to his upcoming angiogram. Homecare will be back out again next Wednesday. Will plan to recheck the INR at that time. Writer requested homecare to talk with patient and his son about asking the MD after his angiogram what his warfarin dosing should be and when it is safe to resume his dosing.            OBJECTIVE    INR   Date Value Ref Range Status   2019 1.8  Final       ASSESSMENT / PLAN  No question data found.  Anticoagulation Summary  As of 1/3/2019    INR goal:   2.0-3.0   TTR:   61.9 % (1.7 y)   INR used for dosin.8! (1/3/2019)   Warfarin maintenance plan:   6 mg (2 mg x 3) every Fri; 4 mg (2 mg x 2) all other days   Full warfarin instructions:   /4: Hold; /5: Hold; /6: Hold; Otherwise 6 mg every Fri; 4 mg all other days   Weekly warfarin total:   30 mg   No change documented:   Ada Isaac RN   Plan last modified:   Ada Isaac RN (2018)   Next INR check:   2019   Priority:   INR   Target end date:   Indefinite    Indications    Chronic anticoagulation [Z79.01]  Chronic atrial fibrillation (H) [I48.2]  Long-term (current) use of anticoagulants [Z79.01] [Z79.01]             Anticoagulation Episode Summary     INR check location:       Preferred lab:       Send INR reminders to:   [place] PHONE [facility]     Comments:   * 2mg tabs. [facility] MWF      Anticoagulation Care Providers     Provider Role Specialty Phone number    Heron Mayo MD [facility] [phone]            See the Encounter Report to view Anticoagulation Flowsheet and Dosing Calendar (Go to Encounters tab in chart review, and find the Anticoagulation Therapy Visit)        Ada Isaac RN [facility]

## 2019-01-07 ENCOUNTER — TRANSFERRED RECORDS (OUTPATIENT)
Dept: HEALTH INFORMATION MANAGEMENT | Facility: CLINIC | Age: 84
End: 2019-01-07

## 2019-01-09 ENCOUNTER — ANTICOAGULATION THERAPY VISIT (OUTPATIENT)
Dept: ANTICOAGULATION | Facility: CLINIC | Age: 84
End: 2019-01-09
Payer: MEDICARE

## 2019-01-09 DIAGNOSIS — I48.20 CHRONIC ATRIAL FIBRILLATION (H): ICD-10-CM

## 2019-01-09 DIAGNOSIS — Z79.01 CHRONIC ANTICOAGULATION: ICD-10-CM

## 2019-01-09 LAB — INR PPP: 1.2

## 2019-01-09 PROCEDURE — 99207 ZZC NO CHARGE NURSE ONLY: CPT

## 2019-01-09 NOTE — PROGRESS NOTES
"ANTICOAGULATION FOLLOW-UP CLINIC VISIT    Patient Name:  Nabor Cunningham  Date:  2019  Contact Type:  Telephone/ [facility]    SUBJECTIVE:     Patient Findings     Positives:   Intentional hold of therapy    Comments:   [facility]    Patient held for 3 days prior to an angiogram. According to the homecare nurse there was no post-procedure complications. Patient was able to start his warfarin the day of the procedure as well.     Care Everywhere documents that angiogram showed \"-Mild-moderate non-obstructive coronary disease and normal filling pressures\" No interventions were completed.     Homecare will be back out again on Friday, will recheck the INR again at that time.              OBJECTIVE    INR   Date Value Ref Range Status   2019 1.2  Final       ASSESSMENT / PLAN  No question data found.  Anticoagulation Summary  As of 2019    INR goal:   2.0-3.0   TTR:   61.3 % (1.7 y)   INR used for dosin.2! (2019)   Warfarin maintenance plan:   6 mg (2 mg x 3) every Fri; 4 mg (2 mg x 2) all other days   Full warfarin instructions:   : 8 mg; 1/10: 6 mg; Otherwise 6 mg every Fri; 4 mg all other days   Weekly warfarin total:   30 mg   Plan last modified:   Ada Isaac RN (2018)   Next INR check:   2019   Priority:   INR   Target end date:   Indefinite    Indications    Chronic anticoagulation [Z79.01]  Chronic atrial fibrillation (H) [I48.2]  Long-term (current) use of anticoagulants [Z79.01] [Z79.01]             Anticoagulation Episode Summary     INR check location:       Preferred lab:       Send INR reminders to:   [place] PHONE [facility]    Comments:   * 2mg tabs. [facility]      Anticoagulation Care Providers     Provider Role Specialty Phone number    Heron Mayo MD [facility] [phone]            See the Encounter Report to view Anticoagulation Flowsheet and Dosing Calendar (Go to Encounters tab in chart review, " and find the Anticoagulation Therapy Visit)        Ada Isaac RN CACP

## 2019-01-11 ENCOUNTER — ANTICOAGULATION THERAPY VISIT (OUTPATIENT)
Dept: ANTICOAGULATION | Facility: CLINIC | Age: 84
End: 2019-01-11

## 2019-01-11 DIAGNOSIS — I48.20 CHRONIC ATRIAL FIBRILLATION (H): ICD-10-CM

## 2019-01-11 DIAGNOSIS — Z79.01 CHRONIC ANTICOAGULATION: ICD-10-CM

## 2019-01-11 LAB — INR PPP: 1.7

## 2019-01-11 PROCEDURE — 99207 ZZC NO CHARGE NURSE ONLY: CPT

## 2019-01-11 NOTE — PROGRESS NOTES
ADDENDUM: Homecare will not be out on Wednesday, patient to continue his same warfarin dose and recheck the INR on Thursday, 19.     Rustam MCNEIL RN, CACP 2019 at 1:40 PM       ANTICOAGULATION FOLLOW-UP CLINIC VISIT    Patient Name:  Nabor Cunningham  Date:  2019  Contact Type:  Telephone/ [facility]    SUBJECTIVE:     Patient Findings     Positives:   Extra doses, Intentional hold of therapy    Comments:   Previous hold. Increased dosage.   INR 1.7 today. Will have patient resume on maintenance dose.   Recheck INR in 5 days.           OBJECTIVE    INR   Date Value Ref Range Status   2019 1.7  Final       ASSESSMENT / PLAN  INR assessment SUB    Recheck INR In: 5 DAYS    INR Location Homecare INR      Anticoagulation Summary  As of 2019    INR goal:   2.0-3.0   TTR:   61.2 % (1.7 y)   INR used for dosin.7! (2019)   Warfarin maintenance plan:   6 mg (2 mg x 3) every Fri; 4 mg (2 mg x 2) all other days   Full warfarin instructions:   6 mg every Fri; 4 mg all other days   Weekly warfarin total:   30 mg   Plan last modified:   Ada Isaac RN (2018)   Next INR check:   2019   Priority:   INR   Target end date:   Indefinite    Indications    Chronic anticoagulation [Z79.01]  Chronic atrial fibrillation (H) [I48.2]  Long-term (current) use of anticoagulants [Z79.01] [Z79.01]             Anticoagulation Episode Summary     INR check location:       Preferred lab:       Send INR reminders to:   [place] PHONE [facility] POOL    Comments:   * 2mg tabs. [facility]      Anticoagulation Care Providers     Provider Role Specialty Phone number    Heron Mayo MD [facility] [phone]            See the Encounter Report to view Anticoagulation Flowsheet and Dosing Calendar (Go to Encounters tab in chart review, and find the Anticoagulation Therapy Visit)        Daphne Lamar RN

## 2019-01-16 ENCOUNTER — TELEPHONE (OUTPATIENT)
Dept: NEUROLOGY | Facility: CLINIC | Age: 84
End: 2019-01-16

## 2019-01-16 NOTE — TELEPHONE ENCOUNTER
FUTURE VISIT INFORMATION        FUTURE VISIT INFORMATION:    Date: 1/17/19    Time:  0845    Location: [facility]   REFERRAL INFORMATION:    Referring provider:  Heron Mayo MD    Referring providers clinic:  [facility]    Reason for visit/diagnosis:  Neuropathy; tingling in feet        NOTES (FOR ALL VISITS) STATUS DETAILS   OFFICE NOTE from referring provider Printed 3/22/17, 11/30/18, 12/21/18   OFFICE NOTE from other specialist Printed  Podiatry 4/12/17   DISCHARGE SUMMARY from hospital      DISCHARGE REPORT from the ER Printed 7/6/16 ([place])   OPERATIVE REPORT      MEDICATION LIST In Epic     IMAGING  (FOR ALL VISITS)       EMG Printed  8/11/16   EEG      MRI (HEAD, NECK, SPINE) Printed  US lower extremity 11/10/17               OTHER

## 2019-01-17 ENCOUNTER — ANTICOAGULATION THERAPY VISIT (OUTPATIENT)
Dept: ANTICOAGULATION | Facility: CLINIC | Age: 84
End: 2019-01-17

## 2019-01-17 ENCOUNTER — OFFICE VISIT (OUTPATIENT)
Dept: NEUROLOGY | Facility: CLINIC | Age: 84
End: 2019-01-17
Payer: MEDICARE

## 2019-01-17 VITALS
BODY MASS INDEX: 27 KG/M2 | HEIGHT: 66 IN | SYSTOLIC BLOOD PRESSURE: 131 MMHG | OXYGEN SATURATION: 97 % | RESPIRATION RATE: 14 BRPM | HEART RATE: 73 BPM | WEIGHT: 168 LBS | DIASTOLIC BLOOD PRESSURE: 60 MMHG

## 2019-01-17 DIAGNOSIS — G62.9 PERIPHERAL POLYNEUROPATHY: Primary | ICD-10-CM

## 2019-01-17 DIAGNOSIS — Z79.01 CHRONIC ANTICOAGULATION: ICD-10-CM

## 2019-01-17 DIAGNOSIS — I48.20 CHRONIC ATRIAL FIBRILLATION (H): ICD-10-CM

## 2019-01-17 DIAGNOSIS — E07.9 THYROID DYSFUNCTION: ICD-10-CM

## 2019-01-17 PROBLEM — I50.43 ACUTE ON CHRONIC COMBINED SYSTOLIC AND DIASTOLIC CONGESTIVE HEART FAILURE (H): Status: ACTIVE | Noted: 2018-11-30

## 2019-01-17 LAB
CRP SERPL-MCNC: 4.6 MG/L (ref 0–8)
ENA SS-A IGG SER IA-ACNC: <0.2 AI (ref 0–0.9)
ENA SS-B IGG SER IA-ACNC: <0.2 AI (ref 0–0.9)
ERYTHROCYTE [SEDIMENTATION RATE] IN BLOOD BY WESTERGREN METHOD: 21 MM/H (ref 0–20)
FOLATE SERPL-MCNC: 82.7 NG/ML
INR PPP: 1.3
TSH SERPL DL<=0.005 MIU/L-ACNC: 3.16 MU/L (ref 0.4–4)
VIT B12 SERPL-MCNC: 1251 PG/ML (ref 193–986)

## 2019-01-17 PROCEDURE — 82607 VITAMIN B-12: CPT | Performed by: PSYCHIATRY & NEUROLOGY

## 2019-01-17 PROCEDURE — 86038 ANTINUCLEAR ANTIBODIES: CPT | Performed by: PSYCHIATRY & NEUROLOGY

## 2019-01-17 PROCEDURE — 00000402 ZZHCL STATISTIC TOTAL PROTEIN: Performed by: PSYCHIATRY & NEUROLOGY

## 2019-01-17 PROCEDURE — 84443 ASSAY THYROID STIM HORMONE: CPT | Performed by: PSYCHIATRY & NEUROLOGY

## 2019-01-17 PROCEDURE — 99207 ZZC NO CHARGE NURSE ONLY: CPT

## 2019-01-17 PROCEDURE — 82784 ASSAY IGA/IGD/IGG/IGM EACH: CPT | Performed by: PSYCHIATRY & NEUROLOGY

## 2019-01-17 PROCEDURE — 36415 COLL VENOUS BLD VENIPUNCTURE: CPT | Performed by: PSYCHIATRY & NEUROLOGY

## 2019-01-17 PROCEDURE — 86235 NUCLEAR ANTIGEN ANTIBODY: CPT | Performed by: PSYCHIATRY & NEUROLOGY

## 2019-01-17 PROCEDURE — 82746 ASSAY OF FOLIC ACID SERUM: CPT | Performed by: PSYCHIATRY & NEUROLOGY

## 2019-01-17 PROCEDURE — 86334 IMMUNOFIX E-PHORESIS SERUM: CPT | Performed by: PSYCHIATRY & NEUROLOGY

## 2019-01-17 PROCEDURE — 85652 RBC SED RATE AUTOMATED: CPT | Performed by: PSYCHIATRY & NEUROLOGY

## 2019-01-17 PROCEDURE — 86140 C-REACTIVE PROTEIN: CPT | Performed by: PSYCHIATRY & NEUROLOGY

## 2019-01-17 PROCEDURE — 99205 OFFICE O/P NEW HI 60 MIN: CPT | Performed by: PSYCHIATRY & NEUROLOGY

## 2019-01-17 PROCEDURE — 84165 PROTEIN E-PHORESIS SERUM: CPT | Performed by: PSYCHIATRY & NEUROLOGY

## 2019-01-17 RX ORDER — GABAPENTIN 300 MG/1
300 CAPSULE ORAL AT BEDTIME
Qty: 30 CAPSULE | Refills: 3 | Status: SHIPPED | OUTPATIENT
Start: 2019-01-17 | End: 2019-05-13

## 2019-01-17 ASSESSMENT — MIFFLIN-ST. JEOR: SCORE: 1409.79

## 2019-01-17 NOTE — LETTER
1/17/2019         RE: Nabor Cunningham  [address] [facility] [address]        Dear Colleague,    Thank you for referring your patient, Nabor Cunningham, to the [facility]. Please see a copy of my visit note below.    INITIAL NEUROLOGY CONSULTATION    DATE OF VISIT: 1/17/2019  MRN: [id number]  PATIENT NAME: Nabor Cunningham  YOB: 1935    REFERRING PROVIDER: No ref. provider found    Chief Complaint   Patient presents with     Consult For     neuropathy - tingling of both feet       SUBJECTIVE:                                                      HPI:   Nabor Cunningham is a 83 year old male whom I was asked by Dr. Mayo to see in consultation for peripheral neuropathy. I do note that the pre-visit form indicates the patient is concernedanxiety and poor sleep as well, though I am not sure Dr. Mayo is asking me to address the latter issues, this is from the patient's perspective.     Per chart review, the patient was complaining of a cold sensation in the feet back in 2016. Chronic peripheral neuropathy noted at that time. Poor sleep was mentioned at that time as well. It was mentioned in a follow-up clinic note that the patient had not undergone electromyogram for the feet and was not on treatment for neuropathy.Dr. Salas mentioned there was a history of heavy alcohol use in the patient. electromyogram was ordered then: results showed a mild sensorimotor polyneuropathy in both lower extremities. Basic labs reportedly normal. Anxiety issues seemed to be the patient's main concern in multiple subsequent visits. Later noted the B12 was in the 300s. He was on a B12 supplement at some point then/thereafter. There is note that he saw a neurologist about his neuropathy at some point and other supplements were recommended. He was put on Pamelor (nortriptyline). The patient was seen by Dr. Guveara in 4.2017 for stabbing foot pain on the Left. He had a callus which was debrided. Dr. Mayo's  note from 11.30.18 reports improvement of the patient's neuropathy symptoms. Gabapentin was later mentioned but there were concerns about polypharmacy. Lower extremity ultrasound done in 2017 was unremarkable.      The patient says that he does not think that the feet have gotten much worse than in 2016. He describes tingling and some discomfort from mid calf, down. He also notices numbness when he he barefoot.   He mentions that sometimes the feet feels worse after he takes the nortriptyline or perhaps its just that the symptoms are worse in the evening. He denies weakness. No sensory changes in the hands.   He feels better with shoes on. He does not really have balance problems but does feel off when his eyes are closed. He does have some swelling in the legs due to his CHF for which he is on Lasix. He has a cane but does not always use it. He also has some trouble with knee pain. He has history of Right knee replacement and currently has problems with the Left knee.     The patient and his daughter confirm that he has not tried a nerve pain medication, other than the nortriptyline which is now being continued at a lower dose for his anxiety.      He is not diabetic. No relevant family history provided.     Past Medical History:   Diagnosis Date     Anxiety      Gout      Hyperlipemia      Hypertension      Insomnia      Paroxysmal atrial fibrillation (H)      Past Surgical History:   Procedure Laterality Date     INCISION AND DRAINAGE TRUNK, COMBINED N/A 9/6/2018    Procedure: COMBINED INCISION AND DRAINAGE TRUNK;  incision and cauterization of left buttock bleed.;  Surgeon: Dain Davis MD;  Location: [facility] OR     INCISION AND DRAINAGE TRUNK, COMBINED N/A 11/1/2018    Procedure:  INCISION AND DRAINAGE of Back Wound;  Surgeon: Dain Davis MD;  Location: [facility] OR         Current Outpatient Medications on File Prior to Visit:  ACETAMINOPHEN PO Take 1,000 mg by mouth every 6 hours as needed for pain    calcium  carbonate (OS- MG [place]. CA) 1250 MG tablet Take 1 tablet by mouth 2 times daily   colchicine (COLCRYS) 0.6 MG tablet Take 1 tablet (0.6 mg) by mouth daily   cyanocobalamin (VITAMIN  B-12) 1000 MCG tablet TAKE ONE TABLET BY MOUTH DAILY   ferrous gluconate (FERGON) 324 (38 Fe) MG tablet Take 1 tablet (324 mg) by mouth daily   furosemide (LASIX) 40 MG tablet Take 1 tablet (40 mg) by mouth 2 times daily   hypromellose (ARTIFICIAL TEARS) 0.4 % SOLN ophthalmic solution Place 1 drop into both eyes every hour as needed for dry eyes   lisinopril (PRINIVIL/ZESTRIL) 5 MG tablet Take 1 tablet (5 mg) by mouth daily   metoprolol succinate ER (TOPROL-XL) 25 MG 24 hr tablet Take 1 tablet (25 mg) by mouth every evening   mineral oil oil Take 5 mLs by mouth daily   mirtazapine (REMERON) 7.5 MG tablet Take 1 tablet (7.5 mg) by mouth At Bedtime   multivitamin, therapeutic with minerals (MULTI-VITAMIN) TABS tablet Take 1 tablet by mouth daily   nortriptyline (PAMELOR) 25 MG capsule Take 1 capsule (25 mg) by mouth At Bedtime   pantoprazole (PROTONIX) 40 MG EC tablet Take 1 tablet (40 mg) by mouth daily   prednisoLONE acetate (PRED FORTE) 1 % ophthalmic susp Place 1 drop Into the left eye 2 times daily    temazepam (RESTORIL) 30 MG capsule TAKE ONE CAPSULE BY MOUTH AT BEDTIME AS NEEDED FOR SLEEP   Warfarin Sodium (COUMADIN PO) Take by mouth daily 6 mg every Fri; 4 mg all other days or as directed by the Anticoagulation Clinic     betamethasone dipropionate (DIPROSONE) 0.05 % cream Apply topically 2 times daily For leg rash (Patient not taking: Reported on 11/30/2018)   HYDROcodone-acetaminophen (NORCO) 5-325 MG per tablet Take 1-2 tablets by mouth every 6 hours as needed (Patient not taking: Reported on 11/30/2018)     Current Facility-Administered Medications on File Prior to Visit:  lidocaine 1 % injection 2 mL     Allergies   Allergen Reactions     Amoxicillin Hives     Penicillins Other (See Comments)     Dizzy       Trazodone  "Other (See Comments)     Hallucinations          No data available        Social History     Tobacco Use     Smoking status: Former Smoker     Years: 20.00     Types: Cigarettes     Smokeless tobacco: Never Used   Substance Use Topics     Alcohol use: No     Drug use: No       REVIEW OF SYSTEMS:                                                      10-point review of systems is negative except as mentioned above in HPI.     EXAM:                                                      Physical Exam:   Vitals: /60 (BP Location: Right arm, Patient Position: Chair, Cuff Size: Adult Regular)   Pulse 73   Resp 14   Ht 1.676 m (5' 6\")   Wt 76.2 kg (168 lb)   SpO2 97%   BMI 27.12 kg/m     BMI= Body mass index is 27.12 kg/m .  GENERAL: NAD.   HEENT: Medial Left sclera is erythematous.  CV: RRR. Paced. S1, S2.    Neurologic:  MENTAL STATUS: Alert, attentive. Speech is fluent. Hypophonic. Normal concentration. Adequate fund of knowledge.   CRANIAL NERVES: Discs technically difficult to visualize. Visual fields intact to confrontation. Right pupil is abnormal, with overlying corneal abnormalities. Facial sensation and movement normal. EOM full. Slightly hard of hearing. Trapezius strength intact. Palate moves symmetrically. Tongue midline, dry.  MOTOR: 5/5 in proximal and distal muscle groups of upper and lower extremities. Tone and bulk normal.   DTRs: Intact and symmetric in UEs and patellae. Cannot elicit ankle jerks. Babinski down-going bilaterally.   SENSATION: Normal light touch and pinprick in hands. Diminished pinprick below the ankles. Intact proprioception. Vibration: Diminished at both ankles.   COORDINATION: Finger tapping normal. Knee heel shin normal.  STATION AND GAIT: Romberg positive. Gait is antalgic. Tandem deferred for safety.   Right hand-dominant.     ASSESSMENT and PLAN:                                                      Assessment and Plan:     ICD-10-CM    1. Peripheral polyneuropathy G62.9 " gabapentin (NEURONTIN) 300 MG capsule     Protein electrophoresis     Protein Immunofixation Serum     Vitamin B12     Vitamin B1 whole blood     Vitamin B6     Anti Nuclear Isabel IgG by IFA with Reflex     CRP inflammation     ESR: Erythrocyte sedimentation rate     Methylmalonic acid     Folate     SSA Ro CHRISTINE Antibody IgG     SSB La CHRISTINE Antibody IgG   2. Thyroid dysfunction E07.9 TSH with free T4 reflex    rule out        Mr. Cunningham is a pleasant 82 yo man with multiple medical comorbidities seen today for peripheral neuropathy. We discussed the previous work-up and additional testing that can be done. I agree that the history of heavy alcohol use may be the cause of his symptoms but also think it is important to recheck some of the basic labs for neuropathy, in case there is a treatable cause. We discussed treatment options. II do not see any contraindication for trying gabapentin in his case. Side effects discussed. We will start with a modest evening dose and adjust from there as needed. We also discussed alternative treatment options such as acupuncture and physical therapy for balance. The patient would like to consider these. We will see him back in about 3 months and be in touch about the gabapentin in the meantime.     Patient Instructions:  Labs for neuropathy. We will notify you of the results.   Gabapentin: 300mg at bedtime to start. *Information provided. Watch for any mood changes, as this can occur in rare cases.   Let us know how you are feeling after a couple of weeks on the gabapentin. We may need to adjust the dose for effectiveness.   Return to neurology in 3 months.     Total Time: 60 minutes were spent with the patient and in chart review. More than 50% of the time spent on counseling (as described above in Assessment and Plan/Instructions) /coordinating the care.    Kae Rodriguez MD  Neurology    CC: Heron Mayo MD    Again, thank you for allowing me to participate in the care of  your patient.        Sincerely,        Kae Rodriguez MD

## 2019-01-17 NOTE — PROGRESS NOTES
INITIAL NEUROLOGY CONSULTATION    DATE OF VISIT: 1/17/2019  MRN: [id number]  PATIENT NAME: Nabor Cunningham  YOB: 1935    REFERRING PROVIDER: No ref. provider found    Chief Complaint   Patient presents with     Consult For     neuropathy - tingling of both feet       SUBJECTIVE:                                                      HPI:   Nabor Cunningham is a 83 year old male whom I was asked by Dr. Mayo to see in consultation for peripheral neuropathy. I do note that the pre-visit form indicates the patient is concernedanxiety and poor sleep as well, though I am not sure Dr. Mayo is asking me to address the latter issues, this is from the patient's perspective.     Per chart review, the patient was complaining of a cold sensation in the feet back in 2016. Chronic peripheral neuropathy noted at that time. Poor sleep was mentioned at that time as well. It was mentioned in a follow-up clinic note that the patient had not undergone electromyogram for the feet and was not on treatment for neuropathy.Dr. Salas mentioned there was a history of heavy alcohol use in the patient. electromyogram was ordered then: results showed a mild sensorimotor polyneuropathy in both lower extremities. Basic labs reportedly normal. Anxiety issues seemed to be the patient's main concern in multiple subsequent visits. Later noted the B12 was in the 300s. He was on a B12 supplement at some point then/thereafter. There is note that he saw a neurologist about his neuropathy at some point and other supplements were recommended. He was put on Pamelor (nortriptyline). The patient was seen by Dr. Guevara in 4.2017 for stabbing foot pain on the Left. He had a callus which was debrided. Dr. Mayo's note from 11.30.18 reports improvement of the patient's neuropathy symptoms. Gabapentin was later mentioned but there were concerns about polypharmacy. Lower extremity ultrasound done in 2017 was unremarkable.      The patient says that he does  not think that the feet have gotten much worse than in 2016. He describes tingling and some discomfort from mid calf, down. He also notices numbness when he he barefoot.   He mentions that sometimes the feet feels worse after he takes the nortriptyline or perhaps its just that the symptoms are worse in the evening. He denies weakness. No sensory changes in the hands.   He feels better with shoes on. He does not really have balance problems but does feel off when his eyes are closed. He does have some swelling in the legs due to his CHF for which he is on Lasix. He has a cane but does not always use it. He also has some trouble with knee pain. He has history of Right knee replacement and currently has problems with the Left knee.     The patient and his daughter confirm that he has not tried a nerve pain medication, other than the nortriptyline which is now being continued at a lower dose for his anxiety.      He is not diabetic. No relevant family history provided.     Past Medical History:   Diagnosis Date     Anxiety      Gout      Hyperlipemia      Hypertension      Insomnia      Paroxysmal atrial fibrillation (H)      Past Surgical History:   Procedure Laterality Date     INCISION AND DRAINAGE TRUNK, COMBINED N/A 9/6/2018    Procedure: COMBINED INCISION AND DRAINAGE TRUNK;  incision and cauterization of left buttock bleed.;  Surgeon: Dain Davis MD;  Location: [facility] OR     INCISION AND DRAINAGE TRUNK, COMBINED N/A 11/1/2018    Procedure:  INCISION AND DRAINAGE of Back Wound;  Surgeon: Dain Davis MD;  Location: [facility] OR         Current Outpatient Medications on File Prior to Visit:  ACETAMINOPHEN PO Take 1,000 mg by mouth every 6 hours as needed for pain    calcium carbonate (OS- MG [place]. CA) 1250 MG tablet Take 1 tablet by mouth 2 times daily   colchicine (COLCRYS) 0.6 MG tablet Take 1 tablet (0.6 mg) by mouth daily   cyanocobalamin (VITAMIN  B-12) 1000 MCG tablet TAKE ONE TABLET BY MOUTH DAILY   ferrous  gluconate (FERGON) 324 (38 Fe) MG tablet Take 1 tablet (324 mg) by mouth daily   furosemide (LASIX) 40 MG tablet Take 1 tablet (40 mg) by mouth 2 times daily   hypromellose (ARTIFICIAL TEARS) 0.4 % SOLN ophthalmic solution Place 1 drop into both eyes every hour as needed for dry eyes   lisinopril (PRINIVIL/ZESTRIL) 5 MG tablet Take 1 tablet (5 mg) by mouth daily   metoprolol succinate ER (TOPROL-XL) 25 MG 24 hr tablet Take 1 tablet (25 mg) by mouth every evening   mineral oil oil Take 5 mLs by mouth daily   mirtazapine (REMERON) 7.5 MG tablet Take 1 tablet (7.5 mg) by mouth At Bedtime   multivitamin, therapeutic with minerals (MULTI-VITAMIN) TABS tablet Take 1 tablet by mouth daily   nortriptyline (PAMELOR) 25 MG capsule Take 1 capsule (25 mg) by mouth At Bedtime   pantoprazole (PROTONIX) 40 MG EC tablet Take 1 tablet (40 mg) by mouth daily   prednisoLONE acetate (PRED FORTE) 1 % ophthalmic susp Place 1 drop Into the left eye 2 times daily    temazepam (RESTORIL) 30 MG capsule TAKE ONE CAPSULE BY MOUTH AT BEDTIME AS NEEDED FOR SLEEP   Warfarin Sodium (COUMADIN PO) Take by mouth daily 6 mg every Fri; 4 mg all other days or as directed by the Anticoagulation Clinic     betamethasone dipropionate (DIPROSONE) 0.05 % cream Apply topically 2 times daily For leg rash (Patient not taking: Reported on 11/30/2018)   HYDROcodone-acetaminophen (NORCO) 5-325 MG per tablet Take 1-2 tablets by mouth every 6 hours as needed (Patient not taking: Reported on 11/30/2018)     Current Facility-Administered Medications on File Prior to Visit:  lidocaine 1 % injection 2 mL     Allergies   Allergen Reactions     Amoxicillin Hives     Penicillins Other (See Comments)     Dizzy       Trazodone Other (See Comments)     Hallucinations          No data available        Social History     Tobacco Use     Smoking status: Former Smoker     Years: 20.00     Types: Cigarettes     Smokeless tobacco: Never Used   Substance Use Topics     Alcohol use:  "No     Drug use: No       REVIEW OF SYSTEMS:                                                      10-point review of systems is negative except as mentioned above in HPI.     EXAM:                                                      Physical Exam:   Vitals: /60 (BP Location: Right arm, Patient Position: Chair, Cuff Size: Adult Regular)   Pulse 73   Resp 14   Ht 1.676 m (5' 6\")   Wt 76.2 kg (168 lb)   SpO2 97%   BMI 27.12 kg/m    BMI= Body mass index is 27.12 kg/m .  GENERAL: NAD.   HEENT: Medial Left sclera is erythematous.  CV: RRR. Paced. S1, S2.    Neurologic:  MENTAL STATUS: Alert, attentive. Speech is fluent. Hypophonic. Normal concentration. Adequate fund of knowledge.   CRANIAL NERVES: Discs technically difficult to visualize. Visual fields intact to confrontation. Right pupil is abnormal, with overlying corneal abnormalities. Facial sensation and movement normal. EOM full. Slightly hard of hearing. Trapezius strength intact. Palate moves symmetrically. Tongue midline, dry.  MOTOR: 5/5 in proximal and distal muscle groups of upper and lower extremities. Tone and bulk normal.   DTRs: Intact and symmetric in UEs and patellae. Cannot elicit ankle jerks. Babinski down-going bilaterally.   SENSATION: Normal light touch and pinprick in hands. Diminished pinprick below the ankles. Intact proprioception. Vibration: Diminished at both ankles.   COORDINATION: Finger tapping normal. Knee heel shin normal.  STATION AND GAIT: Romberg positive. Gait is antalgic. Tandem deferred for safety.   Right hand-dominant.     ASSESSMENT and PLAN:                                                      Assessment and Plan:     ICD-10-CM    1. Peripheral polyneuropathy G62.9 gabapentin (NEURONTIN) 300 MG capsule     Protein electrophoresis     Protein Immunofixation Serum     Vitamin B12     Vitamin B1 whole blood     Vitamin B6     Anti Nuclear Isabel IgG by IFA with Reflex     CRP inflammation     ESR: Erythrocyte " sedimentation rate     Methylmalonic acid     Folate     SSA Ro CHRISTINE Antibody IgG     SSB La CHRISTINE Antibody IgG   2. Thyroid dysfunction E07.9 TSH with free T4 reflex    rule out        Mr. Cunningham is a pleasant 82 yo man with multiple medical comorbidities seen today for peripheral neuropathy. We discussed the previous work-up and additional testing that can be done. I agree that the history of heavy alcohol use may be the cause of his symptoms but also think it is important to recheck some of the basic labs for neuropathy, in case there is a treatable cause. We discussed treatment options. II do not see any contraindication for trying gabapentin in his case. Side effects discussed. We will start with a modest evening dose and adjust from there as needed. We also discussed alternative treatment options such as acupuncture and physical therapy for balance. The patient would like to consider these. We will see him back in about 3 months and be in touch about the gabapentin in the meantime.     Patient Instructions:  Labs for neuropathy. We will notify you of the results.   Gabapentin: 300mg at bedtime to start. *Information provided. Watch for any mood changes, as this can occur in rare cases.   Let us know how you are feeling after a couple of weeks on the gabapentin. We may need to adjust the dose for effectiveness.   Return to neurology in 3 months.     Total Time: 60 minutes were spent with the patient and in chart review. More than 50% of the time spent on counseling (as described above in Assessment and Plan/Instructions) /coordinating the care.    Kae Rodriguez MD  Neurology    CC: Heron Mayo MD

## 2019-01-17 NOTE — NURSING NOTE
"Chief Complaint   Patient presents with     Consult For     neuropathy - tingling of both feet       Initial /60 (BP Location: Right arm, Patient Position: Chair, Cuff Size: Adult Regular)   Pulse 73   Resp 14   Ht 1.676 m (5' 6\")   Wt 76.2 kg (168 lb)   SpO2 97%   BMI 27.12 kg/m   Estimated body mass index is 27.12 kg/m  as calculated from the following:    Height as of this encounter: 1.676 m (5' 6\").    Weight as of this encounter: 76.2 kg (168 lb).  BP completed using cuff size: regular  Medications and allergies reviewed.      Ani KEMP MA    "

## 2019-01-17 NOTE — PATIENT INSTRUCTIONS
Plan:    Labs for neuropathy. We will notify you of the results.   Gabapentin: 300mg at bedtime to start. *Information provided. Watch for any mood changes, as this can occur in rare cases.   Let us know how you are feeling after a couple of weeks on the gabapentin. We may need to adjust the dose for effectiveness.   Return to neurology in 3 months.

## 2019-01-17 NOTE — PROGRESS NOTES
ANTICOAGULATION FOLLOW-UP CLINIC VISIT    Patient Name:  Nabor Cunningham  Date:  2019  [facility]:  [facility]    SUBJECTIVE:     Patient Findings     Positives:   Change in medications (will be starting gabapentin today), Unexplained INR or factor level change    Comments:   Patient had 1 can of V8 in the last week. He also has about a half bottle of Ensure per day. The V8 is not something he always has. Depending on how much vitamin K is in that, it may have contributed to the decrease in the INR. Patient is certain he did not miss any doses. Homecare does set up his medications and all of them were missing.     There is no other obvious reason for the low INR.            OBJECTIVE    INR   Date Value Ref Range Status   2019 1.3  Final       ASSESSMENT / PLAN  No question data found.  Anticoagulation Summary  As of 2019    INR goal:   2.0-3.0   TTR:   60.6 % (1.8 y)   INR used for dosin.3! (2019)   Warfarin maintenance plan:   6 mg (2 mg x 3) every Fri; 4 mg (2 mg x 2) all other days   Full warfarin instructions:   : 8 mg; : 8 mg; Otherwise 6 mg every Fri; 4 mg all other days   Weekly warfarin total:   30 mg   Plan last modified:   Ada Isaac RN (2018)   Next INR check:   2019   Priority:   INR   Target end date:   Indefinite    Indications    Chronic anticoagulation [Z79.01]  Chronic atrial fibrillation (H) [I48.2]  Long-term (current) use of anticoagulants [Z79.01] [Z79.01]             Anticoagulation Episode Summary     INR check location:       Preferred lab:       Send INR reminders to:   [facility] PHONE [facility] POOL    Comments:   * 2mg tabs. [facility] MWF      Anticoagulation Care Providers     Provider Role Specialty Phone number    Heron Mayo MD [facility] [phone]            See the Encounter Report to view Anticoagulation Flowsheet and Dosing Calendar (Go to Encounters tab in chart review, and  find the Anticoagulation Therapy Visit)        Ada Isaac RN CACP

## 2019-01-18 ENCOUNTER — OFFICE VISIT (OUTPATIENT)
Dept: FAMILY MEDICINE | Facility: CLINIC | Age: 84
End: 2019-01-18
Payer: MEDICARE

## 2019-01-18 VITALS
WEIGHT: 169 LBS | TEMPERATURE: 97.4 F | DIASTOLIC BLOOD PRESSURE: 50 MMHG | RESPIRATION RATE: 18 BRPM | HEART RATE: 84 BPM | HEIGHT: 64 IN | OXYGEN SATURATION: 97 % | BODY MASS INDEX: 28.85 KG/M2 | SYSTOLIC BLOOD PRESSURE: 98 MMHG

## 2019-01-18 DIAGNOSIS — F33.1 MODERATE EPISODE OF RECURRENT MAJOR DEPRESSIVE DISORDER (H): ICD-10-CM

## 2019-01-18 DIAGNOSIS — G47.00 PERSISTENT INSOMNIA: ICD-10-CM

## 2019-01-18 DIAGNOSIS — F41.9 ANXIETY: Primary | ICD-10-CM

## 2019-01-18 DIAGNOSIS — I50.9 CONGESTIVE HEART FAILURE, UNSPECIFIED HF CHRONICITY, UNSPECIFIED HEART FAILURE TYPE (H): ICD-10-CM

## 2019-01-18 DIAGNOSIS — G62.9 PERIPHERAL POLYNEUROPATHY: ICD-10-CM

## 2019-01-18 LAB
ALBUMIN SERPL ELPH-MCNC: 4.3 G/DL (ref 3.7–5.1)
ALPHA1 GLOB SERPL ELPH-MCNC: 0.3 G/DL (ref 0.2–0.4)
ALPHA2 GLOB SERPL ELPH-MCNC: 0.6 G/DL (ref 0.5–0.9)
B-GLOBULIN SERPL ELPH-MCNC: 0.9 G/DL (ref 0.6–1)
GAMMA GLOB SERPL ELPH-MCNC: 1.3 G/DL (ref 0.7–1.6)
IGA SERPL-MCNC: 386 MG/DL (ref 70–380)
IGG SERPL-MCNC: 1310 MG/DL (ref 695–1620)
IGM SERPL-MCNC: 141 MG/DL (ref 60–265)
M PROTEIN SERPL ELPH-MCNC: 0 G/DL
PROT PATTERN SERPL ELPH-IMP: NORMAL
PROT PATTERN SERPL IFE-IMP: ABNORMAL

## 2019-01-18 PROCEDURE — 99214 OFFICE O/P EST MOD 30 MIN: CPT | Performed by: FAMILY MEDICINE

## 2019-01-18 RX ORDER — MIRTAZAPINE 15 MG/1
15 TABLET, FILM COATED ORAL AT BEDTIME
Qty: 30 TABLET | Refills: 0
Start: 2019-01-18 | End: 2019-02-15

## 2019-01-18 ASSESSMENT — PAIN SCALES - GENERAL: PAINLEVEL: NO PAIN (0)

## 2019-01-18 ASSESSMENT — MIFFLIN-ST. JEOR: SCORE: 1382.58

## 2019-01-18 NOTE — PROGRESS NOTES
SUBJECTIVE:   Nabor Cunningham is a 83 year old male who presents to clinic today for the following health issues:  Chief Complaint   Patient presents with     Heart Problem     Left heart cath 1/7/2018- [facility].     Medication Reconciliation     Has not started Gabapentin yet.  Mirtazepam increased to 15 mg today by Sonam, Mental Health Practioner.     Multiple recent health problems.   Appointment today from last visit when I had started him on mirtazapine.   He has seen neurology recently.  Was recommended gabapentin.  Saw NP psychiatry today.  Was recommended increase in mirtazapine from 7.5-15.   He had Heart cath in [place] on 1/7.      He has been taking mirtazapine 7.5mg at bedtime.  He is up to bathroom at night about twice.   Some difficulty getting to sleep.  Seems to be doing OK.    No side effects from medications.   Mood OK.   He feels the anxiety is doing well.  Son feels he is doing well at this time.     Some leg pain, not too bad.  Some tingling in toes.  Numbness. Has not started gabapentin yet.     Patient Active Problem List   Diagnosis     Anxiety     Alcohol abuse     Chronic atrial fibrillation (H)     Weight loss     Peripheral polyneuropathy     Chronic gout, unspecified cause, unspecified site     Essential hypertension with goal blood pressure less than 140/90     Hyperlipidemia LDL goal <130     Persistent insomnia     Lower urinary tract symptoms (LUTS)     Chronic anticoagulation     Long-term (current) use of anticoagulants [Z79.01]     Moderate episode of recurrent major depressive disorder (H)     Pneumonia     Bleeding from wound     Postoperative hemorrhage involving circulatory system following non-circulatory system procedure     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  Normal energy.  No fever or  "chills  Resp: No coughing, wheezing or shortness of breath  CV: No chest pains or palpitations  GI: No nausea, vomiting,  heartburn, abdominal pain, diarrhea, constipation or change in bowel habits  : POSITIVE for:, nocturia x 2  Musculoskeletal: POSITIVE  for:, pain in knee.     OBJECTIVE:Blood pressure 98/50, pulse 84, temperature 97.4  F (36.3  C), temperature source Tympanic, resp. rate 18, height 1.626 m (5' 4\"), weight 76.7 kg (169 lb), SpO2 97 %. BMI=Body mass index is 29.01 kg/m .  GENERAL APPEARANCE ADULT: Alert, no acute distress  NECK: No adenopathy,masses or thyromegaly  CV: normal rate, regular rhythm, no murmur or gallop  MS: trace to 1+ edema bilateral in lower legs.   PSYCH: mentation appears normal., affect and mood normal  Does not seem anxious today.       ASSESSMENT:   (F41.9) Anxiety  (primary encounter diagnosis)  Comment: doing OK  Plan: mirtazapine (REMERON) 15 MG tablet        Increase the mirtazapine as planned by your psychiatry practitioner to 15mg daily.     (G47.00) Persistent insomnia  Comment: doing OK  Plan: No change in current treatment plan.    Eventually we can try cutting back and stopping .temazepam    (G62.9) Peripheral polyneuropathy  Comment: He saw neurology yesterday  Plan: Start the gabapentin at bedtime as planned.     (F33.1) Moderate episode of recurrent major depressive disorder (H)  Comment: doing OK  Plan: non-specific     (I50.9) Congestive heart failure, unspecified HF chronicity, unspecified heart failure type (H)  Comment: stble  Plan: follow-up with cardiology as planned.     REcheck in 3 months.      "

## 2019-01-18 NOTE — NURSING NOTE
"Chief Complaint   Patient presents with     Heart Problem     Left heart cath 1/7/2018- [facility].     Medication Reconciliation     Has not started Gabapentin yet.  Mirtazepam increased to 15 mg today by Sonam Mental Health Practioner.       Initial BP 98/50 (BP Location: Right arm, Patient Position: Chair, Cuff Size: Adult Regular)   Pulse 84   Temp 97.4  F (36.3  C) (Tympanic)   Resp 18   Ht 1.626 m (5' 4\")   Wt 76.7 kg (169 lb)   SpO2 97%   BMI 29.01 kg/m   Estimated body mass index is 29.01 kg/m  as calculated from the following:    Height as of this encounter: 1.626 m (5' 4\").    Weight as of this encounter: 76.7 kg (169 lb).    Patient presents to the clinic using No DME    Health Maintenance that is potentially due pending provider review:  NONE    Pt reported. Not addressed per patient today    Is there anyone who you would like to be able to receive your results? Yes children- Ellen Miranda CMA    If yes have patient fill out [facility]  Ellen Miranda CMA    "

## 2019-01-18 NOTE — PATIENT INSTRUCTIONS
ASSESSMENT:   (F41.9) Anxiety  (primary encounter diagnosis)  Comment: doing OK  Plan: mirtazapine (REMERON) 15 MG tablet        Increase the mirtazapine as planned by your psychiatry practitioner to 15mg daily.     (G47.00) Persistent insomnia  Comment: doing OK  Plan: No change in current treatment plan.    Eventually we can try cutting back and stopping .temazepam    (G62.9) Peripheral polyneuropathy  Comment: He saw neurology yesterday  Plan: Start the gabapentin at bedtime as planned.     (F33.1) Moderate episode of recurrent major depressive disorder (H)  Comment: doing OK  Plan: non-specific     (I50.9) Congestive heart failure, unspecified HF chronicity, unspecified heart failure type (H)  Comment: stble  Plan: follow-up with cardiology as planned.     REcheck in 3 months.

## 2019-01-19 LAB
METHYLMALONATE SERPL-SCNC: 0.21 UMOL/L (ref 0–0.4)
VIT B6 SERPL-MCNC: 102.4 NMOL/L (ref 20–125)

## 2019-01-20 LAB — VIT B1 BLD-MCNC: 195 NMOL/L (ref 70–180)

## 2019-01-21 ENCOUNTER — PATIENT OUTREACH (OUTPATIENT)
Dept: CARE COORDINATION | Facility: CLINIC | Age: 84
End: 2019-01-21

## 2019-01-21 ENCOUNTER — ANTICOAGULATION THERAPY VISIT (OUTPATIENT)
Dept: ANTICOAGULATION | Facility: CLINIC | Age: 84
End: 2019-01-21
Payer: MEDICARE

## 2019-01-21 DIAGNOSIS — I48.20 CHRONIC ATRIAL FIBRILLATION (H): ICD-10-CM

## 2019-01-21 DIAGNOSIS — Z79.01 CHRONIC ANTICOAGULATION: ICD-10-CM

## 2019-01-21 LAB — INR PPP: 2

## 2019-01-21 PROCEDURE — 99207 ZZC NO CHARGE NURSE ONLY: CPT

## 2019-01-21 ASSESSMENT — ACTIVITIES OF DAILY LIVING (ADL): DEPENDENT_IADLS:: INDEPENDENT

## 2019-01-21 NOTE — PROGRESS NOTES
ANTICOAGULATION FOLLOW-UP CLINIC VISIT    Patient Name:  Nabor Cunningham  Date:  2019  Contact Type:  Telephone/ [facility]    SUBJECTIVE:     Patient Findings     Positives:   Inflammation (5 pound weight gain since Friday (fluid retention))    Comments:   Patient has had a 5 pound weight gain since Friday. Mirtazapine dose increased, also instructed to start gabapentin (had not yet done so).           OBJECTIVE    INR   Date Value Ref Range Status   2019 2.0  Final       ASSESSMENT / PLAN  No question data found.  Anticoagulation Summary  As of 2019    INR goal:   2.0-3.0   TTR:   60.2 % (1.8 y)   INR used for dosin.0 (2019)   Warfarin maintenance plan:   6 mg (2 mg x 3) every Fri; 4 mg (2 mg x 2) all other days   Full warfarin instructions:   6 mg every Fri; 4 mg all other days   Weekly warfarin total:   30 mg   No change documented:   Ada Isaac RN   Plan last modified:   Ada Isaac RN (2018)   Next INR check:   2019   Priority:   INR   Target end date:   Indefinite    Indications    Chronic anticoagulation [Z79.01]  Chronic atrial fibrillation (H) [I48.2]  Long-term (current) use of anticoagulants [Z79.01] [Z79.01]             Anticoagulation Episode Summary     INR check location:       Preferred lab:       Send INR reminders to:   [place] PHONE [facility]    Comments:   * 2mg tabs. [facility] MWF      Anticoagulation Care Providers     Provider Role Specialty Phone number    Heron Mayo MD [facility] [phone]            See the Encounter Report to view Anticoagulation Flowsheet and Dosing Calendar (Go to Encounters tab in chart review, and find the Anticoagulation Therapy Visit)        Ada Isaac RN [facility]

## 2019-01-21 NOTE — PROGRESS NOTES
Clinic Care Coordination Contact    Clinic Care Coordination Contact  OUTREACH    Referral Information:  Referral Source: PCP    Primary Diagnosis: Other (include Comment box)(wound infection)    Chief Complaint   Patient presents with     Clinic Care Coordination - Follow-up             Universal Utilization: no concerns  Clinic Utilization  Difficulty keeping appointments:: No  Compliance Concerns: No  No-Show Concerns: No  No PCP office visit in Past Year: No  Utilization    Last refreshed: 1/20/2019  3:31 PM:  Hospital Admissions 1           Last refreshed: 1/20/2019  3:31 PM:  ED Visits 6           Last refreshed: 1/20/2019  3:31 PM:  No Show Count (past year) 0              Current as of: 1/20/2019  3:31 PM              Clinical Concerns:  Current Medical Concerns:  Pt was to cardiology and had a left heart cath.  Per son pt is doing well.  They have a MUGA test on 1-31-19 that will give more detail about future steps.     Pt was to neurology as well.     Current Behavioral Concerns: pt has been to psychiatry and psychology.  Per son pt's anxiety is improved.        Education Provided to patient: discussed the importance of continued follow up with cardiology.  Son said pt is feeling better and they will follow up on appointments/tests.     Per son pt's anxiety is better and he is not calling son in the middle of the night.        Health Maintenance Reviewed: Due/Overdue   Health Maintenance Due   Topic Date Due     HF ACTION PLAN Q3 YR  1935     ZOSTER IMMUNIZATION (1 of 2) 11/03/1987     LIPID MONITORING Q1 YEAR  03/22/2018       Clinical Pathway: Clinic Care Coordination Anxiety Assessment    Per son pt is improving on his Anxiety.  No additional information needed at this time.     Symptoms: improving. Pt is not calling son in the middle of the night as he was before.        Medication Management:  No concerns.      Functional Status:  Dependent ADLs:: Independent  Dependent IADLs::  Independent  Bed or wheelchair confined:: No  Mobility Status: Independent  Fallen 2 or more times in the past year?: No  Any fall with injury in the past year?: No    Living Situation:  Current living arrangement:: I live alone  Type of residence:: Independent Senior Living    Diet/Exercise/Sleep:  Diet:: low cholesterol/fat, 2 gram salt or less, high fiber.   Inadequate nutrition (GOAL):: No  Food Insecurity: No  Tube Feeding: No  Exercise:: Currently not exercising  Inadequate activity/exercise (GOAL):: No  Significant changes in sleep pattern (GOAL): No    Transportation:  Transportation concerns (GOAL):: No  Transportation means:: Family, Friend, Regular car     Psychosocial:  Lutheran or spiritual beliefs that impact treatment:: No  Mental health DX:: Yes  Mental health DX how managed:: medications, counseling  Mental health management concern (GOAL):: No  Informal Support system:: Children, Family     Financial/Insurance:   Financial/Insurance concerns (GOAL):: No  Per son pt is going well.  He had no current/new concerns.      Resources and Interventions:  Current Resources:   List of home care services:: Skilled Nursing, Home Health Aid;   Community Resources: Home Care  Supplies used at home:: Wound Care Supplies  Equipment Currently Used at Home: none    Advance Care Plan/Directive  Advanced Care Plans/Directives on file:: No    Referrals Placed: Senior Linkage Line     Goals:   Goals        General    Psychosocial (pt-stated)     Notes - Note created  11/28/2018 11:09 AM by Rani Sarkar LSW    Goal Statement: pt to remain safe in his own home as long as possible, safe meaning no injuries from falls or avoidable health issues.  Measure of Success: no falls with injuries or avoidable health issues  Supportive Steps to Achieve: home care, , family  Barriers: age, health  Strengths: supportive family  Date to Achieve By: 6-30-19  Patient expressed understanding of goal:  yes                  Patient/Caregiver understanding: pt to continue to alert family of concerns.     Outreach Frequency: 2 weeks  Future Appointments              In 2 months Kae Ward MD [facility]          Plan: family to take pt to appointments as scheduled.  SW to call in about one month.     TONE Lord, Clinic Care Coordinator 1/21/2019   9:17 AM  [phone]

## 2019-01-22 LAB — ANA SER QL IF: NEGATIVE

## 2019-01-24 ENCOUNTER — ANTICOAGULATION THERAPY VISIT (OUTPATIENT)
Dept: ANTICOAGULATION | Facility: CLINIC | Age: 84
End: 2019-01-24
Payer: MEDICARE

## 2019-01-24 DIAGNOSIS — I48.20 CHRONIC ATRIAL FIBRILLATION (H): ICD-10-CM

## 2019-01-24 DIAGNOSIS — Z79.01 CHRONIC ANTICOAGULATION: ICD-10-CM

## 2019-01-24 LAB — INR PPP: 1.7

## 2019-01-24 PROCEDURE — 99207 ZZC NO CHARGE NURSE ONLY: CPT

## 2019-01-24 NOTE — PROGRESS NOTES
ANTICOAGULATION FOLLOW-UP CLINIC VISIT    Patient Name:  Nabor Cunningham  Date:  2019  Contact Type:  Telephone/ [facility]    SUBJECTIVE:     Patient Findings     Positives:   Change in medications (lasix dose increase), Unexplained INR or factor level change    Comments:   It is not clear why patient is requiring more warfarin than prior to his angiogram. After he held his warfarin for 3 days, he is needing more to keep him in the therapeutic range. His weekly dose was unchanged, however the larger doses over the weekend are having less of an effect on his INR.           OBJECTIVE    INR   Date Value Ref Range Status   2019 1.7  Final       ASSESSMENT / PLAN  No question data found.  Anticoagulation Summary  As of 2019    INR goal:   2.0-3.0   TTR:   59.9 % (1.8 y)   INR used for dosin.7! (2019)   Warfarin maintenance plan:   6 mg (2 mg x 3) every Fri; 4 mg (2 mg x 2) all other days   Full warfarin instructions:   : 8 mg; : 4 mg; : 8 mg; Otherwise 6 mg every Fri; 4 mg all other days   Weekly warfarin total:   30 mg   Plan last modified:   Ada Isaac RN (2018)   Next INR check:   2019   Priority:   INR   Target end date:   Indefinite    Indications    Chronic anticoagulation [Z79.01]  Chronic atrial fibrillation (H) [I48.2]  Long-term (current) use of anticoagulants [Z79.01] [Z79.01]             Anticoagulation Episode Summary     INR check location:       Preferred lab:       Send INR reminders to:   [place] [facility]    Comments:   * 2mg tabs. [facility]      Anticoagulation Care Providers     Provider Role Specialty Phone number    Heron Mayo MD [facility] [phone]            See the Encounter Report to view Anticoagulation Flowsheet and Dosing Calendar (Go to Encounters tab in chart review, and find the Anticoagulation Therapy Visit)        Ada [facility] RN [facility]

## 2019-01-28 ENCOUNTER — ANTICOAGULATION THERAPY VISIT (OUTPATIENT)
Dept: ANTICOAGULATION | Facility: CLINIC | Age: 84
End: 2019-01-28
Payer: MEDICARE

## 2019-01-28 ENCOUNTER — TELEPHONE (OUTPATIENT)
Dept: NEUROLOGY | Facility: CLINIC | Age: 84
End: 2019-01-28

## 2019-01-28 DIAGNOSIS — I48.20 CHRONIC ATRIAL FIBRILLATION (H): ICD-10-CM

## 2019-01-28 DIAGNOSIS — Z79.01 CHRONIC ANTICOAGULATION: ICD-10-CM

## 2019-01-28 LAB — INR PPP: 2.4

## 2019-01-28 PROCEDURE — 99207 ZZC NO CHARGE NURSE ONLY: CPT

## 2019-01-28 NOTE — TELEPHONE ENCOUNTER
Left message to discuss results. Patient had labs drawn 1/17/19 with Dr. Dai Rodriguez. Not sure if those are the labs patient is wondering about or if today's INR results are in question?     Patient's son to call back.     Darlene ALMEIDA RN   Specialty Clinics     Consent to communicate on file: 4/27/17

## 2019-01-28 NOTE — TELEPHONE ENCOUNTER
Reason for Call:  Other results    Detailed comments: Florenciopt son wants pt test results, please call    Phone Number Patient can be reached at: [phone]    Best Time: today    Can we leave a detailed message on this number? YES    Call taken on 1/28/2019 at 3:22 PM by Candice Kaiser

## 2019-01-28 NOTE — TELEPHONE ENCOUNTER
Son called back, was wondering about neurology appointment labs.     Any suggestions or recommendations regarding labs from 1/17/19?     Son also updated on Gabapentin since started taking. Stated patient has not noticed too much improvement in tingling, is noticing more in his heel (or new tingling in heel). Son was not sure if too soon for improvement to be noted or if other recommendations.     Thank you,   Darlene ALMEIDA RN   Specialty Clinics

## 2019-01-28 NOTE — PROGRESS NOTES
ANTICOAGULATION FOLLOW-UP CLINIC VISIT    Patient Name:  Nabor Cunningham  Date:  2019  Contact Type:  Telephone/ [facility]    SUBJECTIVE:     Patient Findings     Positives:   Change in medications (lisinopril and furosemide doses increased), Inflammation (Patient's dry weight is 165 lbs. He has been around 173-175 lbs. )    Comments:   Patient had 36mg in the previous 7 days, will adjust dose to keep weekly mg total the same by the next INR check on Friday. Patient has a cardiology follow up appointment on Thursday in [place].            OBJECTIVE    INR   Date Value Ref Range Status   2019 2.4  Final       ASSESSMENT / PLAN  No question data found.  Anticoagulation Summary  As of 2019    INR goal:   2.0-3.0   TTR:   59.9 % (1.8 y)   INR used for dosin.4 (2019)   Warfarin maintenance plan:   No maintenance plan   Full warfarin instructions:   : 6 mg; : 4 mg; : 6 mg; : 4 mg   Plan last modified:   Ada Isaac RN (2019)   Next INR check:   2019   Priority:   INR   Target end date:   Indefinite    Indications    Chronic anticoagulation [Z79.01]  Chronic atrial fibrillation (H) [I48.2]  Long-term (current) use of anticoagulants [Z79.01] [Z79.01]             Anticoagulation Episode Summary     INR check location:       Preferred lab:       Send INR reminders to:   [place] PHONE [employer]    Comments:   * 2mg tabs. [facility]       Anticoagulation Care Providers     Provider Role Specialty Phone number    Heron Mayo MD [facility] [phone]            See the Encounter Report to view Anticoagulation Flowsheet and Dosing Calendar (Go to Encounters tab in chart review, and find the Anticoagulation Therapy Visit)        Ada Isaac RN [facility]

## 2019-01-29 NOTE — TELEPHONE ENCOUNTER
Kae Ward MD   Fl Cma/Lpn Pool 4 hours ago (8:48 AM)      Please let the patient's son know the labs were largely unremarkable. I apologize for the delay but there was one immunofixation lab that was abnormal - I am waiting to hear back from the hematologist about whether this is clinically significant or not. Will let them know when I hear.     We can certainly try an increased dose of gabapentin: 300mg --> 600mg QHS to see if there is any improvement. This is still a rather low dose.     Thank you,   ROBI (Routing comment)

## 2019-01-29 NOTE — TELEPHONE ENCOUNTER
I spoke with Chalino's son, Florencio, this afternoon (C2C signed 4/14/17).  I advised him of the unremarkable results, as well as the wait for further information on the abnormal result.      I did advise Florencio of Dr. Rodriguez' suggestion that he can double the gabapentin dose if he'd like.  Florencio said he was concerned because Chalino currently takes as many as 5 medications at bedtime and he'd rather not increase a bedtime medication.  I did suggest that Chalino could possibly split the dose, taking 300 mg in the morning or midday, and then another 300 mg at bedtime.  Nevertheless, Florencio said he'd really like to hold Chalino at 300 mg at bedtime for now.    Florencio also wanted Dr. Rodriguez to know Chalino is having a MUGA scan on Thursday in [place]; they're looking at possibly changing his pacemaker.      I told Florencio I would flag Chalino's abnormal result to make sure we don't lose it to follow up.

## 2019-01-31 ENCOUNTER — TRANSFERRED RECORDS (OUTPATIENT)
Dept: HEALTH INFORMATION MANAGEMENT | Facility: CLINIC | Age: 84
End: 2019-01-31

## 2019-01-31 LAB — EJECTION FRACTION: 43

## 2019-02-01 ENCOUNTER — TELEPHONE (OUTPATIENT)
Dept: FAMILY MEDICINE | Facility: CLINIC | Age: 84
End: 2019-02-01

## 2019-02-01 ENCOUNTER — ANTICOAGULATION THERAPY VISIT (OUTPATIENT)
Dept: ANTICOAGULATION | Facility: CLINIC | Age: 84
End: 2019-02-01
Payer: MEDICARE

## 2019-02-01 DIAGNOSIS — I48.20 CHRONIC ATRIAL FIBRILLATION (H): ICD-10-CM

## 2019-02-01 DIAGNOSIS — Z79.01 CHRONIC ANTICOAGULATION: ICD-10-CM

## 2019-02-01 LAB — INR PPP: 2.1 (ref 0.9–1.1)

## 2019-02-01 PROCEDURE — 99207 ZZC NO CHARGE NURSE ONLY: CPT

## 2019-02-01 NOTE — PROGRESS NOTES
ANTICOAGULATION FOLLOW-UP CLINIC VISIT    Patient Name:  Nabor Cunningham  Date:  2019  Contact Type:  Telephone/ [facility]line with [facility]    SUBJECTIVE:     Patient Findings     Positives:   Change in medications (metalozone was used for two days in place of lasix. Restoril is tapering down and remeron was increased), CHF control change    Comments:   The patient was on home care for a buttock and scapula wound. These are both healed but pt remains on home care for CHF. His weight has been decreasing most recently. No diet or activity changes. Pt has had 36 mg/week and INR at low end of goal range. Will try 38 mg/week. No bleeding or signs of clot.           OBJECTIVE    INR   Date Value Ref Range Status   2019 2.1 (A) 0.9 - 1.1 Final       ASSESSMENT / PLAN  INR assessment THER    Recheck INR In: 1 WEEK    INR Location [facility]      Anticoagulation Summary  As of 2019    INR goal:   2.0-3.0   TTR:   60.2 % (1.8 y)   INR used for dosin.1 (2019)   Warfarin maintenance plan:   4 mg (2 mg x 2) every Mon, Thu; 6 mg (2 mg x 3) all other days   Full warfarin instructions:   4 mg every Mon, Thu; 6 mg all other days   Weekly warfarin total:   38 mg   Plan last modified:   Belia Jones RN (2019)   Next INR check:   2019   Priority:   INR   Target end date:   Indefinite    Indications    Chronic anticoagulation [Z79.01]  Chronic atrial fibrillation (H) [I48.2]  Long-term (current) use of anticoagulants [Z79.01] [Z79.01]             Anticoagulation Episode Summary     INR check location:       Preferred lab:       Send INR reminders to:   [place] PHONE [employer] POOL    Comments:   * 2mg tabs. [facility]       Anticoagulation Care Providers     Provider Role Specialty Phone number    Heron Mayo MD [facility] [phone]            See the Encounter Report to view Anticoagulation Flowsheet and Dosing Calendar (Go to Encounters tab in chart review, and find the Anticoagulation  Therapy Visit)        Belia Jones RN

## 2019-02-01 NOTE — TELEPHONE ENCOUNTER
S-(situation): Marge calling from Home care calling to report that Chalino still has the leg swelling and BP today is 79/42  Feels fine he says.  Weight today is 172# dressed and 168 dry weight.    B-(background): see last few encounters    A-(assessment): leg swelling, low BP    R-(recommendations): discussed this with Dr Mayo and will have him hold the metolazone.  Appointment made for him to see Dr Mayo 2-15-19.  Needs to be seen earlier if worse  Ellen Perez RN

## 2019-02-08 ENCOUNTER — ANTICOAGULATION THERAPY VISIT (OUTPATIENT)
Dept: ANTICOAGULATION | Facility: CLINIC | Age: 84
End: 2019-02-08
Payer: MEDICARE

## 2019-02-08 DIAGNOSIS — I48.20 CHRONIC ATRIAL FIBRILLATION (H): ICD-10-CM

## 2019-02-08 DIAGNOSIS — Z79.01 CHRONIC ANTICOAGULATION: ICD-10-CM

## 2019-02-08 LAB — INR PPP: 2.1 (ref 0.9–1.1)

## 2019-02-08 PROCEDURE — 99207 ZZC NO CHARGE NURSE ONLY: CPT

## 2019-02-08 NOTE — PROGRESS NOTES
ANTICOAGULATION FOLLOW-UP CLINIC VISIT    Patient Name:  Nabor Cunningham  Date:  2019  Contact Type:  Telephone/ spoke with Marge at [facility]    SUBJECTIVE:     Patient Findings     Positives:   No Problem Findings    Comments:   No changes in diet, activity level, medications (including over the counter), or health. No missed doses of warfarin. Patient took dosing as prescribed. No signs of clots or bleeding concerns. Patient will continue maintenance warfarin dosing at 38 mg/week.             OBJECTIVE    INR   Date Value Ref Range Status   2019 2.1 (A) 0.9 - 1.1 Final       ASSESSMENT / PLAN  INR assessment THER    Recheck INR In: 1 WEEK    INR Location [facility]      Anticoagulation Summary  As of 2019    INR goal:   2.0-3.0   TTR:   60.6 % (1.8 y)   INR used for dosin.1 (2019)   Warfarin maintenance plan:   4 mg (2 mg x 2) every Mon, Thu; 6 mg (2 mg x 3) all other days   Full warfarin instructions:   4 mg every Mon, Thu; 6 mg all other days   Weekly warfarin total:   38 mg   No change documented:   Belia Jones RN   Plan last modified:   Belia Jones RN (2019)   Next INR check:   2019   Priority:   INR   Target end date:   Indefinite    Indications    Chronic anticoagulation [Z79.01]  Chronic atrial fibrillation (H) [I48.2]  Long-term (current) use of anticoagulants [Z79.01] [Z79.01]             Anticoagulation Episode Summary     INR check location:       Preferred lab:       Send INR reminders to:   [employer] POOL    Comments:   * [facility]mg tabs.  [facility]       Anticoagulation Care Providers     Provider Role Specialty Phone number    Heron Mayo MD [facility] [phone]            See the Encounter Report to view Anticoagulation Flowsheet and Dosing Calendar (Go to Encounters tab in chart review, and find the Anticoagulation Therapy Visit)        Belia Jones RN

## 2019-02-11 DIAGNOSIS — I50.43 ACUTE ON CHRONIC COMBINED SYSTOLIC AND DIASTOLIC CONGESTIVE HEART FAILURE (H): ICD-10-CM

## 2019-02-12 RX ORDER — LISINOPRIL 5 MG/1
10 TABLET ORAL DAILY
Qty: 90 TABLET | Refills: 3 | Status: SHIPPED | OUTPATIENT
Start: 2019-02-12 | End: 2020-01-31

## 2019-02-14 ENCOUNTER — ANTICOAGULATION THERAPY VISIT (OUTPATIENT)
Dept: ANTICOAGULATION | Facility: CLINIC | Age: 84
End: 2019-02-14
Payer: MEDICARE

## 2019-02-14 DIAGNOSIS — Z79.01 CHRONIC ANTICOAGULATION: ICD-10-CM

## 2019-02-14 DIAGNOSIS — I48.20 CHRONIC ATRIAL FIBRILLATION (H): ICD-10-CM

## 2019-02-14 LAB — INR PPP: 1.8 (ref 0.86–1.14)

## 2019-02-14 PROCEDURE — 99207 ZZC NO CHARGE NURSE ONLY: CPT

## 2019-02-14 NOTE — PROGRESS NOTES
ANTICOAGULATION FOLLOW-UP CLINIC VISIT    Patient Name:  Nabor Cunningham  Date:  2019  Contact Type:  Telephone/ Marge [facility]    SUBJECTIVE:     Patient Findings     Positives:   Unexplained INR or factor level change    Comments:   No changes in medications, activity, health, or diet noted. No bleeding or increased bruising noted. Took warfarin as prescribed.  Writer instructed patient to take 6 mg today then to resume maintenance dose.   Recheck INR in 1 week.   No further questions or concerns.;           OBJECTIVE    INR   Date Value Ref Range Status   2019 1.8 (A) 0.86 - 1.14 Final       ASSESSMENT / PLAN  INR assessment SUB    Recheck INR In: 1 WEEK    [facility]R Location [facility]      Anticoagulation Summary  As of 2019    INR goal:   2.0-3.0   TTR:   60.3 % (1.8 y)   INR used for dosin.8! (2019)   Warfarin maintenance plan:   4 mg (2 mg x 2) every Mon, Thu; 6 mg (2 mg x 3) all other days   Full warfarin instructions:   : 6 mg; Otherwise 4 mg every Mon, Thu; 6 mg all other days   Weekly warfarin total:   38 mg   Plan last modified:   Belia Jones RN (2019)   Next INR check:   2019   Priority:   INR   Target end date:   Indefinite    Indications    Chronic anticoagulation [Z79.01]  Chronic atrial fibrillation (H) [I48.2]  Long-term (current) use of anticoagulants [Z79.01] [Z79.01]             Anticoagulation Episode Summary     INR check location:       Preferred lab:       Send INR reminders to:   [facility] [address] POOL    Comments:   * 2mg tabs. [facility]       Anticoagulation Care Providers     Provider Role Specialty Phone number    Heron Mayo MD [facility] [phone]            See the Encounter Report to view Anticoagulation Flowsheet and Dosing Calendar (Go to Encounters tab in chart review, and find the Anticoagulation Therapy Visit)        Daphne Lamar RN

## 2019-02-15 ENCOUNTER — OFFICE VISIT (OUTPATIENT)
Dept: FAMILY MEDICINE | Facility: CLINIC | Age: 84
End: 2019-02-15
Payer: MEDICARE

## 2019-02-15 ENCOUNTER — TELEPHONE (OUTPATIENT)
Dept: FAMILY MEDICINE | Facility: CLINIC | Age: 84
End: 2019-02-15

## 2019-02-15 VITALS
BODY MASS INDEX: 29.7 KG/M2 | SYSTOLIC BLOOD PRESSURE: 90 MMHG | TEMPERATURE: 96.9 F | HEART RATE: 76 BPM | WEIGHT: 173 LBS | RESPIRATION RATE: 20 BRPM | DIASTOLIC BLOOD PRESSURE: 44 MMHG | OXYGEN SATURATION: 95 %

## 2019-02-15 DIAGNOSIS — M1A.9XX0 CHRONIC GOUT, UNSPECIFIED CAUSE, UNSPECIFIED SITE: ICD-10-CM

## 2019-02-15 DIAGNOSIS — G47.00 PERSISTENT INSOMNIA: ICD-10-CM

## 2019-02-15 DIAGNOSIS — I50.9 CONGESTIVE HEART FAILURE, UNSPECIFIED HF CHRONICITY, UNSPECIFIED HEART FAILURE TYPE (H): ICD-10-CM

## 2019-02-15 DIAGNOSIS — F41.9 ANXIETY: Primary | ICD-10-CM

## 2019-02-15 PROCEDURE — 99214 OFFICE O/P EST MOD 30 MIN: CPT | Performed by: FAMILY MEDICINE

## 2019-02-15 RX ORDER — MIRTAZAPINE 15 MG/1
15 TABLET, FILM COATED ORAL AT BEDTIME
Qty: 30 TABLET | Refills: 11 | Status: SHIPPED | OUTPATIENT
Start: 2019-02-15 | End: 2020-01-31

## 2019-02-15 RX ORDER — COLCHICINE 0.6 MG/1
0.6 TABLET ORAL DAILY
Qty: 90 TABLET | Refills: 3 | Status: SHIPPED | OUTPATIENT
Start: 2019-02-15 | End: 2019-08-08

## 2019-02-15 ASSESSMENT — PATIENT HEALTH QUESTIONNAIRE - PHQ9
SUM OF ALL RESPONSES TO PHQ QUESTIONS 1-9: 4
5. POOR APPETITE OR OVEREATING: NEARLY EVERY DAY

## 2019-02-15 ASSESSMENT — ANXIETY QUESTIONNAIRES
GAD7 TOTAL SCORE: 6
7. FEELING AFRAID AS IF SOMETHING AWFUL MIGHT HAPPEN: SEVERAL DAYS
2. NOT BEING ABLE TO STOP OR CONTROL WORRYING: NOT AT ALL
1. FEELING NERVOUS, ANXIOUS, OR ON EDGE: SEVERAL DAYS
6. BECOMING EASILY ANNOYED OR IRRITABLE: NOT AT ALL
5. BEING SO RESTLESS THAT IT IS HARD TO SIT STILL: SEVERAL DAYS
3. WORRYING TOO MUCH ABOUT DIFFERENT THINGS: NOT AT ALL

## 2019-02-15 ASSESSMENT — PAIN SCALES - GENERAL: PAINLEVEL: NO PAIN (0)

## 2019-02-15 NOTE — NURSING NOTE
"Chief Complaint   Patient presents with     Bowel Problems     States has frequent BM- wondering if medication related.     Depression     Still having some sleep isssues.       Initial BP 90/44   Pulse 76   Temp 96.9  F (36.1  C) (Tympanic)   Resp 20   Wt 78.5 kg (173 lb)   SpO2 95%   BMI 29.70 kg/m   Estimated body mass index is 29.7 kg/m  as calculated from the following:    Height as of 1/18/19: 1.626 m (5' 4\").    Weight as of this encounter: 78.5 kg (173 lb).    Patient presents to the clinic using No DME    Health Maintenance that is potentially due pending provider review:  NONE    n/a    Is there anyone who you would like to be able to receive your results? Yes- Florencio  If yes have patient fill out [facility]  Ellen Miranda CMA    "

## 2019-02-15 NOTE — PROGRESS NOTES
"  SUBJECTIVE:   Nabor Cunningham is a 83 year old male who presents to clinic today for the following health issues:  Chief Complaint   Patient presents with     Bowel Problems     States has frequent BM- wondering if medication related.     Depression     Still having some sleep isssues.      Last clinic visit: 1/18/2019.  See visit summary below.     He has had frequent stools.   Usually about 5-6 stools per day.  Some urgency.   Stools formed.  Onset of symptoms: past month.     Depression and Anxiety Follow-Up    Status since last visit: Improved     Other associated symptoms:frequent dreams    Complicating factors:     Significant life event: sick pet     Current substance abuse: None    PHQ 9/21/2018 12/21/2018 2/15/2019   PHQ-9 Total Score 3 12 4   Q9: Suicide Ideation Not at all Not at all Not at all     IZAIAH-7 SCORE 7/17/2017 12/21/2018 2/15/2019   Total Score 14 12 6     PHQ9 score today= 4  generalized anxiety disorder scale score today= 6  He feels mood has been \"pretty good\".   Off the temazepam for 1 and 1/2 weeks.  Dreams a lot, not disturbing. He did gradual taper.  Sleeping OK.      Son notes less anxiety.  Not calling son as anxious.     PHQ-9  English  PHQ-9   Any Language  IZAIAH-7  Suicide Assessment Five-step Evaluation and Treatment (SAFE-T)    Amount of exercise or physical activity: 6-7 days/week for an average of stationary bike and walks the halls    Problems taking medications regularly: No    Medication side effects: frequent BM    Diet: low sodium    Problem 2: Leg pain, tingling in toes has not been an issue lately.     Problem 3: CHF  Breathing seems OK'  Leg swelling about the same.    He has had MUGA test.  Will contact cardiology next week regarding results.     Patient Active Problem List   Diagnosis     Anxiety     Alcohol abuse     Chronic atrial fibrillation (H)     Weight loss     Peripheral polyneuropathy     Chronic gout, unspecified cause, unspecified site     Essential " hypertension with goal blood pressure less than 140/90     Hyperlipidemia LDL goal <130     Persistent insomnia     Lower urinary tract symptoms (LUTS)     Chronic anticoagulation     Long-term (current) use of anticoagulants [Z79.01]     Moderate episode of recurrent major depressive disorder (H)     Pneumonia     Bleeding from wound     Postoperative hemorrhage involving circulatory system following non-circulatory system procedure     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)     Cardiac pacemaker in situ     CHF (congestive heart failure) (H)      ROS:  No gout episodes.   Heartburn once in a while   No dizziness or lightheadedness.  No orthostatic symptoms.     OBJECTIVE:Blood pressure 90/44, pulse 76, temperature 96.9  F (36.1  C), temperature source Tympanic, resp. rate 20, weight 78.5 kg (173 lb), SpO2 95 %. BMI=Body mass index is 29.7 kg/m .  GENERAL APPEARANCE ADULT: Alert, no acute distress  NECK: No adenopathy,masses or thyromegaly  RESP: lungs clear to auscultation   CV: irregular rhythm-irregularly irregular, rate-normal, no murmur  MS: trace to 1+ edema bilateral.      ASSESSMENT:   (F41.9) Anxiety  (primary encounter diagnosis)  Comment: doing well  Plan: mirtazapine (REMERON) 15 MG tablet        No change in current treatment plan.  REfills.     (M1A.9XX0) Chronic gout, unspecified cause, unspecified site  Comment: doing well  Plan: colchicine (COLCRYS) 0.6 MG tablet        No change in current treatment plan.     (G47.00) Persistent insomnia  Comment: has been good lately off the temazepam.   Plan: No change in current treatment plan.     (I50.9) Congestive heart failure, unspecified HF chronicity, unspecified heart failure type (H)  Comment: currently seeing cardiology.  Stable at this time.   Plan: follow-up on MUGA results as planned next week.     Cut back on mineral oil if stools remain loose.     You should be OK for knee injection.           =====================  Clinic notes 1/18/2019:  ASSESSMENT:   (F41.9) Anxiety  (primary encounter diagnosis)  Comment: doing OK  Plan: mirtazapine (REMERON) 15 MG tablet        Increase the mirtazapine as planned by your psychiatry practitioner to 15mg daily.      (G47.00) Persistent insomnia  Comment: doing OK  Plan: No change in current treatment plan.    Eventually we can try cutting back and stopping .temazepam     (G62.9) Peripheral polyneuropathy  Comment: He saw neurology yesterday  Plan: Start the gabapentin at bedtime as planned.      (F33.1) Moderate episode of recurrent major depressive disorder (H)  Comment: doing OK  Plan: non-specific      (I50.9) Congestive heart failure, unspecified HF chronicity, unspecified heart failure type (H)  Comment: stble  Plan: follow-up with cardiology as planned.      REcheck in 3 months.

## 2019-02-15 NOTE — PATIENT INSTRUCTIONS
ASSESSMENT:   (F41.9) Anxiety  (primary encounter diagnosis)  Comment: doing well  Plan: mirtazapine (REMERON) 15 MG tablet        No change in current treatment plan.  REfills.     (M1A.9XX0) Chronic gout, unspecified cause, unspecified site  Comment: doing well  Plan: colchicine (COLCRYS) 0.6 MG tablet        No change in current treatment plan.     (G47.00) Persistent insomnia  Comment: has been good lately off the temazepam.   Plan: No change in current treatment plan.     (I50.9) Congestive heart failure, unspecified HF chronicity, unspecified heart failure type (H)  Comment: currently seeing cardiology.  Stable at this time.   Plan: follow-up on MUGA results as planned next week.     Cut back on mineral oil if stools remain loose.     You should be OK for knee injection.

## 2019-02-15 NOTE — TELEPHONE ENCOUNTER
Reason for Call:  Other    Detailed comments: Pt's son says he was in the office with his dad today and saw Dr Mayo. He says he mentioned to Dr Mayo that his dad Poops a lot when he is peeing. He wasn't sure what they were to do about this.     Phone Number Patient can be reached at:  Son is Florencio [phone]    Best Time: anytime    Can we leave a detailed message on this number? YES    Call taken on 2/15/2019 at 2:46 PM by Marla Grace

## 2019-02-15 NOTE — TELEPHONE ENCOUNTER
S-(situation): I talked with Chalino and he says that he poops every time he urinates.  He uses wipes to wipe butt, but still butt a little sore.  He urinates about 5-6 times a day he says.  Stools ate normal in color and size.  No fever no abdominal pain    B-(background): in clinic today    A-(assessment): has bowel movement every time he urinates.    R-(recommendations): advised to cut back on the mineral oil  Ellen Perez RN

## 2019-02-16 ASSESSMENT — ANXIETY QUESTIONNAIRES: GAD7 TOTAL SCORE: 6

## 2019-02-19 ENCOUNTER — TELEPHONE (OUTPATIENT)
Dept: FAMILY MEDICINE | Facility: CLINIC | Age: 84
End: 2019-02-19

## 2019-02-19 NOTE — TELEPHONE ENCOUNTER
Reason for Call:  Other call back    Detailed comments: Pt is taking Furosemide. In Epic dose states to take 80 mg In morning and 40 mg in pm for 5 days. Med list also states this is the correct dose, 80mg in am and 40mg in pm but nothing about 5 days only. He has been taking this way since 1/22/19. Please clarify.    Phone Number Patient can be reached at: Other phone number:  [facility] [phone]    Best Time: any    Can we leave a detailed message on this number? YES    Call taken on 2/19/2019 at 2:20 PM by Jaimie Hua

## 2019-02-21 ENCOUNTER — PATIENT OUTREACH (OUTPATIENT)
Dept: CARE COORDINATION | Facility: CLINIC | Age: 84
End: 2019-02-21

## 2019-02-21 ENCOUNTER — ANTICOAGULATION THERAPY VISIT (OUTPATIENT)
Dept: ANTICOAGULATION | Facility: CLINIC | Age: 84
End: 2019-02-21
Payer: MEDICARE

## 2019-02-21 ENCOUNTER — TELEPHONE (OUTPATIENT)
Dept: CARE COORDINATION | Facility: CLINIC | Age: 84
End: 2019-02-21

## 2019-02-21 DIAGNOSIS — Z79.01 CHRONIC ANTICOAGULATION: ICD-10-CM

## 2019-02-21 DIAGNOSIS — I48.20 CHRONIC ATRIAL FIBRILLATION (H): ICD-10-CM

## 2019-02-21 LAB — INR PPP: 2.2 (ref 0.86–1.14)

## 2019-02-21 PROCEDURE — 99207 ZZC NO CHARGE NURSE ONLY: CPT

## 2019-02-21 ASSESSMENT — ACTIVITIES OF DAILY LIVING (ADL): DEPENDENT_IADLS:: INDEPENDENT

## 2019-02-21 NOTE — TELEPHONE ENCOUNTER
Please call.    He has been doing well.  I suggest continuing furosemide dose 80mg AM and 40mg Midday.   He will be seeing cardiology on 2/25.   VICTOR HUGO ART MD

## 2019-02-21 NOTE — TELEPHONE ENCOUNTER
1-21-19.  Furosemide 80 mg in AM and 40 mg mid afternoon    1-28-19 weight 173#.  Add metolazone 5 mg for 5 days.    2-1-19 weight 172# dry weight 168#  Hold metolazone.    2-15-19 saw Dr Mayo, weight 173#. Mirtazapine increased to 15 mg at HS.    Should Chalino still be taking furosemide 80 AM and 40 afternoon?  Ellen Perez RN

## 2019-02-21 NOTE — PROGRESS NOTES
ANTICOAGULATION FOLLOW-UP CLINIC VISIT    Patient Name:  Nabor Cunningham  Date:  2019  Contact Type:  Telephone/ Marge [facility]    SUBJECTIVE:     Patient Findings     Positives:   Extra doses    Comments:   No changes in medications, activity, health, or diet noted. No bleeding or increased bruising noted. Took warfarin as prescribed.  Maintenance dose changed to 40 mg weekly.   Recheck the INR in 1 week.            OBJECTIVE    INR   Date Value Ref Range Status   2019 2.2 (A) 0.86 - 1.14 Final       ASSESSMENT / PLAN  INR assessment THER    Recheck INR In: 1 WEEK    [facility]R Location [facility]      Anticoagulation Summary  As of 2019    INR goal:   2.0-3.0   TTR:   60.2 % (1.9 y)   INR used for dosin.2 (2019)   Warfarin maintenance plan:   4 mg (2 mg x 2) every Mon; 6 mg (2 mg x 3) all other days   Full warfarin instructions:   4 mg every Mon; 6 mg all other days   Weekly warfarin total:   40 mg   Plan last modified:   Daphne Lamar RN (2019)   Next INR check:   2019   Priority:   INR   Target end date:   Indefinite    Indications    Chronic anticoagulation [Z79.01]  Chronic atrial fibrillation (H) [I48.2]  Long-term (current) use of anticoagulants [Z79.01] [Z79.01]             Anticoagulation Episode Summary     INR check location:       Preferred lab:       Send INR reminders to:   [place] [facility] POOL    Comments:   * 2mg tabs. [facility]       Anticoagulation Care Providers     Provider Role Specialty Phone number    Heron Mayo MD [facility] [phone]            See the Encounter Report to view Anticoagulation Flowsheet and Dosing Calendar (Go to Encounters tab in chart review, and find the Anticoagulation Therapy Visit)        Daphne Lamar RN

## 2019-02-21 NOTE — TELEPHONE ENCOUNTER
[facility] now requests orders and shares plan of care/discharge summaries for some patients through [employer].  Please REPLY TO THIS MESSAGE OR ROUTE BACK TO THE AUTHOR in order to give authorization for orders when needed.  This is considered a verbal order, you will still receive a faxed copy of orders for signature.  Thank you for your assistance in improving collaboration for our patients.    ORDER  Looking for clarification on medication. Saw message that you have left me a message, however noted that you have the wrong number to contact me. Please contact me at [phone]  Thank you  Iveth Maurice

## 2019-02-21 NOTE — TELEPHONE ENCOUNTER
[facility]veth home care RN notified and verbalized understanding. She will notify pt/family. Romelia Bertrand RN

## 2019-02-21 NOTE — PROGRESS NOTES
Clinic Care Coordination Contact    Situation: Patient chart reviewed by care coordinator.    Background:  has been in contact with son support him in his care of his dad/patient.    Assessment: Per chart review son did get results of the MUGA test and has follow-up appointment scheduled on 2-25.    Plan/Recommendations:  will call son in 5-7 business days to follow-up on results of that appointment and further assess needs.    TONE Lord, Clinic Care Coordinator 2/21/2019   10:43 AM  [phone]

## 2019-02-28 ENCOUNTER — ANTICOAGULATION THERAPY VISIT (OUTPATIENT)
Dept: ANTICOAGULATION | Facility: CLINIC | Age: 84
End: 2019-02-28
Payer: MEDICARE

## 2019-02-28 DIAGNOSIS — Z79.01 CHRONIC ANTICOAGULATION: ICD-10-CM

## 2019-02-28 DIAGNOSIS — I48.20 CHRONIC ATRIAL FIBRILLATION (H): ICD-10-CM

## 2019-02-28 LAB — INR PPP: 3.1 (ref 0.86–1.14)

## 2019-02-28 PROCEDURE — 99207 ZZC NO CHARGE NURSE ONLY: CPT

## 2019-02-28 NOTE — PROGRESS NOTES
ANTICOAGULATION FOLLOW-UP CLINIC VISIT    Patient Name:  Nabor Cunningham  Date:  2/28/2019  Contact Type:  Telephone/ [facility]eth [facility]    SUBJECTIVE:     Patient Findings     Positives:   No Problem Findings    Comments:   No changes in medications, activity, health, or diet noted. No bleeding or increased bruising noted. Took warfarin as prescribed.  Patient will continue weekly maintenance dose. INR is therapeutic.   Recheck in 1 week. If the INR remains elevated, decrease maintenance dose.             OBJECTIVE    INR   Date Value Ref Range Status   02/28/2019 3.1 (A) 0.86 - 1.14 Final       ASSESSMENT / PLAN  INR assessment THER    Recheck INR In: 1 WEEK    [facility]      Anticoagulation Summary  As of 2/28/2019    INR goal:   2.0-3.0   TTR:   60.5 % (1.9 y)   INR used for dosing:   3.1! (2/28/2019)   Warfarin maintenance plan:   4 mg (2 mg x 2) every Mon; 6 mg (2 mg x 3) all other days   Full warfarin instructions:   4 mg every Mon; 6 mg all other days   Weekly warfarin total:   40 mg   No change documented:   Daphne Lamar RN   Plan last modified:   Daphne Lamar RN (2/21/2019)   Next INR check:   3/7/2019   Priority:   INR   Target end date:   Indefinite    Indications    Chronic anticoagulation [Z79.01]  Chronic atrial fibrillation (H) [I48.2]  Long-term (current) use of anticoagulants [Z79.01] [Z79.01]             Anticoagulation Episode Summary     INR check location:       Preferred lab:       Send INR reminders to:   [place] [facility] POOL    Comments:   * 2mg tabs. [facility]       Anticoagulation Care Providers     Provider Role Specialty Phone number    Heron Mayo MD [facility] [phone]            See the Encounter Report to view Anticoagulation Flowsheet and Dosing Calendar (Go to Encounters tab in chart review, and find the Anticoagulation Therapy Visit)        Daphne Lamar RN

## 2019-03-01 ENCOUNTER — PATIENT OUTREACH (OUTPATIENT)
Dept: CARE COORDINATION | Facility: CLINIC | Age: 84
End: 2019-03-01

## 2019-03-01 ASSESSMENT — ACTIVITIES OF DAILY LIVING (ADL): DEPENDENT_IADLS:: INDEPENDENT

## 2019-03-01 NOTE — LETTER
[facility]  Complex Care Plan  About Me  Patient Name:  Nabor Perea    YOB: 1935  Age:     83 year old   Mary MRN:   [id number] Telephone Information:  Home Phone [phone]   Mobile [phone]   Home Phone [phone]       Address:    [address]  [facility] [address] Email address:  No e-mail address on record      Emergency Contact(s)  Name Relationship Lgl Grd Work Phone Home Phone Mobile Phone   1. JACKSON VAN Daughter No  [phone]   2. ELOY PEREA  Son No   [phone]   3. LISANDRA PEREA Relative No  [phone]            Primary language:  English     needed? No   [place] [facility]:  [phone] op. 1  Other communication barriers: Glasses, Cognitive impairment  Preferred Method of Communication:  Do Not Contact  Current living arrangement: I live alone  Mobility Status/ Medical Equipment: Independent    Health Maintenance  Health Maintenance Reviewed: Due/Overdue   Health Maintenance Due   Topic Date Due     HF ACTION PLAN Q3 YR  1935     ZOSTER IMMUNIZATION (1 of 2) 11/03/1987     MEDICARE ANNUAL WELLNESS VISIT  11/03/2002     LIPID MONITORING Q1 YEAR  03/22/2018     Please talk with your provider about what is needed or can continue to wait.        My Access Plan  Medical Emergency 911   Primary Clinic Line [facility] - [phone]   24 Hour Appointment Line [phone] or  [phone] ([phone]) (toll-free)   24 Hour Nurse Line [phone] (toll-free)   Preferred Urgent Care [facility], [phone]   Preferred Hospital [place], [place]  [phone]   Preferred Pharmacy [id number] - [place], [place] - [address]     Behavioral Health Crisis Line The National Suicide Prevention Lifeline at 1-889.892.5276 or 911     My Care Team Members    Patient Care Team       Relationship Specialty Notifications Start End    Leaf,  Heron Blackburn MD PCP - General Family Practice  9/11/18     Phone: [phone] Fax: [phone]         [address] [place] [address]    Heron Mayo MD PCP - Assigned PCP   3/9/17     Phone: [phone] Fax: [phone]         [address] [place] [address]    Heron Mayo MD  Family Practice  1/29/18     Merged    Phone: [phone] Fax: [phone]         [address] [place] [address]    [facility] ([facility] ([place]  9/4/18     Phone: [phone]         Rani Sarkar LSW Lead Care Coordinator Primary Care - [facility] Admissions 11/13/18     Phone: [phone] Fax: [phone]                My Care Plans  Self Management and Treatment Plan  Goals and (Comments)  Goals        General    Psychosocial (pt-stated)     Notes - Note created  11/28/2018 11:09 AM by Rani Sarkar LSW    Goal Statement: pt to remain safe in his own home as long as possible, safe meaning no injuries from falls or avoidable health issues.  Measure of Success: no falls with injuries or avoidable health issues  Supportive Steps to Achieve: home care, , family  Barriers: age, health  Strengths: supportive family  Date to Achieve By: 6-30-19  Patient expressed understanding of goal: yes                 Action Plans on File:                       Advance Care Plans/Directives Type:        My Medical and Care Information  Problem List   Patient Active Problem List   Diagnosis     Anxiety     Alcohol abuse     Chronic atrial fibrillation (H)     Weight loss     Peripheral polyneuropathy     Chronic gout, unspecified cause, unspecified site     Essential hypertension with goal blood pressure less than 140/90     Hyperlipidemia LDL goal <130     Persistent insomnia     Lower urinary tract symptoms (LUTS)     Chronic anticoagulation     Long-term (current) use of anticoagulants [Z79.01]     Moderate episode of recurrent major depressive disorder (H)      Pneumonia     Bleeding from wound     Postoperative hemorrhage involving circulatory system following non-circulatory system procedure     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    Care Coordination Start Date: 11/28/2018   Frequency of Care Coordination: monthly   Form Last Updated: 03/01/2019

## 2019-03-01 NOTE — PROGRESS NOTES
Clinic Care Coordination Contact    Clinic Care Coordination Contact  OUTREACH    Referral Information:  Referral Source: PCP    Primary Diagnosis: Other (include Comment box)(wound infection)    Chief Complaint   Patient presents with     Clinic Care Coordination - Follow-up             Universal Utilization: pt does see cardiologist out side of [place].   Clinic Utilization  Difficulty keeping appointments:: No  Compliance Concerns: No  No-Show Concerns: No  No PCP office visit in Past Year: No  Utilization    Last refreshed: 3/1/2019  8:09 AM:  Hospital Admissions 1           Last refreshed: 3/1/2019  8:09 AM:  ED Visits 6           Last refreshed: 3/1/2019  8:09 AM:  No Show Count (past year) 0              Current as of: 3/1/2019  8:09 AM              Clinical Concerns:  Current Medical Concerns:  Pt was to cardiologist.  Per son pt's heart has improved and a pacemaker or leads are not needed at this time.     Current Behavioral Concerns: per son and pt, pt is improved with his anxiety.  Both son and pt report the improvement in his qualify of life with the improved anxiety.     Education Provided to patient: discussed continued follow up on cardiology and anxiety.  Son said they have one more appointment with CNS and then will return to PCP for anxiety medication management.  He said they already talked with PCP about this.      They received information from Rafia Barber from the senior linkage line about companionship options.  Per son at this time they are going to wait for a month or two as pt has lots of appointments.      Health Maintenance Reviewed: Due/Overdue   Health Maintenance Due   Topic Date Due     HF ACTION PLAN Q3 YR  1935     ZOSTER IMMUNIZATION (1 of 2) 11/03/1987     MEDICARE ANNUAL WELLNESS VISIT  11/03/2002     LIPID MONITORING Q1 YEAR  03/22/2018       Clinical Pathway: None    Medication Management:  Son assists with setting up medications.      Functional Status:  Dependent  ADLs:: Independent  Dependent IADLs:: Independent  Bed or wheelchair confined:: No  Mobility Status: Independent  Fallen 2 or more times in the past year?: No  Any fall with injury in the past year?: No    Living Situation:  Current living arrangement:: I live alone  Type of residence:: Independent Senior Living    Diet/Exercise/Sleep:  Diet:: Low cholesterol, Low saturated fat, No added salt, Other(high fiber, 2 grm salt restriction)  Inadequate nutrition (GOAL):: No  Food Insecurity: No  Tube Feeding: No  Exercise:: Currently not exercising  Inadequate activity/exercise (GOAL):: No  Significant changes in sleep pattern (GOAL): No    Transportation:  Transportation concerns (GOAL):: No  Transportation means:: Family, Friend, Regular car     Psychosocial:  Judaism or spiritual beliefs that impact treatment:: No  Mental health DX:: Yes  Mental health DX how managed:: Medication, Outpatient Counseling  Mental health management concern (GOAL):: No  Informal Support system:: Children, Family     Financial/Insurance:   Financial/Insurance concerns (GOAL):: No  Pt is doing well.  They did note that the home care RN will be discharging soon. Informed them that they can pay privately for home care RN to do visits.  Also that this would be cheaper then pt going into an [facility] if this is the only need.  Son will consider this.  He said right now with the anxiety improved they are doing ok.     They did also get some information on lifeline.  They do not feel pt needs this right now and will wait.      Resources and Interventions:  Current Resources:   List of home care services:: Skilled Nursing, Home Health Aid;   Community Resources: Home Care  Supplies used at home:: Wound Care Supplies  Equipment Currently Used at Home: none    Advance Care Plan/Directive  Advanced Care Plans/Directives on file:: No    Referrals Placed: Senior Linkage Line     Goals:   Goals        General    Psychosocial (pt-stated)     Notes - Note  created  11/28/2018 11:09 AM by Rani Sarkar LSW    Goal Statement: pt to remain safe in his own home as long as possible, safe meaning no injuries from falls or avoidable health issues.  Measure of Success: no falls with injuries or avoidable health issues  Supportive Steps to Achieve: home care, , family  Barriers: age, health  Strengths: supportive family  Date to Achieve By: 6-30-19  Patient expressed understanding of goal: yes                  Patient/Caregiver understanding: pt to remain safe with children's support.     Outreach Frequency: monthly  Future Appointments              In 2 weeks Josh Mitchell MD [facility] [place], [place]    In 1 month Kae Ward MD [facility]          Plan: pt to attend appointments and continue with current medications.  Sw to send complex care plan and call in about one month.     TONE Lord, Clinic Care Coordinator 3/1/2019   10:24 AM  [phone]

## 2019-03-07 ENCOUNTER — ANTICOAGULATION THERAPY VISIT (OUTPATIENT)
Dept: ANTICOAGULATION | Facility: CLINIC | Age: 84
End: 2019-03-07
Payer: MEDICARE

## 2019-03-07 DIAGNOSIS — I48.20 CHRONIC ATRIAL FIBRILLATION (H): ICD-10-CM

## 2019-03-07 DIAGNOSIS — Z79.01 CHRONIC ANTICOAGULATION: ICD-10-CM

## 2019-03-07 LAB — INR PPP: 2.3 (ref 0.8–1.1)

## 2019-03-07 PROCEDURE — 99207 ZZC NO CHARGE NURSE ONLY: CPT

## 2019-03-07 NOTE — PROGRESS NOTES
ANTICOAGULATION FOLLOW-UP CLINIC VISIT    Patient Name:  Nabor Cunningham  Date:  3/7/2019  Contact Type:  Telephone/ [facility]    SUBJECTIVE:     Patient Findings     Positives:   No Problem Findings    Comments:   No changes in medications, diet, or activity noted. Took warfarin as instructed, denies any missed doses.    Patient will be discharged from homecare on Monday. Patient has an appointment next Friday,  for a possible knee injection. Writer encouraged patient to ask if he needs an INR scheduled before this.     As of now, will plan to continue the same warfarin dose and recheck the INR in 2 weeks at the [facility]. If this appointment time does not work, patient will call to reschedule.            OBJECTIVE    INR   Date Value Ref Range Status   2019 2.3 (A) 0.8 - 1.1 Final       ASSESSMENT / PLAN  INR assessment THER    Recheck INR In: 2 WEEKS    INR Location Homecare INR      Anticoagulation Summary  As of 3/7/2019    INR goal:   2.0-3.0   TTR:   60.8 % (1.9 y)   INR used for dosin.3 (3/7/2019)   Warfarin maintenance plan:   4 mg (2 mg x 2) every Mon; 6 mg (2 mg x 3) all other days   Full warfarin instructions:   4 mg every Mon; 6 mg all other days   Weekly warfarin total:   40 mg   No change documented:   Ada Isaac RN   Plan last modified:   Daphne Lamar RN (2019)   Next INR check:   3/21/2019   Priority:   INR   Target end date:   Indefinite    Indications    Chronic anticoagulation [Z79.01]  Chronic atrial fibrillation (H) [I48.2]  Long-term (current) use of anticoagulants [Z79.01] [Z79.01]             Anticoagulation Episode Summary     INR check location:       Preferred lab:       Send INR reminders to:   [facility] CLINIC POOL    Comments:   * 2mg tabs      Anticoagulation Care Providers     Provider Role Specialty Phone number    Heron Mayo MD [facility] [phone]            See the Encounter Report to view  Anticoagulation Flowsheet and Dosing Calendar (Go to Encounters tab in chart review, and find the Anticoagulation Therapy Visit)        [facility]

## 2019-03-11 ENCOUNTER — TELEPHONE (OUTPATIENT)
Dept: URGENT CARE | Facility: URGENT CARE | Age: 84
End: 2019-03-11

## 2019-03-11 NOTE — TELEPHONE ENCOUNTER
Called and left message for Marge. Stated that patient's INR does not need to be updated.        Lulú Wright, ATC

## 2019-03-11 NOTE — TELEPHONE ENCOUNTER
Marge from  Homecare is asking if pt needs INR checked before appt on Friday? He is on Syndevrx , please call [phone], can leave message

## 2019-03-15 ENCOUNTER — OFFICE VISIT (OUTPATIENT)
Dept: ORTHOPEDICS | Facility: CLINIC | Age: 84
End: 2019-03-15
Payer: MEDICARE

## 2019-03-15 DIAGNOSIS — M17.12 LOCALIZED OSTEOARTHRITIS OF LEFT KNEE: Primary | ICD-10-CM

## 2019-03-15 PROCEDURE — 20611 DRAIN/INJ JOINT/BURSA W/US: CPT | Mod: LT | Performed by: FAMILY MEDICINE

## 2019-03-15 NOTE — LETTER
3/15/2019         RE: Nabor Cunningham  [address] [address]        Dear Colleague,    Thank you for referring your patient, Nabor Cunningham, to the [facility]. Please see a copy of my visit note below.    Large Joint Injection/Arthocentesis: L knee joint  Date/Time: 3/15/2019 9:45 AM  Performed by: Josh Mitchell MD  Authorized by: Josh Mitchell MD     Indications:  Pain and osteoarthritis  Guidance: ultrasound    Approach:  Anterolateral  Location:  Knee  Site:  L knee joint  Medications:  30 mg cross-Linked Hyaluronate 30 MG/3ML  Aspirate amount (mL):  10  Aspirate:  Serous and yellow  Outcome:  Tolerated well, no immediate complications  Procedure discussed: discussed risks, benefits, and alternatives    Consent Given by:  Patient  Timeout: timeout called immediately prior to procedure    Prep: patient was prepped and draped in usual sterile fashion     Pt was counseled on increased risk of bleeding given he is one Warfarin though has had INR within expected ranges recently.  Given significant knee OA and pain not improved with steroid injection.  A hyaluronic acid injection was completed today with 10 ml aspirated prior to procedure.  Aftercares given, f/u PRN with me.  Pt and son understand and agree with plan.    Josh Mitchell              Again, thank you for allowing me to participate in the care of your patient.        Sincerely,        Josh Micthell MD

## 2019-03-15 NOTE — PROGRESS NOTES
Large Joint Injection/Arthocentesis: L knee joint  Date/Time: 3/15/2019 9:45 AM  Performed by: Josh Mitchell MD  Authorized by: Josh Mitchell MD     Indications:  Pain and osteoarthritis  Guidance: ultrasound    Approach:  Anterolateral  Location:  Knee  Site:  L knee joint  Medications:  30 mg cross-Linked Hyaluronate 30 MG/3ML  Aspirate amount (mL):  10  Aspirate:  Serous and yellow  Outcome:  Tolerated well, no immediate complications  Procedure discussed: discussed risks, benefits, and alternatives    Consent Given by:  Patient  Timeout: timeout called immediately prior to procedure    Prep: patient was prepped and draped in usual sterile fashion     Pt was counseled on increased risk of bleeding given he is one Warfarin though has had INR within expected ranges recently.  Given significant knee OA and pain not improved with steroid injection.  A hyaluronic acid injection was completed today with 10 ml aspirated prior to procedure.  Aftercares given, f/u PRN with me.  Pt and son understand and agree with plan.    Josh Mitchell

## 2019-03-15 NOTE — PATIENT INSTRUCTIONS
[facility] Injection Discharge Instructions      You may shower, however avoid swimming, tub baths or hot tubs for 24 hours following your procedure    You may have a mild to moderate increase in pain for several days following the injection.    It may take up to 14 days for the steroid medication to start working although you may feel the effect as early as a few days after the procedure.    You may use ice packs for 10-15 minutes, 3 to 4 times a day at the injection site for comfort    You may use anti-inflammatory medications (such as Ibuprofen or Aleve or Advil) or Tylenol for pain control if necessary    If you were fasting, you may resume your normal diet and medications after the procedure        If you experience any of the following, call [facility] @ [phone] or [phone]  -Fever over 100 degree F  -Swelling, bleeding, redness, drainage, warmth at the injection site  - New or worsening pain

## 2019-03-18 ENCOUNTER — TELEPHONE (OUTPATIENT)
Dept: FAMILY MEDICINE | Facility: CLINIC | Age: 84
End: 2019-03-18

## 2019-03-18 DIAGNOSIS — I50.43 ACUTE ON CHRONIC COMBINED SYSTOLIC AND DIASTOLIC CONGESTIVE HEART FAILURE (H): ICD-10-CM

## 2019-03-18 RX ORDER — FUROSEMIDE 40 MG
TABLET ORAL
Qty: 180 TABLET | Refills: 0 | Status: SHIPPED | OUTPATIENT
Start: 2019-03-18 | End: 2019-06-07

## 2019-03-18 NOTE — TELEPHONE ENCOUNTER
Kya says Chalino is out of his water pills and she would like to talk to his nurse to see if he is to continue them or not. [phone]

## 2019-03-21 ENCOUNTER — ANTICOAGULATION THERAPY VISIT (OUTPATIENT)
Dept: ANTICOAGULATION | Facility: CLINIC | Age: 84
End: 2019-03-21
Payer: MEDICARE

## 2019-03-21 DIAGNOSIS — I48.20 CHRONIC ATRIAL FIBRILLATION (H): ICD-10-CM

## 2019-03-21 DIAGNOSIS — Z79.01 CHRONIC ANTICOAGULATION: ICD-10-CM

## 2019-03-21 LAB — INR POINT OF CARE: 1.7 (ref 0.86–1.14)

## 2019-03-21 PROCEDURE — 36416 COLLJ CAPILLARY BLOOD SPEC: CPT

## 2019-03-21 PROCEDURE — 99207 ZZC NO CHARGE NURSE ONLY: CPT

## 2019-03-21 PROCEDURE — 85610 PROTHROMBIN TIME: CPT | Mod: QW

## 2019-03-21 NOTE — PROGRESS NOTES
ANTICOAGULATION FOLLOW-UP CLINIC VISIT    Patient Name:  Nabor Cunningham  Date:  3/21/2019  Contact Type:  Face to Face    SUBJECTIVE:     Patient Findings     Comments:   No changes in diet, activity level, medications (including over the counter), or health. No missed doses of warfarin. Patient took dosing as prescribed. No signs of clots or bleeding concerns. Patient denies any identifiable changes that caused the sub therapeutic INR. He was discharged from home care recently and sets up his own medications now. He says the pill box was already set up for him for the last two weeks, though, and was appropriately empty. Will advise the patient to take 8 mg today (a slight bump) and then start 6 mg daily. Recheck in 10 days.             OBJECTIVE    INR Protime   Date Value Ref Range Status   2019 1.7 (A) 0.86 - 1.14 Final       ASSESSMENT / PLAN  INR assessment SUB    Recheck INR In: 10 DAYS    INR Location Clinic      Anticoagulation Summary  As of 3/21/2019    INR goal:   2.0-3.0   TTR:   60.6 % (1.9 y)   INR used for dosin.7! (3/21/2019)   Warfarin maintenance plan:   4 mg (2 mg x 2) every Mon; 6 mg (2 mg x 3) all other days   Full warfarin instructions:   3/21: 8 mg; 3/25: 6 mg; Otherwise 4 mg every Mon; 6 mg all other days   Weekly warfarin total:   40 mg   Plan last modified:   Daphne Lamar RN (2019)   Next INR check:   2019   Priority:   INR   Target end date:   Indefinite    Indications    Chronic anticoagulation [Z79.01]  Chronic atrial fibrillation (H) [I48.2]  Long-term (current) use of anticoagulants [Z79.01] [Z79.01]             Anticoagulation Episode Summary     INR check location:       Preferred lab:       Send INR reminders to:   [facility] CLINIC POOL    Comments:   * 2mg tabs      Anticoagulation Care Providers     Provider Role Specialty Phone number    Heron Mayo MD [facility] [phone]            See the Encounter Report to view  Anticoagulation Flowsheet and Dosing Calendar (Go to Encounters tab in chart review, and find the Anticoagulation Therapy Visit)        Belia Jones RN

## 2019-04-01 ENCOUNTER — PATIENT OUTREACH (OUTPATIENT)
Dept: CARE COORDINATION | Facility: CLINIC | Age: 84
End: 2019-04-01

## 2019-04-01 ENCOUNTER — ANTICOAGULATION THERAPY VISIT (OUTPATIENT)
Dept: ANTICOAGULATION | Facility: CLINIC | Age: 84
End: 2019-04-01
Payer: MEDICARE

## 2019-04-01 DIAGNOSIS — I48.20 CHRONIC ATRIAL FIBRILLATION (H): ICD-10-CM

## 2019-04-01 DIAGNOSIS — Z79.01 CHRONIC ANTICOAGULATION: ICD-10-CM

## 2019-04-01 LAB — INR POINT OF CARE: 2.2 (ref 0.86–1.14)

## 2019-04-01 PROCEDURE — 85610 PROTHROMBIN TIME: CPT | Mod: QW

## 2019-04-01 PROCEDURE — 36416 COLLJ CAPILLARY BLOOD SPEC: CPT

## 2019-04-01 PROCEDURE — 99207 ZZC NO CHARGE NURSE ONLY: CPT

## 2019-04-01 ASSESSMENT — ACTIVITIES OF DAILY LIVING (ADL): DEPENDENT_IADLS:: INDEPENDENT

## 2019-04-01 NOTE — PROGRESS NOTES
Clinic Care Coordination Contact  [facility]/Voicemail    Referral Source: PCP  Clinical Data: Care Coordinator Outreach  Outreach attempted x 1.  Left message on voicemail with call back information and requested return call.  Plan: Care Coordinator will try to reach patient again in 3-5 business days.  TONE Lord, [facility] - Care Coordinator   [facility]  4/1/2019   9:19 AM  [phone]

## 2019-04-03 ASSESSMENT — ACTIVITIES OF DAILY LIVING (ADL): DEPENDENT_IADLS:: INDEPENDENT

## 2019-04-03 NOTE — PROGRESS NOTES
Clinic Care Coordination Contact    Clinic Care Coordination Contact  OUTREACH    Referral Information:  Referral Source: PCP    Primary Diagnosis: Other (include Comment box)(wound infection)    Chief Complaint   Patient presents with     Clinic Care Coordination - Follow-up             Universal Utilization: No concerns  Clinic Utilization  Difficulty keeping appointments:: No  Compliance Concerns: No  No-Show Concerns: No  No PCP office visit in Past Year: No  Utilization    Last refreshed: 4/1/2019  6:37 PM:  Hospital Admissions 1           Last refreshed: 4/1/2019  6:37 PM:  ED Visits 6           Last refreshed: 4/1/2019  6:37 PM:  No Show Count (past year) 0              Current as of: 4/1/2019  6:37 PM              Clinical Concerns:  Current Medical Concerns: Son said patient is having more issues with his knee and is looking at getting a knee replacement surgery.  They have 2 places are going to be looking at this week and will make a decision.  Current Behavioral Concerns: Per son patient's anxiety is better and he said other people are noticing it as well.  Education Provided to patient: Long discussion on follow-up care after knee surgery.  Discussed that it could mean going to a transitional care unit before returning home.  Encouraged son to look into options now for transitional care unit and preplanning so they can get into a place that they want to get into.    Patient's cat passed away recently and patient is lonely.  They are looking at finding a replacement cat.      Pain  Pain (GOAL):: Yes  Type: Chronic (>3mo)  Location of chronic pain:: knee - bone on bone with arthritis  Progression: Worsening  Health Maintenance Reviewed: Due/Overdue   Health Maintenance Due   Topic Date Due     HF ACTION PLAN Q3 YR  1935     ZOSTER IMMUNIZATION (1 of 2) 11/03/1987     MEDICARE ANNUAL WELLNESS VISIT  11/03/2002     LIPID MONITORING Q1 YEAR  03/22/2018       Clinical Pathway: None    Medication  Management:  Family assisting with med set up.      Functional Status:  Dependent ADLs:: Independent  Dependent IADLs:: Independent  Bed or wheelchair confined:: No  Mobility Status: Independent  Fallen 2 or more times in the past year?: No  Any fall with injury in the past year?: No    Living Situation:  Current living arrangement:: I live alone  Type of residence:: Independent Senior Living    Diet/Exercise/Sleep:  Diet:: Low cholesterol, Low saturated fat, No added salt, Other(high fiber, 2 grm salt restriction)  Inadequate nutrition (GOAL):: No  Food Insecurity: No  Tube Feeding: No  Exercise:: Currently not exercising  Inadequate activity/exercise (GOAL):: No  Significant changes in sleep pattern (GOAL): No    Transportation:  Transportation concerns (GOAL):: No  Transportation means:: Family, Friend, Regular car     Psychosocial:  Mandaeism or spiritual beliefs that impact treatment:: No  Mental health DX:: Yes  Mental health DX how managed:: Medication, Outpatient Counseling  Mental health management concern (GOAL):: No  Informal Support system:: Children, Family     Financial/Insurance:   Financial/Insurance concerns (GOAL):: No  No concerns other then those discussed above.      Resources and Interventions:  Current Resources:   List of home care services:: Skilled Nursing, Home Health Aid;   Community Resources: Home Care  Supplies used at home:: Wound Care Supplies  Equipment Currently Used at Home: none    Advance Care Plan/Directive  Advanced Care Plans/Directives on file:: No    Referrals Placed: Senior Linkage Line     Goals:   Goals        General    Psychosocial (pt-stated)     Notes - Note created  11/28/2018 11:09 AM by Rani Sarkar LSW    Goal Statement: pt to remain safe in his own home as long as possible, safe meaning no injuries from falls or avoidable health issues.  Measure of Success: no falls with injuries or avoidable health issues  Supportive Steps to Achieve: home care, community  , family  Barriers: age, health  Strengths: supportive family  Date to Achieve By: 6-30-19  Patient expressed understanding of goal: yes                  Patient/Caregiver understanding: family to continue to support pt.     Outreach Frequency: monthly  Future Appointments              [facility]    In 3 weeks NB [facility]TI COAG [facility]          Plan: family to assist with getting knee surgery schedule and look into TCU options.  SW to call in about one month.     TONE Lord, [facility] - Care Coordinator   [facility]  4/3/2019   3:59 PM  [phone]

## 2019-04-05 ENCOUNTER — TRANSFERRED RECORDS (OUTPATIENT)
Dept: HEALTH INFORMATION MANAGEMENT | Facility: CLINIC | Age: 84
End: 2019-04-05

## 2019-04-12 ENCOUNTER — OFFICE VISIT (OUTPATIENT)
Dept: FAMILY MEDICINE | Facility: CLINIC | Age: 84
End: 2019-04-12
Payer: MEDICARE

## 2019-04-12 VITALS
BODY MASS INDEX: 29.7 KG/M2 | HEART RATE: 80 BPM | RESPIRATION RATE: 18 BRPM | SYSTOLIC BLOOD PRESSURE: 122 MMHG | TEMPERATURE: 97.2 F | DIASTOLIC BLOOD PRESSURE: 70 MMHG | HEIGHT: 64 IN

## 2019-04-12 DIAGNOSIS — A49.01 STAPH AUREUS INFECTION: ICD-10-CM

## 2019-04-12 DIAGNOSIS — L30.9 ECZEMA, UNSPECIFIED TYPE: Primary | ICD-10-CM

## 2019-04-12 PROCEDURE — 99213 OFFICE O/P EST LOW 20 MIN: CPT | Performed by: NURSE PRACTITIONER

## 2019-04-12 RX ORDER — CEPHALEXIN 500 MG/1
500 CAPSULE ORAL 2 TIMES DAILY
Qty: 14 CAPSULE | Refills: 0 | Status: SHIPPED | OUTPATIENT
Start: 2019-04-12 | End: 2019-04-18

## 2019-04-12 RX ORDER — CEPHALEXIN 500 MG/1
500 CAPSULE ORAL 4 TIMES DAILY
Qty: 40 CAPSULE | Refills: 0 | Status: SHIPPED | OUTPATIENT
Start: 2019-04-12 | End: 2019-04-12

## 2019-04-12 RX ORDER — TRIAMCINOLONE ACETONIDE 1 MG/G
CREAM TOPICAL 2 TIMES DAILY
Qty: 45 G | Refills: 1 | Status: SHIPPED | OUTPATIENT
Start: 2019-04-12 | End: 2019-07-29

## 2019-04-12 NOTE — PATIENT INSTRUCTIONS
Patient Education     Atopic Dermatitis (Adult)  Atopic dermatitis is a dry, itchy, red rash. It s also called eczema. The rash is chronic, or ongoing. It can come and go over time. The disease is often passed down in families. It causes a problem with the skin barrier that makes the skin more sensitive to the environment and other factors. The increased skin sensitivity causes an itch, which causes scratching. Scratching can worsen the itching or also break the skin. This can put the skin at risk of infection.  The condition is most common in people with asthma, hay fever, hives, or dry or sensitive skin. The rash may be caused by extreme heat or heavy sweating. Skin irritants can cause the rash to flare up. These can include wool or silk clothing, grease, oils, some medicines, and harsh soaps and detergents. Emotional stress can also be a trigger.  Treatment is done to relieve the itching and inflammation of the skin.  Home care  Follow these tips to care for your condition:    Keep the areas of rash clean by bathing at least every other day. Use lukewarm water to bathe. Don t use hot water, which can dry out the skin.    Don t use soaps with strong detergents. Use mild soaps made for sensitive skin.    Apply a cream or ointment to damp skin right after bathing.    Avoid things that irritate your skin. Wear absorbent, soft fabrics next to the skin rather than rough or scratchy materials.    Use mild laundry soap free of scents and perfumes. Make sure to rinse all the soap out of your clothes.    Treat any skin infection as directed.    Use oral diphenhydramine to help reduce itching. This is an antihistamine you can buy at drug and grocery stores. It can make you sleepy, so use lower doses during the daytime. Or you can use loratadine. This is an antihistamine that will not make you sleepy. Do not use diphenhydramine if you have glaucoma or have trouble urinating due to an enlarged prostate.  Follow-up care  See  your healthcare provider, or as advised. If your symptoms don t get better or if they get worse in the next 7 days, make an appointment with your healthcare provider.  When to seek medical advice  Call your healthcare provider right away  if any of these occur:    Increasing area of redness or pain in the skin    Yellow crusts or wet drainage from the rash    Fever of 100.4 F (38 C) or higher, or as directed by your healthcare provider  Date Last Reviewed: 9/1/2016 2000-2018 [employer]. [address] [place] [address]. All rights reserved. This information is not intended as a substitute for professional medical care. Always follow your healthcare professional's instructions.           Patient Education     Understanding Impetigo  Impetigo is a common bacterial infection of the skin. It most often affects the face, arms, and legs. But it can appear on any part of the body. Anyone can have it, regardless of age. But it is most common in children. Impetigo is very contagious. This means it spreads easily to other people.  How to say it  [id number]   What causes impetigo?  Many types of bacteria live on normal, healthy skin. The bacteria usually don t cause problems. Impetigo happens when bacteria enter the skin through a scratch, break, sore, bite, or irritated spot. They then begin to grow out of control, leading to infection. There are two types of staphylococcus bacteria that cause impetigo. In certain cases, impetigo appears on skin that has no visible break. It may be more likely to occur on skin that has another skin problem, such as eczema. It may also be more common after a cold or other virus.  Symptoms of impetigo  Symptoms of this problem include:    Small, fluid-filled blisters on the skin that may itch, ooze, or crust    A yellow, honey-colored crust on the infected skin    Skin sores that spread with scratching    An itchy rash that spreads with scratching    Swollen lymph  nodes  Treatment for impetigo  The goal is to treat the infection and prevent it from spreading to others.    You will likely be given an antibiotic to treat the infection. This may be a cream or ointment called muporicin to put on your skin. If the infection is severe or spreading, you may be given antibiotic medicine to take by mouth. Be sure to use this medicine as directed. Do not stop using it until you are told to stop, even if your skin gets better. If you stop too soon, the infection may come back and be harder to treat.    Avoid scratching or picking at your sores. It may help to cover affected areas with a bandage.    To prevent spreading the infection, wash your hands often. Avoid sharing personal items, towels, clothes, pillows, and sheets with others. After each use, wash these items in hot water.    Clean the affected skin several times a day. Don t scrub. Instead, soak the area in warm, soapy water. This will help remove the crust that forms. For places that you can't soak, such as the face, place a clean, warm (not hot) washcloth on the affected area. Use a new washcloth and towel each time.  When to call your healthcare provider  Call your healthcare provider right away if you have any of these:    Fever of 100.4 F (38 C) or higher, or as directed    Increasing number of sores or spreading areas of redness after 2 days of treatment with antibiotics    Increasing swelling or pain    Increased amounts of fluid or pus coming from the sores    Unusual drowsiness, weakness, or change in behavior    Loss of appetite or vomiting   Date Last Reviewed: 5/1/2016 2000-2018 [employer]. [address] [place] [address], [place] [address]. All rights reserved. This information is not intended as a substitute for professional medical care. Always follow your healthcare professional's instructions.

## 2019-04-12 NOTE — PROGRESS NOTES
SUBJECTIVE:   Nabor Cunningham is a 83 year old male who presents to clinic today for the following   health issues:    Rash  Onset: a couple weeks    Description:   Location: both arms  Character: red  Itching (Pruritis): YES    Progression of Symptoms:  same    Accompanying Signs & Symptoms:  Fever: no   Body aches or joint pain: no   Sore throat symptoms: no   Recent cold symptoms: no     History:   Previous similar rash: no     Precipitating factors:   Exposure to similar rash: no   New exposures: None   Recent travel: no     Alleviating factors:  nothing    Therapies Tried and outcome: rash      Additional history: as documented    Reviewed  and updated as needed this visit by clinical staff         Reviewed and updated as needed this visit by Provider         Current Outpatient Medications   Medication Sig Dispense Refill     calcium carbonate (OS- MG [place]. [place]) 1250 MG tablet Take 1 tablet by mouth 2 times daily       colchicine (COLCRYS) 0.6 MG tablet Take 1 tablet (0.6 mg) by mouth daily 90 tablet 3     COUMADIN 2 MG tablet 4 mg every Mon; 6 mg all other days or as directed by the Anticoagulation Clinic. 240 tablet 0     cyanocobalamin (VITAMIN  B-12) 1000 MCG tablet TAKE ONE TABLET BY MOUTH DAILY 30 tablet 11     ferrous gluconate (FERGON) 324 (38 Fe) MG tablet Take 1 tablet (324 mg) by mouth daily 100 tablet 1     furosemide (LASIX) 40 MG tablet 80mg (two pills) in AM and 40mg (one pill) mid day. 180 tablet 0     gabapentin (NEURONTIN) 300 MG capsule Take 1 capsule (300 mg) by mouth At Bedtime 30 capsule 3     hypromellose (ARTIFICIAL TEARS) 0.4 % SOLN ophthalmic solution Place 1 drop into both eyes every hour as needed for dry eyes       lisinopril (PRINIVIL/ZESTRIL) 5 MG tablet Take 2 tablets (10 mg) by mouth daily 90 tablet 3     metoprolol succinate ER (TOPROL-XL) 25 MG 24 hr tablet Take 1 tablet (25 mg) by mouth every evening 90 tablet 1     mineral oil oil Take 5 mLs by mouth daily        mirtazapine (REMERON) 15 MG tablet Take 1 tablet (15 mg) by mouth At Bedtime 30 tablet 11     multivitamin, therapeutic with minerals (MULTI-VITAMIN) TABS tablet Take 1 tablet by mouth daily 100 tablet 3     nortriptyline (PAMELOR) 25 MG capsule Take 1 capsule (25 mg) by mouth At Bedtime 30 capsule 11     pantoprazole (PROTONIX) 40 MG EC tablet Take 1 tablet (40 mg) by mouth daily 30 tablet 11     prednisoLONE acetate (PRED FORTE) 1 % ophthalmic susp Place 1 drop Into the left eye 2 times daily   2     Allergies   Allergen Reactions     Amoxicillin Hives     Penicillins Other (See Comments)     Dizzy       Trazodone Other (See Comments)     Hallucinations       Recent Labs   Lab Test 01/17/19  0936 11/30/18  1018 11/25/18 11/23/18 11/19/18  1840 11/10/18  1331  03/22/17  1240  01/26/16  1028  04/03/15  1011   LDL  --   --   --   --   --   --   --  93  --  146*  --  142*   HDL  --   --   --   --   --   --   --  53  --  48  --  40   TRIG  --   --   --   --   --   --   --  99  --  118  --  144   ALT  --   --   --  18  --  25  --  56  --   --   --   --    CR  --  0.96 0.99  --  0.93 0.85   < > 0.70   < > 0.89   < > 0.88   GFRESTIMATED  --  75 >60  --  78 86   < > >90  Non  GFR Calc    --   --   --   --    GFRESTBLACK  --  >90  --   --  >90 >90   < > >90  African American GFR Calc     < > >60   < > >60   POTASSIUM  --  3.6 3.5  --  4.1 4.1   < > 4.1   < > 4.1   < > 4.0   TSH 3.16  --   --   --   --   --   --  1.51  --   --   --   --     < > = values in this interval not displayed.      BP Readings from Last 3 Encounters:   04/12/19 122/70   02/15/19 90/44   01/18/19 98/50    Wt Readings from Last 3 Encounters:   02/15/19 78.5 kg (173 lb)   01/18/19 76.7 kg (169 lb)   01/17/19 76.2 kg (168 lb)                    ROS:  Constitutional, HEENT, cardiovascular, pulmonary, gi and gu systems are negative, except as otherwise noted.    OBJECTIVE:     /70 (BP Location: Right arm, Cuff Size: Adult Regular)    "Pulse 80   Temp 97.2  F (36.2  C) (Tympanic)   Resp 18   Ht 1.626 m (5' 4\")   BMI 29.70 kg/m    Body mass index is 29.7 kg/m .  GENERAL: healthy, alert and no distress  EYES: Eyes grossly normal to inspection, PERRL and conjunctivae and sclerae normal  RESP: lungs clear to auscultation - no rales, rhonchi or wheezes  CV: regular rate and rhythm, normal S1 S2, no S3 or S4, no murmur, click or rub, no peripheral edema and peripheral pulses strong  MS: no gross musculoskeletal defects noted, no edema  SKIN: arms bilateral Examination of the rash reveals: Eczema: dry, slightly raised, red patches with mild flaking.  NEURO: Normal strength and tone, mentation intact and speech normal  PSYCH: mentation appears normal, affect normal/bright      ASSESSMENT/PLAN:   (L30.9) Eczema, unspecified type  (primary encounter diagnosis)  Comment:   Plan: triamcinolone (KENALOG) 0.1 % external cream         (A49.01) Staph aureus infection  Comment:   Plan: cephALEXin (KEFLEX) 500 MG capsule,         DISCONTINUED: cephALEXin (KEFLEX) 500 MG         capsule          AVERY Santana [facility]      "

## 2019-04-18 ENCOUNTER — OFFICE VISIT (OUTPATIENT)
Dept: NEUROLOGY | Facility: CLINIC | Age: 84
End: 2019-04-18
Payer: MEDICARE

## 2019-04-18 ENCOUNTER — ANTICOAGULATION THERAPY VISIT (OUTPATIENT)
Dept: ANTICOAGULATION | Facility: CLINIC | Age: 84
End: 2019-04-18
Payer: MEDICARE

## 2019-04-18 VITALS
OXYGEN SATURATION: 94 % | HEART RATE: 83 BPM | SYSTOLIC BLOOD PRESSURE: 102 MMHG | TEMPERATURE: 97.6 F | RESPIRATION RATE: 16 BRPM | DIASTOLIC BLOOD PRESSURE: 52 MMHG

## 2019-04-18 DIAGNOSIS — G62.9 PERIPHERAL POLYNEUROPATHY: Primary | ICD-10-CM

## 2019-04-18 DIAGNOSIS — Z79.01 CHRONIC ANTICOAGULATION: ICD-10-CM

## 2019-04-18 DIAGNOSIS — I48.20 CHRONIC ATRIAL FIBRILLATION (H): ICD-10-CM

## 2019-04-18 LAB
ERYTHROCYTE [SEDIMENTATION RATE] IN BLOOD BY WESTERGREN METHOD: 17 MM/H (ref 0–20)
INR POINT OF CARE: 2.2 (ref 0.86–1.14)

## 2019-04-18 PROCEDURE — 82784 ASSAY IGA/IGD/IGG/IGM EACH: CPT | Performed by: PSYCHIATRY & NEUROLOGY

## 2019-04-18 PROCEDURE — 86335 IMMUNFIX E-PHORSIS/URINE/CSF: CPT | Performed by: PSYCHIATRY & NEUROLOGY

## 2019-04-18 PROCEDURE — 36416 COLLJ CAPILLARY BLOOD SPEC: CPT

## 2019-04-18 PROCEDURE — 85610 PROTHROMBIN TIME: CPT | Mod: QW

## 2019-04-18 PROCEDURE — 86334 IMMUNOFIX E-PHORESIS SERUM: CPT | Performed by: PSYCHIATRY & NEUROLOGY

## 2019-04-18 PROCEDURE — 99207 ZZC NO CHARGE NURSE ONLY: CPT

## 2019-04-18 PROCEDURE — 99214 OFFICE O/P EST MOD 30 MIN: CPT | Performed by: PSYCHIATRY & NEUROLOGY

## 2019-04-18 PROCEDURE — 85652 RBC SED RATE AUTOMATED: CPT | Performed by: PSYCHIATRY & NEUROLOGY

## 2019-04-18 ASSESSMENT — PAIN SCALES - GENERAL: PAINLEVEL: NO PAIN (0)

## 2019-04-18 NOTE — LETTER
4/18/2019         RE: Nabor Cunningham  [address] [facility] [address]        Dear Colleague,    Thank you for referring your patient, Nabor Cunningham, to the [facility]. Please see a copy of my visit note below.    ESTABLISHED PATIENT NEUROLOGY NOTE    DATE OF VISIT: 4/18/2019  MRN: [id number]  PATIENT NAME: Nabor Cunningham  YOB: 1935    Chief Complaint   Patient presents with     RECHECK     Neuropathy     SUBJECTIVE:                                                      HISTORY OF PRESENT ILLNESS:  Nabor is here for follow up regarding peripheral neuropathy. I met the patient about 3 months ago in consultation for the same. HE described tingling and discomfort from the calves down since 2016. He has been on nortriptyline and felt like maybe the feet were worse in the evenings after taking the medication, though he also noted that the symptoms are just worse in the evening in general. Previous electromyogram showed mild sensorimotor polyneuropathy in the LEs, as expected.     Chalino has several medical comorbidities including HTN, Atrial fibrillation s/p pacemaker, CHF, HLD, Gout and anxiety/insomnia. Additional history of alcohol use disorder.     I started the patient on gabapentin and ordered labs for neuropathy. IgA was mildly elevated as was his ESR. The patient's son, Florencio called to report not much change on the starting gabapentin dose so I suggested an increase in the dose, but they decided not to make any changes. He is on 300mg at bedtime.     Chalino is here with his daughter today. He reports that the gabapentin does seem to be helping with the evening/nighttime foot discomfort. He denies side effects from the medication. He does not think the dose needs to be increased at this time.     Currently, he is having increasing problems with Left knee pain. He plans to have surgery early this summer (mid-June). He also had prior Right knee replacement. He does walk  with a cane. No recent falls.     He did have some increased anxiety recently related to having to put his cat down. He was very upset about this per daughter, and broke into a rash - felt to be related to anxiety. He is on some creams for this and reports it has improved. Daughter agrees. He mentions that he has a scab on his forehead that does not really heal; He keeps picking at it.     No other new concerns today.     CURRENT MEDICATIONS:     Current Outpatient Medications on File Prior to Visit:  calcium carbonate (OS- MG [place]. [place]) 1250 MG tablet Take 1 tablet by mouth 2 times daily   colchicine (COLCRYS) 0.6 MG tablet Take 1 tablet (0.6 mg) by mouth daily   COUMADIN 2 MG tablet 4 mg every Mon; 6 mg all other days or as directed by the Anticoagulation Clinic.   cyanocobalamin (VITAMIN  B-12) 1000 MCG tablet TAKE ONE TABLET BY MOUTH DAILY   ferrous gluconate (FERGON) 324 (38 Fe) MG tablet Take 1 tablet (324 mg) by mouth daily   furosemide (LASIX) 40 MG tablet 80mg (two pills) in AM and 40mg (one pill) mid day.   gabapentin (NEURONTIN) 300 MG capsule Take 1 capsule (300 mg) by mouth At Bedtime   hypromellose (ARTIFICIAL TEARS) 0.4 % SOLN ophthalmic solution Place 1 drop into both eyes every hour as needed for dry eyes   lisinopril (PRINIVIL/ZESTRIL) 5 MG tablet Take 2 tablets (10 mg) by mouth daily   metoprolol succinate ER (TOPROL-XL) 25 MG 24 hr tablet Take 1 tablet (25 mg) by mouth every evening   mirtazapine (REMERON) 15 MG tablet Take 1 tablet (15 mg) by mouth At Bedtime   multivitamin, therapeutic with minerals (MULTI-VITAMIN) TABS tablet Take 1 tablet by mouth daily   nortriptyline (PAMELOR) 25 MG capsule Take 1 capsule (25 mg) by mouth At Bedtime   pantoprazole (PROTONIX) 40 MG EC tablet Take 1 tablet (40 mg) by mouth daily   prednisoLONE acetate (PRED FORTE) 1 % ophthalmic susp Place 1 drop Into the left eye daily    triamcinolone (KENALOG) 0.1 % external cream Apply topically 2 times daily    mineral oil oil Take 5 mLs by mouth daily     Current Facility-Administered Medications on File Prior to Visit:  cross-Linked Hyaluronate (GEL-ONE) injection PRSY 30 mg   lidocaine 1 % injection 2 mL       RECENT DIAGNOSTIC STUDIES:   Labs:   Results for orders placed or performed in visit on 04/01/19   INR point of care   Result Value Ref Range    INR Protime 2.2 (A) 0.86 - 1.14       REVIEW OF SYSTEMS:                                                      10-point review of systems is negative except as mentioned above in HPI.     EXAM:                                                      Physical Exam:   Vitals: /52 (BP Location: Right arm, Patient Position: Sitting, Cuff Size: Adult Regular)   Pulse 83   Temp 97.6  F (36.4  C) (Tympanic)   Resp 16   SpO2 94%   BMI= There is no height or weight on file to calculate BMI.  GENERAL: NAD.   HEENT: NC/AT.   CV: RRR. Paced. S1, S2.    EXTR: Left knee clicks with flexion/extension. Healed scar, Right knee. Some joint deformities in the hands.   Neurologic:  MENTAL STATUS: Alert, attentive. Speech is fluent. Hypophonic. Normal concentration. Adequate fund of knowledge.   CRANIAL NERVES: Discs technically difficult to visualize. Visual fields intact to confrontation. LEFT pupil is abnormal, with overlying corneal abnormalities. Ptosis on Left (s/p 3 surgeries). Facial sensation and movement normal. EOM full. Slightly hard of hearing. Trapezius strength intact. Palate moves symmetrically. Tongue midline.  MOTOR: 5/5 in proximal and distal muscle groups of upper and lower extremities. Tone and bulk normal.   DTRs: Intact and symmetric in UEs and patellae. Cannot elicit ankle jerks. Babinski down-going bilaterally.   SENSATION: Normal light touch and pinprick in hands. Diminished pinprick below the ankles. Intact proprioception. Vibration: Diminished at both ankles.   COORDINATION: Finger tapping normal.  STATION AND GAIT: Romberg positive. Gait is antalgic.  Tandem deferred for safety.   Right hand-dominant.     ASSESSMENT and PLAN:                                                      Assessment and Plan:    ICD-10-CM    1. Peripheral polyneuropathy G62.9 Protein Immunofixation Serum     Protein immunofixation urine     Erythrocyte sedimentation rate auto        Mr. Cunningham is a pleasant 84 yo man with multiple medical comorbidities seen today for peripheral neuropathy. We discussed the previous work-up and additional testing that can be done. The history of heavy alcohol use may be playing a role. The rest of his labs for neuropathy were largely unremarkable. I did explain the couple of mildly abnormal findings (IgA, ESR) to the patient and his daughter. He is open to having these rechecked. He is noticing symptomatic improvement on the evening gabapentin, so we will continue. I would like to see Chalino back in 6 months, after he recovers from his upcoming knee surgery. The patient and his daughter understand and agree with the plan. They will let me know if any concerns arise in the meantime.      Patient Instructions:  We will recheck a few labs today and notify you of the results.   Continue the Gabapentin for foot discomfort/tinglinmg at bedtime.  Return to Neurology in 6 months. Let us know if any concerns arise in the meantime.     Total Time: 25 minutes were spent with the patient. More than 50% of the time spent on counseling (as described above in Assessment and Plan/Instructions) /coordinating the care.    Kae Rodriguez MD  Neurology                    Again, thank you for allowing me to participate in the care of your patient.        Sincerely,        Kae Rodriguez MD

## 2019-04-18 NOTE — PROGRESS NOTES
ANTICOAGULATION FOLLOW-UP CLINIC VISIT    Patient Name:  Nabor Cunningham  Date:  2019  Contact Type:  Face to Face    SUBJECTIVE:     Patient Findings     Positives:   Change in medications (on last day of keflex for arm rash)    Comments:   Rash on arms is better now. He is on his last day of keflex for this. No other changes or concerns. INR still in range. Will continue current maintenance dose and recheck INR in 4 weeks. No bleeding or clot signs.           OBJECTIVE    INR Protime   Date Value Ref Range Status   2019 2.2 (A) 0.86 - 1.14 Final       ASSESSMENT / PLAN  INR assessment THER    Recheck INR In: 4 WEEKS    INR Location Clinic      Anticoagulation Summary  As of 2019    INR goal:   2.0-3.0   TTR:   61.2 % (2 y)   INR used for dosin.2 (2019)   Warfarin maintenance plan:   4 mg (2 mg x 2) every Mon; 6 mg (2 mg x 3) all other days   Full warfarin instructions:   4 mg every Mon; 6 mg all other days   Weekly warfarin total:   40 mg   No change documented:   Belia Jones RN   Plan last modified:   Daphne Lamar RN (2019)   Next INR check:   2019   Priority:   INR   Target end date:   Indefinite    Indications    Chronic anticoagulation [Z79.01]  Chronic atrial fibrillation (H) [I48.2]  Long-term (current) use of anticoagulants [Z79.01] [Z79.01]             Anticoagulation Episode Summary     INR check location:       Preferred lab:       Send INR reminders to:   [facility] CLINIC POOL    Comments:   * 2mg tabs      Anticoagulation Care Providers     Provider Role Specialty Phone number    Heron Mayo MD [facility] [phone]            See the Encounter Report to view Anticoagulation Flowsheet and Dosing Calendar (Go to Encounters tab in chart review, and find the Anticoagulation Therapy Visit)        Belia Jones RN

## 2019-04-18 NOTE — PROGRESS NOTES
ESTABLISHED PATIENT NEUROLOGY NOTE    DATE OF VISIT: 4/18/2019  MRN: [id number]  PATIENT NAME: Nabor Cunningham  YOB: 1935    Chief Complaint   Patient presents with     RECHECK     Neuropathy     SUBJECTIVE:                                                      HISTORY OF PRESENT ILLNESS:  Nabor is here for follow up regarding peripheral neuropathy. I met the patient about 3 months ago in consultation for the same. HE described tingling and discomfort from the calves down since 2016. He has been on nortriptyline and felt like maybe the feet were worse in the evenings after taking the medication, though he also noted that the symptoms are just worse in the evening in general. Previous electromyogram showed mild sensorimotor polyneuropathy in the LEs, as expected.     Chalino has several medical comorbidities including HTN, Atrial fibrillation s/p pacemaker, CHF, HLD, Gout and anxiety/insomnia. Additional history of alcohol use disorder.     I started the patient on gabapentin and ordered labs for neuropathy. IgA was mildly elevated as was his ESR. The patient's son, Florencio called to report not much change on the starting gabapentin dose so I suggested an increase in the dose, but they decided not to make any changes. He is on 300mg at bedtime.     Chalino is here with his daughter today. He reports that the gabapentin does seem to be helping with the evening/nighttime foot discomfort. He denies side effects from the medication. He does not think the dose needs to be increased at this time.     Currently, he is having increasing problems with Left knee pain. He plans to have surgery early this summer (mid-June). He also had prior Right knee replacement. He does walk with a cane. No recent falls.     He did have some increased anxiety recently related to having to put his cat down. He was very upset about this per daughter, and broke into a rash - felt to be related to anxiety. He is on some creams for this  and reports it has improved. Daughter agrees. He mentions that he has a scab on his forehead that does not really heal; He keeps picking at it.     No other new concerns today.     CURRENT MEDICATIONS:     Current Outpatient Medications on File Prior to Visit:  calcium carbonate (OS- MG [place]. [place]) 1250 MG tablet Take 1 tablet by mouth 2 times daily   colchicine (COLCRYS) 0.6 MG tablet Take 1 tablet (0.6 mg) by mouth daily   COUMADIN 2 MG tablet 4 mg every Mon; 6 mg all other days or as directed by the Anticoagulation Clinic.   cyanocobalamin (VITAMIN  B-12) 1000 MCG tablet TAKE ONE TABLET BY MOUTH DAILY   ferrous gluconate (FERGON) 324 (38 Fe) MG tablet Take 1 tablet (324 mg) by mouth daily   furosemide (LASIX) 40 MG tablet 80mg (two pills) in AM and 40mg (one pill) mid day.   gabapentin (NEURONTIN) 300 MG capsule Take 1 capsule (300 mg) by mouth At Bedtime   hypromellose (ARTIFICIAL TEARS) 0.4 % SOLN ophthalmic solution Place 1 drop into both eyes every hour as needed for dry eyes   lisinopril (PRINIVIL/ZESTRIL) 5 MG tablet Take 2 tablets (10 mg) by mouth daily   metoprolol succinate ER (TOPROL-XL) 25 MG 24 hr tablet Take 1 tablet (25 mg) by mouth every evening   mirtazapine (REMERON) 15 MG tablet Take 1 tablet (15 mg) by mouth At Bedtime   multivitamin, therapeutic with minerals (MULTI-VITAMIN) TABS tablet Take 1 tablet by mouth daily   nortriptyline (PAMELOR) 25 MG capsule Take 1 capsule (25 mg) by mouth At Bedtime   pantoprazole (PROTONIX) 40 MG EC tablet Take 1 tablet (40 mg) by mouth daily   prednisoLONE acetate (PRED FORTE) 1 % ophthalmic susp Place 1 drop Into the left eye daily    triamcinolone (KENALOG) 0.1 % external cream Apply topically 2 times daily   mineral oil oil Take 5 mLs by mouth daily     Current Facility-Administered Medications on File Prior to Visit:  cross-Linked Hyaluronate (GEL-ONE) injection PRSY 30 mg   lidocaine 1 % injection 2 mL       RECENT DIAGNOSTIC STUDIES:   Labs:    Results for orders placed or performed in visit on 04/01/19   INR point of care   Result Value Ref Range    INR Protime 2.2 (A) 0.86 - 1.14       REVIEW OF SYSTEMS:                                                      10-point review of systems is negative except as mentioned above in HPI.     EXAM:                                                      Physical Exam:   Vitals: /52 (BP Location: Right arm, Patient Position: Sitting, Cuff Size: Adult Regular)   Pulse 83   Temp 97.6  F (36.4  C) (Tympanic)   Resp 16   SpO2 94%   BMI= There is no height or weight on file to calculate BMI.  GENERAL: NAD.   HEENT: NC/AT.   CV: RRR. Paced. S1, S2.    EXTR: Left knee clicks with flexion/extension. Healed scar, Right knee. Some joint deformities in the hands.   Neurologic:  MENTAL STATUS: Alert, attentive. Speech is fluent. Hypophonic. Normal concentration. Adequate fund of knowledge.   CRANIAL NERVES: Discs technically difficult to visualize. Visual fields intact to confrontation. LEFT pupil is abnormal, with overlying corneal abnormalities. Ptosis on Left (s/p 3 surgeries). Facial sensation and movement normal. EOM full. Slightly hard of hearing. Trapezius strength intact. Palate moves symmetrically. Tongue midline.  MOTOR: 5/5 in proximal and distal muscle groups of upper and lower extremities. Tone and bulk normal.   DTRs: Intact and symmetric in UEs and patellae. Cannot elicit ankle jerks. Babinski down-going bilaterally.   SENSATION: Normal light touch and pinprick in hands. Diminished pinprick below the ankles. Intact proprioception. Vibration: Diminished at both ankles.   COORDINATION: Finger tapping normal.  STATION AND GAIT: Romberg positive. Gait is antalgic. Tandem deferred for safety.   Right hand-dominant.     ASSESSMENT and PLAN:                                                      Assessment and Plan:    ICD-10-CM    1. Peripheral polyneuropathy G62.9 Protein Immunofixation Serum     Protein  immunofixation urine     Erythrocyte sedimentation rate auto        Mr. Cunningham is a pleasant 82 yo man with multiple medical comorbidities seen today for peripheral neuropathy. We discussed the previous work-up and additional testing that can be done. The history of heavy alcohol use may be playing a role. The rest of his labs for neuropathy were largely unremarkable. I did explain the couple of mildly abnormal findings (IgA, ESR) to the patient and his daughter. He is open to having these rechecked. He is noticing symptomatic improvement on the evening gabapentin, so we will continue. I would like to see Chalino fuller in 6 months, after he recovers from his upcoming knee surgery. The patient and his daughter understand and agree with the plan. They will let me know if any concerns arise in the meantime.      Patient Instructions:  We will recheck a few labs today and notify you of the results.   Continue the Gabapentin for foot discomfort/tinglinmg at bedtime.  Return to Neurology in 6 months. Let us know if any concerns arise in the meantime.     Total Time: 25 minutes were spent with the patient. More than 50% of the time spent on counseling (as described above in Assessment and Plan/Instructions) /coordinating the care.    Kae Rodriguez MD  Neurology

## 2019-04-19 LAB
IGA SERPL-MCNC: 360 MG/DL (ref 70–380)
IGG SERPL-MCNC: 1270 MG/DL (ref 695–1620)
IGM SERPL-MCNC: 140 MG/DL (ref 60–265)
PROT ELPH PNL UR ELPH: NORMAL
PROT PATTERN SERPL IFE-IMP: NORMAL

## 2019-04-24 NOTE — RESULT ENCOUNTER NOTE
Please advise Nabor Cunningham,  1935, that his lab results were normal.  [phone] (home)   Kae Rodriguez

## 2019-05-03 ENCOUNTER — PATIENT OUTREACH (OUTPATIENT)
Dept: CARE COORDINATION | Facility: CLINIC | Age: 84
End: 2019-05-03

## 2019-05-03 ASSESSMENT — ACTIVITIES OF DAILY LIVING (ADL): DEPENDENT_IADLS:: INDEPENDENT

## 2019-05-03 NOTE — PROGRESS NOTES
Clinic Care Coordination Contact  [facility]/Voicemail    Referral Source: PCP  Clinical Data: Care Coordinator Outreach  Outreach attempted x 1.  Left message on voicemail with call back information and requested return call.  Plan:  Care Coordinator will try to reach patient again in 3-5 business days.    TONE Lord, [facility] - Care Coordinator   [facility]  5/3/2019   9:55 AM  [phone]

## 2019-05-06 ENCOUNTER — TELEPHONE (OUTPATIENT)
Dept: FAMILY MEDICINE | Facility: CLINIC | Age: 84
End: 2019-05-06

## 2019-05-06 NOTE — TELEPHONE ENCOUNTER
Reason for call:  Patient reporting a symptom    Symptom or request: Pt has been complaining about when he coughs or sneezes he gets pain down both legs. This seems to be the only time this happens the past week this started. Daughter is wondering should he be seen? Please Advise.    Yumiko Pugh [phone]      Call taken on 5/6/2019 at 12:53 PM by Cee Michel

## 2019-05-06 NOTE — TELEPHONE ENCOUNTER
Notify her that it does sound like it could have a sciatic component to it.  If it continues a patient  should be seen and evaluated.    Melissa Eng CNP

## 2019-05-06 NOTE — TELEPHONE ENCOUNTER
Daughter states she just found out about a week ago that Bear is having shooting pains down his legs whenever he coughs. She states she doesn't believe it hurts any other time and he doesn't complain of back pain. She is wondering if this could be a sciatic nerve thing or should she be bringing him in? She really did not have any other information to give me. Thank you!    Jaimie Pena RN

## 2019-05-08 ASSESSMENT — ACTIVITIES OF DAILY LIVING (ADL): DEPENDENT_IADLS:: INDEPENDENT

## 2019-05-08 NOTE — PROGRESS NOTES
Clinic Care Coordination Contact    Clinic Care Coordination Contact  OUTREACH    Referral Information:  Referral Source: PCP    Primary Diagnosis: Other (include Comment box)(wound infection)    Chief Complaint   Patient presents with     Clinic Care Coordination - Follow-up             Universal Utilization: no concerns.  Patient does see counseling and psychiatry outside of [place].  Clinic Utilization  Difficulty keeping appointments:: No  Compliance Concerns: No  No-Show Concerns: No  No PCP office visit in Past Year: No  Utilization    Last refreshed: 5/6/2019 11:55 PM:  Hospital Admissions 1           Last refreshed: 5/6/2019 11:55 PM:  ED Visits 6           Last refreshed: 5/6/2019  6:32 PM:  No Show Count (past year) 0              Current as of: 5/6/2019  6:32 PM              Clinical Concerns:  Current Medical Concerns: Per son patient is doing well he will be having a partial knee replacement in early July.  Current Behavioral Concerns: Patient sees a counselor and a psychiatrist/CNS in [place].  Her's son patient does not like the psychiatrist/CNS as she commented about him moving into a facility.  Education Provided to patient: Talked with son about possible reasons why the CNS mentioned about going into a facility and the different possibilities that could have stem this conversation.  Encouraged son to call and talk with the CNS about why she commented on this.  Son did say that patient is doing well on his current med management plan and with counseling.  Pain  Pain (GOAL):: Yes  Type: Chronic (>3mo)  Location of chronic pain:: knee - bone on bone with arthritis  Progression: Worsening  Health Maintenance Reviewed: Due/Overdue   Health Maintenance Due   Topic Date Due     MEDICARE ANNUAL WELLNESS VISIT  1935     HF ACTION PLAN Q3 YR  1935     ZOSTER IMMUNIZATION (1 of 2) 11/03/1987     LIPID MONITORING Q1 YEAR  03/22/2018     BMP Q6 MOS  05/30/2019       Clinical Pathway:  None    Medication Management:  Family assists with medication set up.  Per son no questions or concerns.    Functional Status:  Dependent ADLs:: Independent  Dependent IADLs:: Independent  Bed or wheelchair confined:: No  Mobility Status: Independent  Fallen 2 or more times in the past year?: No  Any fall with injury in the past year?: No    Living Situation:  Current living arrangement:: I live alone  Type of residence:: [facility] Senior Living    Diet/Exercise/Sleep:  Diet:: Low cholesterol, Low saturated fat, No added salt, Other(high fiber, 2 grm salt restriction)  Inadequate nutrition (GOAL):: No  Food Insecurity: No  Tube Feeding: No  Exercise:: Currently not exercising  Inadequate activity/exercise (GOAL):: No  Significant changes in sleep pattern (GOAL): No    Transportation:  Transportation concerns (GOAL):: No  Transportation means:: Family, Friend, Regular car     Psychosocial:  Christianity or spiritual beliefs that impact treatment:: No  Mental health DX:: Yes  Mental health DX how managed:: Medication, Outpatient Counseling  Mental health management concern (GOAL):: No  Informal Support system:: Children, Family     Financial/Insurance:   Financial/Insurance concerns (GOAL):: No  Per son patient is doing well he is getting out to the senior center almost daily.  He feels he is very social when he wants to be her son.  Discussed how depression and anxiety can affect your desire to be social.  Encouraged son to monitor for excess of isolation as this may be an indicator that his depression or anxiety has increased.     Resources and Interventions:  Current Resources:   List of home care services:: Skilled Nursing, Home Health Aid;   Community Resources: Home Care  Supplies used at home:: Wound Care Supplies  Equipment Currently Used at Home: none    Advance Care Plan/Directive  Advanced Care Plans/Directives on file:: No    Referrals Placed: Senior Linkage Line     Goals:   Goals        General     Psychosocial (pt-stated)     Notes - Note edited  5/8/2019 12:29 PM by Rani Sarkar LSW    Goal Statement: pt to remain safe in his own home as long as possible, safe meaning no injuries from falls or avoidable health issues.  Measure of Success: no falls with injuries or avoidable health issues  Supportive Steps to Achieve: home care, , family  Barriers: age, health  Strengths: supportive family  Date to Achieve By: 12-31-19  Patient expressed understanding of goal: yes                  Patient/Caregiver understanding: Family to continue to support patient.  Patient to continue to be as active as he can be.    Outreach Frequency: monthly  Future Appointments              In 1 week NB ANTI COAG [facility]    In 2 months Heron Mayo MD [facility]    In 5 months Kae Ward MD [facility]          Plan: Son to call CNS and follow-up as to reason for comment about patient moving into a facility.   to call mid June.    TONE Lord, [place] [facility] - Care Coordinator   [facility]  5/8/2019   12:46 PM  [phone]

## 2019-05-10 DIAGNOSIS — G62.9 PERIPHERAL POLYNEUROPATHY: ICD-10-CM

## 2019-05-10 DIAGNOSIS — I10 ESSENTIAL HYPERTENSION WITH GOAL BLOOD PRESSURE LESS THAN 140/90: ICD-10-CM

## 2019-05-10 DIAGNOSIS — I50.43 ACUTE ON CHRONIC COMBINED SYSTOLIC AND DIASTOLIC CONGESTIVE HEART FAILURE (H): ICD-10-CM

## 2019-05-11 NOTE — TELEPHONE ENCOUNTER
"Requested Prescriptions    gabapentin (NEURONTIN) 300 MG capsule  Last Written Prescription Date:  1/17/19  Last Fill Quantity: 30,   # refills: 3  Last Office Visit: 4/18/2019 Neuro: Kae Ward MD  Future Office visit:    Next 5 appointments (look out 90 days)    May 13, 2019  3:40 PM CDT  Office Visit with Denver Turner MD  [facility]) [address] [place] [id number]  [phone]   Jul 12, 2019 11:00 AM CDT  Pre-Op physical with [facility] [address] [place] [id number]  [phone]           Routing refill request to provider for review/approval because:  Drug not on the [facility], P or [facility] refill protocol or controlled substance       Pending Prescriptions Disp Refills     metoprolol succinate ER (TOPROL-XL) 25 MG 24 hr tablet  Last Written Prescription Date:  11/30/18  Last Fill Quantity: 90,  # refills: 1   Last office visit: 2/15/2019 with prescribing provider:  ALEXANDRA Mayo      90 tablet 1     Sig: Take 1 tablet (25 mg) by mouth every evening       Beta-Blockers Protocol Passed - 5/10/2019  3:59 PM        Passed - Blood pressure under 140/90 in past 12 months     BP Readings from Last 3 Encounters:   04/18/19 102/52   04/12/19 122/70   02/15/19 90/44                 Passed - Patient is age 6 or older        Passed - Recent (12 mo) or future (30 days) visit within the authorizing provider's specialty     Patient had office visit in the last 12 months or has a visit in the next 30 days with authorizing provider or within the authorizing provider's specialty.  See \"Patient Info\" tab in inbasket, or \"Choose Columns\" in Meds & Orders section of the refill encounter.              Passed - Medication is active on med list        gabapentin (NEURONTIN) 300 MG capsule 30 capsule 3     Sig: Take 1 capsule (300 mg) by mouth At Bedtime       There " is no refill protocol information for this order

## 2019-05-13 ENCOUNTER — OFFICE VISIT (OUTPATIENT)
Dept: FAMILY MEDICINE | Facility: CLINIC | Age: 84
End: 2019-05-13
Payer: MEDICARE

## 2019-05-13 VITALS
TEMPERATURE: 97.6 F | WEIGHT: 176 LBS | DIASTOLIC BLOOD PRESSURE: 64 MMHG | SYSTOLIC BLOOD PRESSURE: 114 MMHG | RESPIRATION RATE: 18 BRPM | HEIGHT: 64 IN | BODY MASS INDEX: 30.05 KG/M2 | HEART RATE: 80 BPM

## 2019-05-13 DIAGNOSIS — R26.9 ABNORMAL GAIT: ICD-10-CM

## 2019-05-13 DIAGNOSIS — M54.10 RADICULAR LOW BACK PAIN: Primary | ICD-10-CM

## 2019-05-13 PROCEDURE — 99213 OFFICE O/P EST LOW 20 MIN: CPT | Performed by: FAMILY MEDICINE

## 2019-05-13 RX ORDER — GABAPENTIN 300 MG/1
300 CAPSULE ORAL AT BEDTIME
Qty: 90 CAPSULE | Refills: 3 | Status: SHIPPED | OUTPATIENT
Start: 2019-05-13 | End: 2019-10-14

## 2019-05-13 RX ORDER — METOPROLOL SUCCINATE 25 MG/1
25 TABLET, EXTENDED RELEASE ORAL EVERY EVENING
Qty: 90 TABLET | Refills: 1 | Status: SHIPPED | OUTPATIENT
Start: 2019-05-13 | End: 2019-12-01

## 2019-05-13 ASSESSMENT — MIFFLIN-ST. JEOR: SCORE: 1414.33

## 2019-05-13 NOTE — PROGRESS NOTES
"  SUBJECTIVE:   Nabor Cunningham is a 83 year old male who presents to clinic today for the following   health issues:      1.) Hip and Leg Pain  - bilateral leg pain  - x 2 weeks. Woke up with it, no injury  - pain radiates down into his calves.   - bending over, going from laying flat to sitting up and sitting are worse  - Taking tylenol PRN    s :Nabor Cunningham is a 83 year old male with a  Few weeks of some low back pain, moving down into legs with certain positions.  No injury.  No bowel/bladder changes or illness    No fever    Favoring L knee, having \"partial\" replacement of medial side in a couple months.      O:/64 (BP Location: Right arm, Patient Position: Chair, Cuff Size: Adult Regular)   Pulse 80   Temp 97.6  F (36.4  C) (Tympanic)   Resp 18   Ht 1.626 m (5' 4\")   Wt 79.8 kg (176 lb)   BMI 30.21 kg/m    GEN: Alert and oriented, in no acute distress  No pain over troch bursa either side  Struggles to get out of chair, uses cane  No hip pain on internal rotation either side    A: low back pain with radicular sx     Gait instability      P: likely spinal stenosis.  Will have him do some therapy for back/gait.  Consider epidural injection.  MRI if not improving over time is something to consider.  Hasn't ever had lumbar MRI          "

## 2019-05-16 ENCOUNTER — ANTICOAGULATION THERAPY VISIT (OUTPATIENT)
Dept: ANTICOAGULATION | Facility: CLINIC | Age: 84
End: 2019-05-16
Payer: MEDICARE

## 2019-05-16 DIAGNOSIS — I48.20 CHRONIC ATRIAL FIBRILLATION (H): ICD-10-CM

## 2019-05-16 DIAGNOSIS — Z79.01 CHRONIC ANTICOAGULATION: ICD-10-CM

## 2019-05-16 LAB — INR POINT OF CARE: 2.3 (ref 0.86–1.14)

## 2019-05-16 PROCEDURE — 36416 COLLJ CAPILLARY BLOOD SPEC: CPT

## 2019-05-16 PROCEDURE — 99207 ZZC NO CHARGE NURSE ONLY: CPT

## 2019-05-16 PROCEDURE — 85610 PROTHROMBIN TIME: CPT | Mod: QW

## 2019-05-16 NOTE — PROGRESS NOTES
ANTICOAGULATION FOLLOW-UP CLINIC VISIT    Patient Name:  Nabor Cunningham  Date:  2019  Contact Type:  Face to Face    SUBJECTIVE:  Patient Findings     Positives:   Other complaints (low back and leg pain)    Comments:   Patient will be starting PT for his back pain and leg pain. No changes in diet, activity level, medications (including over the counter), or health. No missed doses of warfarin. Patient took dosing as prescribed. No signs of clots or bleeding concerns. Patient will continue maintenance warfarin dosing.  He will be having surgery at the . Pre op scheduled for 19.           Clinical Outcomes     Comments:   Patient will be starting PT for his back pain and leg pain. No changes in diet, activity level, medications (including over the counter), or health. No missed doses of warfarin. Patient took dosing as prescribed. No signs of clots or bleeding concerns. Patient will continue maintenance warfarin dosing.  He will be having surgery at the end . Pre op scheduled for 19.              OBJECTIVE    INR Protime   Date Value Ref Range Status   2019 2.3 (A) 0.86 - 1.14 Final       ASSESSMENT / PLAN  INR assessment THER    Recheck INR In: 6 WEEKS    INR Location Clinic      Anticoagulation Summary  As of 2019    INR goal:   2.0-3.0   TTR:   62.6 % (2.1 y)   INR used for dosin.3 (2019)   Warfarin maintenance plan:   4 mg (2 mg x 2) every Mon; 6 mg (2 mg x 3) all other days   Full warfarin instructions:   4 mg every Mon; 6 mg all other days   Weekly warfarin total:   40 mg   No change documented:   Belia Jones RN   Plan last modified:   Daphne Lamar RN (2019)   Next INR check:   2019   Priority:   INR   Target end date:   Indefinite    Indications    Chronic anticoagulation [Z79.01]  Chronic atrial fibrillation (H) [I48.2]  Long-term (current) use of anticoagulants [Z79.01] [Z79.01]             Anticoagulation Episode Summary     INR  check location:       Preferred lab:       Send INR reminders to:   NB ANTICOAG CLINIC POOL    Comments:   * 2mg tabs      Anticoagulation Care Providers     Provider Role Specialty Phone number    Heron Mayo MD [facility] [phone]            See the Encounter Report to view Anticoagulation Flowsheet and Dosing Calendar (Go to Encounters tab in chart review, and find the Anticoagulation Therapy Visit)        Belia Jones RN

## 2019-06-17 ENCOUNTER — TELEPHONE (OUTPATIENT)
Dept: FAMILY MEDICINE | Facility: CLINIC | Age: 84
End: 2019-06-17

## 2019-06-17 ENCOUNTER — PATIENT OUTREACH (OUTPATIENT)
Dept: CARE COORDINATION | Facility: CLINIC | Age: 84
End: 2019-06-17

## 2019-06-17 ASSESSMENT — ACTIVITIES OF DAILY LIVING (ADL): DEPENDENT_IADLS:: INDEPENDENT

## 2019-06-17 NOTE — PROGRESS NOTES
Clinic Care Coordination Contact  Outreach    Updated son on comment from pcp that patient only needs to see cardiologist if the surgeon recommends that or they have concerns.  Son felt he was doing well and that they did not need to see the cardiologist at this time.    Again reviewed the potential outcomes after surgery such as TCU or home care.  Again reviewed that this is dependent on how his surgery goes and will be assessed in the hospital.     will call in about 1 month and touch base with son before surgery.    TONE Lord, [place] [facility] - Care Coordinator   [facility]  6/17/2019   1:34 PM  [phone]

## 2019-06-17 NOTE — PROGRESS NOTES
Clinic Care Coordination Contact    Clinic Care Coordination Contact  OUTREACH    Referral Information:  Referral Source: PCP    Primary Diagnosis: Other (include Comment box)(wound infection)    Chief Complaint   Patient presents with     Clinic Care Coordination - Follow-up     sw        Universal Utilization: pt sees some providers outside of [facility].   Clinic Utilization  Difficulty keeping appointments:: No  Compliance Concerns: No  No-Show Concerns: No  No PCP office visit in Past Year: No  Utilization    Last refreshed: 6/17/2019  8:10 AM:  Hospital Admissions 1           Last refreshed: 6/17/2019  8:10 AM:  ED Visits 6           Last refreshed: 6/17/2019  8:10 AM:  No Show Count (past year) 0              Current as of: 6/17/2019  8:10 AM              Clinical Concerns:  Current Medical Concerns:  Pt will be getting partial knee surgery on 7-31-19.  Per son patient has been having increased pain with his knee and his back.  Her son and chiropractor said he has some bone-on-bone as well.  Current Behavioral Concerns: Patient continues to seek counseling and per son is doing okay.  Education Provided to patient: Son had questions on if patient needs to see cardiology before surgery.  Will route this question to PCP for follow-up.  Pain  Pain (GOAL):: Yes  Type: Chronic (>3mo)  Location of chronic pain:: knee - bone on bone with arthritis  Progression: Worsening  Health Maintenance Reviewed: Due/Overdue   Health Maintenance Due   Topic Date Due     HF ACTION PLAN  1935     ZOSTER IMMUNIZATION (1 of 2) 11/03/1987     MEDICARE ANNUAL WELLNESS VISIT  11/03/2002     LIPID  03/22/2018     BMP  05/30/2019       Clinical Pathway: None    Medication Management:  No concerns.    Functional Status:  Dependent ADLs:: Independent  Dependent IADLs:: Independent  Bed or wheelchair confined:: No  Mobility Status: Independent  Fallen 2 or more times in the past year?: No  Any fall with injury in the past year?:  No    Living Situation:  Current living arrangement:: I live alone  Type of residence:: [facility]    Diet/Exercise/Sleep:  Diet:: Low cholesterol, Low saturated fat, No added salt, Other(high fiber, 2 grm salt restriction)  Inadequate nutrition (GOAL):: No  Food Insecurity: No  Tube Feeding: No  Exercise:: Currently not exercising  Inadequate activity/exercise (GOAL):: No  Significant changes in sleep pattern (GOAL): No    Transportation:  Transportation concerns (GOAL):: No  Transportation means:: Family, Friend, Regular car     Psychosocial:  Hoahaoism or spiritual beliefs that impact treatment:: No  Mental health DX:: Yes  Mental health DX how managed:: Medication, Outpatient Counseling  Mental health management concern (GOAL):: No  Informal Support system:: Children, Family     Financial/Insurance:   Financial/Insurance concerns (GOAL):: No  Discussed potential TCU stay after partial knee.  Patient will be admitted to the hospital for surgery which will help get him some therapy before discharge.  Discussed with son that the hospital will then get have indication if patient needs that intense rehab or is okay discharging with home care.  Per son 1 of his sisters is bringing the patient and may have some questions.  Encouraged him to give her social workers phone number and she can call if she has any questions.    Son has gotten patient shower bench and is looking at getting a rolling walker.  This is to help patient be more independent and continued to be safe with no falls.     Resources and Interventions:  Current Resources:   List of home care services:: Skilled Nursing, Home Health Aid;   Community Resources: Home Care  Supplies used at home:: Wound Care Supplies  Equipment Currently Used at Home: none    Advance Care Plan/Directive  Advanced Care Plans/Directives on file:: No    Referrals Placed: Senior Linkage Line     Goals:   Goals        General    Psychosocial (pt-stated)     Notes -  Note edited  5/8/2019 12:29 PM by Rani Sarkar LSW    Goal Statement: pt to remain safe in his own home as long as possible, safe meaning no injuries from falls or avoidable health issues.  Measure of Success: no falls with injuries or avoidable health issues  Supportive Steps to Achieve: home care, , family  Barriers: age, health  Strengths: supportive family  Date to Achieve By: 12-31-19  Patient expressed understanding of goal: yes                  Patient/Caregiver understanding: Family to continue to support patient.    Outreach Frequency: monthly  Future Appointments              In 1 week [facility] ANTI COAG [facility]    In 3 weeks Heron Mayo MD [facility]    In 4 months Kae Ward MD [facility]          Plan: Patient and family to continue with appointments as scheduled.   to route chart with question to primary regarding cardiology.   to call in about 1 month.    TONE Lord, [place] [facility] - Care Coordinator   [facility]  6/17/2019   9:30 AM  [phone]

## 2019-06-17 NOTE — TELEPHONE ENCOUNTER
I talked with son and and told him OK to have back injection.  Chiropractor also thought would be OK.  Ellen Perez RN

## 2019-06-17 NOTE — LETTER
[facility] Home  Complex Care Plan  About Me:    Patient Name:  Nabor Perea    YOB: 1935  Age:         83 year old   Mary MRN:    [id number] Telephone Information:  Home Phone [phone]   Mobile [phone]   Home Phone [phone]       Address:  [address]  [facility] [address] Email address:  No e-mail address on record      Emergency Contact(s)    Name Relationship Lgl Grd Work Phone Home Phone Mobile Phone   1. JACKSON VAN Daughter No  [phone]   2. ELOY PEREA  Son No   [phone]   3. LISANDRA PEREA Relative No  [phone]            Primary language:  English     needed? No   [facility]:  [phone] op. 1  Other communication barriers: Glasses, Cognitive impairment  Preferred Method of Communication:  Do Not Contact  Current living arrangement: I live alone  Mobility Status/ Medical Equipment: Independent    Health Maintenance  Health Maintenance Reviewed: Due/Overdue   Health Maintenance Due   Topic Date Due     HF ACTION PLAN  1935     ZOSTER IMMUNIZATION (1 of 2) 11/03/1987     MEDICARE ANNUAL WELLNESS VISIT  11/03/2002     LIPID  03/22/2018     BMP  05/30/2019     Please talk with your provider about what is needed or can continue to wait.        My Access Plan  Medical Emergency 911   Primary Clinic Line [facility] - [phone]   24 Hour Appointment Line [phone] or  [phone]) (toll-free)   24 Hour Nurse Line [phone] (toll-free)   Preferred Urgent Care [facility], [phone]   Preferred Hospital [place], [place]  [phone]   Preferred Pharmacy [id number] - [place], [place] - [address]     Behavioral Health Crisis Line The National Suicide Prevention Lifeline at 1-315.887.7470 or 911             My Care Team Members  Patient Care Team       Relationship Specialty Notifications Start End     Heron Mayo MD PCP - General Family Practice  9/11/18     Phone: [phone] Fax: [phone]         [address] [place] [address]    Heron Mayo MD  Family Practice  1/29/18     Merged    Phone: [phone] Fax: [phone]         [address] [place] [address]    Rani Sarkar LSW Lead Care Coordinator Primary Care - CC Admissions 11/13/18     Phone: [phone] Fax: [phone]        Heron Mayo MD Assigned PCP   4/26/19     Phone: [phone] Fax: [phone]         [address] [place] [address]            My Care Plans  Self Management and Treatment Plan  Goals and (Comments)  Goals        General    Psychosocial (pt-stated)     Notes - Note edited  5/8/2019 12:29 PM by Rani Sarkar LSW    Goal Statement: pt to remain safe in his own home as long as possible, safe meaning no injuries from falls or avoidable health issues.  Measure of Success: no falls with injuries or avoidable health issues  Supportive Steps to Achieve: home care, , family  Barriers: age, health  Strengths: supportive family  Date to Achieve By: 12-31-19  Patient expressed understanding of goal: yes                 Action Plans on File:                       Advance Care Plans/Directives Type:        My Medical and Care Information  Problem List   Patient Active Problem List   Diagnosis     Anxiety     Alcohol abuse     Chronic atrial fibrillation (H)     Weight loss     Peripheral polyneuropathy     Chronic gout, unspecified cause, unspecified site     Essential hypertension with goal blood pressure less than 140/90     Hyperlipidemia LDL goal <130     Persistent insomnia     Lower urinary tract symptoms (LUTS)     Chronic anticoagulation     Long-term (current) use of anticoagulants [Z79.01]     Moderate episode of recurrent major depressive disorder (H)     Pneumonia     Bleeding from wound     Postoperative hemorrhage involving circulatory system  following non-circulatory system procedure     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    Care Coordination Start Date: 11/28/2018   Frequency of Care Coordination: monthly   Form Last Updated: 06/17/2019

## 2019-06-17 NOTE — TELEPHONE ENCOUNTER
Reason for Call: Request for an order or referral:    Order or referral being requested: Back injection    Date needed: as soon as possible    Has the patient been seen by the PCP for this problem? YES    Additional comments: Patient was at the chiropractor [facility] and she suggested he get a back injection. Son is concerned if this will mess things up for his upcoming knee replacement surgery on 10/31.     Phone number Patient can be reached at:  Bandar Matias can be reached at [phone]    Best Time:  Today    Can we leave a detailed message on this number?  YES    Call taken on 6/17/2019 at 9:26 AM by Myla Fair

## 2019-06-19 ENCOUNTER — TELEPHONE (OUTPATIENT)
Dept: FAMILY MEDICINE | Facility: CLINIC | Age: 84
End: 2019-06-19

## 2019-06-19 DIAGNOSIS — M54.5 LOW BACK PAIN, UNSPECIFIED BACK PAIN LATERALITY, UNSPECIFIED CHRONICITY, WITH SCIATICA PRESENCE UNSPECIFIED: Primary | ICD-10-CM

## 2019-06-19 NOTE — TELEPHONE ENCOUNTER
Patient was at the chiropractor [facility] and she suggested he get a back injection.   Florencio called jB ([facility]) to schedule and they said insurance won't cover if ordered by a chiropractor.  Needs to be ordered by a doctor. They also want MRI done (  He has a pacemaker)    Referral set up in orders if OK with you  Ellen Perez RN

## 2019-06-19 NOTE — TELEPHONE ENCOUNTER
Florencio (son) called back to talk to Jammie CHERRY. He says he called the place in [place] for the lower back injection and they told him insurance would not cover if it is ordered by by a Chiropractor and they would not accept self pay. They would need the order to come from Dr Mayo. They would also want MRI to be done prior to this but his son isn't sure if he can have one since he has a pacemaker and it in that case it would be a CT scan?   Please call back to talk to Florencio.

## 2019-06-19 NOTE — TELEPHONE ENCOUNTER
Please call.  I have no information about his back pain.    I think he needs to see someone in our system for referrals for MRI or back injections.   He can see me or [facility] if he would prefer back speciality physician.   VICTOR HUGO ART MD

## 2019-06-20 ENCOUNTER — PATIENT OUTREACH (OUTPATIENT)
Dept: CARE COORDINATION | Facility: CLINIC | Age: 84
End: 2019-06-20

## 2019-06-20 ASSESSMENT — ACTIVITIES OF DAILY LIVING (ADL): DEPENDENT_IADLS:: INDEPENDENT

## 2019-06-20 NOTE — PROGRESS NOTES
Clinic Care Coordination Contact  Outreach    dtr Samara asked for a call back. She will be bringing pt to his surgery and she wanted to know about home care or TCU.  Reviewed options and criteria for both with dtr.  Answered her questions during the call.     Sw will follow up with son in about one month for follow up before surgery.  dtr or son will call if they have any questions.     TONE Lord, [place] [facility] - Care Coordinator   [facility]  6/20/2019   11:49 AM  [phone]

## 2019-06-21 ENCOUNTER — AMBULATORY - HEALTHEAST (OUTPATIENT)
Dept: PHYSICAL MEDICINE AND REHAB | Facility: CLINIC | Age: 84
End: 2019-06-21

## 2019-06-21 DIAGNOSIS — M54.50 NONSPECIFIC PAIN IN THE LUMBAR REGION: ICD-10-CM

## 2019-06-24 ENCOUNTER — COMMUNICATION - HEALTHEAST (OUTPATIENT)
Dept: PHYSICAL MEDICINE AND REHAB | Facility: CLINIC | Age: 84
End: 2019-06-24

## 2019-06-24 ENCOUNTER — TRANSFERRED RECORDS (OUTPATIENT)
Dept: HEALTH INFORMATION MANAGEMENT | Facility: CLINIC | Age: 84
End: 2019-06-24

## 2019-06-24 ENCOUNTER — HOSPITAL ENCOUNTER (OUTPATIENT)
Dept: CT IMAGING | Facility: CLINIC | Age: 84
Discharge: HOME | End: 2019-06-24
Attending: NURSE PRACTITIONER | Admitting: NURSE PRACTITIONER
Payer: MEDICARE

## 2019-06-24 ENCOUNTER — HOSPITAL ENCOUNTER (OUTPATIENT)
Dept: PHYSICAL MEDICINE AND REHAB | Facility: CLINIC | Age: 84
Discharge: HOME | End: 2019-06-24
Attending: NURSE PRACTITIONER

## 2019-06-24 DIAGNOSIS — M54.16 LUMBAR RADICULOPATHY: ICD-10-CM

## 2019-06-24 DIAGNOSIS — M54.41 ACUTE BILATERAL LOW BACK PAIN WITH BILATERAL SCIATICA: ICD-10-CM

## 2019-06-24 DIAGNOSIS — M54.16 LUMBAR RADICULITIS: ICD-10-CM

## 2019-06-24 DIAGNOSIS — M54.42 ACUTE BILATERAL LOW BACK PAIN WITH BILATERAL SCIATICA: ICD-10-CM

## 2019-06-24 DIAGNOSIS — M54.41 ACUTE BACK PAIN WITH SCIATICA, RIGHT: ICD-10-CM

## 2019-06-24 DIAGNOSIS — M54.42 ACUTE BACK PAIN WITH SCIATICA, LEFT: ICD-10-CM

## 2019-06-24 PROCEDURE — 72131 CT LUMBAR SPINE W/O DYE: CPT

## 2019-06-25 ENCOUNTER — COMMUNICATION - HEALTHEAST (OUTPATIENT)
Dept: PHYSICAL MEDICINE AND REHAB | Facility: CLINIC | Age: 84
End: 2019-06-25

## 2019-06-25 ENCOUNTER — RECORDS - HEALTHEAST (OUTPATIENT)
Dept: ADMINISTRATIVE | Facility: OTHER | Age: 84
End: 2019-06-25

## 2019-06-25 ENCOUNTER — RECORDS - HEALTHEAST (OUTPATIENT)
Dept: RADIOLOGY | Facility: CLINIC | Age: 84
End: 2019-06-25

## 2019-06-25 ENCOUNTER — AMBULATORY - HEALTHEAST (OUTPATIENT)
Dept: PHYSICAL MEDICINE AND REHAB | Facility: CLINIC | Age: 84
End: 2019-06-25

## 2019-06-25 DIAGNOSIS — M48.061 LUMBAR FORAMINAL STENOSIS: ICD-10-CM

## 2019-06-25 DIAGNOSIS — M54.16 LEFT LUMBAR RADICULITIS: ICD-10-CM

## 2019-06-25 DIAGNOSIS — M54.41 ACUTE BILATERAL LOW BACK PAIN WITH BILATERAL SCIATICA: ICD-10-CM

## 2019-06-25 DIAGNOSIS — M51.369 BULGING LUMBAR DISC: ICD-10-CM

## 2019-06-25 DIAGNOSIS — M54.42 ACUTE BILATERAL LOW BACK PAIN WITH BILATERAL SCIATICA: ICD-10-CM

## 2019-06-27 ENCOUNTER — ANTICOAGULATION THERAPY VISIT (OUTPATIENT)
Dept: ANTICOAGULATION | Facility: CLINIC | Age: 84
End: 2019-06-27
Payer: MEDICARE

## 2019-06-27 DIAGNOSIS — Z79.01 CHRONIC ANTICOAGULATION: ICD-10-CM

## 2019-06-27 DIAGNOSIS — I48.20 CHRONIC ATRIAL FIBRILLATION (H): ICD-10-CM

## 2019-06-27 DIAGNOSIS — Z79.01 LONG TERM CURRENT USE OF ANTICOAGULANT THERAPY: ICD-10-CM

## 2019-06-27 LAB — INR POINT OF CARE: 5 (ref 0.86–1.14)

## 2019-06-27 PROCEDURE — 85610 PROTHROMBIN TIME: CPT | Mod: QW

## 2019-06-27 PROCEDURE — 99207 ZZC NO CHARGE NURSE ONLY: CPT

## 2019-06-27 PROCEDURE — 36416 COLLJ CAPILLARY BLOOD SPEC: CPT

## 2019-06-27 NOTE — PATIENT INSTRUCTIONS
No warfarin today. Eat spinach or large amount of broccoli for dinner. Recheck INR at [facility] lab tomorrow. We will call with results.    Some signs and symptoms of bleeding include: Nose bleed or cut that does not stop bleeding in 10 minutes, bleeding of the gums, vomiting (will look like coffee grounds) or coughing up blood, unusual, easy or large areas of bruising, increased or unexpected vaginal bleeding or increased menstrual flow, red or black stools, red or orange urine, prolonged or severe headache, pale skin, unusual or constant tiredness.  If you have these please call 911 or seek medical care immediately.

## 2019-06-27 NOTE — PROGRESS NOTES
ANTICOAGULATION FOLLOW-UP CLINIC VISIT    Patient Name:  Nabor Cunningham  Date:  6/27/2019  Contact Type:  Face to Face    SUBJECTIVE:  Patient Findings     Positives:   Upcoming invasive procedure (SONAM on 7-2-19), Change in medications (taking tylenol extra strength at 1000 mg every 4 hours), Other complaints (back pain)    Comments:   Writer spoke with patient and daughter, Lexy, at length today. He is having excruciating back pain and is scheduled for an SONAM on 7-2-19. Per daughter, he needs INR less than 3.1. INR today is 5.0 likely due to pain and taking 1000 mg tylenol every 4 hours. Writer did educate patient that this is too much Tylenol and will increase INR as well as being bad for his liver. INR was checked twice and 5.0 each time. No bleeding but patient did cut his ear shaving and it bled. No changes to diet but likely less active with current pain. Patient advised to eat dark greens today, hold warfarin and recheck INR at [facility] lab tomorrow. ACC to call daughterLexy, on her cell tomorrow with results. pcp will be sent a chart copy.        Clinical Outcomes     Comments:   Writer spoke with patient and daughter, Lexy, at length today. He is having excruciating back pain and is scheduled for an SONAM on 7-2-19. Per daughter, he needs INR less than 3.1. INR today is 5.0 likely due to pain and taking 1000 mg tylenol every 4 hours. Writer did educate patient that this is too much Tylenol and will increase INR as well as being bad for his liver. INR was checked twice and 5.0 each time. No bleeding but patient did cut his ear shaving and it bled. No changes to diet but likely less active with current pain. Patient advised to eat dark greens today, hold warfarin and recheck INR at [facility] lab tomorrow. ACC to call daughterLexy, on her cell tomorrow with results. pcp will be sent a chart copy.           OBJECTIVE    INR Protime   Date Value Ref Range Status   06/27/2019 5.0 (A) 0.86 - 1.14 Final        ASSESSMENT / PLAN  INR assessment SUPRA    Recheck INR In: 1 DAY    INR Location Clinic      Anticoagulation Summary  As of 2019    INR goal:   2.0-3.0   TTR:   60.7 % (2.2 y)   INR used for dosin.0! (2019)   Warfarin maintenance plan:   4 mg (2 mg x 2) every Mon; 6 mg (2 mg x 3) all other days   Full warfarin instructions:   : Hold; Otherwise 4 mg every Mon; 6 mg all other days   Weekly warfarin total:   40 mg   Plan last modified:   Daphne Lamar RN (2019)   Next INR check:   2019   Priority:   INR   Target end date:   Indefinite    Indications    Chronic anticoagulation [Z79.01]  Chronic atrial fibrillation (H) [I48.2]  Long-term (current) use of anticoagulants [Z79.01] [Z79.01]             Anticoagulation Episode Summary     INR check location:       Preferred lab:       Send INR reminders to:   GARRICK COOMBS    Comments:   * 2mg tabs. Need INR within goal range for SONAM on 19!!      Anticoagulation Care Providers     Provider Role Specialty Phone number    Heron Mayo MD [facility] [phone]            See the Encounter Report to view Anticoagulation Flowsheet and Dosing Calendar (Go to Encounters tab in chart review, and find the Anticoagulation Therapy Visit)        Belia Jones RN

## 2019-06-28 ENCOUNTER — ANTICOAGULATION THERAPY VISIT (OUTPATIENT)
Dept: ANTICOAGULATION | Facility: CLINIC | Age: 84
End: 2019-06-28
Payer: MEDICARE

## 2019-06-28 ENCOUNTER — PATIENT OUTREACH (OUTPATIENT)
Dept: CARE COORDINATION | Facility: CLINIC | Age: 84
End: 2019-06-28

## 2019-06-28 DIAGNOSIS — Z79.01 LONG TERM CURRENT USE OF ANTICOAGULANT THERAPY: ICD-10-CM

## 2019-06-28 DIAGNOSIS — Z79.01 CHRONIC ANTICOAGULATION: ICD-10-CM

## 2019-06-28 DIAGNOSIS — M1A.9XX0 CHRONIC GOUT, UNSPECIFIED CAUSE, UNSPECIFIED SITE: ICD-10-CM

## 2019-06-28 DIAGNOSIS — I48.20 CHRONIC ATRIAL FIBRILLATION (H): ICD-10-CM

## 2019-06-28 LAB
INR PPP: 2.69 (ref 0.86–1.14)
URATE SERPL-MCNC: 8.8 MG/DL (ref 3.5–7.2)

## 2019-06-28 PROCEDURE — 84550 ASSAY OF BLOOD/URIC ACID: CPT | Performed by: FAMILY MEDICINE

## 2019-06-28 PROCEDURE — 85610 PROTHROMBIN TIME: CPT | Performed by: FAMILY MEDICINE

## 2019-06-28 PROCEDURE — 36415 COLL VENOUS BLD VENIPUNCTURE: CPT | Performed by: FAMILY MEDICINE

## 2019-06-28 PROCEDURE — 99207 ZZC NO CHARGE NURSE ONLY: CPT

## 2019-06-28 ASSESSMENT — ACTIVITIES OF DAILY LIVING (ADL): DEPENDENT_IADLS:: INDEPENDENT

## 2019-06-28 NOTE — PROGRESS NOTES
Clinic Care Coordination Contact  Outreach    Daughter asked for return phone call as she had some questions about getting help with medical appointments.    Upon return phone call daughter said that her and 2 siblings are having challenges trying to get patient to all of his medical appointments.  She is wondering if there are any nurses or other services that would help get patient to appointments.  Reviewed private pay services and also reviewed patient getting assessed by the [facility] for the needs assessment.  Provided her with the phone number for this [facility] and to ask for the needs assessment or mnchoices assessment.    Family would like to try and get home care through [facility] set up for aftercare of surgery.  Patient's surgery is not until July 30.  Reviewed the challenges of getting services in place a month in advance when you do not know if he is going to be staying in the hospital, going to a transitional care unit, and timeframe of any of those things at this point.  Daughter recognized challenges and we discussed options.  Discussed that she could call [facility] to touch base with them for better understanding.  Discussed that preplanning options at a transitional care unit of the choice for potential admit after surgery.  Again reviewed he would need a 3-day stay in the hospital for Medicare to cover the transitional care unit.    Daughter had no other questions at this time and  will follow up mid July.    TONE Lord, [facility] - Care Coordinator   [facility]  6/28/2019   3:46 PM  [phone]

## 2019-06-28 NOTE — PROGRESS NOTES
ANTICOAGULATION FOLLOW-UP CLINIC VISIT    Patient Name:  Nabor Cunningham  Date:  2019  Contact Type:  Telephone/ spoke with daughterLexy, on phone    SUBJECTIVE:  Patient Findings     Positives:   Missed doses (intentional hold yesterday)    Comments:   Patient ate broccoli last night. His daughter told him to cut the Tylenol use in half. She has not talked to him today so cannot verify this was done. Writer suspects patient will continue to need a reduction in dosing due to severe back pain. He needs to have INR in range for SONAM on 19. Will recheck INR on Monday at [facility] lab. ACC to call Lexy corado, on cell with results. Writer advised to eat a serving of greens daily until next INR.        Clinical Outcomes     Comments:   Patient ate broccoli last night. His daughter told him to cut the Tylenol use in half. She has not talked to him today so cannot verify this was done. Writer suspects patient will continue to need a reduction in dosing due to severe back pain. He needs to have INR in range for SONAM on 19. Will recheck INR on Monday at [facility] lab. ACC to call Lexy corado, on cell with results. Writer advised to eat a serving of greens daily until next INR.           OBJECTIVE    INR   Date Value Ref Range Status   2019 2.69 (H) 0.86 - 1.14 Final       ASSESSMENT / PLAN  INR assessment THER    Recheck INR In: 3 DAYS    INR Location Clinic lab     Anticoagulation Summary  As of 2019    INR goal:   2.0-3.0   TTR:   60.6 % (2.2 y)   INR used for dosin.69 (2019)   Warfarin maintenance plan:   4 mg (2 mg x 2) every Mon; 6 mg (2 mg x 3) all other days   Full warfarin instructions:   : 4 mg; : 4 mg; Otherwise 4 mg every Mon; 6 mg all other days   Weekly warfarin total:   40 mg   Plan last modified:   Daphne Lamar RN (2019)   Next INR check:   2019   Priority:   INR   Target end date:   Indefinite    Indications    Chronic anticoagulation  [Z79.01]  Chronic atrial fibrillation (H) [I48.2]  Long-term (current) use of anticoagulants [Z79.01] [Z79.01]             Anticoagulation Episode Summary     INR check location:       Preferred lab:       Send INR reminders to:   GARRICK COOMBS    Comments:   * 2mg tabs. Need INR within goal range for SONAM on 7-2-19!! Does not like to cut tabs      Anticoagulation Care Providers     Provider Role Specialty Phone number    Heron Mayo MD [facility] [phone]            See the Encounter Report to view Anticoagulation Flowsheet and Dosing Calendar (Go to Encounters tab in chart review, and find the Anticoagulation Therapy Visit)        Belia Jones RN

## 2019-06-28 NOTE — RESULT ENCOUNTER NOTE
Please call.  URic acid gout test is abnormal-high.    PLAN: We could consider gout preventative medication like allopurinol if he is having gout episodes.

## 2019-07-01 ENCOUNTER — ANTICOAGULATION THERAPY VISIT (OUTPATIENT)
Dept: ANTICOAGULATION | Facility: CLINIC | Age: 84
End: 2019-07-01
Payer: MEDICARE

## 2019-07-01 DIAGNOSIS — I48.20 CHRONIC ATRIAL FIBRILLATION (H): ICD-10-CM

## 2019-07-01 DIAGNOSIS — Z79.01 CHRONIC ANTICOAGULATION: ICD-10-CM

## 2019-07-01 DIAGNOSIS — Z79.01 LONG TERM CURRENT USE OF ANTICOAGULANT THERAPY: ICD-10-CM

## 2019-07-01 LAB — INR PPP: 1.66 (ref 0.86–1.14)

## 2019-07-01 PROCEDURE — 36415 COLL VENOUS BLD VENIPUNCTURE: CPT | Performed by: FAMILY MEDICINE

## 2019-07-01 PROCEDURE — 99207 ZZC NO CHARGE NURSE ONLY: CPT

## 2019-07-01 PROCEDURE — 85610 PROTHROMBIN TIME: CPT | Performed by: FAMILY MEDICINE

## 2019-07-01 NOTE — PROGRESS NOTES
ANTICOAGULATION FOLLOW-UP CLINIC VISIT    Patient Name:  Nabor Cunningham  Date:  2019  Contact Type:  Telephone/ Sameera, mickie    SUBJECTIVE:  Patient Findings     Comments:   No changes in medications, diet, or activity. No concerns with bleeding or bruising. Took warfarin as prescribed.   Continue maintenance warfarin dose. Will recheck INR in 4 days. Backup plan if daughter is not available to take patient to appt Friday, appt made for Monday and continue maintenance dose except Saturday take 4 mg. Patient daughter Lexy verbalizes understanding and agrees with plan. No further questions at this time.           Clinical Outcomes     Negatives:   Major bleeding event, Thromboembolic event, Anticoagulation-related hospital admission, Anticoagulation-related ED visit, Anticoagulation-related fatality    Comments:   No changes in medications, diet, or activity. No concerns with bleeding or bruising. Took warfarin as prescribed.   Continue maintenance warfarin dose. Will recheck INR in 4 days. Backup plan if daughter is not available to take patient to appt Friday, appt made for Monday and continue maintenance dose except Saturday take 4 mg. Patient daughter Lexy verbalizes understanding and agrees with plan. No further questions at this time.              OBJECTIVE    INR   Date Value Ref Range Status   2019 1.66 (H) 0.86 - 1.14 Final       ASSESSMENT / PLAN  INR assessment SUB    Recheck INR In: 4 DAYS    INR Location Outside lab      Anticoagulation Summary  As of 2019    INR goal:   2.0-3.0   TTR:   60.6 % (2.2 y)   INR used for dosin.66! (2019)   Warfarin maintenance plan:   4 mg (2 mg x 2) every Mon; 6 mg (2 mg x 3) all other days   Full warfarin instructions:   : 6 mg; 7/3: 4 mg; Otherwise 4 mg every Mon; 6 mg all other days   Weekly warfarin total:   40 mg   Plan last modified:   Daphne Lamar RN (2019)   Next INR check:   2019   Priority:   INR   Target end  date:   Indefinite    Indications    Chronic anticoagulation [Z79.01]  Chronic atrial fibrillation (H) [I48.2]  Long-term (current) use of anticoagulants [Z79.01] [Z79.01]             Anticoagulation Episode Summary     INR check location:       Preferred lab:       Send INR reminders to:   GARRICK COOMBS    Comments:   * 2mg tabs. Need INR within goal range for SONAM on 7-2-19!! Does not like to cut tabs      Anticoagulation Care Providers     Provider Role Specialty Phone number    Heron Mayo MD [facility] [phone]            See the Encounter Report to view Anticoagulation Flowsheet and Dosing Calendar (Go to Encounters tab in chart review, and find the Anticoagulation Therapy Visit)    Dosage adjustment made based on physician directed care plan.    Jahaira Jones RN

## 2019-07-02 ENCOUNTER — HOSPITAL ENCOUNTER (OUTPATIENT)
Dept: PHYSICAL MEDICINE AND REHAB | Facility: CLINIC | Age: 84
Discharge: HOME | End: 2019-07-02
Attending: PAIN MEDICINE

## 2019-07-02 DIAGNOSIS — Z79.01 WARFARIN ANTICOAGULATION: ICD-10-CM

## 2019-07-02 DIAGNOSIS — M51.369 BULGING LUMBAR DISC: ICD-10-CM

## 2019-07-02 DIAGNOSIS — M54.42 ACUTE BILATERAL LOW BACK PAIN WITH BILATERAL SCIATICA: ICD-10-CM

## 2019-07-02 DIAGNOSIS — M54.41 ACUTE BILATERAL LOW BACK PAIN WITH BILATERAL SCIATICA: ICD-10-CM

## 2019-07-02 DIAGNOSIS — M99.83 OTHER BIOMECHANICAL LESIONS OF LUMBAR REGION: ICD-10-CM

## 2019-07-02 DIAGNOSIS — M54.16 LEFT LUMBAR RADICULITIS: ICD-10-CM

## 2019-07-02 DIAGNOSIS — M48.061 LUMBAR FORAMINAL STENOSIS: ICD-10-CM

## 2019-07-02 LAB — POC INR - HE - HISTORICAL: 1.9 (ref 0.9–1.1)

## 2019-07-05 ENCOUNTER — ANTICOAGULATION THERAPY VISIT (OUTPATIENT)
Dept: ANTICOAGULATION | Facility: CLINIC | Age: 84
End: 2019-07-05
Payer: MEDICARE

## 2019-07-05 DIAGNOSIS — Z79.01 CHRONIC ANTICOAGULATION: ICD-10-CM

## 2019-07-05 DIAGNOSIS — Z79.01 LONG TERM CURRENT USE OF ANTICOAGULANT THERAPY: ICD-10-CM

## 2019-07-05 DIAGNOSIS — I48.20 CHRONIC ATRIAL FIBRILLATION (H): ICD-10-CM

## 2019-07-05 LAB — INR PPP: 2.55 (ref 0.86–1.14)

## 2019-07-05 PROCEDURE — 85610 PROTHROMBIN TIME: CPT | Performed by: FAMILY MEDICINE

## 2019-07-05 PROCEDURE — 36415 COLL VENOUS BLD VENIPUNCTURE: CPT | Performed by: FAMILY MEDICINE

## 2019-07-05 PROCEDURE — 99207 ZZC NO CHARGE NURSE ONLY: CPT

## 2019-07-05 NOTE — PROGRESS NOTES
ANTICOAGULATION FOLLOW-UP CLINIC VISIT    Patient Name:  Nabor Cunningham  Date:  2019  Contact Type:  Telephone/ detailed message left for daughterLexy.    SUBJECTIVE:  Patient Findings     Comments:   Patient has had labile INRs the past 2 weeks due to severe back pain, overuse of Tylenol and needing INR in range for SONAM on 19. Writer unable to reach Lexy corado, today. Left detailed message with warfarin dosing through 19. Asked to call [facility] back if any changes or concerns. If patient's pain is under control now, may be able to resume maintenance dose, depending on INR result on Monday.        Clinical Outcomes     Comments:   Patient has had labile INRs the past 2 weeks due to severe back pain, overuse of Tylenol and needing INR in range for SONAM on 19. Writer unable to reach Lexy corado, today. Left detailed message with warfarin dosing through 19. Asked to call [facility] back if any changes or concerns. If patient's pain is under control now, may be able to resume maintenance dose, depending on INR result on Monday.           OBJECTIVE    INR   Date Value Ref Range Status   2019 2.55 (H) 0.86 - 1.14 Final       ASSESSMENT / PLAN  INR assessment THER    Recheck INR In: 3 DAYS    INR Location Clinic lab     Anticoagulation Summary  As of 2019    INR goal:   2.0-3.0   TTR:   60.6 % (2.2 y)   INR used for dosin.55 (2019)   Warfarin maintenance plan:   4 mg (2 mg x 2) every Mon; 6 mg (2 mg x 3) all other days   Full warfarin instructions:   : 4 mg; Otherwise 4 mg every Mon; 6 mg all other days   Weekly warfarin total:   40 mg   Plan last modified:   Daphne Lamar RN (2019)   Next INR check:   2019   Priority:   INR   Target end date:   Indefinite    Indications    Chronic anticoagulation [Z79.01]  Chronic atrial fibrillation (H) [I48.2]  Long-term (current) use of anticoagulants [Z79.01] [Z79.01]             Anticoagulation Episode Summary     INR  check location:       Preferred lab:       Send INR reminders to:   GARRICK [place]    Comments:   * 2mg tabs. Does not like to cut tabs      Anticoagulation Care Providers     Provider Role Specialty Phone number    Heron Mayo MD [facility] [phone]            See the Encounter Report to view Anticoagulation Flowsheet and Dosing Calendar (Go to Encounters tab in chart review, and find the Anticoagulation Therapy Visit)        Belia Jones RN

## 2019-07-08 ENCOUNTER — ANTICOAGULATION THERAPY VISIT (OUTPATIENT)
Dept: ANTICOAGULATION | Facility: CLINIC | Age: 84
End: 2019-07-08
Payer: MEDICARE

## 2019-07-08 DIAGNOSIS — Z79.01 CHRONIC ANTICOAGULATION: ICD-10-CM

## 2019-07-08 DIAGNOSIS — Z79.01 LONG TERM CURRENT USE OF ANTICOAGULANT THERAPY: ICD-10-CM

## 2019-07-08 DIAGNOSIS — I48.20 CHRONIC ATRIAL FIBRILLATION (H): ICD-10-CM

## 2019-07-08 LAB — INR POINT OF CARE: 2.7 (ref 0.86–1.14)

## 2019-07-08 PROCEDURE — 36416 COLLJ CAPILLARY BLOOD SPEC: CPT

## 2019-07-08 PROCEDURE — 99207 ZZC NO CHARGE NURSE ONLY: CPT

## 2019-07-08 PROCEDURE — 85610 PROTHROMBIN TIME: CPT | Mod: QW

## 2019-07-08 RX ORDER — WARFARIN SODIUM 2 MG/1
TABLET ORAL
Qty: 250 TABLET | Refills: 0 | COMMUNITY
Start: 2019-07-08 | End: 2019-08-22

## 2019-07-08 NOTE — PROGRESS NOTES
19    Writer updated Lexy on the hold and dosing instructions post procedure. Patient is hoping to have HC post procedure.     Daphne Lamar RN, BSN, PHN  Anticoagulation Clinic   [phone]        19    Per PCP visit: Anticoagulant or Antiplatelet Medication Use  WARFARIN: Thromboembolic risk is low (e.g. single DVT/PE >12 months ago, A fib and [id number] =4 without prior CVA/TIA), hold warfarin 5 days (INR >/= 2) without bridging before or after procedure. Restart warfarin later in the day of procedure if OK with your surgeon.         ANTICOAGULATION FOLLOW-UP CLINIC VISIT    Patient Name:  Nabor Cunningham  Date:  2019  Contact Type:  Face to Face/Daughter    SUBJECTIVE:  Patient Findings     Positives:   Upcoming invasive procedure ()    Comments:   No changes in medications, activity, health, or diet noted. No bleeding or increased bruising noted. Took warfarin as prescribed.  Patient had 38 mg in the past 7 days. Writer adjusted dose to 38 mg weekly.   Patient will Recheck INR in 4 weeks after his surgical procedure.  Patient verbalizes understanding and agrees to plan. No further questions or concerns.          Clinical Outcomes     Comments:   No changes in medications, activity, health, or diet noted. No bleeding or increased bruising noted. Took warfarin as prescribed.  Patient had 38 mg in the past 7 days. Writer adjusted dose to 38 mg weekly.   Patient will Recheck INR in 4 weeks after his surgical procedure.  Patient verbalizes understanding and agrees to plan. No further questions or concerns.             OBJECTIVE    INR Protime   Date Value Ref Range Status   2019 2.7 (A) 0.86 - 1.14 Final       ASSESSMENT / PLAN  INR assessment THER    Recheck INR In: 4 WEEKS    INR Location Clinic      Anticoagulation Summary  As of 2019    INR goal:   2.0-3.0   TTR:   60.8 % (2.2 y)   INR used for dosin.7 (2019)   Warfarin maintenance plan:   4 mg (2 mg x 2) every  Mon, Fri; 6 mg (2 mg x 3) all other days   Full warfarin instructions:   4 mg every Mon, Fri; 6 mg all other days   Weekly warfarin total:   38 mg   Plan last modified:   Daphne Lamar RN (7/8/2019)   Next INR check:   7/12/2019   Priority:   INR   Target end date:   Indefinite    Indications    Chronic anticoagulation [Z79.01]  Chronic atrial fibrillation (H) [I48.2]  Long-term (current) use of anticoagulants [Z79.01] [Z79.01]             Anticoagulation Episode Summary     INR check location:       Preferred lab:       Send INR reminders to:   [facility]    Comments:   * 2mg tabs. Does not like to cut tabs      Anticoagulation Care Providers     Provider Role Specialty Phone number    Heron Mayo MD [facility] [phone]            See the Encounter Report to view Anticoagulation Flowsheet and Dosing Calendar (Go to Encounters tab in chart review, and find the Anticoagulation Therapy Visit)        Daphne Lamar RN

## 2019-07-12 ENCOUNTER — MEDICAL CORRESPONDENCE (OUTPATIENT)
Dept: HEALTH INFORMATION MANAGEMENT | Facility: CLINIC | Age: 84
End: 2019-07-12

## 2019-07-12 ENCOUNTER — OFFICE VISIT (OUTPATIENT)
Dept: FAMILY MEDICINE | Facility: CLINIC | Age: 84
End: 2019-07-12
Payer: MEDICARE

## 2019-07-12 ENCOUNTER — TRANSFERRED RECORDS (OUTPATIENT)
Dept: HEALTH INFORMATION MANAGEMENT | Facility: CLINIC | Age: 84
End: 2019-07-12

## 2019-07-12 ENCOUNTER — HOSPITAL ENCOUNTER (OUTPATIENT)
Dept: PHYSICAL MEDICINE AND REHAB | Facility: CLINIC | Age: 84
Discharge: HOME | End: 2019-07-12
Attending: NURSE PRACTITIONER

## 2019-07-12 VITALS
DIASTOLIC BLOOD PRESSURE: 70 MMHG | WEIGHT: 178 LBS | SYSTOLIC BLOOD PRESSURE: 112 MMHG | RESPIRATION RATE: 18 BRPM | HEIGHT: 64 IN | HEART RATE: 84 BPM | TEMPERATURE: 97.1 F | BODY MASS INDEX: 30.39 KG/M2

## 2019-07-12 DIAGNOSIS — I50.43 ACUTE ON CHRONIC COMBINED SYSTOLIC AND DIASTOLIC CONGESTIVE HEART FAILURE (H): ICD-10-CM

## 2019-07-12 DIAGNOSIS — Z01.818 PREOP GENERAL PHYSICAL EXAM: Primary | ICD-10-CM

## 2019-07-12 DIAGNOSIS — M51.369 BULGING LUMBAR DISC: ICD-10-CM

## 2019-07-12 DIAGNOSIS — M51.369 DDD (DEGENERATIVE DISC DISEASE), LUMBAR: ICD-10-CM

## 2019-07-12 DIAGNOSIS — M54.16 LEFT LUMBAR RADICULITIS: ICD-10-CM

## 2019-07-12 DIAGNOSIS — I48.20 CHRONIC ATRIAL FIBRILLATION (H): ICD-10-CM

## 2019-07-12 DIAGNOSIS — Z79.01 LONG TERM CURRENT USE OF ANTICOAGULANT THERAPY: ICD-10-CM

## 2019-07-12 DIAGNOSIS — E78.5 HYPERLIPIDEMIA LDL GOAL <130: ICD-10-CM

## 2019-07-12 DIAGNOSIS — M48.061 LUMBAR FORAMINAL STENOSIS: ICD-10-CM

## 2019-07-12 DIAGNOSIS — M54.41 ACUTE BILATERAL LOW BACK PAIN WITH BILATERAL SCIATICA: ICD-10-CM

## 2019-07-12 DIAGNOSIS — I10 ESSENTIAL HYPERTENSION WITH GOAL BLOOD PRESSURE LESS THAN 140/90: ICD-10-CM

## 2019-07-12 DIAGNOSIS — M54.42 ACUTE BILATERAL LOW BACK PAIN WITH BILATERAL SCIATICA: ICD-10-CM

## 2019-07-12 PROBLEM — I97.620: Status: RESOLVED | Noted: 2018-09-03 | Resolved: 2019-07-12

## 2019-07-12 PROBLEM — J18.9 PNEUMONIA: Status: RESOLVED | Noted: 2018-09-02 | Resolved: 2019-07-12

## 2019-07-12 PROBLEM — T14.8XXA BLEEDING FROM WOUND: Status: ACTIVE | Noted: 2018-09-02

## 2019-07-12 LAB
ANION GAP SERPL CALCULATED.3IONS-SCNC: 5 MMOL/L (ref 3–14)
BUN SERPL-MCNC: 31 MG/DL (ref 7–30)
CALCIUM SERPL-MCNC: 9.2 MG/DL (ref 8.5–10.1)
CHLORIDE SERPL-SCNC: 97 MMOL/L (ref 94–109)
CHOLEST SERPL-MCNC: 236 MG/DL
CO2 SERPL-SCNC: 33 MMOL/L (ref 20–32)
CREAT SERPL-MCNC: 1.11 MG/DL (ref 0.66–1.25)
ERYTHROCYTE [DISTWIDTH] IN BLOOD BY AUTOMATED COUNT: 14 % (ref 10–15)
GFR SERPL CREATININE-BSD FRML MDRD: 62 ML/MIN/{1.73_M2}
GLUCOSE SERPL-MCNC: 95 MG/DL (ref 70–99)
HCT VFR BLD AUTO: 44.9 % (ref 40–53)
HDLC SERPL-MCNC: 36 MG/DL
HGB BLD-MCNC: 14.6 G/DL (ref 13.3–17.7)
LDLC SERPL CALC-MCNC: ABNORMAL MG/DL
LDLC SERPL DIRECT ASSAY-MCNC: 150 MG/DL
MCH RBC QN AUTO: 29.5 PG (ref 26.5–33)
MCHC RBC AUTO-ENTMCNC: 32.5 G/DL (ref 31.5–36.5)
MCV RBC AUTO: 91 FL (ref 78–100)
NONHDLC SERPL-MCNC: 200 MG/DL
PLATELET # BLD AUTO: 149 10E9/L (ref 150–450)
POTASSIUM SERPL-SCNC: 4.9 MMOL/L (ref 3.4–5.3)
RBC # BLD AUTO: 4.95 10E12/L (ref 4.4–5.9)
SODIUM SERPL-SCNC: 135 MMOL/L (ref 133–144)
TRIGL SERPL-MCNC: 422 MG/DL
WBC # BLD AUTO: 6.7 10E9/L (ref 4–11)

## 2019-07-12 PROCEDURE — 99215 OFFICE O/P EST HI 40 MIN: CPT | Performed by: FAMILY MEDICINE

## 2019-07-12 PROCEDURE — 85027 COMPLETE CBC AUTOMATED: CPT | Performed by: FAMILY MEDICINE

## 2019-07-12 PROCEDURE — 80061 LIPID PANEL: CPT | Performed by: FAMILY MEDICINE

## 2019-07-12 PROCEDURE — 80048 BASIC METABOLIC PNL TOTAL CA: CPT | Performed by: FAMILY MEDICINE

## 2019-07-12 PROCEDURE — 93000 ELECTROCARDIOGRAM COMPLETE: CPT | Performed by: FAMILY MEDICINE

## 2019-07-12 PROCEDURE — 36415 COLL VENOUS BLD VENIPUNCTURE: CPT | Performed by: FAMILY MEDICINE

## 2019-07-12 PROCEDURE — 83721 ASSAY OF BLOOD LIPOPROTEIN: CPT | Performed by: FAMILY MEDICINE

## 2019-07-12 RX ORDER — FUROSEMIDE 40 MG
80 TABLET ORAL DAILY
Qty: 180 TABLET | Refills: 0 | Status: SHIPPED | OUTPATIENT
Start: 2019-07-12 | End: 2020-02-28

## 2019-07-12 ASSESSMENT — MIFFLIN-ST. JEOR: SCORE: 1423.4

## 2019-07-12 NOTE — PATIENT INSTRUCTIONS
Before Your Surgery      Call your surgeon if there is any change in your health. This includes signs of a cold or flu (such as a sore throat, runny nose, cough, rash or fever).    Do not smoke, drink alcohol or take over the counter medicine (unless your surgeon or primary care doctor tells you to) for the 24 hours before and after surgery.    If you take prescribed drugs: Follow your doctor s orders about which medicines to take and which to stop until after surgery.    Eating and drinking prior to surgery: follow the instructions from your surgeon    Take a shower or bath the night before surgery. Use the soap your surgeon gave you to gently clean your skin. If you do not have soap from your surgeon, use your regular soap. Do not shave or scrub the surgery site.  Wear clean pajamas and have clean sheets on your bed.     RECOMMENDATIONS:     --Patient is to take all scheduled medications on the day of surgery EXCEPT for modifications listed below.    Anticoagulant or Antiplatelet Medication Use  WARFARIN: Thromboembolic risk is low (e.g. single DVT/PE >12 months ago, A fib and [id number] =4 without prior CVA/TIA), hold warfarin 5 days (INR >/= 2) without bridging before or after procedure. Restart warfarin later in the day of procedure if OK with your surgeon.         ACE Inhibitor or Angiotensin Receptor Blocker (ARB) Use  Ace inhibitor or Angiotensin Receptor Blocker (ARB) and should HOLD this medication for the 24 hours prior to surgery.  No lisinopril within 24 hours of the surgery.        APPROVAL GIVEN to proceed with proposed procedure, without further diagnostic evaluation       Signed Electronically by: Heron Mayo MD

## 2019-07-12 NOTE — PROGRESS NOTES
[facility]  [address] [place] [id number]  [phone]  Dept: [phone]    PRE-OP EVALUATION:  Today's date: 2019    Nabor Cunningham (: 1935) presents for pre-operative evaluation assessment as requested by Dr. Gore.  He requires evaluation and anesthesia risk assessment prior to undergoing surgery/procedure for treatment of Partial Knee Replacement .    Fax number for surgical facility: [phone] Dr. Gore.    Primary Physician: Heron Mayo  Type of Anesthesia Anticipated: to be determined    Patient has a Health Care Directive or Living Will:  Yes, not on file    Preop Questions 2019   Who is doing your surgery? green   What are you having done? partial knee   Date of Surgery/Procedure: 2019   Facility or Hospital where procedure/surgery will be performed: [facility]   1.  Do you have a history of Heart attack, stroke, stent, coronary bypass surgery, or other heart surgery? YES  - heart surgery pt has a pace maker   2.  Do you ever have any pain or discomfort in your chest? No   3.  Do you have a history of  Heart Failure? No   4.   Are you troubled by shortness of breath when:  walking on a level surface, or up a slight hill, or at night? No   5.  Do you currently have a cold, bronchitis or other respiratory infection? No   6.  Do you have a cough, shortness of breath, or wheezing? No   7.  Do you sometimes get pains in the calves of your legs when you walk? No   8. Do you or anyone in your family have previous history of blood clots? No - pt is on Warfarin to prevent   9.  Do you or does anyone in your family have a serious bleeding problem such as prolonged bleeding following surgeries or cuts? No   10. Have you ever had problems with anemia or been told to take iron pills? YES - Pt currently takes Iron   11. Have you had any abnormal blood loss such as black, tarry or bloody stools? No   12. Have you ever had a blood transfusion?  No   13. Have you or any of your relatives ever had problems with anesthesia? No   14. Do you have sleep apnea, excessive snoring or daytime drowsiness? YES - Unknown   15. Do you have any prosthetic heart valves? No   16. Do you have prosthetic joints? YES - Knee     HPI:     He has had left knee pain for over a year.  No injury.  He has had injections with steroids and hyaluronic acid injections.  Conservative management has not been helping.    He has consulted orthopedics.   He is now scheduled for partial left knee replacement.     MEDICAL HISTORY:     Patient Active Problem List    Diagnosis Date Noted     CHF (congestive heart failure) (H) 12/28/2018     Priority: Medium     Cardiac pacemaker in situ 12/21/2018     Priority: Medium     Dual chamber pacemaker placed in 2016 for sick sinus.       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) 11/30/2018     Priority: Medium     11/30/2018:He has seen cardiology through [facility].  Echo in October, 2018 with EF=49%, diastolic dysfunction, reduced RV systolic function, RV enlargement.  (Report summary in clinic notes 11/30/2018).   Hospitalized 11/22 in [place] for CHF exacerbation acute on chronic.   Nuclear stress test December 2018 with large fixed defect and EF=35%  1/7/2019:Coronary angiogram right and left heart cath:no obstructive disease.        Moderate episode of recurrent major depressive disorder (H) 04/10/2017     Priority: Medium     Anxiety 03/22/2017     Priority: Medium     3/23/2017:He has been on amitriptyline, nortriptyline, buspirone, sertraline and Lexapro in the past year.        Alcohol abuse 03/22/2017     Priority: Medium     7/12/2019:Not currently using alcohol.   None for years.        Chronic atrial fibrillation (H) 03/22/2017     Priority: Medium     Weight loss 03/22/2017     Priority: Medium     Peripheral polyneuropathy 03/22/2017     Priority: Medium     4/7/2017:Abnormal monofilament in distal  plantar feet bilateral.  He has had chronic pain in both feet.   11/30/2018:nortriptyline has helped and he was up to 75mg a day.  He had some prolonged QT and dose cut back to 25mg at bedtime in hospital 11/22/2018.       Chronic gout, unspecified cause, unspecified site 03/22/2017     Priority: Medium     Essential hypertension with goal blood pressure less than 140/90 03/22/2017     Priority: Medium     Hyperlipidemia LDL goal <130 03/22/2017     Priority: Medium     Persistent insomnia 03/22/2017     Priority: Medium     11/30/2018:He has been on temazepam in the past which has helped some.  He was on this chronically.  He tried lorazepam which worked for a few days, then not effective.  Tried mirtazapine which did not help.         Lower urinary tract symptoms (LUTS) 03/22/2017     Priority: Medium     Long-term (current) use of anticoagulants [Z79.01] 03/22/2017     Priority: Medium      Past Medical History:   Diagnosis Date     Bleeding from wound 9/2/2018 7/12/2019:He had prolonged bleeding after cyst removal on his back within the past year.        Past Surgical History:   Procedure Laterality Date     ANGIOGRAM  01/2019    right and left heart cath.      APPENDECTOMY      ~2013     CATARACT IOL, RT/LT      Past bilateral cataract surgery.      EYE SURGERY Left     three corneal transplants     IMPLANT PACEMAKER       INCISION AND DRAINAGE TRUNK, COMBINED N/A 9/6/2018    Procedure: COMBINED INCISION AND DRAINAGE TRUNK;  incision and cauterization of left buttock bleed.;  Surgeon: Dain Davis MD;  Location: [facility] OR     INCISION AND DRAINAGE TRUNK, COMBINED N/A 11/1/2018    Procedure:  INCISION AND DRAINAGE of Back Wound;  Surgeon: Dain Davis MD;  Location: [facility] OR     JOINT REPLACEMENT Right      THORACIC SURGERY      removal benign nodule     Current Outpatient Medications   Medication Sig Dispense Refill     calcium carbonate (OS- MG [place]. [place]) 1250 MG tablet Take 1 tablet by mouth 2 times  daily       colchicine (COLCRYS) 0.6 MG tablet Take 1 tablet (0.6 mg) by mouth daily 90 tablet 3     cyanocobalamin (VITAMIN  B-12) 1000 MCG tablet TAKE ONE TABLET BY MOUTH DAILY 30 tablet 11     ferrous gluconate (FERGON) 324 (38 Fe) MG tablet Take 1 tablet (324 mg) by mouth daily (Patient taking differently: Take 324 mg by mouth daily Increase is coming on 7/17 after knee surgery) 100 tablet 1     furosemide (LASIX) 40 MG tablet Take 2 tablets (80 mg) by mouth daily 180 tablet 0     gabapentin (NEURONTIN) 300 MG capsule Take 1 capsule (300 mg) by mouth At Bedtime (Patient taking differently: Take 300 mg by mouth At Bedtime 100 mg at noon, 100 mg at 5:00 and 300 mg at bedtime) 90 capsule 3     hypromellose (ARTIFICIAL TEARS) 0.4 % SOLN ophthalmic solution Place 1 drop into both eyes every hour as needed for dry eyes       lisinopril (PRINIVIL/ZESTRIL) 5 MG tablet Take 2 tablets (10 mg) by mouth daily 90 tablet 3     metoprolol succinate ER (TOPROL-XL) 25 MG 24 hr tablet Take 1 tablet (25 mg) by mouth every evening 90 tablet 1     mirtazapine (REMERON) 15 MG tablet Take 1 tablet (15 mg) by mouth At Bedtime 30 tablet 11     multivitamin, therapeutic with minerals (MULTI-VITAMIN) TABS tablet Take 1 tablet by mouth daily 100 tablet 3     nortriptyline (PAMELOR) 25 MG capsule Take 1 capsule (25 mg) by mouth At Bedtime 30 capsule 11     prednisoLONE acetate (PRED FORTE) 1 % ophthalmic susp Place 1 drop Into the left eye daily   2     triamcinolone (KENALOG) 0.1 % external cream Apply topically 2 times daily 45 g 1     warfarin (JANTOVEN) 2 MG tablet 4 mg (2 mg x 2) every Mon, Fri; 6 mg (2 mg x 3) all other daysOR AS DIRECTED BY ANTICOAGULATION 250 tablet 0     mineral oil oil Take 5 mLs by mouth daily       OTC products: acetaminophen    Allergies   Allergen Reactions     Amoxicillin Hives     Penicillins Other (See Comments)     Dizzy       Trazodone Other (See Comments)     Hallucinations        Latex Allergy:  "NO    Social History     Tobacco Use     Smoking status: Former Smoker     Years: 20.00     Types: Cigarettes     Smokeless tobacco: Never Used   Substance Use Topics     Alcohol use: No     History   Drug Use No       REVIEW OF SYSTEMS:   ROS:  General: POSITIVE for:, weight gain, 9# since January.   Eyes: POSITIVE for:, vision changes worse over time.   ENT: No problems with ears, nose or throat.  No difficulty swallowing.  Resp: No coughing, wheezing or shortness of breath  CV: No chest pains or palpitations  GI: No nausea, vomiting,  heartburn, abdominal pain, diarrhea, constipation or change in bowel habits  : No urinary frequency or dysuria, bladder or kidney problems  Musculoskeletal: POSITIVE  for:, joint pain low back and left knee.   Neurologic: No headaches, numbness, tingling, weakness, problems with balance or coordination  Psychiatric: No problems with anxiety, depression or mental health  Heme/immune/allergy: POSITIVE  for:, chronic anticoagulation.   Endocrine: No history of thyroid disease, diabetes or other endocrine disorders  Skin: POSITIVE for:, spots left arm.      EXAM:   OBJECTIVE:Blood pressure 112/70, pulse 84, temperature 97.1  F (36.2  C), temperature source Tympanic, resp. rate 18, height 1.626 m (5' 4\"), weight 80.7 kg (178 lb). BMI=Body mass index is 30.55 kg/m .  GENERAL APPEARANCE ADULT: Alert, no acute distress, walks with a walker  EYES: PERRL, EOM normal, conjunctiva and lids normal  HENT: Ears and TMs normal, oral mucosa and posterior oropharynx normal  NECK: No adenopathy,masses or thyromegaly  RESP: lungs clear to auscultation   CV: normal rate, regular rhythm, no murmur or gallop  ABDOMEN: soft, no organomegaly, masses or tenderness, examined seated.   MS: extremities normal, no peripheral edema     DIAGNOSTICS:   EKG: atrial fibrillation, rate 86, no LVH by voltage criteria, Left Bundle Branch Block related to pacemaker.  unchanged from previous tracings  CBC, Chem8 and " lipid panel done today.    Recent Labs   Lab Test 07/08/19 07/05/19  0855  11/30/18  1018  11/25/18 11/19/18  1840   HGB  --   --   --  10.7*  --   --  9.3*   PLT  --   --   --  204  --   --  173   INR 2.7* 2.55*   < >  --    < > 2.4* 3.55*   NA  --   --   --  141  --   --  136   POTASSIUM  --   --   --  3.6  --  3.5 4.1   CR  --   --   --  0.96  --  0.99 0.93    < > = values in this interval not displayed.        IMPRESSION:   Reason for surgery/procedure: left knee pain    The proposed surgical procedure is considered INTERMEDIATE risk.    REVISED CARDIAC RISK INDEX  The patient has the following serious cardiovascular risks for perioperative complications such as (MI, PE, VFib and 3  AV Block):  Congestive Heart Failure (pulmonary edema, PND, s3 vipul, CXR with pulmonary congestion, basilar rales)  INTERPRETATION: 1 risks: Class II (low risk - 0.9% complication rate)    The patient has the following additional risks for perioperative complications:  No identified additional risks      ICD-10-CM    1. Preop general physical exam Z01.818    2. 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 Chronic atrial fibrillation (H) I48.2    4. Essential hypertension with goal blood pressure less than 140/90 I10 EKG 12-lead complete w/read - Clinics     CBC with platelets     Basic metabolic panel   5. Hyperlipidemia LDL goal <130 E78.5 Lipid panel reflex to direct LDL Fasting   6. Long-term (current) use of anticoagulants [Z79.01] Z79.01        RECOMMENDATIONS:     --Patient is to take all scheduled medications on the day of surgery EXCEPT for modifications listed below.    Anticoagulant or Antiplatelet Medication Use  WARFARIN: Thromboembolic risk is low (e.g. single DVT/PE >12 months ago, A fib and [id number] =4 without prior CVA/TIA), hold warfarin 5 days (INR >/= 2) without bridging before or after procedure. Restart warfarin later in the day of  procedure if OK with your surgeon.         ACE Inhibitor or Angiotensin Receptor Blocker (ARB) Use  Ace inhibitor or Angiotensin Receptor Blocker (ARB) and should HOLD this medication for the 24 hours prior to surgery.  No lisinopril within 24 hours of the surgery.        APPROVAL GIVEN to proceed with proposed procedure, without further diagnostic evaluation       Signed Electronically by: Heron Mayo MD    Copy of this evaluation report is provided to requesting physician.    [place] Preop Guidelines    Revised Cardiac Risk Index

## 2019-07-12 NOTE — NURSING NOTE
"Chief Complaint   Patient presents with     Pre-Op Exam       Initial /70 (BP Location: Right arm, Patient Position: Chair, Cuff Size: Adult Regular)   Pulse 84   Temp 97.1  F (36.2  C) (Tympanic)   Resp 18   Ht 1.626 m (5' 4\")   Wt 80.7 kg (178 lb)   BMI 30.55 kg/m   Estimated body mass index is 30.55 kg/m  as calculated from the following:    Height as of this encounter: 1.626 m (5' 4\").    Weight as of this encounter: 80.7 kg (178 lb).    Patient presents to the clinic using No DME    Health Maintenance that is potentially due pending provider review:  NONE    Zainab Romero MA  11:33 AM 7/12/2019  .      "

## 2019-07-14 NOTE — RESULT ENCOUNTER NOTE
"Please call.  Triglycerides, a type of fat found in the bloodstream is high.  HDL (\"good cholesterol\") is low.  .  LDL (\"bad cholesterol\") is high.  This increases risk for cardiovascular disease (heart attacks and strokes).  Chemistries all oK.   Platelet counts borderline low, other blood counts OK.   PLAN: OK for surgery.  Consider taking cholesterol lowering medication like atorvastatin as he has had some evidence for some abnormal cardiac blood vessels.  atorvastatin 20mg daily would be a good choice if interested in taking at 20mg daily.  We can do Rx like #30 with 11 refills."

## 2019-07-15 DIAGNOSIS — E78.5 HYPERLIPIDEMIA LDL GOAL <130: Primary | ICD-10-CM

## 2019-07-15 RX ORDER — ATORVASTATIN CALCIUM 20 MG/1
20 TABLET, FILM COATED ORAL DAILY
Qty: 30 TABLET | Refills: 11 | Status: SHIPPED | OUTPATIENT
Start: 2019-07-15 | End: 2020-04-27

## 2019-07-15 NOTE — TELEPHONE ENCOUNTER
"Spoke with patients daughter Lexy regarding results.   Understanding voiced.   Please sign pended medication. See Dr. Mayo's note below  Katie Wood, LORETA      Triglycerides, a type of fat found in the bloodstream is high.  HDL (\"good cholesterol\") is low.  .  LDL (\"bad cholesterol\") is high.  This increases risk for cardiovascular disease (heart attacks and strokes).   Chemistries all oK.   Platelet counts borderline low, other blood counts OK.   PLAN: OK for surgery.   Consider taking cholesterol lowering medication like atorvastatin as he has had some evidence for some abnormal cardiac blood vessels.  atorvastatin 20mg daily would be a good choice if interested in taking at 20mg daily.  We can do Rx like #30 with 11 refills.   "

## 2019-07-16 ENCOUNTER — PATIENT OUTREACH (OUTPATIENT)
Dept: CARE COORDINATION | Facility: CLINIC | Age: 84
End: 2019-07-16

## 2019-07-16 NOTE — PROGRESS NOTES
Clinic Care Coordination Contact  Follow Up Progress Note      Assessment: pt is to have surgery July 30th.      Goals:   Goals        General    Psychosocial (pt-stated)     Notes - Note edited  5/8/2019 12:29 PM by Rani Sarkar LSW    Goal Statement: pt to remain safe in his own home as long as possible, safe meaning no injuries from falls or avoidable health issues.  Measure of Success: no falls with injuries or avoidable health issues  Supportive Steps to Achieve: home care, , family  Barriers: age, health  Strengths: supportive family  Date to Achieve By: 12-31-19  Patient expressed understanding of goal: yes                  Goal Progression: As of today's date 7/16/2019 goal is met at 76 - 100%.   Goal Status:  Active      Intervention/Education provided during outreach: discussed the follow up options of home care vs outpt therapy.  Reviewed to ask the surgeon about orders or to call the clinic for orders.  Daughter will bring both the clinic and the CC/Sw phone number with to day of surgery to call if she has questions.      Outreach Frequency: normally monthly but will f/u after surgery       Plan:   family will bring phone number with for surgery day to call if she has questions.      Care Coordinator will follow up after surgery to see how things went.     TONE Lord, [facility] - Care Coordinator   [facility]  7/16/2019   9:42 AM  [phone]

## 2019-07-26 ENCOUNTER — NURSE TRIAGE (OUTPATIENT)
Dept: FAMILY MEDICINE | Facility: CLINIC | Age: 84
End: 2019-07-26

## 2019-07-26 ENCOUNTER — HOSPITAL ENCOUNTER (EMERGENCY)
Facility: CLINIC | Age: 84
Discharge: HOME | End: 2019-07-26
Attending: NURSE PRACTITIONER | Admitting: NURSE PRACTITIONER
Payer: MEDICARE

## 2019-07-26 VITALS
RESPIRATION RATE: 18 BRPM | OXYGEN SATURATION: 97 % | TEMPERATURE: 99.2 F | WEIGHT: 175 LBS | HEART RATE: 89 BPM | BODY MASS INDEX: 30.04 KG/M2 | SYSTOLIC BLOOD PRESSURE: 105 MMHG | DIASTOLIC BLOOD PRESSURE: 60 MMHG

## 2019-07-26 DIAGNOSIS — L03.113 CELLULITIS OF RIGHT ARM: ICD-10-CM

## 2019-07-26 LAB
ALBUMIN SERPL-MCNC: 3.7 G/DL (ref 3.4–5)
ALP SERPL-CCNC: 85 U/L (ref 40–150)
ALT SERPL W P-5'-P-CCNC: 33 U/L (ref 0–70)
ANION GAP SERPL CALCULATED.3IONS-SCNC: 5 MMOL/L (ref 3–14)
AST SERPL W P-5'-P-CCNC: 26 U/L (ref 0–45)
BASOPHILS # BLD AUTO: 0 10E9/L (ref 0–0.2)
BASOPHILS NFR BLD AUTO: 0.2 %
BILIRUB SERPL-MCNC: 0.5 MG/DL (ref 0.2–1.3)
BUN SERPL-MCNC: 36 MG/DL (ref 7–30)
CALCIUM SERPL-MCNC: 9.3 MG/DL (ref 8.5–10.1)
CHLORIDE SERPL-SCNC: 98 MMOL/L (ref 94–109)
CO2 SERPL-SCNC: 34 MMOL/L (ref 20–32)
CREAT SERPL-MCNC: 1.39 MG/DL (ref 0.66–1.25)
CRP SERPL-MCNC: 38.1 MG/L (ref 0–8)
DIFFERENTIAL METHOD BLD: ABNORMAL
EOSINOPHIL # BLD AUTO: 0 10E9/L (ref 0–0.7)
EOSINOPHIL NFR BLD AUTO: 0.4 %
ERYTHROCYTE [DISTWIDTH] IN BLOOD BY AUTOMATED COUNT: 13.6 % (ref 10–15)
GFR SERPL CREATININE-BSD FRML MDRD: 47 ML/MIN/{1.73_M2}
GLUCOSE SERPL-MCNC: 104 MG/DL (ref 70–99)
HCT VFR BLD AUTO: 41.2 % (ref 40–53)
HGB BLD-MCNC: 13.7 G/DL (ref 13.3–17.7)
IMM GRANULOCYTES # BLD: 0.1 10E9/L (ref 0–0.4)
IMM GRANULOCYTES NFR BLD: 0.5 %
INR PPP: 4.9 (ref 0.86–1.14)
LYMPHOCYTES # BLD AUTO: 0.5 10E9/L (ref 0.8–5.3)
LYMPHOCYTES NFR BLD AUTO: 4.3 %
MCH RBC QN AUTO: 29.6 PG (ref 26.5–33)
MCHC RBC AUTO-ENTMCNC: 33.3 G/DL (ref 31.5–36.5)
MCV RBC AUTO: 89 FL (ref 78–100)
MONOCYTES # BLD AUTO: 0.7 10E9/L (ref 0–1.3)
MONOCYTES NFR BLD AUTO: 6.2 %
NEUTROPHILS # BLD AUTO: 9.8 10E9/L (ref 1.6–8.3)
NEUTROPHILS NFR BLD AUTO: 88.4 %
NRBC # BLD AUTO: 0 10*3/UL
NRBC BLD AUTO-RTO: 0 /100
PLATELET # BLD AUTO: 137 10E9/L (ref 150–450)
POTASSIUM SERPL-SCNC: 4.8 MMOL/L (ref 3.4–5.3)
PROT SERPL-MCNC: 7.4 G/DL (ref 6.8–8.8)
RBC # BLD AUTO: 4.63 10E12/L (ref 4.4–5.9)
SODIUM SERPL-SCNC: 137 MMOL/L (ref 133–144)
WBC # BLD AUTO: 11.1 10E9/L (ref 4–11)

## 2019-07-26 PROCEDURE — 85025 COMPLETE CBC W/AUTO DIFF WBC: CPT | Performed by: NURSE PRACTITIONER

## 2019-07-26 PROCEDURE — 80053 COMPREHEN METABOLIC PANEL: CPT | Performed by: NURSE PRACTITIONER

## 2019-07-26 PROCEDURE — 25000128 H RX IP 250 OP 636: Performed by: NURSE PRACTITIONER

## 2019-07-26 PROCEDURE — 85610 PROTHROMBIN TIME: CPT | Performed by: NURSE PRACTITIONER

## 2019-07-26 PROCEDURE — 99284 EMERGENCY DEPT VISIT MOD MDM: CPT | Mod: Z6 | Performed by: NURSE PRACTITIONER

## 2019-07-26 PROCEDURE — 96374 THER/PROPH/DIAG INJ IV PUSH: CPT

## 2019-07-26 PROCEDURE — 86140 C-REACTIVE PROTEIN: CPT | Performed by: NURSE PRACTITIONER

## 2019-07-26 PROCEDURE — 99284 EMERGENCY DEPT VISIT MOD MDM: CPT | Mod: 25

## 2019-07-26 RX ORDER — CEPHALEXIN 500 MG/1
500 CAPSULE ORAL 3 TIMES DAILY
Qty: 30 CAPSULE | Refills: 0 | Status: SHIPPED | OUTPATIENT
Start: 2019-07-26 | End: 2019-12-18

## 2019-07-26 RX ORDER — CEFAZOLIN SODIUM 2 G/100ML
2 INJECTION, SOLUTION INTRAVENOUS ONCE
Status: COMPLETED | OUTPATIENT
Start: 2019-07-26 | End: 2019-07-26

## 2019-07-26 RX ADMIN — CEFAZOLIN SODIUM 2 G: 2 INJECTION, SOLUTION INTRAVENOUS at 15:02

## 2019-07-26 ASSESSMENT — ENCOUNTER SYMPTOMS
BACK PAIN: 1
APPETITE CHANGE: 0
ARTHRALGIAS: 1
FEVER: 0
HEADACHES: 0
LIGHT-HEADEDNESS: 0
NAUSEA: 0
DIZZINESS: 0
COUGH: 0
VOMITING: 0
CHILLS: 0
FATIGUE: 0

## 2019-07-26 NOTE — ED TRIAGE NOTES
"Pt has developed right arm redness and swelling today after \" scrubbing my arm in soap and water\" Below elbow appears red, angry and hot to touch. Not currently on abx. Does have a cat and had a cat bite in this area within the last week. Due for knee replacement surgery next week.   "

## 2019-07-26 NOTE — TELEPHONE ENCOUNTER
"    Additional Information    Negative: Major bleeding (actively dripping or spurting) that can't be stopped    Negative: Sounds like a life-threatening emergency to the triager    Cut (length > 1/8 inch or 3 mm) or skin tear and any animal    Answer Assessment - Initial Assessment Questions  1. ANIMAL: \"What type of animal caused the bite?\" \"Is the injury from a bite or a claw?\" If the animal is a dog or a cat, ask: \"Was it a pet or a stray?\" \"Was it acting ill or behaving strangely?\"      Cat bite  2. LOCATION: \"Where is the bite located?\"       Right hand  3. SIZE: \"How big is the bite?\" \"What does it look like?\"       small  4. ONSET: \"When did the bite happen?\" (Minutes or hours ago)       2 days ago, this was patient's pet  5. CIRCUMSTANCES: \"Tell me how this happened.\"       Son is calling on patient's behalf, says he did not see what happened with the patient, son says patient reported his cat bit patient   6. TETANUS: \"When was the last tetanus booster?\"      Up to date  7. PREGNANCY: \"Is there any chance you are pregnant?\" \"When was your last menstrual period?\"      na    Protocols used: ANIMAL BITE-A-OH    Son advised to seek the [place] [facility] for evaluation as son states the right arm is flaming red from the right hand to middle of right arm. Says father denies pain, patient is to have an knee surgery this week and when son tried to schedule this, surgery dept told him patient needs to have right hand/arm evaluated as well. Son states cat that bit the patient nicked patient and did not draw blood and the cat is a pet which is up to date on immunizations.   Son plans to take patient to [facility] at noon.    SAURAV Nolan    "

## 2019-07-26 NOTE — ED AVS SNAPSHOT
[facility] Emergency Department  [address] [address]  Phone:  [phone]  Fax:  [phone]                                    Nabor Cunningham   MRN: [id number]    Department:  [facility] Emergency Department   Date of Visit:  7/26/2019           After Visit Summary Signature Page    I have received my discharge instructions, and my questions have been answered. I have discussed any challenges I see with this plan with the nurse or doctor.    ..........................................................................................................................................  Patient/Patient Representative Signature      ..........................................................................................................................................  Patient Representative Print Name and Relationship to Patient    ..................................................               ................................................  Date                                   Time    ..........................................................................................................................................  Reviewed by Signature/Title    ...................................................              ..............................................  Date                                               Time          22EPIC Rev 08/18

## 2019-07-26 NOTE — ED PROVIDER NOTES
History     Chief Complaint   Patient presents with     Cat Bite     right arm redness and swelling that started today. Pt Cat, approx a week ago. Evaluate for cellulitis vs cat bite cellulitis.      HPI  Nabor Cunningham is a 83 year old male with history of chronic A-fib (on coumadin), CHF, anxiety, hyper lipidemia, hypertension, and chronic gout who presents to the emergency department accompanied by his son for evaluation of right arm redness and swelling that started today. The arm is not painful. The arm is not pruritic. Patient does endorse having a cat bite to his right hand 1 week ago, but this area did not get red or painful.  Denies fever or chills. Patient is supposed to be stopping his coumadin today in preparation of surgery to his knee next wednesday.    Allergies:  Allergies   Allergen Reactions     Amoxicillin Hives     Penicillins Other (See Comments)     Dizzy       Trazodone Other (See Comments)     Hallucinations         Problem List:    Patient Active Problem List    Diagnosis Date Noted     CHF (congestive heart failure) (H) 12/28/2018     Priority: Medium     Cardiac pacemaker in situ 12/21/2018     Priority: Medium     Dual chamber pacemaker placed in 2016 for sick sinus.       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) 11/30/2018     Priority: Medium     11/30/2018:He has seen cardiology through [facility].  Echo in October, 2018 with EF=49%, diastolic dysfunction, reduced RV systolic function, RV enlargement.  (Report summary in clinic notes 11/30/2018).   Hospitalized 11/22 in [place] for CHF exacerbation acute on chronic.   Nuclear stress test December 2018 with large fixed defect and EF=35%  1/7/2019:Coronary angiogram right and left heart cath:no obstructive disease.        Moderate episode of recurrent major depressive disorder (H) 04/10/2017     Priority: Medium     Anxiety 03/22/2017     Priority: Medium     3/23/2017:He has been on amitriptyline,  nortriptyline, buspirone, sertraline and Lexapro in the past year.        Alcohol abuse 03/22/2017     Priority: Medium     7/12/2019:Not currently using alcohol.   None for years.        Chronic atrial fibrillation (H) 03/22/2017     Priority: Medium     Weight loss 03/22/2017     Priority: Medium     Peripheral polyneuropathy 03/22/2017     Priority: Medium     4/7/2017:Abnormal monofilament in distal plantar feet bilateral.  He has had chronic pain in both feet.   11/30/2018:nortriptyline has helped and he was up to 75mg a day.  He had some prolonged QT and dose cut back to 25mg at bedtime in hospital 11/22/2018.       Chronic gout, unspecified cause, unspecified site 03/22/2017     Priority: Medium     Essential hypertension with goal blood pressure less than 140/90 03/22/2017     Priority: Medium     Hyperlipidemia LDL goal <130 03/22/2017     Priority: Medium     Persistent insomnia 03/22/2017     Priority: Medium     11/30/2018:He has been on temazepam in the past which has helped some.  He was on this chronically.  He tried lorazepam which worked for a few days, then not effective.  Tried mirtazapine which did not help.         Lower urinary tract symptoms (LUTS) 03/22/2017     Priority: Medium     Long-term (current) use of anticoagulants [Z79.01] 03/22/2017     Priority: Medium        Past Medical History:    Past Medical History:   Diagnosis Date     Bleeding from wound 9/2/2018       Past Surgical History:    Past Surgical History:   Procedure Laterality Date     ANGIOGRAM  01/2019    right and left heart cath.      APPENDECTOMY      ~2013     CATARACT IOL, RT/LT      Past bilateral cataract surgery.      EYE SURGERY Left     three corneal transplants     IMPLANT PACEMAKER       INCISION AND DRAINAGE TRUNK, COMBINED N/A 9/6/2018    Procedure: COMBINED INCISION AND DRAINAGE TRUNK;  incision and cauterization of left buttock bleed.;  Surgeon: Dain Davis MD;  Location: [facility] OR     INCISION AND DRAINAGE  TRUNK, COMBINED N/A 11/1/2018    Procedure:  INCISION AND DRAINAGE of Back Wound;  Surgeon: Dain Davis MD;  Location: [facility] OR     JOINT REPLACEMENT Right      THORACIC SURGERY      removal benign nodule       Family History:    No family history on file.    Social History:  Marital Status:   [2]  Social History     Tobacco Use     Smoking status: Former Smoker     Years: 20.00     Types: Cigarettes     Smokeless tobacco: Never Used   Substance Use Topics     Alcohol use: No     Drug use: No        Medications:      cephALEXin (KEFLEX) 500 MG capsule   atorvastatin (LIPITOR) 20 MG tablet   calcium carbonate (OS- MG [place]. [place]) 1250 MG tablet   colchicine (COLCRYS) 0.6 MG tablet   cyanocobalamin (VITAMIN  B-12) 1000 MCG tablet   ferrous gluconate (FERGON) 324 (38 Fe) MG tablet   furosemide (LASIX) 40 MG tablet   gabapentin (NEURONTIN) 300 MG capsule   hypromellose (ARTIFICIAL TEARS) 0.4 % SOLN ophthalmic solution   lisinopril (PRINIVIL/ZESTRIL) 5 MG tablet   metoprolol succinate ER (TOPROL-XL) 25 MG 24 hr tablet   mineral oil oil   mirtazapine (REMERON) 15 MG tablet   multivitamin, therapeutic with minerals (MULTI-VITAMIN) TABS tablet   nortriptyline (PAMELOR) 25 MG capsule   prednisoLONE acetate (PRED FORTE) 1 % ophthalmic susp   triamcinolone (KENALOG) 0.1 % external cream   warfarin (JANTOVEN) 2 MG tablet         Review of Systems   Constitutional: Negative for appetite change, chills, fatigue and fever.   HENT: Negative for congestion.    Respiratory: Negative for cough.    Cardiovascular: Negative for chest pain.   Gastrointestinal: Negative for nausea and vomiting.   Genitourinary: Negative.    Musculoskeletal: Positive for arthralgias (chronic left knee pain) and back pain (chronic).   Skin: Positive for rash.   Neurological: Negative for dizziness, light-headedness and headaches.       Physical Exam   BP: 101/58  Pulse: 89  Heart Rate: 82  Temp: 98.5  F (36.9  C)  Resp: 18  Weight: 79.4 kg (175  lb)  SpO2: 96 %      Physical Exam   Constitutional: He is oriented to person, place, and time. He appears well-developed and well-nourished. No distress.   HENT:   Head: Normocephalic and atraumatic.   Cardiovascular: Normal rate and regular rhythm.   Pulmonary/Chest: Effort normal and breath sounds normal.   Neurological: He is alert and oriented to person, place, and time.   Skin:   RIGHT ARM: erythema, mild edema. No tenderness. FROM of elbow, wrist and hand. See picture.               ED Course        Procedures         Results for orders placed or performed during the hospital encounter of 07/26/19 (from the past 24 hour(s))   CBC with platelets differential   Result Value Ref Range    WBC 11.1 (H) 4.0 - 11.0 10e9/L    RBC Count 4.63 4.4 - 5.9 10e12/L    Hemoglobin 13.7 13.3 - 17.7 g/dL    Hematocrit 41.2 40.0 - 53.0 %    MCV 89 78 - 100 fl    MCH 29.6 26.5 - 33.0 pg    MCHC 33.3 31.5 - 36.5 g/dL    RDW 13.6 10.0 - 15.0 %    Platelet Count 137 (L) 150 - 450 10e9/L    Diff Method Automated Method     % Neutrophils 88.4 %    % Lymphocytes 4.3 %    % Monocytes 6.2 %    % Eosinophils 0.4 %    % Basophils 0.2 %    % Immature Granulocytes 0.5 %    Nucleated RBCs 0 0 /100    Absolute Neutrophil 9.8 (H) 1.6 - 8.3 10e9/L    Absolute Lymphocytes 0.5 (L) 0.8 - 5.3 10e9/L    Absolute Monocytes 0.7 0.0 - 1.3 10e9/L    Absolute Eosinophils 0.0 0.0 - 0.7 10e9/L    Absolute Basophils 0.0 0.0 - 0.2 10e9/L    Abs Immature Granulocytes 0.1 0 - 0.4 10e9/L    Absolute Nucleated RBC 0.0    Comprehensive metabolic panel   Result Value Ref Range    Sodium 137 133 - 144 mmol/L    Potassium 4.8 3.4 - 5.3 mmol/L    Chloride 98 94 - 109 mmol/L    Carbon Dioxide 34 (H) 20 - 32 mmol/L    Anion Gap 5 3 - 14 mmol/L    Glucose 104 (H) 70 - 99 mg/dL    Urea Nitrogen 36 (H) 7 - 30 mg/dL    Creatinine 1.39 (H) 0.66 - 1.25 mg/dL    GFR Estimate 47 (L) >60 mL/min/[1.73_m2]    GFR Estimate If Black 54 (L) >60 mL/min/[1.73_m2]    Calcium 9.3 8.5 -  "10.1 mg/dL    Bilirubin Total 0.5 0.2 - 1.3 mg/dL    Albumin 3.7 3.4 - 5.0 g/dL    Protein Total 7.4 6.8 - 8.8 g/dL    Alkaline Phosphatase 85 40 - 150 U/L    ALT 33 0 - 70 U/L    AST 26 0 - 45 U/L   CRP Inflammation   Result Value Ref Range    CRP Inflammation 38.1 (H) 0.0 - 8.0 mg/L   INR   Result Value Ref Range    INR 4.90 (H) 0.86 - 1.14       Medications   ceFAZolin (ANCEF) intermittent infusion 2 g in 100 mL dextrose PRE-MIX (2 g Intravenous Given 7/26/19 1502)       Assessments & Plan (with Medical Decision Making)       History and exam is concerning for right arm cellulitis. Mild leukocytosis with WBC 11.1. Platelets low at 137. CRP elevated at 38.1. Elevated creat of 1.39 and GFR 47.  Electrolytes are normal. LFTs are normal. INR elevated at 4.9.  Patient is afebrile. No tachycardia. No hypotension. Patient has etoh abuse history but has not consumed alcohol in \"20 years\" per son and patient. I have low suspicion for gout since the arm is not painful. I have low suspicion for blood clot in the setting of an INR of 4.9 and findings more consistent with infection.  It is not clear if this is infection from cat bite, since there is not redness coming out from where the cat bite (dorsal wrist) had occurred.  Since he does not show any signs of sepsis I discussed outpatient antibiotics with patient. We discussed that he would not be able to have orthopedics surgery with having an active infection. I consulted with pharmacist regarding his coumadin dosing over the weekend. Patient was given IV ancef 2gm and will be started on Keflex (renal dosing).    Plan as follows:  Keflex 500 mg three times a day for 10 days.  Recheck in clinic with Dr. Mayo at 2:20pm on Monday (this was the only clinic appointment available).  Hold coumadin today.  Saturday take Coumadin 3 mg.  Sunday take Coumadin 6 mg. Recheck INR in clinic on Monday.    Return to the emergency department for fevers, increased redness, swelling or pain. "       I have reviewed the nursing notes.    I have reviewed the findings, diagnosis, plan and need for follow up with the patient.         Medication List      Started    cephALEXin 500 MG capsule  Commonly known as:  KEFLEX  500 mg, Oral, 3 TIMES DAILY            Final diagnoses:   Cellulitis of right arm       7/26/2019   [facility] EMERGENCY DEPARTMENT     Shila Martinez APRN CNP  07/26/19 [id number]

## 2019-07-26 NOTE — DISCHARGE INSTRUCTIONS
Keflex 500 mg three times a day for 10 days.  Recheck in clinic with Dr. Mayo at 2:20pm on Monday (this was the only clinic appointment available).  Hold coumadin today.  Saturday take Coumadin 3 mg.  Sunday take Coumadin 6 mg. Recheck INR in clinic on Monday.    Return to the emergency department for fevers, increased redness, swelling or pain.

## 2019-07-29 ENCOUNTER — OFFICE VISIT (OUTPATIENT)
Dept: FAMILY MEDICINE | Facility: CLINIC | Age: 84
End: 2019-07-29
Payer: MEDICARE

## 2019-07-29 VITALS
OXYGEN SATURATION: 97 % | WEIGHT: 179 LBS | RESPIRATION RATE: 20 BRPM | SYSTOLIC BLOOD PRESSURE: 110 MMHG | HEIGHT: 64 IN | TEMPERATURE: 98.1 F | DIASTOLIC BLOOD PRESSURE: 66 MMHG | BODY MASS INDEX: 30.56 KG/M2 | HEART RATE: 84 BPM

## 2019-07-29 DIAGNOSIS — L30.9 ECZEMA, UNSPECIFIED TYPE: ICD-10-CM
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
## 2019-07-29 DIAGNOSIS — I48.20 CHRONIC ATRIAL FIBRILLATION (H): ICD-10-CM

## 2019-07-29 DIAGNOSIS — L03.113 CELLULITIS OF RIGHT UPPER EXTREMITY: ICD-10-CM

## 2019-07-29 LAB — INR PPP: 1.85 (ref 0.86–1.14)

## 2019-07-29 PROCEDURE — 99213 OFFICE O/P EST LOW 20 MIN: CPT | Performed by: FAMILY MEDICINE

## 2019-07-29 PROCEDURE — 85610 PROTHROMBIN TIME: CPT | Performed by: FAMILY MEDICINE

## 2019-07-29 PROCEDURE — 36415 COLL VENOUS BLD VENIPUNCTURE: CPT | Performed by: FAMILY MEDICINE

## 2019-07-29 RX ORDER — TRIAMCINOLONE ACETONIDE 1 MG/G
CREAM TOPICAL
Qty: 45 G | Refills: 1 | Status: SHIPPED | OUTPATIENT
Start: 2019-07-29 | End: 2020-12-24

## 2019-07-29 RX ORDER — DOXYCYCLINE HYCLATE 100 MG
100 TABLET ORAL 2 TIMES DAILY
Qty: 10 TABLET | Refills: 1 | COMMUNITY
Start: 2019-07-29 | End: 2019-09-23

## 2019-07-29 ASSESSMENT — PAIN SCALES - GENERAL: PAINLEVEL: NO PAIN (0)

## 2019-07-29 ASSESSMENT — MIFFLIN-ST. JEOR: SCORE: 1427.94

## 2019-07-29 NOTE — PROGRESS NOTES
"SUBJECTIVE: Nabor Cunningham is a 83 year old male  Chief Complaint   Patient presents with     ER F/U     [place]:      Lab Only     INR needed not on warfarin at this time, no warfarin since Saturday.      ED/UC Followup:    Facility:  [facility]  Date of visit:07/26/2019 and 07/29/2019  Reason for visit: Cellulits  Current Status: better     Cat bite about a week ago on dorsal wrist.   Redness gradually increasing.  Was sore upper forearm.   7/26/2019 ED [facility].  Treated with IV Ancef on the oral cephalexin TID.    7/28 ED at [place].  Seemed more red and swollen yesterday, more hot and swollen lateral elbow.  HE had doxycycline added yesterday.  They gave him a dose in ED and prescription which he has not yet filled.      Since yesterday swelling improved.    Redness decreasing.   Not painful.  Tender if touched.,    No fever.      INR was 4.9 on 7/26.  Skipped warfarin yesterday and 3mg on 7/27.  He has not taken dose today.     Patient Active Problem List   Diagnosis     Anxiety     Alcohol abuse     Chronic atrial fibrillation (H)     Weight loss     Peripheral polyneuropathy     Chronic gout, unspecified cause, unspecified site     Essential hypertension with goal blood pressure less than 140/90     Hyperlipidemia LDL goal <130     Persistent insomnia     Lower urinary tract symptoms (LUTS)     Long-term (current) use of anticoagulants [Z79.01]     Moderate episode of recurrent major depressive disorder (H)     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)     Cardiac pacemaker in situ     CHF (congestive heart failure) (H)     OBJECTIVE:Blood pressure 110/66, pulse 84, temperature 98.1  F (36.7  C), temperature source Tympanic, resp. rate 20, height 1.626 m (5' 4\"), weight 81.2 kg (179 lb), SpO2 97 %. BMI=Body mass index is 30.73 kg/m .  GENERAL APPEARANCE ADULT: Alert, no acute distress  MS: He has erythema of his right arm from the elbow distally for " about two thirds of his arm.  Intensity the redness has improved greatly compared to the photos.  The swelling is also improved compared to photos from yesterday.  Redness is receding from the line that was drawn in the ED.  A little tenderness over the lateral epicondyle.  There is a bite site distal dorsal forearm which is unremarkable tiny hemorrhagic crust and healing.    ASSESSMENT:     ICD-10-CM    1. Cat bite, subsequent encounter W55.01XD    2. Cellulitis of right upper extremity L03.113       Improving finally.   Continue the cephalexin antibiotic until gone.   Fill the doxycycline prescription and take that until gone.   INR today.   YOur arm should be steadily better, lessening redness, swelling and tenderness.  If not continuing to gradually improve or getting worse, recheck.     warfarin dose usually 4mg Monday and Friday, 6mg other days.   PLAN: take 4mg today (2 pills).    Don't take dose tomorrow until you hear from INR clinic

## 2019-07-29 NOTE — NURSING NOTE
"Chief Complaint   Patient presents with     ER F/U     [place]:      Lab Only     INR needed not on warfarin at this time, no warfarin since Saturday.       Initial /66   Pulse 84   Temp 98.1  F (36.7  C) (Tympanic)   Resp 20   Ht 1.626 m (5' 4\")   Wt 81.2 kg (179 lb)   SpO2 97%   BMI 30.73 kg/m   Estimated body mass index is 30.73 kg/m  as calculated from the following:    Height as of this encounter: 1.626 m (5' 4\").    Weight as of this encounter: 81.2 kg (179 lb).    Patient presents to the clinic using     Health Maintenance that is potentially due pending provider review:          Is there anyone who you would like to be able to receive your results? No  If yes have patient fill out [facility]    "

## 2019-07-29 NOTE — TELEPHONE ENCOUNTER
"Requested Prescriptions   Pending Prescriptions Disp Refills     triamcinolone (KENALOG) 0.1 % external cream [Pharmacy Med Name: TRIAMCINOLONE ACETONIDE 0.1% CREA] 45 g 1     Sig: APPLY TOPICALLY TO AFFECTED AREA(S) TWO TIMES A DAY AS DIRECTED       Topical Steroids and Nonsteroidals Protocol Passed - 7/29/2019  3:38 PM        Passed - Patient is age 6 or older        Passed - Authorizing prescriber's most recent note related to this medication read.     If refill request is for ophthalmic use, please forward request to provider for approval.          Passed - High potency steroid not ordered        Passed - Recent (12 mo) or future (30 days) visit within the authorizing provider's specialty     Patient had office visit in the last 12 months or has a visit in the next 30 days with authorizing provider or within the authorizing provider's specialty.  See \"Patient Info\" tab in inbasket, or \"Choose Columns\" in Meds & Orders section of the refill encounter.              Passed - Medication is active on med list      triamcinolone (KENALOG) 0.1 % external cream  Last Written Prescription Date:  04/12/2019  Last Fill Quantity: 45g,  # refills: 1   Last office visit: No previous visit found with prescribing provider:  07/29/2019 DAPHNIE Mayo   Future Office Visit:   Next 5 appointments (look out 90 days)    Oct 14, 2019  8:30 AM CDT  Return Visit with Kae Rodriguez MD  [facility] ([facility]) [address] [address]  [phone]         Brandy LEHMAN (R) (M)      "

## 2019-07-29 NOTE — PATIENT INSTRUCTIONS
ASSESSMENT:     ICD-10-CM    1. Cat bite, subsequent encounter W55.01XD    2. Cellulitis of right upper extremity L03.113       Improving finally.   Continue the cephalexin antibiotic until gone.   Fill the doxycycline prescription and take that until gone.   INR today.   YOur arm should be steadily better, lessening redness, swelling and tenderness.  If not continuing to gradually improve or getting worse, recheck.     ASSESSMENT:     ICD-10-CM    1. Cat bite, subsequent encounter W55.01XD    2. Cellulitis of right upper extremity L03.113       Improving finally.   Continue the cephalexin antibiotic until gone.   Fill the doxycycline prescription and take that until gone.   INR today.   YOur arm should be steadily better, lessening redness, swelling and tenderness.  If not continuing to gradually improve or getting worse, recheck.     warfarin dose usually 4mg Monday and Friday, 6mg other days.   PLAN: take 4mg today (2 pills).    Don't take dose tomorrow until you hear from INR clinic

## 2019-07-29 NOTE — TELEPHONE ENCOUNTER
Routing refill request to provider for review/approval because:  Patient seen in clinic today.    Jaimie Pena RN

## 2019-07-30 ENCOUNTER — ANTICOAGULATION THERAPY VISIT (OUTPATIENT)
Dept: ANTICOAGULATION | Facility: CLINIC | Age: 84
End: 2019-07-30
Payer: MEDICARE

## 2019-07-30 DIAGNOSIS — Z79.01 LONG TERM CURRENT USE OF ANTICOAGULANT THERAPY: ICD-10-CM

## 2019-07-30 DIAGNOSIS — I48.20 CHRONIC ATRIAL FIBRILLATION (H): ICD-10-CM

## 2019-07-30 PROCEDURE — 99207 ZZC NO CHARGE NURSE ONLY: CPT

## 2019-07-30 NOTE — RESULT ENCOUNTER NOTE
Please call.  INR now a shade low.    PLAN: Go back to previous warfarin dosing unless you hear differently from anticoagulation clinic.

## 2019-07-30 NOTE — PROGRESS NOTES
ANTICOAGULATION FOLLOW-UP CLINIC VISIT    Patient Name:  Nabor Cunningham  Date:  7/30/2019  Contact Type:  Telephone/ daughter, Lexy    SUBJECTIVE:  Patient Findings     Positives:   Change in health (Cellulitis from cat bite ), Missed doses (Holding for supratherapeutic INR), Change in medications (Keflex x 10 days)    Comments:   Writer notified by Dr. Mayo's office that patient's daughter would like [facility] to call her regarding patient's dosing. Patient saw Dr. Mayo yesterday, INR was drawn and was 1.85. Patient has been holding his warfarin for a supratherapeutic INR. Patient started on Keflex 7/26/19 for 10 days for cellulitis from cat bite, according to office visit note patient also to be starting doxycycline 7/29/19 for 5 days.   Per Uptodate: Cephalosporins may enhance the anticoagulant effect of Vitamin K Antagonists. Tetracyclines may enhance the anticoagulant effect of Vitamin K Antagonists.    Writer spoke with patient's daughter, Lexy, who confirmed dosing that patient has been taking. He was supposed to be starting a 5 day hold for knee surgery, that surgery has been postponed due to his cellulitis. She reports he is taking Keflex as directed and did start taking the Doxycycline 7/29/19. Patient will take 6 mg today and tomorrow, with next INR on Thursday. Lexy preferred a lab draw in the morning vs a face to face appt either in [place] or [place]. She is aware that [facility] will call with his INR results.         Clinical Outcomes     Comments:   Writer notified by Dr. Mayo's office that patient's daughter would like [facility] to call her regarding patient's dosing. Patient saw Dr. Mayo yesterday, INR was drawn and was 1.85. Patient has been holding his warfarin for a supratherapeutic INR. Patient started on Keflex 7/26/19 for 10 days for cellulitis from cat bite, according to office visit note patient also to be starting doxycycline 7/29/19 for 5 days.   Per Uptodate: Cephalosporins may enhance the anticoagulant  effect of Vitamin K Antagonists. Tetracyclines may enhance the anticoagulant effect of Vitamin K Antagonists.    Writer spoke with patient's daughter, Lexy, who confirmed dosing that patient has been taking. He was supposed to be starting a 5 day hold for knee surgery, that surgery has been postponed due to his cellulitis. She reports he is taking Keflex as directed and did start taking the Doxycycline 19. Patient will take 6 mg today and tomorrow, with next INR on Thursday. Lexy preferred a lab draw in the morning vs a face to face appt either in [place] or [place]. She is aware that [facility] will call with his INR results.            OBJECTIVE    INR   Date Value Ref Range Status   2019 1.85 (H) 0.86 - 1.14 Final       ASSESSMENT / PLAN  INR assessment SUB    Recheck INR In: 2 DAYS    INR Location Outside lab      Anticoagulation Summary  As of 2019    INR goal:   2.0-3.0   TTR:   59.6 % (2.3 y)   INR used for dosin.85! (2019)   Warfarin maintenance plan:   4 mg (2 mg x 2) every Mon, Fri; 6 mg (2 mg x 3) all other days   Full warfarin instructions:   : 8 mg; Otherwise 4 mg every Mon, Fri; 6 mg all other days   Weekly warfarin total:   38 mg   Plan last modified:   Daphne Lamar RN (2019)   Next INR check:   2019   Priority:   INR   Target end date:   Indefinite    Indications    Chronic atrial fibrillation (H) [I48.2]  Long-term (current) use of anticoagulants [Z79.01] [Z79.01]             Anticoagulation Episode Summary     INR check location:       Preferred lab:       Send INR reminders to:   [facility]    Comments:   * 2mg tabs. Does not like to cut tabs      Anticoagulation Care Providers     Provider Role Specialty Phone number    Heron Mayo MD [facility] [phone]            See the Encounter Report to view Anticoagulation Flowsheet and Dosing Calendar (Go to Encounters tab in chart review, and find the Anticoagulation Therapy  Visit)      Kirstin Mendez RN

## 2019-08-01 ENCOUNTER — TELEPHONE (OUTPATIENT)
Dept: NURSING | Facility: CLINIC | Age: 84
End: 2019-08-01

## 2019-08-01 ENCOUNTER — ANTICOAGULATION THERAPY VISIT (OUTPATIENT)
Dept: ANTICOAGULATION | Facility: CLINIC | Age: 84
End: 2019-08-01

## 2019-08-01 ENCOUNTER — PATIENT OUTREACH (OUTPATIENT)
Dept: CARE COORDINATION | Facility: CLINIC | Age: 84
End: 2019-08-01

## 2019-08-01 DIAGNOSIS — Z79.01 CHRONIC ANTICOAGULATION: ICD-10-CM

## 2019-08-01 DIAGNOSIS — Z79.01 LONG TERM CURRENT USE OF ANTICOAGULANT THERAPY: ICD-10-CM

## 2019-08-01 DIAGNOSIS — I48.20 CHRONIC ATRIAL FIBRILLATION (H): ICD-10-CM

## 2019-08-01 LAB — INR PPP: 1.88 (ref 0.86–1.14)

## 2019-08-01 PROCEDURE — 85610 PROTHROMBIN TIME: CPT | Performed by: FAMILY MEDICINE

## 2019-08-01 PROCEDURE — 99207 ZZC NO CHARGE NURSE ONLY: CPT

## 2019-08-01 PROCEDURE — 36415 COLL VENOUS BLD VENIPUNCTURE: CPT | Performed by: FAMILY MEDICINE

## 2019-08-01 NOTE — TELEPHONE ENCOUNTER
"Daughter Kya calling \"We were seen on 7/29 see epic and Dr. Mayo was suppose to call in another 5 days of my dad's antibiotic, the pharmacy does not have it. We didn't think 5 days was going to be enough.\" Called pharmacy with verbal per MD note and epic:     doxycycline hyclate (VIBRA-TABS) 100 MG tablet 10 tablet 1 7/29/2019  --   Sig - Route: Take 1 tablet (100 mg) by mouth 2 times daily - Oral     Maryanne Stephens RN [facility]      "

## 2019-08-01 NOTE — ADDENDUM NOTE
Addended by: PHILIPP GUERRERO on: 8/1/2019 02:54 PM     Modules accepted: Level of Service, SmartSet

## 2019-08-01 NOTE — PROGRESS NOTES
ANTICOAGULATION FOLLOW-UP CLINIC VISIT    Patient Name:  Nabor Cunningham  Date:  2019  Contact Type:  Telephone/ spoke with daughterLexy    SUBJECTIVE:  Patient Findings     Positives:   Change in medications (on doxy for 5 days through Sat but does have one refill if needed. Still on keflex also)    Comments:   Patient recovering from cellulitis on arm from a cat bite. Per daughter, Lexy, his arm is still a little red but not swollen. Pt took warfarin as advised. Will give a slight booster dose tomorrow, then resume usual dosing and recheck in one week. No other changes or concerns.         Clinical Outcomes     Comments:   Patient recovering from cellulitis on arm from a cat bite. Per daughter, Lexy, his arm is still a little red but not swollen. Pt took warfarin as advised. Will give a slight booster dose tomorrow, then resume usual dosing and recheck in one week. No other changes or concerns.            OBJECTIVE    INR   Date Value Ref Range Status   2019 1.88 (H) 0.86 - 1.14 Final       ASSESSMENT / PLAN  INR assessment SUB    Recheck INR In: 1 WEEK    INR Location Clinic lab     Anticoagulation Summary  As of 2019    INR goal:   2.0-3.0   TTR:   59.4 % (2.3 y)   INR used for dosin.88! (2019)   Warfarin maintenance plan:   4 mg (2 mg x 2) every Mon, Fri; 6 mg (2 mg x 3) all other days   Full warfarin instructions:   : 6 mg; Otherwise 4 mg every Mon, Fri; 6 mg all other days   Weekly warfarin total:   38 mg   Plan last modified:   Daphne Lamar RN (2019)   Next INR check:   2019   Priority:   INR   Target end date:       Indications    Chronic atrial fibrillation (H) [I48.2]  Long-term (current) use of anticoagulants [Z79.01] [Z79.01]             Anticoagulation Episode Summary     INR check location:       Preferred lab:       Send INR reminders to:   [facility]    Comments:   * 2mg tabs. Does not like to cut tabs      Anticoagulation Care Providers      Provider Role Specialty Phone number    Heron Mayo MD [facility] [phone]            See the Encounter Report to view Anticoagulation Flowsheet and Dosing Calendar (Go to Encounters tab in chart review, and find the Anticoagulation Therapy Visit)        Belia Jones RN

## 2019-08-01 NOTE — PROGRESS NOTES
Message left for daughter, Lexy, to call [facility] back. Need to confirm pt took 6 mg the last 2 days. Also, is his arm continuing to improve (had cellulitis from a cat bite).     It looks like he is on keflex and doxy still. Potential dosing in calendar. Recheck INR either Mon or Thurs next week, depending on assessment.    Belia Jones, RN, BSN, PHN

## 2019-08-01 NOTE — PROGRESS NOTES
Clinic Care Coordination Contact  [facility]/Voicemail       Clinical Data: Care Coordinator Outreach  Outreach attempted x 1.  Left message on voicemail with call back information and requested return call.  Plan:  Care Coordinator will try to reach patient again in 6-10 business days.    TONE Lord, [facility] - Care Coordinator   [facility]  8/1/2019   2:42 PM  [phone]

## 2019-08-06 ENCOUNTER — TELEPHONE (OUTPATIENT)
Dept: FAMILY MEDICINE | Facility: CLINIC | Age: 84
End: 2019-08-06

## 2019-08-06 DIAGNOSIS — M1A.9XX0 CHRONIC GOUT, UNSPECIFIED CAUSE, UNSPECIFIED SITE: ICD-10-CM

## 2019-08-06 NOTE — TELEPHONE ENCOUNTER
I talked with Florencio and he is going to schedule appointment for Bear.  Wound is looking great says his son  Ellen Perez SAURAV

## 2019-08-06 NOTE — TELEPHONE ENCOUNTER
Reason for Call:  Other     Detailed comments: Patient needs a refill of the medication - They want to pick it up to mail to a cheaper pharmacy - Call daughter -kiara    Phone Number Patient can be reached at: [phone]    Best Time:     Can we leave a detailed message on this number? YES    Call taken on 8/6/2019 at 12:30 PM by Rosita Greene

## 2019-08-06 NOTE — TELEPHONE ENCOUNTER
Patient was bit by his cat and had a very bad infection. Son is wondering if they can get lab work done to see if the infection is gone and if they can start the lab work for the surgery he is suppose to have. Please advise.    Myla [place]

## 2019-08-08 ENCOUNTER — ANTICOAGULATION THERAPY VISIT (OUTPATIENT)
Dept: ANTICOAGULATION | Facility: CLINIC | Age: 84
End: 2019-08-08
Payer: MEDICARE

## 2019-08-08 DIAGNOSIS — Z79.01 CHRONIC ANTICOAGULATION: ICD-10-CM

## 2019-08-08 DIAGNOSIS — Z79.01 LONG TERM CURRENT USE OF ANTICOAGULANT THERAPY: ICD-10-CM

## 2019-08-08 DIAGNOSIS — I48.20 CHRONIC ATRIAL FIBRILLATION (H): ICD-10-CM

## 2019-08-08 LAB — INR POINT OF CARE: 4.3 (ref 0.86–1.14)

## 2019-08-08 PROCEDURE — 85610 PROTHROMBIN TIME: CPT | Mod: QW

## 2019-08-08 PROCEDURE — 99207 ZZC NO CHARGE NURSE ONLY: CPT

## 2019-08-08 PROCEDURE — 36416 COLLJ CAPILLARY BLOOD SPEC: CPT

## 2019-08-08 RX ORDER — WARFARIN SODIUM 2 MG/1
TABLET ORAL
Qty: 250 TABLET | Refills: 0 | Status: SHIPPED | OUTPATIENT
Start: 2019-08-08 | End: 2019-08-22

## 2019-08-08 RX ORDER — COLCHICINE 0.6 MG/1
0.6 TABLET ORAL DAILY
Qty: 90 TABLET | Refills: 3 | Status: SHIPPED | OUTPATIENT
Start: 2019-08-08 | End: 2020-06-29

## 2019-08-08 NOTE — PROGRESS NOTES
ANTICOAGULATION FOLLOW-UP CLINIC VISIT    Patient Name:  Nabor Cunningham  Date:  2019  Contact Type:  Face to Face    SUBJECTIVE:  Patient Findings     Comments:   Cellulitis on patient's arm is much better, still slightly red but not warm or swollen. He finished all abx and is seeing Dr. Turner in one week for follow up. It is not clear why INR would increase with his infection better and not on abx. Patient denies any chf s/s, no gout, no diarrhea. No recent illnesses. Patient is eating well. No alcohol use. Will hold warfarin today, then resume maintenance dose and recheck next week before seeing Dr. Turner. Patient denies any bleeding s/s.        Clinical Outcomes     Negatives:   Major bleeding event, Thromboembolic event, Anticoagulation-related hospital admission, Anticoagulation-related ED visit, Anticoagulation-related fatality    Comments:   Cellulitis on patient's arm is much better, still slightly red but not warm or swollen. He finished all abx and is seeing Dr. Turner in one week for follow up. It is not clear why INR would increase with his infection better and not on abx. Patient denies any chf s/s, no gout, no diarrhea. No recent illnesses. Patient is eating well. No alcohol use. Will hold warfarin today, then resume maintenance dose and recheck next week before seeing Dr. Turner. Patient denies any bleeding s/s.           OBJECTIVE    INR Protime   Date Value Ref Range Status   2019 4.3 (A) 0.86 - 1.14 Final       ASSESSMENT / PLAN  INR assessment SUPRA    Recheck INR In: 1 WEEK    INR Location Clinic      Anticoagulation Summary  As of 2019    INR goal:   2.0-3.0   TTR:   59.3 % (2.3 y)   INR used for dosin.3! (2019)   Warfarin maintenance plan:   4 mg (2 mg x 2) every Mon, Fri; 6 mg (2 mg x 3) all other days   Full warfarin instructions:   : Hold; Otherwise 4 mg every Mon, Fri; 6 mg all other days   Weekly warfarin total:   38 mg   Plan last modified:   Daphne Lamar RN  (7/8/2019)   Next INR check:   8/15/2019   Priority:   INR   Target end date:   Indefinite    Indications    Chronic atrial fibrillation (H) [I48.2]  Long-term (current) use of anticoagulants [Z79.01] [Z79.01]             Anticoagulation Episode Summary     INR check location:       Preferred lab:       Send INR reminders to:   [facility]    Comments:   * 2mg tabs. Does not like to cut tabs      Anticoagulation Care Providers     Provider Role Specialty Phone number    Heron Mayo MD [facility] [phone]            See the Encounter Report to view Anticoagulation Flowsheet and Dosing Calendar (Go to Encounters tab in chart review, and find the Anticoagulation Therapy Visit)        Belia Jones RN

## 2019-08-12 ENCOUNTER — TELEPHONE (OUTPATIENT)
Dept: FAMILY MEDICINE | Facility: CLINIC | Age: 84
End: 2019-08-12

## 2019-08-12 NOTE — TELEPHONE ENCOUNTER
Panel Management Review      Patient has the following on his problem list:     Depression / Dysthymia review    Measure:  Needs PHQ-9 score of 4 or less during index window.  Administer PHQ-9 and if score is 5 or more, send encounter to provider for next steps.    5 - 7 month window range: 4/22/19-8/20/19    PHQ-9 SCORE 9/21/2018 12/21/2018 2/15/2019   PHQ-9 Total Score 3 12 4       If PHQ-9 recheck is 5 or more, route to provider for next steps.    Patient is due for:  PHQ9      Composite cancer screening  Chart review shows that this patient is due/due soon for the following None  Summary:    Patient is due/failing the following:   PHQ9    Action needed:   Needs PHQ 9    Type of outreach:    Phone, left message for patient to call back.     Questions for provider review:    None                                                                                                                                    Ellen Perez RN

## 2019-08-13 ENCOUNTER — TELEPHONE (OUTPATIENT)
Dept: FAMILY MEDICINE | Facility: CLINIC | Age: 84
End: 2019-08-13

## 2019-08-13 NOTE — PROGRESS NOTES
Clinic Care Coordination Contact  [facility]/Voicemail       Clinical Data: Care Coordinator Outreach  Outreach attempted x 2.  Left message on voicemail with call back information and requested return call.  Plan:  Care Coordinator will call daughter if no return call from son in about one week.  TONE Lord, [facility] - Care Coordinator   [facility]  8/13/2019   12:35 PM  [phone]

## 2019-08-13 NOTE — TELEPHONE ENCOUNTER
Reason for Call:  Apt with Dr Turner Thursday, AUgust 15.      Detailed comments: Son says his dad is seeing Dr Turner to follow up on a cat bite. He says his dad was scheduled to have knee surgery but due to this cat bite it was cancelled. His son says if Dr Turner is OK clearing him to have surgery again, we need to contact Dr Gore in [facility]. His phone is [phone] and fax [phone]. His son says they are not likely going to get him back on the schedule for the surgery until November now.     Phone Number dwayne Matias can be reached at: [phone]    Best Time: anytime    Can we leave a detailed message on this number? YES    Call taken on 8/13/2019 at 12:43 PM by Marla Grace

## 2019-08-15 ENCOUNTER — OFFICE VISIT (OUTPATIENT)
Dept: FAMILY MEDICINE | Facility: CLINIC | Age: 84
End: 2019-08-15
Payer: MEDICARE

## 2019-08-15 ENCOUNTER — ANTICOAGULATION THERAPY VISIT (OUTPATIENT)
Dept: ANTICOAGULATION | Facility: CLINIC | Age: 84
End: 2019-08-15
Payer: MEDICARE

## 2019-08-15 VITALS
WEIGHT: 180 LBS | OXYGEN SATURATION: 94 % | HEART RATE: 73 BPM | HEIGHT: 64 IN | BODY MASS INDEX: 30.73 KG/M2 | DIASTOLIC BLOOD PRESSURE: 60 MMHG | SYSTOLIC BLOOD PRESSURE: 100 MMHG | RESPIRATION RATE: 22 BRPM | TEMPERATURE: 98.3 F

## 2019-08-15 DIAGNOSIS — I48.20 CHRONIC ATRIAL FIBRILLATION (H): ICD-10-CM

## 2019-08-15 DIAGNOSIS — Z79.01 LONG TERM CURRENT USE OF ANTICOAGULANT THERAPY: ICD-10-CM

## 2019-08-15 DIAGNOSIS — L03.90 CELLULITIS, UNSPECIFIED CELLULITIS SITE: Primary | ICD-10-CM

## 2019-08-15 LAB — INR POINT OF CARE: 4 (ref 0.86–1.14)

## 2019-08-15 PROCEDURE — 85610 PROTHROMBIN TIME: CPT | Mod: QW

## 2019-08-15 PROCEDURE — 36416 COLLJ CAPILLARY BLOOD SPEC: CPT

## 2019-08-15 PROCEDURE — 99213 OFFICE O/P EST LOW 20 MIN: CPT | Performed by: FAMILY MEDICINE

## 2019-08-15 PROCEDURE — 99207 ZZC NO CHARGE NURSE ONLY: CPT

## 2019-08-15 ASSESSMENT — MIFFLIN-ST. JEOR: SCORE: 1432.47

## 2019-08-15 NOTE — PROGRESS NOTES
ANTICOAGULATION FOLLOW-UP CLINIC VISIT    Patient Name:  Nabor Cunningham  Date:  8/15/2019  Contact Type:  Face to Face    SUBJECTIVE:  Patient Findings     Positives:   Missed doses (intentional hold 7 days ago)    Comments:   INR remains elevated after a held dose last Thurs. Patient is seeing Dr. Turner today to have his arm cellulitis re evaluated. It still appears slightly red and INR remains high. Per patient and his daughter, he has had no other changes with diet, health or medications (off abx for awhile now). Will hold dose again today, then reduce weekly dosing by ~10% and recheck INR in 1 week. No bleeding s/s.        Clinical Outcomes     Comments:   INR remains elevated after a held dose last Thurs. Patient is seeing Dr. Turner today to have his arm cellulitis re evaluated. It still appears slightly red and INR remains high. Per patient and his daughter, he has had no other changes with diet, health or medications (off abx for awhile now). Will hold dose again today, then reduce weekly dosing by ~10% and recheck INR in 1 week. No bleeding s/s.           OBJECTIVE    INR Protime   Date Value Ref Range Status   08/15/2019 4.0 (A) 0.86 - 1.14 Final       ASSESSMENT / PLAN  INR assessment SUPRA    Recheck INR In: 1 WEEK    INR Location Clinic      Anticoagulation Summary  As of 8/15/2019    INR goal:   2.0-3.0   TTR:   58.8 % (2.3 y)   INR used for dosin.0! (8/15/2019)   Warfarin maintenance plan:   4 mg (2 mg x 2) every Mon, Fri; 6 mg (2 mg x 3) all other days   Full warfarin instructions:   8/15: Hold; : 4 mg; : 4 mg; Otherwise 4 mg every Mon, Fri; 6 mg all other days   Weekly warfarin total:   38 mg   Plan last modified:   Daphne Lamar RN (2019)   Next INR check:   2019   Priority:   INR   Target end date:   Indefinite    Indications    Chronic atrial fibrillation (H) [I48.2]  Long-term (current) use of anticoagulants [Z79.01] [Z79.01]             Anticoagulation Episode  Summary     INR check location:       Preferred lab:       Send INR reminders to:   [facility]    Comments:   * 2mg tabs. Does not like to cut tabs      Anticoagulation Care Providers     Provider Role Specialty Phone number    Heron Mayo MD [facility] [phone]            See the Encounter Report to view Anticoagulation Flowsheet and Dosing Calendar (Go to Encounters tab in chart review, and find the Anticoagulation Therapy Visit)        Belia Jones RN

## 2019-08-15 NOTE — NURSING NOTE
"Chief Complaint   Patient presents with     Cat Bite       Initial /60 (BP Location: Right arm, Patient Position: Sitting, Cuff Size: Adult Regular)   Pulse 73   Temp 98.3  F (36.8  C) (Tympanic)   Resp 22   Ht 1.626 m (5' 4\")   Wt 81.6 kg (180 lb)   SpO2 94%   BMI 30.90 kg/m   Estimated body mass index is 30.9 kg/m  as calculated from the following:    Height as of this encounter: 1.626 m (5' 4\").    Weight as of this encounter: 81.6 kg (180 lb).    Patient presents to the clinic using No DME    Health Maintenance that is potentially due pending provider review:  NONE    n/a    Is there anyone who you would like to be able to receive your results? No  If yes have patient fill out KV[facility]    Katie Wood CMA    "

## 2019-08-15 NOTE — PROGRESS NOTES
"Cat bite-recheck Around 7/27. Needs confirmation that they can move forward with rescheduling Knee surgery    INR-still elevated     S: Nabor Cunningham is a 83 year old male with recent cellulitis on arms from wrestling with his cat.      Improved.  Was on keflex and doxy.    Swelling back to normal    No fevers.  No malaise.      Problem list, med list, additional histories reviewed and updated, as indicated.      O:/60 (BP Location: Right arm, Patient Position: Sitting, Cuff Size: Adult Regular)   Pulse 73   Temp 98.3  F (36.8  C) (Tympanic)   Resp 22   Ht 1.626 m (5' 4\")   Wt 81.6 kg (180 lb)   SpO2 94%   BMI 30.90 kg/m    GEN: Alert and oriented, in no acute distress  Afib, no murmur  Lungs clear  Arms normal  EXT: no edema or lesions noted in lower extremities    A: cellulitis, improved    P: OK to proceed with surgery, specifically with regard to arms/cellultis.        "

## 2019-08-19 ENCOUNTER — COMMUNICATION - HEALTHEAST (OUTPATIENT)
Dept: PHYSICAL MEDICINE AND REHAB | Facility: CLINIC | Age: 84
End: 2019-08-19

## 2019-08-20 ENCOUNTER — TELEPHONE (OUTPATIENT)
Dept: FAMILY MEDICINE | Facility: CLINIC | Age: 84
End: 2019-08-20

## 2019-08-20 NOTE — PROGRESS NOTES
Clinic Care Coordination Contact  [facility]/Voicemail       Clinical Data: Care Coordinator Outreach  Outreach attempted x 3, attempted Dtr Samara who could not hear me on the phone X2, Son Florencio - message left, Dtr Lexy - busy.  Left message on voicemail with call back information and requested return call.  Plan:  Care Coordinator will try to reach patient again in 10-15 business days.    TONE Lord, [facility] - Care Coordinator     8/20/2019   12:47 PM  [phone]

## 2019-08-20 NOTE — TELEPHONE ENCOUNTER
Panel Management Review      Patient has the following on his problem list:     Depression / Dysthymia review    Measure:  Needs PHQ-9 score of 4 or less during index window.  Administer PHQ-9 and if score is 5 or more, send encounter to provider for next steps.    5 - 7 month window range: 4/22/19-8/20/19    PHQ-9 SCORE 9/21/2018 12/21/2018 2/15/2019   PHQ-9 Total Score 3 12 4       If PHQ-9 recheck is 5 or more, route to provider for next steps.    Patient is due for:  PHQ9      Composite cancer screening  Chart review shows that this patient is due/due soon for the following None  Summary:    Patient is due/failing the following:   PHQ9    Action needed:   Patient needs to do PHQ9.    Type of outreach:    hard for him to do over the phone    Questions for provider review:    None                                                                                                                                    Ellen Perez RN

## 2019-08-22 ENCOUNTER — ANTICOAGULATION THERAPY VISIT (OUTPATIENT)
Dept: ANTICOAGULATION | Facility: CLINIC | Age: 84
End: 2019-08-22
Payer: MEDICARE

## 2019-08-22 DIAGNOSIS — I48.20 CHRONIC ATRIAL FIBRILLATION (H): ICD-10-CM

## 2019-08-22 DIAGNOSIS — Z79.01 CHRONIC ANTICOAGULATION: ICD-10-CM

## 2019-08-22 DIAGNOSIS — Z79.01 LONG TERM CURRENT USE OF ANTICOAGULANT THERAPY: ICD-10-CM

## 2019-08-22 LAB — INR POINT OF CARE: 3.2 (ref 0.86–1.14)

## 2019-08-22 PROCEDURE — 99207 ZZC NO CHARGE NURSE ONLY: CPT

## 2019-08-22 PROCEDURE — 85610 PROTHROMBIN TIME: CPT | Mod: QW

## 2019-08-22 PROCEDURE — 36416 COLLJ CAPILLARY BLOOD SPEC: CPT

## 2019-08-22 RX ORDER — WARFARIN SODIUM 2 MG/1
TABLET ORAL
Qty: 250 TABLET | Refills: 0
Start: 2019-08-22 | End: 2019-09-23

## 2019-08-22 NOTE — PROGRESS NOTES
ANTICOAGULATION FOLLOW-UP CLINIC VISIT    Patient Name:  Nabor Cunningham  Date:  8/22/2019  Contact Type:  Face to Face    SUBJECTIVE:  Patient Findings     Comments:   Patient has been cleared for knee surgery but cannot get on the schedule until Nov. He will not do PT that was advised for his pain and is not yet eligible for another steroid injection. Per Dr. Turner, the patient's cellulitis from recent cat bite has resolved. He is still needing a reduced warfarin dosing. Will continue with ~11% reduction and recheck INR in 2 weeks. No other changes or concerns.         Clinical Outcomes     Comments:   Patient has been cleared for knee surgery but cannot get on the schedule until Nov. He will not do PT that was advised for his pain and is not yet eligible for another steroid injection. Per Dr. Turner, the patient's cellulitis from recent cat bite has resolved. He is still needing a reduced warfarin dosing. Will continue with ~11% reduction and recheck INR in 2 weeks. No other changes or concerns.            OBJECTIVE    INR Protime   Date Value Ref Range Status   08/22/2019 3.2 (A) 0.86 - 1.14 Final       ASSESSMENT / PLAN  INR assessment SUPRA    Recheck INR In: 2 WEEKS    INR Location Clinic      Anticoagulation Summary  As of 8/22/2019    INR goal:   2.0-3.0   TTR:   58.3 % (2.4 y)   INR used for dosing:   3.2! (8/22/2019)   Warfarin maintenance plan:   6 mg (2 mg x 3) every Mon, Wed, Fri; 4 mg (2 mg x 2) all other days   Full warfarin instructions:   6 mg every Mon, Wed, Fri; 4 mg all other days   Weekly warfarin total:   34 mg   Plan last modified:   Belia Jones RN (8/22/2019)   Next INR check:   9/5/2019   Priority:   INR   Target end date:   Indefinite    Indications    Chronic atrial fibrillation (H) [I48.2]  Long-term (current) use of anticoagulants [Z79.01] [Z79.01]             Anticoagulation Episode Summary     INR check location:       Preferred lab:       Send INR reminders to:   [facility]    Comments:   * 2mg tabs. Does not like to cut tabs. NO bandaid      Anticoagulation Care Providers     Provider Role Specialty Phone number    Heron Mayo MD [facility] [phone]            See the Encounter Report to view Anticoagulation Flowsheet and Dosing Calendar (Go to Encounters tab in chart review, and find the Anticoagulation Therapy Visit)        Belia Jones RN

## 2019-08-23 ENCOUNTER — PATIENT OUTREACH (OUTPATIENT)
Dept: CARE COORDINATION | Facility: CLINIC | Age: 84
End: 2019-08-23

## 2019-08-23 NOTE — PROGRESS NOTES
Clinic Care Coordination Contact    Follow Up Progress Note      Assessment: Per son and patient things are going well.  The infection has cleared up with no ongoing concerns.  Patient is waiting for callback for when to reschedule the knee surgery.  They are looking at November for the surgery date now.    Goals addressed this encounter:   Goals Addressed                 This Visit's Progress      Psychosocial (pt-stated)   On track     Goal Statement: pt to remain safe in his own home as long as possible, safe meaning no injuries from falls or avoidable health issues.  Measure of Success: no falls with injuries or avoidable health issues  Supportive Steps to Achieve: home care, , family  Barriers: age, health  Strengths: supportive family  Date to Achieve By: 12-31-19  Patient expressed understanding of goal: yes                 Intervention/Education provided during outreach: Both son and patient reporting that things are going well.  Patient continues to be safe at home and encouraged them to call  if there are any changes.     Outreach Frequency: monthly    Plan:   Family to continue to support patient and put in needed interventions if safety concerns arise.  Care Coordinator will follow up in a little over 1 month.    TONE Lord, [facility] - Care Coordinator   [facility]  8/23/2019   10:41 AM  [phone]

## 2019-09-05 ENCOUNTER — ANTICOAGULATION THERAPY VISIT (OUTPATIENT)
Dept: ANTICOAGULATION | Facility: CLINIC | Age: 84
End: 2019-09-05
Payer: MEDICARE

## 2019-09-05 DIAGNOSIS — I48.20 CHRONIC ATRIAL FIBRILLATION (H): ICD-10-CM

## 2019-09-05 DIAGNOSIS — Z79.01 CHRONIC ANTICOAGULATION: ICD-10-CM

## 2019-09-05 DIAGNOSIS — Z79.01 LONG TERM CURRENT USE OF ANTICOAGULANT THERAPY: ICD-10-CM

## 2019-09-05 LAB — INR POINT OF CARE: 3.2 (ref 0.86–1.14)

## 2019-09-05 PROCEDURE — 85610 PROTHROMBIN TIME: CPT | Mod: QW

## 2019-09-05 PROCEDURE — 99207 ZZC NO CHARGE NURSE ONLY: CPT

## 2019-09-05 PROCEDURE — 36416 COLLJ CAPILLARY BLOOD SPEC: CPT

## 2019-09-05 NOTE — PROGRESS NOTES
ANTICOAGULATION FOLLOW-UP CLINIC VISIT    Patient Name:  Nabor Cunningham  Date:  9/5/2019  Contact Type:  Face to Face    SUBJECTIVE:  Patient Findings     Comments:   No changes in diet, activity level, medications (including over the counter), or health. No missed doses of warfarin. Patient took dosing as prescribed. No signs of clots or bleeding concerns.     Dose was reduced by 11% at last [facility] visit but INR stayed 3.2. Will reduce another 5.2% and recheck in 2 weeks.         Clinical Outcomes     Negatives:   Major bleeding event, Thromboembolic event, Anticoagulation-related hospital admission, Anticoagulation-related ED visit, Anticoagulation-related fatality    Comments:   No changes in diet, activity level, medications (including over the counter), or health. No missed doses of warfarin. Patient took dosing as prescribed. No signs of clots or bleeding concerns.     Dose was reduced by 11% at last [facility] visit but INR stayed 3.2. Will reduce another 5.2% and recheck in 2 weeks.            OBJECTIVE    INR Protime   Date Value Ref Range Status   09/05/2019 3.2 (A) 0.86 - 1.14 Final       ASSESSMENT / PLAN  INR assessment SUPRA    Recheck INR In: 2 WEEKS    INR Location Clinic      Anticoagulation Summary  As of 9/5/2019    INR goal:   2.0-3.0   TTR:   57.4 % (2.4 y)   INR used for dosing:   3.2! (9/5/2019)   Warfarin maintenance plan:   6 mg (2 mg x 3) every Mon, Fri; 4 mg (2 mg x 2) all other days   Full warfarin instructions:   6 mg every Mon, Fri; 4 mg all other days   Weekly warfarin total:   32 mg   Plan last modified:   Belia Jones RN (9/5/2019)   Next INR check:   9/19/2019   Priority:   INR   Target end date:   Indefinite    Indications    Chronic atrial fibrillation (H) [I48.2]  Long-term (current) use of anticoagulants [Z79.01] [Z79.01]             Anticoagulation Episode Summary     INR check location:       Preferred lab:       Send INR reminders to:   [facility]    Comments:   * 2mg  tabs. Does not like to cut tabs. NO bandaid      Anticoagulation Care Providers     Provider Role Specialty Phone number    Heron Mayo MD [facility] [phone]            See the Encounter Report to view Anticoagulation Flowsheet and Dosing Calendar (Go to Encounters tab in chart review, and find the Anticoagulation Therapy Visit)        Belia Jones RN

## 2019-09-19 ENCOUNTER — ANTICOAGULATION THERAPY VISIT (OUTPATIENT)
Dept: ANTICOAGULATION | Facility: CLINIC | Age: 84
End: 2019-09-19
Payer: MEDICARE

## 2019-09-19 DIAGNOSIS — Z79.01 LONG TERM CURRENT USE OF ANTICOAGULANT THERAPY: ICD-10-CM

## 2019-09-19 DIAGNOSIS — I48.20 CHRONIC ATRIAL FIBRILLATION (H): ICD-10-CM

## 2019-09-19 LAB — INR POINT OF CARE: 3.2 (ref 0.86–1.14)

## 2019-09-19 PROCEDURE — 85610 PROTHROMBIN TIME: CPT | Mod: QW

## 2019-09-19 PROCEDURE — 36416 COLLJ CAPILLARY BLOOD SPEC: CPT

## 2019-09-19 PROCEDURE — 99207 ZZC NO CHARGE NURSE ONLY: CPT

## 2019-09-19 NOTE — PROGRESS NOTES
Addendum: Per Rani, she does not have a clear answer for INRs remaining elevated. Could be lingering cellulitis or liver affect from taking recent abx. Small possibility is that abx induced antiphospholipid antibody syndrome but this is slim.    Belia Jones, RN, BSN, PHN  9-20-19    ANTICOAGULATION FOLLOW-UP CLINIC VISIT    Patient Name:  Nabor Cunningham  Date:  9/19/2019  Contact Type:  Face to Face    SUBJECTIVE:  Patient Findings     Positives:   Upcoming dental procedure    Comments:   No changes in diet, activity level, medications (including over the counter), or health. No recent illnesses, signs of infection, CHF flare. No alcohol use. Patient is eating well at his living facility. No missed doses of warfarin. Patient took dosing as prescribed. No signs of clots or bleeding concerns.     Warfarin dosing has been reduced multiple times and INR continues to stay above goal range. He is having a tooth pulled on Monday. Will reduce to 4 mg daily and have patient eat greens on Sunday. Patient is checking INR again on 9-23 before dental appointment. Writer did email anticoagulation clinical pharmacist to review.          Clinical Outcomes     Comments:   No changes in diet, activity level, medications (including over the counter), or health. No recent illnesses, signs of infection, CHF flare. No alcohol use. Patient is eating well at his living facility. No missed doses of warfarin. Patient took dosing as prescribed. No signs of clots or bleeding concerns.     Warfarin dosing has been reduced multiple times and INR continues to stay above goal range. He is having a tooth pulled on Monday. Will reduce to 4 mg daily and have patient eat greens on Sunday. Patient is checking INR again on 9-23 before dental appointment. Writer did email anticoagulation clinical pharmacist to review.             OBJECTIVE    INR Protime   Date Value Ref Range Status   09/19/2019 3.2 (A) 0.86 - 1.14 Final       ASSESSMENT /  PLAN  INR assessment SUPRA    Recheck INR In: 4 DAYS    INR Location Clinic      Anticoagulation Summary  As of 9/19/2019    INR goal:   2.0-3.0   TTR:   56.5 % (2.4 y)   INR used for dosing:   3.2! (9/19/2019)   Warfarin maintenance plan:   4 mg (2 mg x 2) every day   Full warfarin instructions:   4 mg every day   Weekly warfarin total:   28 mg   Plan last modified:   Belia Jones RN (9/19/2019)   Next INR check:   9/23/2019   Priority:   INR   Target end date:   Indefinite    Indications    Chronic atrial fibrillation (H) [I48.2]  Long-term (current) use of anticoagulants [Z79.01] [Z79.01]             Anticoagulation Episode Summary     INR check location:       Preferred lab:       Send INR reminders to:   [facility]    Comments:   * 2mg tabs. Does not like to cut tabs. NO bandaid      Anticoagulation Care Providers     Provider Role Specialty Phone number    Heron Mayo MD [facility] [phone]            See the Encounter Report to view Anticoagulation Flowsheet and Dosing Calendar (Go to Encounters tab in chart review, and find the Anticoagulation Therapy Visit)        Belia Jonse RN

## 2019-09-20 ENCOUNTER — TRANSFERRED RECORDS (OUTPATIENT)
Dept: HEALTH INFORMATION MANAGEMENT | Facility: CLINIC | Age: 84
End: 2019-09-20

## 2019-09-20 DIAGNOSIS — I50.22 CHRONIC SYSTOLIC HEART FAILURE (H): Primary | ICD-10-CM

## 2019-09-20 DIAGNOSIS — E61.1 LOW IRON: ICD-10-CM

## 2019-09-20 NOTE — TELEPHONE ENCOUNTER
"Requested Prescriptions   Pending Prescriptions Disp Refills     ferrous gluconate (FERGON) 324 (38 Fe) MG tablet [Pharmacy Med Name: FERROUS GLUCONATE 324MG TABS] 100 tablet 0     Sig: TAKE ONE TABLET BY MOUTH ONCE DAILY       Iron Supplements Passed - 9/20/2019 12:42 PM        Passed - Patient is 12 years of age or older        Passed - Recent (12 mo) or future (30 days) visit within the authorizing provider's specialty     Patient had office visit in the last 12 months or has a visit in the next 30 days with authorizing provider or within the authorizing provider's specialty.  See \"Patient Info\" tab in inbasket, or \"Choose Columns\" in Meds & Orders section of the refill encounter.              Passed - Hgb OR Hct on record within the past 12 mos.     Patient need only have had a HGB or HCT on file in the past 12 mos. That result does not need to be normal.    Recent Labs   Lab Test 07/26/19  1503 07/12/19  1239 11/30/18  1018   HGB 13.7 14.6 10.7*     Recent Labs   Lab Test 07/26/19  1503 07/12/19  1239 11/30/18  1018   HCT 41.2 44.9 34.6*       Please verify a HGB or HCT has been checked SINCE THE LAST DOSE CHANGE.            Passed - Medication is active on med list        Last Written Prescription Date:  11/21/18  Last Fill Quantity: 100,  # refills: 1   Last office visit: 8/15/2019 with prescribing provider:  Johnny   Future Office Visit:   Next 5 appointments (look out 90 days)    Sep 23, 2019  9:20 AM CDT  Pre-Op physical with Heron Mayo MD  [facility] ([facility] [address] [place] [id number] [phone]   Oct 14, 2019  8:30 AM CDT  Return Visit with Kae Rodriguez MD  [facility] ([facility] [address] [facility] [address] [phone]           "

## 2019-09-23 ENCOUNTER — OFFICE VISIT (OUTPATIENT)
Dept: FAMILY MEDICINE | Facility: CLINIC | Age: 84
End: 2019-09-23
Payer: MEDICARE

## 2019-09-23 ENCOUNTER — TELEPHONE (OUTPATIENT)
Dept: FAMILY MEDICINE | Facility: CLINIC | Age: 84
End: 2019-09-23

## 2019-09-23 ENCOUNTER — ANTICOAGULATION THERAPY VISIT (OUTPATIENT)
Dept: ANTICOAGULATION | Facility: CLINIC | Age: 84
End: 2019-09-23
Payer: MEDICARE

## 2019-09-23 VITALS
HEART RATE: 76 BPM | RESPIRATION RATE: 16 BRPM | SYSTOLIC BLOOD PRESSURE: 92 MMHG | DIASTOLIC BLOOD PRESSURE: 54 MMHG | TEMPERATURE: 98 F | OXYGEN SATURATION: 94 % | WEIGHT: 180.8 LBS | BODY MASS INDEX: 31.03 KG/M2

## 2019-09-23 DIAGNOSIS — I48.20 CHRONIC ATRIAL FIBRILLATION (H): ICD-10-CM

## 2019-09-23 DIAGNOSIS — Z79.01 CHRONIC ANTICOAGULATION: ICD-10-CM

## 2019-09-23 DIAGNOSIS — Z79.01 LONG TERM CURRENT USE OF ANTICOAGULANT THERAPY: ICD-10-CM

## 2019-09-23 DIAGNOSIS — I10 ESSENTIAL HYPERTENSION WITH GOAL BLOOD PRESSURE LESS THAN 140/90: ICD-10-CM

## 2019-09-23 DIAGNOSIS — I50.43 ACUTE ON CHRONIC COMBINED SYSTOLIC AND DIASTOLIC CONGESTIVE HEART FAILURE (H): Primary | ICD-10-CM

## 2019-09-23 DIAGNOSIS — Z95.0 CARDIAC PACEMAKER IN SITU: ICD-10-CM

## 2019-09-23 LAB
ANION GAP SERPL CALCULATED.3IONS-SCNC: 3 MMOL/L (ref 3–14)
BUN SERPL-MCNC: 29 MG/DL (ref 7–30)
CALCIUM SERPL-MCNC: 9.5 MG/DL (ref 8.5–10.1)
CHLORIDE SERPL-SCNC: 98 MMOL/L (ref 94–109)
CO2 SERPL-SCNC: 33 MMOL/L (ref 20–32)
CREAT SERPL-MCNC: 0.96 MG/DL (ref 0.66–1.25)
GFR SERPL CREATININE-BSD FRML MDRD: 74 ML/MIN/{1.73_M2}
GLUCOSE SERPL-MCNC: 87 MG/DL (ref 70–99)
INR POINT OF CARE: 2.5 (ref 0.86–1.14)
POTASSIUM SERPL-SCNC: 4.2 MMOL/L (ref 3.4–5.3)
SODIUM SERPL-SCNC: 134 MMOL/L (ref 133–144)

## 2019-09-23 PROCEDURE — 90662 IIV NO PRSV INCREASED AG IM: CPT | Performed by: FAMILY MEDICINE

## 2019-09-23 PROCEDURE — 99207 ZZC NO CHARGE NURSE ONLY: CPT

## 2019-09-23 PROCEDURE — 85610 PROTHROMBIN TIME: CPT | Mod: QW

## 2019-09-23 PROCEDURE — G0008 ADMIN INFLUENZA VIRUS VAC: HCPCS | Performed by: FAMILY MEDICINE

## 2019-09-23 PROCEDURE — 36416 COLLJ CAPILLARY BLOOD SPEC: CPT

## 2019-09-23 PROCEDURE — 99214 OFFICE O/P EST MOD 30 MIN: CPT | Mod: 25 | Performed by: FAMILY MEDICINE

## 2019-09-23 PROCEDURE — 80048 BASIC METABOLIC PNL TOTAL CA: CPT | Performed by: FAMILY MEDICINE

## 2019-09-23 RX ORDER — FERROUS GLUCONATE 324(38)MG
TABLET ORAL
Qty: 100 TABLET | Refills: 0 | OUTPATIENT
Start: 2019-09-23

## 2019-09-23 RX ORDER — WARFARIN SODIUM 2 MG/1
TABLET ORAL
Qty: 250 TABLET | Refills: 0 | COMMUNITY
Start: 2019-09-23 | End: 2019-10-24

## 2019-09-23 NOTE — PATIENT INSTRUCTIONS
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     Schedule an appointment with cardiology.    REferral for Home care.     (I10) Essential hypertension with goal blood pressure less than 140/90  Comment: doing OK, on the lower side.   Plan: No change in current treatment plan.     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   Talk to your eye doctor about refills on prednisolone eye drops.   Flu shot today.

## 2019-09-23 NOTE — NURSING NOTE
"Chief Complaint   Patient presents with     Heart Failure       Initial BP 92/54   Pulse 76   Resp 16   Wt 82 kg (180 lb 12.8 oz)   BMI 31.03 kg/m   Estimated body mass index is 31.03 kg/m  as calculated from the following:    Height as of 8/15/19: 1.626 m (5' 4\").    Weight as of this encounter: 82 kg (180 lb 12.8 oz).    Patient presents to the clinic using [employer] Maintenance that is potentially due pending provider review:  NONE    n/a    Is there anyone who you would like to be able to receive your results? Yes  If yes have patient fill out KVNG-on file    Aixa Byrne CMA(AAMA)      "

## 2019-09-23 NOTE — TELEPHONE ENCOUNTER
Ayden from [facility] is asking if Dr. Mayo would follow him as him Primary Provider.  Dr. Mayo ordered for RN Services.  Evaluation at this time. Requesting Verbal Order.  Approved per [facility] RN  [facility]

## 2019-09-23 NOTE — PROGRESS NOTES
ANTICOAGULATION FOLLOW-UP CLINIC VISIT    Patient Name:  Nabor Cunningham  Date:  2019  Contact Type:  Face to Face    SUBJECTIVE:  Patient Findings     Positives:   Upcoming dental procedure    Comments:   No changes in medications, activity, health, or diet noted. No bleeding or increased bruising noted. Took warfarin as prescribed.  Patient will continue weekly maintenance dose. INR is therapeutic.   Recheck in 2 weeks.  Patient verbalizes understanding and agrees to plan. No further questions or concerns.            Clinical Outcomes     Comments:   No changes in medications, activity, health, or diet noted. No bleeding or increased bruising noted. Took warfarin as prescribed.  Patient will continue weekly maintenance dose. INR is therapeutic.   Recheck in 2 weeks.  Patient verbalizes understanding and agrees to plan. No further questions or concerns.               OBJECTIVE    INR Protime   Date Value Ref Range Status   2019 2.5 (A) 0.86 - 1.14 Final       ASSESSMENT / PLAN  INR assessment THER    Recheck INR In: 2 WEEKS    INR Location Clinic      Anticoagulation Summary  As of 2019    INR goal:   2.0-3.0   TTR:   56.5 % (2.4 y)   INR used for dosin.5 (2019)   Warfarin maintenance plan:   4 mg (2 mg x 2) every day   Full warfarin instructions:   4 mg every day   Weekly warfarin total:   28 mg   No change documented:   Daphne Lamar RN   Plan last modified:   Belia Jones RN (2019)   Next INR check:   10/10/2019   Priority:   INR   Target end date:   Indefinite    Indications    Chronic atrial fibrillation (H) [I48.2]  Long-term (current) use of anticoagulants [Z79.01] [Z79.01]             Anticoagulation Episode Summary     INR check location:       Preferred lab:       Send INR reminders to:   [facility]    Comments:   * 2mg tabs. Does not like to cut tabs. NO bandaid      Anticoagulation Care Providers     Provider Role Specialty Phone number    Maria Esther  Heron Blackburn MD [facility] [phone]            See the Encounter Report to view Anticoagulation Flowsheet and Dosing Calendar (Go to Encounters tab in chart review, and find the Anticoagulation Therapy Visit)        Daphne Lamar RN

## 2019-09-23 NOTE — PROGRESS NOTES
SUBJECTIVE: Nabor Cunningham is a 83 year old male  Chief Complaint   Patient presents with     Heart Failure      He was in for cardiology visit recently.   Pacemaker check last Friday along with cardiology visit.   The cardiology NP recommended Home Health and cardiology.  He has been erratic taking medications.    EF has declined to 35% recently with right and left heart failure symptoms.  Notes from cardiology visit below.     Was scheduled for INR clinic today.    Plan for tooth removal today if INR OK.     Was to have left knee surgery but put off due to dental infection.     Today feeling OK.   Breathing OK.  No dyspnea on exertion but not very active.   Walks with a walker or cane.   No chest pains or palpitations.   Edema has increased a little.   He had furosemide increased on 9/20.  Seems like increased dose has helped.     Son has been setting up medications.  Seems to be taking medication on schedule. Sometimes takes at a different time of the day.   Does not drive.      Heart Failure Follow-up    Are you experiencing any shortness of breath? No    Are you experiencing any swelling in your legs or feet?  Stable    Are you using more pillows than usual? No    Do you cough at night?  No    Do you check your weight daily?  No    Have you had a weight change recently?  No, felt that there was a 5 lb increase, but is stable from last OV.    Are you having any of the following side effects from your medications? (Select all that apply)  The patient does not report symptoms of dizziness, fatigue, cough, swelling, or slow heart beat.    Since your last visit, how many times have you gone to the cardiologist, urgent care, emergency room, or hospital because of your heart failure?   1 time, per follow up schedule.         How many servings of fruits and vegetables do you eat daily?  2-3    On average, how many sweetened beverages do you drink each day (soda, juice, sweet tea, etc)?   1, orange juice.    How many  days per week do you miss taking your medication? 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  Wakes up often at night.  On nortriptyline and gabapentin.  GI: No nausea, vomiting,  heartburn, abdominal pain, diarrhea, constipation or change in bowel habits  : No urinary frequency or dysuria, bladder or kidney problems  Psychiatric: No problems with anxiety, depression or mental health.  Seems to be doing well.     OBJECTIVE:Blood pressure 92/54, pulse 76, temperature 98  F (36.7  C), temperature source Tympanic, resp. rate 16, weight 82 kg (180 lb 12.8 oz), SpO2 94 %. BMI=Body mass index is 31.03 kg/m .  GENERAL APPEARANCE ADULT: Alert, no acute distress  NECK: No adenopathy,masses or thyromegaly  RESP: lungs clear to auscultation   CV: normal rate, regular rhythm, no murmur or gallop  MS: 1+ edema bilateral in lower extremities.      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     Schedule an appointment with cardiology.    REferral for Home care.     (I10) Essential hypertension with goal blood pressure less than 140/90  Comment:  doing OK, on the lower side.   Plan: No change in current treatment plan.     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   Talk to your eye doctor about refills on prednisolone eye drops.   Flu shot today.     ============================================  9/20 cardiology visit notes.     History of Present Illness:    Chalino was noted to have a recent decline in his LVEF to 35% associated with right and left sided heart failure symptoms. He was started on appropriate guideline directed medical therapy. He was noted to have about 50% RV pacing, he saw Dr. Castañeda to discuss possible BiV upgrade. He underwent further evaluation with cardiac catheterization which demonstrated non-obstructive LAD and RCA disease. Subsequent MUGA scan revealed improvement in LVEF to 44%. He saw Dr. Castañeda in follow up in February at that time given improvement in LVEF, no active heart failure symptoms, and decrease in RV pacing to less than 10% BiV upgrade was not recommended. Recommendation to repeat echocardiogram in 6-9 months.     Chalino's biggest complaint is back and knee pain. He is scheduled for TKA 10/2/19. Will be seeing Dr. Mayo on Monday for preoperative evaluation. Chalino rides his stationary bike for 15 minutes twice daily. He has had no symptoms of chest discomfort. He denies any palpitations, syncope, near syncope, orthopnea, or PND. Notes his weights are steadily increasing, but feels like this is due to increase in body weight secondary to decreased walking from his knee and back pain. States he hasn't had any significant edema, but notes a sock line at night when he removes his socks.       Impression/Plan:    (I25.10) Coronary artery disease involving native coronary artery of native heart without angina pectoris (primary encounter diagnosis)  Comment: 1/7/19 [facility] with non-obstructive CAD. Clinically stable without anginal symptoms.  Plan:  Recommend he continue with risk factor modification. Despite his knee pain he does have good functional capacity able to exercise on his stationary bike to 4 METS without any anginal symptoms.     (I49.5) SSS (sick sinus syndrome) ([facility])  Comment: s/p PPM, RV pacing has decreased significantly from 50% to 14% on most recent pacemaker evaluation.   Plan: Continue to follow with the device clinic as scheduled.     (I48.2) Chronic atrial fibrillation ([facility])  Comment: Rate controlled on oral anticoagulation with warfarin.   Plan: Continue Toprol XL 25 mg daily for rate control and warfarin for thromboembolism prophylaxis.     (I50.22) Chronic systolic heart failure ([facility])  Comment: LVEF as low as 35% nuclear stress test, 44% on recent MUGA. 35-40% per TTE today. Discussed with Dr. Ford he feels LVEF is relatively stable. He is mildly compensated per exam findings today.   Plan: Will increase lasix to 100 mg daily, repeat BMP on Monday when he follows up with Dr. Mayo. He is scheduled to undergo TKA on 10/2/19. His son recalls being told they will be using spinal anesthesia rather then general anesthesia. He has good functional capacity without anginal symptoms, if he is compensated from a heart failure standpoint with increase in diuretic he is optimized from a cardiac standpoint for noncardiac surgery. Would recommend limiting perioperative IV fluids given his sytolic heart failure. He will also be seeing Dr. Mayo on Monday for preoperative exam.     He is on GDMT with ACEI and beta blocker, no spironolactone not on optimal doses. Given distance from the clinic it is difficult to follow closely with him in order to up titrate medications. He is going to see if it would be a possibility to obtain home health care services to more closely monitor vitals, and symptoms. Then could consider enrolling in heart failure program here. Alternatively, if unable to obtain home health care services he is going to inquire if there is a  cardiologist closer to him that he can establish with in order to optimize medications. Chalino's son will update me on Monday after his visit with Dr. Mayo.

## 2019-09-26 ENCOUNTER — TELEPHONE (OUTPATIENT)
Dept: FAMILY MEDICINE | Facility: CLINIC | Age: 84
End: 2019-09-26

## 2019-09-26 ENCOUNTER — MEDICAL CORRESPONDENCE (OUTPATIENT)
Dept: HEALTH INFORMATION MANAGEMENT | Facility: CLINIC | Age: 84
End: 2019-09-26

## 2019-09-26 NOTE — TELEPHONE ENCOUNTER
Form signed faxed and sent to scanning.  Jammie [facility]  Clinic Station [place]

## 2019-09-27 NOTE — TELEPHONE ENCOUNTER
Forms signed faxed and sent to scanning.  Jammie [facility]  Clinic Station

## 2019-09-30 ENCOUNTER — TELEPHONE (OUTPATIENT)
Dept: FAMILY MEDICINE | Facility: CLINIC | Age: 84
End: 2019-09-30

## 2019-09-30 ENCOUNTER — MEDICAL CORRESPONDENCE (OUTPATIENT)
Dept: HEALTH INFORMATION MANAGEMENT | Facility: CLINIC | Age: 84
End: 2019-09-30

## 2019-10-02 ENCOUNTER — MEDICAL CORRESPONDENCE (OUTPATIENT)
Dept: HEALTH INFORMATION MANAGEMENT | Facility: CLINIC | Age: 84
End: 2019-10-02

## 2019-10-02 ENCOUNTER — TELEPHONE (OUTPATIENT)
Dept: FAMILY MEDICINE | Facility: CLINIC | Age: 84
End: 2019-10-02

## 2019-10-03 ENCOUNTER — PATIENT OUTREACH (OUTPATIENT)
Dept: CARE COORDINATION | Facility: CLINIC | Age: 84
End: 2019-10-03

## 2019-10-03 ASSESSMENT — ACTIVITIES OF DAILY LIVING (ADL): DEPENDENT_IADLS:: INDEPENDENT

## 2019-10-03 NOTE — LETTER
[facility]  Complex Care Plan  About Me:    Patient Name:  Nabor Perea    YOB: 1935  Age:         83 year old   Mary MRN:    [id number] Telephone Information:  Home Phone [phone]   Mobile [phone]   Home Phone [phone]       Address:  [address]  [facility] [address] Email address:  No e-mail address on record      Emergency Contact(s)    Name Relationship Lgl Grd Work Phone Home Phone Mobile Phone   1. JACKSON VAN Daughter No  [phone]   2. ELOY PEREA  Son No   [phone]   3. LISANDRA PEREA Relative No  [phone]            Primary language:  English     needed? No   [facility]:  [phone] op. 1  Other communication barriers: Glasses, Cognitive impairment  Preferred Method of Communication:  Do Not Contact  Current living arrangement: I live alone  Mobility Status/ Medical Equipment: Independent    Health Maintenance  Health Maintenance Reviewed: Due/Overdue   Health Maintenance Due   Topic Date Due     HF ACTION PLAN  1935     ZOSTER IMMUNIZATION (1 of 2) 11/03/1987     MEDICARE ANNUAL WELLNESS VISIT  11/03/2002     PHQ-9  08/15/2019     Please talk with your provider about what is needed or can continue to wait.        My Access Plan  Medical Emergency 911   Primary Clinic Line [facility] - [phone]   24 Hour Appointment Line [phone] or  [phone]) (toll-free)   24 Hour Nurse Line [phone] (toll-free)   Preferred [facility] Care [facility], [phone]   Preferred Hospital [place], [place]  [phone]   Preferred Pharmacy [id number] - [place], [place] - [address]     Behavioral Health Crisis Line The National Suicide Prevention Lifeline at 1-515.305.6566 or 911             My Care Team Members  Patient Care Team       Relationship Specialty Notifications Start End    Heron Mayo,  MD PCP - General Family Practice  9/11/18     Phone: [phone] Fax: [phone]         [address] [place] [address]    Heron Mayo MD Assigned PCP   3/9/17     Phone: [phone] Fax: [phone]         [address] [place] [address]    Heron Mayo MD  Family Practice  1/29/18     Merged    Phone: [phone] Fax: [phone]         [address] [place] [address]    Rani Sarkar LSW Lead Care Coordinator Primary Care - CC Admissions 11/13/18     Phone: [phone] Fax: [phone]                My Care Plans  Self Management and Treatment Plan  Goals and (Comments)  Goals        General    Pain Management (pt-stated)     Notes - Note created  10/3/2019 10:14 AM by Rani Sarkar LSW    Goal Statement: I want to get knee surgery so my pain is improved.  Measure of Success: I will have surgery by the end of the year if not sooner for knee replacement.   Supportive Steps to Achieve: had infected tooth removed, family  Barriers: repeated infections, extracted tooth area needs to heal  Strengths: infection cleared. Tooth extraction site healing.   Date to Achieve By: 12-31-19  Patient expressed understanding of goal: yes          Psychosocial (pt-stated)     Notes - Note edited  5/8/2019 12:29 PM by Rani Sarkar LSW    Goal Statement: pt to remain safe in his own home as long as possible, safe meaning no injuries from falls or avoidable health issues.  Measure of Success: no falls with injuries or avoidable health issues  Supportive Steps to Achieve: home care, , family  Barriers: age, health  Strengths: supportive family  Date to Achieve By: 12-31-19  Patient expressed understanding of goal: yes                 Action Plans on File:                       Advance Care Plans/Directives Type:        My Medical and Care Information  Problem List   Patient Active Problem List   Diagnosis     Anxiety     Alcohol abuse     Chronic atrial  fibrillation     Weight loss     Peripheral polyneuropathy     Chronic gout, unspecified cause, unspecified site     Essential hypertension with goal blood pressure less than 140/90     Hyperlipidemia LDL goal <130     Persistent insomnia     Lower urinary tract symptoms (LUTS)     Long-term (current) use of anticoagulants [Z79.01]     Moderate episode of recurrent major depressive disorder (H)     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    Care Coordination Start Date: 11/28/2018   Frequency of Care Coordination: monthly   Form Last Updated: 10/03/2019

## 2019-10-03 NOTE — LETTER
[address] [address]  [phone]      10/3/2019      Florencio ASTORGA [address]  [place] [address]    Dear  Florencio,    It was a pleasure to speak with you.  I would like to provide you with the enclosed information for your records.  As part of care coordination, we are developing care plans to assist in accomplishing your health care goals.  When we speak next, please feel free to let me know if you want to add or change any information on your care plans.    As always, feel free to contact me if you have any questions or concerns.  I look forward to working with you in the effort to achieve your health care and wellness goals .        Sincerely,      Rani Sarkar, [facility]  Clinic Care Coordination  [phone]

## 2019-10-03 NOTE — PROGRESS NOTES
Clinic Care Coordination Contact    Follow Up Progress Note      Assessment: Patient had a recent tooth infection and had the tooth removed.  Due to this he was not able to have the rescheduled knee surgery.  He now has to wait until the tooth removal site is healed before he can have surgery.    Per son patient needs to go to heart failure clinic and it was felt that the [place] site was too far for how often he would need to go.  They have been trying to get into [place] clinic to voicing concerns with trying to get someone to clarify information.  They do have home care for another month to help monitor his heart.    Goals addressed this encounter:   Goals Addressed                 This Visit's Progress      Pain Management (pt-stated)        Goal Statement: I want to get knee surgery so my pain is improved.  Measure of Success: I will have surgery by the end of the year if not sooner for knee replacement.   Supportive Steps to Achieve: had infected tooth removed, family  Barriers: repeated infections, extracted tooth area needs to heal  Strengths: infection cleared. Tooth extraction site healing.   Date to Achieve By: 12-31-19  Patient expressed understanding of goal: yes            Psychosocial (pt-stated)   On track     Goal Statement: pt to remain safe in his own home as long as possible, safe meaning no injuries from falls or avoidable health issues.  Measure of Success: no falls with injuries or avoidable health issues  Supportive Steps to Achieve: home care, , family  Barriers: age, health  Strengths: supportive family  Date to Achieve By: 12-31-19  Patient expressed understanding of goal: yes                 Intervention/Education provided during outreach: No intervention or education needed regarding that tooth infection.  With regards to the heart failure and options discussed potential of going to [facility] heart care clinic and number was provided.  This may be an option if  they are not able to get into [place] on a more scheduled visits.  Their concern is how long it takes to get in and if someone is not returning there calls.  They going to have this issue ongoing.  Discussed that since he is a new patient at [place] that may be why it is a more lengthy time to get an initially.    We discussed the home care and some of the Medicare guidelines as to a potential reason why wound care could not continue past a month.  Son wonders if it is also due to when he has his appointment next that is stipulating only a month of home care.  Discussed additional options such as a nurse visit to [facility] or private pay.  Son is going to call Joselyn to verify if it is just a blood pressure check and monitoring so they know better about what options to pursue.     Outreach Frequency: monthly    Plan:   Son to call Joselyn to find out criteria and needs for monitoring patient's heart until he gets in to provider.  Care Coordinator will follow up in 1 month.    TONE Lord, [facility] - Care Coordinator   [facility]  10/3/2019   10:24 AM  [phone]

## 2019-10-04 ENCOUNTER — TELEPHONE (OUTPATIENT)
Dept: FAMILY MEDICINE | Facility: CLINIC | Age: 84
End: 2019-10-04

## 2019-10-09 ENCOUNTER — MEDICAL CORRESPONDENCE (OUTPATIENT)
Dept: HEALTH INFORMATION MANAGEMENT | Facility: CLINIC | Age: 84
End: 2019-10-09

## 2019-10-10 ENCOUNTER — ANTICOAGULATION THERAPY VISIT (OUTPATIENT)
Dept: ANTICOAGULATION | Facility: CLINIC | Age: 84
End: 2019-10-10
Payer: MEDICARE

## 2019-10-10 DIAGNOSIS — Z79.01 LONG TERM CURRENT USE OF ANTICOAGULANT THERAPY: ICD-10-CM

## 2019-10-10 DIAGNOSIS — I48.20 CHRONIC ATRIAL FIBRILLATION (H): ICD-10-CM

## 2019-10-10 LAB — INR POINT OF CARE: 1.8 (ref 0.86–1.14)

## 2019-10-10 PROCEDURE — 99207 ZZC NO CHARGE NURSE ONLY: CPT

## 2019-10-10 PROCEDURE — 36416 COLLJ CAPILLARY BLOOD SPEC: CPT

## 2019-10-10 PROCEDURE — 85610 PROTHROMBIN TIME: CPT | Mod: QW

## 2019-10-10 NOTE — PROGRESS NOTES
ANTICOAGULATION FOLLOW-UP CLINIC VISIT    Patient Name:  Nabor Cunningham  Date:  10/10/2019  Contact Type:  Face to Face    SUBJECTIVE:  Patient Findings     Comments:   Patient recently had his tooth extracted due to infection. He is not on any abx at this time. Once this heals, he will be able to schedule knee surgery. Patient denies any missed doses. It is possible his infected tooth was elevating his INR and now that is fixed, he needs to increase warfarin dosing. Will increase weekly dose by 7.1% at this time. Recheck in 2 weeks. Patient has not eaten any greens.        Clinical Outcomes     Comments:   Patient recently had his tooth extracted due to infection. He is not on any abx at this time. Once this heals, he will be able to schedule knee surgery. Patient denies any missed doses. It is possible his infected tooth was elevating his INR and now that is fixed, he needs to increase warfarin dosing. Will increase weekly dose by 7.1% at this time. Recheck in 2 weeks. Patient has not eaten any greens.           OBJECTIVE    INR Protime   Date Value Ref Range Status   10/10/2019 1.8 (A) 0.86 - 1.14 Final       ASSESSMENT / PLAN  INR assessment SUB    Recheck INR In: 2 WEEKS    INR Location Clinic      Anticoagulation Summary  As of 10/10/2019    INR goal:   2.0-3.0   TTR:   56.8 % (2.5 y)   INR used for dosin.8! (10/10/2019)   Warfarin maintenance plan:   6 mg (2 mg x 3) every Thu; 4 mg (2 mg x 2) all other days   Full warfarin instructions:   6 mg every Thu; 4 mg all other days   Weekly warfarin total:   30 mg   Plan last modified:   Belia Jones RN (10/10/2019)   Next INR check:   10/24/2019   Priority:   INR   Target end date:   Indefinite    Indications    Chronic atrial fibrillation [I48.20]  Long-term (current) use of anticoagulants [Z79.01] [Z79.01]             Anticoagulation Episode Summary     INR check location:       Preferred lab:       Send INR reminders to:   [facility]     Comments:   * 2mg tabs. Does not like to cut tabs. NO bandaid      Anticoagulation Care Providers     Provider Role Specialty Phone number    Heron Mayo MD [facility] [phone]            See the Encounter Report to view Anticoagulation Flowsheet and Dosing Calendar (Go to Encounters tab in chart review, and find the Anticoagulation Therapy Visit)        Belia Jones RN

## 2019-10-14 ENCOUNTER — TELEPHONE (OUTPATIENT)
Dept: FAMILY MEDICINE | Facility: CLINIC | Age: 84
End: 2019-10-14

## 2019-10-14 ENCOUNTER — OFFICE VISIT (OUTPATIENT)
Dept: NEUROLOGY | Facility: CLINIC | Age: 84
End: 2019-10-14
Payer: MEDICARE

## 2019-10-14 VITALS
TEMPERATURE: 96.6 F | SYSTOLIC BLOOD PRESSURE: 92 MMHG | RESPIRATION RATE: 12 BRPM | DIASTOLIC BLOOD PRESSURE: 50 MMHG | HEART RATE: 73 BPM | OXYGEN SATURATION: 96 %

## 2019-10-14 DIAGNOSIS — M51.369 DDD (DEGENERATIVE DISC DISEASE), LUMBAR: ICD-10-CM

## 2019-10-14 DIAGNOSIS — G62.9 PERIPHERAL POLYNEUROPATHY: Primary | ICD-10-CM

## 2019-10-14 PROCEDURE — 99214 OFFICE O/P EST MOD 30 MIN: CPT | Performed by: PSYCHIATRY & NEUROLOGY

## 2019-10-14 RX ORDER — GABAPENTIN 300 MG/1
300 CAPSULE ORAL AT BEDTIME
Qty: 90 CAPSULE | Refills: 3 | Status: SHIPPED | OUTPATIENT
Start: 2019-10-14 | End: 2020-07-07

## 2019-10-14 NOTE — LETTER
10/14/2019         RE: Nabor Cunningham  [address] [facility] [address]        Dear Colleague,    Thank you for referring your patient, Nabor Cunningham, to the [facility]. Please see a copy of my visit note below.    ESTABLISHED PATIENT NEUROLOGY NOTE    DATE OF VISIT: 10/14/2019  MRN: [id number]  PATIENT NAME: Nabor Cunningham  YOB: 1935    Chief Complaint   Patient presents with     RECHECK     Peripheral Neuropathy     SUBJECTIVE:                                                      HISTORY OF PRESENT ILLNESS:  Nabor is here for follow up regarding peripheral neuropathy.     As previously documented by me (4.18.19):  I met the patient about 3 months ago in consultation for the same. He described tingling and discomfort from the calves down since 2016. He has been on nortriptyline and felt like maybe the feet were worse in the evenings after taking the medication, though he also noted that the symptoms are just worse in the evening in general. Previous electromyogram showed mild sensorimotor polyneuropathy in the LEs, as expected.      Chalino has several medical comorbidities including HTN, Atrial fibrillation s/p pacemaker, CHF, HLD, Gout and anxiety/insomnia. Additional history of alcohol use disorder.      I started the patient on gabapentin and ordered labs for neuropathy. IgA was mildly elevated as was his ESR. The patient's son, Florencio called to report not much change on the starting gabapentin dose so I suggested an increase in the dose, but they decided not to make any changes. He is on 300mg at bedtime.      Chalino is here with his daughter today. He reports that the gabapentin does seem to be helping with the evening/nighttime foot discomfort. He denies side effects from the medication. He does not think the dose needs to be increased at this time.      Currently, he is having increasing problems with Left knee pain. He plans to have surgery early this summer  (mid-June). He also had prior Right knee replacement. He does walk with a cane. No recent falls.      He did have some increased anxiety recently related to having to put his cat down. He was very upset about this per daughter, and broke into a rash - felt to be related to anxiety. He is on some creams for this and reports it has improved. Daughter agrees. He mentions that he has a scab on his forehead that does not really heal; He keeps picking at it.      No other new concerns today.      We did not make any changes in the gabapentin dosing after the last visit. I did recheck immunofixation and ESR and the lab values were normal. Per chart review, he reported lower back and hip pain to a primary clinic provider in the meantime. [facility] [place] [facility]. He also had a Left knee partial replacement in July 2019.     Today the patient is here with his daughter.  She clarifies that Nabor has not had the knee surgery yet due to infections he developed (skin and then tooth). They have had to cancel the surgery twice. Plan is to move forward with surgery early next year. He is having a lot of lower back pain. The tingling discomfort in the feet is manageable. He has not noticed any side effects from the gabapentin. No dizziness. No recent falls. He will be seeing the chiropractor for his back pain later today.      CURRENT MEDICATIONS:   atorvastatin (LIPITOR) 20 MG tablet, Take 1 tablet (20 mg) by mouth daily  calcium carbonate (OS- MG [place]. CA) 1250 MG tablet, Take 1 tablet by mouth 2 times daily  colchicine (COLCRYS) 0.6 MG tablet, Take 1 tablet (0.6 mg) by mouth daily  cyanocobalamin (VITAMIN  B-12) 1000 MCG tablet, TAKE ONE TABLET BY MOUTH DAILY  furosemide (LASIX) 40 MG tablet, Take 2 tablets (80 mg) by mouth daily (Patient taking differently: Take 100 mg by mouth daily )  hypromellose (ARTIFICIAL TEARS) 0.4 % SOLN ophthalmic solution, Place 1 drop into both eyes every  hour as needed for dry eyes  lisinopril (PRINIVIL/ZESTRIL) 5 MG tablet, Take 2 tablets (10 mg) by mouth daily  metoprolol succinate ER (TOPROL-XL) 25 MG 24 hr tablet, Take 1 tablet (25 mg) by mouth every evening  multivitamin, therapeutic with minerals (MULTI-VITAMIN) TABS tablet, Take 1 tablet by mouth daily  nortriptyline (PAMELOR) 25 MG capsule, Take 1 capsule (25 mg) by mouth At Bedtime  prednisoLONE acetate (PRED FORTE) 1 % ophthalmic susp, Place 1 drop Into the left eye daily   triamcinolone (KENALOG) 0.1 % external cream, APPLY TOPICALLY TO AFFECTED AREA(S) TWO TIMES A DAY AS DIRECTED (Patient taking differently: 2 times daily as needed )  warfarin ANTICOAGULANT (JANTOVEN ANTICOAGULANT) 2 MG tablet, Take 6 mg every Thu; 4 mg all other days or as directed by the Anticoagulation Clinic  warfarin ANTICOAGULANT (JANTOVEN ANTICOAGULANT) 2 MG tablet, 6 mg Thurs; 4 mg all other days OR AS DIRECTED BY [facility]  [] cephALEXin (KEFLEX) 500 MG capsule, Take 1 capsule (500 mg) by mouth 3 times daily for 10 days  mirtazapine (REMERON) 15 MG tablet, Take 1 tablet (15 mg) by mouth At Bedtime (Patient not taking: Reported on 2019)    cross-Linked Hyaluronate (GEL-ONE) injection PRSY 30 mg  lidocaine 1 % injection 2 mL        RECENT DIAGNOSTIC STUDIES:   Labs:   Results for orders placed or performed in visit on 10/10/19   INR point of care   Result Value Ref Range    INR Protime 1.8 (A) 0.86 - 1.14     Imaging:  CT Lumbar (19):  IMPRESSION:   1. Multilevel advanced degenerative disc disease throughout the lumbar  spine. This is superimposed on other factors to cause multilevel  central stenosis which is moderate at L3-L4 and L4-L5 and mild to  moderate L2-L3.   2. Focus of right anterior epidural gas below the L2-L3 disc space  could be associated with an extruded disc fragment in the right L3  lateral recess. There is also suspicion of a small left central  inferiorly migrating extruded disc at L4-L5 extending  into the left L5  lateral recess.  3. Multilevel foraminal stenosis bilaterally as described above,  severe at some levels.    Imaging reviewed by me. Agree with Radiology read.      REVIEW OF SYSTEMS:                                                      10-point review of systems is negative except as mentioned above in HPI.     EXAM:                                                      Physical Exam:   Vitals: BP 92/50 (BP Location: Right arm, Patient Position: Sitting, Cuff Size: Adult Regular)   Pulse 73   Temp 96.6  F (35.9  C) (Tympanic)   Resp 12   SpO2 96%   BMI= There is no height or weight on file to calculate BMI.  GENERAL: NAD.   HEENT: NC/AT.   CV: RRR. Paced. S1, S2.    EXTR: Joint deformities in the hands.   Neurologic:  MENTAL STATUS: Alert, attentive. Speech is fluent. Hypophonic. Normal concentration. Adequate fund of knowledge.   CRANIAL NERVES: Discs technically difficult to visualize. Visual fields intact to confrontation. LEFT pupil is abnormal, with overlying corneal abnormalities. Ptosis on Left (s/p 3 surgeries). Facial sensation and movement normal. EOM full. Slightly hard of hearing. Trapezius strength intact. Palate moves symmetrically. Tongue midline.  MOTOR: 5/5 in proximal and distal muscle groups of upper and lower extremities. Tone and bulk normal.   DTRs: Intact and symmetric in UEs and patellae. Cannot elicit ankle jerks. Babinski down-going bilaterally.   SENSATION: Normal light touch in the hands. Diminished below the ankles. Intact proprioception. Vibration: Diminished at both ankles.   COORDINATION: Finger tapping normal.  STATION AND GAIT: Stance is stooped. Gait is antalgic. Tandem deferred for safety.   Right hand-dominant.     ASSESSMENT and PLAN:                                                      Assessment and Plan:    ICD-10-CM    1. Peripheral polyneuropathy G62.9 gabapentin (NEURONTIN) 300 MG capsule        Mr. Cunningham is a pleasant 84 yo man with multiple medical  comorbidities seen today for idiopathic peripheral neuropathy. He continues to note symptomatic improvement on the evening gabapentin, so we will not make any dose changes today. His main issue seems to be pain related to lower back pain and some cramping when he bends forward. He and his daughter are working on addressing these issues. I would like to see Bear back in about 6 months as long as things are stable. He understands and agrees with the plan.      Patient Instructions:  Continue the Gabapentin 300mg at bedtime. We can increase the dose if the tingling discomfort in the feet increases.   Follow-up in Neurology clinic in 6 months. Let us know if any concerns arise in the meantime.     Total Time: 25 minutes were spent with the patient. More than 50% of the time spent on counseling (as described above in Assessment and Plan/Instructions) /coordinating the care.    Kae Rodriguez MD  Neurology                  Again, thank you for allowing me to participate in the care of your patient.        Sincerely,        Kae Rodriguez MD

## 2019-10-14 NOTE — PROGRESS NOTES
ESTABLISHED PATIENT NEUROLOGY NOTE    DATE OF VISIT: 10/14/2019  MRN: [id number]  PATIENT NAME: Nabor Cunningham  YOB: 1935    Chief Complaint   Patient presents with     RECHECK     Peripheral Neuropathy     SUBJECTIVE:                                                      HISTORY OF PRESENT ILLNESS:  Nabor is here for follow up regarding peripheral neuropathy.     As previously documented by me (4.18.19):  I met the patient about 3 months ago in consultation for the same. He described tingling and discomfort from the calves down since 2016. He has been on nortriptyline and felt like maybe the feet were worse in the evenings after taking the medication, though he also noted that the symptoms are just worse in the evening in general. Previous electromyogram showed mild sensorimotor polyneuropathy in the LEs, as expected.      Chalino has several medical comorbidities including HTN, Atrial fibrillation s/p pacemaker, CHF, HLD, Gout and anxiety/insomnia. Additional history of alcohol use disorder.      I started the patient on gabapentin and ordered labs for neuropathy. IgA was mildly elevated as was his ESR. The patient's son, Florencio called to report not much change on the starting gabapentin dose so I suggested an increase in the dose, but they decided not to make any changes. He is on 300mg at bedtime.      Chalino is here with his daughter today. He reports that the gabapentin does seem to be helping with the evening/nighttime foot discomfort. He denies side effects from the medication. He does not think the dose needs to be increased at this time.      Currently, he is having increasing problems with Left knee pain. He plans to have surgery early this summer (mid-June). He also had prior Right knee replacement. He does walk with a cane. No recent falls.      He did have some increased anxiety recently related to having to put his cat down. He was very upset about this per daughter, and broke into a  rash - felt to be related to anxiety. He is on some creams for this and reports it has improved. Daughter agrees. He mentions that he has a scab on his forehead that does not really heal; He keeps picking at it.      No other new concerns today.      We did not make any changes in the gabapentin dosing after the last visit. I did recheck immunofixation and ESR and the lab values were normal. Per chart review, he reported lower back and hip pain to a primary clinic provider in the meantime. [facility] [place] [facility]. He also had a Left knee partial replacement in July 2019.     Today the patient is here with his daughter.  She clarifies that Nabor has not had the knee surgery yet due to infections he developed (skin and then tooth). They have had to cancel the surgery twice. Plan is to move forward with surgery early next year. He is having a lot of lower back pain. The tingling discomfort in the feet is manageable. He has not noticed any side effects from the gabapentin. No dizziness. No recent falls. He will be seeing the chiropractor for his back pain later today.      CURRENT MEDICATIONS:   atorvastatin (LIPITOR) 20 MG tablet, Take 1 tablet (20 mg) by mouth daily  calcium carbonate (OS- MG [place]. [place]) 1250 MG tablet, Take 1 tablet by mouth 2 times daily  colchicine (COLCRYS) 0.6 MG tablet, Take 1 tablet (0.6 mg) by mouth daily  cyanocobalamin (VITAMIN  B-12) 1000 MCG tablet, TAKE ONE TABLET BY MOUTH DAILY  furosemide (LASIX) 40 MG tablet, Take 2 tablets (80 mg) by mouth daily (Patient taking differently: Take 100 mg by mouth daily )  hypromellose (ARTIFICIAL TEARS) 0.4 % SOLN ophthalmic solution, Place 1 drop into both eyes every hour as needed for dry eyes  lisinopril (PRINIVIL/ZESTRIL) 5 MG tablet, Take 2 tablets (10 mg) by mouth daily  metoprolol succinate ER (TOPROL-XL) 25 MG 24 hr tablet, Take 1 tablet (25 mg) by mouth every evening  multivitamin, therapeutic with  minerals (MULTI-VITAMIN) TABS tablet, Take 1 tablet by mouth daily  nortriptyline (PAMELOR) 25 MG capsule, Take 1 capsule (25 mg) by mouth At Bedtime  prednisoLONE acetate (PRED FORTE) 1 % ophthalmic susp, Place 1 drop Into the left eye daily   triamcinolone (KENALOG) 0.1 % external cream, APPLY TOPICALLY TO AFFECTED AREA(S) TWO TIMES A DAY AS DIRECTED (Patient taking differently: 2 times daily as needed )  warfarin ANTICOAGULANT (JANTOVEN ANTICOAGULANT) 2 MG tablet, Take 6 mg every Thu; 4 mg all other days or as directed by the Anticoagulation Clinic  warfarin ANTICOAGULANT (JANTOVEN ANTICOAGULANT) 2 MG tablet, 6 mg Thurs; 4 mg all other days OR AS DIRECTED BY [facility]  [] cephALEXin (KEFLEX) 500 MG capsule, Take 1 capsule (500 mg) by mouth 3 times daily for 10 days  mirtazapine (REMERON) 15 MG tablet, Take 1 tablet (15 mg) by mouth At Bedtime (Patient not taking: Reported on 2019)    cross-Linked Hyaluronate (GEL-ONE) injection PRSY 30 mg  lidocaine 1 % injection 2 mL        RECENT DIAGNOSTIC STUDIES:   Labs:   Results for orders placed or performed in visit on 10/10/19   INR point of care   Result Value Ref Range    INR Protime 1.8 (A) 0.86 - 1.14     Imaging:  CT Lumbar (6.24.19):  IMPRESSION:   1. Multilevel advanced degenerative disc disease throughout the lumbar  spine. This is superimposed on other factors to cause multilevel  central stenosis which is moderate at L3-L4 and L4-L5 and mild to  moderate L2-L3.   2. Focus of right anterior epidural gas below the L2-L3 disc space  could be associated with an extruded disc fragment in the right L3  lateral recess. There is also suspicion of a small left central  inferiorly migrating extruded disc at L4-L5 extending into the left L5  lateral recess.  3. Multilevel foraminal stenosis bilaterally as described above,  severe at some levels.    Imaging reviewed by me. Agree with Radiology read.      REVIEW OF SYSTEMS:                                                       10-point review of systems is negative except as mentioned above in HPI.     EXAM:                                                      Physical Exam:   Vitals: BP 92/50 (BP Location: Right arm, Patient Position: Sitting, Cuff Size: Adult Regular)   Pulse 73   Temp 96.6  F (35.9  C) (Tympanic)   Resp 12   SpO2 96%   BMI= There is no height or weight on file to calculate BMI.  GENERAL: NAD.   HEENT: NC/AT.   CV: RRR. Paced. S1, S2.    EXTR: Joint deformities in the hands.   Neurologic:  MENTAL STATUS: Alert, attentive. Speech is fluent. Hypophonic. Normal concentration. Adequate fund of knowledge.   CRANIAL NERVES: Discs technically difficult to visualize. Visual fields intact to confrontation. LEFT pupil is abnormal, with overlying corneal abnormalities. Ptosis on Left (s/p 3 surgeries). Facial sensation and movement normal. EOM full. Slightly hard of hearing. Trapezius strength intact. Palate moves symmetrically. Tongue midline.  MOTOR: 5/5 in proximal and distal muscle groups of upper and lower extremities. Tone and bulk normal.   DTRs: Intact and symmetric in UEs and patellae. Cannot elicit ankle jerks. Babinski down-going bilaterally.   SENSATION: Normal light touch in the hands. Diminished below the ankles. Intact proprioception. Vibration: Diminished at both ankles.   COORDINATION: Finger tapping normal.  STATION AND GAIT: Stance is stooped. Gait is antalgic. Tandem deferred for safety.   Right hand-dominant.     ASSESSMENT and PLAN:                                                      Assessment and Plan:    ICD-10-CM    1. Peripheral polyneuropathy G62.9 gabapentin (NEURONTIN) 300 MG capsule        Mr. Cunningham is a pleasant 84 yo man with multiple medical comorbidities seen today for idiopathic peripheral neuropathy. He continues to note symptomatic improvement on the evening gabapentin, so we will not make any dose changes today. His main issue seems to be pain related to lower back pain and some  cramping when he bends forward. He and his daughter are working on addressing these issues. I would like to see Bear back in about 6 months as long as things are stable. He understands and agrees with the plan.      Patient Instructions:  Continue the Gabapentin 300mg at bedtime. We can increase the dose if the tingling discomfort in the feet increases.   Follow-up in Neurology clinic in 6 months. Let us know if any concerns arise in the meantime.     Total Time: 25 minutes were spent with the patient. More than 50% of the time spent on counseling (as described above in Assessment and Plan/Instructions) /coordinating the care.    Kae Rodriguez MD  Neurology

## 2019-10-14 NOTE — PATIENT INSTRUCTIONS
Plan:    Continue the Gabapentin 300mg at bedtime. We can increase the dose if the tingling discomfort in the feet increases.   Follow-up in Neurology clinic in 6 months. Let us know if any concerns arise in the meantime.

## 2019-10-17 ENCOUNTER — TELEPHONE (OUTPATIENT)
Dept: FAMILY MEDICINE | Facility: CLINIC | Age: 84
End: 2019-10-17

## 2019-10-17 NOTE — TELEPHONE ENCOUNTER
Reason for Call:  Form, our goal is to have forms completed with 72 hours, however, some forms may require a visit or additional information.    Type of letter, form or note:  Home Health Care Orders    Who is the form from?: Home care - [place]    Where did the form come from: form was faxed in    What clinic location was the form placed at?: [place]    Where the form was placed: Given to physician    What number is listed as a contact on the form?: [phone] - Phone  [phone] - Fax       Additional comments:     Call taken on 10/17/2019 at 11:23 AM by Leslie Guerrier

## 2019-10-21 ENCOUNTER — MEDICAL CORRESPONDENCE (OUTPATIENT)
Dept: HEALTH INFORMATION MANAGEMENT | Facility: CLINIC | Age: 84
End: 2019-10-21

## 2019-10-21 NOTE — TELEPHONE ENCOUNTER
Form received back signed  10/21/19  1) Drug Interaction - Colchicine & Atorvastatin  2) Discharge Order Summary   3) Phy Orders - change frequency / PT Discharge Planning     Faxed Back & Sent to be scanned to this encounter  Cee [place] Sec

## 2019-10-23 ENCOUNTER — MEDICAL CORRESPONDENCE (OUTPATIENT)
Dept: HEALTH INFORMATION MANAGEMENT | Facility: CLINIC | Age: 84
End: 2019-10-23

## 2019-10-23 ENCOUNTER — TELEPHONE (OUTPATIENT)
Dept: FAMILY MEDICINE | Facility: CLINIC | Age: 84
End: 2019-10-23

## 2019-10-24 ENCOUNTER — ANTICOAGULATION THERAPY VISIT (OUTPATIENT)
Dept: ANTICOAGULATION | Facility: CLINIC | Age: 84
End: 2019-10-24
Payer: MEDICARE

## 2019-10-24 DIAGNOSIS — Z79.01 LONG TERM CURRENT USE OF ANTICOAGULANT THERAPY: ICD-10-CM

## 2019-10-24 DIAGNOSIS — I48.20 CHRONIC ATRIAL FIBRILLATION (H): ICD-10-CM

## 2019-10-24 LAB — INR POINT OF CARE: 3.5 (ref 0.86–1.14)

## 2019-10-24 PROCEDURE — 85610 PROTHROMBIN TIME: CPT | Mod: QW

## 2019-10-24 PROCEDURE — 36416 COLLJ CAPILLARY BLOOD SPEC: CPT

## 2019-10-24 PROCEDURE — 99207 ZZC NO CHARGE NURSE ONLY: CPT

## 2019-10-24 NOTE — PROGRESS NOTES
ANTICOAGULATION FOLLOW-UP CLINIC VISIT    Patient Name:  Nabor Cunningham  Date:  10/24/2019  Contact Type:  Face to Face    SUBJECTIVE:  Patient Findings     Positives:   Change in medications (taking 1000 mg tylenol every 6 hours or more)    Comments:   Patient has been taking too much extra strength Tylenol. He has had history of doing this before as well. He takes this for his back and knee pain. Daughter states he carries a bag of tylenol around with him and she noticed him taking 2 tabs instead of just 1. There have been no changes in diet, activity or health. No signs of infection or swelling. Writer discussed the importance of not taking too much Tylenol again. Will reduce today's dose of warfarin, then continue most recent dosing as he is likely elevated from Tylenol. Recheck in 2 weeks.        Clinical Outcomes     Comments:   Patient has been taking too much extra strength Tylenol. He has had history of doing this before as well. He takes this for his back and knee pain. Daughter states he carries a bag of tylenol around with him and she noticed him taking 2 tabs instead of just 1. There have been no changes in diet, activity or health. No signs of infection or swelling. Writer discussed the importance of not taking too much Tylenol again. Will reduce today's dose of warfarin, then continue most recent dosing as he is likely elevated from Tylenol. Recheck in 2 weeks.           OBJECTIVE    INR Protime   Date Value Ref Range Status   10/24/2019 3.5 (A) 0.86 - 1.14 Final       ASSESSMENT / PLAN  INR assessment SUPRA    Recheck INR In: 2 WEEKS    INR Location Clinic      Anticoagulation Summary  As of 10/24/2019    INR goal:   2.0-3.0   TTR:   56.9 % (2.5 y)   INR used for dosing:   3.5! (10/24/2019)   Warfarin maintenance plan:   6 mg (2 mg x 3) every Thu; 4 mg (2 mg x 2) all other days   Full warfarin instructions:   10/24: 4 mg; Otherwise 6 mg every Thu; 4 mg all other days   Weekly warfarin total:   30 mg    Plan last modified:   Belia Jones RN (10/24/2019)   Next INR check:   11/7/2019   Priority:   INR   Target end date:   Indefinite    Indications    Chronic atrial fibrillation [I48.20]  Long-term (current) use of anticoagulants [Z79.01] [Z79.01]             Anticoagulation Episode Summary     INR check location:       Preferred lab:       Send INR reminders to:   [facility]    Comments:   * 2mg tabs. Does not like to cut tabs. NO bandaid      Anticoagulation Care Providers     Provider Role Specialty Phone number    Heron Mayo MD [facility] [phone]            See the Encounter Report to view Anticoagulation Flowsheet and Dosing Calendar (Go to Encounters tab in chart review, and find the Anticoagulation Therapy Visit)        Belia Jones RN

## 2019-10-31 ENCOUNTER — PATIENT OUTREACH (OUTPATIENT)
Dept: CARE COORDINATION | Facility: CLINIC | Age: 84
End: 2019-10-31

## 2019-10-31 ASSESSMENT — ACTIVITIES OF DAILY LIVING (ADL): DEPENDENT_IADLS:: INDEPENDENT

## 2019-10-31 NOTE — PROGRESS NOTES
Clinic Care Coordination Contact  [facility]/Voicemail    Referral Source: PCP  Clinical Data: Care Coordinator Outreach  Outreach attempted x 1.  Left message on son's voicemail with call back information and requested return call.  Plan:  Care Coordinator will try to reach patient again in 5-7 business days.    TONE Lord, [facility] - Care Coordinator   [facility]  10/31/2019   10:19 AM  [phone]

## 2019-11-01 ENCOUNTER — COMMUNICATION - HEALTHEAST (OUTPATIENT)
Dept: PHYSICAL MEDICINE AND REHAB | Facility: CLINIC | Age: 84
End: 2019-11-01

## 2019-11-01 DIAGNOSIS — M54.42 ACUTE BILATERAL LOW BACK PAIN WITH BILATERAL SCIATICA: ICD-10-CM

## 2019-11-01 DIAGNOSIS — M54.41 ACUTE BILATERAL LOW BACK PAIN WITH BILATERAL SCIATICA: ICD-10-CM

## 2019-11-07 ENCOUNTER — ANTICOAGULATION THERAPY VISIT (OUTPATIENT)
Dept: ANTICOAGULATION | Facility: CLINIC | Age: 84
End: 2019-11-07
Payer: MEDICARE

## 2019-11-07 DIAGNOSIS — I48.20 CHRONIC ATRIAL FIBRILLATION (H): ICD-10-CM

## 2019-11-07 DIAGNOSIS — Z79.01 LONG TERM CURRENT USE OF ANTICOAGULANT THERAPY: ICD-10-CM

## 2019-11-07 LAB — INR POINT OF CARE: 2.6 (ref 0.86–1.14)

## 2019-11-07 PROCEDURE — 36416 COLLJ CAPILLARY BLOOD SPEC: CPT

## 2019-11-07 PROCEDURE — 99207 ZZC NO CHARGE NURSE ONLY: CPT

## 2019-11-07 PROCEDURE — 85610 PROTHROMBIN TIME: CPT | Mod: QW

## 2019-11-07 ASSESSMENT — ACTIVITIES OF DAILY LIVING (ADL): DEPENDENT_IADLS:: INDEPENDENT

## 2019-11-07 NOTE — PROGRESS NOTES
ANTICOAGULATION FOLLOW-UP CLINIC VISIT    Patient Name:  Nabor Cunningham  Date:  2019  Contact Type:  Face to Face    SUBJECTIVE:  Patient Findings     Positives:   Change in medications (cut back on tylenol)    Comments:   No changes in diet, activity level, or health. Patient did cut down his tylenol use. INR now back in range. No missed doses of warfarin. Patient took dosing as prescribed. No signs of clots or bleeding concerns. Patient will continue maintenance warfarin dosing.          Clinical Outcomes     Negatives:   Major bleeding event, Thromboembolic event, Anticoagulation-related hospital admission, Anticoagulation-related ED visit, Anticoagulation-related fatality    Comments:   No changes in diet, activity level, or health. Patient did cut down his tylenol use. INR now back in range. No missed doses of warfarin. Patient took dosing as prescribed. No signs of clots or bleeding concerns. Patient will continue maintenance warfarin dosing.             OBJECTIVE    INR Protime   Date Value Ref Range Status   2019 2.6 (A) 0.86 - 1.14 Final       ASSESSMENT / PLAN  INR assessment THER    Recheck INR In: 4 WEEKS    INR Location Clinic      Anticoagulation Summary  As of 2019    INR goal:   2.0-3.0   TTR:   56.7 % (2.6 y)   INR used for dosin.6 (2019)   Warfarin maintenance plan:   6 mg (2 mg x 3) every Thu; 4 mg (2 mg x 2) all other days   Full warfarin instructions:   6 mg every Thu; 4 mg all other days   Weekly warfarin total:   30 mg   No change documented:   Belia Jones RN   Plan last modified:   Belia Jones RN (10/24/2019)   Next INR check:   2019   Priority:   INR   Target end date:   Indefinite    Indications    Chronic atrial fibrillation [I48.20]  Long-term (current) use of anticoagulants [Z79.01] [Z79.01]             Anticoagulation Episode Summary     INR check location:       Preferred lab:       Send INR reminders to:   [facility]    Comments:    * 2mg tabs. Does not like to cut tabs. NO bandaid      Anticoagulation Care Providers     Provider Role Specialty Phone number    Heron Mayo MD [facility] [phone]            See the Encounter Report to view Anticoagulation Flowsheet and Dosing Calendar (Go to Encounters tab in chart review, and find the Anticoagulation Therapy Visit)        Belia Jones RN

## 2019-11-07 NOTE — PROGRESS NOTES
Clinic Care Coordination Contact    Follow Up Progress Note      Assessment: Per son patient is doing well.  They will be getting him back into the dentist to make sure that that infected tooth area is good to go for surgery.  They have cardiology scheduled in [place] in December.  And they will be scheduling his knee surgery once they get the all clear from the dentist, which should be about 3 to 4 months out.    Goals addressed this encounter:   Goals Addressed                 This Visit's Progress      Pain Management (pt-stated)   70%     Goal Statement: I want to get knee surgery so my pain is improved.  Measure of Success: I will have surgery by the end of the year if not sooner for knee replacement.   Supportive Steps to Achieve: had infected tooth removed, family  Barriers: repeated infections, extracted tooth area needs to heal  Strengths: infection cleared. Tooth extraction site healing.   Date to Achieve By: 2-  Patient expressed understanding of goal: yes            Psychosocial (pt-stated)   On track     Goal Statement: pt to remain safe in his own home as long as possible, safe meaning no injuries from falls or avoidable health issues.  Measure of Success: no falls with injuries or avoidable health issues  Supportive Steps to Achieve: home care, , family  Barriers: age, health  Strengths: supportive family  Date to Achieve By: 6-  Patient expressed understanding of goal: yes    11-7-19 extended target date             Intervention/Education provided during outreach: Discussed patient and any changes.  He is now using a rolling walker to ambulate.  Family is still supporting him with setting up medications and ordering medications for him.  1 of them they are ordering from [facility] as it is less expensive.  Reviewed that it is the open enrollment period For Medicare supplement plans.  Provided son with the phone numbers to the [facility] line and [address] [place] [address]  senior [employer] so they can review options.    Per son patient is no longer going to counseling for his anxiety and is doing well.  Pt is doing well with getting along with his cat which helps his moods.      Outreach Frequency: monthly    Plan:   Pt to continue to be supported by his kids.  Scheduling of knee surgery by family early next year.   Care Coordinator will follow up in one month.     TONE Lord, [facility] - Care Coordinator   [facility]  11/7/2019   3:38 PM  [phone]

## 2019-12-01 DIAGNOSIS — I50.43 ACUTE ON CHRONIC COMBINED SYSTOLIC AND DIASTOLIC CONGESTIVE HEART FAILURE (H): ICD-10-CM

## 2019-12-01 DIAGNOSIS — I10 ESSENTIAL HYPERTENSION WITH GOAL BLOOD PRESSURE LESS THAN 140/90: ICD-10-CM

## 2019-12-01 DIAGNOSIS — F41.9 ANXIETY: ICD-10-CM

## 2019-12-01 NOTE — TELEPHONE ENCOUNTER
"NORTRIPTYLINE HCL 25MG CAPS-  Last Written Prescription Date:  11/30/2018  Last Fill Quantity: 30,  # refills: 11   Last office visit: 9/23/2019 with prescribing provider:  MELINA   Future Office Visit:      Metoprolol  Last Written Prescription Date:  5/13/2019  Last Fill Quantity: 90,  # refills: 1   Last office visit: 9/23/2019 with prescribing provider:  MELINA   Future Office Visit:    Requested Prescriptions   Pending Prescriptions Disp Refills     nortriptyline (PAMELOR) 25 MG capsule [Pharmacy Med Name: NORTRIPTYLINE HCL 25MG CAPS] 30 capsule 10     Sig: TAKE ONE CAPSULE BY MOUTH EVERY NIGHT AT BEDTIME       Tricyclic Agents ( Annual appt and no PHQ9) Passed - 12/1/2019  9:39 AM        Passed - Blood Pressure under 140/90 in past 12 mos     BP Readings from Last 3 Encounters:   10/14/19 92/50   09/23/19 92/54   08/15/19 100/60                 Passed - Recent (12 mo) or future (30 days) visit within authorizing provider's specialty     Patient has had an office visit with the authorizing provider or a provider within the authorizing providers department within the previous 12 mos or has a future within next 30 days. See \"Patient Info\" tab in inbasket, or \"Choose Columns\" in Meds & Orders section of the refill encounter.              Passed - Medication is active on med list        Passed - Patient is age 18 or older        metoprolol succinate ER (TOPROL-XL) 25 MG 24 hr tablet [Pharmacy Med Name: METOPROLOL SUCCINATE ER 25MG TB24] 90 tablet 1     Sig: TAKE ONE TABLET BY MOUTH EVERY EVENING       Beta-Blockers Protocol Passed - 12/1/2019  9:39 AM        Passed - Blood pressure under 140/90 in past 12 months     BP Readings from Last 3 Encounters:   10/14/19 92/50   09/23/19 92/54   08/15/19 100/60                 Passed - Patient is age 6 or older        Passed - Recent (12 mo) or future (30 days) visit within the authorizing provider's specialty     Patient has had an office visit with the authorizing provider or a " "provider within the authorizing providers department within the previous 12 mos or has a future within next 30 days. See \"Patient Info\" tab in inbasket, or \"Choose Columns\" in Meds & Orders section of the refill encounter.              Passed - Medication is active on med list            "

## 2019-12-02 RX ORDER — METOPROLOL SUCCINATE 25 MG/1
TABLET, EXTENDED RELEASE ORAL
Qty: 90 TABLET | Refills: 1 | Status: SHIPPED | OUTPATIENT
Start: 2019-12-02 | End: 2020-05-26

## 2019-12-02 RX ORDER — NORTRIPTYLINE HCL 25 MG
CAPSULE ORAL
Qty: 30 CAPSULE | Refills: 10 | Status: SHIPPED | OUTPATIENT
Start: 2019-12-02 | End: 2020-10-07

## 2019-12-05 ENCOUNTER — PATIENT OUTREACH (OUTPATIENT)
Dept: CARE COORDINATION | Facility: CLINIC | Age: 84
End: 2019-12-05

## 2019-12-05 ENCOUNTER — ANTICOAGULATION THERAPY VISIT (OUTPATIENT)
Dept: ANTICOAGULATION | Facility: CLINIC | Age: 84
End: 2019-12-05
Payer: MEDICARE

## 2019-12-05 DIAGNOSIS — I48.20 CHRONIC ATRIAL FIBRILLATION (H): ICD-10-CM

## 2019-12-05 DIAGNOSIS — Z79.01 LONG TERM CURRENT USE OF ANTICOAGULANT THERAPY: ICD-10-CM

## 2019-12-05 LAB — INR POINT OF CARE: 1.8 (ref 0.86–1.14)

## 2019-12-05 PROCEDURE — 99207 ZZC NO CHARGE NURSE ONLY: CPT

## 2019-12-05 PROCEDURE — 85610 PROTHROMBIN TIME: CPT | Mod: QW

## 2019-12-05 PROCEDURE — 36416 COLLJ CAPILLARY BLOOD SPEC: CPT

## 2019-12-05 ASSESSMENT — ACTIVITIES OF DAILY LIVING (ADL): DEPENDENT_IADLS:: INDEPENDENT

## 2019-12-05 NOTE — PROGRESS NOTES
ANTICOAGULATION FOLLOW-UP CLINIC VISIT    Patient Name:  Nabor Cunningham  Date:  2019  Contact Type:  Face to Face    SUBJECTIVE:  Patient Findings     Positives:   Missed doses (possible)    Comments:   Patient's son has been setting up medications the last 10 days or so and it is possible he did not set up the extra dose last Thurs. The patient thinks he has only been taking 4 mg daily. Since INR was supra therapeutic on this same dose 2 rechecks ago, writer is hesitant to increase dosing at this time. Will continue current dose for now (has a larger dose today already) and daughter will resume med set up. Recheck at [facility] in 13 days after cardiology visit.        Clinical Outcomes     Comments:   Patient's son has been setting up medications the last 10 days or so and it is possible he did not set up the extra dose last Thurs. The patient thinks he has only been taking 4 mg daily. Since INR was supra therapeutic on this same dose 2 rechecks ago, writer is hesitant to increase dosing at this time. Will continue current dose for now (has a larger dose today already) and daughter will resume med set up. Recheck at [facility] in 13 days after cardiology visit.           OBJECTIVE    INR Protime   Date Value Ref Range Status   2019 1.8 (A) 0.86 - 1.14 Final       ASSESSMENT / PLAN  INR assessment SUB    Recheck INR In: 2 WEEKS    INR Location Clinic      Anticoagulation Summary  As of 2019    INR goal:   2.0-3.0   TTR:   51.6 % (1 y)   INR used for dosin.8! (2019)   Warfarin maintenance plan:   6 mg (2 mg x 3) every Thu; 4 mg (2 mg x 2) all other days   Full warfarin instructions:   6 mg every Thu; 4 mg all other days   Weekly warfarin total:   30 mg   No change documented:   Belia Jones RN   Plan last modified:   Belia Jones RN (10/24/2019)   Next INR check:   2019   Priority:   INR   Target end date:   Indefinite    Indications    Chronic atrial fibrillation [I48.20]  Long-term  (current) use of anticoagulants [Z79.01] [Z79.01]             Anticoagulation Episode Summary     INR check location:       Preferred lab:       Send INR reminders to:   [facility]    Comments:   * 2mg tabs. Does not like to cut tabs. NO bandaid      Anticoagulation Care Providers     Provider Role Specialty Phone number    Heron Mayo MD [facility] [phone]            See the Encounter Report to view Anticoagulation Flowsheet and Dosing Calendar (Go to Encounters tab in chart review, and find the Anticoagulation Therapy Visit)        Belia Jones RN

## 2019-12-05 NOTE — PROGRESS NOTES
Clinic Care Coordination Contact  [facility]/Voicemail    Referral Source: PCP  Clinical Data: Care Coordinator Outreach  Outreach attempted x 1.  Left message on son's voicemail with call back information and requested return call.  Plan: Care Coordinator will try to reach patient again in 5-7 business days.    TONE Lord, [facility] - Care Coordinator   [facility]  12/5/2019   12:53 PM  [phone]

## 2019-12-13 ASSESSMENT — ACTIVITIES OF DAILY LIVING (ADL): DEPENDENT_IADLS:: INDEPENDENT

## 2019-12-13 NOTE — PROGRESS NOTES
Clinic Care Coordination Contact    Follow Up Progress Note      Assessment: Son and patient report that they are doing well.  They have gotten the all cleared from the dentist for knee surgery and they have cardiology scheduled on the 18th.    Goals addressed this encounter:   Goals Addressed                 This Visit's Progress      Pain Management (pt-stated)   80%     Goal Statement: I want to get knee surgery so my pain is improved.  Measure of Success: I will have surgery by the end of the year if not sooner for knee replacement.   Supportive Steps to Achieve: had infected tooth removed, family  Barriers: repeated infections, extracted tooth area needs to heal  Strengths: infection cleared. Tooth extraction site healing.   Date to Achieve By: 2-  Patient expressed understanding of goal: yes            Psychosocial (pt-stated)   On track     Goal Statement: pt to remain safe in his own home as long as possible, safe meaning no injuries from falls or avoidable health issues.  Measure of Success: no falls with injuries or avoidable health issues  Supportive Steps to Achieve: home care, , family  Barriers: age, health  Strengths: supportive family  Date to Achieve By: 6-  Patient expressed understanding of goal: yes    11-7-19 extended target date             Intervention/Education provided during outreach: At the current time they have no concerns.  Family still support patient and get him out shopping.  Patient appears to be in a good mood with lots of joking during the phone call.    Health Maintenance Due   Topic Date Due     HF ACTION PLAN  1935     ZOSTER IMMUNIZATION (1 of 2) 11/03/1987     MEDICARE ANNUAL WELLNESS VISIT  11/03/2002     PHQ-9  08/15/2019          Outreach Frequency: monthly    Plan:   Family to continue to support patient and bring him to appointments.   to call mid-January due to holidays and out of office.  Care Coordinator  will follow up in 5 weeks.     TONE Lord, [facility] - Care Coordinator   [facility]  12/13/2019   3:39 PM  [phone]

## 2019-12-18 ENCOUNTER — OFFICE VISIT (OUTPATIENT)
Dept: CARDIOLOGY | Facility: CLINIC | Age: 84
End: 2019-12-18
Payer: MEDICARE

## 2019-12-18 ENCOUNTER — ANTICOAGULATION THERAPY VISIT (OUTPATIENT)
Dept: ANTICOAGULATION | Facility: CLINIC | Age: 84
End: 2019-12-18
Payer: MEDICARE

## 2019-12-18 VITALS
WEIGHT: 181 LBS | OXYGEN SATURATION: 97 % | HEIGHT: 64 IN | DIASTOLIC BLOOD PRESSURE: 59 MMHG | HEART RATE: 65 BPM | BODY MASS INDEX: 30.9 KG/M2 | SYSTOLIC BLOOD PRESSURE: 105 MMHG

## 2019-12-18 DIAGNOSIS — Z79.01 LONG TERM CURRENT USE OF ANTICOAGULANT THERAPY: ICD-10-CM

## 2019-12-18 DIAGNOSIS — I50.22 CHRONIC SYSTOLIC HEART FAILURE (H): Primary | ICD-10-CM

## 2019-12-18 DIAGNOSIS — I48.20 CHRONIC ATRIAL FIBRILLATION (H): ICD-10-CM

## 2019-12-18 LAB — INR POINT OF CARE: 2 (ref 0.86–1.14)

## 2019-12-18 PROCEDURE — 99203 OFFICE O/P NEW LOW 30 MIN: CPT | Performed by: INTERNAL MEDICINE

## 2019-12-18 PROCEDURE — 99207 ZZC NO CHARGE NURSE ONLY: CPT

## 2019-12-18 PROCEDURE — 85610 PROTHROMBIN TIME: CPT | Mod: QW

## 2019-12-18 PROCEDURE — 36416 COLLJ CAPILLARY BLOOD SPEC: CPT

## 2019-12-18 ASSESSMENT — MIFFLIN-ST. JEOR: SCORE: 1432.01

## 2019-12-18 NOTE — LETTER
12/18/2019    Heron Mayo MD  [address] [place] [id number]    RE: Nabor Cunningham       Dear Colleague,    I had the pleasure of seeing Nabor Cunningham in the [facility].    HPI and Plan:   See dictation [id number]    Orders Placed This Encounter   Procedures     Basic metabolic panel     Follow-Up with [facility] visit     Echocardiogram Complete     No orders of the defined types were placed in this encounter.    Medications Discontinued During This Encounter   Medication Reason     cephALEXin (KEFLEX) 500 MG capsule Therapy completed     lidocaine 1 % injection 2 mL Therapy completed         Encounter Diagnosis   Name Primary?     Chronic systolic heart failure (H) Yes       CURRENT MEDICATIONS:  Current Outpatient Medications   Medication Sig Dispense Refill     atorvastatin (LIPITOR) 20 MG tablet Take 1 tablet (20 mg) by mouth daily 30 tablet 11     calcium carbonate (OS- MG [place]. [place]) 1250 MG tablet Take 1 tablet by mouth 2 times daily       colchicine (COLCRYS) 0.6 MG tablet Take 1 tablet (0.6 mg) by mouth daily 90 tablet 3     cyanocobalamin (VITAMIN  B-12) 1000 MCG tablet TAKE ONE TABLET BY MOUTH DAILY 30 tablet 11     furosemide (LASIX) 40 MG tablet Take 2 tablets (80 mg) by mouth daily (Patient taking differently: Take 100 mg by mouth daily ) 180 tablet 0     gabapentin (NEURONTIN) 300 MG capsule Take 1 capsule (300 mg) by mouth At Bedtime 90 capsule 3     hypromellose (ARTIFICIAL TEARS) 0.4 % SOLN ophthalmic solution Place 1 drop into both eyes every hour as needed for dry eyes       lisinopril (PRINIVIL/ZESTRIL) 5 MG tablet Take 2 tablets (10 mg) by mouth daily 90 tablet 3     metoprolol succinate ER (TOPROL-XL) 25 MG 24 hr tablet TAKE ONE TABLET BY MOUTH EVERY EVENING 90 tablet 1     mirtazapine (REMERON) 15 MG tablet Take 1 tablet (15 mg) by mouth At Bedtime 30 tablet 11     multivitamin, therapeutic with minerals (MULTI-VITAMIN) TABS tablet Take 1  tablet by mouth daily 100 tablet 3     nortriptyline (PAMELOR) 25 MG capsule TAKE ONE CAPSULE BY MOUTH EVERY NIGHT AT BEDTIME 30 capsule 10     prednisoLONE acetate (PRED FORTE) 1 % ophthalmic susp Place 1 drop Into the left eye daily   2     triamcinolone (KENALOG) 0.1 % external cream APPLY TOPICALLY TO AFFECTED AREA(S) TWO TIMES A DAY AS DIRECTED (Patient taking differently: 2 times daily as needed ) 45 g 1     warfarin ANTICOAGULANT (JANTOVEN ANTICOAGULANT) 2 MG tablet Take 6 mg every Thu; 4 mg all other days or as directed by the Anticoagulation Clinic 200 tablet 0       ALLERGIES     Allergies   Allergen Reactions     Amoxicillin Hives     Penicillins Other (See Comments)     Dizzy       Trazodone Other (See Comments)     Hallucinations         PAST MEDICAL HISTORY:  Past Medical History:   Diagnosis Date     Bleeding from wound 9/2/2018 7/12/2019:He had prolonged bleeding after cyst removal on his back within the past year.          PAST SURGICAL HISTORY:  Past Surgical History:   Procedure Laterality Date     ANGIOGRAM  01/2019    right and left heart cath.      APPENDECTOMY      ~2013     CATARACT IOL, RT/LT      Past bilateral cataract surgery.      EYE SURGERY Left     three corneal transplants     IMPLANT PACEMAKER       INCISION AND DRAINAGE TRUNK, COMBINED N/A 9/6/2018    Procedure: COMBINED INCISION AND DRAINAGE TRUNK;  incision and cauterization of left buttock bleed.;  Surgeon: Dain Davis MD;  Location: [facility]     INCISION AND DRAINAGE TRUNK, COMBINED N/A 11/1/2018    Procedure:  INCISION AND DRAINAGE of Back Wound;  Surgeon: Dain Davis MD;  Location: [facility] OR     JOINT REPLACEMENT Right      THORACIC SURGERY      removal benign nodule       FAMILY HISTORY:  No family history on file.    SOCIAL HISTORY:  Social History     Socioeconomic History     Marital status:      Spouse name: None     Number of children: None     Years of education: None     Highest education level: None  "  Occupational History     None   Social Needs     Financial resource strain: None     Food insecurity:     Worry: None     Inability: None     Transportation needs:     Medical: None     Non-medical: None   Tobacco Use     Smoking status: Former Smoker     Years: 20.00     Types: Cigarettes     Smokeless tobacco: Never Used   Substance and Sexual Activity     Alcohol use: No     Drug use: No     Sexual activity: Not Currently   Lifestyle     Physical activity:     Days per week: None     Minutes per session: None     Stress: None   Relationships     Social connections:     Talks on phone: None     Gets together: None     Attends Yarsani service: None     Active member of club or organization: None     Attends meetings of clubs or organizations: None     Relationship status: None     Intimate partner violence:     Fear of current or ex partner: None     Emotionally abused: None     Physically abused: None     Forced sexual activity: None   Other Topics Concern     Parent/sibling w/ CABG, MI or angioplasty before 65F 55M? Not Asked   Social History Narrative    ** Merged History Encounter **            Review of Systems:  Skin:  Positive for itching   Eyes:  Positive for glasses;visual blurring  ENT:  Negative    Respiratory:  Negative    Cardiovascular:    Positive for;lower extremity symptoms;edema;fatigue  Gastroenterology: Negative    Genitourinary:  Positive for urinary frequency;urgency  Musculoskeletal:  Positive for joint pain;joint swelling;joint stiffness;arthritis  Neurologic:  Positive for numbness or tingling of feet  Psychiatric:  Positive for sleep disturbances  Heme/Lymph/Imm:  Negative    Endocrine:  Negative      Physical Exam:  Vitals: /59 (BP Location: Right arm, Patient Position: Sitting, Cuff Size: Adult Regular)   Pulse 65   Ht 1.626 m (5' 4\")   Wt 82.1 kg (181 lb)   SpO2 97%   BMI 31.07 kg/m       Recent Lab Results:  LIPID RESULTS:  Lab Results   Component Value Date    CHOL 236 " (H) 07/12/2019    HDL 36 (L) 07/12/2019    LDL  07/12/2019     Cannot estimate LDL when triglyceride exceeds 400 mg/dL     (H) 07/12/2019    TRIG 422 (H) 07/12/2019       LIVER ENZYME RESULTS:  Lab Results   Component Value Date    AST 26 07/26/2019    ALT 33 07/26/2019       CBC RESULTS:  Lab Results   Component Value Date    WBC 11.1 (H) 07/26/2019    RBC 4.63 07/26/2019    HGB 13.7 07/26/2019    HCT 41.2 07/26/2019    MCV 89 07/26/2019    MCH 29.6 07/26/2019    MCHC 33.3 07/26/2019    RDW 13.6 07/26/2019     (L) 07/26/2019       BMP RESULTS:  Lab Results   Component Value Date     09/23/2019    POTASSIUM 4.2 09/23/2019    CHLORIDE 98 09/23/2019    CO2 33 (H) 09/23/2019    ANIONGAP 3 09/23/2019    GLC 87 09/23/2019    BUN 29 09/23/2019    CR 0.96 09/23/2019    GFRESTIMATED 74 09/23/2019    GFRESTBLACK 85 09/23/2019    ALY 9.5 09/23/2019        A1C RESULTS:  No results found for: A1C    INR RESULTS:  Lab Results   Component Value Date    INR 1.8 (A) 12/05/2019    INR 2.6 (A) 11/07/2019    INR 1.88 (H) 08/01/2019    INR 1.85 (H) 07/29/2019           CC  Heron Mayo MD  [address] [place] [id number]                    Thank you for allowing me to participate in the care of your patient.      Sincerely,     Katie Galvan MD     [facility]    cc:   Heron Mayo MD  [address] [place] [phone]

## 2019-12-18 NOTE — PROGRESS NOTES
HPI and Plan:   See dictation [id number]    Orders Placed This Encounter   Procedures     Basic metabolic panel     Follow-Up with [facility] visit     Echocardiogram Complete     No orders of the defined types were placed in this encounter.    Medications Discontinued During This Encounter   Medication Reason     cephALEXin (KEFLEX) 500 MG capsule Therapy completed     lidocaine 1 % injection 2 mL Therapy completed         Encounter Diagnosis   Name Primary?     Chronic systolic heart failure (H) Yes       CURRENT MEDICATIONS:  Current Outpatient Medications   Medication Sig Dispense Refill     atorvastatin (LIPITOR) 20 MG tablet Take 1 tablet (20 mg) by mouth daily 30 tablet 11     calcium carbonate (OS- MG [place]. [place]) 1250 MG tablet Take 1 tablet by mouth 2 times daily       colchicine (COLCRYS) 0.6 MG tablet Take 1 tablet (0.6 mg) by mouth daily 90 tablet 3     cyanocobalamin (VITAMIN  B-12) 1000 MCG tablet TAKE ONE TABLET BY MOUTH DAILY 30 tablet 11     furosemide (LASIX) 40 MG tablet Take 2 tablets (80 mg) by mouth daily (Patient taking differently: Take 100 mg by mouth daily ) 180 tablet 0     gabapentin (NEURONTIN) 300 MG capsule Take 1 capsule (300 mg) by mouth At Bedtime 90 capsule 3     hypromellose (ARTIFICIAL TEARS) 0.4 % SOLN ophthalmic solution Place 1 drop into both eyes every hour as needed for dry eyes       lisinopril (PRINIVIL/ZESTRIL) 5 MG tablet Take 2 tablets (10 mg) by mouth daily 90 tablet 3     metoprolol succinate ER (TOPROL-XL) 25 MG 24 hr tablet TAKE ONE TABLET BY MOUTH EVERY EVENING 90 tablet 1     mirtazapine (REMERON) 15 MG tablet Take 1 tablet (15 mg) by mouth At Bedtime 30 tablet 11     multivitamin, therapeutic with minerals (MULTI-VITAMIN) TABS tablet Take 1 tablet by mouth daily 100 tablet 3     nortriptyline (PAMELOR) 25 MG capsule TAKE ONE CAPSULE BY MOUTH EVERY NIGHT AT BEDTIME 30 capsule 10     prednisoLONE acetate (PRED FORTE) 1 % ophthalmic susp Place 1 drop Into  the left eye daily   2     triamcinolone (KENALOG) 0.1 % external cream APPLY TOPICALLY TO AFFECTED AREA(S) TWO TIMES A DAY AS DIRECTED (Patient taking differently: 2 times daily as needed ) 45 g 1     warfarin ANTICOAGULANT (JANTOVEN ANTICOAGULANT) 2 MG tablet Take 6 mg every Thu; 4 mg all other days or as directed by the Anticoagulation Clinic 200 tablet 0       ALLERGIES     Allergies   Allergen Reactions     Amoxicillin Hives     Penicillins Other (See Comments)     Dizzy       Trazodone Other (See Comments)     Hallucinations         PAST MEDICAL HISTORY:  Past Medical History:   Diagnosis Date     Bleeding from wound 9/2/2018 7/12/2019:He had prolonged bleeding after cyst removal on his back within the past year.          PAST SURGICAL HISTORY:  Past Surgical History:   Procedure Laterality Date     ANGIOGRAM  01/2019    right and left heart cath.      APPENDECTOMY      ~2013     CATARACT IOL, RT/LT      Past bilateral cataract surgery.      EYE SURGERY Left     three corneal transplants     IMPLANT PACEMAKER       INCISION AND DRAINAGE TRUNK, COMBINED N/A 9/6/2018    Procedure: COMBINED INCISION AND DRAINAGE TRUNK;  incision and cauterization of left buttock bleed.;  Surgeon: Dain Davis MD;  Location: [facility]     INCISION AND DRAINAGE TRUNK, COMBINED N/A 11/1/2018    Procedure:  INCISION AND DRAINAGE of Back Wound;  Surgeon: Dain Davis MD;  Location: [facility]     JOINT REPLACEMENT Right      THORACIC SURGERY      removal benign nodule       FAMILY HISTORY:  No family history on file.    SOCIAL HISTORY:  Social History     Socioeconomic History     Marital status:      Spouse name: None     Number of children: None     Years of education: None     Highest education level: None   Occupational History     None   Social Needs     Financial resource strain: None     Food insecurity:     Worry: None     Inability: None     Transportation needs:     Medical: None     Non-medical: None   Tobacco Use      "Smoking status: Former Smoker     Years: 20.00     Types: Cigarettes     Smokeless tobacco: Never Used   Substance and Sexual Activity     Alcohol use: No     Drug use: No     Sexual activity: Not Currently   Lifestyle     Physical activity:     Days per week: None     Minutes per session: None     Stress: None   Relationships     Social connections:     Talks on phone: None     Gets together: None     Attends [employer] service: None     Active member of club or organization: None     Attends meetings of clubs or organizations: None     Relationship status: None     Intimate partner violence:     Fear of current or ex partner: None     Emotionally abused: None     Physically abused: None     Forced sexual activity: None   Other Topics Concern     Parent/sibling w/ CABG, MI or angioplasty before 65F 55M? Not Asked   Social History Narrative    ** Merged History Encounter **            Review of Systems:  Skin:  Positive for itching   Eyes:  Positive for glasses;visual blurring  ENT:  Negative    Respiratory:  Negative    Cardiovascular:    Positive for;lower extremity symptoms;edema;fatigue  Gastroenterology: Negative    Genitourinary:  Positive for urinary frequency;urgency  Musculoskeletal:  Positive for joint pain;joint swelling;joint stiffness;arthritis  Neurologic:  Positive for numbness or tingling of feet  Psychiatric:  Positive for sleep disturbances  Heme/Lymph/Imm:  Negative    Endocrine:  Negative      Physical Exam:  Vitals: /59 (BP Location: Right arm, Patient Position: Sitting, Cuff Size: Adult Regular)   Pulse 65   Ht 1.626 m (5' 4\")   Wt 82.1 kg (181 lb)   SpO2 97%   BMI 31.07 kg/m      Recent Lab Results:  LIPID RESULTS:  Lab Results   Component Value Date    CHOL 236 (H) 07/12/2019    HDL 36 (L) 07/12/2019    LDL  07/12/2019     Cannot estimate LDL when triglyceride exceeds 400 mg/dL     (H) 07/12/2019    TRIG 422 (H) 07/12/2019       LIVER ENZYME RESULTS:  Lab Results   Component " Value Date    AST 26 07/26/2019    ALT 33 07/26/2019       CBC RESULTS:  Lab Results   Component Value Date    WBC 11.1 (H) 07/26/2019    RBC 4.63 07/26/2019    HGB 13.7 07/26/2019    HCT 41.2 07/26/2019    MCV 89 07/26/2019    MCH 29.6 07/26/2019    MCHC 33.3 07/26/2019    RDW 13.6 07/26/2019     (L) 07/26/2019       BMP RESULTS:  Lab Results   Component Value Date     09/23/2019    POTASSIUM 4.2 09/23/2019    CHLORIDE 98 09/23/2019    CO2 33 (H) 09/23/2019    ANIONGAP 3 09/23/2019    GLC 87 09/23/2019    BUN 29 09/23/2019    CR 0.96 09/23/2019    GFRESTIMATED 74 09/23/2019    GFRESTBLACK 85 09/23/2019    ALY 9.5 09/23/2019        A1C RESULTS:  No results found for: A1C    INR RESULTS:  Lab Results   Component Value Date    INR 1.8 (A) 12/05/2019    INR 2.6 (A) 11/07/2019    INR 1.88 (H) 08/01/2019    INR 1.85 (H) 07/29/2019           CC  Heron Mayo MD  [address] [place] [id number]

## 2019-12-18 NOTE — PROGRESS NOTES
Service Date: 12/18/2019      REASON FOR VISIT:  Transferring care from [facility].      HISTORY OF PRESENTING ILLNESS:  This is a pleasant, 84-year-old gentleman who is here with his daughter, Nita.  He lives closer to [facility], but has been getting his care in [facility] and now wants to transfer care here.  He is a pleasant gentleman and has a history of atrial fibrillation, sinus node dysfunction and bradycardia, which is probably symptomatic from reports in Care Everywhere.  He got a pacemaker placed in 06/2016.  EF at that time was normal.  He has an ascending aortic aneurysm.  Subsequently, he did okay until 11/2018 when he presented to the hospital with heart failure.  He says he was admitted for a few days with significant edema and shortness of breath and was diuresed and since then has been on diuretics.  Over the last 1 year, his EF has dropped down to the 35%-40% range.  As a result of that, he had a coronary angiography done in 01/2019, which showed nonobstructive disease.  He remains on a statin.  He is on warfarin for his atrial fibrillation.  For his LV dysfunction, he is on guideline-directed medical therapy with metoprolol succinate 25 mg daily and lisinopril 10 mg daily.  His creatinine is normal as of September of this year.  He is not on a mineralocorticoid receptor antagonist.  He reports no problems with warfarin.  He walks with a walker.  He does not drive; he says his eyesight is bad.  NYHA class is difficult to discern, but my guess is he is NYHA class II.  He denies any chest pain, syncope, presyncope or falls.      PHYSICAL EXAMINATION:   VITAL SIGNS:  Blood pressure is 105/69 with a pulse of 65.   GENERAL:  Alert and oriented x3, in no acute distress.   NECK:  Supple.  JVP is about 8 cm.   LUNGS:  Clear.   CARDIAC:  Cardiac sounds are irregular.  There is a soft systolic murmur.  No rubs or gallops.   EXTREMITIES:  Warm with trace to no edema.   ABDOMEN:  Soft and nontender.    PSYCHIATRIC:  Appropriate affect.   HEENT:  No icterus or pallor.      ASSESSMENT AND PLAN:  Mr. Nabor Cunningham is doing well from a cardiovascular standpoint.  It appears things are stable for him.      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  Per reports, he had a significant amount of RV pacing close to about 50% in the past, which has now come down to 10% per reports from most recent device interrogation.  We will get him enrolled in our Device Clinic here so that they can follow his %pacing.      I need to see his burden of atrial fibrillation if he has any other ventricular ectopics as well.  Continue anticoagulation for atrial fibrillation.   He denies any bleeding problems at this time.      I will repeat an echocardiogram in 3 months and have him see Rosario Mon in clinic with a basic metabolic panel.  For now, he will continue metoprolol, lisinopril and 100 mg of Lasix.  I have told him to weigh himself every day.  I told him to buy a blood pressure cuff at home and watch his blood pressures.  If things change clinically, he needs to contact us sooner.  For now, he appears to be quite stable, he says.  He is watching his salt and water intake.      From a nonobstructive coronary artery disease standpoint, he is on a statin.  We will get an LDL and follow up in the next 6-12 months.      For his ascending aortic aneurysm, let us see what his echocardiogram shows in followup.  I doubt he is a surgical candidate even if we do get to that point.      Continue care with a PCP. If further LV dysfunction ensues in followup or clinically he gets worse, a CRT upgrade may be indicated, and of course, further changes to heart failure medications would be indicated at that time, too.  I will see him back in 6 months.      cc:   Heron Mayo MD   [facility]    [address] [place] [id number]         GENIE  MD AMANDA             D: 2019   T: 2019   MT: wilda      Name:     NICOLE PEREA   MRN:      [id number]        Account:      [id number]   :      1935           Service Date: 2019      Document: [id number]

## 2019-12-18 NOTE — PROGRESS NOTES
ANTICOAGULATION FOLLOW-UP CLINIC VISIT    Patient Name:  Nabor Cunningham  Date:  2019  Contact Type:  Face to Face/Daughter    SUBJECTIVE:  Patient Findings     Comments:   No changes in medications, activity, health, or diet noted. No bleeding or increased bruising noted. Took warfarin as prescribed.  Patient will continue weekly maintenance dose. INR is therapeutic.   Recheck in 4 weeks.   Patient verbalizes understanding and agrees to plan. No further questions or concerns.          Clinical Outcomes     Negatives:   Major bleeding event, Thromboembolic event, Anticoagulation-related hospital admission, Anticoagulation-related ED visit, Anticoagulation-related fatality    Comments:   No changes in medications, activity, health, or diet noted. No bleeding or increased bruising noted. Took warfarin as prescribed.  Patient will continue weekly maintenance dose. INR is therapeutic.   Recheck in 4 weeks.   Patient verbalizes understanding and agrees to plan. No further questions or concerns.             OBJECTIVE    INR Protime   Date Value Ref Range Status   2019 2.0 (A) 0.86 - 1.14 Final       ASSESSMENT / PLAN  INR assessment THER    Recheck INR In: 4 WEEKS    INR Location Clinic      Anticoagulation Summary  As of 2019    INR goal:   2.0-3.0   TTR:   48.0 % (1 y)   INR used for dosin.0 (2019)   Warfarin maintenance plan:   6 mg (2 mg x 3) every Thu; 4 mg (2 mg x 2) all other days   Full warfarin instructions:   6 mg every Thu; 4 mg all other days   Weekly warfarin total:   30 mg   No change documented:   Daphne Lamar RN   Plan last modified:   Belia Jones RN (10/24/2019)   Next INR check:   2020   Priority:   Maintenance   Target end date:   Indefinite    Indications    Chronic atrial fibrillation [I48.20]  Long-term (current) use of anticoagulants [Z79.01] [Z79.01]             Anticoagulation Episode Summary     INR check location:       Preferred lab:       Send  INR reminders to:   GARRICK [place]    Comments:   * 2mg tabs. Does not like to cut tabs. NO bandaid      Anticoagulation Care Providers     Provider Role Specialty Phone number    Heron Mayo MD [facility] [phone]            See the Encounter Report to view Anticoagulation Flowsheet and Dosing Calendar (Go to Encounters tab in chart review, and find the Anticoagulation Therapy Visit)        Daphne Lamar RN

## 2019-12-18 NOTE — LETTER
12/18/2019      Heron Mayo MD  [address] [place] [id number]      RE: Nabor Cunningham       Dear Colleague,    I had the pleasure of seeing Nabor Cunningham in the [facility].    Service Date: 12/18/2019      REASON FOR VISIT:  Transferring care from [place].      HISTORY OF PRESENTING ILLNESS:  This is a pleasant, 84-year-old gentleman who is here with his daughter, Nita.  He lives closer to [facility], but has been getting his care in [facility] and now wants to transfer care here.  He is a pleasant gentleman and has a history of atrial fibrillation, sinus node dysfunction and bradycardia, which is probably symptomatic from reports in Care Everywhere.  He got a pacemaker placed in 06/2016.  EF at that time was normal.  He has an ascending aortic aneurysm.  Subsequently, he did okay until 11/2018 when he presented to the hospital with heart failure.  He says he was admitted for a few days with significant edema and shortness of breath and was diuresed and since then has been on diuretics.  Over the last 1 year, his EF has dropped down to the 35%-40% range.  As a result of that, he had a coronary angiography done in 01/2019, which showed nonobstructive disease.  He remains on a statin.  He is on warfarin for his atrial fibrillation.  For his LV dysfunction, he is on guideline-directed medical therapy with metoprolol succinate 25 mg daily and lisinopril 10 mg daily.  His creatinine is normal as of September of this year.  He is not on a mineralocorticoid receptor antagonist.  He reports no problems with warfarin.  He walks with a walker.  He does not drive; he says his eyesight is bad.  NYHA class is difficult to discern, but my guess is he is NYHA class II.  He denies any chest pain, syncope, presyncope or falls.      PHYSICAL EXAMINATION:   VITAL SIGNS:  Blood pressure is 105/69 with a pulse of 65.   GENERAL:  Alert and oriented x3, in no acute distress.   NECK:   Supple.  JVP is about 8 cm.   LUNGS:  Clear.   CARDIAC:  Cardiac sounds are irregular.  There is a soft systolic murmur.  No rubs or gallops.   EXTREMITIES:  Warm with trace to no edema.   ABDOMEN:  Soft and nontender.   PSYCHIATRIC:  Appropriate affect.   HEENT:  No icterus or pallor.      ASSESSMENT AND PLAN:  Mr. Nabor Cunningham is doing well from a cardiovascular standpoint.  It appears things are stable for him.      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  Per reports, he had a significant amount of RV pacing close to about 50% in the past, which has now come down to 10% per reports from most recent device interrogation.  We will get him enrolled in our Device Clinic here so that they can follow his %pacing.      I need to see his burden of atrial fibrillation if he has any other ventricular ectopics as well.  Continue anticoagulation for atrial fibrillation.   He denies any bleeding problems at this time.      I will repeat an echocardiogram in 3 months and have him see Rosario Mon in clinic with a basic metabolic panel.  For now, he will continue metoprolol, lisinopril and 100 mg of Lasix.  I have told him to weigh himself every day.  I told him to buy a blood pressure cuff at home and watch his blood pressures.  If things change clinically, he needs to contact us sooner.  For now, he appears to be quite stable, he says.  He is watching his salt and water intake.      From a nonobstructive coronary artery disease standpoint, he is on a statin.  We will get an LDL and follow up in the next 6-12 months.      For his ascending aortic aneurysm, let us see what his echocardiogram shows in followup.  I doubt he is a surgical candidate even if we do get to that point.      Continue care with a PCP. If further LV dysfunction ensues in followup or clinically he gets worse, a CRT upgrade may be indicated, and of course, further changes  to heart failure medications would be indicated at that time, too.  I will see him back in 6 months.      cc:   Heron Mayo MD   [address] [address]         GENIE PATEL MD             D: 2019   T: 2019   MT: wilda      Name:     NICOLE PEREA   MRN:      [id number]        Account:      [id number]   :      1935           Service Date: 2019      Document: [id number]           Outpatient Encounter Medications as of 2019   Medication Sig Dispense Refill     atorvastatin (LIPITOR) 20 MG tablet Take 1 tablet (20 mg) by mouth daily 30 tablet 11     calcium carbonate (OS- MG [place]. CA) 1250 MG tablet Take 1 tablet by mouth 2 times daily       colchicine (COLCRYS) 0.6 MG tablet Take 1 tablet (0.6 mg) by mouth daily 90 tablet 3     cyanocobalamin (VITAMIN  B-12) 1000 MCG tablet TAKE ONE TABLET BY MOUTH DAILY 30 tablet 11     furosemide (LASIX) 40 MG tablet Take 2 tablets (80 mg) by mouth daily (Patient taking differently: Take 100 mg by mouth daily ) 180 tablet 0     gabapentin (NEURONTIN) 300 MG capsule Take 1 capsule (300 mg) by mouth At Bedtime 90 capsule 3     hypromellose (ARTIFICIAL TEARS) 0.4 % SOLN ophthalmic solution Place 1 drop into both eyes every hour as needed for dry eyes       lisinopril (PRINIVIL/ZESTRIL) 5 MG tablet Take 2 tablets (10 mg) by mouth daily 90 tablet 3     metoprolol succinate ER (TOPROL-XL) 25 MG 24 hr tablet TAKE ONE TABLET BY MOUTH EVERY EVENING 90 tablet 1     mirtazapine (REMERON) 15 MG tablet Take 1 tablet (15 mg) by mouth At Bedtime 30 tablet 11     multivitamin, therapeutic with minerals (MULTI-VITAMIN) TABS tablet Take 1 tablet by mouth daily 100 tablet 3     nortriptyline (PAMELOR) 25 MG capsule TAKE ONE CAPSULE BY MOUTH EVERY NIGHT AT BEDTIME 30 capsule 10     prednisoLONE acetate (PRED FORTE) 1 % ophthalmic susp Place 1 drop Into the left eye daily   2     triamcinolone (KENALOG) 0.1 %  external cream APPLY TOPICALLY TO AFFECTED AREA(S) TWO TIMES A DAY AS DIRECTED (Patient taking differently: 2 times daily as needed ) 45 g 1     warfarin ANTICOAGULANT (JANTOVEN ANTICOAGULANT) 2 MG tablet Take 6 mg every Thu; 4 mg all other days or as directed by the Anticoagulation Clinic 200 tablet 0     [DISCONTINUED] cephALEXin (KEFLEX) 500 MG capsule Take 1 capsule (500 mg) by mouth 3 times daily for 10 days 30 capsule 0     Facility-Administered Encounter Medications as of 12/18/2019   Medication Dose Route Frequency Provider Last Rate Last Dose     cross-Linked Hyaluronate (GEL-ONE) injection PRSY 30 mg  30 mg   Josh Mitchell MD   30 mg at 03/15/19 0945     [DISCONTINUED] lidocaine 1 % injection 2 mL  2 mL   Britt Davidson MD   2 mL at 06/29/18 1002       Again, thank you for allowing me to participate in the care of your patient.      Sincerely,    Katie Galvan MD     [facility]

## 2019-12-18 NOTE — PATIENT INSTRUCTIONS
"[facility]~[address]~[place], [place]~[address]  Thank you for your  Heart Care visit today. If you have questions regarding your visit, please contact your cardiology RN, Jammie Shane, at [phone].    Please arrive 15 minutes early to all cardiology appointments.    To schedule a future appointment, we kindly ask that you call cardiology scheduling at [phone] three months prior to requested revisit date.  [facility] cardiology clinic is staffed with \"Advance Practice Providers\". These are our cardiology Physician Assistants and Nurse Practitioners.   Please call cardiology scheduling if you feel you need clinical evaluation with them at any time for any cardiac reason.     Reminder:  For your safety, we ask that you bring in your current medication(s) or an updated list of your medications with you to EACH office visit. Include the medication name, dose of pill on bottle and how you are taking it. Include over-the-counter medications or supplements. Your provider will review this at each visit and plan your care based on your current information.   ~~~~~~~~~~~~~~~~~~~~~~~~~~~~~~~~~~~~~~~  \"[facility]\" [place] telephone numbers for reference:  Cardiology Scheduling~[phone]  Diagnostic Imaging Scheduling~[phone]  Lab Scheduling~[phone]  Anticoagulation Clinic~[phone]  Cardiac Rehabilitation~[phone]  CORE Clinic RN's~[phone] (at [facility])  Congential Team [phone] (at [facility])  Cardiology Clinic RN's~[phone] (Jammie Shane, RN)  ~~~~~~~~~~~~~~~~~~~~~~~~~~~~~~~~~~~~~~~~        "

## 2019-12-26 DIAGNOSIS — G47.00 PERSISTENT INSOMNIA: Primary | ICD-10-CM

## 2019-12-26 RX ORDER — DOXEPIN HYDROCHLORIDE 10 MG/1
10 CAPSULE ORAL AT BEDTIME
Qty: 30 CAPSULE | Refills: 5 | Status: SHIPPED | OUTPATIENT
Start: 2019-12-26 | End: 2020-02-28

## 2019-12-26 NOTE — TELEPHONE ENCOUNTER
doxepin (SILENOR) 3 MG tablet (Discontinued) 60 tablet 5 12/3/2018 12/21/2018 --   Sig - Route: Take 1-2 tablets (3-6 mg) by mouth nightly as needed for sleep - Oral   Patient taking differently: Take 10 mg by mouth nightly as needed for sleep         Sent to pharmacy as: doxepin (SILENOR) 3 MG tablet   Class: E-Prescribe   Order: [id number]   E-Prescribing Status: Receipt confirmed by pharmacy (12/3/2018 11:55 AM CST)

## 2019-12-26 NOTE — TELEPHONE ENCOUNTER
Lm to [facility]. This was discontinued. Need to know symptoms and review current med list. Romelia Bertrand RN

## 2020-01-03 DIAGNOSIS — I48.20 CHRONIC ATRIAL FIBRILLATION (H): ICD-10-CM

## 2020-01-03 DIAGNOSIS — Z79.01 CHRONIC ANTICOAGULATION: ICD-10-CM

## 2020-01-03 RX ORDER — WARFARIN SODIUM 2 MG/1
TABLET ORAL
Qty: 200 TABLET | Refills: 0 | Status: SHIPPED | OUTPATIENT
Start: 2020-01-03 | End: 2020-01-16

## 2020-01-03 NOTE — TELEPHONE ENCOUNTER
Warfarin 2 mg  Current Dosing Instructions: Take 6 mg (3 tabs) Thurs and 4 mg (2 tabs) all other days or as directed  Tabs: 200  Refills: 0  Next INR due: 1/16/20

## 2020-01-07 ENCOUNTER — TELEPHONE (OUTPATIENT)
Dept: FAMILY MEDICINE | Facility: CLINIC | Age: 85
End: 2020-01-07

## 2020-01-08 ENCOUNTER — COMMUNICATION - HEALTHEAST (OUTPATIENT)
Dept: PHYSICAL MEDICINE AND REHAB | Facility: CLINIC | Age: 85
End: 2020-01-08

## 2020-01-08 DIAGNOSIS — M54.42 ACUTE BILATERAL LOW BACK PAIN WITH BILATERAL SCIATICA: ICD-10-CM

## 2020-01-08 DIAGNOSIS — M54.41 ACUTE BILATERAL LOW BACK PAIN WITH BILATERAL SCIATICA: ICD-10-CM

## 2020-01-16 ENCOUNTER — ANTICOAGULATION THERAPY VISIT (OUTPATIENT)
Dept: ANTICOAGULATION | Facility: CLINIC | Age: 85
End: 2020-01-16
Payer: MEDICARE

## 2020-01-16 DIAGNOSIS — Z79.01 CHRONIC ANTICOAGULATION: ICD-10-CM

## 2020-01-16 DIAGNOSIS — I48.20 CHRONIC ATRIAL FIBRILLATION (H): ICD-10-CM

## 2020-01-16 DIAGNOSIS — Z79.01 LONG TERM CURRENT USE OF ANTICOAGULANT THERAPY: ICD-10-CM

## 2020-01-16 LAB — INR POINT OF CARE: 1.9 (ref 0.86–1.14)

## 2020-01-16 PROCEDURE — 85610 PROTHROMBIN TIME: CPT | Mod: QW

## 2020-01-16 PROCEDURE — 99207 ZZC NO CHARGE NURSE ONLY: CPT

## 2020-01-16 PROCEDURE — 36416 COLLJ CAPILLARY BLOOD SPEC: CPT

## 2020-01-16 RX ORDER — WARFARIN SODIUM 2 MG/1
TABLET ORAL
Qty: 200 TABLET | Refills: 0
Start: 2020-01-16 | End: 2020-03-30

## 2020-01-16 NOTE — PROGRESS NOTES
ANTICOAGULATION FOLLOW-UP CLINIC VISIT    Patient Name:  Nabor Cunningham  Date:  2020  Contact Type:  Face to Face    SUBJECTIVE:  Patient Findings     Comments:   No changes in diet, activity level, medications (including over the counter), or health. No missed doses of warfarin. Patient took dosing as prescribed. No signs of clots or bleeding concerns. Patient has been sub therapeutic or 2.0 the last 3 visits. Will increase dose by 6.7% and recheck in 2 weeks.         Clinical Outcomes     Negatives:   Major bleeding event, Thromboembolic event, Anticoagulation-related hospital admission, Anticoagulation-related ED visit, Anticoagulation-related fatality    Comments:   No changes in diet, activity level, medications (including over the counter), or health. No missed doses of warfarin. Patient took dosing as prescribed. No signs of clots or bleeding concerns. Patient has been sub therapeutic or 2.0 the last 3 visits. Will increase dose by 6.7% and recheck in 2 weeks.            OBJECTIVE    INR Protime   Date Value Ref Range Status   2020 1.9 (A) 0.86 - 1.14 Final       ASSESSMENT / PLAN  INR assessment SUB    Recheck INR In: 2 WEEKS    INR Location Clinic      Anticoagulation Summary  As of 2020    INR goal:   2.0-3.0   TTR:   44.5 % (1 y)   INR used for dosin.9! (2020)   Warfarin maintenance plan:   6 mg (2 mg x 3) every Mon, Thu; 4 mg (2 mg x 2) all other days   Full warfarin instructions:   6 mg every Mon, Thu; 4 mg all other days   Weekly warfarin total:   32 mg   Plan last modified:   Belia Jones RN (2020)   Next INR check:   2020   Priority:   High   Target end date:   Indefinite    Indications    Chronic atrial fibrillation [I48.20]  Long-term (current) use of anticoagulants [Z79.01] [Z79.01]             Anticoagulation Episode Summary     INR check location:       Preferred lab:       Send INR reminders to:   [facility]    Comments:   * 2mg tabs. Does  not like to cut tabs. NO bandaid      Anticoagulation Care Providers     Provider Role Specialty Phone number    Heron Mayo MD [facility] [phone]            See the Encounter Report to view Anticoagulation Flowsheet and Dosing Calendar (Go to Encounters tab in chart review, and find the Anticoagulation Therapy Visit)        Belia Jones RN

## 2020-01-22 ENCOUNTER — PATIENT OUTREACH (OUTPATIENT)
Dept: CARE COORDINATION | Facility: CLINIC | Age: 85
End: 2020-01-22

## 2020-01-22 SDOH — SOCIAL STABILITY: SOCIAL NETWORK: HOW OFTEN DO YOU GET TOGETHER WITH FRIENDS OR RELATIVES?: MORE THAN THREE TIMES A WEEK

## 2020-01-22 SDOH — SOCIAL STABILITY: SOCIAL NETWORK: ARE YOU MARRIED, WIDOWED, DIVORCED, SEPARATED, NEVER MARRIED, OR LIVING WITH A PARTNER?: WIDOWED

## 2020-01-22 SDOH — HEALTH STABILITY: MENTAL HEALTH
STRESS IS WHEN SOMEONE FEELS TENSE, NERVOUS, ANXIOUS, OR CAN'T SLEEP AT NIGHT BECAUSE THEIR MIND IS TROUBLED. HOW STRESSED ARE YOU?: ONLY A LITTLE

## 2020-01-22 SDOH — SOCIAL STABILITY: SOCIAL INSECURITY
WITHIN THE LAST YEAR, HAVE YOU BEEN KICKED, HIT, SLAPPED, OR OTHERWISE PHYSICALLY HURT BY YOUR PARTNER OR EX-PARTNER?: NO

## 2020-01-22 SDOH — SOCIAL STABILITY: SOCIAL INSECURITY
WITHIN THE LAST YEAR, HAVE TO BEEN RAPED OR FORCED TO HAVE ANY KIND OF SEXUAL ACTIVITY BY YOUR PARTNER OR EX-PARTNER?: NO

## 2020-01-22 SDOH — SOCIAL STABILITY: SOCIAL NETWORK: HOW OFTEN DO YOU ATTEND CHURCH OR RELIGIOUS SERVICES?: MORE THAN 4 TIMES PER YEAR

## 2020-01-22 SDOH — ECONOMIC STABILITY: TRANSPORTATION INSECURITY
IN THE PAST 12 MONTHS, HAS LACK OF TRANSPORTATION KEPT YOU FROM MEETINGS, WORK, OR FROM GETTING THINGS NEEDED FOR DAILY LIVING?: NO

## 2020-01-22 SDOH — ECONOMIC STABILITY: INCOME INSECURITY: HOW HARD IS IT FOR YOU TO PAY FOR THE VERY BASICS LIKE FOOD, HOUSING, MEDICAL CARE, AND HEATING?: NOT HARD AT ALL

## 2020-01-22 SDOH — SOCIAL STABILITY: SOCIAL NETWORK: HOW OFTEN DO YOU ATTENT MEETINGS OF THE CLUB OR ORGANIZATION YOU BELONG TO?: NEVER

## 2020-01-22 SDOH — ECONOMIC STABILITY: FOOD INSECURITY: WITHIN THE PAST 12 MONTHS, YOU WORRIED THAT YOUR FOOD WOULD RUN OUT BEFORE YOU GOT MONEY TO BUY MORE.: NEVER TRUE

## 2020-01-22 SDOH — SOCIAL STABILITY: SOCIAL INSECURITY: WITHIN THE LAST YEAR, HAVE YOU BEEN AFRAID OF YOUR PARTNER OR EX-PARTNER?: NO

## 2020-01-22 SDOH — HEALTH STABILITY: PHYSICAL HEALTH: ON AVERAGE, HOW MANY MINUTES DO YOU ENGAGE IN EXERCISE AT THIS LEVEL?: 20 MIN

## 2020-01-22 SDOH — SOCIAL STABILITY: SOCIAL NETWORK
DO YOU BELONG TO ANY CLUBS OR ORGANIZATIONS SUCH AS CHURCH GROUPS UNIONS, FRATERNAL OR ATHLETIC GROUPS, OR SCHOOL GROUPS?: NO

## 2020-01-22 SDOH — SOCIAL STABILITY: SOCIAL INSECURITY: WITHIN THE LAST YEAR, HAVE YOU BEEN HUMILIATED OR EMOTIONALLY ABUSED IN OTHER WAYS BY YOUR PARTNER OR EX-PARTNER?: NO

## 2020-01-22 SDOH — SOCIAL STABILITY: SOCIAL NETWORK
IN A TYPICAL WEEK, HOW MANY TIMES DO YOU TALK ON THE PHONE WITH FAMILY, FRIENDS, OR NEIGHBORS?: MORE THAN THREE TIMES A WEEK

## 2020-01-22 SDOH — ECONOMIC STABILITY: TRANSPORTATION INSECURITY
IN THE PAST 12 MONTHS, HAS THE LACK OF TRANSPORTATION KEPT YOU FROM MEDICAL APPOINTMENTS OR FROM GETTING MEDICATIONS?: NO

## 2020-01-22 SDOH — HEALTH STABILITY: PHYSICAL HEALTH: ON AVERAGE, HOW MANY DAYS PER WEEK DO YOU ENGAGE IN MODERATE TO STRENUOUS EXERCISE (LIKE A BRISK WALK)?: 7 DAYS

## 2020-01-22 SDOH — ECONOMIC STABILITY: FOOD INSECURITY: WITHIN THE PAST 12 MONTHS, THE FOOD YOU BOUGHT JUST DIDN'T LAST AND YOU DIDN'T HAVE MONEY TO GET MORE.: NEVER TRUE

## 2020-01-22 NOTE — LETTER
[facility]  Complex Care Plan  About Me:    Patient Name:  Nabor Perea    YOB: 1935  Age:         84 year old   [place] MRN:    [id number] Telephone Information:  Home Phone [phone]   Mobile [phone]   Home Phone [phone]       Address:  [address]  [facility] [address] Email address:  No e-mail address on record      Emergency Contact(s)    Name Relationship Lgl Grd Work Phone Home Phone Mobile Phone   1. JACKSON VAN Daughter No  [phone]   2. ELOY PEREA  Son No   [phone]   3. LISANDRA PEREA Relative No  [phone]            Primary language:  English     needed? No   [place] [employer]:  [phone] op. 1  Other communication barriers: Glasses, Cognitive impairment  Preferred Method of Communication:  Do Not Contact  Current living arrangement:    Mobility Status/ Medical Equipment:      Health Maintenance  Health Maintenance Reviewed: Due/Overdue   Health Maintenance Due   Topic Date Due     HF ACTION PLAN  1935     ZOSTER IMMUNIZATION (1 of 2) 11/03/1987     MEDICARE ANNUAL WELLNESS VISIT  11/03/2002     PHQ-9  08/15/2019     Please talk with your provider about what is needed or can continue to wait.        My Access Plan  Medical Emergency 911   Primary Clinic Line [facility] - [phone]   24 Hour Appointment Line [phone] or  [phone]) (toll-free)   24 Hour Nurse Line [phone] (toll-free)   Preferred Urgent Care     Preferred Hospital     Preferred Pharmacy [id number] - [place], [place] - [address]     Behavioral Health Crisis Line The National Suicide Prevention Lifeline at 1-546.361.2331 or 911             My Care Team Members  Patient Care Team       Relationship Specialty Notifications Start End    Heron Mayo MD PCP - General Family Practice  9/11/18     Phone: [phone] Fax: [phone] [address] [place] [address]  [facility] [address]    Heron Mayo MD Assigned PCP   3/9/17     Phone: [phone] Fax: [phone]         [address] [place] [address]    Heron Mayo MD  Family Practice  1/29/18     Merged    Phone: [phone] Fax: [phone]         [address] [place] [address]    Rani Sarkar LSW Lead Care Coordinator Primary Care - CC Admissions 11/13/18     Phone: [phone] Fax: [phone]                My Care Plans  Self Management and Treatment Plan  Goals and (Comments)  Goals        General    Pain Management (pt-stated)     Notes - Note edited  1/22/2020 10:42 AM by Rani Sarkar LSW    Goal Statement: I want to get knee surgery so my pain is improved.  Measure of Success: I will have surgery by the end of the year if not sooner for knee replacement.   Supportive Steps to Achieve: had infected tooth removed, family  Barriers: repeated infections, extracted tooth area needs to heal  Strengths: infection cleared. Tooth extraction site healing.   Date to Achieve By: 3-  Patient expressed understanding of goal: yes    1/22/2020 extend target date - as surgery had to be delayed due to infections      Psychosocial (pt-stated)     Notes - Note edited  11/7/2019  3:02 PM by Rani Sarkar LSW    Goal Statement: pt to remain safe in his own home as long as possible, safe meaning no injuries from falls or avoidable health issues.  Measure of Success: no falls with injuries or avoidable health issues  Supportive Steps to Achieve: home care, , family  Barriers: age, health  Strengths: supportive family  Date to Achieve By: 6-  Patient expressed understanding of goal: yes    11-7-19 extended target date             Action Plans on File:                       Advance Care Plans/Directives Type:        My Medical and Care Information  Problem List   Patient Active Problem List   Diagnosis     Anxiety     Alcohol abuse     Chronic atrial  fibrillation     Weight loss     Peripheral polyneuropathy     Chronic gout, unspecified cause, unspecified site     Essential hypertension with goal blood pressure less than 140/90     Hyperlipidemia LDL goal <130     Persistent insomnia     Lower urinary tract symptoms (LUTS)     Long-term (current) use of anticoagulants [Z79.01]     Moderate episode of recurrent major depressive disorder (H)     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    Care Coordination Start Date: 11/28/2018   Frequency of Care Coordination: monthly   Form Last Updated: 01/22/2020

## 2020-01-22 NOTE — LETTER
[place] [id number] [address] [address]  [phone]      1/22/2020      Nabor Cunningham  [address] [facility] [address]      Dear  Nabor,    It was a pleasure to speak with you.  I would like to provide you with the enclosed information for your records.  As part of care coordination, we are developing care plans to assist in accomplishing your health care goals.  When we speak next, please feel free to let me know if you want to add or change any information on your care plans.    As always, feel free to contact me if you have any questions or concerns.  I look forward to working with you in the effort to achieve your health care and wellness goals .        Sincerely,      Rani Sarkar, \[facility] [place] [facility]\""  Clinic Care Coordination  [phone]

## 2020-01-22 NOTE — PROGRESS NOTES
Clinic Care Coordination Contact    Follow Up Progress Note      Assessment: Son reports the patient is doing well.  Surgery for his knee is scheduled for March 3.  He is noticing his other leg is starting to bother as he compensates and alters his walking due to the knee pain.    Goals addressed this encounter:   Goals Addressed                 This Visit's Progress      Pain Management (pt-stated)        Goal Statement: I want to get knee surgery so my pain is improved.  Measure of Success: I will have surgery by the end of the year if not sooner for knee replacement.   Supportive Steps to Achieve: had infected tooth removed, family  Barriers: repeated infections, extracted tooth area needs to heal  Strengths: infection cleared. Tooth extraction site healing.   Date to Achieve By: 3-  Patient expressed understanding of goal: yes    1/22/2020 extend target date - as surgery had to be delayed due to infections             Intervention/Education provided during outreach: Son reports patient is doing well we went through some of the social determinants of health and updated that information.  Son reports patient continues to have manageable anxiety and is not really affecting his daily routine.  Patient continues to be very social at his facility.  Son reports that patient will go around 45 minutes early for lunch so that he and friends can socialize.  He continues to exercise in his apartment and for walks with enough to increase his heart rate.  Patient also gets out to socialize with Protestant in the building.        Outreach Frequency: monthly    Plan:   Family to continue to support patient and assist him with getting his partial knee surgery as scheduled.  Care Coordinator will follow up in about 1 month.    TONE Lord, [facility] - Care Coordinator   [facility]  1/22/2020   11:39 AM  [phone]

## 2020-01-31 ENCOUNTER — ANTICOAGULATION THERAPY VISIT (OUTPATIENT)
Dept: ANTICOAGULATION | Facility: CLINIC | Age: 85
End: 2020-01-31
Payer: MEDICARE

## 2020-01-31 DIAGNOSIS — Z79.01 LONG TERM CURRENT USE OF ANTICOAGULANT THERAPY: ICD-10-CM

## 2020-01-31 DIAGNOSIS — I48.20 CHRONIC ATRIAL FIBRILLATION (H): ICD-10-CM

## 2020-01-31 LAB — INR POINT OF CARE: 2 (ref 0.86–1.14)

## 2020-01-31 PROCEDURE — 36416 COLLJ CAPILLARY BLOOD SPEC: CPT

## 2020-01-31 PROCEDURE — 85610 PROTHROMBIN TIME: CPT | Mod: QW

## 2020-01-31 PROCEDURE — 99207 ZZC NO CHARGE NURSE ONLY: CPT

## 2020-01-31 NOTE — PROGRESS NOTES
20  Left partial knee replacement on 3/4/20     Anticoagulant or Antiplatelet Medication Use  WARFARIN: Thromboembolic risk is low (e.g. single DVT/PE >12 months ago, A fib and [id number] < 4 without prior CVA/TIA), hold warfarin 5 days (INR >/= 2) without bridging before procedure.  No bridging.   No warfarin for 5 days before your surgery.  It is oK to resume  The warfarin later in the day after your surgery if OK  with your surgeon.     ANTICOAGULATION FOLLOW-UP CLINIC VISIT    Patient Name:  Nabor Cunningham  Date:  2020  Contact Type:  Face to Face/Son    SUBJECTIVE:  Patient Findings     Comments:   No changes in medications, activity, health, or diet noted. No bleeding or increased bruising noted. Took warfarin as prescribed.  Patient will continue weekly maintenance dose. INR is therapeutic.   Recheck in 4 weeks.   Patient verbalizes understanding and agrees to plan. No further questions or concerns.          Clinical Outcomes     Negatives:   Major bleeding event, Thromboembolic event, Anticoagulation-related hospital admission, Anticoagulation-related ED visit, Anticoagulation-related fatality    Comments:   No changes in medications, activity, health, or diet noted. No bleeding or increased bruising noted. Took warfarin as prescribed.  Patient will continue weekly maintenance dose. INR is therapeutic.   Recheck in 4 weeks.   Patient verbalizes understanding and agrees to plan. No further questions or concerns.             OBJECTIVE    INR Protime   Date Value Ref Range Status   2020 2.0 (A) 0.86 - 1.14 Final       ASSESSMENT / PLAN  INR assessment THER    Recheck INR In: 4 WEEKS    INR Location Clinic      Anticoagulation Summary  As of 2020    INR goal:   2.0-3.0   TTR:   43.1 % (1 y)   INR used for dosin.0 (2020)   Warfarin maintenance plan:   6 mg (2 mg x 3) every Mon, Thu; 4 mg (2 mg x 2) all other days   Full warfarin instructions:   6 mg every Mon, Thu; 4 mg all  other days   Weekly warfarin total:   32 mg   No change documented:   Daphne Lamar RN   Plan last modified:   Belia Jones RN (1/16/2020)   Next INR check:   2/28/2020   Priority:   High   Target end date:   Indefinite    Indications    Chronic atrial fibrillation [I48.20]  Long-term (current) use of anticoagulants [Z79.01] [Z79.01]             Anticoagulation Episode Summary     INR check location:       Preferred lab:       Send INR reminders to:   [facility]    Comments:   * 2mg tabs. Does not like to cut tabs. NO bandaid      Anticoagulation Care Providers     Provider Role Specialty Phone number    Heron Mayo MD [facility] Practice [phone]            See the Encounter Report to view Anticoagulation Flowsheet and Dosing Calendar (Go to Encounters tab in chart review, and find the Anticoagulation Therapy Visit)        Daphne Lamar, RN

## 2020-02-11 ENCOUNTER — HOSPITAL ENCOUNTER (OUTPATIENT)
Dept: CARDIOLOGY | Facility: CLINIC | Age: 85
Discharge: HOME | End: 2020-02-11
Attending: INTERNAL MEDICINE | Admitting: INTERNAL MEDICINE
Payer: MEDICARE

## 2020-02-11 DIAGNOSIS — I50.22 CHRONIC SYSTOLIC HEART FAILURE (H): ICD-10-CM

## 2020-02-11 PROCEDURE — 93280 PM DEVICE PROGR EVAL DUAL: CPT

## 2020-02-11 PROCEDURE — 93280 PM DEVICE PROGR EVAL DUAL: CPT | Mod: 26 | Performed by: INTERNAL MEDICINE

## 2020-02-12 LAB
MDC_IDC_LEAD_IMPLANT_DT: NORMAL
MDC_IDC_LEAD_IMPLANT_DT: NORMAL
MDC_IDC_LEAD_LOCATION: NORMAL
MDC_IDC_LEAD_LOCATION: NORMAL
MDC_IDC_LEAD_LOCATION_DETAIL_1: NORMAL
MDC_IDC_LEAD_LOCATION_DETAIL_1: NORMAL
MDC_IDC_LEAD_MFG: NORMAL
MDC_IDC_LEAD_MFG: NORMAL
MDC_IDC_LEAD_MODEL: NORMAL
MDC_IDC_LEAD_MODEL: NORMAL
MDC_IDC_LEAD_POLARITY_TYPE: NORMAL
MDC_IDC_LEAD_SERIAL: NORMAL
MDC_IDC_LEAD_SERIAL: NORMAL
MDC_IDC_MSMT_BATTERY_STATUS: NORMAL
MDC_IDC_MSMT_LEADCHNL_RA_IMPEDANCE_VALUE: 388 OHM
MDC_IDC_MSMT_LEADCHNL_RA_PACING_THRESHOLD_AMPLITUDE: 0.5 V
MDC_IDC_MSMT_LEADCHNL_RA_PACING_THRESHOLD_PULSEWIDTH: 0.4 MS
MDC_IDC_MSMT_LEADCHNL_RA_SENSING_INTR_AMPL: 0.3 MV
MDC_IDC_MSMT_LEADCHNL_RV_IMPEDANCE_VALUE: 902 OHM
MDC_IDC_MSMT_LEADCHNL_RV_PACING_THRESHOLD_AMPLITUDE: 0.6 V
MDC_IDC_MSMT_LEADCHNL_RV_PACING_THRESHOLD_PULSEWIDTH: 0.4 MS
MDC_IDC_MSMT_LEADCHNL_RV_SENSING_INTR_AMPL: 17.6 MV
MDC_IDC_PG_IMPLANT_DTM: NORMAL
MDC_IDC_PG_MFG: NORMAL
MDC_IDC_PG_MODEL: NORMAL
MDC_IDC_PG_SERIAL: NORMAL
MDC_IDC_PG_TYPE: NORMAL
MDC_IDC_SESS_CLINIC_NAME: NORMAL
MDC_IDC_SESS_DTM: NORMAL
MDC_IDC_SESS_TYPE: NORMAL
MDC_IDC_SET_BRADY_AT_MODE_SWITCH_MODE: NORMAL
MDC_IDC_SET_BRADY_LOWRATE: 60 {BEATS}/MIN
MDC_IDC_SET_BRADY_MAX_SENSOR_RATE: 130 {BEATS}/MIN
MDC_IDC_SET_BRADY_MODE: NORMAL
MDC_IDC_SET_BRADY_PAV_DELAY_HIGH: 110 MS
MDC_IDC_SET_BRADY_PAV_DELAY_LOW: 220 MS
MDC_IDC_SET_BRADY_SAV_DELAY_LOW: 220 MS
MDC_IDC_SET_LEADCHNL_RA_PACING_ANODE_ELECTRODE_1: NORMAL
MDC_IDC_SET_LEADCHNL_RA_PACING_ANODE_LOCATION_1: NORMAL
MDC_IDC_SET_LEADCHNL_RA_PACING_CAPTURE_MODE: NORMAL
MDC_IDC_SET_LEADCHNL_RA_PACING_CATHODE_ELECTRODE_1: NORMAL
MDC_IDC_SET_LEADCHNL_RA_PACING_CATHODE_LOCATION_1: NORMAL
MDC_IDC_SET_LEADCHNL_RA_PACING_POLARITY: NORMAL
MDC_IDC_SET_LEADCHNL_RA_SENSING_ADAPTATION_MODE: NORMAL
MDC_IDC_SET_LEADCHNL_RA_SENSING_ANODE_ELECTRODE_1: NORMAL
MDC_IDC_SET_LEADCHNL_RA_SENSING_ANODE_LOCATION_1: NORMAL
MDC_IDC_SET_LEADCHNL_RA_SENSING_CATHODE_ELECTRODE_1: NORMAL
MDC_IDC_SET_LEADCHNL_RA_SENSING_CATHODE_LOCATION_1: NORMAL
MDC_IDC_SET_LEADCHNL_RA_SENSING_POLARITY: NORMAL
MDC_IDC_SET_LEADCHNL_RA_SENSING_SENSITIVITY: 0.15 MV
MDC_IDC_SET_LEADCHNL_RV_PACING_AMPLITUDE: 2.5 V
MDC_IDC_SET_LEADCHNL_RV_PACING_ANODE_ELECTRODE_1: NORMAL
MDC_IDC_SET_LEADCHNL_RV_PACING_ANODE_LOCATION_1: NORMAL
MDC_IDC_SET_LEADCHNL_RV_PACING_CAPTURE_MODE: NORMAL
MDC_IDC_SET_LEADCHNL_RV_PACING_CATHODE_ELECTRODE_1: NORMAL
MDC_IDC_SET_LEADCHNL_RV_PACING_CATHODE_LOCATION_1: NORMAL
MDC_IDC_SET_LEADCHNL_RV_PACING_POLARITY: NORMAL
MDC_IDC_SET_LEADCHNL_RV_PACING_PULSEWIDTH: 0.4 MS
MDC_IDC_SET_LEADCHNL_RV_SENSING_ADAPTATION_MODE: NORMAL
MDC_IDC_SET_LEADCHNL_RV_SENSING_ANODE_ELECTRODE_1: NORMAL
MDC_IDC_SET_LEADCHNL_RV_SENSING_ANODE_LOCATION_1: NORMAL
MDC_IDC_SET_LEADCHNL_RV_SENSING_CATHODE_ELECTRODE_1: NORMAL
MDC_IDC_SET_LEADCHNL_RV_SENSING_CATHODE_LOCATION_1: NORMAL
MDC_IDC_SET_LEADCHNL_RV_SENSING_POLARITY: NORMAL
MDC_IDC_SET_LEADCHNL_RV_SENSING_SENSITIVITY: 0.6 MV
MDC_IDC_SET_ZONE_DETECTION_INTERVAL: 375 MS
MDC_IDC_SET_ZONE_TYPE: NORMAL
MDC_IDC_SET_ZONE_VENDOR_TYPE: NORMAL
MDC_IDC_STAT_BRADY_RV_PERCENT_PACED: NORMAL
MDC_IDC_STAT_EPISODE_RECENT_COUNT: 0
MDC_IDC_STAT_EPISODE_RECENT_COUNT_DTM_END: NORMAL
MDC_IDC_STAT_EPISODE_RECENT_COUNT_DTM_START: NORMAL
MDC_IDC_STAT_EPISODE_TOTAL_COUNT: 4
MDC_IDC_STAT_EPISODE_TOTAL_COUNT_DTM_END: NORMAL
MDC_IDC_STAT_EPISODE_TYPE: NORMAL
MDC_IDC_STAT_EPISODE_TYPE: NORMAL
MDC_IDC_STAT_EPISODE_VENDOR_TYPE: NORMAL
MDC_IDC_STAT_EPISODE_VENDOR_TYPE: NORMAL

## 2020-02-13 ENCOUNTER — TELEPHONE (OUTPATIENT)
Dept: FAMILY MEDICINE | Facility: CLINIC | Age: 85
End: 2020-02-13

## 2020-02-13 NOTE — TELEPHONE ENCOUNTER
Reason for Call:  Other     Detailed comments: Pt's son, Florencio says Ashkan is having partial knee surgery on his left knee on March 4th. They would like him to stop his Warfarin 5-7 days prior. They want to know if this is OK with Dr Mayo. His Pre-op is Feb 28th but they will need to know this before that date.   He is aware that Dr Mayo is out of clinic until tomorrow.   Phone Number Patient can be reached at: Florencio [phone]    Best Time: anytime    Can we leave a detailed message on this number? YES    Call taken on 2/13/2020 at 1:56 PM by Marla Grace

## 2020-02-14 NOTE — TELEPHONE ENCOUNTER
Please call.  He is on warfarin for atrial fibrillation.    I recommend stopping warfarin 5 days before the surgery.    He will not need bridging.    He can restart warfarin later in the evening of his surgery.   VICTOR HUGO ART MD

## 2020-02-24 ENCOUNTER — PATIENT OUTREACH (OUTPATIENT)
Dept: CARE COORDINATION | Facility: CLINIC | Age: 85
End: 2020-02-24

## 2020-02-24 NOTE — PROGRESS NOTES
Clinic Care Coordination Contact    Follow Up Progress Note      Assessment: Son reports that patient is doing well.  Anxiety is well maintained and so his depression.  They are getting all final appointments in before surgery which is scheduled for Wednesday the fourth.    Goals addressed this encounter:   Goals Addressed                 This Visit's Progress      Pain Management (pt-stated)   80%     Goal Statement: I want to get knee surgery so my pain is improved.  Measure of Success: I will have surgery by the end of the year if not sooner for knee replacement.   Supportive Steps to Achieve: had infected tooth removed, family  Barriers: repeated infections, extracted tooth area needs to heal  Strengths: infection cleared. Tooth extraction site healing.   Date to Achieve By: 3-  Patient expressed understanding of goal: yes    1/22/2020 extend target date - as surgery had to be delayed due to infections        Psychosocial (pt-stated)   On track     Goal Statement: pt to remain safe in his own home as long as possible, safe meaning no injuries from falls or avoidable health issues.  Measure of Success: no falls with injuries or avoidable health issues  Supportive Steps to Achieve: home care, , family  Barriers: age, health  Strengths: supportive family  Date to Achieve By: 6-  Patient expressed understanding of goal: yes    11-7-19 extended target date             Intervention/Education provided during outreach: Son reports no concerns at the current time.  Patient is doing well with his anxiety and depression and looking forward to surgery.  They were out for wheeling this weekend and son reports patient did well and no concerns with pain.    Patient has partial knee surgery scheduled for 4 March.  They have preop scheduled and cardiology scheduled prior to surgery.      Outreach Frequency: monthly    Plan:   Family to assist patient with Appointments and surgery.  Son  to call  if any concerns arise before next call.  Care Coordinator will follow up in about 2 weeks.    TONE Lord, [facility] - Care Coordinator   [facility]  2/24/2020   1:14 PM  [phone]

## 2020-02-25 PROBLEM — I42.8 NICM (NONISCHEMIC CARDIOMYOPATHY) (H): Status: ACTIVE | Noted: 2020-02-25

## 2020-02-25 PROBLEM — I48.20 CHRONIC ATRIAL FIBRILLATION (H): Status: ACTIVE | Noted: 2017-03-22

## 2020-02-25 PROBLEM — I25.10 CAD (CORONARY ARTERY DISEASE): Status: ACTIVE | Noted: 2020-02-25

## 2020-02-25 NOTE — TELEPHONE ENCOUNTER
"Requested Prescriptions   Pending Prescriptions Disp Refills     lisinopril (PRINIVIL/ZESTRIL) 5 MG tablet 90 tablet 3     Sig: Take 2 tablets (10 mg) by mouth daily    ACE Inhibitors (Including Combos) Protocol Passed - 2/11/2019  3:51 PM       Passed - Blood pressure under 140/90 in past 12 months    BP Readings from Last 3 Encounters:   01/18/19 98/50   01/17/19 131/60   12/21/18 110/56                Passed - Recent (12 mo) or future (30 days) visit within the authorizing provider's specialty    Patient had office visit in the last 12 months or has a visit in the next 30 days with authorizing provider or within the authorizing provider's specialty.  See \"Patient Info\" tab in inbasket, or \"Choose Columns\" in Meds & Orders section of the refill encounter.             Passed - Medication is active on med list       Passed - Patient is age 18 or older       Passed - Normal serum creatinine on file in past 12 months    Recent Labs   Lab Test 11/30/18  1018   CR 0.96            Passed - Normal serum potassium on file in past 12 months    Recent Labs   Lab Test 11/30/18  1018   POTASSIUM 3.6             lisinopril (PRINIVIL/ZESTRIL) 5 MG tablet  Last Written Prescription Date:  01/21/2019  Last Fill Quantity: 90 tablet,  # refills: 3   Last office visit: 1/18/2019 with prescribing provider:  DAPHNIE Mayo   Future Office Visit:   Next 5 appointments (look out 90 days)    Feb 15, 2019  1:00 PM CST  SHORT with Heron Mayo MD  [facility] ([facility]) [address] [place] [id number] [phone]   Apr 18, 2019  8:45 AM CDT  Return Visit with Kae Rodriguez MD  [facility] ([facility]) [address] [facility] [id number] [phone]         Brandy Harkins RT (R) (M)    " Admission

## 2020-02-27 ENCOUNTER — HOSPITAL ENCOUNTER (OUTPATIENT)
Dept: CARDIOLOGY | Facility: CLINIC | Age: 85
Discharge: HOME | End: 2020-02-27
Attending: INTERNAL MEDICINE | Admitting: INTERNAL MEDICINE
Payer: MEDICARE

## 2020-02-27 DIAGNOSIS — I50.22 CHRONIC SYSTOLIC HEART FAILURE (H): ICD-10-CM

## 2020-02-27 PROCEDURE — 25500064 ZZH RX 255 OP 636: Performed by: INTERNAL MEDICINE

## 2020-02-27 PROCEDURE — 93306 TTE W/DOPPLER COMPLETE: CPT

## 2020-02-27 PROCEDURE — 93306 TTE W/DOPPLER COMPLETE: CPT | Mod: 26 | Performed by: INTERNAL MEDICINE

## 2020-02-27 RX ADMIN — HUMAN ALBUMIN MICROSPHERES AND PERFLUTREN 2 ML: 10; .22 INJECTION, SOLUTION INTRAVENOUS at 14:53

## 2020-02-28 ENCOUNTER — OFFICE VISIT (OUTPATIENT)
Dept: FAMILY MEDICINE | Facility: CLINIC | Age: 85
End: 2020-02-28
Payer: MEDICARE

## 2020-02-28 VITALS
RESPIRATION RATE: 18 BRPM | BODY MASS INDEX: 31.38 KG/M2 | SYSTOLIC BLOOD PRESSURE: 94 MMHG | WEIGHT: 183.8 LBS | TEMPERATURE: 97.3 F | DIASTOLIC BLOOD PRESSURE: 50 MMHG | OXYGEN SATURATION: 94 % | HEIGHT: 64 IN | HEART RATE: 83 BPM

## 2020-02-28 DIAGNOSIS — I25.10 CORONARY ARTERY DISEASE INVOLVING NATIVE CORONARY ARTERY OF NATIVE HEART WITHOUT ANGINA PECTORIS: ICD-10-CM

## 2020-02-28 DIAGNOSIS — Z01.818 PREOP GENERAL PHYSICAL EXAM: Primary | ICD-10-CM

## 2020-02-28 DIAGNOSIS — F41.9 ANXIETY: ICD-10-CM

## 2020-02-28 DIAGNOSIS — I48.20 CHRONIC ATRIAL FIBRILLATION (H): ICD-10-CM

## 2020-02-28 DIAGNOSIS — I10 ESSENTIAL HYPERTENSION WITH GOAL BLOOD PRESSURE LESS THAN 140/90: ICD-10-CM

## 2020-02-28 DIAGNOSIS — I42.8 NICM (NONISCHEMIC CARDIOMYOPATHY) (H): ICD-10-CM

## 2020-02-28 DIAGNOSIS — I50.43 ACUTE ON CHRONIC COMBINED SYSTOLIC AND DIASTOLIC CONGESTIVE HEART FAILURE (H): ICD-10-CM

## 2020-02-28 LAB
ANION GAP SERPL CALCULATED.3IONS-SCNC: 3 MMOL/L (ref 3–14)
BUN SERPL-MCNC: 37 MG/DL (ref 7–30)
CALCIUM SERPL-MCNC: 9.7 MG/DL (ref 8.5–10.1)
CHLORIDE SERPL-SCNC: 100 MMOL/L (ref 94–109)
CO2 SERPL-SCNC: 33 MMOL/L (ref 20–32)
CREAT SERPL-MCNC: 1.67 MG/DL (ref 0.66–1.25)
GFR SERPL CREATININE-BSD FRML MDRD: 37 ML/MIN/{1.73_M2}
GLUCOSE SERPL-MCNC: 97 MG/DL (ref 70–99)
HGB BLD-MCNC: 13.2 G/DL (ref 13.3–17.7)
POTASSIUM SERPL-SCNC: 4.8 MMOL/L (ref 3.4–5.3)
SODIUM SERPL-SCNC: 136 MMOL/L (ref 133–144)

## 2020-02-28 PROCEDURE — 85018 HEMOGLOBIN: CPT | Performed by: FAMILY MEDICINE

## 2020-02-28 PROCEDURE — 80048 BASIC METABOLIC PNL TOTAL CA: CPT | Performed by: FAMILY MEDICINE

## 2020-02-28 PROCEDURE — 93000 ELECTROCARDIOGRAM COMPLETE: CPT | Performed by: FAMILY MEDICINE

## 2020-02-28 PROCEDURE — 99215 OFFICE O/P EST HI 40 MIN: CPT | Performed by: FAMILY MEDICINE

## 2020-02-28 PROCEDURE — 36415 COLL VENOUS BLD VENIPUNCTURE: CPT | Performed by: FAMILY MEDICINE

## 2020-02-28 RX ORDER — GABAPENTIN 100 MG/1
CAPSULE ORAL
Qty: 60 CAPSULE | COMMUNITY
Start: 2020-02-28 | End: 2020-09-24

## 2020-02-28 RX ORDER — FUROSEMIDE 40 MG
60 TABLET ORAL DAILY
Qty: 180 TABLET | Refills: 0 | COMMUNITY
Start: 2020-03-09 | End: 2020-04-24

## 2020-02-28 ASSESSMENT — MIFFLIN-ST. JEOR: SCORE: 1444.71

## 2020-02-28 ASSESSMENT — PAIN SCALES - GENERAL: PAINLEVEL: NO PAIN (0)

## 2020-02-28 NOTE — PATIENT INSTRUCTIONS
Before Your Surgery      Call your surgeon if there is any change in your health. This includes signs of a cold or flu (such as a sore throat, runny nose, cough, rash or fever).    Do not smoke, drink alcohol or take over the counter medicine (unless your surgeon or primary care doctor tells you to) for the 24 hours before and after surgery.    If you take prescribed drugs: Follow your doctor s orders about which medicines to take and which to stop until after surgery.    Eating and drinking prior to surgery: follow the instructions from your surgeon    Take a shower or bath the night before surgery. Use the soap your surgeon gave you to gently clean your skin. If you do not have soap from your surgeon, use your regular soap. Do not shave or scrub the surgery site.  Wear clean pajamas and have clean sheets on your bed.       ICD-10-CM    1. Preop general physical exam Z01.818    2. 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 Anxiety F41.9    4. Coronary artery disease involving native coronary artery of native heart without angina pectoris I25.10 EKG 12-lead complete w/read - Clinics   5. Chronic atrial fibrillation I48.20    6. Essential hypertension with goal blood pressure less than 140/90 I10 Hemoglobin     Basic metabolic panel   7. NICM (nonischemic cardiomyopathy) (H) I42.8        RECOMMENDATIONS:     --Patient is to take all scheduled medications on the day of surgery EXCEPT for modifications listed below.    Anticoagulant or Antiplatelet Medication Use  WARFARIN: Thromboembolic risk is low (e.g. single DVT/PE >12 months ago, A fib and [id number] < 4 without prior CVA/TIA), hold warfarin 5 days (INR >/= 2) without bridging before procedure.  No bridging.   No warfarin for 5 days before your surgery.  It is oK to resume  The warfarin later in the day after your surgery if OK  with your surgeon.         ACE Inhibitor or  Angiotensin Receptor Blocker (ARB) Use  Ace inhibitor or Angiotensin Receptor Blocker (ARB) and should HOLD this medication for the 24 hours prior to surgery.  No lisinopril within 24 hours of your surgery.  Do not take the morning of surgery.     APPROVAL GIVEN to proceed with proposed procedure, without further diagnostic evaluation

## 2020-02-28 NOTE — PROGRESS NOTES
[facility]  [address] [place] [id number]  [phone]  Dept: [phone]    PRE-OP EVALUATION:  Today's date: 2020    Nabor Cunningham (: 1935) presents for pre-operative evaluation assessment as requested by Dr. Vasquez.  He requires evaluation and anesthesia risk assessment prior to undergoing surgery/procedure for treatment of left partial knee replacement .    Fax number for surgical facility: [phone]  Primary Physician: Heron Mayo  Type of Anesthesia Anticipated: General    Patient has a Health Care Directive or Living Will:  NO    Preop Questions 2020   Who is doing your surgery? dr vasquez   What are you having done? patrial lt knee   Date of Surgery/Procedure: [id number]   Facility or Hospital where procedure/surgery will be performed: [facility]   1.  Do you have a history of Heart attack, stroke, stent, coronary bypass surgery, or other heart surgery? YES  - CHF, has had angiogram   2.  Do you ever have any pain or discomfort in your chest? No   3.  Do you have a history of  Heart Failure? No   4.   Are you troubled by shortness of breath when:  walking on a level surface, or up a slight hill, or at night? No   5.  Do you currently have a cold, bronchitis or other respiratory infection? No   6.  Do you have a cough, shortness of breath, or wheezing? No   7.  Do you sometimes get pains in the calves of your legs when you walk? No   8. Do you or anyone in your family have previous history of blood clots? No   9.  Do you or does anyone in your family have a serious bleeding problem such as prolonged bleeding following surgeries or cuts? No   10. Have you ever had problems with anemia or been told to take iron pills? No   11. Have you had any abnormal blood loss such as black, tarry or bloody stools? No   12. Have you ever had a blood transfusion? No   13. Have you or any of your relatives ever had problems with anesthesia? No   14. Do you have  "sleep apnea, excessive snoring or daytime drowsiness? No   15. Do you have any prosthetic heart valves? No   16. Do you have prosthetic joints? YES - right total knee         HPI:   He has had pain in left knee for years. Some catching and snapping.  He states he has been told it is \"bone on bone\".  He is scheduled for a left knee medial unicompartmental arthroplasty.       MEDICAL HISTORY:     Patient Active Problem List    Diagnosis Date Noted     NICM (nonischemic cardiomyopathy) (H) 02/25/2020     Priority: Medium     CAD (coronary artery disease) 02/25/2020     Priority: Medium     1/7/19 Cath: Nonobstructive disease       CHF (congestive heart failure) (H) 12/28/2018     Priority: Medium     Cardiac pacemaker in situ 12/21/2018     Priority: Medium     Dual chamber pacemaker placed in 2016 for sick sinus.       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) 11/30/2018     Priority: Medium     11/30/2018:He has seen cardiology through [place].  Echo in October, 2018 with EF=49%, diastolic dysfunction, reduced RV systolic function, RV enlargement.  (Report summary in clinic notes 11/30/2018).   Hospitalized 11/22 in [place] for CHF exacerbation acute on chronic.   Nuclear stress test December 2018 with large fixed defect and EF=35%  1/7/2019:Coronary angiogram right and left heart cath:no obstructive disease.        Moderate episode of recurrent major depressive disorder (H) 04/10/2017     Priority: Medium     Anxiety 03/22/2017     Priority: Medium     3/23/2017:He has been on amitriptyline, nortriptyline, buspirone, sertraline and Lexapro in the past year.        Alcohol abuse 03/22/2017     Priority: Medium     7/12/2019:Not currently using alcohol.   None for years.        Chronic atrial fibrillation 03/22/2017     Priority: Medium     S/p PPM 6/2016       Weight loss 03/22/2017     Priority: Medium     Peripheral polyneuropathy 03/22/2017     Priority: Medium     " 4/7/2017:Abnormal monofilament in distal plantar feet bilateral.  He has had chronic pain in both feet.   11/30/2018:nortriptyline has helped and he was up to 75mg a day.  He had some prolonged QT and dose cut back to 25mg at bedtime in hospital 11/22/2018.       Chronic gout, unspecified cause, unspecified site 03/22/2017     Priority: Medium     Essential hypertension with goal blood pressure less than 140/90 03/22/2017     Priority: Medium     Hyperlipidemia LDL goal <130 03/22/2017     Priority: Medium     Persistent insomnia 03/22/2017     Priority: Medium     11/30/2018:He has been on temazepam in the past which has helped some.  He was on this chronically.  He tried lorazepam which worked for a few days, then not effective.  Tried mirtazapine which did not help.         Lower urinary tract symptoms (LUTS) 03/22/2017     Priority: Medium     Long-term (current) use of anticoagulants [Z79.01] 03/22/2017     Priority: Medium      Past Medical History:   Diagnosis Date     Bleeding from wound 9/2/2018 7/12/2019:He had prolonged bleeding after cyst removal on his back within the past year.        Past Surgical History:   Procedure Laterality Date     ANGIOGRAM  01/2019    right and left heart cath.      APPENDECTOMY      ~2013     CATARACT IOL, RT/LT      Past bilateral cataract surgery.      EYE SURGERY Left     three corneal transplants     IMPLANT PACEMAKER       INCISION AND DRAINAGE TRUNK, COMBINED N/A 9/6/2018    Procedure: COMBINED INCISION AND DRAINAGE TRUNK;  incision and cauterization of left buttock bleed.;  Surgeon: Dain Davis MD;  Location: [facility] OR     INCISION AND DRAINAGE TRUNK, COMBINED N/A 11/1/2018    Procedure:  INCISION AND DRAINAGE of Back Wound;  Surgeon: Dain Davis MD;  Location: [facility] OR     JOINT REPLACEMENT Right     right total knee.      THORACIC SURGERY      removal benign nodule     Current Outpatient Medications   Medication Sig Dispense Refill     atorvastatin (LIPITOR) 20 MG  tablet Take 1 tablet (20 mg) by mouth daily 30 tablet 11     calcium carbonate (OS- MG [place]. [place]) 1250 MG tablet Take 1 tablet by mouth 2 times daily       colchicine (COLCRYS) 0.6 MG tablet Take 1 tablet (0.6 mg) by mouth daily 90 tablet 3     furosemide (LASIX) 40 MG tablet Take 2 tablets (80 mg) by mouth daily 180 tablet 0     gabapentin (NEURONTIN) 100 MG capsule He takes gabapentin 100mg in AM, 100mg at Noon and takes one 300mg pill at bedtime. 60 capsule      gabapentin (NEURONTIN) 300 MG capsule Take 1 capsule (300 mg) by mouth At Bedtime 90 capsule 3     hypromellose (ARTIFICIAL TEARS) 0.4 % SOLN ophthalmic solution Place 1 drop into both eyes every hour as needed for dry eyes       lisinopril (PRINIVIL/ZESTRIL) 5 MG tablet TAKE TWO TABLETS BY MOUTH ONCE DAILY 180 tablet 1     metoprolol succinate ER (TOPROL-XL) 25 MG 24 hr tablet TAKE ONE TABLET BY MOUTH EVERY EVENING 90 tablet 1     mirtazapine (REMERON) 15 MG tablet TAKE ONE TABLET BY MOUTH EVERY NIGHT AT BEDTIME 90 tablet 1     multivitamin, therapeutic with minerals (MULTI-VITAMIN) TABS tablet Take 1 tablet by mouth daily 100 tablet 3     nortriptyline (PAMELOR) 25 MG capsule TAKE ONE CAPSULE BY MOUTH EVERY NIGHT AT BEDTIME 30 capsule 10     prednisoLONE acetate (PRED FORTE) 1 % ophthalmic susp Place 1 drop Into the left eye daily   2     triamcinolone (KENALOG) 0.1 % external cream APPLY TOPICALLY TO AFFECTED AREA(S) TWO TIMES A DAY AS DIRECTED (Patient taking differently: 2 times daily as needed ) 45 g 1     warfarin ANTICOAGULANT (JANTOVEN ANTICOAGULANT) 2 MG tablet TAKE 3 TABLETS BY MOUTH EVERY Monday/THURSDAY AND TAKE 2 TABLETS DAILY ON ALL OTHER DAYS OR AS DIRECTED BY THE ANTICOAGULATION CLINIC 200 tablet 0     OTC products: acetaminophen.  Tums.     Allergies   Allergen Reactions     Amoxicillin Hives     Penicillins Other (See Comments)     Dizzy       Trazodone Other (See Comments)     Hallucinations        Latex Allergy: NO    Social  "History     Tobacco Use     Smoking status: Former Smoker     Years: 20.00     Types: Cigars     Smokeless tobacco: Never Used   Substance Use Topics     Alcohol use: Not Currently     History   Drug Use No     Family History :  ============  No family history of bleeding or anesthesia problems.     REVIEW OF SYSTEMS:   ROS:  General: No change in weight, sleep or appetite.  Normal energy.  No fever or chills  Eyes: Negative for vision changes or eye problems  ENT: No problems with ears, nose or throat.  No difficulty swallowing.  Resp: No coughing, wheezing or shortness of breath  CV: No chest pains or palpitations  GI: POSITIVE for:, diarrhea.  He has three loose stools a day for 6-8 months.  He takes Imodium once in a while which helps.   : No urinary frequency or dysuria, bladder or kidney problems  Musculoskeletal: POSITIVE  for:, left knee catches and snaps.  Has been painful in the past.   Neurologic: POSITIVE for:, occasional headache   Psychiatric: No problems with anxiety, depression or mental health  Heme/immune/allergy: No history of bleeding or clotting problems or anemia.  No allergies or immune system problems, POSITIVE  for:, chronic anticoagulation.  Endocrine: No history of thyroid disease, diabetes or other endocrine disorders  Skin: POSITIVE for:, bruises easily.     EXAM:   OBJECTIVE:Blood pressure 94/50, pulse 83, temperature 97.3  F (36.3  C), resp. rate 18, height 1.626 m (5' 4\"), weight 83.4 kg (183 lb 12.8 oz), SpO2 94 %. BMI=Body mass index is 31.55 kg/m .  GENERAL APPEARANCE ADULT: Alert, no acute distress  EYES: PERRL, EOM normal, conjunctiva and lids normal  HENT: Ears and TMs normal, oral mucosa and posterior oropharynx normal  NECK: No adenopathy,masses or thyromegaly  RESP: lungs clear to auscultation   CV: irregular rhythm-irregularly irregular, rate-normal, no murmur  ABDOMEN: soft, no organomegaly, masses or tenderness  MS: trace edema bilateral.        DIAGNOSTICS:   EKG: " atrial fibrillation, rate 86, normal intervals, no acute ST/T changes c/w ischemia, no LVH by voltage criteria, Left Bundle Branch Block, unchanged from previous tracings  HGB and chem 8 pending.     Recent Labs   Lab Test 01/31/20 01/16/20 09/23/19  1043  07/26/19  1503 07/12/19  1239   HGB  --   --   --   --   --  13.7 14.6   PLT  --   --   --   --   --  137* 149*   INR 2.0* 1.9*   < >  --    < > 4.90*  --    NA  --   --   --  134  --  137 135   POTASSIUM  --   --   --  4.2  --  4.8 4.9   CR  --   --   --  0.96  --  1.39* 1.11    < > = values in this interval not displayed.      He has cardiology evaluation on 3/2.     Echocardiogram done on 2/27/2020:Interpretation Summary  1. The left ventricle is normal in size. The visual ejection fraction is  estimated at 40-45%. Difficult to assess wall motion, even with contrast.  Septal bounce from conduction abnormality.  2. The right ventricle is normal in structure, function and size.  3. No significant valve disease.  4. The ascending aorta is Mildly dilated. Sinus of Valsalva 4.1cm, ascending  aorta 4.0cm.  IMPRESSION:   Reason for surgery/procedure: left knee osteoarthritis.     The proposed surgical procedure is considered INTERMEDIATE risk.    REVISED CARDIAC RISK INDEX  The patient has the following serious cardiovascular risks for perioperative complications such as (MI, PE, VFib and 3  AV Block):  Congestive Heart Failure (pulmonary edema, PND, s3 vipul, CXR with pulmonary congestion, basilar rales)  INTERPRETATION: 1 risks: Class II (low risk - 0.9% complication rate).  His CHF has been stable.     The patient has the following additional risks for perioperative complications:        ICD-10-CM    1. Preop general physical exam Z01.818    2. 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 Anxiety F41.9    4. Coronary artery disease involving native coronary artery of native heart without  angina pectoris I25.10 EKG 12-lead complete w/read - Clinics   5. Chronic atrial fibrillation I48.20    6. Essential hypertension with goal blood pressure less than 140/90 I10 Hemoglobin     Basic metabolic panel   7. NICM (nonischemic cardiomyopathy) (H) I42.8        RECOMMENDATIONS:     --Patient is to take all scheduled medications on the day of surgery EXCEPT for modifications listed below.    Anticoagulant or Antiplatelet Medication Use  WARFARIN: Thromboembolic risk is low (e.g. single DVT/PE >12 months ago, A fib and [id number] < 4 without prior CVA/TIA), hold warfarin 5 days (INR >/= 2) without bridging before procedure.  No bridging.   No warfarin for 5 days before your surgery.  It is oK to resume  The warfarin later in the day after your surgery if OK  with your surgeon.         ACE Inhibitor or Angiotensin Receptor Blocker (ARB) Use  Ace inhibitor or Angiotensin Receptor Blocker (ARB) and should HOLD this medication for the 24 hours prior to surgery.  No lisinopril within 24 hours of your surgery.  Do not take the morning of surgery.     APPROVAL GIVEN to proceed with proposed procedure, without further diagnostic evaluation     He has elevated creatinine new in the past year.  Uncertain cause.  It could be related to aging, hypertension, congestive heart failure and medications could be contributing.   PLAN: Check UA and renal ultrasound looking for causes of chronic kidney disease.  This does not need to be done before surgery.     Signed Electronically by: Heron Mayo MD    Copy of this evaluation report is provided to requesting physician.    [facility] Preop Guidelines    Revised Cardiac Risk Index

## 2020-02-28 NOTE — LETTER
March 2, 2020      Nabor Cunningham  [address] [facility] [id number]        Dear ,    We are writing to inform you of your test results.    Mild anemia.     The blood chemistries (Basic metabolic panel) are all normal including electrolytes (salt balances in the blood) and blood glucose.  The creatinine, a blood test to measure kidney function is elevated indicating some decrease in kidney function.   This is new in the past year and of uncertain cause.  It could be related to aging, hypertension, congestive heart failure and medications could be contributing.   PLAN: Make sure he is drinking enough fluids.     Check Chem 8 again on 3/2 at cardiology visit.   Cardiology may consider cutting back on diuretic.   OK for surgery.   I do recommend UA test to check kidneys and renal ultrasound at some time (does not need to be done before surgery) to evaluate kidney structure and he will need follow-up creatinine in a month or so.      VICTOR HUGO ART MD    Resulted Orders   Hemoglobin   Result Value Ref Range    Hemoglobin 13.2 (L) 13.3 - 17.7 g/dL   Basic metabolic panel   Result Value Ref Range    Sodium 136 133 - 144 mmol/L    Potassium 4.8 3.4 - 5.3 mmol/L    Chloride 100 94 - 109 mmol/L    Carbon Dioxide 33 (H) 20 - 32 mmol/L    Anion Gap 3 3 - 14 mmol/L    Glucose 97 70 - 99 mg/dL      Comment:      Non Fasting    Urea Nitrogen 37 (H) 7 - 30 mg/dL    Creatinine 1.67 (H) 0.66 - 1.25 mg/dL    GFR Estimate 37 (L) >60 mL/min/[1.73_m2]      Comment:      Non  GFR Calc  Starting 12/18/2018, serum creatinine based estimated GFR (eGFR) will be   calculated using the Chronic Kidney Disease Epidemiology Collaboration   (CKD-EPI) equation.      GFR Estimate If Black 43 (L) >60 mL/min/[1.73_m2]      Comment:       GFR Calc  Starting 12/18/2018, serum creatinine based estimated GFR (eGFR) will be   calculated using the Chronic Kidney Disease Epidemiology Collaboration    (CKD-EPI) equation.      Calcium 9.7 8.5 - 10.1 mg/dL       If you have any questions or concerns, please call the clinic at the number listed above.       Sincerely,        Heron Mayo MD/carmella

## 2020-02-28 NOTE — NURSING NOTE
"Chief Complaint   Patient presents with     Pre-Op Exam     left partial knee replacement.       Initial BP 94/50 (BP Location: Right arm, Patient Position: Chair, Cuff Size: Adult Regular)   Pulse 83   Temp 97.3  F (36.3  C)   Resp 18   Ht 1.626 m (5' 4\")   Wt 83.4 kg (183 lb 12.8 oz)   SpO2 94%   BMI 31.55 kg/m   Estimated body mass index is 31.55 kg/m  as calculated from the following:    Height as of this encounter: 1.626 m (5' 4\").    Weight as of this encounter: 83.4 kg (183 lb 12.8 oz).    Patient presents to the clinic using walker or cane    Health Maintenance that is potentially due pending provider review:      n/a    Is there anyone who you would like to be able to receive your results? Yes Florencio Cunningham  If yes have patient fill out [facility]    "

## 2020-03-01 PROBLEM — N18.30 CKD (CHRONIC KIDNEY DISEASE) STAGE 3, GFR 30-59 ML/MIN (H): Status: ACTIVE | Noted: 2020-03-01

## 2020-03-01 NOTE — RESULT ENCOUNTER NOTE
Please call.  Mild anemia.    The blood chemistries (Basic metabolic panel) are all normal including electrolytes (salt balances in the blood) and blood glucose.  The creatinine, a blood test to measure kidney function is elevated indicating some decrease in kidney function.   This is new in the past year and of uncertain cause.  It could be related to aging, hypertension, congestive heart failure and medications could be contributing.   PLAN: Make sure he is drinking enough fluids.    Check Chem 8 again on 3/2 at cardiology visit.   Cardiology may consider cutting back on diuretic.   OK for surgery.   I do recommend UA test to check kidneys and renal ultrasound at some time (does not need to be done before surgery) to evaluate kidney structure and he will need follow-up creatinine in a month or so.  Please arrange for orders.   VICTOR HUGO ART MD

## 2020-03-02 ENCOUNTER — OFFICE VISIT (OUTPATIENT)
Dept: CARDIOLOGY | Facility: CLINIC | Age: 85
End: 2020-03-02
Attending: INTERNAL MEDICINE
Payer: MEDICARE

## 2020-03-02 ENCOUNTER — TELEPHONE (OUTPATIENT)
Dept: FAMILY MEDICINE | Facility: CLINIC | Age: 85
End: 2020-03-02

## 2020-03-02 VITALS
WEIGHT: 185.4 LBS | DIASTOLIC BLOOD PRESSURE: 65 MMHG | OXYGEN SATURATION: 95 % | BODY MASS INDEX: 31.82 KG/M2 | SYSTOLIC BLOOD PRESSURE: 103 MMHG | HEART RATE: 63 BPM

## 2020-03-02 DIAGNOSIS — I50.22 CHRONIC SYSTOLIC HEART FAILURE (H): Primary | ICD-10-CM

## 2020-03-02 DIAGNOSIS — Z79.01 LONG TERM CURRENT USE OF ANTICOAGULANT THERAPY: ICD-10-CM

## 2020-03-02 DIAGNOSIS — Z79.01 CHRONIC ANTICOAGULATION: ICD-10-CM

## 2020-03-02 DIAGNOSIS — N18.30 CKD (CHRONIC KIDNEY DISEASE) STAGE 3, GFR 30-59 ML/MIN (H): ICD-10-CM

## 2020-03-02 DIAGNOSIS — N18.30 CKD (CHRONIC KIDNEY DISEASE) STAGE 3, GFR 30-59 ML/MIN (H): Primary | ICD-10-CM

## 2020-03-02 DIAGNOSIS — I48.20 CHRONIC ATRIAL FIBRILLATION (H): ICD-10-CM

## 2020-03-02 LAB
ANION GAP SERPL CALCULATED.3IONS-SCNC: 5 MMOL/L (ref 3–14)
BUN SERPL-MCNC: 35 MG/DL (ref 7–30)
CALCIUM SERPL-MCNC: 9.7 MG/DL (ref 8.5–10.1)
CHLORIDE SERPL-SCNC: 103 MMOL/L (ref 94–109)
CO2 SERPL-SCNC: 29 MMOL/L (ref 20–32)
CREAT SERPL-MCNC: 1.55 MG/DL (ref 0.66–1.25)
GFR SERPL CREATININE-BSD FRML MDRD: 41 ML/MIN/{1.73_M2}
GLUCOSE SERPL-MCNC: 99 MG/DL (ref 70–99)
INR PPP: 1.2 (ref 0.86–1.14)
POTASSIUM SERPL-SCNC: 4.5 MMOL/L (ref 3.4–5.3)
SODIUM SERPL-SCNC: 137 MMOL/L (ref 133–144)

## 2020-03-02 PROCEDURE — 85610 PROTHROMBIN TIME: CPT | Performed by: FAMILY MEDICINE

## 2020-03-02 PROCEDURE — 80048 BASIC METABOLIC PNL TOTAL CA: CPT | Performed by: FAMILY MEDICINE

## 2020-03-02 PROCEDURE — 36415 COLL VENOUS BLD VENIPUNCTURE: CPT | Performed by: FAMILY MEDICINE

## 2020-03-02 PROCEDURE — 99214 OFFICE O/P EST MOD 30 MIN: CPT | Performed by: PHYSICIAN ASSISTANT

## 2020-03-02 NOTE — TELEPHONE ENCOUNTER
Mild anemia.     The blood chemistries (Basic metabolic panel) are all normal including electrolytes (salt balances in the blood) and blood glucose.  The creatinine, a blood test to measure kidney function is elevated indicating some decrease in kidney function.   This is new in the past year and of uncertain cause.  It could be related to aging, hypertension, congestive heart failure and medications could be contributing.   PLAN: Make sure he is drinking enough fluids.     Check Chem 8 again on 3/2 at cardiology visit.   Cardiology may consider cutting back on diuretic.   OK for surgery.   I do recommend UA test to check kidneys and renal ultrasound at some time (does not need to be done before surgery) to evaluate kidney structure and he will need follow-up creatinine in a month or so.   Please arrange for orders.   VICTOR HUGO ART MD

## 2020-03-02 NOTE — LETTER
3/2/2020    Heron Mayo MD  [address] [place] [address]    RE: Nabor Cunningham       Dear Colleague,    I had the pleasure of seeing Nabor Cunningham in the [facility].      Cardiology Progress Note    Date of Service: 03/04/2020  Patient seen today in follow up of: CORE follow up  Primary cardiologist: Dr. Galvan    HPI:  Nabor Cunningham is a very pleasant 84 year old male with a history of atrial fibrillation, sinus node dysfunction and bradycardia, pacemaker implantation in June 2016, and ascending aortic aneurysm, nonobstructive coronary artery disease who is here today for CORE clinic follow-up.    He previously retrieved his care at [facility] in [place] and recently transferred his care here to Dr. Galvan in December 2019.  It appears at the time of his pacemaker implantation in 2016 his LV function was normal.    He did relatively well until he was hospitalized in November 2018 with acute heart failure.  He was diuresed with symptomatic improvement and has been on diuretics since that time.  His EF in the year following that dropped to the 35 to 40% range.  He underwent coronary angiography in January 2019 which showed nonobstructive coronary disease.  He has been on guideline directed medical therapy for his cardiomyopathy with metoprolol succinate and lisinopril.  He is also continued on warfarin for thromboembolic prevention.    He was seen by Dr. Galvan in December.  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  He enrolled him in the device clinic here to follow his pacing percentage.  He recommended a follow-up in 3 months and an echocardiogram and consideration of a CRT if he has worsening LV dysfunction or worsening heart failure.    He is back today for follow-up.  He had an echo done on 2/27/2020 which showed an LVEF of 40 to 45%.  It was difficult to assess wall motion abnormalities on this study.  The RV was normal.  In  September 2019, an echo at [facility] reportedly showed an EF of 35 to 40%. A device check on 2/11/20 showed he was V paced 21% of the time.     He is here today with his daughter.  He has been stable since his last visit here.  He denies any increasing dyspnea on exertion.  No orthopnea or PND.  He has some mild peripheral edema which is stable.  He does not weigh daily at home.  He last weighed last week and was 184 pounds.  He notes this seems to be stable.  He is not dizzy or lightheaded.  He denies chest discomfort.  He is able to walk around his apartment in the hallways of his facility without any difficulty.  He is scheduled for a partial knee replacement in 2 days.  Dr. Mayo has cleared him and he is off his warfarin for 5 days prior to this.    ASSESSMENT/PLAN:  Nabor Cunningham is a very pleasant 84 year old male with a history of atrial fibrillation, symptomatic bradycardia s/p PPM in 2016, a nonischemic cardiomyopathy with a LVEF of 40% (possibly pacemaker induced), AAA, hypertension and hyperlipidemia who is here today for CORE clinic follow up.    He appears to be stable from a cardiac perspective at this point. He is not having any heart failure symptoms and has stable likely NYHA class II symptoms.  His recent echocardiogram showed a LVEF of around 40% which appears to be stable compared to his last echo done in [place] last fall.  His current medical therapy for his cardiomyopathy includes metoprolol XL 25 mg daily and lisinopril 10 mg daily.    His recent labs have shown evidence of worsening renal insufficiency. It appears his renal function at baseline is normal. Today it is 1.5. his BUN is mildly elevated. Electrolytes are normal. He recently saw Dr. Mayo who recommended a renal US and UA for evaluation which he plans to have done next week. Currently, he takes 80 mg of lasix. He has trace edema but otherwise appears euvolemic today. We discussed potentially cautiously cutting back his lasix dose to  60 mg daily. He is hesitant to do so before his scheduled knee surgery this week. I will have the CORE nurses follow up with him next week and if he is doing well we'll plan to make this change. He has not been weighing on a daily basis, I recommended he start. I would like to see him back in approximately three weeks then to see how he is doing on the lower dose of diuretics. In the meantime, we reviewed signs/symptoms of heart failure to be watching and call the clinic for.     We'll continue to follow his pacing burden via his device. If his heart failure worsens or his EF declines further, we could consider upgrade to a biventricular device.     Orders this Visit:  Orders Placed This Encounter   Procedures     Basic metabolic panel     Follow-Up with CORE Clinic - STEVE visit     No orders of the defined types were placed in this encounter.    Medications Discontinued During This Encounter   Medication Reason     cross-Linked Hyaluronate (GEL-ONE) injection PRSY 30 mg        CURRENT MEDICATIONS:  Current Outpatient Medications   Medication Sig Dispense Refill     atorvastatin (LIPITOR) 20 MG tablet Take 1 tablet (20 mg) by mouth daily 30 tablet 11     calcium carbonate (OS- MG [place]) 1250 MG tablet Take 1 tablet by mouth 2 times daily       colchicine (COLCRYS) 0.6 MG tablet Take 1 tablet (0.6 mg) by mouth daily 90 tablet 3     furosemide (LASIX) 40 MG tablet Take 2 tablets (80 mg) by mouth daily 180 tablet 0     gabapentin (NEURONTIN) 100 MG capsule He takes gabapentin 100mg in AM, 100mg at Noon and takes one 300mg pill at bedtime. 60 capsule      gabapentin (NEURONTIN) 300 MG capsule Take 1 capsule (300 mg) by mouth At Bedtime 90 capsule 3     hypromellose (ARTIFICIAL TEARS) 0.4 % SOLN ophthalmic solution Place 1 drop into both eyes every hour as needed for dry eyes       lisinopril (PRINIVIL/ZESTRIL) 5 MG tablet TAKE TWO TABLETS BY MOUTH ONCE DAILY 180 tablet 1     metoprolol succinate ER (TOPROL-XL)  25 MG 24 hr tablet TAKE ONE TABLET BY MOUTH EVERY EVENING 90 tablet 1     mirtazapine (REMERON) 15 MG tablet TAKE ONE TABLET BY MOUTH EVERY NIGHT AT BEDTIME 90 tablet 1     multivitamin, therapeutic with minerals (MULTI-VITAMIN) TABS tablet Take 1 tablet by mouth daily 100 tablet 3     nortriptyline (PAMELOR) 25 MG capsule TAKE ONE CAPSULE BY MOUTH EVERY NIGHT AT BEDTIME 30 capsule 10     prednisoLONE acetate (PRED FORTE) 1 % ophthalmic susp Place 1 drop Into the left eye daily   2     triamcinolone (KENALOG) 0.1 % external cream APPLY TOPICALLY TO AFFECTED AREA(S) TWO TIMES A DAY AS DIRECTED (Patient taking differently: 2 times daily as needed ) 45 g 1     warfarin ANTICOAGULANT (JANTOVEN ANTICOAGULANT) 2 MG tablet TAKE 3 TABLETS BY MOUTH EVERY Monday/THURSDAY AND TAKE 2 TABLETS DAILY ON ALL OTHER DAYS OR AS DIRECTED BY THE ANTICOAGULATION CLINIC 200 tablet 0     ALLERGIES  Allergies   Allergen Reactions     Amoxicillin Hives     Penicillins Other (See Comments)     Dizzy       Trazodone Other (See Comments)     Hallucinations       PAST MEDICAL HISTORY:  Past Medical History:   Diagnosis Date     Bleeding from wound 9/2/2018 7/12/2019:He had prolonged bleeding after cyst removal on his back within the past year.        PAST SURGICAL HISTORY:  Past Surgical History:   Procedure Laterality Date     ANGIOGRAM  01/2019    right and left heart cath.      APPENDECTOMY      ~2013     CATARACT IOL, RT/LT      Past bilateral cataract surgery.      EYE SURGERY Left     three corneal transplants     IMPLANT PACEMAKER       INCISION AND DRAINAGE TRUNK, COMBINED N/A 9/6/2018    Procedure: COMBINED INCISION AND DRAINAGE TRUNK;  incision and cauterization of left buttock bleed.;  Surgeon: Dain Davis MD;  Location: [facility] OR     INCISION AND DRAINAGE TRUNK, COMBINED N/A 11/1/2018    Procedure:  INCISION AND DRAINAGE of Back Wound;  Surgeon: Dain Davis MD;  Location: [facility] OR     JOINT REPLACEMENT Right     right total knee.       THORACIC SURGERY      removal benign nodule     FAMILY HISTORY:  No family history on file.  SOCIAL HISTORY:  Social History     Socioeconomic History     Marital status:      Spouse name: None     Number of children: 3     Years of education: 13     Highest education level: None   Occupational History     Occupation: retired   Social Needs     Financial resource strain: Not hard at all     Food insecurity:     Worry: Never true     Inability: Never true     Transportation needs:     Medical: No     Non-medical: No   Tobacco Use     Smoking status: Former Smoker     Years: 20.00     Types: Cigars     Smokeless tobacco: Never Used   Substance and Sexual Activity     Alcohol use: Not Currently     Drug use: No     Sexual activity: Not Currently   Lifestyle     Physical activity:     Days per week: 7 days     Minutes per session: 20 min     Stress: Only a little   Relationships     Social connections:     Talks on phone: More than three times a week     Gets together: More than three times a week     Attends Rastafarian service: More than 4 times per year     Active member of club or organization: No     Attends meetings of clubs or organizations: Never     Relationship status:      Intimate partner violence:     Fear of current or ex partner: No     Emotionally abused: No     Physically abused: No     Forced sexual activity: No   Other Topics Concern     Parent/sibling w/ CABG, MI or angioplasty before 65F 55M? Not Asked   Social History Narrative    ** Merged History Encounter **          Review of Systems:  Skin:  Positive for itching;bruising   Eyes:  Positive for glasses;visual blurring  ENT:  Negative    Respiratory:  Negative    Cardiovascular:    Positive for;lower extremity symptoms;edema;fatigue  Gastroenterology: Negative    Genitourinary:  Positive for urinary frequency;urgency  Musculoskeletal:  Positive for joint pain;joint swelling;joint stiffness;arthritis  Neurologic:    numbness or  tingling of feet  Psychiatric:  Positive for sleep disturbances  Heme/Lymph/Imm:  Negative    Endocrine:  Negative       Physical Exam:  Vitals: /65 (BP Location: Right arm, Patient Position: Sitting, Cuff Size: Adult Large)   Pulse 63   Wt 84.1 kg (185 lb 6.4 oz)   SpO2 95%   BMI 31.82 kg/m      Wt Readings from Last 4 Encounters:   03/02/20 84.1 kg (185 lb 6.4 oz)   02/28/20 83.4 kg (183 lb 12.8 oz)   12/18/19 82.1 kg (181 lb)   09/23/19 82 kg (180 lb 12.8 oz)     GEN: well nourished, in no acute distress.  HEENT:  Pupils equal, round. Sclerae nonicteric.   C/V:  Irregularly irregular, no murmur, rub or gallop.    RESP: Respirations are unlabored. Clear to auscultation bilaterally without wheezing, rales, or rhonchi.  GI: Abdomen soft, nontender.  EXTREM: trace bilateral LE edema.  NEURO: Alert and oriented, cooperative.  SKIN: Warm and dry.     Recent Lab Results:  LIPID RESULTS:  Lab Results   Component Value Date    CHOL 236 (H) 07/12/2019    HDL 36 (L) 07/12/2019    LDL  07/12/2019     Cannot estimate LDL when triglyceride exceeds 400 mg/dL     (H) 07/12/2019    TRIG 422 (H) 07/12/2019     LIVER ENZYME RESULTS:  Lab Results   Component Value Date    AST 26 07/26/2019    ALT 33 07/26/2019     CBC RESULTS:  Lab Results   Component Value Date    WBC 11.1 (H) 07/26/2019    RBC 4.63 07/26/2019    HGB 13.2 (L) 02/28/2020    HCT 41.2 07/26/2019    MCV 89 07/26/2019    MCH 29.6 07/26/2019    MCHC 33.3 07/26/2019    RDW 13.6 07/26/2019     (L) 07/26/2019     BMP RESULTS:  Lab Results   Component Value Date     03/02/2020    POTASSIUM 4.5 03/02/2020    CHLORIDE 103 03/02/2020    CO2 29 03/02/2020    ANIONGAP 5 03/02/2020    GLC 99 03/02/2020    BUN 35 (H) 03/02/2020    CR 1.55 (H) 03/02/2020    GFRESTIMATED 41 (L) 03/02/2020    GFRESTBLACK 48 (L) 03/02/2020    ALY 9.7 03/02/2020      A1C RESULTS:  No results found for: A1C  INR RESULTS:  Lab Results   Component Value Date    INR 1.20 (H)  03/02/2020    INR 2.0 (A) 01/31/2020    INR 1.9 (A) 01/16/2020    INR 1.88 (H) 08/01/2019       New/Pertinent imaging results since last visit:  Echocardiogram 2/27/20:  Interpretation Summary     1. The left ventricle is normal in size. The visual ejection fraction is estimated at 40-45%. Difficult to assess wall motion, even with contrast. Septal bounce from conduction abnormality.  2. The right ventricle is normal in structure, function and size.  3. No significant valve disease.  4. The ascending aorta is Mildly dilated. Sinus of Valsalva 4.1cm, ascending aorta 4.0cm.     Outside echo report from 9/2019 ([facility]) showed EF 35-40%.        Thank you for allowing me to participate in the care of your patient.    Sincerely,     Yulia Mon PA-C     [facility]

## 2020-03-02 NOTE — PATIENT INSTRUCTIONS
Call CORE nurse for any questions or concerns Mon-Fri 8am-4pm: [phone]  For concerns after hours: [phone], option #2     1. Medication changes: no medication changes for now.     2. Plan from today: I'll have Sara the nurse who walks with me on Monday to see how you're doing. We'll consider cutting back your water pill to 60 mg daily if you're doing okay. If we do cut back your lasix, I'd like for you weigh yourself every day.     3. Lab results:   Component      Latest Ref Rng & Units 3/2/2020   Sodium      133 - 144 mmol/L 137   Potassium      3.4 - 5.3 mmol/L 4.5   Chloride      94 - 109 mmol/L 103   Carbon Dioxide      20 - 32 mmol/L 29   Anion Gap      3 - 14 mmol/L 5   Glucose      70 - 99 mg/dL 99   Urea Nitrogen      7 - 30 mg/dL 35 (H)   Creatinine      0.66 - 1.25 mg/dL 1.55 (H)   GFR Estimate      >60 mL/min/1.73:m2 41 (L)   GFR Estimate If Black      >60 mL/min/1.73:m2 48 (L)   Calcium      8.5 - 10.1 mg/dL 9.7       *If you need to speak with [facility] Scheduling, please call: [phone]

## 2020-03-02 NOTE — TELEPHONE ENCOUNTER
ANTICOAGULATION MANAGEMENT     Patient Name:  Nabor Cunningham  Date:  3/2/2020    ASSESSMENT /SUBJECTIVE:      Today's INR result of 1.20 is subtherapeutic. Goal INR of 2.0-3.0      Warfarin dose taken: Warfarin recently held as instructed which may be affecting INR    Diet: No new diet changes affecting INR    Medication changes/ interactions: No new medications/supplements affecting INR    Previous INR: Therapeutic     S/S of bleeding or thromboembolism: No    New injury or illness:  No    Upcoming surgery, procedure or cardioversion:  Yes     Additional findings: Holding warfarin for procedure on 3/4/20 and will resume warfarin on wed 3/4,       PLAN:    Spoke with daughter regarding INR result and instructed:     Warfarin Dosing Instructions: Continue your current warfarin dose after procedure and have INR rechecked 4-5 days postprocedure    Instructed patient to follow up no later than: 3/12/20  [facility] RN visit scheduled    Education provided: No      Daughter verbalizes understanding and agrees to warfarin dosing plan.    Instructed to call the Anticoagulation Clinic for any changes, questions or concerns. (#[phone])        OBJECTIVE:  INR   Date Value Ref Range Status   2020 1.20 (H) 0.86 - 1.14 Final             Anticoagulation Summary  As of 3/2/2020    INR goal:   2.0-3.0   TTR:   37.0 % (1 y)   INR used for dosin.20! (3/2/2020)   Warfarin maintenance plan:   6 mg (2 mg x 3) every Mon, Thu; 4 mg (2 mg x 2) all other days   Full warfarin instructions:   3/2: Hold; 3/3: Hold; Otherwise 6 mg every Mon, Thu; 4 mg all other days   Weekly warfarin total:   32 mg   Plan last modified:   Belia Jones, RN (2020)   Next INR check:   3/11/2020   Priority:   Critical   Target end date:   Indefinite    Indications    Chronic atrial fibrillation [I48.20]  Long-term (current) use of anticoagulants [Z79.01] [Z79.01]             Anticoagulation Episode Summary     INR check location:       Preferred  lab:       Send INR reminders to:   [facility]    Comments:   * 2mg tabs. Does not like to cut tabs. NO bandaid      Anticoagulation Care Providers     Provider Role Specialty Phone number    Heron Mayo MD [facility] [phone]

## 2020-03-02 NOTE — PROGRESS NOTES
Cardiology Progress Note    Date of Service: 03/04/2020  Patient seen today in follow up of: CORE follow up  Primary cardiologist: Dr. Galvan    HPI:  Nabor Cunningham is a very pleasant 84 year old male with a history of atrial fibrillation, sinus node dysfunction and bradycardia, pacemaker implantation in June 2016, and ascending aortic aneurysm, nonobstructive coronary artery disease who is here today for CORE clinic follow-up.    He previously retrieved his care at [facility] in [place] and recently transferred his care here to Dr. Galvan in December 2019.  It appears at the time of his pacemaker implantation in 2016 his LV function was normal.    He did relatively well until he was hospitalized in November 2018 with acute heart failure.  He was diuresed with symptomatic improvement and has been on diuretics since that time.  His EF in the year following that dropped to the 35 to 40% range.  He underwent coronary angiography in January 2019 which showed nonobstructive coronary disease.  He has been on guideline directed medical therapy for his cardiomyopathy with metoprolol succinate and lisinopril.  He is also continued on warfarin for thromboembolic prevention.    He was seen by Dr. Galvan in December.  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  He enrolled him in the device clinic here to follow his pacing percentage.  He recommended a follow-up in 3 months and an echocardiogram and consideration of a CRT if he has worsening LV dysfunction or worsening heart failure.    He is back today for follow-up.  He had an echo done on 2/27/2020 which showed an LVEF of 40 to 45%.  It was difficult to assess wall motion abnormalities on this study.  The RV was normal.  In September 2019, an echo at [facility] reportedly showed an EF of 35 to 40%. A device check on 2/11/20 showed he was V paced 21% of the time.     He is here today with his daughter.  He has been stable since his last visit  here.  He denies any increasing dyspnea on exertion.  No orthopnea or PND.  He has some mild peripheral edema which is stable.  He does not weigh daily at home.  He last weighed last week and was 184 pounds.  He notes this seems to be stable.  He is not dizzy or lightheaded.  He denies chest discomfort.  He is able to walk around his apartment in the hallways of his facility without any difficulty.  He is scheduled for a partial knee replacement in 2 days.  Dr. Mayo has cleared him and he is off his warfarin for 5 days prior to this.    ASSESSMENT/PLAN:  Nabor Cunningham is a very pleasant 84 year old male with a history of atrial fibrillation, symptomatic bradycardia s/p PPM in 2016, a nonischemic cardiomyopathy with a LVEF of 40% (possibly pacemaker induced), AAA, hypertension and hyperlipidemia who is here today for CORE clinic follow up.    He appears to be stable from a cardiac perspective at this point. He is not having any heart failure symptoms and has stable likely NYHA class II symptoms.  His recent echocardiogram showed a LVEF of around 40% which appears to be stable compared to his last echo done in [place] last fall.  His current medical therapy for his cardiomyopathy includes metoprolol XL 25 mg daily and lisinopril 10 mg daily.    His recent labs have shown evidence of worsening renal insufficiency. It appears his renal function at baseline is normal. Today it is 1.5. his BUN is mildly elevated. Electrolytes are normal. He recently saw Dr. Mayo who recommended a renal US and UA for evaluation which he plans to have done next week. Currently, he takes 80 mg of lasix. He has trace edema but otherwise appears euvolemic today. We discussed potentially cautiously cutting back his lasix dose to 60 mg daily. He is hesitant to do so before his scheduled knee surgery this week. I will have the CORE nurses follow up with him next week and if he is doing well we'll plan to make this change. He has not been weighing  on a daily basis, I recommended he start. I would like to see him back in approximately three weeks then to see how he is doing on the lower dose of diuretics. In the meantime, we reviewed signs/symptoms of heart failure to be watching and call the clinic for.     We'll continue to follow his pacing burden via his device. If his heart failure worsens or his EF declines further, we could consider upgrade to a biventricular device.     Orders this Visit:  Orders Placed This Encounter   Procedures     Basic metabolic panel     Follow-Up with CORE Clinic - STEVE visit     No orders of the defined types were placed in this encounter.    Medications Discontinued During This Encounter   Medication Reason     cross-Linked Hyaluronate (GEL-ONE) injection PRSY 30 mg        CURRENT MEDICATIONS:  Current Outpatient Medications   Medication Sig Dispense Refill     atorvastatin (LIPITOR) 20 MG tablet Take 1 tablet (20 mg) by mouth daily 30 tablet 11     calcium carbonate (OS- MG [place]. [place]) 1250 MG tablet Take 1 tablet by mouth 2 times daily       colchicine (COLCRYS) 0.6 MG tablet Take 1 tablet (0.6 mg) by mouth daily 90 tablet 3     furosemide (LASIX) 40 MG tablet Take 2 tablets (80 mg) by mouth daily 180 tablet 0     gabapentin (NEURONTIN) 100 MG capsule He takes gabapentin 100mg in AM, 100mg at Noon and takes one 300mg pill at bedtime. 60 capsule      gabapentin (NEURONTIN) 300 MG capsule Take 1 capsule (300 mg) by mouth At Bedtime 90 capsule 3     hypromellose (ARTIFICIAL TEARS) 0.4 % SOLN ophthalmic solution Place 1 drop into both eyes every hour as needed for dry eyes       lisinopril (PRINIVIL/ZESTRIL) 5 MG tablet TAKE TWO TABLETS BY MOUTH ONCE DAILY 180 tablet 1     metoprolol succinate ER (TOPROL-XL) 25 MG 24 hr tablet TAKE ONE TABLET BY MOUTH EVERY EVENING 90 tablet 1     mirtazapine (REMERON) 15 MG tablet TAKE ONE TABLET BY MOUTH EVERY NIGHT AT BEDTIME 90 tablet 1     multivitamin, therapeutic with minerals  (MULTI-VITAMIN) TABS tablet Take 1 tablet by mouth daily 100 tablet 3     nortriptyline (PAMELOR) 25 MG capsule TAKE ONE CAPSULE BY MOUTH EVERY NIGHT AT BEDTIME 30 capsule 10     prednisoLONE acetate (PRED FORTE) 1 % ophthalmic susp Place 1 drop Into the left eye daily   2     triamcinolone (KENALOG) 0.1 % external cream APPLY TOPICALLY TO AFFECTED AREA(S) TWO TIMES A DAY AS DIRECTED (Patient taking differently: 2 times daily as needed ) 45 g 1     warfarin ANTICOAGULANT (JANTOVEN ANTICOAGULANT) 2 MG tablet TAKE 3 TABLETS BY MOUTH EVERY Monday/THURSDAY AND TAKE 2 TABLETS DAILY ON ALL OTHER DAYS OR AS DIRECTED BY THE ANTICOAGULATION CLINIC 200 tablet 0     ALLERGIES  Allergies   Allergen Reactions     Amoxicillin Hives     Penicillins Other (See Comments)     Dizzy       Trazodone Other (See Comments)     Hallucinations       PAST MEDICAL HISTORY:  Past Medical History:   Diagnosis Date     Bleeding from wound 9/2/2018 7/12/2019:He had prolonged bleeding after cyst removal on his back within the past year.        PAST SURGICAL HISTORY:  Past Surgical History:   Procedure Laterality Date     ANGIOGRAM  01/2019    right and left heart cath.      APPENDECTOMY      ~2013     CATARACT IOL, RT/LT      Past bilateral cataract surgery.      EYE SURGERY Left     three corneal transplants     IMPLANT PACEMAKER       INCISION AND DRAINAGE TRUNK, COMBINED N/A 9/6/2018    Procedure: COMBINED INCISION AND DRAINAGE TRUNK;  incision and cauterization of left buttock bleed.;  Surgeon: Dain Davis MD;  Location: [facility] OR     INCISION AND DRAINAGE TRUNK, COMBINED N/A 11/1/2018    Procedure:  INCISION AND DRAINAGE of Back Wound;  Surgeon: Dain Davis MD;  Location: [facility] OR     JOINT REPLACEMENT Right     right total knee.      THORACIC SURGERY      removal benign nodule     FAMILY HISTORY:  No family history on file.  SOCIAL HISTORY:  Social History     Socioeconomic History     Marital status:      Spouse name: None      Number of children: 3     Years of education: 13     Highest education level: None   Occupational History     Occupation: retired   Social Needs     Financial resource strain: Not hard at all     Food insecurity:     Worry: Never true     Inability: Never true     Transportation needs:     Medical: No     Non-medical: No   Tobacco Use     Smoking status: Former Smoker     Years: 20.00     Types: Cigars     Smokeless tobacco: Never Used   Substance and Sexual Activity     Alcohol use: Not Currently     Drug use: No     Sexual activity: Not Currently   Lifestyle     Physical activity:     Days per week: 7 days     Minutes per session: 20 min     Stress: Only a little   Relationships     Social connections:     Talks on phone: More than three times a week     Gets together: More than three times a week     Attends [employer] service: More than 4 times per year     Active member of club or organization: No     Attends meetings of clubs or organizations: Never     Relationship status:      Intimate partner violence:     Fear of current or ex partner: No     Emotionally abused: No     Physically abused: No     Forced sexual activity: No   Other Topics Concern     Parent/sibling w/ CABG, MI or angioplasty before 65F 55M? Not Asked   Social History Narrative    ** Merged History Encounter **          Review of Systems:  Skin:  Positive for itching;bruising   Eyes:  Positive for glasses;visual blurring  ENT:  Negative    Respiratory:  Negative    Cardiovascular:    Positive for;lower extremity symptoms;edema;fatigue  Gastroenterology: Negative    Genitourinary:  Positive for urinary frequency;urgency  Musculoskeletal:  Positive for joint pain;joint swelling;joint stiffness;arthritis  Neurologic:    numbness or tingling of feet  Psychiatric:  Positive for sleep disturbances  Heme/Lymph/Imm:  Negative    Endocrine:  Negative       Physical Exam:  Vitals: /65 (BP Location: Right arm, Patient Position: Sitting, Cuff  Size: Adult Large)   Pulse 63   Wt 84.1 kg (185 lb 6.4 oz)   SpO2 95%   BMI 31.82 kg/m     Wt Readings from Last 4 Encounters:   03/02/20 84.1 kg (185 lb 6.4 oz)   02/28/20 83.4 kg (183 lb 12.8 oz)   12/18/19 82.1 kg (181 lb)   09/23/19 82 kg (180 lb 12.8 oz)     GEN: well nourished, in no acute distress.  HEENT:  Pupils equal, round. Sclerae nonicteric.   C/V:  Irregularly irregular, no murmur, rub or gallop.    RESP: Respirations are unlabored. Clear to auscultation bilaterally without wheezing, rales, or rhonchi.  GI: Abdomen soft, nontender.  EXTREM: trace bilateral LE edema.  NEURO: Alert and oriented, cooperative.  SKIN: Warm and dry.     Recent Lab Results:  LIPID RESULTS:  Lab Results   Component Value Date    CHOL 236 (H) 07/12/2019    HDL 36 (L) 07/12/2019    LDL  07/12/2019     Cannot estimate LDL when triglyceride exceeds 400 mg/dL     (H) 07/12/2019    TRIG 422 (H) 07/12/2019     LIVER ENZYME RESULTS:  Lab Results   Component Value Date    AST 26 07/26/2019    ALT 33 07/26/2019     CBC RESULTS:  Lab Results   Component Value Date    WBC 11.1 (H) 07/26/2019    RBC 4.63 07/26/2019    HGB 13.2 (L) 02/28/2020    HCT 41.2 07/26/2019    MCV 89 07/26/2019    MCH 29.6 07/26/2019    MCHC 33.3 07/26/2019    RDW 13.6 07/26/2019     (L) 07/26/2019     BMP RESULTS:  Lab Results   Component Value Date     03/02/2020    POTASSIUM 4.5 03/02/2020    CHLORIDE 103 03/02/2020    CO2 29 03/02/2020    ANIONGAP 5 03/02/2020    GLC 99 03/02/2020    BUN 35 (H) 03/02/2020    CR 1.55 (H) 03/02/2020    GFRESTIMATED 41 (L) 03/02/2020    GFRESTBLACK 48 (L) 03/02/2020    ALY 9.7 03/02/2020      A1C RESULTS:  No results found for: A1C  INR RESULTS:  Lab Results   Component Value Date    INR 1.20 (H) 03/02/2020    INR 2.0 (A) 01/31/2020    INR 1.9 (A) 01/16/2020    INR 1.88 (H) 08/01/2019       New/Pertinent imaging results since last visit:  Echocardiogram 2/27/20:  Interpretation Summary     1. The left  ventricle is normal in size. The visual ejection fraction is estimated at 40-45%. Difficult to assess wall motion, even with contrast. Septal bounce from conduction abnormality.  2. The right ventricle is normal in structure, function and size.  3. No significant valve disease.  4. The ascending aorta is Mildly dilated. Sinus of Valsalva 4.1cm, ascending aorta 4.0cm.     Outside echo report from 9/2019 ([facility]) showed EF 35-40%.    Yulia Mon PA-C  [facility]

## 2020-03-02 NOTE — LETTER
3/2/2020    Heron Mayo MD  [address] [place] [id number]    RE: Nabor Cunningham       Dear Colleague,    I had the pleasure of seeing Nabor Cunningham in the [facility].      Cardiology Progress Note    Date of Service: 03/04/2020  Patient seen today in follow up of: CORE follow up  Primary cardiologist: Dr. Galvan    HPI:  Nabor Cunningham is a very pleasant 84 year old male with a history of atrial fibrillation, sinus node dysfunction and bradycardia, pacemaker implantation in June 2016, and ascending aortic aneurysm, nonobstructive coronary artery disease who is here today for CORE clinic follow-up.    He previously retrieved his care at [facility] in [place] and recently transferred his care here to Dr. Galvan in December 2019.  It appears at the time of his pacemaker implantation in 2016 his LV function was normal.    He did relatively well until he was hospitalized in November 2018 with acute heart failure.  He was diuresed with symptomatic improvement and has been on diuretics since that time.  His EF in the year following that dropped to the 35 to 40% range.  He underwent coronary angiography in January 2019 which showed nonobstructive coronary disease.  He has been on guideline directed medical therapy for his cardiomyopathy with metoprolol succinate and lisinopril.  He is also continued on warfarin for thromboembolic prevention.    He was seen by Dr. Galvan in December.  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  He enrolled him in the device clinic here to follow his pacing percentage.  He recommended a follow-up in 3 months and an echocardiogram and consideration of a CRT if he has worsening LV dysfunction or worsening heart failure.    He is back today for follow-up.  He had an echo done on 2/27/2020 which showed an LVEF of 40 to 45%.  It was difficult to assess wall motion abnormalities on this study.  The RV was normal.  In  September 2019, an echo at [facility] reportedly showed an EF of 35 to 40%. A device check on 2/11/20 showed he was V paced 21% of the time.     He is here today with his daughter.  He has been stable since his last visit here.  He denies any increasing dyspnea on exertion.  No orthopnea or PND.  He has some mild peripheral edema which is stable.  He does not weigh daily at home.  He last weighed last week and was 184 pounds.  He notes this seems to be stable.  He is not dizzy or lightheaded.  He denies chest discomfort.  He is able to walk around his apartment in the hallways of his facility without any difficulty.  He is scheduled for a partial knee replacement in 2 days.  Dr. Mayo has cleared him and he is off his warfarin for 5 days prior to this.    ASSESSMENT/PLAN:  Nabor Cunningham is a very pleasant 84 year old male with a history of atrial fibrillation, symptomatic bradycardia s/p PPM in 2016, a nonischemic cardiomyopathy with a LVEF of 40% (possibly pacemaker induced), AAA, hypertension and hyperlipidemia who is here today for CORE clinic follow up.    He appears to be stable from a cardiac perspective at this point. He is not having any heart failure symptoms and has stable likely NYHA class II symptoms.  His recent echocardiogram showed a LVEF of around 40% which appears to be stable compared to his last echo done in [place] last fall.  His current medical therapy for his cardiomyopathy includes metoprolol XL 25 mg daily and lisinopril 10 mg daily.    His recent labs have shown evidence of worsening renal insufficiency. It appears his renal function at baseline is normal. Today it is 1.5. his BUN is mildly elevated. Electrolytes are normal. He recently saw Dr. Mayo who recommended a renal US and UA for evaluation which he plans to have done next week. Currently, he takes 80 mg of lasix. He has trace edema but otherwise appears euvolemic today. We discussed potentially cautiously cutting back his lasix dose to  60 mg daily. He is hesitant to do so before his scheduled knee surgery this week. I will have the CORE nurses follow up with him next week and if he is doing well we'll plan to make this change. He has not been weighing on a daily basis, I recommended he start. I would like to see him back in approximately three weeks then to see how he is doing on the lower dose of diuretics. In the meantime, we reviewed signs/symptoms of heart failure to be watching and call the clinic for.     We'll continue to follow his pacing burden via his device. If his heart failure worsens or his EF declines further, we could consider upgrade to a biventricular device.     Orders this Visit:  Orders Placed This Encounter   Procedures     Basic metabolic panel     Follow-Up with CORE Clinic - STEVE visit     No orders of the defined types were placed in this encounter.    Medications Discontinued During This Encounter   Medication Reason     cross-Linked Hyaluronate (GEL-ONE) injection PRSY 30 mg        CURRENT MEDICATIONS:  Current Outpatient Medications   Medication Sig Dispense Refill     atorvastatin (LIPITOR) 20 MG tablet Take 1 tablet (20 mg) by mouth daily 30 tablet 11     calcium carbonate (OS- MG [place]. CA) 1250 MG tablet Take 1 tablet by mouth 2 times daily       colchicine (COLCRYS) 0.6 MG tablet Take 1 tablet (0.6 mg) by mouth daily 90 tablet 3     furosemide (LASIX) 40 MG tablet Take 2 tablets (80 mg) by mouth daily 180 tablet 0     gabapentin (NEURONTIN) 100 MG capsule He takes gabapentin 100mg in AM, 100mg at Noon and takes one 300mg pill at bedtime. 60 capsule      gabapentin (NEURONTIN) 300 MG capsule Take 1 capsule (300 mg) by mouth At Bedtime 90 capsule 3     hypromellose (ARTIFICIAL TEARS) 0.4 % SOLN ophthalmic solution Place 1 drop into both eyes every hour as needed for dry eyes       lisinopril (PRINIVIL/ZESTRIL) 5 MG tablet TAKE TWO TABLETS BY MOUTH ONCE DAILY 180 tablet 1     metoprolol succinate ER (TOPROL-XL)  25 MG 24 hr tablet TAKE ONE TABLET BY MOUTH EVERY EVENING 90 tablet 1     mirtazapine (REMERON) 15 MG tablet TAKE ONE TABLET BY MOUTH EVERY NIGHT AT BEDTIME 90 tablet 1     multivitamin, therapeutic with minerals (MULTI-VITAMIN) TABS tablet Take 1 tablet by mouth daily 100 tablet 3     nortriptyline (PAMELOR) 25 MG capsule TAKE ONE CAPSULE BY MOUTH EVERY NIGHT AT BEDTIME 30 capsule 10     prednisoLONE acetate (PRED FORTE) 1 % ophthalmic susp Place 1 drop Into the left eye daily   2     triamcinolone (KENALOG) 0.1 % external cream APPLY TOPICALLY TO AFFECTED AREA(S) TWO TIMES A DAY AS DIRECTED (Patient taking differently: 2 times daily as needed ) 45 g 1     warfarin ANTICOAGULANT (JANTOVEN ANTICOAGULANT) 2 MG tablet TAKE 3 TABLETS BY MOUTH EVERY Monday/THURSDAY AND TAKE 2 TABLETS DAILY ON ALL OTHER DAYS OR AS DIRECTED BY THE ANTICOAGULATION CLINIC 200 tablet 0     ALLERGIES  Allergies   Allergen Reactions     Amoxicillin Hives     Penicillins Other (See Comments)     Dizzy       Trazodone Other (See Comments)     Hallucinations       PAST MEDICAL HISTORY:  Past Medical History:   Diagnosis Date     Bleeding from wound 9/2/2018 7/12/2019:He had prolonged bleeding after cyst removal on his back within the past year.        PAST SURGICAL HISTORY:  Past Surgical History:   Procedure Laterality Date     ANGIOGRAM  01/2019    right and left heart cath.      APPENDECTOMY      ~2013     CATARACT IOL, RT/LT      Past bilateral cataract surgery.      EYE SURGERY Left     three corneal transplants     IMPLANT PACEMAKER       INCISION AND DRAINAGE TRUNK, COMBINED N/A 9/6/2018    Procedure: COMBINED INCISION AND DRAINAGE TRUNK;  incision and cauterization of left buttock bleed.;  Surgeon: Dain Davis MD;  Location: [facility] OR     INCISION AND DRAINAGE TRUNK, COMBINED N/A 11/1/2018    Procedure:  INCISION AND DRAINAGE of Back Wound;  Surgeon: Dain Davis MD;  Location: [facility] OR     JOINT REPLACEMENT Right     right total knee.       THORACIC SURGERY      removal benign nodule     FAMILY HISTORY:  No family history on file.  SOCIAL HISTORY:  Social History     Socioeconomic History     Marital status:      Spouse name: None     Number of children: 3     Years of education: 13     Highest education level: None   Occupational History     Occupation: retired   Social Needs     Financial resource strain: Not hard at all     Food insecurity:     Worry: Never true     Inability: Never true     Transportation needs:     Medical: No     Non-medical: No   Tobacco Use     Smoking status: Former Smoker     Years: 20.00     Types: Cigars     Smokeless tobacco: Never Used   Substance and Sexual Activity     Alcohol use: Not Currently     Drug use: No     Sexual activity: Not Currently   Lifestyle     Physical activity:     Days per week: 7 days     Minutes per session: 20 min     Stress: Only a little   Relationships     Social connections:     Talks on phone: More than three times a week     Gets together: More than three times a week     Attends Scientologist service: More than 4 times per year     Active member of club or organization: No     Attends meetings of clubs or organizations: Never     Relationship status:      Intimate partner violence:     Fear of current or ex partner: No     Emotionally abused: No     Physically abused: No     Forced sexual activity: No   Other Topics Concern     Parent/sibling w/ CABG, MI or angioplasty before 65F 55M? Not Asked   Social History Narrative    ** Merged History Encounter **          Review of Systems:  Skin:  Positive for itching;bruising   Eyes:  Positive for glasses;visual blurring  ENT:  Negative    Respiratory:  Negative    Cardiovascular:    Positive for;lower extremity symptoms;edema;fatigue  Gastroenterology: Negative    Genitourinary:  Positive for urinary frequency;urgency  Musculoskeletal:  Positive for joint pain;joint swelling;joint stiffness;arthritis  Neurologic:    numbness or  tingling of feet  Psychiatric:  Positive for sleep disturbances  Heme/Lymph/Imm:  Negative    Endocrine:  Negative       Physical Exam:  Vitals: /65 (BP Location: Right arm, Patient Position: Sitting, Cuff Size: Adult Large)   Pulse 63   Wt 84.1 kg (185 lb 6.4 oz)   SpO2 95%   BMI 31.82 kg/m      Wt Readings from Last 4 Encounters:   03/02/20 84.1 kg (185 lb 6.4 oz)   02/28/20 83.4 kg (183 lb 12.8 oz)   12/18/19 82.1 kg (181 lb)   09/23/19 82 kg (180 lb 12.8 oz)     GEN: well nourished, in no acute distress.  HEENT:  Pupils equal, round. Sclerae nonicteric.   C/V:  Irregularly irregular, no murmur, rub or gallop.    RESP: Respirations are unlabored. Clear to auscultation bilaterally without wheezing, rales, or rhonchi.  GI: Abdomen soft, nontender.  EXTREM: trace bilateral LE edema.  NEURO: Alert and oriented, cooperative.  SKIN: Warm and dry.     Recent Lab Results:  LIPID RESULTS:  Lab Results   Component Value Date    CHOL 236 (H) 07/12/2019    HDL 36 (L) 07/12/2019    LDL  07/12/2019     Cannot estimate LDL when triglyceride exceeds 400 mg/dL     (H) 07/12/2019    TRIG 422 (H) 07/12/2019     LIVER ENZYME RESULTS:  Lab Results   Component Value Date    AST 26 07/26/2019    ALT 33 07/26/2019     CBC RESULTS:  Lab Results   Component Value Date    WBC 11.1 (H) 07/26/2019    RBC 4.63 07/26/2019    HGB 13.2 (L) 02/28/2020    HCT 41.2 07/26/2019    MCV 89 07/26/2019    MCH 29.6 07/26/2019    MCHC 33.3 07/26/2019    RDW 13.6 07/26/2019     (L) 07/26/2019     BMP RESULTS:  Lab Results   Component Value Date     03/02/2020    POTASSIUM 4.5 03/02/2020    CHLORIDE 103 03/02/2020    CO2 29 03/02/2020    ANIONGAP 5 03/02/2020    GLC 99 03/02/2020    BUN 35 (H) 03/02/2020    CR 1.55 (H) 03/02/2020    GFRESTIMATED 41 (L) 03/02/2020    GFRESTBLACK 48 (L) 03/02/2020    ALY 9.7 03/02/2020      A1C RESULTS:  No results found for: A1C  INR RESULTS:  Lab Results   Component Value Date    INR 1.20 (H)  03/02/2020    INR 2.0 (A) 01/31/2020    INR 1.9 (A) 01/16/2020    INR 1.88 (H) 08/01/2019       New/Pertinent imaging results since last visit:  Echocardiogram 2/27/20:  Interpretation Summary     1. The left ventricle is normal in size. The visual ejection fraction is estimated at 40-45%. Difficult to assess wall motion, even with contrast. Septal bounce from conduction abnormality.  2. The right ventricle is normal in structure, function and size.  3. No significant valve disease.  4. The ascending aorta is Mildly dilated. Sinus of Valsalva 4.1cm, ascending aorta 4.0cm.     Outside echo report from 9/2019 ([facility]) showed EF 35-40%.    Yulia Mon PA-C  [facility]    Thank you for allowing me to participate in the care of your patient.      Sincerely,     Yulia Mon PA-C     [facility]    cc:   Katie Galvan MD  [address]  [place] [address]

## 2020-03-02 NOTE — TELEPHONE ENCOUNTER
Left message for daughter Lexy to call [facility] nurse back regarding INR result

## 2020-03-03 NOTE — RESULT ENCOUNTER NOTE
Please call son.  The creatinine, a blood test to measure kidney function is elevated indicating some decrease in kidney function..  It improved slightly from last week.    INR is coming down off warfarin.    PLAN: OK for surgery tomorrow.    Preop forms were faxed today.

## 2020-03-09 ENCOUNTER — CARE COORDINATION (OUTPATIENT)
Dept: CARDIOLOGY | Facility: CLINIC | Age: 85
End: 2020-03-09

## 2020-03-09 NOTE — PROGRESS NOTES
Received call back from pt. He reports he ended up cutting back his Lasix to 60mg daily last week. He has been weighing himself and it is stable. Wt today 184#, exact same as it was last week prior to cutting back Lasix. Pt denies SOB or swelling. Reports feeling well. Med list updated with current Lasix dose. Pt reports he ended up not having knee surgery last week. He said that he is not really having any pain, and they told him he needed a few weeks of help at home post surgery, and it was decided to hold off for now on surgery. Advised pt to continue to weight daily, call if wt gain of >2# in a day or 5# in a week. Asked him to call if increased SOB or swelling. Pt stated understanding. Pt is due for follow up in 1 month with [facility], it is not yet scheduled. Pt states his son schedules his appts and he will remind him to call and arrange.     Update sent to ROSA Larsen.     HAWK Macario, RN, CHFN  03/09/20 at 9:48 AM

## 2020-03-09 NOTE — PROGRESS NOTES
[facility]  Pt seen in clinic on 3/2/20 by ROSA Larsen. Home weight was 184#. Pt had some worsening renal function and elevated BUN. ROSA Larsen recommended we decrease Lasix to 60mg daily, but pt hesitant as he was scheduled for knee surgery on 3/5. Plan was to call pt today for weight/sx update and consider cutting back at that time. Pt currently taking Lasix 80mg daily.     Called pt for update, no answer. [facility] for call back.     Future Appointments   Date Time Provider Department [place]   3/12/2020  8:15 AM [address]   3/12/2020  2:30 PM NB ANTI COAG [facility]   4/14/2020  9:00 AM Kae Ward MD [facility]   5/27/2020 12:00 AM [facility] [address] [facility]     MINA MacarioN, RN, CHFN  03/09/20 at 9:04 AM

## 2020-03-10 ENCOUNTER — PATIENT OUTREACH (OUTPATIENT)
Dept: CARE COORDINATION | Facility: CLINIC | Age: 85
End: 2020-03-10

## 2020-03-10 NOTE — PROGRESS NOTES
Clinic Care Coordination Contact  [facility]/Voicemail       Clinical Data: Care Coordinator Outreach  Outreach attempted x 1.  Left message on son's voicemail with call back information and requested return call.  Plan:  Care Coordinator will try to reach patient again in 4-6 business days.    TONE Lord, [facility] - Care Coordinator   [facility]  3/10/2020   9:39 AM  [phone]

## 2020-03-10 NOTE — PROGRESS NOTES
Clinic Care Coordination Contact    Follow Up Progress Note      Assessment: Son reports patient did not have surgery as they had not planned for a need for someone to be with him up to 2 weeks post surgery.  They also noticed some lab values a little off and they will be following up with an ultrasound on the 12th.    Goals addressed this encounter:   Goals Addressed                 This Visit's Progress      Pain Management (pt-stated)   70%     Goal Statement: I want to get knee surgery so my pain is improved.  Measure of Success: I will have surgery by the end of the year if not sooner for knee replacement.   Supportive Steps to Achieve: had infected tooth removed, family  Barriers: repeated infections, extracted tooth area needs to heal  Strengths: infection cleared. Tooth extraction site healing.   Date to Achieve By: 5-  Patient expressed understanding of goal: yes            Psychosocial (pt-stated)   On track     Goal Statement: pt to remain safe in his own home as long as possible, safe meaning no injuries from falls or avoidable health issues.  Measure of Success: no falls with injuries or avoidable health issues  Supportive Steps to Achieve: home care, , family  Barriers: age, health  Strengths: supportive family  Date to Achieve By: 6-  Patient expressed understanding of goal: yes               Intervention/Education provided during outreach: Since that overall patient is doing okay.  He does not have pain in his knee but it does click and lock at times.  He is not having any falls because of it.  He did note that he will need a fourth cornea replacement.  The first 2 were rejected and after they pulled the stitches out from the third 1 his vision became blurred.    Son had questions about cranberry juice and reviewed with RN care coordinator who said that as long as he does not have a lot should not interfere in his anticoagulant med.     Outreach Frequency:  monthly    Plan:   Son to bring patient to appointments and follow-up as scheduled.  Care Coordinator will follow up in 1 month.    TONE Lord, [facility] - Care Coordinator   [facility]  3/10/2020   2:08 PM  [phone]

## 2020-03-12 ENCOUNTER — TELEPHONE (OUTPATIENT)
Dept: FAMILY MEDICINE | Facility: CLINIC | Age: 85
End: 2020-03-12

## 2020-03-12 ENCOUNTER — ANTICOAGULATION THERAPY VISIT (OUTPATIENT)
Dept: ANTICOAGULATION | Facility: CLINIC | Age: 85
End: 2020-03-12
Payer: MEDICARE

## 2020-03-12 ENCOUNTER — HOSPITAL ENCOUNTER (OUTPATIENT)
Dept: ULTRASOUND IMAGING | Facility: CLINIC | Age: 85
Discharge: HOME | End: 2020-03-12
Attending: FAMILY MEDICINE | Admitting: FAMILY MEDICINE
Payer: MEDICARE

## 2020-03-12 DIAGNOSIS — N18.30 CKD (CHRONIC KIDNEY DISEASE) STAGE 3, GFR 30-59 ML/MIN (H): ICD-10-CM

## 2020-03-12 DIAGNOSIS — Z79.01 LONG TERM CURRENT USE OF ANTICOAGULANT THERAPY: ICD-10-CM

## 2020-03-12 DIAGNOSIS — I48.20 CHRONIC ATRIAL FIBRILLATION (H): ICD-10-CM

## 2020-03-12 LAB — INR POINT OF CARE: 2.1 (ref 0.86–1.14)

## 2020-03-12 PROCEDURE — 99207 ZZC NO CHARGE NURSE ONLY: CPT

## 2020-03-12 PROCEDURE — 85610 PROTHROMBIN TIME: CPT | Mod: QW

## 2020-03-12 PROCEDURE — 76770 US EXAM ABDO BACK WALL COMP: CPT

## 2020-03-12 PROCEDURE — 36416 COLLJ CAPILLARY BLOOD SPEC: CPT

## 2020-03-12 NOTE — TELEPHONE ENCOUNTER
Florencio called back and would like you to call him back at [phone].  He is Nabor's son.    [facility] [place]

## 2020-03-12 NOTE — PROGRESS NOTES
ANTICOAGULATION FOLLOW-UP CLINIC VISIT    Patient Name:  Nabor Cunningham  Date:  3/12/2020  Contact Type:  Face to Face    SUBJECTIVE:  Patient Findings     Positives:   Missed doses (prior hold for knee surgery that was cancelled)    Comments:   Patient did not end up having knee surgery. Family cancelled it because he is not having any pain and there was no one to care for him after surgery. Patient resumed warfarin on 3-4-20. Patient came with his son today, who is wondering about his dose of lasix. Per last cardiology note, patient was to schedule an office visit to discuss this. Writer advised patient's son to schedule this. Writer also advised that renal US was normal per PCP notes. No other changes or concerns. Recheck INR in 4 weeks.        Clinical Outcomes     Comments:   Patient did not end up having knee surgery. Family cancelled it because he is not having any pain and there was no one to care for him after surgery. Patient resumed warfarin on 3-4-20. Patient came with his son today, who is wondering about his dose of lasix. Per last cardiology note, patient was to schedule an office visit to discuss this. Writer advised patient's son to schedule this. Writer also advised that renal US was normal per PCP notes. No other changes or concerns. Recheck INR in 4 weeks.           OBJECTIVE    INR Protime   Date Value Ref Range Status   2020 2.1 (A) 0.86 - 1.14 Final       ASSESSMENT / PLAN  INR assessment THER    Recheck INR In: 4 WEEKS    INR Location Clinic      Anticoagulation Summary  As of 3/12/2020    INR goal:   2.0-3.0   TTR:   34.6 % (1 y)   INR used for dosin.1 (3/12/2020)   Warfarin maintenance plan:   6 mg (2 mg x 3) every Mon, Thu; 4 mg (2 mg x 2) all other days   Full warfarin instructions:   6 mg every Mon, Thu; 4 mg all other days   Weekly warfarin total:   32 mg   No change documented:   Belia Jones RN   Plan last modified:   Belia Jones RN (2020)   Next INR check:    4/9/2020   Priority:   Maintenance   Target end date:   Indefinite    Indications    Chronic atrial fibrillation [I48.20]  Long-term (current) use of anticoagulants [Z79.01] [Z79.01]             Anticoagulation Episode Summary     INR check location:       Preferred lab:       Send INR reminders to:   [facility]    Comments:   * 2mg tabs. Does not like to cut tabs. NO bandaid      Anticoagulation Care Providers     Provider Role Specialty Phone number    Heron Mayo MD [facility] [phone]            See the Encounter Report to view Anticoagulation Flowsheet and Dosing Calendar (Go to Encounters tab in chart review, and find the Anticoagulation Therapy Visit)        Belia Jones RN

## 2020-03-12 NOTE — TELEPHONE ENCOUNTER
Patient's deb Matias reports diarrhea for 3 months aobut 4 times per day, no abdominal pain, no nausea, no fever, not weak, says that the patient uses Imodium and this helps. Patient eats at [employer] so son is not sure what foods patient eats.    Advised to be seen tomorrow but son not able to work in at that time, appointment is scheduled on 3-, told son that if patient develops worsening diarrhea, abdominal pain, nausea, weakness or fever be seen earlier or Urgent Care, son agrees and will continue to use Imodium till can be seen, patient is taking fluids in well, no bleeding concerns reported.    SAURAV Nolan

## 2020-03-12 NOTE — TELEPHONE ENCOUNTER
LMTRC - regarding further questions.      Reason for call:  Patient reporting a symptom    Symptom or request: Pt stopped at  asking if he could get a order for Stool Samples for Diarrhea.  Pt spoke with Dara.    Phone Number patient can be reached at:  Home number on file [phone] (home)    Best Time:  Any Time      Can we leave a detailed message on this number:  YES    Call taken on 3/12/2020 at 2:55 PM by Cee Michel

## 2020-03-20 ENCOUNTER — COMMUNICATION - HEALTHEAST (OUTPATIENT)
Dept: PHYSICAL MEDICINE AND REHAB | Facility: CLINIC | Age: 85
End: 2020-03-20

## 2020-03-20 DIAGNOSIS — M54.41 ACUTE BILATERAL LOW BACK PAIN WITH BILATERAL SCIATICA: ICD-10-CM

## 2020-03-20 DIAGNOSIS — I48.20 CHRONIC ATRIAL FIBRILLATION (H): ICD-10-CM

## 2020-03-20 DIAGNOSIS — Z79.01 LONG TERM CURRENT USE OF ANTICOAGULANT THERAPY: Primary | ICD-10-CM

## 2020-03-20 DIAGNOSIS — M54.42 ACUTE BILATERAL LOW BACK PAIN WITH BILATERAL SCIATICA: ICD-10-CM

## 2020-03-27 ENCOUNTER — TELEPHONE (OUTPATIENT)
Dept: FAMILY MEDICINE | Facility: CLINIC | Age: 85
End: 2020-03-27

## 2020-03-27 DIAGNOSIS — Z79.01 LONG TERM CURRENT USE OF ANTICOAGULANT THERAPY: ICD-10-CM

## 2020-03-27 DIAGNOSIS — I48.20 CHRONIC ATRIAL FIBRILLATION (H): ICD-10-CM

## 2020-03-27 NOTE — TELEPHONE ENCOUNTER
Pts son Florencio called with questions regarding bringing pt in for an INR out of concern for his father's health.  Reviewed pts past INR results and notes; pt is on a stable dose of warfarin and has been testing an INR every 4 weeks. Lab appt made for pt to have an INR on 4/23 (6 weeks from last INR). Provided pts son with [facility] phone number ([phone]) to call with any questions or concerns.    Raven Collado RN on 3/27/2020 at 2:08 PM

## 2020-04-06 ENCOUNTER — VIRTUAL VISIT (OUTPATIENT)
Dept: CARDIOLOGY | Facility: CLINIC | Age: 85
End: 2020-04-06
Attending: PHYSICIAN ASSISTANT
Payer: MEDICARE

## 2020-04-06 ENCOUNTER — TELEPHONE (OUTPATIENT)
Dept: FAMILY MEDICINE | Facility: CLINIC | Age: 85
End: 2020-04-06

## 2020-04-06 VITALS — WEIGHT: 184 LBS | BODY MASS INDEX: 31.58 KG/M2

## 2020-04-06 DIAGNOSIS — I50.22 CHRONIC SYSTOLIC HEART FAILURE (H): Primary | ICD-10-CM

## 2020-04-06 PROCEDURE — 99442 ZZC PHYSICIAN TELEPHONE EVALUATION 11-20 MIN: CPT | Performed by: PHYSICIAN ASSISTANT

## 2020-04-06 NOTE — PROGRESS NOTES
"Wellness Screening Tool    Symptom Screening:    Do you have one of the following new symptoms:      Fever or reported chills?  No    A new cough (started within the past 14 days)? No    Shortness of breath (started within the past 14 days)? No    Nausea, vomiting or diarrhea? No    Within the past 3 weeks, have you been exposed to someone with a known positive illness below?      COVID - 19 (known or suspected) No    Chicken pox?  No    Measles? No    Pertussis? No        Patient notified of visitor restriction: Yes    Patient's appointment status: Telephone Visit    Nabor Cunningham is a 84 year old male who is being evaluated via a billable telephone visit.      The patient has been notified of following:     \"This telephone visit will be conducted via a call between you and your physician/provider. We have found that certain health care needs can be provided without the need for a physical exam.  This service lets us provide the care you need with a short phone conversation.  If a prescription is necessary we can send it directly to your pharmacy.  If lab work is needed we can place an order for that and you can then stop by our lab to have the test done at a later time.    If during the course of the call the physician/provider feels a telephone visit is not appropriate, you will not be charged for this service.\"     Patient has given verbal consent for Telephone visit?  Yes    Nabor Cunningham complains of    Chief Complaint   Patient presents with     CORE       I have reviewed and updated the patient's Past Medical History, Social History, Family History and Medication List.    ALLERGIES  Amoxicillin; Penicillins; and Trazodone    Additional provider notes:  Nabor Cunningham is a very pleasant 84 year old male with a history of atrial fibrillation, sinus node dysfunction and bradycardia, pacemaker implantation in June 2016, and ascending aortic aneurysm, nonobstructive coronary artery disease. Please see my " prior note from 3/2/20 for full details regarding his medical history. He transferred care from [facility] in December of 2019. He has been noted to have some worsening LV dysfunction after his pacemaker implantation in 2016. His last echocardiogram showed a LVEF of 40%. This is felt to possibly be related to RV pacing. He has been on guideline directed therapy with lisinopril and Metoprolol XL.    I met him about a month ago. Clinically, he was doing fairly well at that time. He had some worsening renal insufficiency on his labs and we cut back his lasix from 80 mg daily to 60 mg daily. He was scheduled to have a knee replacement done but this was postponed as he did not have proper follow up care. This has not been rescheduled at this point due to the COVID-19 outbreak.    He notes he has been feeling well. His weight has been stable at 184 lbs despite decreasing his lasix. He denies any increased dyspnea on exertion or peripheral edema. No orthopnea or PND.     Assessment/plan:  Ashkan Cunningham is pleasant 84 year old male with a history as noted above. He has been noted ot have worsening LV dysfunction, possibly related to RV pacing. His current medical therapy includes lisinopril 10 mg daily and Metoprolol XL 25 mg daily. His blood pressures historically have run at the lower end of normal (90- 100s systolic), thus I made no changes to these medications today.    He fortuntaely seems to remain compensated despite slightly cutting back his lasix dose from 80 mg to 60 mg daily. I recommended he continue this dose unchanged. His renal function had worsened on last check with a creatinine up to 1.5 - 1.6. I am hopeful his renal function will look at bit better on next check. If it remains elevated, we could consider cautiously cutting back his lasix to 40 mg daily. He has not yet had another BMP done as he has been trying to avoid the clinic with the COVID-19 outbreak. He will need his INR checked at some point. I  asked him to have a BMP drawn at his next INR check, and we can adjust his medications as needed pending that. He did recently have a renal US which was unremarkable.    I will plan to have Bear return for follow up in three months. If his EF worsens or clinically his heart failure worsens, we could consider upgrading his device to a CRT. In the meantime, I asked him and his daughter to contact the clinic with any concerns.    Phone call duration: 12 minutes    Yulia Mon PA-C

## 2020-04-06 NOTE — TELEPHONE ENCOUNTER
Reason for call:  Patient reporting a symptom    Symptom or request: Pt's daughter says she just saw her dad's foot and on his Great toe on the Right the nail is almost torn off, thick yellow and hard. He does have pain. It looks like fungus. She wants to know what she can do. She doesn't want to bring him in.       Phone Number patient can be reached at:  Deya  [phone]    Best Time:  anytime    Can we leave a detailed message on this number:  YES    Call taken on 4/6/2020 at 12:06 PM by Marla Grace

## 2020-04-10 ENCOUNTER — VIRTUAL VISIT (OUTPATIENT)
Dept: FAMILY MEDICINE | Facility: CLINIC | Age: 85
End: 2020-04-10
Payer: MEDICARE

## 2020-04-10 DIAGNOSIS — L03.032 PARONYCHIA, TOE, LEFT: Primary | ICD-10-CM

## 2020-04-10 DIAGNOSIS — L60.8 CHANGE IN NAIL APPEARANCE: ICD-10-CM

## 2020-04-10 PROCEDURE — 99213 OFFICE O/P EST LOW 20 MIN: CPT | Mod: 95 | Performed by: NURSE PRACTITIONER

## 2020-04-10 NOTE — PROGRESS NOTES
"Nabor Cunningham is a 84 year old male who is being evaluated via a billable video visit.      The patient has been notified of following:     \"This video visit will be conducted via a call between you and your physician/provider. We have found that certain health care needs can be provided without the need for an in-person physical exam.  This service lets us provide the care you need with a video conversation.  If a prescription is necessary we can send it directly to your pharmacy.  If lab work is needed we can place an order for that and you can then stop by our lab to have the test done at a later time.    Video visits are billed at different rates depending on your insurance coverage.  Please reach out to your insurance provider with any questions.    If during the course of the call the physician/provider feels a video visit is not appropriate, you will not be charged for this service.\"    Patient has given verbal consent for Video visit? Yes    How would you like to obtain your AVS? E-Mail (inform patient AVS not encrypted)    Patient would like the video invitation sent by: Text to cell phone: [phone]     Subjective     Nabor Cunningham is a 84 year old male who presents to clinic today for the following health issues:    HPI   Toe Problem       Duration: \"awhile\"     Description (location/character/radiation): 1st toe on right foot     Intensity:  mild    Accompanying signs and symptoms: painfully to touch, flaky toenail     History (similar episodes/previous evaluation): None    Precipitating or alleviating factors: None    Therapies tried and outcome: None     Nabor reports very minimal tenderness unless stepping or pushing on toe  No drainage   Minimal redness  Has a lady that comes to facility to do his toenails, has not been recently    Video Start Time: 9:28    Patient Active Problem List   Diagnosis     Anxiety     Alcohol abuse     Chronic atrial fibrillation     Weight loss     Peripheral " polyneuropathy     Chronic gout, unspecified cause, unspecified site     Essential hypertension with goal blood pressure less than 140/90     Hyperlipidemia LDL goal <130     Persistent insomnia     Lower urinary tract symptoms (LUTS)     Long-term (current) use of anticoagulants [Z79.01]     Moderate episode of recurrent major depressive disorder (H)     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)     Cardiac pacemaker in situ     NICM (nonischemic cardiomyopathy) (H)     CAD (coronary artery disease)     CKD (chronic kidney disease) stage 3, GFR 30-59 ml/min (H)     Chronic systolic heart failure (H)     Past Surgical History:   Procedure Laterality Date     ANGIOGRAM  01/2019    right and left heart cath.      APPENDECTOMY      ~2013     CATARACT IOL, RT/LT      Past bilateral cataract surgery.      EYE SURGERY Left     three corneal transplants     IMPLANT PACEMAKER       INCISION AND DRAINAGE TRUNK, COMBINED N/A 9/6/2018    Procedure: COMBINED INCISION AND DRAINAGE TRUNK;  incision and cauterization of left buttock bleed.;  Surgeon: Dain Davis MD;  Location: [facility]     INCISION AND DRAINAGE TRUNK, COMBINED N/A 11/1/2018    Procedure:  INCISION AND DRAINAGE of Back Wound;  Surgeon: Dain Davis MD;  Location: [facility] OR     JOINT REPLACEMENT Right     right total knee.      THORACIC SURGERY      removal benign nodule       Social History     Tobacco Use     Smoking status: Former Smoker     Years: 20.00     Types: Cigars     Smokeless tobacco: Never Used   Substance Use Topics     Alcohol use: Not Currently     History reviewed. No pertinent family history.      Current Outpatient Medications   Medication Sig Dispense Refill     atorvastatin (LIPITOR) 20 MG tablet Take 1 tablet (20 mg) by mouth daily 30 tablet 11     calcium carbonate (OS- MG [place]. [place]) 1250 MG tablet Take 1 tablet by mouth 2 times daily       colchicine (COLCRYS) 0.6 MG tablet Take 1 tablet (0.6 mg) by  mouth daily 90 tablet 3     furosemide (LASIX) 40 MG tablet Take 60 mg by mouth daily 180 tablet 0     gabapentin (NEURONTIN) 100 MG capsule He takes gabapentin 100mg in AM, 100mg at Noon and takes one 300mg pill at bedtime. 60 capsule      gabapentin (NEURONTIN) 300 MG capsule Take 1 capsule (300 mg) by mouth At Bedtime 90 capsule 3     hypromellose (ARTIFICIAL TEARS) 0.4 % SOLN ophthalmic solution Place 1 drop into both eyes every hour as needed for dry eyes       lisinopril (PRINIVIL/ZESTRIL) 5 MG tablet TAKE TWO TABLETS BY MOUTH ONCE DAILY 180 tablet 1     metoprolol succinate ER (TOPROL-XL) 25 MG 24 hr tablet TAKE ONE TABLET BY MOUTH EVERY EVENING 90 tablet 1     mirtazapine (REMERON) 15 MG tablet TAKE ONE TABLET BY MOUTH EVERY NIGHT AT BEDTIME 90 tablet 1     multivitamin, therapeutic with minerals (MULTI-VITAMIN) TABS tablet Take 1 tablet by mouth daily 100 tablet 3     nortriptyline (PAMELOR) 25 MG capsule TAKE ONE CAPSULE BY MOUTH EVERY NIGHT AT BEDTIME 30 capsule 10     prednisoLONE acetate (PRED FORTE) 1 % ophthalmic susp Place 1 drop Into the left eye daily   2     triamcinolone (KENALOG) 0.1 % external cream APPLY TOPICALLY TO AFFECTED AREA(S) TWO TIMES A DAY AS DIRECTED (Patient taking differently: 2 times daily as needed ) 45 g 1     warfarin ANTICOAGULANT (JANTOVEN ANTICOAGULANT) 2 MG tablet 6 mg (2 mg x 3) every Mon, Thu; 4 mg (2 mg x 2) all other days OR AS DIRECTED BY THE ANTICOAGULATION CLINIC 200 tablet 0     Allergies   Allergen Reactions     Amoxicillin Hives     Penicillins Other (See Comments)     Dizzy       Trazodone Other (See Comments)     Hallucinations         Reviewed and updated as needed this visit by Provider  Tobacco  Allergies  Meds  Problems  Med Hx  Surg Hx  Fam Hx         Review of Systems   ROS COMP: Constitutional, HEENT, cardiovascular, pulmonary, gi and gu systems are negative, except as otherwise noted.      Objective    There were no vitals taken for this  "visit.  Estimated body mass index is 31.58 kg/m  as calculated from the following:    Height as of 2/28/20: 1.626 m (5' 4\").    Weight as of 4/6/20: 83.5 kg (184 lb).  Physical Exam   GENERAL: healthy, alert and no distress  SKIN: left great toe with yellow thickened nail and mild erythema and swelling lateral nail     Diagnostic Test Results:  none         Assessment & Plan     1. Paronychia, toe, left  Mild symptoms at this time, recommend soaks twice daily in [place].  Monitor for worsening symptoms. Symptomatic care and follow up discussed.    2. Change in nail appearance  Concern for fungal infection of nail, Bear consider OTC topical medication to treat with symptoms only present on one nail.  Monitor for worsening symptoms and follow up as needed.  Symptomatic care and follow up discussed.    Home care instructions were reviewed with the patient. The risks, benefits and treatment options of prescribed medications or other treatments have been discussed with the patient. The patient verbalized their understanding and should call or follow up if no improvement or if they develop further problems.     Home care instructions were reviewed with the patient. The risks, benefits and treatment options of prescribed medications or other treatments have been discussed with the patient. The patient verbalized their understanding and should call or follow up if no improvement or if they develop further problems.      Return in about 1 week (around 4/17/2020), or if symptoms worsen or fail to improve.    [facility]      Video-Visit Details    Type of service:  Video Visit    Video End Time (time video stopped): 9:39    Originating Location (pt. Location): Home    Distant Location (provider location):  [facility]     Mode of Communication:  Video Conference via [employer]    Return in about 1 week (around 4/17/2020), or if symptoms worsen or fail to " improve.       AVERY New CNP

## 2020-04-13 ENCOUNTER — PATIENT OUTREACH (OUTPATIENT)
Dept: CARE COORDINATION | Facility: CLINIC | Age: 85
End: 2020-04-13

## 2020-04-13 NOTE — PROGRESS NOTES
Clinic Care Coordination Contact    Follow Up Progress Note      Assessment: Son reports that patient is doing about the same.  They did go to see a foot person/nurse who said to soak his foot and then put Vicks on the toenail.    Goals addressed this encounter:   Goals Addressed                 This Visit's Progress      Psychosocial (pt-stated)   On track     Goal Statement: pt to remain safe in his own home as long as possible, safe meaning no injuries from falls or avoidable health issues.  Measure of Success: no falls with injuries or avoidable health issues  Supportive Steps to Achieve: home care, , family  Barriers: age, health  Strengths: supportive family  Date to Achieve By: 6-  Patient expressed understanding of goal: yes               Intervention/Education provided during outreach: Patient has appointment with provider on the 20th and then with the lab on the 23rd.  Discussed with son the potential for patient to have an increase in his depression and anxiety during this period of social isolation.  Son had not considered this and said he will pay closer attention to any changes.  He does note that patient is reporting that he is not feeling as rested when he wakes up.  Reviewed that if patient is not as active during the day this could be a part as well.  Son reports that he is not as active as he is just in his apartment and used to go down to eat and visit with friends.  Encourage son to follow-up with providers as needed for any changes in patient's mental health.    Family supports patient with his appointments and follow-up visits.  They are present at his appointments and helping ensure that he is following up with recommendations.  At this time patient is not reporting discomfort with his knee.     Outreach Frequency: monthly    Plan:   Family to watch for signs of increased depression and anxiety through the social isolation.  Family to continue to assist  patient with managing his medical care.  Care Coordinator will follow up in 1 month.    TONE Lord, [facility] - Care Coordinator   [facility]  4/13/2020   10:47 AM  [phone]

## 2020-04-17 ENCOUNTER — TELEPHONE (OUTPATIENT)
Dept: FAMILY MEDICINE | Facility: CLINIC | Age: 85
End: 2020-04-17

## 2020-04-17 NOTE — TELEPHONE ENCOUNTER
Please call.   We can see him in clinic if he likes for face to face visit.   If he would prefer video visit, we can start there.   I may order stool tests in which case, someone would need to come in to get sample containers.   VICTOR HUGO ART MD

## 2020-04-17 NOTE — TELEPHONE ENCOUNTER
Spoke to the daughter Lexy who is wondering if should have a face to face office visit on Monday as patient has been dealing with diarrhea over 6 months where the patient would have 4-5 episodes per day until the patient started taking Imodium one pill per day and now the patient only goes 2 times per day and so is much more manageable, denies any abdominal pain or fever, daughter is worried about bringing patient in due to COVID, patient is currently asymptomatic for COVID. Lexy says patient's son scheduled the face to face appointment. At this point as PCP could want to do stool samples, would think the appointment is appropriate, so told the daughter she should keep the appointment at this time, will send this message to Dr. Mayo in case it would be ok to switch to video or telephone visit.    SAURAV Nolan

## 2020-04-17 NOTE — TELEPHONE ENCOUNTER
"Reason for call:  Patient reporting a symptom    Symptom or request: Kya (daughter) wants to know if she needs to bring her dad in on Monday to see Dr Mayo or  not. He is schedule for an appointment at 2:00. He has been having diarrhea for quite awhile but now has been taking one Imodium daily and it is slowing it down so he only has it about 2x a day instead of all the time.     Duration (how long have symptoms been present): Daughter said \"quite a while\"     *    Phone Number patient can be reached at:  Lexy Juarez- [phone]  Best Time:  anytime    Can we leave a detailed message on this number:  YES    Call taken on 4/17/2020 at 12:56 PM by Marla Grace    "

## 2020-04-20 ENCOUNTER — OFFICE VISIT (OUTPATIENT)
Dept: FAMILY MEDICINE | Facility: CLINIC | Age: 85
End: 2020-04-20
Payer: MEDICARE

## 2020-04-20 ENCOUNTER — TELEPHONE (OUTPATIENT)
Dept: FAMILY MEDICINE | Facility: CLINIC | Age: 85
End: 2020-04-20

## 2020-04-20 VITALS
OXYGEN SATURATION: 93 % | BODY MASS INDEX: 31.58 KG/M2 | RESPIRATION RATE: 18 BRPM | HEIGHT: 64 IN | WEIGHT: 185 LBS | SYSTOLIC BLOOD PRESSURE: 110 MMHG | HEART RATE: 76 BPM | DIASTOLIC BLOOD PRESSURE: 60 MMHG | TEMPERATURE: 98.5 F

## 2020-04-20 DIAGNOSIS — L30.9 DERMATITIS: ICD-10-CM

## 2020-04-20 DIAGNOSIS — R19.7 DIARRHEA, UNSPECIFIED TYPE: Primary | ICD-10-CM

## 2020-04-20 PROCEDURE — 99213 OFFICE O/P EST LOW 20 MIN: CPT | Performed by: FAMILY MEDICINE

## 2020-04-20 RX ORDER — DOXYCYCLINE HYCLATE 100 MG
100 TABLET ORAL 2 TIMES DAILY
Qty: 10 TABLET | Refills: 0 | Status: SHIPPED | OUTPATIENT
Start: 2020-04-20 | End: 2020-04-20

## 2020-04-20 RX ORDER — DOXYCYCLINE HYCLATE 100 MG
100 TABLET ORAL 2 TIMES DAILY
Qty: 10 TABLET | Refills: 0 | Status: SHIPPED | OUTPATIENT
Start: 2020-04-20 | End: 2020-05-15

## 2020-04-20 ASSESSMENT — ANXIETY QUESTIONNAIRES
5. BEING SO RESTLESS THAT IT IS HARD TO SIT STILL: NOT AT ALL
GAD7 TOTAL SCORE: 0
7. FEELING AFRAID AS IF SOMETHING AWFUL MIGHT HAPPEN: NOT AT ALL
6. BECOMING EASILY ANNOYED OR IRRITABLE: NOT AT ALL
1. FEELING NERVOUS, ANXIOUS, OR ON EDGE: NOT AT ALL
2. NOT BEING ABLE TO STOP OR CONTROL WORRYING: NOT AT ALL
3. WORRYING TOO MUCH ABOUT DIFFERENT THINGS: NOT AT ALL

## 2020-04-20 ASSESSMENT — PATIENT HEALTH QUESTIONNAIRE - PHQ9
SUM OF ALL RESPONSES TO PHQ QUESTIONS 1-9: 4
5. POOR APPETITE OR OVEREATING: NOT AT ALL

## 2020-04-20 ASSESSMENT — MIFFLIN-ST. JEOR: SCORE: 1450.15

## 2020-04-20 NOTE — PATIENT INSTRUCTIONS
ASSESSMENT:   (R19.7) Diarrhea, unspecified type  (primary encounter diagnosis)  Comment: Could be from colchicine.  R/O infection.  R/O inflammatory bowel problem.    Plan:   First stop the colchicine for 10 days to 2 weeks.  If diarrhea stops, then we can figure out a different gout preventative medication like allopurinol.  If the diarrhea does not stop, do the stool tests.   If stool tests not helpful in making diagnosis, I recommend colonoscopy when able.     In the meantime, it is OK to take loperamide (Imodium).  Try loperamide (Imodium-brand name) 2 pills at first loose stool, then 1 after each loose stool up to maximum of 8 pills a day for symptom relief.  This over the counter medication is available without a prescription.      (L30.9) Dermatitis  Comment: folliculitis vs rosacea.   Plan: Take doxycycline 100mg twice daily for 10 days.  If rash does not clear or seem affected, let me know and we will try tropical medication like metronidazole.  Stop vitamins while on doxycycline.

## 2020-04-20 NOTE — TELEPHONE ENCOUNTER
Someone presented to pharmacy to  Nabor's doxycycline today and when getting counseled, she mentioned that she thought it was supposed to be for 10 days and not 5. Pharmacy sees the after visit summary saying to take doxy 100 bid x10 days but only received a rx for 10 tablets.  Can you send an additional rx over for 10 more? They picked up the 10 already so they could get started right away.    Thank you,  Shanice Anderson, Pharm.D.  [facility]

## 2020-04-20 NOTE — PROGRESS NOTES
SUBJECTIVE: Nabor Cunningham is a 84 year old male  Chief Complaint   Patient presents with     Diarrhea     Derm Problem      Diarrhea      Duration: Months    Description:       Consistency of stool: runny       Blood in stool: no        Number of loose stools past 24 hours: 2 x day since starting the Imodium otherwise it 5 x a day.    Intensity:  moderate    Accompanying signs and symptoms:       Fever: no        Nausea/vomitting: no        Abdominal pain: no        Weight loss: no     History (recent antibiotics or travel/ill contacts/med changes/testing done): none    Precipitating or alleviating factors: None    Therapies tried and outcome: Imodium AD  Diarrhea for months.    Better with Imodium once daily. Still loose but 2 times a day.    No cramping.    Some urgency.   No blood or melena.     No chronic diarrhea in the past. His pattern in the past is a stool per day.     derm      Duration: ? months    Description (location/character/radiation): red areas on face    Intensity:  mild    Accompanying signs and symptoms: none    History (similar episodes/previous evaluation): None    Precipitating or alleviating factors: None    Therapies tried and outcome: None     He has been using topical cream daily.  Has not helped.   Not itchy.    They start like pimples.  He squeezes them.     No recent med changes with cardiology.     He takes colchicine daily for gout.  Incidence of diarrhea=23-77% with this medication.    He has had colonoscopy in the past.     Patient Active Problem List   Diagnosis     Anxiety     Alcohol abuse     Chronic atrial fibrillation     Weight loss     Peripheral polyneuropathy     Chronic gout, unspecified cause, unspecified site     Essential hypertension with goal blood pressure less than 140/90     Hyperlipidemia LDL goal <130     Persistent insomnia     Lower urinary tract symptoms (LUTS)     Long-term (current) use of anticoagulants [Z79.01]     Moderate episode of recurrent major  "depressive disorder (H)     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)     Cardiac pacemaker in situ     NICM (nonischemic cardiomyopathy) (H)     CAD (coronary artery disease)     CKD (chronic kidney disease) stage 3, GFR 30-59 ml/min (H)     Chronic systolic heart failure (H)        ROS:Resp: No coughing, wheezing or shortness of breath  CV: No chest pains or palpitations  : No urinary frequency or dysuria, bladder or kidney problems  Musculoskeletal: No significant muscle or joint pains   No changes in weight.     OBJECTIVE:Blood pressure 110/60, pulse 76, temperature 98.5  F (36.9  C), temperature source Tympanic, resp. rate 18, height 1.626 m (5' 4\"), weight 83.9 kg (185 lb), SpO2 93 %. BMI=Body mass index is 31.76 kg/m .  GENERAL APPEARANCE ADULT: Alert, no acute distress  NECK: No adenopathy,masses or thyromegaly  RESP: lungs clear to auscultation   CV: normal rate, regular rhythm, no murmur or gallop  ABDOMEN: soft, no organomegaly, masses or tenderness  SKIN: Scattered red papules and excoriated spots lateral to right nose, left eyebrow, forehead at scalp line.      ASSESSMENT:   (R19.7) Diarrhea, unspecified type  (primary encounter diagnosis)  Comment: Could be from colchicine.  R/O infection.  R/O inflammatory bowel problem.    Plan:   First stop the colchicine for 10 days to 2 weeks.  If diarrhea stops, then we can figure out a different gout preventative medication like allopurinol.  If the diarrhea does not stop, do the stool tests.   If stool tests not helpful in making diagnosis, I recommend colonoscopy when able.     In the meantime, it is OK to take loperamide (Imodium).  Try loperamide (Imodium-brand name) 2 pills at first loose stool, then 1 after each loose stool up to maximum of 8 pills a day for symptom relief.  This over the counter medication is available without a prescription.      (L30.9) Dermatitis  Comment: folliculitis vs rosacea.   Plan: Take " doxycycline 100mg twice daily for 10 days.  If rash does not clear or seem affected, let me know and we will try tropical medication like metronidazole.  Stop vitamins while on doxycycline.

## 2020-04-20 NOTE — NURSING NOTE
"Chief Complaint   Patient presents with     Diarrhea     Derm Problem       Initial /60   Pulse 76   Temp 98.5  F (36.9  C) (Tympanic)   Resp 18   Ht 1.626 m (5' 4\")   Wt 83.9 kg (185 lb)   SpO2 93%   BMI 31.76 kg/m   Estimated body mass index is 31.76 kg/m  as calculated from the following:    Height as of this encounter: 1.626 m (5' 4\").    Weight as of this encounter: 83.9 kg (185 lb).    Patient presents to the clinic using [employer] that is potentially due pending provider review:          Is there anyone who you would like to be able to receive your results? If yes have patient fill out KVNG    " 72.6

## 2020-04-21 ASSESSMENT — ANXIETY QUESTIONNAIRES: GAD7 TOTAL SCORE: 0

## 2020-04-23 ENCOUNTER — ANTICOAGULATION THERAPY VISIT (OUTPATIENT)
Dept: ANTICOAGULATION | Facility: CLINIC | Age: 85
End: 2020-04-23

## 2020-04-23 DIAGNOSIS — I48.20 CHRONIC ATRIAL FIBRILLATION (H): ICD-10-CM

## 2020-04-23 DIAGNOSIS — Z79.01 LONG TERM CURRENT USE OF ANTICOAGULANT THERAPY: ICD-10-CM

## 2020-04-23 DIAGNOSIS — I50.22 CHRONIC SYSTOLIC HEART FAILURE (H): ICD-10-CM

## 2020-04-23 LAB
ANION GAP SERPL CALCULATED.3IONS-SCNC: 3 MMOL/L (ref 3–14)
BUN SERPL-MCNC: 42 MG/DL (ref 7–30)
CALCIUM SERPL-MCNC: 10 MG/DL (ref 8.5–10.1)
CAPILLARY BLOOD COLLECTION: NORMAL
CHLORIDE SERPL-SCNC: 102 MMOL/L (ref 94–109)
CO2 SERPL-SCNC: 32 MMOL/L (ref 20–32)
CREAT SERPL-MCNC: 2.46 MG/DL (ref 0.66–1.25)
GFR SERPL CREATININE-BSD FRML MDRD: 23 ML/MIN/{1.73_M2}
GLUCOSE SERPL-MCNC: 93 MG/DL (ref 70–99)
INR PPP: 3.7 (ref 0.86–1.14)
POTASSIUM SERPL-SCNC: 4.7 MMOL/L (ref 3.4–5.3)
SODIUM SERPL-SCNC: 137 MMOL/L (ref 133–144)

## 2020-04-23 PROCEDURE — 80048 BASIC METABOLIC PNL TOTAL CA: CPT | Performed by: PHYSICIAN ASSISTANT

## 2020-04-23 PROCEDURE — 36416 COLLJ CAPILLARY BLOOD SPEC: CPT | Performed by: FAMILY MEDICINE

## 2020-04-23 PROCEDURE — 99207 ZZC NO CHARGE NURSE ONLY: CPT

## 2020-04-23 PROCEDURE — 85610 PROTHROMBIN TIME: CPT | Performed by: FAMILY MEDICINE

## 2020-04-23 NOTE — PROGRESS NOTES
ANTICOAGULATION FOLLOW-UP CLINIC VISIT    Patient Name:  Nabor Cunningham  Date:  4/23/2020  Contact Type:  Telephone    SUBJECTIVE:  Patient Findings     Positives:   Change in health (diarrhea), Change in medications (doxycycline started 4/20 for 10 days for rash, stopped colchicine x 10 days-2 weeks to see if diarrhea improves)    Comments:   Writer spoke with patient's daughter, Lexy. Patient was in to the clinic on Monday for diarrhea, stopped his colchicine to see if that would help the diarrhea. She states the diarrhea is now improving but he is starting to have more pain from the gout. She plans to call the clinic tomorrow to discuss with his PCP as they were talking about starting a different medication for his gout. Patient noted to also have started on doxycycline x 10 days for a rash/dermatitis on Monday. No concerns of bleeding/increased bruising reported. Will plan to hold his dose of warfarin today then resume maintenance dose of warfarin. Advised recheck Mon or Tue, Tue afternoon worked better for their schedule.         Clinical Outcomes     Comments:   Writer spoke with patient's daughter, Lexy. Patient was in to the clinic on Monday for diarrhea, stopped his colchicine to see if that would help the diarrhea. She states the diarrhea is now improving but he is starting to have more pain from the gout. She plans to call the clinic tomorrow to discuss with his PCP as they were talking about starting a different medication for his gout. Patient noted to also have started on doxycycline x 10 days for a rash/dermatitis on Monday. No concerns of bleeding/increased bruising reported. Will plan to hold his dose of warfarin today then resume maintenance dose of warfarin. Advised recheck Mon or Tue, Tue afternoon worked better for their schedule.            OBJECTIVE    INR   Date Value Ref Range Status   04/23/2020 3.70 (H) 0.86 - 1.14 Final     Comment:     This test is intended for monitoring Coumadin  therapy.  Results are not   accurate in patients with prolonged INR due to factor deficiency.         ASSESSMENT / PLAN  INR assessment SUPRA    Recheck INR In: 5 DAYS    INR Location Outside lab      Anticoagulation Summary  As of 4/23/2020    INR goal:   2.0-3.0   TTR:   33.3 % (1 y)   INR used for dosing:   3.70! (4/23/2020)   Warfarin maintenance plan:   6 mg (2 mg x 3) every Mon, Thu; 4 mg (2 mg x 2) all other days   Full warfarin instructions:   4/23: Hold; Otherwise 6 mg every Mon, Thu; 4 mg all other days   Weekly warfarin total:   32 mg   Plan last modified:   Belia Jones RN (1/16/2020)   Next INR check:   4/28/2020   Priority:   High   Target end date:   Indefinite    Indications    Chronic atrial fibrillation [I48.20]  Long-term (current) use of anticoagulants [Z79.01] [Z79.01]             Anticoagulation Episode Summary     INR check location:       Preferred lab:       Send INR reminders to:   [facility]    Comments:   * 2mg tabs. Does not like to cut tabs. NO bandaid      Anticoagulation Care Providers     Provider Role Specialty Phone number    Heron Mayo MD [facility] [phone]            See the Encounter Report to view Anticoagulation Flowsheet and Dosing Calendar (Go to Encounters tab in chart review, and find the Anticoagulation Therapy Visit)      Kirstin Mendez, RN

## 2020-04-24 ENCOUNTER — TELEPHONE (OUTPATIENT)
Dept: FAMILY MEDICINE | Facility: CLINIC | Age: 85
End: 2020-04-24

## 2020-04-24 ENCOUNTER — TELEPHONE (OUTPATIENT)
Dept: CARDIOLOGY | Facility: CLINIC | Age: 85
End: 2020-04-24

## 2020-04-24 DIAGNOSIS — N18.30 CKD (CHRONIC KIDNEY DISEASE) STAGE 3, GFR 30-59 ML/MIN (H): Primary | ICD-10-CM

## 2020-04-24 DIAGNOSIS — I50.43 ACUTE ON CHRONIC COMBINED SYSTOLIC AND DIASTOLIC CONGESTIVE HEART FAILURE (H): Primary | ICD-10-CM

## 2020-04-24 RX ORDER — FUROSEMIDE 40 MG
20 TABLET ORAL DAILY
Qty: 180 TABLET | Refills: 0 | COMMUNITY
Start: 2020-04-24 | End: 2020-09-22

## 2020-04-24 NOTE — TELEPHONE ENCOUNTER
Reason for call:  Patient reporting a symptom    Symptom or request: Leg Pain back of upper thighs. Pt was into see Dr. Mayo and he discontinued Colchicine Gout Medication. Could this be related to this?      Phone Number patient can be reached at:  Other phone number:  Lexy Daughter [phone] *    Best Time:  Any Time      Can we leave a detailed message on this number:  YES    Call taken on 4/24/2020 at 3:18 PM by Cee Michel

## 2020-04-24 NOTE — TELEPHONE ENCOUNTER
Pt is c/o back of thighs hurting when he is attempting to stand. Thinks this is because he stopped the colchicine. Also Cardiology ordered labs and they are concerned about his kidney function and daughter was told that cardiology was going to reach out to Dr Mayo. They did decrease is lasix.

## 2020-04-24 NOTE — TELEPHONE ENCOUNTER
Please call.     Stopping colchicine should not cause leg pain.  I suggest staying off the colchicine.   His creatinine has increased just since last summer.  I do not know why.   He has cut back on furosemide.    He should make sure he drinks plenty of fluids.   REcheck chem 8 next week with CBC, UA and MARINA ART MD

## 2020-04-24 NOTE — TELEPHONE ENCOUNTER
Called and spoke to Kya and son Florencio today who help with Ashkan's medication management. We reviewed his recent BMP which showed worsening renal insufficiency. BUN is 42. His electrolytes are normal. Unclear reason for the significant decline. I note he was recently seen by his PCP for diarrhea. Potentially he has become pre-renal in the setting of diarrhea and diuretic use. His colchicine was stopped and it sounds as through the diarrhea is improving. I recommended we cautiously cut back lasix to 20 mg daily. I will have the [facility] nurses follow up with him next week to ensure he is not having any heart failure symptoms. I will decide on further diuretic dosing from there. He will likely need another BMP next week. Kya and Florencio were doing going to discuss with Ashkan and have him cut back his lasix. I will also send a message to Dr. Mayo.    TOBIAS Mon PA-C 3:45 PM 4/24/2020

## 2020-04-24 NOTE — TELEPHONE ENCOUNTER
[facility]    Hi! Ashkan's renal function is much worse - not sure why. I cut his lasix back. Can we call him Monday for an update. I'd like for him to have a lab sometime next week (ordered). Thank you! Rosario        Will call pt on Monday for update and to arrange lab appointment - reminder to RN board.    MINA FieldsN, RN, PHN, HNB-BC   4/24/2020 at 4:09 PM

## 2020-04-26 DIAGNOSIS — E78.5 HYPERLIPIDEMIA LDL GOAL <130: ICD-10-CM

## 2020-04-27 RX ORDER — ATORVASTATIN CALCIUM 20 MG/1
TABLET, FILM COATED ORAL
Qty: 30 TABLET | Refills: 11 | Status: SHIPPED | OUTPATIENT
Start: 2020-04-27 | End: 2021-04-12

## 2020-04-27 NOTE — TELEPHONE ENCOUNTER
[facility]    Reviewed chart - pt has lab appointment at Dr. Mayo's office tomorrow. Will watch for results - reminder to RN board.    Also called pt for update on wt/sx since decreasing Lasix. No answer, LVM requesting recent wts and HF sx.     MINA FieldsN, RN, PHN, HNB-BC   4/27/2020 at 11:13 AM

## 2020-04-28 ENCOUNTER — ANTICOAGULATION THERAPY VISIT (OUTPATIENT)
Dept: FAMILY MEDICINE | Facility: CLINIC | Age: 85
End: 2020-04-28

## 2020-04-28 DIAGNOSIS — Z79.01 LONG TERM CURRENT USE OF ANTICOAGULANT THERAPY: ICD-10-CM

## 2020-04-28 DIAGNOSIS — N18.30 CKD (CHRONIC KIDNEY DISEASE) STAGE 3, GFR 30-59 ML/MIN (H): ICD-10-CM

## 2020-04-28 DIAGNOSIS — I48.20 CHRONIC ATRIAL FIBRILLATION (H): ICD-10-CM

## 2020-04-28 LAB
ANION GAP SERPL CALCULATED.3IONS-SCNC: 3 MMOL/L (ref 3–14)
BUN SERPL-MCNC: 52 MG/DL (ref 7–30)
CALCIUM SERPL-MCNC: 11.4 MG/DL (ref 8.5–10.1)
CHLORIDE SERPL-SCNC: 103 MMOL/L (ref 94–109)
CO2 SERPL-SCNC: 30 MMOL/L (ref 20–32)
CREAT SERPL-MCNC: 2.44 MG/DL (ref 0.66–1.25)
ERYTHROCYTE [DISTWIDTH] IN BLOOD BY AUTOMATED COUNT: 14.1 % (ref 10–15)
GFR SERPL CREATININE-BSD FRML MDRD: 24 ML/MIN/{1.73_M2}
GLUCOSE SERPL-MCNC: 112 MG/DL (ref 70–99)
HCT VFR BLD AUTO: 38.8 % (ref 40–53)
HGB BLD-MCNC: 12.6 G/DL (ref 13.3–17.7)
INR PPP: 2.1 (ref 0.86–1.14)
MCH RBC QN AUTO: 29.4 PG (ref 26.5–33)
MCHC RBC AUTO-ENTMCNC: 32.5 G/DL (ref 31.5–36.5)
MCV RBC AUTO: 91 FL (ref 78–100)
PLATELET # BLD AUTO: 137 10E9/L (ref 150–450)
POTASSIUM SERPL-SCNC: 4.7 MMOL/L (ref 3.4–5.3)
RBC # BLD AUTO: 4.28 10E12/L (ref 4.4–5.9)
SODIUM SERPL-SCNC: 136 MMOL/L (ref 133–144)
WBC # BLD AUTO: 6.7 10E9/L (ref 4–11)

## 2020-04-28 PROCEDURE — 36415 COLL VENOUS BLD VENIPUNCTURE: CPT | Performed by: FAMILY MEDICINE

## 2020-04-28 PROCEDURE — 99207 ZZC NO CHARGE NURSE ONLY: CPT | Performed by: FAMILY MEDICINE

## 2020-04-28 PROCEDURE — 85610 PROTHROMBIN TIME: CPT | Performed by: FAMILY MEDICINE

## 2020-04-28 PROCEDURE — 85027 COMPLETE CBC AUTOMATED: CPT | Performed by: FAMILY MEDICINE

## 2020-04-28 PROCEDURE — 80048 BASIC METABOLIC PNL TOTAL CA: CPT | Performed by: FAMILY MEDICINE

## 2020-04-28 NOTE — PROGRESS NOTES
20    Writer spoke with Lexy.    Daphne Lamar RN, BSN, PHN  Anticoagulation Clinic   [phone]        ANTICOAGULATION FOLLOW-UP CLINIC VISIT    Patient Name:  Nabor Cunningham  Date:  2020  Contact Type:  Telephone/ Left DVM w/Lexy    SUBJECTIVE:  Patient Findings     Positives:   Change in medications (Doxycyline)    Comments:   Writer left DVM with Lexy  Patient will complete Doxycycline on .  Patient will continue weekly maintenance dose. INR is therapeutic.  Recheck in 1 week due to antibiotic.           Clinical Outcomes     Negatives:   Major bleeding event, Thromboembolic event, Anticoagulation-related hospital admission, Anticoagulation-related ED visit, Anticoagulation-related fatality    Comments:   Writer left DVM with Lexy  Patient will complete Doxycycline on .  Patient will continue weekly maintenance dose. INR is therapeutic.  Recheck in 1 week due to antibiotic.              OBJECTIVE    INR   Date Value Ref Range Status   2020 2.10 (H) 0.86 - 1.14 Final     Comment:     This test is intended for monitoring Coumadin therapy.  Results are not   accurate in patients with prolonged INR due to factor deficiency.         ASSESSMENT / PLAN  INR assessment THER    Recheck INR In: 1 WEEK    INR Location Outside lab      Anticoagulation Summary  As of 2020    INR goal:   2.0-3.0   TTR:   32.8 % (1 y)   INR used for dosin.10 (2020)   Warfarin maintenance plan:   6 mg (2 mg x 3) every Mon, Thu; 4 mg (2 mg x 2) all other days   Full warfarin instructions:   6 mg every Mon, Thu; 4 mg all other days   Weekly warfarin total:   32 mg   Plan last modified:   Belia Jones RN (2020)   Next INR check:   2020   Priority:   High   Target end date:   Indefinite    Indications    Chronic atrial fibrillation [I48.20]  Long-term (current) use of anticoagulants [Z79.01] [Z79.01]             Anticoagulation Episode Summary     INR check location:       Preferred  lab:       Send INR reminders to:   [facility]    Comments:   * 2mg tabs. Does not like to cut tabs. NO bandaid      Anticoagulation Care Providers     Provider Role Specialty Phone number    Heron Mayo MD [facility] [phone]            See the Encounter Report to view Anticoagulation Flowsheet and Dosing Calendar (Go to Encounters tab in chart review, and find the Anticoagulation Therapy Visit)        Daphne Lamar RN

## 2020-04-29 DIAGNOSIS — I50.43 ACUTE ON CHRONIC COMBINED SYSTOLIC AND DIASTOLIC CONGESTIVE HEART FAILURE (H): ICD-10-CM

## 2020-04-29 PROBLEM — N18.4 CKD (CHRONIC KIDNEY DISEASE) STAGE 4, GFR 15-29 ML/MIN (H): Status: ACTIVE | Noted: 2020-03-01

## 2020-04-29 RX ORDER — LISINOPRIL 5 MG/1
5 TABLET ORAL DAILY
Qty: 180 TABLET | Refills: 1
Start: 2020-04-29 | End: 2020-07-20

## 2020-04-29 NOTE — RESULT ENCOUNTER NOTE
Please call son.  His serum creatinine remains high indicating significant decline in kidney function. We do not know the cause.   PLAN: He should have a couple urine tests to see why kidneys not functioning well.  They have been ordered.   I recommend seeing Nephrology.  We can do referral through [facility] in [place] or [facility].  Stay off colchicine.   Cut back  On lisinopril to one pill (5mg ) daily.   Recheck chem8 in a month.

## 2020-04-30 NOTE — TELEPHONE ENCOUNTER
Thanks for the update. As long as he lets us know if he has having any symptoms. It looks like Dr. Mayo follow up on his labs and cut back his lisinopril and referred him to nephrology. Thanks. Rosario

## 2020-04-30 NOTE — TELEPHONE ENCOUNTER
[facility]    Have not had return call from pt. Attempted a second time, able to reach dtr Lexy. States she saw pt on Tuesday and he seemed to be at baseline. Did not notice any SOB or peripheral swelling. States she believes pt would call her if he was experiencing any HF sx. Does not know what pt's wts have been running.    Says pt's diarrhea has resolved since stopping colchicine. Also says pt will probably remain on doxycycline for another 4-5 days.    Pt had BMP 4/28, see results below (compare to 4/23):           Told Lexy I would review with Rosario and call her back if changes. Also gave [facility] RN number to call if pt has sx/concerns. Verbalized understanding.     Routing to oRsario.    Bhavna Justice, BSN, RN, PHN, HNB-BC   4/30/2020 at 10:57 AM

## 2020-05-05 ENCOUNTER — ANTICOAGULATION THERAPY VISIT (OUTPATIENT)
Dept: FAMILY MEDICINE | Facility: CLINIC | Age: 85
End: 2020-05-05

## 2020-05-05 DIAGNOSIS — I48.20 CHRONIC ATRIAL FIBRILLATION (H): ICD-10-CM

## 2020-05-05 DIAGNOSIS — Z79.01 LONG TERM CURRENT USE OF ANTICOAGULANT THERAPY: ICD-10-CM

## 2020-05-05 DIAGNOSIS — N18.30 CKD (CHRONIC KIDNEY DISEASE) STAGE 3, GFR 30-59 ML/MIN (H): ICD-10-CM

## 2020-05-05 DIAGNOSIS — I50.43 ACUTE ON CHRONIC COMBINED SYSTOLIC AND DIASTOLIC CONGESTIVE HEART FAILURE (H): ICD-10-CM

## 2020-05-05 DIAGNOSIS — R19.7 DIARRHEA, UNSPECIFIED TYPE: ICD-10-CM

## 2020-05-05 LAB
ALBUMIN UR-MCNC: ABNORMAL MG/DL
AMORPH CRY #/AREA URNS HPF: ABNORMAL /HPF
ANION GAP SERPL CALCULATED.3IONS-SCNC: 5 MMOL/L (ref 3–14)
APPEARANCE UR: CLEAR
BACTERIA #/AREA URNS HPF: ABNORMAL /HPF
BILIRUB UR QL STRIP: NEGATIVE
BUN SERPL-MCNC: 29 MG/DL (ref 7–30)
C DIFF TOX B STL QL: NEGATIVE
CALCIUM SERPL-MCNC: 10.7 MG/DL (ref 8.5–10.1)
CHLORIDE SERPL-SCNC: 105 MMOL/L (ref 94–109)
CO2 SERPL-SCNC: 29 MMOL/L (ref 20–32)
COLOR UR AUTO: YELLOW
CREAT SERPL-MCNC: 1.86 MG/DL (ref 0.66–1.25)
CREAT UR-MCNC: 42 MG/DL
GFR SERPL CREATININE-BSD FRML MDRD: 33 ML/MIN/{1.73_M2}
GLUCOSE SERPL-MCNC: 86 MG/DL (ref 70–99)
GLUCOSE UR STRIP-MCNC: NEGATIVE MG/DL
HGB UR QL STRIP: NEGATIVE
HYALINE CASTS #/AREA URNS LPF: ABNORMAL /LPF
INR PPP: 3.6 (ref 0.86–1.14)
KETONES UR STRIP-MCNC: NEGATIVE MG/DL
LEUKOCYTE ESTERASE UR QL STRIP: ABNORMAL
MICROALBUMIN UR-MCNC: 20 MG/L
MICROALBUMIN/CREAT UR: 47.97 MG/G CR (ref 0–17)
NITRATE UR QL: NEGATIVE
NON-SQ EPI CELLS #/AREA URNS LPF: ABNORMAL /LPF
PH UR STRIP: 7.5 PH (ref 5–7)
POTASSIUM SERPL-SCNC: 4.2 MMOL/L (ref 3.4–5.3)
RBC #/AREA URNS AUTO: ABNORMAL /HPF
SODIUM SERPL-SCNC: 139 MMOL/L (ref 133–144)
SOURCE: ABNORMAL
SP GR UR STRIP: 1.01 (ref 1–1.03)
SPECIMEN SOURCE: NORMAL
UROBILINOGEN UR STRIP-ACNC: 0.2 EU/DL (ref 0.2–1)
WBC #/AREA URNS AUTO: ABNORMAL /HPF

## 2020-05-05 PROCEDURE — 99207 ZZC NO CHARGE NURSE ONLY: CPT | Performed by: FAMILY MEDICINE

## 2020-05-05 PROCEDURE — 80048 BASIC METABOLIC PNL TOTAL CA: CPT | Performed by: PHYSICIAN ASSISTANT

## 2020-05-05 PROCEDURE — 81001 URINALYSIS AUTO W/SCOPE: CPT | Performed by: FAMILY MEDICINE

## 2020-05-05 PROCEDURE — 87506 IADNA-DNA/RNA PROBE TQ 6-11: CPT | Performed by: FAMILY MEDICINE

## 2020-05-05 PROCEDURE — 87493 C DIFF AMPLIFIED PROBE: CPT | Mod: XU | Performed by: FAMILY MEDICINE

## 2020-05-05 PROCEDURE — 85610 PROTHROMBIN TIME: CPT | Performed by: FAMILY MEDICINE

## 2020-05-05 PROCEDURE — 82043 UR ALBUMIN QUANTITATIVE: CPT | Performed by: FAMILY MEDICINE

## 2020-05-05 PROCEDURE — 87177 OVA AND PARASITES SMEARS: CPT | Performed by: FAMILY MEDICINE

## 2020-05-05 PROCEDURE — 87329 GIARDIA AG IA: CPT | Mod: XU | Performed by: FAMILY MEDICINE

## 2020-05-05 PROCEDURE — 87209 SMEAR COMPLEX STAIN: CPT | Performed by: FAMILY MEDICINE

## 2020-05-05 PROCEDURE — 36415 COLL VENOUS BLD VENIPUNCTURE: CPT | Performed by: PHYSICIAN ASSISTANT

## 2020-05-05 PROCEDURE — 87328 CRYPTOSPORIDIUM AG IA: CPT | Performed by: FAMILY MEDICINE

## 2020-05-05 NOTE — PROGRESS NOTES
ANTICOAGULATION FOLLOW-UP CLINIC VISIT    Patient Name:  Nabor Cunningham  Date:  5/5/2020  Contact Type:  Telephone/ Lexy    SUBJECTIVE:  Patient Findings     Positives:   Change in medications (Doxycycline just completed.)    Comments:   No changes in activity, health, or diet noted. No bleeding or increased bruising noted. Took warfarin as prescribed.  Patient will hold his Coumadin for today, the resume the maintenance dose.  Recheck in 1 week.  Lexy verbalizes understanding and agrees to plan. No further questions or concerns.            Clinical Outcomes     Negatives:   Major bleeding event, Thromboembolic event, Anticoagulation-related hospital admission, Anticoagulation-related ED visit, Anticoagulation-related fatality    Comments:   No changes in activity, health, or diet noted. No bleeding or increased bruising noted. Took warfarin as prescribed.  Patient will hold his Coumadin for today, the resume the maintenance dose.  Recheck in 1 week.  Lexy verbalizes understanding and agrees to plan. No further questions or concerns.               OBJECTIVE    INR   Date Value Ref Range Status   05/05/2020 3.60 (H) 0.86 - 1.14 Final     Comment:     This test is intended for monitoring Coumadin therapy.  Results are not   accurate in patients with prolonged INR due to factor deficiency.         ASSESSMENT / PLAN  INR assessment SUPRA    Recheck INR In: 1 WEEK    INR Location Outside lab      Anticoagulation Summary  As of 5/5/2020    INR goal:   2.0-3.0   TTR:   32.0 % (1 y)   INR used for dosing:   3.60! (5/5/2020)   Warfarin maintenance plan:   6 mg (2 mg x 3) every Mon, Thu; 4 mg (2 mg x 2) all other days   Full warfarin instructions:   5/5: Hold; Otherwise 6 mg every Mon, Thu; 4 mg all other days   Weekly warfarin total:   32 mg   Plan last modified:   Belia Jones RN (1/16/2020)   Next INR check:   5/12/2020   Priority:   High   Target end date:   Indefinite    Indications    Chronic atrial  fibrillation [I48.20]  Long-term (current) use of anticoagulants [Z79.01] [Z79.01]             Anticoagulation Episode Summary     INR check location:       Preferred lab:       Send INR reminders to:   [facility]    Comments:   * 2mg tabs. Does not like to cut tabs. NO bandaid      Anticoagulation Care Providers     Provider Role Specialty Phone number    Heron Mayo MD [facility] [phone]            See the Encounter Report to view Anticoagulation Flowsheet and Dosing Calendar (Go to Encounters tab in chart review, and find the Anticoagulation Therapy Visit)        Daphne Lamar RN

## 2020-05-06 LAB
C COLI+JEJUNI+LARI FUSA STL QL NAA+PROBE: NOT DETECTED
C PARVUM AG STL QL IA: NEGATIVE
EC STX1 GENE STL QL NAA+PROBE: NOT DETECTED
EC STX2 GENE STL QL NAA+PROBE: NOT DETECTED
ENTERIC PATHOGEN COMMENT: NORMAL
G LAMBLIA AG STL QL IA: NEGATIVE
NOROV GI+II ORF1-ORF2 JNC STL QL NAA+PR: NOT DETECTED
O+P STL MICRO: NORMAL
O+P STL MICRO: NORMAL
RVA NSP5 STL QL NAA+PROBE: NOT DETECTED
SALMONELLA SP RPOD STL QL NAA+PROBE: NOT DETECTED
SHIGELLA SP+EIEC IPAH STL QL NAA+PROBE: NOT DETECTED
SPECIMEN SOURCE: NORMAL
SPECIMEN SOURCE: NORMAL
V CHOL+PARA RFBL+TRKH+TNAA STL QL NAA+PR: NOT DETECTED
Y ENTERO RECN STL QL NAA+PROBE: NOT DETECTED

## 2020-05-12 ENCOUNTER — ANTICOAGULATION THERAPY VISIT (OUTPATIENT)
Dept: FAMILY MEDICINE | Facility: CLINIC | Age: 85
End: 2020-05-12

## 2020-05-12 DIAGNOSIS — Z79.01 CHRONIC ANTICOAGULATION: ICD-10-CM

## 2020-05-12 DIAGNOSIS — Z79.01 LONG TERM CURRENT USE OF ANTICOAGULANT THERAPY: ICD-10-CM

## 2020-05-12 DIAGNOSIS — I48.20 CHRONIC ATRIAL FIBRILLATION (H): ICD-10-CM

## 2020-05-12 LAB
CAPILLARY BLOOD COLLECTION: NORMAL
INR PPP: 3.8 (ref 0.86–1.14)

## 2020-05-12 PROCEDURE — 36416 COLLJ CAPILLARY BLOOD SPEC: CPT | Performed by: FAMILY MEDICINE

## 2020-05-12 PROCEDURE — 99207 ZZC NO CHARGE NURSE ONLY: CPT | Performed by: FAMILY MEDICINE

## 2020-05-12 PROCEDURE — 85610 PROTHROMBIN TIME: CPT | Performed by: FAMILY MEDICINE

## 2020-05-12 RX ORDER — WARFARIN SODIUM 2 MG/1
TABLET ORAL
Qty: 200 TABLET | Refills: 0 | COMMUNITY
Start: 2020-05-12 | End: 2020-12-28

## 2020-05-12 NOTE — PROGRESS NOTES
ANTICOAGULATION FOLLOW-UP CLINIC VISIT    Patient Name:  Nabor Cunningham  Date:  5/12/2020  Contact Type:  Telephone/ Lexy    SUBJECTIVE:  Patient Findings     Positives:   Change in health (Gout)    Comments:   No changes in medications, activity, or diet noted. No bleeding or increased bruising noted. Took warfarin as prescribed.  Patient was instructed to hold his Coumadin for today. Writer reduced the maintenance dose to 30 mg weekly for a 6% decrease.  Recheck in 2 weeks.  Patient's daughter verbalizes understanding and agrees to plan. No further questions or concerns.  Patient denies signs or symptoms of bleeding. Writer educated patient's daughter regarding increased bleed risk and when to seek immediate medical attention. Patient's daughter verbalized understanding.              Clinical Outcomes     Negatives:   Major bleeding event, Thromboembolic event, Anticoagulation-related hospital admission, Anticoagulation-related ED visit, Anticoagulation-related fatality    Comments:   No changes in medications, activity, or diet noted. No bleeding or increased bruising noted. Took warfarin as prescribed.  Patient was instructed to hold his Coumadin for today. Writer reduced the maintenance dose to 30 mg weekly for a 6% decrease.  Recheck in 2 weeks.  Patient's daughter verbalizes understanding and agrees to plan. No further questions or concerns.  Patient denies signs or symptoms of bleeding. Writer educated patient's daughter regarding increased bleed risk and when to seek immediate medical attention. Patient's daughter verbalized understanding.                 OBJECTIVE    Recent labs: (last 7 days)     05/12/20  1516   INR 3.80*       ASSESSMENT / PLAN  INR assessment SUPRA    Recheck INR In: 2 WEEKS    INR Location Outside lab      Anticoagulation Summary  As of 5/12/2020    INR goal:   2.0-3.0   TTR:   30.1 % (1 y)   INR used for dosing:   3.80! (5/12/2020)   Warfarin maintenance plan:   6 mg (2 mg x 3)  every Mon; 4 mg (2 mg x 2) all other days   Full warfarin instructions:   5/12: Hold; Otherwise 6 mg every Mon; 4 mg all other days   Weekly warfarin total:   30 mg   Plan last modified:   Daphne Lamar RN (5/12/2020)   Next INR check:   5/26/2020   Priority:   High   Target end date:   Indefinite    Indications    Chronic atrial fibrillation [I48.20]  Long-term (current) use of anticoagulants [Z79.01] [Z79.01]             Anticoagulation Episode Summary     INR check location:       Preferred lab:       Send INR reminders to:   [facility]    Comments:   * 2mg tabs. Does not like to cut tabs. NO bandaid      Anticoagulation Care Providers     Provider Role Specialty Phone number    Heron Mayo MD [facility] [phone]            See the Encounter Report to view Anticoagulation Flowsheet and Dosing Calendar (Go to Encounters tab in chart review, and find the Anticoagulation Therapy Visit)        Daphne Lamar RN

## 2020-05-14 ENCOUNTER — PATIENT OUTREACH (OUTPATIENT)
Dept: CARE COORDINATION | Facility: CLINIC | Age: 85
End: 2020-05-14

## 2020-05-14 NOTE — LETTER
[address] [address]  [phone]      5/14/2020      Nabor Cunningham  [address] [place] [address]      Dear  Nabor,    It was a pleasure to speak with you.  I would like to provide you with the enclosed information for your records.  As part of care coordination, we are developing care plans to assist in accomplishing your health care goals.  When we speak next, please feel free to let me know if you want to add or change any information on your care plans.    As always, feel free to contact me if you have any questions or concerns.  I look forward to working with you in the effort to achieve your health care and wellness goals .        Sincerely,      Rani Sarkar, [facility]  Clinic Care Coordination  [phone]                        [facility]  Complex Care Plan  About Me:    Patient Name:  Nabor Cunningham    YOB: 1935  Age:         84 year old   [place] MRN:    [id number] Telephone Information:  Home Phone [phone]   Mobile [phone]   Home Phone [phone]       Address:  [address] [place] [address] Email address:  No e-mail address on record      Emergency Contact(s)    Name Relationship Lgl Grd Work Phone Home Phone Mobile Phone   1. JACKSON VAN Daughter No  [phone]   2. ELOY CUNNINGHAM  Son No   [phone]   3. LISANDRA CUNNINGHAM Relative No  [phone]            Primary language:  English     needed? No   [place] [employer]:  [phone] op. 1  Other communication barriers: Glasses, Cognitive impairment  Preferred Method of Communication:  Do Not Contact  Current living arrangement:    Mobility Status/ Medical Equipment:      Health Maintenance  Health Maintenance Reviewed: Due/Overdue   Health Maintenance Due   Topic Date Due     HF ACTION PLAN  1935     ZOSTER IMMUNIZATION (1 of 2) 11/03/1987     MEDICARE ANNUAL WELLNESS  VISIT  11/03/2002     Please talk with your provider about what is needed or can continue to wait.        My Access Plan  Medical Emergency 911   Primary Clinic Line [facility] - [phone]   24 Hour Appointment Line [phone] or  [phone]) (toll-free)   24 Hour Nurse Line [phone] (toll-free)   Preferred Urgent Care     Preferred Hospital     Preferred Pharmacy [id number] - [facility] [address] [facility]     Behavioral Health Crisis Line [place] Suicide Prevention Lifeline at 1-150.696.6190 or 911             My Care Team Members  Patient Care Team       Relationship Specialty Notifications Start End    Heron Mayo MD PCP - General Family Practice  9/11/18     Phone: [phone] Fax: [phone]         [address] [place] [address]    Heron Mayo MD Assigned PCP   3/9/17     Phone: [phone] Fax: [phone]         [address] [place] [address]    Heron Mayo MD  Family Practice  1/29/18     Merged    Phone: [phone] Fax: [phone]         [address] [place] [address]    Rani Sarkar LSW Lead Care Coordinator Primary Care -  Admissions 11/13/18     Phone: [phone] Fax: [phone]                My Care Plans  Self Management and Treatment Plan  Goals and (Comments)  Goals        General    Pain Management (pt-stated)     Notes - Note edited  5/14/2020 12:52 PM by Rani Sarkar LSW    Goal Statement: I want to get knee surgery so my pain is improved.  Measure of Success: I will have surgery by the end of the year if not sooner for knee replacement.   Supportive Steps to Achieve: had infected tooth removed, family  Barriers: repeated infections, extracted tooth area needs to heal  Strengths: infection cleared. Tooth extraction site healing.   Date to Achieve By: 5-  Patient expressed understanding of goal: yes    On hold due to covid 19 pandemic      Psychosocial  (pt-stated)     Notes - Note edited  3/10/2020  2:04 PM by Rani Sarkar LSW    Goal Statement: pt to remain safe in his own home as long as possible, safe meaning no injuries from falls or avoidable health issues.  Measure of Success: no falls with injuries or avoidable health issues  Supportive Steps to Achieve: home care, , family  Barriers: age, health  Strengths: supportive family  Date to Achieve By: 6-  Patient expressed understanding of goal: yes               Action Plans on File:                       Advance Care Plans/Directives Type:        My Medical and Care Information  Problem List   Patient Active Problem List   Diagnosis     Anxiety     Alcohol abuse     Chronic atrial fibrillation     Weight loss     Peripheral polyneuropathy     Chronic gout, unspecified cause, unspecified site     Essential hypertension with goal blood pressure less than 140/90     Hyperlipidemia LDL goal <130     Persistent insomnia     Lower urinary tract symptoms (LUTS)     Long-term (current) use of anticoagulants [Z79.01]     Moderate episode of recurrent major depressive disorder (H)     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)     Cardiac pacemaker in situ     NICM (nonischemic cardiomyopathy) (H)     CAD (coronary artery disease)     CKD (chronic kidney disease) stage 4, GFR 15-29 ml/min (H)     Chronic systolic heart failure (H)      Current Medications and Allergies:  Allergies as of 5/14/2020   Reviewed by Fadumo Patricio APRN CNP on 4/10/2020    Severity  Noted  Reaction Type  Reactions    Amoxicillin  Not Specified  09/02/2018     Hives    Penicillins  Not Specified  03/22/2017     Other (See Comments)    Dizzy    Trazodone  Not Specified  03/22/2017     Other (See Comments)    Hallucinations    Medications - This is your record. Keep this with you and show to your community pharmacist(s) and physician(s) at each visit.      Instructions  for use    atorvastatin (LIPITOR) 20 MG tablet  TAKE ONE TABLET BY MOUTH ONCE DAILY    calcium carbonate (OS- MG Teller. CA) 1250 MG tablet  Take 1 tablet by mouth 2 times daily    colchicine (COLCRYS) 0.6 MG tablet  Take 1 tablet (0.6 mg) by mouth daily    doxycycline hyclate (VIBRA-TABS) 100 MG tablet ()  Take 1 tablet (100 mg) by mouth 2 times daily for 10 days    furosemide (LASIX) 40 MG tablet  Take 0.5 tablets (20 mg) by mouth daily    gabapentin (NEURONTIN) 100 MG capsule  He takes gabapentin 100mg in AM, 100mg at Noon and takes one 300mg pill at bedtime.    gabapentin (NEURONTIN) 300 MG capsule  Take 1 capsule (300 mg) by mouth At Bedtime    hypromellose (ARTIFICIAL TEARS) 0.4 % SOLN ophthalmic solution  Place 1 drop into both eyes every hour as needed for dry eyes    lisinopril (ZESTRIL) 5 MG tablet  Take 1 tablet (5 mg) by mouth daily    metoprolol succinate ER (TOPROL-XL) 25 MG 24 hr tablet  TAKE ONE TABLET BY MOUTH EVERY EVENING    mirtazapine (REMERON) 15 MG tablet  TAKE ONE TABLET BY MOUTH EVERY NIGHT AT BEDTIME    multivitamin, therapeutic with minerals (MULTI-VITAMIN) TABS tablet  Take 1 tablet by mouth daily    nortriptyline (PAMELOR) 25 MG capsule  TAKE ONE CAPSULE BY MOUTH EVERY NIGHT AT BEDTIME    prednisoLONE acetate (PRED FORTE) 1 % ophthalmic susp  Place 1 drop Into the left eye daily    triamcinolone (KENALOG) 0.1 % external cream  APPLY TOPICALLY TO AFFECTED AREA(S) TWO TIMES A DAY AS DIRECTED     Patient taking differently: 2 times daily as needed    warfarin ANTICOAGULANT (JANTOVEN ANTICOAGULANT) 2 MG tablet  6 mg (2 mg x 3) every Mon; 4 mg (2 mg x 2) all other days OR AS DIRECTED BY THE ANTICOAGULATION CLINIC          Care Coordination Start Date: 2018   Frequency of Care Coordination: monthly   Form Last Updated: 2020

## 2020-05-14 NOTE — PROGRESS NOTES
Clinic Care Coordination Contact    Follow Up Progress Note      Assessment: son is noticing some changes in pt's mood/behavior.  Discussed that it could be depression increasing with the covid-19 social distancing.      Pt reported to son that the diarrhea got better and then returned.     Goals addressed this encounter:   Goals Addressed                 This Visit's Progress      Pain Management (pt-stated)   On Hold     Goal Statement: I want to get knee surgery so my pain is improved.  Measure of Success: I will have surgery by the end of the year if not sooner for knee replacement.   Supportive Steps to Achieve: had infected tooth removed, family  Barriers: repeated infections, extracted tooth area needs to heal  Strengths: infection cleared. Tooth extraction site healing.   Date to Achieve By: 5-  Patient expressed understanding of goal: yes    On hold due to covid 19 pandemic        Psychosocial (pt-stated)   On track     Goal Statement: pt to remain safe in his own home as long as possible, safe meaning no injuries from falls or avoidable health issues.  Measure of Success: no falls with injuries or avoidable health issues  Supportive Steps to Achieve: home care, , family  Barriers: age, health  Strengths: supportive family  Date to Achieve By: 6-  Patient expressed understanding of goal: yes               Intervention/Education provided during outreach: per son pt would like to have the home care RN come out every 2 weeks to check his vitals and do the INR checks.  Son said that his sister is responsible for the finances and is questioning the need due to the cost.  Per son there is money there and discussed the benefits during this period.  Reviewed the socialization as well as the limiting having to go to the clinic for lab work.  Son will talk with his sister about this. They are uncertain when the visiting restrictions will be reduced.     Pt has an appointment  tomorrow and encouraged discussion about the diarrhea.      Outreach Frequency: monthly    Plan:   Son to call SW if he is in need of home care phone numbers.  Pt to attend appointments as planned.    Care Coordinator will follow up in one month.     TONE Lord, [facility] [facility] - Care Coordinator   [facility]  5/14/2020   1:06 PM  [phone]

## 2020-05-15 ENCOUNTER — VIRTUAL VISIT (OUTPATIENT)
Dept: FAMILY MEDICINE | Facility: CLINIC | Age: 85
End: 2020-05-15
Payer: MEDICARE

## 2020-05-15 DIAGNOSIS — M1A.9XX0 CHRONIC GOUT, UNSPECIFIED CAUSE, UNSPECIFIED SITE: Primary | ICD-10-CM

## 2020-05-15 PROCEDURE — 99442: CPT | Performed by: FAMILY MEDICINE

## 2020-05-15 RX ORDER — ALLOPURINOL 100 MG/1
100 TABLET ORAL DAILY
Qty: 30 TABLET | Refills: 1 | Status: SHIPPED | OUTPATIENT
Start: 2020-05-15 | End: 2020-07-17 | Stop reason: DRUGHIGH

## 2020-05-15 NOTE — PATIENT INSTRUCTIONS
Assessment/Plan:  (M1A.9XX0) Chronic gout, unspecified cause, unspecified site  (primary encounter diagnosis)  Comment: frequent gout in the past when not on colchicine.   I recommend switch to allopurinol for gout prevention.   Plan: allopurinol (ZYLOPRIM) 100 MG tablet, CBC with         platelets, Comprehensive metabolic panel (BMP +        Alb, Alk Phos, ALT, AST, Total. Bili, TP), Uric        acid          Start allopurinol 100mg daily.  Take after a meal.   Recheck blood tests in a month with uric acid level and labs as above to check for side effects.   Let me know if gout flare, rash or other side effects.

## 2020-05-15 NOTE — PROGRESS NOTES
"Nabor Cunningham is a 84 year old male who is being evaluated via a billable telephone visit.      The patient has been notified of following:     \"This telephone visit will be conducted via a call between you and your physician/provider. We have found that certain health care needs can be provided without the need for a physical exam.  This service lets us provide the care you need with a short phone conversation.  If a prescription is necessary we can send it directly to your pharmacy.  If lab work is needed we can place an order for that and you can then stop by our lab to have the test done at a later time.    Telephone visits are billed at different rates depending on your insurance coverage. During this emergency period, for some insurers they may be billed the same as an in-person visit.  Please reach out to your insurance provider with any questions.    If during the course of the call the physician/provider feels a telephone visit is not appropriate, you will not be charged for this service.\"    Patient has given verbal consent for Telephone visit?  Yes    What phone number would you like to be contacted at? [phone]    How would you like to obtain your AVS? Mail a copy    SUBJECTIVE: Nabor Cunningham is a 84 year old male  Chief Complaint   Patient presents with     Arthritis     discuss gout medication      I spoke with daughter Lexy today.   Last clinic visit: 4/20 for diarrhea.  I had suggested stopping colchicine which he had been taking for gout.  He has not been on allopurinol in the past.   Uric acid level has been high when tested in the past.   His last gout episode has been a while.  It has been probably 2-3 years.  He used to have frequent gout.     Bowels now back to normal.    He has been feeling well.     No other health concerns today.     Patient Active Problem List   Diagnosis     Anxiety     Alcohol abuse     Chronic atrial fibrillation     Weight loss     Peripheral polyneuropathy "     Chronic gout, unspecified cause, unspecified site     Essential hypertension with goal blood pressure less than 140/90     Hyperlipidemia LDL goal <130     Persistent insomnia     Lower urinary tract symptoms (LUTS)     Long-term (current) use of anticoagulants [Z79.01]     Moderate episode of recurrent major depressive disorder (H)     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)     Cardiac pacemaker in situ     NICM (nonischemic cardiomyopathy) (H)     CAD (coronary artery disease)     CKD (chronic kidney disease) stage 4, GFR 15-29 ml/min (H)     Chronic systolic heart failure (H)        Assessment/Plan:  (M1A.9XX0) Chronic gout, unspecified cause, unspecified site  (primary encounter diagnosis)  Comment: frequent gout in the past when not on colchicine.   I recommend switch to allopurinol for gout prevention.   Plan: allopurinol (ZYLOPRIM) 100 MG tablet, CBC with         platelets, Comprehensive metabolic panel (BMP +        Alb, Alk Phos, ALT, AST, Total. Bili, TP), Uric        acid          Start allopurinol 100mg daily.  Take after a meal.   Recheck blood tests in a month with uric acid level and labs as above to check for side effects.   Let me know if gout flare, rash or other side effects.        No follow-ups on file.      Phone call duration:  15 minutes    VICTOR HUGO ART MD

## 2020-05-20 ENCOUNTER — TELEPHONE (OUTPATIENT)
Dept: FAMILY MEDICINE | Facility: CLINIC | Age: 85
End: 2020-05-20

## 2020-05-24 DIAGNOSIS — I10 ESSENTIAL HYPERTENSION WITH GOAL BLOOD PRESSURE LESS THAN 140/90: ICD-10-CM

## 2020-05-24 DIAGNOSIS — I50.43 ACUTE ON CHRONIC COMBINED SYSTOLIC AND DIASTOLIC CONGESTIVE HEART FAILURE (H): ICD-10-CM

## 2020-05-26 ENCOUNTER — TELEPHONE (OUTPATIENT)
Dept: FAMILY MEDICINE | Facility: CLINIC | Age: 85
End: 2020-05-26

## 2020-05-26 ENCOUNTER — ANTICOAGULATION THERAPY VISIT (OUTPATIENT)
Dept: FAMILY MEDICINE | Facility: CLINIC | Age: 85
End: 2020-05-26

## 2020-05-26 DIAGNOSIS — Z79.01 LONG TERM CURRENT USE OF ANTICOAGULANT THERAPY: ICD-10-CM

## 2020-05-26 DIAGNOSIS — I48.20 CHRONIC ATRIAL FIBRILLATION (H): ICD-10-CM

## 2020-05-26 DIAGNOSIS — I48.20 CHRONIC ATRIAL FIBRILLATION (H): Primary | ICD-10-CM

## 2020-05-26 LAB
CAPILLARY BLOOD COLLECTION: NORMAL
INR PPP: 3.7 (ref 0.86–1.14)

## 2020-05-26 PROCEDURE — 85610 PROTHROMBIN TIME: CPT | Performed by: FAMILY MEDICINE

## 2020-05-26 PROCEDURE — 36416 COLLJ CAPILLARY BLOOD SPEC: CPT | Performed by: FAMILY MEDICINE

## 2020-05-26 PROCEDURE — 99207 ZZC NO CHARGE NURSE ONLY: CPT | Performed by: FAMILY MEDICINE

## 2020-05-26 RX ORDER — METOPROLOL SUCCINATE 25 MG/1
TABLET, EXTENDED RELEASE ORAL
Qty: 90 TABLET | Refills: 1 | Status: SHIPPED | OUTPATIENT
Start: 2020-05-26 | End: 2020-11-27

## 2020-05-26 NOTE — PROGRESS NOTES
ANTICOAGULATION FOLLOW-UP CLINIC VISIT    Patient Name:  Nabor Cunningham  Date:  5/26/2020  Contact Type:  Telephone/ Lexy    SUBJECTIVE:  Patient Findings     Positives:   Change in medications    Comments:   Started allopurinol which can increase INR/risk for bleeding.   Lower maintenance dose by 4 mg (13.3%) and recheck in 2 weeks.        Clinical Outcomes     Negatives:   Major bleeding event, Thromboembolic event, Anticoagulation-related hospital admission, Anticoagulation-related ED visit, Anticoagulation-related fatality    Comments:   Started allopurinol which can increase INR/risk for bleeding.   Lower maintenance dose by 4 mg (13.3%) and recheck in 2 weeks.           OBJECTIVE    Recent labs: (last 7 days)     05/26/20  1552   INR 3.70*       ASSESSMENT / PLAN  INR assessment SUPRA    Recheck INR In: 2 WEEKS    INR Location Clinic      Anticoagulation Summary  As of 5/26/2020    INR goal:   2.0-3.0   TTR:   26.3 % (1 y)   INR used for dosing:   3.70! (5/26/2020)   Warfarin maintenance plan:   2 mg (2 mg x 1) every Tue; 4 mg (2 mg x 2) all other days   Full warfarin instructions:   5/26: 2 mg; Otherwise 2 mg every Tue; 4 mg all other days   Weekly warfarin total:   26 mg   Plan last modified:   Kae Schmitt RN (5/26/2020)   Next INR check:   6/9/2020   Priority:   High   Target end date:   Indefinite    Indications    Chronic atrial fibrillation [I48.20]  Long-term (current) use of anticoagulants [Z79.01] [Z79.01]             Anticoagulation Episode Summary     INR check location:       Preferred lab:       Send INR reminders to:   [facility]    Comments:   * 2mg tabs. Does not like to cut tabs. NO bandaid      Anticoagulation Care Providers     Provider Role Specialty Phone number    Heron Mayo MD [facility] [phone]            See the Encounter Report to view Anticoagulation Flowsheet and Dosing Calendar (Go to Encounters tab in chart review, and find the  Anticoagulation Therapy Visit)        Kae Schmitt RN

## 2020-05-26 NOTE — TELEPHONE ENCOUNTER
Anticoagulation clinic referral renewal sent to Dr. Mayo to review and sign.    Kae Schmitt RN on 5/26/2020 at 4:26 PM

## 2020-05-27 ENCOUNTER — ANCILLARY PROCEDURE (OUTPATIENT)
Dept: CARDIOLOGY | Facility: CLINIC | Age: 85
End: 2020-05-27
Attending: INTERNAL MEDICINE
Payer: MEDICARE

## 2020-05-27 DIAGNOSIS — Z95.0 CARDIAC PACEMAKER IN SITU: ICD-10-CM

## 2020-05-27 LAB
MDC_IDC_EPISODE_DTM: NORMAL
MDC_IDC_EPISODE_DTM: NORMAL
MDC_IDC_EPISODE_ID: NORMAL
MDC_IDC_EPISODE_ID: NORMAL
MDC_IDC_EPISODE_TYPE: NORMAL
MDC_IDC_EPISODE_TYPE: NORMAL
MDC_IDC_LEAD_IMPLANT_DT: NORMAL
MDC_IDC_LEAD_IMPLANT_DT: NORMAL
MDC_IDC_LEAD_LOCATION: NORMAL
MDC_IDC_LEAD_LOCATION: NORMAL
MDC_IDC_LEAD_LOCATION_DETAIL_1: NORMAL
MDC_IDC_LEAD_LOCATION_DETAIL_1: NORMAL
MDC_IDC_LEAD_MFG: NORMAL
MDC_IDC_LEAD_MFG: NORMAL
MDC_IDC_LEAD_MODEL: NORMAL
MDC_IDC_LEAD_MODEL: NORMAL
MDC_IDC_LEAD_POLARITY_TYPE: NORMAL
MDC_IDC_LEAD_SERIAL: NORMAL
MDC_IDC_LEAD_SERIAL: NORMAL
MDC_IDC_MSMT_BATTERY_DTM: NORMAL
MDC_IDC_MSMT_BATTERY_REMAINING_LONGEVITY: 138 MO
MDC_IDC_MSMT_BATTERY_REMAINING_PERCENTAGE: 100 %
MDC_IDC_MSMT_BATTERY_STATUS: NORMAL
MDC_IDC_MSMT_LEADCHNL_RA_IMPEDANCE_VALUE: 387 OHM
MDC_IDC_MSMT_LEADCHNL_RV_IMPEDANCE_VALUE: 904 OHM
MDC_IDC_MSMT_LEADCHNL_RV_PACING_THRESHOLD_AMPLITUDE: 0.6 V
MDC_IDC_MSMT_LEADCHNL_RV_PACING_THRESHOLD_PULSEWIDTH: 0.4 MS
MDC_IDC_PG_IMPLANT_DTM: NORMAL
MDC_IDC_PG_MFG: NORMAL
MDC_IDC_PG_MODEL: NORMAL
MDC_IDC_PG_SERIAL: NORMAL
MDC_IDC_PG_TYPE: NORMAL
MDC_IDC_SESS_CLINIC_NAME: NORMAL
MDC_IDC_SESS_DTM: NORMAL
MDC_IDC_SESS_TYPE: NORMAL
MDC_IDC_SET_BRADY_AT_MODE_SWITCH_RATE: 150 {BEATS}/MIN
MDC_IDC_SET_BRADY_LOWRATE: 60 {BEATS}/MIN
MDC_IDC_SET_BRADY_MAX_SENSOR_RATE: 130 {BEATS}/MIN
MDC_IDC_SET_BRADY_MODE: NORMAL
MDC_IDC_SET_LEADCHNL_RA_SENSING_ADAPTATION_MODE: NORMAL
MDC_IDC_SET_LEADCHNL_RA_SENSING_SENSITIVITY: 0.15 MV
MDC_IDC_SET_LEADCHNL_RV_PACING_AMPLITUDE: 2.5 V
MDC_IDC_SET_LEADCHNL_RV_PACING_CAPTURE_MODE: NORMAL
MDC_IDC_SET_LEADCHNL_RV_PACING_POLARITY: NORMAL
MDC_IDC_SET_LEADCHNL_RV_PACING_PULSEWIDTH: 0.4 MS
MDC_IDC_SET_LEADCHNL_RV_SENSING_ADAPTATION_MODE: NORMAL
MDC_IDC_SET_LEADCHNL_RV_SENSING_POLARITY: NORMAL
MDC_IDC_SET_LEADCHNL_RV_SENSING_SENSITIVITY: 0.6 MV
MDC_IDC_SET_ZONE_DETECTION_INTERVAL: 375 MS
MDC_IDC_SET_ZONE_TYPE: NORMAL
MDC_IDC_SET_ZONE_VENDOR_TYPE: NORMAL
MDC_IDC_STAT_BRADY_DTM_END: NORMAL
MDC_IDC_STAT_BRADY_DTM_START: NORMAL
MDC_IDC_STAT_BRADY_RA_PERCENT_PACED: 0 %
MDC_IDC_STAT_BRADY_RV_PERCENT_PACED: 18 %
MDC_IDC_STAT_EPISODE_RECENT_COUNT: 0
MDC_IDC_STAT_EPISODE_RECENT_COUNT_DTM_END: NORMAL
MDC_IDC_STAT_EPISODE_RECENT_COUNT_DTM_START: NORMAL
MDC_IDC_STAT_EPISODE_TYPE: NORMAL
MDC_IDC_STAT_EPISODE_VENDOR_TYPE: NORMAL

## 2020-05-27 PROCEDURE — 93294 REM INTERROG EVL PM/LDLS PM: CPT | Performed by: INTERNAL MEDICINE

## 2020-05-27 PROCEDURE — 93296 REM INTERROG EVL PM/IDS: CPT | Performed by: INTERNAL MEDICINE

## 2020-06-09 ENCOUNTER — ANTICOAGULATION THERAPY VISIT (OUTPATIENT)
Dept: FAMILY MEDICINE | Facility: CLINIC | Age: 85
End: 2020-06-09

## 2020-06-09 DIAGNOSIS — I48.20 CHRONIC ATRIAL FIBRILLATION (H): ICD-10-CM

## 2020-06-09 DIAGNOSIS — Z79.01 LONG TERM CURRENT USE OF ANTICOAGULANT THERAPY: ICD-10-CM

## 2020-06-09 DIAGNOSIS — M1A.9XX0 CHRONIC GOUT, UNSPECIFIED CAUSE, UNSPECIFIED SITE: ICD-10-CM

## 2020-06-09 LAB
ALBUMIN SERPL-MCNC: 3.4 G/DL (ref 3.4–5)
ALP SERPL-CCNC: 63 U/L (ref 40–150)
ALT SERPL W P-5'-P-CCNC: 20 U/L (ref 0–70)
ANION GAP SERPL CALCULATED.3IONS-SCNC: 4 MMOL/L (ref 3–14)
AST SERPL W P-5'-P-CCNC: 22 U/L (ref 0–45)
BILIRUB SERPL-MCNC: 0.5 MG/DL (ref 0.2–1.3)
BUN SERPL-MCNC: 29 MG/DL (ref 7–30)
CALCIUM SERPL-MCNC: 9.3 MG/DL (ref 8.5–10.1)
CHLORIDE SERPL-SCNC: 106 MMOL/L (ref 94–109)
CO2 SERPL-SCNC: 30 MMOL/L (ref 20–32)
CREAT SERPL-MCNC: 1.65 MG/DL (ref 0.66–1.25)
ERYTHROCYTE [DISTWIDTH] IN BLOOD BY AUTOMATED COUNT: 14.5 % (ref 10–15)
GFR SERPL CREATININE-BSD FRML MDRD: 38 ML/MIN/{1.73_M2}
GLUCOSE SERPL-MCNC: 98 MG/DL (ref 70–99)
HCT VFR BLD AUTO: 37.7 % (ref 40–53)
HGB BLD-MCNC: 12.5 G/DL (ref 13.3–17.7)
INR PPP: 2.8 (ref 0.86–1.14)
MCH RBC QN AUTO: 30.9 PG (ref 26.5–33)
MCHC RBC AUTO-ENTMCNC: 33.2 G/DL (ref 31.5–36.5)
MCV RBC AUTO: 93 FL (ref 78–100)
PLATELET # BLD AUTO: 132 10E9/L (ref 150–450)
POTASSIUM SERPL-SCNC: 4.3 MMOL/L (ref 3.4–5.3)
PROT SERPL-MCNC: 7.5 G/DL (ref 6.8–8.8)
RBC # BLD AUTO: 4.05 10E12/L (ref 4.4–5.9)
SODIUM SERPL-SCNC: 140 MMOL/L (ref 133–144)
URATE SERPL-MCNC: 7.3 MG/DL (ref 3.5–7.2)
WBC # BLD AUTO: 6.7 10E9/L (ref 4–11)

## 2020-06-09 PROCEDURE — 85610 PROTHROMBIN TIME: CPT | Performed by: FAMILY MEDICINE

## 2020-06-09 PROCEDURE — 84550 ASSAY OF BLOOD/URIC ACID: CPT | Performed by: FAMILY MEDICINE

## 2020-06-09 PROCEDURE — 36415 COLL VENOUS BLD VENIPUNCTURE: CPT | Performed by: FAMILY MEDICINE

## 2020-06-09 PROCEDURE — 85027 COMPLETE CBC AUTOMATED: CPT | Performed by: FAMILY MEDICINE

## 2020-06-09 PROCEDURE — 99207 ZZC NO CHARGE NURSE ONLY: CPT | Performed by: FAMILY MEDICINE

## 2020-06-09 PROCEDURE — 80053 COMPREHEN METABOLIC PANEL: CPT | Performed by: FAMILY MEDICINE

## 2020-06-09 NOTE — PROGRESS NOTES
ANTICOAGULATION FOLLOW-UP CLINIC VISIT    Patient Name:  Nabor Cunningham  Date:  2020  Contact Type:  Telephone/ Lexy    SUBJECTIVE:  Patient Findings     Comments:   No changes in medications, activity, health, or diet noted. No bleeding or increased bruising noted. Took warfarin as prescribed.  Patient will continue weekly maintenance dose. INR is therapeutic.   Recheck in 4 weeks.  Patient verbalizes understanding and agrees to plan. No further questions or concerns.          Clinical Outcomes     Negatives:   Major bleeding event, Thromboembolic event, Anticoagulation-related hospital admission, Anticoagulation-related ED visit, Anticoagulation-related fatality    Comments:   No changes in medications, activity, health, or diet noted. No bleeding or increased bruising noted. Took warfarin as prescribed.  Patient will continue weekly maintenance dose. INR is therapeutic.   Recheck in 4 weeks.  Patient verbalizes understanding and agrees to plan. No further questions or concerns.             OBJECTIVE    Recent labs: (last 7 days)     20  1553   INR 2.80*       ASSESSMENT / PLAN  INR assessment THER    Recheck INR In: 4 WEEKS    INR Location Outside lab      Anticoagulation Summary  As of 2020    INR goal:   2.0-3.0   TTR:   27.1 % (1 y)   INR used for dosin.80 (2020)   Warfarin maintenance plan:   2 mg (2 mg x 1) every Tue; 4 mg (2 mg x 2) all other days   Full warfarin instructions:   2 mg every Tue; 4 mg all other days   Weekly warfarin total:   26 mg   No change documented:   Daphne Lamar RN   Plan last modified:   Kae Schmitt RN (2020)   Next INR check:   2020   Priority:   Maintenance   Target end date:   Indefinite    Indications    Chronic atrial fibrillation [I48.20]  Long-term (current) use of anticoagulants [Z79.01] [Z79.01]             Anticoagulation Episode Summary     INR check location:       Preferred lab:       Send INR reminders to:   [facility]  [place]    Comments:   * 2mg tabs. Does not like to cut tabs. NO bandaid      Anticoagulation Care Providers     Provider Role Specialty Phone number    Heron Mayo MD Referring [facility] [phone]            See the Encounter Report to view Anticoagulation Flowsheet and Dosing Calendar (Go to Encounters tab in chart review, and find the Anticoagulation Therapy Visit)        Daphne Lamar RN

## 2020-06-10 ENCOUNTER — TELEPHONE (OUTPATIENT)
Dept: FAMILY MEDICINE | Facility: CLINIC | Age: 85
End: 2020-06-10

## 2020-06-10 DIAGNOSIS — M1A.9XX0 CHRONIC GOUT, UNSPECIFIED CAUSE, UNSPECIFIED SITE: Primary | ICD-10-CM

## 2020-06-10 DIAGNOSIS — R19.7 DIARRHEA: ICD-10-CM

## 2020-06-10 RX ORDER — ALLOPURINOL 100 MG/1
200 TABLET ORAL DAILY
Qty: 60 TABLET | Refills: 1 | Status: SHIPPED | OUTPATIENT
Start: 2020-06-10 | End: 2020-08-18

## 2020-06-10 NOTE — RESULT ENCOUNTER NOTE
Please call.  ON virtual visit on 5/15 he indicated bowels were back to normal.    If the diarrhea has restarted and is troublesome, I suggest colonoscopy.   We can put in an order.   VICTOR HUGO ART MD

## 2020-06-10 NOTE — RESULT ENCOUNTER NOTE
Please call son or daughter.  The blood chemistries (Basic metabolic panel) are all normal including electrolytes (salt balances in the blood), blood glucose and liver tests.  Kidney function tests are abnormal.   It has been improving.   Uric acid better but still high.   Mild anemia and mildly low platelets which have been present in the past.   PLAN: He has been on allopurinol for gout prevention at 100mg daily.   I recommend increase to 200mg daily.  We can send prescription for #60 of the 100mg pills with one refill.   REcheck uric acid in a month.  Please arrange for prescriptions and orders.

## 2020-06-10 NOTE — TELEPHONE ENCOUNTER
Heron Mayo MD  P Nb Care Team Pool               Please call son or daughter.  The blood chemistries (Basic metabolic panel) are all normal including electrolytes (salt balances in the blood), blood glucose and liver tests.  Kidney function tests are abnormal.   It has been improving.   Uric acid better but still high.   Mild anemia and mildly low platelets which have been present in the past.   PLAN: He has been on allopurinol for gout prevention at 100mg daily.   I recommend increase to 200mg daily.  We can send prescription for #60 of the 100mg pills with one refill.   REcheck uric acid in a month.   Please arrange for prescriptions and orders.      Notes recorded by Belia Fisher CMA on 6/10/2020 at 8:55 AM CDT   Daughter notified of lab results -  Daughter wanted Dr. Mayo to know pt is still having diarrhea, daughter states stool tests were negative and possibly next step would be colonoscopy.  Dr. Mayo what would you like pt to do?     Orders done.  Note sent to Dr Mayo.  Ellen Perez RN

## 2020-06-11 ENCOUNTER — TELEPHONE (OUTPATIENT)
Dept: FAMILY MEDICINE | Facility: CLINIC | Age: 85
End: 2020-06-11

## 2020-06-11 NOTE — TELEPHONE ENCOUNTER
Please call.    I doubt it is related to warfarin.    He can try stopping atorvastatin for a couple weeks to see if pain changes.  If not, it is not the medication and he should continue.   If it stops, we can try alternative statin.   VICTOR HUGO ART MD

## 2020-06-11 NOTE — TELEPHONE ENCOUNTER
Reason for call:  Patient reporting a symptom    Symptom or request: Pt's son says his dads meds were reviewed by [facility]. They said there are 2 of his meds that could possibly be causing the muscle pain in his legs. He says it could be Jantoven or Atorvastatin. He says his dad has been having muscle pain so has been taking more of the Gabapentin 100 mg. They suggested he possibly be changed from Atorvastatin to Rosuvastatin and to monitor his INR more closely during the change over. He would like to see what Dr Mayo feels about doing this. He is aware that Dr Mayo is not in the office this afternoon.        Phone Number patient can be reached at:  Florencio Cunningham SON  [phone]    Best Time:  anytime    Can we leave a detailed message on this number:  YES    Call taken on 6/11/2020 at 1:16 PM by Marla Grace

## 2020-06-11 NOTE — TELEPHONE ENCOUNTER
Message below relayed to son Florencio. He is in agreement with plan. Requested he call clinic back in 2 weeks with update.    Kirsty RG RN, BSN

## 2020-06-15 ENCOUNTER — PATIENT OUTREACH (OUTPATIENT)
Dept: CARE COORDINATION | Facility: CLINIC | Age: 85
End: 2020-06-15

## 2020-06-15 NOTE — PROGRESS NOTES
Clinic Care Coordination Contact    Call placed to son and when he was asked if it was a good time to talk he said no.  He said tomorrow afternoon would be an okay time to call.    Plan:  to call out in about 1 business day.    TONE Lord, [facility] [facility] - Care Coordinator   [facility]  6/15/2020   1:36 PM  [phone]

## 2020-06-16 NOTE — PROGRESS NOTES
Clinic Care Coordination Contact  [facility]/Voicemail       Clinical Data: Care Coordinator Outreach  Outreach attempted x 1.  Left message on son's voicemail with call back information and requested return call.  Plan:  Care Coordinator will try to reach patient's son again in 3-4 weeks as Sw out the next two weeks.    TONE Lord, [place] [facility] - Care Coordinator   [facility]  6/16/2020   10:30 AM  [phone]

## 2020-06-18 DIAGNOSIS — Z11.59 ENCOUNTER FOR SCREENING FOR OTHER VIRAL DISEASES: Primary | ICD-10-CM

## 2020-06-19 ENCOUNTER — TELEPHONE (OUTPATIENT)
Dept: FAMILY MEDICINE | Facility: CLINIC | Age: 85
End: 2020-06-19

## 2020-06-19 DIAGNOSIS — Z79.01 LONG TERM CURRENT USE OF ANTICOAGULANT THERAPY: ICD-10-CM

## 2020-06-19 DIAGNOSIS — I48.20 CHRONIC ATRIAL FIBRILLATION (H): ICD-10-CM

## 2020-06-19 NOTE — TELEPHONE ENCOUNTER
Florencio calling asking how Nabor should take his Warfarin before and after his Colonoscopy on 07.03.2020 please call with instructions    Brittany Peña CSS

## 2020-06-19 NOTE — TELEPHONE ENCOUNTER
Reason for Call:  Other     Detailed comments: Patient is having colonoscopy on July 3rd and needs Dr. Mayo to order Covid 19 test to be done 2-3 days prior - please call son Florencio    Phone Number Patient can be reached at: [phone]    Best Time:     Can we leave a detailed message on this number?     Call taken on 6/19/2020 at 10:39 AM by Rosita Greene

## 2020-06-22 NOTE — TELEPHONE ENCOUNTER
Please call.  PLAN:   Stop warfarin 5 days before colonoscopy.   Restart acute renal insufficiency later in the day of the colonoscopy.    He will be off the warfarin and be a few days without warfarin in his system but risk for stroke is low and I do not think he needs any other blood thinner in the interval.   VICTOR HUGO ART MD

## 2020-06-22 NOTE — TELEPHONE ENCOUNTER
Writer discussed warfarin dosing plan with patient's son Florencio. Patient will hold for 5 days, no bridging. Will give 2 day booster of warfarin dose, then resume prior maintenance dose. Patient should ask the MD if it is okay to restart warfarin the evening of the colonoscopy.     AVS with warfarin dosing instructions emailed to Florencio.

## 2020-06-29 ENCOUNTER — ANESTHESIA EVENT (OUTPATIENT)
Dept: GASTROENTEROLOGY | Facility: CLINIC | Age: 85
End: 2020-06-29
Payer: MEDICARE

## 2020-06-29 RX ORDER — DOXEPIN HYDROCHLORIDE 10 MG/1
CAPSULE ORAL
COMMUNITY
Start: 2020-02-19 | End: 2020-07-17

## 2020-06-29 RX ORDER — LANOLIN ALCOHOL/MO/W.PET/CERES
1000 CREAM (GRAM) TOPICAL DAILY
COMMUNITY

## 2020-06-29 RX ORDER — MAGNESIUM CARB/ALUMINUM HYDROX 105-160MG
5 TABLET,CHEWABLE ORAL
Status: ON HOLD | COMMUNITY
End: 2020-07-03

## 2020-06-29 RX ORDER — IRON POLYSACCHARIDE COMPLEX 150 MG
150 CAPSULE ORAL
Status: ON HOLD | COMMUNITY
End: 2020-07-03

## 2020-06-29 RX ORDER — POLYVINYL ALCOHOL 14 MG/ML
1-2 SOLUTION/ DROPS OPHTHALMIC
COMMUNITY
End: 2022-01-01

## 2020-06-29 ASSESSMENT — ENCOUNTER SYMPTOMS: DYSRHYTHMIAS: 1

## 2020-06-29 ASSESSMENT — LIFESTYLE VARIABLES: TOBACCO_USE: 1

## 2020-06-29 NOTE — ANESTHESIA PREPROCEDURE EVALUATION
Anesthesia Pre-Procedure Evaluation    Patient: Nabor Cunningham   MRN: [id number] : 1935          Preoperative Diagnosis: Diarrhea, unspecified type [R19.7]    Procedure(s):  COLONOSCOPY    Past Medical History:   Diagnosis Date     Bleeding from wound 2018:He had prolonged bleeding after cyst removal on his back within the past year.        Past Surgical History:   Procedure Laterality Date     ANGIOGRAM  2019    right and left heart cath.      APPENDECTOMY      ~     CATARACT IOL, RT/LT      Past bilateral cataract surgery.      EYE SURGERY Left     three corneal transplants     IMPLANT PACEMAKER       INCISION AND DRAINAGE TRUNK, COMBINED N/A 2018    Procedure: COMBINED INCISION AND DRAINAGE TRUNK;  incision and cauterization of left buttock bleed.;  Surgeon: Dain Davis MD;  Location: [facility] OR     INCISION AND DRAINAGE TRUNK, COMBINED N/A 2018    Procedure:  INCISION AND DRAINAGE of Back Wound;  Surgeon: Dain Davis MD;  Location: [facility] OR     JOINT REPLACEMENT Right     right total knee.      THORACIC SURGERY      removal benign nodule       Anesthesia Evaluation     . Pt has had prior anesthetic. Type: General, MAC and Regional           ROS/MED HX    ENT/Pulmonary:     (+)tobacco use, Past use , . .    Neurologic:     (+)neuropathy     Cardiovascular: Comment: Cardiomyopathy - systolic heart failure    (+) Dyslipidemia, hypertension--CAD, --. Taking blood thinners : . CHF . . :. dysrhythmias a-fib, . Previous cardiac testing Echodate:results:   Interpretation Summary     1. The left ventricle is normal in size. The visual ejection fraction is  estimated at 40-45%. Difficult to assess wall motion, even with contrast.  Septal bounce from conduction abnormality.  2. The right ventricle is normal in structure, function and size.  3. No significant valve disease.  4. The ascending aorta is Mildly dilated. Sinus of Valsalva 4.1cm, ascending  aorta 4.0cm.     Outside  echo report from 9/2019 ([facility]) showed EF 35-40%.date: results:ECG reviewed date:2-2020 results:Atrial fibrillation -with ectopic ventricular couplets   -Left bundle branch block.     ABNORMAL    date: results:          METS/Exercise Tolerance:     Hematologic:     (+) Anemia, -      Musculoskeletal:         GI/Hepatic:     (+) Other GI/Hepatic ETOH      Renal/Genitourinary:     (+) chronic renal disease, type: CRI,       Endo:     (+) Obesity, Other Endocrine Disorder gout.      Psychiatric:     (+) psychiatric history anxiety and depression      Infectious Disease:  - neg infectious disease ROS       Malignancy:      - no malignancy   Other:                          Physical Exam  Normal systems: cardiovascular, pulmonary and dental    Airway   Mallampati: II  TM distance: >3 FB    Dental     Cardiovascular       Pulmonary             Lab Results   Component Value Date    WBC 6.7 06/09/2020    HGB 12.5 (L) 06/09/2020    HCT 37.7 (L) 06/09/2020     (L) 06/09/2020    CRP 38.1 (H) 07/26/2019    SED 17 04/18/2019     06/09/2020    POTASSIUM 4.3 06/09/2020    CHLORIDE 106 06/09/2020    CO2 30 06/09/2020    BUN 29 06/09/2020    CR 1.65 (H) 06/09/2020    GLC 98 06/09/2020    ALY 9.3 06/09/2020    MAG 2.3 11/30/2018    ALBUMIN 3.4 06/09/2020    PROTTOTAL 7.5 06/09/2020    ALT 20 06/09/2020    AST 22 06/09/2020    ALKPHOS 63 06/09/2020    BILITOTAL 0.5 06/09/2020    LIPASE 22.4 06/02/2016    AMYLASE 30 01/06/2017    PTT 43 (H) 09/06/2018    INR 2.80 (H) 06/09/2020    TSH 3.16 01/17/2019       Preop Vitals  BP Readings from Last 3 Encounters:   04/20/20 110/60   03/02/20 103/65   02/28/20 94/50    Pulse Readings from Last 3 Encounters:   04/20/20 76   03/02/20 63   02/28/20 83      Resp Readings from Last 3 Encounters:   04/20/20 18   02/28/20 18   10/14/19 12    SpO2 Readings from Last 3 Encounters:   04/20/20 93%   03/02/20 95%   02/28/20 94%      Temp Readings from Last 1 Encounters:   04/20/20 36.9  C  "(98.5  F) (Tympanic)    Ht Readings from Last 1 Encounters:   04/20/20 1.626 m (5' 4\")      Wt Readings from Last 1 Encounters:   04/20/20 83.9 kg (185 lb)    Estimated body mass index is 31.76 kg/m  as calculated from the following:    Height as of 4/20/20: 1.626 m (5' 4\").    Weight as of 4/20/20: 83.9 kg (185 lb).       Anesthesia Plan      History & Physical Review  History and physical reviewed and following examination; no interval change.    ASA Status:  3 .        Plan for MAC with Intravenous and Propofol induction. Reason for MAC:  Other - see comments           Postoperative Care      Consents  Anesthetic plan, risks, benefits and alternatives discussed with:  Patient..                 AVERY Li CRNA  "

## 2020-06-30 ENCOUNTER — COMMUNICATION - HEALTHEAST (OUTPATIENT)
Dept: PHYSICAL MEDICINE AND REHAB | Facility: CLINIC | Age: 85
End: 2020-06-30

## 2020-06-30 DIAGNOSIS — M54.42 ACUTE BILATERAL LOW BACK PAIN WITH BILATERAL SCIATICA: ICD-10-CM

## 2020-06-30 DIAGNOSIS — M54.41 ACUTE BILATERAL LOW BACK PAIN WITH BILATERAL SCIATICA: ICD-10-CM

## 2020-06-30 DIAGNOSIS — Z11.59 ENCOUNTER FOR SCREENING FOR OTHER VIRAL DISEASES: ICD-10-CM

## 2020-06-30 PROCEDURE — U0003 INFECTIOUS AGENT DETECTION BY NUCLEIC ACID (DNA OR RNA); SEVERE ACUTE RESPIRATORY SYNDROME CORONAVIRUS 2 (SARS-COV-2) (CORONAVIRUS DISEASE [COVID-19]), AMPLIFIED PROBE TECHNIQUE, MAKING USE OF HIGH THROUGHPUT TECHNOLOGIES AS DESCRIBED BY CMS-2020-01-R: HCPCS | Performed by: SURGERY

## 2020-06-30 PROCEDURE — 99207 ZZC NO CHARGE NURSE ONLY: CPT

## 2020-07-01 LAB
SARS-COV-2 PCR COMMENT: NORMAL
SARS-COV-2 RNA SPEC QL NAA+PROBE: NEGATIVE
SARS-COV-2 RNA SPEC QL NAA+PROBE: NORMAL
SPECIMEN SOURCE: NORMAL
SPECIMEN SOURCE: NORMAL

## 2020-07-03 ENCOUNTER — HOSPITAL ENCOUNTER (OUTPATIENT)
Facility: CLINIC | Age: 85
Discharge: HOME | End: 2020-07-03
Attending: SURGERY | Admitting: SURGERY
Payer: MEDICARE

## 2020-07-03 ENCOUNTER — DOCUMENTATION ONLY (OUTPATIENT)
Dept: FAMILY MEDICINE | Facility: CLINIC | Age: 85
End: 2020-07-03

## 2020-07-03 ENCOUNTER — ANESTHESIA (OUTPATIENT)
Dept: GASTROENTEROLOGY | Facility: CLINIC | Age: 85
End: 2020-07-03
Payer: MEDICARE

## 2020-07-03 VITALS
BODY MASS INDEX: 30.73 KG/M2 | HEIGHT: 64 IN | DIASTOLIC BLOOD PRESSURE: 73 MMHG | HEART RATE: 78 BPM | TEMPERATURE: 97.7 F | SYSTOLIC BLOOD PRESSURE: 117 MMHG | RESPIRATION RATE: 18 BRPM | WEIGHT: 180 LBS | OXYGEN SATURATION: 99 %

## 2020-07-03 DIAGNOSIS — I48.20 CHRONIC ATRIAL FIBRILLATION (H): ICD-10-CM

## 2020-07-03 DIAGNOSIS — Z79.01 LONG TERM CURRENT USE OF ANTICOAGULANT THERAPY: ICD-10-CM

## 2020-07-03 LAB — COLONOSCOPY: NORMAL

## 2020-07-03 PROCEDURE — 45380 COLONOSCOPY AND BIOPSY: CPT | Performed by: SURGERY

## 2020-07-03 PROCEDURE — 25000128 H RX IP 250 OP 636: Performed by: NURSE ANESTHETIST, CERTIFIED REGISTERED

## 2020-07-03 PROCEDURE — 25800030 ZZH RX IP 258 OP 636: Performed by: SURGERY

## 2020-07-03 PROCEDURE — 25000125 ZZHC RX 250: Performed by: SURGERY

## 2020-07-03 PROCEDURE — 88305 TISSUE EXAM BY PATHOLOGIST: CPT | Mod: 26 | Performed by: SURGERY

## 2020-07-03 PROCEDURE — 88305 TISSUE EXAM BY PATHOLOGIST: CPT | Performed by: SURGERY

## 2020-07-03 PROCEDURE — 37000008 ZZH ANESTHESIA TECHNICAL FEE, 1ST 30 MIN: Performed by: SURGERY

## 2020-07-03 RX ORDER — ONDANSETRON 2 MG/ML
4 INJECTION INTRAMUSCULAR; INTRAVENOUS
Status: DISCONTINUED | OUTPATIENT
Start: 2020-07-03 | End: 2020-07-03 | Stop reason: HOSPADM

## 2020-07-03 RX ORDER — LIDOCAINE 40 MG/G
CREAM TOPICAL
Status: DISCONTINUED | OUTPATIENT
Start: 2020-07-03 | End: 2020-07-03 | Stop reason: HOSPADM

## 2020-07-03 RX ORDER — SODIUM CHLORIDE, SODIUM LACTATE, POTASSIUM CHLORIDE, CALCIUM CHLORIDE 600; 310; 30; 20 MG/100ML; MG/100ML; MG/100ML; MG/100ML
INJECTION, SOLUTION INTRAVENOUS CONTINUOUS
Status: DISCONTINUED | OUTPATIENT
Start: 2020-07-03 | End: 2020-07-03 | Stop reason: HOSPADM

## 2020-07-03 RX ORDER — PROPOFOL 10 MG/ML
INJECTION, EMULSION INTRAVENOUS PRN
Status: DISCONTINUED | OUTPATIENT
Start: 2020-07-03 | End: 2020-07-03

## 2020-07-03 RX ADMIN — PROPOFOL 50 MG: 10 INJECTION, EMULSION INTRAVENOUS at 09:27

## 2020-07-03 RX ADMIN — PROPOFOL 25 MG: 10 INJECTION, EMULSION INTRAVENOUS at 09:38

## 2020-07-03 RX ADMIN — PROPOFOL 50 MG: 10 INJECTION, EMULSION INTRAVENOUS at 09:21

## 2020-07-03 RX ADMIN — PROPOFOL 50 MG: 10 INJECTION, EMULSION INTRAVENOUS at 09:24

## 2020-07-03 RX ADMIN — LIDOCAINE HYDROCHLORIDE 1 ML: 10 INJECTION, SOLUTION EPIDURAL; INFILTRATION; INTRACAUDAL; PERINEURAL at 08:54

## 2020-07-03 RX ADMIN — SODIUM CHLORIDE, POTASSIUM CHLORIDE, SODIUM LACTATE AND CALCIUM CHLORIDE 1000 ML: 600; 310; 30; 20 INJECTION, SOLUTION INTRAVENOUS at 08:54

## 2020-07-03 RX ADMIN — PROPOFOL 50 MG: 10 INJECTION, EMULSION INTRAVENOUS at 09:30

## 2020-07-03 RX ADMIN — PROPOFOL 25 MG: 10 INJECTION, EMULSION INTRAVENOUS at 09:43

## 2020-07-03 ASSESSMENT — MIFFLIN-ST. JEOR: SCORE: 1427.47

## 2020-07-03 NOTE — ANESTHESIA POSTPROCEDURE EVALUATION
Patient: Nabor Cunningham    Procedure(s):  COLONOSCOPY, WITH POLYPECTOMY AND BIOPSY    Diagnosis:Diarrhea, unspecified type [R19.7]  Diagnosis Additional Information: No value filed.    Anesthesia Type:  MAC    Note:  Anesthesia Post Evaluation    Patient location during evaluation: Bedside  Patient participation: Able to fully participate in evaluation  Level of consciousness: awake and alert  Pain management: adequate  multimodal analgesia used between 6 hours prior to anesthesia start to PACU dischargeAirway patency: patent  Cardiovascular status: acceptable  Respiratory status: acceptable  two or more mitigation strategies used for obstructive sleep apneaHydration status: acceptable  PONV: none     Anesthetic complications: None          Last vitals:  Vitals:    07/03/20 0810   BP: 118/71   Pulse: 93   Resp: 18   Temp: 36.5  C (97.7  F)   SpO2: 97%         Electronically Signed By: AVERY Alvarez CRNA  July 3, 2020  9:46 AM

## 2020-07-03 NOTE — ANESTHESIA CARE TRANSFER NOTE
Patient: Nabor Cunningham    Procedure(s):  COLONOSCOPY, WITH POLYPECTOMY AND BIOPSY    Diagnosis: Diarrhea, unspecified type [R19.7]  Diagnosis Additional Information: No value filed.    Anesthesia Type:   MAC     Note:  Airway :Room Air  Patient transferred to:Phase II  Handoff Report: Identifed the Patient, Identified the Reponsible Provider, Reviewed the pertinent medical history, Discussed the surgical course, Reviewed Intra-OP anesthesia mangement and issues during anesthesia, Set expectations for post-procedure period and Allowed opportunity for questions and acknowledgement of understanding      Vitals: (Last set prior to Anesthesia Care Transfer)    CRNA VITALS  7/3/2020 0915 - 7/3/2020 0945      7/3/2020             Pulse:  79    Ht Rate:  76    SpO2:  97 %                Electronically Signed By: AVERY Alvarez CRNA  July 3, 2020  9:45 AM

## 2020-07-03 NOTE — BRIEF OP NOTE
[facility]   Brief Operative Note    Pre-operative diagnosis: Diarrhea, unspecified type [R19.7]   Post-operative diagnosis prep only fair, but colon otherwise normal     Procedure: Procedure(s):  COLONOSCOPY, WITH POLYPECTOMY AND BIOPSY   Surgeon(s): Surgeon(s) and Role:     * Fady Boyer MD - Primary   Estimated blood loss: * No values recorded between 7/3/2020  9:23 AM and 7/3/2020  9:46 AM *    Specimens: ID Type Source Tests Collected by Time Destination   A : Random biopsies to rule out microscopic colitis Biopsy Large Intestine, Other SURGICAL PATHOLOGY EXAM Fady Boyer MD 7/3/2020  9:34 AM       Findings: 1. Extensive amount of thicker liquid stool  2. No polyps or tumors seen.  3. Colon randomly biopsied from cecum to rectum

## 2020-07-03 NOTE — PROGRESS NOTES
ANTICOAGULATION  MANAGEMENT: Discharge Review    Nabor Cunningham chart reviewed for anticoagulation continuity of care    Outpatient surgery/procedure on 7/3/20 for Colonoscopy.    Discharge disposition: Home    Results:    No results for input(s): INR, [id number], AXA in the last 168 hours.  Anticoagulation inpatient management:     not applicable     Anticoagulation discharge instructions:     Warfarin dosing: increase dose to per AVS calendar.   Bridging: No   INR goal change: No      Medication changes affecting anticoagulation: No    Additional factors affecting anticoagulation: No    Plan     Agree with dosing adjustment on discharge    Patient not contacted    Anticoagulation Calendar updated    Daphne Lamar RN

## 2020-07-03 NOTE — H&P
84 year old year old male here for colonoscopy for chronic diarrhea.    Patient Active Problem List   Diagnosis     Anxiety     Alcohol abuse     Chronic atrial fibrillation (H)     Weight loss     Peripheral polyneuropathy     Chronic gout, unspecified cause, unspecified site     Essential hypertension with goal blood pressure less than 140/90     Hyperlipidemia LDL goal <130     Persistent insomnia     Lower urinary tract symptoms (LUTS)     Long-term (current) use of anticoagulants [Z79.01]     Moderate episode of recurrent major depressive disorder (H)     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)     Cardiac pacemaker in situ     NICM (nonischemic cardiomyopathy) (H)     CAD (coronary artery disease)     CKD (chronic kidney disease) stage 4, GFR 15-29 ml/min (H)     Chronic systolic heart failure (H)       Past Medical History:   Diagnosis Date     Bleeding from wound 9/2/2018 7/12/2019:He had prolonged bleeding after cyst removal on his back within the past year.          Past Surgical History:   Procedure Laterality Date     ANGIOGRAM  01/2019    right and left heart cath.      APPENDECTOMY      ~2013     CATARACT IOL, RT/LT      Past bilateral cataract surgery.      EYE SURGERY Left     three corneal transplants     IMPLANT PACEMAKER       INCISION AND DRAINAGE TRUNK, COMBINED N/A 9/6/2018    Procedure: COMBINED INCISION AND DRAINAGE TRUNK;  incision and cauterization of left buttock bleed.;  Surgeon: Dain Davis MD;  Location: [facility] OR     INCISION AND DRAINAGE TRUNK, COMBINED N/A 11/1/2018    Procedure:  INCISION AND DRAINAGE of Back Wound;  Surgeon: Dain Davis MD;  Location: [facility] OR     JOINT REPLACEMENT Right     right total knee.      THORACIC SURGERY      removal benign nodule       @[facility]@    No current outpatient medications on file.       Allergies   Allergen Reactions     Amoxicillin Hives     Penicillins Other (See Comments)     Dizzy       Trazodone Other  "(See Comments)     Hallucinations         Pt reports that he has quit smoking. His smoking use included cigars. He quit after 20.00 years of use. He has never used smokeless tobacco. He reports previous alcohol use. He reports that he does not use drugs.    Exam:  /71 (BP Location: Right arm)   Pulse 93   Temp 97.7  F (36.5  C) (Oral)   Resp 18   Ht 1.626 m (5' 4\")   Wt 81.6 kg (180 lb)   SpO2 97%   BMI 30.90 kg/m      Awake, Alert OX3  Lungs - CTA bilaterally  CV - RRR, no murmurs, distal pulses intact  Abd - soft, non-distended, non-tender, +BS  Extr - No cyanosis or edema    A/P 84 year old year old male in need of colonoscopy for chronic diarrhea. Risks, benefits, alternatives, and complications were discussed including the possibility of perforation and the patient agreed to proceed.    Fady Boyer MD  "

## 2020-07-06 LAB — COPATH REPORT: NORMAL

## 2020-07-07 ENCOUNTER — VIRTUAL VISIT (OUTPATIENT)
Dept: NEUROLOGY | Facility: CLINIC | Age: 85
End: 2020-07-07
Payer: MEDICARE

## 2020-07-07 ENCOUNTER — ANTICOAGULATION THERAPY VISIT (OUTPATIENT)
Dept: FAMILY MEDICINE | Facility: CLINIC | Age: 85
End: 2020-07-07

## 2020-07-07 DIAGNOSIS — Z79.01 LONG TERM CURRENT USE OF ANTICOAGULANT THERAPY: ICD-10-CM

## 2020-07-07 DIAGNOSIS — I48.20 CHRONIC ATRIAL FIBRILLATION (H): ICD-10-CM

## 2020-07-07 DIAGNOSIS — G62.9 PERIPHERAL POLYNEUROPATHY: ICD-10-CM

## 2020-07-07 LAB
CAPILLARY BLOOD COLLECTION: NORMAL
INR PPP: 1.3 (ref 0.86–1.14)

## 2020-07-07 PROCEDURE — 99441 ZZC PHYSICIAN TELEPHONE EVALUATION 5-10 MIN: CPT | Performed by: PSYCHIATRY & NEUROLOGY

## 2020-07-07 PROCEDURE — 36416 COLLJ CAPILLARY BLOOD SPEC: CPT | Performed by: FAMILY MEDICINE

## 2020-07-07 PROCEDURE — 99207 ZZC NO CHARGE NURSE ONLY: CPT | Performed by: FAMILY MEDICINE

## 2020-07-07 PROCEDURE — 85610 PROTHROMBIN TIME: CPT | Performed by: FAMILY MEDICINE

## 2020-07-07 RX ORDER — GABAPENTIN 300 MG/1
300 CAPSULE ORAL AT BEDTIME
Qty: 90 CAPSULE | Refills: 3 | Status: SHIPPED | OUTPATIENT
Start: 2020-07-07 | End: 2020-11-05

## 2020-07-07 NOTE — PROGRESS NOTES
"Nabor Cunningham is a 84 year old male who is being evaluated via a billable telephone visit.      The patient has been notified of following:     \"This telephone visit will be conducted via a call between you and your physician/provider. We have found that certain health care needs can be provided without the need for a physical exam.  This service lets us provide the care you need with a short phone conversation.  If a prescription is necessary we can send it directly to your pharmacy.  If lab work is needed we can place an order for that and you can then stop by our lab to have the test done at a later time.    Telephone visits are billed at different rates depending on your insurance coverage. During this emergency period, for some insurers they may be billed the same as an in-person visit.  Please reach out to your insurance provider with any questions.    If during the course of the call the physician/provider feels a telephone visit is not appropriate, you will not be charged for this service.\"    Patient has given verbal consent for Telephone visit?  Yes    What phone number would you like to be contacted at? [phone]    How would you like to obtain your AVS? Mail a copy    Phone call duration: 6 minutes    Kae Rodriguez MD      "

## 2020-07-07 NOTE — PROGRESS NOTES
Physician Note:  Mr. Cunningham is followed in Neurology for peripheral neuropathy. He has had tingling and discomfort from the calves down since 2016. He was on nortriptyline when we met. NCS/EMG had been done previously and results showed a mild sensorimotor polyneuropathy in both lower extremities. I ordered labs for neuropathy. Remarkable findings included a mildly elevated IgA and ESR. These were normal upon recheck.    Chalino has several medical comorbidities including HTN, Atrial fibrillation s/p pacemaker, CHF, HLD, Gout and anxiety/insomnia. Additional history of alcohol use disorder.    When we met last in 10.2019, Chalino reported stable neuropathy symptoms. He felt that this was manageable on the 300mg QHS of gabapentin. We did not make any dose changes at that time. He was planning to have knee surgery, but per chart ([facility] note) it looks like this was delayed due to the COVID-19 pandemic.     I spoke with Chalino and his daughter, Kya, on the phone today. Ashkan tells me that the feet are the same. He feels that the gabapentin is keeping the pain under control at the current dose. He denies side effects on the medication. He has been able to get out a little bit more recently in his building, wearing a mask.     They tell me the knee surgery had to be put on hold because there were concerns about how he was going to do rehabilitation afterwards. He has upcoming eye surgery, per Kya.     Current Outpatient Medications   Medication     gabapentin (NEURONTIN) 300 MG capsule     allopurinol (ZYLOPRIM) 100 MG tablet     allopurinol (ZYLOPRIM) 100 MG tablet     atorvastatin (LIPITOR) 20 MG tablet     calcium carbonate (OS- MG [address]. [place]) 1250 MG tablet     cyanocobalamin (VITAMIN B-12) 1000 MCG tablet     doxepin (SINEQUAN) 10 MG capsule     furosemide (LASIX) 40 MG tablet     gabapentin (NEURONTIN) 100 MG capsule     hypromellose (ARTIFICIAL TEARS) 0.4 % SOLN ophthalmic solution     lisinopril (ZESTRIL) 5 MG  tablet     metoprolol succinate ER (TOPROL-XL) 25 MG 24 hr tablet     mirtazapine (REMERON) 15 MG tablet     multivitamin, therapeutic with minerals (MULTI-VITAMIN) TABS tablet     nortriptyline (PAMELOR) 25 MG capsule     polyvinyl alcohol (LIQUIFILM TEARS) 1.4 % ophthalmic solution     prednisoLONE acetate (PRED FORTE) 1 % ophthalmic susp     triamcinolone (KENALOG) 0.1 % external cream     warfarin ANTICOAGULANT (JANTOVEN ANTICOAGULANT) 2 MG tablet     No current facility-administered medications for this visit.        Assessment and Plan:  Encounter Diagnosis   Name Primary?     Peripheral polyneuropathy        Mr. Cunningham is a pleasant 85 yo man with multiple medical comorbidities seen today for idiopathic peripheral neuropathy. He continues to note symptomatic improvement on the evening gabapentin, so we will not make any dose changes today. I would like to see Bear back in clinic for an updated exam in a few months. He and Kya understand and agree with the plan.     Patient Instructions:  -- Continue the Gabapentin: 300mg at bedtime for neuropathy.  -- Return to Neurology clinic in 3-4 months.   -- Let us know if any concerns arise in the meantime.     Phone call duration: 6 minutes. Additional 15 minutes spent in chart review and documentation by me.     Kae Rodriguez MD  Neurology

## 2020-07-07 NOTE — LETTER
"    7/7/2020         RE: Nabor Cunningham  [address] [place] [address]        Dear Colleague,    Thank you for referring your patient, Nabor Cunningham, to the [facility]. Please see a copy of my visit note below.    Nabor Cunningham is a 84 year old male who is being evaluated via a billable telephone visit.      The patient has been notified of following:     \"This telephone visit will be conducted via a call between you and your physician/provider. We have found that certain health care needs can be provided without the need for a physical exam.  This service lets us provide the care you need with a short phone conversation.  If a prescription is necessary we can send it directly to your pharmacy.  If lab work is needed we can place an order for that and you can then stop by our lab to have the test done at a later time.    Telephone visits are billed at different rates depending on your insurance coverage. During this emergency period, for some insurers they may be billed the same as an in-person visit.  Please reach out to your insurance provider with any questions.    If during the course of the call the physician/provider feels a telephone visit is not appropriate, you will not be charged for this service.\"    Patient has given verbal consent for Telephone visit?  Yes    What phone number would you like to be contacted at? [phone]    How would you like to obtain your AVS? Mail a copy    Phone call duration: 6 minutes    Kae Rodriguez MD        Physician Note:  Mr. Cunningham is followed in Neurology for peripheral neuropathy. He has had tingling and discomfort from the calves down since 2016. He was on nortriptyline when we met. NCS/EMG had been done previously and results showed a mild sensorimotor polyneuropathy in both lower extremities. I ordered labs for neuropathy. Remarkable findings included a mildly elevated IgA and ESR. These were normal upon " recheck.    Chalino has several medical comorbidities including HTN, Atrial fibrillation s/p pacemaker, CHF, HLD, Gout and anxiety/insomnia. Additional history of alcohol use disorder.    When we met last in 10.2019, Chalino reported stable neuropathy symptoms. He felt that this was manageable on the 300mg QHS of gabapentin. We did not make any dose changes at that time. He was planning to have knee surgery, but per chart ([facility] note) it looks like this was delayed due to the COVID-19 pandemic.     I spoke with Chalino and his daughter, Kya, on the phone today. Ashkan tells me that the feet are the same. He feels that the gabapentin is keeping the pain under control at the current dose. He denies side effects on the medication. He has been able to get out a little bit more recently in his building, wearing a mask.     They tell me the knee surgery had to be put on hold because there were concerns about how he was going to do rehabilitation afterwards. He has upcoming eye surgery, per Kya.     Current Outpatient Medications   Medication     gabapentin (NEURONTIN) 300 MG capsule     allopurinol (ZYLOPRIM) 100 MG tablet     allopurinol (ZYLOPRIM) 100 MG tablet     atorvastatin (LIPITOR) 20 MG tablet     calcium carbonate (OS- MG [place]. [place]) 1250 MG tablet     cyanocobalamin (VITAMIN B-12) 1000 MCG tablet     doxepin (SINEQUAN) 10 MG capsule     furosemide (LASIX) 40 MG tablet     gabapentin (NEURONTIN) 100 MG capsule     hypromellose (ARTIFICIAL TEARS) 0.4 % SOLN ophthalmic solution     lisinopril (ZESTRIL) 5 MG tablet     metoprolol succinate ER (TOPROL-XL) 25 MG 24 hr tablet     mirtazapine (REMERON) 15 MG tablet     multivitamin, therapeutic with minerals (MULTI-VITAMIN) TABS tablet     nortriptyline (PAMELOR) 25 MG capsule     polyvinyl alcohol (LIQUIFILM TEARS) 1.4 % ophthalmic solution     prednisoLONE acetate (PRED FORTE) 1 % ophthalmic susp     triamcinolone (KENALOG) 0.1 % external cream     warfarin  ANTICOAGULANT (JANTOVEN ANTICOAGULANT) 2 MG tablet     No current facility-administered medications for this visit.        Assessment and Plan:  Encounter Diagnosis   Name Primary?     Peripheral polyneuropathy        Mr. Cunningham is a pleasant 85 yo man with multiple medical comorbidities seen today for idiopathic peripheral neuropathy. He continues to note symptomatic improvement on the evening gabapentin, so we will not make any dose changes today. I would like to see Bear back in clinic for an updated exam in a few months. He and Kya understand and agree with the plan.     Patient Instructions:  -- Continue the Gabapentin: 300mg at bedtime for neuropathy.  -- Return to Neurology clinic in 3-4 months.   -- Let us know if any concerns arise in the meantime.     Phone call duration: 6 minutes. Additional 15 minutes spent in chart review and documentation by me.     Kae Rodriguez MD  Neurology      Again, thank you for allowing me to participate in the care of your patient.        Sincerely,        Kae Rodriguez MD

## 2020-07-07 NOTE — PROGRESS NOTES
ANTICOAGULATION FOLLOW-UP CLINIC VISIT    Patient Name:  Nabor Cunningham  Date:  2020  Contact Type:  Telephone/ Lexy-daughter    SUBJECTIVE:  Patient Findings     Positives:   Missed doses, Extra doses    Comments:   No changes in medications, activity, health, or diet noted. No bleeding or increased bruising noted.  Patient was instructed to take 4 mg today, then resume the maintenance dose.   Recheck in 10 days due to scheduling restrictions.  Patient's daughter verbalizes understanding and agrees to plan. No further questions or concerns.          Clinical Outcomes     Negatives:   Major bleeding event, Thromboembolic event, Anticoagulation-related hospital admission, Anticoagulation-related ED visit, Anticoagulation-related fatality    Comments:   No changes in medications, activity, health, or diet noted. No bleeding or increased bruising noted.  Patient was instructed to take 4 mg today, then resume the maintenance dose.   Recheck in 10 days due to scheduling restrictions.  Patient's daughter verbalizes understanding and agrees to plan. No further questions or concerns.             OBJECTIVE    Recent labs: (last 7 days)     20  1535   INR 1.30*       ASSESSMENT / PLAN  INR assessment SUB    Recheck INR In: 10 DAYS    INR Location Outside lab      Anticoagulation Summary  As of 2020    INR goal:   2.0-3.0   TTR:   29.2 % (1 y)   INR used for dosin.30! (2020)   Warfarin maintenance plan:   2 mg (2 mg x 1) every Tue; 4 mg (2 mg x 2) all other days   Full warfarin instructions:   : 4 mg; Otherwise 2 mg every Tue; 4 mg all other days   Weekly warfarin total:   26 mg   Plan last modified:   Kae Schmitt RN (2020)   Next INR check:   2020   Priority:   High   Target end date:   Indefinite    Indications    Chronic atrial fibrillation (H) [I48.20]  Long-term (current) use of anticoagulants [Z79.01] [Z79.01]             Anticoagulation Episode Summary     INR check  location:       Preferred lab:       Send INR reminders to:   [facility]    Comments:   * 2mg tabs. Does not like to cut tabs. NO bandaid      Anticoagulation Care Providers     Provider Role Specialty Phone number    Heron Mayo MD Referring [facility] [phone]            See the Encounter Report to view Anticoagulation Flowsheet and Dosing Calendar (Go to Encounters tab in chart review, and find the Anticoagulation Therapy Visit)        Daphne Lamar RN

## 2020-07-07 NOTE — PATIENT INSTRUCTIONS
Plan:  -- Continue the Gabapentin: 300mg at bedtime for neuropathy.  -- Return to Neurology clinic in 3-4 months.   -- Let us know if any concerns arise in the meantime.

## 2020-07-13 ENCOUNTER — PATIENT OUTREACH (OUTPATIENT)
Dept: CARE COORDINATION | Facility: CLINIC | Age: 85
End: 2020-07-13

## 2020-07-13 NOTE — PROGRESS NOTES
Clinic Care Coordination Contact    Follow Up Progress Note      Assessment: Son reports that pt is doing well.  They have a preop appointment Friday for eye surgery scheduled for the 30th.      Goals addressed this encounter:   Goals Addressed                 This Visit's Progress      Psychosocial (pt-stated)   90%     Goal Statement: I want to stay in my own home and have no falls or avoidable health issues for the next 6 months.   Date Goal set: 7/13/2020  Barriers: age, covid-19  Strengths: supportive family  Date to Achieve By: 1/9/21   Patient expressed understanding of goal: yes  Action steps to achieve this goal:  1. I will have no falls  2. I will remain free from infections  3.  I will follow up with my providers as recommended                 Intervention/Education provided during outreach: Florencio reported that pt is doing well with the Covid - 19.  Son visits him weekly and they were able to get out this last weekend.  Pt was even able to ride in a side by side vehicle on a trail.  Son said they are planning a visit with daughter at her cabin this coming weekend.      Reviewed plan to keep safe and son agrees.  We reviewed goals and son said they are not planning on doing knee surgery as pt denied any pain.  Son was in agreement with goal for pt to remain safe with no falls or avoidable injuries/illnesses.  Plan is to attend appointments as scheduled and not have any falls.  Reviewed CHW role and son in agreement with follow up from them in about one month.      Outreach Frequency: monthly    Plan:   Pt to attend appointments as planned.  Pt to not have any falls.   Care Coordinator will follow up in one month.     TONE Lord, [facility] - Care Coordinator   [facility]  7/13/2020   3:47 PM  [phone]

## 2020-07-17 ENCOUNTER — ANTICOAGULATION THERAPY VISIT (OUTPATIENT)
Dept: ANTICOAGULATION | Facility: CLINIC | Age: 85
End: 2020-07-17
Payer: MEDICARE

## 2020-07-17 ENCOUNTER — OFFICE VISIT (OUTPATIENT)
Dept: FAMILY MEDICINE | Facility: CLINIC | Age: 85
End: 2020-07-17
Payer: MEDICARE

## 2020-07-17 VITALS
OXYGEN SATURATION: 95 % | HEIGHT: 64 IN | HEART RATE: 78 BPM | SYSTOLIC BLOOD PRESSURE: 110 MMHG | RESPIRATION RATE: 18 BRPM | TEMPERATURE: 97.4 F | DIASTOLIC BLOOD PRESSURE: 54 MMHG | BODY MASS INDEX: 31.55 KG/M2 | WEIGHT: 184.8 LBS

## 2020-07-17 DIAGNOSIS — K52.832 LYMPHOCYTIC COLITIS: ICD-10-CM

## 2020-07-17 DIAGNOSIS — I48.20 CHRONIC ATRIAL FIBRILLATION (H): ICD-10-CM

## 2020-07-17 DIAGNOSIS — T86.8419 CORNEAL TRANSPLANT FAILURE: ICD-10-CM

## 2020-07-17 DIAGNOSIS — Z79.01 LONG TERM CURRENT USE OF ANTICOAGULANT THERAPY: ICD-10-CM

## 2020-07-17 DIAGNOSIS — Z01.818 PREOP GENERAL PHYSICAL EXAM: Primary | ICD-10-CM

## 2020-07-17 DIAGNOSIS — I50.43 ACUTE ON CHRONIC COMBINED SYSTOLIC AND DIASTOLIC CONGESTIVE HEART FAILURE (H): ICD-10-CM

## 2020-07-17 DIAGNOSIS — E78.5 HYPERLIPIDEMIA LDL GOAL <130: ICD-10-CM

## 2020-07-17 LAB
ANION GAP SERPL CALCULATED.3IONS-SCNC: 4 MMOL/L (ref 3–14)
BUN SERPL-MCNC: 22 MG/DL (ref 7–30)
CALCIUM SERPL-MCNC: 9.8 MG/DL (ref 8.5–10.1)
CHLORIDE SERPL-SCNC: 101 MMOL/L (ref 94–109)
CHOLEST SERPL-MCNC: 165 MG/DL
CO2 SERPL-SCNC: 31 MMOL/L (ref 20–32)
CREAT SERPL-MCNC: 1.52 MG/DL (ref 0.66–1.25)
GFR SERPL CREATININE-BSD FRML MDRD: 42 ML/MIN/{1.73_M2}
GLUCOSE SERPL-MCNC: 92 MG/DL (ref 70–99)
HDLC SERPL-MCNC: 36 MG/DL
INR PPP: 1.5 (ref 0.86–1.14)
LDLC SERPL CALC-MCNC: 62 MG/DL
NONHDLC SERPL-MCNC: 129 MG/DL
POTASSIUM SERPL-SCNC: 4.6 MMOL/L (ref 3.4–5.3)
SODIUM SERPL-SCNC: 136 MMOL/L (ref 133–144)
TRIGL SERPL-MCNC: 336 MG/DL

## 2020-07-17 PROCEDURE — 99207 ZZC NO CHARGE NURSE ONLY: CPT

## 2020-07-17 PROCEDURE — 80061 LIPID PANEL: CPT | Performed by: FAMILY MEDICINE

## 2020-07-17 PROCEDURE — 85610 PROTHROMBIN TIME: CPT | Performed by: FAMILY MEDICINE

## 2020-07-17 PROCEDURE — 36415 COLL VENOUS BLD VENIPUNCTURE: CPT | Performed by: FAMILY MEDICINE

## 2020-07-17 PROCEDURE — 80048 BASIC METABOLIC PNL TOTAL CA: CPT | Performed by: FAMILY MEDICINE

## 2020-07-17 PROCEDURE — 99214 OFFICE O/P EST MOD 30 MIN: CPT | Performed by: FAMILY MEDICINE

## 2020-07-17 ASSESSMENT — MIFFLIN-ST. JEOR: SCORE: 1449.25

## 2020-07-17 ASSESSMENT — PAIN SCALES - GENERAL: PAINLEVEL: NO PAIN (0)

## 2020-07-17 NOTE — PROGRESS NOTES
From Chart:  Procedure: Cornea replacement left eye  Date of surgery: 7/30/2020    Anticoagulant or Antiplatelet Medication Use  WARFARIN: Thromboembolic risk is low (e.g. single DVT/PE >12 months ago, A fib and [id number] < 4 without prior CVA/TIA), hold warfarin 5 days (INR >/= 2) without bridging before procedure.    No warfarin for 5 days prior to the surgery. Last dose on 7/25  OK to restart the warfarin later in the day of the surgery.     ANTICOAGULATION FOLLOW-UP CLINIC VISIT    Patient Name:  Nabor Cunningham  Date:  7/17/2020  Contact Type:  Telephone    SUBJECTIVE:  Patient Findings     Comments:   No changes in medications, activity, or diet noted. No concerns with clotting, bleeding, or increased bruising noted. Took warfarin as prescribed.  Gout - no complaints or concerns at this time.  Daughter is to take 6 mg coumadin today, then start new maintenance dose of 4 mg daily and recheck INR on Thursday 7/23. Reviewed dosing with Rani Pharmacist.   Spoke w/ daughter Lexy solano on file.  Patient educated on the increased risk for a clot/stroke, precautions to take, S &S of a clot/stroke, and when to seek medical attention. Denies concerns or S&S of a clot.  Daughter verbalizes understanding and agrees to plan. No further questions or concerns.        Clinical Outcomes     Negatives:   Major bleeding event, Thromboembolic event, Anticoagulation-related hospital admission, Anticoagulation-related ED visit, Anticoagulation-related fatality    Comments:   No changes in medications, activity, or diet noted. No concerns with clotting, bleeding, or increased bruising noted. Took warfarin as prescribed.  Gout - no complaints or concerns at this time.  Daughter is to take 6 mg coumadin today, then start new maintenance dose of 4 mg daily and recheck INR on Thursday 7/23. Reviewed dosing with Rani Pharmacist.   Spoke w/ daughter Lexy solano on file.  Patient educated on the increased risk for a  clot/stroke, precautions to take, S &S of a clot/stroke, and when to seek medical attention. Denies concerns or S&S of a clot.  Daughter verbalizes understanding and agrees to plan. No further questions or concerns.           OBJECTIVE    Recent labs: (last 7 days)     20  1434   INR 1.50*       ASSESSMENT / PLAN  INR assessment SUB    Recheck INR In: 6 DAYS    INR Location Clinic      Anticoagulation Summary  As of 2020    INR goal:   2.0-3.0   TTR:   28.5 % (1 y)   INR used for dosin.50! (2020)   Warfarin maintenance plan:   4 mg (2 mg x 2) every day   Full warfarin instructions:   : 6 mg; : Hold; : Hold; : Hold; : Hold; : Hold; : 6 mg; : 6 mg; Otherwise 4 mg every day   Weekly warfarin total:   28 mg   Plan last modified:   Jayde Whitt RN (2020)   Next INR check:   2020   Priority:   High   Target end date:   Indefinite    Indications    Chronic atrial fibrillation (H) [I48.20]  Long-term (current) use of anticoagulants [Z79.01] [Z79.01]             Anticoagulation Episode Summary     INR check location:       Preferred lab:       Send INR reminders to:   [facility]    Comments:   * 2mg tabs. Does not like to cut tabs. NO bandaid. Lexy Juarez daughter normally takes calls - ok on file      Anticoagulation Care Providers     Provider Role Specialty Phone number    Heron Mayo MD Referring [facility] [phone]            See the Encounter Report to view Anticoagulation Flowsheet and Dosing Calendar (Go to Encounters tab in chart review, and find the Anticoagulation Therapy Visit)        Jayde Whitt RN

## 2020-07-17 NOTE — PROGRESS NOTES
[facility]  [address] [place] [id number]  [phone]  Dept: [phone]    PRE-OP EVALUATION:  Today's date: 2020    Nabor Cunningham (: 1935) presents for pre-operative evaluation assessment as requested by Dr. Mcdonald.  He requires evaluation and anesthesia risk assessment prior to undergoing surgery/procedure for treatment of LEFT EYE .    Fax number for surgical facility: [facility]  Primary Physician: Heron Mayo  Type of Anesthesia Anticipated: to be determined    Patient has a Health Care Directive or Living Will: no    Preop Questions 2020   Who is doing your surgery? nelson   What are you having done? Cornea replacement lt eye   Date of Surgery/Procedure:    Facility or Hospital where procedure/surgery will be performed: [facility]   1.  Do you have a history of Heart attack, stroke, stent, coronary bypass surgery, or other heart surgery? YES  - pacemaker and CHF   2.  Do you ever have any pain or discomfort in your chest? No   3.  Do you have a history of  Heart Failure? No   4.   Are you troubled by shortness of breath when:  walking on a level surface, or up a slight hill, or at night? No   5.  Do you currently have a cold, bronchitis or other respiratory infection? No   6.  Do you have a cough, shortness of breath, or wheezing? -   7.  Do you sometimes get pains in the calves of your legs when you walk? -   8. Do you or anyone in your family have previous history of blood clots? -   9.  Do you or does anyone in your family have a serious bleeding problem such as prolonged bleeding following surgeries or cuts? -   10. Have you ever had problems with anemia or been told to take iron pills? -   11. Have you had any abnormal blood loss such as black, tarry or bloody stools? -   12. Have you ever had a blood transfusion? -   13. Have you or any of your relatives ever had problems with anesthesia? -   14. Do you have sleep apnea,  excessive snoring or daytime drowsiness? -   15. Do you have any prosthetic heart valves? -   16. Do you have prosthetic joints? -         HPI:   He is having a left eye corneal transplant.  He has had this procedure three times in the past. He has been having more visual problems with that eye.     MEDICAL HISTORY:     Patient Active Problem List    Diagnosis Date Noted     Chronic systolic heart failure (H) 04/06/2020     Priority: Medium     CKD (chronic kidney disease) stage 4, GFR 15-29 ml/min (H) 03/01/2020     Priority: Medium     3/1/2020:decreased GFR within the past year.  Unremarkable renal ultrasound.   Unknown cause.    4/29/2020:UA and MAC have not yet been done. He has started lisinopril in November, 2018.          NICM (nonischemic cardiomyopathy) (H) 02/25/2020     Priority: Medium     CAD (coronary artery disease) 02/25/2020     Priority: Medium     1/7/19 Cath: Nonobstructive disease       Cardiac pacemaker in situ 12/21/2018     Priority: Medium     Dual chamber pacemaker placed in 2016 for sick sinus.       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) 11/30/2018     Priority: Medium     11/30/2018:He has seen cardiology through [place].  Echo in October, 2018 with EF=49%, diastolic dysfunction, reduced RV systolic function, RV enlargement.  (Report summary in clinic notes 11/30/2018).   Hospitalized 11/22 in [place] for CHF exacerbation acute on chronic.   Nuclear stress test December 2018 with large fixed defect and EF=35%  1/7/2019:Coronary angiogram right and left heart cath:no obstructive disease.        Moderate episode of recurrent major depressive disorder (H) 04/10/2017     Priority: Medium     Anxiety 03/22/2017     Priority: Medium     3/23/2017:He has been on amitriptyline, nortriptyline, buspirone, sertraline and Lexapro in the past year.        Alcohol abuse 03/22/2017     Priority: Medium     7/12/2019:Not currently using alcohol.   None for years.         Chronic atrial fibrillation (H) 03/22/2017     Priority: Medium     S/p PPM 6/2016       Weight loss 03/22/2017     Priority: Medium     Peripheral polyneuropathy 03/22/2017     Priority: Medium     4/7/2017:Abnormal monofilament in distal plantar feet bilateral.  He has had chronic pain in both feet.   11/30/2018:nortriptyline has helped and he was up to 75mg a day.  He had some prolonged QT and dose cut back to 25mg at bedtime in hospital 11/22/2018.       Chronic gout, unspecified cause, unspecified site 03/22/2017     Priority: Medium     Essential hypertension with goal blood pressure less than 140/90 03/22/2017     Priority: Medium     Hyperlipidemia LDL goal <130 03/22/2017     Priority: Medium     Persistent insomnia 03/22/2017     Priority: Medium     11/30/2018:He has been on temazepam in the past which has helped some.  He was on this chronically.  He tried lorazepam which worked for a few days, then not effective.  Tried mirtazapine which did not help.         Lower urinary tract symptoms (LUTS) 03/22/2017     Priority: Medium     Long-term (current) use of anticoagulants [Z79.01] 03/22/2017     Priority: Medium      Past Medical History:   Diagnosis Date     Bleeding from wound 9/2/2018 7/12/2019:He had prolonged bleeding after cyst removal on his back within the past year.        Past Surgical History:   Procedure Laterality Date     ANGIOGRAM  01/2019    right and left heart cath.      APPENDECTOMY      ~2013     CATARACT IOL, RT/LT      Past bilateral cataract surgery.      COLONOSCOPY N/A 7/3/2020    Procedure: COLONOSCOPY, WITH POLYPECTOMY AND BIOPSY;  Surgeon: Fady Boyer MD;  Location: [facility]     EYE SURGERY Left     three corneal transplants     IMPLANT PACEMAKER       INCISION AND DRAINAGE TRUNK, COMBINED N/A 9/6/2018    Procedure: COMBINED INCISION AND DRAINAGE TRUNK;  incision and cauterization of left buttock bleed.;  Surgeon: Dain Davis MD;  Location: [facility]     INCISION AND  DRAINAGE TRUNK, COMBINED N/A 11/1/2018    Procedure:  INCISION AND DRAINAGE of Back Wound;  Surgeon: Dain Davis MD;  Location: [facility]     JOINT REPLACEMENT Right     right total knee.      THORACIC SURGERY      removal benign nodule     Current Outpatient Medications   Medication Sig Dispense Refill     allopurinol (ZYLOPRIM) 100 MG tablet Take 2 tablets (200 mg) by mouth daily 60 tablet 1     atorvastatin (LIPITOR) 20 MG tablet TAKE ONE TABLET BY MOUTH ONCE DAILY 30 tablet 11     calcium carbonate (OS- MG [place]. [place]) 1250 MG tablet Take 1 tablet by mouth 2 times daily       cyanocobalamin (VITAMIN B-12) 1000 MCG tablet Take 1,000 mcg by mouth Taking every other day       furosemide (LASIX) 40 MG tablet Take 0.5 tablets (20 mg) by mouth daily 180 tablet 0     gabapentin (NEURONTIN) 100 MG capsule He takes gabapentin 100mg in AM, 100mg at Noon and takes one 300mg pill at bedtime. 60 capsule      gabapentin (NEURONTIN) 300 MG capsule Take 1 capsule (300 mg) by mouth At Bedtime 90 capsule 3     hypromellose (ARTIFICIAL TEARS) 0.4 % SOLN ophthalmic solution Place 1 drop into both eyes every hour as needed for dry eyes       lisinopril (ZESTRIL) 5 MG tablet Take 1 tablet (5 mg) by mouth daily 180 tablet 1     metoprolol succinate ER (TOPROL-XL) 25 MG 24 hr tablet TAKE ONE TABLET BY MOUTH EVERY EVENING 90 tablet 1     mirtazapine (REMERON) 15 MG tablet TAKE ONE TABLET BY MOUTH EVERY NIGHT AT BEDTIME 90 tablet 1     multivitamin, therapeutic with minerals (MULTI-VITAMIN) TABS tablet Take 1 tablet by mouth daily 100 tablet 3     nortriptyline (PAMELOR) 25 MG capsule TAKE ONE CAPSULE BY MOUTH EVERY NIGHT AT BEDTIME 30 capsule 10     polyvinyl alcohol (LIQUIFILM TEARS) 1.4 % ophthalmic solution 1-2 drops       prednisoLONE acetate (PRED FORTE) 1 % ophthalmic susp Place 1 drop Into the left eye daily   2     warfarin ANTICOAGULANT (JANTOVEN ANTICOAGULANT) 2 MG tablet 2 mg (2 mg x 1) every Tue; 4 mg  All other days OR  AS DIRECTED BY THE ANTICOAGULATION CLINIC 200 tablet 0     doxepin (SINEQUAN) 10 MG capsule        triamcinolone (KENALOG) 0.1 % external cream APPLY TOPICALLY TO AFFECTED AREA(S) TWO TIMES A DAY AS DIRECTED (Patient not taking: No sig reported) 45 g 1   Not taking doxepin at this time.   OTC products: acetaminophen and Tums.  Imodium AD    Allergies   Allergen Reactions     Amoxicillin Hives     Penicillins Other (See Comments)     Dizzy       Trazodone Other (See Comments)     Hallucinations        Latex Allergy: NO    Social History     Tobacco Use     Smoking status: Former Smoker     Years: 20.00     Types: Cigars     Smokeless tobacco: Never Used   Substance Use Topics     Alcohol use: Not Currently     History   Drug Use No     Family History :  ============  No family history of bleeding or anesthesia problems.      REVIEW OF SYSTEMS:   ROS:  General: No change in weight, sleep or appetite.  Normal energy.  No fever or chills  Eyes: POSITIVE for:, see above   ENT: No problems with ears, nose or throat.  No difficulty swallowing.  He has some hard of hearing   Resp: No coughing, wheezing or shortness of breath  CV: No chest pains or palpitations  GI: POSITIVE for:, diarrhea, chronic.  occasional heartburn.  Tums helps.  He has been having up to 5-6 stools a day.  When taking loperamide (Imodium) he has been having one stool a day.   : No urinary frequency or dysuria, bladder or kidney problems  Musculoskeletal: No significant muscle or joint pains  Neurologic: POSITIVE for:, neuropathy in legs doing OK  Psychiatric: No problems with anxiety, depression or mental health  Heme/immune/allergy: on chronic warfarin.   Endocrine: No history of thyroid disease, diabetes or other endocrine disorders  Skin: No rashes,worrisome lesions or skin problems   colonoscopy on 7/3:  Impression:          - Preparation of the colon was fair.                        - Stool in the entire examined colon.                        -  "Biopsies were taken with a cold forceps from the                        entire colon for evaluation of microscopic colitis.      pathology:FINAL DIAGNOSIS:   Colon, random mucosal biopsies:   - Lymphocytic colitis.   - Negative for dysplasia and malignancy.     EXAM:   OBJECTIVE:Blood pressure 110/54, pulse 78, temperature 97.4  F (36.3  C), temperature source Tympanic, resp. rate 18, height 1.626 m (5' 4\"), weight 83.8 kg (184 lb 12.8 oz), SpO2 95 %. BMI=Body mass index is 31.72 kg/m .  GENERAL APPEARANCE ADULT: Alert, no acute distress, walks with a walker.   EYES: PERRL, EOM normal, conjunctiva and lids normal  HENT: Ears and TMs normal, oral mucosa and posterior oropharynx normal  NECK: No adenopathy,masses or thyromegaly  RESP: lungs clear to auscultation   CV: normal rate, regular rhythm, no murmur or gallop  ABDOMEN: soft, no organomegaly, masses or tenderness  MS: extremities normal, no peripheral edema     DIAGNOSTICS:   EKG: not needed  Chem 8 and INR done today.     Recent Labs   Lab Test 07/07/20  1535 06/09/20  1553  05/05/20  1506 04/28/20  1455   HGB  --  12.5*  --   --  12.6*   PLT  --  132*  --   --  137*   INR 1.30* 2.80*   < >  --  2.10*   NA  --  140  --  139 136   POTASSIUM  --  4.3  --  4.2 4.7   CR  --  1.65*  --  1.86* 2.44*    < > = values in this interval not displayed.        IMPRESSION:   Reason for surgery/procedure: impaired vision left eye and failed corneal transplant.     The proposed surgical procedure is considered LOW risk.    REVISED CARDIAC RISK INDEX  The patient has the following serious cardiovascular risks for perioperative complications such as (MI, PE, VFib and 3  AV Block):  Congestive Heart Failure (pulmonary edema, PND, s3 vipul, CXR with pulmonary congestion, basilar rales).  His CHF has been stable.    INTERPRETATION: 1 risks: Class II (low risk - 0.9% complication rate)    The patient has the following additional risks for perioperative complications:  No identified " additional risks      ICD-10-CM    1. Preop general physical exam  Z01.818    2. Corneal transplant failure  T86.841    3. Lymphocytic colitis  K52.832 GASTROENTEROLOGY ADULT REF CONSULT ONLY   4. Chronic atrial fibrillation (H)  I48.20    5. 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)  I50.43 Basic metabolic panel   6. Hyperlipidemia LDL goal <130  E78.5 Lipid panel reflex to direct LDL Non-fasting       RECOMMENDATIONS:       --Patient is to take all scheduled medications on the day of surgery EXCEPT for modifications listed below.    Anticoagulant or Antiplatelet Medication Use  WARFARIN: Thromboembolic risk is low (e.g. single DVT/PE >12 months ago, A fib and [id number] < 4 without prior CVA/TIA), hold warfarin 5 days (INR >/= 2) without bridging before procedure.    No warfarin for 5 days prior to the surgery.   Last dose on 7/25  OK to restart the warfarin later in the day of the surgery.       Schedule an appointment with GI specialist regarding the diarrhea.  This can be done after your eye surgery.      APPROVAL GIVEN to proceed with proposed procedure, without further diagnostic evaluation       Signed Electronically by: Heron Mayo MD    Copy of this evaluation report is provided to requesting physician.    [facility] Preop Guidelines    Revised Cardiac Risk Index

## 2020-07-17 NOTE — PROGRESS NOTES
Discussed with ACC RN, recommend boost today 6mg (1.5x) and increase to 4mg daily ~8% dose increase, previously has been in range on 28-30mg prior to gout flares and supra therapeutic levels in April/May.  Recheck in 1 week, will have another hold for surgery starting next weekend, if low INR, follow last dose day instructions given by provider (is actually 4 day hold, 5 counting procedure day), if high end/supratherapeutic, will need 5 day hold prior to procedure.    Rani Lundberg, PharmD BCACP  Anticoagulation Clinical Pharmacist

## 2020-07-17 NOTE — NURSING NOTE
"Chief Complaint   Patient presents with     Pre-Op Exam       Initial /54 (BP Location: Right arm, Patient Position: Chair, Cuff Size: Adult Regular)   Pulse 78   Temp 97.4  F (36.3  C) (Tympanic)   Resp 18   Ht 1.626 m (5' 4\")   Wt 83.8 kg (184 lb 12.8 oz)   SpO2 95%   BMI 31.72 kg/m   Estimated body mass index is 31.72 kg/m  as calculated from the following:    Height as of this encounter: 1.626 m (5' 4\").    Weight as of this encounter: 83.8 kg (184 lb 12.8 oz).    Patient presents to the clinic using No DME    Health Maintenance that is potentially due pending provider review:  NONE    n/a    Is there anyone who you would like to be able to receive your results? Yes  If yes have patient fill out [facility]  Ellen Miranda CMA    "

## 2020-07-17 NOTE — PATIENT INSTRUCTIONS
Before Your Surgery      Call your surgeon if there is any change in your health. This includes signs of a cold or flu (such as a sore throat, runny nose, cough, rash or fever).    Do not smoke, drink alcohol or take over the counter medicine (unless your surgeon or primary care doctor tells you to) for the 24 hours before and after surgery.    If you take prescribed drugs: Follow your doctor s orders about which medicines to take and which to stop until after surgery.    Eating and drinking prior to surgery: follow the instructions from your surgeon    Take a shower or bath the night before surgery. Use the soap your surgeon gave you to gently clean your skin. If you do not have soap from your surgeon, use your regular soap. Do not shave or scrub the surgery site.  Wear clean pajamas and have clean sheets on your bed.     RECOMMENDATIONS:       --Patient is to take all scheduled medications on the day of surgery EXCEPT for modifications listed below.    Anticoagulant or Antiplatelet Medication Use  WARFARIN: Thromboembolic risk is low (e.g. single DVT/PE >12 months ago, A fib and [id number] < 4 without prior CVA/TIA), hold warfarin 5 days (INR >/= 2) without bridging before procedure.    No warfarin for 5 days prior to the surgery.   Last dose on 7/25  OK to restart the warfarin later in the day of the surgery.       Schedule an appointment with GI specialist regarding the diarrhea.  This can be done after your eye surgery.      APPROVAL GIVEN to proceed with proposed procedure, without further diagnostic evaluation

## 2020-07-18 DIAGNOSIS — I50.43 ACUTE ON CHRONIC COMBINED SYSTOLIC AND DIASTOLIC CONGESTIVE HEART FAILURE (H): ICD-10-CM

## 2020-07-20 RX ORDER — LISINOPRIL 5 MG/1
TABLET ORAL
Qty: 180 TABLET | Refills: 1 | Status: SHIPPED | OUTPATIENT
Start: 2020-07-20 | End: 2021-01-22

## 2020-07-22 ENCOUNTER — VIRTUAL VISIT (OUTPATIENT)
Dept: GASTROENTEROLOGY | Facility: CLINIC | Age: 85
End: 2020-07-22
Attending: FAMILY MEDICINE
Payer: MEDICARE

## 2020-07-22 DIAGNOSIS — K52.832 LYMPHOCYTIC COLITIS: Primary | ICD-10-CM

## 2020-07-22 PROCEDURE — 99442 ZZC PHYSICIAN TELEPHONE EVALUATION 11-20 MIN: CPT | Performed by: INTERNAL MEDICINE

## 2020-07-22 NOTE — PROGRESS NOTES
"Nabor Cunningham is a 84 year old male who is being evaluated via a billable telephone visit.      The patient has been notified of following:     \"This telephone visit will be conducted via a call between you and your physician/provider. We have found that certain health care needs can be provided without the need for a physical exam.  This service lets us provide the care you need with a short phone conversation.  If a prescription is necessary we can send it directly to your pharmacy.  If lab work is needed we can place an order for that and you can then stop by our lab to have the test done at a later time.    Telephone visits are billed at different rates depending on your insurance coverage. During this emergency period, for some insurers they may be billed the same as an in-person visit.  Please reach out to your insurance provider with any questions.    If during the course of the call the physician/provider feels a telephone visit is not appropriate, you will not be charged for this service.\"    Patient has given verbal consent for Telephone visit?  Yes    What phone number would you like to be contacted at? [phone]    How would you like to obtain your AVS? Mail a copy        Appointment today with patient and his son for diarrhea which has been going for the last few months.  Was having 4-5 nonbloody bowel movements a day.  No abdominal pain.  No weight loss. Had a colonoscopy on 7-3 with random biopsies which showed lymphocytic colitis. Started imodium prior to the colonoscopy - took bowel movements down to once a day but sometimes causes constipation so he doesn't like to take it.  Had to take mineral oil a few days ago. Now is having 1-2 somewhat formed BMs daily.  Not on any fiber supplements.  Occasionally has acid reflux - resolves with tums.    No NSAIDs. No recent changes in medications.  No weight loss.    Past Medical History:   Diagnosis Date     Bleeding from wound 9/2/2018 7/12/2019:He " had prolonged bleeding after cyst removal on his back within the past year.          Past Surgical History:   Procedure Laterality Date     ANGIOGRAM  01/2019    right and left heart cath.      APPENDECTOMY      ~2013     CATARACT IOL, RT/LT      Past bilateral cataract surgery.      COLONOSCOPY N/A 7/3/2020    Procedure: COLONOSCOPY, WITH POLYPECTOMY AND BIOPSY;  Surgeon: Fady Boyer MD;  Location: [facility]     EYE SURGERY Left     three corneal transplants     IMPLANT PACEMAKER       INCISION AND DRAINAGE TRUNK, COMBINED N/A 9/6/2018    Procedure: COMBINED INCISION AND DRAINAGE TRUNK;  incision and cauterization of left buttock bleed.;  Surgeon: Dain Davis MD;  Location: [facility]     INCISION AND DRAINAGE TRUNK, COMBINED N/A 11/1/2018    Procedure:  INCISION AND DRAINAGE of Back Wound;  Surgeon: Dain Davis MD;  Location: [facility]     JOINT REPLACEMENT Right     right total knee.      THORACIC SURGERY      removal benign nodule       Family history - no history of GI malignancies    Social History     Tobacco Use     Smoking status: Former Smoker     Years: 20.00     Types: Cigars     Smokeless tobacco: Never Used   Substance Use Topics     Alcohol use: Not Currently       Assessment and Plan:    Lymphocytic colitis - initially having 4-5 BMs daily - now improved.  Did have constipation with imodium - can continue to use sparingly if needed.  Start fiber supplement such as metamucil or citrucel - titrate to 3 times daily.  If symptoms worsen, may consider course of budesonide.  Will also check celiac serologies with next labs.    Phone call time: 18 minutes    RTC 6 weeks    Vivian Mobley DO

## 2020-07-22 NOTE — PATIENT INSTRUCTIONS
Start taking a fiber supplement like citrucel or metamucil daily - slowly work up to 3 times a day - if it causes bloating or constipation, you can decrease it to once or twice daily.  Use imodium as needed for diarrhea - you may want to try taking only half a tablet to help prevent constipation after use.  I have ordered a blood test for celiac disease (gluten intolerance) - you can have this done whenever you go in next for blood work.    Please contact us if you're having more issues with diarrhea and we can talk about other treatments for lymphocytic colitis

## 2020-07-23 DIAGNOSIS — Z79.01 LONG TERM CURRENT USE OF ANTICOAGULANT THERAPY: ICD-10-CM

## 2020-07-23 DIAGNOSIS — I48.20 CHRONIC ATRIAL FIBRILLATION (H): ICD-10-CM

## 2020-07-23 LAB — INR PPP: 2.26 (ref 0.86–1.14)

## 2020-07-23 PROCEDURE — 85610 PROTHROMBIN TIME: CPT | Performed by: FAMILY MEDICINE

## 2020-07-23 PROCEDURE — 36415 COLL VENOUS BLD VENIPUNCTURE: CPT | Performed by: FAMILY MEDICINE

## 2020-07-24 ENCOUNTER — TELEPHONE (OUTPATIENT)
Dept: FAMILY MEDICINE | Facility: CLINIC | Age: 85
End: 2020-07-24

## 2020-07-24 ENCOUNTER — TELEPHONE (OUTPATIENT)
Dept: GASTROENTEROLOGY | Facility: CLINIC | Age: 85
End: 2020-07-24

## 2020-07-24 ENCOUNTER — ANTICOAGULATION THERAPY VISIT (OUTPATIENT)
Dept: ANTICOAGULATION | Facility: CLINIC | Age: 85
End: 2020-07-24
Payer: MEDICARE

## 2020-07-24 DIAGNOSIS — I48.20 CHRONIC ATRIAL FIBRILLATION (H): ICD-10-CM

## 2020-07-24 DIAGNOSIS — F41.9 ANXIETY: ICD-10-CM

## 2020-07-24 DIAGNOSIS — Z79.01 LONG TERM CURRENT USE OF ANTICOAGULANT THERAPY: ICD-10-CM

## 2020-07-24 PROCEDURE — 99207 ZZC NO CHARGE NURSE ONLY: CPT

## 2020-07-24 NOTE — TELEPHONE ENCOUNTER
Reason for Call:  Medication or medication refill:    Do you use a [facility]?  Name of the pharmacy and phone number for the current request:  [facility] - [phone]    Name of the medication requested: Lisinopril Sig. Bottle says 2 daily. Paper work says one daily.    Can we leave a detailed message on this number? YES    Phone number patient can be reached at: Other phone number:  Florencio [phone] *    Best Time: Any Time      Call taken on 7/24/2020 at 1:15 PM by Cee Michle

## 2020-07-24 NOTE — PROGRESS NOTES
ANTICOAGULATION FOLLOW-UP CLINIC VISIT    Patient Name:  Nabor Cunningham  Date:  2020  Contact Type:  Telephone/ [place]    SUBJECTIVE:  Patient Findings     Positives:   Upcoming invasive procedure    Comments:   No changes in medications, activity, health, or diet noted. No bleeding or increased bruising noted. Took warfarin as prescribed.  Patient will continue weekly maintenance dose. INR is therapeutic.   Recheck in 10 days.  Lexy verbalizes understanding and agrees to plan. No further questions or concerns.          Clinical Outcomes     Comments:   No changes in medications, activity, health, or diet noted. No bleeding or increased bruising noted. Took warfarin as prescribed.  Patient will continue weekly maintenance dose. INR is therapeutic.   Recheck in 10 days.  Lexy verbalizes understanding and agrees to plan. No further questions or concerns.             OBJECTIVE    Recent labs: (last 7 days)     20  1842   INR 2.26*       ASSESSMENT / PLAN  INR assessment THER    Recheck INR In: 10 DAYS    INR Location Outside lab      Anticoagulation Summary  As of 2020    INR goal:   2.0-3.0   TTR:   29.2 % (1 y)   INR used for dosin.26 (2020)   Warfarin maintenance plan:   4 mg (2 mg x 2) every day   Full warfarin instructions:   : Hold; : Hold; : Hold; : Hold; : Hold; : 6 mg; : 6 mg; Otherwise 4 mg every day   Weekly warfarin total:   28 mg   Plan last modified:   Jayde Whitt RN (2020)   Next INR check:   8/3/2020   Priority:   High   Target end date:   Indefinite    Indications    Chronic atrial fibrillation (H) [I48.20]  Long-term (current) use of anticoagulants [Z79.01] [Z79.01]             Anticoagulation Episode Summary     INR check location:       Preferred lab:       Send INR reminders to:   [facility]    Comments:   * 2mg tabs. Does not like to cut tabs. NO bandaid. Lexy Juarez daughter normally takes calls - ok on file       Anticoagulation Care Providers     Provider Role Specialty Phone number    Heron Mayo MD Referring [facility] [phone]            See the Encounter Report to view Anticoagulation Flowsheet and Dosing Calendar (Go to Encounters tab in chart review, and find the Anticoagulation Therapy Visit)        Daphne Lamar RN

## 2020-07-24 NOTE — TELEPHONE ENCOUNTER
7/24 Provided phone number [phone] to schedule  in about 6 weeks (around 9/2/2020).    Heydi Rodriguez   Procedure    Ortho/Sports Med/Pod/Ent/Eye/Surgical Specialties  [facility] [place]   [phone]

## 2020-07-24 NOTE — TELEPHONE ENCOUNTER
Florencio Peters the patient's son is needing clarification on dosage of the Lisinopril, the AVS on 7- says to take one tablet but was just sent a script for 2 tablets per day, please verify what you want the patient to take, thank you.  Florencio is filling out the medication boxes today.      SAURAV Nolan

## 2020-07-24 NOTE — TELEPHONE ENCOUNTER
I spoke with Florencio.    He has been on two of the 5mg pills.   He will continue two 5mg lisinopril pills (10mg ) daily.   Heron Mayo MD

## 2020-07-27 RX ORDER — MIRTAZAPINE 15 MG/1
TABLET, FILM COATED ORAL
Qty: 90 TABLET | Refills: 1 | Status: SHIPPED | OUTPATIENT
Start: 2020-07-27 | End: 2022-04-22

## 2020-08-03 ENCOUNTER — ANTICOAGULATION THERAPY VISIT (OUTPATIENT)
Dept: FAMILY MEDICINE | Facility: CLINIC | Age: 85
End: 2020-08-03

## 2020-08-03 DIAGNOSIS — Z79.01 LONG TERM CURRENT USE OF ANTICOAGULANT THERAPY: ICD-10-CM

## 2020-08-03 DIAGNOSIS — I48.20 CHRONIC ATRIAL FIBRILLATION (H): ICD-10-CM

## 2020-08-03 LAB
CAPILLARY BLOOD COLLECTION: NORMAL
INR PPP: 1.2 (ref 0.86–1.14)

## 2020-08-03 PROCEDURE — 85610 PROTHROMBIN TIME: CPT | Performed by: FAMILY MEDICINE

## 2020-08-03 PROCEDURE — 36416 COLLJ CAPILLARY BLOOD SPEC: CPT | Performed by: FAMILY MEDICINE

## 2020-08-03 NOTE — PROGRESS NOTES
ANTICOAGULATION MANAGEMENT     Patient Name:  Nabor Cunningham  Date:  8/3/2020    ASSESSMENT /SUBJECTIVE:    Today's INR result of 1.2 is subtherapeutic. Goal INR of 2.0-3.0      Warfarin dose taken: Warfarin taken as previously instructed    Diet: No new diet changes affecting INR    Medication changes/ interactions: No new medications/supplements affecting INR    Previous INR: Therapeutic     S/S of bleeding or thromboembolism: No    New injury or illness: No    Upcoming surgery, procedure or cardioversion: No    Additional findings: noted warfarin hold last week for a procedure.       PLAN:    Spoke with daughter Lexy regarding INR result and instructed:     Warfarin Dosing Instructions: Continue your current warfarin dose 4mg daily    Instructed patient to follow up no later than: 4-7 days, Lexy was agreeable to 1 week recheck  Lab visit scheduled    Education provided: Target INR goal and significance of current INR result      Lexy verbalizes understanding and agrees to warfarin dosing plan.    Instructed to call the Anticoagulation Clinic for any changes, questions or concerns. (#[phone])        Alba Vargas RN      OBJECTIVE:  Recent labs: (last 7 days)     20  1700   INR 1.20*         No question data found.  Anticoagulation Summary  As of 8/3/2020    INR goal:   2.0-3.0   TTR:   28.9 % (1 y)   INR used for dosin.20! (8/3/2020)   Warfarin maintenance plan:   4 mg (2 mg x 2) every day   Full warfarin instructions:   4 mg every day   Weekly warfarin total:   28 mg   Plan last modified:   Jayde Whitt RN (2020)   Next INR check:   8/10/2020   Priority:   High   Target end date:   Indefinite    Indications    Chronic atrial fibrillation (H) [I48.20]  Long-term (current) use of anticoagulants [Z79.01] [Z79.01]             Anticoagulation Episode Summary     INR check location:       Preferred lab:       Send [facility] reminders to:   [facility]    Comments:   * 2mg tabs.  Does not like to cut tabs. NO bandaid. Lexy Juarez daughter normally takes calls - ok on file      Anticoagulation Care Providers     Provider Role Specialty Phone number    Heron Mayo MD Referring Family Practice [phone]

## 2020-08-06 DIAGNOSIS — I50.43 ACUTE ON CHRONIC COMBINED SYSTOLIC AND DIASTOLIC CONGESTIVE HEART FAILURE (H): ICD-10-CM

## 2020-08-07 RX ORDER — LISINOPRIL 5 MG/1
TABLET ORAL
Qty: 180 TABLET | Refills: 1 | OUTPATIENT
Start: 2020-08-07

## 2020-08-10 ENCOUNTER — ANTICOAGULATION THERAPY VISIT (OUTPATIENT)
Dept: FAMILY MEDICINE | Facility: CLINIC | Age: 85
End: 2020-08-10

## 2020-08-10 DIAGNOSIS — Z79.01 LONG TERM CURRENT USE OF ANTICOAGULANT THERAPY: ICD-10-CM

## 2020-08-10 DIAGNOSIS — I48.20 CHRONIC ATRIAL FIBRILLATION (H): ICD-10-CM

## 2020-08-10 LAB
CAPILLARY BLOOD COLLECTION: NORMAL
INR PPP: 1.5 (ref 0.86–1.14)

## 2020-08-10 PROCEDURE — 85610 PROTHROMBIN TIME: CPT | Performed by: FAMILY MEDICINE

## 2020-08-10 PROCEDURE — 36416 COLLJ CAPILLARY BLOOD SPEC: CPT | Performed by: FAMILY MEDICINE

## 2020-08-10 NOTE — PROGRESS NOTES
ANTICOAGULATION MANAGEMENT     Patient Name:  Nabor Cunningham  Date:  8/10/2020    ASSESSMENT /SUBJECTIVE:    Today's INR result of 1.5 is subtherapeutic. Goal INR of 2.0-3.0      Warfarin dose taken: Warfarin taken as previously instructed    Diet: No new diet changes affecting INR    Medication changes/ interactions: No new medications/supplements affecting INR    Previous INR: Subtherapeutic     S/S of bleeding or thromboembolism: No    New injury or illness: No    Upcoming surgery, procedure or cardioversion: No    Additional findings: None      PLAN:    Spoke with Lexy, daughter, regarding INR result and instructed:     Warfarin Dosing Instructions: Change your warfarin dose to 6 mg every Mon; 4 mg all other days    Instructed patient to follow up no later than: 1 week  Lab visit scheduled    Education provided: Target INR goal and significance of current INR result and Importance of therapeutic range      Lexy verbalizes understanding and agrees to warfarin dosing plan.    Instructed to call the Anticoagulation Clinic for any changes, questions or concerns. (#[phone])        Zainab Underwood RN      OBJECTIVE:  Recent labs: (last 7 days)     08/10/20  1658   INR 1.50*         No question data found.  Anticoagulation Summary  As of 8/10/2020    INR goal:   2.0-3.0   TTR:   28.8 % (1 y)   INR used for dosin.50! (8/10/2020)   Warfarin maintenance plan:   6 mg (2 mg x 3) every Mon; 4 mg (2 mg x 2) all other days   Full warfarin instructions:   8/10: 6 mg; Otherwise 6 mg every Mon; 4 mg all other days   Weekly warfarin total:   30 mg   Plan last modified:   Zainab Underwood, RN (8/10/2020)   Next INR check:   2020   Priority:   High   Target end date:   Indefinite    Indications    Chronic atrial fibrillation (H) [I48.20]  Long-term (current) use of anticoagulants [Z79.01] [Z79.01]             Anticoagulation Episode Summary     INR check location:       Preferred lab:       Send INR reminders to:    [facility]    Comments:   * 2mg tabs. Does not like to cut tabs. NO bandaid. Lexy Juarez daughter normally takes calls - ok on file      Anticoagulation Care Providers     Provider Role Specialty Phone number    Heron Mayo MD [facility] [phone]

## 2020-08-13 ENCOUNTER — PATIENT OUTREACH (OUTPATIENT)
Dept: FAMILY MEDICINE | Facility: CLINIC | Age: 85
End: 2020-08-13
Payer: MEDICARE

## 2020-08-13 NOTE — PROGRESS NOTES
Clinic Care Coordination Contact  Community Health Worker Follow Up    Goals:   Goals Addressed as of 8/13/2020 at 12:09 PM                 Today    7/13/20       Patient Stated      Psychosocial (pt-stated)   90%  90%    Added 11/28/18 by Rani Sarkar LSW     Goal Statement: I would like to stay in my own home and have no falls or avoidable health issues for the next 6 months.   Date Goal set: 7/13/2020  Barriers: age, covid-19  Strengths: supportive family  Date to Achieve By: 1/9/21   Patient expressed understanding of goal: yes  Action steps to achieve this goal:  1. I will have no falls  2. I will remain free from infections  3.  I will follow up with my providers as recommended              CHW spoke with the patient son (stevenson). Consent to communicate on file. Harsh is doing really well. He has been getting his meals on wheels and enjoys them.     Harsh's son was a little worried about his INR being low. They advised harsh to take another pill to help get his INR up. CHW reminded Stevenson that if his INR doesn't change then to reach out and ask questions.     CHW asked to reach out next month to follow up and make sure that there are no new concerns or questions. Harsh's son was happy and agreed to this plan.     Intervention and Education during outreach: Monthly    CHW Plan: Will call the patient son next month to follow up and make sure no new concerns or questions have come up.     Shruthi Hernandez  [facility] Health Worker   [facility]  [email or web address]   Office: [phone]  Fax: [phone]

## 2020-08-16 DIAGNOSIS — M1A.9XX0 CHRONIC GOUT, UNSPECIFIED CAUSE, UNSPECIFIED SITE: ICD-10-CM

## 2020-08-18 ENCOUNTER — ANTICOAGULATION THERAPY VISIT (OUTPATIENT)
Dept: ANTICOAGULATION | Facility: CLINIC | Age: 85
End: 2020-08-18
Payer: MEDICARE

## 2020-08-18 DIAGNOSIS — Z79.01 LONG TERM CURRENT USE OF ANTICOAGULANT THERAPY: ICD-10-CM

## 2020-08-18 DIAGNOSIS — I48.20 CHRONIC ATRIAL FIBRILLATION (H): ICD-10-CM

## 2020-08-18 LAB
CAPILLARY BLOOD COLLECTION: NORMAL
INR PPP: 2.7 (ref 0.86–1.14)

## 2020-08-18 PROCEDURE — 36416 COLLJ CAPILLARY BLOOD SPEC: CPT | Performed by: FAMILY MEDICINE

## 2020-08-18 PROCEDURE — 85610 PROTHROMBIN TIME: CPT | Performed by: FAMILY MEDICINE

## 2020-08-18 PROCEDURE — 99207 ZZC NO CHARGE NURSE ONLY: CPT

## 2020-08-18 RX ORDER — ALLOPURINOL 100 MG/1
TABLET ORAL
Qty: 60 TABLET | Refills: 11 | Status: SHIPPED | OUTPATIENT
Start: 2020-08-18 | End: 2021-08-10

## 2020-08-18 NOTE — PROGRESS NOTES
ANTICOAGULATION FOLLOW-UP CLINIC VISIT    Patient Name:  Nabor Cunningham  Date:  2020  Contact Type:  Telephone/ Lexy    SUBJECTIVE:  Patient Findings     Comments:   No changes in medications, activity, health, or diet noted. No bleeding or increased bruising noted. Took warfarin as prescribed.  Patient will continue weekly maintenance dose. INR is therapeutic.   Recheck in 2 weeks.   Patient's daughter verbalizes understanding and agrees to plan. No further questions or concerns.          Clinical Outcomes     Negatives:   Major bleeding event, Thromboembolic event, Anticoagulation-related hospital admission, Anticoagulation-related ED visit, Anticoagulation-related fatality    Comments:   No changes in medications, activity, health, or diet noted. No bleeding or increased bruising noted. Took warfarin as prescribed.  Patient will continue weekly maintenance dose. INR is therapeutic.   Recheck in 2 weeks.   Patient's daughter verbalizes understanding and agrees to plan. No further questions or concerns.             OBJECTIVE    Recent labs: (last 7 days)     20  1633   INR 2.70*       ASSESSMENT / PLAN  INR assessment THER    Recheck INR In: 2 WEEKS    INR Location Outside lab      Anticoagulation Summary  As of 2020    INR goal:   2.0-3.0   TTR:   30.1 % (1 y)   INR used for dosin.70 (2020)   Warfarin maintenance plan:   6 mg (2 mg x 3) every Mon; 4 mg (2 mg x 2) all other days   Full warfarin instructions:   6 mg every Mon; 4 mg all other days   Weekly warfarin total:   30 mg   Plan last modified:   Zainab Underwood RN (8/10/2020)   Next INR check:   2020   Priority:   Maintenance   Target end date:   Indefinite    Indications    Chronic atrial fibrillation (H) [I48.20]  Long-term (current) use of anticoagulants [Z79.01] [Z79.01]             Anticoagulation Episode Summary     INR check location:       Preferred lab:       Send INR reminders to:   [facility]     Comments:   * 2mg tabs. Does not like to cut tabs. NO bandaid. Lexy Juarez daughter normally takes calls - ok on file      Anticoagulation Care Providers     Provider Role Specialty Phone number    Heron Mayo MD Referring [facility] [phone]            See the Encounter Report to view Anticoagulation Flowsheet and Dosing Calendar (Go to Encounters tab in chart review, and find the Anticoagulation Therapy Visit)        Daphne Lamar RN

## 2020-08-18 NOTE — TELEPHONE ENCOUNTER
Routing refill request to provider for review/approval because:  Labs out of range  Creatinine   Date Value Ref Range Status   07/17/2020 1.52 (H) 0.66 - 1.25 mg/dL Final     Uric Acid   Date Value Ref Range Status   06/09/2020 7.3 (H) 3.5 - 7.2 mg/dL Final     Kirsty RG RN, BSN

## 2020-08-26 ENCOUNTER — PATIENT OUTREACH (OUTPATIENT)
Dept: CARE COORDINATION | Facility: CLINIC | Age: 85
End: 2020-08-26

## 2020-08-26 NOTE — LETTER
[address] [address]  [phone]      8/26/2020      Nabor Cunningham  [address]  [facility] [address]      Dear  Nabor,    I would like to provide you with the enclosed information for your records.  As part of care coordination, we are developing care plans to assist in accomplishing your health care goals.  When we speak next, please feel free to let me know if you want to add or change any information on your care plans.    As always, feel free to contact me if you have any questions or concerns.  I look forward to working with you in the effort to achieve your health care and wellness goals .        Sincerely,      Rani Sarkar, [facility]  Clinic Care Coordination  [phone]                [facility]  Complex Care Plan  About Me:    Patient Name:  Nabor Cunningham    YOB: 1935  Age:         84 year old   [place] MRN:    [id number] Telephone Information:  Home Phone [phone]   Mobile [phone]   Home Phone Not on file.       Address:  [address]  [facility] [address] Email address:  No e-mail address on record      Emergency Contact(s)    Name Relationship Lgl Grd Work Phone Home Phone Mobile Phone   1. JACKSON VAN Daughter No  [phone]   2. ELOY CUNNINGHAM  Son No   [phone]   3. LISANDRA CUNNINGHAM Relative No  [phone]            Primary language:  English     needed? No   [place] [facility]:  [phone] op. 1  Other communication barriers: Glasses, Cognitive impairment  Preferred Method of Communication:  Do Not Contact  Current living arrangement:    Mobility Status/ Medical Equipment:      Health Maintenance  Health Maintenance Reviewed: Due/Overdue   Health Maintenance Due   Topic Date Due     HF ACTION PLAN  1935     ZOSTER IMMUNIZATION (1 of 2) 11/03/1987     MEDICARE ANNUAL WELLNESS VISIT  11/03/2002     INFLUENZA VACCINE (1) 09/01/2020     Please talk with your provider  about what is needed or can continue to wait.        My Access Plan  Medical Emergency 911   Primary Clinic Line [facility] - [phone]   24 Hour Appointment Line [phone] or  [phone]) (toll-free)   24 Hour Nurse Line [phone] (toll-free)   Preferred Urgent Care     Preferred Hospital     Preferred Pharmacy [id number] - [place], [place] - [address]     Behavioral Health Crisis Line The National Suicide Prevention Lifeline at 1-667.325.4878 or 911             My Care Team Members  Patient Care Team       Relationship Specialty Notifications Start End    Heron Mayo MD PCP - General Family Practice  9/11/18     Phone: [phone] Fax: [phone]         [address] [place] [address]    Heron Mayo MD Assigned PCP   3/9/17     Phone: [phone] Fax: [phone]         [address] [place] [address]    Heron Mayo MD  Family Practice  1/29/18     Merged    Phone: [phone] Fax: [phone]         [address] [place] [address]    Rani Sarkar LSW Lead Care Coordinator Primary Care - CC Admissions 11/13/18     Phone: [phone] Fax: [phone]        Shruthi Hernandez MA Community Health Worker  Admissions 7/13/20     Vivian Mobley DO MD Gastroenterology  7/17/20     Phone: [phone] Fax: [phone]         [address] [address] [place] [address]            My Care Plans  Self Management and Treatment Plan  Goals and (Comments)  Goals        General    Psychosocial (pt-stated)     Notes - Note edited  8/13/2020 12:09 PM by Shruthi Hernandez MA    Goal Statement: I would like to stay in my own home and have no falls or avoidable health issues for the next 6 months.   Date Goal set: 7/13/2020  Barriers: age, covid-19  Strengths: supportive family  Date to Achieve By: 1/9/21   Patient expressed understanding of goal: yes  Action steps to achieve this goal:  1. I will have no falls  2. I will  remain free from infections  3.  I will follow up with my providers as recommended                 Action Plans on File:                       Advance Care Plans/Directives Type:        My Medical and Care Information  Problem List   Patient Active Problem List   Diagnosis     Anxiety     Alcohol abuse     Chronic atrial fibrillation (H)     Weight loss     Peripheral polyneuropathy     Chronic gout, unspecified cause, unspecified site     Essential hypertension with goal blood pressure less than 140/90     Hyperlipidemia LDL goal <130     Persistent insomnia     Lower urinary tract symptoms (LUTS)     Long-term (current) use of anticoagulants [Z79.01]     Moderate episode of recurrent major depressive disorder (H)     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)     Cardiac pacemaker in situ     NICM (nonischemic cardiomyopathy) (H)     CAD (coronary artery disease)     CKD (chronic kidney disease) stage 4, GFR 15-29 ml/min (H)      Current Medications and Allergies:  Allergies as of 8/26/2020   Reviewed by Wyatt Nguyễn MA on 7/22/2020    Severity  Noted  Reaction Type  Reactions    Amoxicillin  Not Specified  09/02/2018     Hives    Penicillins  Not Specified  03/22/2017     Other (See Comments)    Dizzy    Trazodone  Not Specified  03/22/2017     Other (See Comments)    Hallucinations    Medications - This is your record. Keep this with you and show to your community pharmacist(s) and physician(s) at each visit.      Instructions for use    allopurinol (ZYLOPRIM) 100 MG tablet  TAKE TWO TABLETS BY MOUTH ONCE DAILY    atorvastatin (LIPITOR) 20 MG tablet  TAKE ONE TABLET BY MOUTH ONCE DAILY    calcium carbonate (OS- MG [place]. CA) 1250 MG tablet  Take 1 tablet by mouth 2 times daily    cyanocobalamin (VITAMIN B-12) 1000 MCG tablet  Take 1,000 mcg by mouth Taking every other day    furosemide (LASIX) 40 MG tablet  Take 0.5 tablets (20 mg) by mouth daily    gabapentin (NEURONTIN)  100 MG capsule  He takes gabapentin 100mg in AM, 100mg at Noon and takes one 300mg pill at bedtime.    gabapentin (NEURONTIN) 300 MG capsule  Take 1 capsule (300 mg) by mouth At Bedtime    hypromellose (ARTIFICIAL TEARS) 0.4 % SOLN ophthalmic solution  Place 1 drop into both eyes every hour as needed for dry eyes    lisinopril (ZESTRIL) 5 MG tablet  TAKE TWO TABLETS BY MOUTH ONCE DAILY    metoprolol succinate ER (TOPROL-XL) 25 MG 24 hr tablet  TAKE ONE TABLET BY MOUTH EVERY EVENING    mirtazapine (REMERON) 15 MG tablet  TAKE ONE TABLET BY MOUTH EVERY NIGHT AT BEDTIME    multivitamin, therapeutic with minerals (MULTI-VITAMIN) TABS tablet  Take 1 tablet by mouth daily    nortriptyline (PAMELOR) 25 MG capsule  TAKE ONE CAPSULE BY MOUTH EVERY NIGHT AT BEDTIME    polyvinyl alcohol (LIQUIFILM TEARS) 1.4 % ophthalmic solution  1-2 drops    prednisoLONE acetate (PRED FORTE) 1 % ophthalmic susp  Place 1 drop Into the left eye daily    triamcinolone (KENALOG) 0.1 % external cream  APPLY TOPICALLY TO AFFECTED AREA(S) TWO TIMES A DAY AS DIRECTED     Patient not taking: No sig reported    warfarin ANTICOAGULANT (JANTOVEN ANTICOAGULANT) 2 MG tablet  6 mg (2 mg x 3) every Mon; 4 mg (2 mg x 2) all other days or As directed by Anticoagulation clinic          Care Coordination Start Date: 11/28/2018   Frequency of Care Coordination: 6 weeks   Form Last Updated: 08/26/2020

## 2020-08-26 NOTE — PROGRESS NOTES
Clinic Care Coordination Contact    Situation: Patient chart reviewed by care coordinator.    Background: Patient's goal is to remain safe in the home supports.    Assessment: Per review of CHW out reach to son, patient is doing well and has no concerns.    Plan/Recommendations: CHW will reach out to patient per guidelines/standard workflow. SWCC will review chart in 6 weeks to monitor goal progression, per standard workflow. Will send updated complex care plan    TONE Lord, [facility] - Care Coordinator   [facility]  8/26/2020   2:38 PM  [phone]

## 2020-09-01 ENCOUNTER — ANCILLARY PROCEDURE (OUTPATIENT)
Dept: CARDIOLOGY | Facility: CLINIC | Age: 85
End: 2020-09-01
Attending: INTERNAL MEDICINE
Payer: MEDICARE

## 2020-09-01 ENCOUNTER — ANTICOAGULATION THERAPY VISIT (OUTPATIENT)
Dept: ANTICOAGULATION | Facility: CLINIC | Age: 85
End: 2020-09-01
Payer: MEDICARE

## 2020-09-01 DIAGNOSIS — Z95.0 CARDIAC PACEMAKER IN SITU: ICD-10-CM

## 2020-09-01 DIAGNOSIS — I48.20 CHRONIC ATRIAL FIBRILLATION (H): ICD-10-CM

## 2020-09-01 DIAGNOSIS — Z79.01 LONG TERM CURRENT USE OF ANTICOAGULANT THERAPY: ICD-10-CM

## 2020-09-01 LAB
CAPILLARY BLOOD COLLECTION: NORMAL
INR PPP: 2.4 (ref 0.86–1.14)

## 2020-09-01 PROCEDURE — 99207 ZZC NO CHARGE NURSE ONLY: CPT

## 2020-09-01 PROCEDURE — 93294 REM INTERROG EVL PM/LDLS PM: CPT | Performed by: INTERNAL MEDICINE

## 2020-09-01 PROCEDURE — 36416 COLLJ CAPILLARY BLOOD SPEC: CPT | Performed by: FAMILY MEDICINE

## 2020-09-01 PROCEDURE — 85610 PROTHROMBIN TIME: CPT | Performed by: FAMILY MEDICINE

## 2020-09-01 PROCEDURE — 93296 REM INTERROG EVL PM/IDS: CPT | Performed by: INTERNAL MEDICINE

## 2020-09-01 NOTE — PROGRESS NOTES
ANTICOAGULATION FOLLOW-UP CLINIC VISIT    Patient Name:  Nabor Cunningham  Date:  2020  Contact Type:  Telephone/ Lexy    SUBJECTIVE:  Patient Findings     Comments:   No changes in medications, activity, health, or diet noted. No bleeding or increased bruising noted. Took warfarin as prescribed.  Patient will continue weekly maintenance dose. INR is therapeutic.   Recheck in 2 weeks.  Lexy verbalizes understanding and agrees to plan. No further questions or concerns.          Clinical Outcomes     Negatives:   Major bleeding event, Thromboembolic event, Anticoagulation-related hospital admission, Anticoagulation-related ED visit, Anticoagulation-related fatality    Comments:   No changes in medications, activity, health, or diet noted. No bleeding or increased bruising noted. Took warfarin as prescribed.  Patient will continue weekly maintenance dose. INR is therapeutic.   Recheck in 2 weeks.  Lexy verbalizes understanding and agrees to plan. No further questions or concerns.             OBJECTIVE    Recent labs: (last 7 days)     20  1614   INR 2.40*       ASSESSMENT / PLAN  INR assessment THER    Recheck INR In: 2 WEEKS    INR Location Outside lab      Anticoagulation Summary  As of 2020    INR goal:   2.0-3.0   TTR:   33.9 % (1 y)   INR used for dosin.40 (2020)   Warfarin maintenance plan:   6 mg (2 mg x 3) every Mon; 4 mg (2 mg x 2) all other days   Full warfarin instructions:   6 mg every Mon; 4 mg all other days   Weekly warfarin total:   30 mg   No change documented:   Daphne Lamar, RN   Plan last modified:   Zainab Underwood RN (8/10/2020)   Next INR check:   9/15/2020   Priority:   Maintenance   Target end date:   Indefinite    Indications    Chronic atrial fibrillation (H) [I48.20]  Long-term (current) use of anticoagulants [Z79.01] [Z79.01]             Anticoagulation Episode Summary     INR check location:       Preferred lab:       Send INR reminders to:   [facility]  [place]    Comments:   * 2mg tabs. Does not like to cut tabs. NO bandaid. Lexy Juarez daughter normally takes calls - ok on file      Anticoagulation Care Providers     Provider Role Specialty Phone number    Heron Mayo MD Referring [facility] [phone]            See the Encounter Report to view Anticoagulation Flowsheet and Dosing Calendar (Go to Encounters tab in chart review, and find the Anticoagulation Therapy Visit)        Daphne Lamar RN

## 2020-09-02 ENCOUNTER — VIRTUAL VISIT (OUTPATIENT)
Dept: GASTROENTEROLOGY | Facility: CLINIC | Age: 85
End: 2020-09-02
Payer: MEDICARE

## 2020-09-02 DIAGNOSIS — K52.832 LYMPHOCYTIC COLITIS: Primary | ICD-10-CM

## 2020-09-02 PROCEDURE — 99213 OFFICE O/P EST LOW 20 MIN: CPT | Mod: TEL | Performed by: INTERNAL MEDICINE

## 2020-09-02 RX ORDER — BUDESONIDE 3 MG/1
9 CAPSULE, COATED PELLETS ORAL EVERY MORNING
Qty: 90 CAPSULE | Refills: 1 | Status: SHIPPED | OUTPATIENT
Start: 2020-09-02 | End: 2021-05-07

## 2020-09-02 NOTE — PATIENT INSTRUCTIONS
Please start using budesonide (entocort) 9 mg daily - we will keep you on this until you follow-up with me in about a month.  Continue your metamucil three times a day.  
120

## 2020-09-02 NOTE — PROGRESS NOTES
HPI:    Chalino presents today for a telephone visit to follow-up on lymphocytic colitis. At appointment last time, patient was having issues with constipation after using imodium.  Now is taking a fiber supplement - now having 4-5 loose BMs a day.  Is no longer constipated and is worried about becoming constipated again in the future. No abdominal pain.  No weight loss.  Otherwise feeling well.    Patient's son was on the phone call as well.        Past Medical History:   Diagnosis Date     Bleeding from wound 9/2/2018 7/12/2019:He had prolonged bleeding after cyst removal on his back within the past year.          Past Surgical History:   Procedure Laterality Date     ANGIOGRAM  01/2019    right and left heart cath.      APPENDECTOMY      ~2013     CATARACT IOL, RT/LT      Past bilateral cataract surgery.      COLONOSCOPY N/A 7/3/2020    Procedure: COLONOSCOPY, WITH POLYPECTOMY AND BIOPSY;  Surgeon: Fady Boyer MD;  Location: [facility]     EYE SURGERY Left     three corneal transplants     IMPLANT PACEMAKER       INCISION AND DRAINAGE TRUNK, COMBINED N/A 9/6/2018    Procedure: COMBINED INCISION AND DRAINAGE TRUNK;  incision and cauterization of left buttock bleed.;  Surgeon: Dain Davis MD;  Location: [facility]     INCISION AND DRAINAGE TRUNK, COMBINED N/A 11/1/2018    Procedure:  INCISION AND DRAINAGE of Back Wound;  Surgeon: Dain Davis MD;  Location: [facility]     JOINT REPLACEMENT Right     right total knee.      THORACIC SURGERY      removal benign nodule       No family history on file.    Social History     Tobacco Use     Smoking status: Former Smoker     Years: 20.00     Types: Cigars     Smokeless tobacco: Never Used   Substance Use Topics     Alcohol use: Not Currently        Assessment and Plan:    # lymphocytic colitis - initially developed constipation on imodium and fiber supplements - now on fiber supplements alone and having multiple loose stools a day.  Will start budesonide - 9mg daily with  plan to taper.  Continue fiber supplement      RTC 1 month    Vivian Mobley,      Phone call duration: 10 minutes

## 2020-09-02 NOTE — PROGRESS NOTES
"Nabor Cunningham is a 84 year old male who is being evaluated via a billable telephone visit.      The patient has been notified of following:     \"This telephone visit will be conducted via a call between you and your physician/provider. We have found that certain health care needs can be provided without the need for a physical exam.  This service lets us provide the care you need with a short phone conversation.  If a prescription is necessary we can send it directly to your pharmacy.  If lab work is needed we can place an order for that and you can then stop by our lab to have the test done at a later time.    Telephone visits are billed at different rates depending on your insurance coverage. During this emergency period, for some insurers they may be billed the same as an in-person visit.  Please reach out to your insurance provider with any questions.    If during the course of the call the physician/provider feels a telephone visit is not appropriate, you will not be charged for this service.\"    Patient has given verbal consent for Telephone visit?  Yes    What phone number would you like to be contacted at? [phone]    How would you like to obtain your AVS? Mail a copy     Dara Tang LPN        "

## 2020-09-03 ENCOUNTER — TELEPHONE (OUTPATIENT)
Dept: GASTROENTEROLOGY | Facility: CLINIC | Age: 85
End: 2020-09-03

## 2020-09-03 NOTE — TELEPHONE ENCOUNTER
Prior Authorization Not Needed per Insurance        Medication: Budesonide-PA NOT NEEDED   Insurance Company: [employer] - Phone [phone] Fax [phone]  Expected CoPay: $493    Pharmacy Filling the Rx: [place], [place] - [address] [place] [address]  Pharmacy Notified: Yes  Patient Notified: No    Called pharmacy and pharmacy stated that PA is Not Needed and medication is covered. Pharmacy stated that they have a paid claim on medication quantity 90 for 30 day supply with a co-pay $493. Cash price for quantity 90 is $1,481.79. Patient maybe in a coverage gap due to cost of co-pay being very high. Pharmacy stated that medication was order and will be in today (9/3/2020).

## 2020-09-03 NOTE — TELEPHONE ENCOUNTER
LPN spoke with patients son, Florencio regarding the co-pay. Florencio stated that the pharmacy had been in contact with him and they he did some checking around. Florencio was able to get the Budesonide cheaper through [facility] and had the prescription transferred to them. Patients son had no further questions or concerns.     Dara Tang LPN

## 2020-09-03 NOTE — TELEPHONE ENCOUNTER
Prior Authorization Retail Medication Request    Medication/Dose: Budesonide 3 mg- take 3 tabs daily  ICD code (if different than what is on RX):    Previously Tried and Failed:    Rationale:      Insurance Name:    Insurance ID:        Pharmacy Information (if different than what is on RX)  Name:    Phone:      Dara Tang LPN

## 2020-09-07 LAB
MDC_IDC_EPISODE_DTM: NORMAL
MDC_IDC_EPISODE_DTM: NORMAL
MDC_IDC_EPISODE_ID: NORMAL
MDC_IDC_EPISODE_ID: NORMAL
MDC_IDC_EPISODE_TYPE: NORMAL
MDC_IDC_EPISODE_TYPE: NORMAL
MDC_IDC_LEAD_IMPLANT_DT: NORMAL
MDC_IDC_LEAD_IMPLANT_DT: NORMAL
MDC_IDC_LEAD_LOCATION: NORMAL
MDC_IDC_LEAD_LOCATION: NORMAL
MDC_IDC_LEAD_LOCATION_DETAIL_1: NORMAL
MDC_IDC_LEAD_LOCATION_DETAIL_1: NORMAL
MDC_IDC_LEAD_MFG: NORMAL
MDC_IDC_LEAD_MFG: NORMAL
MDC_IDC_LEAD_MODEL: NORMAL
MDC_IDC_LEAD_MODEL: NORMAL
MDC_IDC_LEAD_POLARITY_TYPE: NORMAL
MDC_IDC_LEAD_SERIAL: NORMAL
MDC_IDC_LEAD_SERIAL: NORMAL
MDC_IDC_MSMT_BATTERY_DTM: NORMAL
MDC_IDC_MSMT_BATTERY_REMAINING_LONGEVITY: 138 MO
MDC_IDC_MSMT_BATTERY_REMAINING_PERCENTAGE: 100 %
MDC_IDC_MSMT_BATTERY_STATUS: NORMAL
MDC_IDC_MSMT_LEADCHNL_RA_IMPEDANCE_VALUE: 376 OHM
MDC_IDC_MSMT_LEADCHNL_RV_IMPEDANCE_VALUE: 879 OHM
MDC_IDC_MSMT_LEADCHNL_RV_PACING_THRESHOLD_AMPLITUDE: 0.7 V
MDC_IDC_MSMT_LEADCHNL_RV_PACING_THRESHOLD_PULSEWIDTH: 0.4 MS
MDC_IDC_PG_IMPLANT_DTM: NORMAL
MDC_IDC_PG_MFG: NORMAL
MDC_IDC_PG_MODEL: NORMAL
MDC_IDC_PG_SERIAL: NORMAL
MDC_IDC_PG_TYPE: NORMAL
MDC_IDC_SESS_CLINIC_NAME: NORMAL
MDC_IDC_SESS_DTM: NORMAL
MDC_IDC_SESS_TYPE: NORMAL
MDC_IDC_SET_BRADY_AT_MODE_SWITCH_RATE: 150 {BEATS}/MIN
MDC_IDC_SET_BRADY_LOWRATE: 60 {BEATS}/MIN
MDC_IDC_SET_BRADY_MAX_SENSOR_RATE: 130 {BEATS}/MIN
MDC_IDC_SET_BRADY_MODE: NORMAL
MDC_IDC_SET_LEADCHNL_RA_SENSING_ADAPTATION_MODE: NORMAL
MDC_IDC_SET_LEADCHNL_RA_SENSING_SENSITIVITY: 0.15 MV
MDC_IDC_SET_LEADCHNL_RV_PACING_AMPLITUDE: 2.5 V
MDC_IDC_SET_LEADCHNL_RV_PACING_CAPTURE_MODE: NORMAL
MDC_IDC_SET_LEADCHNL_RV_PACING_POLARITY: NORMAL
MDC_IDC_SET_LEADCHNL_RV_PACING_PULSEWIDTH: 0.4 MS
MDC_IDC_SET_LEADCHNL_RV_SENSING_ADAPTATION_MODE: NORMAL
MDC_IDC_SET_LEADCHNL_RV_SENSING_POLARITY: NORMAL
MDC_IDC_SET_LEADCHNL_RV_SENSING_SENSITIVITY: 0.6 MV
MDC_IDC_SET_ZONE_DETECTION_INTERVAL: 375 MS
MDC_IDC_SET_ZONE_TYPE: NORMAL
MDC_IDC_SET_ZONE_VENDOR_TYPE: NORMAL
MDC_IDC_STAT_BRADY_DTM_END: NORMAL
MDC_IDC_STAT_BRADY_DTM_START: NORMAL
MDC_IDC_STAT_BRADY_RA_PERCENT_PACED: 0 %
MDC_IDC_STAT_BRADY_RV_PERCENT_PACED: 17 %
MDC_IDC_STAT_EPISODE_RECENT_COUNT: 0
MDC_IDC_STAT_EPISODE_RECENT_COUNT_DTM_END: NORMAL
MDC_IDC_STAT_EPISODE_RECENT_COUNT_DTM_START: NORMAL
MDC_IDC_STAT_EPISODE_TYPE: NORMAL
MDC_IDC_STAT_EPISODE_VENDOR_TYPE: NORMAL

## 2020-09-15 ENCOUNTER — ANTICOAGULATION THERAPY VISIT (OUTPATIENT)
Dept: ANTICOAGULATION | Facility: CLINIC | Age: 85
End: 2020-09-15
Payer: MEDICARE

## 2020-09-15 DIAGNOSIS — I48.20 CHRONIC ATRIAL FIBRILLATION (H): ICD-10-CM

## 2020-09-15 DIAGNOSIS — Z79.01 LONG TERM CURRENT USE OF ANTICOAGULANT THERAPY: ICD-10-CM

## 2020-09-15 LAB
CAPILLARY BLOOD COLLECTION: NORMAL
INR PPP: 2.3 (ref 0.86–1.14)

## 2020-09-15 PROCEDURE — 36416 COLLJ CAPILLARY BLOOD SPEC: CPT | Performed by: FAMILY MEDICINE

## 2020-09-15 PROCEDURE — 99207 ZZC NO CHARGE NURSE ONLY: CPT

## 2020-09-15 PROCEDURE — 85610 PROTHROMBIN TIME: CPT | Performed by: FAMILY MEDICINE

## 2020-09-15 NOTE — PROGRESS NOTES
ANTICOAGULATION FOLLOW-UP CLINIC VISIT    Patient Name:  Nabor Cunningham  Date:  9/15/2020  Contact Type:  Telephone/ Lexy    SUBJECTIVE:  Patient Findings     Comments:   No changes in medications, activity, health, or diet noted. No bleeding or increased bruising noted. Took warfarin as prescribed.  Patient will continue weekly maintenance dose. INR is therapeutic.   Recheck in 4 weeks.   Patient verbalizes understanding and agrees to plan. No further questions or concerns.          Clinical Outcomes     Negatives:   Major bleeding event, Thromboembolic event, Anticoagulation-related hospital admission, Anticoagulation-related ED visit, Anticoagulation-related fatality    Comments:   No changes in medications, activity, health, or diet noted. No bleeding or increased bruising noted. Took warfarin as prescribed.  Patient will continue weekly maintenance dose. INR is therapeutic.   Recheck in 4 weeks.   Patient verbalizes understanding and agrees to plan. No further questions or concerns.             OBJECTIVE    Recent labs: (last 7 days)     09/15/20  1717   INR 2.30*       ASSESSMENT / PLAN  INR assessment THER    Recheck INR In: 4 WEEKS    INR Location Outside lab      Anticoagulation Summary  As of 9/15/2020    INR goal:   2.0-3.0   TTR:   37.7 % (1 y)   INR used for dosin.30 (9/15/2020)   Warfarin maintenance plan:   6 mg (2 mg x 3) every Mon; 4 mg (2 mg x 2) all other days   Full warfarin instructions:   6 mg every Mon; 4 mg all other days   Weekly warfarin total:   30 mg   No change documented:   Daphne Lamar RN   Plan last modified:   Zainab Underwood RN (8/10/2020)   Next INR check:   10/13/2020   Priority:   Maintenance   Target end date:   Indefinite    Indications    Chronic atrial fibrillation (H) [I48.20]  Long-term (current) use of anticoagulants [Z79.01] [Z79.01]             Anticoagulation Episode Summary     INR check location:       Preferred lab:       Send INR reminders to:    [facility]    Comments:   * 2mg tabs. Does not like to cut tabs. NO bandaid. Lexy Juarez daughter normally takes calls - ok on file      Anticoagulation Care Providers     Provider Role Specialty Phone number    Heron Mayo MD Referring [facility] [phone]            See the Encounter Report to view Anticoagulation Flowsheet and Dosing Calendar (Go to Encounters tab in chart review, and find the Anticoagulation Therapy Visit)        Daphne Lamar RN

## 2020-09-16 ENCOUNTER — PATIENT OUTREACH (OUTPATIENT)
Dept: FAMILY MEDICINE | Facility: CLINIC | Age: 85
End: 2020-09-16
Payer: MEDICARE

## 2020-09-16 ENCOUNTER — TELEPHONE (OUTPATIENT)
Dept: GASTROENTEROLOGY | Facility: CLINIC | Age: 85
End: 2020-09-16

## 2020-09-16 NOTE — PROGRESS NOTES
Clinic Care Coordination Contact  Community Health Worker Follow Up    Goals:   Goals Addressed as of 9/16/2020 at 2:04 PM                 Today    8/13/20       Patient Stated      Psychosocial (pt-stated)   100%  90%    Added 11/28/18 by Rani Sarkar LSW     Goal Statement: I would like to stay in my own home and have no falls or avoidable health issues for the next 6 months.   Date Goal set: 7/13/2020  Barriers: age, covid-19  Strengths: supportive family  Date to Achieve By: 1/9/21   Patient expressed understanding of goal: yes  Action steps to achieve this goal:  1. I will have no falls  2. I will remain free from infections  3.  I will follow up with my providers as recommended              The CHW spoke with the patients son (florencio) to see how things are going and if there are any new concerns. Florencio stated that his father has been doing well and has had no falls. The patient has been in good health and has been taking his medications as prescribed.    The only concern that florencio had was that the patient started a new medication this month called Claire and has stopped taking it 2 days ago because he has been so constipated. Florencio was wondering what he should do.    The CHW offered to send a message to the provider that prescribed the medication and have them call to discuss options. Florencio was happy with this plan. He also wanted to know that phone number to the [place] [facility] for future questions. [phone]    Intervention and Education during outreach: Monthly    CHW Plan: The CHW will follow up with the patients son (florencio) to see how things are going and if he is ready to move to maintenance.     Shruthi Hernandez  [facility] Health Worker   [facility]  [email or web address]  [email or web address]   Office: [phone]  Fax: [phone]     113.4

## 2020-09-16 NOTE — TELEPHONE ENCOUNTER
Called patient's son Florencio regarding message about new onset constipation.   Diarrhea stopped 5-6 days after starting the budesonide.  Is now constipated - hasn't had a bowel movement in a few days.  Stopped the budesonide 2 days ago because was constipated for 5 days.  Stopped it two days ago - now having normal BMs again.  Was having bleeding and burning sensation from the rectum when he was constipated but that has now resolved.   Patient reports he feels fine and doesn't want to change anything.    Will continue to hold budesonide.  Continue home fiber.  Will monitor clinical course.

## 2020-09-16 NOTE — TELEPHONE ENCOUNTER
Reason for Call:  Other call back and prescription    Detailed comments: The patient has not taken the medication Budesoride in 2 days due to constipation. The patients son shar would like to talk to Dr. Mobley about this and see what options the patient has with continuing to take this medication or change to a different one.     Phone Number Patient can be reached at: Home number on file [phone] (home)    Best Time: any    Can we leave a detailed message on this number? YES    Call taken on 9/16/2020 at 2:13 PM by Shruthi Hernandez MA

## 2020-09-21 ENCOUNTER — COMMUNICATION - HEALTHEAST (OUTPATIENT)
Dept: PHYSICAL MEDICINE AND REHAB | Facility: CLINIC | Age: 85
End: 2020-09-21

## 2020-09-21 DIAGNOSIS — M54.41 ACUTE BILATERAL LOW BACK PAIN WITH BILATERAL SCIATICA: ICD-10-CM

## 2020-09-21 DIAGNOSIS — M54.42 ACUTE BILATERAL LOW BACK PAIN WITH BILATERAL SCIATICA: ICD-10-CM

## 2020-09-22 DIAGNOSIS — I50.43 ACUTE ON CHRONIC COMBINED SYSTOLIC AND DIASTOLIC CONGESTIVE HEART FAILURE (H): ICD-10-CM

## 2020-09-22 RX ORDER — FUROSEMIDE 40 MG
20 TABLET ORAL DAILY
Qty: 45 TABLET | Refills: 3 | Status: SHIPPED | OUTPATIENT
Start: 2020-09-22 | End: 2021-11-22

## 2020-09-22 NOTE — TELEPHONE ENCOUNTER
"Routing refill request to provider for review/approval because:  Medication is reported/historical      Requested Prescriptions   Pending Prescriptions Disp Refills     furosemide (LASIX) 40 MG tablet       Sig: Take 0.5 tablets (20 mg) by mouth daily       Diuretics (Including Combos) Protocol Failed - 9/22/2020  9:01 AM        Failed - Normal serum creatinine on file in past 12 months     Recent Labs   Lab Test 07/17/20  1434   CR 1.52*              Passed - Blood pressure under 140/90 in past 12 months     BP Readings from Last 3 Encounters:   07/17/20 110/54   07/03/20 117/73   04/20/20 110/60                 Passed - Recent (12 mo) or future (30 days) visit within the authorizing provider's specialty     Patient has had an office visit with the authorizing provider or a provider within the authorizing providers department within the previous 12 mos or has a future within next 30 days. See \"Patient Info\" tab in inbasket, or \"Choose Columns\" in Meds & Orders section of the refill encounter.              Passed - Medication is active on med list        Passed - Patient is age 18 or older        Passed - Normal serum potassium on file in past 12 months     Recent Labs   Lab Test 07/17/20  1434   POTASSIUM 4.6                    Passed - Normal serum sodium on file in past 12 months     Recent Labs   Lab Test 07/17/20  1434                      "

## 2020-09-24 DIAGNOSIS — G62.9 PERIPHERAL POLYNEUROPATHY: Primary | ICD-10-CM

## 2020-09-28 RX ORDER — GABAPENTIN 100 MG/1
CAPSULE ORAL
Qty: 150 CAPSULE | Refills: 11 | Status: SHIPPED | OUTPATIENT
Start: 2020-09-28 | End: 2022-02-17 | Stop reason: DRUGHIGH

## 2020-10-07 ENCOUNTER — VIRTUAL VISIT (OUTPATIENT)
Dept: GASTROENTEROLOGY | Facility: CLINIC | Age: 85
End: 2020-10-07
Payer: MEDICARE

## 2020-10-07 DIAGNOSIS — F41.9 ANXIETY: ICD-10-CM

## 2020-10-07 DIAGNOSIS — K52.832 LYMPHOCYTIC COLITIS: Primary | ICD-10-CM

## 2020-10-07 PROCEDURE — 99442 PR PHYSICIAN TELEPHONE EVALUATION 11-20 MIN: CPT | Mod: 95 | Performed by: INTERNAL MEDICINE

## 2020-10-07 RX ORDER — NORTRIPTYLINE HCL 25 MG
CAPSULE ORAL
Qty: 30 CAPSULE | Refills: 10 | Status: SHIPPED | OUTPATIENT
Start: 2020-10-07 | End: 2021-11-02

## 2020-10-07 NOTE — PROGRESS NOTES
"     HPI:    Nabor presents today for a telephone visit to discuss lymphocytic colitis. His son was also on the phone call. Started budesonide at last visit but developed constipation.  Stopped the budesonide and constipation resolved.  Now having about 4 BMs a day.  Taking imodium three times a day. Isn't taking fiber but doesn't know why. Wakes up at night to urinate but not to have a bowel movement.      Patient reports that he hasn't felt good in a few weeks.  Says that nothing tastes good.  Doesn't want to do anything.  Frustrated that he is stuck at home all the time and can't work.  Also reports that he dreams \"too much.\" Has seen a psychologist in the past but he didn't like her.        Past Medical History:   Diagnosis Date     Bleeding from wound 9/2/2018 7/12/2019:He had prolonged bleeding after cyst removal on his back within the past year.          Past Surgical History:   Procedure Laterality Date     ANGIOGRAM  01/2019    right and left heart cath.      APPENDECTOMY      ~2013     CATARACT IOL, RT/LT      Past bilateral cataract surgery.      COLONOSCOPY N/A 7/3/2020    Procedure: COLONOSCOPY, WITH POLYPECTOMY AND BIOPSY;  Surgeon: Fady Boyer MD;  Location: [facility]     EYE SURGERY Left     three corneal transplants     IMPLANT PACEMAKER       INCISION AND DRAINAGE TRUNK, COMBINED N/A 9/6/2018    Procedure: COMBINED INCISION AND DRAINAGE TRUNK;  incision and cauterization of left buttock bleed.;  Surgeon: Dain Davis MD;  Location: [facility]     INCISION AND DRAINAGE TRUNK, COMBINED N/A 11/1/2018    Procedure:  INCISION AND DRAINAGE of Back Wound;  Surgeon: Dain Davis MD;  Location: [facility]     JOINT REPLACEMENT Right     right total knee.      THORACIC SURGERY      removal benign nodule       No family history on file.    Social History     Tobacco Use     Smoking status: Former Smoker     Years: 20.00     Types: Cigars     Smokeless tobacco: Never Used   Substance Use Topics     Alcohol " use: Not Currently        Assessment and Plan:    # microscopic colitis - tried budesonide but it made him constipated.  Overall doing okay on imodium but admits things could be better.  Will add pepto bismol - start with 15mg daily and if no constipation, increase to 3 times a day    # depression - advised patient to discuss with PCP.  Also offered to set patient up with our health psychologist - patient will think about it - will contact Dakotah Barros and have him reach out to patient.    RTC 6 weeks    Vivian Mobley DO     Phone call duration: 19 minutes

## 2020-10-07 NOTE — PATIENT INSTRUCTIONS
Start taking pepto bismol - start with once a day - if that doesn't cause you to be constipated, you can increase it to 3 times a day.    Please talk to Dr. Mayo about your depression - I will also have our psychologist Dakotah Barros reach out to you/

## 2020-10-07 NOTE — PROGRESS NOTES
"Nabor Cunningham is a 84 year old male who is being evaluated via a billable telephone visit.      The patient has been notified of following:     \"This telephone visit will be conducted via a call between you and your physician/provider. We have found that certain health care needs can be provided without the need for a physical exam.  This service lets us provide the care you need with a short phone conversation.  If a prescription is necessary we can send it directly to your pharmacy.  If lab work is needed we can place an order for that and you can then stop by our lab to have the test done at a later time.    Telephone visits are billed at different rates depending on your insurance coverage. During this emergency period, for some insurers they may be billed the same as an in-person visit.  Please reach out to your insurance provider with any questions.    If during the course of the call the physician/provider feels a telephone visit is not appropriate, you will not be charged for this service.\"    Patient has given verbal consent for Telephone visit?  Yes    What phone number would you like to be contacted at? [phone]     How would you like to obtain your AVS? Mail a copy    Phone call duration:  minutes        "

## 2020-10-09 ENCOUNTER — TELEPHONE (OUTPATIENT)
Dept: PSYCHOLOGY | Facility: CLINIC | Age: 85
End: 2020-10-09

## 2020-10-09 NOTE — TELEPHONE ENCOUNTER
Spoke with Florencio at the request of Dr. Mobley. Florencio indicated patient has been feeling down and would likely benefit from having someone to talk with. Florencio will call back next week to schedule an appointment with the [facility].

## 2020-10-13 ENCOUNTER — ANTICOAGULATION THERAPY VISIT (OUTPATIENT)
Dept: ANTICOAGULATION | Facility: CLINIC | Age: 85
End: 2020-10-13

## 2020-10-13 DIAGNOSIS — Z79.01 LONG TERM CURRENT USE OF ANTICOAGULANT THERAPY: ICD-10-CM

## 2020-10-13 DIAGNOSIS — M1A.9XX0 CHRONIC GOUT, UNSPECIFIED CAUSE, UNSPECIFIED SITE: ICD-10-CM

## 2020-10-13 DIAGNOSIS — I48.20 CHRONIC ATRIAL FIBRILLATION (H): ICD-10-CM

## 2020-10-13 LAB
INR PPP: 5.2 (ref 0.86–1.14)
URATE SERPL-MCNC: 4.9 MG/DL (ref 3.5–7.2)

## 2020-10-13 PROCEDURE — 36415 COLL VENOUS BLD VENIPUNCTURE: CPT | Performed by: FAMILY MEDICINE

## 2020-10-13 PROCEDURE — 84550 ASSAY OF BLOOD/URIC ACID: CPT | Performed by: FAMILY MEDICINE

## 2020-10-13 PROCEDURE — 85610 PROTHROMBIN TIME: CPT | Performed by: FAMILY MEDICINE

## 2020-10-13 PROCEDURE — 99207 PR NO CHARGE NURSE ONLY: CPT

## 2020-10-13 NOTE — PROGRESS NOTES
ANTICOAGULATION FOLLOW-UP CLINIC VISIT    Patient Name:  Nabor Cunningham  Date:  10/13/2020  Contact Type:  Telephone/ Lexy    SUBJECTIVE:  Patient Findings     Positives:  Change in health (Diarrhea/constipation.)    Comments:  Patient will hold his dose x 2 days.   Recheck the INR in 2 days.   Lexy verbalizes understanding and agrees to plan. No further questions or concerns.  Lexy denies signs or symptoms of bleeding. Writer educated Lexy regarding increased bleed risk and when to seek immediate medical attention. Lexy verbalized understanding.            Clinical Outcomes     Negatives:  Major bleeding event, Thromboembolic event, Anticoagulation-related hospital admission, Anticoagulation-related ED visit, Anticoagulation-related fatality    Comments:  Patient will hold his dose x 2 days.   Recheck the INR in 2 days.   Lexy verbalizes understanding and agrees to plan. No further questions or concerns.  Lexy denies signs or symptoms of bleeding. Writer educated Lexy regarding increased bleed risk and when to seek immediate medical attention. Lexy verbalized understanding.               OBJECTIVE    Recent labs: (last 7 days)     10/13/20  1632   INR 5.20*       ASSESSMENT / PLAN  INR assessment SUPRA    Recheck INR In: 2 DAYS    INR Location Outside lab      Anticoagulation Summary  As of 10/13/2020    INR goal:  2.0-3.0   TTR:  34.8 % (1 y)   INR used for dosin.20 (10/13/2020)   Warfarin maintenance plan:  6 mg (2 mg x 3) every Mon; 4 mg (2 mg x 2) all other days   Full warfarin instructions:  10/13: Hold; 10/14: Hold; Otherwise 6 mg every Mon; 4 mg all other days   Weekly warfarin total:  30 mg   Plan last modified:  Zainab Underwood RN (8/10/2020)   Next INR check:  10/15/2020   Priority:  Critical   Target end date:  Indefinite    Indications    Chronic atrial fibrillation (H) [I48.20]  Long-term (current) use of anticoagulants [Z79.01] [Z79.01]             Anticoagulation Episode  Summary     INR check location:      Preferred lab:      Send INR reminders to:  [facility]    Comments:  * 2mg tabs. Does not like to cut tabs. NO bandaid. Lexy Juarez daughter normally takes calls - ok on file      Anticoagulation Care Providers     Provider Role Specialty Phone number    Heron Mayo MD Referring [facility] [phone]            See the Encounter Report to view Anticoagulation Flowsheet and Dosing Calendar (Go to Encounters tab in chart review, and find the Anticoagulation Therapy Visit)        Daphne Lamar RN

## 2020-10-14 ENCOUNTER — TELEPHONE (OUTPATIENT)
Dept: FAMILY MEDICINE | Facility: CLINIC | Age: 85
End: 2020-10-14

## 2020-10-14 NOTE — TELEPHONE ENCOUNTER
Please call.   It is difficult to know often what causes INR to change.  Often it is related to diet.  Many foods can influence INR.  Changes in medication can make a difference.  Missing or taking extra pills can change INR.  It may take some investigation to figure it out and sometimes we just don't know.   The anticoagulation clinic is very helpful for figuring this out.   VICTOR HUGO ART MD

## 2020-10-14 NOTE — TELEPHONE ENCOUNTER
Reason for Call:  Other     Detailed comments: Son Florencio is calling wanting to understand why his dads INR is so high - please call -    Phone Number Patient can be reached at: Cell number on file:    Telephone Information:   Mobile [phone]       Best Time:     Can we leave a detailed message on this number? YES    Call taken on 10/14/2020 at 10:36 AM by Rosita Greene

## 2020-10-14 NOTE — TELEPHONE ENCOUNTER
Dr. Mayo,    Please see son's question, would you be able to advise on this? Does say in INR note that the patient had change in health with diarrhea/constipation. Had a virtual appointment with Gastro on 10-7-2020.    SAURAV Nolan

## 2020-10-15 ENCOUNTER — PATIENT OUTREACH (OUTPATIENT)
Dept: CARE COORDINATION | Facility: CLINIC | Age: 85
End: 2020-10-15

## 2020-10-15 ENCOUNTER — PATIENT OUTREACH (OUTPATIENT)
Dept: NURSING | Facility: CLINIC | Age: 85
End: 2020-10-15
Payer: MEDICARE

## 2020-10-15 ENCOUNTER — ANTICOAGULATION THERAPY VISIT (OUTPATIENT)
Dept: ANTICOAGULATION | Facility: CLINIC | Age: 85
End: 2020-10-15

## 2020-10-15 DIAGNOSIS — Z79.01 LONG TERM CURRENT USE OF ANTICOAGULANT THERAPY: ICD-10-CM

## 2020-10-15 DIAGNOSIS — I48.20 CHRONIC ATRIAL FIBRILLATION (H): ICD-10-CM

## 2020-10-15 LAB
CAPILLARY BLOOD COLLECTION: NORMAL
INR PPP: 1.9 (ref 0.86–1.14)

## 2020-10-15 PROCEDURE — 36416 COLLJ CAPILLARY BLOOD SPEC: CPT | Performed by: FAMILY MEDICINE

## 2020-10-15 PROCEDURE — 85610 PROTHROMBIN TIME: CPT | Performed by: FAMILY MEDICINE

## 2020-10-15 NOTE — PROGRESS NOTES
Clinic Care Coordination Contact    Follow Up Progress Note      Assessment: son returned call and notes that pt continues to show signes of depression.  He notes that there is a decline in socialization where he lives.      Goals addressed this encounter:   Goals Addressed                 This Visit's Progress      Psychosocial (pt-stated)   100%     Goal Statement: I would like to stay in my own home and have no falls or avoidable health issues for the next 6 months.   Date Goal set: 7/13/2020  Barriers: age, covid-19  Strengths: supportive family  Date to Achieve By: 1/9/21   Patient expressed understanding of goal: yes  Action steps to achieve this goal:  1. I will have no falls  2. I will remain free from infections  3.  I will follow up with my providers as recommended                 Intervention/Education provided during outreach: long discussion with son about options.  Discussed adding someone to come in to help with shower and socialize.  Pt has prohibiting factors that limit his access to people as he wants a consistent schedule.  He does not like getting phone calls as well.     Son reports that his sister, whom manages the finances is not always willing to spend money.  Discussed talking points.      Pt does get meals on wheels twice a week.  Discussed increasing this yet pt is not eating the meals now.     Outreach Frequency: monthly    Plan:   Son to talk with sister about options.    Care Coordinator will follow up in one month.     TONE Lord, [facility] - Care Coordinator   [facility]  10/15/2020   12:43 PM  [phone]

## 2020-10-15 NOTE — PROGRESS NOTES
ANTICOAGULATION MANAGEMENT     Patient Name:  Nabor Cunningham  Date:  10/15/2020    ASSESSMENT /SUBJECTIVE:    Today's INR result of 1.90 is subtherapeutic. Goal INR of 2.0-3.0      Warfarin dose taken: Missed dose(s) may be affecting INR (held x2 days due to supratherapeutic INR)    Diet: No new diet changes affecting INR, possibly more alcohol, does drink wine.    Medication changes/ interactions: Taking pepto bismol and warfarin may increase risk of bleeding, but not expected to affect INR. Takes about 3000mg of tylenol per day but this has been ongoing.    Previous INR: Supratherapeutic 5.20    S/S of bleeding or thromboembolism: No    New injury or illness: Ongoing colitis.     Upcoming surgery, procedure or cardioversion: No    Additional findings: None      PLAN:    Telephone call with  Florencio regarding INR result and instructed:     Warfarin Dosing Instructions: Continue your current warfarin dose 6mg Mon; 4mg all other days    Instructed patient to follow up no later than: 5 days  Lab visit scheduled    Education provided: Importance of consistent vitamin K intake, Potential interaction between warfarin and alcohol, Potential interaction between warfarin and pepto bismol (increased bleeding risk), Monitoring for bleeding signs and symptoms and Contact the anticoagulation clinic with any changes, questions or concerns at #[phone]       Florencio verbalizes understanding and agrees to warfarin dosing plan.    Instructed to call the Anticoagulation Clinic for any changes, questions or concerns. (#[phone])        Ada Isaac RN CACP       OBJECTIVE:  Recent labs: (last 7 days)     10/13/20  1632 10/15/20  1553   INR 5.20* 1.90*         INR assessment SUB    Recheck INR In: 5 DAYS    INR Location Clinic      Anticoagulation Summary  As of 10/15/2020    INR goal:  2.0-3.0   TTR:  34.5 % (1 y)   INR used for dosin.90 (10/15/2020)   Warfarin maintenance plan:  6 mg (2 mg x 3) every Mon; 4 mg (2  mg x 2) all other days   Full warfarin instructions:  6 mg every Mon; 4 mg all other days   Weekly warfarin total:  30 mg   No change documented:  Ada Isaac RN   Plan last modified:  Zainab Underwood RN (8/10/2020)   Next INR check:  10/20/2020   Priority:  Critical   Target end date:  Indefinite    Indications    Chronic atrial fibrillation (H) [I48.20]  Long-term (current) use of anticoagulants [Z79.01] [Z79.01]             Anticoagulation Episode Summary     INR check location:      Preferred lab:      Send INR reminders to:  [facility]    Comments:  * 2mg tabs. Does not like to cut tabs. NO bandaid. Lexy Juarez daughter normally takes calls - ok on file      Anticoagulation Care Providers     Provider Role Specialty Phone number    Heron Mayo MD Referring [facility] [phone]

## 2020-10-15 NOTE — PROGRESS NOTES
Clinic Care Coordination Contact  [facility]/Voicemail       Clinical Data: Care Coordinator Outreach  Outreach attempted x 1.  Left message on son's voicemail with call back information and requested return call.  Plan:  Care Coordinator will try to reach patient again in 4-6 business days.    TONE Lord, [facility] - Care Coordinator   [facility]  10/15/2020   11:44 AM  [phone]

## 2020-10-16 NOTE — TELEPHONE ENCOUNTER
Routing to [facility], anything else to add to this, ok to close this chart?    SAURAV Nolan

## 2020-10-16 NOTE — TELEPHONE ENCOUNTER
Okay to close, patient's son had no further questions after our discussion yesterday.    Rustam MCNEIL RN, CACP

## 2020-10-16 NOTE — TELEPHONE ENCOUNTER
Writer spoke with patient's son yesterday after his most recent INR check. Discussed possible reasons for the elevated INR at that time (see Anticoag Therapy Note for details).    Rustam MCNEIL RN, [facility]  [facility]

## 2020-10-20 ENCOUNTER — ANTICOAGULATION THERAPY VISIT (OUTPATIENT)
Dept: ANTICOAGULATION | Facility: CLINIC | Age: 85
End: 2020-10-20

## 2020-10-20 DIAGNOSIS — Z79.01 LONG TERM CURRENT USE OF ANTICOAGULANT THERAPY: ICD-10-CM

## 2020-10-20 DIAGNOSIS — I48.20 CHRONIC ATRIAL FIBRILLATION (H): ICD-10-CM

## 2020-10-20 LAB
CAPILLARY BLOOD COLLECTION: NORMAL
INR PPP: 1.9 (ref 0.86–1.14)

## 2020-10-20 PROCEDURE — 36416 COLLJ CAPILLARY BLOOD SPEC: CPT | Performed by: FAMILY MEDICINE

## 2020-10-20 PROCEDURE — 85610 PROTHROMBIN TIME: CPT | Performed by: FAMILY MEDICINE

## 2020-10-20 PROCEDURE — 99207 PR NO CHARGE NURSE ONLY: CPT

## 2020-10-20 NOTE — PROGRESS NOTES
ANTICOAGULATION FOLLOW-UP CLINIC VISIT    Patient Name:  Nabor Cunningham  Date:  10/20/2020  Contact Type:  Telephone/ Florencio    SUBJECTIVE:  Patient Findings     Comments:  No changes in medications, activity, health, or diet noted. No bleeding or increased bruising noted. Took warfarin as prescribed.  Patient will continue weekly maintenance dose.   Recheck in 2 weeks.   Florencio verbalizes understanding and agrees to plan. No further questions or concerns.          Clinical Outcomes     Negatives:  Major bleeding event, Thromboembolic event, Anticoagulation-related hospital admission, Anticoagulation-related ED visit, Anticoagulation-related fatality    Comments:  No changes in medications, activity, health, or diet noted. No bleeding or increased bruising noted. Took warfarin as prescribed.  Patient will continue weekly maintenance dose.   Recheck in 2 weeks.   Florencio verbalizes understanding and agrees to plan. No further questions or concerns.             OBJECTIVE    Recent labs: (last 7 days)     10/20/20  1522   INR 1.90*       ASSESSMENT / PLAN  INR assessment SUB    Recheck INR In: 2 WEEKS    INR Location Outside lab      Anticoagulation Summary  As of 10/20/2020    INR goal:  2.0-3.0   TTR:  33.4 % (1 y)   INR used for dosin.90 (10/20/2020)   Warfarin maintenance plan:  6 mg (2 mg x 3) every Mon; 4 mg (2 mg x 2) all other days   Full warfarin instructions:  6 mg every Mon; 4 mg all other days   Weekly warfarin total:  30 mg   Plan last modified:  Zainab Underwood, RN (8/10/2020)   Next INR check:  11/3/2020   Priority:  High   Target end date:  Indefinite    Indications    Chronic atrial fibrillation (H) [I48.20]  Long-term (current) use of anticoagulants [Z79.01] [Z79.01]             Anticoagulation Episode Summary     INR check location:      Preferred lab:      Send INR reminders to:  [facility]    Comments:  * 2mg tabs. Does not like to cut tabs. NO bandaid.       Anticoagulation Care  Providers     Provider Role Specialty Phone number    Heron Mayo MD Referring [facility] [phone]            See the Encounter Report to view Anticoagulation Flowsheet and Dosing Calendar (Go to Encounters tab in chart review, and find the Anticoagulation Therapy Visit)        Daphne Lamar RN

## 2020-10-23 ENCOUNTER — TRANSFERRED RECORDS (OUTPATIENT)
Dept: HEALTH INFORMATION MANAGEMENT | Facility: CLINIC | Age: 85
End: 2020-10-23

## 2020-11-02 DIAGNOSIS — G62.9 PERIPHERAL POLYNEUROPATHY: ICD-10-CM

## 2020-11-02 NOTE — TELEPHONE ENCOUNTER
Requested Prescriptions   Pending Prescriptions Disp Refills     gabapentin (NEURONTIN) 300 MG capsule 90 capsule 3     Sig: Take 1 capsule (300 mg) by mouth At Bedtime       There is no refill protocol information for this order        Last office visit: 10/14/2019 with prescribing provider:  Dr. Rodriguez   Future Office Visit:          Denise Behrendt  Specialty CSS

## 2020-11-03 ENCOUNTER — ANTICOAGULATION THERAPY VISIT (OUTPATIENT)
Dept: ANTICOAGULATION | Facility: CLINIC | Age: 85
End: 2020-11-03

## 2020-11-03 DIAGNOSIS — I48.20 CHRONIC ATRIAL FIBRILLATION (H): ICD-10-CM

## 2020-11-03 DIAGNOSIS — Z79.01 LONG TERM CURRENT USE OF ANTICOAGULANT THERAPY: ICD-10-CM

## 2020-11-03 LAB
CAPILLARY BLOOD COLLECTION: NORMAL
INR PPP: 2.7 (ref 0.86–1.14)

## 2020-11-03 PROCEDURE — 36416 COLLJ CAPILLARY BLOOD SPEC: CPT | Performed by: FAMILY MEDICINE

## 2020-11-03 PROCEDURE — 99207 PR NO CHARGE NURSE ONLY: CPT

## 2020-11-03 PROCEDURE — 85610 PROTHROMBIN TIME: CPT | Performed by: FAMILY MEDICINE

## 2020-11-03 NOTE — PROGRESS NOTES
ANTICOAGULATION FOLLOW-UP CLINIC VISIT    Patient Name:  Nabor Cunningham  Date:  11/3/2020  Contact Type:  Telephone/ Florencio    SUBJECTIVE:  Patient Findings     Comments:  Patient will continue weekly maintenance dose. INR is therapeutic.   Recheck in 4 weeks.   Florencio verbalizes understanding and agrees to plan. No further questions or concerns.          Clinical Outcomes     Negatives:  Major bleeding event, Thromboembolic event, Anticoagulation-related hospital admission, Anticoagulation-related ED visit, Anticoagulation-related fatality    Comments:  Patient will continue weekly maintenance dose. INR is therapeutic.   Recheck in 4 weeks.   Florencio verbalizes understanding and agrees to plan. No further questions or concerns.             OBJECTIVE    Recent labs: (last 7 days)     20  1622   INR 2.70*       ASSESSMENT / PLAN  INR assessment THER    Recheck INR In: 4 WEEKS    INR Location Outside lab      Anticoagulation Summary  As of 11/3/2020    INR goal:  2.0-3.0   TTR:  35.9 % (1 y)   INR used for dosin.70 (11/3/2020)   Warfarin maintenance plan:  6 mg (2 mg x 3) every Mon; 4 mg (2 mg x 2) all other days   Full warfarin instructions:  6 mg every Mon; 4 mg all other days   Weekly warfarin total:  30 mg   Plan last modified:  Zainab Underwood, RN (8/10/2020)   Next INR check:  2020   Priority:  Maintenance   Target end date:  Indefinite    Indications    Chronic atrial fibrillation (H) [I48.20]  Long-term (current) use of anticoagulants [Z79.01] [Z79.01]             Anticoagulation Episode Summary     INR check location:      Preferred lab:      Send INR reminders to:  [facility]    Comments:  * 2mg tabs. Does not like to cut tabs. NO bandaid.       Anticoagulation Care Providers     Provider Role Specialty Phone number    Heron Mayo MD Referring Family Medicine [phone]            See the Encounter Report to view Anticoagulation Flowsheet and Dosing Calendar (Go to Encounters  tab in chart review, and find the Anticoagulation Therapy Visit)        Daphne Lamar RN

## 2020-11-05 RX ORDER — GABAPENTIN 300 MG/1
300 CAPSULE ORAL AT BEDTIME
Qty: 90 CAPSULE | Refills: 1 | Status: SHIPPED | OUTPATIENT
Start: 2020-11-05 | End: 2021-05-17

## 2020-11-18 ENCOUNTER — PATIENT OUTREACH (OUTPATIENT)
Dept: CARE COORDINATION | Facility: CLINIC | Age: 85
End: 2020-11-18

## 2020-11-18 NOTE — PROGRESS NOTES
Clinic Care Coordination Contact  [facility]/Voicemail       Clinical Data: Care Coordinator Outreach  Outreach attempted x 1.  Left message on son's voicemail with call back information and requested return call.  Plan:  Care Coordinator will try to reach patient again in 11-13 business days.    TONE Lord, [facility] - Care Coordinator   [facility]  11/18/2020   12:26 PM  [phone]

## 2020-11-18 NOTE — LETTER
[facility]  Complex Care Plan  About Me:    Patient Name:  Nabor Cunningham    YOB: 1935  Age:         85 year old   Mary MRN:    [id number] Telephone Information:  Home Phone [phone]   Mobile [phone]   Home Phone Not on file.       [place]  [facility] [address]  [place] [address] Email address:  No e-mail address on record      Emergency Contact(s)    Name Relationship Lgl Grd Work Phone Home Phone Mobile Phone   1. JACKSON VAN Daughter No  [phone]   2. ELOY CUNNINGHAM  Son No   [phone]   3. LISANDRA CUNNINGHAM Relative No  [phone]            Primary language:  English     needed? No   [place] [employer] [facility]:  [phone] op. 1  Other communication barriers: Glasses, Cognitive impairment  Preferred Method of Communication:  Do Not Contact  Current living arrangement: I live alone  Mobility Status/ Medical Equipment: Independent    Health Maintenance  Health Maintenance Reviewed: Due/Overdue   Health Maintenance Due   Topic Date Due     HF ACTION PLAN  1935     ZOSTER IMMUNIZATION (1 of 2) 11/03/1987     MEDICARE ANNUAL WELLNESS VISIT  11/03/2002     INFLUENZA VACCINE (1) 09/01/2020     PHQ-9  10/20/2020     FALL RISK ASSESSMENT  12/05/2020     Please talk with your provider about what is needed or can continue to wait.        My Access Plan  Medical Emergency 911   Primary Clinic Line [facility] - [phone]   24 Hour Appointment Line [phone] or  [phone]) (toll-free)   24 Hour Nurse Line [phone] (toll-free)   Preferred Urgent Care [facility], [phone]   Preferred Hospital [place], [place]  [phone]   Preferred Pharmacy [id number] - [place], [place] - [address]     Behavioral Health Crisis Line The National Suicide Prevention Lifeline at 1-349.294.7725 or 911             My Care Team Members  Patient Care Team        Relationship Specialty Notifications Start End    Heron Mayo MD PCP - General Family Practice  9/11/18     Phone: [phone] Fax: [phone]         [address] [place] [address]    Heron Mayo MD Assigned PCP   3/9/17     Phone: [phone] Fax: [phone]         [address] [place] [address]    Heron Mayo MD  [facility]  1/29/18     Merged    Phone: [phone] Fax: [phone]         [address] [place] [address]    Rani Sarkar LSW Lead Care Coordinator Primary Care - CC Admissions 11/13/18     Phone: [phone] Fax: [phone]        Vivian Mobley DO MD Gastroenterology  7/17/20     Phone: [phone] Fax: [phone] [address] [facility] [address]    Kae Ward MD Assigned Neuroscience Provider   10/23/20     Phone: [phone] Fax: [phone] [address] [facility] [address]    Katie Galvan MD Assigned Heart and Vascular Provider   11/15/20     Phone: [phone] Pager: [phone] Fax: [phone] [address] [facility] [address]            My Care Plans  Self Management and Treatment Plan  Goals and (Comments)       Action Plans on File:                       Advance Care Plans/Directives Type:        My Medical and Care Information  Problem List   Patient Active Problem List   Diagnosis     Anxiety     Alcohol abuse     Chronic atrial fibrillation (H)     Weight loss     Peripheral polyneuropathy     Chronic gout, unspecified cause, unspecified site     Essential hypertension with goal blood pressure less than 140/90     Hyperlipidemia LDL goal <130     Persistent insomnia     Lower urinary tract symptoms (LUTS)     Long-term (current) use of anticoagulants [Z79.01]     Moderate episode of recurrent major depressive disorder (H)     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)     Cardiac pacemaker in situ     NICM (nonischemic  cardiomyopathy) (H)     CAD (coronary artery disease)     CKD (chronic kidney disease) stage 4, GFR 15-29 ml/min (H)      Current Medications and Allergies:  See printed Medication Report.    Care Coordination Start Date: 11/28/2018   Frequency of Care Coordination: 2 months   Form Last Updated: 12/03/2020

## 2020-11-20 ENCOUNTER — VIRTUAL VISIT (OUTPATIENT)
Dept: GASTROENTEROLOGY | Facility: CLINIC | Age: 85
End: 2020-11-20
Payer: MEDICARE

## 2020-11-20 DIAGNOSIS — K52.832 LYMPHOCYTIC COLITIS: Primary | ICD-10-CM

## 2020-11-20 PROCEDURE — 99214 OFFICE O/P EST MOD 30 MIN: CPT | Mod: 95 | Performed by: INTERNAL MEDICINE

## 2020-11-20 RX ORDER — BISMUTH SUBSALICYLATE 262MG/15ML
1 SUSPENSION, ORAL (FINAL DOSE FORM) ORAL 3 TIMES DAILY
Qty: 90 TABLET | Refills: 3 | Status: SHIPPED | OUTPATIENT
Start: 2020-11-20 | End: 2021-03-05

## 2020-11-20 NOTE — PROGRESS NOTES
HPI:    Ashkan presents today for a telephone visit to discuss lymphocytic colitis.  Now having 9 bowel movements a day.  Went back on the budesonide 3 mg a day on his own - has been on them for a few weeks.  Is not taking fiber.  Doesn't know why. Never tried pepto bismol - said he forgot about it. Doesn't know if he's taking imodium. Seems very confused about all of the medications he is taking.    Remains very concerned he may get constipated which makes him hesitant to start any new medications..  Had his son buy him prunes but doesn't think he is eating them.  Is eating a lot of ice cream but unsure if it is making a difference in his symptoms.     Past Medical History:   Diagnosis Date     Bleeding from wound 9/2/2018 7/12/2019:He had prolonged bleeding after cyst removal on his back within the past year.          Past Surgical History:   Procedure Laterality Date     ANGIOGRAM  01/2019    right and left heart cath.      APPENDECTOMY      ~2013     CATARACT IOL, RT/LT      Past bilateral cataract surgery.      COLONOSCOPY N/A 7/3/2020    Procedure: COLONOSCOPY, WITH POLYPECTOMY AND BIOPSY;  Surgeon: Fady Boyer MD;  Location: [facility]     EYE SURGERY Left     three corneal transplants     IMPLANT PACEMAKER       INCISION AND DRAINAGE TRUNK, COMBINED N/A 9/6/2018    Procedure: COMBINED INCISION AND DRAINAGE TRUNK;  incision and cauterization of left buttock bleed.;  Surgeon: Dain Davis MD;  Location: [facility]     INCISION AND DRAINAGE TRUNK, COMBINED N/A 11/1/2018    Procedure:  INCISION AND DRAINAGE of Back Wound;  Surgeon: Dain Davis MD;  Location: [facility]     JOINT REPLACEMENT Right     right total knee.      THORACIC SURGERY      removal benign nodule       No family history on file.    Social History     Tobacco Use     Smoking status: Former Smoker     Years: 20.00     Types: Cigars     Smokeless tobacco: Never Used   Substance Use Topics     Alcohol use: Not Currently        Assessment and  Plan:    # lymphocytic colitis - not controlled at present - having 9 watery BMs a day.  Believes he is taking budesonide 3 mg daily - unsure if it is helping - discussed trying to increase the dose but patient is worried as he initially got constipated when he was on 9mg a day.  Also reports it is too expensive for him to afford.  Had recommended starting pepto bismol at last appointment but he never did - will start now.  Discussed with patient's son who will add these to his pill box.  Will also start fiber supplements - can be added to his pill box as well.  May want to try lactase enzymes prior to eating ice cream.  Can continue to use imodium as needed.  Patient encouraged that he can reach out if he develops constipation and that I would rather have him do this right away then wait to the point that he is symptomatic as he has in the past.     RTC 4 weeks    Vivian Mobley DO     Phone call duration: 19 minutes

## 2020-11-20 NOTE — PROGRESS NOTES
"Nabor Cunningham is a 85 year old male who is being evaluated via a billable telephone visit.      The patient has been notified of following:     \"This telephone visit will be conducted via a call between you and your physician/provider. We have found that certain health care needs can be provided without the need for a physical exam.  This service lets us provide the care you need with a short phone conversation.  If a prescription is necessary we can send it directly to your pharmacy.  If lab work is needed we can place an order for that and you can then stop by our lab to have the test done at a later time.    Telephone visits are billed at different rates depending on your insurance coverage. During this emergency period, for some insurers they may be billed the same as an in-person visit.  Please reach out to your insurance provider with any questions.    If during the course of the call the physician/provider feels a telephone visit is not appropriate, you will not be charged for this service.\"    Patient has given verbal consent for Telephone visit?  Yes    What phone number would you like to be contacted at? [phone]    How would you like to obtain your AVS? Mail a copy     Dara Tang LPN          "

## 2020-11-27 DIAGNOSIS — I10 ESSENTIAL HYPERTENSION WITH GOAL BLOOD PRESSURE LESS THAN 140/90: ICD-10-CM

## 2020-11-27 DIAGNOSIS — I50.43 ACUTE ON CHRONIC COMBINED SYSTOLIC AND DIASTOLIC CONGESTIVE HEART FAILURE (H): ICD-10-CM

## 2020-11-27 RX ORDER — METOPROLOL SUCCINATE 25 MG/1
TABLET, EXTENDED RELEASE ORAL
Qty: 90 TABLET | Refills: 1 | Status: SHIPPED | OUTPATIENT
Start: 2020-11-27 | End: 2021-05-13

## 2020-12-01 ENCOUNTER — ANCILLARY PROCEDURE (OUTPATIENT)
Dept: CARDIOLOGY | Facility: CLINIC | Age: 85
End: 2020-12-01
Attending: INTERNAL MEDICINE
Payer: MEDICARE

## 2020-12-01 ENCOUNTER — ANTICOAGULATION THERAPY VISIT (OUTPATIENT)
Dept: ANTICOAGULATION | Facility: CLINIC | Age: 85
End: 2020-12-01

## 2020-12-01 DIAGNOSIS — Z79.01 LONG TERM CURRENT USE OF ANTICOAGULANT THERAPY: ICD-10-CM

## 2020-12-01 DIAGNOSIS — I48.20 CHRONIC ATRIAL FIBRILLATION (H): ICD-10-CM

## 2020-12-01 DIAGNOSIS — Z95.0 CARDIAC PACEMAKER IN SITU: ICD-10-CM

## 2020-12-01 LAB
CAPILLARY BLOOD COLLECTION: NORMAL
INR PPP: 4.9 (ref 0.86–1.14)

## 2020-12-01 PROCEDURE — 99207 PR NO CHARGE NURSE ONLY: CPT

## 2020-12-01 PROCEDURE — 93294 REM INTERROG EVL PM/LDLS PM: CPT | Performed by: INTERNAL MEDICINE

## 2020-12-01 PROCEDURE — 93296 REM INTERROG EVL PM/IDS: CPT | Performed by: INTERNAL MEDICINE

## 2020-12-01 PROCEDURE — 85610 PROTHROMBIN TIME: CPT | Performed by: FAMILY MEDICINE

## 2020-12-01 PROCEDURE — 36416 COLLJ CAPILLARY BLOOD SPEC: CPT | Performed by: FAMILY MEDICINE

## 2020-12-01 NOTE — PROGRESS NOTES
ANTICOAGULATION FOLLOW-UP CLINIC VISIT    Patient Name:  Nabor Cunningham  Date:  2020  Contact Type:  Telephone/ Tricia    SUBJECTIVE:  Patient Findings     Positives:  Change in health (Bowel issues)    Comments:  Patient will hold x 2 days, then resume the maintenance dose.   Recheck in 1 week.   Tricia verbalize understanding and agrees to plan. No further questions or concerns.  Patient denies signs or symptoms of bleeding. Writer educated Tricia regarding increased bleed risk and when to seek immediate medical attention. They verbalized understanding.            Clinical Outcomes     Negatives:  Major bleeding event, Thromboembolic event, Anticoagulation-related hospital admission, Anticoagulation-related ED visit, Anticoagulation-related fatality    Comments:  Patient will hold x 2 days, then resume the maintenance dose.   Recheck in 1 week.   Tricia verbalize understanding and agrees to plan. No further questions or concerns.  Patient denies signs or symptoms of bleeding. Writer educated Tricia regarding increased bleed risk and when to seek immediate medical attention. They verbalized understanding.               OBJECTIVE    Recent labs: (last 7 days)     20  1631   INR 4.90*       ASSESSMENT / PLAN  INR assessment SUPRA    Recheck INR In: 1 WEEK    INR Location Outside lab      Anticoagulation Summary  As of 2020    INR goal:  2.0-3.0   TTR:  30.4 % (1 y)   INR used for dosin.90 (2020)   Warfarin maintenance plan:  6 mg (2 mg x 3) every Mon; 4 mg (2 mg x 2) all other days   Full warfarin instructions:  : Hold; : Hold; Otherwise 6 mg every Mon; 4 mg all other days   Weekly warfarin total:  30 mg   Plan last modified:  Zainab Underwood, RN (8/10/2020)   Next INR check:  2020   Priority:  Maintenance   Target end date:  Indefinite    Indications    Chronic atrial fibrillation (H) [I48.20]  Long-term (current) use of  anticoagulants [Z79.01] [Z79.01]             Anticoagulation Episode Summary     INR check location:      Preferred lab:      Send INR reminders to:  [facility]    Comments:  * 2mg tabs. Does not like to cut tabs. NO bandaid.       Anticoagulation Care Providers     Provider Role Specialty Phone number    Heron Mayo MD Referring Family Medicine [phone]            See the Encounter Report to view Anticoagulation Flowsheet and Dosing Calendar (Go to Encounters tab in chart review, and find the Anticoagulation Therapy Visit)        Daphne Lamar RN

## 2020-12-03 ASSESSMENT — ACTIVITIES OF DAILY LIVING (ADL): DEPENDENT_IADLS:: INDEPENDENT

## 2020-12-03 NOTE — PROGRESS NOTES
Clinic Care Coordination Contact    Follow Up Progress Note      Assessment: son reports that pt is doing well.  They feel they have the medications more set so he is not having as many bowel movements and not constipated. His depression is well managed and he is adjusting to the restrictions with Covid.     Reassessed and no new needs or concerns.      Goals addressed this encounter:   Goals Addressed                 This Visit's Progress      COMPLETED: Psychosocial (pt-stated)        Goal Statement: I would like to stay in my own home and have no falls or avoidable health issues for the next 6 months.   Date Goal set: 7/13/2020  Barriers: age, covid-19  Strengths: supportive family  Date to Achieve By: 1/9/21   Patient expressed understanding of goal: yes  Action steps to achieve this goal:  1. I will have no falls  2. I will remain free from infections  3.  I will follow up with my providers as recommended                 Intervention/Education provided during outreach: achieved goal and will transition to maintenance.  Will follow up in  Two months.  Son to call if there are any changes.      Outreach Frequency: 2 months    Plan:   Son to call if any changes in pt that indicates need for more active care coordination.   Care Coordinator will follow up in two months.     TONE Lord, [facility] - Care Coordinator   [facility]  12/3/2020   8:52 AM  [phone]

## 2020-12-09 ENCOUNTER — ANTICOAGULATION THERAPY VISIT (OUTPATIENT)
Dept: ANTICOAGULATION | Facility: CLINIC | Age: 85
End: 2020-12-09

## 2020-12-09 DIAGNOSIS — Z79.01 LONG TERM CURRENT USE OF ANTICOAGULANT THERAPY: ICD-10-CM

## 2020-12-09 DIAGNOSIS — I48.20 CHRONIC ATRIAL FIBRILLATION (H): ICD-10-CM

## 2020-12-09 LAB
CAPILLARY BLOOD COLLECTION: NORMAL
INR PPP: 1 (ref 0.86–1.14)

## 2020-12-09 PROCEDURE — 36416 COLLJ CAPILLARY BLOOD SPEC: CPT | Performed by: FAMILY MEDICINE

## 2020-12-09 PROCEDURE — 85610 PROTHROMBIN TIME: CPT | Performed by: FAMILY MEDICINE

## 2020-12-09 PROCEDURE — 99207 PR NO CHARGE NURSE ONLY: CPT

## 2020-12-09 NOTE — PROGRESS NOTES
ANTICOAGULATION FOLLOW-UP CLINIC VISIT    Patient Name:  Nabor Cunningham  Date:  2020  Contact Type:  Telephone/ florencio rice    SUBJECTIVE:  Patient Findings     Positives:  Missed doses    Comments:  Writer spoke with Florencio rice. He says he set up his dad's dose as advised by [facility] (hold x 2 days). The INR result does not indicate any warfarin has been taken recently, though. Florencio will talk to his dad and review the medication planner. He will have pt take double dose today, then resume usual dose and recheck on . Pt to watch for clot/stroke signs.        Clinical Outcomes     Comments:  Writer spoke with Florencio rice. He says he set up his dad's dose as advised by [facility] (hold x 2 days). The INR result does not indicate any warfarin has been taken recently, though. Florencio will talk to his dad and review the medication planner. He will have pt take double dose today, then resume usual dose and recheck on . Pt to watch for clot/stroke signs.           OBJECTIVE    Recent labs: (last 7 days)     20  1625   INR 1.00       ASSESSMENT / PLAN  INR assessment SUB    Recheck INR In: 5 DAYS    INR Location Clinic      Anticoagulation Summary  As of 2020    INR goal:  2.0-3.0   TTR:  30.9 % (1 y)   INR used for dosin.00 (2020)   Warfarin maintenance plan:  6 mg (2 mg x 3) every Mon; 4 mg (2 mg x 2) all other days   Full warfarin instructions:  : 8 mg; Otherwise 6 mg every Mon; 4 mg all other days   Weekly warfarin total:  30 mg   Plan last modified:  Zainab Underwood RN (8/10/2020)   Next INR check:  2020   Priority:  Critical   Target end date:  Indefinite    Indications    Chronic atrial fibrillation (H) [I48.20]  Long-term (current) use of anticoagulants [Z79.01] [Z79.01]             Anticoagulation Episode Summary     INR check location:      Preferred lab:      Send INR reminders to:  [facility]    Comments:  * 2mg tabs. Does not like to cut tabs. NO bandaid.        Anticoagulation Care Providers     Provider Role Specialty Phone number    Heron Mayo MD Referring Family Medicine [phone]            See the Encounter Report to view Anticoagulation Flowsheet and Dosing Calendar (Go to Encounters tab in chart review, and find the Anticoagulation Therapy Visit)        Belia Jones RN

## 2020-12-14 ENCOUNTER — ANTICOAGULATION THERAPY VISIT (OUTPATIENT)
Dept: ANTICOAGULATION | Facility: CLINIC | Age: 85
End: 2020-12-14

## 2020-12-14 DIAGNOSIS — I48.20 CHRONIC ATRIAL FIBRILLATION (H): ICD-10-CM

## 2020-12-14 DIAGNOSIS — Z79.01 LONG TERM CURRENT USE OF ANTICOAGULANT THERAPY: ICD-10-CM

## 2020-12-14 LAB
CAPILLARY BLOOD COLLECTION: NORMAL
INR PPP: 1.7 (ref 0.86–1.14)

## 2020-12-14 PROCEDURE — 36416 COLLJ CAPILLARY BLOOD SPEC: CPT | Performed by: FAMILY MEDICINE

## 2020-12-14 PROCEDURE — 85610 PROTHROMBIN TIME: CPT | Performed by: FAMILY MEDICINE

## 2020-12-14 NOTE — PROGRESS NOTES
ANTICOAGULATION MANAGEMENT     Patient Name:  Nabor Cunningham  Date:  2020    ASSESSMENT /SUBJECTIVE:    Today's INR result of 1.70 is subtherapeutic. Goal INR of 2.0-3.0      Warfarin dose taken: Warfarin taken as instructed -booster dose taken as advised    Diet: No new diet changes affecting INR    Medication changes/ interactions: No new medications/supplements affecting INR    Previous INR: Subtherapeutic 1.00    S/S of bleeding or thromboembolism: No    New injury or illness: No    Upcoming surgery, procedure or cardioversion: No    Additional findings: None      PLAN:    Telephone call with  sonFlorencio, regarding INR result and instructed:     Warfarin Dosing Instructions: Continue your current warfarin dose 6 mg every Mon; 4 mg all other days    Instructed patient to follow up no later than: 1 week  Lab visit scheduled    Education provided: Target INR goal and significance of current INR result, Monitoring for clotting signs and symptoms and When to seek medical attention/emergency care      Florencio verbalizes understanding and agrees to warfarin dosing plan.    Instructed to call the Anticoagulation Clinic for any changes, questions or concerns. (#[phone])        Kirstin Mendez RN      OBJECTIVE:  Recent labs: (last 7 days)     20  1625 20  1333   INR 1.00 1.70*         No question data found.  Anticoagulation Summary  As of 2020    INR goal:  2.0-3.0   TTR:  30.9 % (1 y)   INR used for dosin.70 (2020)   Warfarin maintenance plan:  6 mg (2 mg x 3) every Mon; 4 mg (2 mg x 2) all other days   Full warfarin instructions:  6 mg every Mon; 4 mg all other days   Weekly warfarin total:  30 mg   No change documented:  Kirstin Mendez RN   Plan last modified:  Zainab Underwood RN (8/10/2020)   Next INR check:  2020   Priority:  Critical   Target end date:  Indefinite    Indications    Chronic atrial fibrillation (H) [I48.20]  Long-term (current) use of  anticoagulants [Z79.01] [Z79.01]             Anticoagulation Episode Summary     INR check location:      Preferred lab:      Send INR reminders to:  [facility]    Comments:  * 2mg tabs. Does not like to cut tabs. NO bandaid.       Anticoagulation Care Providers     Provider Role Specialty Phone number    Heron Mayo MD Referring Family Medicine [phone]

## 2020-12-15 LAB
MDC_IDC_EPISODE_DTM: NORMAL
MDC_IDC_EPISODE_DTM: NORMAL
MDC_IDC_EPISODE_ID: NORMAL
MDC_IDC_EPISODE_ID: NORMAL
MDC_IDC_EPISODE_TYPE: NORMAL
MDC_IDC_EPISODE_TYPE: NORMAL
MDC_IDC_LEAD_IMPLANT_DT: NORMAL
MDC_IDC_LEAD_IMPLANT_DT: NORMAL
MDC_IDC_LEAD_LOCATION: NORMAL
MDC_IDC_LEAD_LOCATION: NORMAL
MDC_IDC_LEAD_LOCATION_DETAIL_1: NORMAL
MDC_IDC_LEAD_LOCATION_DETAIL_1: NORMAL
MDC_IDC_LEAD_MFG: NORMAL
MDC_IDC_LEAD_MFG: NORMAL
MDC_IDC_LEAD_MODEL: NORMAL
MDC_IDC_LEAD_MODEL: NORMAL
MDC_IDC_LEAD_POLARITY_TYPE: NORMAL
MDC_IDC_LEAD_SERIAL: NORMAL
MDC_IDC_LEAD_SERIAL: NORMAL
MDC_IDC_MSMT_BATTERY_DTM: NORMAL
MDC_IDC_MSMT_BATTERY_REMAINING_LONGEVITY: 132 MO
MDC_IDC_MSMT_BATTERY_REMAINING_PERCENTAGE: 100 %
MDC_IDC_MSMT_BATTERY_STATUS: NORMAL
MDC_IDC_MSMT_LEADCHNL_RA_IMPEDANCE_VALUE: 369 OHM
MDC_IDC_MSMT_LEADCHNL_RV_IMPEDANCE_VALUE: 956 OHM
MDC_IDC_MSMT_LEADCHNL_RV_PACING_THRESHOLD_AMPLITUDE: 0.7 V
MDC_IDC_MSMT_LEADCHNL_RV_PACING_THRESHOLD_PULSEWIDTH: 0.4 MS
MDC_IDC_PG_IMPLANT_DTM: NORMAL
MDC_IDC_PG_MFG: NORMAL
MDC_IDC_PG_MODEL: NORMAL
MDC_IDC_PG_SERIAL: NORMAL
MDC_IDC_PG_TYPE: NORMAL
MDC_IDC_SESS_CLINIC_NAME: NORMAL
MDC_IDC_SESS_DTM: NORMAL
MDC_IDC_SESS_TYPE: NORMAL
MDC_IDC_SET_BRADY_AT_MODE_SWITCH_RATE: 150 {BEATS}/MIN
MDC_IDC_SET_BRADY_LOWRATE: 60 {BEATS}/MIN
MDC_IDC_SET_BRADY_MAX_SENSOR_RATE: 130 {BEATS}/MIN
MDC_IDC_SET_BRADY_MODE: NORMAL
MDC_IDC_SET_LEADCHNL_RA_SENSING_ADAPTATION_MODE: NORMAL
MDC_IDC_SET_LEADCHNL_RA_SENSING_SENSITIVITY: 0.15 MV
MDC_IDC_SET_LEADCHNL_RV_PACING_AMPLITUDE: 2.5 V
MDC_IDC_SET_LEADCHNL_RV_PACING_CAPTURE_MODE: NORMAL
MDC_IDC_SET_LEADCHNL_RV_PACING_POLARITY: NORMAL
MDC_IDC_SET_LEADCHNL_RV_PACING_PULSEWIDTH: 0.4 MS
MDC_IDC_SET_LEADCHNL_RV_SENSING_ADAPTATION_MODE: NORMAL
MDC_IDC_SET_LEADCHNL_RV_SENSING_POLARITY: NORMAL
MDC_IDC_SET_LEADCHNL_RV_SENSING_SENSITIVITY: 0.6 MV
MDC_IDC_SET_ZONE_DETECTION_INTERVAL: 375 MS
MDC_IDC_SET_ZONE_TYPE: NORMAL
MDC_IDC_SET_ZONE_VENDOR_TYPE: NORMAL
MDC_IDC_STAT_BRADY_DTM_END: NORMAL
MDC_IDC_STAT_BRADY_DTM_START: NORMAL
MDC_IDC_STAT_BRADY_RA_PERCENT_PACED: 0 %
MDC_IDC_STAT_BRADY_RV_PERCENT_PACED: 18 %
MDC_IDC_STAT_EPISODE_RECENT_COUNT: 0
MDC_IDC_STAT_EPISODE_RECENT_COUNT_DTM_END: NORMAL
MDC_IDC_STAT_EPISODE_RECENT_COUNT_DTM_START: NORMAL
MDC_IDC_STAT_EPISODE_TYPE: NORMAL
MDC_IDC_STAT_EPISODE_VENDOR_TYPE: NORMAL

## 2020-12-23 ENCOUNTER — ANTICOAGULATION THERAPY VISIT (OUTPATIENT)
Dept: ANTICOAGULATION | Facility: CLINIC | Age: 85
End: 2020-12-23

## 2020-12-23 ENCOUNTER — VIRTUAL VISIT (OUTPATIENT)
Dept: GASTROENTEROLOGY | Facility: CLINIC | Age: 85
End: 2020-12-23
Payer: MEDICARE

## 2020-12-23 DIAGNOSIS — I48.20 CHRONIC ATRIAL FIBRILLATION (H): ICD-10-CM

## 2020-12-23 DIAGNOSIS — L30.9 ECZEMA, UNSPECIFIED TYPE: ICD-10-CM

## 2020-12-23 DIAGNOSIS — Z79.01 LONG TERM CURRENT USE OF ANTICOAGULANT THERAPY: ICD-10-CM

## 2020-12-23 DIAGNOSIS — K52.832 LYMPHOCYTIC COLITIS: Primary | ICD-10-CM

## 2020-12-23 LAB
CAPILLARY BLOOD COLLECTION: NORMAL
INR PPP: 2.6 (ref 0.86–1.14)

## 2020-12-23 PROCEDURE — 99207 PR NO CHARGE NURSE ONLY: CPT

## 2020-12-23 PROCEDURE — 36416 COLLJ CAPILLARY BLOOD SPEC: CPT | Performed by: FAMILY MEDICINE

## 2020-12-23 PROCEDURE — 99441 PR PHYSICIAN TELEPHONE EVALUATION 5-10 MIN: CPT | Mod: 95 | Performed by: INTERNAL MEDICINE

## 2020-12-23 PROCEDURE — 85610 PROTHROMBIN TIME: CPT | Performed by: FAMILY MEDICINE

## 2020-12-23 NOTE — PROGRESS NOTES
"Nabor Cunningham is a 85 year old male who is being evaluated via a billable telephone visit.      The patient has been notified of following:     \"This telephone visit will be conducted via a call between you and your physician/provider. We have found that certain health care needs can be provided without the need for a physical exam.  This service lets us provide the care you need with a short phone conversation.  If a prescription is necessary we can send it directly to your pharmacy.  If lab work is needed we can place an order for that and you can then stop by our lab to have the test done at a later time.    Telephone visits are billed at different rates depending on your insurance coverage. During this emergency period, for some insurers they may be billed the same as an in-person visit.  Please reach out to your insurance provider with any questions.    If during the course of the call the physician/provider feels a telephone visit is not appropriate, you will not be charged for this service.\"    Patient has given verbal consent for Telephone visit?  Yes    What phone number would you like to be contacted at? [phone]     How would you like to obtain your AVS? Mail a copy    Phone call duration:  minutes    Waytt Nguyễn CMA.    "

## 2020-12-23 NOTE — PROGRESS NOTES
HPI:    Ashkan presents today with his son for a telephone visit to follow-up lymphocytic colitis causing diarrhea.  At last visit - recommended scheduling pepto bismol and fiber supplements.  Had previously tried budesonide but it gave him constipation.  Now having normal bowel movements - generally once a day or once every other day.  Still uses mineral oil as needed for constipation but doesn't think his constipation is an issue. Overall happy with his progress.    Son reports he hasn't been complaining about anything lately which is an improvement.  No changes in his appetite.    Past Medical History:   Diagnosis Date     Bleeding from wound 9/2/2018 7/12/2019:He had prolonged bleeding after cyst removal on his back within the past year.          Past Surgical History:   Procedure Laterality Date     ANGIOGRAM  01/2019    right and left heart cath.      APPENDECTOMY      ~2013     CATARACT IOL, RT/LT      Past bilateral cataract surgery.      COLONOSCOPY N/A 7/3/2020    Procedure: COLONOSCOPY, WITH POLYPECTOMY AND BIOPSY;  Surgeon: Fady Boyer MD;  Location: [facility]     EYE SURGERY Left     three corneal transplants     IMPLANT PACEMAKER       INCISION AND DRAINAGE TRUNK, COMBINED N/A 9/6/2018    Procedure: COMBINED INCISION AND DRAINAGE TRUNK;  incision and cauterization of left buttock bleed.;  Surgeon: Dain Davis MD;  Location: [facility]     INCISION AND DRAINAGE TRUNK, COMBINED N/A 11/1/2018    Procedure:  INCISION AND DRAINAGE of Back Wound;  Surgeon: Dain Davis MD;  Location: [facility]     JOINT REPLACEMENT Right     right total knee.      THORACIC SURGERY      removal benign nodule       No family history on file.    Social History     Tobacco Use     Smoking status: Former Smoker     Years: 20.00     Types: Cigars     Smokeless tobacco: Never Used   Substance Use Topics     Alcohol use: Not Currently        Assessment and Plan:    # lymphocytic colitis - improving on pepto-bismol and fiber  supplements - will continue.  Patient advised to call if worsening symptoms or if he develops constipation.    RTC 3 months    Vivian Mobley DO     Phone call duration: 6 minutes

## 2020-12-23 NOTE — PATIENT INSTRUCTIONS
I'm happy you are doing better.  Lets keep everything the same - continue the pepto bismol and the fiber supplements.  Please let me know if you're having problems with either constipation or diarrhea.

## 2020-12-23 NOTE — PROGRESS NOTES
VM left for pt to return call to [facility] - [phone]. Dosing instructions not given to pt.  Tentative plan: consider recheck in 2 weeks, continue maintenance dose.  Jayde Whitt RN on 12/23/2020 at 3:51 PM     ANTICOAGULATION FOLLOW-UP CLINIC VISIT    Patient Name:  Nabor Cunningham  Date:  12/23/2020  Contact Type:  Telephone    SUBJECTIVE:  Patient Findings     Comments:  No changes in medications, activity, or diet noted. No concerns with clotting, bleeding, or increased bruising noted. Took warfarin as prescribed. Denies illness at this time. Pt is not having a lot of diarrhea at this time - it has improved. He was taking medication for colitis.  Education provided regarding diarrhea and INR.  Patient is to continue maintenance warfarin plan, and check INR in 2 weeks.Son will have his sister call to schedule appt.  Son verbalizes understanding and agrees to plan. No further questions or concerns.        Clinical Outcomes     Negatives:  Major bleeding event, Thromboembolic event, Anticoagulation-related hospital admission, Anticoagulation-related ED visit, Anticoagulation-related fatality    Comments:  No changes in medications, activity, or diet noted. No concerns with clotting, bleeding, or increased bruising noted. Took warfarin as prescribed. Denies illness at this time. Pt is not having a lot of diarrhea at this time - it has improved. He was taking medication for colitis.  Education provided regarding diarrhea and INR.  Patient is to continue maintenance warfarin plan, and check INR in 2 weeks.Son will have his sister call to schedule appt.  Son verbalizes understanding and agrees to plan. No further questions or concerns.           OBJECTIVE    Recent labs: (last 7 days)     12/23/20  1527   INR 2.60*       ASSESSMENT / PLAN  INR assessment THER    Recheck INR In: 2 WEEKS    INR Location Clinic      Anticoagulation Summary  As of 12/23/2020    INR goal:  2.0-3.0   TTR:  32.6 % (1 y)   INR used for dosing:   2.60 (12/23/2020)   Warfarin maintenance plan:  6 mg (2 mg x 3) every Mon; 4 mg (2 mg x 2) all other days   Full warfarin instructions:  6 mg every Mon; 4 mg all other days   Weekly warfarin total:  30 mg   No change documented:  Jayde Whitt RN   Plan last modified:  Zainab Underwood RN (8/10/2020)   Next INR check:  1/6/2021   Priority:  Maintenance   Target end date:  Indefinite    Indications    Chronic atrial fibrillation (H) [I48.20]  Long-term (current) use of anticoagulants [Z79.01] [Z79.01]             Anticoagulation Episode Summary     INR check location:      Preferred lab:      [facility]  [facility]    Comments:  * 2mg tabs. Does not like to cut tabs. NO bandaid.       Anticoagulation Care Providers     Provider Role Specialty Phone number    Heron Mayo MD Referring Family Medicine [phone]            See the Encounter Report to view Anticoagulation Flowsheet and Dosing Calendar (Go to Encounters tab in chart review, and find the Anticoagulation Therapy Visit)        Jayde Whitt RN

## 2020-12-24 RX ORDER — TRIAMCINOLONE ACETONIDE 1 MG/G
CREAM TOPICAL
Qty: 45 G | Refills: 1 | Status: SHIPPED | OUTPATIENT
Start: 2020-12-24

## 2020-12-25 DIAGNOSIS — Z79.01 CHRONIC ANTICOAGULATION: ICD-10-CM

## 2020-12-25 DIAGNOSIS — I48.20 CHRONIC ATRIAL FIBRILLATION (H): ICD-10-CM

## 2020-12-28 RX ORDER — WARFARIN SODIUM 2 MG/1
TABLET ORAL
Qty: 190 TABLET | Refills: 0 | Status: SHIPPED | OUTPATIENT
Start: 2020-12-28 | End: 2021-03-22

## 2020-12-28 NOTE — TELEPHONE ENCOUNTER
Current warfarin dose:  Warfarin maintenance plan:  6 mg (2 mg x 3) every Mon; 4 mg (2 mg x 2) all other days   Full warfarin instructions:  6 mg every Mon; 4 mg all other days   Weekly warfarin total:  30 mg       Last INR result:  INR   Date Value Ref Range Status   12/23/2020 2.60 (H) 0.86 - 1.14 Final     Comment:     This test is intended for monitoring Coumadin therapy.  Results are not   accurate in patients with prolonged INR due to factor deficiency.       Last office visit: 7/17/2020     Refill authorized per [facility] protocol.    Rustam MCNEIL RN, CACP

## 2021-01-06 ENCOUNTER — ANTICOAGULATION THERAPY VISIT (OUTPATIENT)
Dept: ANTICOAGULATION | Facility: CLINIC | Age: 86
End: 2021-01-06

## 2021-01-06 DIAGNOSIS — Z79.01 LONG TERM CURRENT USE OF ANTICOAGULANT THERAPY: ICD-10-CM

## 2021-01-06 DIAGNOSIS — I48.20 CHRONIC ATRIAL FIBRILLATION (H): ICD-10-CM

## 2021-01-06 LAB
CAPILLARY BLOOD COLLECTION: NORMAL
INR PPP: 3.1 (ref 0.86–1.14)

## 2021-01-06 PROCEDURE — 85610 PROTHROMBIN TIME: CPT | Performed by: FAMILY MEDICINE

## 2021-01-06 PROCEDURE — 36416 COLLJ CAPILLARY BLOOD SPEC: CPT | Performed by: FAMILY MEDICINE

## 2021-01-06 PROCEDURE — 99207 PR NO CHARGE NURSE ONLY: CPT

## 2021-01-06 NOTE — PROGRESS NOTES
ANTICOAGULATION FOLLOW-UP CLINIC VISIT    Patient Name:  Nabor Cunningham  Date:  1/6/2021  Contact Type:  Telephone    SUBJECTIVE:  Patient Findings     Comments:  No changes in diet, activity level, medications (including over the counter), or health. No alcohol use. No recent illnesses, swelling, pain, etc. No missed doses of warfarin. Patient took dosing as prescribed. No signs of clots or bleeding concerns. Patient will continue maintenance warfarin dosing.          Clinical Outcomes     Negatives:  Major bleeding event, Thromboembolic event, Anticoagulation-related hospital admission, Anticoagulation-related ED visit, Anticoagulation-related fatality    Comments:  No changes in diet, activity level, medications (including over the counter), or health. No alcohol use. No recent illnesses, swelling, pain, etc. No missed doses of warfarin. Patient took dosing as prescribed. No signs of clots or bleeding concerns. Patient will continue maintenance warfarin dosing.             OBJECTIVE    Recent labs: (last 7 days)     01/06/21  1638   INR 3.10*       ASSESSMENT / PLAN  INR assessment SUPRA    Recheck INR In: 2 WEEKS    INR Location Clinic      Anticoagulation Summary  As of 1/6/2021    INR goal:  2.0-3.0   TTR:  35.6 % (1 y)   INR used for dosing:  3.10 (1/6/2021)   Warfarin maintenance plan:  6 mg (2 mg x 3) every Mon; 4 mg (2 mg x 2) all other days   Full warfarin instructions:  6 mg every Mon; 4 mg all other days   Weekly warfarin total:  30 mg   No change documented:  Belia Jones RN   Plan last modified:  Zainab Underwood RN (8/10/2020)   Next INR check:  1/20/2021   Priority:  High   Target end date:  Indefinite    Indications    Chronic atrial fibrillation (H) [I48.20]  Long-term (current) use of anticoagulants [Z79.01] [Z79.01]             Anticoagulation Episode Summary     INR check location:      Preferred lab:      Send INR reminders to:  [facility]    Comments:  * 2mg tabs. Does not like  to cut tabs. NO bandaid.       Anticoagulation Care Providers     Provider Role Specialty Phone number    Heron Mayo MD Referring Family Medicine [phone]            See the Encounter Report to view Anticoagulation Flowsheet and Dosing Calendar (Go to Encounters tab in chart review, and find the Anticoagulation Therapy Visit)        Belia Jones RN

## 2021-01-20 ENCOUNTER — ANTICOAGULATION THERAPY VISIT (OUTPATIENT)
Dept: ANTICOAGULATION | Facility: CLINIC | Age: 86
End: 2021-01-20

## 2021-01-20 DIAGNOSIS — Z79.01 LONG TERM CURRENT USE OF ANTICOAGULANT THERAPY: ICD-10-CM

## 2021-01-20 DIAGNOSIS — I48.20 CHRONIC ATRIAL FIBRILLATION (H): ICD-10-CM

## 2021-01-20 LAB
CAPILLARY BLOOD COLLECTION: NORMAL
INR PPP: 2.5 (ref 0.86–1.14)

## 2021-01-20 PROCEDURE — 85610 PROTHROMBIN TIME: CPT | Performed by: FAMILY MEDICINE

## 2021-01-20 PROCEDURE — 99207 PR NO CHARGE NURSE ONLY: CPT

## 2021-01-20 PROCEDURE — 36416 COLLJ CAPILLARY BLOOD SPEC: CPT | Performed by: FAMILY MEDICINE

## 2021-01-20 NOTE — PROGRESS NOTES
01/20/21    Son told writer, patient had had some wine the day this INR was drawn, and it is likely why it was elevated.    Daphne Lamar, RN, BSN, N  Anticoagulation Clinic   [phone]

## 2021-01-20 NOTE — PROGRESS NOTES
ANTICOAGULATION FOLLOW-UP CLINIC VISIT    Patient Name:  Nabor Cunningham  Date:  2021  Contact Type:  Telephone    SUBJECTIVE:  Patient Findings     Comments:  Patient will continue weekly maintenance dose. INR is therapeutic.   Recheck in 4 weeks.   Florencio verbalizes understanding and agrees to plan. No further questions or concerns.          Clinical Outcomes     Negatives:  Major bleeding event, Thromboembolic event, Anticoagulation-related hospital admission, Anticoagulation-related ED visit, Anticoagulation-related fatality    Comments:  Patient will continue weekly maintenance dose. INR is therapeutic.   Recheck in 4 weeks.   Florencio verbalizes understanding and agrees to plan. No further questions or concerns.             OBJECTIVE    Recent labs: (last 7 days)     21  1529   INR 2.50*       ASSESSMENT / PLAN  INR assessment THER    Recheck INR In: 4 WEEKS    INR Location Outside lab      Anticoagulation Summary  As of 2021    INR goal:  2.0-3.0   TTR:  38.8 % (1 y)   INR used for dosin.50 (2021)   Warfarin maintenance plan:  6 mg (2 mg x 3) every Mon; 4 mg (2 mg x 2) all other days   Full warfarin instructions:  6 mg every Mon; 4 mg all other days   Weekly warfarin total:  30 mg   No change documented:  Daphne Lamar, RN   Plan last modified:  Zainab Underwood RN (8/10/2020)   Next INR check:  2021   Priority:  Maintenance   Target end date:  Indefinite    Indications    Chronic atrial fibrillation (H) [I48.20]  Long-term (current) use of anticoagulants [Z79.01] [Z79.01]             Anticoagulation Episode Summary     INR check location:      Preferred lab:      Send IN[facility] reminders to:  [facility]    Comments:  * 2mg tabs. Does not like to cut tabs. NO bandaid.       Anticoagulation Care Providers     Provider Role Specialty Phone number    Heron Mayo MD Referring Family Medicine [phone]            See the Encounter Report to view Anticoagulation  Flowsheet and Dosing Calendar (Go to Encounters tab in chart review, and find the Anticoagulation Therapy Visit)        Daphne Lamar RN

## 2021-01-22 DIAGNOSIS — I50.43 ACUTE ON CHRONIC COMBINED SYSTOLIC AND DIASTOLIC CONGESTIVE HEART FAILURE (H): ICD-10-CM

## 2021-01-22 RX ORDER — LISINOPRIL 5 MG/1
TABLET ORAL
Qty: 180 TABLET | Refills: 1 | Status: SHIPPED | OUTPATIENT
Start: 2021-01-22 | End: 2021-07-12

## 2021-01-22 NOTE — TELEPHONE ENCOUNTER
"Requested Prescriptions   Pending Prescriptions Disp Refills     lisinopril (ZESTRIL) 5 MG tablet [Pharmacy Med Name: LISINOPRIL 5MG TABS] 180 tablet 1     Sig: TAKE TWO TABLETS BY MOUTH ONCE DAILY       ACE Inhibitors (Including Combos) Protocol Failed - 1/22/2021  5:02 AM        Failed - Normal serum creatinine on file in past 12 months     Recent Labs   Lab Test 07/17/20  1434   CR 1.52*       Ok to refill medication if creatinine is low          Passed - Blood pressure under 140/90 in past 12 months     BP Readings from Last 3 Encounters:   07/17/20 110/54   07/03/20 117/73   04/20/20 110/60                 Passed - Recent (12 mo) or future (30 days) visit within the authorizing provider's specialty     Patient has had an office visit with the authorizing provider or a provider within the authorizing providers department within the previous 12 mos or has a future within next 30 days. See \"Patient Info\" tab in inbasket, or \"Choose Columns\" in Meds & Orders section of the refill encounter.              Passed - Medication is active on med list        Passed - Patient is age 18 or older        Passed - Normal serum potassium on file in past 12 months     Recent Labs   Lab Test 07/17/20  1434   POTASSIUM 4.6                Kirsty RG, RN, BSN      "

## 2021-02-04 ENCOUNTER — PATIENT OUTREACH (OUTPATIENT)
Dept: FAMILY MEDICINE | Facility: CLINIC | Age: 86
End: 2021-02-04
Payer: MEDICARE

## 2021-02-04 DIAGNOSIS — I50.43 ACUTE ON CHRONIC COMBINED SYSTOLIC AND DIASTOLIC CONGESTIVE HEART FAILURE (H): ICD-10-CM

## 2021-02-04 NOTE — PROGRESS NOTES
Clinic Care Coordination Contact    Assessment: Care Coordinator contacted patient for 2 month follow up.  Patient has continued to follow the plan of care and assessment is negative for any new needs or concerns.    Enrollment status: Graduated.      Plan: No further outreaches at this time.  Patient will continue to follow the plan of care.  If new needs arise a new Care Coordination referral may be placed.  FYI to PCP    TONE Lord, [facility] - Care Coordinator   [facility]  2/4/2021   1:53 PM  [phone]

## 2021-02-04 NOTE — LETTER
[place] CARE COORDINATION - [facility]  [address] [address]  [phone]    February 4, 2021    Nabor Cunningham C/O Florencio Cunningham  [address]   [facility] [address]    Dear Nabor/Florencio,    Your Care Team congratulates you on your journey to maintain wellness. This document will help guide you on your journey to maintain a healthy lifestyle.  You can use this to help you overcome any barriers you may encounter.  If you should have any questions or concerns, you can contact the members of your Care Team or contact your Primary Care Clinic for assistance.     Health Maintenance  Health Maintenance Reviewed: Due/Overdue   Health Maintenance Due   Topic Date Due     HF ACTION PLAN  1935     COVID-19 Vaccine (1 of 2) 11/03/1951     ZOSTER IMMUNIZATION (1 of 2) 11/03/1987     MEDICARE ANNUAL WELLNESS VISIT  11/03/2002     INFLUENZA VACCINE (1) 09/01/2020     PHQ-9  10/20/2020     BMP  01/17/2021     Please talk with your provider about what is needed or can continue to wait.        My Access Plan  Medical Emergency 911   Primary Clinic Line [facility] - [phone]   24 Hour Appointment Line [phone] or  [phone] ([phone]) (toll-free)   24 Hour Nurse Line [phone] (toll-free)   Preferred Urgent Care     Preferred Hospital     Preferred Pharmacy [id number] [place], [place] - [address] [facility]     Behavioral Health Crisis Line The National Suicide Prevention Lifeline at 1-263.840.8072 or 911     My Care Team Members  Patient Care Team       Relationship Specialty Notifications Start End    Heron Mayo MD PCP - General Family Practice  9/11/18     Phone: [phone] Fax: [phone] [address] [place] [address]    Heron Mayo MD Assigned PCP   3/9/17     Phone: [phone] Fax: [phone] [address] [place] [address]    Heron Mayo MD  Family Practice  1/29/18     Merged     Phone: [phone] Fax: [phone]         [address] [facility] [address]    Vivian Mobley DO MD Gastroenterology  7/17/20     Phone: [phone] Fax: [phone]         [address] [place] [facility] [address]    Kae Ward MD Assigned Neuroscience Provider   10/23/20     Phone: [phone] Fax: [phone]         [address] [facility] [place] [address]    Katie Galvan MD Assigned Heart and Vascular Provider   11/15/20     Phone: [phone] Pager: [phone] Fax: [phone] [address] [facility] [address]              Goals       COMPLETED: Psychosocial (pt-stated)      Goal Statement: I would like to stay in my own home and have no falls or avoidable health issues for the next 6 months.   Date Goal set: 7/13/2020  Barriers: age, covid-19  Strengths: supportive family  Date to Achieve By: 1/9/21   Patient expressed understanding of goal: yes  Action steps to achieve this goal:  1. I will have no falls  2. I will remain free from infections  3.  I will follow up with my providers as recommended                 Advance Care Plans/Directives Type:      I noticed there is not a current health care directive on file for Bear.  If you have one please bring a copy into the clinic.  If you need a form please feel free to contact the clinic or myself.     It has been your Clinic Care Team's pleasure to work with you on your goals.    Regards,    Rani Sarkar, [facility] - Care Coordination  [facility]   [phone]

## 2021-02-05 RX ORDER — LISINOPRIL 5 MG/1
TABLET ORAL
Qty: 180 TABLET | Refills: 1 | OUTPATIENT
Start: 2021-02-05

## 2021-02-17 ENCOUNTER — ANTICOAGULATION THERAPY VISIT (OUTPATIENT)
Dept: FAMILY MEDICINE | Facility: CLINIC | Age: 86
End: 2021-02-17

## 2021-02-17 DIAGNOSIS — Z79.01 LONG TERM CURRENT USE OF ANTICOAGULANT THERAPY: ICD-10-CM

## 2021-02-17 DIAGNOSIS — I48.20 CHRONIC ATRIAL FIBRILLATION (H): ICD-10-CM

## 2021-02-17 LAB
CAPILLARY BLOOD COLLECTION: NORMAL
INR PPP: 3.8 (ref 0.86–1.14)

## 2021-02-17 PROCEDURE — 36416 COLLJ CAPILLARY BLOOD SPEC: CPT | Performed by: FAMILY MEDICINE

## 2021-02-17 PROCEDURE — 85610 PROTHROMBIN TIME: CPT | Performed by: FAMILY MEDICINE

## 2021-02-17 NOTE — PROGRESS NOTES
ANTICOAGULATION MANAGEMENT     Patient Name:  Nabor Cunningham  Date:  2/17/2021    ASSESSMENT /SUBJECTIVE:    Today's INR result of 3.8 is supratherapeutic. Goal INR of 2.0-3.0      Warfarin dose taken: Warfarin taken as instructed    Diet: No new diet changes affecting INR    Medication changes/ interactions: No new medications/supplements affecting INR    Previous INR: Therapeutic     S/S of bleeding or thromboembolism: No    New injury or illness: No    Upcoming surgery, procedure or cardioversion: No    Additional findings: None      PLAN:    Telephone call with  Lexy regarding INR result and instructed:     Warfarin Dosing Instructions: hold tonight then continue your current warfarin dose of 6 mg Mon and 4 mg ROW    Instructed patient to follow up no later than: 2 weeks  Lab visit scheduled    Education provided: Target INR goal and significance of current INR result      Lexy verbalizes understanding and agrees to warfarin dosing plan.    Instructed to call the Anticoagulation Clinic for any changes, questions or concerns. (#[phone])        Kamini Orozco RN      OBJECTIVE:  Recent labs: (last 7 days)     02/17/21  1529   INR 3.80*         No question data found.  Anticoagulation Summary  As of 2/17/2021    INR goal:  2.0-3.0   TTR:  41.8 % (1 y)   INR used for dosing:  3.80 (2/17/2021)   Warfarin maintenance plan:  6 mg (2 mg x 3) every Mon; 4 mg (2 mg x 2) all other days   Full warfarin instructions:  2/17: Hold; Otherwise 6 mg every Mon; 4 mg all other days   Weekly warfarin total:  30 mg   Plan last modified:  Zainab Underwood RN (8/10/2020)   Next INR check:  3/3/2021   Priority:  Maintenance   Target end date:  Indefinite    Indications    Chronic atrial fibrillation (H) [I48.20]  Long-term (current) use of anticoagulants [Z79.01] [Z79.01]             Anticoagulation Episode Summary     INR check location:      Preferred lab:      Send INR reminders to:  [facility]    Comments:  * 2mg  tabs. Does not like to cut tabs. NO bandaid.       Anticoagulation Care Providers     Provider Role Specialty Phone number    Heron Mayo MD [facility] [phone]

## 2021-03-02 ENCOUNTER — TELEPHONE (OUTPATIENT)
Dept: FAMILY MEDICINE | Facility: CLINIC | Age: 86
End: 2021-03-02

## 2021-03-02 NOTE — TELEPHONE ENCOUNTER
Pt's son wanted to know how long he would have to wait to get a Covid Vaccine after getting a Steroid Injection in his knee.    I spoke to Vin LEE and he said it would need to be 2 weeks after.     Notified

## 2021-03-03 ENCOUNTER — ANTICOAGULATION THERAPY VISIT (OUTPATIENT)
Dept: ANTICOAGULATION | Facility: CLINIC | Age: 86
End: 2021-03-03

## 2021-03-03 DIAGNOSIS — I48.20 CHRONIC ATRIAL FIBRILLATION (H): ICD-10-CM

## 2021-03-03 DIAGNOSIS — Z79.01 LONG TERM CURRENT USE OF ANTICOAGULANT THERAPY: ICD-10-CM

## 2021-03-03 LAB
CAPILLARY BLOOD COLLECTION: NORMAL
INR PPP: 3.8 (ref 0.86–1.14)

## 2021-03-03 PROCEDURE — 36416 COLLJ CAPILLARY BLOOD SPEC: CPT | Performed by: FAMILY MEDICINE

## 2021-03-03 PROCEDURE — 85610 PROTHROMBIN TIME: CPT | Performed by: FAMILY MEDICINE

## 2021-03-03 NOTE — PROGRESS NOTES
Anticoagulation Management    Unable to reach Bear (Florencio) today.    Today's INR result of 3.80 is supratherapeutic (goal INR of 2.0-3.0).  Result received from: Clinic Lab    Follow up required to confirm warfarin dose taken and assess for changes    Left message to hold warfarin tonight. Maintenance dose adjusted to 4 mg daily. Recheck on 3/11/21. Requested a call back to review and assess.       Anticoagulation clinic to follow up    Daphne Lamar RN

## 2021-03-03 NOTE — PROGRESS NOTES
ANTICOAGULATION MANAGEMENT     Patient Name:  Nabor Cunningham  Date:  3/3/2021    ASSESSMENT /SUBJECTIVE:    Today's INR result of 3.8 is supratherapeutic. Goal INR of 2.0-3.0      Warfarin dose taken: Warfarin taken as instructed    Diet: No new diet changes affecting INR    Medication changes/ interactions: No new medications/supplements affecting INR    Previous INR: Supratherapeutic     S/S of bleeding or thromboembolism: No    New injury or illness: No    Upcoming surgery, procedure or cardioversion: No    Additional findings: None      PLAN:    Telephone call with  Florencio regarding INR result and instructed:     Warfarin Dosing Instructions: hold today's dose then change your warfarin dose to 4 mg daily  . (6.7 % change)    Instructed patient to follow up no later than: 1 week  Lab visit scheduled    Education provided: Target INR goal and significance of current INR result      Florencio verbalizes understanding and agrees to warfarin dosing plan.    Instructed to call the Anticoagulation Clinic for any changes, questions or concerns. (#[phone])        Kamini Orozco RN      OBJECTIVE:  Recent labs: (last 7 days)     03/03/21  1541   INR 3.80*         No question data found.  Anticoagulation Summary  As of 3/3/2021    INR goal:  2.0-3.0   TTR:  41.8 % (1 y)   INR used for dosing:  3.80 (3/3/2021)   Warfarin maintenance plan:  4 mg (2 mg x 2) every day   Full warfarin instructions:  3/3: Hold; Otherwise 4 mg every day   Weekly warfarin total:  28 mg   Plan last modified:  Daphne Lamar RN (3/3/2021)   Next INR check:  3/11/2021   Priority:  Maintenance   Target end date:  Indefinite    Indications    Chronic atrial fibrillation (H) [I48.20]  Long-term (current) use of anticoagulants [Z79.01] [Z79.01]             Anticoagulation Episode Summary     INR check location:      Preferred lab:      Send INR reminders to:  [facility]    Comments:  * 2mg tabs. Does not like to cut tabs. NO bandaid.        Anticoagulation Care Providers     Provider Role Specialty Phone number    Heron Mayo MD Referring Family Medicine [phone]

## 2021-03-04 ENCOUNTER — TELEPHONE (OUTPATIENT)
Dept: ORTHOPEDICS | Facility: CLINIC | Age: 86
End: 2021-03-04
Payer: MEDICARE

## 2021-03-04 DIAGNOSIS — M17.12 LOCALIZED OSTEOARTHRITIS OF LEFT KNEE: Primary | ICD-10-CM

## 2021-03-04 DIAGNOSIS — K52.832 LYMPHOCYTIC COLITIS: ICD-10-CM

## 2021-03-04 NOTE — TELEPHONE ENCOUNTER
Patient scheduled for appointment on 3/11/21 for discussion of viscosupplementation injection vs steroid injection of left knee.      Gel-One injection last completed 3/15/19.       Prior authorization referral for SynviscOne injection pended.     Please advise.    Herbert Coats ATC

## 2021-03-05 RX ORDER — BISMUTH SUBSALICYLATE 262MG/15ML
1 SUSPENSION, ORAL (FINAL DOSE FORM) ORAL 3 TIMES DAILY
Qty: 90 TABLET | Refills: 3 | Status: SHIPPED | OUTPATIENT
Start: 2021-03-05 | End: 2022-03-27

## 2021-03-05 NOTE — TELEPHONE ENCOUNTER
bismuth subsalicylate (PEPTO-BISMOL) 262 MG TABS      Last Written Prescription Date:  11-20-20  Last Fill Quantity: 90,   # refills: 3  Last Office Visit : 12-23-20  Future Office visit:  4-2-21    Routing refill request to provider for review/approval because:  Med not on GI protocol

## 2021-03-06 ENCOUNTER — HEALTH MAINTENANCE LETTER (OUTPATIENT)
Age: 86
End: 2021-03-06

## 2021-03-11 ENCOUNTER — OFFICE VISIT (OUTPATIENT)
Dept: ORTHOPEDICS | Facility: CLINIC | Age: 86
End: 2021-03-11
Payer: MEDICARE

## 2021-03-11 VITALS
BODY MASS INDEX: 29.88 KG/M2 | SYSTOLIC BLOOD PRESSURE: 115 MMHG | HEIGHT: 64 IN | DIASTOLIC BLOOD PRESSURE: 70 MMHG | WEIGHT: 175 LBS

## 2021-03-11 DIAGNOSIS — Z79.01 LONG TERM CURRENT USE OF ANTICOAGULANT THERAPY: ICD-10-CM

## 2021-03-11 DIAGNOSIS — G89.29 CHRONIC PAIN OF LEFT KNEE: ICD-10-CM

## 2021-03-11 DIAGNOSIS — I48.20 CHRONIC ATRIAL FIBRILLATION (H): ICD-10-CM

## 2021-03-11 DIAGNOSIS — M25.562 CHRONIC PAIN OF LEFT KNEE: ICD-10-CM

## 2021-03-11 DIAGNOSIS — M17.12 PRIMARY OSTEOARTHRITIS OF LEFT KNEE: Primary | ICD-10-CM

## 2021-03-11 LAB
CAPILLARY BLOOD COLLECTION: NORMAL
INR PPP: 3.9 (ref 0.86–1.14)

## 2021-03-11 PROCEDURE — 36416 COLLJ CAPILLARY BLOOD SPEC: CPT | Performed by: FAMILY MEDICINE

## 2021-03-11 PROCEDURE — 85610 PROTHROMBIN TIME: CPT | Performed by: FAMILY MEDICINE

## 2021-03-11 PROCEDURE — 99203 OFFICE O/P NEW LOW 30 MIN: CPT | Performed by: FAMILY MEDICINE

## 2021-03-11 ASSESSMENT — MIFFLIN-ST. JEOR: SCORE: 1399.79

## 2021-03-11 NOTE — LETTER
3/11/2021         RE: Nabor Cunningham  [address] [facility] [address]        Dear Colleague,    Thank you for referring your patient, Nabor Cunningham, to the [facility] [place]. Please see a copy of my visit note below.    Nabor Cunningham  :  1935  DOS: 3/11/2021  MRN: [id number]    Sports Medicine Clinic Visit    PCP: Heron Mayo    Nabor Cunningham is a 85 year old male who is seen as a self referral presenting with chronic left knee pain.    Injury: Gradual onset of waxing & waning left knee pain over the past 6+ months.  Pain located over left deep anterior medial knee, nonradiating.  Additional Features:  Positive: swelling and weakness.  Symptoms are better with Tylenol and Rest.  Symptoms are worse with: walking, going from sit to stand, stairs.  Other evaluation and/or treatments so far consists of: Ice, Tylenol, Rest and walker.  Recent imaging completed: X-rays completed .  Prior History of related problems: history of chronic left knee pain.   He has previously completed left knee GelOne injection in 2019 from Dr Mitchell that provided good relief for 6+ months.     Patient is on coumadin, his INR was completed earlier today and was 3.9.    Social History: retired - patient is accompanied to appointment today by his son    Review of Systems  Musculoskeletal: as above  Remainder of review of systems is negative including constitutional, CV, pulmonary, GI, Skin and Neurologic except as noted in HPI or medical history.    Past Medical History:   Diagnosis Date     Bleeding from wound 2018:He had prolonged bleeding after cyst removal on his back within the past year.        Past Surgical History:   Procedure Laterality Date     ANGIOGRAM  2019    right and left heart cath.      APPENDECTOMY      ~     CATARACT IOL, RT/LT      Past bilateral cataract surgery.      COLONOSCOPY N/A 7/3/2020    Procedure: COLONOSCOPY, WITH  "POLYPECTOMY AND BIOPSY;  Surgeon: Fady Boyer MD;  Location: [facility] [facility]     EYE SURGERY Left     three corneal transplants     IMPLANT PACEMAKER       INCISION AND DRAINAGE TRUNK, COMBINED N/A 9/6/2018    Procedure: COMBINED INCISION AND DRAINAGE TRUNK;  incision and cauterization of left buttock bleed.;  Surgeon: Dain Davis MD;  Location: [facility]     INCISION AND DRAINAGE TRUNK, COMBINED N/A 11/1/2018    Procedure:  INCISION AND DRAINAGE of Back Wound;  Surgeon: Dain Davis MD;  Location: [facility]     JOINT REPLACEMENT Right     right total knee.      THORACIC SURGERY      removal benign nodule     No family history on file.    Objective  /70   Ht 1.626 m (5' 4\")   Wt 79.4 kg (175 lb)   BMI 30.04 kg/m        General: healthy, alert and in no distress      HEENT: no scleral icterus or conjunctival erythema     Skin: no suspicious lesions or rash. No jaundice.     CV: regular rhythm by palpation, 2+ distal pulses, no pedal edema      Resp: normal respiratory effort without conversational dyspnea     Psych: normal mood and affect      Gait: nonantalgic, appropriate coordination and balance     Neuro: normal light touch sensory exam of the extremities. Motor strength as noted below     Left Knee exam    ROM:        Full active and passive ROM with flexion and extension, mild pain with terminal ROM    Inspection:       no visible ecchymosis        effusion noted small    Skin:       no visible deformities       well perfused       capillary refill brisk    Patellar Motion:        Normal patellar tracking noted through range of motion       Crepitus noted in the patellofemoral joint    Tender:        lateral patellar border,       medial joint line >> lateral joint line    Non Tender:         remainder of knee area    Special Tests:        neg (-) varus at 0 deg and 30 deg       neg (-) valgus at 0 deg and 30 deg    Evaluation of ipsilateral kinetic chain       normal strength with hip extension and " abduction      Radiology  XR KNEE STANDING AP BILAT AND LATERAL LEFT 3/26/2018 3:21 PM     HISTORY: Chronic left knee pain.     COMPARISON: None.     FINDINGS: Right knee arthroplasty hardware components appear  well-seated. Images of the left knee show complete loss of medial  tibiofemoral compartment joint space and moderate patellofemoral  compartment osteophyte formation. No joint effusion. No acute fracture  or malalignment. Dense vascular calcification noted on the lateral  view.                                                                      IMPRESSION: Moderate left knee osteoarthritis.    Assessment:  1. Primary osteoarthritis of left knee    2. Chronic pain of left knee        Plan:  Discussed the assessment with the patient.  Follow up: 1 week  Plan for SynviscOne injection next week, +/- aspiration, US guided  Avoid CSI until 2 weeks s/p COVID vaccine series, reviewed with Kal  PT options reviewed, available prn  INR 3.9 today, so deferred injection for today until INR is in therapeutic range  They will coordinate new dosing of Coumadin with Anti-coagulation clinic, and have repeat INR before appt on 3/18  XR images independently visualized and reviewed with patient today in clinic  Known severe medial compartment OA, can offer TKA discussion in the future prn, but he is hoping to avoid this option  Home handouts provided and supportive care reviewed  All questions were answered today  Contact us with additional questions or concerns  Signs and sx of concern reviewed      Daniel Edwards DO, ELVI  Primary Care Sports Medicine  [facility]              Disclaimer: This note consists of symbols derived from keyboarding, dictation and/or voice recognition software. As a result, there may be errors in the script that have gone undetected. Please consider this when interpreting information found in this chart.      Again, thank you for allowing me to participate in the care of  your patient.        Sincerely,        Daniel Edwards, DO

## 2021-03-11 NOTE — PROGRESS NOTES
Nabor Cunningham  :  1935  DOS: 3/11/2021  MRN: [id number]    Sports Medicine Clinic Visit    PCP: Heron Mayo    Nabor Cunningham is a 85 year old male who is seen as a self referral presenting with chronic left knee pain.    Injury: Gradual onset of waxing & waning left knee pain over the past 6+ months.  Pain located over left deep anterior medial knee, nonradiating.  Additional Features:  Positive: swelling and weakness.  Symptoms are better with Tylenol and Rest.  Symptoms are worse with: walking, going from sit to stand, stairs.  Other evaluation and/or treatments so far consists of: Ice, Tylenol, Rest and walker.  Recent imaging completed: X-rays completed .  Prior History of related problems: history of chronic left knee pain.   He has previously completed left knee GelOne injection in 2019 from Dr Mitchell that provided good relief for 6+ months.     Patient is on coumadin, his INR was completed earlier today and was 3.9.    Social History: retired - patient is accompanied to appointment today by his son    Review of Systems  Musculoskeletal: as above  Remainder of review of systems is negative including constitutional, CV, pulmonary, GI, Skin and Neurologic except as noted in HPI or medical history.    Past Medical History:   Diagnosis Date     Bleeding from wound 2018:He had prolonged bleeding after cyst removal on his back within the past year.        Past Surgical History:   Procedure Laterality Date     ANGIOGRAM  2019    right and left heart cath.      APPENDECTOMY      ~     CATARACT IOL, RT/LT      Past bilateral cataract surgery.      COLONOSCOPY N/A 7/3/2020    Procedure: COLONOSCOPY, WITH POLYPECTOMY AND BIOPSY;  Surgeon: Fady Boyer MD;  Location: [facility]     EYE SURGERY Left     three corneal transplants     IMPLANT PACEMAKER       INCISION AND DRAINAGE TRUNK, COMBINED N/A 2018    Procedure: COMBINED INCISION AND DRAINAGE TRUNK;  incision and  "cauterization of left buttock bleed.;  Surgeon: Dain Davis MD;  Location: [facility]     INCISION AND DRAINAGE TRUNK, COMBINED N/A 11/1/2018    Procedure:  INCISION AND DRAINAGE of Back Wound;  Surgeon: Dain Davis MD;  Location: [facility] [facility]     JOINT REPLACEMENT Right     right total knee.      THORACIC SURGERY      removal benign nodule     No family history on file.    Objective  /70   Ht 1.626 m (5' 4\")   Wt 79.4 kg (175 lb)   BMI 30.04 kg/m        General: healthy, alert and in no distress      HEENT: no scleral icterus or conjunctival erythema     Skin: no suspicious lesions or rash. No jaundice.     CV: regular rhythm by palpation, 2+ distal pulses, no pedal edema      Resp: normal respiratory effort without conversational dyspnea     Psych: normal mood and affect      Gait: nonantalgic, appropriate coordination and balance     Neuro: normal light touch sensory exam of the extremities. Motor strength as noted below     Left Knee exam    ROM:        Full active and passive ROM with flexion and extension, mild pain with terminal ROM    Inspection:       no visible ecchymosis        effusion noted small    Skin:       no visible deformities       well perfused       capillary refill brisk    Patellar Motion:        Normal patellar tracking noted through range of motion       Crepitus noted in the patellofemoral joint    Tender:        lateral patellar border,       medial joint line >> lateral joint line    Non Tender:         remainder of knee area    Special Tests:        neg (-) varus at 0 deg and 30 deg       neg (-) valgus at 0 deg and 30 deg    Evaluation of ipsilateral kinetic chain       normal strength with hip extension and abduction      Radiology  XR KNEE STANDING AP BILAT AND LATERAL LEFT 3/26/2018 3:21 PM     HISTORY: Chronic left knee pain.     COMPARISON: None.     FINDINGS: Right knee arthroplasty hardware components appear  well-seated. Images of the left knee show complete loss of " medial  tibiofemoral compartment joint space and moderate patellofemoral  compartment osteophyte formation. No joint effusion. No acute fracture  or malalignment. Dense vascular calcification noted on the lateral  view.                                                                      IMPRESSION: Moderate left knee osteoarthritis.    Assessment:  1. Primary osteoarthritis of left knee    2. Chronic pain of left knee        Plan:  Discussed the assessment with the patient.  Follow up: 1 week  Plan for SynviscOne injection next week, +/- aspiration, US guided  Avoid CSI until 2 weeks s/p COVID vaccine series, reviewed with Kal  PT options reviewed, available prn  INR 3.9 today, so deferred injection for today until INR is in therapeutic range  They will coordinate new dosing of Coumadin with Anti-coagulation clinic, and have repeat INR before appt on 3/18  XR images independently visualized and reviewed with patient today in clinic  Known severe medial compartment OA, can offer TKA discussion in the future prn, but he is hoping to avoid this option  Home handouts provided and supportive care reviewed  All questions were answered today  Contact us with additional questions or concerns  Signs and sx of concern reviewed      Daniel Edwards DO, ELVI  Primary Care Sports Medicine  [facility]              Disclaimer: This note consists of symbols derived from keyboarding, dictation and/or voice recognition software. As a result, there may be errors in the script that have gone undetected. Please consider this when interpreting information found in this chart.

## 2021-03-15 ENCOUNTER — ANTICOAGULATION THERAPY VISIT (OUTPATIENT)
Dept: ANTICOAGULATION | Facility: CLINIC | Age: 86
End: 2021-03-15

## 2021-03-15 DIAGNOSIS — I48.20 CHRONIC ATRIAL FIBRILLATION (H): ICD-10-CM

## 2021-03-15 DIAGNOSIS — Z79.01 LONG TERM CURRENT USE OF ANTICOAGULANT THERAPY: ICD-10-CM

## 2021-03-15 LAB
CAPILLARY BLOOD COLLECTION: NORMAL
INR PPP: 2.6 (ref 0.86–1.14)

## 2021-03-15 PROCEDURE — 85610 PROTHROMBIN TIME: CPT | Performed by: FAMILY MEDICINE

## 2021-03-15 PROCEDURE — 36416 COLLJ CAPILLARY BLOOD SPEC: CPT | Performed by: FAMILY MEDICINE

## 2021-03-15 NOTE — PROGRESS NOTES
"ANTICOAGULATION MANAGEMENT     Patient Name:  Nabor Cunningham  Date:  3/15/2021    ASSESSMENT /SUBJECTIVE:    Today's INR result of 2.6 is therapeutic. Goal INR of 2.0-3.0      Warfarin dose taken: Warfarin taken as instructed    Diet: No new diet changes affecting INR    Medication changes/ interactions: continues to take pepto-bismol BID    Previous INR: Supratherapeutic     S/S of bleeding or thromboembolism: No    New injury or illness: No    Upcoming surgery, procedure or cardioversion: Yes: patient to have injection into left knee with a \"gel\" or instead of a steriod    Additional findings: None      PLAN:    Telephone call with  SonFlorencio regarding INR result and instructed:     Warfarin Dosing Instructions: Continue your current warfarin dose 2 mg on  and 4 mg all other days of the week.  0% change    Instructed patient to follow up no later than: 2 weeks  Lab visit scheduled    Education provided: Target INR goal and significance of current INR result, Importance of therapeutic range, Importance of following up for INR monitoring at instructed interval and Importance of taking warfarin as instructed      Florencio, dwayne verbalizes understanding and agrees to warfarin dosing plan.    Instructed to call the Anticoagulation Clinic for any changes, questions or concerns. (#[phone])        Aixa Webster RN      OBJECTIVE:  Recent labs: (last 7 days)     21  1150 03/15/21  1342   INR 3.90* 2.60*         No question data found.  Anticoagulation Summary  As of 3/15/2021    INR goal:  2.0-3.0   TTR:  41.0 % (1 y)   INR used for dosin.60 (3/15/2021)   Warfarin maintenance plan:  2 mg (2 mg x 1) every Sun; 4 mg (2 mg x 2) all other days   Full warfarin instructions:  2 mg every Sun; 4 mg all other days   Weekly warfarin total:  26 mg   No change documented:  Aixa Webster, RN   Plan last modified:  Aixa Webster RN (3/11/2021)   Next INR check:  3/29/2021   Priority:  Maintenance "   Target end date:  Indefinite    Indications    Chronic atrial fibrillation (H) [I48.20]  Long-term (current) use of anticoagulants [Z79.01] [Z79.01]             Anticoagulation Episode Summary     INR check location:      Preferred lab:      Send INR reminders to:  [facility]    Comments:  * 2mg tabs. Does not like to cut tabs. NO bandaid.       Anticoagulation Care Providers     Provider Role Specialty Phone number    Heron Mayo MD Referring Family Medicine [phone]         MINA DomínguezN, RN  Anticoagulation Clinic

## 2021-03-18 ENCOUNTER — IMMUNIZATION (OUTPATIENT)
Dept: FAMILY MEDICINE | Facility: CLINIC | Age: 86
End: 2021-03-18
Payer: MEDICARE

## 2021-03-18 ENCOUNTER — OFFICE VISIT (OUTPATIENT)
Dept: ORTHOPEDICS | Facility: CLINIC | Age: 86
End: 2021-03-18
Payer: MEDICARE

## 2021-03-18 VITALS — WEIGHT: 175 LBS | HEIGHT: 64 IN | BODY MASS INDEX: 29.88 KG/M2

## 2021-03-18 DIAGNOSIS — M17.12 PRIMARY OSTEOARTHRITIS OF LEFT KNEE: Primary | ICD-10-CM

## 2021-03-18 DIAGNOSIS — M25.562 CHRONIC PAIN OF LEFT KNEE: ICD-10-CM

## 2021-03-18 DIAGNOSIS — G89.29 CHRONIC PAIN OF LEFT KNEE: ICD-10-CM

## 2021-03-18 PROCEDURE — 20611 DRAIN/INJ JOINT/BURSA W/US: CPT | Mod: LT | Performed by: FAMILY MEDICINE

## 2021-03-18 PROCEDURE — 0011A PR COVID VAC MODERNA 100 MCG/0.5 ML IM: CPT

## 2021-03-18 PROCEDURE — 91301 PR COVID VAC MODERNA 100 MCG/0.5 ML IM: CPT

## 2021-03-18 ASSESSMENT — MIFFLIN-ST. JEOR: SCORE: 1399.79

## 2021-03-18 NOTE — LETTER
3/18/2021         RE: aNbor Cunningham  [address] [facility] [address]        Dear Colleague,    Thank you for referring your patient, Nabor Cunningham, to the [facility] [place]. Please see a copy of my visit note below.    Nabor Cunningham  :  1935  DOS: 3/18/2021  MRN: [id number]    Sports Medicine Clinic Procedure    Ultrasound Guided Left Intra-Articular Knee SynviscOne Injection, +/- Aspiration    Clinical History: Patient presents for repeat left knee SynviscOne injection.  INR check completed 3/15/21 was 2.6.    Diagnosis:   1. Primary osteoarthritis of left knee    2. Chronic pain of left knee        Large Joint Injection/Arthocentesis: L knee joint    Date/Time: 3/18/2021 12:00 PM  Performed by: Daniel Edwards DO  Authorized by: Daniel Edwards DO     Indications:  Osteoarthritis  Needle Size:  22 G  Guidance: ultrasound    Approach:  Superolateral  Location:  Knee      Medications:  48 mg hylan 48 MG/6ML  Aspirate amount (mL):  7  Aspirate:  Serous and yellow  Outcome:  Tolerated well, no immediate complications  Procedure discussed: discussed risks, benefits, and alternatives    Consent Given by:  Patient  Timeout: timeout called immediately prior to procedure    Prep: patient was prepped and draped in usual sterile fashion        Impression:  Successful Left intra-articular knee SynviscOne injection and aspiration.    Plan:  Follow up prn based on clinical progress, in 4 wks if no relief  Expectations and limitations of synvisc were reviewed in detail  Often 4-6 weeks before full effect may be noticed  Usually covered up to every 6 months by insurance, but does not need to be repeated unless pain returns, at which point we would re-evaluate  Potential use of CSI in future for flares of pain reviewed in detail  Encouraged modified progressive pain-free activity as tolerated  HEP and Supportive care reviewed  All questions were answered  today  Contact us with additional questions or concerns  Signs and sx of concern reviewed    Daniel Edwards DO, ELVI  Primary Care Sports Medicine  [facility]           Again, thank you for allowing me to participate in the care of your patient.        Sincerely,        Daniel Edwards DO

## 2021-03-18 NOTE — PROGRESS NOTES
Nabor Cunningham  :  1935  DOS: 3/18/2021  MRN: [id number]    Sports Medicine Clinic Procedure    Ultrasound Guided Left Intra-Articular Knee SynviscOne Injection, +/- Aspiration    Clinical History: Patient presents for repeat left knee SynviscOne injection.  INR check completed 3/15/21 was 2.6.    Diagnosis:   1. Primary osteoarthritis of left knee    2. Chronic pain of left knee        Large Joint Injection/Arthocentesis: L knee joint    Date/Time: 3/18/2021 12:00 PM  Performed by: Daniel Edwards DO  Authorized by: Daniel Edwards DO     Indications:  Osteoarthritis  Needle Size:  22 G  Guidance: ultrasound    Approach:  Superolateral  Location:  Knee      Medications:  48 mg hylan 48 MG/6ML  Aspirate amount (mL):  7  Aspirate:  Serous and yellow  Outcome:  Tolerated well, no immediate complications  Procedure discussed: discussed risks, benefits, and alternatives    Consent Given by:  Patient  Timeout: timeout called immediately prior to procedure    Prep: patient was prepped and draped in usual sterile fashion        Impression:  Successful Left intra-articular knee SynviscOne injection and aspiration.    Plan:  Follow up prn based on clinical progress, in 4 wks if no relief  Expectations and limitations of synvisc were reviewed in detail  Often 4-6 weeks before full effect may be noticed  Usually covered up to every 6 months by insurance, but does not need to be repeated unless pain returns, at which point we would re-evaluate  Potential use of CSI in future for flares of pain reviewed in detail  Encouraged modified progressive pain-free activity as tolerated  HEP and Supportive care reviewed  All questions were answered today  Contact us with additional questions or concerns  Signs and sx of concern reviewed    Daniel Edwards DO, CAQ  Primary Care Sports Medicine  [facility]

## 2021-03-23 ENCOUNTER — ANCILLARY PROCEDURE (OUTPATIENT)
Dept: CARDIOLOGY | Facility: CLINIC | Age: 86
End: 2021-03-23
Attending: INTERNAL MEDICINE
Payer: MEDICARE

## 2021-03-23 DIAGNOSIS — Z95.0 CARDIAC PACEMAKER IN SITU: ICD-10-CM

## 2021-03-23 PROCEDURE — 93296 REM INTERROG EVL PM/IDS: CPT | Performed by: INTERNAL MEDICINE

## 2021-03-23 PROCEDURE — 93294 REM INTERROG EVL PM/LDLS PM: CPT | Performed by: INTERNAL MEDICINE

## 2021-03-25 ENCOUNTER — DOCUMENTATION ONLY (OUTPATIENT)
Dept: FAMILY MEDICINE | Facility: CLINIC | Age: 86
End: 2021-03-25

## 2021-03-25 DIAGNOSIS — Z79.01 LONG TERM CURRENT USE OF ANTICOAGULANT THERAPY: ICD-10-CM

## 2021-03-25 DIAGNOSIS — I48.20 CHRONIC ATRIAL FIBRILLATION (H): ICD-10-CM

## 2021-03-25 DIAGNOSIS — Z79.01 CHRONIC ANTICOAGULATION: Primary | ICD-10-CM

## 2021-03-26 ENCOUNTER — ANTICOAGULATION THERAPY VISIT (OUTPATIENT)
Dept: ANTICOAGULATION | Facility: CLINIC | Age: 86
End: 2021-03-26

## 2021-03-26 DIAGNOSIS — Z79.01 CHRONIC ANTICOAGULATION: ICD-10-CM

## 2021-03-26 DIAGNOSIS — I48.20 CHRONIC ATRIAL FIBRILLATION (H): ICD-10-CM

## 2021-03-26 DIAGNOSIS — Z79.01 LONG TERM CURRENT USE OF ANTICOAGULANT THERAPY: ICD-10-CM

## 2021-03-26 LAB
CAPILLARY BLOOD COLLECTION: NORMAL
INR PPP: 2 (ref 0.86–1.14)
MDC_IDC_EPISODE_DTM: NORMAL
MDC_IDC_EPISODE_DTM: NORMAL
MDC_IDC_EPISODE_ID: NORMAL
MDC_IDC_EPISODE_ID: NORMAL
MDC_IDC_EPISODE_TYPE: NORMAL
MDC_IDC_EPISODE_TYPE: NORMAL
MDC_IDC_LEAD_IMPLANT_DT: NORMAL
MDC_IDC_LEAD_IMPLANT_DT: NORMAL
MDC_IDC_LEAD_LOCATION: NORMAL
MDC_IDC_LEAD_LOCATION: NORMAL
MDC_IDC_LEAD_LOCATION_DETAIL_1: NORMAL
MDC_IDC_LEAD_LOCATION_DETAIL_1: NORMAL
MDC_IDC_LEAD_MFG: NORMAL
MDC_IDC_LEAD_MFG: NORMAL
MDC_IDC_LEAD_MODEL: NORMAL
MDC_IDC_LEAD_MODEL: NORMAL
MDC_IDC_LEAD_POLARITY_TYPE: NORMAL
MDC_IDC_LEAD_SERIAL: NORMAL
MDC_IDC_LEAD_SERIAL: NORMAL
MDC_IDC_MSMT_BATTERY_DTM: NORMAL
MDC_IDC_MSMT_BATTERY_REMAINING_LONGEVITY: 126 MO
MDC_IDC_MSMT_BATTERY_REMAINING_PERCENTAGE: 100 %
MDC_IDC_MSMT_BATTERY_STATUS: NORMAL
MDC_IDC_MSMT_LEADCHNL_RA_IMPEDANCE_VALUE: 354 OHM
MDC_IDC_MSMT_LEADCHNL_RV_IMPEDANCE_VALUE: 912 OHM
MDC_IDC_MSMT_LEADCHNL_RV_PACING_THRESHOLD_AMPLITUDE: 0.7 V
MDC_IDC_MSMT_LEADCHNL_RV_PACING_THRESHOLD_PULSEWIDTH: 0.4 MS
MDC_IDC_PG_IMPLANT_DTM: NORMAL
MDC_IDC_PG_MFG: NORMAL
MDC_IDC_PG_MODEL: NORMAL
MDC_IDC_PG_SERIAL: NORMAL
MDC_IDC_PG_TYPE: NORMAL
MDC_IDC_SESS_CLINIC_NAME: NORMAL
MDC_IDC_SESS_DTM: NORMAL
MDC_IDC_SESS_TYPE: NORMAL
MDC_IDC_SET_BRADY_AT_MODE_SWITCH_RATE: 150 {BEATS}/MIN
MDC_IDC_SET_BRADY_LOWRATE: 60 {BEATS}/MIN
MDC_IDC_SET_BRADY_MAX_SENSOR_RATE: 130 {BEATS}/MIN
MDC_IDC_SET_BRADY_MODE: NORMAL
MDC_IDC_SET_LEADCHNL_RA_SENSING_ADAPTATION_MODE: NORMAL
MDC_IDC_SET_LEADCHNL_RA_SENSING_SENSITIVITY: 0.15 MV
MDC_IDC_SET_LEADCHNL_RV_PACING_AMPLITUDE: 2.5 V
MDC_IDC_SET_LEADCHNL_RV_PACING_CAPTURE_MODE: NORMAL
MDC_IDC_SET_LEADCHNL_RV_PACING_POLARITY: NORMAL
MDC_IDC_SET_LEADCHNL_RV_PACING_PULSEWIDTH: 0.4 MS
MDC_IDC_SET_LEADCHNL_RV_SENSING_ADAPTATION_MODE: NORMAL
MDC_IDC_SET_LEADCHNL_RV_SENSING_POLARITY: NORMAL
MDC_IDC_SET_LEADCHNL_RV_SENSING_SENSITIVITY: 0.6 MV
MDC_IDC_SET_ZONE_DETECTION_INTERVAL: 375 MS
MDC_IDC_SET_ZONE_TYPE: NORMAL
MDC_IDC_SET_ZONE_VENDOR_TYPE: NORMAL
MDC_IDC_STAT_BRADY_DTM_END: NORMAL
MDC_IDC_STAT_BRADY_DTM_START: NORMAL
MDC_IDC_STAT_BRADY_RA_PERCENT_PACED: 0 %
MDC_IDC_STAT_BRADY_RV_PERCENT_PACED: 19 %
MDC_IDC_STAT_EPISODE_RECENT_COUNT: 0
MDC_IDC_STAT_EPISODE_RECENT_COUNT_DTM_END: NORMAL
MDC_IDC_STAT_EPISODE_RECENT_COUNT_DTM_START: NORMAL
MDC_IDC_STAT_EPISODE_TYPE: NORMAL
MDC_IDC_STAT_EPISODE_VENDOR_TYPE: NORMAL

## 2021-03-26 PROCEDURE — 36416 COLLJ CAPILLARY BLOOD SPEC: CPT | Performed by: FAMILY MEDICINE

## 2021-03-26 PROCEDURE — 85610 PROTHROMBIN TIME: CPT | Performed by: FAMILY MEDICINE

## 2021-03-26 NOTE — PROGRESS NOTES
ANTICOAGULATION MANAGEMENT     Patient Name:  Nabor Cunningham  Date:  3/26/2021    ASSESSMENT /SUBJECTIVE:    Today's INR result of 2.00 is therapeutic. Goal INR of 2.0-3.0      Warfarin dose taken: Warfarin taken as instructed    Diet: No new diet changes affecting INR    Medication changes/ interactions: No new medications/supplements affecting INR    Previous INR: Therapeutic     S/S of bleeding or thromboembolism: No    New injury or illness: No    Upcoming surgery, procedure or cardioversion: No    Additional findings: None      PLAN:    Telephone call with Nyla regarding INR result and instructed:     Warfarin Dosing Instructions: Continue your current warfarin dose 2 mg on Sun; 4 mg ROW    Instructed patient to follow up no later than: 2 weeks  Lab visit scheduled    Education provided: Monitoring for bleeding signs and symptoms      Lizbeth verbalizes understanding and agrees to warfarin dosing plan.    Instructed to call the Anticoagulation Clinic for any changes, questions or concerns. (#[phone])        Daphne Lamar RN      OBJECTIVE:  Recent labs: (last 7 days)     21  1340   INR 2.00*         No question data found.  Anticoagulation Summary  As of 3/26/2021    INR goal:  2.0-3.0   TTR:  41.0 % (1 y)   INR used for dosin.00 (3/26/2021)   Warfarin maintenance plan:  2 mg (2 mg x 1) every Sun; 4 mg (2 mg x 2) all other days   Full warfarin instructions:  2 mg every Sun; 4 mg all other days   Weekly warfarin total:  26 mg   Plan last modified:  Aixa Webster RN (3/11/2021)   Next INR check:  2021   Priority:  Maintenance   Target end date:  Indefinite    Indications    Chronic atrial fibrillation (H) [I48.20]  Long-term (current) use of anticoagulants [Z79.01] [Z79.01]             Anticoagulation Episode Summary     INR check location:      Preferred lab:      Send INR reminders to:  [facility]    Comments:  * 2mg tabs. Does not like to cut tabs. NO  bandaid.       Anticoagulation Care Providers     Provider Role Specialty Phone number    Heron Mayo MD Referring Family Medicine [phone]

## 2021-03-26 NOTE — RESULT ENCOUNTER NOTE
Please call.   The blood chemistries (Basic metabolic panel) are all normal including electrolytes (salt balances in the blood), blood glucose and kidney tests. dentures

## 2021-03-30 DIAGNOSIS — I50.43 ACUTE ON CHRONIC COMBINED SYSTOLIC AND DIASTOLIC CONGESTIVE HEART FAILURE (H): Primary | ICD-10-CM

## 2021-04-02 ENCOUNTER — OFFICE VISIT (OUTPATIENT)
Dept: GASTROENTEROLOGY | Facility: CLINIC | Age: 86
End: 2021-04-02
Payer: MEDICARE

## 2021-04-02 VITALS
WEIGHT: 177.2 LBS | BODY MASS INDEX: 29.52 KG/M2 | DIASTOLIC BLOOD PRESSURE: 56 MMHG | HEIGHT: 65 IN | OXYGEN SATURATION: 95 % | HEART RATE: 70 BPM | SYSTOLIC BLOOD PRESSURE: 95 MMHG

## 2021-04-02 DIAGNOSIS — K52.832 LYMPHOCYTIC COLITIS: Primary | ICD-10-CM

## 2021-04-02 PROCEDURE — 99214 OFFICE O/P EST MOD 30 MIN: CPT | Performed by: INTERNAL MEDICINE

## 2021-04-02 RX ORDER — LOPERAMIDE HCL 2 MG
2 CAPSULE ORAL 4 TIMES DAILY PRN
COMMUNITY

## 2021-04-02 ASSESSMENT — PAIN SCALES - GENERAL: PAINLEVEL: NO PAIN (0)

## 2021-04-02 ASSESSMENT — MIFFLIN-ST. JEOR: SCORE: 1425.65

## 2021-04-02 NOTE — PATIENT INSTRUCTIONS
I'm happy you are doing so well.    Stay on the pepto bismol at night time and the twice daily fiber.    Let me know right away if you're getting diarrhea again or if your constipation is no longer controlled.

## 2021-04-02 NOTE — PROGRESS NOTES
GI CLINIC VISIT    CC/REFERRING MD:  No ref. provider found  REASON FOR CONSULTATION:   No ref. provider found for   Chief Complaint   Patient presents with     RECHECK     3 month follow up         HPI    Patient here today with his son for follow-up.  Continues to do well on once daily pepto bismol and BID fiber.  Usually has 1-2 BMs a day.  Has been taking mineral oil again to help keep his stools soft.    Heartburn well controlled on daily pepto.    No other complaints today.  Has intentionally lost a few pounds.     ROS:    No fevers or chills  No weight loss  No blurry vision, double vision or change in vision  No sore throat  No lymphadenopathy  No headache, paraesthesias, or weakness in a limb  No shortness of breath or wheezing  No chest pain or pressure  No arthralgias or myalgias  No rashes or skin changes  No odynophagia or dysphagia  No BRBPR, hematochezia, melena  No dysuria, frequency or urgency  No hot/cold intolerance or polyria  No anxiety or depression    PROBLEM LIST  Patient Active Problem List    Diagnosis Date Noted     CKD (chronic kidney disease) stage 4, GFR 15-29 ml/min (H) 03/01/2020     Priority: Medium     3/1/2020:decreased GFR within the past year.  Unremarkable renal ultrasound.   Unknown cause.    4/29/2020:UA and MAC have not yet been done. He has started lisinopril in November, 2018.          NICM (nonischemic cardiomyopathy) (H) 02/25/2020     Priority: Medium     CAD (coronary artery disease) 02/25/2020     Priority: Medium     1/7/19 Cath: Nonobstructive disease       Cardiac pacemaker in situ 12/21/2018     Priority: Medium     Dual chamber pacemaker placed in 2016 for sick sinus.       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) 11/30/2018     Priority: Medium     11/30/2018:He has seen cardiology through [place].  Echo in October, 2018 with EF=49%, diastolic dysfunction, reduced RV systolic function, RV enlargement.  (Report summary in clinic  notes 11/30/2018).   Hospitalized 11/22 in [place] for CHF exacerbation acute on chronic.   Nuclear stress test December 2018 with large fixed defect and EF=35%  1/7/2019:Coronary angiogram right and left heart cath:no obstructive disease.        Moderate episode of recurrent major depressive disorder (H) 04/10/2017     Priority: Medium     Anxiety 03/22/2017     Priority: Medium     3/23/2017:He has been on amitriptyline, nortriptyline, buspirone, sertraline and Lexapro in the past year.        Alcohol abuse 03/22/2017     Priority: Medium     7/12/2019:Not currently using alcohol.   None for years.        Chronic atrial fibrillation (H) 03/22/2017     Priority: Medium     S/p PPM 6/2016       Weight loss 03/22/2017     Priority: Medium     Peripheral polyneuropathy 03/22/2017     Priority: Medium     4/7/2017:Abnormal monofilament in distal plantar feet bilateral.  He has had chronic pain in both feet.   11/30/2018:nortriptyline has helped and he was up to 75mg a day.  He had some prolonged QT and dose cut back to 25mg at bedtime in hospital 11/22/2018.       Chronic gout, unspecified cause, unspecified site 03/22/2017     Priority: Medium     Essential hypertension with goal blood pressure less than 140/90 03/22/2017     Priority: Medium     Hyperlipidemia LDL goal <130 03/22/2017     Priority: Medium     Persistent insomnia 03/22/2017     Priority: Medium     11/30/2018:He has been on temazepam in the past which has helped some.  He was on this chronically.  He tried lorazepam which worked for a few days, then not effective.  Tried mirtazapine which did not help.         Lower urinary tract symptoms (LUTS) 03/22/2017     Priority: Medium     Long-term (current) use of anticoagulants [Z79.01] 03/22/2017     Priority: Medium       PERTINENT PAST MEDICAL HISTORY:  Past Medical History:   Diagnosis Date     Bleeding from wound 9/2/2018 7/12/2019:He had prolonged bleeding after cyst removal on his back within  the past year.          PREVIOUS SURGERIES:  Past Surgical History:   Procedure Laterality Date     ANGIOGRAM  01/2019    right and left heart cath.      APPENDECTOMY      ~2013     CATARACT IOL, RT/LT      Past bilateral cataract surgery.      COLONOSCOPY N/A 7/3/2020    Procedure: COLONOSCOPY, WITH POLYPECTOMY AND BIOPSY;  Surgeon: Fady Boyer MD;  Location: [facility]     EYE SURGERY Left     three corneal transplants     IMPLANT PACEMAKER       INCISION AND DRAINAGE TRUNK, COMBINED N/A 9/6/2018    Procedure: COMBINED INCISION AND DRAINAGE TRUNK;  incision and cauterization of left buttock bleed.;  Surgeon: Dain Davis MD;  Location: [facility]     INCISION AND DRAINAGE TRUNK, COMBINED N/A 11/1/2018    Procedure:  INCISION AND DRAINAGE of Back Wound;  Surgeon: Dain Davis MD;  Location: [facility]     JOINT REPLACEMENT Right     right total knee.      THORACIC SURGERY      removal benign nodule       ALLERGIES:     Allergies   Allergen Reactions     Amoxicillin Hives     Penicillins Other (See Comments)     Dizzy       Trazodone Other (See Comments)     Hallucinations         PERTINENT MEDICATIONS:    Current Outpatient Medications:      allopurinol (ZYLOPRIM) 100 MG tablet, TAKE TWO TABLETS BY MOUTH ONCE DAILY, Disp: 60 tablet, Rfl: 11     atorvastatin (LIPITOR) 20 MG tablet, TAKE ONE TABLET BY MOUTH ONCE DAILY, Disp: 30 tablet, Rfl: 11     bismuth subsalicylate (PEPTO-BISMOL) 262 MG TABS, Take 1 tablet by mouth 3 times daily, Disp: 90 tablet, Rfl: 3     calcium carbonate (OS- MG [place]. [place]) 1250 MG tablet, Take 1 tablet by mouth Every other day, Disp: , Rfl:      cyanocobalamin (VITAMIN B-12) 1000 MCG tablet, Take 1,000 mcg by mouth daily , Disp: , Rfl:      furosemide (LASIX) 40 MG tablet, Take 0.5 tablets (20 mg) by mouth daily, Disp: 45 tablet, Rfl: 3     gabapentin (NEURONTIN) 300 MG capsule, Take 1 capsule (300 mg) by mouth At Bedtime, Disp: 90 capsule, Rfl: 1     hypromellose (ARTIFICIAL TEARS) 0.4 % SOLN  ophthalmic solution, Place 1 drop into both eyes every hour as needed for dry eyes, Disp: , Rfl:      lisinopril (ZESTRIL) 5 MG tablet, TAKE TWO TABLETS BY MOUTH ONCE DAILY, Disp: 180 tablet, Rfl: 1     loperamide (IMODIUM) 2 MG capsule, Take 2 mg by mouth 4 times daily as needed for diarrhea, Disp: , Rfl:      metoprolol succinate ER (TOPROL-XL) 25 MG 24 hr tablet, TAKE ONE TABLET BY MOUTH EVERY EVENING, Disp: 90 tablet, Rfl: 1     multivitamin, therapeutic with minerals (MULTI-VITAMIN) TABS tablet, Take 1 tablet by mouth Every other day, Disp: 100 tablet, Rfl: 3     nortriptyline (PAMELOR) 25 MG capsule, TAKE ONE CAPSULE BY MOUTH EVERY NIGHT AT BEDTIME, Disp: 30 capsule, Rfl: 10     polyvinyl alcohol (LIQUIFILM TEARS) 1.4 % ophthalmic solution, 1-2 drops, Disp: , Rfl:      prednisoLONE acetate (PRED FORTE) 1 % ophthalmic susp, Place 1 drop Into the left eye 3 times daily , Disp: , Rfl: 2     warfarin ANTICOAGULANT (JANTOVEN ANTICOAGULANT) 2 MG tablet, Take 2 mg every Sun; 4 mg all other days or as directed by Anticoagulation Clinic, Disp: 175 tablet, Rfl: 0     budesonide (ENTOCORT EC) 3 MG EC capsule, Take 3 capsules (9 mg) by mouth every morning (Patient not taking: Reported on 4/2/2021), Disp: 90 capsule, Rfl: 1     gabapentin (NEURONTIN) 100 MG capsule, He takes gabapentin 100mg in AM, 100mg at Noon and takes one 300mg pill at bedtime. (Patient not taking: Reported on 4/2/2021), Disp: 150 capsule, Rfl: 11     mirtazapine (REMERON) 15 MG tablet, TAKE ONE TABLET BY MOUTH EVERY NIGHT AT BEDTIME (Patient not taking: Reported on 4/2/2021), Disp: 90 tablet, Rfl: 1     triamcinolone (KENALOG) 0.1 % external cream, APPLY TOPICALLY TO AFFECTED AREA(S) TWICE DAILY AS DIRECTED (Patient not taking: Reported on 4/2/2021), Disp: 45 g, Rfl: 1    Current Facility-Administered Medications:      hylan (SYNVISC ONE) injection 48 mg, 48 mg, , , Daniel Edwards DO, 48 mg at 03/18/21 1200    SOCIAL HISTORY:  Social History  "    Socioeconomic History     Marital status:      Spouse name: Not on file     Number of children: 3     Years of education: 13     Highest education level: Not on file   Occupational History     Occupation: retired   Social Needs     Financial resource strain: Not hard at all     Food insecurity     Worry: Never true     Inability: Never true     Transportation needs     Medical: No     Non-medical: No   Tobacco Use     Smoking status: Former Smoker     Years: 20.00     Types: Cigars     Smokeless tobacco: Never Used   Substance and Sexual Activity     Alcohol use: Not Currently     Drug use: No     Sexual activity: Not Currently   Lifestyle     Physical activity     Days per week: 7 days     Minutes per session: 20 min     Stress: Only a little   Relationships     Social connections     Talks on phone: More than three times a week     Gets together: More than three times a week     Attends Shinto service: More than 4 times per year     Active member of club or organization: No     Attends meetings of clubs or organizations: Never     Relationship status:      Intimate partner violence     Fear of current or ex partner: No     Emotionally abused: No     Physically abused: No     Forced sexual activity: No   Other Topics Concern     Parent/sibling w/ CABG, MI or angioplasty before 65F 55M? Not Asked   Social History Narrative    ** Merged History Encounter **            FAMILY HISTORY:  No family history on file.    Past/family/social history reviewed and no changes    PHYSICAL EXAMINATION:  Constitutional: aaox3, cooperative, pleasant, not dyspneic/diaphoretic, no acute distress  Vitals reviewed: BP 95/56 (BP Location: Right arm, Patient Position: Sitting, Cuff Size: Adult Regular)   Pulse 70   Ht 1.651 m (5' 5\")   Wt 80.4 kg (177 lb 3.2 oz)   SpO2 95%   BMI 29.49 kg/m    Wt:   Wt Readings from Last 2 Encounters:   04/02/21 80.4 kg (177 lb 3.2 oz)   03/18/21 79.4 kg (175 lb)      Eyes: Sclera " anicteric/injected  Ears/nose/mouth/throat: Normal oropharynx without ulcers or exudate, mucus membranes moist, hearing intact  Neck: supple, thyroid normal size  CV: No edema  Respiratory: Unlabored breathing  Lymph: No axillary, submandibular, supraclavicular or inguinal lymphadenopathy  Abd: Soft, Nondistended, +bs, no hepatosplenomegaly, nontender, no peritoneal signs  Skin: warm, perfused, no jaundice  Psych: Normal affect  MSK: Normal gait      PERTINENT STUDIES:  Most recent CBC:  Recent Labs   Lab Test 06/09/20  1553 04/28/20  1455   WBC 6.7 6.7   HGB 12.5* 12.6*   HCT 37.7* 38.8*   * 137*     Most recent hepatic panel:  Recent Labs   Lab Test 06/09/20  1553 07/26/19  1503   ALT 20 33   AST 22 26     Most recent creatinine:  Recent Labs   Lab Test 07/17/20  1434 06/09/20  1553   CR 1.52* 1.65*       ASSESSMENT/PLAN:    # lymphocytic colitis - doing well on BID fiber and daily pepto bismol - will continue.  He is also using as needed mineral oil for constipation and has a good response to this.  If worsening symptoms (either diarrhea or constipation) patient instructed to call for further recommendations    RTC 12 months or sooner if needed    Thank you for this consultation.  It was a pleasure to participate in the care of this patient; please contact us with any further questions.  A total of 30  minutes, face to face, was spent with this patient, >50% of which was counseling regarding the above delineated issues.    This note was created with voice recognition software, and while reviewed for accuracy, typos may remain.

## 2021-04-02 NOTE — LETTER
4/2/2021         RE: Nabor Cunningham  [address] [facility] [id number]        Dear Colleague,    Thank you for referring your patient, Nabor Cunningham, to the [facility]. Please see a copy of my visit note below.    GI CLINIC VISIT    CC/REFERRING MD:  No ref. provider found  REASON FOR CONSULTATION:   No ref. provider found for   Chief Complaint   Patient presents with     RECHECK     3 month follow up         HPI    Patient here today with his son for follow-up.  Continues to do well on once daily pepto bismol and BID fiber.  Usually has 1-2 BMs a day.  Has been taking mineral oil again to help keep his stools soft.    Heartburn well controlled on daily pepto.    No other complaints today.  Has intentionally lost a few pounds.     ROS:    No fevers or chills  No weight loss  No blurry vision, double vision or change in vision  No sore throat  No lymphadenopathy  No headache, paraesthesias, or weakness in a limb  No shortness of breath or wheezing  No chest pain or pressure  No arthralgias or myalgias  No rashes or skin changes  No odynophagia or dysphagia  No BRBPR, hematochezia, melena  No dysuria, frequency or urgency  No hot/cold intolerance or polyria  No anxiety or depression    PROBLEM LIST  Patient Active Problem List    Diagnosis Date Noted     CKD (chronic kidney disease) stage 4, GFR 15-29 ml/min (H) 03/01/2020     Priority: Medium     3/1/2020:decreased GFR within the past year.  Unremarkable renal ultrasound.   Unknown cause.    4/29/2020:UA and MAC have not yet been done. He has started lisinopril in November, 2018.          NICM (nonischemic cardiomyopathy) (H) 02/25/2020     Priority: Medium     CAD (coronary artery disease) 02/25/2020     Priority: Medium     1/7/19 Cath: Nonobstructive disease       Cardiac pacemaker in situ 12/21/2018     Priority: Medium     Dual chamber pacemaker placed in 2016 for sick sinus.       Acute on chronic combined systolic and  diastolic congestive heart failure (H) 11/30/2018     Priority: Medium     11/30/2018:He has seen cardiology through [place].  Echo in October, 2018 with EF=49%, diastolic dysfunction, reduced RV systolic function, RV enlargement.  (Report summary in clinic notes 11/30/2018).   Hospitalized 11/22 in [place] for CHF exacerbation acute on chronic.   Nuclear stress test December 2018 with large fixed defect and EF=35%  1/7/2019:Coronary angiogram right and left heart cath:no obstructive disease.        Moderate episode of recurrent major depressive disorder (H) 04/10/2017     Priority: Medium     Anxiety 03/22/2017     Priority: Medium     3/23/2017:He has been on amitriptyline, nortriptyline, buspirone, sertraline and Lexapro in the past year.        Alcohol abuse 03/22/2017     Priority: Medium     7/12/2019:Not currently using alcohol.   None for years.        Chronic atrial fibrillation (H) 03/22/2017     Priority: Medium     S/p PPM 6/2016       Weight loss 03/22/2017     Priority: Medium     Peripheral polyneuropathy 03/22/2017     Priority: Medium     4/7/2017:Abnormal monofilament in distal plantar feet bilateral.  He has had chronic pain in both feet.   11/30/2018:nortriptyline has helped and he was up to 75mg a day.  He had some prolonged QT and dose cut back to 25mg at bedtime in hospital 11/22/2018.       Chronic gout, unspecified cause, unspecified site 03/22/2017     Priority: Medium     Essential hypertension with goal blood pressure less than 140/90 03/22/2017     Priority: Medium     Hyperlipidemia LDL goal <130 03/22/2017     Priority: Medium     Persistent insomnia 03/22/2017     Priority: Medium     11/30/2018:He has been on temazepam in the past which has helped some.  He was on this chronically.  He tried lorazepam which worked for a few days, then not effective.  Tried mirtazapine which did not help.         Lower urinary tract symptoms (LUTS) 03/22/2017     Priority: Medium     Long-term  (current) use of anticoagulants [Z79.01] 03/22/2017     Priority: Medium       PERTINENT PAST MEDICAL HISTORY:  Past Medical History:   Diagnosis Date     Bleeding from wound 9/2/2018 7/12/2019:He had prolonged bleeding after cyst removal on his back within the past year.          PREVIOUS SURGERIES:  Past Surgical History:   Procedure Laterality Date     ANGIOGRAM  01/2019    right and left heart cath.      APPENDECTOMY      ~2013     CATARACT IOL, RT/LT      Past bilateral cataract surgery.      COLONOSCOPY N/A 7/3/2020    Procedure: COLONOSCOPY, WITH POLYPECTOMY AND BIOPSY;  Surgeon: Fady Boyer MD;  Location: [facility]     EYE SURGERY Left     three corneal transplants     IMPLANT PACEMAKER       INCISION AND DRAINAGE TRUNK, COMBINED N/A 9/6/2018    Procedure: COMBINED INCISION AND DRAINAGE TRUNK;  incision and cauterization of left buttock bleed.;  Surgeon: Dain Davis MD;  Location: [facility]     INCISION AND DRAINAGE TRUNK, COMBINED N/A 11/1/2018    Procedure:  INCISION AND DRAINAGE of Back Wound;  Surgeon: Dain Davis MD;  Location: [facility]     JOINT REPLACEMENT Right     right total knee.      THORACIC SURGERY      removal benign nodule       ALLERGIES:     Allergies   Allergen Reactions     Amoxicillin Hives     Penicillins Other (See Comments)     Dizzy       Trazodone Other (See Comments)     Hallucinations         PERTINENT MEDICATIONS:    Current Outpatient Medications:      allopurinol (ZYLOPRIM) 100 MG tablet, TAKE TWO TABLETS BY MOUTH ONCE DAILY, Disp: 60 tablet, Rfl: 11     atorvastatin (LIPITOR) 20 MG tablet, TAKE ONE TABLET BY MOUTH ONCE DAILY, Disp: 30 tablet, Rfl: 11     bismuth subsalicylate (PEPTO-BISMOL) 262 MG TABS, Take 1 tablet by mouth 3 times daily, Disp: 90 tablet, Rfl: 3     calcium carbonate (OS- MG [place]. [place]) 1250 MG tablet, Take 1 tablet by mouth Every other day, Disp: , Rfl:      cyanocobalamin (VITAMIN B-12) 1000 MCG tablet, Take 1,000 mcg by mouth daily , Disp: , Rfl:       furosemide (LASIX) 40 MG tablet, Take 0.5 tablets (20 mg) by mouth daily, Disp: 45 tablet, Rfl: 3     gabapentin (NEURONTIN) 300 MG capsule, Take 1 capsule (300 mg) by mouth At Bedtime, Disp: 90 capsule, Rfl: 1     hypromellose (ARTIFICIAL TEARS) 0.4 % SOLN ophthalmic solution, Place 1 drop into both eyes every hour as needed for dry eyes, Disp: , Rfl:      lisinopril (ZESTRIL) 5 MG tablet, TAKE TWO TABLETS BY MOUTH ONCE DAILY, Disp: 180 tablet, Rfl: 1     loperamide (IMODIUM) 2 MG capsule, Take 2 mg by mouth 4 times daily as needed for diarrhea, Disp: , Rfl:      metoprolol succinate ER (TOPROL-XL) 25 MG 24 hr tablet, TAKE ONE TABLET BY MOUTH EVERY EVENING, Disp: 90 tablet, Rfl: 1     multivitamin, therapeutic with minerals (MULTI-VITAMIN) TABS tablet, Take 1 tablet by mouth Every other day, Disp: 100 tablet, Rfl: 3     nortriptyline (PAMELOR) 25 MG capsule, TAKE ONE CAPSULE BY MOUTH EVERY NIGHT AT BEDTIME, Disp: 30 capsule, Rfl: 10     polyvinyl alcohol (LIQUIFILM TEARS) 1.4 % ophthalmic solution, 1-2 drops, Disp: , Rfl:      prednisoLONE acetate (PRED FORTE) 1 % ophthalmic susp, Place 1 drop Into the left eye 3 times daily , Disp: , Rfl: 2     warfarin ANTICOAGULANT (JANTOVEN ANTICOAGULANT) 2 MG tablet, Take 2 mg every Sun; 4 mg all other days or as directed by Anticoagulation Clinic, Disp: 175 tablet, Rfl: 0     budesonide (ENTOCORT EC) 3 MG EC capsule, Take 3 capsules (9 mg) by mouth every morning (Patient not taking: Reported on 4/2/2021), Disp: 90 capsule, Rfl: 1     gabapentin (NEURONTIN) 100 MG capsule, He takes gabapentin 100mg in AM, 100mg at Noon and takes one 300mg pill at bedtime. (Patient not taking: Reported on 4/2/2021), Disp: 150 capsule, Rfl: 11     mirtazapine (REMERON) 15 MG tablet, TAKE ONE TABLET BY MOUTH EVERY NIGHT AT BEDTIME (Patient not taking: Reported on 4/2/2021), Disp: 90 tablet, Rfl: 1     triamcinolone (KENALOG) 0.1 % external cream, APPLY TOPICALLY TO AFFECTED AREA(S) TWICE  DAILY AS DIRECTED (Patient not taking: Reported on 4/2/2021), Disp: 45 g, Rfl: 1    Current Facility-Administered Medications:      hylan (SYNVISC ONE) injection 48 mg, 48 mg, , , Daniel Edwards, , 48 mg at 03/18/21 1200    SOCIAL HISTORY:  Social History     Socioeconomic History     Marital status:      Spouse name: Not on file     Number of children: 3     Years of education: 13     Highest education level: Not on file   Occupational History     Occupation: retired   Social Needs     Financial resource strain: Not hard at all     Food insecurity     Worry: Never true     Inability: Never true     Transportation needs     Medical: No     Non-medical: No   Tobacco Use     Smoking status: Former Smoker     Years: 20.00     Types: Cigars     Smokeless tobacco: Never Used   Substance and Sexual Activity     Alcohol use: Not Currently     Drug use: No     Sexual activity: Not Currently   Lifestyle     Physical activity     Days per week: 7 days     Minutes per session: 20 min     Stress: Only a little   Relationships     Social connections     Talks on phone: More than three times a week     Gets together: More than three times a week     Attends [employer] service: More than 4 times per year     Active member of club or organization: No     Attends meetings of clubs or organizations: Never     Relationship status:      Intimate partner violence     Fear of current or ex partner: No     Emotionally abused: No     Physically abused: No     Forced sexual activity: No   Other Topics Concern     Parent/sibling w/ CABG, MI or angioplasty before 65F 55M? Not Asked   Social History Narrative    ** Merged History Encounter **            FAMILY HISTORY:  No family history on file.    Past/family/social history reviewed and no changes    PHYSICAL EXAMINATION:  Constitutional: aaox3, cooperative, pleasant, not dyspneic/diaphoretic, no acute distress  Vitals reviewed: BP 95/56 (BP Location: Right arm,  "Patient Position: Sitting, Cuff Size: Adult Regular)   Pulse 70   Ht 1.651 m (5' 5\")   Wt 80.4 kg (177 lb 3.2 oz)   SpO2 95%   BMI 29.49 kg/m    Wt:   Wt Readings from Last 2 Encounters:   04/02/21 80.4 kg (177 lb 3.2 oz)   03/18/21 79.4 kg (175 lb)      Eyes: Sclera anicteric/injected  Ears/nose/mouth/throat: Normal oropharynx without ulcers or exudate, mucus membranes moist, hearing intact  Neck: supple, thyroid normal size  CV: No edema  Respiratory: Unlabored breathing  Lymph: No axillary, submandibular, supraclavicular or inguinal lymphadenopathy  Abd: Soft, Nondistended, +bs, no hepatosplenomegaly, nontender, no peritoneal signs  Skin: warm, perfused, no jaundice  Psych: Normal affect  MSK: Normal gait      PERTINENT STUDIES:  Most recent CBC:  Recent Labs   Lab Test 06/09/20  1553 04/28/20  1455   WBC 6.7 6.7   HGB 12.5* 12.6*   HCT 37.7* 38.8*   * 137*     Most recent hepatic panel:  Recent Labs   Lab Test 06/09/20  1553 07/26/19  1503   ALT 20 33   AST 22 26     Most recent creatinine:  Recent Labs   Lab Test 07/17/20  1434 06/09/20  1553   CR 1.52* 1.65*       ASSESSMENT/PLAN:    # lymphocytic colitis - doing well on BID fiber and daily pepto bismol - will continue.  He is also using as needed mineral oil for constipation and has a good response to this.  If worsening symptoms (either diarrhea or constipation) patient instructed to call for further recommendations    RTC 12 months or sooner if needed    Thank you for this consultation.  It was a pleasure to participate in the care of this patient; please contact us with any further questions.  A total of 30  minutes, face to face, was spent with this patient, >50% of which was counseling regarding the above delineated issues.    This note was created with voice recognition software, and while reviewed for accuracy, typos may remain.       Nabor Cunningham's goals for this visit include:   Chief Complaint   Patient presents with     RECHECK     3 " "month follow up       He requests these members of his care team be copied on today's visit information: YES    PCP: Heron Mayo    Referring Provider:  No referring provider defined for this encounter.    BP 95/56 (BP Location: Right arm, Patient Position: Sitting, Cuff Size: Adult Regular)   Pulse 70   Ht 1.651 m (5' 5\")   Wt 80.4 kg (177 lb 3.2 oz)   SpO2 95%   BMI 29.49 kg/m      Do you need any medication refills at today's visit? NO  Wyatt Nguyễn, LORETA            Again, thank you for allowing me to participate in the care of your patient.        Sincerely,        Vivian Mobley, DO    "

## 2021-04-02 NOTE — PROGRESS NOTES
"Nabor Cunningham's goals for this visit include:   Chief Complaint   Patient presents with     RECHECK     3 month follow up       He requests these members of his care team be copied on today's visit information: YES    PCP: Heron Mayo    Referring Provider:  No referring provider defined for this encounter.    BP 95/56 (BP Location: Right arm, Patient Position: Sitting, Cuff Size: Adult Regular)   Pulse 70   Ht 1.651 m (5' 5\")   Wt 80.4 kg (177 lb 3.2 oz)   SpO2 95%   BMI 29.49 kg/m      Do you need any medication refills at today's visit? NO  Wyatt Nguyễn CMA        "

## 2021-04-09 ENCOUNTER — ANTICOAGULATION THERAPY VISIT (OUTPATIENT)
Dept: ANTICOAGULATION | Facility: CLINIC | Age: 86
End: 2021-04-09

## 2021-04-09 DIAGNOSIS — Z79.01 LONG TERM CURRENT USE OF ANTICOAGULANT THERAPY: ICD-10-CM

## 2021-04-09 DIAGNOSIS — I48.20 CHRONIC ATRIAL FIBRILLATION (H): ICD-10-CM

## 2021-04-09 DIAGNOSIS — Z79.01 CHRONIC ANTICOAGULATION: ICD-10-CM

## 2021-04-09 LAB
CAPILLARY BLOOD COLLECTION: NORMAL
INR PPP: 1.7 (ref 0.86–1.14)

## 2021-04-09 PROCEDURE — 36416 COLLJ CAPILLARY BLOOD SPEC: CPT | Performed by: FAMILY MEDICINE

## 2021-04-09 PROCEDURE — 85610 PROTHROMBIN TIME: CPT | Performed by: FAMILY MEDICINE

## 2021-04-09 NOTE — PROGRESS NOTES
ANTICOAGULATION MANAGEMENT     Patient Name:  Nabor Cunningham  Date:  2021    ASSESSMENT /SUBJECTIVE:    Today's INR result of 1.7 is subtherapeutic. Goal INR of 2.0-3.0      Warfarin dose taken: Warfarin taken as instructed    Diet: Increased greens/vitamin K in diet; ongoing change eats 1-2  salads per day because he doesn't like the meals on wheels    Medication changes/ interactions: No new medications/supplements affecting INR    Previous INR: Therapeutic     S/S of bleeding or thromboembolism: No    New injury or illness: No    Upcoming surgery, procedure or cardioversion: No    Additional findings: None      PLAN:    Telephone call with  SonFlorencio regarding INR result and instructed:     Warfarin Dosing Instructions: Boost today 6 mg  then continue your current warfarin dose of 2 mg  and 4 mg all other days    Instructed patient to follow up no later than: 1 week  Lab visit scheduled Patient already had a separate lab scheduled so added on to have INR checked    Education provided: Importance of consistent vitamin K intake, Impact of vitamin K foods on INR, Vitamin K content of foods, Importance of therapeutic range, Importance of following up for INR monitoring at instructed interval and Importance of taking warfarin as instructed      Florencio verbalizes understanding and agrees to warfarin dosing plan.    Instructed to call the Anticoagulation Clinic for any changes, questions or concerns. (#[phone])        Aixa Webster RN      OBJECTIVE:  Recent labs: (last 7 days)     21  1332   INR 1.70*         No question data found.  Anticoagulation Summary  As of 2021    INR goal:  2.0-3.0   TTR:  38.3 % (1 y)   INR used for dosin.70 (2021)   Warfarin maintenance plan:  2 mg (2 mg x 1) every Sun; 4 mg (2 mg x 2) all other days   Full warfarin instructions:  : 6 mg; Otherwise 2 mg every Sun; 4 mg all other days   Weekly warfarin total:  26 mg   Plan last modified:  [facility]  Aixa Renee, RN (3/11/2021)   Next INR check:  4/23/2021   Priority:  Maintenance   Target end date:  Indefinite    Indications    Chronic atrial fibrillation (H) [I48.20]  Long-term (current) use of anticoagulants [Z79.01] [Z79.01]             Anticoagulation Episode Summary     INR check location:      Preferred lab:      Send INR reminders to:  [facility]    Comments:  * 2mg tabs. Does not like to cut tabs. NO bandaid.       Anticoagulation Care Providers     Provider Role Specialty Phone number    Heron Mayo MD Referring Family Medicine [phone]

## 2021-04-11 DIAGNOSIS — E78.5 HYPERLIPIDEMIA LDL GOAL <130: ICD-10-CM

## 2021-04-12 RX ORDER — ATORVASTATIN CALCIUM 20 MG/1
TABLET, FILM COATED ORAL
Qty: 30 TABLET | Refills: 11 | Status: SHIPPED | OUTPATIENT
Start: 2021-04-12 | End: 2022-03-14

## 2021-04-15 ENCOUNTER — IMMUNIZATION (OUTPATIENT)
Dept: FAMILY MEDICINE | Facility: CLINIC | Age: 86
End: 2021-04-15
Attending: FAMILY MEDICINE
Payer: MEDICARE

## 2021-04-15 ENCOUNTER — ANTICOAGULATION THERAPY VISIT (OUTPATIENT)
Dept: ANTICOAGULATION | Facility: CLINIC | Age: 86
End: 2021-04-15

## 2021-04-15 DIAGNOSIS — I48.20 CHRONIC ATRIAL FIBRILLATION (H): ICD-10-CM

## 2021-04-15 DIAGNOSIS — I50.43 ACUTE ON CHRONIC COMBINED SYSTOLIC AND DIASTOLIC CONGESTIVE HEART FAILURE (H): ICD-10-CM

## 2021-04-15 DIAGNOSIS — Z79.01 CHRONIC ANTICOAGULATION: ICD-10-CM

## 2021-04-15 DIAGNOSIS — Z79.01 LONG TERM CURRENT USE OF ANTICOAGULANT THERAPY: ICD-10-CM

## 2021-04-15 DIAGNOSIS — K52.832 LYMPHOCYTIC COLITIS: ICD-10-CM

## 2021-04-15 LAB
ANION GAP SERPL CALCULATED.3IONS-SCNC: 6 MMOL/L (ref 3–14)
BUN SERPL-MCNC: 23 MG/DL (ref 7–30)
CALCIUM SERPL-MCNC: 9.1 MG/DL (ref 8.5–10.1)
CAPILLARY BLOOD COLLECTION: NORMAL
CHLORIDE SERPL-SCNC: 103 MMOL/L (ref 94–109)
CO2 SERPL-SCNC: 29 MMOL/L (ref 20–32)
CREAT SERPL-MCNC: 1.63 MG/DL (ref 0.66–1.25)
GFR SERPL CREATININE-BSD FRML MDRD: 38 ML/MIN/{1.73_M2}
GLUCOSE SERPL-MCNC: 84 MG/DL (ref 70–99)
HGB BLD-MCNC: 10.5 G/DL (ref 13.3–17.7)
INR PPP: 1.5 (ref 0.86–1.14)
NT-PROBNP SERPL-MCNC: 689 PG/ML (ref 0–450)
POTASSIUM SERPL-SCNC: 4 MMOL/L (ref 3.4–5.3)
SODIUM SERPL-SCNC: 138 MMOL/L (ref 133–144)

## 2021-04-15 PROCEDURE — 82784 ASSAY IGA/IGD/IGG/IGM EACH: CPT | Performed by: INTERNAL MEDICINE

## 2021-04-15 PROCEDURE — 83880 ASSAY OF NATRIURETIC PEPTIDE: CPT | Performed by: INTERNAL MEDICINE

## 2021-04-15 PROCEDURE — 83516 IMMUNOASSAY NONANTIBODY: CPT | Performed by: INTERNAL MEDICINE

## 2021-04-15 PROCEDURE — 91301 PR COVID VAC MODERNA 100 MCG/0.5 ML IM: CPT

## 2021-04-15 PROCEDURE — 85018 HEMOGLOBIN: CPT | Performed by: INTERNAL MEDICINE

## 2021-04-15 PROCEDURE — 85610 PROTHROMBIN TIME: CPT | Performed by: FAMILY MEDICINE

## 2021-04-15 PROCEDURE — 0012A PR COVID VAC MODERNA 100 MCG/0.5 ML IM: CPT

## 2021-04-15 PROCEDURE — 36416 COLLJ CAPILLARY BLOOD SPEC: CPT | Performed by: FAMILY MEDICINE

## 2021-04-15 PROCEDURE — 80048 BASIC METABOLIC PNL TOTAL CA: CPT | Performed by: INTERNAL MEDICINE

## 2021-04-15 PROCEDURE — 83516 IMMUNOASSAY NONANTIBODY: CPT | Mod: 59 | Performed by: INTERNAL MEDICINE

## 2021-04-15 NOTE — PROGRESS NOTES
ANTICOAGULATION MANAGEMENT     Patient Name:  Nabor Cunningham  Date:  4/15/2021    ASSESSMENT /SUBJECTIVE:    Today's INR result of 1.50 is subtherapeutic. Goal INR of 2.0-3.0      Warfarin dose taken: Warfarin taken as instructed    Diet: Increased greens/vitamin K in diet; ongoing change    Medication changes/ interactions: No new medications/supplements affecting INR    Previous INR: Subtherapeutic 1.70    S/S of bleeding or thromboembolism: No    New injury or illness: No    Upcoming surgery, procedure or cardioversion: No    Additional findings: None      PLAN:    Telephone call with Florencio regarding INR result and instructed:     Warfarin Dosing Instructions: Change your warfarin dose to 5mg Thurs; 4mg all other days  . (11.5 % change)    Instructed patient to follow up no later than: 12 days  Lab visit scheduled    Education provided: Importance of notifying clinic for changes in medications or health; a sooner lab recheck maybe needed       Florencio verbalizes understanding and agrees to warfarin dosing plan.    Instructed to call the Anticoagulation Clinic for any changes, questions or concerns. (#[phone])        Ada Isaac RN CACP       OBJECTIVE:  Recent labs: (last 7 days)     21  1332 04/15/21  0826   INR 1.70* 1.50*         INR assessment SUB    Recheck INR In:  12 days   INR Location Clinic      Anticoagulation Summary  As of 4/15/2021    INR goal:  2.0-3.0   TTR:  38.3 % (1 y)   INR used for dosin.50 (4/15/2021)   Warfarin maintenance plan:  5 mg (2 mg x 2.5) every Thu; 4 mg (2 mg x 2) all other days   Full warfarin instructions:  5 mg every Thu; 4 mg all other days   Weekly warfarin total:  29 mg   Plan last modified:  Ada Isaac RN (4/15/2021)   Next INR check:  2021   Priority:  Maintenance   Target end date:  Indefinite    Indications    Chronic atrial fibrillation (H) [I48.20]  Long-term (current) use of anticoagulants [Z79.01] [Z79.01]              Anticoagulation Episode Summary     INR check location:      Preferred lab:      Send INR reminders to:  [facility]    Comments:  * 2mg tabs. Does not like to cut tabs. NO bandaid.       Anticoagulation Care Providers     Provider Role Specialty Phone number    Heron Mayo MD Referring Family Medicine [phone]

## 2021-04-16 LAB
IGA SERPL-MCNC: 328 MG/DL (ref 84–499)
TTG IGA SER-ACNC: <1 U/ML
TTG IGG SER-ACNC: <1 U/ML

## 2021-04-19 ENCOUNTER — TELEPHONE (OUTPATIENT)
Dept: ANTICOAGULATION | Facility: CLINIC | Age: 86
End: 2021-04-19

## 2021-04-19 ENCOUNTER — TELEPHONE (OUTPATIENT)
Dept: FAMILY MEDICINE | Facility: CLINIC | Age: 86
End: 2021-04-19

## 2021-04-19 DIAGNOSIS — Z79.01 LONG TERM CURRENT USE OF ANTICOAGULANT THERAPY: ICD-10-CM
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
## 2021-04-19 DIAGNOSIS — I48.20 CHRONIC ATRIAL FIBRILLATION (H): ICD-10-CM

## 2021-04-19 RX ORDER — DOXYCYCLINE HYCLATE 100 MG
100 TABLET ORAL 2 TIMES DAILY
Qty: 20 TABLET | Refills: 0 | Status: SHIPPED | OUTPATIENT
Start: 2021-04-19 | End: 2021-04-29

## 2021-04-19 RX ORDER — METRONIDAZOLE 500 MG/1
500 TABLET ORAL 3 TIMES DAILY
Qty: 30 TABLET | Refills: 0 | Status: SHIPPED | OUTPATIENT
Start: 2021-04-19 | End: 2021-04-29

## 2021-04-19 NOTE — TELEPHONE ENCOUNTER
ANTICOAGULATION  MANAGEMENT     Interacting Medication Review    Interacting medication(s): Metronidazole (Flagyl) with warfarin. He is also taking doxycycline. Pt does have the chills today. The chills are a new Sx.   Pt did also have a flu shot and did have a reaction to that. The reaction included fatigue and didn't feel like himself. Pt did report back pain also from the flu shot.    Duration: 10 days  (4/19 to 4/28)    Indication: Possible infection    New medication?: Yes, interaction may increase INR and risk of bleeding       PLAN     Continue current warfarin dose. Recommend to check INR on Thursday. Will not hold dose at this time, last INR was low.    Spoke with Son Florencio. Advised for Florencio to inform FP of the chills Sx. Informed him that it could be a Sx of infection but since it is a new Sx - FP should be notified.  Patient educated on the increased risk for bleeding, precautions to take, and when to seek medical attention.    Anticoagulation Calendar updated    Jayde Whitt RN

## 2021-04-19 NOTE — TELEPHONE ENCOUNTER
Reason for call:  Patient reporting a symptom    Symptom or request: Son says his dad was bit by his cat again yesterday on his wrist. It broke the skin and bled a lot. It is red around the bite now.  He says he will need an antibiotic.     Duration (how long have symptoms been present): Yesterday    Have you been treated for this before? Yes he has been bit by the cat before    Additional comments:  NB pharmacy    Phone Number patient can be reached at:  Bandar Matias, [phone]    Best Time:  anytime    Can we leave a detailed message on this number:  YES    Call taken on 4/19/2021 at 8:26 AM by Marla Grace

## 2021-04-19 NOTE — TELEPHONE ENCOUNTER
Please call.  Due to penicillin allergy, he will need two different antibiotics.  I recommend:  doxycycline 100mg twice daily for 10 days   Plus  metronidazole 500mg three times daily for 10 days.   HE should contact anticoagulation clinic due to possible drug interactions with warfarin to see if he needs warfarin dose adjustment.  NO multivitamin when on antibiotics.  Rx sent to [facility] pharmacy.   VICTOR HUGO ART MD

## 2021-04-19 NOTE — TELEPHONE ENCOUNTER
Spoke with patient's son Florencio. Notified prescriptions sent to pharmacy and to hold multivitamin. Florencio verbalized understanding to contact [facility] clinic as well.   Home care instructions given : Clean wound daily with soap and water, keep clean and dry. Watch for signs of infection:Spreading redness or warmth from the wound  Increased pain or swelling  Fever   Colored fluid or pus draining from the wound            Decreased ability to move any body part                      near the bite area    7559-4146 [employer]. All rights reserved. This information is not intended as a substitute for professional medical care. Always follow your healthcare professional's instructions.  Anna Marie RG RN

## 2021-04-22 ENCOUNTER — ANTICOAGULATION THERAPY VISIT (OUTPATIENT)
Dept: ANTICOAGULATION | Facility: CLINIC | Age: 86
End: 2021-04-22

## 2021-04-22 DIAGNOSIS — Z79.01 LONG TERM CURRENT USE OF ANTICOAGULANT THERAPY: ICD-10-CM

## 2021-04-22 DIAGNOSIS — Z79.01 CHRONIC ANTICOAGULATION: ICD-10-CM

## 2021-04-22 DIAGNOSIS — I48.20 CHRONIC ATRIAL FIBRILLATION (H): ICD-10-CM

## 2021-04-22 LAB
CAPILLARY BLOOD COLLECTION: NORMAL
INR PPP: 1.6 (ref 0.86–1.14)

## 2021-04-22 PROCEDURE — 36416 COLLJ CAPILLARY BLOOD SPEC: CPT | Performed by: FAMILY MEDICINE

## 2021-04-22 PROCEDURE — 85610 PROTHROMBIN TIME: CPT | Performed by: FAMILY MEDICINE

## 2021-04-22 NOTE — PROGRESS NOTES
ANTICOAGULATION MANAGEMENT     Patient Name:  Nabor Cunningham  Date:  2021    ASSESSMENT /SUBJECTIVE:    Today's INR result of 1.60 is subtherapeutic. Goal INR of 2.0-3.0      Warfarin dose taken: Warfarin taken as instructed    Diet: No new diet changes affecting INR    Medication changes/ interactions: Interaction between Flagyl  started on  and warfarin may be affecting INR Also taking doxycycline     Previous INR: Subtherapeutic - maintenance dose increased    S/S of bleeding or thromboembolism: No    New injury or illness: Yes: cat bite    Upcoming surgery, procedure or cardioversion: No    Additional findings: Spoke with sonFlorencio who reports warfarin was taken as instructed. Patient started doxycycline and Flagyl on Monday-will be on Flagyl until . Also reports diarrhea is improved but patient has colitis so is ongoing.    PLAN:    Telephone call with  Florencio regarding INR result and instructed:     Warfarin Dosing Instructions: Take 6 mg today then continue your current warfarin dose of 5 mg every Thu; 4 mg all other days    Instructed patient to follow up no later than: Tuesday  Lab visit scheduled    Education provided: Target INR goal and significance of current INR result and Potential interaction between warfarin and Flagyl, doxycycline      Florencio verbalizes understanding and agrees to warfarin dosing plan.    Instructed to call the Anticoagulation Clinic for any changes, questions or concerns. (#[phone])        Kirstin Mendez RN      OBJECTIVE:  Recent labs: (last 7 days)     21  1347   INR 1.60*         INR assessment SUB    Recheck INR In: 5 DAYS    INR Location Clinic      Anticoagulation Summary  As of 2021    INR goal:  2.0-3.0   TTR:  38.3 % (1 y)   INR used for dosin.60 (2021)   Warfarin maintenance plan:  5 mg (2 mg x 2.5) every Thu; 4 mg (2 mg x 2) all other days   Full warfarin instructions:  : 6 mg; Otherwise 5 mg every Thu; 4 mg all other days    Weekly warfarin total:  29 mg   Plan last modified:  Ada Isaac RN (4/15/2021)   Next INR check:  4/27/2021   Priority:  High   Target end date:  Indefinite    Indications    Chronic atrial fibrillation (H) [I48.20]  Long-term (current) use of anticoagulants [Z79.01] [Z79.01]             Anticoagulation Episode Summary     INR check location:      Preferred lab:      Send INR reminders to:  [facility]    Comments:  * 2mg tabs. NO bandaid.       Anticoagulation Care Providers     Provider Role Specialty Phone number    Heron Mayo MD Referring Family Medicine [phone]

## 2021-04-27 ENCOUNTER — ANTICOAGULATION THERAPY VISIT (OUTPATIENT)
Dept: FAMILY MEDICINE | Facility: CLINIC | Age: 86
End: 2021-04-27

## 2021-04-27 ENCOUNTER — OFFICE VISIT (OUTPATIENT)
Dept: CARDIOLOGY | Facility: CLINIC | Age: 86
End: 2021-04-27
Attending: PHYSICIAN ASSISTANT
Payer: MEDICARE

## 2021-04-27 VITALS
DIASTOLIC BLOOD PRESSURE: 63 MMHG | OXYGEN SATURATION: 96 % | TEMPERATURE: 97.1 F | BODY MASS INDEX: 29.12 KG/M2 | SYSTOLIC BLOOD PRESSURE: 100 MMHG | HEART RATE: 74 BPM | WEIGHT: 175 LBS

## 2021-04-27 DIAGNOSIS — I48.20 CHRONIC ATRIAL FIBRILLATION (H): ICD-10-CM

## 2021-04-27 DIAGNOSIS — Z79.01 CHRONIC ANTICOAGULATION: ICD-10-CM

## 2021-04-27 DIAGNOSIS — Z79.01 LONG TERM CURRENT USE OF ANTICOAGULANT THERAPY: ICD-10-CM

## 2021-04-27 DIAGNOSIS — I50.22 CHRONIC SYSTOLIC HEART FAILURE (H): ICD-10-CM

## 2021-04-27 LAB
CAPILLARY BLOOD COLLECTION: NORMAL
INR PPP: 2.5 (ref 0.86–1.14)

## 2021-04-27 PROCEDURE — 99214 OFFICE O/P EST MOD 30 MIN: CPT | Performed by: INTERNAL MEDICINE

## 2021-04-27 PROCEDURE — 85610 PROTHROMBIN TIME: CPT | Performed by: FAMILY MEDICINE

## 2021-04-27 PROCEDURE — 99207 PR NO CHARGE NURSE ONLY: CPT | Performed by: FAMILY MEDICINE

## 2021-04-27 PROCEDURE — 36416 COLLJ CAPILLARY BLOOD SPEC: CPT | Performed by: FAMILY MEDICINE

## 2021-04-27 RX ORDER — OFLOXACIN 3 MG/ML
SOLUTION/ DROPS OPHTHALMIC
COMMUNITY
Start: 2020-07-24 | End: 2022-02-17

## 2021-04-27 NOTE — PROGRESS NOTES
ANTICOAGULATION FOLLOW-UP CLINIC VISIT    Patient Name:  Nabor Cunningham  Date:  2021  Contact Type:  Telephone/ Florencio    SUBJECTIVE:  Patient Findings     Comments:  Continues to take flagyl and doxy for 2 more days.        Clinical Outcomes     Negatives:  Major bleeding event, Thromboembolic event, Anticoagulation-related hospital admission, Anticoagulation-related ED visit, Anticoagulation-related fatality    Comments:  Continues to take flagyl and doxy for 2 more days.           OBJECTIVE    Recent labs: (last 7 days)     21  1411   INR 2.50*       ASSESSMENT / PLAN  INR assessment THER    Recheck INR In: 1 WEEK    INR Location Clinic      Anticoagulation Summary  As of 2021    INR goal:  2.0-3.0   TTR:  38.7 % (1 y)   INR used for dosin.50 (2021)   Warfarin maintenance plan:  5 mg (2 mg x 2.5) every Thu; 4 mg (2 mg x 2) all other days   Full warfarin instructions:  5 mg every Thu; 4 mg all other days   Weekly warfarin total:  29 mg   No change documented:  Gio Patel RN   Plan last modified:  Ada Isaac RN (4/15/2021)   Next INR check:  2021   Priority:  High   Target end date:  Indefinite    Indications    Chronic atrial fibrillation (H) [I48.20]  Long-term (current) use of anticoagulants [Z79.01] [Z79.01]             Anticoagulation Episode Summary     INR check location:      Preferred lab:      Send INR reminders to:  [facility]    Comments:  * 2mg tabs. NO bandaid.       Anticoagulation Care Providers     Provider Role Specialty Phone number    Heron Mayo MD Referring Family Medicine [phone]            See the Encounter Report to view Anticoagulation Flowsheet and Dosing Calendar (Go to Encounters tab in chart review, and find the Anticoagulation Therapy Visit)    Patient INR is therapeutic.  Patient will continue to take 29 mg weekly dosing and follow up in 1 week or sooner for any problems or concerns.        Gio  SAURAV Patel

## 2021-04-27 NOTE — LETTER
4/27/2021    Heron Mayo MD  [address] [facility] [address]    RE: Nabor Cunningham       Dear Colleague,    I had the pleasure of seeing Nabor Cunningham in the [facility].    CARDIOLOGY CLINIC CONSULTATION    REASON FOR CONSULT: HFREF    PRIMARY CARE PHYSICIAN:  Heron Mayo    HISTORY OF PRESENT ILLNESS:  This is a pleasant, 85-year-old gentleman who is here with his daughter, Nita.   He transferred care to me in Dec 2019.  He is a pleasant gentleman and has a history of atrial fibrillation, sinus node dysfunction and symptomatic bradycardia.  He got a pacemaker placed in 06/2016.  EF at that time was normal.  He has a mild stable 4.1 cm ascending aortic aneurysm.  Subsequently, he did okay until 11/2018 when he presented to the hospital with heart failure.  EF had dropped down to the 35%-40%.  As a result of that, he had a coronary angiography done in 01/2019, which showed nonobstructive disease.  He remains on a statin.  He is on warfarin for his atrial fibrillation. Remains in chronic AF. Device has been kept at [facility].  For his LV dysfunction, he has remained on guideline-directed medical therapy with metoprolol succinate 25 mg daily and lisinopril 10 mg daily. He is not on a mineralocorticoid receptor antagonist.      Was not seen last year due to COVID. He reports no problems with warfarin.  He walks with a walker.  He does not drive; he says his eyesight is bad.  NYHA class is difficult to discern, but my guess is he is NYHA class II.  He denies any chest pain, syncope, presyncope or falls. No significant change in the last year.     PAST MEDICAL HISTORY:  Past Medical History:   Diagnosis Date     Bleeding from wound 9/2/2018 7/12/2019:He had prolonged bleeding after cyst removal on his back within the past year.          MEDICATIONS:  Current Outpatient Medications   Medication     allopurinol (ZYLOPRIM) 100 MG tablet      atorvastatin (LIPITOR) 20 MG tablet     budesonide (ENTOCORT EC) 3 MG EC capsule     calcium carbonate (OS- MG [address]. CA) 1250 MG tablet     cyanocobalamin (VITAMIN B-12) 1000 MCG tablet     doxycycline hyclate (VIBRA-TABS) 100 MG tablet     furosemide (LASIX) 40 MG tablet     gabapentin (NEURONTIN) 100 MG capsule     gabapentin (NEURONTIN) 300 MG capsule     hypromellose (ARTIFICIAL TEARS) 0.4 % SOLN ophthalmic solution     lisinopril (ZESTRIL) 5 MG tablet     loperamide (IMODIUM) 2 MG capsule     metoprolol succinate ER (TOPROL-XL) 25 MG 24 hr tablet     metroNIDAZOLE (FLAGYL) 500 MG tablet     mirtazapine (REMERON) 15 MG tablet     multivitamin, therapeutic with minerals (MULTI-VITAMIN) TABS tablet     nortriptyline (PAMELOR) 25 MG capsule     ofloxacin (OCUFLOX) 0.3 % ophthalmic solution     polyvinyl alcohol (LIQUIFILM TEARS) 1.4 % ophthalmic solution     prednisoLONE acetate (PRED FORTE) 1 % ophthalmic susp     triamcinolone (KENALOG) 0.1 % external cream     warfarin ANTICOAGULANT (JANTOVEN ANTICOAGULANT) 2 MG tablet     bismuth subsalicylate (PEPTO-BISMOL) 262 MG TABS     No current facility-administered medications for this visit.        ALLERGIES:  Allergies   Allergen Reactions     Amoxicillin Hives     Penicillins Other (See Comments)     Dizzy       Trazodone Other (See Comments)     Hallucinations         SOCIAL HISTORY:  I have reviewed this patient's social history and updated it with pertinent information if needed. Nabor Cunningham  reports that he has quit smoking. His smoking use included cigars. He quit after 20.00 years of use. He has never used smokeless tobacco. He reports previous alcohol use. He reports that he does not use drugs.    FAMILY HISTORY:  I have reviewed this patient's family history and updated it with pertinent information if needed.   No family history on file.    REVIEW OF SYSTEMS:  Skin:  Positive for bruising   Eyes:  Positive for glasses  ENT:  Negative     Respiratory:  Negative    Cardiovascular:  Negative    Gastroenterology: Negative    Genitourinary:  Positive for retention;hesitancy  Musculoskeletal:  Negative    Neurologic:  Negative numbness or tingling of feet  Psychiatric:  Negative    Heme/Lymph/Imm:  Negative    Endocrine:  Negative        PHYSICAL EXAM:  Temp: 97.1  F (36.2  C) Temp src: Tympanic BP: 100/63 Pulse: 74     SpO2: 96 %      Vital Signs with Ranges  Temp:  [97.1  F (36.2  C)] 97.1  F (36.2  C)  Pulse:  [74] 74  BP: (100)/(63) 100/63  SpO2:  [96 %] 96 %  175 lbs 0 oz    Constitutional: awake, alert, no distress  Respiratory: Clear  Cardiovascular: Irregular. No edema, normal JVP.   GI: nondistended  Neuropsychiatric: appropriate affact    DATA:  Labs: Pertinent cardiac labs reviewed    ASSESSMENT:  Chronic AF on anticoagulation  SSS dual pacemaker in 2016  Non ischemic pacing induced cardiomyopathy  Hypertriglyceridemia  Non obstructive CAD    RECOMMENDATIONS:  1. Overall he is doing well without symptoms. No change in the last year.  2. I recommend an echo to follow up on LV dysfunction. If EF stable, then RTC in 1 year. If worsening, then change to Entresto, MRA addition and re-evaluate CRT candidacy with time.   3. Otherwise call me with any changes.  4. Follow up with PPM and INR clinics.     ROZINA Huber, [facility]  Cardiology - [facility]  April 27, 2021      cc:   Britt Mcghee PA-C  [address]  [place] [address]

## 2021-04-27 NOTE — PROGRESS NOTES
CARDIOLOGY CLINIC CONSULTATION    REASON FOR CONSULT: HFREF    PRIMARY CARE PHYSICIAN:  Heron Mayo    HISTORY OF PRESENT ILLNESS:  This is a pleasant, 85-year-old gentleman who is here with his daughter, Nita.   He transferred care to me in Dec 2019.  He is a pleasant gentleman and has a history of atrial fibrillation, sinus node dysfunction and symptomatic bradycardia.  He got a pacemaker placed in 06/2016.  EF at that time was normal.  He has a mild stable 4.1 cm ascending aortic aneurysm.  Subsequently, he did okay until 11/2018 when he presented to the hospital with heart failure.  EF had dropped down to the 35%-40%.  As a result of that, he had a coronary angiography done in 01/2019, which showed nonobstructive disease.  He remains on a statin.  He is on warfarin for his atrial fibrillation. Remains in chronic AF. Device has been kept at [facility].  For his LV dysfunction, he has remained on guideline-directed medical therapy with metoprolol succinate 25 mg daily and lisinopril 10 mg daily. He is not on a mineralocorticoid receptor antagonist.      Was not seen last year due to COVID. He reports no problems with warfarin.  He walks with a walker.  He does not drive; he says his eyesight is bad.  NYHA class is difficult to discern, but my guess is he is NYHA class II.  He denies any chest pain, syncope, presyncope or falls. No significant change in the last year.     PAST MEDICAL HISTORY:  Past Medical History:   Diagnosis Date     Bleeding from wound 9/2/2018 7/12/2019:He had prolonged bleeding after cyst removal on his back within the past year.          MEDICATIONS:  Current Outpatient Medications   Medication     allopurinol (ZYLOPRIM) 100 MG tablet     atorvastatin (LIPITOR) 20 MG tablet     budesonide (ENTOCORT EC) 3 MG EC capsule     calcium carbonate (OS- MG [place]. CA) 1250 MG tablet     cyanocobalamin (VITAMIN B-12) 1000 MCG tablet     doxycycline hyclate (VIBRA-TABS) 100 MG tablet      furosemide (LASIX) 40 MG tablet     gabapentin (NEURONTIN) 100 MG capsule     gabapentin (NEURONTIN) 300 MG capsule     hypromellose (ARTIFICIAL TEARS) 0.4 % SOLN ophthalmic solution     lisinopril (ZESTRIL) 5 MG tablet     loperamide (IMODIUM) 2 MG capsule     metoprolol succinate ER (TOPROL-XL) 25 MG 24 hr tablet     metroNIDAZOLE (FLAGYL) 500 MG tablet     mirtazapine (REMERON) 15 MG tablet     multivitamin, therapeutic with minerals (MULTI-VITAMIN) TABS tablet     nortriptyline (PAMELOR) 25 MG capsule     ofloxacin (OCUFLOX) 0.3 % ophthalmic solution     polyvinyl alcohol (LIQUIFILM TEARS) 1.4 % ophthalmic solution     prednisoLONE acetate (PRED FORTE) 1 % ophthalmic susp     triamcinolone (KENALOG) 0.1 % external cream     warfarin ANTICOAGULANT (JANTOVEN ANTICOAGULANT) 2 MG tablet     bismuth subsalicylate (PEPTO-BISMOL) 262 MG TABS     No current facility-administered medications for this visit.        ALLERGIES:  Allergies   Allergen Reactions     Amoxicillin Hives     Penicillins Other (See Comments)     Dizzy       Trazodone Other (See Comments)     Hallucinations         SOCIAL HISTORY:  I have reviewed this patient's social history and updated it with pertinent information if needed. Nabor Cunningham  reports that he has quit smoking. His smoking use included cigars. He quit after 20.00 years of use. He has never used smokeless tobacco. He reports previous alcohol use. He reports that he does not use drugs.    FAMILY HISTORY:  I have reviewed this patient's family history and updated it with pertinent information if needed.   No family history on file.    REVIEW OF SYSTEMS:  Skin:  Positive for bruising   Eyes:  Positive for glasses  ENT:  Negative    Respiratory:  Negative    Cardiovascular:  Negative    Gastroenterology: Negative    Genitourinary:  Positive for retention;hesitancy  Musculoskeletal:  Negative    Neurologic:  Negative numbness or tingling of feet  Psychiatric:  Negative    Heme/Lymph/Imm:   Negative    Endocrine:  Negative        PHYSICAL EXAM:  Temp: 97.1  F (36.2  C) Temp src: Tympanic BP: 100/63 Pulse: 74     SpO2: 96 %      Vital Signs with Ranges  Temp:  [97.1  F (36.2  C)] 97.1  F (36.2  C)  Pulse:  [74] 74  BP: (100)/(63) 100/63  SpO2:  [96 %] 96 %  175 lbs 0 oz    Constitutional: awake, alert, no distress  Respiratory: Clear  Cardiovascular: Irregular. No edema, normal JVP.   GI: nondistended  Neuropsychiatric: appropriate affact    DATA:  Labs: Pertinent cardiac labs reviewed    ASSESSMENT:  Chronic AF on anticoagulation  SSS dual pacemaker in 2016  Non ischemic pacing induced cardiomyopathy  Hypertriglyceridemia  Non obstructive CAD    RECOMMENDATIONS:  1. Overall he is doing well without symptoms. No change in the last year.  2. I recommend an echo to follow up on LV dysfunction. If EF stable, then RTC in 1 year. If worsening, then change to Entresto, MRA addition and re-evaluate CRT candidacy with time.   3. Otherwise call me with any changes.  4. Follow up with PPM and INR clinics.     ROZINA Huber, [facility]  Cardiology - [facility]  April 27, 2021

## 2021-05-05 ENCOUNTER — ANTICOAGULATION THERAPY VISIT (OUTPATIENT)
Dept: ANTICOAGULATION | Facility: CLINIC | Age: 86
End: 2021-05-05

## 2021-05-05 ENCOUNTER — TELEPHONE (OUTPATIENT)
Dept: ANTICOAGULATION | Facility: CLINIC | Age: 86
End: 2021-05-05

## 2021-05-05 ENCOUNTER — HOSPITAL ENCOUNTER (OUTPATIENT)
Dept: CARDIOLOGY | Facility: CLINIC | Age: 86
Discharge: HOME | End: 2021-05-05
Attending: INTERNAL MEDICINE | Admitting: INTERNAL MEDICINE
Payer: MEDICARE

## 2021-05-05 DIAGNOSIS — I48.20 CHRONIC ATRIAL FIBRILLATION (H): Primary | ICD-10-CM

## 2021-05-05 DIAGNOSIS — Z79.01 CHRONIC ANTICOAGULATION: ICD-10-CM

## 2021-05-05 DIAGNOSIS — I50.22 CHRONIC SYSTOLIC HEART FAILURE (H): ICD-10-CM

## 2021-05-05 DIAGNOSIS — Z79.01 LONG TERM CURRENT USE OF ANTICOAGULANT THERAPY: ICD-10-CM

## 2021-05-05 DIAGNOSIS — I48.20 CHRONIC ATRIAL FIBRILLATION (H): ICD-10-CM

## 2021-05-05 LAB
CAPILLARY BLOOD COLLECTION: NORMAL
INR PPP: 2.7 (ref 0.86–1.14)

## 2021-05-05 PROCEDURE — 36416 COLLJ CAPILLARY BLOOD SPEC: CPT | Performed by: FAMILY MEDICINE

## 2021-05-05 PROCEDURE — 93306 TTE W/DOPPLER COMPLETE: CPT | Mod: 26 | Performed by: INTERNAL MEDICINE

## 2021-05-05 PROCEDURE — 255N000002 HC RX 255 OP 636: Performed by: INTERNAL MEDICINE

## 2021-05-05 PROCEDURE — 85610 PROTHROMBIN TIME: CPT | Performed by: FAMILY MEDICINE

## 2021-05-05 PROCEDURE — 999N000208 ECHOCARDIOGRAM COMPLETE

## 2021-05-05 RX ORDER — WARFARIN SODIUM 2 MG/1
TABLET ORAL
Qty: 175 TABLET | Refills: 0 | COMMUNITY
Start: 2021-05-05 | End: 2021-06-17

## 2021-05-05 RX ADMIN — HUMAN ALBUMIN MICROSPHERES AND PERFLUTREN 2 ML: 10; .22 INJECTION, SOLUTION INTRAVENOUS at 10:23

## 2021-05-05 NOTE — PROGRESS NOTES
ANTICOAGULATION MANAGEMENT     Patient Name:  Nabor Cunningham  Date:  2021    ASSESSMENT /SUBJECTIVE:    Today's INR result of 2.70 is therapeutic. Goal INR of 2.0-3.0      Warfarin dose taken: Warfarin taken as instructed    Diet: No new diet changes affecting INR    Medication changes/ interactions: No new medications/supplements affecting INR    Previous INR: Therapeutic     S/S of bleeding or thromboembolism: No    New injury or illness: No    Upcoming surgery, procedure or cardioversion: No    Additional findings: None      PLAN:    Telephone call with  Florencio regarding INR result and instructed:     Warfarin Dosing Instructions: Continue your current warfarin dose 5 mg Thu; 4 mg ROW.    Instructed patient to follow up no later than: 2 weeks  Lab visit scheduled    Education provided: Contact [facility] Anticoagulation: [phone]  with any changes, questions or concerns.       Florencio verbalizes understanding and agrees to warfarin dosing plan.    Instructed to call the Anticoagulation Clinic for any changes, questions or concerns. (#[phone])        Daphne Lamar RN      OBJECTIVE:  Recent labs: (last 7 days)     21  1108   INR 2.70*         No question data found.  Anticoagulation Summary  As of 2021    INR goal:  2.0-3.0   TTR:  39.3 % (1 y)   INR used for dosin.70 (2021)   Warfarin maintenance plan:  5 mg (2 mg x 2.5) every Thu; 4 mg (2 mg x 2) all other days   Full warfarin instructions:  5 mg every Thu; 4 mg all other days   Weekly warfarin total:  29 mg   Plan last modified:  Ada Isaac RN (4/15/2021)   Next INR check:  2021   Priority:  High   Target end date:  Indefinite    Indications    Chronic atrial fibrillation (H) [I48.20]  Long-term (current) use of anticoagulants [Z79.01] [Z79.01]             Anticoagulation Episode Summary     INR check location:      Preferred lab:      Send INR reminders to:  [facility]    Comments:   * 2mg tabs. NO bandaid.       Anticoagulation Care Providers     Provider Role Specialty Phone number    Heron Mayo MD Referring Family Medicine [phone]

## 2021-05-05 NOTE — TELEPHONE ENCOUNTER
ANTICOAGULATION MANAGEMENT      Nabor Cunningham due for annual renewal of referral to anticoagulation monitoring. Order pended for your review and signature.      ANTICOAGULATION SUMMARY      Warfarin indication(s)     Atrial fibrillation    Heart valve present?  NO       Current goal range   INR: 2.0-3.0     Goal appropriate for indication? Yes, INR 2-3 appropriate for hx of DVT, PE, hypercoagulable state, Afib, LVAD, or bileaflet AVR without risk factors     Current duration of therapy Indefinite/long term therapy   Time in Therapeutic Range (TTR)  (Goal > 60%) 38.3 %       Office visit with referring provider's group within last year yes on 7/12/20       Daphne Lamar RN

## 2021-05-07 ENCOUNTER — OFFICE VISIT (OUTPATIENT)
Dept: FAMILY MEDICINE | Facility: CLINIC | Age: 86
End: 2021-05-07
Payer: MEDICARE

## 2021-05-07 VITALS
SYSTOLIC BLOOD PRESSURE: 98 MMHG | DIASTOLIC BLOOD PRESSURE: 50 MMHG | BODY MASS INDEX: 29.12 KG/M2 | OXYGEN SATURATION: 97 % | WEIGHT: 175 LBS | RESPIRATION RATE: 18 BRPM | TEMPERATURE: 97.3 F | HEART RATE: 86 BPM

## 2021-05-07 DIAGNOSIS — R82.90 NONSPECIFIC FINDING ON EXAMINATION OF URINE: ICD-10-CM

## 2021-05-07 DIAGNOSIS — R39.11 BENIGN PROSTATIC HYPERPLASIA WITH URINARY HESITANCY: Primary | ICD-10-CM

## 2021-05-07 DIAGNOSIS — N40.1 BENIGN PROSTATIC HYPERPLASIA WITH URINARY HESITANCY: Primary | ICD-10-CM

## 2021-05-07 DIAGNOSIS — R39.11 URINARY HESITANCY: ICD-10-CM

## 2021-05-07 LAB
ALBUMIN UR-MCNC: 30 MG/DL
APPEARANCE UR: CLEAR
BACTERIA #/AREA URNS HPF: ABNORMAL /HPF
BILIRUB UR QL STRIP: NEGATIVE
COLOR UR AUTO: YELLOW
GLUCOSE UR STRIP-MCNC: NEGATIVE MG/DL
HGB UR QL STRIP: NEGATIVE
HYALINE CASTS #/AREA URNS LPF: ABNORMAL /LPF
KETONES UR STRIP-MCNC: NEGATIVE MG/DL
LEUKOCYTE ESTERASE UR QL STRIP: ABNORMAL
NITRATE UR QL: NEGATIVE
NON-SQ EPI CELLS #/AREA URNS LPF: ABNORMAL /LPF
PH UR STRIP: 5 PH (ref 5–7)
RBC #/AREA URNS AUTO: ABNORMAL /HPF
SOURCE: ABNORMAL
SP GR UR STRIP: 1.02 (ref 1–1.03)
UROBILINOGEN UR STRIP-ACNC: 0.2 EU/DL (ref 0.2–1)
WBC #/AREA URNS AUTO: ABNORMAL /HPF

## 2021-05-07 PROCEDURE — 87086 URINE CULTURE/COLONY COUNT: CPT | Performed by: FAMILY MEDICINE

## 2021-05-07 PROCEDURE — 81001 URINALYSIS AUTO W/SCOPE: CPT | Performed by: FAMILY MEDICINE

## 2021-05-07 PROCEDURE — 99213 OFFICE O/P EST LOW 20 MIN: CPT | Performed by: FAMILY MEDICINE

## 2021-05-07 RX ORDER — TAMSULOSIN HYDROCHLORIDE 0.4 MG/1
0.4 CAPSULE ORAL DAILY
Qty: 30 CAPSULE | Refills: 5 | Status: SHIPPED | OUTPATIENT
Start: 2021-05-07 | End: 2021-10-18

## 2021-05-07 ASSESSMENT — PAIN SCALES - GENERAL: PAINLEVEL: NO PAIN (0)

## 2021-05-07 ASSESSMENT — PATIENT HEALTH QUESTIONNAIRE - PHQ9: SUM OF ALL RESPONSES TO PHQ QUESTIONS 1-9: 0

## 2021-05-07 NOTE — PATIENT INSTRUCTIONS
ASSESSMENT:   (N40.1,  R39.11) Benign prostatic hyperplasia with urinary hesitancy  (primary encounter diagnosis)  Comment: causing problems with urination.   Plan:   Start tamsulosin 0.4mg daily.   If not better in a month, let me know, we can try higher dose or see urology.    Some wbc (white blood cells) on urine test today.    We will check urine culture to see if there is any sign of infection.     (R39.11) Urinary hesitancy  Comment: see above   Plan: *UA reflex to Microscopic and Culture ([place]         and [facility] (except [place] and         [place]), Urine Microscopic           (R82.90) Nonspecific finding on examination of urine  Comment:   Plan: Urine Culture Aerobic Bacterial

## 2021-05-07 NOTE — PROGRESS NOTES
ASSESSMENT:   (N40.1,  R39.11) Benign prostatic hyperplasia with urinary hesitancy  (primary encounter diagnosis)  Comment: causing problems with urination.   Plan:   Start tamsulosin 0.4mg daily.   If not better in a month, let me know, we can try higher dose or see urology.    Some wbc (white blood cells) on urine test today.    We will check urine culture to see if there is any sign of infection.     (R39.11) Urinary hesitancy  Comment: see above   Plan: *UA reflex to Microscopic and Culture ([place]         and [facility] (except [facility] and         [place]), Urine Microscopic           (R82.90) Nonspecific finding on examination of urine  Comment:   Plan: Urine Culture Aerobic Bacterial             Subjective   Ashkan is a 85 year old who presents for the following health issues  Chief Complaint   Patient presents with     Urinary Problem     difficulty starting urine stream.        He would like to try Flomax for urinary symptoms.   NocturiaX3.    Seems like it takes a long time to get urine started.    Seems to empty bladder OK.  Stream is slow.   No dysuria.   Onset of symptoms: a couple years.     Genitourinary - Male, states would like to try a med.  Onset/Duration: 2 years  Description:   Dysuria (painful urination): no}  Hematuria (blood in urine): no  Frequency: no  Waking at night to urinate: YES x 2  Hesitancy (delay in urine): YES  Retention (unable to empty): no  Decrease in urinary flow: YES  Incontinence: no  Progression of Symptoms:  worsening  Accompanying Signs & Symptoms:  Fever: no  Back/Flank pain: no  Urethral discharge: no  Testicle lumps/masses/pain: no  Nausea and/or vomiting: no  Abdominal pain: no  History:   History of frequent UTI s: no  History of kidney stones: no  History of hernias: no  Personal or Family history of Prostate problems: no  Sexually active: no  Precipitating or alleviating factors: None  Therapies tried and outcome: none    Patient Active Problem List    Diagnosis     Anxiety     Alcohol abuse     Chronic atrial fibrillation (H)     Weight loss     Peripheral polyneuropathy     Chronic gout, unspecified cause, unspecified site     Essential hypertension with goal blood pressure less than 140/90     Hyperlipidemia LDL goal <130     Persistent insomnia     Lower urinary tract symptoms (LUTS)     Long-term (current) use of anticoagulants [Z79.01]     Moderate episode of recurrent major depressive disorder (H)     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 rate 18, weight 79.4 kg (175 lb), SpO2 97 %. BMI=Body mass index is 29.12 kg/m .  GENERAL APPEARANCE ADULT: Alert, no acute distress, walks with a walker, needs assist getting up on exam table.   ABDOMEN: soft, no organomegaly, masses or tenderness  UA with 10-25wbc.

## 2021-05-07 NOTE — NURSING NOTE
"Chief Complaint   Patient presents with     Urinary Problem     difficulty starting urine stream.       Initial BP 98/50 (BP Location: Right arm, Patient Position: Chair, Cuff Size: Adult Regular)   Pulse 86   Temp 97.3  F (36.3  C) (Tympanic)   Resp 18   Wt 79.4 kg (175 lb)   SpO2 97%   BMI 29.12 kg/m   Estimated body mass index is 29.12 kg/m  as calculated from the following:    Height as of 4/2/21: 1.651 m (5' 5\").    Weight as of this encounter: 79.4 kg (175 lb).    Patient presents to the clinic using [employer] Maintenance that is potentially due pending provider review:          Is there anyone who you would like to be able to receive your results?   If yes have patient fill out [facility]    "

## 2021-05-08 LAB
BACTERIA SPEC CULT: NORMAL
SPECIMEN SOURCE: NORMAL

## 2021-05-09 NOTE — RESULT ENCOUNTER NOTE
Hi Bear,   Your urine culture was negative for infection, no antibiotics are needed.   VICTOR HUGO ART MD

## 2021-05-13 DIAGNOSIS — I10 ESSENTIAL HYPERTENSION WITH GOAL BLOOD PRESSURE LESS THAN 140/90: ICD-10-CM

## 2021-05-13 DIAGNOSIS — I50.43 ACUTE ON CHRONIC COMBINED SYSTOLIC AND DIASTOLIC CONGESTIVE HEART FAILURE (H): ICD-10-CM

## 2021-05-13 RX ORDER — METOPROLOL SUCCINATE 25 MG/1
TABLET, EXTENDED RELEASE ORAL
Qty: 90 TABLET | Refills: 1 | Status: SHIPPED | OUTPATIENT
Start: 2021-05-13 | End: 2021-11-11

## 2021-05-14 DIAGNOSIS — G62.9 PERIPHERAL POLYNEUROPATHY: ICD-10-CM

## 2021-05-14 NOTE — TELEPHONE ENCOUNTER
LOV: 7/7/2020  -- Continue the Gabapentin: 300mg at bedtime for neuropathy.  -- Return to Neurology clinic in 3-4 months.     Medication not listed on [facility] protocol. Routed to provider for consideration    Darlene ALMEIDA RN   Specialty Clinics

## 2021-05-14 NOTE — TELEPHONE ENCOUNTER
Requested Prescriptions   Pending Prescriptions Disp Refills     gabapentin (NEURONTIN) 300 MG capsule 90 capsule 1     Sig: Take 1 capsule (300 mg) by mouth At Bedtime       There is no refill protocol information for this order       Last office visit: 10/14/2019 with prescribing provider:  Dr. Rodriguez   Future Office Visit:          Olegario Vega  Specialty [facility]

## 2021-05-17 RX ORDER — GABAPENTIN 300 MG/1
300 CAPSULE ORAL AT BEDTIME
Qty: 90 CAPSULE | Refills: 1 | Status: SHIPPED | OUTPATIENT
Start: 2021-05-17 | End: 2022-01-28

## 2021-05-21 ENCOUNTER — ANTICOAGULATION THERAPY VISIT (OUTPATIENT)
Dept: ANTICOAGULATION | Facility: CLINIC | Age: 86
End: 2021-05-21

## 2021-05-21 DIAGNOSIS — I48.20 CHRONIC ATRIAL FIBRILLATION (H): ICD-10-CM

## 2021-05-21 DIAGNOSIS — Z79.01 LONG TERM CURRENT USE OF ANTICOAGULANT THERAPY: ICD-10-CM

## 2021-05-21 LAB
CAPILLARY BLOOD COLLECTION: NORMAL
INR PPP: 1.5 (ref 0.86–1.14)

## 2021-05-21 PROCEDURE — 36416 COLLJ CAPILLARY BLOOD SPEC: CPT | Performed by: FAMILY MEDICINE

## 2021-05-21 PROCEDURE — 85610 PROTHROMBIN TIME: CPT | Performed by: FAMILY MEDICINE

## 2021-05-21 NOTE — PROGRESS NOTES
ANTICOAGULATION MANAGEMENT     Patient Name:  Nabor Cunningham  Date:  5/21/2021    ASSESSMENT /SUBJECTIVE:    Today's INR result of 1.5 is subtherapeutic. Goal INR of 2.0-3.0      Warfarin dose taken: Florencio thinks he took all the doses but cannot confirm    Diet: Increased greens/vitamin K in diet; plans to resume previous intake    Medication changes/ interactions: No new medications/supplements affecting INR    Previous INR: Therapeutic     S/S of bleeding or thromboembolism: No    New injury or illness: No    Upcoming surgery, procedure or cardioversion: No    Additional findings: None      PLAN:    Telephone call with  Florencio regarding INR result and instructed:     Warfarin Dosing Instructions: 8 mg today then continue your current warfarin dose of 5 mg Thurs; 4 mg ROW    Instructed patient to follow up no later than: 1 week  Lab visit scheduled    Education provided: Importance of consistent vitamin K intake, Impact of vitamin K foods on INR, Monitoring for clotting signs and symptoms and Contact [facility] Anticoagulation: [phone]  with any changes, questions or concerns.       Florencio verbalizes understanding and agrees to warfarin dosing plan.    Instructed to call the Anticoagulation Clinic for any changes, questions or concerns. (#[phone])        Belia Jones RN      OBJECTIVE:  Recent labs: (last 7 days)     05/21/21  1451   INR 1.50*         No question data found.  Anticoagulation Summary  As of 5/21/2021    INR goal:  2.0-3.0   TTR:  41.9 % (1 y)   INR used for dosing:     Warfarin maintenance plan:  5 mg (2 mg x 2.5) every Thu; 4 mg (2 mg x 2) all other days   Full warfarin instructions:  5/21: 8 mg; Otherwise 5 mg every Thu; 4 mg all other days   Weekly warfarin total:  29 mg   Plan last modified:  Ada Isaac RN (4/15/2021)   Next INR check:     Priority:  Maintenance   Target end date:  Indefinite    Indications    Chronic atrial fibrillation (H)  [I48.20]  Long-term (current) use of anticoagulants [Z79.01] [Z79.01]             Anticoagulation Episode Summary     INR check location:      Preferred lab:      Send INR reminders to:  [facility]    Comments:  * 2mg tabs. NO bandaid.       Anticoagulation Care Providers     Provider Role Specialty Phone number    Heron Mayo MD Referring Family Medicine [phone]

## 2021-05-26 ENCOUNTER — RECORDS - HEALTHEAST (OUTPATIENT)
Dept: ADMINISTRATIVE | Facility: CLINIC | Age: 86
End: 2021-05-26

## 2021-05-28 ENCOUNTER — ANTICOAGULATION THERAPY VISIT (OUTPATIENT)
Dept: ANTICOAGULATION | Facility: CLINIC | Age: 86
End: 2021-05-28

## 2021-05-28 DIAGNOSIS — I48.20 CHRONIC ATRIAL FIBRILLATION (H): ICD-10-CM

## 2021-05-28 DIAGNOSIS — Z79.01 LONG TERM CURRENT USE OF ANTICOAGULANT THERAPY: ICD-10-CM

## 2021-05-28 LAB
CAPILLARY BLOOD COLLECTION: NORMAL
INR PPP: 2 (ref 0.86–1.14)

## 2021-05-28 PROCEDURE — 36416 COLLJ CAPILLARY BLOOD SPEC: CPT | Performed by: FAMILY MEDICINE

## 2021-05-28 PROCEDURE — 85610 PROTHROMBIN TIME: CPT | Performed by: FAMILY MEDICINE

## 2021-05-28 NOTE — PROGRESS NOTES
Reason For Visit/Interval History: Nabor Cunningham, 82 year old male, seen as outpatient to RE-evaluate left buttock wound. Referred by Dr. Boyer. Patient presents with daughter today.  Pt is home with Homecare nursing for wound care. Son here for visit today, states thinks wound is better. This is a co-visit with Dr. Davis.    History: Pt was seen by Dr. Boyer for left gluteal abscess wound on 8/27/18 at which time he had incision and debridement.  Pt had issues with bleeding from this wound requiring ED visit day after I&D and a trip to OR with Dr. Davis 9/6/18 including debridement, cautery and sutures.  Pt was discharged from [facility] inpatient care on 9/7/18 after Dr. Boyer consulted for ongoing wound edge bleeding which he treated with FloSeal and Surgicel and pressure dressing.  [facility] and [facility]      Signficant co-morbidities include Hx of ETOH abuse, a fib on coumadin, (which is on hold due to bleeding,) peripheral polyneuropathy, depression, and weight loss.    Personal/social history: Home with Homecare and daughter is very supportive.      Objective:   Physical appearance: elderly, thin  Ambulation: independant  Current treatment plan: Aquacel Ag and Mepilex border 6x6,  barrier cream on periwound skin  Last changed: Wednesday     Wound #1 Left upper buttock  Stage/tissue depth: full thickness  0.8 cm L x 1cm W x 1.5 cm deep near 9:00 wound edge, rest is flush  Tunneling: no  Undermining: no  Wound bed type/amount: 100% red and granular, one 2-3mm spot that is slightly hypergranular  Wound Edges: attached  Periwound: intact, no induration  Drainage: moderate serosanguinous, minimal strike though on Mepilex, Hydrofera blue is saturated where in wound and turning white.  Odor: no  Pain: none relayed    INR 2.5 on 10/15/18, will recheck today    Diet: Prior visit - states his appetite is ok, he is eating ok and he is getting 3 meals  per day  Smoking:     Education: topical plan of care, moist wound healing, importance of good nutrition      Assessment:  Left buttock abscess wound s/p suturing, debridement and cautery due to multiple episodes of bleeding, is granulating and healing well        Plan:  Hypergranular tissue cauterized.  Continue hydrofera blue for control of hypergranulation, tuck into depth and cover with Mepilex border 6x6 for absorption, change 3 times per week at minimum.  [facility] is changing dressing three times weekly.    Wound Care: Done as per instructions    Instructions sent with pt:  No change in plan of care, may stop hydrofera blue once filled to skin level this was written for pt to share with homecare    Follow-up in clinic as needed    Discussed plan of care with patient and son        Verbal, written, & demonstrative education provided.  Face to face time  approximately 30 minutes.    Monica Lundberg RN, CWOCN     [phone]             5

## 2021-05-28 NOTE — PROGRESS NOTES
ANTICOAGULATION MANAGEMENT     Patient Name:  Nabor Cunningham  Date:  2021    ASSESSMENT /SUBJECTIVE:    Today's INR result of  is therapeutic. Goal INR of 2.0-3.0      Warfarin dose taken: Warfarin taken as instructed    Diet: No new diet changes affecting INR    Medication changes/ interactions: No new medications/supplements affecting INR    Previous INR: Subtherapeutic     S/S of bleeding or thromboembolism: No    New injury or illness: No    Upcoming surgery, procedure or cardioversion: No    Additional findings: None      PLAN:    Telephone call with  Son whom verbalizes understanding and agrees to plan regarding INR result and instructed:     Warfarin Dosing Instructions: Change your warfarin dose to 5 mg every Sun, Tue, Thur; 4 mg all other days  . (6.9 % change)   Discussed with Rani Lundberg Pharmacist due to last INR being subtherapeutic.     Instructed patient to follow up no later than: 2 weeks  Lab visit scheduled    Education provided: Please call back if any changes to your diet, medications or how you've been taking warfarin    Florencio verbalizes understanding and agrees to warfarin dosing plan.    Instructed to call the Anticoagulation Clinic for any changes, questions or concerns. (#[phone])        Jayde Whitt RN      OBJECTIVE:  Recent labs: (last 7 days)     21  1630   INR 2.00*         INR assessment THER    Recheck INR In: 2 WEEKS    INR Location Clinic      Anticoagulation Summary  As of 2021    INR goal:  2.0-3.0   TTR:  41.9 % (1 y)   INR used for dosin.00 (2021)   Warfarin maintenance plan:  5 mg (2 mg x 2.5) every Sun, Tue, Thu; 4 mg (2 mg x 2) all other days   Full warfarin instructions:  5 mg every Sun, Tue, Thu; 4 mg all other days   Weekly warfarin total:  31 mg   Plan last modified:  Jayde Whitt RN (2021)   Next INR check:  2021   Priority:  Maintenance   Target end date:  Indefinite    Indications    Chronic atrial fibrillation (H)  [I48.20]  Long-term (current) use of anticoagulants [Z79.01] [Z79.01]             Anticoagulation Episode Summary     INR check location:      Preferred lab:      Send INR reminders to:  [facility]    Comments:  * 2mg tabs. NO bandaid.       Anticoagulation Care Providers     Provider Role Specialty Phone number    Heron Mayo MD Referring Family Medicine [phone]

## 2021-05-29 NOTE — TELEPHONE ENCOUNTER
Pharmacist calling for clarification on prescription of Gabapentin 100 mg. Please review and call her back. Thanks!

## 2021-05-29 NOTE — PROGRESS NOTES
ASSESSMENT: Nabor Cunningham is a 83 y.o. male who presents for consultation at the request of HE PCP Heron Mayo MD, with a past medical history significant for atrial fibrillation on Coumadin anticoagulation therapy, pacemaker, CHF, pneumonia, hyperlipidemia, hypertension, insomnia, anxiety, peripheral neuropathy, gout, history of alcohol abuse who presents today for new patient evaluation of:    -Acute bilateral low back pain rating to lower extremity left greater than right S1 dermatomal pattern with some left L4 dermatomal pattern as well that is severe debilitating x4 weeks with no known injury.    Patient is neurologically intact on exam. No myelopathic or red flag symptoms.     -Patient is scheduled for left knee partial replacement 7/31/2019 with [facility].    CORY Score: 64    WHO 5: 5     Diagnoses and all orders for this visit:    Acute bilateral low back pain with bilateral sciatica  -     Cancel: CT Lumbar Spine Without Contrast; Future; Expected date: 06/24/2019  -     gabapentin (NEURONTIN) 100 MG capsule; Take 300 mg by mouth 2 (two) times a day. 100 mg, 1 tablet a.m. and 1 tablet afternoon.  Dispense: 60 capsule; Refill: 3  -     CT Lumbar Spine Without Contrast; Future; Expected date: 06/24/2019    Lumbar radiculitis  -     CT Lumbar Spine Without Contrast; Future; Expected date: 06/24/2019      PLAN:  Reviewed spine anatomy and disease process. Discussed diagnosis and treatment options with the patient today. A shared decision making model was used.  The patient's values and choices were respected. The following represents what was discussed and decided upon by the provider and the patient.      -DIAGNOSTIC TESTS:   --Ordered lumbar CT scan to further evaluate acute debilitating radiculitis.  --Lumbar x-ray completed at chiropractic clinic, patient states advanced degenerative disc changes.  **Patient has pacemaker unable to have MRI.    -PHYSICAL THERAPY: Discussed that we could  recommend physical therapy pending CT scan however patient likely not tolerate PT well at this time as his pain is severe and debilitating.  Discussed the importance of core strengthening, ROM, stretching exercises with the patient and how each of these entities is important in decreasing pain.  Explained to the patient that the purpose of physical therapy is to teach the patient a home exercise program.  These exercises need to be performed every day in order to decrease pain and prevent future occurrences of pain.        -MEDICATIONS: Prescribed gabapentin 100 mg 1 tablet in the morning 1 in the afternoon and advised patient to continue with his typical dose of 300 mg at nighttime which is both for anxiety and peripheral neuropathy he states.  Discussed multiple medication options today with patient. Discussed risks, side effects, and proper use of medications. Patient verbalized understanding.    -INTERVENTIONS: Discussed that an injection would likely be a recommendation on the left, likely SONAM pending MRI review.  Pain is predominant left however does have bilateral symptoms.  --Coumadin anticoagulation therapy.  Discussed risks and benefits of injections with patient today.    -PATIENT EDUCATION:  45 minutes of total visit time was spent face to face with the patient today, greater than 50% of total time spent with patient was spent on counseling, education, and coordinating care.   -10 minutes spent outside of visit time, non-face-to-face time, reviewing chart.    -FOLLOW-UP:   Follow-up after CT scan completed to review images and ongoing plan.    Advised patient to call the [facility]ine Center if symptoms worsen or you have problems controlling bladder and bowel function.   ______________________________________________________________________    SUBJECTIVE:  HPI:  Nabor Cunningham  Is a 83 y.o. male who presents today for new patient evaluation of low back pain bilateral lumbar sacral junction that radiates to the  posterior buttock with pain rating to the posterior thigh and posterior calf left greater than right as well as some pain into the left anterior thigh and anterior shin that is severe debilitating ongoing for the last 4 weeks with no known injury currently his pain is a 10/10 however he reports minimal pain with sitting pain is most significant with transitioning from sit to stand bending twisting and walking or standing.  Patient denies any lower extremity numbness and tingling, he is walking with a cane currently due to his pain and feeling weaker in his lower extremity is due to the pain.  Patient denies any episodes of his legs giving out on him however.  Patient denies bowel bladder loss control.    Patient's son was present today during entire visit and added to past medical/surgical/family/social history and history of presenting illness.  Patient's ex-wife was also present during partial visit.    -Patient is scheduled for left knee partial replacement 7/31/2019 with [facility].    -Treatment to Date: No prior spinal surgery or spinal injection.    Left knee steroid injection [facility] 3/15/2019    -Medications:  Amitriptyline    *Coumadin anticoagulation therapy    Current Outpatient Medications on File Prior to Encounter   Medication Sig Dispense Refill     acetaminophen (TYLENOL) 500 MG tablet Take 1,000 mg by mouth every 6 (six) hours as needed.       amino acids (DAILY AMINO ORAL) Take 25 mg by mouth daily.       calcium carbonate-vitamin D3 (CALTRATE 600 + D) 600 mg (1,500 mg)-800 unit Chew Chew 1 tablet daily.       colchicine (COLCRYS) 0.6 mg tablet Take 0.6 mg by mouth daily.       cyanocobalamin, vitamin B-12, (CYANOCOBALAMIN) 1,000 mcg tablet Take 1,000 mcg by mouth daily.       ferrous gluconate (FERGON) 324 MG tablet Take 324 mg by mouth daily.       furosemide (LASIX) 40 MG tablet TAKE TWO TABLETS BY MOUTH EVERY MORNING AND ONE TABLET BY MOUTH MID DAY       gabapentin (NEURONTIN) 300 MG  capsule Take 300 mg by mouth at bedtime.       lisinopril (PRINIVIL,ZESTRIL) 5 MG tablet Take 10 mg by mouth daily.       multivitamin (ONE A DAY) per tablet Take 1 tablet by mouth daily.       nortriptyline (PAMELOR) 25 MG capsule Take 25 mg by mouth at bedtime.       pantoprazole (PROTONIX) 40 MG tablet Take 40 mg by mouth daily as needed.       prednisoLONE acetate (PRED-FORTE) 1 % ophthalmic suspension Apply 1 drop to eye daily.       temazepam (RESTORIL) 30 mg capsule TAKE ONE CAPSULE BY MOUTH AT BEDTIME AS NEEDED FOR SLEEP       triamcinolone (KENALOG) 0.1 % cream Apply topically.       warfarin (JANTOVEN) 2 MG tablet TAKE 2 TABLETS BY MOUTH EVERY MONDAY AND 3 TABLETS BY MOUTH ALL OTHER DAYS OR AS DIRECTED BY ANTICOAGULATION       carboxymethylcellulose-glycern 1-0.9 % DrpG Apply 1 drop to eye 4 (four) times a day as needed.       No current facility-administered medications on file prior to encounter.        Allergies   Allergen Reactions     Penicillins Dizziness, Hives and Other (See Comments)     Dizzy  weakness       Trazodone Other (See Comments)     Hallucinations         Past Medical History:   Diagnosis Date     Alcohol abuse      Anxiety      CHF (congestive heart failure) (H)      Pacemaker      Peripheral neuropathy         Past Surgical History:   Procedure Laterality Date     REPLACEMENT TOTAL KNEE Right        Family History   Problem Relation Age of Onset     Cancer Brother      Diabetes Brother        Reviewed past medical, surgical, and family history with patient found on new patient intake packet located in EMR Media tab.     SOCIAL HX: Patient denies smoking/tobacco use, denies current alcohol use but does report he was a heavy drinker in the past with beer but does not drink anymore.  Patient denies recreational drug use.    ROS: Positive for joint pain, sciatica, itching, easy bruising due to Coumadin, insomnia, anxiety, reflux, leg swelling, changes in vision, difficulty swallowing.   Specifically negative for bowel/bladder dysfunction, balance changes, headache, dizziness, foot drop, fevers, chills, appetite changes, nausea/vomiting, unexplained weight loss. Otherwise 13 systems reviewed are negative. Please see the patient's intake questionnaire from today for details.    OBJECTIVE:  /61 (Patient Site: Right Arm, Patient Position: Sitting)   Pulse 80   Wt 173 lb (78.5 kg)   SpO2 93%     PHYSICAL EXAMINATION:    --CONSTITUTIONAL:  Vital signs as above.  No acute distress.  The patient is well nourished and well groomed.  --PSYCHIATRIC:  Appropriate mood and affect. The patient is awake, alert, oriented to person, place, time and answering questions appropriately with clear speech.    --SKIN:  Skin over the face, bilateral lower extremities, and posterior torso is clean, dry, intact without rashes.    --RESPIRATORY: Normal rhythm and effort. No abnormal accessory muscle breathing patterns noted.   --STANDING EXAMINATION:  Normal lumbar lordosis noted, no lateral shift.  --MUSCULOSKELETAL: Lumbar spine inspection reveals no evidence of deformity. Range of motion is limited in all movements: Lumbar flexion, extension, lateral rotation. No tenderness to palpation lumbar spine. Straight leg raising in the seated position is negative to radicular pain. Sciatic notch non-tender.  --SACROILIAC JOINT: One Finger point test negative.  --GROSS MOTOR: Gait is antalgic, walks with a cane.  Rises from seated position with some difficulty due to pain.  --LOWER EXTREMITY MOTOR TESTING:  Plantar flexion left 5/5, right 5/5   Dorsiflexion left 5/5, right 5/5   Great toe MTP extension left 5/5, right 5/5  Knee flexion left 5/5, right 5/5  Knee extension left 5/5, right 5/5   Hip flexion left 5/5, right 5/5  Hip abduction left 5/5, right 5/5  Hip adduction left 5/5, right 5/5   --HIPS: Full range of motion bilaterally. Negative FABERs on the involved lower extremity.   --NEUROLOGICAL:  2/4 patellar, medial  hamstring, and achilles reflexes bilaterally.  Sensation to light touch is intact in the bilateral L4, L5, and S1 dermatomes. Babinski is negative. No clonus.  Negative Tiffanie reflex bilaterally.  --VASCULAR:  2/4 dorsalis pedis and posterior tibialsi pulses bilaterally.  Bilateral lower extremities are warm.  There is 1+ bilateral pitting edema of the bilateral lower extremities.    RESULTS: Prior medical records from [facility] and care everywhere were reviewed today.    Imaging: No results found.

## 2021-05-29 NOTE — PATIENT INSTRUCTIONS - HE
CT lumbar spine scheduled for today 6/24/19 at 1:45 check in time for a 2 PM scan.     [place]  [address] [place] [address]  Phone: [phone]

## 2021-05-29 NOTE — TELEPHONE ENCOUNTER
Provider sent new Rx to pharmacy. Patient should only taking Gabapentin 100 mg two times a day during the day time.     Continue taking Gabapentin 300 mg at bedtime which he is currently doing already.

## 2021-05-30 NOTE — TELEPHONE ENCOUNTER
----- Message from Irene Romero CNP sent at 6/25/2019 12:12 PM CDT -----  Nurse Danny: Please call patient and notify him that I did review his CT scan from [place] which does show multilevel chronic nerve compression however there is likely a disc herniation at L4-5 as well with L5 nerve root compression.  I recommend trialing a left L4-5 and L5-S1 TFESI, order was placed and he can schedule at this time.  Diane Ross: can you please call [facility] radiology however as the images inour systme are only of the axial pictures, no sagittal slices available.  The radiology report says that they were able to complete the pictures so I am not sure why we do not have all the pictures.  Thanks,  Irene

## 2021-05-30 NOTE — PROGRESS NOTES
Lumbar spine CT scan  [facility]- 6/24/2019  Impression:  1.  Multilevel advanced degenerative disc disease throughout the lumbar spine.  This is superimposed on the other factors to cause multilevel central stenosis which is moderate at L3-4 and L4-5 and mild to moderate at L2-3.  2.  Focus of the right anterior epidural gas below the L2-3 disc space could be associated with an extruded disc fragment on the right L3 lateral recess.  There is also suspicion of a small left central inferiorly migrating extruded disc at L4-5 extending to the left L5 lateral recess.  Left L5 nerve root compression.  3.  Multilevel foraminal stenosis bilaterally.  Moderate to severe bilateral foraminal stenosis at L4-5 and L5-S1, severe bilateral foraminal stenosis L3-4.

## 2021-05-30 NOTE — PATIENT INSTRUCTIONS - HE
Discussed the importance of core strengthening, ROM, stretching exercises with the patient and how each of these entities is important in decreasing pain.  Explained to the patient that the purpose of physical therapy is to teach the patient a home exercise program.  These exercises need to be performed every day in order to decrease pain and prevent future occurrences of pain.        ~Please call Nurse Navigation line [phone] with any questions or concerns about your treatment plan, if symptoms worsen and you would like to be seen urgently, or if you have problems controlling bladder and bowel function.  ~Follow Up Appointment time slots with Irene Romero CNP with the Spine [facility], are also available at the [facility] location near [facility] on the first and third THURSDAY afternoons of each month.

## 2021-05-30 NOTE — PATIENT INSTRUCTIONS - HE
DISCHARGE INSTRUCTIONS    During office hours (8:00 a.m.- 4:30 p.m.) questions or concerns may be answered  by calling Spine Navigation Nurses at  [phone].     If you experience any problems after hours  please call [phone] and you will be connected to [facility].     All Patients:    ? You may experience an increase in your symptoms for the first 2 days (It may take anywhere between 2 days- 2 weeks for the steroid to have maximum effect).    ? You may use ice on the injection site, as frequently as 20 minutes each hour if needed.    ? You may take your pain medicine.    ? You may continue taking your regular medication after your injection. If you have had a Medial Branch Block you may resume pain medication once your pain diary is completed.    ? You may shower. No swimming, tub bath or hot tub for 48 hours.  You may remove your bandaid/bandage as soon as you are home.    ? You may resume light activities, as tolerated.    ? Resume your usual diet as tolerated.    ? It is strongly advised that you do not drive for 1-3 hours post injection.    ? If you have had oral sedation:  Do not drive for 8 hours post injection.      ? If you have had IV sedation:  Do not drive for 24 hours post injection.  Do not operate hazardous machinery or make important personal/business decisions for 24 hours.      POSSIBLE STEROID SIDE EFFECTS (If steroid/cortisone was used for your procedure)    -If you experience these symptoms, it should only last for a short period      Swelling of the legs                Skin redness (flushing)       Mouth (oral) irritation     Blood sugar (glucose) levels              Sweats                      Mood changes    Headache    Sleeplessness         POSSIBLE PROCEDURE SIDE EFFECTS  -Call the Spine Center if you are concerned    Increased Pain             Increased numbness/tingling        Nausea/Vomiting            Bruising/bleeding at site        Redness or swelling                                                 Difficulty walking        Weakness             Fever greater than 100.5    *In the event of a severe headache after an epidural steroid injection that is relieved by lying down, please call the [facility] to speak with a clinical staff member*

## 2021-05-30 NOTE — PROGRESS NOTES
Assessment:     Diagnoses and all orders for this visit:    Acute bilateral low back pain with bilateral sciatica  -     Ambulatory referral to PT/OT    Left lumbar radiculitis  -     Ambulatory referral to PT/OT    Bulging lumbar disc  -     Ambulatory referral to PT/OT    Lumbar foraminal stenosis  -     Ambulatory referral to PT/OT    DDD (degenerative disc disease), lumbar  -     Ambulatory referral to PT/OT       Nabor Cunningham is a 83 y.o. y.o. male with past medical history significant for atrial fibrillation on Coumadin anticoagulation therapy, pacemaker, CHF, pneumonia, hyperlipidemia, hypertension, insomnia, anxiety, peripheral neuropathy, gout, history of alcohol abuse who presents today for follow-up regarding:    -Ongoing bilateral low back pain with left lumbar radiculitis L5 dermatomal pattern in which she did receive 50% relief however significant initial relief with left L4-5 and L5-S1 TFESI.    Patient is also scheduled for left partial knee replacement 7/31/2019 which hopefully will also help with some of his left knee pain.     Plan:     A shared decision making plan was used. The patient's values and choices were respected. Prior medical records from 6/24/19 were reviewed today. The following represents what was discussed and decided upon by the provider and the patient.        -DIAGNOSTIC TESTS: Images were personally reviewed and interpreted.   --Lumbar spine CT scan 6/24/2019 shows multilevel degenerative disc changes with moderate central stenosis at L3-4 and L4-5, mild to moderate L2-3.  Extruded disc fragment L2-3 with right L3 lateral recess stenosis and left migrating L4-5 causing left L5 lateral recess stenosis.  Multilevel foraminal stenosis moderate to severe bilaterally at L4-5 and L5-S1, severe bilaterally at L3-4.  --Lumbar x-ray completed at chiropractic clinic, patient states advanced degenerative disc changes.  **Patient has pacemaker unable to have MRI.    -INTERVENTIONS: No  further injection recommendations at this time as he is scheduled for knee surgery 7/31/2019.  Can revisit injection options 6 to 8 weeks post knee surgery if radicular pain is still bothersome.    -MEDICATIONS: Advised patient to continue gabapentin at this time as well as Tylenol as needed.  Discussed side effects of medications and proper use. Patient verbalized understanding.    -PHYSICAL THERAPY: Referral to physical therapy external [place] in [place] per patient request to establish core strengthening and nerve glide exercises.  Discussed the importance of core strengthening, ROM, stretching exercises with the patient and how each of these entities is important in decreasing pain.  Explained to the patient that the purpose of physical therapy is to teach the patient a home exercise program.  These exercises need to be performed every day in order to decrease pain and prevent future occurrences of pain.        -PATIENT EDUCATION:  25 minutes of total visit time was spent face to face with the patient today, 60 % of the visit was spent on counseling, education, and coordinating care.   -5 minutes spent outside of visit time, non-face-to-face time, reviewing chart.    -FOLLOW UP: Follow-up as needed post surgery if symptoms are not improving.  Advised to contact clinic if symptoms worsen or change.    Subjective:     Nabor Cunningham is a 83 y.o. male who presents today for follow-up regarding 2 weeks post left L4-5 and L5-S1 TFESI which she did receive about 50% relief with pain that is most significant into the lateral thigh and lateral shin, he reports that the for the first couple of hours post the injection he felt great and pain was almost completely gone.  Patient still reports his pain is a 6/10 9 at its worst, a 4 at its best most bothersome with getting out of bed putting his shoes on and riding his bike as well as standing and walking.  Patient denies any lower extremity weakness or episodes of  his leg giving out on him, denies recent trips falls or balance change.    Patient's son was present today during entire visit and added to past medical/surgical/family/social history and history of presenting illness.     -Patient is scheduled for left knee partial replacement 7/31/2019 with [facility].     -Treatment to Date: No prior spinal surgery or spinal injection.     Left knee steroid injection [place] 3/15/2019  Left L4-5 and L5-S1 TFESI 7/2/2019 with significant initial and 50% lasting relief.  Preprocedure pain 7/10, post 2/10.     -Medications:  Amitriptyline     *Coumadin anticoagulation therapy    Current Outpatient Medications on File Prior to Encounter   Medication Sig Dispense Refill     acetaminophen (TYLENOL) 500 MG tablet Take 1,000 mg by mouth every 6 (six) hours as needed.       gabapentin (NEURONTIN) 100 MG capsule Take 100 mg by mouth 2 (two) times a day. 100 mg, 1 tablet a.m. and 1 tablet afternoon. 60 capsule 3     gabapentin (NEURONTIN) 300 MG capsule Take 300 mg by mouth at bedtime.       warfarin (JANTOVEN) 2 MG tablet TAKE 2 TABLETS BY MOUTH EVERY MONDAY AND 3 TABLETS BY MOUTH ALL OTHER DAYS OR AS DIRECTED BY ANTICOAGULATION       amino acids (DAILY AMINO ORAL) Take 25 mg by mouth daily.       calcium carbonate-vitamin D3 (CALTRATE 600 + D) 600 mg (1,500 mg)-800 unit Chew Chew 1 tablet daily.       carboxymethylcellulose-glycern 1-0.9 % DrpG Apply 1 drop to eye 4 (four) times a day as needed.       colchicine (COLCRYS) 0.6 mg tablet Take 0.6 mg by mouth daily.       cyanocobalamin, vitamin B-12, (CYANOCOBALAMIN) 1,000 mcg tablet Take 1,000 mcg by mouth daily.       ferrous gluconate (FERGON) 324 MG tablet Take 324 mg by mouth daily.       furosemide (LASIX) 40 MG tablet TAKE TWO TABLETS BY MOUTH EVERY MORNING AND ONE TABLET BY MOUTH MID DAY       lisinopril (PRINIVIL,ZESTRIL) 5 MG tablet Take 10 mg by mouth daily.       multivitamin (ONE A DAY) per tablet Take 1 tablet by mouth  daily.       nortriptyline (PAMELOR) 25 MG capsule Take 25 mg by mouth at bedtime.       pantoprazole (PROTONIX) 40 MG tablet Take 40 mg by mouth daily as needed.       prednisoLONE acetate (PRED-FORTE) 1 % ophthalmic suspension Apply 1 drop to eye daily.       temazepam (RESTORIL) 30 mg capsule TAKE ONE CAPSULE BY MOUTH AT BEDTIME AS NEEDED FOR SLEEP       triamcinolone (KENALOG) 0.1 % cream Apply topically.       No current facility-administered medications on file prior to encounter.        Allergies   Allergen Reactions     Penicillins Dizziness, Hives and Other (See Comments)     Dizzy  weakness       Trazodone Other (See Comments)     Hallucinations         Past Medical History:   Diagnosis Date     Alcohol abuse      Anxiety      CHF (congestive heart failure) (H)      Pacemaker      Peripheral neuropathy         Review of Systems  ROS:  Specifically negative for bowel/bladder dysfunction, balance changes, headache, dizziness, foot drop, fevers, chills, appetite changes, nausea/vomiting, unexplained weight loss. Otherwise 13 systems reviewed are negative. Please see the patient's intake questionnaire from today for details.    Reviewed Social, Family, Past Medical and Past Surgical history with patient, no significant changes noted since prior visit.     Objective:     /70 (Patient Site: Right Arm, Patient Position: Sitting)   Wt 178 lb (80.7 kg)     PHYSICAL EXAMINATION:    --CONSTITUTIONAL: Well developed, well nourished, healthy appearing individual.  --PSYCHIATRIC: Appropriate mood and affect. No difficulty interacting due to temper, social withdrawal, or memory issues.  --SKIN: Lumbar region is dry and intact.   --RESPIRATORY: Normal rhythm and effort. No abnormal accessory muscle breathing patterns noted.   --MUSCULOSKELETAL:  Normal lumbar lordosis noted, no lateral shift.  --GROSS MOTOR: Easily arises from a seated position. Gait is non-antalgic  --LUMBAR SPINE:  Inspection reveals no evidence  of deformity.     RESULTS:   Imaging: Lumbar spine imaging was reviewed today. The images were shown to the patient and the findings were explained using a spine model.      [facility]  Impression:  1.  Multilevel advanced degenerative disc disease throughout the lumbar spine.  This is superimposed on the other factors to cause multilevel central stenosis which is moderate at L3-4 and L4-5 and mild to moderate at L2-3.  2.  Focus of the right anterior epidural gas below the L2-3 disc space could be associated with an extruded disc fragment on the right L3 lateral recess.  There is also suspicion of a small left central inferiorly migrating extruded disc at L4-5 extending to the left L5 lateral recess.  Left L5 nerve root compression.  3.  Multilevel foraminal stenosis bilaterally.  Moderate to severe bilateral foraminal stenosis at L4-5 and L5-S1, severe bilateral foraminal stenosis L3-4.

## 2021-05-30 NOTE — TELEPHONE ENCOUNTER
Call to pt's son, Florencio, with provider's results and recommendations. He verbalized understanding. They would like to proceed with recommended injection. Injection requirements reviewed. Unable to get in contact with scheduling. Sent message to scheduling to call him to make appt.

## 2021-05-31 NOTE — TELEPHONE ENCOUNTER
There was a previous referral to [place] physical therapy which I am not sure if he is completed.  I would recommend trialing physical therapy at this time if he has not already, since it has only been a little over a month since his previous injection.    He can only have 4 total steroid injections in a year (or the steroid can start to cause other problems and weaken bones) which is why we typically like to average out injections only every 3 months.  My recommendation will be trialing a couple sessions of physical therapy first if he has not trialed yet to target his back pain and leg pain and then trial the injection if needed if he is not getting benefit with therapy.

## 2021-05-31 NOTE — TELEPHONE ENCOUNTER
Call placed to pt's son, Florencio, with provider's response. He verbalized understanding. He states the pt has not tried physical therapy yet. They will reach out to [facility] to make an appt. Encouraged the pt to try at least 4 sessions of PT. Advised them to [facility] if he does not get relief with the physical therapy, or if they have any questions/concerns.

## 2021-05-31 NOTE — TELEPHONE ENCOUNTER
Call from pt's son, Florencio, to give status update on pt and request repeat injection. He reports that the pt did not have knee surgery as scheduled for 7/31/19. He states that the pt got an infection from a cat bite and was placed on IV abx. The pt is now clear of the infection. His knee surgery will not be until mid-late November.      Pt's son wondering about repeat injection before this surgery. Pt had Left L4-5, L5-S1 TFESI on 7/2/19. He reports that the pt received relief for approximately 1 month. His pain is in the same location.    Will give provider status update. Ok to leave detailed message upon call back.

## 2021-06-03 VITALS — WEIGHT: 173 LBS | BODY MASS INDEX: 29.7 KG/M2

## 2021-06-03 VITALS — BODY MASS INDEX: 30.55 KG/M2 | WEIGHT: 178 LBS

## 2021-06-11 ENCOUNTER — ANTICOAGULATION THERAPY VISIT (OUTPATIENT)
Dept: ANTICOAGULATION | Facility: CLINIC | Age: 86
End: 2021-06-11

## 2021-06-11 DIAGNOSIS — I48.20 CHRONIC ATRIAL FIBRILLATION (H): ICD-10-CM

## 2021-06-11 DIAGNOSIS — Z79.01 LONG TERM CURRENT USE OF ANTICOAGULANT THERAPY: ICD-10-CM

## 2021-06-11 LAB
CAPILLARY BLOOD COLLECTION: NORMAL
INR PPP: 2 (ref 0.86–1.14)

## 2021-06-11 PROCEDURE — 85610 PROTHROMBIN TIME: CPT | Performed by: FAMILY MEDICINE

## 2021-06-11 PROCEDURE — 36416 COLLJ CAPILLARY BLOOD SPEC: CPT | Performed by: FAMILY MEDICINE

## 2021-06-11 NOTE — PROGRESS NOTES
4:38 PM    Writer spoke with Florencio. No changes noted. Dose reviewed. Next INR scheduled.    Daphne Lamar RN, BSN, PHN  Anticoagulation Clinic   [phone]        ANTICOAGULATION MANAGEMENT     Patient Name:  Nabor Cunningham  Date:  2021    ASSESSMENT /SUBJECTIVE:    Today's INR result of 2.00 is therapeutic. Goal INR of 2.0-3.0      Previous INR: Therapeutic         PLAN:    Warfarin Dosing Instructions: Continue your current warfarin dose 5 mg Sun,Tue,Thu; 4 mg ROW.    Instructed patient to follow up no later than: 2 weeks  Left detailed message with recommended recheck date    Education provided: Please call back if any changes to your diet, medications or how you've been taking warfarin    Detailed voice message left for  Florencio with dosing instructions and follow up date.     Instructed to call the Anticoagulation Clinic for any changes, questions or concerns. (#[phone])        Daphne Lamar RN      OBJECTIVE:  Recent labs: (last 7 days)     21  1503   INR 2.00*         No question data found.  Anticoagulation Summary  As of 2021    INR goal:  2.0-3.0   TTR:  44.5 % (1 y)   INR used for dosin.00 (2021)   Warfarin maintenance plan:  5 mg (2 mg x 2.5) every Sun, Tue, Thu; 4 mg (2 mg x 2) all other days   Full warfarin instructions:  5 mg every Sun, Tue, Thu; 4 mg all other days   Weekly warfarin total:  31 mg   No change documented:  Daphne Lamar RN   Plan last modified:  Jayde Whitt RN (2021)   Next INR check:  2021   Priority:  Maintenance   Target end date:  Indefinite    Indications    Chronic atrial fibrillation (H) [I48.20]  Long-term (current) use of anticoagulants [Z79.01] [Z79.01]             Anticoagulation Episode Summary     INR check location:      Preferred lab:      Send INR reminders to:  [facility]    Comments:  * 2mg tabs. NO bandaid.       Anticoagulation Care Providers     Provider Role Specialty Phone number    Maria Esther  Heron Blackburn MD [facility] [phone]

## 2021-06-11 NOTE — TELEPHONE ENCOUNTER
I called and spoke with Florencio (pt's son), who manages his medications. Informed Florencio that we got a refill request for gabapentin 100 mg capsules. However, we have not seen the pt since 7/2019. Per our clinic protocol he must be seen at least once per year to discuss med renewal. Unless, he is doing well and tolerating well with the medication then he can have his PCP (Dr. Mayo) take over his refills on gabapentin. Per Erica, he has so many different specialist so it's hard to get him in for an appointment. The patient had mentioned to Erica that he did not get any relief with his last injection and does not wish to [facility]. Pt stated his pain is not as bad and is currently seeing a chiro weekly which is helping with his sciatica pain per pt.     At this point, gabapentin is helping the pt. Pt is only taking gabapentin 100 mg 1 cap at noon and NOT taking two times a day anymore. He still has gabapentin 300 mg that he takes at bedtime. Pt is currently out of gabapentin 100 mg and would like to know if he can get a short supply from [facility] for now. Erica will contact his PCP to request further refill from the PCP in the future.     Please advise

## 2021-06-11 NOTE — TELEPHONE ENCOUNTER
Pharmacy sent refill request for gabapentin   --Med last Rx 6/30/20 #60, 1 refill   --Last OV 7/12/19  --Future appt: none  --Please advise

## 2021-07-02 ENCOUNTER — ANTICOAGULATION THERAPY VISIT (OUTPATIENT)
Dept: ANTICOAGULATION | Facility: CLINIC | Age: 86
End: 2021-07-02

## 2021-07-02 DIAGNOSIS — I48.20 CHRONIC ATRIAL FIBRILLATION (H): ICD-10-CM

## 2021-07-02 DIAGNOSIS — Z79.01 LONG TERM CURRENT USE OF ANTICOAGULANT THERAPY: ICD-10-CM

## 2021-07-02 LAB
CAPILLARY BLOOD COLLECTION: NORMAL
INR PPP: 2.2 (ref 0.86–1.14)

## 2021-07-02 PROCEDURE — 36416 COLLJ CAPILLARY BLOOD SPEC: CPT | Performed by: FAMILY MEDICINE

## 2021-07-02 PROCEDURE — 85610 PROTHROMBIN TIME: CPT | Performed by: FAMILY MEDICINE

## 2021-07-02 NOTE — PROGRESS NOTES
ANTICOAGULATION MANAGEMENT     Nabor Cunningham 85 year old male is on warfarin with therapeutic INR result. (Goal INR 2.0-3.0)    Recent labs: (last 7 days)     07/02/21  1335   INR 2.20*       ASSESSMENT     Source(s): Patient/Caregiver Call       Warfarin doses taken: Warfarin taken as instructed    Diet: No new diet changes identified    New illness, injury, or hospitalization: No    Medication/supplement changes: None noted    Signs or symptoms of bleeding or clotting: No    Previous INR: Therapeutic last 2(+) visits    Additional findings: None     PLAN     Recommended plan for no diet, medication or health factor changes affecting INR     Dosing Instructions: Continue your current warfarin dose with next INR in 4 weeks       Summary  As of 7/2/2021    Full warfarin instructions:  5 mg every Sun, Tue, Thu; 4 mg all other days   Next INR check:  7/30/2021             Telephone call with  Florencio who verbalizes understanding and agrees to plan    Lab visit scheduled    Education provided: Please call back if any changes to your diet, medications or how you've been taking warfarin and Contact [phone]  with any changes, questions or concerns.     Plan made per [facility] anticoagulation protocol    Daphne Lamar RN  Anticoagulation Clinic  7/2/2021    _______________________________________________________________________     Anticoagulation Episode Summary     Current INR goal:  2.0-3.0   TTR:  46.5 % (1 y)   Target end date:  Indefinite   Send INR reminders to:  [facility]    Indications    Chronic atrial fibrillation (H) [I48.20]  Long-term (current) use of anticoagulants [Z79.01] [Z79.01]           Comments:  * 2mg tabs. NO bandaid.          Anticoagulation Care Providers     Provider Role Specialty Phone number    Heron Mayo MD Referring Family Medicine [phone]

## 2021-07-03 NOTE — ADDENDUM NOTE
Addendum Note by Irene Romero CNP at 6/24/2019  7:49 AM     Author: Irene Romero CNP Service: -- Author Type: Nurse Practitioner    Filed: 6/24/2019 11:17 AM Date of Service: 6/24/2019  7:49 AM Status: Signed    : Irene Romero CNP (Nurse Practitioner)    Encounter addended by: Irene Romero CNP on: 6/24/2019 11:17 AM      Actions taken: Diagnosis association updated, Order list changed

## 2021-07-09 DIAGNOSIS — I50.43 ACUTE ON CHRONIC COMBINED SYSTOLIC AND DIASTOLIC CONGESTIVE HEART FAILURE (H): ICD-10-CM

## 2021-07-12 RX ORDER — LISINOPRIL 5 MG/1
TABLET ORAL
Qty: 180 TABLET | Refills: 1 | Status: SHIPPED | OUTPATIENT
Start: 2021-07-12 | End: 2021-12-29

## 2021-07-12 NOTE — TELEPHONE ENCOUNTER
"Requested Prescriptions   Pending Prescriptions Disp Refills     lisinopril (ZESTRIL) 5 MG tablet [Pharmacy Med Name: LISINOPRIL 5MG TABS] 180 tablet 1     Sig: TAKE TWO TABLETS BY MOUTH ONCE DAILY       ACE Inhibitors (Including Combos) Protocol Failed - 7/9/2021  5:01 AM        Failed - Normal serum creatinine on file in past 12 months     Recent Labs   Lab Test 04/15/21  0831   CR 1.63*       Ok to refill medication if creatinine is low          Passed - Blood pressure under 140/90 in past 12 months     BP Readings from Last 3 Encounters:   05/07/21 98/50   04/27/21 100/63   04/02/21 95/56                 Passed - Recent (12 mo) or future (30 days) visit within the authorizing provider's specialty     Patient has had an office visit with the authorizing provider or a provider within the authorizing providers department within the previous 12 mos or has a future within next 30 days. See \"Patient Info\" tab in inbasket, or \"Choose Columns\" in Meds & Orders section of the refill encounter.              Passed - Medication is active on med list        Passed - Patient is age 18 or older        Passed - Normal serum potassium on file in past 12 months     Recent Labs   Lab Test 04/15/21  0831   POTASSIUM 4.0                  "

## 2021-07-30 ENCOUNTER — ANTICOAGULATION THERAPY VISIT (OUTPATIENT)
Dept: FAMILY MEDICINE | Facility: CLINIC | Age: 86
End: 2021-07-30

## 2021-07-30 ENCOUNTER — LAB (OUTPATIENT)
Dept: LAB | Facility: CLINIC | Age: 86
End: 2021-07-30
Payer: MEDICARE

## 2021-07-30 DIAGNOSIS — I48.20 CHRONIC ATRIAL FIBRILLATION (H): ICD-10-CM

## 2021-07-30 DIAGNOSIS — Z79.01 LONG TERM CURRENT USE OF ANTICOAGULANT THERAPY: ICD-10-CM

## 2021-07-30 DIAGNOSIS — I48.20 CHRONIC ATRIAL FIBRILLATION (H): Primary | ICD-10-CM

## 2021-07-30 LAB — INR BLD: 2.7 (ref 0.9–1.1)

## 2021-07-30 PROCEDURE — 85610 PROTHROMBIN TIME: CPT

## 2021-07-30 PROCEDURE — 36415 COLL VENOUS BLD VENIPUNCTURE: CPT

## 2021-07-30 NOTE — PROGRESS NOTES
ANTICOAGULATION MANAGEMENT     Nabor Cunningham 85 year old male is on warfarin with therapeutic INR result. (Goal INR 2.0-3.0)    Recent labs: (last 7 days)     07/30/21  1335   INR 2.7*       ASSESSMENT     Source(s): Chart Review and Patient/Caregiver Call       Warfarin doses taken: Warfarin taken as instructed    Diet: No new diet changes identified    New illness, injury, or hospitalization: No    Medication/supplement changes: None noted    Signs or symptoms of bleeding or clotting: No    Previous INR: Therapeutic last 2(+) visits    Additional findings: None          PLAN     Recommended plan for no diet, medication or health factor changes affecting INR     Dosing Instructions: Continue your current warfarin dose with next INR in 5 weeks       Summary  As of 7/30/2021    Full warfarin instructions:  5 mg every Sun, Tue, Thu; 4 mg all other days   Next INR check:               Telephone call with Nabor who verbalizes understanding and agrees to plan    Lab visit scheduled    Education provided: None required    Plan made per [facility] anticoagulation protocol    Kae Schmitt, RN  Anticoagulation Clinic  7/30/2021    _______________________________________________________________________     Anticoagulation Episode Summary     Current INR goal:  2.0-3.0   TTR:  52.8 % (1 y)   Target end date:  Indefinite   Send INR reminders to:  [facility]    Indications    Chronic atrial fibrillation (H) [I48.20]  Long-term (current) use of anticoagulants [Z79.01] [Z79.01]           Comments:  * 2mg tabs. NO bandaid.          Anticoagulation Care Providers     Provider Role Specialty Phone number    Heron Mayo MD Referring Family Medicine [phone]

## 2021-08-03 DIAGNOSIS — M1A.9XX0 CHRONIC GOUT, UNSPECIFIED CAUSE, UNSPECIFIED SITE: ICD-10-CM

## 2021-08-10 RX ORDER — ALLOPURINOL 100 MG/1
TABLET ORAL
Qty: 60 TABLET | Refills: 1 | Status: SHIPPED | OUTPATIENT
Start: 2021-08-10 | End: 2021-09-23

## 2021-09-03 ENCOUNTER — ANTICOAGULATION THERAPY VISIT (OUTPATIENT)
Dept: ANTICOAGULATION | Facility: CLINIC | Age: 86
End: 2021-09-03

## 2021-09-03 ENCOUNTER — LAB (OUTPATIENT)
Dept: LAB | Facility: CLINIC | Age: 86
End: 2021-09-03
Payer: MEDICARE

## 2021-09-03 DIAGNOSIS — I48.20 CHRONIC ATRIAL FIBRILLATION (H): Primary | ICD-10-CM

## 2021-09-03 DIAGNOSIS — I48.20 CHRONIC ATRIAL FIBRILLATION (H): ICD-10-CM

## 2021-09-03 DIAGNOSIS — Z79.01 LONG TERM CURRENT USE OF ANTICOAGULANT THERAPY: ICD-10-CM

## 2021-09-03 LAB — INR BLD: 2.1 (ref 0.9–1.1)

## 2021-09-03 PROCEDURE — 85610 PROTHROMBIN TIME: CPT

## 2021-09-03 PROCEDURE — 36416 COLLJ CAPILLARY BLOOD SPEC: CPT

## 2021-09-03 NOTE — PROGRESS NOTES
ANTICOAGULATION MANAGEMENT     Nabor Cunningham 85 year old male is on warfarin with therapeutic INR result. (Goal INR 2.0-3.0)    Recent labs: (last 7 days)     09/03/21  1330   INR 2.1*       ASSESSMENT     Source(s): Chart Review and Patient/Caregiver Call - Florencio      Warfarin doses taken: Warfarin taken as instructed    Diet: No new diet changes identified    New illness, injury, or hospitalization: No    Medication/supplement changes: None noted    Signs or symptoms of bleeding or clotting: No    Previous INR: Therapeutic last 2(+) visits    Additional findings: None     PLAN     Recommended plan for no diet, medication or health factor changes affecting INR     Dosing Instructions: Continue your current warfarin dose with next INR in 6 weeks       Summary  As of 9/3/2021    Full warfarin instructions:  5 mg every Sun, Tue, Thu; 4 mg all other days   Next INR check:  10/15/2021             Telephone call with Florencio who verbalizes understanding and agrees to plan    Lab visit scheduled    Education provided: Please call back if any changes to your diet, medications or how you've been taking warfarin    Plan made per [facility] anticoagulation protocol    Jayde Whitt RN  Anticoagulation Clinic  9/3/2021    _______________________________________________________________________     Anticoagulation Episode Summary     Current INR goal:  2.0-3.0   TTR:  56.9 % (1 y)   Target end date:  Indefinite   Send INR reminders to:  [facility]    Indications    Chronic atrial fibrillation (H) [I48.20]  Long-term (current) use of anticoagulants [Z79.01] [Z79.01]           Comments:  * 2mg tabs. NO bandaid.          Anticoagulation Care Providers     Provider Role Specialty Phone number    Heron Mayo MD Referring Family Medicine [phone]

## 2021-09-22 DIAGNOSIS — M1A.9XX0 CHRONIC GOUT, UNSPECIFIED CAUSE, UNSPECIFIED SITE: ICD-10-CM

## 2021-09-23 RX ORDER — ALLOPURINOL 100 MG/1
TABLET ORAL
Qty: 60 TABLET | Refills: 5 | Status: SHIPPED | OUTPATIENT
Start: 2021-09-23 | End: 2022-03-14

## 2021-09-23 NOTE — TELEPHONE ENCOUNTER
Routing refill request to provider for review/approval because:  Labs out of range:  creat, CBC  Labs not current:  ALT  PETRA Beauchamp RN

## 2021-10-09 ENCOUNTER — HEALTH MAINTENANCE LETTER (OUTPATIENT)
Age: 86
End: 2021-10-09

## 2021-10-11 ENCOUNTER — TELEPHONE (OUTPATIENT)
Dept: FAMILY MEDICINE | Facility: CLINIC | Age: 86
End: 2021-10-11

## 2021-10-11 NOTE — TELEPHONE ENCOUNTER
Reason for Call:  Other     Detailed comments: Patient son calling asking if patient can get a prescription for a 4 wheeled walker with breaks and chair. Willing to make appointment if patient needs to be seen first.    Phone Number Patient can be reached at: Home number on file [phone]    Best Time: any    Can we leave a detailed message on this number? YES    Call taken on 10/11/2021 at 9:06 AM by Gabriela Atwood

## 2021-10-15 ENCOUNTER — OFFICE VISIT (OUTPATIENT)
Dept: FAMILY MEDICINE | Facility: CLINIC | Age: 86
End: 2021-10-15
Payer: MEDICARE

## 2021-10-15 ENCOUNTER — ANTICOAGULATION THERAPY VISIT (OUTPATIENT)
Dept: FAMILY MEDICINE | Facility: CLINIC | Age: 86
End: 2021-10-15

## 2021-10-15 ENCOUNTER — LAB (OUTPATIENT)
Dept: LAB | Facility: CLINIC | Age: 86
End: 2021-10-15
Payer: MEDICARE

## 2021-10-15 VITALS
SYSTOLIC BLOOD PRESSURE: 110 MMHG | RESPIRATION RATE: 18 BRPM | DIASTOLIC BLOOD PRESSURE: 56 MMHG | WEIGHT: 184 LBS | TEMPERATURE: 97.2 F | HEIGHT: 65 IN | BODY MASS INDEX: 30.66 KG/M2 | HEART RATE: 70 BPM

## 2021-10-15 DIAGNOSIS — N18.31 STAGE 3A CHRONIC KIDNEY DISEASE (H): ICD-10-CM

## 2021-10-15 DIAGNOSIS — Z79.01 LONG TERM CURRENT USE OF ANTICOAGULANT THERAPY: ICD-10-CM

## 2021-10-15 DIAGNOSIS — M15.0 PRIMARY OSTEOARTHRITIS INVOLVING MULTIPLE JOINTS: Primary | ICD-10-CM

## 2021-10-15 DIAGNOSIS — I48.20 CHRONIC ATRIAL FIBRILLATION (H): ICD-10-CM

## 2021-10-15 DIAGNOSIS — F33.1 MODERATE EPISODE OF RECURRENT MAJOR DEPRESSIVE DISORDER (H): ICD-10-CM

## 2021-10-15 DIAGNOSIS — R39.11 BENIGN PROSTATIC HYPERPLASIA WITH URINARY HESITANCY: ICD-10-CM

## 2021-10-15 DIAGNOSIS — R39.11 URINARY HESITANCY: ICD-10-CM

## 2021-10-15 DIAGNOSIS — N40.1 BENIGN PROSTATIC HYPERPLASIA WITH URINARY HESITANCY: ICD-10-CM

## 2021-10-15 DIAGNOSIS — I48.20 CHRONIC ATRIAL FIBRILLATION (H): Primary | ICD-10-CM

## 2021-10-15 PROBLEM — N18.4 CKD (CHRONIC KIDNEY DISEASE) STAGE 4, GFR 15-29 ML/MIN (H): Status: RESOLVED | Noted: 2020-03-01 | Resolved: 2021-10-15

## 2021-10-15 PROBLEM — N18.30 CHRONIC KIDNEY DISEASE, STAGE 3 (H): Status: ACTIVE | Noted: 2021-10-15

## 2021-10-15 LAB — INR BLD: 1.9 (ref 0.9–1.1)

## 2021-10-15 PROCEDURE — 85610 PROTHROMBIN TIME: CPT

## 2021-10-15 PROCEDURE — 36416 COLLJ CAPILLARY BLOOD SPEC: CPT

## 2021-10-15 PROCEDURE — 99214 OFFICE O/P EST MOD 30 MIN: CPT | Performed by: FAMILY MEDICINE

## 2021-10-15 ASSESSMENT — MIFFLIN-ST. JEOR: SCORE: 1456.5

## 2021-10-15 NOTE — NURSING NOTE
"Chief Complaint   Patient presents with     Mobility Issues     /56 (Cuff Size: Adult Regular)   Pulse 70   Temp 97.2  F (36.2  C) (Tympanic)   Resp 18   Ht 1.651 m (5' 5\")   Wt 83.5 kg (184 lb)   BMI 30.62 kg/m   Estimated body mass index is 30.62 kg/m  as calculated from the following:    Height as of this encounter: 1.651 m (5' 5\").    Weight as of this encounter: 83.5 kg (184 lb).  Patient presents to the clinic using Walker      Health Maintenance that is potentially due pending provider review:    Health Maintenance Due   Topic Date Due     HF ACTION PLAN  Never done     ANNUAL REVIEW OF HM ORDERS  Never done     ZOSTER IMMUNIZATION (1 of 2) Never done     MEDICARE ANNUAL WELLNESS VISIT  Never done     ALT  06/09/2021     CBC  06/09/2021     LIPID  07/17/2021     INFLUENZA VACCINE (1) 09/01/2021     BMP  10/15/2021     PHQ-9  11/07/2021                "

## 2021-10-15 NOTE — PROGRESS NOTES
ANTICOAGULATION MANAGEMENT     Nabor Cunningham 85 year old male is on warfarin with subtherapeutic INR result. (Goal INR 2.0-3.0)    Recent labs: (last 7 days)     10/15/21  1325   INR 1.9*       ASSESSMENT     Source(s): Chart Review and Patient/Caregiver Call       Warfarin doses taken: Warfarin taken as instructed    Diet: No new diet changes identified    New illness, injury, or hospitalization: No    Medication/supplement changes: None noted    Signs or symptoms of bleeding or clotting: No    Previous INR: Therapeutic last 2(+) visits    Additional findings: None     PLAN     Recommended plan for no diet, medication or health factor changes affecting INR     Dosing Instructions: Continue your current warfarin dose with next INR in 2 weeks       Summary  As of 10/15/2021    Full warfarin instructions:  5 mg every Sun, Tue, Thu; 4 mg all other days   Next INR check:  10/29/2021             Telephone call with  Florencio who verbalizes understanding and agrees to plan    Lab visit scheduled    Education provided: Please call back if any changes to your diet, medications or how you've been taking warfarin and Monitoring for clotting signs and symptoms    Plan made per [facility] anticoagulation protocol    Jayde Whitt RN  Anticoagulation Clinic  10/15/2021    _______________________________________________________________________     Anticoagulation Episode Summary     Current INR goal:  2.0-3.0   TTR:  58.6 % (1 y)   Target end date:  Indefinite   Send INR reminders to:  [facility]    Indications    Chronic atrial fibrillation (H) [I48.20]  Long-term (current) use of anticoagulants [Z79.01] [Z79.01]           Comments:  * 2mg tabs. NO bandaid.          Anticoagulation Care Providers     Provider Role Specialty Phone number    Heron Mayo MD Referring Family Medicine [phone]

## 2021-10-15 NOTE — PROGRESS NOTES
"    Assessment & Plan     Primary osteoarthritis involving multiple joints  Known to have osteoarthritis, peripheral neuropathy, using walker for stability and support, would like to have different size as current one too low and causing back pain.  DME order signed.  Suggested to continue Tylenol as needed and regular walks  - Walker Order    Moderate episode of recurrent major depressive disorder (H)  Symptoms stable.  Continue mirtazapine    Stage III chronic kidney disease  Renal functions reviewed, stable      Lorenzo Murphy MD  [facility]    Adarsh Luther is a 85 year old who presents for the following health issues     HPI     Concern - Wants orders for a 4 wheeled walker with a seat   Onset:   Description: Has one now, but it is too short for him. Would like a taller one. The one he has now was given to him.   Intensity: mild  Progression of Symptoms:  same      Known to have major depression, symptoms stable, taking mirtazapine nightly.      Review of Systems   Constitutional, HEENT, cardiovascular, pulmonary, GI, , musculoskeletal, neuro, skin, endocrine and psych systems are negative, except as otherwise noted.      Objective    /56 (Cuff Size: Adult Regular)   Pulse 70   Temp 97.2  F (36.2  C) (Tympanic)   Resp 18   Ht 1.651 m (5' 5\")   Wt 83.5 kg (184 lb)   BMI 30.62 kg/m    Body mass index is 30.62 kg/m .  Physical Exam   GENERAL: alert, no distress, frail and elderly  NECK: no adenopathy, no asymmetry, masses, or scars and thyroid normal to palpation  RESP: lungs clear to auscultation - no rales, rhonchi or wheezes  CV: regular rates and rhythm, normal S1 S2, no S3 or S4, no murmur, click or rub and pacemaker in situ  ABDOMEN: soft, nontender  MS: Chronic OA changes involving multiple joints, pedal edema 2+ bilaterally  NEURO: Grossly intact  PSYCH: mentation appears normal, affect normal/bright          "

## 2021-10-18 RX ORDER — TAMSULOSIN HYDROCHLORIDE 0.4 MG/1
CAPSULE ORAL
Qty: 30 CAPSULE | Refills: 5 | Status: SHIPPED | OUTPATIENT
Start: 2021-10-18 | End: 2022-04-08

## 2021-10-29 ENCOUNTER — LAB (OUTPATIENT)
Dept: LAB | Facility: CLINIC | Age: 86
End: 2021-10-29
Payer: MEDICARE

## 2021-10-29 ENCOUNTER — ANTICOAGULATION THERAPY VISIT (OUTPATIENT)
Dept: FAMILY MEDICINE | Facility: CLINIC | Age: 86
End: 2021-10-29

## 2021-10-29 DIAGNOSIS — I48.20 CHRONIC ATRIAL FIBRILLATION (H): ICD-10-CM

## 2021-10-29 DIAGNOSIS — I48.20 CHRONIC ATRIAL FIBRILLATION (H): Primary | ICD-10-CM

## 2021-10-29 DIAGNOSIS — Z79.01 LONG TERM CURRENT USE OF ANTICOAGULANT THERAPY: ICD-10-CM

## 2021-10-29 LAB — INR BLD: 2 (ref 0.9–1.1)

## 2021-10-29 PROCEDURE — 85610 PROTHROMBIN TIME: CPT

## 2021-10-29 PROCEDURE — 36416 COLLJ CAPILLARY BLOOD SPEC: CPT

## 2021-10-29 NOTE — PROGRESS NOTES
ANTICOAGULATION MANAGEMENT     Nabor Cunningham 85 year old male is on warfarin with therapeutic INR result. (Goal INR 2.0-3.0)    Recent labs: (last 7 days)     10/29/21  1400   INR 2.0*       ASSESSMENT     Source(s): Chart Review and Patient/Caregiver Call       Warfarin doses taken: Warfarin taken as instructed    Diet: No new diet changes identified    New illness, injury, or hospitalization: No    Medication/supplement changes: None noted    Signs or symptoms of bleeding or clotting: No    Previous INR: Subtherapeutic    Additional findings: None     PLAN     Recommended plan for no diet, medication or health factor changes affecting INR     Dosing Instructions: Continue your current warfarin dose with next INR in 3 weeks       Summary  As of 10/29/2021    Full warfarin instructions:  5 mg every Sun, Tue, Thu; 4 mg all other days   Next INR check:  11/19/2021             Telephone call with  Florencio who verbalizes understanding and agrees to plan    Lab visit scheduled    Education provided: Please call back if any changes to your diet, medications or how you've been taking warfarin    Plan made per [facility] anticoagulation protocol    Jayde Whitt RN  Anticoagulation Clinic  10/29/2021    _______________________________________________________________________     Anticoagulation Episode Summary     Current INR goal:  2.0-3.0   TTR:  55.4 % (1 y)   Target end date:  Indefinite   Send INR reminders to:  [facility]    Indications    Chronic atrial fibrillation (H) [I48.20]  Long-term (current) use of anticoagulants [Z79.01] [Z79.01]           Comments:           Anticoagulation Care Providers     Provider Role Specialty Phone number    Heron Mayo MD Referring Family Medicine [phone]

## 2021-11-02 ENCOUNTER — HOSPITAL ENCOUNTER (OUTPATIENT)
Dept: CARDIOLOGY | Facility: CLINIC | Age: 86
Discharge: HOME | End: 2021-11-02
Attending: INTERNAL MEDICINE | Admitting: INTERNAL MEDICINE
Payer: MEDICARE

## 2021-11-02 DIAGNOSIS — F41.9 ANXIETY: ICD-10-CM

## 2021-11-02 DIAGNOSIS — Z95.0 CARDIAC PACEMAKER IN SITU: ICD-10-CM

## 2021-11-02 DIAGNOSIS — I49.5 SICK SINUS SYNDROME (H): Primary | ICD-10-CM

## 2021-11-02 PROCEDURE — 93280 PM DEVICE PROGR EVAL DUAL: CPT

## 2021-11-02 PROCEDURE — 93280 PM DEVICE PROGR EVAL DUAL: CPT | Mod: 26 | Performed by: INTERNAL MEDICINE

## 2021-11-02 RX ORDER — NORTRIPTYLINE HCL 25 MG
CAPSULE ORAL
Qty: 30 CAPSULE | Refills: 11 | Status: SHIPPED | OUTPATIENT
Start: 2021-11-02 | End: 2022-01-01

## 2021-11-09 LAB
MDC_IDC_LEAD_IMPLANT_DT: NORMAL
MDC_IDC_LEAD_IMPLANT_DT: NORMAL
MDC_IDC_LEAD_LOCATION: NORMAL
MDC_IDC_LEAD_LOCATION: NORMAL
MDC_IDC_LEAD_LOCATION_DETAIL_1: NORMAL
MDC_IDC_LEAD_LOCATION_DETAIL_1: NORMAL
MDC_IDC_LEAD_MFG: NORMAL
MDC_IDC_LEAD_MFG: NORMAL
MDC_IDC_LEAD_MODEL: NORMAL
MDC_IDC_LEAD_MODEL: NORMAL
MDC_IDC_LEAD_POLARITY_TYPE: NORMAL
MDC_IDC_LEAD_SERIAL: NORMAL
MDC_IDC_LEAD_SERIAL: NORMAL
MDC_IDC_MSMT_BATTERY_STATUS: NORMAL
MDC_IDC_MSMT_LEADCHNL_RA_IMPEDANCE_VALUE: 359 OHM
MDC_IDC_MSMT_LEADCHNL_RA_PACING_THRESHOLD_AMPLITUDE: 0.5 V
MDC_IDC_MSMT_LEADCHNL_RA_PACING_THRESHOLD_PULSEWIDTH: 0.4 MS
MDC_IDC_MSMT_LEADCHNL_RV_IMPEDANCE_VALUE: 884 OHM
MDC_IDC_MSMT_LEADCHNL_RV_PACING_THRESHOLD_AMPLITUDE: 0.7 V
MDC_IDC_MSMT_LEADCHNL_RV_PACING_THRESHOLD_PULSEWIDTH: 0.4 MS
MDC_IDC_MSMT_LEADCHNL_RV_SENSING_INTR_AMPL: 15.9 MV
MDC_IDC_PG_IMPLANT_DTM: NORMAL
MDC_IDC_PG_MFG: NORMAL
MDC_IDC_PG_MODEL: NORMAL
MDC_IDC_PG_SERIAL: NORMAL
MDC_IDC_PG_TYPE: NORMAL
MDC_IDC_SESS_CLINIC_NAME: NORMAL
MDC_IDC_SESS_DTM: NORMAL
MDC_IDC_SESS_TYPE: NORMAL
MDC_IDC_SET_BRADY_AT_MODE_SWITCH_MODE: NORMAL
MDC_IDC_SET_BRADY_LOWRATE: 60 {BEATS}/MIN
MDC_IDC_SET_BRADY_MAX_SENSOR_RATE: 130 {BEATS}/MIN
MDC_IDC_SET_BRADY_MODE: NORMAL
MDC_IDC_SET_BRADY_PAV_DELAY_HIGH: 220 MS
MDC_IDC_SET_BRADY_PAV_DELAY_LOW: 220 MS
MDC_IDC_SET_BRADY_SAV_DELAY_LOW: 220 MS
MDC_IDC_SET_LEADCHNL_RA_PACING_CAPTURE_MODE: NORMAL
MDC_IDC_SET_LEADCHNL_RA_PACING_POLARITY: NORMAL
MDC_IDC_SET_LEADCHNL_RA_SENSING_ADAPTATION_MODE: NORMAL
MDC_IDC_SET_LEADCHNL_RA_SENSING_POLARITY: NORMAL
MDC_IDC_SET_LEADCHNL_RA_SENSING_SENSITIVITY: 0.15 MV
MDC_IDC_SET_LEADCHNL_RV_PACING_AMPLITUDE: 1.2 V
MDC_IDC_SET_LEADCHNL_RV_PACING_CAPTURE_MODE: NORMAL
MDC_IDC_SET_LEADCHNL_RV_PACING_POLARITY: NORMAL
MDC_IDC_SET_LEADCHNL_RV_PACING_PULSEWIDTH: 0.4 MS
MDC_IDC_SET_LEADCHNL_RV_SENSING_ADAPTATION_MODE: NORMAL
MDC_IDC_SET_LEADCHNL_RV_SENSING_POLARITY: NORMAL
MDC_IDC_SET_LEADCHNL_RV_SENSING_SENSITIVITY: 0.6 MV
MDC_IDC_SET_ZONE_DETECTION_INTERVAL: 375 MS
MDC_IDC_SET_ZONE_TYPE: NORMAL
MDC_IDC_SET_ZONE_VENDOR_TYPE: NORMAL
MDC_IDC_STAT_EPISODE_RECENT_COUNT: 1
MDC_IDC_STAT_EPISODE_RECENT_COUNT_DTM_END: NORMAL
MDC_IDC_STAT_EPISODE_RECENT_COUNT_DTM_START: NORMAL
MDC_IDC_STAT_EPISODE_TOTAL_COUNT: 5
MDC_IDC_STAT_EPISODE_TOTAL_COUNT_DTM_END: NORMAL
MDC_IDC_STAT_EPISODE_TYPE: NORMAL
MDC_IDC_STAT_EPISODE_TYPE: NORMAL
MDC_IDC_STAT_EPISODE_VENDOR_TYPE: NORMAL
MDC_IDC_STAT_EPISODE_VENDOR_TYPE: NORMAL

## 2021-11-11 DIAGNOSIS — I50.43 ACUTE ON CHRONIC COMBINED SYSTOLIC AND DIASTOLIC CONGESTIVE HEART FAILURE (H): ICD-10-CM

## 2021-11-11 DIAGNOSIS — I10 ESSENTIAL HYPERTENSION WITH GOAL BLOOD PRESSURE LESS THAN 140/90: ICD-10-CM

## 2021-11-11 RX ORDER — METOPROLOL SUCCINATE 25 MG/1
TABLET, EXTENDED RELEASE ORAL
Qty: 90 TABLET | Refills: 1 | Status: ON HOLD | OUTPATIENT
Start: 2021-11-11 | End: 2022-01-01

## 2021-11-19 ENCOUNTER — ANTICOAGULATION THERAPY VISIT (OUTPATIENT)
Dept: ANTICOAGULATION | Facility: CLINIC | Age: 86
End: 2021-11-19

## 2021-11-19 ENCOUNTER — LAB (OUTPATIENT)
Dept: LAB | Facility: CLINIC | Age: 86
End: 2021-11-19
Payer: MEDICARE

## 2021-11-19 DIAGNOSIS — Z79.01 LONG TERM CURRENT USE OF ANTICOAGULANT THERAPY: ICD-10-CM

## 2021-11-19 DIAGNOSIS — I48.20 CHRONIC ATRIAL FIBRILLATION (H): Primary | ICD-10-CM

## 2021-11-19 DIAGNOSIS — I48.20 CHRONIC ATRIAL FIBRILLATION (H): ICD-10-CM

## 2021-11-19 LAB — INR BLD: 2.5 (ref 0.9–1.1)

## 2021-11-19 PROCEDURE — 36416 COLLJ CAPILLARY BLOOD SPEC: CPT

## 2021-11-19 PROCEDURE — 85610 PROTHROMBIN TIME: CPT

## 2021-11-19 NOTE — PROGRESS NOTES
ANTICOAGULATION MANAGEMENT     Nabor Cunningham 86 year old male is on warfarin with therapeutic INR result. (Goal INR 2.0-3.0)    Recent labs: (last 7 days)     11/19/21  1324   INR 2.5*       ASSESSMENT     Source(s): Chart Review and Patient/Caregiver Call       Warfarin doses taken: Warfarin taken as instructed    Diet: No new diet changes identified    New illness, injury, or hospitalization: No    Medication/supplement changes: None noted    Signs or symptoms of bleeding or clotting: No    Previous INR: Therapeutic last visit; previously outside of goal range    Additional findings: None     PLAN     Recommended plan for no diet, medication or health factor changes affecting INR     Dosing Instructions: Continue your current warfarin dose with next INR in 4 weeks       Summary  As of 11/19/2021    Full warfarin instructions:  5 mg every Sun, Tue, Thu; 4 mg all other days   Next INR check:  12/17/2021             Telephone call with  Florencio who verbalizes understanding and agrees to plan    Lab visit scheduled    Education provided: Please call back if any changes to your diet, medications or how you've been taking warfarin and Contact [phone]  with any changes, questions or concerns.     Plan made per [facility] anticoagulation protocol    Belia Jones RN  Anticoagulation Clinic  11/19/2021    _______________________________________________________________________     Anticoagulation Episode Summary     Current INR goal:  2.0-3.0   TTR:  58.5 % (1 y)   Target end date:  Indefinite   Send INR reminders to:  [facility]    Indications    Chronic atrial fibrillation (H) [I48.20]  Long-term (current) use of anticoagulants [Z79.01] [Z79.01]           Comments:           Anticoagulation Care Providers     Provider Role Specialty Phone number    Heron Mayo MD Referring Family Medicine [phone]

## 2021-11-22 ENCOUNTER — OFFICE VISIT (OUTPATIENT)
Dept: CARDIOLOGY | Facility: CLINIC | Age: 86
End: 2021-11-22
Attending: INTERNAL MEDICINE
Payer: MEDICARE

## 2021-11-22 VITALS
HEART RATE: 66 BPM | DIASTOLIC BLOOD PRESSURE: 56 MMHG | OXYGEN SATURATION: 97 % | SYSTOLIC BLOOD PRESSURE: 105 MMHG | BODY MASS INDEX: 30.12 KG/M2 | WEIGHT: 181 LBS

## 2021-11-22 DIAGNOSIS — I50.22 CHRONIC SYSTOLIC HEART FAILURE (H): ICD-10-CM

## 2021-11-22 DIAGNOSIS — I50.43 ACUTE ON CHRONIC COMBINED SYSTOLIC AND DIASTOLIC CONGESTIVE HEART FAILURE (H): Primary | ICD-10-CM

## 2021-11-22 PROCEDURE — 99214 OFFICE O/P EST MOD 30 MIN: CPT | Performed by: PHYSICIAN ASSISTANT

## 2021-11-22 RX ORDER — FUROSEMIDE 20 MG
20 TABLET ORAL DAILY
Qty: 90 TABLET | Refills: 3 | Status: SHIPPED | OUTPATIENT
Start: 2021-11-22 | End: 2022-01-01

## 2021-11-22 NOTE — LETTER
11/22/2021    Heron Mayo MD  [address] [place] [id number]    RE: Nabor Cunningham       Dear Colleague,    I had the pleasure of seeing Nabor Cunningham in the [facility].      Cardiology Progress Note    Patient seen today in follow up of: CORE follow-up  Primary cardiologist: Dr. Galvan    HPI:  Nabor Cunningham is a very pleasant 86 year old male with a history of:  1.  Chronic atrial fibrillation.  On warfarin for thromboembolic prevention.  2.  Sinus node dysfunction and symptomatic bradycardia status post implantation of a pacemaker in June 2016.  EF was normal at that time.  3.  A mild cardiomyopathy.  With an EF around 40%.  Diagnosed in 2018 after he presented with heart failure.  Coronary angiography in January 2019 showed nonobstructive CAD.  4.  Mild ascending aortic aneurysm.  4.4 cm on last echocardiogram in May 2021.    Bear is here today for routine CORE clinic follow-up.  He is followed with Dr. Galvan and myself since transferring his care here in December 2019.  Overall, he is doing well from a heart failure perspective and he has not been rehospitalized.  He has been maintained on appropriate medical therapy with metoprolol succinate and lisinopril.  He has not been on an MRI due to renal insufficiency.    He saw Dr. Galvan last spring and had a follow-up echocardiogram at that time on 5/5/2021 which showed stable LV function with an EF of 40 to 45%.  There was mild global hypokinesis.  RV function was normal.  No significant valvular disease was noted.    He is back today for routine follow-up.  Overall, he is stable from a heart failure perspective.  He denies any shortness of breath, orthopnea, or PND.  He does not weigh himself regularly and tells me he assesses things by of his belt feels tighter not.  It does not at this point.    Unfortunately, he has been having some trouble with his left knee again.  He was set to undergo  a knee replacement but this was ultimately canceled as he had no one to stay with him and then deferred amidst the COVID-19 pandemic.  His knee had gotten better but now is quite painful again.  He plans to see orthopedics and tells me he will likely need at least a partial knee replacement.    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    At this point, Bear continues to appear stable from a cardiovascular perspective.  He has no increasing heart failure symptoms and at this point primarily seems to be limited by knee discomfort.  His last echocardiogram as mentioned above in May showed stable LV function.  He is on appropriate medical therapy including lisinopril 10 mg daily, metoprolol XL 25 mg daily and takes a low-dose of Lasix, 20 mg daily.  He appears compensated on exam today.  I made no changes to his regimen.    He last had lab work over 6 months ago thus I suggested we recheck a basic metabolic panel and a hemoglobin.  His last hemoglobin in April had trended down slightly to 10.  He had significantly worsening renal insufficiency in the spring 2020 but more recently his creatinine has been stable around 1.6.  He will schedule labs in a few weeks with his next INR draw at [place].  I will notify him of those results and if any follow-up is needed.    He anticipates he will likely need orthopedic surgery in the future.  At this point, he appears to be well compensated from a heart failure perspective and I do not see any obvious contraindication to that with his current clinical status.    I will have him return to see Dr. Galvan in about 6 months with an echocardiogram prior to  reassess his EF.  In the meantime, if he has any questions or concerns or certainly any worsening heart failure symptoms I asked him to contact us immediately.  It was a pleasure seeing Bear in clinic today.    Orders this Visit:  Orders Placed This Encounter   Procedures     Basic metabolic panel     Hemoglobin     Echocardiogram Complete     Orders Placed This Encounter   Medications     furosemide (LASIX) 20 MG tablet     Sig: Take 1 tablet (20 mg) by mouth daily     Dispense:  90 tablet     Refill:  3     Medications Discontinued During This Encounter   Medication Reason     furosemide (LASIX) 40 MG tablet Reorder       CURRENT MEDICATIONS:  Current Outpatient Medications   Medication Sig Dispense Refill     allopurinol (ZYLOPRIM) 100 MG tablet TAKE TWO TABLETS BY MOUTH ONCE DAILY 60 tablet 5     atorvastatin (LIPITOR) 20 MG tablet TAKE ONE TABLET BY MOUTH ONCE DAILY 30 tablet 11     bismuth subsalicylate (PEPTO-BISMOL) 262 MG TABS Take 1 tablet by mouth 3 times daily (Patient taking differently: Take 1 tablet by mouth daily ) 90 tablet 3     calcium carbonate (OS- MG [place]. [place]) 1250 MG tablet Take 1 tablet by mouth Every other day       cyanocobalamin (VITAMIN B-12) 1000 MCG tablet Take 1,000 mcg by mouth daily        furosemide (LASIX) 20 MG tablet Take 1 tablet (20 mg) by mouth daily 90 tablet 3     gabapentin (NEURONTIN) 100 MG capsule He takes gabapentin 100mg in AM, 100mg at Noon and takes one 300mg pill at bedtime. 150 capsule 11     gabapentin (NEURONTIN) 300 MG capsule Take 1 capsule (300 mg) by mouth At Bedtime 90 capsule 1     hypromellose (ARTIFICIAL TEARS) 0.4 % SOLN ophthalmic solution Place 1 drop into both eyes every hour as needed for dry eyes       lisinopril (ZESTRIL) 5 MG tablet TAKE TWO TABLETS BY MOUTH ONCE DAILY 180 tablet 1     loperamide (IMODIUM) 2 MG capsule Take 2 mg by mouth 4 times daily as needed for diarrhea       metoprolol succinate ER (TOPROL-XL) 25 MG 24 hr tablet TAKE  ONE TABLET BY MOUTH EVERY EVENING 90 tablet 1     mirtazapine (REMERON) 15 MG tablet TAKE ONE TABLET BY MOUTH EVERY NIGHT AT BEDTIME 90 tablet 1     multivitamin, therapeutic with minerals (MULTI-VITAMIN) TABS tablet Take 1 tablet by mouth Every other day 100 tablet 3     nortriptyline (PAMELOR) 25 MG capsule TAKE ONE CAPSULE BY MOUTH EVERY NIGHT AT BEDTIME 30 capsule 11     ofloxacin (OCUFLOX) 0.3 % ophthalmic solution        polyvinyl alcohol (LIQUIFILM TEARS) 1.4 % ophthalmic solution 1-2 drops       prednisoLONE acetate (PRED FORTE) 1 % ophthalmic susp Place 1 drop Into the left eye 3 times daily   2     tamsulosin (FLOMAX) 0.4 MG capsule TAKE ONE CAPSULE BY MOUTH ONCE DAILY 30 capsule 5     triamcinolone (KENALOG) 0.1 % external cream APPLY TOPICALLY TO AFFECTED AREA(S) TWICE DAILY AS DIRECTED 45 g 1     warfarin ANTICOAGULANT (JANTOVEN ANTICOAGULANT) 2 MG tablet Take 5 mg every Sun, Tue, Thu; 4 mg all other days or As directed by Anticoagulation clinic 175 tablet 0     ALLERGIES  Allergies   Allergen Reactions     Amoxicillin Hives     Penicillins Other (See Comments)     Dizzy       Trazodone Other (See Comments)     Hallucinations       PAST MEDICAL HISTORY:  Past Medical History:   Diagnosis Date     Bleeding from wound 9/2/2018 7/12/2019:He had prolonged bleeding after cyst removal on his back within the past year.        PAST SURGICAL HISTORY:  Past Surgical History:   Procedure Laterality Date     ANGIOGRAM  01/2019    right and left heart cath.      APPENDECTOMY      ~2013     CATARACT IOL, RT/LT      Past bilateral cataract surgery.      COLONOSCOPY N/A 7/3/2020    Procedure: COLONOSCOPY, WITH POLYPECTOMY AND BIOPSY;  Surgeon: Fady Boyer MD;  Location: [facility]     EYE SURGERY Left     three corneal transplants     IMPLANT PACEMAKER       INCISION AND DRAINAGE TRUNK, COMBINED N/A 9/6/2018    Procedure: COMBINED INCISION AND DRAINAGE TRUNK;  incision and cauterization of left buttock bleed.;   Surgeon: Dain Davis MD;  Location: [facility]     INCISION AND DRAINAGE TRUNK, COMBINED N/A 11/1/2018    Procedure:  INCISION AND DRAINAGE of Back Wound;  Surgeon: Dain Davis MD;  Location: [facility]     JOINT REPLACEMENT Right     right total knee.      REPLACEMENT TOTAL KNEE Right      THORACIC SURGERY      removal benign nodule     FAMILY HISTORY:  Family History   Problem Relation Age of Onset     Cancer Brother      Diabetes Brother      SOCIAL HISTORY:  Social History     Socioeconomic History     Marital status:      Spouse name: None     Number of children: 3     Years of education: 13     Highest education level: None   Occupational History     Occupation: retired   Tobacco Use     Smoking status: Former Smoker     Years: 20.00     Types: Cigars     Smokeless tobacco: Never Used   Substance and Sexual Activity     Alcohol use: Not Currently     Drug use: No     Sexual activity: Not Currently   Other Topics Concern     Parent/sibling w/ CABG, MI or angioplasty before 65F 55M? Not Asked   Social History Narrative    ** Merged History Encounter **          Social Determinants of Health     Financial Resource Strain: Low Risk      Difficulty of Paying Living Expenses: Not hard at all   Food Insecurity: No Food Insecurity     Worried About Running Out of Food in the Last Year: Never true     Ran Out of Food in the Last Year: Never true   Transportation Needs: No Transportation Needs     Lack of Transportation (Medical): No     Lack of Transportation (Non-Medical): No   Physical Activity: Not on file   Stress: Not on file   Social Connections: Not on file   Intimate Partner Violence: Not At Risk     Fear of Current or Ex-Partner: No     Emotionally Abused: No     Physically Abused: No     Sexually Abused: No   Housing Stability: Not on file     Review of Systems:  Skin:  Positive for bruising   Eyes:  Positive for glasses  ENT:  Negative    Respiratory:  Negative    Cardiovascular:  Negative     Gastroenterology: Negative    Genitourinary:  Positive for retention;hesitancy  Musculoskeletal:  Negative joint pain;joint swelling;joint stiffness;arthritis  Neurologic:  Negative numbness or tingling of feet  Psychiatric:  Negative sleep disturbances  Heme/Lymph/Imm:  Negative    Endocrine:  Negative       Physical Exam:  Vitals: /56   Pulse 66   Wt 82.1 kg (181 lb)   SpO2 97%   BMI 30.12 kg/m     Wt Readings from Last 4 Encounters:   11/22/21 82.1 kg (181 lb)   10/15/21 83.5 kg (184 lb)   05/07/21 79.4 kg (175 lb)   04/27/21 79.4 kg (175 lb)     GEN: well nourished, in no acute distress.  HEENT:  Pupils equal, round. Sclerae nonicteric.   C/V:  Regular rate and rhythm, no murmur, rub or gallop.    RESP: Respirations are unlabored. Clear to auscultation bilaterally without wheezing, rales, or rhonchi.  GI: Abdomen soft, nontender.  EXTREM: 1+ bilateral pitting LE edema.  NEURO: Alert and oriented, cooperative.  SKIN: Warm and dry.     Recent Lab Results:  LIPID RESULTS:  Lab Results   Component Value Date    CHOL 165 07/17/2020    HDL 36 (L) 07/17/2020    LDL 62 07/17/2020    TRIG 336 (H) 07/17/2020     LIVER ENZYME RESULTS:  Lab Results   Component Value Date    AST 22 06/09/2020    ALT 20 06/09/2020     CBC RESULTS:  Lab Results   Component Value Date    WBC 6.7 06/09/2020    RBC 4.05 (L) 06/09/2020    HGB 10.5 (L) 04/15/2021    HCT 37.7 (L) 06/09/2020    MCV 93 06/09/2020    MCH 30.9 06/09/2020    MCHC 33.2 06/09/2020    RDW 14.5 06/09/2020     (L) 06/09/2020     BMP RESULTS:  Lab Results   Component Value Date     04/15/2021    POTASSIUM 4.0 04/15/2021    CHLORIDE 103 04/15/2021    CO2 29 04/15/2021    ANIONGAP 6 04/15/2021    GLC 84 04/15/2021    BUN 23 04/15/2021    CR 1.63 (H) 04/15/2021    GFRESTIMATED 38 (L) 04/15/2021    GFRESTBLACK 44 (L) 04/15/2021    ALY 9.1 04/15/2021      A1C RESULTS:  No results found for: A1C  INR RESULTS:  Lab Results   Component Value Date    INR 2.5  (H) 11/19/2021    INR 2.0 (H) 10/29/2021    INR 2.20 (H) 07/02/2021    INR 2.00 (H) 06/11/2021       Yulia Mon PA-C  [facility]    Thank you for allowing me to participate in the care of your patient.      Sincerely,     Yulia Mon PA-C     [facility]  cc:   Katie Galvan MD  [address]  [place] [id number]

## 2021-11-22 NOTE — PROGRESS NOTES
Cardiology Progress Note    Patient seen today in follow up of: [facility] follow-up  Primary cardiologist: Dr. Galvan    HPI:  Nabor Cunningham is a very pleasant 86 year old male with a history of:  1.  Chronic atrial fibrillation.  On warfarin for thromboembolic prevention.  2.  Sinus node dysfunction and symptomatic bradycardia status post implantation of a pacemaker in June 2016.  EF was normal at that time.  3.  A mild cardiomyopathy.  With an EF around 40%.  Diagnosed in 2018 after he presented with heart failure.  Coronary angiography in January 2019 showed nonobstructive CAD.  4.  Mild ascending aortic aneurysm.  4.4 cm on last echocardiogram in May 2021.    Bear is here today for routine [facility] clinic follow-up.  He is followed with Dr. Galvan and myself since transferring his care here in December 2019.  Overall, he is doing well from a heart failure perspective and he has not been rehospitalized.  He has been maintained on appropriate medical therapy with metoprolol succinate and lisinopril.  He has not been on an MRI due to renal insufficiency.    He saw Dr. Galvan last spring and had a follow-up echocardiogram at that time on 5/5/2021 which showed stable LV function with an EF of 40 to 45%.  There was mild global hypokinesis.  RV function was normal.  No significant valvular disease was noted.    He is back today for routine follow-up.  Overall, he is stable from a heart failure perspective.  He denies any shortness of breath, orthopnea, or PND.  He does not weigh himself regularly and tells me he assesses things by of his belt feels tighter not.  It does not at this point.    Unfortunately, he has been having some trouble with his left knee again.  He was set to undergo a knee replacement but this was ultimately canceled as he had no one to stay with him and then deferred amidst the COVID-19 pandemic.  His knee had gotten better but now is quite painful again.  He plans to see orthopedics and tells me he will  likely need at least a partial knee replacement.    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    At this point, Ashkan continues to appear stable from a cardiovascular perspective.  He has no increasing heart failure symptoms and at this point primarily seems to be limited by knee discomfort.  His last echocardiogram as mentioned above in May showed stable LV function.  He is on appropriate medical therapy including lisinopril 10 mg daily, metoprolol XL 25 mg daily and takes a low-dose of Lasix, 20 mg daily.  He appears compensated on exam today.  I made no changes to his regimen.    He last had lab work over 6 months ago thus I suggested we recheck a basic metabolic panel and a hemoglobin.  His last hemoglobin in April had trended down slightly to 10.  He had significantly worsening renal insufficiency in the spring 2020 but more recently his creatinine has been stable around 1.6.  He will schedule labs in a few weeks with his next INR draw at [place].  I will notify him of those results and if any follow-up is needed.    He anticipates he will likely need orthopedic surgery in the future.  At this point, he appears to be well compensated from a heart failure perspective and I do not see any obvious contraindication to that with his current clinical status.    I will have him return to see Dr. Galvan in about 6 months with an echocardiogram prior to reassess his EF.  In the meantime, if he has any questions or concerns or certainly any worsening heart failure symptoms I asked him to contact us immediately.  It was a pleasure seeing Ashkan in clinic today.    Orders this Visit:  Orders  Placed This Encounter   Procedures     Basic metabolic panel     Hemoglobin     Echocardiogram Complete     Orders Placed This Encounter   Medications     furosemide (LASIX) 20 MG tablet     Sig: Take 1 tablet (20 mg) by mouth daily     Dispense:  90 tablet     Refill:  3     Medications Discontinued During This Encounter   Medication Reason     furosemide (LASIX) 40 MG tablet Reorder       CURRENT MEDICATIONS:  Current Outpatient Medications   Medication Sig Dispense Refill     allopurinol (ZYLOPRIM) 100 MG tablet TAKE TWO TABLETS BY MOUTH ONCE DAILY 60 tablet 5     atorvastatin (LIPITOR) 20 MG tablet TAKE ONE TABLET BY MOUTH ONCE DAILY 30 tablet 11     bismuth subsalicylate (PEPTO-BISMOL) 262 MG TABS Take 1 tablet by mouth 3 times daily (Patient taking differently: Take 1 tablet by mouth daily ) 90 tablet 3     calcium carbonate (OS- MG [place]. [place]) 1250 MG tablet Take 1 tablet by mouth Every other day       cyanocobalamin (VITAMIN B-12) 1000 MCG tablet Take 1,000 mcg by mouth daily        furosemide (LASIX) 20 MG tablet Take 1 tablet (20 mg) by mouth daily 90 tablet 3     gabapentin (NEURONTIN) 100 MG capsule He takes gabapentin 100mg in AM, 100mg at Noon and takes one 300mg pill at bedtime. 150 capsule 11     gabapentin (NEURONTIN) 300 MG capsule Take 1 capsule (300 mg) by mouth At Bedtime 90 capsule 1     hypromellose (ARTIFICIAL TEARS) 0.4 % SOLN ophthalmic solution Place 1 drop into both eyes every hour as needed for dry eyes       lisinopril (ZESTRIL) 5 MG tablet TAKE TWO TABLETS BY MOUTH ONCE DAILY 180 tablet 1     loperamide (IMODIUM) 2 MG capsule Take 2 mg by mouth 4 times daily as needed for diarrhea       metoprolol succinate ER (TOPROL-XL) 25 MG 24 hr tablet TAKE ONE TABLET BY MOUTH EVERY EVENING 90 tablet 1     mirtazapine (REMERON) 15 MG tablet TAKE ONE TABLET BY MOUTH EVERY NIGHT AT BEDTIME 90 tablet 1     multivitamin, therapeutic with minerals (MULTI-VITAMIN) TABS tablet Take 1 tablet by mouth  Every other day 100 tablet 3     nortriptyline (PAMELOR) 25 MG capsule TAKE ONE CAPSULE BY MOUTH EVERY NIGHT AT BEDTIME 30 capsule 11     ofloxacin (OCUFLOX) 0.3 % ophthalmic solution        polyvinyl alcohol (LIQUIFILM TEARS) 1.4 % ophthalmic solution 1-2 drops       prednisoLONE acetate (PRED FORTE) 1 % ophthalmic susp Place 1 drop Into the left eye 3 times daily   2     tamsulosin (FLOMAX) 0.4 MG capsule TAKE ONE CAPSULE BY MOUTH ONCE DAILY 30 capsule 5     triamcinolone (KENALOG) 0.1 % external cream APPLY TOPICALLY TO AFFECTED AREA(S) TWICE DAILY AS DIRECTED 45 g 1     warfarin ANTICOAGULANT (JANTOVEN ANTICOAGULANT) 2 MG tablet Take 5 mg every Sun, Tue, Thu; 4 mg all other days or As directed by Anticoagulation clinic 175 tablet 0     ALLERGIES  Allergies   Allergen Reactions     Amoxicillin Hives     Penicillins Other (See Comments)     Dizzy       Trazodone Other (See Comments)     Hallucinations       PAST MEDICAL HISTORY:  Past Medical History:   Diagnosis Date     Bleeding from wound 9/2/2018 7/12/2019:He had prolonged bleeding after cyst removal on his back within the past year.        PAST SURGICAL HISTORY:  Past Surgical History:   Procedure Laterality Date     ANGIOGRAM  01/2019    right and left heart cath.      APPENDECTOMY      ~2013     CATARACT IOL, RT/LT      Past bilateral cataract surgery.      COLONOSCOPY N/A 7/3/2020    Procedure: COLONOSCOPY, WITH POLYPECTOMY AND BIOPSY;  Surgeon: Fady Boyer MD;  Location: [facility]     EYE SURGERY Left     three corneal transplants     IMPLANT PACEMAKER       INCISION AND DRAINAGE TRUNK, COMBINED N/A 9/6/2018    Procedure: COMBINED INCISION AND DRAINAGE TRUNK;  incision and cauterization of left buttock bleed.;  Surgeon: Dain Davis MD;  Location: [facility]     INCISION AND DRAINAGE TRUNK, COMBINED N/A 11/1/2018    Procedure:  INCISION AND DRAINAGE of Back Wound;  Surgeon: Dain Davis MD;  Location: [facility]     JOINT REPLACEMENT Right     right total  knee.      REPLACEMENT TOTAL KNEE Right      THORACIC SURGERY      removal benign nodule     FAMILY HISTORY:  Family History   Problem Relation Age of Onset     Cancer Brother      Diabetes Brother      SOCIAL HISTORY:  Social History     Socioeconomic History     Marital status:      Spouse name: None     Number of children: 3     Years of education: 13     Highest education level: None   Occupational History     Occupation: retired   Tobacco Use     Smoking status: Former Smoker     Years: 20.00     Types: Cigars     Smokeless tobacco: Never Used   Substance and Sexual Activity     Alcohol use: Not Currently     Drug use: No     Sexual activity: Not Currently   Other Topics Concern     Parent/sibling w/ CABG, MI or angioplasty before 65F 55M? Not Asked   Social History Narrative    ** Merged History Encounter **          Social Determinants of Health     Financial Resource Strain: Low Risk      Difficulty of Paying Living Expenses: Not hard at all   Food Insecurity: No Food Insecurity     Worried About Running Out of Food in the Last Year: Never true     Ran Out of Food in the Last Year: Never true   Transportation Needs: No Transportation Needs     Lack of Transportation (Medical): No     Lack of Transportation (Non-Medical): No   Physical Activity: Not on file   Stress: Not on file   Social Connections: Not on file   Intimate Partner Violence: Not At Risk     Fear of Current or Ex-Partner: No     Emotionally Abused: No     Physically Abused: No     Sexually Abused: No   Housing Stability: Not on file     Review of Systems:  Skin:  Positive for bruising   Eyes:  Positive for glasses  ENT:  Negative    Respiratory:  Negative    Cardiovascular:  Negative    Gastroenterology: Negative    Genitourinary:  Positive for retention;hesitancy  Musculoskeletal:  Negative joint pain;joint swelling;joint stiffness;arthritis  Neurologic:  Negative numbness or tingling of feet  Psychiatric:  Negative sleep  disturbances  Heme/Lymph/Imm:  Negative    Endocrine:  Negative       Physical Exam:  Vitals: /56   Pulse 66   Wt 82.1 kg (181 lb)   SpO2 97%   BMI 30.12 kg/m     Wt Readings from Last 4 Encounters:   11/22/21 82.1 kg (181 lb)   10/15/21 83.5 kg (184 lb)   05/07/21 79.4 kg (175 lb)   04/27/21 79.4 kg (175 lb)     GEN: well nourished, in no acute distress.  HEENT:  Pupils equal, round. Sclerae nonicteric.   C/V:  Regular rate and rhythm, no murmur, rub or gallop.    RESP: Respirations are unlabored. Clear to auscultation bilaterally without wheezing, rales, or rhonchi.  GI: Abdomen soft, nontender.  EXTREM: 1+ bilateral pitting LE edema.  NEURO: Alert and oriented, cooperative.  SKIN: Warm and dry.     Recent Lab Results:  LIPID RESULTS:  Lab Results   Component Value Date    CHOL 165 07/17/2020    HDL 36 (L) 07/17/2020    LDL 62 07/17/2020    TRIG 336 (H) 07/17/2020     LIVER ENZYME RESULTS:  Lab Results   Component Value Date    AST 22 06/09/2020    ALT 20 06/09/2020     CBC RESULTS:  Lab Results   Component Value Date    WBC 6.7 06/09/2020    RBC 4.05 (L) 06/09/2020    HGB 10.5 (L) 04/15/2021    HCT 37.7 (L) 06/09/2020    MCV 93 06/09/2020    MCH 30.9 06/09/2020    MCHC 33.2 06/09/2020    RDW 14.5 06/09/2020     (L) 06/09/2020     BMP RESULTS:  Lab Results   Component Value Date     04/15/2021    POTASSIUM 4.0 04/15/2021    CHLORIDE 103 04/15/2021    CO2 29 04/15/2021    ANIONGAP 6 04/15/2021    GLC 84 04/15/2021    BUN 23 04/15/2021    CR 1.63 (H) 04/15/2021    GFRESTIMATED 38 (L) 04/15/2021    GFRESTBLACK 44 (L) 04/15/2021    ALY 9.1 04/15/2021      A1C RESULTS:  No results found for: A1C  INR RESULTS:  Lab Results   Component Value Date    INR 2.5 (H) 11/19/2021    INR 2.0 (H) 10/29/2021    INR 2.20 (H) 07/02/2021    INR 2.00 (H) 06/11/2021       Yulia Mon PA-C  [facility]

## 2021-11-22 NOTE — PATIENT INSTRUCTIONS
Call [facility] nurse for any questions or concerns Mon-Fri 8am-4pm:                                                 #[phone]                                       For concerns after hours:                                               #[phone], option 2     1: Medication changes: no medication changes. I sent in a prescription for 20 mg tablets of lasix (furosemide) so you don't have to cut the tablets when you get them.  2: Plan from today: see Dr. Galvan in April with an echocardiogram beforehand.

## 2021-12-17 ENCOUNTER — LAB (OUTPATIENT)
Dept: LAB | Facility: CLINIC | Age: 86
End: 2021-12-17
Payer: MEDICARE

## 2021-12-17 ENCOUNTER — ANTICOAGULATION THERAPY VISIT (OUTPATIENT)
Dept: ANTICOAGULATION | Facility: CLINIC | Age: 86
End: 2021-12-17

## 2021-12-17 DIAGNOSIS — Z79.01 LONG TERM CURRENT USE OF ANTICOAGULANT THERAPY: ICD-10-CM

## 2021-12-17 DIAGNOSIS — I48.20 CHRONIC ATRIAL FIBRILLATION (H): ICD-10-CM

## 2021-12-17 DIAGNOSIS — I50.43 ACUTE ON CHRONIC COMBINED SYSTOLIC AND DIASTOLIC CONGESTIVE HEART FAILURE (H): ICD-10-CM

## 2021-12-17 DIAGNOSIS — I48.20 CHRONIC ATRIAL FIBRILLATION (H): Primary | ICD-10-CM

## 2021-12-17 LAB
ANION GAP SERPL CALCULATED.3IONS-SCNC: 2 MMOL/L (ref 3–14)
BUN SERPL-MCNC: 21 MG/DL (ref 7–30)
CALCIUM SERPL-MCNC: 9.1 MG/DL (ref 8.5–10.1)
CHLORIDE BLD-SCNC: 98 MMOL/L (ref 94–109)
CO2 SERPL-SCNC: 34 MMOL/L (ref 20–32)
CREAT SERPL-MCNC: 1.44 MG/DL (ref 0.66–1.25)
GFR SERPL CREATININE-BSD FRML MDRD: 44 ML/MIN/1.73M2
GLUCOSE BLD-MCNC: 99 MG/DL (ref 70–99)
HGB BLD-MCNC: 12.2 G/DL (ref 13.3–17.7)
INR BLD: 1.8 (ref 0.9–1.1)
POTASSIUM BLD-SCNC: 4.2 MMOL/L (ref 3.4–5.3)
SODIUM SERPL-SCNC: 134 MMOL/L (ref 133–144)

## 2021-12-17 PROCEDURE — 36415 COLL VENOUS BLD VENIPUNCTURE: CPT

## 2021-12-17 PROCEDURE — 85018 HEMOGLOBIN: CPT

## 2021-12-17 PROCEDURE — 85610 PROTHROMBIN TIME: CPT

## 2021-12-17 PROCEDURE — 80048 BASIC METABOLIC PNL TOTAL CA: CPT

## 2021-12-17 NOTE — PROGRESS NOTES
ANTICOAGULATION MANAGEMENT     Nabor Cunningham 86 year old male is on warfarin with subtherapeutic INR result. (Goal INR 2.0-3.0)    Recent labs: (last 7 days)     12/17/21  1325   INR 1.8*       ASSESSMENT     Source(s): Chart Review       Warfarin doses taken: Warfarin taken as instructed    Diet: No new diet changes identified    New illness, injury, or hospitalization: No    Medication/supplement changes: None noted    Signs or symptoms of bleeding or clotting: No    Previous INR: Therapeutic last 2(+) visits    Additional findings: None started a new mvi centrum silver     PLAN     Recommended plan for no diet, medication or health factor changes affecting INR     Dosing Instructions: Continue your current warfarin dose with next INR in 2 weeks       Summary  As of 12/17/2021    Full warfarin instructions:  5 mg every Sun, Tue, Thu; 4 mg all other days   Next INR check:               Telephone call with Nabor who verbalizes understanding and agrees to plan    Lab visit scheduled    Education provided: Please call back if any changes to your diet, medications or how you've been taking warfarin    Plan made per [facility] anticoagulation protocol    Gwen Walker RN  Anticoagulation Clinic  12/17/2021    _______________________________________________________________________     Anticoagulation Episode Summary     Current INR goal:  2.0-3.0   TTR:  63.4 % (1 y)   Target end date:  Indefinite   Send INR reminders to:  [facility]    Indications    Chronic atrial fibrillation (H) [I48.20]  Long-term (current) use of anticoagulants [Z79.01] [Z79.01]           Comments:           Anticoagulation Care Providers     Provider Role Specialty Phone number    Heron Mayo MD Referring Family Medicine [phone]

## 2021-12-20 ENCOUNTER — TELEPHONE (OUTPATIENT)
Dept: FAMILY MEDICINE | Facility: CLINIC | Age: 86
End: 2021-12-20
Payer: MEDICARE

## 2021-12-29 DIAGNOSIS — I50.43 ACUTE ON CHRONIC COMBINED SYSTOLIC AND DIASTOLIC CONGESTIVE HEART FAILURE (H): ICD-10-CM

## 2021-12-29 RX ORDER — LISINOPRIL 5 MG/1
TABLET ORAL
Qty: 180 TABLET | Refills: 1 | Status: SHIPPED | OUTPATIENT
Start: 2021-12-29 | End: 2022-01-01

## 2021-12-31 ENCOUNTER — ANTICOAGULATION THERAPY VISIT (OUTPATIENT)
Dept: ANTICOAGULATION | Facility: CLINIC | Age: 86
End: 2021-12-31

## 2021-12-31 ENCOUNTER — LAB (OUTPATIENT)
Dept: LAB | Facility: CLINIC | Age: 86
End: 2021-12-31
Payer: MEDICARE

## 2021-12-31 DIAGNOSIS — I48.20 CHRONIC ATRIAL FIBRILLATION (H): ICD-10-CM

## 2021-12-31 DIAGNOSIS — Z79.01 CHRONIC ANTICOAGULATION: ICD-10-CM

## 2021-12-31 DIAGNOSIS — I48.20 CHRONIC ATRIAL FIBRILLATION (H): Primary | ICD-10-CM

## 2021-12-31 DIAGNOSIS — Z79.01 LONG TERM CURRENT USE OF ANTICOAGULANT THERAPY: ICD-10-CM

## 2021-12-31 LAB — INR BLD: 1.7 (ref 0.9–1.1)

## 2021-12-31 PROCEDURE — 36416 COLLJ CAPILLARY BLOOD SPEC: CPT

## 2021-12-31 PROCEDURE — 85610 PROTHROMBIN TIME: CPT

## 2021-12-31 RX ORDER — WARFARIN SODIUM 2 MG/1
TABLET ORAL
Qty: 195 TABLET | Refills: 0 | COMMUNITY
Start: 2021-12-31 | End: 2022-02-23

## 2021-12-31 NOTE — PROGRESS NOTES
ANTICOAGULATION MANAGEMENT     Nabor Cunningham 86 year old male is on warfarin with subtherapeutic INR result. (Goal INR 2.0-3.0)    Recent labs: (last 7 days)     12/31/21  1318   INR 1.7*       ASSESSMENT     Source(s): Patient/Caregiver Call       Warfarin doses taken: Warfarin taken as instructed    Diet: No new diet changes identified    New illness, injury, or hospitalization: No    Medication/supplement changes: None noted    Signs or symptoms of bleeding or clotting: No    Previous INR: Subtherapeutic    Additional findings: None     PLAN     Recommended plan for no diet, medication or health factor changes affecting INR     Dosing Instructions:  Increase your warfarin dose (9.7% change) with next INR in 2 weeks       Summary  As of 12/31/2021    Full warfarin instructions:  6 mg every Mon, Wed, Fri; 4 mg all other days   Next INR check:  1/14/2022             Telephone call with  Florencio who verbalizes understanding and agrees to plan    Lab visit scheduled    Education provided: Please call back if any changes to your diet, medications or how you've been taking warfarin and Contact [phone]  with any changes, questions or concerns.     Plan made per [facility] anticoagulation protocol    Daphne Lamar RN  Anticoagulation Clinic  12/31/2021    _______________________________________________________________________     Anticoagulation Episode Summary     Current INR goal:  2.0-3.0   TTR:  59.8 % (1 y)   Target end date:  Indefinite   Send INR reminders to:  [facility]    Indications    Chronic atrial fibrillation (H) [I48.20]  Long-term (current) use of anticoagulants [Z79.01] [Z79.01]           Comments:           Anticoagulation Care Providers     Provider Role Specialty Phone number    Heron Mayo MD Referring Family Medicine [phone]

## 2022-01-01 ENCOUNTER — APPOINTMENT (OUTPATIENT)
Dept: OCCUPATIONAL THERAPY | Facility: CLINIC | Age: 87
End: 2022-01-01
Attending: ORTHOPAEDIC SURGERY
Payer: MEDICARE

## 2022-01-01 ENCOUNTER — ANTICOAGULATION THERAPY VISIT (OUTPATIENT)
Dept: ANTICOAGULATION | Facility: CLINIC | Age: 87
End: 2022-01-01

## 2022-01-01 ENCOUNTER — TELEPHONE (OUTPATIENT)
Dept: GASTROENTEROLOGY | Facility: CLINIC | Age: 87
End: 2022-01-01

## 2022-01-01 ENCOUNTER — CARE COORDINATION (OUTPATIENT)
Dept: CARDIOLOGY | Facility: CLINIC | Age: 87
End: 2022-01-01

## 2022-01-01 ENCOUNTER — TELEPHONE (OUTPATIENT)
Dept: PULMONOLOGY | Facility: CLINIC | Age: 87
End: 2022-01-01

## 2022-01-01 ENCOUNTER — ANESTHESIA EVENT (OUTPATIENT)
Dept: SURGERY | Facility: CLINIC | Age: 87
End: 2022-01-01
Payer: MEDICARE

## 2022-01-01 ENCOUNTER — LAB (OUTPATIENT)
Dept: LAB | Facility: CLINIC | Age: 87
End: 2022-01-01
Payer: MEDICARE

## 2022-01-01 ENCOUNTER — TRANSFERRED RECORDS (OUTPATIENT)
Dept: HEALTH INFORMATION MANAGEMENT | Facility: CLINIC | Age: 87
End: 2022-01-01

## 2022-01-01 ENCOUNTER — TELEPHONE (OUTPATIENT)
Dept: FAMILY MEDICINE | Facility: CLINIC | Age: 87
End: 2022-01-01

## 2022-01-01 ENCOUNTER — APPOINTMENT (OUTPATIENT)
Dept: GENERAL RADIOLOGY | Facility: CLINIC | Age: 87
End: 2022-01-01
Attending: ORTHOPAEDIC SURGERY
Payer: MEDICARE

## 2022-01-01 ENCOUNTER — VIRTUAL VISIT (OUTPATIENT)
Dept: GASTROENTEROLOGY | Facility: CLINIC | Age: 87
End: 2022-01-01
Payer: MEDICARE

## 2022-01-01 ENCOUNTER — APPOINTMENT (OUTPATIENT)
Dept: PHYSICAL THERAPY | Facility: CLINIC | Age: 87
End: 2022-01-01
Attending: ORTHOPAEDIC SURGERY
Payer: MEDICARE

## 2022-01-01 ENCOUNTER — OFFICE VISIT (OUTPATIENT)
Dept: FAMILY MEDICINE | Facility: CLINIC | Age: 87
End: 2022-01-01
Payer: MEDICARE

## 2022-01-01 ENCOUNTER — PATIENT OUTREACH (OUTPATIENT)
Dept: CARE COORDINATION | Facility: CLINIC | Age: 87
End: 2022-01-01

## 2022-01-01 ENCOUNTER — MEDICAL CORRESPONDENCE (OUTPATIENT)
Dept: CARDIOLOGY | Facility: CLINIC | Age: 87
End: 2022-01-01

## 2022-01-01 ENCOUNTER — HOSPITAL ENCOUNTER (OUTPATIENT)
Dept: CT IMAGING | Facility: CLINIC | Age: 87
Discharge: HOME | End: 2022-09-02
Attending: INTERNAL MEDICINE | Admitting: INTERNAL MEDICINE
Payer: MEDICARE

## 2022-01-01 ENCOUNTER — MYC MEDICAL ADVICE (OUTPATIENT)
Dept: GASTROENTEROLOGY | Facility: CLINIC | Age: 87
End: 2022-01-01

## 2022-01-01 ENCOUNTER — ANESTHESIA (OUTPATIENT)
Dept: SURGERY | Facility: CLINIC | Age: 87
End: 2022-01-01
Payer: MEDICARE

## 2022-01-01 ENCOUNTER — MEDICAL CORRESPONDENCE (OUTPATIENT)
Dept: HEALTH INFORMATION MANAGEMENT | Facility: CLINIC | Age: 87
End: 2022-01-01

## 2022-01-01 ENCOUNTER — APPOINTMENT (OUTPATIENT)
Dept: LAB | Facility: CLINIC | Age: 87
End: 2022-01-01
Payer: MEDICARE

## 2022-01-01 ENCOUNTER — ANTICOAGULATION THERAPY VISIT (OUTPATIENT)
Dept: ANTICOAGULATION | Facility: CLINIC | Age: 87
End: 2022-01-01
Payer: MEDICARE

## 2022-01-01 ENCOUNTER — OFFICE VISIT (OUTPATIENT)
Dept: GASTROENTEROLOGY | Facility: CLINIC | Age: 87
End: 2022-01-01
Payer: MEDICARE

## 2022-01-01 ENCOUNTER — TELEPHONE (OUTPATIENT)
Dept: GERIATRICS | Facility: CLINIC | Age: 87
End: 2022-01-01

## 2022-01-01 ENCOUNTER — TELEPHONE (OUTPATIENT)
Dept: DERMATOLOGY | Facility: CLINIC | Age: 87
End: 2022-01-01

## 2022-01-01 ENCOUNTER — ALLIED HEALTH/NURSE VISIT (OUTPATIENT)
Dept: FAMILY MEDICINE | Facility: CLINIC | Age: 87
End: 2022-01-01

## 2022-01-01 ENCOUNTER — OFFICE VISIT (OUTPATIENT)
Dept: CARDIOLOGY | Facility: CLINIC | Age: 87
End: 2022-01-01
Payer: MEDICARE

## 2022-01-01 ENCOUNTER — TELEPHONE (OUTPATIENT)
Dept: ANTICOAGULATION | Facility: CLINIC | Age: 87
End: 2022-01-01

## 2022-01-01 ENCOUNTER — MEDICAL CORRESPONDENCE (OUTPATIENT)
Dept: DERMATOLOGY | Facility: CLINIC | Age: 87
End: 2022-01-01

## 2022-01-01 ENCOUNTER — TRANSITIONAL CARE UNIT VISIT (OUTPATIENT)
Dept: GERIATRICS | Facility: CLINIC | Age: 87
End: 2022-01-01
Payer: MEDICARE

## 2022-01-01 ENCOUNTER — E-CONSULT (OUTPATIENT)
Dept: PULMONOLOGY | Facility: CLINIC | Age: 87
End: 2022-01-01
Payer: MEDICARE

## 2022-01-01 ENCOUNTER — DOCUMENTATION ONLY (OUTPATIENT)
Dept: CARDIOLOGY | Facility: CLINIC | Age: 87
End: 2022-01-01

## 2022-01-01 ENCOUNTER — HOSPITAL ENCOUNTER (EMERGENCY)
Facility: CLINIC | Age: 87
Discharge: HOME | End: 2022-07-21
Attending: EMERGENCY MEDICINE | Admitting: EMERGENCY MEDICINE
Payer: MEDICARE

## 2022-01-01 ENCOUNTER — TELEPHONE (OUTPATIENT)
Dept: CARDIOLOGY | Facility: CLINIC | Age: 87
End: 2022-01-01

## 2022-01-01 ENCOUNTER — ANCILLARY PROCEDURE (OUTPATIENT)
Dept: GENERAL RADIOLOGY | Facility: CLINIC | Age: 87
End: 2022-01-01
Attending: INTERNAL MEDICINE
Payer: MEDICARE

## 2022-01-01 ENCOUNTER — APPOINTMENT (OUTPATIENT)
Dept: CT IMAGING | Facility: CLINIC | Age: 87
End: 2022-01-01
Attending: EMERGENCY MEDICINE
Payer: MEDICARE

## 2022-01-01 ENCOUNTER — ANCILLARY PROCEDURE (OUTPATIENT)
Dept: CARDIOLOGY | Facility: CLINIC | Age: 87
End: 2022-01-01
Attending: INTERNAL MEDICINE
Payer: MEDICARE

## 2022-01-01 ENCOUNTER — TELEPHONE (OUTPATIENT)
Dept: FAMILY MEDICINE | Facility: CLINIC | Age: 87
End: 2022-01-01
Payer: MEDICARE

## 2022-01-01 ENCOUNTER — APPOINTMENT (OUTPATIENT)
Dept: GENERAL RADIOLOGY | Facility: CLINIC | Age: 87
End: 2022-01-01
Attending: EMERGENCY MEDICINE
Payer: MEDICARE

## 2022-01-01 ENCOUNTER — TRANSCRIBE ORDERS (OUTPATIENT)
Dept: OTHER | Age: 87
End: 2022-01-01

## 2022-01-01 ENCOUNTER — ALLIED HEALTH/NURSE VISIT (OUTPATIENT)
Dept: FAMILY MEDICINE | Facility: CLINIC | Age: 87
End: 2022-01-01
Payer: MEDICARE

## 2022-01-01 ENCOUNTER — HOSPITAL ENCOUNTER (EMERGENCY)
Facility: CLINIC | Age: 87
Discharge: HOME | End: 2022-05-19
Attending: EMERGENCY MEDICINE | Admitting: EMERGENCY MEDICINE
Payer: MEDICARE

## 2022-01-01 ENCOUNTER — DOCUMENTATION ONLY (OUTPATIENT)
Dept: OTHER | Facility: CLINIC | Age: 87
End: 2022-01-01
Payer: MEDICARE

## 2022-01-01 ENCOUNTER — DOCUMENTATION ONLY (OUTPATIENT)
Dept: FAMILY MEDICINE | Facility: CLINIC | Age: 87
End: 2022-01-01
Payer: MEDICARE

## 2022-01-01 ENCOUNTER — PATIENT OUTREACH (OUTPATIENT)
Dept: NURSING | Facility: CLINIC | Age: 87
End: 2022-01-01
Payer: MEDICARE

## 2022-01-01 ENCOUNTER — PATIENT OUTREACH (OUTPATIENT)
Dept: CARE COORDINATION | Facility: CLINIC | Age: 87
End: 2022-01-01
Payer: MEDICARE

## 2022-01-01 ENCOUNTER — OFFICE VISIT (OUTPATIENT)
Dept: FAMILY MEDICINE | Facility: CLINIC | Age: 87
End: 2022-01-01

## 2022-01-01 ENCOUNTER — HOSPITAL ENCOUNTER (OUTPATIENT)
Dept: CT IMAGING | Facility: CLINIC | Age: 87
Discharge: HOME | End: 2022-06-24
Attending: INTERNAL MEDICINE | Admitting: INTERNAL MEDICINE
Payer: MEDICARE

## 2022-01-01 ENCOUNTER — OFFICE VISIT (OUTPATIENT)
Dept: DERMATOLOGY | Facility: CLINIC | Age: 87
End: 2022-01-01
Payer: MEDICARE

## 2022-01-01 ENCOUNTER — HEALTH MAINTENANCE LETTER (OUTPATIENT)
Age: 87
End: 2022-01-01

## 2022-01-01 ENCOUNTER — LAB REQUISITION (OUTPATIENT)
Dept: LAB | Facility: CLINIC | Age: 87
End: 2022-01-01
Payer: MEDICARE

## 2022-01-01 ENCOUNTER — TRANSFERRED RECORDS (OUTPATIENT)
Dept: HEALTH INFORMATION MANAGEMENT | Facility: CLINIC | Age: 87
End: 2022-01-01
Payer: MEDICARE

## 2022-01-01 ENCOUNTER — DOCUMENTATION ONLY (OUTPATIENT)
Dept: ANTICOAGULATION | Facility: CLINIC | Age: 87
End: 2022-01-01
Payer: MEDICARE

## 2022-01-01 ENCOUNTER — TRANSFERRED RECORDS (OUTPATIENT)
Dept: GENERAL RADIOLOGY | Facility: CLINIC | Age: 87
End: 2022-01-01

## 2022-01-01 ENCOUNTER — HOSPITAL ENCOUNTER (OUTPATIENT)
Facility: CLINIC | Age: 87
Discharge: NURSING, REHAB OR LONG-TERM CARE | End: 2022-05-09
Attending: ORTHOPAEDIC SURGERY | Admitting: ORTHOPAEDIC SURGERY
Payer: MEDICARE

## 2022-01-01 ENCOUNTER — TRANSCRIBE ORDERS (OUTPATIENT)
Dept: FAMILY MEDICINE | Facility: CLINIC | Age: 87
End: 2022-01-01

## 2022-01-01 VITALS
TEMPERATURE: 97.5 F | BODY MASS INDEX: 32.61 KG/M2 | HEART RATE: 76 BPM | HEIGHT: 64 IN | WEIGHT: 191 LBS | SYSTOLIC BLOOD PRESSURE: 122 MMHG | OXYGEN SATURATION: 95 % | DIASTOLIC BLOOD PRESSURE: 54 MMHG | RESPIRATION RATE: 16 BRPM

## 2022-01-01 VITALS
WEIGHT: 190.6 LBS | SYSTOLIC BLOOD PRESSURE: 124 MMHG | DIASTOLIC BLOOD PRESSURE: 70 MMHG | OXYGEN SATURATION: 95 % | HEART RATE: 88 BPM | HEIGHT: 66 IN | RESPIRATION RATE: 22 BRPM | TEMPERATURE: 97.6 F | BODY MASS INDEX: 30.63 KG/M2

## 2022-01-01 VITALS
BODY MASS INDEX: 29.82 KG/M2 | SYSTOLIC BLOOD PRESSURE: 118 MMHG | TEMPERATURE: 97.6 F | WEIGHT: 179 LBS | HEART RATE: 95 BPM | RESPIRATION RATE: 16 BRPM | OXYGEN SATURATION: 90 % | HEIGHT: 65 IN | DIASTOLIC BLOOD PRESSURE: 72 MMHG

## 2022-01-01 VITALS
BODY MASS INDEX: 32.79 KG/M2 | WEIGHT: 184 LBS | HEART RATE: 74 BPM | RESPIRATION RATE: 20 BRPM | HEIGHT: 66 IN | HEART RATE: 83 BPM | DIASTOLIC BLOOD PRESSURE: 78 MMHG | OXYGEN SATURATION: 98 % | DIASTOLIC BLOOD PRESSURE: 60 MMHG | BODY MASS INDEX: 29.57 KG/M2 | SYSTOLIC BLOOD PRESSURE: 111 MMHG | SYSTOLIC BLOOD PRESSURE: 114 MMHG | WEIGHT: 191 LBS | OXYGEN SATURATION: 94 % | TEMPERATURE: 96.9 F

## 2022-01-01 VITALS
BODY MASS INDEX: 29.7 KG/M2 | OXYGEN SATURATION: 91 % | HEART RATE: 87 BPM | SYSTOLIC BLOOD PRESSURE: 104 MMHG | WEIGHT: 184 LBS | TEMPERATURE: 97.3 F | RESPIRATION RATE: 18 BRPM | DIASTOLIC BLOOD PRESSURE: 71 MMHG

## 2022-01-01 VITALS
HEART RATE: 74 BPM | SYSTOLIC BLOOD PRESSURE: 123 MMHG | TEMPERATURE: 97.7 F | BODY MASS INDEX: 30.68 KG/M2 | WEIGHT: 190.92 LBS | HEIGHT: 66 IN | OXYGEN SATURATION: 96 % | DIASTOLIC BLOOD PRESSURE: 58 MMHG | RESPIRATION RATE: 18 BRPM

## 2022-01-01 VITALS
OXYGEN SATURATION: 93 % | DIASTOLIC BLOOD PRESSURE: 52 MMHG | WEIGHT: 179 LBS | TEMPERATURE: 97.4 F | HEART RATE: 81 BPM | HEIGHT: 65 IN | RESPIRATION RATE: 16 BRPM | BODY MASS INDEX: 29.82 KG/M2 | SYSTOLIC BLOOD PRESSURE: 106 MMHG

## 2022-01-01 VITALS
RESPIRATION RATE: 16 BRPM | HEART RATE: 84 BPM | BODY MASS INDEX: 33.92 KG/M2 | OXYGEN SATURATION: 98 % | DIASTOLIC BLOOD PRESSURE: 72 MMHG | TEMPERATURE: 97.7 F | WEIGHT: 197.6 LBS | SYSTOLIC BLOOD PRESSURE: 114 MMHG

## 2022-01-01 VITALS
TEMPERATURE: 97.1 F | HEART RATE: 72 BPM | SYSTOLIC BLOOD PRESSURE: 109 MMHG | DIASTOLIC BLOOD PRESSURE: 59 MMHG | BODY MASS INDEX: 34.16 KG/M2 | OXYGEN SATURATION: 97 % | WEIGHT: 199 LBS

## 2022-01-01 VITALS
HEART RATE: 74 BPM | DIASTOLIC BLOOD PRESSURE: 57 MMHG | BODY MASS INDEX: 33.99 KG/M2 | WEIGHT: 198 LBS | SYSTOLIC BLOOD PRESSURE: 103 MMHG | TEMPERATURE: 97.6 F | OXYGEN SATURATION: 95 %

## 2022-01-01 VITALS — DIASTOLIC BLOOD PRESSURE: 52 MMHG | HEART RATE: 78 BPM | SYSTOLIC BLOOD PRESSURE: 100 MMHG

## 2022-01-01 VITALS
HEART RATE: 88 BPM | RESPIRATION RATE: 18 BRPM | OXYGEN SATURATION: 96 % | BODY MASS INDEX: 29.82 KG/M2 | WEIGHT: 179 LBS | DIASTOLIC BLOOD PRESSURE: 75 MMHG | SYSTOLIC BLOOD PRESSURE: 106 MMHG | HEIGHT: 65 IN | TEMPERATURE: 97.5 F

## 2022-01-01 VITALS
DIASTOLIC BLOOD PRESSURE: 66 MMHG | HEIGHT: 64 IN | SYSTOLIC BLOOD PRESSURE: 120 MMHG | HEART RATE: 81 BPM | TEMPERATURE: 98 F | WEIGHT: 186 LBS | OXYGEN SATURATION: 97 % | BODY MASS INDEX: 31.76 KG/M2

## 2022-01-01 DIAGNOSIS — N18.32 STAGE 3B CHRONIC KIDNEY DISEASE (H): ICD-10-CM

## 2022-01-01 DIAGNOSIS — I48.20 CHRONIC ATRIAL FIBRILLATION (H): ICD-10-CM

## 2022-01-01 DIAGNOSIS — I50.43 ACUTE ON CHRONIC COMBINED SYSTOLIC (CONGESTIVE) AND DIASTOLIC (CONGESTIVE) HEART FAILURE (H): ICD-10-CM

## 2022-01-01 DIAGNOSIS — R93.89 ABNORMAL CHEST CT: Primary | ICD-10-CM

## 2022-01-01 DIAGNOSIS — E87.1 HYPONATREMIA: ICD-10-CM

## 2022-01-01 DIAGNOSIS — N18.31 STAGE 3A CHRONIC KIDNEY DISEASE (H): ICD-10-CM

## 2022-01-01 DIAGNOSIS — I50.22 CHRONIC SYSTOLIC HEART FAILURE (H): Primary | ICD-10-CM

## 2022-01-01 DIAGNOSIS — I48.20 CHRONIC ATRIAL FIBRILLATION (H): Primary | ICD-10-CM

## 2022-01-01 DIAGNOSIS — L73.9 FOLLICULITIS: ICD-10-CM

## 2022-01-01 DIAGNOSIS — Z79.01 LONG TERM CURRENT USE OF ANTICOAGULANT THERAPY: ICD-10-CM

## 2022-01-01 DIAGNOSIS — Z95.0 CARDIAC PACEMAKER IN SITU: Primary | ICD-10-CM

## 2022-01-01 DIAGNOSIS — R39.11 BENIGN PROSTATIC HYPERPLASIA WITH URINARY HESITANCY: ICD-10-CM

## 2022-01-01 DIAGNOSIS — M17.12 PRIMARY OSTEOARTHRITIS OF LEFT KNEE: ICD-10-CM

## 2022-01-01 DIAGNOSIS — Z95.0 CARDIAC PACEMAKER IN SITU: ICD-10-CM

## 2022-01-01 DIAGNOSIS — R19.7 DIARRHEA, UNSPECIFIED TYPE: ICD-10-CM

## 2022-01-01 DIAGNOSIS — Z23 NEED FOR PROPHYLACTIC VACCINATION AND INOCULATION AGAINST INFLUENZA: ICD-10-CM

## 2022-01-01 DIAGNOSIS — M79.609: ICD-10-CM

## 2022-01-01 DIAGNOSIS — Z96.652 S/P TOTAL KNEE ARTHROPLASTY, LEFT: ICD-10-CM

## 2022-01-01 DIAGNOSIS — N17.9 ACUTE RENAL FAILURE, UNSPECIFIED ACUTE RENAL FAILURE TYPE (H): Primary | ICD-10-CM

## 2022-01-01 DIAGNOSIS — Z51.81 ANTICOAGULATION GOAL OF INR 2 TO 3: ICD-10-CM

## 2022-01-01 DIAGNOSIS — R14.0 ABDOMINAL DISTENTION: ICD-10-CM

## 2022-01-01 DIAGNOSIS — Z86.19 HX OF CLOSTRIDIUM DIFFICILE INFECTION: ICD-10-CM

## 2022-01-01 DIAGNOSIS — M1A.9XX0 CHRONIC GOUT, UNSPECIFIED CAUSE, UNSPECIFIED SITE: ICD-10-CM

## 2022-01-01 DIAGNOSIS — R09.89 PULMONARY CONGESTION: ICD-10-CM

## 2022-01-01 DIAGNOSIS — D50.0 IRON DEFICIENCY ANEMIA DUE TO CHRONIC BLOOD LOSS: ICD-10-CM

## 2022-01-01 DIAGNOSIS — Z96.652 HISTORY OF TOTAL KNEE ARTHROPLASTY, LEFT: Primary | ICD-10-CM

## 2022-01-01 DIAGNOSIS — S51.012A SKIN TEAR OF LEFT ELBOW WITHOUT COMPLICATION, INITIAL ENCOUNTER: ICD-10-CM

## 2022-01-01 DIAGNOSIS — I50.43 ACUTE ON CHRONIC COMBINED SYSTOLIC AND DIASTOLIC CONGESTIVE HEART FAILURE (H): ICD-10-CM

## 2022-01-01 DIAGNOSIS — R93.89 ABNORMAL CHEST X-RAY: Primary | ICD-10-CM

## 2022-01-01 DIAGNOSIS — R91.8 ABNORMAL CT LUNG SCREENING: Primary | ICD-10-CM

## 2022-01-01 DIAGNOSIS — M17.12 PRIMARY OSTEOARTHRITIS OF LEFT KNEE: Primary | ICD-10-CM

## 2022-01-01 DIAGNOSIS — I49.5 SICK SINUS SYNDROME (H): ICD-10-CM

## 2022-01-01 DIAGNOSIS — I50.22 CHRONIC SYSTOLIC HEART FAILURE (H): ICD-10-CM

## 2022-01-01 DIAGNOSIS — A04.72 C. DIFFICILE COLITIS: ICD-10-CM

## 2022-01-01 DIAGNOSIS — R19.7 DIARRHEA, UNSPECIFIED TYPE: Primary | ICD-10-CM

## 2022-01-01 DIAGNOSIS — J18.9 COMMUNITY ACQUIRED PNEUMONIA OF RIGHT LOWER LOBE OF LUNG: ICD-10-CM

## 2022-01-01 DIAGNOSIS — E83.42 HYPOMAGNESEMIA: ICD-10-CM

## 2022-01-01 DIAGNOSIS — K52.832 LYMPHOCYTIC COLITIS: ICD-10-CM

## 2022-01-01 DIAGNOSIS — D64.9 ANEMIA, UNSPECIFIED TYPE: ICD-10-CM

## 2022-01-01 DIAGNOSIS — M17.12 OSTEOARTHRITIS OF LEFT KNEE, UNSPECIFIED OSTEOARTHRITIS TYPE: Primary | ICD-10-CM

## 2022-01-01 DIAGNOSIS — J18.9 PNEUMONIA OF RIGHT LUNG DUE TO INFECTIOUS ORGANISM, UNSPECIFIED PART OF LUNG: Primary | ICD-10-CM

## 2022-01-01 DIAGNOSIS — Z47.1 AFTERCARE FOLLOWING LEFT KNEE JOINT REPLACEMENT SURGERY: ICD-10-CM

## 2022-01-01 DIAGNOSIS — L73.9 FOLLICULITIS: Primary | ICD-10-CM

## 2022-01-01 DIAGNOSIS — L30.9 DERMATITIS: Primary | ICD-10-CM

## 2022-01-01 DIAGNOSIS — R93.89 ABNORMAL CHEST X-RAY: ICD-10-CM

## 2022-01-01 DIAGNOSIS — G62.9 PERIPHERAL POLYNEUROPATHY: ICD-10-CM

## 2022-01-01 DIAGNOSIS — Z86.19 HX OF CLOSTRIDIUM DIFFICILE INFECTION: Primary | ICD-10-CM

## 2022-01-01 DIAGNOSIS — N40.1 BENIGN PROSTATIC HYPERPLASIA WITH URINARY HESITANCY: ICD-10-CM

## 2022-01-01 DIAGNOSIS — I10 ESSENTIAL HYPERTENSION WITH GOAL BLOOD PRESSURE LESS THAN 140/90: ICD-10-CM

## 2022-01-01 DIAGNOSIS — Z79.01 ANTICOAGULATION GOAL OF INR 2 TO 3: ICD-10-CM

## 2022-01-01 DIAGNOSIS — A04.72 C. DIFFICILE COLITIS: Primary | ICD-10-CM

## 2022-01-01 DIAGNOSIS — I10 ESSENTIAL HYPERTENSION WITH GOAL BLOOD PRESSURE LESS THAN 140/90: Primary | ICD-10-CM

## 2022-01-01 DIAGNOSIS — K52.832 LYMPHOCYTIC COLITIS: Primary | ICD-10-CM

## 2022-01-01 DIAGNOSIS — Z79.01 CHRONIC ANTICOAGULATION: ICD-10-CM

## 2022-01-01 DIAGNOSIS — I50.43 ACUTE ON CHRONIC COMBINED SYSTOLIC AND DIASTOLIC CONGESTIVE HEART FAILURE (H): Primary | ICD-10-CM

## 2022-01-01 DIAGNOSIS — J18.9 PNEUMONIA OF RIGHT LUNG DUE TO INFECTIOUS ORGANISM, UNSPECIFIED PART OF LUNG: ICD-10-CM

## 2022-01-01 DIAGNOSIS — E87.70 FLUID OVERLOAD, UNSPECIFIED: ICD-10-CM

## 2022-01-01 DIAGNOSIS — R39.11 URINARY HESITANCY: ICD-10-CM

## 2022-01-01 DIAGNOSIS — R14.0 ABDOMINAL DISTENTION: Primary | ICD-10-CM

## 2022-01-01 DIAGNOSIS — R60.0 PERIPHERAL EDEMA: Primary | ICD-10-CM

## 2022-01-01 DIAGNOSIS — Z11.59 ENCOUNTER FOR SCREENING FOR OTHER VIRAL DISEASES: ICD-10-CM
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
## 2022-01-01 DIAGNOSIS — Z96.652 AFTERCARE FOLLOWING LEFT KNEE JOINT REPLACEMENT SURGERY: ICD-10-CM

## 2022-01-01 DIAGNOSIS — R00.2 PALPITATIONS: Primary | ICD-10-CM

## 2022-01-01 DIAGNOSIS — Z23 HIGH PRIORITY FOR 2019-NCOV VACCINE: ICD-10-CM

## 2022-01-01 DIAGNOSIS — M17.12 LEFT KNEE DJD: Primary | ICD-10-CM

## 2022-01-01 LAB
ALBUMIN SERPL BCG-MCNC: 3.8 G/DL (ref 3.5–5.2)
ALBUMIN SERPL-MCNC: 3.2 G/DL (ref 3.4–5)
ALP SERPL-CCNC: 107 U/L (ref 40–150)
ALP SERPL-CCNC: 125 U/L (ref 40–129)
ALT SERPL W P-5'-P-CCNC: 19 U/L (ref 0–70)
ALT SERPL W P-5'-P-CCNC: 19 U/L (ref 10–50)
ALT SERPL W P-5'-P-CCNC: 21 U/L (ref 0–70)
ANION GAP SERPL CALCULATED.3IONS-SCNC: 10 MMOL/L (ref 7–15)
ANION GAP SERPL CALCULATED.3IONS-SCNC: 13 MMOL/L (ref 7–15)
ANION GAP SERPL CALCULATED.3IONS-SCNC: 3 MMOL/L (ref 3–14)
ANION GAP SERPL CALCULATED.3IONS-SCNC: 3 MMOL/L (ref 3–14)
ANION GAP SERPL CALCULATED.3IONS-SCNC: 5 MMOL/L (ref 3–14)
ANION GAP SERPL CALCULATED.3IONS-SCNC: 6 MMOL/L (ref 3–14)
ANION GAP SERPL CALCULATED.3IONS-SCNC: 6 MMOL/L (ref 3–14)
ANION GAP SERPL CALCULATED.3IONS-SCNC: 6 MMOL/L (ref 7–15)
ANION GAP SERPL CALCULATED.3IONS-SCNC: 7 MMOL/L (ref 3–14)
ANION GAP SERPL CALCULATED.3IONS-SCNC: 7 MMOL/L (ref 3–14)
AST SERPL W P-5'-P-CCNC: 20 U/L (ref 0–45)
AST SERPL W P-5'-P-CCNC: 28 U/L (ref 10–50)
BACTERIA SKIN AEROBE CULT: ABNORMAL
BACTERIA SKIN AEROBE CULT: NO GROWTH
BACTERIA SPEC CULT: NO GROWTH
BASOPHILS # BLD AUTO: 0 10E3/UL (ref 0–0.2)
BASOPHILS # BLD AUTO: 0.1 10E3/UL (ref 0–0.2)
BASOPHILS NFR BLD AUTO: 1 %
BASOPHILS NFR BLD AUTO: 1 %
BILIRUB SERPL-MCNC: 0.4 MG/DL
BILIRUB SERPL-MCNC: 0.7 MG/DL (ref 0.2–1.3)
BUN SERPL-MCNC: 20 MG/DL (ref 7–30)
BUN SERPL-MCNC: 23 MG/DL (ref 7–30)
BUN SERPL-MCNC: 24 MG/DL (ref 7–30)
BUN SERPL-MCNC: 27.7 MG/DL (ref 8–23)
BUN SERPL-MCNC: 28.4 MG/DL (ref 8–23)
BUN SERPL-MCNC: 31.2 MG/DL (ref 8–23)
BUN SERPL-MCNC: 32 MG/DL (ref 7–30)
BUN SERPL-MCNC: 37 MG/DL (ref 7–30)
BUN SERPL-MCNC: 38 MG/DL (ref 7–30)
BUN SERPL-MCNC: 45 MG/DL (ref 7–30)
C DIFF GDH STL QL IA: POSITIVE
C DIFF GDH STL QL IA: POSITIVE
C DIFF TOX A+B STL QL IA: POSITIVE
C DIFF TOX A+B STL QL IA: POSITIVE
C DIFF TOX B STL QL: POSITIVE
C DIFF TOX B STL QL: POSITIVE
CALCIUM SERPL-MCNC: 8.2 MG/DL (ref 8.5–10.1)
CALCIUM SERPL-MCNC: 8.4 MG/DL (ref 8.5–10.1)
CALCIUM SERPL-MCNC: 8.4 MG/DL (ref 8.5–10.1)
CALCIUM SERPL-MCNC: 8.9 MG/DL (ref 8.5–10.1)
CALCIUM SERPL-MCNC: 9 MG/DL (ref 8.5–10.1)
CALCIUM SERPL-MCNC: 9.1 MG/DL (ref 8.5–10.1)
CALCIUM SERPL-MCNC: 9.2 MG/DL (ref 8.5–10.1)
CALCIUM SERPL-MCNC: 9.2 MG/DL (ref 8.8–10.2)
CALCIUM SERPL-MCNC: 9.5 MG/DL (ref 8.8–10.2)
CALCIUM SERPL-MCNC: 9.6 MG/DL (ref 8.8–10.2)
CALPROTECTIN STL-MCNT: 220 MG/KG (ref 0–49.9)
CALPROTECTIN STL-MCNT: 412 MG/KG (ref 0–49.9)
CHLORIDE BLD-SCNC: 102 MMOL/L (ref 94–109)
CHLORIDE BLD-SCNC: 103 MMOL/L (ref 94–109)
CHLORIDE BLD-SCNC: 98 MMOL/L (ref 94–109)
CHLORIDE BLD-SCNC: 99 MMOL/L (ref 94–109)
CHLORIDE SERPL-SCNC: 96 MMOL/L (ref 98–107)
CHLORIDE SERPL-SCNC: 97 MMOL/L (ref 98–107)
CHLORIDE SERPL-SCNC: 98 MMOL/L (ref 98–107)
CHOLEST SERPL-MCNC: 149 MG/DL
CO2 SERPL-SCNC: 26 MMOL/L (ref 20–32)
CO2 SERPL-SCNC: 26 MMOL/L (ref 20–32)
CO2 SERPL-SCNC: 28 MMOL/L (ref 20–32)
CO2 SERPL-SCNC: 29 MMOL/L (ref 20–32)
CO2 SERPL-SCNC: 30 MMOL/L (ref 20–32)
CO2 SERPL-SCNC: 30 MMOL/L (ref 20–32)
CO2 SERPL-SCNC: 33 MMOL/L (ref 20–32)
CREAT SERPL-MCNC: 1.22 MG/DL (ref 0.66–1.25)
CREAT SERPL-MCNC: 1.33 MG/DL (ref 0.66–1.25)
CREAT SERPL-MCNC: 1.5 MG/DL (ref 0.66–1.25)
CREAT SERPL-MCNC: 1.69 MG/DL (ref 0.66–1.25)
CREAT SERPL-MCNC: 1.78 MG/DL (ref 0.67–1.17)
CREAT SERPL-MCNC: 1.8 MG/DL (ref 0.66–1.25)
CREAT SERPL-MCNC: 1.85 MG/DL (ref 0.66–1.25)
CREAT SERPL-MCNC: 1.93 MG/DL (ref 0.66–1.25)
CREAT SERPL-MCNC: 2.13 MG/DL (ref 0.67–1.17)
CREAT SERPL-MCNC: 2.25 MG/DL (ref 0.67–1.17)
CREAT SERPL-MCNC: 2.41 MG/DL (ref 0.66–1.25)
CREAT UR-MCNC: 95.6 MG/DL
DEPRECATED HCO3 PLAS-SCNC: 28 MMOL/L (ref 22–29)
DEPRECATED HCO3 PLAS-SCNC: 30 MMOL/L (ref 22–29)
DEPRECATED HCO3 PLAS-SCNC: 33 MMOL/L (ref 22–29)
EOSINOPHIL # BLD AUTO: 0.2 10E3/UL (ref 0–0.7)
EOSINOPHIL # BLD AUTO: 0.3 10E3/UL (ref 0–0.7)
EOSINOPHIL NFR BLD AUTO: 2 %
EOSINOPHIL NFR BLD AUTO: 6 %
ERYTHROCYTE [DISTWIDTH] IN BLOOD BY AUTOMATED COUNT: 14.8 % (ref 10–15)
ERYTHROCYTE [DISTWIDTH] IN BLOOD BY AUTOMATED COUNT: 15.1 % (ref 10–15)
ERYTHROCYTE [DISTWIDTH] IN BLOOD BY AUTOMATED COUNT: 15.2 % (ref 10–15)
ERYTHROCYTE [DISTWIDTH] IN BLOOD BY AUTOMATED COUNT: 15.5 % (ref 10–15)
ERYTHROCYTE [DISTWIDTH] IN BLOOD BY AUTOMATED COUNT: 15.5 % (ref 10–15)
ERYTHROCYTE [DISTWIDTH] IN BLOOD BY AUTOMATED COUNT: 15.8 % (ref 10–15)
GFR SERPL CREATININE-BSD FRML MDRD: 26 ML/MIN/1.73M2
GFR SERPL CREATININE-BSD FRML MDRD: 28 ML/MIN/1.73M2
GFR SERPL CREATININE-BSD FRML MDRD: 30 ML/MIN/1.73M2
GFR SERPL CREATININE-BSD FRML MDRD: 34 ML/MIN/1.73M2
GFR SERPL CREATININE-BSD FRML MDRD: 35 ML/MIN/1.73M2
GFR SERPL CREATININE-BSD FRML MDRD: 37 ML/MIN/1.73M2
GFR SERPL CREATININE-BSD FRML MDRD: 37 ML/MIN/1.73M2
GFR SERPL CREATININE-BSD FRML MDRD: 40 ML/MIN/1.73M2
GFR SERPL CREATININE-BSD FRML MDRD: 46 ML/MIN/1.73M2
GFR SERPL CREATININE-BSD FRML MDRD: 53 ML/MIN/1.73M2
GFR SERPL CREATININE-BSD FRML MDRD: 58 ML/MIN/1.73M2
GLUCOSE BLD-MCNC: 100 MG/DL (ref 70–99)
GLUCOSE BLD-MCNC: 105 MG/DL (ref 70–99)
GLUCOSE BLD-MCNC: 107 MG/DL (ref 70–99)
GLUCOSE BLD-MCNC: 113 MG/DL (ref 70–99)
GLUCOSE BLD-MCNC: 134 MG/DL (ref 70–99)
GLUCOSE BLD-MCNC: 96 MG/DL (ref 70–99)
GLUCOSE BLD-MCNC: 97 MG/DL (ref 70–99)
GLUCOSE SERPL-MCNC: 106 MG/DL (ref 70–99)
GLUCOSE SERPL-MCNC: 111 MG/DL (ref 70–99)
GLUCOSE SERPL-MCNC: 113 MG/DL (ref 70–99)
HCT VFR BLD AUTO: 29.2 % (ref 40–53)
HCT VFR BLD AUTO: 31.1 % (ref 40–53)
HCT VFR BLD AUTO: 34.1 % (ref 40–53)
HCT VFR BLD AUTO: 35.4 % (ref 40–53)
HCT VFR BLD AUTO: 36.2 % (ref 40–53)
HCT VFR BLD AUTO: 36.5 % (ref 40–53)
HDLC SERPL-MCNC: 49 MG/DL
HGB BLD-MCNC: 10 G/DL (ref 13.3–17.7)
HGB BLD-MCNC: 10.7 G/DL (ref 13.3–17.7)
HGB BLD-MCNC: 11.5 G/DL (ref 13.3–17.7)
HGB BLD-MCNC: 11.6 G/DL (ref 13.3–17.7)
HGB BLD-MCNC: 11.6 G/DL (ref 13.3–17.7)
HGB BLD-MCNC: 9.2 G/DL (ref 13.3–17.7)
HGB BLD-MCNC: 9.4 G/DL (ref 13.3–17.7)
HGB BLD-MCNC: 9.6 G/DL (ref 13.3–17.7)
HOLD SPECIMEN: NORMAL
IMM GRANULOCYTES # BLD: 0 10E3/UL
IMM GRANULOCYTES # BLD: 0 10E3/UL
IMM GRANULOCYTES NFR BLD: 0 %
IMM GRANULOCYTES NFR BLD: 1 %
INR (EXTERNAL): 1.7 (ref 0.9–1.1)
INR (EXTERNAL): 2 (ref 0.9–1.1)
INR (EXTERNAL): 2.4 (ref 0.9–1.1)
INR (EXTERNAL): 2.7 (ref 0.9–1.1)
INR (EXTERNAL): 3.1 (ref 0.9–1.1)
INR (EXTERNAL): 3.1 (ref 0.9–1.1)
INR (EXTERNAL): 3.2 (ref 0.9–1.1)
INR (EXTERNAL): 3.5 (ref 0.9–1.1)
INR (EXTERNAL): 3.8 (ref 0.9–1.1)
INR (EXTERNAL): 3.8 (ref 0.9–1.1)
INR BLD: 1.3 (ref 0.9–1.1)
INR BLD: 1.7 (ref 0.9–1.1)
INR BLD: 1.9 (ref 0.9–1.1)
INR BLD: 2.1 (ref 0.9–1.1)
INR BLD: 2.6 (ref 0.9–1.1)
INR BLD: 2.7 (ref 0.9–1.1)
INR BLD: 2.9 (ref 0.9–1.1)
INR BLD: 2.9 (ref 0.9–1.1)
INR BLD: 3.7 (ref 0.9–1.1)
INR PPP: 1.16 (ref 0.85–1.15)
INR PPP: 1.2 (ref 0.85–1.15)
INR PPP: 1.24 (ref 0.85–1.15)
INR PPP: 1.52 (ref 0.85–1.15)
INR PPP: 1.58 (ref 0.85–1.15)
INR PPP: 2.48 (ref 0.85–1.15)
INR PPP: 3.21 (ref 0.85–1.15)
LDLC SERPL CALC-MCNC: 67 MG/DL
LYMPHOCYTES # BLD AUTO: 0.6 10E3/UL (ref 0.8–5.3)
LYMPHOCYTES # BLD AUTO: 0.6 10E3/UL (ref 0.8–5.3)
LYMPHOCYTES NFR BLD AUTO: 14 %
LYMPHOCYTES NFR BLD AUTO: 6 %
MCH RBC QN AUTO: 27.8 PG (ref 26.5–33)
MCH RBC QN AUTO: 27.8 PG (ref 26.5–33)
MCH RBC QN AUTO: 28.2 PG (ref 26.5–33)
MCH RBC QN AUTO: 28.5 PG (ref 26.5–33)
MCH RBC QN AUTO: 28.6 PG (ref 26.5–33)
MCH RBC QN AUTO: 29 PG (ref 26.5–33)
MCHC RBC AUTO-ENTMCNC: 31.4 G/DL (ref 31.5–36.5)
MCHC RBC AUTO-ENTMCNC: 31.8 G/DL (ref 31.5–36.5)
MCHC RBC AUTO-ENTMCNC: 31.8 G/DL (ref 31.5–36.5)
MCHC RBC AUTO-ENTMCNC: 32.2 G/DL (ref 31.5–36.5)
MCHC RBC AUTO-ENTMCNC: 32.2 G/DL (ref 31.5–36.5)
MCHC RBC AUTO-ENTMCNC: 32.8 G/DL (ref 31.5–36.5)
MCV RBC AUTO: 86 FL (ref 78–100)
MCV RBC AUTO: 87 FL (ref 78–100)
MCV RBC AUTO: 89 FL (ref 78–100)
MCV RBC AUTO: 89 FL (ref 78–100)
MCV RBC AUTO: 90 FL (ref 78–100)
MCV RBC AUTO: 90 FL (ref 78–100)
MDC_IDC_EPISODE_DTM: NORMAL
MDC_IDC_EPISODE_ID: NORMAL
MDC_IDC_EPISODE_TYPE: NORMAL
MDC_IDC_LEAD_IMPLANT_DT: NORMAL
MDC_IDC_LEAD_LOCATION: NORMAL
MDC_IDC_LEAD_LOCATION_DETAIL_1: NORMAL
MDC_IDC_LEAD_MFG: NORMAL
MDC_IDC_LEAD_MODEL: NORMAL
MDC_IDC_LEAD_POLARITY_TYPE: NORMAL
MDC_IDC_LEAD_POLARITY_TYPE: NORMAL
MDC_IDC_LEAD_SERIAL: NORMAL
MDC_IDC_MSMT_BATTERY_DTM: NORMAL
MDC_IDC_MSMT_BATTERY_DTM: NORMAL
MDC_IDC_MSMT_BATTERY_REMAINING_LONGEVITY: 108 MO
MDC_IDC_MSMT_BATTERY_REMAINING_LONGEVITY: 108 MO
MDC_IDC_MSMT_BATTERY_REMAINING_PERCENTAGE: 100 %
MDC_IDC_MSMT_BATTERY_REMAINING_PERCENTAGE: 100 %
MDC_IDC_MSMT_BATTERY_STATUS: NORMAL
MDC_IDC_MSMT_BATTERY_STATUS: NORMAL
MDC_IDC_MSMT_LEADCHNL_RA_IMPEDANCE_VALUE: 330 OHM
MDC_IDC_MSMT_LEADCHNL_RA_IMPEDANCE_VALUE: 367 OHM
MDC_IDC_MSMT_LEADCHNL_RV_IMPEDANCE_VALUE: 842 OHM
MDC_IDC_MSMT_LEADCHNL_RV_IMPEDANCE_VALUE: 915 OHM
MDC_IDC_MSMT_LEADCHNL_RV_PACING_THRESHOLD_AMPLITUDE: 0.8 V
MDC_IDC_MSMT_LEADCHNL_RV_PACING_THRESHOLD_AMPLITUDE: 0.8 V
MDC_IDC_MSMT_LEADCHNL_RV_PACING_THRESHOLD_PULSEWIDTH: 0.4 MS
MDC_IDC_MSMT_LEADCHNL_RV_PACING_THRESHOLD_PULSEWIDTH: 0.4 MS
MDC_IDC_PG_IMPLANT_DTM: NORMAL
MDC_IDC_PG_IMPLANT_DTM: NORMAL
MDC_IDC_PG_MFG: NORMAL
MDC_IDC_PG_MFG: NORMAL
MDC_IDC_PG_MODEL: NORMAL
MDC_IDC_PG_MODEL: NORMAL
MDC_IDC_PG_SERIAL: NORMAL
MDC_IDC_PG_SERIAL: NORMAL
MDC_IDC_PG_TYPE: NORMAL
MDC_IDC_PG_TYPE: NORMAL
MDC_IDC_SESS_CLINIC_NAME: NORMAL
MDC_IDC_SESS_CLINIC_NAME: NORMAL
MDC_IDC_SESS_DTM: NORMAL
MDC_IDC_SESS_DTM: NORMAL
MDC_IDC_SESS_TYPE: NORMAL
MDC_IDC_SESS_TYPE: NORMAL
MDC_IDC_SET_BRADY_AT_MODE_SWITCH_RATE: 150 {BEATS}/MIN
MDC_IDC_SET_BRADY_AT_MODE_SWITCH_RATE: 150 {BEATS}/MIN
MDC_IDC_SET_BRADY_LOWRATE: 60 {BEATS}/MIN
MDC_IDC_SET_BRADY_LOWRATE: 60 {BEATS}/MIN
MDC_IDC_SET_BRADY_MAX_SENSOR_RATE: 130 {BEATS}/MIN
MDC_IDC_SET_BRADY_MAX_SENSOR_RATE: 130 {BEATS}/MIN
MDC_IDC_SET_BRADY_MODE: NORMAL
MDC_IDC_SET_BRADY_MODE: NORMAL
MDC_IDC_SET_LEADCHNL_RA_SENSING_ADAPTATION_MODE: NORMAL
MDC_IDC_SET_LEADCHNL_RA_SENSING_ADAPTATION_MODE: NORMAL
MDC_IDC_SET_LEADCHNL_RA_SENSING_SENSITIVITY: 0.15 MV
MDC_IDC_SET_LEADCHNL_RA_SENSING_SENSITIVITY: 0.15 MV
MDC_IDC_SET_LEADCHNL_RV_PACING_AMPLITUDE: 1.2 V
MDC_IDC_SET_LEADCHNL_RV_PACING_AMPLITUDE: 1.4 V
MDC_IDC_SET_LEADCHNL_RV_PACING_CAPTURE_MODE: NORMAL
MDC_IDC_SET_LEADCHNL_RV_PACING_CAPTURE_MODE: NORMAL
MDC_IDC_SET_LEADCHNL_RV_PACING_POLARITY: NORMAL
MDC_IDC_SET_LEADCHNL_RV_PACING_POLARITY: NORMAL
MDC_IDC_SET_LEADCHNL_RV_PACING_PULSEWIDTH: 0.4 MS
MDC_IDC_SET_LEADCHNL_RV_PACING_PULSEWIDTH: 0.4 MS
MDC_IDC_SET_LEADCHNL_RV_SENSING_ADAPTATION_MODE: NORMAL
MDC_IDC_SET_LEADCHNL_RV_SENSING_ADAPTATION_MODE: NORMAL
MDC_IDC_SET_LEADCHNL_RV_SENSING_POLARITY: NORMAL
MDC_IDC_SET_LEADCHNL_RV_SENSING_POLARITY: NORMAL
MDC_IDC_SET_LEADCHNL_RV_SENSING_SENSITIVITY: 0.6 MV
MDC_IDC_SET_LEADCHNL_RV_SENSING_SENSITIVITY: 0.6 MV
MDC_IDC_SET_ZONE_DETECTION_INTERVAL: 375 MS
MDC_IDC_SET_ZONE_DETECTION_INTERVAL: 375 MS
MDC_IDC_SET_ZONE_TYPE: NORMAL
MDC_IDC_SET_ZONE_TYPE: NORMAL
MDC_IDC_SET_ZONE_VENDOR_TYPE: NORMAL
MDC_IDC_SET_ZONE_VENDOR_TYPE: NORMAL
MDC_IDC_STAT_BRADY_DTM_END: NORMAL
MDC_IDC_STAT_BRADY_DTM_END: NORMAL
MDC_IDC_STAT_BRADY_DTM_START: NORMAL
MDC_IDC_STAT_BRADY_DTM_START: NORMAL
MDC_IDC_STAT_BRADY_RA_PERCENT_PACED: 0 %
MDC_IDC_STAT_BRADY_RA_PERCENT_PACED: 0 %
MDC_IDC_STAT_BRADY_RV_PERCENT_PACED: 24 %
MDC_IDC_STAT_BRADY_RV_PERCENT_PACED: 33 %
MDC_IDC_STAT_EPISODE_RECENT_COUNT: 0
MDC_IDC_STAT_EPISODE_RECENT_COUNT_DTM_END: NORMAL
MDC_IDC_STAT_EPISODE_RECENT_COUNT_DTM_START: NORMAL
MDC_IDC_STAT_EPISODE_TYPE: NORMAL
MDC_IDC_STAT_EPISODE_VENDOR_TYPE: NORMAL
MICROALBUMIN UR-MCNC: 16.2 MG/L
MICROALBUMIN/CREAT UR: 16.95 MG/G CR (ref 0–17)
MONOCYTES # BLD AUTO: 0.5 10E3/UL (ref 0–1.3)
MONOCYTES # BLD AUTO: 0.6 10E3/UL (ref 0–1.3)
MONOCYTES NFR BLD AUTO: 12 %
MONOCYTES NFR BLD AUTO: 6 %
NEUTROPHILS # BLD AUTO: 3 10E3/UL (ref 1.6–8.3)
NEUTROPHILS # BLD AUTO: 7.5 10E3/UL (ref 1.6–8.3)
NEUTROPHILS NFR BLD AUTO: 67 %
NEUTROPHILS NFR BLD AUTO: 84 %
NONHDLC SERPL-MCNC: 100 MG/DL
NRBC # BLD AUTO: 0 10E3/UL
NRBC # BLD AUTO: 0 10E3/UL
NRBC BLD AUTO-RTO: 0 /100
NRBC BLD AUTO-RTO: 0 /100
NT-PROBNP SERPL-MCNC: 804 PG/ML (ref 0–1800)
OSMOLALITY UR: 419 MMOL/KG (ref 100–1200)
PHOSPHATE SERPL-MCNC: 3.8 MG/DL (ref 2.5–4.5)
PLATELET # BLD AUTO: 136 10E3/UL (ref 150–450)
PLATELET # BLD AUTO: 144 10E3/UL (ref 150–450)
PLATELET # BLD AUTO: 151 10E3/UL (ref 150–450)
PLATELET # BLD AUTO: 161 10E3/UL (ref 150–450)
PLATELET # BLD AUTO: 172 10E3/UL (ref 150–450)
PLATELET # BLD AUTO: 216 10E3/UL (ref 150–450)
POTASSIUM BLD-SCNC: 4.2 MMOL/L (ref 3.4–5.3)
POTASSIUM BLD-SCNC: 4.2 MMOL/L (ref 3.4–5.3)
POTASSIUM BLD-SCNC: 4.5 MMOL/L (ref 3.4–5.3)
POTASSIUM BLD-SCNC: 4.5 MMOL/L (ref 3.4–5.3)
POTASSIUM BLD-SCNC: 4.6 MMOL/L (ref 3.4–5.3)
POTASSIUM BLD-SCNC: 4.8 MMOL/L (ref 3.4–5.3)
POTASSIUM BLD-SCNC: 4.8 MMOL/L (ref 3.4–5.3)
POTASSIUM BLD-SCNC: 4.9 MMOL/L (ref 3.4–5.3)
POTASSIUM SERPL-SCNC: 4.8 MMOL/L (ref 3.4–5.3)
POTASSIUM SERPL-SCNC: 4.8 MMOL/L (ref 3.4–5.3)
POTASSIUM SERPL-SCNC: 4.9 MMOL/L (ref 3.4–5.3)
PROT SERPL-MCNC: 7.5 G/DL (ref 6.4–8.3)
PROT SERPL-MCNC: 7.6 G/DL (ref 6.8–8.8)
PTH-INTACT SERPL-MCNC: 19 PG/ML (ref 18–80)
RBC # BLD AUTO: 3.3 10E6/UL (ref 4.4–5.9)
RBC # BLD AUTO: 3.45 10E6/UL (ref 4.4–5.9)
RBC # BLD AUTO: 3.85 10E6/UL (ref 4.4–5.9)
RBC # BLD AUTO: 4.06 10E6/UL (ref 4.4–5.9)
RBC # BLD AUTO: 4.12 10E6/UL (ref 4.4–5.9)
RBC # BLD AUTO: 4.14 10E6/UL (ref 4.4–5.9)
SARS-COV-2 RNA RESP QL NAA+PROBE: NEGATIVE
SARS-COV-2 RNA RESP QL NAA+PROBE: NEGATIVE
SODIUM SERPL-SCNC: 131 MMOL/L (ref 133–144)
SODIUM SERPL-SCNC: 131 MMOL/L (ref 133–144)
SODIUM SERPL-SCNC: 132 MMOL/L (ref 133–144)
SODIUM SERPL-SCNC: 135 MMOL/L (ref 133–144)
SODIUM SERPL-SCNC: 136 MMOL/L (ref 133–144)
SODIUM SERPL-SCNC: 137 MMOL/L (ref 133–144)
SODIUM SERPL-SCNC: 137 MMOL/L (ref 136–145)
SODIUM UR-SCNC: 49 MMOL/L
TRIGL SERPL-MCNC: 163 MG/DL
TROPONIN I SERPL HS-MCNC: 12 NG/L
WBC # BLD AUTO: 4.5 10E3/UL (ref 4–11)
WBC # BLD AUTO: 6.1 10E3/UL (ref 4–11)
WBC # BLD AUTO: 6.4 10E3/UL (ref 4–11)
WBC # BLD AUTO: 6.5 10E3/UL (ref 4–11)
WBC # BLD AUTO: 8.6 10E3/UL (ref 4–11)
WBC # BLD AUTO: 8.9 10E3/UL (ref 4–11)

## 2022-01-01 PROCEDURE — 91313 COVID-19,PF,MODERNA BIVALENT: CPT | Performed by: FAMILY MEDICINE

## 2022-01-01 PROCEDURE — 278N000051 HC OR IMPLANT GENERAL: Performed by: ORTHOPAEDIC SURGERY

## 2022-01-01 PROCEDURE — 97110 THERAPEUTIC EXERCISES: CPT | Mod: GP | Performed by: PHYSICAL THERAPIST

## 2022-01-01 PROCEDURE — 710N000009 HC RECOVERY PHASE 1, LEVEL 1, PER MIN: Performed by: ORTHOPAEDIC SURGERY

## 2022-01-01 PROCEDURE — 99305 1ST NF CARE MODERATE MDM 35: CPT | Performed by: FAMILY MEDICINE

## 2022-01-01 PROCEDURE — 97110 THERAPEUTIC EXERCISES: CPT | Mod: GP

## 2022-01-01 PROCEDURE — 36415 COLL VENOUS BLD VENIPUNCTURE: CPT | Performed by: ORTHOPAEDIC SURGERY

## 2022-01-01 PROCEDURE — 85610 PROTHROMBIN TIME: CPT | Performed by: EMERGENCY MEDICINE

## 2022-01-01 PROCEDURE — 250N000011 HC RX IP 250 OP 636: Performed by: ORTHOPAEDIC SURGERY

## 2022-01-01 PROCEDURE — 99207 E-CONSULT TO PULMONARY (ADULT OUTPT PROVIDER TO SPECIALIST WRITTEN QUESTION & RESPONSE): CPT | Performed by: FAMILY MEDICINE

## 2022-01-01 PROCEDURE — 85027 COMPLETE CBC AUTOMATED: CPT | Performed by: PHYSICIAN ASSISTANT

## 2022-01-01 PROCEDURE — G1010 CDSM STANSON: HCPCS

## 2022-01-01 PROCEDURE — 36416 COLLJ CAPILLARY BLOOD SPEC: CPT

## 2022-01-01 PROCEDURE — 80048 BASIC METABOLIC PNL TOTAL CA: CPT | Performed by: EMERGENCY MEDICINE

## 2022-01-01 PROCEDURE — 272N000001 HC OR GENERAL SUPPLY STERILE: Performed by: ORTHOPAEDIC SURGERY

## 2022-01-01 PROCEDURE — 73560 X-RAY EXAM OF KNEE 1 OR 2: CPT | Mod: LT

## 2022-01-01 PROCEDURE — 80048 BASIC METABOLIC PNL TOTAL CA: CPT

## 2022-01-01 PROCEDURE — 85610 PROTHROMBIN TIME: CPT | Performed by: ORTHOPAEDIC SURGERY

## 2022-01-01 PROCEDURE — 99441 PR PHYSICIAN TELEPHONE EVALUATION 5-10 MIN: CPT | Mod: 95 | Performed by: INTERNAL MEDICINE

## 2022-01-01 PROCEDURE — 83993 ASSAY FOR CALPROTECTIN FECAL: CPT

## 2022-01-01 PROCEDURE — 250N000013 HC RX MED GY IP 250 OP 250 PS 637

## 2022-01-01 PROCEDURE — 99214 OFFICE O/P EST MOD 30 MIN: CPT | Performed by: FAMILY MEDICINE

## 2022-01-01 PROCEDURE — 99207 PR NO CHARGE NURSE ONLY: CPT

## 2022-01-01 PROCEDURE — 360N000077 HC SURGERY LEVEL 4, PER MIN: Performed by: ORTHOPAEDIC SURGERY

## 2022-01-01 PROCEDURE — 85610 PROTHROMBIN TIME: CPT

## 2022-01-01 PROCEDURE — 99309 SBSQ NF CARE MODERATE MDM 30: CPT | Performed by: NURSE PRACTITIONER

## 2022-01-01 PROCEDURE — 99214 OFFICE O/P EST MOD 30 MIN: CPT | Mod: 25 | Performed by: FAMILY MEDICINE

## 2022-01-01 PROCEDURE — 74019 RADEX ABDOMEN 2 VIEWS: CPT | Mod: TC | Performed by: RADIOLOGY

## 2022-01-01 PROCEDURE — 97116 GAIT TRAINING THERAPY: CPT | Mod: GP

## 2022-01-01 PROCEDURE — 99215 OFFICE O/P EST HI 40 MIN: CPT | Performed by: PHYSICIAN ASSISTANT

## 2022-01-01 PROCEDURE — 99215 OFFICE O/P EST HI 40 MIN: CPT | Performed by: INTERNAL MEDICINE

## 2022-01-01 PROCEDURE — 250N000009 HC RX 250: Performed by: NURSE ANESTHETIST, CERTIFIED REGISTERED

## 2022-01-01 PROCEDURE — 71046 X-RAY EXAM CHEST 2 VIEWS: CPT | Mod: TC | Performed by: RADIOLOGY

## 2022-01-01 PROCEDURE — 99214 OFFICE O/P EST MOD 30 MIN: CPT | Performed by: INTERNAL MEDICINE

## 2022-01-01 PROCEDURE — 99225 PR SUBSEQUENT OBSERVATION CARE,LEVEL II: CPT

## 2022-01-01 PROCEDURE — 83970 ASSAY OF PARATHORMONE: CPT

## 2022-01-01 PROCEDURE — 36415 COLL VENOUS BLD VENIPUNCTURE: CPT | Mod: ORL | Performed by: INTERNAL MEDICINE

## 2022-01-01 PROCEDURE — 87493 C DIFF AMPLIFIED PROBE: CPT | Mod: 59

## 2022-01-01 PROCEDURE — 250N000013 HC RX MED GY IP 250 OP 250 PS 637: Performed by: ORTHOPAEDIC SURGERY

## 2022-01-01 PROCEDURE — 84460 ALANINE AMINO (ALT) (SGPT): CPT

## 2022-01-01 PROCEDURE — 99282 EMERGENCY DEPT VISIT SF MDM: CPT | Performed by: EMERGENCY MEDICINE

## 2022-01-01 PROCEDURE — 999N000065 XR KNEE PORT LEFT 1/2 VIEWS

## 2022-01-01 PROCEDURE — 250N000011 HC RX IP 250 OP 636: Performed by: NURSE ANESTHETIST, CERTIFIED REGISTERED

## 2022-01-01 PROCEDURE — 370N000017 HC ANESTHESIA TECHNICAL FEE, PER MIN: Performed by: ORTHOPAEDIC SURGERY

## 2022-01-01 PROCEDURE — U0003 INFECTIOUS AGENT DETECTION BY NUCLEIC ACID (DNA OR RNA); SEVERE ACUTE RESPIRATORY SYNDROME CORONAVIRUS 2 (SARS-COV-2) (CORONAVIRUS DISEASE [COVID-19]), AMPLIFIED PROBE TECHNIQUE, MAKING USE OF HIGH THROUGHPUT TECHNOLOGIES AS DESCRIBED BY CMS-2020-01-R: HCPCS

## 2022-01-01 PROCEDURE — 87101 SKIN FUNGI CULTURE: CPT | Performed by: PHYSICIAN ASSISTANT

## 2022-01-01 PROCEDURE — 36415 COLL VENOUS BLD VENIPUNCTURE: CPT

## 2022-01-01 PROCEDURE — 82565 ASSAY OF CREATININE: CPT | Performed by: PHYSICIAN ASSISTANT

## 2022-01-01 PROCEDURE — 87493 C DIFF AMPLIFIED PROBE: CPT

## 2022-01-01 PROCEDURE — P9603 ONE-WAY ALLOW PRORATED MILES: HCPCS | Mod: ORL | Performed by: FAMILY MEDICINE

## 2022-01-01 PROCEDURE — 99214 OFFICE O/P EST MOD 30 MIN: CPT | Performed by: PHYSICIAN ASSISTANT

## 2022-01-01 PROCEDURE — 99284 EMERGENCY DEPT VISIT MOD MDM: CPT | Mod: 25 | Performed by: EMERGENCY MEDICINE

## 2022-01-01 PROCEDURE — 87324 CLOSTRIDIUM AG IA: CPT

## 2022-01-01 PROCEDURE — 36415 COLL VENOUS BLD VENIPUNCTURE: CPT | Mod: ORL | Performed by: FAMILY MEDICINE

## 2022-01-01 PROCEDURE — 85018 HEMOGLOBIN: CPT | Performed by: ORTHOPAEDIC SURGERY

## 2022-01-01 PROCEDURE — 250N000025 HC SEVOFLURANE, PER MIN: Performed by: ORTHOPAEDIC SURGERY

## 2022-01-01 PROCEDURE — 83935 ASSAY OF URINE OSMOLALITY: CPT

## 2022-01-01 PROCEDURE — 87077 CULTURE AEROBIC IDENTIFY: CPT | Performed by: PHYSICIAN ASSISTANT

## 2022-01-01 PROCEDURE — 93294 REM INTERROG EVL PM/LDLS PM: CPT | Performed by: INTERNAL MEDICINE

## 2022-01-01 PROCEDURE — 36415 COLL VENOUS BLD VENIPUNCTURE: CPT | Performed by: PHYSICIAN ASSISTANT

## 2022-01-01 PROCEDURE — 250N000013 HC RX MED GY IP 250 OP 250 PS 637: Performed by: PHYSICIAN ASSISTANT

## 2022-01-01 PROCEDURE — 93296 REM INTERROG EVL PM/IDS: CPT | Performed by: INTERNAL MEDICINE

## 2022-01-01 PROCEDURE — 84132 ASSAY OF SERUM POTASSIUM: CPT | Performed by: PHYSICIAN ASSISTANT

## 2022-01-01 PROCEDURE — 99285 EMERGENCY DEPT VISIT HI MDM: CPT | Mod: 25 | Performed by: EMERGENCY MEDICINE

## 2022-01-01 PROCEDURE — 82043 UR ALBUMIN QUANTITATIVE: CPT | Performed by: PHYSICIAN ASSISTANT

## 2022-01-01 PROCEDURE — U0005 INFEC AGEN DETEC AMPLI PROBE: HCPCS

## 2022-01-01 PROCEDURE — 258N000003 HC RX IP 258 OP 636: Performed by: NURSE ANESTHETIST, CERTIFIED REGISTERED

## 2022-01-01 PROCEDURE — 85610 PROTHROMBIN TIME: CPT | Performed by: FAMILY MEDICINE

## 2022-01-01 PROCEDURE — 87635 SARS-COV-2 COVID-19 AMP PRB: CPT | Performed by: EMERGENCY MEDICINE

## 2022-01-01 PROCEDURE — 999N000141 HC STATISTIC PRE-PROCEDURE NURSING ASSESSMENT: Performed by: ORTHOPAEDIC SURGERY

## 2022-01-01 PROCEDURE — 87070 CULTURE OTHR SPECIMN AEROBIC: CPT | Performed by: PHYSICIAN ASSISTANT

## 2022-01-01 PROCEDURE — 271N000001 HC OR GENERAL SUPPLY NON-STERILE: Performed by: ORTHOPAEDIC SURGERY

## 2022-01-01 PROCEDURE — 84295 ASSAY OF SERUM SODIUM: CPT | Performed by: NURSE ANESTHETIST, CERTIFIED REGISTERED

## 2022-01-01 PROCEDURE — C1776 JOINT DEVICE (IMPLANTABLE): HCPCS | Performed by: ORTHOPAEDIC SURGERY

## 2022-01-01 PROCEDURE — C9803 HOPD COVID-19 SPEC COLLECT: HCPCS | Performed by: EMERGENCY MEDICINE

## 2022-01-01 PROCEDURE — 36415 COLL VENOUS BLD VENIPUNCTURE: CPT | Performed by: FAMILY MEDICINE

## 2022-01-01 PROCEDURE — 97535 SELF CARE MNGMENT TRAINING: CPT | Mod: GO

## 2022-01-01 PROCEDURE — 0134A COVID-19,PF,MODERNA BIVALENT: CPT | Performed by: FAMILY MEDICINE

## 2022-01-01 PROCEDURE — 84484 ASSAY OF TROPONIN QUANT: CPT | Performed by: EMERGENCY MEDICINE

## 2022-01-01 PROCEDURE — 80061 LIPID PANEL: CPT

## 2022-01-01 PROCEDURE — 36415 COLL VENOUS BLD VENIPUNCTURE: CPT | Performed by: EMERGENCY MEDICINE

## 2022-01-01 PROCEDURE — 36416 COLLJ CAPILLARY BLOOD SPEC: CPT | Performed by: FAMILY MEDICINE

## 2022-01-01 PROCEDURE — 90662 IIV NO PRSV INCREASED AG IM: CPT | Performed by: FAMILY MEDICINE

## 2022-01-01 PROCEDURE — 71250 CT THORAX DX C-: CPT

## 2022-01-01 PROCEDURE — 97165 OT EVAL LOW COMPLEX 30 MIN: CPT | Mod: GO

## 2022-01-01 PROCEDURE — 80053 COMPREHEN METABOLIC PANEL: CPT

## 2022-01-01 PROCEDURE — 83880 ASSAY OF NATRIURETIC PEPTIDE: CPT | Performed by: EMERGENCY MEDICINE

## 2022-01-01 PROCEDURE — 84100 ASSAY OF PHOSPHORUS: CPT

## 2022-01-01 PROCEDURE — 99203 OFFICE O/P NEW LOW 30 MIN: CPT | Performed by: PHYSICIAN ASSISTANT

## 2022-01-01 PROCEDURE — 80048 BASIC METABOLIC PNL TOTAL CA: CPT | Mod: ORL | Performed by: INTERNAL MEDICINE

## 2022-01-01 PROCEDURE — 71045 X-RAY EXAM CHEST 1 VIEW: CPT

## 2022-01-01 PROCEDURE — 97116 GAIT TRAINING THERAPY: CPT | Mod: GP | Performed by: PHYSICAL THERAPIST

## 2022-01-01 PROCEDURE — G0008 ADMIN INFLUENZA VIRUS VAC: HCPCS | Performed by: FAMILY MEDICINE

## 2022-01-01 PROCEDURE — 87102 FUNGUS ISOLATION CULTURE: CPT | Performed by: PHYSICIAN ASSISTANT

## 2022-01-01 PROCEDURE — 84300 ASSAY OF URINE SODIUM: CPT

## 2022-01-01 PROCEDURE — 80053 COMPREHEN METABOLIC PANEL: CPT | Performed by: PHYSICIAN ASSISTANT

## 2022-01-01 PROCEDURE — 85027 COMPLETE CBC AUTOMATED: CPT

## 2022-01-01 PROCEDURE — 80048 BASIC METABOLIC PNL TOTAL CA: CPT | Mod: ORL | Performed by: FAMILY MEDICINE

## 2022-01-01 PROCEDURE — 93005 ELECTROCARDIOGRAM TRACING: CPT | Performed by: EMERGENCY MEDICINE

## 2022-01-01 PROCEDURE — 97161 PT EVAL LOW COMPLEX 20 MIN: CPT | Mod: GP | Performed by: PHYSICAL THERAPIST

## 2022-01-01 PROCEDURE — 250N000013 HC RX MED GY IP 250 OP 250 PS 637: Performed by: NURSE ANESTHETIST, CERTIFIED REGISTERED

## 2022-01-01 PROCEDURE — 93010 ELECTROCARDIOGRAM REPORT: CPT | Performed by: EMERGENCY MEDICINE

## 2022-01-01 PROCEDURE — 85025 COMPLETE CBC W/AUTO DIFF WBC: CPT | Mod: ORL | Performed by: INTERNAL MEDICINE

## 2022-01-01 PROCEDURE — 80048 BASIC METABOLIC PNL TOTAL CA: CPT | Performed by: PHYSICIAN ASSISTANT

## 2022-01-01 PROCEDURE — 87324 CLOSTRIDIUM AG IA: CPT | Mod: 59

## 2022-01-01 PROCEDURE — 85025 COMPLETE CBC W/AUTO DIFF WBC: CPT | Performed by: EMERGENCY MEDICINE

## 2022-01-01 DEVICE — IMP COMP FEM STRK TRIATHLN PS LT 6 5515-F-601: Type: IMPLANTABLE DEVICE | Site: KNEE | Status: FUNCTIONAL

## 2022-01-01 DEVICE — BONE CEMENT RADIOPAQUE SIMPLEX HV FULL DOSE 6194-1-001: Type: IMPLANTABLE DEVICE | Site: KNEE | Status: FUNCTIONAL

## 2022-01-01 DEVICE — IMP COMP PATELLA HOWM TRI 35 10MM 5551-L-350: Type: IMPLANTABLE DEVICE | Site: KNEE | Status: FUNCTIONAL

## 2022-01-01 DEVICE — IMP BASEPLATE TIBIAL HOWM TRI 5 5520-B-500: Type: IMPLANTABLE DEVICE | Site: KNEE | Status: FUNCTIONAL

## 2022-01-01 RX ORDER — OXYCODONE HYDROCHLORIDE 5 MG/1
5-10 TABLET ORAL
Qty: 30 TABLET | Refills: 0 | Status: SHIPPED | OUTPATIENT
Start: 2022-01-01 | End: 2022-01-01

## 2022-01-01 RX ORDER — OXYCODONE HYDROCHLORIDE 5 MG/1
5-10 TABLET ORAL
Qty: 30 TABLET | Refills: 0
Start: 2022-01-01 | End: 2022-01-01

## 2022-01-01 RX ORDER — BISACODYL 10 MG
10 SUPPOSITORY, RECTAL RECTAL DAILY PRN
Status: DISCONTINUED | OUTPATIENT
Start: 2022-01-01 | End: 2022-01-01 | Stop reason: HOSPADM

## 2022-01-01 RX ORDER — SODIUM CHLORIDE, SODIUM LACTATE, POTASSIUM CHLORIDE, CALCIUM CHLORIDE 600; 310; 30; 20 MG/100ML; MG/100ML; MG/100ML; MG/100ML
INJECTION, SOLUTION INTRAVENOUS CONTINUOUS
Status: DISCONTINUED | OUTPATIENT
Start: 2022-01-01 | End: 2022-01-01 | Stop reason: CLARIF

## 2022-01-01 RX ORDER — FENTANYL CITRATE 50 UG/ML
INJECTION, SOLUTION INTRAMUSCULAR; INTRAVENOUS PRN
Status: DISCONTINUED | OUTPATIENT
Start: 2022-01-01 | End: 2022-01-01

## 2022-01-01 RX ORDER — TAMSULOSIN HYDROCHLORIDE 0.4 MG/1
0.4 CAPSULE ORAL DAILY
Qty: 90 CAPSULE | Refills: 1 | Status: SHIPPED | OUTPATIENT
Start: 2022-01-01

## 2022-01-01 RX ORDER — HYDROXYZINE HYDROCHLORIDE 25 MG/1
25 TABLET, FILM COATED ORAL EVERY 6 HOURS PRN
Status: DISCONTINUED | OUTPATIENT
Start: 2022-01-01 | End: 2022-01-01 | Stop reason: HOSPADM

## 2022-01-01 RX ORDER — ALLOPURINOL 100 MG/1
100 TABLET ORAL DAILY
Status: DISCONTINUED | OUTPATIENT
Start: 2022-01-01 | End: 2022-01-01

## 2022-01-01 RX ORDER — DIPHENHYDRAMINE HCL 12.5 MG/5ML
12.5 SOLUTION ORAL EVERY 6 HOURS PRN
Status: DISCONTINUED | OUTPATIENT
Start: 2022-01-01 | End: 2022-01-01 | Stop reason: HOSPADM

## 2022-01-01 RX ORDER — FUROSEMIDE 20 MG
40 TABLET ORAL DAILY
Qty: 180 TABLET | Refills: 3 | Status: SHIPPED | OUTPATIENT
Start: 2022-01-01 | End: 2022-01-01

## 2022-01-01 RX ORDER — ONDANSETRON 2 MG/ML
INJECTION INTRAMUSCULAR; INTRAVENOUS PRN
Status: DISCONTINUED | OUTPATIENT
Start: 2022-01-01 | End: 2022-01-01

## 2022-01-01 RX ORDER — PROCHLORPERAZINE MALEATE 5 MG
5 TABLET ORAL EVERY 6 HOURS PRN
Status: DISCONTINUED | OUTPATIENT
Start: 2022-01-01 | End: 2022-01-01 | Stop reason: HOSPADM

## 2022-01-01 RX ORDER — METHOCARBAMOL 500 MG/1
500 TABLET, FILM COATED ORAL EVERY 6 HOURS PRN
Qty: 20 TABLET | Refills: 0 | Status: SHIPPED | OUTPATIENT
Start: 2022-01-01

## 2022-01-01 RX ORDER — ONDANSETRON 2 MG/ML
4 INJECTION INTRAMUSCULAR; INTRAVENOUS EVERY 6 HOURS PRN
Status: DISCONTINUED | OUTPATIENT
Start: 2022-01-01 | End: 2022-01-01 | Stop reason: HOSPADM

## 2022-01-01 RX ORDER — VANCOMYCIN HYDROCHLORIDE 125 MG/1
CAPSULE ORAL
Qty: 42 CAPSULE | Refills: 0
Start: 2022-01-01 | End: 2022-01-01

## 2022-01-01 RX ORDER — GLYCOPYRROLATE 0.2 MG/ML
INJECTION, SOLUTION INTRAMUSCULAR; INTRAVENOUS PRN
Status: DISCONTINUED | OUTPATIENT
Start: 2022-01-01 | End: 2022-01-01

## 2022-01-01 RX ORDER — SODIUM CHLORIDE, SODIUM LACTATE, POTASSIUM CHLORIDE, CALCIUM CHLORIDE 600; 310; 30; 20 MG/100ML; MG/100ML; MG/100ML; MG/100ML
INJECTION, SOLUTION INTRAVENOUS CONTINUOUS
Status: DISCONTINUED | OUTPATIENT
Start: 2022-01-01 | End: 2022-01-01 | Stop reason: HOSPADM

## 2022-01-01 RX ORDER — LEVOFLOXACIN 500 MG/1
500 TABLET, FILM COATED ORAL DAILY
Qty: 5 TABLET | Refills: 0 | Status: SHIPPED | OUTPATIENT
Start: 2022-01-01 | End: 2022-01-01

## 2022-01-01 RX ORDER — CEFAZOLIN SODIUM/WATER 2 G/20 ML
2 SYRINGE (ML) INTRAVENOUS ONCE
Status: COMPLETED | OUTPATIENT
Start: 2022-01-01 | End: 2022-01-01

## 2022-01-01 RX ORDER — ONDANSETRON 2 MG/ML
4 INJECTION INTRAMUSCULAR; INTRAVENOUS EVERY 30 MIN PRN
Status: DISCONTINUED | OUTPATIENT
Start: 2022-01-01 | End: 2022-01-01 | Stop reason: HOSPADM

## 2022-01-01 RX ORDER — WARFARIN SODIUM 2 MG/1
4 TABLET ORAL
Status: COMPLETED | OUTPATIENT
Start: 2022-01-01 | End: 2022-01-01

## 2022-01-01 RX ORDER — LISINOPRIL 5 MG/1
TABLET ORAL
Qty: 180 TABLET | Refills: 1 | Status: SHIPPED | OUTPATIENT
Start: 2022-01-01 | End: 2022-01-01

## 2022-01-01 RX ORDER — ONDANSETRON 4 MG/1
4 TABLET, ORALLY DISINTEGRATING ORAL EVERY 6 HOURS PRN
Status: DISCONTINUED | OUTPATIENT
Start: 2022-01-01 | End: 2022-01-01 | Stop reason: HOSPADM

## 2022-01-01 RX ORDER — NORTRIPTYLINE HCL 25 MG
25 CAPSULE ORAL AT BEDTIME
Status: DISCONTINUED | OUTPATIENT
Start: 2022-01-01 | End: 2022-01-01 | Stop reason: HOSPADM

## 2022-01-01 RX ORDER — TRANEXAMIC ACID 650 MG/1
1950 TABLET ORAL ONCE
Status: COMPLETED | OUTPATIENT
Start: 2022-01-01 | End: 2022-01-01

## 2022-01-01 RX ORDER — NALOXONE HYDROCHLORIDE 0.4 MG/ML
0.2 INJECTION, SOLUTION INTRAMUSCULAR; INTRAVENOUS; SUBCUTANEOUS
Status: DISCONTINUED | OUTPATIENT
Start: 2022-01-01 | End: 2022-01-01 | Stop reason: HOSPADM

## 2022-01-01 RX ORDER — AMOXICILLIN 250 MG
1 CAPSULE ORAL 2 TIMES DAILY
Status: DISCONTINUED | OUTPATIENT
Start: 2022-01-01 | End: 2022-01-01 | Stop reason: HOSPADM

## 2022-01-01 RX ORDER — GABAPENTIN 300 MG/1
300 CAPSULE ORAL AT BEDTIME
Qty: 90 CAPSULE | Refills: 3 | Status: SHIPPED | OUTPATIENT
Start: 2022-01-01

## 2022-01-01 RX ORDER — FENTANYL CITRATE 50 UG/ML
25 INJECTION, SOLUTION INTRAMUSCULAR; INTRAVENOUS EVERY 5 MIN PRN
Status: DISCONTINUED | OUTPATIENT
Start: 2022-01-01 | End: 2022-01-01 | Stop reason: HOSPADM

## 2022-01-01 RX ORDER — CLINDAMYCIN PHOSPHATE 11.9 MG/ML
SOLUTION TOPICAL
Qty: 60 ML | Refills: 1 | Status: SHIPPED | OUTPATIENT
Start: 2022-01-01 | End: 2022-01-01

## 2022-01-01 RX ORDER — LISINOPRIL 10 MG/1
10 TABLET ORAL DAILY
Status: DISCONTINUED | OUTPATIENT
Start: 2022-01-01 | End: 2022-01-01 | Stop reason: HOSPADM

## 2022-01-01 RX ORDER — CICLOPIROX OLAMINE 7.7 MG/100ML
SUSPENSION TOPICAL
Qty: 60 ML | Refills: 6 | Status: SHIPPED | OUTPATIENT
Start: 2022-01-01

## 2022-01-01 RX ORDER — VANCOMYCIN HYDROCHLORIDE 125 MG/1
CAPSULE ORAL
Qty: 42 CAPSULE | Refills: 0 | Status: SHIPPED | OUTPATIENT
Start: 2022-01-01 | End: 2023-01-01

## 2022-01-01 RX ORDER — ACETAMINOPHEN 325 MG/1
975 TABLET ORAL ONCE
Status: DISCONTINUED | OUTPATIENT
Start: 2022-01-01 | End: 2022-01-01 | Stop reason: HOSPADM

## 2022-01-01 RX ORDER — METOPROLOL SUCCINATE 25 MG/1
25 TABLET, EXTENDED RELEASE ORAL EVERY MORNING
Status: DISCONTINUED | OUTPATIENT
Start: 2022-01-01 | End: 2022-01-01 | Stop reason: HOSPADM

## 2022-01-01 RX ORDER — NALOXONE HYDROCHLORIDE 0.4 MG/ML
0.4 INJECTION, SOLUTION INTRAMUSCULAR; INTRAVENOUS; SUBCUTANEOUS
Status: DISCONTINUED | OUTPATIENT
Start: 2022-01-01 | End: 2022-01-01 | Stop reason: HOSPADM

## 2022-01-01 RX ORDER — KETAMINE HYDROCHLORIDE 10 MG/ML
INJECTION INTRAMUSCULAR; INTRAVENOUS PRN
Status: DISCONTINUED | OUTPATIENT
Start: 2022-01-01 | End: 2022-01-01

## 2022-01-01 RX ORDER — WARFARIN SODIUM 3 MG/1
6 TABLET ORAL
Status: COMPLETED | OUTPATIENT
Start: 2022-01-01 | End: 2022-01-01

## 2022-01-01 RX ORDER — CLINDAMYCIN PHOSPHATE 11.9 MG/ML
SOLUTION TOPICAL
Qty: 60 ML | Refills: 11 | Status: SHIPPED | OUTPATIENT
Start: 2022-01-01

## 2022-01-01 RX ORDER — DIMENHYDRINATE 50 MG/ML
25 INJECTION, SOLUTION INTRAMUSCULAR; INTRAVENOUS
Status: DISCONTINUED | OUTPATIENT
Start: 2022-01-01 | End: 2022-01-01 | Stop reason: HOSPADM

## 2022-01-01 RX ORDER — FAMOTIDINE 20 MG/1
20 TABLET, FILM COATED ORAL DAILY
Status: DISCONTINUED | OUTPATIENT
Start: 2022-01-01 | End: 2022-01-01 | Stop reason: HOSPADM

## 2022-01-01 RX ORDER — CARBOXYMETHYLCELLULOSE SODIUM 10 MG/ML
1-2 GEL OPHTHALMIC
Status: DISCONTINUED | OUTPATIENT
Start: 2022-01-01 | End: 2022-01-01 | Stop reason: HOSPADM

## 2022-01-01 RX ORDER — ATORVASTATIN CALCIUM 20 MG/1
20 TABLET, FILM COATED ORAL EVERY EVENING
Status: DISCONTINUED | OUTPATIENT
Start: 2022-01-01 | End: 2022-01-01 | Stop reason: HOSPADM

## 2022-01-01 RX ORDER — TRANEXAMIC ACID 650 MG/1
1950 TABLET ORAL ONCE
Status: DISCONTINUED | OUTPATIENT
Start: 2022-01-01 | End: 2022-01-01

## 2022-01-01 RX ORDER — FAMOTIDINE 20 MG/1
20 TABLET, FILM COATED ORAL 2 TIMES DAILY
Status: DISCONTINUED | OUTPATIENT
Start: 2022-01-01 | End: 2022-01-01

## 2022-01-01 RX ORDER — OXYCODONE HYDROCHLORIDE 5 MG/1
5 TABLET ORAL EVERY 4 HOURS PRN
Status: DISCONTINUED | OUTPATIENT
Start: 2022-01-01 | End: 2022-01-01 | Stop reason: HOSPADM

## 2022-01-01 RX ORDER — METHOCARBAMOL 500 MG/1
500 TABLET, FILM COATED ORAL EVERY 6 HOURS PRN
Status: DISCONTINUED | OUTPATIENT
Start: 2022-01-01 | End: 2022-01-01 | Stop reason: HOSPADM

## 2022-01-01 RX ORDER — HYDROMORPHONE HCL IN WATER/PF 6 MG/30 ML
0.2 PATIENT CONTROLLED ANALGESIA SYRINGE INTRAVENOUS
Status: DISCONTINUED | OUTPATIENT
Start: 2022-01-01 | End: 2022-01-01 | Stop reason: HOSPADM

## 2022-01-01 RX ORDER — FUROSEMIDE 20 MG
60 TABLET ORAL DAILY
Qty: 270 TABLET | Refills: 3 | Status: SHIPPED | OUTPATIENT
Start: 2022-01-01 | End: 2022-01-01

## 2022-01-01 RX ORDER — TAMSULOSIN HYDROCHLORIDE 0.4 MG/1
0.4 CAPSULE ORAL DAILY
Status: DISCONTINUED | OUTPATIENT
Start: 2022-01-01 | End: 2022-01-01 | Stop reason: HOSPADM

## 2022-01-01 RX ORDER — CEFAZOLIN SODIUM 2 G/100ML
2 INJECTION, SOLUTION INTRAVENOUS EVERY 8 HOURS
Status: COMPLETED | OUTPATIENT
Start: 2022-01-01 | End: 2022-01-01

## 2022-01-01 RX ORDER — FUROSEMIDE 20 MG
20 TABLET ORAL DAILY
Status: DISCONTINUED | OUTPATIENT
Start: 2022-01-01 | End: 2022-01-01 | Stop reason: HOSPADM

## 2022-01-01 RX ORDER — ONDANSETRON 4 MG/1
4 TABLET, ORALLY DISINTEGRATING ORAL EVERY 30 MIN PRN
Status: DISCONTINUED | OUTPATIENT
Start: 2022-01-01 | End: 2022-01-01 | Stop reason: HOSPADM

## 2022-01-01 RX ORDER — HYDROXYZINE HYDROCHLORIDE 10 MG/1
10 TABLET, FILM COATED ORAL EVERY 6 HOURS PRN
Status: DISCONTINUED | OUTPATIENT
Start: 2022-01-01 | End: 2022-01-01 | Stop reason: HOSPADM

## 2022-01-01 RX ORDER — LISINOPRIL 5 MG/1
5 TABLET ORAL DAILY
Qty: 180 TABLET | Refills: 1 | Status: SHIPPED | OUTPATIENT
Start: 2022-01-01

## 2022-01-01 RX ORDER — ACETAMINOPHEN 325 MG/1
975 TABLET ORAL ONCE
Status: DISCONTINUED | OUTPATIENT
Start: 2022-01-01 | End: 2022-01-01

## 2022-01-01 RX ORDER — GABAPENTIN 300 MG/1
300 CAPSULE ORAL AT BEDTIME
Status: DISCONTINUED | OUTPATIENT
Start: 2022-01-01 | End: 2022-01-01 | Stop reason: HOSPADM

## 2022-01-01 RX ORDER — LIDOCAINE HYDROCHLORIDE 10 MG/ML
INJECTION, SOLUTION INFILTRATION; PERINEURAL PRN
Status: DISCONTINUED | OUTPATIENT
Start: 2022-01-01 | End: 2022-01-01

## 2022-01-01 RX ORDER — IOPAMIDOL 755 MG/ML
86 INJECTION, SOLUTION INTRAVASCULAR ONCE
Status: DISCONTINUED | OUTPATIENT
Start: 2022-01-01 | End: 2022-01-01

## 2022-01-01 RX ORDER — WARFARIN SODIUM 2 MG/1
TABLET ORAL
Qty: 215 TABLET | Refills: 1 | Status: SHIPPED | OUTPATIENT
Start: 2022-01-01

## 2022-01-01 RX ORDER — PREDNISOLONE ACETATE 10 MG/ML
1 SUSPENSION/ DROPS OPHTHALMIC DAILY
Status: DISCONTINUED | OUTPATIENT
Start: 2022-01-01 | End: 2022-01-01 | Stop reason: HOSPADM

## 2022-01-01 RX ORDER — DOXYCYCLINE 100 MG/1
100 CAPSULE ORAL 2 TIMES DAILY
Qty: 20 CAPSULE | Refills: 0 | Status: SHIPPED | OUTPATIENT
Start: 2022-01-01 | End: 2022-01-01

## 2022-01-01 RX ORDER — PROPOFOL 10 MG/ML
INJECTION, EMULSION INTRAVENOUS PRN
Status: DISCONTINUED | OUTPATIENT
Start: 2022-01-01 | End: 2022-01-01

## 2022-01-01 RX ORDER — FUROSEMIDE 80 MG
80 TABLET ORAL DAILY
Qty: 90 TABLET | Refills: 3 | Status: SHIPPED | OUTPATIENT
Start: 2022-01-01

## 2022-01-01 RX ORDER — LIDOCAINE 40 MG/G
CREAM TOPICAL
Status: DISCONTINUED | OUTPATIENT
Start: 2022-01-01 | End: 2022-01-01 | Stop reason: HOSPADM

## 2022-01-01 RX ORDER — ACETAMINOPHEN 325 MG/1
650 TABLET ORAL EVERY 4 HOURS PRN
Status: DISCONTINUED | OUTPATIENT
Start: 2022-01-01 | End: 2022-01-01 | Stop reason: HOSPADM

## 2022-01-01 RX ORDER — ACETAMINOPHEN 325 MG/1
975 TABLET ORAL ONCE
Status: COMPLETED | OUTPATIENT
Start: 2022-01-01 | End: 2022-01-01

## 2022-01-01 RX ORDER — VANCOMYCIN HYDROCHLORIDE 125 MG/1
125 CAPSULE ORAL 4 TIMES DAILY
Qty: 56 CAPSULE | Refills: 0 | Status: SHIPPED | OUTPATIENT
Start: 2022-01-01 | End: 2022-01-01

## 2022-01-01 RX ORDER — DEXAMETHASONE SODIUM PHOSPHATE 4 MG/ML
INJECTION, SOLUTION INTRA-ARTICULAR; INTRALESIONAL; INTRAMUSCULAR; INTRAVENOUS; SOFT TISSUE PRN
Status: DISCONTINUED | OUTPATIENT
Start: 2022-01-01 | End: 2022-01-01

## 2022-01-01 RX ORDER — FERROUS GLUCONATE 324(38)MG
324 TABLET ORAL
Qty: 100 TABLET | Refills: 3 | Status: SHIPPED | OUTPATIENT
Start: 2022-01-01

## 2022-01-01 RX ORDER — ALLOPURINOL 100 MG/1
200 TABLET ORAL DAILY
Status: DISCONTINUED | OUTPATIENT
Start: 2022-01-01 | End: 2022-01-01 | Stop reason: HOSPADM

## 2022-01-01 RX ORDER — POLYETHYLENE GLYCOL 3350 17 G/17G
17 POWDER, FOR SOLUTION ORAL DAILY
Status: DISCONTINUED | OUTPATIENT
Start: 2022-01-01 | End: 2022-01-01 | Stop reason: HOSPADM

## 2022-01-01 RX ORDER — MAGNESIUM SULFATE HEPTAHYDRATE 40 MG/ML
2 INJECTION, SOLUTION INTRAVENOUS ONCE
Status: COMPLETED | OUTPATIENT
Start: 2022-01-01 | End: 2022-01-01

## 2022-01-01 RX ORDER — HYDROMORPHONE HCL IN WATER/PF 6 MG/30 ML
0.2 PATIENT CONTROLLED ANALGESIA SYRINGE INTRAVENOUS EVERY 5 MIN PRN
Status: DISCONTINUED | OUTPATIENT
Start: 2022-01-01 | End: 2022-01-01 | Stop reason: HOSPADM

## 2022-01-01 RX ORDER — MEPERIDINE HYDROCHLORIDE 25 MG/ML
12.5 INJECTION INTRAMUSCULAR; INTRAVENOUS; SUBCUTANEOUS EVERY 5 MIN PRN
Status: DISCONTINUED | OUTPATIENT
Start: 2022-01-01 | End: 2022-01-01 | Stop reason: HOSPADM

## 2022-01-01 RX ORDER — BISMUTH SUBSALICYLATE 262 MG/1
1 TABLET, CHEWABLE ORAL
COMMUNITY

## 2022-01-01 RX ORDER — GABAPENTIN 100 MG/1
100 CAPSULE ORAL
Status: COMPLETED | OUTPATIENT
Start: 2022-01-01 | End: 2022-01-01

## 2022-01-01 RX ORDER — AMOXICILLIN 250 MG
1-2 CAPSULE ORAL 2 TIMES DAILY
Qty: 30 TABLET | Refills: 0
Start: 2022-01-01

## 2022-01-01 RX ORDER — ACETAMINOPHEN 325 MG/1
650 TABLET ORAL EVERY 4 HOURS PRN
Qty: 100 TABLET | Refills: 0
Start: 2022-01-01 | End: 2022-01-01

## 2022-01-01 RX ORDER — PROPOFOL 10 MG/ML
INJECTION, EMULSION INTRAVENOUS CONTINUOUS PRN
Status: DISCONTINUED | OUTPATIENT
Start: 2022-01-01 | End: 2022-01-01

## 2022-01-01 RX ORDER — ACETAMINOPHEN 325 MG/1
975 TABLET ORAL EVERY 8 HOURS
Status: COMPLETED | OUTPATIENT
Start: 2022-01-01 | End: 2022-01-01

## 2022-01-01 RX ORDER — VANCOMYCIN HYDROCHLORIDE 125 MG/1
125 CAPSULE ORAL 4 TIMES DAILY
Qty: 40 CAPSULE | Refills: 0 | Status: SHIPPED | OUTPATIENT
Start: 2022-01-01 | End: 2022-01-01

## 2022-01-01 RX ORDER — LISINOPRIL 5 MG/1
5 TABLET ORAL DAILY
Qty: 180 TABLET | Refills: 1
Start: 2022-01-01 | End: 2022-01-01

## 2022-01-01 RX ORDER — METOPROLOL SUCCINATE 25 MG/1
TABLET, EXTENDED RELEASE ORAL
Qty: 90 TABLET | Refills: 1 | Status: SHIPPED | OUTPATIENT
Start: 2022-01-01 | End: 2023-01-01

## 2022-01-01 RX ORDER — BISMUTH SUBSALICYLATE 262 MG/1
TABLET, CHEWABLE ORAL
Qty: 30 TABLET | Refills: 3 | OUTPATIENT
Start: 2022-01-01

## 2022-01-01 RX ADMIN — METOPROLOL SUCCINATE 25 MG: 25 TABLET, FILM COATED, EXTENDED RELEASE ORAL at 08:32

## 2022-01-01 RX ADMIN — LIDOCAINE HYDROCHLORIDE 0.1 ML: 10 INJECTION, SOLUTION EPIDURAL; INFILTRATION; INTRACAUDAL; PERINEURAL at 07:05

## 2022-01-01 RX ADMIN — TAMSULOSIN HYDROCHLORIDE 0.4 MG: 0.4 CAPSULE ORAL at 08:31

## 2022-01-01 RX ADMIN — MAGNESIUM SULFATE HEPTAHYDRATE 2 G: 40 INJECTION, SOLUTION INTRAVENOUS at 08:09

## 2022-01-01 RX ADMIN — CARBOXYMETHYLCELLULOSE SODIUM 2 DROP: 10 GEL OPHTHALMIC at 09:36

## 2022-01-01 RX ADMIN — ATORVASTATIN CALCIUM 20 MG: 20 TABLET, FILM COATED ORAL at 17:00

## 2022-01-01 RX ADMIN — WARFARIN SODIUM 4 MG: 2 TABLET ORAL at 17:23

## 2022-01-01 RX ADMIN — PHENYLEPHRINE HYDROCHLORIDE 100 MCG: 10 INJECTION INTRAVENOUS at 09:18

## 2022-01-01 RX ADMIN — FENTANYL CITRATE 50 MCG: 50 INJECTION, SOLUTION INTRAMUSCULAR; INTRAVENOUS at 08:38

## 2022-01-01 RX ADMIN — GABAPENTIN 300 MG: 300 CAPSULE ORAL at 21:41

## 2022-01-01 RX ADMIN — FAMOTIDINE 20 MG: 20 TABLET, FILM COATED ORAL at 20:00

## 2022-01-01 RX ADMIN — FENTANYL CITRATE 25 MCG: 50 INJECTION, SOLUTION INTRAMUSCULAR; INTRAVENOUS at 10:16

## 2022-01-01 RX ADMIN — Medication 1 CAPSULE: at 08:33

## 2022-01-01 RX ADMIN — SENNOSIDES AND DOCUSATE SODIUM 1 TABLET: 50; 8.6 TABLET ORAL at 08:08

## 2022-01-01 RX ADMIN — TAMSULOSIN HYDROCHLORIDE 0.4 MG: 0.4 CAPSULE ORAL at 16:40

## 2022-01-01 RX ADMIN — FENTANYL CITRATE 50 MCG: 50 INJECTION, SOLUTION INTRAMUSCULAR; INTRAVENOUS at 08:47

## 2022-01-01 RX ADMIN — FAMOTIDINE 20 MG: 20 TABLET, FILM COATED ORAL at 08:32

## 2022-01-01 RX ADMIN — SENNOSIDES AND DOCUSATE SODIUM 1 TABLET: 50; 8.6 TABLET ORAL at 14:09

## 2022-01-01 RX ADMIN — KETAMINE HYDROCHLORIDE 10 MG: 10 INJECTION, SOLUTION INTRAMUSCULAR; INTRAVENOUS at 08:06

## 2022-01-01 RX ADMIN — KETAMINE HYDROCHLORIDE 10 MG: 10 INJECTION, SOLUTION INTRAMUSCULAR; INTRAVENOUS at 09:05

## 2022-01-01 RX ADMIN — DEXAMETHASONE SODIUM PHOSPHATE 8 MG: 4 INJECTION, SOLUTION INTRA-ARTICULAR; INTRALESIONAL; INTRAMUSCULAR; INTRAVENOUS; SOFT TISSUE at 08:17

## 2022-01-01 RX ADMIN — FENTANYL CITRATE 25 MCG: 50 INJECTION, SOLUTION INTRAMUSCULAR; INTRAVENOUS at 10:31

## 2022-01-01 RX ADMIN — LISINOPRIL 10 MG: 10 TABLET ORAL at 08:32

## 2022-01-01 RX ADMIN — SENNOSIDES AND DOCUSATE SODIUM 1 TABLET: 50; 8.6 TABLET ORAL at 20:00

## 2022-01-01 RX ADMIN — Medication 1 CAPSULE: at 08:32

## 2022-01-01 RX ADMIN — ACETAMINOPHEN 975 MG: 325 TABLET, FILM COATED ORAL at 13:34

## 2022-01-01 RX ADMIN — CEFAZOLIN SODIUM 2 G: 2 INJECTION, SOLUTION INTRAVENOUS at 00:10

## 2022-01-01 RX ADMIN — GABAPENTIN 100 MG: 100 CAPSULE ORAL at 06:57

## 2022-01-01 RX ADMIN — FAMOTIDINE 20 MG: 20 TABLET, FILM COATED ORAL at 14:09

## 2022-01-01 RX ADMIN — HYDROMORPHONE HYDROCHLORIDE 0.2 MG: 0.2 INJECTION, SOLUTION INTRAMUSCULAR; INTRAVENOUS; SUBCUTANEOUS at 10:46

## 2022-01-01 RX ADMIN — FAMOTIDINE 20 MG: 20 TABLET, FILM COATED ORAL at 08:31

## 2022-01-01 RX ADMIN — METOPROLOL SUCCINATE 25 MG: 25 TABLET, FILM COATED, EXTENDED RELEASE ORAL at 08:08

## 2022-01-01 RX ADMIN — NORTRIPTYLINE HYDROCHLORIDE 25 MG: 25 CAPSULE ORAL at 22:06

## 2022-01-01 RX ADMIN — NORTRIPTYLINE HYDROCHLORIDE 25 MG: 25 CAPSULE ORAL at 20:36

## 2022-01-01 RX ADMIN — FAMOTIDINE 20 MG: 20 TABLET, FILM COATED ORAL at 08:08

## 2022-01-01 RX ADMIN — METOPROLOL SUCCINATE 25 MG: 25 TABLET, FILM COATED, EXTENDED RELEASE ORAL at 08:17

## 2022-01-01 RX ADMIN — Medication 1 CAPSULE: at 08:08

## 2022-01-01 RX ADMIN — ACETAMINOPHEN 975 MG: 325 TABLET, FILM COATED ORAL at 22:05

## 2022-01-01 RX ADMIN — PHENYLEPHRINE HYDROCHLORIDE 100 MCG: 10 INJECTION INTRAVENOUS at 09:41

## 2022-01-01 RX ADMIN — PROPOFOL 125 MCG/KG/MIN: 10 INJECTION, EMULSION INTRAVENOUS at 08:16

## 2022-01-01 RX ADMIN — ALLOPURINOL 200 MG: 100 TABLET ORAL at 08:31

## 2022-01-01 RX ADMIN — SODIUM CHLORIDE, POTASSIUM CHLORIDE, SODIUM LACTATE AND CALCIUM CHLORIDE: 600; 310; 30; 20 INJECTION, SOLUTION INTRAVENOUS at 07:05

## 2022-01-01 RX ADMIN — ATORVASTATIN CALCIUM 20 MG: 20 TABLET, FILM COATED ORAL at 16:40

## 2022-01-01 RX ADMIN — POLYETHYLENE GLYCOL 3350 17 G: 17 POWDER, FOR SOLUTION ORAL at 08:31

## 2022-01-01 RX ADMIN — PHENYLEPHRINE HYDROCHLORIDE 100 MCG: 10 INJECTION INTRAVENOUS at 09:26

## 2022-01-01 RX ADMIN — KETAMINE HYDROCHLORIDE 10 MG: 10 INJECTION, SOLUTION INTRAMUSCULAR; INTRAVENOUS at 07:50

## 2022-01-01 RX ADMIN — OXYCODONE HYDROCHLORIDE 5 MG: 5 TABLET ORAL at 10:40

## 2022-01-01 RX ADMIN — ACETAMINOPHEN 975 MG: 325 TABLET, FILM COATED ORAL at 21:26

## 2022-01-01 RX ADMIN — ACETAMINOPHEN 975 MG: 325 TABLET, FILM COATED ORAL at 06:12

## 2022-01-01 RX ADMIN — KETAMINE HYDROCHLORIDE 10 MG: 10 INJECTION, SOLUTION INTRAMUSCULAR; INTRAVENOUS at 08:17

## 2022-01-01 RX ADMIN — Medication 2 G: at 07:43

## 2022-01-01 RX ADMIN — PREDNISOLONE ACETATE 1 DROP: 10 SUSPENSION/ DROPS OPHTHALMIC at 07:47

## 2022-01-01 RX ADMIN — SENNOSIDES AND DOCUSATE SODIUM 1 TABLET: 50; 8.6 TABLET ORAL at 20:34

## 2022-01-01 RX ADMIN — ALLOPURINOL 200 MG: 100 TABLET ORAL at 08:17

## 2022-01-01 RX ADMIN — PHENYLEPHRINE HYDROCHLORIDE 100 MCG: 10 INJECTION INTRAVENOUS at 08:25

## 2022-01-01 RX ADMIN — PHENYLEPHRINE HYDROCHLORIDE 100 MCG: 10 INJECTION INTRAVENOUS at 09:39

## 2022-01-01 RX ADMIN — SENNOSIDES AND DOCUSATE SODIUM 1 TABLET: 50; 8.6 TABLET ORAL at 08:31

## 2022-01-01 RX ADMIN — NORTRIPTYLINE HYDROCHLORIDE 25 MG: 25 CAPSULE ORAL at 21:27

## 2022-01-01 RX ADMIN — GABAPENTIN 300 MG: 300 CAPSULE ORAL at 22:06

## 2022-01-01 RX ADMIN — PHENYLEPHRINE HYDROCHLORIDE 0.2 MCG/KG/MIN: 10 INJECTION INTRAVENOUS at 08:27

## 2022-01-01 RX ADMIN — PHENYLEPHRINE HYDROCHLORIDE 100 MCG: 10 INJECTION INTRAVENOUS at 08:34

## 2022-01-01 RX ADMIN — WARFARIN SODIUM 6 MG: 3 TABLET ORAL at 17:00

## 2022-01-01 RX ADMIN — CARBOXYMETHYLCELLULOSE SODIUM 1 DROP: 10 GEL OPHTHALMIC at 07:48

## 2022-01-01 RX ADMIN — ACETAMINOPHEN 975 MG: 325 TABLET, FILM COATED ORAL at 20:35

## 2022-01-01 RX ADMIN — SODIUM CHLORIDE, POTASSIUM CHLORIDE, SODIUM LACTATE AND CALCIUM CHLORIDE: 600; 310; 30; 20 INJECTION, SOLUTION INTRAVENOUS at 09:30

## 2022-01-01 RX ADMIN — TAMSULOSIN HYDROCHLORIDE 0.4 MG: 0.4 CAPSULE ORAL at 08:17

## 2022-01-01 RX ADMIN — TAMSULOSIN HYDROCHLORIDE 0.4 MG: 0.4 CAPSULE ORAL at 08:32

## 2022-01-01 RX ADMIN — FENTANYL CITRATE 50 MCG: 50 INJECTION, SOLUTION INTRAMUSCULAR; INTRAVENOUS at 08:22

## 2022-01-01 RX ADMIN — WARFARIN SODIUM 7.5 MG: 5 TABLET ORAL at 17:37

## 2022-01-01 RX ADMIN — METHOCARBAMOL 500 MG: 500 TABLET ORAL at 08:17

## 2022-01-01 RX ADMIN — ACETAMINOPHEN 975 MG: 325 TABLET, FILM COATED ORAL at 05:42

## 2022-01-01 RX ADMIN — FENTANYL CITRATE 25 MCG: 50 INJECTION, SOLUTION INTRAMUSCULAR; INTRAVENOUS at 10:10

## 2022-01-01 RX ADMIN — GABAPENTIN 300 MG: 300 CAPSULE ORAL at 20:36

## 2022-01-01 RX ADMIN — ACETAMINOPHEN 975 MG: 325 TABLET, FILM COATED ORAL at 06:41

## 2022-01-01 RX ADMIN — ACETAMINOPHEN 975 MG: 325 TABLET, FILM COATED ORAL at 14:08

## 2022-01-01 RX ADMIN — PREDNISOLONE ACETATE 1 DROP: 10 SUSPENSION/ DROPS OPHTHALMIC at 09:35

## 2022-01-01 RX ADMIN — ONDANSETRON 4 MG: 2 INJECTION INTRAMUSCULAR; INTRAVENOUS at 08:10

## 2022-01-01 RX ADMIN — LIDOCAINE HYDROCHLORIDE 50 MG: 10 INJECTION, SOLUTION INFILTRATION; PERINEURAL at 08:17

## 2022-01-01 RX ADMIN — ALLOPURINOL 200 MG: 100 TABLET ORAL at 08:08

## 2022-01-01 RX ADMIN — METOPROLOL TARTRATE 12.5 MG: 25 TABLET, FILM COATED ORAL at 16:40

## 2022-01-01 RX ADMIN — TRANEXAMIC ACID 1950 MG: 650 TABLET ORAL at 06:58

## 2022-01-01 RX ADMIN — ALLOPURINOL 200 MG: 100 TABLET ORAL at 08:32

## 2022-01-01 RX ADMIN — SENNOSIDES AND DOCUSATE SODIUM 1 TABLET: 50; 8.6 TABLET ORAL at 20:22

## 2022-01-01 RX ADMIN — KETAMINE HYDROCHLORIDE 10 MG: 10 INJECTION, SOLUTION INTRAMUSCULAR; INTRAVENOUS at 08:44

## 2022-01-01 RX ADMIN — ATORVASTATIN CALCIUM 20 MG: 20 TABLET, FILM COATED ORAL at 17:17

## 2022-01-01 RX ADMIN — GLYCOPYRROLATE 0.2 MG: 0.2 INJECTION, SOLUTION INTRAMUSCULAR; INTRAVENOUS at 08:23

## 2022-01-01 RX ADMIN — WARFARIN SODIUM 4 MG: 2 TABLET ORAL at 17:17

## 2022-01-01 RX ADMIN — PREDNISOLONE ACETATE 1 DROP: 10 SUSPENSION/ DROPS OPHTHALMIC at 08:08

## 2022-01-01 RX ADMIN — FAMOTIDINE 20 MG: 20 TABLET, FILM COATED ORAL at 08:17

## 2022-01-01 RX ADMIN — GABAPENTIN 300 MG: 300 CAPSULE ORAL at 21:26

## 2022-01-01 RX ADMIN — ACETAMINOPHEN 975 MG: 325 TABLET ORAL at 06:56

## 2022-01-01 RX ADMIN — NORTRIPTYLINE HYDROCHLORIDE 25 MG: 25 CAPSULE ORAL at 21:41

## 2022-01-01 RX ADMIN — METOPROLOL SUCCINATE 25 MG: 25 TABLET, FILM COATED, EXTENDED RELEASE ORAL at 08:31

## 2022-01-01 RX ADMIN — ACETAMINOPHEN 975 MG: 325 TABLET, FILM COATED ORAL at 13:28

## 2022-01-01 RX ADMIN — SENNOSIDES AND DOCUSATE SODIUM 1 TABLET: 50; 8.6 TABLET ORAL at 08:32

## 2022-01-01 RX ADMIN — ATORVASTATIN CALCIUM 20 MG: 20 TABLET, FILM COATED ORAL at 17:37

## 2022-01-01 RX ADMIN — PREDNISOLONE ACETATE 1 DROP: 10 SUSPENSION/ DROPS OPHTHALMIC at 08:35

## 2022-01-01 RX ADMIN — FENTANYL CITRATE 25 MCG: 50 INJECTION, SOLUTION INTRAMUSCULAR; INTRAVENOUS at 10:23

## 2022-01-01 RX ADMIN — TAMSULOSIN HYDROCHLORIDE 0.4 MG: 0.4 CAPSULE ORAL at 08:08

## 2022-01-01 RX ADMIN — ALLOPURINOL 100 MG: 100 TABLET ORAL at 16:40

## 2022-01-01 RX ADMIN — Medication 1 CAPSULE: at 17:24

## 2022-01-01 RX ADMIN — PROPOFOL 100 MG: 10 INJECTION, EMULSION INTRAVENOUS at 08:17

## 2022-01-01 RX ADMIN — FAMOTIDINE 20 MG: 20 TABLET, FILM COATED ORAL at 20:34

## 2022-01-01 RX ADMIN — CEFAZOLIN SODIUM 2 G: 2 INJECTION, SOLUTION INTRAVENOUS at 15:53

## 2022-01-01 SDOH — ECONOMIC STABILITY: FOOD INSECURITY: WITHIN THE PAST 12 MONTHS, YOU WORRIED THAT YOUR FOOD WOULD RUN OUT BEFORE YOU GOT MONEY TO BUY MORE.: NEVER TRUE

## 2022-01-01 SDOH — ECONOMIC STABILITY: FOOD INSECURITY: WITHIN THE PAST 12 MONTHS, THE FOOD YOU BOUGHT JUST DIDN'T LAST AND YOU DIDN'T HAVE MONEY TO GET MORE.: NEVER TRUE

## 2022-01-01 SDOH — HEALTH STABILITY: PHYSICAL HEALTH: ON AVERAGE, HOW MANY MINUTES DO YOU ENGAGE IN EXERCISE AT THIS LEVEL?: 0 MIN

## 2022-01-01 SDOH — HEALTH STABILITY: PHYSICAL HEALTH: ON AVERAGE, HOW MANY DAYS PER WEEK DO YOU ENGAGE IN MODERATE TO STRENUOUS EXERCISE (LIKE A BRISK WALK)?: 0 DAYS

## 2022-01-01 ASSESSMENT — ENCOUNTER SYMPTOMS
HEADACHES: 0
LIGHT-HEADEDNESS: 0
WEAKNESS: 0
FATIGUE: 0
NAUSEA: 0
ABDOMINAL PAIN: 0
BACK PAIN: 0
SHORTNESS OF BREATH: 0
CONFUSION: 0
CHEST TIGHTNESS: 0
NUMBNESS: 0
FEVER: 0
NECK STIFFNESS: 0
NECK PAIN: 0
DYSRHYTHMIAS: 1
COUGH: 0
CHILLS: 0
WOUND: 1

## 2022-01-01 ASSESSMENT — PAIN SCALES - GENERAL
PAINLEVEL: NO PAIN (0)

## 2022-01-01 ASSESSMENT — LIFESTYLE VARIABLES: TOBACCO_USE: 1

## 2022-01-01 ASSESSMENT — ACTIVITIES OF DAILY LIVING (ADL)
DEPENDENT_IADLS:: TRANSPORTATION;MEAL PREPARATION;MEDICATION MANAGEMENT

## 2022-01-01 ASSESSMENT — SOCIAL DETERMINANTS OF HEALTH (SDOH)
IN A TYPICAL WEEK, HOW MANY TIMES DO YOU TALK ON THE PHONE WITH FAMILY, FRIENDS, OR NEIGHBORS?: TWICE A WEEK
HOW HARD IS IT FOR YOU TO PAY FOR THE VERY BASICS LIKE FOOD, HOUSING, MEDICAL CARE, AND HEATING?: NOT VERY HARD

## 2022-01-14 ENCOUNTER — ANTICOAGULATION THERAPY VISIT (OUTPATIENT)
Dept: ANTICOAGULATION | Facility: CLINIC | Age: 87
End: 2022-01-14

## 2022-01-14 ENCOUNTER — LAB (OUTPATIENT)
Dept: LAB | Facility: CLINIC | Age: 87
End: 2022-01-14
Payer: MEDICARE

## 2022-01-14 DIAGNOSIS — I48.20 CHRONIC ATRIAL FIBRILLATION (H): Primary | ICD-10-CM

## 2022-01-14 DIAGNOSIS — I48.20 CHRONIC ATRIAL FIBRILLATION (H): ICD-10-CM

## 2022-01-14 DIAGNOSIS — Z79.01 LONG TERM CURRENT USE OF ANTICOAGULANT THERAPY: ICD-10-CM

## 2022-01-14 LAB — INR BLD: 2.4 (ref 0.9–1.1)

## 2022-01-14 PROCEDURE — 36416 COLLJ CAPILLARY BLOOD SPEC: CPT

## 2022-01-14 PROCEDURE — 85610 PROTHROMBIN TIME: CPT

## 2022-01-14 NOTE — PROGRESS NOTES
ANTICOAGULATION MANAGEMENT     Nabor Cunningham 86 year old male is on warfarin with therapeutic INR result. (Goal INR 2.0-3.0)    Recent labs: (last 7 days)     01/14/22  1308   INR 2.4*       ASSESSMENT     Source(s): Patient/Caregiver Call       Warfarin doses taken: Warfarin taken as instructed    Diet: No new diet changes identified    New illness, injury, or hospitalization: No    Medication/supplement changes: None noted    Signs or symptoms of bleeding or clotting: No    Previous INR: Subtherapeutic    Additional findings: Upcoming surgery/procedure TBD. Patient needs to see Surgeon first then back to Dr. Mayo for Pre-op. May be a couple months.     PLAN     Recommended plan for no diet, medication or health factor changes affecting INR     Dosing Instructions: Continue your current warfarin dose with next INR in 3 weeks       Summary  As of 1/14/2022    Full warfarin instructions:  6 mg every Mon, Wed, Fri; 4 mg all other days   Next INR check:  2/4/2022             Telephone call with Baron who verbalizes understanding and agrees to plan    Lab visit scheduled    Education provided: Please call back if any changes to your diet, medications or how you've been taking warfarin and Contact [phone]  with any changes, questions or concerns.     Plan made per [facility] anticoagulation protocol    Daphne Lamar RN  Anticoagulation Clinic  1/14/2022    _______________________________________________________________________     Anticoagulation Episode Summary     Current INR goal:  2.0-3.0   TTR:  59.5 % (1 y)   Target end date:  Indefinite   Send INR reminders to:  [facility]    Indications    Chronic atrial fibrillation (H) [I48.20]  Long-term (current) use of anticoagulants [Z79.01] [Z79.01]           Comments:           Anticoagulation Care Providers     Provider Role Specialty Phone number    Heron Mayo MD Referring Family Medicine [phone]

## 2022-01-28 DIAGNOSIS — G62.9 PERIPHERAL POLYNEUROPATHY: ICD-10-CM

## 2022-01-28 RX ORDER — GABAPENTIN 300 MG/1
300 CAPSULE ORAL AT BEDTIME
Qty: 90 CAPSULE | Refills: 1 | Status: SHIPPED | OUTPATIENT
Start: 2022-01-28 | End: 2022-01-01

## 2022-01-28 NOTE — TELEPHONE ENCOUNTER
Requested Prescriptions   Pending Prescriptions Disp Refills     gabapentin (NEURONTIN) 300 MG capsule 90 capsule 1     Sig: Take 1 capsule (300 mg) by mouth At Bedtime       There is no refill protocol information for this order        Last office visit: Visit date not found with prescribing provider:     Future Office Visit:          Sheri JEFFERSON, Specialty Clinic

## 2022-02-01 ENCOUNTER — ANCILLARY PROCEDURE (OUTPATIENT)
Dept: CARDIOLOGY | Facility: CLINIC | Age: 87
End: 2022-02-01
Attending: INTERNAL MEDICINE
Payer: MEDICARE

## 2022-02-01 DIAGNOSIS — Z95.0 CARDIAC PACEMAKER IN SITU: ICD-10-CM

## 2022-02-01 DIAGNOSIS — I49.5 SICK SINUS SYNDROME (H): ICD-10-CM

## 2022-02-01 PROCEDURE — 93294 REM INTERROG EVL PM/LDLS PM: CPT | Performed by: INTERNAL MEDICINE

## 2022-02-01 PROCEDURE — 93296 REM INTERROG EVL PM/IDS: CPT | Performed by: INTERNAL MEDICINE

## 2022-02-02 LAB
MDC_IDC_EPISODE_DTM: NORMAL
MDC_IDC_EPISODE_DTM: NORMAL
MDC_IDC_EPISODE_ID: NORMAL
MDC_IDC_EPISODE_ID: NORMAL
MDC_IDC_EPISODE_TYPE: NORMAL
MDC_IDC_EPISODE_TYPE: NORMAL
MDC_IDC_LEAD_IMPLANT_DT: NORMAL
MDC_IDC_LEAD_IMPLANT_DT: NORMAL
MDC_IDC_LEAD_LOCATION: NORMAL
MDC_IDC_LEAD_LOCATION: NORMAL
MDC_IDC_LEAD_LOCATION_DETAIL_1: NORMAL
MDC_IDC_LEAD_LOCATION_DETAIL_1: NORMAL
MDC_IDC_LEAD_MFG: NORMAL
MDC_IDC_LEAD_MFG: NORMAL
MDC_IDC_LEAD_MODEL: NORMAL
MDC_IDC_LEAD_MODEL: NORMAL
MDC_IDC_LEAD_POLARITY_TYPE: NORMAL
MDC_IDC_LEAD_SERIAL: NORMAL
MDC_IDC_LEAD_SERIAL: NORMAL
MDC_IDC_MSMT_BATTERY_DTM: NORMAL
MDC_IDC_MSMT_BATTERY_REMAINING_LONGEVITY: 114 MO
MDC_IDC_MSMT_BATTERY_REMAINING_PERCENTAGE: 100 %
MDC_IDC_MSMT_BATTERY_STATUS: NORMAL
MDC_IDC_MSMT_LEADCHNL_RA_IMPEDANCE_VALUE: 353 OHM
MDC_IDC_MSMT_LEADCHNL_RV_IMPEDANCE_VALUE: 917 OHM
MDC_IDC_MSMT_LEADCHNL_RV_PACING_THRESHOLD_AMPLITUDE: 0.8 V
MDC_IDC_MSMT_LEADCHNL_RV_PACING_THRESHOLD_PULSEWIDTH: 0.4 MS
MDC_IDC_PG_IMPLANT_DTM: NORMAL
MDC_IDC_PG_MFG: NORMAL
MDC_IDC_PG_MODEL: NORMAL
MDC_IDC_PG_SERIAL: NORMAL
MDC_IDC_PG_TYPE: NORMAL
MDC_IDC_SESS_CLINIC_NAME: NORMAL
MDC_IDC_SESS_DTM: NORMAL
MDC_IDC_SESS_TYPE: NORMAL
MDC_IDC_SET_BRADY_AT_MODE_SWITCH_RATE: 150 {BEATS}/MIN
MDC_IDC_SET_BRADY_LOWRATE: 60 {BEATS}/MIN
MDC_IDC_SET_BRADY_MAX_SENSOR_RATE: 130 {BEATS}/MIN
MDC_IDC_SET_BRADY_MODE: NORMAL
MDC_IDC_SET_LEADCHNL_RA_SENSING_ADAPTATION_MODE: NORMAL
MDC_IDC_SET_LEADCHNL_RA_SENSING_SENSITIVITY: 0.15 MV
MDC_IDC_SET_LEADCHNL_RV_PACING_AMPLITUDE: 1.2 V
MDC_IDC_SET_LEADCHNL_RV_PACING_CAPTURE_MODE: NORMAL
MDC_IDC_SET_LEADCHNL_RV_PACING_POLARITY: NORMAL
MDC_IDC_SET_LEADCHNL_RV_PACING_PULSEWIDTH: 0.4 MS
MDC_IDC_SET_LEADCHNL_RV_SENSING_ADAPTATION_MODE: NORMAL
MDC_IDC_SET_LEADCHNL_RV_SENSING_POLARITY: NORMAL
MDC_IDC_SET_LEADCHNL_RV_SENSING_SENSITIVITY: 0.6 MV
MDC_IDC_SET_ZONE_DETECTION_INTERVAL: 375 MS
MDC_IDC_SET_ZONE_TYPE: NORMAL
MDC_IDC_SET_ZONE_VENDOR_TYPE: NORMAL
MDC_IDC_STAT_BRADY_DTM_END: NORMAL
MDC_IDC_STAT_BRADY_DTM_START: NORMAL
MDC_IDC_STAT_BRADY_RA_PERCENT_PACED: 0 %
MDC_IDC_STAT_BRADY_RV_PERCENT_PACED: 28 %
MDC_IDC_STAT_EPISODE_RECENT_COUNT: 0
MDC_IDC_STAT_EPISODE_RECENT_COUNT_DTM_END: NORMAL
MDC_IDC_STAT_EPISODE_RECENT_COUNT_DTM_START: NORMAL
MDC_IDC_STAT_EPISODE_TYPE: NORMAL
MDC_IDC_STAT_EPISODE_VENDOR_TYPE: NORMAL

## 2022-02-04 ENCOUNTER — LAB (OUTPATIENT)
Dept: LAB | Facility: CLINIC | Age: 87
End: 2022-02-04
Payer: MEDICARE

## 2022-02-04 ENCOUNTER — ANTICOAGULATION THERAPY VISIT (OUTPATIENT)
Dept: ANTICOAGULATION | Facility: CLINIC | Age: 87
End: 2022-02-04

## 2022-02-04 DIAGNOSIS — I48.20 CHRONIC ATRIAL FIBRILLATION (H): Primary | ICD-10-CM

## 2022-02-04 DIAGNOSIS — Z79.01 LONG TERM CURRENT USE OF ANTICOAGULANT THERAPY: ICD-10-CM

## 2022-02-04 DIAGNOSIS — I48.20 CHRONIC ATRIAL FIBRILLATION (H): ICD-10-CM

## 2022-02-04 LAB — INR BLD: 1.7 (ref 0.9–1.1)

## 2022-02-04 PROCEDURE — 85610 PROTHROMBIN TIME: CPT

## 2022-02-04 PROCEDURE — 36416 COLLJ CAPILLARY BLOOD SPEC: CPT

## 2022-02-04 NOTE — PROGRESS NOTES
ANTICOAGULATION MANAGEMENT     Nabor Cunningham 86 year old male is on warfarin with subtherapeutic INR result. (Goal INR 2.0-3.0)    Recent labs: (last 7 days)     02/04/22  1325   INR 1.7*       ASSESSMENT     Source(s): Chart Review and Patient/Caregiver Call       Warfarin doses taken: Warfarin taken differently, but did not change total weekly dose    Diet: No new diet changes identified    New illness, injury, or hospitalization: No    Medication/supplement changes: None noted    Signs or symptoms of bleeding or clotting: No    Previous INR: Therapeutic last visit; previously outside of goal range    Additional findings: upcoming appt regarding a possible left knee procedure     PLAN     Recommended plan for no diet, medication or health factor changes affecting INR     Dosing Instructions:  Increase your warfarin dose (5.9% change) with next INR in 2 weeks       Summary  As of 2/4/2022    Full warfarin instructions:  4 mg every Sun, Tue, Thu; 6 mg all other days   Next INR check:  2/18/2022             Telephone call with Florencio who verbalizes understanding and agrees to plan    Lab visit scheduled    Education provided: Please call back if any changes to your diet, medications or how you've been taking warfarin and Monitoring for clotting signs and symptoms    Plan made per [facility] anticoagulation protocol    Jayde Whitt RN  Anticoagulation Clinic  2/4/2022    _______________________________________________________________________     Anticoagulation Episode Summary     Current INR goal:  2.0-3.0   TTR:  58.0 % (1 y)   Target end date:  Indefinite   Send INR reminders to:  [facility]    Indications    Chronic atrial fibrillation (H) [I48.20]  Long-term (current) use of anticoagulants [Z79.01] [Z79.01]           Comments:           Anticoagulation Care Providers     Provider Role Specialty Phone number    Heron Mayo MD Referring Family Medicine [phone]

## 2022-02-10 ENCOUNTER — TRANSFERRED RECORDS (OUTPATIENT)
Dept: HEALTH INFORMATION MANAGEMENT | Facility: CLINIC | Age: 87
End: 2022-02-10
Payer: MEDICARE

## 2022-02-17 ENCOUNTER — OFFICE VISIT (OUTPATIENT)
Dept: CARDIOLOGY | Facility: CLINIC | Age: 87
End: 2022-02-17
Payer: MEDICARE

## 2022-02-17 VITALS
BODY MASS INDEX: 30.95 KG/M2 | TEMPERATURE: 97.1 F | OXYGEN SATURATION: 97 % | WEIGHT: 186 LBS | SYSTOLIC BLOOD PRESSURE: 110 MMHG | DIASTOLIC BLOOD PRESSURE: 56 MMHG | HEART RATE: 61 BPM

## 2022-02-17 DIAGNOSIS — I50.22 CHRONIC SYSTOLIC HEART FAILURE (H): Primary | ICD-10-CM

## 2022-02-17 PROCEDURE — 99214 OFFICE O/P EST MOD 30 MIN: CPT | Performed by: INTERNAL MEDICINE

## 2022-02-17 RX ORDER — ACETAMINOPHEN 500 MG
500-1000 TABLET ORAL EVERY 6 HOURS PRN
COMMUNITY

## 2022-02-17 NOTE — PROGRESS NOTES
HPI and Plan:   See dictation [id number]    No orders of the defined types were placed in this encounter.    Orders Placed This Encounter   Medications     Calcium-Magnesium-Zinc 333-133-5 MG TABS per tablet     Sig: Take 1 tablet by mouth daily     METAMUCIL FIBER PO     Sig: Take 1 capsule by mouth daily     acetaminophen (TYLENOL) 500 MG tablet     Sig: Take 500-1,000 mg by mouth every 6 hours as needed for mild pain     Albuterol (VENTOLIN IN)     Sig: Inhale into the lungs every 4 hours as needed     Medications Discontinued During This Encounter   Medication Reason     gabapentin (NEURONTIN) 100 MG capsule Dose adjustment     calcium carbonate (OS- MG [place]. [place]) 1250 MG tablet Alternate therapy     hypromellose (ARTIFICIAL TEARS) 0.4 % SOLN ophthalmic solution Therapy completed     ofloxacin (OCUFLOX) 0.3 % ophthalmic solution Therapy completed         No diagnosis found.    Today's clinic visit entailed:  Review of the result(s) of each unique test - Echo Device checks and labs  30 minutes spent on the date of the encounter doing chart review, review of outside records, review of test results, interpretation of tests, patient visit, documentation, discussion with other provider(s) and discussion with family   Provider  Link to MDM Help Grid     The level of medical decision making during this visit was of high complexity.      CURRENT MEDICATIONS:  Current Outpatient Medications   Medication Sig Dispense Refill     acetaminophen (TYLENOL) 500 MG tablet Take 500-1,000 mg by mouth every 6 hours as needed for mild pain       Albuterol (VENTOLIN IN) Inhale into the lungs every 4 hours as needed       allopurinol (ZYLOPRIM) 100 MG tablet TAKE TWO TABLETS BY MOUTH ONCE DAILY 60 tablet 5     atorvastatin (LIPITOR) 20 MG tablet TAKE ONE TABLET BY MOUTH ONCE DAILY 30 tablet 11     bismuth subsalicylate (PEPTO-BISMOL) 262 MG TABS Take 1 tablet by mouth 3 times daily (Patient taking differently: Take 1 tablet by  mouth daily ) 90 tablet 3     Calcium-Magnesium-Zinc 333-133-5 MG TABS per tablet Take 1 tablet by mouth daily       cyanocobalamin (VITAMIN B-12) 1000 MCG tablet Take 1,000 mcg by mouth daily        furosemide (LASIX) 20 MG tablet Take 1 tablet (20 mg) by mouth daily 90 tablet 3     gabapentin (NEURONTIN) 300 MG capsule Take 1 capsule (300 mg) by mouth At Bedtime MUST SCHEDULE APPT BEFORE FURTHER REFILLLS 90 capsule 1     lisinopril (ZESTRIL) 5 MG tablet TAKE TWO TABLETS BY MOUTH ONCE DAILY 180 tablet 1     loperamide (IMODIUM) 2 MG capsule Take 2 mg by mouth 4 times daily as needed for diarrhea       METAMUCIL FIBER PO Take 1 capsule by mouth daily       metoprolol succinate ER (TOPROL-XL) 25 MG 24 hr tablet TAKE ONE TABLET BY MOUTH EVERY EVENING 90 tablet 1     multivitamin, therapeutic with minerals (MULTI-VITAMIN) TABS tablet Take 1 tablet by mouth Every other day 100 tablet 3     nortriptyline (PAMELOR) 25 MG capsule TAKE ONE CAPSULE BY MOUTH EVERY NIGHT AT BEDTIME 30 capsule 11     polyvinyl alcohol (LIQUIFILM TEARS) 1.4 % ophthalmic solution 1-2 drops       prednisoLONE acetate (PRED FORTE) 1 % ophthalmic susp Place 1 drop Into the left eye daily   2     tamsulosin (FLOMAX) 0.4 MG capsule TAKE ONE CAPSULE BY MOUTH ONCE DAILY 30 capsule 5     triamcinolone (KENALOG) 0.1 % external cream APPLY TOPICALLY TO AFFECTED AREA(S) TWICE DAILY AS DIRECTED 45 g 1     warfarin ANTICOAGULANT (JANTOVEN ANTICOAGULANT) 2 MG tablet Take 4 mg every Sun, Tue, Thu; 6 mg all other days or as directed by Anticoagulation Clinic 195 tablet 0     mirtazapine (REMERON) 15 MG tablet TAKE ONE TABLET BY MOUTH EVERY NIGHT AT BEDTIME (Patient not taking: Reported on 2/17/2022) 90 tablet 1       ALLERGIES     Allergies   Allergen Reactions     Amoxicillin Hives     Penicillins Other (See Comments)     Dizzy       Trazodone Other (See Comments)     Hallucinations         PAST MEDICAL HISTORY:  Past Medical History:   Diagnosis Date      Bleeding from wound 9/2/2018 7/12/2019:He had prolonged bleeding after cyst removal on his back within the past year.          PAST SURGICAL HISTORY:  Past Surgical History:   Procedure Laterality Date     ANGIOGRAM  01/2019    right and left heart cath.      APPENDECTOMY      ~2013     CATARACT IOL, RT/LT      Past bilateral cataract surgery.      COLONOSCOPY N/A 7/3/2020    Procedure: COLONOSCOPY, WITH POLYPECTOMY AND BIOPSY;  Surgeon: Fady Boyer MD;  Location: [facility]     EYE SURGERY Left     three corneal transplants     IMPLANT PACEMAKER       INCISION AND DRAINAGE TRUNK, COMBINED N/A 9/6/2018    Procedure: COMBINED INCISION AND DRAINAGE TRUNK;  incision and cauterization of left buttock bleed.;  Surgeon: Dain Davis MD;  Location: [facility]     INCISION AND DRAINAGE TRUNK, COMBINED N/A 11/1/2018    Procedure:  INCISION AND DRAINAGE of Back Wound;  Surgeon: Dain Davis MD;  Location: [facility]     JOINT REPLACEMENT Right     right total knee.      REPLACEMENT TOTAL KNEE Right      THORACIC SURGERY      removal benign nodule       FAMILY HISTORY:  Family History   Problem Relation Age of Onset     Cancer Brother      Diabetes Brother        SOCIAL HISTORY:  Social History     Socioeconomic History     Marital status:      Spouse name: None     Number of children: 3     Years of education: 13     Highest education level: None   Occupational History     Occupation: retired   Tobacco Use     Smoking status: Former Smoker     Years: 20.00     Types: Cigars     Smokeless tobacco: Never Used   Substance and Sexual Activity     Alcohol use: Not Currently     Drug use: No     Sexual activity: Not Currently   Other Topics Concern     Parent/sibling w/ CABG, MI or angioplasty before 65F 55M? Not Asked   Social History Narrative    ** Merged History Encounter **          Social Determinants of Health     Financial Resource Strain: Not on file   Food Insecurity: Not on file   Transportation Needs: Not on file    Physical Activity: Not on file   Stress: Not on file   Social Connections: Not on file   Intimate Partner Violence: Not on file   Housing Stability: Not on file       Review of Systems:  Skin:  Negative     Eyes:  Positive for glasses  ENT:  Negative    Respiratory:  Negative    Cardiovascular:    Positive for;fatigue  Gastroenterology: Positive for    Genitourinary:  Positive for retention;hesitancy  Musculoskeletal:  Negative joint pain;joint swelling;joint stiffness;arthritis  Neurologic:  Negative numbness or tingling of feet  Psychiatric:  Negative sleep disturbances  Heme/Lymph/Imm:  Negative    Endocrine:  Negative      Physical Exam:  Vitals: /56 (BP Location: Right arm, Patient Position: Sitting, Cuff Size: Adult Regular)   Pulse 61   Temp 97.1  F (36.2  C) (Tympanic)   Wt 84.4 kg (186 lb)   SpO2 97%   BMI 30.95 kg/m      Recent Lab Results:  LIPID RESULTS:  Lab Results   Component Value Date    CHOL 165 07/17/2020    HDL 36 (L) 07/17/2020    LDL 62 07/17/2020    TRIG 336 (H) 07/17/2020       LIVER ENZYME RESULTS:  Lab Results   Component Value Date    AST 22 06/09/2020    ALT 20 06/09/2020       CBC RESULTS:  Lab Results   Component Value Date    WBC 6.7 06/09/2020    RBC 4.05 (L) 06/09/2020    HGB 12.2 (L) 12/17/2021    HGB 10.5 (L) 04/15/2021    HCT 37.7 (L) 06/09/2020    MCV 93 06/09/2020    MCH 30.9 06/09/2020    MCHC 33.2 06/09/2020    RDW 14.5 06/09/2020     (L) 06/09/2020       BMP RESULTS:  Lab Results   Component Value Date     12/17/2021     04/15/2021    POTASSIUM 4.2 12/17/2021    POTASSIUM 4.0 04/15/2021    CHLORIDE 98 12/17/2021    CHLORIDE 103 04/15/2021    CO2 34 (H) 12/17/2021    CO2 29 04/15/2021    ANIONGAP 2 (L) 12/17/2021    ANIONGAP 6 04/15/2021    GLC 99 12/17/2021    GLC 84 04/15/2021    BUN 21 12/17/2021    BUN 23 04/15/2021    CR 1.44 (H) 12/17/2021    CR 1.63 (H) 04/15/2021    GFRESTIMATED 44 (L) 12/17/2021    GFRESTIMATED 38 (L) 04/15/2021     GFRESTBLACK 44 (L) 04/15/2021    ALY 9.1 12/17/2021    ALY 9.1 04/15/2021        A1C RESULTS:  No results found for: A1C    INR RESULTS:  Lab Results   Component Value Date    INR 1.7 (H) 02/04/2022    INR 2.4 (H) 01/14/2022    INR 2.20 (H) 07/02/2021    INR 2.00 (H) 06/11/2021           CC  No referring provider defined for this encounter.

## 2022-02-17 NOTE — NURSING NOTE
A copy of this clinic note has been faxed to Dr. Espinoza's office at [facility], per patient request.  [phone].  Transmission confirmed via [employer] Fax.

## 2022-02-17 NOTE — LETTER
2/17/2022    Heron Mayo MD  [address] [place] [id number]    RE: Nabor Cunningham       Dear Colleague,     I had the pleasure of seeing Nabor Cunningham in the [facility].  HPI and Plan:   See dictation [id number]    No orders of the defined types were placed in this encounter.    Orders Placed This Encounter   Medications     Calcium-Magnesium-Zinc 333-133-5 MG TABS per tablet     Sig: Take 1 tablet by mouth daily     METAMUCIL FIBER PO     Sig: Take 1 capsule by mouth daily     acetaminophen (TYLENOL) 500 MG tablet     Sig: Take 500-1,000 mg by mouth every 6 hours as needed for mild pain     Albuterol (VENTOLIN IN)     Sig: Inhale into the lungs every 4 hours as needed     Medications Discontinued During This Encounter   Medication Reason     gabapentin (NEURONTIN) 100 MG capsule Dose adjustment     calcium carbonate (OS- MG [place]. [place]) 1250 MG tablet Alternate therapy     hypromellose (ARTIFICIAL TEARS) 0.4 % SOLN ophthalmic solution Therapy completed     ofloxacin (OCUFLOX) 0.3 % ophthalmic solution Therapy completed         No diagnosis found.    Today's clinic visit entailed:  Review of the result(s) of each unique test - Echo Device checks and labs  30 minutes spent on the date of the encounter doing chart review, review of outside records, review of test results, interpretation of tests, patient visit, documentation, discussion with other provider(s) and discussion with family   Provider  Link to [facility] Help Grid     The level of medical decision making during this visit was of high complexity.      CURRENT MEDICATIONS:  Current Outpatient Medications   Medication Sig Dispense Refill     acetaminophen (TYLENOL) 500 MG tablet Take 500-1,000 mg by mouth every 6 hours as needed for mild pain       Albuterol (VENTOLIN IN) Inhale into the lungs every 4 hours as needed       allopurinol (ZYLOPRIM) 100 MG tablet TAKE TWO TABLETS BY MOUTH ONCE DAILY 60 tablet 5     atorvastatin  (LIPITOR) 20 MG tablet TAKE ONE TABLET BY MOUTH ONCE DAILY 30 tablet 11     bismuth subsalicylate (PEPTO-BISMOL) 262 MG TABS Take 1 tablet by mouth 3 times daily (Patient taking differently: Take 1 tablet by mouth daily ) 90 tablet 3     Calcium-Magnesium-Zinc 333-133-5 MG TABS per tablet Take 1 tablet by mouth daily       cyanocobalamin (VITAMIN B-12) 1000 MCG tablet Take 1,000 mcg by mouth daily        furosemide (LASIX) 20 MG tablet Take 1 tablet (20 mg) by mouth daily 90 tablet 3     gabapentin (NEURONTIN) 300 MG capsule Take 1 capsule (300 mg) by mouth At Bedtime MUST SCHEDULE APPT BEFORE FURTHER REFILLLS 90 capsule 1     lisinopril (ZESTRIL) 5 MG tablet TAKE TWO TABLETS BY MOUTH ONCE DAILY 180 tablet 1     loperamide (IMODIUM) 2 MG capsule Take 2 mg by mouth 4 times daily as needed for diarrhea       METAMUCIL FIBER PO Take 1 capsule by mouth daily       metoprolol succinate ER (TOPROL-XL) 25 MG 24 hr tablet TAKE ONE TABLET BY MOUTH EVERY EVENING 90 tablet 1     multivitamin, therapeutic with minerals (MULTI-VITAMIN) TABS tablet Take 1 tablet by mouth Every other day 100 tablet 3     nortriptyline (PAMELOR) 25 MG capsule TAKE ONE CAPSULE BY MOUTH EVERY NIGHT AT BEDTIME 30 capsule 11     polyvinyl alcohol (LIQUIFILM TEARS) 1.4 % ophthalmic solution 1-2 drops       prednisoLONE acetate (PRED FORTE) 1 % ophthalmic susp Place 1 drop Into the left eye daily   2     tamsulosin (FLOMAX) 0.4 MG capsule TAKE ONE CAPSULE BY MOUTH ONCE DAILY 30 capsule 5     triamcinolone (KENALOG) 0.1 % external cream APPLY TOPICALLY TO AFFECTED AREA(S) TWICE DAILY AS DIRECTED 45 g 1     warfarin ANTICOAGULANT (JANTOVEN ANTICOAGULANT) 2 MG tablet Take 4 mg every Sun, Tue, Thu; 6 mg all other days or as directed by Anticoagulation Clinic 195 tablet 0     mirtazapine (REMERON) 15 MG tablet TAKE ONE TABLET BY MOUTH EVERY NIGHT AT BEDTIME (Patient not taking: Reported on 2/17/2022) 90 tablet 1       ALLERGIES     Allergies   Allergen  Reactions     Amoxicillin Hives     Penicillins Other (See Comments)     Dizzy       Trazodone Other (See Comments)     Hallucinations         PAST MEDICAL HISTORY:  Past Medical History:   Diagnosis Date     Bleeding from wound 9/2/2018 7/12/2019:He had prolonged bleeding after cyst removal on his back within the past year.          PAST SURGICAL HISTORY:  Past Surgical History:   Procedure Laterality Date     ANGIOGRAM  01/2019    right and left heart cath.      APPENDECTOMY      ~2013     CATARACT IOL, RT/LT      Past bilateral cataract surgery.      COLONOSCOPY N/A 7/3/2020    Procedure: COLONOSCOPY, WITH POLYPECTOMY AND BIOPSY;  Surgeon: Fady Boyer MD;  Location: [facility]     EYE SURGERY Left     three corneal transplants     IMPLANT PACEMAKER       INCISION AND DRAINAGE TRUNK, COMBINED N/A 9/6/2018    Procedure: COMBINED INCISION AND DRAINAGE TRUNK;  incision and cauterization of left buttock bleed.;  Surgeon: Dain Davis MD;  Location: [facility]     INCISION AND DRAINAGE TRUNK, COMBINED N/A 11/1/2018    Procedure:  INCISION AND DRAINAGE of Back Wound;  Surgeon: Dain Davis MD;  Location: [facility]     JOINT REPLACEMENT Right     right total knee.      REPLACEMENT TOTAL KNEE Right      THORACIC SURGERY      removal benign nodule       FAMILY HISTORY:  Family History   Problem Relation Age of Onset     Cancer Brother      Diabetes Brother        SOCIAL HISTORY:  Social History     Socioeconomic History     Marital status:      Spouse name: None     Number of children: 3     Years of education: 13     Highest education level: None   Occupational History     Occupation: retired   Tobacco Use     Smoking status: Former Smoker     Years: 20.00     Types: Cigars     Smokeless tobacco: Never Used   Substance and Sexual Activity     Alcohol use: Not Currently     Drug use: No     Sexual activity: Not Currently   Other Topics Concern     Parent/sibling w/ CABG, MI or angioplasty before 65F 55M? Not Asked    Social History Narrative    ** Merged History Encounter **          Social Determinants of Health     Financial Resource Strain: Not on file   Food Insecurity: Not on file   Transportation Needs: Not on file   Physical Activity: Not on file   Stress: Not on file   Social Connections: Not on file   Intimate Partner Violence: Not on file   Housing Stability: Not on file       Review of Systems:  Skin:  Negative     Eyes:  Positive for glasses  ENT:  Negative    Respiratory:  Negative    Cardiovascular:    Positive for;fatigue  Gastroenterology: Positive for    Genitourinary:  Positive for retention;hesitancy  Musculoskeletal:  Negative joint pain;joint swelling;joint stiffness;arthritis  Neurologic:  Negative numbness or tingling of feet  Psychiatric:  Negative sleep disturbances  Heme/Lymph/Imm:  Negative    Endocrine:  Negative      Physical Exam:  Vitals: /56 (BP Location: Right arm, Patient Position: Sitting, Cuff Size: Adult Regular)   Pulse 61   Temp 97.1  F (36.2  C) (Tympanic)   Wt 84.4 kg (186 lb)   SpO2 97%   BMI 30.95 kg/m      Recent Lab Results:  LIPID RESULTS:  Lab Results   Component Value Date    CHOL 165 07/17/2020    HDL 36 (L) 07/17/2020    LDL 62 07/17/2020    TRIG 336 (H) 07/17/2020       LIVER ENZYME RESULTS:  Lab Results   Component Value Date    AST 22 06/09/2020    ALT 20 06/09/2020       CBC RESULTS:  Lab Results   Component Value Date    WBC 6.7 06/09/2020    RBC 4.05 (L) 06/09/2020    HGB 12.2 (L) 12/17/2021    HGB 10.5 (L) 04/15/2021    HCT 37.7 (L) 06/09/2020    MCV 93 06/09/2020    MCH 30.9 06/09/2020    MCHC 33.2 06/09/2020    RDW 14.5 06/09/2020     (L) 06/09/2020       BMP RESULTS:  Lab Results   Component Value Date     12/17/2021     04/15/2021    POTASSIUM 4.2 12/17/2021    POTASSIUM 4.0 04/15/2021    CHLORIDE 98 12/17/2021    CHLORIDE 103 04/15/2021    CO2 34 (H) 12/17/2021    CO2 29 04/15/2021    ANIONGAP 2 (L) 12/17/2021    ANIONGAP 6 04/15/2021     GLC 99 12/17/2021    GLC 84 04/15/2021    BUN 21 12/17/2021    BUN 23 04/15/2021    CR 1.44 (H) 12/17/2021    CR 1.63 (H) 04/15/2021    GFRESTIMATED 44 (L) 12/17/2021    GFRESTIMATED 38 (L) 04/15/2021    GFRESTBLACK 44 (L) 04/15/2021    ALY 9.1 12/17/2021    ALY 9.1 04/15/2021        A1C RESULTS:  No results found for: A1C    INR RESULTS:  Lab Results   Component Value Date    INR 1.7 (H) 02/04/2022    INR 2.4 (H) 01/14/2022    INR 2.20 (H) 07/02/2021    INR 2.00 (H) 06/11/2021           CC  No referring provider defined for this encounter.                    Service Date: 02/17/2022    REASON FOR VISIT:  Cardiac evaluation prior to noncardiac knee surgery.    HISTORY OF PRESENT ILLNESS:  Mr. Nabor Cunningham is a very pleasant 86-year-old gentleman who had transferred care to us in 12/2019.  He was last seen by us in 2021.      The patient has the following pertinent medical issues.  1.  History of chronic atrial fibrillation, on anticoagulation.  2.  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  3.  Initially normal EF, but subsequently dropped to the 40% range, likely a result of pacing versus atrial fibrillation.  4.  Coronary angiography done due to drop in EF in 01/2019 showing nonobstructive disease.  Remained on a statin.  5.  Chronic anticoagulation for his atrial fibrillation.  6.  Chronic kidney disease with creatinine of 1.5 to 1.8 range.  7.  Hypertriglyceridemia and HDL deficiency.  8.  Mild aortic stenosis.  9.  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    Mr. Cunningham was last evaluated in 2021.  Overall, was doing okay without any change in clinical status.  He walks with a walker.  NYHA class is hard to figure out, but no angina or heart failure symptoms reported.  Today, he is here for routine evaluation.  He is anticipating a left knee total arthroplasty and is getting a Cardiology evaluation prior to  that.    He denies any sort of cardiovascular symptoms to me specifically no syncope, presyncope, palpitations, edema, orthopnea or angina.    PHYSICAL EXAMINATION:    VITAL SIGNS:  Blood pressure 110/56, pulse 61 and regular.  GENERAL:  Alert, oriented x3, in no acute distress.  NECK:  Supple.  JVP is normal.  LUNGS:  Clear.  HEART:  Sounds are irregular, but controlled heart rate.  Soft systolic murmur.  No rubs or gallops.  EXTREMITIES:  Warm, trace edema.  ABDOMEN:  Nondistended.  HEENT:  No icterus or pallor.    PERTINENT MEDICATIONS:    1.  Atorvastatin 20 mg daily.  2.  Lasix 20 mg daily.  3.  Lisinopril 10 mg daily.  4.  Metoprolol succinate 25 mg daily.  5.  Warfarin.    Other noncardiac medications.    PERTINENT DATA:  Creatinine 1.45, hemoglobin 12.2.  Last echocardiogram in 05/2021 showing mild aortic stenosis, aorta at 4.4 cm and LVEF of 40% with ventricular pacing and abnormal septal motion.    ASSESSMENT AND PLAN:  Mr. Cunningham has no cardiac symptoms.  No signs of volume overload on exam that is clinically significant.  He is tolerating his medications well.  I just recommend repeating an echocardiogram to ensure endorse stability in EF, aortic stenosis, and if that is normal or stable, I would recommend continuing ongoing medical therapy.  If there is a major decline in EF then we will have alternative recommendations.  I will make final recommendations based on the echo report, although I doubt there is anything alarming that were going find.      Device check reveals 28% ventricular pacing.  He is in chronic atrial fibrillation.  Continue monitoring that and follow up with Device Clinic for routine care.      He is tolerating anticoagulation well.  This can be temporarily interrupted during the time of surgery and resumed as soon as possible.      Lastly, perioperatively, he will go to a formal preoperative evaluation with Medicine and Anesthesia team, but I would recommend holding his Lasix only if  creatinine is worsening.  Otherwise, he would remain at risk for developing volume overload perioperatively with fluid resuscitation.      Otherwise, 1-year followup with Cardiology routinely, sooner if anything changes clinically.    cc:   Heron Espinoza MD   [facility]  [address] [place] [id number]   Fax:  [phone]    Katie Galvan MD      D: 2022   T: 2022   MT: TO    Name:     NICOLE PEREA  MRN:      [id number]        Account:      [id number]   :      1935           Service Date: 2022       Document: [id number]

## 2022-02-17 NOTE — PROGRESS NOTES
Service Date: 02/17/2022    REASON FOR VISIT:  Cardiac evaluation prior to noncardiac knee surgery.    HISTORY OF PRESENT ILLNESS:  Mr. Nabor Cunningham is a very pleasant 86-year-old gentleman who had transferred care to us in 12/2019.  He was last seen by us in 2021.      The patient has the following pertinent medical issues.  1.  History of chronic atrial fibrillation, on anticoagulation.  2.  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  3.  Initially normal EF, but subsequently dropped to the 40% range, likely a result of pacing versus atrial fibrillation.  4.  Coronary angiography done due to drop in EF in 01/2019 showing nonobstructive disease.  Remained on a statin.  5.  Chronic anticoagulation for his atrial fibrillation.  6.  Chronic kidney disease with creatinine of 1.5 to 1.8 range.  7.  Hypertriglyceridemia and HDL deficiency.  8.  Mild aortic stenosis.  9.  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    Mr. Cunningham was last evaluated in 2021.  Overall, was doing okay without any change in clinical status.  He walks with a walker.  NYHA class is hard to figure out, but no angina or heart failure symptoms reported.  Today, he is here for routine evaluation.  He is anticipating a left knee total arthroplasty and is getting a Cardiology evaluation prior to that.    He denies any sort of cardiovascular symptoms to me specifically no syncope, presyncope, palpitations, edema, orthopnea or angina.    PHYSICAL EXAMINATION:    VITAL SIGNS:  Blood pressure 110/56, pulse 61 and regular.  GENERAL:  Alert, oriented x3, in no acute distress.  NECK:  Supple.  JVP is normal.  LUNGS:  Clear.  HEART:  Sounds are irregular, but controlled heart rate.  Soft systolic murmur.  No rubs or gallops.  EXTREMITIES:  Warm, trace edema.  ABDOMEN:  Nondistended.  HEENT:  No icterus or pallor.    PERTINENT MEDICATIONS:    1.  Atorvastatin 20 mg  daily.  2.  Lasix 20 mg daily.  3.  Lisinopril 10 mg daily.  4.  Metoprolol succinate 25 mg daily.  5.  Warfarin.    Other noncardiac medications.    PERTINENT DATA:  Creatinine 1.45, hemoglobin 12.2.  Last echocardiogram in 2021 showing mild aortic stenosis, aorta at 4.4 cm and LVEF of 40% with ventricular pacing and abnormal septal motion.    ASSESSMENT AND PLAN:  Mr. Cunningham has no cardiac symptoms.  No signs of volume overload on exam that is clinically significant.  He is tolerating his medications well.  I just recommend repeating an echocardiogram to ensure endorse stability in EF, aortic stenosis, and if that is normal or stable, I would recommend continuing ongoing medical therapy.  If there is a major decline in EF then we will have alternative recommendations.  I will make final recommendations based on the echo report, although I doubt there is anything alarming that we are going to find.      Device check reveals 28% ventricular pacing.  He is in chronic atrial fibrillation.  Continue monitoring that and follow up with [facility] for routine care.      He is tolerating anticoagulation well.  This can be temporarily interrupted during the time of surgery and resumed as soon as possible.      Otherwise, 1-year followup with Cardiology routinely, sooner if anything changes clinically.    cc:   Heron Espinoza MD   [facility]  [address] [place] [id number]   Fax:  [phone]    Katie Galvan MD        D: 2022   T: 2022   MT: TO    Name:     NICOLE CUNNINGHAM  MRN:      [id number]        Account:      [id number]   :      1935           Service Date: 2022       Document: [id number]

## 2022-02-18 ENCOUNTER — ANTICOAGULATION THERAPY VISIT (OUTPATIENT)
Dept: ANTICOAGULATION | Facility: CLINIC | Age: 87
End: 2022-02-18

## 2022-02-18 ENCOUNTER — LAB (OUTPATIENT)
Dept: LAB | Facility: CLINIC | Age: 87
End: 2022-02-18
Payer: MEDICARE

## 2022-02-18 DIAGNOSIS — I48.20 CHRONIC ATRIAL FIBRILLATION (H): Primary | ICD-10-CM

## 2022-02-18 DIAGNOSIS — I48.20 CHRONIC ATRIAL FIBRILLATION (H): ICD-10-CM

## 2022-02-18 DIAGNOSIS — Z79.01 LONG TERM CURRENT USE OF ANTICOAGULANT THERAPY: ICD-10-CM

## 2022-02-18 LAB — INR BLD: 1.9 (ref 0.9–1.1)

## 2022-02-18 PROCEDURE — 36416 COLLJ CAPILLARY BLOOD SPEC: CPT

## 2022-02-18 PROCEDURE — 85610 PROTHROMBIN TIME: CPT

## 2022-02-18 NOTE — PROGRESS NOTES
ANTICOAGULATION MANAGEMENT     Nabor Cunningham 86 year old male is on warfarin with subtherapeutic INR result. (Goal INR 2.0-3.0)    Recent labs: (last 7 days)     02/18/22  1326   INR 1.9*       ASSESSMENT     Source(s): Chart Review and Patient/Caregiver Call       Warfarin doses taken: Warfarin taken as instructed    Diet: No new diet changes identified    New illness, injury, or hospitalization: No    Medication/supplement changes: None noted    Signs or symptoms of bleeding or clotting: No    Previous INR: Subtherapeutic    Additional findings: Upcoming surgery/procedure for knee replacement but not yet scheduled. Pt okay to hold warfarin without bridge per cards notes     PLAN     Recommended plan for no diet, medication or health factor changes affecting INR     Dosing Instructions:  Increase your warfarin dose (5.6% change) with next INR in 10 days       Summary  As of 2/18/2022    Full warfarin instructions:  4 mg every Sun, Thu; 6 mg all other days   Next INR check:  2/28/2022             Telephone call with  Florencio who verbalizes understanding and agrees to plan    Lab visit scheduled    Education provided: Please call back if any changes to your diet, medications or how you've been taking warfarin, Importance of notifying clinic of upcoming surgeries and procedures 2 weeks in advance and Contact [phone]  with any changes, questions or concerns.     Plan made per [facility] anticoagulation protocol    Belia Jones RN  Anticoagulation Clinic  2/18/2022    _______________________________________________________________________     Anticoagulation Episode Summary     Current INR goal:  2.0-3.0   TTR:  58.0 % (1 y)   Target end date:  Indefinite   Send INR reminders to:  [facility]    Indications    Chronic atrial fibrillation (H) [I48.20]  Long-term (current) use of anticoagulants [Z79.01] [Z79.01]           Comments:           Anticoagulation Care Providers     Provider Role Specialty Phone  number    Leaf, Heron Blackburn MD [facility] [phone]

## 2022-02-23 DIAGNOSIS — Z79.01 CHRONIC ANTICOAGULATION: ICD-10-CM

## 2022-02-23 DIAGNOSIS — I48.20 CHRONIC ATRIAL FIBRILLATION (H): ICD-10-CM

## 2022-02-23 RX ORDER — WARFARIN SODIUM 2 MG/1
TABLET ORAL
Qty: 228 TABLET | Refills: 1 | Status: SHIPPED | OUTPATIENT
Start: 2022-02-23 | End: 2022-01-01

## 2022-02-23 NOTE — TELEPHONE ENCOUNTER
Prescription approved per [facility] Refill Protocol.    Latrice Robertson RN  [facility]

## 2022-02-28 ENCOUNTER — ANTICOAGULATION THERAPY VISIT (OUTPATIENT)
Dept: ANTICOAGULATION | Facility: CLINIC | Age: 87
End: 2022-02-28

## 2022-02-28 ENCOUNTER — LAB (OUTPATIENT)
Dept: LAB | Facility: CLINIC | Age: 87
End: 2022-02-28
Payer: MEDICARE

## 2022-02-28 ENCOUNTER — HOSPITAL ENCOUNTER (OUTPATIENT)
Dept: CARDIOLOGY | Facility: CLINIC | Age: 87
Discharge: HOME | End: 2022-02-28
Attending: PHYSICIAN ASSISTANT | Admitting: PHYSICIAN ASSISTANT
Payer: MEDICARE

## 2022-02-28 DIAGNOSIS — I50.43 ACUTE ON CHRONIC COMBINED SYSTOLIC AND DIASTOLIC CONGESTIVE HEART FAILURE (H): ICD-10-CM

## 2022-02-28 DIAGNOSIS — Z79.01 LONG TERM CURRENT USE OF ANTICOAGULANT THERAPY: ICD-10-CM

## 2022-02-28 DIAGNOSIS — I48.20 CHRONIC ATRIAL FIBRILLATION (H): ICD-10-CM

## 2022-02-28 DIAGNOSIS — I48.20 CHRONIC ATRIAL FIBRILLATION (H): Primary | ICD-10-CM

## 2022-02-28 LAB
INR BLD: 2.6 (ref 0.9–1.1)
LVEF ECHO: NORMAL

## 2022-02-28 PROCEDURE — 255N000002 HC RX 255 OP 636: Performed by: INTERNAL MEDICINE

## 2022-02-28 PROCEDURE — 36416 COLLJ CAPILLARY BLOOD SPEC: CPT

## 2022-02-28 PROCEDURE — 999N000208 ECHOCARDIOGRAM COMPLETE

## 2022-02-28 PROCEDURE — 93306 TTE W/DOPPLER COMPLETE: CPT | Mod: 26 | Performed by: INTERNAL MEDICINE

## 2022-02-28 PROCEDURE — 85610 PROTHROMBIN TIME: CPT

## 2022-02-28 RX ADMIN — HUMAN ALBUMIN MICROSPHERES AND PERFLUTREN 5 ML: 10; .22 INJECTION, SOLUTION INTRAVENOUS at 14:39

## 2022-02-28 NOTE — PROGRESS NOTES
ANTICOAGULATION MANAGEMENT     Nabor Cunningham 86 year old male is on warfarin with therapeutic INR result. (Goal INR 2.0-3.0)    Recent labs: (last 7 days)     02/28/22  1502   INR 2.6*       ASSESSMENT       Source(s): Chart Review and Patient/Caregiver Call       Warfarin doses taken: Warfarin taken as instructed    Diet: No new diet changes identified    New illness, injury, or hospitalization: No    Medication/supplement changes: None noted    Signs or symptoms of bleeding or clotting: No    Previous INR: Subtherapeutic    Additional findings: None       PLAN     Recommended plan for no diet, medication or health factor changes affecting INR     Dosing Instructions: Continue your current warfarin dose with next INR in 3 weeks       Summary  As of 2/28/2022    Full warfarin instructions:  4 mg every Sun, Thu; 6 mg all other days   Next INR check:  3/21/2022             Telephone call with  Florencio who verbalizes understanding and agrees to plan    Lab visit scheduled    Education provided: Please call back if any changes to your diet, medications or how you've been taking warfarin    Plan made per [facility] anticoagulation protocol    Jayde Whitt RN  Anticoagulation Clinic  2/28/2022    _______________________________________________________________________     Anticoagulation Episode Summary     Current INR goal:  2.0-3.0   TTR:  60.4 % (1 y)   Target end date:  Indefinite   Send INR reminders to:  [facility]    Indications    Chronic atrial fibrillation (H) [I48.20]  Long-term (current) use of anticoagulants [Z79.01] [Z79.01]           Comments:           Anticoagulation Care Providers     Provider Role Specialty Phone number    Heron Mayo MD Referring Family Medicine [phone]

## 2022-03-07 ENCOUNTER — TELEPHONE (OUTPATIENT)
Dept: CARDIOLOGY | Facility: CLINIC | Age: 87
End: 2022-03-07
Payer: MEDICARE

## 2022-03-07 NOTE — TELEPHONE ENCOUNTER
Spoke with son Florencio today, results and MD note faxed to Dr. Espinoza.  He was not aware that patient has mychart and that his sister had already been notified of results. Confirmed future appointment.      No further action needed at this time.

## 2022-03-07 NOTE — TELEPHONE ENCOUNTER
[facility] Call Center    Phone Message    May a detailed message be left on voicemail: yes     Reason for Call: Other: Per pt son gave all info to Mandy last appt visit and the testing results and after visit notes were suppose to be sent to Dr. Valdez/Sulma at [facility] so pt can start knee procedures/appts but they have not recieved anything. Pt son request someone sent those today or call him back if there is any issues reuest urgency as they have not started anything because Orthopedics are waiting for Dr. Galvan notes and testing results.    Action Taken: Message routed to:  Other: Cardiology    Travel Screening: Not Applicable

## 2022-03-12 DIAGNOSIS — M1A.9XX0 CHRONIC GOUT, UNSPECIFIED CAUSE, UNSPECIFIED SITE: ICD-10-CM

## 2022-03-12 DIAGNOSIS — E78.5 HYPERLIPIDEMIA LDL GOAL <130: ICD-10-CM

## 2022-03-14 RX ORDER — ALLOPURINOL 100 MG/1
TABLET ORAL
Qty: 60 TABLET | Refills: 5 | Status: SHIPPED | OUTPATIENT
Start: 2022-03-14

## 2022-03-14 RX ORDER — ATORVASTATIN CALCIUM 20 MG/1
TABLET, FILM COATED ORAL
Qty: 30 TABLET | Refills: 11 | Status: SHIPPED | OUTPATIENT
Start: 2022-03-14

## 2022-03-14 NOTE — TELEPHONE ENCOUNTER
"Pt has lab appt 3/18  Requested Prescriptions   Pending Prescriptions Disp Refills    allopurinol (ZYLOPRIM) 100 MG tablet [Pharmacy Med Name: ALLOPURINOL 100MG TABS] 60 tablet 5     Sig: TAKE TWO TABLETS BY MOUTH ONCE DAILY        Gout Agents Protocol Failed - 3/12/2022  5:02 AM        Failed - CBC on file in past 12 months     Recent Labs   Lab Test 12/17/21  1325 04/15/21  0831 06/09/20  1553 03/22/17  1240 01/06/17  1008   WBC  --   --  6.7   < >  --    GICHWBC  --   --   --   --  5.6   RBC  --   --  4.05*   < >  --    GICHRBC  --   --   --   --  4.96   HGB 12.2*   < > 12.5*   < > 14.5   HCT  --   --  37.7*   < > 41.9   PLT  --   --  132*   < > 131*    < > = values in this interval not displayed.                   Failed - ALT on file in past 12 months     Recent Labs   Lab Test 06/09/20  1553 03/22/17  1240 01/06/17  1028   ALT 20   < >  --    GICHALT  --   --  27    < > = values in this interval not displayed.               Failed - Has Uric Acid on file in past 12 months and value is less than 6     Recent Labs   Lab Test 10/13/20  1632   URIC 4.9     If level is 6mg/dL or greater, ok to refill one time and refer to provider.             Failed - Normal serum creatinine on file in the past 12 months     Recent Labs   Lab Test 12/17/21  1325   CR 1.44*       Ok to refill medication if creatinine is low          Passed - Recent (12 mo) or future (30 days) visit within the authorizing provider's specialty     Patient has had an office visit with the authorizing provider or a provider within the authorizing providers department within the previous 12 mos or has a future within next 30 days. See \"Patient Info\" tab in inbasket, or \"Choose Columns\" in Meds & Orders section of the refill encounter.              Passed - Medication is active on med list        Passed - Patient is age 18 or older           atorvastatin (LIPITOR) 20 MG tablet [Pharmacy Med Name: ATORVASTATIN CALCIUM 20MG TABS] 30 tablet 11     Sig: TAKE " "ONE TABLET BY MOUTH ONCE DAILY        Statins Protocol Failed - 3/12/2022  5:02 AM        Failed - LDL on file in past 12 months     Recent Labs   Lab Test 07/17/20  1434   LDL 62               Passed - No abnormal creatine kinase in past 12 months     No lab results found.             Passed - Recent (12 mo) or future (30 days) visit within the authorizing provider's specialty     Patient has had an office visit with the authorizing provider or a provider within the authorizing providers department within the previous 12 mos or has a future within next 30 days. See \"Patient Info\" tab in inbasket, or \"Choose Columns\" in Meds & Orders section of the refill encounter.              Passed - Medication is active on med list        Passed - Patient is age 18 or older              Marietta Lee RN     "

## 2022-03-18 ENCOUNTER — LAB (OUTPATIENT)
Dept: LAB | Facility: CLINIC | Age: 87
End: 2022-03-18
Payer: MEDICARE

## 2022-03-18 ENCOUNTER — ANTICOAGULATION THERAPY VISIT (OUTPATIENT)
Dept: ANTICOAGULATION | Facility: CLINIC | Age: 87
End: 2022-03-18

## 2022-03-18 DIAGNOSIS — I48.20 CHRONIC ATRIAL FIBRILLATION (H): ICD-10-CM

## 2022-03-18 DIAGNOSIS — Z79.01 LONG TERM CURRENT USE OF ANTICOAGULANT THERAPY: ICD-10-CM

## 2022-03-18 DIAGNOSIS — I48.20 CHRONIC ATRIAL FIBRILLATION (H): Primary | ICD-10-CM

## 2022-03-18 LAB — INR BLD: 3.1 (ref 0.9–1.1)

## 2022-03-18 PROCEDURE — 85610 PROTHROMBIN TIME: CPT

## 2022-03-18 PROCEDURE — 36416 COLLJ CAPILLARY BLOOD SPEC: CPT

## 2022-03-18 NOTE — PROGRESS NOTES
ANTICOAGULATION MANAGEMENT     Nabor Cunningham 86 year old male is on warfarin with supratherapeutic INR result. (Goal INR 2.0-3.0)    Recent labs: (last 7 days)     03/18/22  1319   INR 3.1*       ASSESSMENT       Source(s): Patient/Caregiver Call       Warfarin doses taken: Warfarin taken as instructed    Diet: No new diet changes identified    New illness, injury, or hospitalization: No    Medication/supplement changes: None noted    Signs or symptoms of bleeding or clotting: No    Previous INR: Therapeutic last visit; previously outside of goal range    Additional findings: Upcoming surgery/procedure Knee surgery date TBD       PLAN     Recommended plan for no diet, medication or health factor changes affecting INR     Dosing Instructions: Continue your current warfarin dose with next INR in 2 weeks       Summary  As of 3/18/2022    Full warfarin instructions:  4 mg every Sun, Thu; 6 mg all other days   Next INR check:  4/1/2022             Telephone call with Baron who verbalizes understanding and agrees to plan    Lab visit scheduled    Education provided: Educated on S/S bleeding and when to see medical attention.    Plan made per [facility] anticoagulation protocol    Daphne Lamar RN  Anticoagulation Clinic  3/18/2022    _______________________________________________________________________     Anticoagulation Episode Summary     Current INR goal:  2.0-3.0   TTR:  63.3 % (1 y)   Target end date:  Indefinite   Send INR reminders to:  [facility]    Indications    Chronic atrial fibrillation (H) [I48.20]  Long-term (current) use of anticoagulants [Z79.01] [Z79.01]           Comments:           Anticoagulation Care Providers     Provider Role Specialty Phone number    Heron Mayo MD Referring Family Medicine [phone]

## 2022-03-26 ENCOUNTER — HEALTH MAINTENANCE LETTER (OUTPATIENT)
Age: 87
End: 2022-03-26

## 2022-03-27 DIAGNOSIS — K52.832 LYMPHOCYTIC COLITIS: ICD-10-CM

## 2022-03-28 RX ORDER — BISMUTH SUBSALICYLATE 262MG/15ML
1 SUSPENSION, ORAL (FINAL DOSE FORM) ORAL DAILY
Qty: 30 TABLET | Refills: 3 | Status: SHIPPED | OUTPATIENT
Start: 2022-03-28

## 2022-03-28 NOTE — TELEPHONE ENCOUNTER
Routing to Dr. Gallegos who prescribed this before for the patient:    Routing refill request to provider for review/approval because:  Drug not on the [facility] refill protocol     SAURAV Nolan

## 2022-03-30 DIAGNOSIS — I50.43 ACUTE ON CHRONIC COMBINED SYSTOLIC AND DIASTOLIC CONGESTIVE HEART FAILURE (H): Primary | ICD-10-CM

## 2022-03-31 DIAGNOSIS — Z11.59 ENCOUNTER FOR SCREENING FOR OTHER VIRAL DISEASES: Primary | ICD-10-CM

## 2022-04-01 ENCOUNTER — LAB (OUTPATIENT)
Dept: LAB | Facility: CLINIC | Age: 87
End: 2022-04-01
Payer: MEDICARE

## 2022-04-01 ENCOUNTER — ANCILLARY PROCEDURE (OUTPATIENT)
Dept: GENERAL RADIOLOGY | Facility: CLINIC | Age: 87
End: 2022-04-01
Attending: INTERNAL MEDICINE
Payer: MEDICARE

## 2022-04-01 ENCOUNTER — TELEPHONE (OUTPATIENT)
Dept: GASTROENTEROLOGY | Facility: CLINIC | Age: 87
End: 2022-04-01

## 2022-04-01 ENCOUNTER — ANTICOAGULATION THERAPY VISIT (OUTPATIENT)
Dept: ANTICOAGULATION | Facility: CLINIC | Age: 87
End: 2022-04-01

## 2022-04-01 ENCOUNTER — TELEPHONE (OUTPATIENT)
Dept: ANTICOAGULATION | Facility: CLINIC | Age: 87
End: 2022-04-01

## 2022-04-01 ENCOUNTER — DOCUMENTATION ONLY (OUTPATIENT)
Dept: ANTICOAGULATION | Facility: CLINIC | Age: 87
End: 2022-04-01

## 2022-04-01 ENCOUNTER — LAB (OUTPATIENT)
Dept: LAB | Facility: CLINIC | Age: 87
End: 2022-04-01

## 2022-04-01 DIAGNOSIS — R19.7 DIARRHEA, UNSPECIFIED TYPE: ICD-10-CM

## 2022-04-01 DIAGNOSIS — K52.832 LYMPHOCYTIC COLITIS: Primary | ICD-10-CM

## 2022-04-01 DIAGNOSIS — Z79.01 LONG TERM CURRENT USE OF ANTICOAGULANT THERAPY: ICD-10-CM

## 2022-04-01 DIAGNOSIS — I48.20 CHRONIC ATRIAL FIBRILLATION (H): Primary | ICD-10-CM

## 2022-04-01 DIAGNOSIS — I50.43 ACUTE ON CHRONIC COMBINED SYSTOLIC AND DIASTOLIC CONGESTIVE HEART FAILURE (H): ICD-10-CM

## 2022-04-01 DIAGNOSIS — I48.20 CHRONIC ATRIAL FIBRILLATION (H): ICD-10-CM

## 2022-04-01 DIAGNOSIS — K52.832 LYMPHOCYTIC COLITIS: ICD-10-CM

## 2022-04-01 LAB
ANION GAP SERPL CALCULATED.3IONS-SCNC: 6 MMOL/L (ref 3–14)
BUN SERPL-MCNC: 30 MG/DL (ref 7–30)
CALCIUM SERPL-MCNC: 9.1 MG/DL (ref 8.5–10.1)
CHLORIDE BLD-SCNC: 97 MMOL/L (ref 94–109)
CO2 SERPL-SCNC: 30 MMOL/L (ref 20–32)
CREAT SERPL-MCNC: 1.7 MG/DL (ref 0.66–1.25)
GFR SERPL CREATININE-BSD FRML MDRD: 39 ML/MIN/1.73M2
GLUCOSE BLD-MCNC: 98 MG/DL (ref 70–99)
INR BLD: 2.6 (ref 0.9–1.1)
POTASSIUM BLD-SCNC: 4.5 MMOL/L (ref 3.4–5.3)
SODIUM SERPL-SCNC: 133 MMOL/L (ref 133–144)

## 2022-04-01 PROCEDURE — 83993 ASSAY FOR CALPROTECTIN FECAL: CPT

## 2022-04-01 PROCEDURE — 85610 PROTHROMBIN TIME: CPT

## 2022-04-01 PROCEDURE — 87493 C DIFF AMPLIFIED PROBE: CPT | Mod: 59

## 2022-04-01 PROCEDURE — 87506 IADNA-DNA/RNA PROBE TQ 6-11: CPT

## 2022-04-01 PROCEDURE — 36415 COLL VENOUS BLD VENIPUNCTURE: CPT

## 2022-04-01 PROCEDURE — 74019 RADEX ABDOMEN 2 VIEWS: CPT | Performed by: RADIOLOGY

## 2022-04-01 PROCEDURE — 80048 BASIC METABOLIC PNL TOTAL CA: CPT

## 2022-04-01 NOTE — TELEPHONE ENCOUNTER
Vivian Mobley, DO  You 10 minutes ago (12:45 PM)     I put in stool studies for him.  I also put in an abdominal x-ray to make sure this isn't overflow diarrhea as he's had a lot of issues with constipation in the past      Dr. Mobley has placed orders for stool studies.  Son contacted, they are at the clinic and he will be sure that they obtain a stool collection kit while there if patient is unable to submit a sample at the clinic.  Informed of abdominal xray as well, son was inquiring with clinic staff if able to complete today.    Rosita Bacon RN

## 2022-04-01 NOTE — TELEPHONE ENCOUNTER
Patient's son, Florencio, contacted.  States his father reports that his father has reported loose stools for the past 3 weeks.  Averages about 3 loose stools per day and increased urgency.  Patient continued on Pepto Bismol and takes fiber twice per day.  Son reports that he doesn't take much water with the fiber in the evening because he doesn't want to have to get up during the night to urinate.  Patient is currently away at lunch and has an INR appt after lunch.  Son is not sure if there has been any blood in the stool, but feels that it would have been mentioned, so unlikely.  Son is also not sure if the amount of stool each time is large or small amounts, though states he has not had any complaints of incontinence.      Will return call when patient is available to obtain further information.     Rosita Bacon RN

## 2022-04-01 NOTE — PROGRESS NOTES
ANTICOAGULATION MANAGEMENT      Nabor Cunningham due for annual renewal of referral to anticoagulation monitoring. Order pended for your review and signature.      ANTICOAGULATION SUMMARY      Warfarin indication(s)     Atrial fibrillation    Heart valve present?  NO       Current goal range   INR: 2.0-3.0     Goal appropriate for indication? Yes, INR 2-3 appropriate for hx of DVT, PE, hypercoagulable state, Afib, LVAD, or bileaflet AVR without risk factors     Current duration of therapy Indefinite/long term therapy   Time in Therapeutic Range (TTR)  (Goal > 60%) 64%       Office visit with referring provider's group within last year yes on 1-14-22       Belia Jones RN

## 2022-04-01 NOTE — TELEPHONE ENCOUNTER
Patient's son contacted, informed that xray result has not been reviewed yet by Dr. Mobley, but does not appear that there is a substantial amount of stool, so unlikely to be overflow diarrhea.  Son states that patient is collecting the stool sample at this time and he will be bringing it over to the clinic.  Reports that the stool is very runny/watery.  Advised that he/patient will be contacted with results early next week, though calprotectin will take a bit longer for results.     Rosita Bacon, RN

## 2022-04-01 NOTE — TELEPHONE ENCOUNTER
[facility] Call Center    Phone Message    May a detailed message be left on voicemail: yes     Reason for Call: Symptoms or Concerns     If patient has red-flag symptoms, warm transfer to triage line    Current symptom or concern: Chronic diarrhea    Symptoms have been present for:  1 1/2 - 2 week(s)    Has patient previously been seen for this? No    Are there any new or worsening symptoms? Yes: chronic diarrhea    NOTE:  Patient has been taking Pepto Bismol and gel tabs; however, that doesn't seem to be helping      Action Taken: Message routed to:  [facility]    Travel Screening: Not Applicable

## 2022-04-01 NOTE — PROGRESS NOTES
ANTICOAGULATION MANAGEMENT     Nabor Cunningham 86 year old male is on warfarin with therapeutic INR result. (Goal INR 2.0-3.0)    Recent labs: (last 7 days)     04/01/22  1246   INR 2.6*       ASSESSMENT       Source(s): Chart Review and Patient/Caregiver Call       Warfarin doses taken: Warfarin taken as instructed    Diet: No new diet changes identified    New illness, injury, or hospitalization: Yes: diarrhea last 3 weeks    Medication/supplement changes: None noted    Signs or symptoms of bleeding or clotting: No    Previous INR: Supratherapeutic    Additional findings: Upcoming surgery/procedure knee replacement on 5-5 and patient having stool studies done for diarrhea. Son is aware to let [facility] know if any abx are prescribed       PLAN     Recommended plan for temporary change(s) affecting INR     Dosing Instructions: continue your current warfarin dose with next INR in 3 weeks       Summary  As of 4/1/2022    Full warfarin instructions:  4 mg every Sun, Thu; 6 mg all other days   Next INR check:  4/22/2022             Telephone call with  Florencio who verbalizes understanding and agrees to plan    Lab visit scheduled    Education provided: Please call back if any changes to your diet, medications or how you've been taking warfarin, Importance of notifying clinic for changes in medications; a sooner lab recheck maybe needed., Importance of notifying clinic for diarrhea, nausea/vomiting, reduced intake, and/or illness; a sooner lab recheck maybe needed. and Contact [phone]  with any changes, questions or concerns.     Plan made per [facility] anticoagulation protocol    Belia Jones RN  Anticoagulation Clinic  4/1/2022    _______________________________________________________________________     Anticoagulation Episode Summary     Current INR goal:  2.0-3.0   TTR:  64.0 % (1 y)   Target end date:  Indefinite   Send INR reminders to:  [facility]    Indications    Chronic atrial fibrillation (H)  [I48.20]  Long-term (current) use of anticoagulants [Z79.01] [Z79.01]           Comments:           Anticoagulation Care Providers     Provider Role Specialty Phone number    Heron Mayo MD [facility] [phone]

## 2022-04-01 NOTE — TELEPHONE ENCOUNTER
Patient is having knee replacement on 5-5-22. Will send to anticoagulation pharmacist for review on warfarin hold.    Belia Jones, RN, BSN, PHN

## 2022-04-02 LAB
C COLI+JEJUNI+LARI FUSA STL QL NAA+PROBE: NOT DETECTED
C DIFF TOX B STL QL: NEGATIVE
EC STX1 GENE STL QL NAA+PROBE: NOT DETECTED
EC STX2 GENE STL QL NAA+PROBE: NOT DETECTED
NOROV GI+II ORF1-ORF2 JNC STL QL NAA+PR: NOT DETECTED
RVA NSP5 STL QL NAA+PROBE: NOT DETECTED
SALMONELLA SP RPOD STL QL NAA+PROBE: NOT DETECTED
SHIGELLA SP+EIEC IPAH STL QL NAA+PROBE: NOT DETECTED
V CHOL+PARA RFBL+TRKH+TNAA STL QL NAA+PR: NOT DETECTED
Y ENTERO RECN STL QL NAA+PROBE: NOT DETECTED

## 2022-04-04 LAB — CALPROTECTIN STL-MCNT: 14.6 MG/KG (ref 0–49.9)

## 2022-04-06 NOTE — TELEPHONE ENCOUNTER
Contacted sonFlorencio.  Reviewed results/recommendations per Dr. Mobley:    Dear Mr. Cunningham     Your stool studies were negative for infection - additionally - your fecal calprotectin (an inflammatory marker) was normal.  Please try adding a fiber supplement if you haven't already.  You can also increase your pepto bismol to twice a day and if more severe diarrhea, you can try some imodium (although carefully as too much can cause constipation). Please contact me if you have any questions.     Vivian Mobley DO   Written by Vivian Mobley DO on 4/4/2022 12:46 PM CDT    Patient is with son, reports that loose stools have resolved and is having formed stool again.  Patient is taking a fiber capsule twice per day and takes Pepto Bismol in the evening.  Advised that if the loose stools were to persist, could increase to Pepto twice per day and may try Imodium sparingly to avoid constipation.  However, advised if loose stools have completely resolved at this time but were to return at a future date with several loose stools daily for multiple days in a row, would recommend reaching out to repeat stool studies to again ensure there is no infectious process involved.  Son verbalizes understanding and agrees to plan.    Rosita Bacon RN

## 2022-04-07 NOTE — TELEPHONE ENCOUNTER
"[facility]-PROCEDURAL ANTICOAGULATION  MANAGEMENT    ASSESSMENT     Warfarin interruption plan for TKA on 2022.    Indication for Anticoagulation: Atrial Fibrillation      [id number] = 4 (CHF, Hypertension and Age >= 75)      Juan Alberto-Procedure Risk stratification for thromboembolism: low (2017 ACC periprocedure pathway for NVAF Expert Consensus)    NVAF: 2017 ACC periprocedure pathway for NVAF advises NO bridge for low risk stratification ([id number] score <=4 and no prior hx of stroke, TIA or systemic embolism)     RECOMMENDATION       Pre-Procedure:  o Hold warfarin for 5 days, until after procedure startin2022   o No Bridge      Post-Procedure: per ortho  o Post procedure decision to bridge with prophylaxis Lovenox at discretion of ortho    Resume warfarin dose if okay with provider doing procedure on night of procedure, or first evening advised to restart PM: 6mg unless directed differently  o Recheck INR ~ 7 days after resuming warfarin       Plan routed to referring provider for approval  ?   Rani Lundberg [facility]    SUBJECTIVE/OBJECTIVE     Nabor Cunningham, a 86 year old male    Goal INR Range: 2.0-3.0     Patient bridged in past: No    Pertinent History: N/A    Wt Readings from Last 3 Encounters:   22 84.4 kg (186 lb)   22 84.4 kg (186 lb)   21 82.1 kg (181 lb)      Ideal body weight: 61.5 kg (135 lb 9.3 oz)  Adjusted ideal body weight: 70.6 kg (155 lb 12 oz)     Estimated body mass index is 30.95 kg/m  as calculated from the following:    Height as of 10/15/21: 1.651 m (5' 5\").    Weight as of 22: 84.4 kg (186 lb).    Lab Results   Component Value Date    INR 2.6 (H) 2022    INR 3.1 (H) 2022    INR 2.6 (H) 2022     Lab Results   Component Value Date    HGB 12.2 2021    HGB 10.5 04/15/2021    HCT 37.7 2020     2020     Lab Results   Component Value Date    CR 1.70 (H) 2022    CR 1.44 (H) 2021    CR 1.63 (H) 04/15/2021 "     Estimated Creatinine Clearance: 32.3 mL/min (A) (based on SCr of 1.7 mg/dL (H)).

## 2022-04-08 DIAGNOSIS — N40.1 BENIGN PROSTATIC HYPERPLASIA WITH URINARY HESITANCY: ICD-10-CM

## 2022-04-08 DIAGNOSIS — R39.11 URINARY HESITANCY: ICD-10-CM

## 2022-04-08 DIAGNOSIS — R39.11 BENIGN PROSTATIC HYPERPLASIA WITH URINARY HESITANCY: ICD-10-CM

## 2022-04-08 RX ORDER — TAMSULOSIN HYDROCHLORIDE 0.4 MG/1
CAPSULE ORAL
Qty: 30 CAPSULE | Refills: 5 | Status: SHIPPED | OUTPATIENT
Start: 2022-04-08 | End: 2022-01-01

## 2022-04-10 NOTE — CONFIDENTIAL NOTE
PLAN: Stop warfarin 5 days before procedure and resume later the day of procedure as recommended by cardiology.   VICTOR HUGO ART MD

## 2022-04-13 NOTE — TELEPHONE ENCOUNTER
Plan updated to reflect procedure date 05/05/2022.    Rani Lundberg, PharmD BCACP  Anticoagulation Clinical Pharmacist

## 2022-04-15 ENCOUNTER — OFFICE VISIT (OUTPATIENT)
Dept: CARDIOLOGY | Facility: CLINIC | Age: 87
End: 2022-04-15
Attending: INTERNAL MEDICINE
Payer: MEDICARE

## 2022-04-15 VITALS
SYSTOLIC BLOOD PRESSURE: 116 MMHG | HEART RATE: 71 BPM | OXYGEN SATURATION: 97 % | BODY MASS INDEX: 29.89 KG/M2 | HEIGHT: 66 IN | WEIGHT: 186 LBS | DIASTOLIC BLOOD PRESSURE: 64 MMHG

## 2022-04-15 DIAGNOSIS — I50.22 CHRONIC SYSTOLIC HEART FAILURE (H): ICD-10-CM

## 2022-04-15 PROCEDURE — 99213 OFFICE O/P EST LOW 20 MIN: CPT | Performed by: INTERNAL MEDICINE

## 2022-04-15 NOTE — PROGRESS NOTES
Service Date: 04/15/2022    REASON FOR VISIT:  Heart failure and AFib.    HISTORY OF PRESENT ILLNESS:  Mr. Nabor Cunningham is a very pleasant 86-year-old gentleman who has been seeing us since 12/2019.  He was actually supposed to be following up with me in a year.  However, this appointment was previously made based on prior clinic visits.  In fact, he was actually seen by me just a couple of months ago and was told to follow up in a year, but it looks like this appointment was not canceled for today and, as such, he is here in clinic.  There are no new issues going on with him.  We even tried to call him before the clinic visit to see if he wanted to reschedule, but he was already arrived in clinic.    Please see my prior notes for details for his pertinent medical issues.  He has following pertinent cardiac medical problems:  1.  History of chronic atrial fibrillation, on anticoagulation.  2.  History of symptomatic bradycardia with pacemaker since 06/2016 with intermittent 20%-40% ventricular pacing.  3.  Initially normal EF but then dropped to around 40%-50% range, likely a result of chronic AFib and pacing.  4.  Coronary angiography done due to drop in EF in 01/2019, showing nonobstructive disease.  Remains on a statin.  5.  History of chronic kidney disease.  Creatinine 1.5-2.  6.  Hyperlipidemia, HDL deficiency.  7.  Mild aortic stenosis with mild ascending aortic dilatation at 4.5 cm.    Today Mr. Cunningham denies any new cardiovascular symptoms.  No angina, syncope, presyncope.  He has mild lower extremity edema for which he takes Lasix 20 mg daily.  His blood pressure is fine, and he is anticipating knee surgery in the next month.    PHYSICAL EXAMINATION:  VITAL SIGNS:  Normal blood pressure and heart rate.  GENERAL:  Alert, oriented x3, in no acute distress.  NECK:  Supple.  JVP is normal.  LUNGS:  Clear.  CARDIAC:  Sounds are irregular.  Soft systolic murmur without rubs or gallops.  EXTREMITIES:  Warm, with  1+ bilateral pitting edema.  PSYCH:  Appropriate affect.  HEENT:  No icterus or pallor.    ASSESSMENT AND PLAN:  I apologized to Mr. Cunningham because his appointment should have been canceled and was not, but in any case, he has no cardiac issues going on at this time.  Overall, he feels well.  He is going to proceed with knee surgery next month.  I have told him to contact us if any perioperative needs arise.  Otherwise, he seems to be doing okay.  From a cardiac standpoint, anticoagulation can be interrupted going into his orthopedic surgery and could be resumed as soon as possible.  If okayed by Orthopedics, while he is off Coumadin consider being on a baby aspirin at least.  Otherwise, continue other pertinent cardiac medications including diuretics perioperatively.  Be cautious with fluid resuscitation.  See us back in clinic in a year from now.  Can discharge from [facility].    Katie Galvan MD    cc:  [place] [address]    Katie Galvan MD        D: 04/15/2022   T: 04/15/2022   MT: willis    Name:     NICOLE CUNNINGHAM  MRN:      [id number]        Account:      [id number]   :      1935           Service Date: 04/15/2022       Document: [id number]

## 2022-04-15 NOTE — PROGRESS NOTES
HPI and Plan:   See dictation [id number]    No orders of the defined types were placed in this encounter.    No orders of the defined types were placed in this encounter.    There are no discontinued medications.      Encounter Diagnosis   Name Primary?     Chronic systolic heart failure (H)        Today's clinic visit entailed:  Review of the result(s) of each unique test - Echo Cath Device  25 minutes spent on the date of the encounter doing chart review, review of test results, interpretation of tests, patient visit, documentation and discussion with family   Provider  Link to [facility] Help Grid     The level of medical decision making during this visit was of moderate complexity.      CURRENT MEDICATIONS:  Current Outpatient Medications   Medication Sig Dispense Refill     allopurinol (ZYLOPRIM) 100 MG tablet TAKE TWO TABLETS BY MOUTH ONCE DAILY 60 tablet 5     atorvastatin (LIPITOR) 20 MG tablet TAKE ONE TABLET BY MOUTH ONCE DAILY 30 tablet 11     Calcium-Magnesium-Zinc 333-133-5 MG TABS per tablet Take 1 tablet by mouth daily       cyanocobalamin (VITAMIN B-12) 1000 MCG tablet Take 1,000 mcg by mouth daily        furosemide (LASIX) 20 MG tablet Take 1 tablet (20 mg) by mouth daily 90 tablet 3     gabapentin (NEURONTIN) 300 MG capsule Take 1 capsule (300 mg) by mouth At Bedtime MUST SCHEDULE APPT BEFORE FURTHER REFILLLS 90 capsule 1     lisinopril (ZESTRIL) 5 MG tablet TAKE TWO TABLETS BY MOUTH ONCE DAILY 180 tablet 1     loperamide (IMODIUM) 2 MG capsule Take 2 mg by mouth 4 times daily as needed for diarrhea       metoprolol succinate ER (TOPROL-XL) 25 MG 24 hr tablet TAKE ONE TABLET BY MOUTH EVERY EVENING 90 tablet 1     multivitamin w/minerals (THERA-VIT-M) tablet Take 1 tablet by mouth Every other day 100 tablet 3     nortriptyline (PAMELOR) 25 MG capsule TAKE ONE CAPSULE BY MOUTH EVERY NIGHT AT BEDTIME 30 capsule 11     tamsulosin (FLOMAX) 0.4 MG capsule TAKE ONE CAPSULE BY MOUTH ONCE DAILY 30 capsule 5      warfarin ANTICOAGULANT (JANTOVEN ANTICOAGULANT) 2 MG tablet Take 2 tablets on Sun/Thurs and 3 tablets all other days, OR AS DIRECTED BY ANTICOAGULATION CLINIC 228 tablet 1     acetaminophen (TYLENOL) 500 MG tablet Take 500-1,000 mg by mouth every 6 hours as needed for mild pain       Albuterol (VENTOLIN IN) Inhale into the lungs every 4 hours as needed       bismuth subsalicylate (PEPTO-BISMOL) 262 MG TABS Take 1 tablet by mouth daily 30 tablet 3     METAMUCIL FIBER PO Take 1 capsule by mouth daily       mirtazapine (REMERON) 15 MG tablet TAKE ONE TABLET BY MOUTH EVERY NIGHT AT BEDTIME (Patient not taking: No sig reported) 90 tablet 1     polyvinyl alcohol (LIQUIFILM TEARS) 1.4 % ophthalmic solution 1-2 drops       prednisoLONE acetate (PRED FORTE) 1 % ophthalmic susp Place 1 drop Into the left eye daily   2     triamcinolone (KENALOG) 0.1 % external cream APPLY TOPICALLY TO AFFECTED AREA(S) TWICE DAILY AS DIRECTED 45 g 1       ALLERGIES     Allergies   Allergen Reactions     Amoxicillin Hives     Penicillins Other (See Comments)     Dizzy       Trazodone Other (See Comments)     Hallucinations         PAST MEDICAL HISTORY:  Past Medical History:   Diagnosis Date     Bleeding from wound 9/2/2018 7/12/2019:He had prolonged bleeding after cyst removal on his back within the past year.          PAST SURGICAL HISTORY:  Past Surgical History:   Procedure Laterality Date     ANGIOGRAM  01/2019    right and left heart cath.      APPENDECTOMY      ~2013     CATARACT IOL, RT/LT      Past bilateral cataract surgery.      COLONOSCOPY N/A 7/3/2020    Procedure: COLONOSCOPY, WITH POLYPECTOMY AND BIOPSY;  Surgeon: Fady Boyer MD;  Location: [facility]     EYE SURGERY Left     three corneal transplants     IMPLANT PACEMAKER       INCISION AND DRAINAGE TRUNK, COMBINED N/A 9/6/2018    Procedure: COMBINED INCISION AND DRAINAGE TRUNK;  incision and cauterization of left buttock bleed.;  Surgeon: Dain Davis MD;  Location: [facility]  "    INCISION AND DRAINAGE TRUNK, COMBINED N/A 11/1/2018    Procedure:  INCISION AND DRAINAGE of Back Wound;  Surgeon: Dain Davis MD;  Location: [facility]     JOINT REPLACEMENT Right     right total knee.      REPLACEMENT TOTAL KNEE Right      THORACIC SURGERY      removal benign nodule       FAMILY HISTORY:  Family History   Problem Relation Age of Onset     Cancer Brother      Diabetes Brother        SOCIAL HISTORY:  Social History     Socioeconomic History     Marital status:      Number of children: 3     Years of education: 13   Occupational History     Occupation: retired   Tobacco Use     Smoking status: Former Smoker     Years: 20.00     Types: Cigars     Smokeless tobacco: Never Used   Substance and Sexual Activity     Alcohol use: Not Currently     Drug use: No     Sexual activity: Not Currently   Social History Narrative    ** Merged History Encounter **            Review of Systems:  Skin:  Negative bruising   Eyes:  Positive for glasses  ENT:  Negative    Respiratory:  Negative    Cardiovascular:  Negative Positive for;fatigue  Gastroenterology: Positive for    Genitourinary:  Positive for retention;hesitancy  Musculoskeletal:  Negative joint pain;joint swelling;joint stiffness;arthritis  Neurologic:  Negative numbness or tingling of feet  Psychiatric:  Negative sleep disturbances  Heme/Lymph/Imm:  Negative    Endocrine:  Negative      Physical Exam:  Vitals: /64   Pulse 71   Ht 1.676 m (5' 6\")   Wt 84.4 kg (186 lb)   SpO2 97%   BMI 30.02 kg/m      Recent Lab Results:  LIPID RESULTS:  Lab Results   Component Value Date    CHOL 165 07/17/2020    HDL 36 (L) 07/17/2020    LDL 62 07/17/2020    TRIG 336 (H) 07/17/2020       LIVER ENZYME RESULTS:  Lab Results   Component Value Date    AST 22 06/09/2020    ALT 20 06/09/2020       CBC RESULTS:  Lab Results   Component Value Date    WBC 6.7 06/09/2020    RBC 4.05 (L) 06/09/2020    HGB 12.2 (L) 12/17/2021    HGB 10.5 (L) 04/15/2021    HCT 37.7 " (L) 06/09/2020    MCV 93 06/09/2020    MCH 30.9 06/09/2020    MCHC 33.2 06/09/2020    RDW 14.5 06/09/2020     (L) 06/09/2020       BMP RESULTS:  Lab Results   Component Value Date     04/01/2022     04/15/2021    POTASSIUM 4.5 04/01/2022    POTASSIUM 4.0 04/15/2021    CHLORIDE 97 04/01/2022    CHLORIDE 103 04/15/2021    CO2 30 04/01/2022    CO2 29 04/15/2021    ANIONGAP 6 04/01/2022    ANIONGAP 6 04/15/2021    GLC 98 04/01/2022    GLC 84 04/15/2021    BUN 30 04/01/2022    BUN 23 04/15/2021    CR 1.70 (H) 04/01/2022    CR 1.63 (H) 04/15/2021    GFRESTIMATED 39 (L) 04/01/2022    GFRESTIMATED 38 (L) 04/15/2021    GFRESTBLACK 44 (L) 04/15/2021    ALY 9.1 04/01/2022    ALY 9.1 04/15/2021        A1C RESULTS:  No results found for: A1C    INR RESULTS:  Lab Results   Component Value Date    INR 2.6 (H) 04/01/2022    INR 3.1 (H) 03/18/2022    INR 2.20 (H) 07/02/2021    INR 2.00 (H) 06/11/2021           CC  Katie Galvan MD  [id number]  [place] [id number]

## 2022-04-15 NOTE — LETTER
4/15/2022    Heron Mayo MD  [address] [facility] [address] [address]    RE: Nabor Cunningham       Dear Colleague,     I had the pleasure of seeing Nabor Cunningham in the [facility].  HPI and Plan:   See dictation [id number]    No orders of the defined types were placed in this encounter.    No orders of the defined types were placed in this encounter.    There are no discontinued medications.      Encounter Diagnosis   Name Primary?     Chronic systolic heart failure (H)        Today's clinic visit entailed:  Review of the result(s) of each unique test - Echo Cath Device  25 minutes spent on the date of the encounter doing chart review, review of test results, interpretation of tests, patient visit, documentation and discussion with family   Provider  Link to [facility] Help Grid     The level of medical decision making during this visit was of moderate complexity.      CURRENT MEDICATIONS:  Current Outpatient Medications   Medication Sig Dispense Refill     allopurinol (ZYLOPRIM) 100 MG tablet TAKE TWO TABLETS BY MOUTH ONCE DAILY 60 tablet 5     atorvastatin (LIPITOR) 20 MG tablet TAKE ONE TABLET BY MOUTH ONCE DAILY 30 tablet 11     Calcium-Magnesium-Zinc 333-133-5 MG TABS per tablet Take 1 tablet by mouth daily       cyanocobalamin (VITAMIN B-12) 1000 MCG tablet Take 1,000 mcg by mouth daily        furosemide (LASIX) 20 MG tablet Take 1 tablet (20 mg) by mouth daily 90 tablet 3     gabapentin (NEURONTIN) 300 MG capsule Take 1 capsule (300 mg) by mouth At Bedtime MUST SCHEDULE APPT BEFORE FURTHER REFILLLS 90 capsule 1     lisinopril (ZESTRIL) 5 MG tablet TAKE TWO TABLETS BY MOUTH ONCE DAILY 180 tablet 1     loperamide (IMODIUM) 2 MG capsule Take 2 mg by mouth 4 times daily as needed for diarrhea       metoprolol succinate ER (TOPROL-XL) 25 MG 24 hr tablet TAKE ONE TABLET BY MOUTH EVERY EVENING 90 tablet 1     multivitamin w/minerals (THERA-VIT-M) tablet Take 1 tablet by mouth Every other day 100  tablet 3     nortriptyline (PAMELOR) 25 MG capsule TAKE ONE CAPSULE BY MOUTH EVERY NIGHT AT BEDTIME 30 capsule 11     tamsulosin (FLOMAX) 0.4 MG capsule TAKE ONE CAPSULE BY MOUTH ONCE DAILY 30 capsule 5     warfarin ANTICOAGULANT (JANTOVEN ANTICOAGULANT) 2 MG tablet Take 2 tablets on Sun/Thurs and 3 tablets all other days, OR AS DIRECTED BY ANTICOAGULATION CLINIC 228 tablet 1     acetaminophen (TYLENOL) 500 MG tablet Take 500-1,000 mg by mouth every 6 hours as needed for mild pain       Albuterol (VENTOLIN IN) Inhale into the lungs every 4 hours as needed       bismuth subsalicylate (PEPTO-BISMOL) 262 MG TABS Take 1 tablet by mouth daily 30 tablet 3     METAMUCIL FIBER PO Take 1 capsule by mouth daily       mirtazapine (REMERON) 15 MG tablet TAKE ONE TABLET BY MOUTH EVERY NIGHT AT BEDTIME (Patient not taking: No sig reported) 90 tablet 1     polyvinyl alcohol (LIQUIFILM TEARS) 1.4 % ophthalmic solution 1-2 drops       prednisoLONE acetate (PRED FORTE) 1 % ophthalmic susp Place 1 drop Into the left eye daily   2     triamcinolone (KENALOG) 0.1 % external cream APPLY TOPICALLY TO AFFECTED AREA(S) TWICE DAILY AS DIRECTED 45 g 1       ALLERGIES     Allergies   Allergen Reactions     Amoxicillin Hives     Penicillins Other (See Comments)     Dizzy       Trazodone Other (See Comments)     Hallucinations         PAST MEDICAL HISTORY:  Past Medical History:   Diagnosis Date     Bleeding from wound 9/2/2018 7/12/2019:He had prolonged bleeding after cyst removal on his back within the past year.          PAST SURGICAL HISTORY:  Past Surgical History:   Procedure Laterality Date     ANGIOGRAM  01/2019    right and left heart cath.      APPENDECTOMY      ~2013     CATARACT IOL, RT/LT      Past bilateral cataract surgery.      COLONOSCOPY N/A 7/3/2020    Procedure: COLONOSCOPY, WITH POLYPECTOMY AND BIOPSY;  Surgeon: Fady Boyer MD;  Location: [facility]     EYE SURGERY Left     three corneal transplants     IMPLANT PACEMAKER    "    INCISION AND DRAINAGE TRUNK, COMBINED N/A 9/6/2018    Procedure: COMBINED INCISION AND DRAINAGE TRUNK;  incision and cauterization of left buttock bleed.;  Surgeon: Dain Davis MD;  Location: [facility]     INCISION AND DRAINAGE TRUNK, COMBINED N/A 11/1/2018    Procedure:  INCISION AND DRAINAGE of Back Wound;  Surgeon: Dain Davis MD;  Location: [facility]     JOINT REPLACEMENT Right     right total knee.      REPLACEMENT TOTAL KNEE Right      THORACIC SURGERY      removal benign nodule       FAMILY HISTORY:  Family History   Problem Relation Age of Onset     Cancer Brother      Diabetes Brother        SOCIAL HISTORY:  Social History     Socioeconomic History     Marital status:      Number of children: 3     Years of education: 13   Occupational History     Occupation: retired   Tobacco Use     Smoking status: Former Smoker     Years: 20.00     Types: Cigars     Smokeless tobacco: Never Used   Substance and Sexual Activity     Alcohol use: Not Currently     Drug use: No     Sexual activity: Not Currently   Social History Narrative    ** Merged History Encounter **            Review of Systems:  Skin:  Negative bruising   Eyes:  Positive for glasses  ENT:  Negative    Respiratory:  Negative    Cardiovascular:  Negative Positive for;fatigue  Gastroenterology: Positive for    Genitourinary:  Positive for retention;hesitancy  Musculoskeletal:  Negative joint pain;joint swelling;joint stiffness;arthritis  Neurologic:  Negative numbness or tingling of feet  Psychiatric:  Negative sleep disturbances  Heme/Lymph/Imm:  Negative    Endocrine:  Negative      Physical Exam:  Vitals: /64   Pulse 71   Ht 1.676 m (5' 6\")   Wt 84.4 kg (186 lb)   SpO2 97%   BMI 30.02 kg/m      Recent Lab Results:  LIPID RESULTS:  Lab Results   Component Value Date    CHOL 165 07/17/2020    HDL 36 (L) 07/17/2020    LDL 62 07/17/2020    TRIG 336 (H) 07/17/2020       LIVER ENZYME RESULTS:  Lab Results   Component Value Date    AST 22 " 06/09/2020    ALT 20 06/09/2020       CBC RESULTS:  Lab Results   Component Value Date    WBC 6.7 06/09/2020    RBC 4.05 (L) 06/09/2020    HGB 12.2 (L) 12/17/2021    HGB 10.5 (L) 04/15/2021    HCT 37.7 (L) 06/09/2020    MCV 93 06/09/2020    MCH 30.9 06/09/2020    MCHC 33.2 06/09/2020    RDW 14.5 06/09/2020     (L) 06/09/2020       BMP RESULTS:  Lab Results   Component Value Date     04/01/2022     04/15/2021    POTASSIUM 4.5 04/01/2022    POTASSIUM 4.0 04/15/2021    CHLORIDE 97 04/01/2022    CHLORIDE 103 04/15/2021    CO2 30 04/01/2022    CO2 29 04/15/2021    ANIONGAP 6 04/01/2022    ANIONGAP 6 04/15/2021    GLC 98 04/01/2022    GLC 84 04/15/2021    BUN 30 04/01/2022    BUN 23 04/15/2021    CR 1.70 (H) 04/01/2022    CR 1.63 (H) 04/15/2021    GFRESTIMATED 39 (L) 04/01/2022    GFRESTIMATED 38 (L) 04/15/2021    GFRESTBLACK 44 (L) 04/15/2021    ALY 9.1 04/01/2022    ALY 9.1 04/15/2021        A1C RESULTS:  No results found for: A1C    INR RESULTS:  Lab Results   Component Value Date    INR 2.6 (H) 04/01/2022    INR 3.1 (H) 03/18/2022    INR 2.20 (H) 07/02/2021    INR 2.00 (H) 06/11/2021       CC  Katie Galvan MD  [id number] [facility] [address]  [place] [id number]    Thank you for allowing me to participate in the care of your patient.      Sincerely,     Katie Galvan MD     [facility]

## 2022-04-15 NOTE — NURSING NOTE
"Initial /64   Pulse 71   Ht 1.676 m (5' 6\")   Wt 84.4 kg (186 lb)   SpO2 97%   BMI 30.02 kg/m   Estimated body mass index is 30.02 kg/m  as calculated from the following:    Height as of this encounter: 1.676 m (5' 6\").    Weight as of this encounter: 84.4 kg (186 lb). .      "

## 2022-04-22 ENCOUNTER — ANTICOAGULATION THERAPY VISIT (OUTPATIENT)
Dept: ANTICOAGULATION | Facility: CLINIC | Age: 87
End: 2022-04-22

## 2022-04-22 ENCOUNTER — LAB (OUTPATIENT)
Dept: LAB | Facility: CLINIC | Age: 87
End: 2022-04-22
Payer: MEDICARE

## 2022-04-22 ENCOUNTER — OFFICE VISIT (OUTPATIENT)
Dept: FAMILY MEDICINE | Facility: CLINIC | Age: 87
End: 2022-04-22

## 2022-04-22 VITALS
TEMPERATURE: 97.7 F | WEIGHT: 189 LBS | DIASTOLIC BLOOD PRESSURE: 48 MMHG | BODY MASS INDEX: 30.37 KG/M2 | OXYGEN SATURATION: 96 % | HEIGHT: 66 IN | HEART RATE: 63 BPM | SYSTOLIC BLOOD PRESSURE: 108 MMHG | RESPIRATION RATE: 17 BRPM

## 2022-04-22 DIAGNOSIS — M17.12 OSTEOARTHRITIS OF LEFT KNEE, UNSPECIFIED OSTEOARTHRITIS TYPE: ICD-10-CM

## 2022-04-22 DIAGNOSIS — I48.20 CHRONIC ATRIAL FIBRILLATION (H): ICD-10-CM

## 2022-04-22 DIAGNOSIS — I10 ESSENTIAL HYPERTENSION WITH GOAL BLOOD PRESSURE LESS THAN 140/90: ICD-10-CM

## 2022-04-22 DIAGNOSIS — Z79.01 LONG TERM CURRENT USE OF ANTICOAGULANT THERAPY: ICD-10-CM

## 2022-04-22 DIAGNOSIS — Z01.818 PREOP GENERAL PHYSICAL EXAM: Primary | ICD-10-CM

## 2022-04-22 DIAGNOSIS — I48.20 CHRONIC ATRIAL FIBRILLATION (H): Primary | ICD-10-CM

## 2022-04-22 DIAGNOSIS — I50.43 ACUTE ON CHRONIC COMBINED SYSTOLIC AND DIASTOLIC CONGESTIVE HEART FAILURE (H): ICD-10-CM

## 2022-04-22 DIAGNOSIS — Z23 HIGH PRIORITY FOR 2019-NCOV VACCINE: ICD-10-CM

## 2022-04-22 DIAGNOSIS — Z95.0 CARDIAC PACEMAKER IN SITU: ICD-10-CM

## 2022-04-22 PROBLEM — K52.832 LYMPHOCYTIC COLITIS: Status: ACTIVE | Noted: 2022-04-22

## 2022-04-22 LAB
ANION GAP SERPL CALCULATED.3IONS-SCNC: 5 MMOL/L (ref 3–14)
BUN SERPL-MCNC: 33 MG/DL (ref 7–30)
CALCIUM SERPL-MCNC: 9.1 MG/DL (ref 8.5–10.1)
CHLORIDE BLD-SCNC: 94 MMOL/L (ref 94–109)
CO2 SERPL-SCNC: 30 MMOL/L (ref 20–32)
CREAT SERPL-MCNC: 1.86 MG/DL (ref 0.66–1.25)
ERYTHROCYTE [DISTWIDTH] IN BLOOD BY AUTOMATED COUNT: 14.9 % (ref 10–15)
GFR SERPL CREATININE-BSD FRML MDRD: 35 ML/MIN/1.73M2
GLUCOSE BLD-MCNC: 103 MG/DL (ref 70–99)
HCT VFR BLD AUTO: 36.8 % (ref 40–53)
HGB BLD-MCNC: 11.7 G/DL (ref 13.3–17.7)
INR BLD: 3.3 (ref 0.9–1.1)
MCH RBC QN AUTO: 29.3 PG (ref 26.5–33)
MCHC RBC AUTO-ENTMCNC: 31.8 G/DL (ref 31.5–36.5)
MCV RBC AUTO: 92 FL (ref 78–100)
PLATELET # BLD AUTO: 150 10E3/UL (ref 150–450)
POTASSIUM BLD-SCNC: 4.5 MMOL/L (ref 3.4–5.3)
RBC # BLD AUTO: 4 10E6/UL (ref 4.4–5.9)
SODIUM SERPL-SCNC: 129 MMOL/L (ref 133–144)
WBC # BLD AUTO: 6 10E3/UL (ref 4–11)

## 2022-04-22 PROCEDURE — 0064A COVID-19,PF,MODERNA (18+ YRS BOOSTER .25ML): CPT | Performed by: FAMILY MEDICINE

## 2022-04-22 PROCEDURE — 93000 ELECTROCARDIOGRAM COMPLETE: CPT | Performed by: FAMILY MEDICINE

## 2022-04-22 PROCEDURE — 99214 OFFICE O/P EST MOD 30 MIN: CPT | Mod: 25 | Performed by: FAMILY MEDICINE

## 2022-04-22 PROCEDURE — 36415 COLL VENOUS BLD VENIPUNCTURE: CPT | Performed by: FAMILY MEDICINE

## 2022-04-22 PROCEDURE — 85610 PROTHROMBIN TIME: CPT

## 2022-04-22 PROCEDURE — 80048 BASIC METABOLIC PNL TOTAL CA: CPT | Performed by: FAMILY MEDICINE

## 2022-04-22 PROCEDURE — 85027 COMPLETE CBC AUTOMATED: CPT | Performed by: FAMILY MEDICINE

## 2022-04-22 PROCEDURE — 91306 COVID-19,PF,MODERNA (18+ YRS BOOSTER .25ML): CPT | Performed by: FAMILY MEDICINE

## 2022-04-22 PROCEDURE — 36416 COLLJ CAPILLARY BLOOD SPEC: CPT

## 2022-04-22 ASSESSMENT — PAIN SCALES - GENERAL: PAINLEVEL: NO PAIN (0)

## 2022-04-22 ASSESSMENT — PATIENT HEALTH QUESTIONNAIRE - PHQ9: SUM OF ALL RESPONSES TO PHQ QUESTIONS 1-9: 0

## 2022-04-22 NOTE — PROGRESS NOTES
[facility]  [address] [place] [id number]  Phone: [phone]  Fax: [phone]  Primary Provider: Heron Mayo  Pre-op Performing Provider: HERON MAYO      PREOPERATIVE EVALUATION:  Today's date: 4/22/2022    Nabor Cunningham is a 86 year old male who presents for a preoperative evaluation.    Surgical Information:  Surgery/Procedure: Left knee replacement  Surgery Location: [facility]  Surgeon: Dr. Espinoza  Surgery Date: 05/05/2022  Time of Surgery: TBD  Where patient plans to recover: At a TCU (Transitional Care Unit)  Fax number for surgical facility: Note does not need to be faxed, will be available electronically in Epic.    Type of Anesthesia Anticipated: Choice    ASSESSMENT/PLAN:                                                        ICD-10-CM    1. Preop general physical exam  Z01.818    2. Osteoarthritis of left knee, unspecified osteoarthritis type  M17.12    3. 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)  I50.43    4. Cardiac pacemaker in situ  Z95.0    5. Chronic atrial fibrillation (H)  I48.20 EKG 12-lead complete w/read - Clinics   6. Essential hypertension with goal blood pressure less than 140/90  I10 Basic metabolic panel  (Ca, Cl, CO2, Creat, Gluc, K, Na, BUN)     CBC with platelets     CBC with platelets     Basic metabolic panel  (Ca, Cl, CO2, Creat, Gluc, K, Na, BUN)   7. High priority for 2019-nCoV vaccine  Z23 COVID-19,PF,MODERNA (18+ Yrs BOOSTER .25mL)       Pre-op assessment:  Revised cardiac risk index score:1    Additional risks to consider in the perioperative period:none    Recommended changes to medications prior to and around the time of surgery:  No furosemide the morning of surgery.   Stop warfarin 5 days before the surgery.  Restart later in the day of the surgery if OK with your surgeon.  Last warfarin dos on 4/29.   Continue the other medications.     Other recommendations:none     Approval is  given to proceed with proposed upcoming surgical procedure.  No additional testing or evaluation needed.       Subjective     HPI related to upcoming procedure: He has had pain in the left knee for years and osteoarthritis.  He has consulted orthopedics.  He is scheduled for a left knee replacement.       Preop Questions 4/22/2022   1. Have you ever had a heart attack or stroke? No   2. Have you ever had surgery on your heart or blood vessels, such as a stent placement, a coronary artery bypass, or surgery on an artery in your head, neck, heart, or legs? No   3. Do you have chest pain with activity? No   4. Do you have a history of  heart failure? YES -stable at this time   5. Do you currently have a cold, bronchitis or symptoms of other infection? No   6. Do you have a cough, shortness of breath, or wheezing? No   7. Do you or anyone in your family have previous history of blood clots? No   8. Do you or does anyone in your family have a serious bleeding problem such as prolonged bleeding following surgeries or cuts? No   9. Have you ever had problems with anemia or been told to take iron pills? No   10. Have you had any abnormal blood loss such as black, tarry or bloody stools? No   11. Have you ever had a blood transfusion? No   12. Are you willing to have a blood transfusion if it is medically needed before, during, or after your surgery? Yes   13. Have you or any of your relatives ever had problems with anesthesia? No   14. Do you have sleep apnea, excessive snoring or daytime drowsiness? No   15. Do you have any artifical heart valves or other implanted medical devices like a pacemaker, defibrillator, or continuous glucose monitor? YES - pacemaker   15a. What type of device do you have? pacemaker   15b. Name of the clinic that manages your device:  [facility]   16. Do you have artificial joints? YES - Right knee replacement   17. Are you allergic to latex? No     Health Care Directive:  Patient does not have a  Health Care Directive or Living Will: Discussed advance care planning with patient; information given to patient to review.    Preoperative Review of :   reviewed - controlled substances reflected in medication list.    Patient Active Problem List    Diagnosis Date Noted     Lymphocytic colitis 04/22/2022     Priority: Medium     7/3/2020:colonoscopy diagnosed with biopsies.        Chronic kidney disease, stage 3 10/15/2021     Priority: Medium     NICM (nonischemic cardiomyopathy) (H) 02/25/2020     Priority: Medium     CAD (coronary artery disease) 02/25/2020     Priority: Medium     1/7/19 Cath: Nonobstructive disease       Cardiac pacemaker in situ 12/21/2018     Priority: Medium     Dual chamber pacemaker placed in 2016 for sick sinus.       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) 11/30/2018     Priority: Medium     11/30/2018:He has seen cardiology through [facility].  Echo in October, 2018 with EF=49%, diastolic dysfunction, reduced RV systolic function, RV enlargement.  (Report summary in clinic notes 11/30/2018).   Hospitalized 11/22 in [place] for CHF exacerbation acute on chronic.   Nuclear stress test December 2018 with large fixed defect and EF=35%  1/7/2019:Coronary angiogram right and left heart cath:no obstructive disease.        Moderate episode of recurrent major depressive disorder (H) 04/10/2017     Priority: Medium     Anxiety 03/22/2017     Priority: Medium     3/23/2017:He has been on amitriptyline, nortriptyline, buspirone, sertraline and Lexapro in the past year.        Alcohol abuse 03/22/2017     Priority: Medium     7/12/2019:Not currently using alcohol.   None for years.        Chronic atrial fibrillation (H) 03/22/2017     Priority: Medium     S/p PPM 6/2016       Weight loss 03/22/2017     Priority: Medium     Peripheral polyneuropathy 03/22/2017     Priority: Medium     4/7/2017:Abnormal monofilament in distal plantar feet bilateral.  He has  had chronic pain in both feet.   11/30/2018:nortriptyline has helped and he was up to 75mg a day.  He had some prolonged QT and dose cut back to 25mg at bedtime in hospital 11/22/2018.       Chronic gout, unspecified cause, unspecified site 03/22/2017     Priority: Medium     Essential hypertension with goal blood pressure less than 140/90 03/22/2017     Priority: Medium     Hyperlipidemia LDL goal <130 03/22/2017     Priority: Medium     Persistent insomnia 03/22/2017     Priority: Medium     11/30/2018:He has been on temazepam in the past which has helped some.  He was on this chronically.  He tried lorazepam which worked for a few days, then not effective.  Tried mirtazapine which did not help.         Lower urinary tract symptoms (LUTS) 03/22/2017     Priority: Medium     Long-term (current) use of anticoagulants [Z79.01] 03/22/2017     Priority: Medium      Past Medical History:   Diagnosis Date     Bleeding from wound 9/2/2018 7/12/2019:He had prolonged bleeding after cyst removal on his back within the past year.        Past Surgical History:   Procedure Laterality Date     ANGIOGRAM  01/2019    right and left heart cath.      APPENDECTOMY      ~2013     CATARACT IOL, RT/LT      Past bilateral cataract surgery.      COLONOSCOPY N/A 07/03/2020    Procedure: COLONOSCOPY, WITH POLYPECTOMY AND BIOPSY;  Surgeon: Fady Boyer MD;  Location: [facility]     EYE SURGERY Left     three corneal transplants     IMPLANT PACEMAKER       INCISION AND DRAINAGE TRUNK, COMBINED N/A 09/06/2018    Procedure: COMBINED INCISION AND DRAINAGE TRUNK;  incision and cauterization of left buttock bleed.;  Surgeon: Dain Davis MD;  Location: [facility]     INCISION AND DRAINAGE TRUNK, COMBINED N/A 11/01/2018    Procedure:  INCISION AND DRAINAGE of Back Wound;  Surgeon: Dain Davis MD;  Location: [facility]     JOINT REPLACEMENT Right     right total knee.      THORACIC SURGERY      removal benign nodule     Current Outpatient Medications    Medication Sig Dispense Refill     acetaminophen (TYLENOL) 500 MG tablet Take 500-1,000 mg by mouth every 6 hours as needed for mild pain       allopurinol (ZYLOPRIM) 100 MG tablet TAKE TWO TABLETS BY MOUTH ONCE DAILY 60 tablet 5     atorvastatin (LIPITOR) 20 MG tablet TAKE ONE TABLET BY MOUTH ONCE DAILY 30 tablet 11     bismuth subsalicylate (PEPTO-BISMOL) 262 MG TABS Take 1 tablet by mouth daily 30 tablet 3     Calcium-Magnesium-Zinc 333-133-5 MG TABS per tablet Take 1 tablet by mouth daily       cyanocobalamin (VITAMIN B-12) 1000 MCG tablet Take 1,000 mcg by mouth daily        furosemide (LASIX) 20 MG tablet Take 1 tablet (20 mg) by mouth daily 90 tablet 3     lisinopril (ZESTRIL) 5 MG tablet TAKE TWO TABLETS BY MOUTH ONCE DAILY 180 tablet 1     loperamide (IMODIUM) 2 MG capsule Take 2 mg by mouth 4 times daily as needed for diarrhea       METAMUCIL FIBER PO Take 1 capsule by mouth daily       metoprolol succinate ER (TOPROL-XL) 25 MG 24 hr tablet TAKE ONE TABLET BY MOUTH EVERY EVENING 90 tablet 1     multivitamin w/minerals (THERA-VIT-M) tablet Take 1 tablet by mouth Every other day 100 tablet 3     nortriptyline (PAMELOR) 25 MG capsule TAKE ONE CAPSULE BY MOUTH EVERY NIGHT AT BEDTIME 30 capsule 11     polyvinyl alcohol (LIQUIFILM TEARS) 1.4 % ophthalmic solution 1-2 drops       prednisoLONE acetate (PRED FORTE) 1 % ophthalmic susp Place 1 drop Into the left eye daily   2     tamsulosin (FLOMAX) 0.4 MG capsule TAKE ONE CAPSULE BY MOUTH ONCE DAILY 30 capsule 5     triamcinolone (KENALOG) 0.1 % external cream APPLY TOPICALLY TO AFFECTED AREA(S) TWICE DAILY AS DIRECTED 45 g 1     warfarin ANTICOAGULANT (JANTOVEN ANTICOAGULANT) 2 MG tablet Take 2 tablets on Sun/Thurs and 3 tablets all other days, OR AS DIRECTED BY ANTICOAGULATION CLINIC 228 tablet 1     gabapentin (NEURONTIN) 300 MG capsule Take 1 capsule (300 mg) by mouth At Bedtime MUST SCHEDULE APPT BEFORE FURTHER REFILLLS 90 capsule 1       Allergies  "  Allergen Reactions     Amoxicillin Hives     Penicillins Other (See Comments)     Dizzy       Trazodone Other (See Comments)     Hallucinations          Social History     Tobacco Use     Smoking status: Former Smoker     Years: 20.00     Types: Cigars     Smokeless tobacco: Never Used   Substance Use Topics     Alcohol use: Not Currently     Family History :  ============  No family history of bleeding or anesthesia problems.     History   Drug Use No     ROS:  General: POSITIVE for:, weight gain 14# in the past year.   Eyes: Negative for vision changes or eye problems  ENT: No problems with ears, nose or throat.  No difficulty swallowing.  Resp: No coughing, wheezing or shortness of breath  CV: No chest pains or palpitations.  His congestive heart failure hsa been stable and he has seen cardiology earlier this month and was OK for surgery.   GI: POSITIVE for:, diarrhea earlier this month.   : POSITIVE for:, decreased urinary stream  Musculoskeletal: POSITIVE  for:, pain left knee  Neurologic: No headaches, numbness, tingling, weakness, problems with balance or coordination  Psychiatric: No problems with anxiety, depression or mental health  Heme/immune/allergy: No history of bleeding or clotting problems or anemia.  No allergies or immune system problems  Endocrine: No history of thyroid disease, diabetes or other endocrine disorders  Skin: No rashes,worrisome lesions or skin problems     OBJECTIVE:Blood pressure 108/48, pulse 63, temperature 97.7  F (36.5  C), temperature source Tympanic, resp. rate 17, height 1.676 m (5' 6\"), weight 85.7 kg (189 lb), SpO2 96 %. BMI=Body mass index is 30.51 kg/m .  GENERAL APPEARANCE ADULT: Alert, no acute distress, walks with a walker.  Appears frail  EYES: PERRL, EOM normal, conjunctiva and lids normal.  Mild ptosis left upper eyelid.   HENT: Ears and TMs normal, oral mucosa and posterior oropharynx normal  NECK: No adenopathy,masses or thyromegaly  RESP: lungs clear to " auscultation   CV: irregular rhythm-irregularly irregular, rate-normal, no murmur  ABDOMEN: soft, no organomegaly, masses or tenderness  MS: He has 1-2+ edema in both lower extremities.   Minicog memory test: Clock normal.   1/3 words recalled.      Recent Labs   Lab Test 04/01/22  1246 03/18/22  1319 12/31/21  1318 12/17/21  1325 04/22/21  1347 04/15/21  0831 07/07/20  1535 06/09/20  1553 05/05/20  1506 04/28/20  1455   HGB  --   --   --  12.2*  --  10.5*  --  12.5*  --  12.6*   PLT  --   --   --   --   --   --   --  132*  --  137*   INR 2.6* 3.1*   < > 1.8*   < >  --    < > 2.80*   < > 2.10*     --   --  134  --  138   < > 140   < > 136   POTASSIUM 4.5  --   --  4.2  --  4.0   < > 4.3   < > 4.7   CR 1.70*  --   --  1.44*  --  1.63*   < > 1.65*   < > 2.44*    < > = values in this interval not displayed.        Diagnostics:  Labs pending at this time.  Results will be reviewed when available.   EKG: atrial fibrillation, rate 71, normal axis, normal intervals, no acute ST/T changes c/w ischemia, no LVH by voltage criteria, Left Bundle Branch Block, unchanged from previous tracings    Revised Cardiac Risk Index (RCRI):  The patient has the following serious cardiovascular risks for perioperative complications:   - Congestive Heart Failure (pulmonary edema, PND, s3 vipul, CXR with pulmonary congestion, basilar rales) = 1 point     RCRI Interpretation: 1 point: Class II (low risk - 0.9% complication rate)    Signed Electronically by: Heron Mayo MD  Copy of this evaluation report is provided to requesting physician.

## 2022-04-22 NOTE — TELEPHONE ENCOUNTER
3:26 PM    Bandar Matias updated on hold plan pre-op. Patient plans to go to a TCU post-op.    Daphne Lamar RN, BSN, N  [facility]   [place] # [id number]  [phone]

## 2022-04-22 NOTE — H&P (VIEW-ONLY)
[facility]  [address] [place] [id number]  Phone: [phone]  Fax: [phone]  Primary Provider: Heron Mayo  Pre-op Performing Provider: HERON MAYO      PREOPERATIVE EVALUATION:  Today's date: 4/22/2022    Nabor Cunningham is a 86 year old male who presents for a preoperative evaluation.    Surgical Information:  Surgery/Procedure: Left knee replacement  Surgery Location: [facility]  Surgeon: Dr. Espinoza  Surgery Date: 05/05/2022  Time of Surgery: TBD  Where patient plans to recover: At a TCU (Transitional Care Unit)  Fax number for surgical facility: Note does not need to be faxed, will be available electronically in Epic.    Type of Anesthesia Anticipated: Choice    ASSESSMENT/PLAN:                                                        ICD-10-CM    1. Preop general physical exam  Z01.818    2. Osteoarthritis of left knee, unspecified osteoarthritis type  M17.12    3. 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)  I50.43    4. Cardiac pacemaker in situ  Z95.0    5. Chronic atrial fibrillation (H)  I48.20 EKG 12-lead complete w/read - Clinics   6. Essential hypertension with goal blood pressure less than 140/90  I10 Basic metabolic panel  (Ca, Cl, CO2, Creat, Gluc, K, Na, BUN)     CBC with platelets     CBC with platelets     Basic metabolic panel  (Ca, Cl, CO2, Creat, Gluc, K, Na, BUN)   7. High priority for 2019-nCoV vaccine  Z23 COVID-19,PF,MODERNA (18+ Yrs BOOSTER .25mL)       Pre-op assessment:  Revised cardiac risk index score:1    Additional risks to consider in the perioperative period:none    Recommended changes to medications prior to and around the time of surgery:  No furosemide the morning of surgery.   Stop warfarin 5 days before the surgery.  Restart later in the day of the surgery if OK with your surgeon.  Last warfarin dos on 4/29.   Continue the other medications.     Other recommendations:none     Approval is  given to proceed with proposed upcoming surgical procedure.  No additional testing or evaluation needed.       Subjective     HPI related to upcoming procedure: He has had pain in the left knee for years and osteoarthritis.  He has consulted orthopedics.  He is scheduled for a left knee replacement.       Preop Questions 4/22/2022   1. Have you ever had a heart attack or stroke? No   2. Have you ever had surgery on your heart or blood vessels, such as a stent placement, a coronary artery bypass, or surgery on an artery in your head, neck, heart, or legs? No   3. Do you have chest pain with activity? No   4. Do you have a history of  heart failure? YES -stable at this time   5. Do you currently have a cold, bronchitis or symptoms of other infection? No   6. Do you have a cough, shortness of breath, or wheezing? No   7. Do you or anyone in your family have previous history of blood clots? No   8. Do you or does anyone in your family have a serious bleeding problem such as prolonged bleeding following surgeries or cuts? No   9. Have you ever had problems with anemia or been told to take iron pills? No   10. Have you had any abnormal blood loss such as black, tarry or bloody stools? No   11. Have you ever had a blood transfusion? No   12. Are you willing to have a blood transfusion if it is medically needed before, during, or after your surgery? Yes   13. Have you or any of your relatives ever had problems with anesthesia? No   14. Do you have sleep apnea, excessive snoring or daytime drowsiness? No   15. Do you have any artifical heart valves or other implanted medical devices like a pacemaker, defibrillator, or continuous glucose monitor? YES - pacemaker   15a. What type of device do you have? pacemaker   15b. Name of the clinic that manages your device:  [facility]   16. Do you have artificial joints? YES - Right knee replacement   17. Are you allergic to latex? No     Health Care Directive:  Patient does not have a  Health Care Directive or Living Will: Discussed advance care planning with patient; information given to patient to review.    Preoperative Review of :   reviewed - controlled substances reflected in medication list.    Patient Active Problem List    Diagnosis Date Noted     Lymphocytic colitis 04/22/2022     Priority: Medium     7/3/2020:colonoscopy diagnosed with biopsies.        Chronic kidney disease, stage 3 10/15/2021     Priority: Medium     NICM (nonischemic cardiomyopathy) (H) 02/25/2020     Priority: Medium     CAD (coronary artery disease) 02/25/2020     Priority: Medium     1/7/19 Cath: Nonobstructive disease       Cardiac pacemaker in situ 12/21/2018     Priority: Medium     Dual chamber pacemaker placed in 2016 for sick sinus.       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) 11/30/2018     Priority: Medium     11/30/2018:He has seen cardiology through [facility].  Echo in October, 2018 with EF=49%, diastolic dysfunction, reduced RV systolic function, RV enlargement.  (Report summary in clinic notes 11/30/2018).   Hospitalized 11/22 in [place] for CHF exacerbation acute on chronic.   Nuclear stress test December 2018 with large fixed defect and EF=35%  1/7/2019:Coronary angiogram right and left heart cath:no obstructive disease.        Moderate episode of recurrent major depressive disorder (H) 04/10/2017     Priority: Medium     Anxiety 03/22/2017     Priority: Medium     3/23/2017:He has been on amitriptyline, nortriptyline, buspirone, sertraline and Lexapro in the past year.        Alcohol abuse 03/22/2017     Priority: Medium     7/12/2019:Not currently using alcohol.   None for years.        Chronic atrial fibrillation (H) 03/22/2017     Priority: Medium     S/p PPM 6/2016       Weight loss 03/22/2017     Priority: Medium     Peripheral polyneuropathy 03/22/2017     Priority: Medium     4/7/2017:Abnormal monofilament in distal plantar feet bilateral.  He has  had chronic pain in both feet.   11/30/2018:nortriptyline has helped and he was up to 75mg a day.  He had some prolonged QT and dose cut back to 25mg at bedtime in hospital 11/22/2018.       Chronic gout, unspecified cause, unspecified site 03/22/2017     Priority: Medium     Essential hypertension with goal blood pressure less than 140/90 03/22/2017     Priority: Medium     Hyperlipidemia LDL goal <130 03/22/2017     Priority: Medium     Persistent insomnia 03/22/2017     Priority: Medium     11/30/2018:He has been on temazepam in the past which has helped some.  He was on this chronically.  He tried lorazepam which worked for a few days, then not effective.  Tried mirtazapine which did not help.         Lower urinary tract symptoms (LUTS) 03/22/2017     Priority: Medium     Long-term (current) use of anticoagulants [Z79.01] 03/22/2017     Priority: Medium      Past Medical History:   Diagnosis Date     Bleeding from wound 9/2/2018 7/12/2019:He had prolonged bleeding after cyst removal on his back within the past year.        Past Surgical History:   Procedure Laterality Date     ANGIOGRAM  01/2019    right and left heart cath.      APPENDECTOMY      ~2013     CATARACT IOL, RT/LT      Past bilateral cataract surgery.      COLONOSCOPY N/A 07/03/2020    Procedure: COLONOSCOPY, WITH POLYPECTOMY AND BIOPSY;  Surgeon: Fady Boyer MD;  Location: [facility]     EYE SURGERY Left     three corneal transplants     IMPLANT PACEMAKER       INCISION AND DRAINAGE TRUNK, COMBINED N/A 09/06/2018    Procedure: COMBINED INCISION AND DRAINAGE TRUNK;  incision and cauterization of left buttock bleed.;  Surgeon: Dain Davis MD;  Location: [facility]     INCISION AND DRAINAGE TRUNK, COMBINED N/A 11/01/2018    Procedure:  INCISION AND DRAINAGE of Back Wound;  Surgeon: Dain Davis MD;  Location: [facility]     JOINT REPLACEMENT Right     right total knee.      THORACIC SURGERY      removal benign nodule     Current Outpatient Medications    Medication Sig Dispense Refill     acetaminophen (TYLENOL) 500 MG tablet Take 500-1,000 mg by mouth every 6 hours as needed for mild pain       allopurinol (ZYLOPRIM) 100 MG tablet TAKE TWO TABLETS BY MOUTH ONCE DAILY 60 tablet 5     atorvastatin (LIPITOR) 20 MG tablet TAKE ONE TABLET BY MOUTH ONCE DAILY 30 tablet 11     bismuth subsalicylate (PEPTO-BISMOL) 262 MG TABS Take 1 tablet by mouth daily 30 tablet 3     Calcium-Magnesium-Zinc 333-133-5 MG TABS per tablet Take 1 tablet by mouth daily       cyanocobalamin (VITAMIN B-12) 1000 MCG tablet Take 1,000 mcg by mouth daily        furosemide (LASIX) 20 MG tablet Take 1 tablet (20 mg) by mouth daily 90 tablet 3     lisinopril (ZESTRIL) 5 MG tablet TAKE TWO TABLETS BY MOUTH ONCE DAILY 180 tablet 1     loperamide (IMODIUM) 2 MG capsule Take 2 mg by mouth 4 times daily as needed for diarrhea       METAMUCIL FIBER PO Take 1 capsule by mouth daily       metoprolol succinate ER (TOPROL-XL) 25 MG 24 hr tablet TAKE ONE TABLET BY MOUTH EVERY EVENING 90 tablet 1     multivitamin w/minerals (THERA-VIT-M) tablet Take 1 tablet by mouth Every other day 100 tablet 3     nortriptyline (PAMELOR) 25 MG capsule TAKE ONE CAPSULE BY MOUTH EVERY NIGHT AT BEDTIME 30 capsule 11     polyvinyl alcohol (LIQUIFILM TEARS) 1.4 % ophthalmic solution 1-2 drops       prednisoLONE acetate (PRED FORTE) 1 % ophthalmic susp Place 1 drop Into the left eye daily   2     tamsulosin (FLOMAX) 0.4 MG capsule TAKE ONE CAPSULE BY MOUTH ONCE DAILY 30 capsule 5     triamcinolone (KENALOG) 0.1 % external cream APPLY TOPICALLY TO AFFECTED AREA(S) TWICE DAILY AS DIRECTED 45 g 1     warfarin ANTICOAGULANT (JANTOVEN ANTICOAGULANT) 2 MG tablet Take 2 tablets on Sun/Thurs and 3 tablets all other days, OR AS DIRECTED BY ANTICOAGULATION CLINIC 228 tablet 1     gabapentin (NEURONTIN) 300 MG capsule Take 1 capsule (300 mg) by mouth At Bedtime MUST SCHEDULE APPT BEFORE FURTHER REFILLLS 90 capsule 1       Allergies  "  Allergen Reactions     Amoxicillin Hives     Penicillins Other (See Comments)     Dizzy       Trazodone Other (See Comments)     Hallucinations          Social History     Tobacco Use     Smoking status: Former Smoker     Years: 20.00     Types: Cigars     Smokeless tobacco: Never Used   Substance Use Topics     Alcohol use: Not Currently     Family History :  ============  No family history of bleeding or anesthesia problems.     History   Drug Use No     ROS:  General: POSITIVE for:, weight gain 14# in the past year.   Eyes: Negative for vision changes or eye problems  ENT: No problems with ears, nose or throat.  No difficulty swallowing.  Resp: No coughing, wheezing or shortness of breath  CV: No chest pains or palpitations.  His congestive heart failure hsa been stable and he has seen cardiology earlier this month and was OK for surgery.   GI: POSITIVE for:, diarrhea earlier this month.   : POSITIVE for:, decreased urinary stream  Musculoskeletal: POSITIVE  for:, pain left knee  Neurologic: No headaches, numbness, tingling, weakness, problems with balance or coordination  Psychiatric: No problems with anxiety, depression or mental health  Heme/immune/allergy: No history of bleeding or clotting problems or anemia.  No allergies or immune system problems  Endocrine: No history of thyroid disease, diabetes or other endocrine disorders  Skin: No rashes,worrisome lesions or skin problems     OBJECTIVE:Blood pressure 108/48, pulse 63, temperature 97.7  F (36.5  C), temperature source Tympanic, resp. rate 17, height 1.676 m (5' 6\"), weight 85.7 kg (189 lb), SpO2 96 %. BMI=Body mass index is 30.51 kg/m .  GENERAL APPEARANCE ADULT: Alert, no acute distress, walks with a walker.  Appears frail  EYES: PERRL, EOM normal, conjunctiva and lids normal.  Mild ptosis left upper eyelid.   HENT: Ears and TMs normal, oral mucosa and posterior oropharynx normal  NECK: No adenopathy,masses or thyromegaly  RESP: lungs clear to " auscultation   CV: irregular rhythm-irregularly irregular, rate-normal, no murmur  ABDOMEN: soft, no organomegaly, masses or tenderness  MS: He has 1-2+ edema in both lower extremities.   Minicog memory test: Clock normal.   1/3 words recalled.      Recent Labs   Lab Test 04/01/22  1246 03/18/22  1319 12/31/21  1318 12/17/21  1325 04/22/21  1347 04/15/21  0831 07/07/20  1535 06/09/20  1553 05/05/20  1506 04/28/20  1455   HGB  --   --   --  12.2*  --  10.5*  --  12.5*  --  12.6*   PLT  --   --   --   --   --   --   --  132*  --  137*   INR 2.6* 3.1*   < > 1.8*   < >  --    < > 2.80*   < > 2.10*     --   --  134  --  138   < > 140   < > 136   POTASSIUM 4.5  --   --  4.2  --  4.0   < > 4.3   < > 4.7   CR 1.70*  --   --  1.44*  --  1.63*   < > 1.65*   < > 2.44*    < > = values in this interval not displayed.        Diagnostics:  Labs pending at this time.  Results will be reviewed when available.   EKG: atrial fibrillation, rate 71, normal axis, normal intervals, no acute ST/T changes c/w ischemia, no LVH by voltage criteria, Left Bundle Branch Block, unchanged from previous tracings    Revised Cardiac Risk Index (RCRI):  The patient has the following serious cardiovascular risks for perioperative complications:   - Congestive Heart Failure (pulmonary edema, PND, s3 vipul, CXR with pulmonary congestion, basilar rales) = 1 point     RCRI Interpretation: 1 point: Class II (low risk - 0.9% complication rate)    Signed Electronically by: Heron Mayo MD  Copy of this evaluation report is provided to requesting physician.

## 2022-04-22 NOTE — PROGRESS NOTES
ANTICOAGULATION MANAGEMENT     Nabor Cunningham 86 year old male is on warfarin with supratherapeutic INR result. (Goal INR 2.0-3.0)    Recent labs: (last 7 days)     04/22/22  1246   INR 3.3*       ASSESSMENT       Source(s): Patient/Caregiver Call       Warfarin doses taken: Warfarin taken as instructed    Diet: No new diet changes identified    New illness, injury, or hospitalization: Yes: Increased knee discomfort.    Medication/supplement changes: None noted    Signs or symptoms of bleeding or clotting: No    Previous INR: Therapeutic last visit; previously outside of goal range    Additional findings: Upcoming surgery/procedure 5/5/22       PLAN     Recommended plan for temporary changes affecting INR     Dosing Instructions: hold dose then continue your current warfarin dose . Begin to hold the Warfarin on 4/30/22 until the evening of the procedure on 5/5/22. Inpatient pharmacy will dose post-op. Next INR in 2 weeks       Summary  As of 4/22/2022    Full warfarin instructions:  4/22: Hold; 4/30: Hold; 5/1: Hold; 5/2: Hold; 5/3: Hold; 5/4: Hold; Otherwise 4 mg every Sun, Thu; 6 mg all other days   Next INR check:  5/5/2022             Telephone call with  Florencio who verbalizes understanding and agrees to plan    Check at provider office visit    Education provided: Please call back if any changes to your diet, medications or how you've been taking warfarin, Importance of taking warfarin as instructed, Monitoring for bleeding signs and symptoms, When to seek medical attention/emergency care, Importance of notifying clinic for changes in medications; a sooner lab recheck maybe needed., Importance of notifying clinic for diarrhea, nausea/vomiting, reduced intake, and/or illness; a sooner lab recheck maybe needed., Importance of notifying clinic of upcoming surgeries and procedures 2 weeks in advance and Contact [phone]  with any changes, questions or concerns.     Plan made per [facility] anticoagulation  protocol    Daphne Lamar RN  Anticoagulation Clinic  4/22/2022    _______________________________________________________________________     Anticoagulation Episode Summary     Current INR goal:  2.0-3.0   TTR:  67.4 % (1 y)   Target end date:  Indefinite   Send INR reminders to:  [facility]    Indications    Chronic atrial fibrillation (H) [I48.20]  Long-term (current) use of anticoagulants [Z79.01] [Z79.01]           Comments:           Anticoagulation Care Providers     Provider Role Specialty Phone number    Heron Mayo MD Referring Family Medicine [phone]

## 2022-04-22 NOTE — PATIENT INSTRUCTIONS
ASSESSMENT/PLAN:                                                        ICD-10-CM    1. Preop general physical exam  Z01.818    2. Osteoarthritis of left knee, unspecified osteoarthritis type  M17.12    3. 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)  I50.43    4. Cardiac pacemaker in situ  Z95.0    5. Chronic atrial fibrillation (H)  I48.20    6. Essential hypertension with goal blood pressure less than 140/90  I10        Pre-op assessment:  Revised cardiac risk index score:1    Additional risks to consider in the perioperative period:none    Recommended changes to medications prior to and around the time of surgery:  No furosemide the morning of surgery.   Stop warfarin 5 days before the surgery.  Restart later in the day of the surgery if OK with your surgeon.   Continue the other medications.     Other recommendations:none     Approval is given to proceed with proposed upcoming surgical procedure.  No additional testing or evaluation needed.

## 2022-04-22 NOTE — NURSING NOTE
"Chief Complaint   Patient presents with     Pre-Op Exam     Left knee replacement     Imm/Inj     COVID-19 VACCINE       Initial /48 (BP Location: Right arm, Patient Position: Right side, Cuff Size: Adult Large)   Pulse 63   Temp 97.7  F (36.5  C) (Tympanic)   Resp 17   Ht 1.676 m (5' 6\")   Wt 85.7 kg (189 lb)   SpO2 96%   BMI 30.51 kg/m   Estimated body mass index is 30.51 kg/m  as calculated from the following:    Height as of this encounter: 1.676 m (5' 6\").    Weight as of this encounter: 85.7 kg (189 lb).    Patient presents to the clinic using No DME    Health Maintenance that is potentially due pending provider review:  Wants covid booster    Possibly completing today per provider review.    Is there anyone who you would like to be able to receive your results? Yes  If yes have patient fill out [facility]  Ellen Miranda CMA   Patient forgot Covid vax card- will bring it in so we can document. Ellen Miranda CMA    "

## 2022-04-24 DIAGNOSIS — E87.1 HYPONATREMIA: Primary | ICD-10-CM

## 2022-04-24 PROBLEM — N18.32 STAGE 3B CHRONIC KIDNEY DISEASE (H): Status: ACTIVE | Noted: 2021-10-15

## 2022-04-24 NOTE — RESULT ENCOUNTER NOTE
Hi Bear,   Your sodium is low.  This may be related to the kidney disease or possibly from medications.   Kidney function function decreased similar to past.    Mild anemia about like the past.   PLAN: Cut back on daily fluid intake to one quart per day total fluids.   REcheck blood and urine test again sometime this week to see if sodium improves.   VICTOR HUGO ART MD

## 2022-04-28 NOTE — ANESTHESIA PREPROCEDURE EVALUATION
Anesthesia Pre-Procedure Evaluation    Patient: Nabor Cunningham   MRN: [id number] : 1935        Procedure : Procedure(s):  TOTAL Knee Arthroplasty          Past Medical History:   Diagnosis Date     Bleeding from wound 2018:He had prolonged bleeding after cyst removal on his back within the past year.         Past Surgical History:   Procedure Laterality Date     ANGIOGRAM  2019    right and left heart cath.      APPENDECTOMY      ~     CATARACT IOL, RT/LT      Past bilateral cataract surgery.      COLONOSCOPY N/A 2020    Procedure: COLONOSCOPY, WITH POLYPECTOMY AND BIOPSY;  Surgeon: Fady Boyer MD;  Location: [facility]     EYE SURGERY Left     three corneal transplants     IMPLANT PACEMAKER       INCISION AND DRAINAGE TRUNK, COMBINED N/A 2018    Procedure: COMBINED INCISION AND DRAINAGE TRUNK;  incision and cauterization of left buttock bleed.;  Surgeon: Dain Davis MD;  Location: [facility]     INCISION AND DRAINAGE TRUNK, COMBINED N/A 2018    Procedure:  INCISION AND DRAINAGE of Back Wound;  Surgeon: Dain Davis MD;  Location: [facility]     JOINT REPLACEMENT Right     right total knee.      THORACIC SURGERY      removal benign nodule      Allergies   Allergen Reactions     Amoxicillin Hives     Penicillins Other (See Comments)     Dizzy       Trazodone Other (See Comments)     Hallucinations        Social History     Tobacco Use     Smoking status: Former Smoker     Years: 20.00     Types: Cigars     Smokeless tobacco: Never Used   Substance Use Topics     Alcohol use: Not Currently      Wt Readings from Last 1 Encounters:   22 85.7 kg (189 lb)        Anesthesia Evaluation   Pt has had prior anesthetic. Type: General and MAC.    No history of anesthetic complications       ROS/MED HX  ENT/Pulmonary:     (+) tobacco use, Past use,     Neurologic:     (+) peripheral neuropathy, - bilat feet.     Cardiovascular: Comment: 5/3/22 Device Interrogation:  [employer]  Scientific Accolade (S) Remote PPM Device Check  : 33%, chronic AFib, taking Warfarin  Mode: VVIR 60/130  Presenting Rhythm: &VS, rates 90-100bpm  Heart Rate: Adequate rates per histogram  Sensing: stable  Pacing Threshold: stable  Impedance: stable  Battery Status: 9 years  Atrial Arrhythmia: n/a  Ventricular Arrhythmia: none       (+) Dyslipidemia hypertension--CAD ---Taking blood thinners CHF pacemaker, dysrhythmias, a-fib, Previous cardiac testing   Echo: Date: 2/28/22 Results:  Interpretation Summary     The rhythm was paced.  Mildly decreased left ventricular systolic function  The visual ejection fraction is 45-50%.  There is mild global hypokinesia of the left ventricle.  Septal wall motion abnormality may reflect pacemaker activation.  The right ventricular systolic function is normal.  The left atrium is severely dilated.  Mild mitral valve regurgitaion.  Mild valvular aortic stenosis.  Pacemaker lead in the right ventricle. Trace tricuspid valve regurgitation.  Mildly dilated ascending aorta 4.4 cm.     Compared to the prior study dated 5/5/2021, there have been no changes.  ______________________________________________________________________________  Left Ventricle  The left ventricle is normal in size. There is mild eccentric left ventricular  hypertrophy. Mildly decreased left ventricular systolic function. The visual  ejection fraction is 45-50%. Diastolic function not assessed due to atrial  fibrillation. There is mild global hypokinesia of the left ventricle. Septal  wall motion abnormality may reflect pacemaker activation.     Right Ventricle  The right ventricle is grossly normal size. The right ventricular systolic  function is normal. There is a pacemaker lead in the right ventricle.     Atria  The left atrium is severely dilated. Right atrial size is normal. There is no  color Doppler evidence of an atrial shunt.     Mitral Valve  The mitral valve leaflets appear normal. There is no  evidence of stenosis,  fluttering, or prolapse. There is mild (1+) mitral regurgitation.     Tricuspid Valve  The tricuspid valve is not well visualized, but is grossly normal. There is  trace tricuspid regurgitation. The right ventricular systolic pressure is  approximated at 29.7 mmHg plus the right atrial pressure.     Aortic Valve  The aortic valve is trileaflet. There is trace aortic regurgitation. Mild  valvular aortic stenosis.     Pulmonic Valve  The pulmonic valve is not well visualized. There is trace pulmonic valvular  regurgitation.     Vessels  Mild aortic root dilatation. The ascending aorta is Mildly dilated. 4.4 cm.  The inferior vena cava is normal.     Pericardium  There is no pericardial effusion.     Rhythm  The rhythm was paced  Stress Test: Date: Results:    ECG Reviewed: Date: 4/22/22 Results:  Afib, LBBB  Cath: Date: Results:      METS/Exercise Tolerance:     Hematologic:       Musculoskeletal:       GI/Hepatic:     (+) GERD, Asymptomatic on medication,     Renal/Genitourinary:     (+) renal disease, type: CRI,     Endo:  - neg endo ROS     Psychiatric/Substance Use:     (+) psychiatric history anxiety and depression alcohol abuse     Infectious Disease:  - neg infectious disease ROS     Malignancy:  - neg malignancy ROS     Other:            Physical Exam    Airway        Mallampati: II   TM distance: > 3 FB   Neck ROM: full   Mouth opening: > 3 cm    Respiratory Devices and Support         Dental  no notable dental history         Cardiovascular          Rhythm and rate: irregular and normal     Pulmonary   pulmonary exam normal                OUTSIDE LABS:  CBC:   Lab Results   Component Value Date    WBC 6.0 04/22/2022    WBC 6.7 06/09/2020    HGB 11.7 (L) 04/22/2022    HGB 12.2 (L) 12/17/2021    HCT 36.8 (L) 04/22/2022    HCT 37.7 (L) 06/09/2020     04/22/2022     (L) 06/09/2020     BMP:   Lab Results   Component Value Date     (L) 04/22/2022     04/01/2022     POTASSIUM 4.5 04/22/2022    POTASSIUM 4.5 04/01/2022    CHLORIDE 94 04/22/2022    CHLORIDE 97 04/01/2022    CO2 30 04/22/2022    CO2 30 04/01/2022    BUN 33 (H) 04/22/2022    BUN 30 04/01/2022    CR 1.86 (H) 04/22/2022    CR 1.70 (H) 04/01/2022     (H) 04/22/2022    GLC 98 04/01/2022     COAGS:   Lab Results   Component Value Date    PTT 43 (H) 09/06/2018    INR 3.3 (H) 04/22/2022     POC:   Lab Results   Component Value Date    BGM 92 09/02/2018     HEPATIC:   Lab Results   Component Value Date    ALBUMIN 3.4 06/09/2020    PROTTOTAL 7.5 06/09/2020    ALT 20 06/09/2020    AST 22 06/09/2020    ALKPHOS 63 06/09/2020    BILITOTAL 0.5 06/09/2020     OTHER:   Lab Results   Component Value Date    ALY 9.1 04/22/2022    MAG 2.3 11/30/2018    LIPASE 22.4 06/02/2016    AMYLASE 30 01/06/2017    TSH 3.16 01/17/2019    CRP 38.1 (H) 07/26/2019    SED 17 04/18/2019       Anesthesia Plan    ASA Status:  3   NPO Status:  NPO Appropriate    Anesthesia Type: General.     - Airway: LMA   Induction: Intravenous, Propofol.   Maintenance: Balanced.        Consents    Anesthesia Plan(s) and associated risks, benefits, and realistic alternatives discussed. Questions answered and patient/representative(s) expressed understanding.     - Discussed: Risks, Benefits and Alternatives for BOTH SEDATION and the PROCEDURE were discussed     - Discussed with:  Patient, Other (See Comment) (son)      - Extended Intubation/Ventilatory Support Discussed: No.      - Patient is DNR/DNI Status: No    Use of blood products discussed: No .     Postoperative Care    Pain management: IV analgesics, Oral pain medications, Multi-modal analgesia.   PONV prophylaxis: Ondansetron (or other 5HT-3), Dexamethasone or Solumedrol, Background Propofol Infusion     Comments:                Mark Frankel, RAY, APRN RAY

## 2022-04-29 NOTE — PROGRESS NOTES
----- Message from Olga Guillen MA sent at 2/27/2020  4:37 PM CST -----  Good evening,     Dr. Bee is requesting patient be seen by a provider who specialize in strokes.    Thanks   PAMELA Espinoza   Pt here with his son, they have questions about low sodium results from blood work. Pt had not gotten Dr Mayo's message about low sodium, he was given that message today. Pt does not use salt in his food or cooking, he gets most meals at his assisted living so he doesn't know how much sodium is in them. He takes furosemide, he limits his fluid intake to about 32 ounces a day Pt given pt education information about low sodium and causes. Pt had lab orders in to be drawn this week, lab staff agreed to add pt to their schedule.Marietta Lee RN

## 2022-05-01 NOTE — RESULT ENCOUNTER NOTE
Please call son,  Sodium remains low but improved.   Urine sodium and osmolality suggests more sodium being lost in urine than should be from a condition called SIADH.   PLAN: OK for surgery.   Continue to limit fluids to about a quart per day.    VICTOR HUGO ART MD

## 2022-05-03 NOTE — PROVIDER NOTIFICATION
Talked with son, Florencio, and he has informed me that they will decide if he needs inpatient rehab after surgery.

## 2022-05-05 PROBLEM — M17.12 LEFT KNEE DJD: Status: ACTIVE | Noted: 2022-01-01

## 2022-05-05 NOTE — BRIEF OP NOTE
[facility]    Brief Operative Note    Pre-operative diagnosis: Arthritis of left knee [M17.12]  Post-operative diagnosis Same as pre-operative diagnosis    Procedure: Procedure(s):  TOTAL Knee Arthroplasty  Surgeon: Surgeon(s) and Role:     * Heron Espinoza MD - Primary     * Elder Mcdonald PA-C - Assisting  Anesthesia: Choice   Estimated Blood Loss: 300 ml    Drains: None  Specimens: * No specimens in log *  Findings:   None.  Complications: None.  Implants:   Implant Name Type Inv. Item Serial No.  Lot No. LRB No. Used Action   BONE CEMENT RADIOPAQUE SIMPLEX HV FULL DOSE 6194-1-001 - [id number] Cement, Bone BONE CEMENT RADIOPAQUE SIMPLEX HV FULL DOSE 6194-1-001  [employer] [id number] Left 1 Implanted   IMP COMP PATELLA HOWM TRI 35 10MM 5551-L-350 - [id number] Total Joint Component/Insert IMP COMP PATELLA HOWM TRI 35 10MM 5551-L-350  [employer] [id number] Left 1 Implanted   IMP BASEPLATE TIBIAL HOWM TRI 5 5520-B-500 - [id number] Total Joint Component/Insert IMP BASEPLATE TIBIAL HOWM TRI 5 5520-B-500  [employer] [id number] Left 1 Implanted   IMP COMP FEM STRK TRIATHLN PS LT 6 5515-F-601 - [id number] Total Joint Component/Insert IMP COMP FEM STRK TRIATHLN PS LT 6 5515-F-601  [employer] [id number] Left 1 Implanted   Triathlon Tibial Bearing Insert Total Joint Component/Insert    [id number] Left 1 Implanted

## 2022-05-05 NOTE — ANESTHESIA CARE TRANSFER NOTE
Patient: Nabor Cunningham    Procedure: Procedure(s):  TOTAL Knee Arthroplasty       Diagnosis: Arthritis of left knee [M17.12]  Diagnosis Additional Information: No value filed.    Anesthesia Type:   No value filed.     Note:    Oropharynx: oropharynx clear of all foreign objects and spontaneously breathing  Level of Consciousness: awake  Oxygen Supplementation: nasal cannula  Level of Supplemental Oxygen (L/min / FiO2): 3  Independent Airway: airway patency satisfactory and stable  Dentition: dentition unchanged  Vital Signs Stable: post-procedure vital signs reviewed and stable  Report to RN Given: handoff report given  Patient transferred to: PACU    Handoff Report: Identifed the Patient, Identified the Reponsible Provider, Reviewed the pertinent medical history, Discussed the surgical course, Reviewed Intra-OP anesthesia mangement and issues during anesthesia, Set expectations for post-procedure period and Allowed opportunity for questions and acknowledgement of understanding      Vitals:  Vitals Value Taken Time   BP 96/54 05/05/22 1000   Temp     Pulse 89 05/05/22 1002   Resp 16 05/05/22 1002   SpO2 96 % 05/05/22 1002   Vitals shown include unvalidated device data.    Electronically Signed By: AVERY Berumen CRNA  May 5, 2022  10:03 AM

## 2022-05-05 NOTE — ANESTHESIA PROCEDURE NOTES
Intrathecal injection Procedure Note    Pre-Procedure   Staff -        CRNA: Mona Gant APRN CRNA       Other Anesthesia Staff: Gerard Bills APRN CRNA       Performed By: CRNA       Pre-Anesthestic Checklist: patient identified, IV checked, risks and benefits discussed, informed consent, monitors and equipment checked and pre-op evaluation  Timeout:       Correct Patient: Yes        Correct Procedure: Yes        Correct Site: Yes        Correct Position: Yes   Procedure Documentation  Procedure: intrathecal injection   Comments:  Spinal placement attempted by above mentioned CRNAs w/o success.  Attempted at L3-4, 4-5 w/ multiple redirects.  Pt tolerated procedure well and decision to convert to GA.

## 2022-05-05 NOTE — PROGRESS NOTES
Patient has tolerable pain since admission. Scheduled Tylenol being given and other PRNs available, but patient has declined. He reports pain to knee at 4/10 or lower. Ace wrap removed temporarily and scant bloody drainage outlined on dressing. No other skin concerns at this time. Immobilizer to remain in place until tomorrow morning.    Nickel sized nodule noted to lower back. Patient unaware of area. Suspected fluid accumulation from attempted spinal.    He is disoriented to time and place and tends to ramble from subject to subject, but is pleasant. He is hard of hearing. Has not used call light appropriately, but has not attempted to transfer self.    Vitals stable, although intermittently needing O2 via NC at 1L due to sats down to mid 80's. Currently on room air.    He was up with 2 assist and walker this evening for dinner and did well. He is WBAT to LLE. Pulses remain intact to BLEs. He has chronic neuropathy and this is at baseline per his report.     No void since surgery and bladder scanned for 323 last around 1700. There are orders for straight cath if >500. Patient restarted on flomax.    Son updated after surgery and visited patient after admission. All questions answered. He will bring prednisolone eye drops tomorrow morning. Nurse needs to call pharmacy if eye drops are not brought in. They can supply med if needed.

## 2022-05-05 NOTE — OP NOTE
Total Knee Arthroplasty Operative Note        PLAN:  Weight bearing status: Weight bearing as tolerated   Activity: Activity as tolerated  Patient may move about with assist as indicated or with supervision   Anticoagulation plan:                 Resume pre-op anticoagulation begin      tomorrow.    Follow up plan                           Follow up in 2 week(s)        Name: Nabor Cunningham    PCP: Heron Mayo    Procedure Date: 5/5/2022    Pre-operative diagnosis: Arthritis of left knee [M17.12]   Post-operative diagnosis: Same   Procedure: Total knee arthoplasty (Left)   Surgeon: Heron Espinoza MD     Assistant(s): Elder Mcdonald PA-C   Anesthesia: Gener[facility] Anesthesia   Estimated blood loss: 300 ml   Drains: None   Specimens: None       Findings: See full dictated operative note for details   Complications: None       Comments: See dictated operative report for full details       Indications:    The patient has experienced progressive left knee pain despite use of  Analgesics, NSAID's, Injection therapy and physical therapy. Because of the failure of these therapies to control symptoms and limited walking, night pain and altered activities the patient has decided to move ahead with joint replacement surgery.    Procedure and Findings:    After being informed of risks, benefits, alternatives to the procedure, patient desired to proceed, brought to the operating suite where they were placed under spinal anesthetic. Patient received 2 grams of intravenous Ancef.  Elder Mcdonald PA-C was present for the entire length of the case for the purposes of proper patient positioning, surgical exposure, and patient safety. A time-out verification step was completed.    Examination of the joint surfaces demonstrated full thickness cartilage loss involving themedial compartments of the left knee.    Attention was directed towards the patella. A midline incision, vastus splitting minimally invasive approach was  utilized. The patella was everted. It was measured at 35 mm. It was resected, remeasured and a 35 mm asymmetric patella was positioned. A protector plate was placed on the patella. Attention was directed toward the distal femur. IM guider oren was placed. An 8 mm 5 degree distal femoral cut was completed. The IM guide oren was then placed in the tibia. External guide oren and tower were utilized and 9 mm button stylus was referenced off the lateral tibial plateau and cuts were completed. The tibia was sized to a 5. Attention was directed towards the distal femur. It was sized to a 6. The 4-in-1 cutting block was placed along the epicondylar axis. It was secured with 2 pins, anterior, posterior and chamfer cuts were completed. Soft tissue balancing was then carried out. Medial release was completed.After assessing range of motion and stability a flexion/extension gap mismatch was identified.  The decision was made to convert to a posterior stabilized device.  Femoral position was verified.  The PS cutting guide was fixated with 3 pins. PCL resected and trial components were tested.  Ultimately a 10 mm PS insert gave excellent range of motion and stability throughout the range of motion. Patella was noted to track symmetrically throughout the range to motion. The tibial tray rotation was marked, size was verified, it was secured with 2 pins and punched. Trial components were then removed. The cancellous surfaces were pulsatile lavaged. One batch of Simplex cement was mixed under vacuum. The tibia, femur and patella were sequentially cemented in place. The knee was held in extension with axial compression until the cement had hardened. The 10 mm insert was ultimately selected and secured Care was taken to remove any excess cement. Joint capsular injection with anesthetic solution was performed. Excellent range of motion and stability was demonstrated.  The joint was copiously irrigated. Joint capsules were closed with  interrupted number 1 running Stratafix. Subcutaneous tissues were closed with 2-0 Vicryl and skin was closed with Pyote for wound closure and Aquacell. A sterile dressing was applied.Pt was placed in a knee immobilizer. Patient tolerated the procedure well without complication and returned to the PAR in stable condition.      Heron Espinoza MD    Date: 5/5/2022 Time: 9:45 AM  ?    CONFIDENTIALITY NOTICE This message and any included attachments are from [facility] and are intended only for the addressee. The information contained in this message is confidential and may constitute inside or non-public information under international, federal, or state securities laws. Unauthorized forwarding, printing, copying, distribution, or use of such information is strictly prohibited and may be unlawful. If you are not the addressee, please promptly delete this message and notify the sender of the delivery error by e-mail.

## 2022-05-05 NOTE — INTERVAL H&P NOTE
"I have reviewed the surgical (or preoperative) H&P that is linked to this encounter, and examined the patient. There are no significant changes    Clinical Conditions Present on Arrival:  Clinically Significant Risk Factors Present on Admission           # Hyponatremia: Na = N/A on admission, will monitor as appropriate       # Coagulation Defect: home medication list includes an anticoagulant medication   # Obesity: Estimated body mass index is 30.51 kg/m  as calculated from the following:    Height as of this encounter: 1.676 m (5' 6\").    Weight as of this encounter: 85.7 kg (189 lb).       "

## 2022-05-05 NOTE — PLAN OF CARE
"  Problem: Plan of Care - These are the overarching goals to be used throughout the patient stay.    Goal: Plan of Care Review/Shift Note  Description: The Plan of Care Review/Shift note should be completed every shift.  The Outcome Evaluation is a brief statement about your assessment that the patient is improving, declining, or no change.  This information will be displayed automatically on your shift note.  Outcome: Ongoing, Progressing  Flowsheets (Taken 5/5/2022 1450)  Plan of Care Reviewed With:   patient   dwayne  Outcome Evaluation: Pain managed at this time. Patient repo'd in bed to prevent pressure areas. He is alert with intermittent disorientation. Monitoring for urinary retention since no void since surgery. Eating and drinking well. CMS intact to BLE's. Dressing CDI and immobilizer to remain in place until tomorrow AM.      Problem: Plan of Care - These are the overarching goals to be used throughout the patient stay.    Goal: Patient-Specific Goal (Individualized)  Description: You can add care plan individualizations to a care plan. Examples of Individualization might be:  \"Parent requests to be called daily at 9am for status\", \"I have a hard time hearing out of my right ear\", or \"Do not touch me to wake me up as it startles me\".  Outcome: Ongoing, Progressing  Flowsheets (Taken 5/5/2022 1319)  Individualized Care Needs: PT/OT and SW to assess discharge needs.       Problem: Plan of Care - These are the overarching goals to be used throughout the patient stay.    Goal: Absence of Hospital-Acquired Illness or Injury  Intervention: Identify and Manage Fall Risk  Recent Flowsheet Documentation  Taken 5/5/2022 1136 by Jahaira Mcghee RN  Safety Promotion/Fall Prevention:   activity supervised   bed alarm on   clutter free environment maintained   fall prevention program maintained   lighting adjusted   patient and family education   nonskid shoes/slippers when out of bed         Problem: Postoperative " Urinary Retention (Knee Arthroplasty)  Goal: Effective Urinary Elimination  Outcome: Ongoing, Progressing

## 2022-05-05 NOTE — PROGRESS NOTES
"[facility] ADMISSION NOTE    Patient admitted to room 1003 at approximately 1130 via cart from surgery. Patient was accompanied by nurse.     Verbal SBAR report received from SAURAV Bird prior to patient arrival.     Patient trasferred to bed via air alejandra. Patient alert and oriented X 3. Pain is controlled with current analgesics.  Medication(s) being used: narcotic analgesics including fentanyl, oxycodone, dilaudid. Admission vital signs: Blood pressure 121/65, pulse 94, temperature 97.7  F (36.5  C), temperature source Oral, resp. rate 18, height 1.676 m (5' 6\"), weight 85.7 kg (189 lb), SpO2 95 %. Patient was oriented to plan of care, call light, bed controls, tv, telephone, bathroom and visiting hours.     Risk Assessment    The following safety risks were identified during admission: fall and skin. Yellow risk band applied: NO.     Skin Initial Assessment    This writer admitted this patient and completed a full skin assessment and Jarad score in the Adult PCS flowsheet. Appropriate interventions initiated as needed.     Secondary skin check completed by Kirstin CHERRY RN.    Jarad Risk Assessment  Sensory Perception: 3-->slightly limited  Moisture: 3-->occasionally moist  Activity: 1-->bedfast  Mobility: 2-->very limited  Nutrition: 2-->probably inadequate  Friction and Shear: 2-->potential problem  Jarad Score: 13  Sensory/Activity/Mobility Interventions: Encourage activity/OOB, Turn: left/right positioning  Moisture Interventions: Urinary collection device, Incontinence pad  Nutrition Interventions: Maintain adequate hydration  Friction/Shear Interventions: HOB 30 degrees or less  Bed Frame: Standard width and length  Informed Refusal Interventions: No    Education    Patient has a [place] to Observation order: No  Observation education completed and documented: N/A      Jahaira Mcghee RN    "

## 2022-05-05 NOTE — ANESTHESIA PROCEDURE NOTES
Airway       Patient location during procedure: OR  Staff -        CRNA: Mona Gant APRN CRNA       Performed By: CRNA  Consent for Airway        Urgency: elective  Indications and Patient Condition       Indications for airway management: ryan-procedural       Induction type:intravenous       Mask difficulty assessment: 1 - vent by mask    Final Airway Details       Final airway type: supraglottic airway    Supraglottic Airway Details        Type: LMA       Brand: Air-Q       LMA size: 4.5    Post intubation assessment        Placement verified by: capnometry, equal breath sounds and chest rise        Number of attempts at approach: 1       Number of other approaches attempted: 0       Ease of procedure: easy       Dentition: Intact and Unchanged

## 2022-05-05 NOTE — PROGRESS NOTES
05/05/22 1257   Quick Adds   Type of Visit Initial PT Evaluation   Living Environment   People in Home alone   Current Living Arrangements apartment  (in [place])   Home Accessibility no concerns   Living Environment Comments has a cat he cares for, pt bends down to change the water bowl, son helps with cleaning the apt and cat box, 3rd floor, elevator, pt walks to elevator daily to go down to get his meals and coffee, they can bring meals up to him if he is not able to go downstairs, walk in shower that is wc accessible, they put a long tub bench in the shower, has a grab bar next to the higher toilet, has reachers   Self-Care   Equipment Currently Used at Home shower chair;grab bar, tub/shower;grab bar, toilet;raised toilet seat;walker, rolling   Activity/Exercise/Self-Care Comment started using 4WW recently, walks halls at facility, goes shopping at [facility]   General Information   Onset of Illness/Injury or Date of Surgery 05/05/22   Referring Physician Heron Espinoza MD   Patient/Family Therapy Goals Statement (PT) per son home vs TCU- if goes home son might be able to stay with him for 1 day but then would need to be alone at times, son would check in daily but works   Pertinent History of Current Problem (include personal factors and/or comorbidities that impact the POC) L knee arthritis, s/p L TKA   Existing Precautions/Restrictions   (L KI on until the AM, then ROM as tolerated no restriction, ok to WBAT in KI)   Weight-Bearing Status - LLE weight-bearing as tolerated   General Observations SpO2 dropping into the high 80s with converstation with therapist, son states per MD is not to try to mobilize until the AM- per orders ok to WBAT in KI   Cognition   Affect/Mental Status (Cognition) WNL   Orientation Status (Cognition) oriented x 4   Cognitive Status Comments son denies any cognitive issues that he is concerned about   Range of Motion (ROM)   ROM Comment B DF to neutral   Strength (Manual  Muscle Testing)   Strength (Manual Muscle Testing) Able to perform R SLR   Gait/Stairs (Locomotion)   Comment, (Gait/Stairs) will assess in the AM   Sensory Examination   Sensory Perception Comments able to feel light touch to L foot and toes   Clinical Impression   Criteria for Skilled Therapeutic Intervention Yes, treatment indicated   PT Diagnosis (PT) L TKA aftercare   Influenced by the following impairments impaired L quad strength and L knee AROM   Functional limitations due to impairments impaired functional mobility   Clinical Presentation (PT Evaluation Complexity) Evolving/Changing   Clinical Presentation Rationale clinical judgement   Clinical Decision Making (Complexity) low complexity   Planned Therapy Interventions (PT) bed mobility training;gait training;home exercise program;patient/family education;ROM (range of motion);strengthening;home program guidelines   Anticipated Equipment Needs at Discharge (PT)   (has 4 WW here)   Risk & Benefits of therapy have been explained evaluation/treatment results reviewed;care plan/treatment goals reviewed;risks/benefits reviewed;current/potential barriers reviewed;participants voiced agreement with care plan;patient;son   PT Discharge Planning   PT Discharge Recommendation (DC Rec)   (will make rec after mobility assessed in the AM)   PT Rationale for DC Rec unknown today- needs to be safe to be alone if discharges home, son can check in and might be able to stay with him the first day, open to TCU if covered and needed   Plan of Care Review   Plan of Care Reviewed With patient;son   Total Evaluation Time   Total Evaluation Time (Minutes) 15   Physical Therapy Goals   PT Frequency Daily   PT Predicted Duration/Target Date for Goal Attainment 05/08/22   PT Goals Transfers;Gait   PT: Transfers Modified independent;Sit to/from stand;Assistive device   PT: Gait Modified independent;Rolling walker;100 feet

## 2022-05-05 NOTE — ANESTHESIA POSTPROCEDURE EVALUATION
Patient: Nabor Cunningham    Procedure: Procedure(s):  TOTAL Knee Arthroplasty       Anesthesia Type:  No value filed.    Note:  Disposition: Inpatient   Postop Pain Control: Uneventful            Sign Out: Well controlled pain   PONV: No   Neuro/Psych: Uneventful            Sign Out: Acceptable/Baseline neuro status   Airway/Respiratory: Uneventful            Sign Out: Acceptable/Baseline resp. status   CV/Hemodynamics: Uneventful            Sign Out: Acceptable CV status; No obvious hypovolemia; No obvious fluid overload   Other NRE: NONE   DID A NON-ROUTINE EVENT OCCUR? No           Last vitals:  Vitals Value Taken Time   /96 05/05/22 1045   Temp 36.5  C (97.7  F) 05/05/22 1000   Pulse 90 05/05/22 1058   Resp 19 05/05/22 1059   SpO2 98 % 05/05/22 1058   Vitals shown include unvalidated device data.    Electronically Signed By: AVERY Berumen CRNA  May 5, 2022  11:28 AM

## 2022-05-06 PROBLEM — Z95.0 CARDIAC PACEMAKER IN SITU: Status: ACTIVE | Noted: 2018-12-21

## 2022-05-06 NOTE — UTILIZATION REVIEW
"  Admission Status; Secondary Review Determination     Admission Date: 5/5/2022  6:01 AM      Under the authority of the Utilization Management Committee, the utilization review process indicated a secondary review on the above patient.  The review outcome is based on review of the medical records, discussions with staff, and applying clinical experience noted on the date of the review.        ()      Inpatient Status Appropriate - This patient's medical care is consistent with medical management for inpatient care and reasonable inpatient medical practice.      () Observation Status Appropriate - This patient does not meet hospital inpatient criteria and is placed in observation status. If this patient's primary payer is Medicare and was admitted as an inpatient, Condition Code 44 should be used and patient status changed to \"observation\".   (x) Outpatient status Appropriate        RATIONALE FOR DETERMINATION   Mr. Cunningham is an 86-year-old male who underwent left total knee arthroplasty on 5/5/2022.  He was placed in the hospital on outpatient status after his procedure for expected routine postoperative care.  He currently remains at the hospital awaiting safe disposition plan and placement.  As he remains at the hospital awaiting safe disposition plan, outpatient status at the hospital remains appropriate hospital status at this time.      The severity of illness, intensity of service provided, expected LOS and risk for adverse outcome make the care appropriate for outpatient level care at the hospital.        The information on this document is developed by the utilization review team in order for the business office to ensure compliance.  This only denotes the appropriateness of proper admission status and does not reflect the quality of care rendered.         The definitions of Inpatient Status and Observation Status used in making the determination above are those provided in the CMS Coverage Manual, Chapter 1 " and Chapter 6, section 70.4.      Sincerely,     Meng Blackwood D.O.  Utilization Review/ Case Management  [facility].

## 2022-05-06 NOTE — PLAN OF CARE
Goal Outcome Evaluation:      Pt moves well with assist 1, walker, & gait belt. Dressing remains with only previously noted area of dried blood. Good CMS in ble. Tolerates pain with PO scheduled Tylenol. Eating and drinking good amounts. Up to bathroom to sit on toilet to void, passing flatus. LS clear/Diminished. VSS, afebrile. Pt with increasing confusion this afternoon. Staff unable to redirect and reorient for short time in evening. Pt is oriented to self & situation, though situation wanes also intermittently. Bed alarm and pressure alarms when up in chair.  Call light in reach. Mehreen Milligan RN BSN

## 2022-05-06 NOTE — PROGRESS NOTES
"   05/06/22 1335   Quick Adds   Type of Visit Initial Occupational Therapy Evaluation   Living Environment   People in Home alone   Current Living Arrangements apartment  (in [place])   Home Accessibility no concerns   Living Environment Comments son is present during evaluation. At baseline pt takes care of his cat. Bends down to change water bowl. son assists with cleaning the apartment and cat box. walk-in shower with bench. grab bars near toilet and showering. Son would not be able to provide more than 1 day of assist upon discharge.   Self-Care   Equipment Currently Used at Home shower chair;grab bar, tub/shower;grab bar, toilet;raised toilet seat;walker, rolling   Activity/Exercise/Self-Care Comment At baseline independent with ADLs using shower chair, grab bars and RW. Per son pt is not normally confused. Currently pt is confused to place and situation.   Instrumental Activities of Daily Living (IADL)   IADL Comments son assists with cleaning cat box.   General Information   Onset of Illness/Injury or Date of Surgery 05/05/22   Referring Physician Heron Espionza MD   Patient/Family Therapy Goal Statement (OT) Pt is currently confused- but is open to therapy. Son is open to pt going to TCU   Additional Occupational Profile Info/Pertinent History of Current Problem s/p L TKA   Left Lower Extremity (Weight-bearing Status) weight-bearing as tolerated (WBAT)   Cognitive Status Examination   Orientation Status person   Follows Commands follows two-step commands;50-74% accuracy;follows one-step commands   Cognitive Status Comments Pt states place as \"bar\" unable to orient pt to hospital. ~ 5 minutes later pt asks therapist if I live here. Attempted to orient pt to situation- but again is unable to recall ~2 minutes later that he was here because he had knee surgery. Impulsive at times- forgetting that he had surgery and to take things/mobility slower.   Pain Assessment   Patient Currently in Pain Yes, see " "Vital Sign flowsheet  (does not rate. states \"my knee hurts\" when walking in room.)   Activities of Daily Living   BADL Assessment/Intervention lower body dressing;toileting   Lower Body Dressing Assessment/Training   Position (Lower Body Dressing) unsupported sitting   [place] Level (Lower Body Dressing) contact guard assist   Toileting   [place] Level (Toileting) contact guard assist;verbal cues   Clinical Impression   Criteria for Skilled Therapeutic Interventions Met (OT) Yes, treatment indicated   OT Diagnosis decreased independence with ADLs and related mobility   OT Problem List-Impairments impacting ADL cognition;strength;post-surgical precautions   Assessment of Occupational Performance 5 or more Performance Deficits   Identified Performance Deficits safety with dressing, toileting, showering, functional mobility   Planned Therapy Interventions (OT) ADL retraining   Clinical Decision Making Complexity (OT) low complexity   Risk & Benefits of therapy have been explained patient;participants included;participants voiced agreement with care plan;current/potential barriers reviewed;risks/benefits reviewed;care plan/treatment goals reviewed;evaluation/treatment results reviewed   OT Discharge Planning   OT Discharge Recommendation (DC Rec) Transitional Care Facility   OT Rationale for DC Rec decreased independence with ADLs and functional mobility. Pt is currently more confused than baseline and unable to recall that he had surgery so is quick to move and unsafe to be home alone.   OT Brief overview of current status Ax1 for ADLs.   Total Evaluation Time (Minutes)   Total Evaluation Time (Minutes) 9   OT Goals   Therapy Frequency (OT) Daily   OT Predicted Duration/Target Date for Goal Attainment 05/09/22   OT Goals Lower Body Dressing;Toilet Transfer/Toileting;Hygiene/Grooming   OT: Hygiene/Grooming supervision/stand-by assist;while standing   OT: Lower Body Dressing Supervision/stand-by assist   OT: " Toilet Transfer/Toileting Supervision/stand-by assist   Psychosocial Support   Trust Relationship/Rapport care explained;choices provided

## 2022-05-06 NOTE — PROGRESS NOTES
Bladder scanned @ 2100 5/5/22 for 236. Patient was able to void 50 ml straw colored urine.   Bladder scanned @ 0100 for 334. Patient was able to void 50 ml of straw colored urine.   Patient denies discomfort from bladder fullness. Writer cannot palpate bladder.   Will scan again 0500.

## 2022-05-06 NOTE — PROGRESS NOTES
"[facility] Progress Note      Post-operative Day: 1 Day Post-Op    Procedure(s):  TOTAL Knee Arthroplasty LEFT  Subjective:    Pt reports minimal pain in the left knee, denies any concerns. He has not yet seen PT today. He and his son are aware that he could need a stay at TCU for further support.     Chest pain, SOB:  No  Nausea, vomiting:  No  Lightheadedness, dizziness:  No  Neuro:  Patient denies new onset numbness or paresthesias      Objective:  Blood pressure 117/63, pulse 74, temperature 97.5  F (36.4  C), temperature source Oral, resp. rate 19, height 1.676 m (5' 6\"), weight 85.5 kg (188 lb 7.9 oz), SpO2 91 %.    Patient Vitals for the past 24 hrs:   BP Temp Temp src Pulse Resp SpO2 Weight   05/06/22 0832 117/63 -- -- 74 -- -- --   05/06/22 0726 117/45 97.5  F (36.4  C) Oral 82 19 91 % --   05/06/22 0350 129/83 98.9  F (37.2  C) Oral 71 18 94 % 85.5 kg (188 lb 7.9 oz)   05/06/22 0010 137/80 97.8  F (36.6  C) Oral 79 18 96 % --   05/05/22 1945 139/66 97.7  F (36.5  C) Oral 79 -- 100 % --   05/05/22 1639 103/57 -- -- 75 18 91 % --   05/05/22 1638 -- -- -- -- -- (!) 87 % --   05/05/22 1536 103/55 97.7  F (36.5  C) Oral 68 18 99 % --   05/05/22 1500 -- -- -- -- -- 98 % --   05/05/22 1420 -- -- -- -- -- 100 % --   05/05/22 1415 -- -- -- -- -- (!) 84 % --   05/05/22 1400 90/56 -- -- 80 -- 90 % --   05/05/22 1345 -- -- -- -- -- 93 % --   05/05/22 1330 126/60 -- -- 91 -- 93 % --   05/05/22 1300 121/65 -- -- 94 -- 95 % --   05/05/22 1245 -- -- -- -- -- 99 % --   05/05/22 1230 109/67 -- -- 70 -- 96 % --   05/05/22 1215 112/58 -- -- 68 -- 97 % --   05/05/22 1200 99/69 -- -- 72 -- 98 % --   05/05/22 1145 100/57 -- -- 73 -- 97 % --   05/05/22 1135 -- 97.7  F (36.5  C) Oral -- -- -- --   05/05/22 1130 101/60 -- -- 88 -- 97 % --   05/05/22 1111 -- -- -- -- -- 98 % --   05/05/22 1045 (!) 107/96 -- -- 74 18 98 % --   05/05/22 1030 105/73 -- -- 78 17 95 % --       Wt Readings from Last 4 Encounters:   05/06/22 " 85.5 kg (188 lb 7.9 oz)   04/22/22 85.7 kg (189 lb)   04/15/22 84.4 kg (186 lb)   02/17/22 84.4 kg (186 lb)       Gen: A&O x 3. NAD. Appears comfortable. Smiling, makes eye contact and jokes.  Wound status: Covered, Aquacel dressing in place. Slight bloody drainage noted.   Circulation, motion and sensation: Dorsiflexion/plantarflexion intact and equal bilaterally; distal lower extremity sensation is intact and equal bilaterally. Foot and toes are warm and well perfused.    Swelling: Mild. Scattered ecchymoses noted throughout the extremities.   Calf tenderness: calves are soft and non-tender bilaterally     Pertinent Labs   Lab Results: personally reviewed.     Recent Labs   Lab Test 05/06/22  0451 05/05/22  0613 05/05/22  0513 04/29/22  0933 04/22/22  1427 04/22/22  1246 07/07/20  1535 06/09/20  1553 07/29/19  1529 07/26/19  1503 01/21/19  0000 01/17/19  0936 09/02/18  0845 09/02/18  0505   INR 1.20* 1.16*  --   --   --  3.3*   < > 2.80*   < > 4.90*   < >  --    < > 2.26*   HGB 9.6*  --  11.6*  --  11.7*  --    < > 12.5*   < > 13.7   < >  --    < > 12.8*   HCT  --   --  36.5*  --  36.8*  --   --  37.7*   < > 41.2   < >  --    < > 39.6*   MCV  --   --  90  --  92  --   --  93   < > 89   < >  --    < > 85   PLT  --   --  161  --  150  --   --  132*   < > 137*   < >  --    < > 155   *  --  136 131* 129*  --    < > 140   < > 137   < >  --    < > 136   CRP  --   --   --   --   --   --   --   --   --  38.1*  --  4.6  --  31.5*    < > = values in this interval not displayed.       Plan:   Continue current cares and rehabilitation.  Anticoagulation protocol: Resume chronic coumadin  Pain medications:  oxycodone and tylenol  Weight bearing status:  WBAT  Disposition:  Plan for discharge to home with home health care vs TCU pending further therapy evaluation when medically stable and pain is controlled, cleared by therapy. SW consult place to assist in planning, pending needs. Could possibly go home later today if he  does well with PT, vs 1-2 more days.             Report completed by:  Jona Galarza PA-C  Date: 5/6/2022  Time: 10:22 AM

## 2022-05-06 NOTE — PROGRESS NOTES
Patient has denied pain or other discomforts this noc shift. Immobilizer opened and ace wrap removed to check for bleeding. Scant amount on Aquacel dressing. Area has been circled. Patient has now urinated 125 ml for a total of 225 ml on noc shift. Temp: 98.9  F (37.2  C) Temp src: Oral BP: 129/83 Pulse: 71   Resp: 18 SpO2: 94 % O2 Device: None (Room air) Oxygen Delivery: 1 LPM.    Blood glucose via am lab draw is 134.

## 2022-05-06 NOTE — CONSULTS
Care Management Initial Consult    General Information  Assessment completed with: Patient-Ashkan and son-Florencio  Type of CM/SW Visit: Initial Assessment    Primary Care Provider verified and updated as needed: Yes   Readmission within the last 30 days:        Reason for Consult: discharge planning  Advance Care Planning:   Not present on chart     Communication Assessment  Patient's communication style: spoken language (English or Bilingual)    Hearing Difficulty or Deaf: yes   Wear Glasses or Blind: yes    Cognitive  Cognitive/Neuro/Behavioral: WDL  Level of Consciousness: alert  Arousal Level: opens eyes spontaneously  Orientation: disoriented to, place, time  Mood/Behavior: calm, cooperative  Best Language: 0 - No aphasia  Speech: logical, rambling    Living Environment:   People in home: alone     Current living Arrangements: apartment      Able to return to prior arrangements: yes  Living Arrangement Comments: [address] [facility] [address]    Family/Social Support:  Care provided by: self, child(valencia)  Provides care for: pet-Cat- Florencio will care for cat if pt goes to TCU  Marital Status:   Children          Description of Support System: Supportive, Involved    Support Assessment: Adequate family and caregiver support, Adequate social supports    Current Resources:   Patient receiving home care services: No  Community Resources: None  Equipment currently used at home: shower chair, grab bar, tub/shower, grab bar, toilet, raised toilet seat, walker, rolling  Supplies currently used at home: None    Employment/Financial:  Employment Status: retired     Employment/ Comments:   Financial Concerns: No concerns identified   Referral to Financial Worker: No     Lifestyle & Psychosocial Needs:  Social Determinants of Health     Tobacco Use: Medium Risk     Smoking Tobacco Use: Former Smoker     Smokeless Tobacco Use: Never Used   Alcohol Use: Not on file   Financial Resource Strain:  Not on file   Food Insecurity: Not on file   Transportation Needs: Not on file   Physical Activity: Not on file   Stress: Not on file   Social Connections: Not on file   Intimate Partner Violence: Not on file   Depression: Not at risk     PHQ-2 Score: 0   Housing Stability: Not on file     Functional Status:  Prior to admission patient needed assistance:   Dependent ADLs:: Independent  Dependent IADLs:: Transportation, Meal Preparation, Medication Management     Mental Health Status:  Mental Health Status: No Current Concerns       Chemical Dependency Status:  Chemical Dependency Status: No Current Concerns           Values/Beliefs:  Spiritual, Cultural Beliefs, Anabaptist Practices, Values that affect care: no             Additional Information:  Care Management team received referral from Ortho team to assist pt with discharge planning needs.     Per PT/OT, it has been determined that pt would benefit from TCU cares.  SW met with pt and sonFlorencio, at bedside and discussed Medicare.gov, TCU coverage, locations and expectations.  Pt is agreeable, but also shared he will miss his cat & will return to his apartment as soon as he is rehabbed.      Discussed TCU facilities and sent referrals to:  --[facility] (Phone: [phone] Fax: [phone])- next available bed 5-9-22- Chastity reviewing  --[facility] (p: [phone]/ F: [phone])- spoke with Lulú in admissions, will review for bed.  --[facility] (Phone: [phone]/Fax: [phone])- Elizabeth reviewing for bed on Sunday, 5/8/22  --[facility] (Admissions Phone: [phone] Main Phone: [phone] Fax: [phone])-   --[facility] (Main: [phone] Admissions: [phone] Fax: [phone])-    SonFlroencio, stated he can transport at time of discharge.  Pt is not using oxygen at time of this note.     Ortho PA will need to write TCU discharge orders and print hard prescription for  any controlled pain medications at time of discharge.     Care Management team to follow for discharge plans.      TOEN Estrada

## 2022-05-06 NOTE — PROGRESS NOTES
[facility] Medicine Progress Note  Date of Service: 05/06/2022    Assessment & Plan   Nabor Cunningham is a 86 year old male who presented on 5/5/2022 for scheduled Procedure(s):  TOTAL Knee Arthroplasty by Heron Espinoza MD and is being followed by the hospital medicine service for co-management of acute and/or chronic perioperative medical problems.      S/p Procedure(s):  TOTAL Knee Arthroplasty on 5/5/2022  Left Knee DJD  1 Day Post-Op   -Pain control, wound cares, physical therapy, occupational therapy and DVT prophylaxis per orthopedic surgery service.     Active problems:  HTN  Atrial fibrillation, pacemaker present  Chronic systolic & diastolic heart failure  BP controlled with lisinopril 10mg. PTA lasix continues to be held, patient appears euvolemic on exam.   -Hold lisinopril going forward given slight increase in Cr and lack of IV access to provide rehydration if necessary.   -Continue metoprolol succinate 25mg Qday  -Resume PTA warfarin with dosing & INR monitoring per pharmacy    Gout  Patient denies any pain or gout-related concerns at this time.   -Continue PTA allopurinol    Hyperlipidemia  -Continue PTA Atorvastatin    GERD  Eating well without N/V or discomfort.   -Continue PTA famotidine    Neuropathic Pain  Patient denies any changes in neuropathic pain at this time.   -Continue multimodal pain management per orthopedics  -Continue PTA amitriptyline & gabapentin     BPH  Bladder scanned 2x overnight but now not having any issues with emptying bladder  -Continue pta flomax       DVT Prophylaxis: as per orthopedic surgery service - Warfarin  Code Status: Full Code    Lines: PIV NOT currently present   Funk catheter: Not present    Discussion: Medically, the patient appears stable    Disposition: Anticipate discharge in 0 - 1 days. From a medicine standpoint, there are no absolute barriers to discharge. While patient remains inpatient awaiting placement,  plan to continue monitoring Cr and mental status. Patient seems mildly confused but son Florencio in room states this seems baseline for patient.     Anette Tim PA-C  [facility]       Interval History   Patient feeling well after surgery. Pain rated 0/10 at rest and with activity. Denies numbness or tingling. It is unclear if patient's pain is actually a 0/10 or if patient is mildly confused and unable to accurately rate pain.     Tolerating oral intake, denies abdominal pain, nausea, vomiting. Has not yet had a bowel movement since surgery, is passing flatus. Bladder scanned 2x overnight, but now urinating without difficulty. Denies dysuria, incomplete emptying, or oliguria.     Denies chest pain, palpitations, cough, wheeze, shortness of breath, headache, vision changes, dizziness, or other complaints.     Physical Exam   Temp:  [97.5  F (36.4  C)-98.9  F (37.2  C)] 97.5  F (36.4  C)  Pulse:  [68-94] 74  Resp:  [11-19] 19  BP: ()/(45-96) 117/63  SpO2:  [84 %-100 %] 91 %    Weights:   Vitals:    05/05/22 0613 05/06/22 0350   Weight: 85.7 kg (189 lb) 85.5 kg (188 lb 7.9 oz)    Body mass index is 30.42 kg/m .    General appearance: Awake, alert, and in no apparent distress. Pleasant and conversational, tangential.   CV: Seems to be in regular rhythm during exam. Regular rate, no murmurs. No LE edema. Dorsalis pedis and radial pulses 2+ bilaterally.  Respiratory: Moving air well bilaterally, minimal expiratory wheezing over right middle lung field. No crackles, or rhonchi.  GI: Non-distended, soft, nontender to palpation. No rebound or guarding. Normoactive bowel sounds.  Skin: Warm, dry, no rashes. No mottling of skin. Ecchymosis over dorsal right hand.   Musculoskeletal / extremities: Moves upper extremities equally with no obvious abnormalities. Distal CMS intact.  Neurologic: No focal deficits.    Data   Recent Labs   Lab 05/06/22  0451 05/05/22  0613 05/05/22  0513  04/29/22  0933   WBC  --   --  6.1  --    HGB 9.6*  --  11.6*  --    MCV  --   --  90  --    PLT  --   --  161  --    INR 1.20* 1.16*  --   --    *  --  136 131*   POTASSIUM 4.8  --  4.5 4.6   CHLORIDE 99  --   --  98   CO2 26  --   --  30   BUN 38*  --   --  24   CR 1.93*  --  1.69* 1.50*   ANIONGAP 7  --   --  3   ALY 8.4*  --   --  9.1   *  --   --  100*       Recent Labs   Lab 05/06/22  0451 04/29/22  0933   * 100*      Imaging  Recent Results (from the past 24 hour(s))   XR Knee Port Left 1/2 Views    Narrative    LEFT KNEE PORTABLE ONE TO TWO VIEWS   5/5/2022 10:47 AM   HISTORY:  Postop total knee.  COMPARISON: Radiographs from 3/26/2018.    Impression    IMPRESSION: Left total knee arthroplasty with adjacent gas. Excellent  position and alignment of components. No evidence of complication or  periprosthetic fracture.  ROSE ECHOLS MD       I reviewed all new labs and imaging results over the last 24 hours    Medications     lactated ringers Stopped (05/05/22 1918)     Warfarin Therapy Reminder         acetaminophen  975 mg Oral Q8H     allopurinol  200 mg Oral Daily     atorvastatin  20 mg Oral QPM     famotidine  20 mg Oral BID    Or     famotidine  20 mg Intravenous BID     gabapentin  300 mg Oral At Bedtime     lisinopril  10 mg Oral Daily     metoprolol succinate ER  25 mg Oral QAM     nortriptyline  25 mg Oral At Bedtime     polyethylene glycol  17 g Oral Daily     prednisoLONE acetate  1 drop Left Eye Daily     psyllium  1 capsule Oral Daily     senna-docusate  1 tablet Oral BID     sodium chloride (PF)  3 mL Intracatheter Q8H     tamsulosin  0.4 mg Oral Daily     Anette Tim PA-C  [facility]

## 2022-05-06 NOTE — PLAN OF CARE
Problem: Pain (Knee Arthroplasty)  Goal: Acceptable Pain Control  Outcome: Ongoing, Progressing  Patient rates surgical knee pain 0/10. Scheduled tylenol along with icing for pain control. Opened immobilizer and unwrapped ace bandage. Dressing is clean dry and intact. Areas of scant amount of bleeding outlined on Aquacel dressing.      Problem: Postoperative Urinary Retention (Knee Arthroplasty)  Goal: Effective Urinary Elimination  Outcome: Ongoing, Progressing  Patient has been urinating in small 50 ml amounts x 2. Bladder scanned x 2 for less that 350 ml. Patient was able to urinate 125 ml at his 4 am assessment. Denies discomfort or pain in bladder area.

## 2022-05-07 NOTE — PLAN OF CARE
Goal Outcome Evaluation:     Pt mentation better today, only disoriented to location. Though in evening did become more fidgety while sitting up in chair. Continues to move well with walker and staff assist. Eating & drinking well. Using call light today. VSS, afebrile. Tylenol for pain, dressing with few small dried blood areas. Son aware pt to discharge on Monday to [place]. Mehreen Milligan RN BSN

## 2022-05-07 NOTE — PROGRESS NOTES
[facility]    Hospitalist Progress Note    Date of Service (when I saw the patient): 05/07/2022    Assessment & Plan   Nabor Cunningham is a 86 year old male who presented on 5/5/2022 for scheduled left Knee Arthroplasty by Heron Espinoza MD and is being followed by the hospital medicine service for co-management of acute and/or chronic perioperative medical problems.    S/p Procedure(s):  TOTAL Knee Arthroplasty on 5/5/2022  Left Knee DJD  1 Day Post-Op   -Pain control, wound cares, physical therapy, occupational therapy and DVT prophylaxis per orthopedic surgery service.     Hypertension  Atrial fibrillation, pacemaker present  Chronic systolic and diastolic heart failure  BP controlled with lisinopril 10mg. PTA lasix continues to be held, patient appears euvolemic on exam.   -Hold lisinopril during hospitalization  -Continue metoprolol succinate 25mg Qday  -Resume PTA warfarin with dosing & INR monitoring per pharmacy  -Resume lisinopril at discharge     Gout  Patient denies any pain or gout-related concerns at this time.   -Continue PTA allopurinol     Hyperlipidemia  -Continue PTA Atorvastatin     GERD  Eating well without N/V or discomfort.   -Continue PTA famotidine     Neuropathic Pain  Patient denies any changes in neuropathic pain at this time.   -Continue multimodal pain management per orthopedics  -Continue PTA amitriptyline and gabapentin     BPH  Bladder scanned 2x overnight but now not having any issues with emptying bladder  -Continue pta flomax     DVT Prophylaxis: Defer to primary service  Code Status: Full Code    Disposition: Patient appears medically stable for discharge.    Pavel Hamilton MD    Interval History   Patient is sleeping in a chair.  His pain is well controlled.  He has no acute complaints.    -Data reviewed today: I reviewed all new labs and imaging results over the last 24 hours. I personally reviewed no images or EKG's today.    Physical Exam    Temp: 98.5  F (36.9  C) Temp src: Oral BP: 132/73 Pulse: 80   Resp: 18 SpO2: 95 % O2 Device: None (Room air)    Vitals:    05/05/22 0613 05/06/22 0350 05/07/22 0603   Weight: 85.7 kg (189 lb) 85.5 kg (188 lb 7.9 oz) 86.6 kg (190 lb 14.7 oz)     Vital Signs with Ranges  Temp:  [97.4  F (36.3  C)-98.5  F (36.9  C)] 98.5  F (36.9  C)  Pulse:  [65-80] 80  Resp:  [16-18] 18  BP: (100-132)/(55-73) 132/73  SpO2:  [91 %-96 %] 95 %  I/O last 3 completed shifts:  In: 450 [P.O.:450]  Out: -     Gen: Well nourished, elderly male, no acute distressed  HEENT: Atraumatic, normocephalic; sclera non-injected, anicterric; oral mucosa moist, no lesion, no exudate  Lungs: Clear to ausculation, no wheezes, no rhonchi, no rales  Heart: Regular rate, regular rhythm, no gallops, no rubs, no murmurs  GI: Bowel sound normal, no hepatosplenomegaly, no masses, non-tender, non-distended, no guarding, no rebound tenderness  Lymph: No lymphadenopathy, trace BLE edema  Skin: No rashes, no chronic venous stasis     Medications     Warfarin Therapy Reminder         acetaminophen  975 mg Oral Q8H     allopurinol  200 mg Oral Daily     atorvastatin  20 mg Oral QPM     [START ON 5/8/2022] famotidine  20 mg Intravenous Daily    Or     [START ON 5/8/2022] famotidine  20 mg Oral Daily     [Held by provider] furosemide  20 mg Oral Daily     gabapentin  300 mg Oral At Bedtime     [Held by provider] lisinopril  10 mg Oral Daily     metoprolol succinate ER  25 mg Oral QAM     nortriptyline  25 mg Oral At Bedtime     polyethylene glycol  17 g Oral Daily     prednisoLONE acetate  1 drop Left Eye Daily     psyllium  1 capsule Oral Daily     senna-docusate  1 tablet Oral BID     tamsulosin  0.4 mg Oral Daily     warfarin ANTICOAGULANT  6 mg Oral ONCE at 18:00       Data   Recent Labs   Lab 05/07/22  0603 05/06/22  0451 05/05/22  0613 05/05/22  0513   WBC  --   --   --  6.1   HGB 9.2* 9.6*  --  11.6*   MCV  --   --   --  90   PLT  --   --   --  161   INR 1.24*  1.20* 1.16*  --    * 132*  --  136   POTASSIUM 4.8 4.8  --  4.5   CHLORIDE 99 99  --   --    CO2 26 26  --   --    BUN 45* 38*  --   --    CR 1.85* 1.93*  --  1.69*   ANIONGAP 6 7  --   --    ALY 8.2* 8.4*  --   --    GLC 97 134*  --   --        No results found for this or any previous visit (from the past 24 hour(s)).

## 2022-05-07 NOTE — PROGRESS NOTES
"[facility] Orthopaedics Progress Note      Post-operative Day: 2 Days Post-Op    Procedure(s):  Left Total  Knee Arthroplasty      Subjective:  Past Medical History:   Diagnosis Date     Bleeding from wound 9/2/2018 7/12/2019:He had prolonged bleeding after cyst removal on his back within the past year.        Patient very cheerful this morning. Was telling several stories about his wife. States pain is mild. Resting comfortably in chair.    Pain: mild  aching and tender to palpation. Denies shooting pain and weakness. States he has numbness in all toes at baseline.  denies Chest pain, SOB, O2 required: None  denies nausea/ emesis  denies lightheadedness, dizziness and weakness  passing gas. Urinating well, Funk in place: no.       Objective:  Blood pressure 132/73, pulse 80, temperature 98.5  F (36.9  C), temperature source Oral, resp. rate 18, height 1.676 m (5' 6\"), weight 86.6 kg (190 lb 14.7 oz), SpO2 95 %.    Patient Vitals for the past 24 hrs:   BP Temp Temp src Pulse Resp SpO2 Weight   05/07/22 0603 132/73 -- -- 80 18 95 % 86.6 kg (190 lb 14.7 oz)   05/06/22 2234 108/56 98.5  F (36.9  C) Oral 69 16 96 % --   05/06/22 1927 100/55 98.4  F (36.9  C) Oral 65 16 96 % --   05/06/22 1630 118/62 97.4  F (36.3  C) Oral 66 -- 91 % --   05/06/22 1134 113/59 97.5  F (36.4  C) Oral 65 18 93 % --       Wt Readings from Last 4 Encounters:   05/07/22 86.6 kg (190 lb 14.7 oz)   04/22/22 85.7 kg (189 lb)   04/15/22 84.4 kg (186 lb)   02/17/22 84.4 kg (186 lb)     General: Alert. Some disorientation regarding time, place. No apparent distress. Non-labored breathing. Appropriate affect.   MSK: dressing: Aquacel in place. Mild bloody drainage noted.  Skin intact, yes ecchymosis, yes erythema. Mild, diffuse tenderness about the operative knee. ROM appropriate for post op. Distal neurovascularly intact. Compartments soft and non-tender. No calf pain. Homans negative. PF/DF  intact.       Pertinent Labs   Lab Results: " personally reviewed.     Recent Labs   Lab Test 05/07/22  0603 05/06/22  0451 05/05/22  0613 05/05/22  0513 04/29/22  0933 04/22/22  1427 07/07/20  1535 06/09/20  1553 07/29/19  1529 07/26/19  1503 01/21/19  0000 01/17/19  0936 09/02/18  0845 09/02/18  0505   INR 1.24* 1.20* 1.16*  --   --   --    < > 2.80*   < > 4.90*   < >  --    < > 2.26*   HGB 9.2* 9.6*  --  11.6*  --  11.7*   < > 12.5*   < > 13.7   < >  --    < > 12.8*   HCT  --   --   --  36.5*  --  36.8*  --  37.7*   < > 41.2   < >  --    < > 39.6*   MCV  --   --   --  90  --  92  --  93   < > 89   < >  --    < > 85   PLT  --   --   --  161  --  150  --  132*   < > 137*   < >  --    < > 155   * 132*  --  136   < > 129*   < > 140   < > 137   < >  --    < > 136   CRP  --   --   --   --   --   --   --   --   --  38.1*  --  4.6  --  31.5*    < > = values in this interval not displayed.         Procedure(s):  Left Total  Knee Arthroplasty  Plan: Anticoagulation protocol: Resume chronic coumadin            Pain medications:  Oxycodone, Tylenol. Patient currently refusing oxycodone.            Weight bearing status:  WBAT            Dressing Change: Aquacel dressing to be left intact until follow up.            Disposition: TCU; awaiting placement. Possible today vs tomorrow. Patient orthopedically stable to discharge.             Follow up: 2 week with [facility]            Continue cares and rehabilitation     Report completed by:  Marge Xie PA-C  Date: 5/7/2022  Time: 8:54 AM

## 2022-05-07 NOTE — PROGRESS NOTES
Pt continues confused as to place, time, and his present situation. Pt can recall stories of long ago, but does not recall having surgery yesterday. Pt assisted to BR with help of 2 staff, gait belt, and walker. Pt states his left knee hurts him with movement, but does not when he is back in bed. Pt is adament about not taking any pain pills because he says they are illegal. 3 siderails up and bed alarm on for pt's safety. Will continue to monitor.

## 2022-05-07 NOTE — PROGRESS NOTES
"Pt. Triggered bed alarm, he needed to use the bathroom. He is slow to get moving, but is steady with his wheeled walker. He asks this RN \"Did you see that ban around her\". Patient oriented to self only, but cooperative.. He did sleep for 5+ hours continuously. Am labs pending.   "

## 2022-05-07 NOTE — DISCHARGE INSTRUCTIONS
Coumadin Discharge Instructions:  Resume home Coumadin dosing of 4 mg on SunThurs, 6 mg rest of the week.  Recheck INR in 1 week.

## 2022-05-08 NOTE — PROGRESS NOTES
"Pt. Alert to self and some long term memory. He has been calm and cooperative, He has actually used the call light twice tonite to get help to the bathroom. He is up with use of walker and gait belt. He is slow and his left knee is painful with weight bearing. He voids  And had a large BM. Upon return to bed he says the pain is better. He says his foot has has some tingling in it always, and he uses \"nerve pills\" for it. (Neurontin) Patient will move up to m/s 2306. Awaiting bed at [facility] in [facility] on Monday.  "

## 2022-05-08 NOTE — PLAN OF CARE
Goal Outcome Evaluation:    Pt is alert and orientated to self, time, and situation but continues to have intermittent disorientation to place. Pt declines any PRN pain medication for left knee pain, also declines any ice packs -- he reports only wanting to take his schedule Tylenol and Gabapentin. CMS intact, does have baseline numbness in his toes. Dressing to left knee is dry and intact, has scant old drainage present.     Pt ambulates with assist x1 using his walker, noted to have difficulty at times obtaining his balance when standing up. He has been tolerating PO intake and voiding without issue.    Afebrile, O2 sat 95% on room air. Bed alarm on for safety.

## 2022-05-08 NOTE — PROGRESS NOTES
"[facility] Orthopaedics Progress Note      Post-operative Day: 3 Days Post-Op    Procedure(s):  Left Total  Knee Arthroplasty      Subjective:  Past Medical History:   Diagnosis Date     Bleeding from wound 9/2/2018 7/12/2019:He had prolonged bleeding after cyst removal on his back within the past year.          Bear feels he is doing well. Son at bedside. He was up to the bathroom just prior to my arrival. States pain is well-controlled.    Pain: mild aching.. Denies shooting pain. Mild paresthesias at lateral knee, baseline neuropathy in toes  denies Chest pain, SOB, O2 required: None  denies nausea/ emesis  denies lightheadedness, dizziness and weakness  Passing gas. Urinating well, Funk in place: no.       Objective:  Blood pressure 107/46, pulse 84, temperature 97.7  F (36.5  C), temperature source Oral, resp. rate 18, height 1.676 m (5' 6\"), weight 86.6 kg (190 lb 14.7 oz), SpO2 94 %.    Patient Vitals for the past 24 hrs:   BP Temp Temp src Pulse Resp SpO2   05/08/22 0935 107/46 97.7  F (36.5  C) Oral 84 18 94 %   05/08/22 0438 125/48 97.5  F (36.4  C) Oral 77 18 95 %   05/07/22 2339 134/60 97.6  F (36.4  C) Oral 80 18 96 %   05/07/22 2033 135/75 97.9  F (36.6  C) Oral 79 20 93 %   05/07/22 1537 108/73 98.5  F (36.9  C) Oral 73 20 94 %       Wt Readings from Last 4 Encounters:   05/07/22 86.6 kg (190 lb 14.7 oz)   04/22/22 85.7 kg (189 lb)   04/15/22 84.4 kg (186 lb)   02/17/22 84.4 kg (186 lb)     General: Alert and oriented x3. No apparent distress. Non-labored breathing. Appropriate affect.   MSK: : dressing Aquacel in place. Mild bloody drainage apparent - no increase since yesterday. Skin intact, scattered ecchymosis, no erythema. nontender to palpation to incision site. ROM appropriate for post op. Distal neurovascularly intact; decreased sensation at toes in stocking distribution; unchanged from baseline. Compartments soft and non-tender. No calf pain. Homans negative. PF/DF intact.      Pertinent " Labs   Lab Results: personally reviewed.     Recent Labs   Lab Test 05/08/22  0441 05/07/22  0603 05/06/22  0451 05/05/22  0613 05/05/22  0513 04/29/22  0933 04/22/22  1427 07/07/20  1535 06/09/20  1553 07/29/19  1529 07/26/19  1503 01/21/19  0000 01/17/19  0936 09/02/18  0845 09/02/18  0505   INR 1.52* 1.24* 1.20*   < >  --   --   --    < > 2.80*   < > 4.90*   < >  --    < > 2.26*   HGB  --  9.2* 9.6*  --  11.6*  --  11.7*   < > 12.5*   < > 13.7   < >  --    < > 12.8*   HCT  --   --   --   --  36.5*  --  36.8*  --  37.7*   < > 41.2   < >  --    < > 39.6*   MCV  --   --   --   --  90  --  92  --  93   < > 89   < >  --    < > 85   PLT  --   --   --   --  161  --  150  --  132*   < > 137*   < >  --    < > 155   NA  --  131* 132*  --  136   < > 129*   < > 140   < > 137   < >  --    < > 136   CRP  --   --   --   --   --   --   --   --   --   --  38.1*  --  4.6  --  31.5*    < > = values in this interval not displayed.         Procedure(s):  Left Total  Knee Arthroplasty  Plan: Anticoagulation protocol: Resume chronic coumadin            Pain medications:  Oxycodone, Tylenol. Patient currently refusing oxycodone.            Weight bearing status:  WBAT            Dressing Change: Aquacel dressing to be left intact until follow up.            Disposition: TCU; awaiting placement. Bed available tomorrow. Patient orthopedically stable to discharge.             Follow up: 2 week with [facility]    Report completed by:  Marge Xie PA-C  Date: 5/8/2022  Time: 11:06 AM

## 2022-05-08 NOTE — PROGRESS NOTES
[place] [facility] [place]    Patient admitted to room 2306 at approximately 2330 via wheel chair from ICU. Patient was accompanied by NST.     Verbal SBAR report received from SAURAV Mujica prior to patient arrival.     Patient ambulated to bed with one assist. Patient alert and oriented X 3. The patient is not having any pain.  . Admission vital signs: Blood pressure 134/60, pulse 80, temperature 97.6  F (36.4  C), temperature source Oral, resp. rate 18, height 1.676 m (5' 6\"), weight 86.6 kg (190 lb 14.7 oz), SpO2 96 %. Patient was oriented to plan of care, call light, bed controls, tv, telephone, bathroom and visiting hours.     Risk Assessment    The following safety risks were identified during admission: fall. Yellow risk band applied: YES.     Skin Initial Assessment    This writer admitted this patient and completed a full skin assessment and Jarad score in the Adult PCS flowsheet. Appropriate interventions initiated as needed.     Secondary skin check completed by SAURAV Foster.    Jarad Risk Assessment  Sensory Perception: 3-->slightly limited  Moisture: 3-->occasionally moist  Activity: 1-->bedfast  Mobility: 2-->very limited  Nutrition: 2-->probably inadequate  Friction and Shear: 2-->potential problem  Jarad Score: 13  Sensory/Activity/Mobility Interventions: Encourage activity/OOB, Turn: left/right positioning  Moisture Interventions: Urinary collection device, Incontinence pad  Nutrition Interventions: Maintain adequate hydration  Friction/Shear Interventions: HOB 30 degrees or less  Mattress: Standard Hospital Mattress (Foam)  Bed Frame: Standard width and length  Informed Refusal Interventions: No    Education    Patient has a [place] to Observation order: No  Observation education completed and documented: N/A      Livia Connors    "

## 2022-05-08 NOTE — PROGRESS NOTES
Patient is up with assist of 1, gait belt, and walker.  Pain is controlled with scheduled medications.  Old drainage noted on Aquacel.  Voiding without difficulty. Tolerating regular diet.

## 2022-05-09 NOTE — DISCHARGE SUMMARY
[facility] Orthopedics Discharge Summary                                  [facility]     NICOLE PEREA [id number]   Age: 86 year old  PCP: Heron Mayo, [phone] 1935     Date of Admission:  5/5/2022  Date of Discharge::  5/9/2022  Discharge Physician:  Jona Galarza PA-C    Code status:  Full Code    Admission Information:  Admission Diagnosis:  Arthritis of left knee [M17.12]  Left knee DJD [M17.12]    Post-Operative Day: 4 Days Post-Op     Reason for admission:  The patient was admitted for the following:Procedure(s) (LRB):  Left Total  Knee Arthroplasty (Left)    Principal Problem:    Left knee DJD  Active Problems:    Anxiety    Chronic atrial fibrillation (H)    Peripheral polyneuropathy    Chronic gout, unspecified cause, unspecified site    Essential hypertension with goal blood pressure less than 140/90    Hyperlipidemia LDL goal <130    Long-term (current) use of anticoagulants [Z79.01]    Moderate episode of recurrent major depressive disorder (H)    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)    Cardiac pacemaker in situ    CAD (coronary artery disease)    Stage 3b chronic kidney disease (H)      Allergies:  Amoxicillin, Penicillins, and Trazodone    Following the procedure noted above the patient was transferred to the post-op floor and started on:    Therapy:  physical therapy and occupational therapy  Anticoagulation Plan: Chronic coumadin  Pain Management: Tylenol  Weight bearing status: Weight bearing as tolerated     The patient was followed and co-managed by the hospitalist service during the inpatient treatment course  Complications:  None  Consultations:  None     Pertinent Labs   Lab Results: personally reviewed.     Recent Labs   Lab Test 05/09/22  0431 05/08/22  0441 05/07/22  0603 05/06/22  0451 05/05/22  0613 05/05/22  0513 04/29/22  0933 04/22/22  1427 07/07/20  1535 06/09/20  1553 07/29/19  1529 07/26/19  1503  01/21/19  0000 01/17/19  0936 09/02/18  0845 09/02/18  0505   INR 1.58* 1.52* 1.24* 1.20*   < >  --   --   --    < > 2.80*   < > 4.90*   < >  --    < > 2.26*   HGB  --   --  9.2* 9.6*  --  11.6*  --  11.7*   < > 12.5*   < > 13.7   < >  --    < > 12.8*   HCT  --   --   --   --   --  36.5*  --  36.8*  --  37.7*   < > 41.2   < >  --    < > 39.6*   MCV  --   --   --   --   --  90  --  92  --  93   < > 89   < >  --    < > 85   PLT  --   --   --   --   --  161  --  150  --  132*   < > 137*   < >  --    < > 155   NA  --   --  131* 132*  --  136   < > 129*   < > 140   < > 137   < >  --    < > 136   CRP  --   --   --   --   --   --   --   --   --   --   --  38.1*  --  4.6  --  31.5*    < > = values in this interval not displayed.          Discharge Information:  Condition at discharge: Stable  Discharge destination:  Discharged to short-term care facility     Medications at discharge:  Current Discharge Medication List      START taking these medications    Details   !! acetaminophen (TYLENOL) 325 MG tablet Take 2 tablets (650 mg) by mouth every 4 hours as needed for other (mild pain)  Qty: 100 tablet, Refills: 0    Associated Diagnoses: History of total knee arthroplasty, left      methocarbamol (ROBAXIN) 500 MG tablet Take 1 tablet (500 mg) by mouth every 6 hours as needed for muscle spasms  Qty: 20 tablet, Refills: 0    Associated Diagnoses: History of total knee arthroplasty, left      senna-docusate (SENOKOT-S/PERICOLACE) 8.6-50 MG tablet Take 1-2 tablets by mouth 2 times daily Take while on oral narcotics to prevent or treat constipation.  Qty: 30 tablet, Refills: 0    Comments: While taking narcotics  Associated Diagnoses: History of total knee arthroplasty, left       !! - Potential duplicate medications found. Please discuss with provider.      CONTINUE these medications which have NOT CHANGED    Details   !! acetaminophen (TYLENOL) 500 MG tablet Take 500-1,000 mg by mouth every 6 hours as needed for mild pain       allopurinol (ZYLOPRIM) 100 MG tablet TAKE TWO TABLETS BY MOUTH ONCE DAILY  Qty: 60 tablet, Refills: 5    Associated Diagnoses: Chronic gout, unspecified cause, unspecified site      atorvastatin (LIPITOR) 20 MG tablet TAKE ONE TABLET BY MOUTH ONCE DAILY  Qty: 30 tablet, Refills: 11    Associated Diagnoses: Hyperlipidemia LDL goal <130      bismuth subsalicylate (PEPTO-BISMOL) 262 MG TABS Take 1 tablet by mouth daily  Qty: 30 tablet, Refills: 3    Associated Diagnoses: Lymphocytic colitis      Calcium-Magnesium-Zinc 333-133-5 MG TABS per tablet Take 1 tablet by mouth daily      cyanocobalamin (VITAMIN B-12) 1000 MCG tablet Take 1,000 mcg by mouth daily       furosemide (LASIX) 20 MG tablet Take 1 tablet (20 mg) by mouth daily  Qty: 90 tablet, Refills: 3    Associated Diagnoses: 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)      gabapentin (NEURONTIN) 300 MG capsule Take 1 capsule (300 mg) by mouth At Bedtime MUST SCHEDULE APPT BEFORE FURTHER REFILLLS  Qty: 90 capsule, Refills: 1    Associated Diagnoses: Peripheral polyneuropathy      lisinopril (ZESTRIL) 5 MG tablet TAKE TWO TABLETS BY MOUTH ONCE DAILY  Qty: 180 tablet, Refills: 1    Associated Diagnoses: 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)      loperamide (IMODIUM) 2 MG capsule Take 2 mg by mouth 4 times daily as needed for diarrhea      METAMUCIL FIBER PO Take 1 capsule by mouth daily      metoprolol succinate ER (TOPROL-XL) 25 MG 24 hr tablet TAKE ONE TABLET BY MOUTH EVERY EVENING  Qty: 90 tablet, Refills: 1    Associated Diagnoses: 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); Essential hypertension with goal blood pressure less than 140/90      multivitamin w/minerals (THERA-VIT-M) tablet Take 1 tablet by mouth Every other day  Qty: 100 tablet, Refills: 3      nortriptyline (PAMELOR) 25 MG capsule TAKE ONE CAPSULE BY MOUTH EVERY NIGHT AT BEDTIME  Qty: 30  capsule, Refills: 11    Associated Diagnoses: Anxiety      polyvinyl alcohol (LIQUIFILM TEARS) 1.4 % ophthalmic solution 1-2 drops      prednisoLONE acetate (PRED FORTE) 1 % ophthalmic susp Place 1 drop Into the left eye daily   Refills: 2      tamsulosin (FLOMAX) 0.4 MG capsule TAKE ONE CAPSULE BY MOUTH ONCE DAILY  Qty: 30 capsule, Refills: 5    Associated Diagnoses: Benign prostatic hyperplasia with urinary hesitancy; Urinary hesitancy      triamcinolone (KENALOG) 0.1 % external cream APPLY TOPICALLY TO AFFECTED AREA(S) TWICE DAILY AS DIRECTED  Qty: 45 g, Refills: 1    Associated Diagnoses: Eczema, unspecified type      warfarin ANTICOAGULANT (JANTOVEN ANTICOAGULANT) 2 MG tablet Take 2 tablets on Sun/Thurs and 3 tablets all other days, OR AS DIRECTED BY ANTICOAGULATION CLINIC  Qty: 228 tablet, Refills: 1    Associated Diagnoses: Chronic atrial fibrillation (H); Chronic anticoagulation       !! - Potential duplicate medications found. Please discuss with provider.                     Follow-Up Care:  Patient should be seen in the office in 14 days by the Orthopedic Surgeon/Physician Assistant.  Call [phone] for appointment or questions.  Ryan Renee PA-C

## 2022-05-09 NOTE — PLAN OF CARE
Patient vital signs are at baseline: Yes  Patient able to ambulate as they were prior to admission or with assist devices provided by therapies during their stay:  Yes  Patient MUST void prior to discharge:  Yes  Patient able to tolerate oral intake:  Yes  Pain has adequate pain control using Oral analgesics:  Yes  Has goal D/C date and time been discussed with patient:  Yes. Plan to discharge to TCU today, family to provide transport.

## 2022-05-09 NOTE — PLAN OF CARE
Physical Therapy Discharge Summary    Reason for therapy discharge:    Discharged to transitional care facility.    Progress towards therapy goal(s). See goals on Care Plan in [facility] electronic health record for goal details.  Goals partially met.  Barriers to achieving goals:   limited tolerance for therapy.    Therapy recommendation(s):    Continued therapy is recommended.  Rationale/Recommendations:  L knee ROM, strengthening, and gait training.    Iveth Lundberg PT

## 2022-05-09 NOTE — PLAN OF CARE
[facility] DISCHARGE NOTE    Patient discharged to transitional care unit at 12:54 PM via wheel chair. Accompanied by son and staff. Discharge instructions reviewed with patient and son, opportunity offered to ask questions. Prescriptions sent with patient to fill . All belongings sent with patient.    Rosangela Ching RN

## 2022-05-09 NOTE — PLAN OF CARE
Occupational Therapy Discharge Summary    Reason for therapy discharge:    Discharged to transitional care facility.    Progress towards therapy goal(s). See [facility] on Care [facility] for goal details.  Goals partially met.  Barriers to achieving goals:   discharge from facility.    Therapy recommendation(s):    Continued therapy is recommended.  Rationale/Recommendations:  TCU to increase independence with ADLs and functional mobility.

## 2022-05-09 NOTE — PROGRESS NOTES
Care Management Discharge Note    Discharge Date: 05/09/2022       Discharge Disposition: Transitional Care;  [facility] (Phone: [phone] Fax: [phone])    Discharge Services: None    Discharge DME: Walker    Discharge Transportation: family or friend will provide    Private pay costs discussed: Not applicable    PAS Confirmation Code:  ([id number])  Patient/family educated on Medicare website which has current facility and service quality ratings: yes    Education Provided on the Discharge Plan:  yes  Persons Notified of Discharge Plans:  Patient & son, Florencio  Patient/Family in Agreement with the Plan: yes    Handoff Referral Completed: Yes    Additional Information:  Per IDT rounds today, MD team states that pt is medically stable for discharge today.      PT/OT recommends TCU upon discharge & pt is in agreement with this plan.  Pt has been accepted for cares at [facility] (Phone: [phone] Fax: [phone])    Transportation discussed and family to transport at discharge.     PLAN OF CARE:  Pt will discharge to [facility] via son.       TONE Estrada

## 2022-05-09 NOTE — PROGRESS NOTES
ANTICOAGULATION  MANAGEMENT: Discharge Review    Nabor Cunningham chart reviewed for anticoagulation continuity of care    Hospital Admission on 5/5-5/9 for L total knee arthroplasty.    Discharge disposition: SNF    Results:  Recent labs: (last 7 days)     05/05/22  0613 05/06/22  0451 05/07/22  0603 05/08/22  0441 05/09/22  0431   INR 1.16* 1.20* 1.24* 1.52* 1.58*     Anticoagulation inpatient management:     more warfarin administered than maintenance regimen     Anticoagulation discharge instructions:     Warfarin dosing: home regimen continued   Bridging: No   INR goal change: No      Medication changes affecting anticoagulation: Yes: tylenol    Additional factors affecting anticoagulation: Yes: pain, inflammation    Plan     No adjustment to anticoagulation plan needed    ACC will resume monitoring upon discharge from TCU    Anticoagulation Calendar updated    Tanisha Carrillo RN

## 2022-05-09 NOTE — PROGRESS NOTES
Patient vital signs are at baseline: Yes  Patient able to ambulate as they were prior to admission or with assist devices provided by therapies during their stay:  No,  Reason:  Pt is a heavy assist with transfers and ambulation. Uses home walker but tends to shuffle and needs reminders. High risk for falls. All appropriate measures in place to prevent falls.  Patient MUST void prior to discharge:  Yes  Patient able to tolerate oral intake:  Yes  Pain has adequate pain control using Oral analgesics:  Yes  Does patient have an identified :  Yes  Has goal D/C date and time been discussed with patient:  Yes. Plan for patient to go to TCU most likely today.   Continue to assess per plan of care.

## 2022-05-09 NOTE — PROGRESS NOTES
"[facility] Orthopaedics Progress Note      Post-operative Day: 4 Days Post-Op    Procedure(s):  Left Total  Knee Arthroplasty  Subjective:    Pt reports that his knee is \"still there\" and he has a little pain at the lateral aspect. He understands the plan for TCU on discharge.     Chest pain, SOB:  No  Nausea, vomiting:  No  Lightheadedness, dizziness:  No  Neuro:  Patient denies new onset numbness or paresthesias      Objective:  Blood pressure 123/58, pulse 74, temperature 97.7  F (36.5  C), temperature source Oral, resp. rate 18, height 1.676 m (5' 6\"), weight 86.6 kg (190 lb 14.7 oz), SpO2 96 %.    Patient Vitals for the past 24 hrs:   BP Temp Temp src Pulse Resp SpO2   05/09/22 0802 123/58 97.7  F (36.5  C) Oral 74 18 96 %   05/09/22 0408 129/54 97.6  F (36.4  C) Oral 90 16 93 %   05/08/22 2032 132/59 97.3  F (36.3  C) Oral 80 18 96 %   05/08/22 1107 118/46 97.8  F (36.6  C) Oral 69 18 93 %   05/08/22 0935 107/46 97.7  F (36.5  C) Oral 84 18 94 %       Wt Readings from Last 4 Encounters:   05/07/22 86.6 kg (190 lb 14.7 oz)   04/22/22 85.7 kg (189 lb)   04/15/22 84.4 kg (186 lb)   02/17/22 84.4 kg (186 lb)       Gen: A&O x 2. NAD. Appears comfortable, up to the recliner.  Wound status: Covered, Aquacel in place. Mild bloody drainage noted.   Circulation, motion and sensation: Dorsiflexion/plantarflexion intact and equal bilaterally; distal lower extremity sensation is intact and equal bilaterally. Foot and toes are warm and well perfused.    Swelling: Mild  Calf tenderness: calves are soft and non-tender bilaterally     Pertinent Labs   Lab Results: personally reviewed.     Recent Labs   Lab Test 05/09/22  0431 05/08/22  0441 05/07/22  0603 05/06/22  0451 05/05/22  0613 05/05/22  0513 04/29/22  0933 04/22/22  1427 07/07/20  1535 06/09/20  1553 07/29/19  1529 07/26/19  1503 01/21/19  0000 01/17/19  0936 09/02/18  0845 09/02/18  0505   INR 1.58* 1.52* 1.24* 1.20*   < >  --   --   --    < > 2.80*   < > 4.90*   < > "  --    < > 2.26*   HGB  --   --  9.2* 9.6*  --  11.6*  --  11.7*   < > 12.5*   < > 13.7   < >  --    < > 12.8*   HCT  --   --   --   --   --  36.5*  --  36.8*  --  37.7*   < > 41.2   < >  --    < > 39.6*   MCV  --   --   --   --   --  90  --  92  --  93   < > 89   < >  --    < > 85   PLT  --   --   --   --   --  161  --  150  --  132*   < > 137*   < >  --    < > 155   NA  --   --  131* 132*  --  136   < > 129*   < > 140   < > 137   < >  --    < > 136   CRP  --   --   --   --   --   --   --   --   --   --   --  38.1*  --  4.6  --  31.5*    < > = values in this interval not displayed.       Plan:   Continue current cares and rehabilitation.  Anticoagulation protocol: Chronic coumadin  Pain medications: Tylenol only, will discontinue oxycodone on discharge.   Weight bearing status:  WBAT  Disposition:  Orthopedically stable. Awaiting TCU placement, nursing to contact .             Report completed by:  Jona Galarza PA-C  Date: 5/9/2022  Time: 8:36 AM

## 2022-05-10 NOTE — PROGRESS NOTES
[facility]  PRIMARY CARE PROVIDER AND CLINIC:  Heron Mayo MD, [address] [place] [id number]  Chief Complaint   Patient presents with     Hospital F/U     [facility] Record Number:  [id number]  Place of Service where encounter took place:  [facility] AT [facility] ([facility]) [[id number]]    Nabor Cunningham  is a 86 year old  (1935), admitted to the above facility from  [facility]. Hospital stay 5/5/22 through 5/9/22..  Admitted to this facility for  rehab, medical management and nursing care.    HPI:    HPI information obtained from: facility chart records, facility staff, patient report and [facility] chart review.   Brief Summary of Hospital Course:   * Pt with PMH notable for Left Knee joint DJD, hospitalized with elective LTKA. Started on APAP and robaxin 500 mg q 6 hr prn  * A-fib on coumadin, INR reversed for surgery. 1.58 (5/9). Discharged on coumadin  4 mg Sun/Thu and 6 mg AOD.  * Mild Hyponatremia.   * evaluated by PMR and felt to benefit from TCU placement    Today:  - Resident seen and examined, chart reviewed and discussed with the nursing staff.   - TKA: patient reports pain is sharp in nature, 5/10 when severe, aggravated with standing up on the leg, better with rest and medications.   - Rehab: reports it is going good.   - A-fib: denies palpitation, SOB or CP. Endorses dry cough.     - DNP and RN have no concern today.   --------------------------------    CODE STATUS/ADVANCE DIRECTIVES DISCUSSION:   CPR/Full code   Patient's living condition: lives alone  ALLERGIES: Amoxicillin, Penicillins, and Trazodone  PAST MEDICAL HISTORY:  has a past medical history of Bleeding from wound (9/2/2018).    He has no past medical history of Complication of anesthesia or PONV (postoperative nausea and vomiting).  PAST SURGICAL HISTORY:   has a past surgical history that includes Incision and drainage trunk, combined (N/A, 09/06/2018);  Incision and drainage trunk, combined (N/A, 11/01/2018); angiogram (01/2019); appendectomy; joint replacement (Right); Implant pacemaker; Thoracic surgery; Eye surgery (Left); cataract iol, rt/lt; Colonoscopy (N/A, 07/03/2020); and Arthroplasty knee (Left, 5/5/2022).  FAMILY HISTORY: family history includes Cancer in his brother; Diabetes in his brother.  SOCIAL HISTORY:   reports that he has quit smoking. His smoking use included cigars. He quit after 20.00 years of use. He has never used smokeless tobacco. He reports previous alcohol use. He reports that he does not use drugs.    Post Discharge Medication Reconciliation Status: discharge medications reconciled and changed, per note/orders  Current Outpatient Medications   Medication Sig Dispense Refill     acetaminophen (TYLENOL) 325 MG tablet Take 2 tablets (650 mg) by mouth every 4 hours as needed for other (mild pain) 100 tablet 0     acetaminophen (TYLENOL) 500 MG tablet Take 500-1,000 mg by mouth every 6 hours as needed for mild pain       allopurinol (ZYLOPRIM) 100 MG tablet TAKE TWO TABLETS BY MOUTH ONCE DAILY (Patient taking differently: Take 200 mg by mouth daily) 60 tablet 5     atorvastatin (LIPITOR) 20 MG tablet TAKE ONE TABLET BY MOUTH ONCE DAILY 30 tablet 11     bismuth subsalicylate (PEPTO-BISMOL) 262 MG TABS Take 1 tablet by mouth daily 30 tablet 3     Calcium-Magnesium-Zinc 333-133-5 MG TABS per tablet Take 1 tablet by mouth daily       cyanocobalamin (VITAMIN B-12) 1000 MCG tablet Take 1,000 mcg by mouth daily        furosemide (LASIX) 20 MG tablet Take 1 tablet (20 mg) by mouth daily 90 tablet 3     gabapentin (NEURONTIN) 300 MG capsule Take 1 capsule (300 mg) by mouth At Bedtime MUST SCHEDULE APPT BEFORE FURTHER REFILLLS 90 capsule 1     lisinopril (ZESTRIL) 5 MG tablet TAKE TWO TABLETS BY MOUTH ONCE DAILY (Patient taking differently: Take 10 mg by mouth daily) 180 tablet 1     loperamide (IMODIUM) 2 MG capsule Take 2 mg by mouth 4 times daily  "as needed for diarrhea       METAMUCIL FIBER PO Take 1 capsule by mouth daily       methocarbamol (ROBAXIN) 500 MG tablet Take 1 tablet (500 mg) by mouth every 6 hours as needed for muscle spasms 20 tablet 0     metoprolol succinate ER (TOPROL XL) 25 MG 24 hr tablet TAKE ONE TABLET BY MOUTH EVERY EVENING 90 tablet 1     multivitamin w/minerals (THERA-VIT-M) tablet Take 1 tablet by mouth Every other day 100 tablet 3     nortriptyline (PAMELOR) 25 MG capsule TAKE ONE CAPSULE BY MOUTH EVERY NIGHT AT BEDTIME (Patient taking differently: Take 25 mg by mouth At Bedtime) 30 capsule 11     polyvinyl alcohol (LIQUIFILM TEARS) 1.4 % ophthalmic solution 1-2 drops       prednisoLONE acetate (PRED FORTE) 1 % ophthalmic susp Place 1 drop Into the left eye daily   2     senna-docusate (SENOKOT-S/PERICOLACE) 8.6-50 MG tablet Take 1-2 tablets by mouth 2 times daily Take while on oral narcotics to prevent or treat constipation. 30 tablet 0     tamsulosin (FLOMAX) 0.4 MG capsule TAKE ONE CAPSULE BY MOUTH ONCE DAILY (Patient taking differently: Take 0.4 mg by mouth daily) 30 capsule 5     triamcinolone (KENALOG) 0.1 % external cream APPLY TOPICALLY TO AFFECTED AREA(S) TWICE DAILY AS DIRECTED 45 g 1     warfarin ANTICOAGULANT (JANTOVEN ANTICOAGULANT) 2 MG tablet Take 2 tablets on Sun/Thurs and 3 tablets all other days, OR AS DIRECTED BY ANTICOAGULATION CLINIC 228 tablet 1       ROS:  10 point ROS of systems including Constitutional, Eyes, Respiratory, Cardiovascular, Gastroenterology, Genitourinary, Integumentary, Musculoskeletal, Psychiatric were all negative except for pertinent positives noted in my HPI.    Vitals:  /70   Pulse 88   Temp 97.6  F (36.4  C)   Resp 22   Ht 1.676 m (5' 6\")   Wt 86.5 kg (190 lb 9.6 oz)   SpO2 95%   BMI 30.76 kg/m    Exam:  GENERAL APPEARANCE:  in no distress, cooperative  ENT:  Mouth and posterior oropharynx normal, moist mucous membranes, oral mucosa moist, no lesion noted.   EYES:  EOMI, " Pupil rounded and equal.  RESP:  lungs clear to auscultation   CV:  S1S2 audible, regular HR, no murmur appreciated.   ABDOMEN:  soft, NT/ND, BS audible. no mass appreciated on palpation.   M/S:   1+ pitting edema over left leg and traces over right leg. SKIN: Aquacel many wet spot over surgical site on the left hip area. . Extensive ecchymosis over  Left knee region. Dry ecchymosis over dorsal surface of the right hand.   NEURO:   No NFD appreciated on observation. Hand  5/5 b/l  PSYCH: , affect and mood normal      Lab/Diagnostic data: Reviewed in the chart and EHR.      Recent Labs   Lab Test 05/09/22  0431 05/08/22  0441 05/07/22  0603 05/06/22  0451 05/05/22  0613 05/05/22  0513 04/29/22  0933 04/22/22  1427 07/07/20  1535 06/09/20  1553 07/29/19  1529 07/26/19  1503 01/21/19  0000 01/17/19  0936 09/02/18  0845 09/02/18  0505   INR 1.58* 1.52* 1.24* 1.20*   < >  --   --   --    < > 2.80*   < > 4.90*   < >  --    < > 2.26*   HGB  --   --  9.2* 9.6*  --  11.6*  --  11.7*   < > 12.5*   < > 13.7   < >  --    < > 12.8*   HCT  --   --   --   --   --  36.5*  --  36.8*  --  37.7*   < > 41.2   < >  --    < > 39.6*   MCV  --   --   --   --   --  90  --  92  --  93   < > 89   < >  --    < > 85   PLT  --   --   --   --   --  161  --  150  --  132*   < > 137*   < >  --    < > 155   NA  --   --  131* 132*  --  136   < > 129*   < > 140   < > 137   < >  --    < > 136   CRP  --   --   --   --   --   --   --   --   --   --   --  38.1*  --  4.6  --  31.5*    < > = values in this interval not displayed.        ASSESSMENT/PLAN:  --------------------------  * Pt with PMH notable for Left Knee joint DJD, hospitalized with elective LTKA. Started on APAP and robaxin 500 mg q 6 hr prn  * A-fib on coumadin, INR reversed for surgery. 1.58 (5/9). Discharged on coumadin  4 mg Sun/Thu and 6 mg AOD.  * Mild Hyponatremia.   * evaluated by PMR and felt to benefit from TCU placement    -- Started rehab program, making a progress, continue  until desired goal is achieved.   - Analgesia optimal  - Followed by Orthopedic Team.   - CVR on toprol 25 mg  -  on coumadin with INR followed closely. Will check INR on lab day.   - check BMP on lab day      CKD3b, GFR 30-44 ml/min (H): - Avoid nephrotoxic  medications. Renally dose medications. 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 On lasix.   - HLD: on lipitor  - Gout: on allopurinol. No concern.   - BPH: on flomax.       Electronically signed by:  Judy Meier MD

## 2022-05-10 NOTE — PROGRESS NOTES
Clinic Care Coordination Contact  Care Coordination Transition Communication         Clinical Data:   Date of Admission:                  5/5/2022  Date of Discharge::                 5/9/2022  Left Total  Knee Arthroplasty (Left)  Transition to Facility:              Facility Name: [facility]               Contact name and phone number/fax: faxed CC RN contact information to TCU and instructed to call when the patient is discharged     Plan: RN/SW Care Coordinator will await notification from facility staff informing RN/SW Care Coordinator of patient's discharge plans/needs. RN/SW Care Coordinator will review chart and outreach to facility staff every 4 weeks and as needed.     [facility]   Marysol Denis RN, Care Coordinator   [facility]'s   E-mail [email or web address]   [phone]

## 2022-05-10 NOTE — LETTER
To:             Please give to facility    From:   Marysol Denis RN, Care Coordinator   [facility]   Marysol Denis RN, Care Coordinator   [facility]'s   E-mail [email or web address]   [phone]   Patient Name:  Nabor Cunningham  YOB: 1935   Admit date: 5/9/2022      *Information Needed:  Please contact me when the patient will discharge (or if they will move to long term care)- include the discharge date, disposition, and main diagnosis   - If the patient is discharged with home care services, please provide the name of the agency    Also- Please inform me if a care conference is being held.   [facility]   Marysol Denis RN, Care Coordinator   [facility]'s   E-mail [email or web address]   [phone]                             Thank you

## 2022-05-11 NOTE — LETTER
5/11/2022        RE: Nabor Cunningham  [address] [address]        [place] [facility]  PRIMARY CARE PROVIDER AND CLINIC:  Heron Mayo MD, [address] [address]  Chief Complaint   Patient presents with     Hospital F/U     [place] Medical Record Number:  [id number]  Place of Service where encounter took place:  [facility] [[id number]]    Nabor Cunningham  is a 86 year old  (1935), admitted to the above facility from  [facility]. Hospital stay 5/5/22 through 5/9/22..  Admitted to this facility for  rehab, medical management and nursing care.    HPI:    HPI information obtained from: facility chart records, facility staff, patient report and [facility] chart review.   Brief Summary of Hospital Course:   * Pt with PMH notable for Left Knee joint DJD, hospitalized with elective LTKA. Started on APAP and robaxin 500 mg q 6 hr prn  * A-fib on coumadin, INR reversed for surgery. 1.58 (5/9). Discharged on coumadin  4 mg Sun/Thu and 6 mg AOD.  * Mild Hyponatremia.   * evaluated by PMR and felt to benefit from TCU placement    Today:  - Resident seen and examined, chart reviewed and discussed with the nursing staff.   - TKA: patient reports pain is sharp in nature, 5/10 when severe, aggravated with standing up on the leg, better with rest and medications.   - Rehab: reports it is going good.   - A-fib: denies palpitation, SOB or CP. Endorses dry cough.     - DNP and RN have no concern today.   --------------------------------    CODE STATUS/ADVANCE DIRECTIVES DISCUSSION:   CPR/Full code   Patient's living condition: lives alone  ALLERGIES: Amoxicillin, Penicillins, and Trazodone  PAST MEDICAL HISTORY:  has a past medical history of Bleeding from wound (9/2/2018).    He has no past medical history of Complication of anesthesia or PONV (postoperative nausea and vomiting).  PAST SURGICAL HISTORY:   has a  past surgical history that includes Incision and drainage trunk, combined (N/A, 09/06/2018); Incision and drainage trunk, combined (N/A, 11/01/2018); angiogram (01/2019); appendectomy; joint replacement (Right); Implant pacemaker; Thoracic surgery; Eye surgery (Left); cataract iol, rt/lt; Colonoscopy (N/A, 07/03/2020); and Arthroplasty knee (Left, 5/5/2022).  FAMILY HISTORY: family history includes Cancer in his brother; Diabetes in his brother.  SOCIAL HISTORY:   reports that he has quit smoking. His smoking use included cigars. He quit after 20.00 years of use. He has never used smokeless tobacco. He reports previous alcohol use. He reports that he does not use drugs.    Post Discharge Medication Reconciliation Status: discharge medications reconciled and changed, per note/orders  Current Outpatient Medications   Medication Sig Dispense Refill     acetaminophen (TYLENOL) 325 MG tablet Take 2 tablets (650 mg) by mouth every 4 hours as needed for other (mild pain) 100 tablet 0     acetaminophen (TYLENOL) 500 MG tablet Take 500-1,000 mg by mouth every 6 hours as needed for mild pain       allopurinol (ZYLOPRIM) 100 MG tablet TAKE TWO TABLETS BY MOUTH ONCE DAILY (Patient taking differently: Take 200 mg by mouth daily) 60 tablet 5     atorvastatin (LIPITOR) 20 MG tablet TAKE ONE TABLET BY MOUTH ONCE DAILY 30 tablet 11     bismuth subsalicylate (PEPTO-BISMOL) 262 MG TABS Take 1 tablet by mouth daily 30 tablet 3     Calcium-Magnesium-Zinc 333-133-5 MG TABS per tablet Take 1 tablet by mouth daily       cyanocobalamin (VITAMIN B-12) 1000 MCG tablet Take 1,000 mcg by mouth daily        furosemide (LASIX) 20 MG tablet Take 1 tablet (20 mg) by mouth daily 90 tablet 3     gabapentin (NEURONTIN) 300 MG capsule Take 1 capsule (300 mg) by mouth At Bedtime MUST SCHEDULE APPT BEFORE FURTHER REFILLLS 90 capsule 1     lisinopril (ZESTRIL) 5 MG tablet TAKE TWO TABLETS BY MOUTH ONCE DAILY (Patient taking differently: Take 10 mg by  "mouth daily) 180 tablet 1     loperamide (IMODIUM) 2 MG capsule Take 2 mg by mouth 4 times daily as needed for diarrhea       METAMUCIL FIBER PO Take 1 capsule by mouth daily       methocarbamol (ROBAXIN) 500 MG tablet Take 1 tablet (500 mg) by mouth every 6 hours as needed for muscle spasms 20 tablet 0     metoprolol succinate ER (TOPROL XL) 25 MG 24 hr tablet TAKE ONE TABLET BY MOUTH EVERY EVENING 90 tablet 1     multivitamin w/minerals (THERA-VIT-M) tablet Take 1 tablet by mouth Every other day 100 tablet 3     nortriptyline (PAMELOR) 25 MG capsule TAKE ONE CAPSULE BY MOUTH EVERY NIGHT AT BEDTIME (Patient taking differently: Take 25 mg by mouth At Bedtime) 30 capsule 11     polyvinyl alcohol (LIQUIFILM TEARS) 1.4 % ophthalmic solution 1-2 drops       prednisoLONE acetate (PRED FORTE) 1 % ophthalmic susp Place 1 drop Into the left eye daily   2     senna-docusate (SENOKOT-S/PERICOLACE) 8.6-50 MG tablet Take 1-2 tablets by mouth 2 times daily Take while on oral narcotics to prevent or treat constipation. 30 tablet 0     tamsulosin (FLOMAX) 0.4 MG capsule TAKE ONE CAPSULE BY MOUTH ONCE DAILY (Patient taking differently: Take 0.4 mg by mouth daily) 30 capsule 5     triamcinolone (KENALOG) 0.1 % external cream APPLY TOPICALLY TO AFFECTED AREA(S) TWICE DAILY AS DIRECTED 45 g 1     warfarin ANTICOAGULANT (JANTOVEN ANTICOAGULANT) 2 MG tablet Take 2 tablets on Sun/Thurs and 3 tablets all other days, OR AS DIRECTED BY ANTICOAGULATION CLINIC 228 tablet 1       ROS:  10 point ROS of systems including Constitutional, Eyes, Respiratory, Cardiovascular, Gastroenterology, Genitourinary, Integumentary, Musculoskeletal, Psychiatric were all negative except for pertinent positives noted in my HPI.    Vitals:  /70   Pulse 88   Temp 97.6  F (36.4  C)   Resp 22   Ht 1.676 m (5' 6\")   Wt 86.5 kg (190 lb 9.6 oz)   SpO2 95%   BMI 30.76 kg/m    Exam:  GENERAL APPEARANCE:  in no distress, cooperative  ENT:  Mouth and posterior " oropharynx normal, moist mucous membranes, oral mucosa moist, no lesion noted.   EYES:  EOMI, Pupil rounded and equal.  RESP:  lungs clear to auscultation   CV:  S1S2 audible, regular HR, no murmur appreciated.   ABDOMEN:  soft, NT/ND, BS audible. no mass appreciated on palpation.   M/S:   1+ pitting edema over left leg and traces over right leg. SKIN: Aquacel many wet spot over surgical site on the left hip area. . Extensive ecchymosis over  Left knee region. Dry ecchymosis over dorsal surface of the right hand.   NEURO:   No NFD appreciated on observation. Hand  5/5 b/l  PSYCH: , affect and mood normal      Lab/Diagnostic data: Reviewed in the chart and EHR.      Recent Labs   Lab Test 05/09/22  0431 05/08/22  0441 05/07/22  0603 05/06/22  0451 05/05/22  0613 05/05/22  0513 04/29/22  0933 04/22/22  1427 07/07/20  1535 06/09/20  1553 07/29/19  1529 07/26/19  1503 01/21/19  0000 01/17/19  0936 09/02/18  0845 09/02/18  0505   INR 1.58* 1.52* 1.24* 1.20*   < >  --   --   --    < > 2.80*   < > 4.90*   < >  --    < > 2.26*   HGB  --   --  9.2* 9.6*  --  11.6*  --  11.7*   < > 12.5*   < > 13.7   < >  --    < > 12.8*   HCT  --   --   --   --   --  36.5*  --  36.8*  --  37.7*   < > 41.2   < >  --    < > 39.6*   MCV  --   --   --   --   --  90  --  92  --  93   < > 89   < >  --    < > 85   PLT  --   --   --   --   --  161  --  150  --  132*   < > 137*   < >  --    < > 155   NA  --   --  131* 132*  --  136   < > 129*   < > 140   < > 137   < >  --    < > 136   CRP  --   --   --   --   --   --   --   --   --   --   --  38.1*  --  4.6  --  31.5*    < > = values in this interval not displayed.        ASSESSMENT/PLAN:  --------------------------  * Pt with PMH notable for Left Knee joint DJD, hospitalized with elective LTKA. Started on APAP and robaxin 500 mg q 6 hr prn  * A-fib on coumadin, INR reversed for surgery. 1.58 (5/9). Discharged on coumadin  4 mg Sun/Thu and 6 mg AOD.  * Mild Hyponatremia.   * evaluated by PMR and  felt to benefit from TCU placement    -- Started rehab program, making a progress, continue until desired goal is achieved.   - Analgesia optimal  - Followed by Orthopedic Team.   - CVR on toprol 25 mg  -  on coumadin with INR followed closely. Will check INR on lab day.   - check BMP on lab day      CKD3b, GFR 30-44 ml/min (H): - Avoid nephrotoxic  medications. Renally dose medications. 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 On lasix.   - HLD: on lipitor  - Gout: on allopurinol. No concern.   - BPH: on flomax.       Electronically signed by:  Judy Meier MD           Sincerely,        Judy Meier MD

## 2022-05-12 NOTE — PROGRESS NOTES
"[facility]ERIATR[facility]    Chief Complaint   Patient presents with     INR RESULTS     HPI:  Nabor Cunningham is a 86 year old  (1935), who is being seen today for an episodic care visit at: [facility] AT [facility] ([facility]) [[id number]]. Today's concern is:     Diagnoses       Codes Comments    Chronic atrial fibrillation (H)    -  Primary I48.20     Cardiac pacemaker in situ     Z95.0     Long term current use of anticoagulant therapy     Z79.01     Warfarin anticoagulation     Z79.01     Pulmonary congestion     R09.89     Primary osteoarthritis of left knee     M17.12     S/P total knee arthroplasty, left     Z96.652         Came to meet \"Bear\" today as INR was done and the rounding MD did the first initial visit.  Ashkan is here for rehab after having total arthroplasty on left knee due to primary DJD.  Ashkan's son is present today and so able to fill in some of the history.  His son helps with his needs in the community.    Today Ashkan sounds congested and wheezy.  Offered to have a CXR done to make sure he does not pneumonia or extra fluid on his lungs.    Informed them of his INR and when next INR will be done.    Allergies, and PMH/PSH reviewed in EPIC today.    Current Outpatient Medications   Medication Sig Dispense Refill     acetaminophen (TYLENOL) 500 MG tablet Take 500-1,000 mg by mouth every 6 hours as needed for mild pain       allopurinol (ZYLOPRIM) 100 MG tablet TAKE TWO TABLETS BY MOUTH ONCE DAILY (Patient taking differently: Take 200 mg by mouth daily) 60 tablet 5     atorvastatin (LIPITOR) 20 MG tablet TAKE ONE TABLET BY MOUTH ONCE DAILY 30 tablet 11     bismuth subsalicylate (PEPTO-BISMOL) 262 MG TABS Take 1 tablet by mouth daily 30 tablet 3     Calcium-Magnesium-Zinc 333-133-5 MG TABS per tablet Take 1 tablet by mouth daily       cyanocobalamin (VITAMIN B-12) 1000 MCG tablet Take 1,000 mcg by mouth daily        furosemide (LASIX) 20 MG tablet Take 1 tablet (20 mg) by mouth " daily 90 tablet 3     gabapentin (NEURONTIN) 300 MG capsule Take 1 capsule (300 mg) by mouth At Bedtime MUST SCHEDULE APPT BEFORE FURTHER REFILLLS 90 capsule 1     lisinopril (ZESTRIL) 5 MG tablet TAKE TWO TABLETS BY MOUTH ONCE DAILY (Patient taking differently: Take 10 mg by mouth daily) 180 tablet 1     loperamide (IMODIUM) 2 MG capsule Take 2 mg by mouth 4 times daily as needed for diarrhea       METAMUCIL FIBER PO Take 1 capsule by mouth daily       methocarbamol (ROBAXIN) 500 MG tablet Take 1 tablet (500 mg) by mouth every 6 hours as needed for muscle spasms 20 tablet 0     metoprolol succinate ER (TOPROL XL) 25 MG 24 hr tablet TAKE ONE TABLET BY MOUTH EVERY EVENING 90 tablet 1     multivitamin w/minerals (THERA-VIT-M) tablet Take 1 tablet by mouth Every other day 100 tablet 3     nortriptyline (PAMELOR) 25 MG capsule TAKE ONE CAPSULE BY MOUTH EVERY NIGHT AT BEDTIME (Patient taking differently: Take 25 mg by mouth At Bedtime) 30 capsule 11     polyvinyl alcohol (LIQUIFILM TEARS) 1.4 % ophthalmic solution 1-2 drops       prednisoLONE acetate (PRED FORTE) 1 % ophthalmic susp Place 1 drop Into the left eye daily   2     senna-docusate (SENOKOT-S/PERICOLACE) 8.6-50 MG tablet Take 1-2 tablets by mouth 2 times daily Take while on oral narcotics to prevent or treat constipation. 30 tablet 0     tamsulosin (FLOMAX) 0.4 MG capsule TAKE ONE CAPSULE BY MOUTH ONCE DAILY (Patient taking differently: Take 0.4 mg by mouth daily) 30 capsule 5     triamcinolone (KENALOG) 0.1 % external cream APPLY TOPICALLY TO AFFECTED AREA(S) TWICE DAILY AS DIRECTED 45 g 1     warfarin ANTICOAGULANT (JANTOVEN ANTICOAGULANT) 2 MG tablet Take 2 tablets on Sun/Thurs and 3 tablets all other days, OR AS DIRECTED BY ANTICOAGULATION CLINIC 228 tablet 1       REVIEW OF SYSTEMS:  4 point ROS including Respiratory, CV, GI and , other than that noted in the HPI,  is negative    Objective:   /71   Pulse 87   Temp 97.3  F (36.3  C)   Resp 18    Wt 83.5 kg (184 lb)   SpO2 91%   BMI 29.70 kg/m    Mr. Cunningham is in the recliner with legs elevated and under a blanket.  Alert and fairly pleasant.  Feel like he does not understand everything and so son helps him out.  Chalino looks over to his son for conversation.    Audible congestion and mild wheezing heard and the same in his lungs.  His breathing is not labored and no oxygen is used.   Heart rate regular and strong.  Able to look at both legs as his left knee pant leg is pulled up.  Aquacel dressing present and can see drainage on the dressing but has not seeped out of the dressing.  Does have swelling on the left side more than the right.  This is due to the surgery.  No EDWAR stockings. Mild bruising noted around the knee  Abdomen is soft and non-tender.       Component      Latest Ref Rng & Units 5/7/2022 5/8/2022 5/9/2022 5/12/2022   Sodium      133 - 144 mmol/L 131 (L)   136   Potassium      3.4 - 5.3 mmol/L 4.8   4.2   Chloride      94 - 109 mmol/L 99   103   Carbon Dioxide      20 - 32 mmol/L 26   28   Anion Gap      3 - 14 mmol/L 6   5   Urea Nitrogen      7 - 30 mg/dL 45 (H)   32 (H)   Creatinine      0.66 - 1.25 mg/dL 1.85 (H)   1.22   Calcium      8.5 - 10.1 mg/dL 8.2 (L)   8.4 (L)   Glucose      70 - 99 mg/dL 97   96   GFR Estimate      >60 mL/min/1.73m2 35 (L)   58 (L)   INR      0.85 - 1.15  1.52 (H) 1.58 (H)      INR today 2.0    Assessment/Plan:  (I48.20) Chronic atrial fibrillation (H)  (primary encounter diagnosis)  (Z95.0) Cardiac pacemaker in situ  (Z79.01) Long-term (current) use of anticoagulants [Z79.01]  (Z51.81)  Anticoagulation goal of 2-3  Comment:   First INR done here and so will follow his admission dose of Warfarin 4mg on Su/Th and 6mg all other days of week.  INR will be done Monday for close monitoring as well as a CXR will be done today and expect medications will change either in diuretics or ABX added.    (R09.89) Pulmonary congestion  Comment: will proceed to order a CXR  today.  AP and lateral view.  Son suspected a pneumonia or such as he talked about it to the NP and his father.  Bear asked when they would come and assured him they will come here and they will find him.  Meanwhile feel can wait to see what results are.    (M17.12) Primary osteoarthritis of left knee  (Z96.652) S/P total knee arthroplasty, left  Comment: attending therapies.  Has a walker in his room as well as a wheelchair.  Next week he has an appointment with the surgeon's office.  Tylenol ES prn for pain and is scheduled with Gabapentin at [facility].  There is Robaxin for muscle relaxer too.  Encourage him to use ice to the knee and elevate due to swelling.      Orders:  1.  INR = 2.0  Continue with Warfarin 4mg on Su/TH and 6mg all other days  INR on Monday   2.  CXR today AP and lateral view for coughing and wheezing    Electronically signed by: AVERY Purcell CNP

## 2022-05-12 NOTE — LETTER
"    5/12/2022        RE: Nabor Cunningham  [address] [address]        [facility] GERIATRICS    Chief Complaint   Patient presents with     INR RESULTS     HPI:  Nabor Cunningham is a 86 year old  (1935), who is being seen today for an episodic care visit at: [facility] AT [facility] ([facility]) [[id number]]. Today's concern is:     Diagnoses       Codes Comments    Chronic atrial fibrillation (H)    -  Primary I48.20     Cardiac pacemaker in situ     Z95.0     Long term current use of anticoagulant therapy     Z79.01     Warfarin anticoagulation     Z79.01     Pulmonary congestion     R09.89     Primary osteoarthritis of left knee     M17.12     S/P total knee arthroplasty, left     Z96.652         Came to meet \"Bear\" today as INR was done and the rounding MD did the first initial visit.  Ashkan is here for rehab after having total arthroplasty on left knee due to primary DJD.  Ashkan's son is present today and so able to fill in some of the history.  His son helps with his needs in the community.    Today Ashkan sounds congested and wheezy.  Offered to have a CXR done to make sure he does not pneumonia or extra fluid on his lungs.    Informed them of his INR and when next INR will be done.    Allergies, and PMH/PSH reviewed in [facility] today.    Current Outpatient Medications   Medication Sig Dispense Refill     acetaminophen (TYLENOL) 500 MG tablet Take 500-1,000 mg by mouth every 6 hours as needed for mild pain       allopurinol (ZYLOPRIM) 100 MG tablet TAKE TWO TABLETS BY MOUTH ONCE DAILY (Patient taking differently: Take 200 mg by mouth daily) 60 tablet 5     atorvastatin (LIPITOR) 20 MG tablet TAKE ONE TABLET BY MOUTH ONCE DAILY 30 tablet 11     bismuth subsalicylate (PEPTO-BISMOL) 262 MG TABS Take 1 tablet by mouth daily 30 tablet 3     Calcium-Magnesium-Zinc 333-133-5 MG TABS per tablet Take 1 tablet by mouth daily       cyanocobalamin (VITAMIN B-12) 1000 MCG tablet Take 1,000 " mcg by mouth daily        furosemide (LASIX) 20 MG tablet Take 1 tablet (20 mg) by mouth daily 90 tablet 3     gabapentin (NEURONTIN) 300 MG capsule Take 1 capsule (300 mg) by mouth At Bedtime MUST SCHEDULE APPT BEFORE FURTHER REFILLLS 90 capsule 1     lisinopril (ZESTRIL) 5 MG tablet TAKE TWO TABLETS BY MOUTH ONCE DAILY (Patient taking differently: Take 10 mg by mouth daily) 180 tablet 1     loperamide (IMODIUM) 2 MG capsule Take 2 mg by mouth 4 times daily as needed for diarrhea       METAMUCIL FIBER PO Take 1 capsule by mouth daily       methocarbamol (ROBAXIN) 500 MG tablet Take 1 tablet (500 mg) by mouth every 6 hours as needed for muscle spasms 20 tablet 0     metoprolol succinate ER (TOPROL XL) 25 MG 24 hr tablet TAKE ONE TABLET BY MOUTH EVERY EVENING 90 tablet 1     multivitamin w/minerals (THERA-VIT-M) tablet Take 1 tablet by mouth Every other day 100 tablet 3     nortriptyline (PAMELOR) 25 MG capsule TAKE ONE CAPSULE BY MOUTH EVERY NIGHT AT BEDTIME (Patient taking differently: Take 25 mg by mouth At Bedtime) 30 capsule 11     polyvinyl alcohol (LIQUIFILM TEARS) 1.4 % ophthalmic solution 1-2 drops       prednisoLONE acetate (PRED FORTE) 1 % ophthalmic susp Place 1 drop Into the left eye daily   2     senna-docusate (SENOKOT-S/PERICOLACE) 8.6-50 MG tablet Take 1-2 tablets by mouth 2 times daily Take while on oral narcotics to prevent or treat constipation. 30 tablet 0     tamsulosin (FLOMAX) 0.4 MG capsule TAKE ONE CAPSULE BY MOUTH ONCE DAILY (Patient taking differently: Take 0.4 mg by mouth daily) 30 capsule 5     triamcinolone (KENALOG) 0.1 % external cream APPLY TOPICALLY TO AFFECTED AREA(S) TWICE DAILY AS DIRECTED 45 g 1     warfarin ANTICOAGULANT (JANTOVEN ANTICOAGULANT) 2 MG tablet Take 2 tablets on Sun/Thurs and 3 tablets all other days, OR AS DIRECTED BY ANTICOAGULATION CLINIC 228 tablet 1       REVIEW OF SYSTEMS:  4 point ROS including Respiratory, CV, GI and , other than that noted in the HPI,   is negative    Objective:   /71   Pulse 87   Temp 97.3  F (36.3  C)   Resp 18   Wt 83.5 kg (184 lb)   SpO2 91%   BMI 29.70 kg/m    Mr. Cunningham is in the recliner with legs elevated and under a blanket.  Alert and fairly pleasant.  Feel like he does not understand everything and so son helps him out.  Chalino looks over to his son for conversation.    Audible congestion and mild wheezing heard and the same in his lungs.  His breathing is not labored and no oxygen is used.   Heart rate regular and strong.  Able to look at both legs as his left knee pant leg is pulled up.  Aquacel dressing present and can see drainage on the dressing but has not seeped out of the dressing.  Does have swelling on the left side more than the right.  This is due to the surgery.  No EDWAR stockings. Mild bruising noted around the knee  Abdomen is soft and non-tender.       Component      Latest Ref Rng & Units 5/7/2022 5/8/2022 5/9/2022 5/12/2022   Sodium      133 - 144 mmol/L 131 (L)   136   Potassium      3.4 - 5.3 mmol/L 4.8   4.2   Chloride      94 - 109 mmol/L 99   103   Carbon Dioxide      20 - 32 mmol/L 26   28   Anion Gap      3 - 14 mmol/L 6   5   Urea Nitrogen      7 - 30 mg/dL 45 (H)   32 (H)   Creatinine      0.66 - 1.25 mg/dL 1.85 (H)   1.22   Calcium      8.5 - 10.1 mg/dL 8.2 (L)   8.4 (L)   Glucose      70 - 99 mg/dL 97   96   GFR Estimate      >60 mL/min/1.73m2 35 (L)   58 (L)   INR      0.85 - 1.15  1.52 (H) 1.58 (H)      INR today 2.0    Assessment/Plan:  (I48.20) Chronic atrial fibrillation (H)  (primary encounter diagnosis)  (Z95.0) Cardiac pacemaker in situ  (Z79.01) Long-term (current) use of anticoagulants [Z79.01]  (Z51.81)  Anticoagulation goal of 2-3  Comment:   First INR done here and so will follow his admission dose of Warfarin 4mg on Su/Th and 6mg all other days of week.  INR will be done Monday for close monitoring as well as a CXR will be done today and expect medications will change either in diuretics  or ABX added.    (R09.89) Pulmonary congestion  Comment: will proceed to order a CXR today.  AP and lateral view.  Son suspected a pneumonia or such as he talked about it to the NP and his father.  Bear asked when they would come and assured him they will come here and they will find him.  Meanwhile feel can wait to see what results are.    (M17.12) Primary osteoarthritis of left knee  (Z96.652) S/P total knee arthroplasty, left  Comment: attending therapies.  Has a walker in his room as well as a wheelchair.  Next week he has an appointment with the surgeon's office.  Tylenol ES prn for pain and is scheduled with Gabapentin at [facility].  There is Robaxin for muscle relaxer too.  Encourage him to use ice to the knee and elevate due to swelling.      Orders:  1.  INR = 2.0  Continue with Warfarin 4mg on Su/TH and 6mg all other days  INR on Monday   2.  CXR today AP and lateral view for coughing and wheezing    Electronically signed by: AVERY Purcell CNP               Sincerely,        AVERY Purcell CNP
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
## 2022-05-19 NOTE — LETTER
May 20, 2022      Ashkan Cunningham  [address] [address]        Dear ,    We are writing to inform you of your test results.    Phosphorus level and ALT liver tests are normal.  Parathyroid hormone level is in process- we are checking this due to your history of reduced kidney function.         Resulted Orders   ALT   Result Value Ref Range    ALT 21 0 - 70 U/L   Phosphorus   Result Value Ref Range    Phosphorus 3.8 2.5 - 4.5 mg/dL       If you have any questions or concerns, please call the clinic at the number listed above.       Sincerely,      Brian Norris MD

## 2022-05-19 NOTE — DISCHARGE INSTRUCTIONS
Take the antibiotics daily as prescribed for the next 5 days.  Return to the emergency department for worsening symptoms, difficulty breathing, repeated vomiting, lightheadedness, or other concerns.  Follow-up in clinic for a recheck.

## 2022-05-19 NOTE — ED TRIAGE NOTES
Pt had a total knee done about 2 weeks ago, per son report pt did have a cough after surgery. Pt wasn't suppose to have anesthesia due to age but spinal did not take. Son reports he has noted his cough and congestion has worsened since. Pt reports he has been coughing stuff up. Pt does have a hx of CHF

## 2022-05-19 NOTE — LETTER
May 23, 2022      Ashkan Cunningham  [address] [facility] [address]        Dear ,    We are writing to inform you of your test results.    Parathyroid hormone level is normal    Resulted Orders   ALT   Result Value Ref Range    ALT 21 0 - 70 U/L   Parathyroid Hormone Intact   Result Value Ref Range    Parathyroid Hormone Intact 19 18 - 80 pg/mL   Phosphorus   Result Value Ref Range    Phosphorus 3.8 2.5 - 4.5 mg/dL       If you have any questions or concerns, please call the clinic at the number listed above.       Sincerely,      Brian Norris MD

## 2022-05-19 NOTE — ED PROVIDER NOTES
History     Chief Complaint   Patient presents with     Cough     Pt had a total knee done about 2 weeks ago, per son report pt did have a cough after surgery. Pt wasn't suppose to have anesthesia due to age but spinal did not take. Son reports he has noted his cough and congestion has worsened since. Pt reports he has been coughing stuff up. Pt does have a hx of CHF      HPI  Nabor Cunningham is a 86 year old male with a history of atrial fibrillation on warfarin and prior diastolic congestive heart failure as well as 2 weeks post left knee replacement who presents for cough.  The patient says he has been coughing for the past week, reports the cough is productive of some clear sputum.  No hemoptysis.  Otherwise he feels well.  He denies feeling short of breath and says that his family wanted him to come in to get it rechecked today, otherwise he would have just gone home.  He was just discharged from the TCU today and had a follow-up appointment for a recheck of his left knee.  He says that the pain in his left leg is pretty much gone and he has some swelling in the leg that is much better than what he has had in the past.  No pain in the calf.  He denies any fever, chills, headache, chest pain, abdominal pain, nausea, vomiting, diarrhea, or rash.    Allergies:  Allergies   Allergen Reactions     Amoxicillin Hives     Penicillins Other (See Comments)     Dizzy       Trazodone Other (See Comments)     Hallucinations         Problem List:    Patient Active Problem List    Diagnosis Date Noted     Left knee DJD 05/05/2022     Priority: Medium     Lymphocytic colitis 04/22/2022     Priority: Medium     7/3/2020:colonoscopy diagnosed with biopsies.        Stage 3b chronic kidney disease (H) 10/15/2021     Priority: Medium     NICM (nonischemic cardiomyopathy) (H) 02/25/2020     Priority: Medium     CAD (coronary artery disease) 02/25/2020     Priority: Medium     1/7/19 Cath: Nonobstructive disease       Cardiac  pacemaker in situ 12/21/2018     Priority: Medium     Dual chamber pacemaker placed in 2016 for sick sinus.       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) 11/30/2018     Priority: Medium     11/30/2018:He has seen cardiology through [facility].  Echo in October, 2018 with EF=49%, diastolic dysfunction, reduced RV systolic function, RV enlargement.  (Report summary in clinic notes 11/30/2018).   Hospitalized 11/22 in [place] for CHF exacerbation acute on chronic.   Nuclear stress test December 2018 with large fixed defect and EF=35%  1/7/2019:Coronary angiogram right and left heart cath:no obstructive disease.        Moderate episode of recurrent major depressive disorder (H) 04/10/2017     Priority: Medium     Anxiety 03/22/2017     Priority: Medium     3/23/2017:He has been on amitriptyline, nortriptyline, buspirone, sertraline and Lexapro in the past year.        Alcohol abuse 03/22/2017     Priority: Medium     7/12/2019:Not currently using alcohol.   None for years.        Chronic atrial fibrillation (H) 03/22/2017     Priority: Medium     S/p PPM 6/2016       Weight loss 03/22/2017     Priority: Medium     Peripheral polyneuropathy 03/22/2017     Priority: Medium     4/7/2017:Abnormal monofilament in distal plantar feet bilateral.  He has had chronic pain in both feet.   11/30/2018:nortriptyline has helped and he was up to 75mg a day.  He had some prolonged QT and dose cut back to 25mg at bedtime in hospital 11/22/2018.       Chronic gout, unspecified cause, unspecified site 03/22/2017     Priority: Medium     Essential hypertension with goal blood pressure less than 140/90 03/22/2017     Priority: Medium     Hyperlipidemia LDL goal <130 03/22/2017     Priority: Medium     Persistent insomnia 03/22/2017     Priority: Medium     11/30/2018:He has been on temazepam in the past which has helped some.  He was on this chronically.  He tried lorazepam which worked for a few days,  then not effective.  Tried mirtazapine which did not help.         Lower urinary tract symptoms (LUTS) 03/22/2017     Priority: Medium     Long-term (current) use of anticoagulants [Z79.01] 03/22/2017     Priority: Medium     Warfarin anticoagulation 10/28/2016     Priority: Medium        Past Medical History:    Past Medical History:   Diagnosis Date     Bleeding from wound 9/2/2018       Past Surgical History:    Past Surgical History:   Procedure Laterality Date     ANGIOGRAM  01/2019    right and left heart cath.      APPENDECTOMY      ~2013     ARTHROPLASTY KNEE Left 5/5/2022    Procedure: Left Total  Knee Arthroplasty;  Surgeon: Heron Espinoza MD;  Location: [facility]     CATARACT IOL, RT/LT      Past bilateral cataract surgery.      COLONOSCOPY N/A 07/03/2020    Procedure: COLONOSCOPY, WITH POLYPECTOMY AND BIOPSY;  Surgeon: Fady Boyer MD;  Location: [facility]     EYE SURGERY Left     three corneal transplants     IMPLANT PACEMAKER       INCISION AND DRAINAGE TRUNK, COMBINED N/A 09/06/2018    Procedure: COMBINED INCISION AND DRAINAGE TRUNK;  incision and cauterization of left buttock bleed.;  Surgeon: Dani Davis MD;  Location: [facility]     INCISION AND DRAINAGE TRUNK, COMBINED N/A 11/01/2018    Procedure:  INCISION AND DRAINAGE of Back Wound;  Surgeon: Dain Davis MD;  Location: [facility]     JOINT REPLACEMENT Right     right total knee.      THORACIC SURGERY      removal benign nodule       Family History:    Family History   Problem Relation Age of Onset     Cancer Brother      Diabetes Brother        Social History:  Marital Status:   [5]  Social History     Tobacco Use     Smoking status: Former Smoker     Years: 20.00     Types: Cigars     Smokeless tobacco: Never Used   Substance Use Topics     Alcohol use: Not Currently     Drug use: No        Medications:    levofloxacin (LEVAQUIN) 500 MG tablet  acetaminophen (TYLENOL) 325 MG tablet  acetaminophen (TYLENOL) 500 MG tablet  allopurinol  "(ZYLOPRIM) 100 MG tablet  atorvastatin (LIPITOR) 20 MG tablet  bismuth subsalicylate (PEPTO-BISMOL) 262 MG TABS  Calcium-Magnesium-Zinc 333-133-5 MG TABS per tablet  cyanocobalamin (VITAMIN B-12) 1000 MCG tablet  furosemide (LASIX) 20 MG tablet  gabapentin (NEURONTIN) 300 MG capsule  lisinopril (ZESTRIL) 5 MG tablet  loperamide (IMODIUM) 2 MG capsule  METAMUCIL FIBER PO  methocarbamol (ROBAXIN) 500 MG tablet  metoprolol succinate ER (TOPROL XL) 25 MG 24 hr tablet  multivitamin w/minerals (THERA-VIT-M) tablet  nortriptyline (PAMELOR) 25 MG capsule  polyvinyl alcohol (LIQUIFILM TEARS) 1.4 % ophthalmic solution  prednisoLONE acetate (PRED FORTE) 1 % ophthalmic susp  senna-docusate (SENOKOT-S/PERICOLACE) 8.6-50 MG tablet  tamsulosin (FLOMAX) 0.4 MG capsule  triamcinolone (KENALOG) 0.1 % external cream  warfarin ANTICOAGULANT (JANTOVEN ANTICOAGULANT) 2 MG tablet          Review of Systems  Pertinent positives and negatives listed in the HPI, all other systems reviewed and are negative.    Physical Exam   BP: 106/75  Pulse: 88  Temp: 97.5  F (36.4  C)  Resp: 18  Height: 165.1 cm (5' 5\")  Weight: 81.2 kg (179 lb)  SpO2: 96 %      Physical Exam  Vitals and nursing note reviewed.   Constitutional:       General: He is in acute distress.      Appearance: He is well-developed. He is not diaphoretic.   HENT:      Head: Normocephalic and atraumatic.      Right Ear: External ear normal.      Left Ear: External ear normal.      Nose: Nose normal.   Eyes:      General: No scleral icterus.     Conjunctiva/sclera: Conjunctivae normal.   Cardiovascular:      Rate and Rhythm: Normal rate and regular rhythm.   Pulmonary:      Effort: Pulmonary effort is normal. No respiratory distress.      Breath sounds: No stridor. Wheezing present.   Abdominal:      General: There is no distension.      Palpations: Abdomen is soft.   Musculoskeletal:      Cervical back: Normal range of motion.      Left lower leg: Edema (to above the knee) present. "      Comments: Left knee: Midline incision down the knee, well-healing without surrounding erythema or discharge   Skin:     General: Skin is warm and dry.      Comments: No rash of the left lower extremity.   Neurological:      Mental Status: He is alert and oriented to person, place, and time.   Psychiatric:         Behavior: Behavior normal.         ED Course                 Procedures              EKG Interpretation:      Interpreted by Sarath Davidson MD  Time reviewed: 1545  Symptoms at time of EKG: Cough   Rhythm: atrial fibrillation - controlled  Rate: 82  Axis: Normal  Ectopy: none  Conduction: left bundle branch block (complete)  ST Segments/ T Waves: No acute ischemic changes  Q Waves: none  Comparison to prior: Unchanged    Clinical Impression: Atrial fibrillation, rate controlled, no acute ischemic changes    Critical Care time:  none               Results for orders placed or performed during the hospital encounter of 05/19/22 (from the past 24 hour(s))   Asymptomatic COVID-19 Virus (Coronavirus) by PCR Nose    Specimen: Nose; Swab   Result Value Ref Range    SARS CoV2 PCR Negative Negative    Narrative    Testing was performed using the michael  SARS-CoV-2 & Influenza A/B Assay on the michael  Apple  System.  This test should be ordered for the detection of SARS-COV-2 in individuals who meet SARS-CoV-2 clinical and/or epidemiological criteria. Test performance is unknown in asymptomatic patients.  This test is for in vitro diagnostic use under the FDA EUA for laboratories certified under CLIA to perform moderate and/or high complexity testing. This test has not been FDA cleared or approved.  A negative test does not rule out the presence of PCR inhibitors in the specimen or target RNA in concentration below the limit of detection for the assay. The possibility of a false negative should be considered if the patient's recent exposure or clinical presentation suggests COVID-19.  [facility]  Laboratories are certified under the Clinical Laboratory Improvement Amendments of 1988 (CLIA-88) as qualified to perform moderate and/or high complexity laboratory testing.   XR Chest Port 1 View    Narrative    CHEST PORTABLE ONE VIEW  5/19/2022 4:07 PM     HISTORY: Cough.    COMPARISON: 11/10/2018      Impression    IMPRESSION: Stable cardiomegaly. Hazy airspace opacity through the  right mid and lower lung could be due to infiltrate. Likely elevation  of the right hemidiaphragm. Small right pleural effusion cannot be  excluded. No left-sided effusion. No pneumothorax. Dual lead left  chest pacemaker is unchanged. Surgical clips are seen over the right  axilla/right lateral chest wall. Degenerative changes are noted  through the spine.     KAYLA BILLINGSLEY MD         SYSTEM ID:  [id number]   INR   Result Value Ref Range    INR 3.21 (H) 0.85 - 1.15   Nt probnp inpatient   Result Value Ref Range    N terminal Pro BNP Inpatient 804 0 - 1,800 pg/mL   Troponin I   Result Value Ref Range    Troponin I High Sensitivity 12 <79 ng/L   Basic metabolic panel   Result Value Ref Range    Sodium 136 133 - 144 mmol/L    Potassium 4.9 3.4 - 5.3 mmol/L    Chloride 99 94 - 109 mmol/L    Carbon Dioxide (CO2) 30 20 - 32 mmol/L    Anion Gap 7 3 - 14 mmol/L    Urea Nitrogen 20 7 - 30 mg/dL    Creatinine 1.33 (H) 0.66 - 1.25 mg/dL    Calcium 8.9 8.5 - 10.1 mg/dL    Glucose 107 (H) 70 - 99 mg/dL    GFR Estimate 53 (L) >60 mL/min/1.73m2   CBC with Platelets & Differential    Narrative    The following orders were created for panel order CBC with Platelets & Differential.  Procedure                               Abnormality         Status                     ---------                               -----------         ------                     CBC with platelets and d...[[id number]]  Abnormal            Final result                 Please view results for these tests on the individual orders.   CBC with platelets and differential   Result  Value Ref Range    WBC Count 8.9 4.0 - 11.0 10e3/uL    RBC Count 3.45 (L) 4.40 - 5.90 10e6/uL    Hemoglobin 10.0 (L) 13.3 - 17.7 g/dL    Hematocrit 31.1 (L) 40.0 - 53.0 %    MCV 90 78 - 100 fL    MCH 29.0 26.5 - 33.0 pg    MCHC 32.2 31.5 - 36.5 g/dL    RDW 15.2 (H) 10.0 - 15.0 %    Platelet Count 216 150 - 450 10e3/uL    % Neutrophils 84 %    % Lymphocytes 6 %    % Monocytes 6 %    % Eosinophils 2 %    % Basophils 1 %    % Immature Granulocytes 1 %    NRBCs per 100 WBC 0 <1 /100    Absolute Neutrophils 7.5 1.6 - 8.3 10e3/uL    Absolute Lymphocytes 0.6 (L) 0.8 - 5.3 10e3/uL    Absolute Monocytes 0.5 0.0 - 1.3 10e3/uL    Absolute Eosinophils 0.2 0.0 - 0.7 10e3/uL    Absolute Basophils 0.0 0.0 - 0.2 10e3/uL    Absolute Immature Granulocytes 0.0 <=0.4 10e3/uL    Absolute NRBCs 0.0 10e3/uL       Medications - No data to display    Assessments & Plan (with Medical Decision Making)   86-year-old male with a history of atrial fibrillation on warfarin, diastolic congestive heart failure, and is 2 weeks post knee replacement who presents for cough.  Blood pressure is 106/75, temperature is 36.4  C, heart rate is 88, SPO2 is 96% on room air.  EKG shows rate controlled atrial fibrillation without ischemia.  Troponin is normal making ACS unlikely.  NT proBNP is normal making heart failure unlikely.  White blood cell count is 8.9 and his hemoglobin is 10.  Electrolytes are within normal limits.  His INR is slightly supratherapeutic at 3.2.  Chest x-ray obtained, images reviewed independently as well as radiology read reviewed, positive for infiltrate in the right lung.  COVID test is negative.  Given the patient's cough with this infiltrate I will treat him for pneumonia.  At this time with him breathing comfortably on room air with normal oxygen saturations and reassuring work-up as above I do believe that he is safe for outpatient management with oral levofloxacin.  He is told to return if he has worsening of his symptoms or  other concerns.  He is scheduled for follow-up appointment in the clinic tomorrow for recheck.  The patient and his son are in agreement with this plan.    I have reviewed the nursing notes.    I have reviewed the findings, diagnosis, plan and need for follow up with the patient.       New Prescriptions    LEVOFLOXACIN (LEVAQUIN) 500 MG TABLET    Take 1 tablet (500 mg) by mouth daily for 5 days       Final diagnoses:   Community acquired pneumonia of right lower lobe of lung       5/19/2022   [facility] EMERGENCY DEPT     Sarath Davidson MD  05/19/22 [id number]

## 2022-05-20 NOTE — TELEPHONE ENCOUNTER
ANTICOAGULATION  MANAGEMENT: Discharge Review    Nabor Cunningham chart reviewed for anticoagulation continuity of care    Emergency room visit on 5/19 for Community acquired pneumonia of right lower lobe of lung.    Discharge disposition: [facility] to discharge today    Results:    Recent labs: (last 7 days)     05/19/22  1724   INR 3.21*     Anticoagulation inpatient management:     not applicable     Anticoagulation discharge instructions:     Warfarin dosing: home regimen continued   Bridging: No   INR goal change: No      Medication changes affecting anticoagulation: Yes: Levaquin    Additional factors affecting anticoagulation: No    Plan     Recommend to check INR on 5/23    Patient not contacted - pt in clinic this morning for other appts. [facility] to contact this afternoon to schedule INR for next week.    Anticoagulation Calendar updated    Jayde Whitt RN

## 2022-05-20 NOTE — PROGRESS NOTES
Clinic Care Coordination Contact    Clinic Care Coordination Contact  OUTREACH    Referral Information:  Referral Source: IP Report    Primary Diagnosis: Orthopedic (Pneumonia)    Chief Complaint   Patient presents with     Clinic Care Coordination - Post Hospital        Universal Utilization:   Hospital ED visit 5/19/2022 Pneumonia   Clinic Utilization  Difficulty keeping appointments:: No  Compliance Concerns: No  No-Show Concerns: No  No PCP office visit in Past Year: No  Utilization    Hospital Admissions  1             ED Visits  1             No Show Count (past year)  0                Current as of: 5/20/2022  8:06 AM              Clinical Concerns:  Current Medical Concerns:  CC RN spoke to Florencio /son and he reports the patient was discharge to home after ED visit.  [facility]  Florencio is staying with the patient for a few days   Patient is feeling rough and continues to have a productive cough  Patient has a hospital follow up with Dr Luther today  Patient will finish the course of antibiotic     Current Behavioral Concerns: No    Education Provided to patient: CC role introduced   Introductory letter and care plan sent via My Chart    Pain  Pain (GOAL):: No  Health Maintenance Reviewed: Not assessed  Clinical Pathway: Clinic Care Coordination Pneumonia Assessment  Discharge:        Day of hospital discharge: 5/19/22    What recommendations were made for follow up after your recent  hospitalization? Antibiotic     Have the follow up appointments been scheduled? Yes    If not, can I help you set up these appointments? No         Is there a referral/need for Pulmonary Rehab? No    Is the patient enrolled in Shock Treatment Management Tele-Assurance program? No    Symptom Review:    How have you been feeling now that you are home?  Son reports the patient is feeling rough     Are you having any increased shortness of breath? No    Are you having any fevers? No    When  you cough, are you coughing up anything? Yes  Medications:     Were you started on any new medications?  Yes, Antibiotic     Were any of your previous medications changed? No    Do you have all of your medications? Yes,     Have you had trouble filling your prescriptions? No    Are your medications effective in controlling your symptoms? Yes,     Are you currently on Prednisone (* does pt understand the tapering instructions)?  No    Was MTM or Diabetic Education referral placed (*Make sure to put transitions as reason for referral)? No    Oxygen/DME    Are you currently on oxygen? No   Activity:    How much activity can you do before you are SOB? Pacing activity     Were you instructed on how to cough and deep breathe? Yes, reviewed again today   Diet:  Are there any current diet restrictions or changes per discharge instructions? No  Emotions/Lifestyle:      Do you smoke? reports that he has quit smoking. His smoking use included cigars. He quit after 20.00 years of use. He has never used smokeless tobacco.    Medication Management:  Medication review status: Medications reviewed.  Changes noted per patient report.       Functional Status:  Dependent ADLs:: Ambulation-walker  Dependent IADLs:: Transportation, Meal Preparation, Medication Management  Mobility Status: Independent w/Device  Fallen 2 or more times in the past year?: No  Any fall with injury in the past year?: No    Living Situation:  Current living arrangement:: I live in a private home    Lifestyle & Psychosocial Needs:    Social Determinants of Health     Tobacco Use: Medium Risk     Smoking Tobacco Use: Former Smoker     Smokeless Tobacco Use: Never Used   Alcohol Use: Not on file   Financial Resource Strain: Not on file   Food Insecurity: Not on file   Transportation Needs: Not on file   Physical Activity: Not on file   Stress: Not on file   Social Connections: Not on file   Intimate Partner Violence: Not on file   Depression: Not at risk      PHQ-2 Score: 0   Housing Stability: Not on file     Diet:: Regular  Inadequate nutrition (GOAL):: No  Tube Feeding: No  Inadequate activity/exercise (GOAL):: No  Significant changes in sleep pattern (GOAL): No  Transportation means:: Family     Restoration or spiritual beliefs that impact treatment:: No  Mental health DX:: Yes  Mental health DX how managed:: None  Mental health management concern (GOAL):: No  Chemical Dependency Status: No Current Concerns  Informal Support system:: Children           Resources and Interventions:  Current Resources:      Community Resources: Transitional Care  Supplies Currently Used at Home: None  Equipment Currently Used at Home: shower chair, grab bar, tub/shower, grab bar, toilet, raised toilet seat, walker, rolling  Employment Status: retired         Advance Care Plan/Directive  Advanced Care Plans/Directives on file:: No    Referrals Placed: None       Goals:    Goals        General     Medical (pt-stated)      Notes - Note created  5/20/2022  9:32 AM by Marysol Denis RN     Goal Statement: My Pneumonia symptoms will be resolved in the next month   Date Goal set: 5/20/2022  Barriers: Deconditioned   Strengths: supportive son   Date to Achieve By: 6/20/2022  Patient expressed understanding of goal: Bandar Matias agrees with the plan   Action steps to achieve this goal:  1. I will finish the course of antibiotic   2. I will cough,deep breathe and drink plenty of water   3. I will keep future provider appointments               Patient/Caregiver understanding: Son expresses good understanding of discharge instructions     Outreach Frequency: weekly  Future Appointments              Today Brian Norris MD [facility]  Arrive at: Clinic A    In 2 months CEVALLOS TECH1 [facility]          Plan:   Patient will keep hospital follow up today  Patient will [facility] today and hopefully  discharge soon   [facility]   Marysol Denis RN, Care Coordinator   [facility]'s   E-mail [email or web address]   [phone]

## 2022-05-20 NOTE — TELEPHONE ENCOUNTER
Writer able to reach son, Florencio. The patient is indeed discharged home from TCU. INR set up for 5-23-22.    Belia Jones, RN, BSN, PHN

## 2022-05-20 NOTE — PROGRESS NOTES
Assessment & Plan     Pneumonia of right lung due to infectious organism, unspecified part of lung    Ashkan was diagnosed with pneumonia at an ER visit yesterday.  Feeling about the same today.  We'll continue the current levofloxacin.  SpO2 in clinic was initially on the low side at 90, but after deep breathing, he was able to improve this to the mid 90s.  Recommend IS exercises at home to help prevent atelectasis and help with oxygenation; incentive spirometer provided.  Recommend getting a pulse ox for home monitoring.  He is supposed to discharge from TCU today and is very worried that this pneumonia will keep him there longer.  Discussed that I certainly think it is reasonable for him to stay at TCU longer for monitoring, but he states the facility is ill equipped for his needs and feels he would do better at home.  He will discuss this further with the TCU upon returning after our appointment.  Recommend CXR in 1 month to ensure resolution of findings.     - XR Chest 2 Views; Future    Stage 3a chronic kidney disease (H)    Labs added for monitoring:    - Parathyroid Hormone Intact; Future  - Phosphorus; Future  - Albumin Random Urine Quantitative with Creat Ratio; Future    Chronic combined systolic and diastolic congestive heart failure (H)    Labs added for monitoring:    - ALT; Future      33 minutes spent on the date of the encounter doing chart review, history and exam, documentation and further activities per the note         Return in about 1 month (around 6/20/2022) for Repeat chest x-ray.    Brian Norris MD  [facility]    Adarsh Luther is a 86 year old who presents for the following health issues     HPI     ED/UC Followup:    Facility:  [facility] ED  Date of visit: 05/19/2022  Reason for visit: pneumonia of right lower lobe of lung, cough  Current Status: about the same as when he was in the ED yesterday.  Doesn't feel too SOB.  Had a lot of coughing,  "gurgling, hacking last night.  Couldn't find a good position to sleep, so finished the night in the recliner to sleep and that was better.         Ashkan was seen in the ED yesterday for cough s/p TKA 2 weeks prior.  Work-up showed pneumonia and he was started on levofloxacin.  COVID test was negative.        Review of Systems   Constitutional, pulmonary systems are negative, except as otherwise noted.      Objective    /72 (BP Location: Right arm, Patient Position: Sitting, Cuff Size: Adult Regular)   Pulse 95   Temp 97.6  F (36.4  C) (Tympanic)   Resp 16   Ht 1.651 m (5' 5\")   Wt 81.2 kg (179 lb)   SpO2 90%   BMI 29.79 kg/m    Body mass index is 29.79 kg/m .   SpO2 up to 96% with deep breath  Physical Exam   GENERAL: alert and no distress  RESP: rhonchi throughout  CV: regular rate and rhythm, normal S1 S2, no S3 or S4, no murmur, click or rub, no peripheral edema and peripheral pulses strong          "

## 2022-05-20 NOTE — PATIENT INSTRUCTIONS
Do 10 breaths on the incentive spirometer every 2 hours or so to keep the lungs well inflated.    Continue current levofloxacin antibiotic.    Continue to monitor your oxygen level and let us know if it is getting below 92.       Follow-up as needed if not improving in a week or new/worsening symptoms develop.         *    I'd recommend checking with your insurance to see if they cover the new shingles vaccine, Shingrix.  This vaccine is much more effective than the older shingles vaccine.  Some insurances only cover this if you get it at the pharmacy rather than the clinic, so either check on this or just plan to get the vaccine at a pharmacy.      You are also due for a tetanus booster- your insurance may only cover this at the pharmacy as well, so I would check at a pharmacy for this.

## 2022-05-23 NOTE — TELEPHONE ENCOUNTER
Reason for Call:  AMANDA     Detailed comments:      Letting Dr. Mayo know they received the referral -  they are accenting the Patient     .[facility]*Hospice/ [facility]  - [facility]  Nurse DOMINICK     Tele # [phone]    Call taken on 5/23/2022 at 4:19 PM by Cee Michel

## 2022-05-23 NOTE — PROGRESS NOTES
Patient is here in clinic with son asking for referral for home care PT and they will also do INR checks  Recent knee surgery.  Homebound  Referral pended in a telephone encounter for Dr Mayo to sign  Ellen Perez RN

## 2022-05-23 NOTE — PROGRESS NOTES
ANTICOAGULATION MANAGEMENT     Nabor Cunningham 86 year old male is on warfarin with supratherapeutic INR result. (Goal INR 2.0-3.0)    Recent labs: (last 7 days)     05/23/22  1349   INR 3.7*       ASSESSMENT       Source(s): Chart Review and Patient/Caregiver Call       Warfarin doses taken: Warfarin taken as instructed    Diet: No new diet changes identified    New illness, injury, or hospitalization: Yes: 5/19 community acquired pneumonia     Medication/supplement changes: Levaquin - took last dose today    Signs or symptoms of bleeding or clotting: No    Previous INR: Supratherapeutic    Additional findings: recent left total knee arthroplasty      PLAN     Recommended plan for temporary change(s) affecting INR     Dosing Instructions: hold dose then continue your current warfarin dose with next INR in 1 week       Summary  As of 5/23/2022    Full warfarin instructions:  5/23: Hold; Otherwise 4 mg every Sun, Thu; 6 mg all other days   Next INR check:  5/31/2022             Telephone call with  Florencio who verbalizes understanding and agrees to plan    Lab visit scheduled    Education provided: Please call back if any changes to your diet, medications or how you've been taking warfarin and Monitoring for bleeding signs and symptoms    Plan made per [facility] anticoagulation protocol    Jayde Whitt RN  Anticoagulation Clinic  5/23/2022    _______________________________________________________________________     Anticoagulation Episode Summary     Current INR goal:  2.0-3.0   TTR:  65.2 % (1 y)   Target end date:  Indefinite   Send INR reminders to:  [facility]    Indications    Chronic atrial fibrillation (H) [I48.20]  Long-term (current) use of anticoagulants [Z79.01] [Z79.01]           Comments:           Anticoagulation Care Providers     Provider Role Specialty Phone number    Heron Mayo MD Referring Family Medicine [phone]            
no

## 2022-05-23 NOTE — TELEPHONE ENCOUNTER
Patient is here in clinic with son asking for referral for home care PT and they will also do INR checks  Recent knee surgery.  Homebound  Referral pended.   Ellen Perez RN

## 2022-05-24 NOTE — TELEPHONE ENCOUNTER
Nursing from [facility] called this NP this morning to explain that Ashkan went to the ortho appointment yesterday and was told he could go home and was not brought back to the nursing home yesterday.  The son even took him to the ED where he was diagnosed with community acquired pneumonia.    Nursing has been trying to speak with the son about the financial ramifications of taking him home and not returning him to the nursing home.  It is considered AMA.    If this NP states it is ok to return home with medications then order will be given but family will have to find further therapies as the TCU will not set up home care and then it will not be considered AMA to leave.  Family obviously did not understand how the system works.    Ok for the discharge with medication list only.    Electronically signed by Klaudia Valdez RN, CNP

## 2022-05-25 NOTE — TELEPHONE ENCOUNTER
Rach calling back from [facility]. Needed to add social work eval for community resources.    Cris Red Patient

## 2022-05-25 NOTE — TELEPHONE ENCOUNTER
Reason for Call: Request for an order or referral:    Order or referral being requested: Rach with [facility] is calling on the following Verbal Orders:  SN  1 x week for 9 weeks  3 PRN's  PT and OT Evals  HHC 1 x week for 3 weeks  Also noting that pt states not taking florina-docusate instead will do 1 tsp of mineral oil      Date needed: as soon as possible    Has the patient been seen by the PCP for this problem? Not Applicable    Additional comments: none    Phone number Patient can be reached at:  Other phone number:  Rach is at [phone]    Best Time:  any    Can we leave a detailed message on this number?  YES    Call taken on 5/25/2022 at 9:30 AM by Marietta Koroma

## 2022-05-25 NOTE — PROGRESS NOTES
Clinic Care Coordination Contact    Clinic Care Coordination Contact  OUTREACH    Referral Information:  Referral Source: IP Report    Primary Diagnosis: Pneumonia )    Chief Complaint   Patient presents with     Clinic Care Coordination - Post Hospital     Clinic Care Coordination RN         Universal Utilization:   Date of Admission:                  5/5/2022  Date of Discharge::                 5/9/2022  Left Total  Knee Arthroplasty (Left)  Clinic Utilization  Difficulty keeping appointments:: No  Compliance Concerns: No  No-Show Concerns: No  No PCP office visit in Past Year: No  Utilization    Hospital Admissions  1             ED Visits  1             No Show Count (past year)  0                Current as of: 5/24/2022 11:43 PM              Clinical Concerns:  Current Medical Concerns:  CC RN spoke to Florencio/son and he reports the patient had a [facility] visit yesterday  Patient will have home care RN visit once a week for 9 weeks   Home care RN states his lungs don't sound too bad.  CC RN encouraged son to have patient take deep breaths and drink plenty of water  Son agrees with the plan     Current Behavioral Concerns:No  Education Provided to patient: see above    Pain  Pain (GOAL):: No  Health Maintenance Reviewed: Not assessed  Clinical Pathway: Clinic Care Coordination Pneumonia Assessment    Discharge    Day of ED hospital ED visit : 5/19/22    TCU discharge 5/23/2022    What recommendations were made for follow up after your recent  hospitalization? Home care services     Have the follow up appointments been scheduled? Yes    If not, can I help you set up these appointments? No     Is there a referral/need for Pulmonary Rehab? No    Symptom Review:    How have you been feeling now that you are home?  Better per son     Are you having any increased shortness of breath? No    Are you having any fevers? No    When you cough, are you coughing up anything? Yes per son       Medications:     Were you  started on any new medications?  No    Were any of your previous medications changed? No    Do you have all of your medications? Yes,     Have you had trouble filling your prescriptions? No    Are your medications effective in controlling your symptoms? Yes,     Was MTM or Diabetic Education referral placed (*Make sure to put transitions as reason for referral)? No    Oxygen/DME    Are you currently on oxygen? No   Activity:    How much activity can you do before you are SOB? Pacing activity     Were you instructed on how to cough and deep breathe? Yes, Reviewed again today   Diet:    Are there any current diet restrictions or changes per discharge instructions? No  Emotions/Lifestyle:      Do you smoke? reports that he has quit smoking. His smoking use included cigars. He quit after 20.00 years of use. He has never used smokeless tobacco.    Medication Management:  Medication review status: Reviewed by home care RN and no questions   Functional Status:  Dependent ADLs:: Ambulation-walker  Dependent IADLs:: Transportation, Meal Preparation, Medication Management  Bed or wheelchair confined:: No  Mobility Status: Independent w/Device    Living Situation:  Current living arrangement:: I live in a private home    Lifestyle & Psychosocial Needs:    Social Determinants of Health     Tobacco Use: Medium Risk     Smoking Tobacco Use: Former Smoker     Smokeless Tobacco Use: Never Used   Alcohol Use: Not on file   Financial Resource Strain: Low Risk      Difficulty of Paying Living Expenses: Not very hard   Food Insecurity: No Food Insecurity     Worried About Running Out of Food in the Last Year: Never true     Ran Out of Food in the Last Year: Never true   Transportation Needs: No Transportation Needs     Lack of Transportation (Medical): No     Lack of Transportation (Non-Medical): No   Physical Activity: Inactive     Days of Exercise per Week: 0 days     Minutes of Exercise per Session: 0 min   Stress: Not on file    Social Connections: Unknown     Frequency of Communication with Friends and Family: Twice a week     Frequency of Social Gatherings with Friends and Family: Not on file     Attends Jew Services: Not on file     Active Member of Clubs or Organizations: Not on file     Attends Club or Organization Meetings: Not on file     Marital Status: Not on file   Intimate Partner Violence: Not on file   Depression: Not at risk     PHQ-2 Score: 0   Housing Stability: Not on file     Diet:: Regular  Inadequate nutrition (GOAL):: No  Tube Feeding: No  Inadequate activity/exercise (GOAL):: No  Significant changes in sleep pattern (GOAL): No  Transportation means:: Family     Jew or spiritual beliefs that impact treatment:: No  Mental health DX:: Yes  Mental health DX how managed:: None  Mental health management concern (GOAL):: No  Chemical Dependency Status: No Current Concerns  Informal Support system:: Children           Resources and Interventions:  Current Resources:   Skilled Home Care Services: Skilled Nursing, Home Health Aid, Physicial Therapy (SW)  Community Resources: Home Care  Supplies Currently Used at Home: None  Equipment Currently Used at Home: shower chair, grab bar, tub/shower, grab bar, toilet, raised toilet seat, walker, rolling  Employment Status: retired         Advance Care Plan/Directive  Advanced Care Plans/Directives on file:: No    Referrals Placed: None         Goals:    Goals        General     Medical (pt-stated)      Notes - Note created  5/20/2022  9:32 AM by Marysol Denis RN     Goal Statement: My Pneumonia symptoms will be resolved in the next month   Date Goal set: 5/20/2022  Barriers: Deconditioned   Strengths: supportive son   Date to Achieve By: 6/20/2022  Patient expressed understanding of goal: Bandar Matias agrees with the plan   Action steps to achieve this goal:  1. I will finish the course of antibiotic   2. I will cough,deep breathe and drink plenty of water   3. I will keep  future provider appointments               Patient/Caregiver understanding: Son expresses good understanding of discharge instructions     Outreach Frequency: 2 weeks  Future Appointments              [facility] [facility]    In 3 weeks [id number] [place], [facility]    In 2 months CEVALLOS TECH1 [facility], [facility] CLIN          Plan:   Patient will continue [facility] services   CCRN will follow up in 1-2 weeks     [facility]   Marysol Denis RN, Care Coordinator   [facility]'s   E-mail [email or web address]   [phone]

## 2022-05-27 NOTE — TELEPHONE ENCOUNTER
Reason for Call:  Other    Detailed comments: Requesting PT 2 times a week for 3 weeks. Focusing on strength, range of motion, and mobility.    Phone Number can be reached at: Other phone number:  [phone]  [facility]    Best Time: anytime    Can we leave a detailed message on this number? YES    Call taken on 5/27/2022 at 12:14 PM by Cris Red

## 2022-05-31 NOTE — PROGRESS NOTES
ANTICOAGULATION MANAGEMENT     Nabor Cunningham 86 year old male is on warfarin with supratherapeutic INR result. (Goal INR 2.0-3.0)    Recent labs: (last 7 days)     05/31/22  1227   INR 3.5*       ASSESSMENT       Source(s): Chart Review and Home Care/Facility Nurse       Warfarin doses taken: Warfarin taken as instructed    Diet: No new diet changes identified    New illness, injury, or hospitalization: Yes: left TKA    Medication/supplement changes: None noted    Signs or symptoms of bleeding or clotting: No    Previous INR: Supratherapeutic    Additional findings: None       PLAN     Recommended plan for no diet, medication or health factor changes affecting INR     Dosing Instructions: decrease your warfarin dose (5.3% change) with next INR in 1 week       Summary  As of 5/31/2022    Full warfarin instructions:  4 mg every Sun, Tue, Thu; 6 mg all other days   Next INR check:  6/6/2022             Telephone call with Rach home care/facility nurse who verbalizes understanding and agrees to plan    Orders given to  Homecare nurse/facility to recheck    Education provided: None required    Plan made per [facility] anticoagulation protocol    Belia Butcher, RN  Anticoagulation Clinic  5/31/2022    _______________________________________________________________________     Anticoagulation Episode Summary     Current INR goal:  2.0-3.0   TTR:  64.6 % (1 y)   Target end date:  Indefinite   Send INR reminders to:  [facility]    Indications    Chronic atrial fibrillation (H) [I48.20]  Long-term (current) use of anticoagulants [Z79.01] [Z79.01]           Comments:           Anticoagulation Care Providers     Provider Role Specialty Phone number    Heron Mayo MD Referring Family Medicine [phone]

## 2022-05-31 NOTE — TELEPHONE ENCOUNTER
Reason for Call:  YASHIRAI Declining Home Health Aide    Detailed comments: Pt is declining Home Health Aide- Pt is able to shower with out difficulty.     .[facility]*Hospice/ [facility]  - [facility]  Nurse Rach     Tele # [phone]      Call taken on 5/31/2022 at 12:16 PM by Cee Michel

## 2022-06-06 NOTE — PROGRESS NOTES
ANTICOAGULATION MANAGEMENT     Nabor Cunningham 86 year old male is on warfarin with supratherapeutic INR result. (Goal INR 2.0-3.0)    Recent labs: (last 7 days)     06/06/22  1706   INR 3.1*       ASSESSMENT       Source(s): Chart Review and Home Care/Facility Nurse       Warfarin doses taken: Warfarin taken as instructed    Diet: No new diet changes identified    New illness, injury, or hospitalization: No    Medication/supplement changes: None noted    Signs or symptoms of bleeding or clotting: No    Previous INR: Supratherapeutic    Additional findings: None       PLAN     Recommended plan for no diet, medication or health factor changes affecting INR     Dosing Instructions: continue your current warfarin dose with next INR in 1 week       Summary  As of 6/6/2022    Full warfarin instructions:  6 mg every Mon, Wed, Fri; 4 mg all other days   Next INR check:  6/15/2022             Telephone call with Rach home care/facility nurse who verbalizes understanding and agrees to plan    Orders given to  Homecare nurse/facility to recheck    Education provided: None required    Plan made per [facility] anticoagulation protocol    Belia Butcher, RN  Anticoagulation Clinic  6/6/2022    _______________________________________________________________________     Anticoagulation Episode Summary     Current INR goal:  2.0-3.0   TTR:  62.9 % (1 y)   Target end date:  Indefinite   Send INR reminders to:  [facility]    Indications    Chronic atrial fibrillation (H) [I48.20]  Long-term (current) use of anticoagulants [Z79.01] [Z79.01]           Comments:           Anticoagulation Care Providers     Provider Role Specialty Phone number    Heron Mayo MD Referring Family Medicine [phone]

## 2022-06-13 NOTE — TELEPHONE ENCOUNTER
"Requested Prescriptions   Pending Prescriptions Disp Refills     lisinopril (ZESTRIL) 5 MG tablet [Pharmacy Med Name: LISINOPRIL 5MG TABS] 180 tablet 1     Sig: TAKE TWO TABLETS BY MOUTH ONCE DAILY       ACE Inhibitors (Including Combos) Protocol Failed - 6/12/2022  5:02 AM        Failed - Normal serum creatinine on file in past 12 months     Recent Labs   Lab Test 05/19/22  1725   CR 1.33*       Ok to refill medication if creatinine is low          Passed - Blood pressure under 140/90 in past 12 months     BP Readings from Last 3 Encounters:   05/20/22 118/72   05/19/22 106/75   05/16/22 111/78                 Passed - Recent (12 mo) or future (30 days) visit within the authorizing provider's specialty     Patient has had an office visit with the authorizing provider or a provider within the authorizing providers department within the previous 12 mos or has a future within next 30 days. See \"Patient Info\" tab in inbasket, or \"Choose Columns\" in Meds & Orders section of the refill encounter.              Passed - Medication is active on med list        Passed - Patient is age 18 or older        Passed - Normal serum potassium on file in past 12 months     Recent Labs   Lab Test 05/19/22  1725   POTASSIUM 4.9                  "

## 2022-06-15 NOTE — PROGRESS NOTES
ANTICOAGULATION MANAGEMENT     Nabor Cunningham 86 year old male is on warfarin with therapeutic INR result. (Goal INR 2.0-3.0)    Recent labs: (last 7 days)     06/15/22  1431   INR 2.0*       ASSESSMENT       Source(s): Chart Review and Home Care/Facility Nurse       Warfarin doses taken: Warfarin taken as instructed    Diet: No new diet changes identified    New illness, injury, or hospitalization: No    Medication/supplement changes: None noted    Signs or symptoms of bleeding or clotting: No    Previous INR: Supratherapeutic    Additional findings: None       PLAN     Recommended plan for no diet, medication or health factor changes affecting INR     Dosing Instructions: continue your current warfarin dose with next INR in 1 week       Summary  As of 6/15/2022    Full warfarin instructions:  6 mg every Mon, Wed, Fri; 4 mg all other days   Next INR check:  6/22/2022             Telephone call with Vandana home care/facility nurse who agrees to plan and repeated back plan correctly    Orders given to  Homecare nurse/facility to recheck    Education provided: Please call back if any changes to your diet, medications or how you've been taking warfarin    Plan made per [facility] anticoagulation protocol    Samara Slater, RN  Anticoagulation Clinic  6/15/2022    _______________________________________________________________________     Anticoagulation Episode Summary     Current INR goal:  2.0-3.0   TTR:  62.7 % (1 y)   Target end date:  Indefinite   Send INR reminders to:  [facility]    Indications    Chronic atrial fibrillation (H) [I48.20]  Long-term (current) use of anticoagulants [Z79.01] [Z79.01]           Comments:  Home Care/[facility]         Anticoagulation Care Providers     Provider Role Specialty Phone number    Heron Mayo MD Referring Family Medicine [phone]

## 2022-06-22 NOTE — PROGRESS NOTES
ANTICOAGULATION MANAGEMENT     Nabor Cunningham 86 year old male is on warfarin with therapeutic INR result. (Goal INR 2.0-3.0)    Recent labs: (last 7 days)     06/22/22  1319   INR 2.4*       ASSESSMENT       Source(s): Chart Review and Home Care/Facility Nurse       Warfarin doses taken: Warfarin taken as instructed    Diet: No new diet changes identified    New illness, injury, or hospitalization: No    Medication/supplement changes: None noted    Signs or symptoms of bleeding or clotting: No    Previous INR: Therapeutic last visit; previously outside of goal range    Additional findings: Home care discharging today.       PLAN     Recommended plan for no diet, medication or health factor changes affecting INR     Dosing Instructions: continue your current warfarin dose with next INR in 2 weeks       Summary  As of 6/22/2022    Full warfarin instructions:  6 mg every Mon, Wed, Fri; 4 mg all other days   Next INR check:  7/6/2022             Telephone call with Rach home care/facility nurse who verbalizes understanding and agrees to plan    Lab visit scheduled    Education provided: Goal range and significance of current result    Plan made per [facility] anticoagulation protocol    Bandar Delacruz RN  Anticoagulation Clinic  6/22/2022    _______________________________________________________________________     Anticoagulation Episode Summary     Current INR goal:  2.0-3.0   TTR:  62.7 % (1 y)   Target end date:  Indefinite   Send INR reminders to:  [facility] [place]    Indications    Chronic atrial fibrillation (H) [I48.20]  Long-term (current) use of anticoagulants [Z79.01] [Z79.01]           Comments:  Home Care/[facility]         Anticoagulation Care Providers     Provider Role Specialty Phone number    Heron Mayo MD Referring Family Medicine [phone]

## 2022-06-23 NOTE — PROGRESS NOTES
Clinic Care Coordination Contact  [facility]/Voicemail    Referral Source: IP Report  Clinical Data:   ED visit 5/19/2022   Community acquired pneumonia of right lower lobe of lung       Care Coordinator Outreach  Outreach attempted x 1.  Left message on patient's voicemail with call back information and requested return call.  Plan: . Care Coordinator will try to reach patient again in 10  business days.  [facility]   Marysol Denis RN, Care Coordinator   [facility]'s   E-mail [email or web address]   [phone]

## 2022-06-28 NOTE — RESULT ENCOUNTER NOTE
Covering for primary/ordering provider - Gregory is no longer with [facility].    There is ongoing pneumonia seen.  Please have patient seen this week by PCP or another provider to discuss further

## 2022-06-28 NOTE — RESULT ENCOUNTER NOTE
Please call son or daughter.   His Chest CT is abnormal. Suggests pneumonia or inflammation.    Enlarged lymph nodes.   Lung nodules do not appear worrisome.   PLAN: He has not been seen since 5/20 and the practitioner who saw him is no longer working at [facility].  I have no idea how he is doing.   He should be seen again.  If ill, he should be seen soon.  If doing OK, this could wait a week or two.   VICTOR HUGO ART MD

## 2022-07-01 NOTE — PATIENT INSTRUCTIONS
ASSESSMENT:   (R93.89) Abnormal chest CT  (primary encounter diagnosis)  Comment: chronic changes.  NO clinical signs of pneumonia at this time.   Plan: I will do e-consult with pulmonary for help.   NO changes at this time pending that consultation.     (R19.7) Diarrhea, unspecified type  Comment: this is from the lymphocytic colitis.   It can come and go.   Plan: Try loperamide (Imodium-brand name) 2 pills at first loose stool, then 1 after each loose stool up to maximum of 8 pills a day for symptom relief.  This over the counter medication is available without a prescription.      (G62.9) Peripheral polyneuropathy  Comment: needs refill.  The gabapentin helps.   Plan: gabapentin (NEURONTIN) 300 MG capsule        No change in current treatment plan.  REfills.

## 2022-07-01 NOTE — PROGRESS NOTES
ASSESSMENT:   (R93.89) Abnormal chest CT  (primary encounter diagnosis)  Comment: chronic changes.  NO clinical signs of pneumonia at this time.   Plan: I will do e-consult with pulmonary for help.   NO changes at this time pending that consultation.     (R19.7) Diarrhea, unspecified type  Comment: this is from the lymphocytic colitis.   It can come and go.   Plan: Try loperamide (Imodium-brand name) 2 pills at first loose stool, then 1 after each loose stool up to maximum of 8 pills a day for symptom relief.  This over the counter medication is available without a prescription.      (G62.9) Peripheral polyneuropathy  Comment: needs refill.  The gabapentin helps.   Plan: gabapentin (NEURONTIN) 300 MG capsule        No change in current treatment plan.  REfills.          Adarsh Luther is a 86 year old accompanied by his son., presenting for the following health issues:  Results (Discuss chest CT results), Orders (Needs INR), Diarrhea (Bouts of diarrhea; takes imodium as needed then is back to normal stools the next day), and Insomnia (Ongoing insomnia; needs refills of gabapentin)    Chief Complaint   Patient presents with     Results     Discuss chest CT results     Orders     Needs INR     Diarrhea     Bouts of diarrhea; takes imodium as needed then is back to normal stools the next day     Insomnia     Ongoing insomnia; needs refills of gabapentin     He had a left total knee replacement on May 5.  He had confusion in the hospital the second day in the hospital.   He did go to a TCU for recovery.  5/19: Seen in the emergency department for cough and was treated for pneumonia.  Treated with levaquin 500mg daily for 5 days.      Concerns today are for follow-up on chest x-ray.     He had a lung abnormality 30 years ago.  Biopsy done which was not malignant.    In reviewing past chest x-ray reports and CT scans, he has had abnormalities since September 2, 2018.    Living in his senior apartment.  Back home for a  "few weeks.   Feels OK.    He has been using inspirometer.   No fever.   No cough.   No shortness of breath.   No chest pain.     Energy low for months.  Has not changed recently.   No issues with confusion since hospital.   Appetite OK.   Gets one meal a day at apartment.   Eats fruits, ice cream.     He has had diarrhea off and on.  Takes Imodium as needed.  Typically a few times a month.  He has had past stool samples which were OK.  He had lymphocytic colitis with colonoscopy 7/3/2020 with abnormal biopsies.     Patient Active Problem List   Diagnosis     Anxiety     Alcohol abuse     Chronic atrial fibrillation (H)     Weight loss     Peripheral polyneuropathy     Chronic gout, unspecified cause, unspecified site     Essential hypertension with goal blood pressure less than 140/90     Hyperlipidemia LDL goal <130     Persistent insomnia     Lower urinary tract symptoms (LUTS)     Long-term (current) use of anticoagulants [Z79.01]     Moderate episode of recurrent major depressive disorder (H)     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)     Cardiac pacemaker in situ     NICM (nonischemic cardiomyopathy) (H)     CAD (coronary artery disease)     Stage 3b chronic kidney disease (H)     Lymphocytic colitis     Left knee DJD     Warfarin anticoagulation        OBJECTIVE:Blood pressure 106/52, pulse 81, temperature 97.4  F (36.3  C), temperature source Tympanic, resp. rate 16, height 1.651 m (5' 5\"), weight 81.2 kg (179 lb), SpO2 93 %. BMI=Body mass index is 29.79 kg/m .  GENERAL APPEARANCE ADULT: Alert, no acute distress, walks with a walker  NECK: No adenopathy,masses or thyromegaly  RESP: lungs clear to auscultation   CV: normal rate, regular rhythm, no murmur or gallop  MS: extremities normal, no peripheral edema   ===================    CXR 5/19:  IMPRESSION: Stable cardiomegaly. Hazy airspace opacity through the  right mid and lower lung could be due to infiltrate. Likely " elevation  of the right hemidiaphragm. Small right pleural effusion cannot be  excluded. No left-sided effusion. No pneumothorax. Dual lead left  chest pacemaker is unchanged. Surgical clips are seen over the right  axilla/right lateral chest wall. Degenerative changes are noted  through the spine.     CXR 6/20:  IMPRESSION: An ill-defined irregular opacity is seen in the right mid  to lower lung, which may represent atelectasis, scarring, and/or  pneumonia. The appearance of the right lung opacity is not  significantly changed to recent comparison. There is elevation and  obscuration of the right hemidiaphragm suggesting volume loss. A small  volume right-sided pleural effusion is not excluded. The left lung is  clear. Heart size is enlarged and similar to comparison. Prominence of  the right paratracheal stripe is likely secondary to patient rotation  and appears unchanged. Left pectoral implantable cardiac device and  dual cardiac leads are in similar position. Multilevel hypertrophic  degenerative changes of the spine are present. No acute osseous  Abnormality.    6/24 chest CT:  IMPRESSION:   1.  Consolidative volume loss with air bronchograms in the right lung,  suggestive of multifocal pneumonia, including aspiration pneumonia.  Areas of fat density within the consolidations raises the possibility  of lipoid pneumonia as well.  2.  Patchy groundglass attenuation in the lingula and left lower lobe,  favoring infectious or inflammatory etiology.  3.  Mildly enlarged left paratracheal and subcarinal lymph nodes,  likely reactive in nature. Prior wedge resection of the right lung.  4.  Cardiomegaly.  5.  Fusiform aneurysmal dilation of the ascending thoracic aorta  measuring up to 4.7 cm.

## 2022-07-01 NOTE — PROGRESS NOTES
ANTICOAGULATION MANAGEMENT     Nabor Cunningham 86 year old male is on warfarin with therapeutic INR result. (Goal INR 2.0-3.0)    Recent labs: (last 7 days)     07/01/22  1008   INR 2.7*       ASSESSMENT       Source(s): Patient/Caregiver Call       Warfarin doses taken: Warfarin taken as instructed    Diet: No new diet changes identified    New illness, injury, or hospitalization: No    Medication/supplement changes: None noted    Signs or symptoms of bleeding or clotting: No    Previous INR: Therapeutic last 2(+) visits    Additional findings: None       PLAN     Recommended plan for no diet, medication or health factor changes affecting INR     Dosing Instructions: continue your current warfarin dose with next INR in 2 weeks       Summary  As of 7/1/2022    Full warfarin instructions:  6 mg every Mon, Wed, Fri; 4 mg all other days   Next INR check:  7/15/2022             Telephone call with  Florencio who verbalizes understanding and agrees to plan    Lab visit scheduled    Education provided: Please call back if any changes to your diet, medications or how you've been taking warfarin and Contact [phone]  with any changes, questions or concerns.     Plan made per [facility] anticoagulation protocol    Daphne Lamar RN  Anticoagulation Clinic  7/1/2022    _______________________________________________________________________     Anticoagulation Episode Summary     Current INR goal:  2.0-3.0   TTR:  62.6 % (1 y)   Target end date:  Indefinite   Send INR reminders to:  [facility] [place]    Indications    Chronic atrial fibrillation (H) [I48.20]  Long-term (current) use of anticoagulants [Z79.01] [Z79.01]           Comments:  Home Care/[facility]         Anticoagulation Care Providers     Provider Role Specialty Phone number    Heron Mayo MD Referring Family Medicine [phone]

## 2022-07-02 NOTE — PROGRESS NOTES
ALL SMARTFIELDS MUST BE COMPLETED FOR PATIENT CARE AND BILLING    7/2/2022     E-Consult has been denied due to: Complexity of question, needs in-person referral.    Interprofessional consultation requested by:  Heron Mayo MD      Clinical Question/Purpose: MY CLINICAL QUESTION IS: This 86-year-old gentleman has had abnormal chest CT with suggestion of extensive infiltrates.  He was seen in the emergency department on May 19 for some cough and congestion.  His CT scan was abnormal and he was treated with Levaquin orally.    Patient assessment and information reviewed:   Dr Mayo's note, past CXRs and chest CT;  ++ smoking history    Recommendations: Agree that some X ray change dates back to 2018 - surprising that it has received no work-up / interest till now.  CT shows very dense consolidation with air bronchograms abutting the pleura. Differential diagnosis is not straightforward and requires significant history and further evaluation, assuming a specific Dx is desired and patient is amenable to appropriate treatment (TBD)       The recommendations provided in this E-Consult are based on a review of clinical data pertinent to the clinical question presented, without a review of the patient's complete medical record or, the benefit of a comprehensive in-person or virtual patient evaluation. This consultation should not replace the clinical judgement and evaluation of the provider ordering this E-Consult. Any new clinical issues, or changes in patient status since the filing of this E-Consult will need to be taken into account when assessing these recommendations. Please contact me if you have further questions.    My total time spent reviewing clinical information and formulating assessment was 10 minutes.    Report sent automatically to requesting provider once signed.     Fady Dewitt MD

## 2022-07-06 NOTE — CONFIDENTIAL NOTE
He had a recent clinic visit on July 1 regarding abnormal CT scan.  He really did not have any acute respiratory symptoms no fever no cough nothing to suggest a respiratory infection.  I did an E consult through pulmonology and they recommended more extensive evaluation.  I have spoken to his daughter Lexy today about the E consult results and I recommend pulmonology consultation.  Order placed she will try and get appointment at [place] and if not successful there through [place] or [place] if possible.  VICTOR HUGO ART MD

## 2022-07-15 NOTE — PROGRESS NOTES
Clinic Care Coordination Contact  [facility]/Voicemail    Referral Source: IP Report  Clinical Data:  ED visit 5/19/2022   Community acquired pneumonia of right lower lobe of lung       Care Coordinator Outreach  Outreach attempted x 1.  Left message on Lexy/daughter voicemail with call back information and requested return call.  Plan: . Care Coordinator will do no further outreaches   Dis-enrollment letter sent via My Chart .  [facility]   Marysol Denis RN, Care Coordinator   [facility]'s   E-mail [email or web address]   [phone]

## 2022-07-15 NOTE — LETTER
[facility] CARE COORDINATION  [address] [facility] [address]    July 18, 2022    Nabor Cunningham  [address] [place] [address]      Dear Nabor,    I have been unsuccessful in reaching you since our last contact. At this time the Care Coordination team will make no further attempts to reach you, however this does not change your ability to continue receiving care from your providers at your primary care clinic. If you need additional support from a care coordinator in the future please contact me at [phone].    All of us at [facility] are invested in your health and are here to assist you in meeting your goals.     Sincerely,    [facility]   Marysol Denis RN, Care Coordinator   [facility]'s   E-mail [email or web address]   [phone]

## 2022-07-21 NOTE — ED PROVIDER NOTES
History     Chief Complaint   Patient presents with     bleeding from abrasion     HPI  Nabor Cunningham is a 86 year old male with history of atrial fibrillation on Coumadin presenting for evaluation of fall.  Patient reports that he stumbled on the carpet in his home on Sunday (3 days ago).  Tonight he itched at his arm and pulled the scab off of an abrasion on his arm causing it to bleed.  He did not think much of it but his son saw the bleeding and told him to come in for evaluation.  Patient denies any pain currently.  Denies headache, numbness, tingling, or weakness.  Reports that he gets tripped up occasionally but does not feel his walking is significantly different from baseline.    Allergies:  Allergies   Allergen Reactions     Amoxicillin Hives     Penicillins Other (See Comments)     Dizzy       Trazodone Other (See Comments)     Hallucinations         Problem List:    Patient Active Problem List    Diagnosis Date Noted     Left knee DJD 05/05/2022     Priority: Medium     Lymphocytic colitis 04/22/2022     Priority: Medium     7/3/2020:colonoscopy diagnosed with biopsies.        Stage 3b chronic kidney disease (H) 10/15/2021     Priority: Medium     NICM (nonischemic cardiomyopathy) (H) 02/25/2020     Priority: Medium     CAD (coronary artery disease) 02/25/2020     Priority: Medium     1/7/19 Cath: Nonobstructive disease       Cardiac pacemaker in situ 12/21/2018     Priority: Medium     Dual chamber pacemaker placed in 2016 for sick sinus.       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) 11/30/2018     Priority: Medium     11/30/2018:He has seen cardiology through [facility].  Echo in October, 2018 with EF=49%, diastolic dysfunction, reduced RV systolic function, RV enlargement.  (Report summary in clinic notes 11/30/2018).   Hospitalized 11/22 in [place] for CHF exacerbation acute on chronic.   Nuclear stress test December 2018 with large fixed defect and  EF=35%  1/7/2019:Coronary angiogram right and left heart cath:no obstructive disease.        Moderate episode of recurrent major depressive disorder (H) 04/10/2017     Priority: Medium     Anxiety 03/22/2017     Priority: Medium     3/23/2017:He has been on amitriptyline, nortriptyline, buspirone, sertraline and Lexapro in the past year.        Alcohol abuse 03/22/2017     Priority: Medium     7/12/2019:Not currently using alcohol.   None for years.        Chronic atrial fibrillation (H) 03/22/2017     Priority: Medium     S/p PPM 6/2016       Weight loss 03/22/2017     Priority: Medium     Peripheral polyneuropathy 03/22/2017     Priority: Medium     4/7/2017:Abnormal monofilament in distal plantar feet bilateral.  He has had chronic pain in both feet.   11/30/2018:nortriptyline has helped and he was up to 75mg a day.  He had some prolonged QT and dose cut back to 25mg at bedtime in hospital 11/22/2018.       Chronic gout, unspecified cause, unspecified site 03/22/2017     Priority: Medium     Essential hypertension with goal blood pressure less than 140/90 03/22/2017     Priority: Medium     Hyperlipidemia LDL goal <130 03/22/2017     Priority: Medium     Persistent insomnia 03/22/2017     Priority: Medium     11/30/2018:He has been on temazepam in the past which has helped some.  He was on this chronically.  He tried lorazepam which worked for a few days, then not effective.  Tried mirtazapine which did not help.         Lower urinary tract symptoms (LUTS) 03/22/2017     Priority: Medium     Long-term (current) use of anticoagulants [Z79.01] 03/22/2017     Priority: Medium     Warfarin anticoagulation 10/28/2016     Priority: Medium        Past Medical History:    Past Medical History:   Diagnosis Date     Bleeding from wound 9/2/2018       Past Surgical History:    Past Surgical History:   Procedure Laterality Date     ANGIOGRAM  01/2019    right and left heart cath.      APPENDECTOMY      ~2013     ARTHROPLASTY  KNEE Left 5/5/2022    Procedure: Left Total  Knee Arthroplasty;  Surgeon: Heron Espinoza MD;  Location: [facility]     CATARACT IOL, RT/LT      Past bilateral cataract surgery.      COLONOSCOPY N/A 07/03/2020    Procedure: COLONOSCOPY, WITH POLYPECTOMY AND BIOPSY;  Surgeon: Fady Boyer MD;  Location: [facility]     EYE SURGERY Left     three corneal transplants     IMPLANT PACEMAKER       INCISION AND DRAINAGE TRUNK, COMBINED N/A 09/06/2018    Procedure: COMBINED INCISION AND DRAINAGE TRUNK;  incision and cauterization of left buttock bleed.;  Surgeon: Dain Davis MD;  Location: [facility] OR     INCISION AND DRAINAGE TRUNK, COMBINED N/A 11/01/2018    Procedure:  INCISION AND DRAINAGE of Back Wound;  Surgeon: Dain Davis MD;  Location: [facility] OR     JOINT REPLACEMENT Right     right total knee.      THORACIC SURGERY      removal benign nodule       Family History:    Family History   Problem Relation Age of Onset     Cancer Brother      Diabetes Brother        Social History:  Marital Status:   [5]  Social History     Tobacco Use     Smoking status: Former Smoker     Years: 20.00     Types: Cigars     Smokeless tobacco: Never Used   Vaping Use     Vaping Use: Never used   Substance Use Topics     Alcohol use: Not Currently     Drug use: No        Medications:    acetaminophen (TYLENOL) 325 MG tablet  acetaminophen (TYLENOL) 500 MG tablet  allopurinol (ZYLOPRIM) 100 MG tablet  atorvastatin (LIPITOR) 20 MG tablet  bismuth subsalicylate (PEPTO-BISMOL) 262 MG TABS  Calcium-Magnesium-Zinc 333-133-5 MG TABS per tablet  cyanocobalamin (VITAMIN B-12) 1000 MCG tablet  furosemide (LASIX) 20 MG tablet  gabapentin (NEURONTIN) 300 MG capsule  lisinopril (ZESTRIL) 5 MG tablet  loperamide (IMODIUM) 2 MG capsule  METAMUCIL FIBER PO  methocarbamol (ROBAXIN) 500 MG tablet  metoprolol succinate ER (TOPROL XL) 25 MG 24 hr tablet  multivitamin w/minerals (THERA-VIT-M) tablet  nortriptyline (PAMELOR) 25 MG capsule  polyvinyl alcohol  "(LIQUIFILM TEARS) 1.4 % ophthalmic solution  prednisoLONE acetate (PRED FORTE) 1 % ophthalmic susp  senna-docusate (SENOKOT-S/PERICOLACE) 8.6-50 MG tablet  tamsulosin (FLOMAX) 0.4 MG capsule  triamcinolone (KENALOG) 0.1 % external cream  warfarin ANTICOAGULANT (JANTOVEN ANTICOAGULANT) 2 MG tablet          Review of Systems   Constitutional: Negative for chills, fatigue and fever.   HENT: Negative for congestion.    Respiratory: Negative for cough, chest tightness and shortness of breath.    Cardiovascular: Negative for chest pain.   Gastrointestinal: Negative for abdominal pain and nausea.   Musculoskeletal: Negative for back pain, gait problem (at baseline), neck pain and neck stiffness.   Skin: Positive for wound (left elbow).   Neurological: Negative for weakness, light-headedness, numbness and headaches.   Psychiatric/Behavioral: Negative for confusion.   All other systems reviewed and are negative.      Physical Exam   BP: 120/66  Pulse: 81  Temp: 98  F (36.7  C)  Height: 162.6 cm (5' 4\")  Weight: 84.4 kg (186 lb)  SpO2: 97 %      Physical Exam  Vitals and nursing note reviewed.   Constitutional:       Appearance: Normal appearance. He is not ill-appearing or diaphoretic.      Comments: Elderly appearing but awake and alert in no distress   HENT:      Head:      Comments: Slight bruising around the left eye     Nose: Nose normal.      Mouth/Throat:      Mouth: Mucous membranes are moist.   Eyes:      Comments: Mild left subconjunctival hemorrhage   Cardiovascular:      Rate and Rhythm: Normal rate.      Pulses: Normal pulses.   Pulmonary:      Effort: Pulmonary effort is normal.   Abdominal:      Palpations: Abdomen is soft.      Tenderness: There is no abdominal tenderness.   Musculoskeletal:         General: Normal range of motion.      Cervical back: Full passive range of motion without pain and normal range of motion. No rigidity or tenderness. No spinous process tenderness or muscular tenderness.      " Comments: Roughly dime sized skin tear on the left elbow with some active oozing bleeding   Skin:     General: Skin is warm and dry.      Capillary Refill: Capillary refill takes less than 2 seconds.   Neurological:      Mental Status: He is alert and oriented to person, place, and time.   Psychiatric:         Mood and Affect: Mood normal.         ED Course                 Procedures                Results for orders placed or performed during the hospital encounter of 07/20/22 (from the past 24 hour(s))   INR   Result Value Ref Range    INR 2.48 (H) 0.85 - 1.15   Head CT w/o contrast    Narrative    EXAM: CT HEAD W/O CONTRAST  LOCATION: [facility]  DATE/TIME: 7/20/2022 11:49 PM    INDICATION: Head injury, on Coumadin  COMPARISON: None.  TECHNIQUE: Routine CT Head without IV contrast. Multiplanar reformats. Dose reduction techniques were used.    FINDINGS:  INTRACRANIAL CONTENTS: No intracranial hemorrhage, extraaxial collection, or mass effect.  No CT evidence of acute infarct. Moderate presumed chronic small vessel ischemic changes. Moderate generalized volume loss. No hydrocephalus.     VISUALIZED ORBITS/SINUSES/MASTOIDS: No intraorbital abnormality. No paranasal sinus mucosal disease. No middle ear or mastoid effusion.    BONES/SOFT TISSUES: Anterior subluxation right temporomandibular joint.      Impression    IMPRESSION:  1.  No acute intracranial process.     *Note: Due to a large number of results and/or encounters for the requested time period, some results have not been displayed. A complete set of results can be found in Results Review.       Medications - No data to display    Assessments & Plan (with Medical Decision Making)  86-year-old male presenting for evaluation of an injury to his left arm after a fall few days ago.  Patient reports he stumbled striking his head and arm.  No loss of conscious.  Has not had any head or neck pain.  Opened up the wound on his arm today  causing some bleeding and was encouraged to come in for evaluation of this.  Has a small skin tear with active oozing of blood on the arm.  Bleeding well controlled with direct pressure.  Applied some bacitracin and a nonadherent dressing with a Curlex wrap which easily controlled the bleeding.  Due to him being on Coumadin, he does bleed easily and with this fall and minor head injury, recommended CT of the head which thankfully showed no evidence of bleeding.  Patient provided counseling on fall prevention with return precautions.  Patient and family explicitly advised that if he were to fall again, they should seek evaluation right away due to the risk of intracranial hemorrhage     I have reviewed the nursing notes.    I have reviewed the findings, diagnosis, plan and need for follow up with the patient.       Discharge Medication List as of 7/21/2022 12:36 AM          Final diagnoses:   Fall, initial encounter   Skin tear of left elbow without complication, initial encounter       7/20/2022   [facility] EMERGENCY DEPT     Shanks, Alexx Gautam MD  07/21/22 0422

## 2022-07-21 NOTE — ED TRIAGE NOTES
Bleeding from abrasion on left elbow.  Pt on blood thinner.     Triage Assessment     Row Name 07/20/22 2040       Triage Assessment (Adult)    Airway WDL WDL       Respiratory WDL    Respiratory WDL WDL       Skin Circulation/Temperature WDL    Skin Circulation/Temperature WDL WDL       Cardiac WDL    Cardiac WDL WDL       Peripheral/Neurovascular WDL    Peripheral Neurovascular WDL WDL       Cognitive/Neuro/Behavioral WDL    Cognitive/Neuro/Behavioral WDL WDL

## 2022-07-21 NOTE — TELEPHONE ENCOUNTER
ANTICOAGULATION  MANAGEMENT: Discharge Review    Nabor Cunningham chart reviewed for anticoagulation continuity of care    Emergency room visit on 7/20/22 for Fall, skin tear.    Discharge disposition: Home    Results:    Recent labs: (last 7 days)     07/20/22  2105   INR 2.48*     Anticoagulation inpatient management:     not applicable     Anticoagulation discharge instructions:     Warfarin dosing: home regimen continued   Bridging: No   INR goal change: No      Medication changes affecting anticoagulation: No    Additional factors affecting anticoagulation: No     PLAN     No adjustment to anticoagulation plan needed    Spoke with Florencio, scheduled next INR.    No adjustment to Anticoagulation Calendar was required    Kirstin Mendez RN

## 2022-07-22 NOTE — TELEPHONE ENCOUNTER
Called to schedule no answer lvm to contact me directly [phone] to schedule nw pt appointmnt

## 2022-07-25 NOTE — TELEPHONE ENCOUNTER
ANTICOAGULATION MANAGEMENT:  Medication Refill    Anticoagulation Summary  As of 7/21/2022    Warfarin maintenance plan:  6 mg (2 mg x 3) every Mon, Wed, Fri; 4 mg (2 mg x 2) all other days   Next INR check:  8/12/2022   Target end date:  Indefinite    Indications    Chronic atrial fibrillation (H) [I48.20]  Long-term (current) use of anticoagulants [Z79.01] [Z79.01]             Anticoagulation Care Providers     Provider Role Specialty Phone number    Heron Mayo MD Referring Family Medicine [phone]          Visit with referring provider/group within last year: Yes    [facility] referral signed within last year: Yes    Nabor meets all criteria for refill (current [facility] referral, office visit with referring provider/group in last year, lab monitoring up to date or not exceeding 2 weeks overdue). Rx instructions and quantity supplied updated to match patient's current dosing plan. Warfarin 90 day supply with 1 refill granted per [facility] protocol     Jayde Whitt RN  Anticoagulation Clinic

## 2022-07-25 NOTE — TELEPHONE ENCOUNTER
Bismuth subsalicylate (Pepto Bismol) 262mg chewable tablet.   Chew and swallow one tablet by mouth daily.     Last Written Prescription Date:  3/28/2022  Last Fill Quantity: 30,  # refills: 3   Last office visit: 4/2/2021 with prescribing provider  Future Office Visit:  None scheduled    Routing refill request to provider for review/approval because:  Drug not on the [facility] refill protocol.  Last office visit with prescribing provider: 4/2/2021.      Vivian Soto RN

## 2022-08-02 NOTE — TELEPHONE ENCOUNTER
Called son he informed pt is in the hospital and may not be home for a while, he will contact Lexy (pt's Dtr) and they will contact me [phone] once ready to schedule.

## 2022-08-09 NOTE — TELEPHONE ENCOUNTER
I would defer that decision to the doctors currently taking care of him.  Of course understandably the fall risk is high and that her bleeding risk, anticoagulation should be withheld as appropriate.  I think it is best if the patient does a visit with us to further discuss these issues with potential consideration of watchman device in the future if willing.

## 2022-08-09 NOTE — PROGRESS NOTES
"Pt's daughter, Lexy, called with update that pt was hospitalized twice within the past month for weakness and falls. He was in the  ED on 7/20/22, and has been hospitalized at [facility] 7/24-8/5/22. He was discharged to [facility] and will be going home on 8/11/22. She was told to call to ask if pt needs to continue taking warfarin long term as his bleeding risk has increased with his falls. I reviewed [facility] records in CARE EVERY[facility]. It appears that pt's warfarin was held while inpatient, but per daughter pt has resumed it at the TCU. I am unable to view the TCU records. Pt's daughter said he has been able to get his strength back at the TCU and isn't currently having any issues with shortness of breath or swelling. She said he had a hard time recovering after his most recent knee surgery, and she thinks that has contributed to his falls. I told her I will send an update to , but he will most likely want to see pt in clinic post TCU discharge to discuss anticoagulation plan and make sure his fluid status is stable.Pt will also need his anemia monitored. Most recent hgb in care everywhere is 8/1/22 and it was 8.4. Ford MG August 9, 2022, 12:51 PM           Hospital Course:   \"87 yo male with PMH sig for CKD 3B, CAD, combined systolic and diastolic CHF, PM (d/t SSS, placed in 2016), anxiety, history of alcohol abuse, chronic atrial fibrillation (on warfarin), and recent left knee replacement who was brought in to the [facility] emergency department 7/24/2022 by his family due to multiple falls at home with weakness, PLASENCIA and LE swelling. Admitted with + hemoccult, anemia, cardiomyopathy, FTT, mild hyponatremia (132), possible cellulitis, possible CHF exacerbation. Pt received ceftriaxone X 3 days for possible CAP, IV diuresis for possible CHF exacerbation, IV protonix for possible GIB. Warfarin held in setting of down-trending Hgb. Vitamin K 5mg po X 1 as INR continues " "in therapeutic range after warfarin held. Hemoglobin stabilized in range of 8.8-9.4 while holding warfarin. Echocardiogram on 7/26 with ejection fraction better than previous study (9/2019), estimated pulmonary pressures higher. CT angio chest performed on 7/26/22 and notable for lipoid pneumonia findings that correlate with patient's long-term home use of mineral oil. Diuresis was held for 48 hours and resumed at PTA home dose of 20mg po on 7/27. MOCA indicated severe impairment with +3/30, improved over course of hospitalization. Pt noted to have low UOP and COURTNEY with Cr 2.51 on 7/28. Discussed with Nephrology who indicate COURTNEY may be due to contrast induced nephropathy or ATN. Colonoscopy and upper endoscopy performed on 7/28/22 and without any findings of acute bleeding. Polyps removed and sent for pathology. \"    "

## 2022-08-11 NOTE — PROGRESS NOTES
I called pt's daughter, Lexy, back to discuss 's response to her question about anticoagulation. Pt is supposed to be discharged today from the TCU but they are doing labs to make sure he is stable to leave. I scheduled pt for visit with  8/18/22 @ 1:00 in [place]. Pt's daughter said she will make sure she gets a copy of pt's records from the TCU such as an updated medication list and lab results to bring to the visit. I told her I will call her on Monday to verify that pt has been discharged. If he has then pt will need labs prior to 8/18/22 visit with . Ford MG August 11, 2022, 10:48 AM

## 2022-08-12 NOTE — TELEPHONE ENCOUNTER
Symptoms    Describe your symptoms: Upper abdominal pain- Pt was up all night with the pain.    Any pain: No    How long have you been having symptoms:    This has been going on for two weeks.  Pt has had Upper GI / Colonoscopy, 3 CT's,  This was through [facility]     Have you been seen for this:  Yes:       Could we send this information to you in [facility] or would you prefer to receive a phone call?:   Patient would prefer a phone call   Okay to leave a detailed message?: Yes at Other phone number:  Florencio [phone]  [facility]  *

## 2022-08-12 NOTE — TELEPHONE ENCOUNTER
Verbal order given to Jahaira MG, [facility] for : Late start to be on Monday 8/15. Reason staff availability.

## 2022-08-12 NOTE — TELEPHONE ENCOUNTER
07-20-22 ED visit- fall.  07-24-22 [facility]  hosp admission- Dx anemai due to CKD, falls. [facility]. Discharged to home yesterday. Pt/son report  Pt having mid lower chest/ upper abd pain, nausea since home. Had GI work up in hosp with CT's, upper GI, colonoscopy,  with no known cause for sx. States uncomfortable to take deep breath. Denies vomiting. Son states pt at significant bkst and lunch today. No reported bowel issues. No fevers, chills. Denies chest/ arm/ jaw/ back pain, pressure, palpitations. Lightheadedness, dizziness, increased weakness.  Due to nature of sx advised return to ED for eval today.  Son states prefers [facility] ED, in agreement with recommendation.  PETRA Beauchamp RN

## 2022-08-15 NOTE — TELEPHONE ENCOUNTER
Order/Referral Request    Who is requesting: [facility], [phone]    Orders being requested: SN 2/wk x 2, 1x/wk x 7, 3 PRN. Pt and OT eval, HHA 1x/wk x 3, SW eval    Reason service is needed/diagnosis: Home Care patient    When are orders needed by: asap    Has this been discussed with Provider: Yes    Does patient have a preference on a Group/Provider/Facility? [facility]    Does patient have an appointment scheduled?: No    Where to send orders: Fax    Could we send this information to you in [facility] or would you prefer to receive a phone call?:   Patient would prefer a phone call   Okay to leave a detailed message?: Yes at Other phone number:  [facility], [phone]

## 2022-08-15 NOTE — PROGRESS NOTES
I called pt's daughter, Lexy, and she confirmed that pt was discharged from the TCU last week. Pt's son, Florencio, will bring him to the 8/18 visit with  and will bring pt's discharge TCU records with him. I told Lexy that  will want a bmp and cbc lab prior to the visit. Pt is scheduled for an INR on 8/17, but pt's daughter said he is getting [facility] home health starting this week so they are going to have the home health RN draw the INR on 8/17. I told her I will call [facility] to see if bmp and cbc lab can be done on 8/17. I left message with [facility] with the request that a bmp and CBC be done at pt's home health visit 8/17. I requested a call back. Ford MG August 15, 2022, 3:04 PM

## 2022-08-15 NOTE — PROGRESS NOTES
Rubi RN from [facility] left message to verify the name of the provider on the verbal order for a bmp and CBC. I called her back and said it's for 8/18/22 visit per . Labs to be done at 8/17 home care visit. Ford MG August 15, 2022, 4:24 PM

## 2022-08-16 NOTE — TELEPHONE ENCOUNTER
Nurse notified of approval of orders.  She says Tri Beauchamp had inquired if the patient had one to ER on the weekend.  She was not aware of any ED visit.

## 2022-08-17 NOTE — PROGRESS NOTES
Hospitalized 7/24-8/5, patient was taking boost daily in hospital/TCU, has been home for 6 days and has had no boost or ensure. Patient received meals on wheels which is inconsistent with green intake, patient reportedly has a large lunch but that's the main meal of the day. Patent discharged on 4 mg Friday, sat, sun, recheck 8/8/22. It appears TCU increased dosing to 4 mg sun/thur and 6 mg all other days on 8/8/22 per home care.      Patients warfarin was held while inpatient and was resumed in TCU.  Daughter is concerned with fall risk and bleeding risk with resuming warfarin. Per cardiology notes on 8/9/22 encounter, Dr. Galvan would like to see patient in clinic prior to deciding on whether to continue warfarin long term or to discontinue. Will postpone encounter to 8/18/22 so that [facility] can watch for decision at this visit and contact julio césar with home at below number with any updates to dosing/INR management. Call julio césra [phone].      7/25-7/29- hold warfarin, gave Vit K on 7/26 due to low hgb which did stable out while inpatient. Patient resumed warfarin inpatient on 7/29.      Patient lives alone and risk for falls but home care reports no falls in the week patient has been home.  Reports no longer receiving boost or ensure since being home, patient green vegetable intake is inconsistent due to relies on meals on wheels which is different every delivery.  Denies any bleeding or bruising, no medication changes, no wounds, or any other changes that could affect INR.    Plan:  Patient INR today was 3.8, he received 6 mg more than his normal dosing prior to hospitalization and also, has not had any boost/ensure for 1 week after receiving one daily during inpatient status 7/24-8/11.  Patient to hold his dose tonight then resume prior to inpatient warfarin regimen and recheck INR on 8/22/22.  Maintenance dosing prior to hospitalization was 11.8% lower than TCU discharge which would be appropriate with the lack of  green intake now that patient is home.      Postponing to 8/18 for cardiology appt.    Ronel Flores RN

## 2022-08-18 NOTE — PROGRESS NOTES
"From Cards notes today, family would still like to \"think about it\". Will continue on warfarin until the family says otherwise.    Updated HC SAURAV Loyd who will stick with current plan outlined below and recheck INR Monday.  "

## 2022-08-18 NOTE — PROGRESS NOTES
CARDIOLOGY CLINIC CONSULTATION    REASON FOR CONSULT: Non ischemic cardiomyopathy atrial fibrillation    PRIMARY CARE PHYSICIAN:  Heron Mayo    Today's clinic visit entailed:  Review of the result(s) of each unique test - Echo labs ECG outside record  45 minutes spent on the date of the encounter doing chart review, history and exam, documentation and further activities per the note  Provider  Link to [facility] Help Grid     The level of medical decision making during this visit was of high complexity.      HISTORY OF PRESENT ILLNESS:  This a pleasant 86-year-old gentleman who is here with his son.  The patient lives in [place].  Daughter Nita was on the phone as well.  I last saw the patient in April 2022.  He has a following several medical problems    1.  History of chronic atrial fibrillation, on anticoagulation with warfarin.  2.  History of symptomatic bradycardia with pacemaker since 06/2016 with intermittent 20%-40% ventricular pacing.  3.  Initially normal EF but then dropped to around 40%-50% range, likely a result of chronic AFib and pacing.  Last EF is around 45%.  4.  Coronary angiography done due to drop in EF in 01/2019, showing nonobstructive disease.  Remains on a statin.  5.  History of chronic kidney disease.  Creatinine 1.5-2.  6.  Hyperlipidemia, HDL deficiency.  7.  Mild aortic stenosis with mild ascending aortic dilatation at 4.5 cm.    When patient was last seen by me in April he was doing okay.  However it appears that last month he started developing lower extremity edema and significant weakness and falls because of which he was at an outside facility.  He was in the hospital for a few weeks.  He needed IV diuresis.  He was anemic.  Anticoagulation was held.  Since then he has recovered somewhat.  He walks with a walker.  He has been discharged to home.  He lives by himself with care from his kids.    Today seen in cardiology clinic for follow-up.  Denies any new cardiovascular  symptoms.  He currently takes 20 mg of Lasix.  Has mild intermittent lower extremity edema.  NYHA class is difficult to accurately  due to comorbidities.  He has not had any further falls since hospitalization    They are here in clinic today to discuss bleeding risks and need for ongoing anticoagulation.    PAST MEDICAL HISTORY:  Past Medical History:   Diagnosis Date     Bleeding from wound 9/2/2018 7/12/2019:He had prolonged bleeding after cyst removal on his back within the past year.          MEDICATIONS:  Current Outpatient Medications   Medication     acetaminophen (TYLENOL) 500 MG tablet     allopurinol (ZYLOPRIM) 100 MG tablet     atorvastatin (LIPITOR) 20 MG tablet     bismuth subsalicylate (PEPTO-BISMOL) 262 MG TABS     Calcium-Magnesium-Zinc 333-133-5 MG TABS per tablet     cyanocobalamin (VITAMIN B-12) 1000 MCG tablet     furosemide (LASIX) 20 MG tablet     gabapentin (NEURONTIN) 300 MG capsule     lisinopril (ZESTRIL) 5 MG tablet     loperamide (IMODIUM) 2 MG capsule     METAMUCIL FIBER PO     methocarbamol (ROBAXIN) 500 MG tablet     metoprolol succinate ER (TOPROL XL) 25 MG 24 hr tablet     multivitamin w/minerals (THERA-VIT-M) tablet     nortriptyline (PAMELOR) 25 MG capsule     polyvinyl alcohol (LIQUIFILM TEARS) 1.4 % ophthalmic solution     prednisoLONE acetate (PRED FORTE) 1 % ophthalmic susp     senna-docusate (SENOKOT-S/PERICOLACE) 8.6-50 MG tablet     tamsulosin (FLOMAX) 0.4 MG capsule     triamcinolone (KENALOG) 0.1 % external cream     warfarin ANTICOAGULANT (JANTOVEN ANTICOAGULANT) 2 MG tablet     No current facility-administered medications for this visit.       ALLERGIES:  Allergies   Allergen Reactions     Amoxicillin Hives     Penicillins Other (See Comments)     Dizzy       Trazodone Other (See Comments)     Hallucinations         SOCIAL HISTORY:  I have reviewed this patient's social history and updated it with pertinent information if needed. Nabor Cunningham  reports that he  has quit smoking. His smoking use included cigars. He quit after 20.00 years of use. He has never used smokeless tobacco. He reports previous alcohol use. He reports that he does not use drugs.    FAMILY HISTORY:  I have reviewed this patient's family history and updated it with pertinent information if needed.   Family History   Problem Relation Age of Onset     Cancer Brother      Diabetes Brother        REVIEW OF SYSTEMS:  Skin:        Eyes:       ENT:       Respiratory:  Negative for dyspnea on exertion;shortness of breath  Cardiovascular:    Positive for;lower extremity symptoms;edema  Gastroenterology:      Genitourinary:       Musculoskeletal:       Neurologic:       Psychiatric:       Heme/Lymph/Imm:       Endocrine:           PHYSICAL EXAM:      BP: 114/60 Pulse: 74     SpO2: 98 %      Vital Signs with Ranges  Pulse:  [74] 74  BP: (114)/(60) 114/60  SpO2:  [98 %] 98 %  191 lbs 0 oz    Constitutional: awake, alert, no distress  Respiratory: Clear to auscultation  Cardiovascular: Irregular heart sounds soft systolic murmur no rubs or gallops JVP is about 8 cm trace edema.  GI: nondistended  Neuropsychiatric: appropriate affact    DATA:  Labs: Pertinent cardiac labs reviewed    ASSESSMENT:  Chronic atrial fibrillation  Pacemaker  Nonischemic cardiomyopathy with mild LV dysfunction  Age-related musculoskeletal disability falls and frailty    RECOMMENDATIONS:  Normal volume status standpoint patient appears to be mildly volume overloaded.  Discussed heart failure management again with him.  Recommend continuing current dose of Lasix and taking 20 mg extra with 3 to 5 pound weight gain until weight comes down.  He needs to weigh himself every day salt and fluid restriction advised.  Needs to call and utilize nursing team support weight goes up.  Last EF is 45 to 50%.    We have a long discussion about atrial fibrillation anticoagulation.  Discussed risk and benefits rationale for anticoagulation.  Discussed  bleeding risk.  Discussed utility of watchman device implantation given his falls and anemia.    Patient made it very clear to me that he does not want any cardiovascular procedures at this time.  I respect his wishes.    I told the patient and family to discuss with physical therapy and primary care teams to assess overall fall risk.  He is currently walking with a walker and appears to fail and lives by himself.  If fall risk continues to be high which I think it is, may be best to family decides to stop anticoagulation.  In that case they have to accept stroke risk.  They will think about it discussed and make a decision.    Follow-up with renal and PCP.  Please do not hesitate to contact me with questions.  There are no active heart failure recommendations at this time besides diuretic titration based on weight and symptoms..    ROZINA Huber, [facility]  Cardiology - [facility]  August 18, 2022

## 2022-08-18 NOTE — LETTER
8/18/2022    Heron Mayo MD  [address] [place] [id number]    RE: Nabor Cunningham       Dear Colleague,     I had the pleasure of seeing Nabor Cunningham in the [facility].  CARDIOLOGY CLINIC CONSULTATION    REASON FOR CONSULT: Non ischemic cardiomyopathy atrial fibrillation    PRIMARY CARE PHYSICIAN:  Heron Mayo    Today's clinic visit entailed:  Review of the result(s) of each unique test - Echo labs ECG outside record  45 minutes spent on the date of the encounter doing chart review, history and exam, documentation and further activities per the note  Provider  Link to [facility] Help Grid     The level of medical decision making during this visit was of high complexity.      HISTORY OF PRESENT ILLNESS:  This a pleasant 86-year-old gentleman who is here with his son.  The patient lives in [place].  Daughter Nita was on the phone as well.  I last saw the patient in April 2022.  He has a following several medical problems    1.  History of chronic atrial fibrillation, on anticoagulation with warfarin.  2.  History of symptomatic bradycardia with pacemaker since 06/2016 with intermittent 20%-40% ventricular pacing.  3.  Initially normal EF but then dropped to around 40%-50% range, likely a result of chronic AFib and pacing.  Last EF is around 45%.  4.  Coronary angiography done due to drop in EF in 01/2019, showing nonobstructive disease.  Remains on a statin.  5.  History of chronic kidney disease.  Creatinine 1.5-2.  6.  Hyperlipidemia, HDL deficiency.  7.  Mild aortic stenosis with mild ascending aortic dilatation at 4.5 cm.    When patient was last seen by me in April he was doing okay.  However it appears that last month he started developing lower extremity edema and significant weakness and falls because of which he was at an outside facility.  He was in the hospital for a few weeks.  He needed IV diuresis.  He was anemic.  Anticoagulation was held.  Since then he has  recovered somewhat.  He walks with a walker.  He has been discharged to home.  He lives by himself with care from his kids.    Today seen in cardiology clinic for follow-up.  Denies any new cardiovascular symptoms.  He currently takes 20 mg of Lasix.  Has mild intermittent lower extremity edema.  NYHA class is difficult to accurately  due to comorbidities.  He has not had any further falls since hospitalization    They are here in clinic today to discuss bleeding risks and need for ongoing anticoagulation.    PAST MEDICAL HISTORY:  Past Medical History:   Diagnosis Date     Bleeding from wound 9/2/2018 7/12/2019:He had prolonged bleeding after cyst removal on his back within the past year.          MEDICATIONS:  Current Outpatient Medications   Medication     acetaminophen (TYLENOL) 500 MG tablet     allopurinol (ZYLOPRIM) 100 MG tablet     atorvastatin (LIPITOR) 20 MG tablet     bismuth subsalicylate (PEPTO-BISMOL) 262 MG TABS     Calcium-Magnesium-Zinc 333-133-5 MG TABS per tablet     cyanocobalamin (VITAMIN B-12) 1000 MCG tablet     furosemide (LASIX) 20 MG tablet     gabapentin (NEURONTIN) 300 MG capsule     lisinopril (ZESTRIL) 5 MG tablet     loperamide (IMODIUM) 2 MG capsule     METAMUCIL FIBER PO     methocarbamol (ROBAXIN) 500 MG tablet     metoprolol succinate ER (TOPROL XL) 25 MG 24 hr tablet     multivitamin w/minerals (THERA-VIT-M) tablet     nortriptyline (PAMELOR) 25 MG capsule     polyvinyl alcohol (LIQUIFILM TEARS) 1.4 % ophthalmic solution     prednisoLONE acetate (PRED FORTE) 1 % ophthalmic susp     senna-docusate (SENOKOT-S/PERICOLACE) 8.6-50 MG tablet     tamsulosin (FLOMAX) 0.4 MG capsule     triamcinolone (KENALOG) 0.1 % external cream     warfarin ANTICOAGULANT (JANTOVEN ANTICOAGULANT) 2 MG tablet     No current facility-administered medications for this visit.       ALLERGIES:  Allergies   Allergen Reactions     Amoxicillin Hives     Penicillins Other (See Comments)     Dizzy        Trazodone Other (See Comments)     Hallucinations         SOCIAL HISTORY:  I have reviewed this patient's social history and updated it with pertinent information if needed. Nabor Cunningham  reports that he has quit smoking. His smoking use included cigars. He quit after 20.00 years of use. He has never used smokeless tobacco. He reports previous alcohol use. He reports that he does not use drugs.    FAMILY HISTORY:  I have reviewed this patient's family history and updated it with pertinent information if needed.   Family History   Problem Relation Age of Onset     Cancer Brother      Diabetes Brother        REVIEW OF SYSTEMS:  Skin:        Eyes:       ENT:       Respiratory:  Negative for dyspnea on exertion;shortness of breath  Cardiovascular:    Positive for;lower extremity symptoms;edema  Gastroenterology:      Genitourinary:       Musculoskeletal:       Neurologic:       Psychiatric:       Heme/Lymph/Imm:       Endocrine:           PHYSICAL EXAM:      BP: 114/60 Pulse: 74     SpO2: 98 %      Vital Signs with Ranges  Pulse:  [74] 74  BP: (114)/(60) 114/60  SpO2:  [98 %] 98 %  191 lbs 0 oz    Constitutional: awake, alert, no distress  Respiratory: Clear to auscultation  Cardiovascular: Irregular heart sounds soft systolic murmur no rubs or gallops JVP is about 8 cm trace edema.  GI: nondistended  Neuropsychiatric: appropriate affact    DATA:  Labs: Pertinent cardiac labs reviewed    ASSESSMENT:  Chronic atrial fibrillation  Pacemaker  Nonischemic cardiomyopathy with mild LV dysfunction  Age-related musculoskeletal disability falls and frailty    RECOMMENDATIONS:  Normal volume status standpoint patient appears to be mildly volume overloaded.  Discussed heart failure management again with him.  Recommend continuing current dose of Lasix and taking 20 mg extra with 3 to 5 pound weight gain until weight comes down.  He needs to weigh himself every day salt and fluid restriction advised.  Needs to call and utilize  nursing team support weight goes up.  Last EF is 45 to 50%.    We have a long discussion about atrial fibrillation anticoagulation.  Discussed risk and benefits rationale for anticoagulation.  Discussed bleeding risk.  Discussed utility of watchman device implantation given his falls and anemia.    Patient made it very clear to me that he does not want any cardiovascular procedures at this time.  I respect his wishes.    I told the patient and family to discuss with physical therapy and primary care teams to assess overall fall risk.  He is currently walking with a walker and appears to fail and lives by himself.  If fall risk continues to be high which I think it is, may be best to family decides to stop anticoagulation.  In that case they have to accept stroke risk.  They will think about it discussed and make a decision.    Follow-up with renal and PCP.  Please do not hesitate to contact me with questions.  There are no active heart failure recommendations at this time besides diuretic titration based on weight and symptoms..    ROZINA Huber, [facility]  Cardiology - [facility]  August 18, 2022    Thank you for allowing me to participate in the care of your patient.      Sincerely,     Katie Galvan MD     [facility]  cc:   No referring provider defined for this encounter.

## 2022-08-19 NOTE — PATIENT INSTRUCTIONS
ASSESSMENT:   (N17.9) Acute renal failure, unspecified acute renal failure type (H)  (primary encounter diagnosis)  Comment: Improved from last hospital stay.   Plan: ferrous gluconate (FERGON) 324 (38 Fe) MG         tablet        Recheck blood test in 2 weeks.   Schedule an appointment with our  for a lab appointment.      (I48.20) Chronic atrial fibrillation (H)  Comment: Not interested in Watchman.    Plan: Continue the warfarin for now to lessen chances of stroke.  If there are signs of continuing bleeding, we may need to consider stopping the warfarin.     (D50.0) Iron deficiency anemia due to chronic blood loss  Comment:   Plan: Take iron pills ferrous gluconate 324mg daily for iron.   Eat some foods higher in iron.  See list.      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  Weight up a little and leg swelling.   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     You have home care and home OT and PT right now.    Ask about devices that would help you in your daily activities like help getting socks on, different bed or chair.  They can evaluate for the best ones for you and if needed I can sign paperwork or orders.     Dietary sources of iron.   From [email or web address] To [email or web address]    Food  Approximate measure  Iron (mg)    High iron sources    Cream of Wheat (quick or instant)*  1/2 cup 7.8   Kidney, beef 2 oz (60 g) 5.3   Liver, beef 2 oz (60 g) 5.8   Liver, calf 2 oz (60 g) 9.0   Liver, chicken 2 oz (60 g) 6.0   Liverwurst 2 oz (60 g) 3.6   Prune juice 1/2 cup 5.1   Spinach  1/2 cup 3.2   Moderate iron sources    All-Bran cereal  1/2 cup 2.9   Almonds, dried unblanched  1/2 cup 3.0   Dried beans and peas    Baked beans, no pork  1/4 cup  1.5    Blackeye peas, cooked  1/4  cup  0.8    Chick peas, dry  1/4 cup  3.5    Great northern beans, cooked  1/4 cup  1.3    Green peas, cooked  1/4 cup  1.4    Lentils, dry  1/4 cup  3.4    Lima beans, cooked  1/4 cup  1.3    Navy beans, cooked  1/4 cup  1.3    Red beans, dry  1/4 cup  3.5    Soybeans, cooked  1/4 cup  1.4    White beans, dry  1/4 cup  3.9    Beef, cooked  2 oz (60 g) 2-3?   Ham, cooked  2 oz (60 g) 1.3   Nguyễn, cooked  2 oz (60 g) 1.9   Peaches, dried  1/4 cup 2.4   Peanuts, roasted without skins  3 1/2 oz (100 g) 3.2   Pork, cooked  2 oz (60 g) 2-3?   Prunes, dried  2 large 1.1   Scallops  2 oz (60 g) 1.6   Turkey, cooked  2 oz (60 g) 1.7   Approximate iron content of children's favorite foods    Hamburger, small  1  3.0    Large  1  5.2    Big Mac  1  4.3    Quarter Pounder  1  5.1    Spaghetti with meatballs  1 cup 3.3   Frankfurter and beans  1 cup 4.8   Pork and beans  1 cup 5.9   Raisins  5/8 cup 3.5   Cereals, fortified  1 serving 4.5-17.8   Nuts  1 cup 5.0-7.0   Seeds, sunflower  3 1/2 oz (100 g) 7.1   Chile con carne 1 cup 3.6   Beef burrito or jose de jesus  1 medium 3.4-4.6   Cheese pizza  2 slices 3.0   Cheese pizza with beef  2 slices 4.8   White bread  1 piece 0.7   * Or other fortified cereals which contain 10 mg of iron per ounce or 100 percent RDA per serving.   As organ meats are generally high in cholesterol, these iron-rich foods should be eaten in moderation.  ? Depending on cut, the greatest amounts of iron are generally found in the emre, flank, and bottom round cuts of beef.  ? Depending on cut, the greatest amounts of iron are generally found in the loin, sirloin, tenderloin, and picnic shoulder cuts of pork.    Raisins, nuts, and seeds are not generally recommended for children under age three because of risk of choking.   Data from: Phillip HERNANDEZ, Cherelle SANON (Eds), Nutrition in Pediatrics, 2nd ed, [employer] [facility], [place] 1997.

## 2022-08-19 NOTE — PROGRESS NOTES
ASSESSMENT:   (N17.9) Acute renal failure, unspecified acute renal failure type (H)  (primary encounter diagnosis)  Comment: Improved from last hospital stay.   Plan: ferrous gluconate (FERGON) 324 (38 Fe) MG         tablet        Recheck blood test in 2 weeks.   Schedule an appointment with our  for a lab appointment.      (I48.20) Chronic atrial fibrillation (H)  Comment: Not interested in Watchman.    Plan: Continue the warfarin for now to lessen chances of stroke.  If there are signs of continuing bleeding, we may need to consider stopping the warfarin.     (D50.0) Iron deficiency anemia due to chronic blood loss  Comment:   Plan: Take iron pills ferrous gluconate 324mg daily for iron.   Eat some foods higher in iron.  See list.      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  Weight up a little and leg swelling.   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     You have home care and home OT and PT right now.    Ask about devices that would help you in your daily activities like help getting socks on, different bed or chair.  They can evaluate for the best ones for you and if needed I can sign paperwork or orders.          Adarsh Luther is a 86 year old accompanied by his son, presenting for the following health issues:  Edema (Intermittent bilateral lower extremity swelling), Gastrointestinal Problem (Bloating and rigid abdomen since EGD and Colonoscopy), and Hospital F/U          Discuss compression stockings and application tool for putting them    Bloating and rigid abdomen    Discuss blood thinners    Discuss Iron supplements    Discuss scale with raised display for at home weights    Discuss lift chair and adjustable bed for head and leg  elevation    Hospital Follow-up Visit:    Hospital/Nursing Home/IP Rehab Facility: [facility]  Date of Admission: 7/26/2022  Date of Discharge: Unsure  Reason(s) for Admission: Heart Issues    Was your hospitalization related to COVID-19? No   Problems taking medications regularly:  None  Medication changes since discharge: Noted on form from homecare  Problems adhering to non-medication therapy:  None    Summary of hospitalization:  See outside records, reviewed and scanned  Diagnostic Tests/Treatments reviewed.  Follow up needed: none  Other Healthcare Providers Involved in Patient s Care:         cardiology  Update since discharge: improved.   Post Medication Reconciliation Status:        Plan of care communicated with patient and son     Hospitalized 7/24 to 8/5.  Hospitalized in [facility].  See abstracted hospital notes below.  He had generalized weakness and some acute kidney injury and worsening congestive heart failure.  Spent time in TCU at [facility].   Home on 8/11.  Weight 191# yesterday.   Was recommended continuing furosemide 20mg daily but can increase the dose with weight gain.  He has had more leg swelling.     Lives alone in his apartment.   Plans to have Home Care.  They have sent out nurse.    He had labs on 8/17  He did see cardiology yesterday.   Discussed Watchman but Bear does not want to do this.     Wants   Patient Active Problem List   Diagnosis     Anxiety     Alcohol abuse     Chronic atrial fibrillation (H)     Weight loss     Peripheral polyneuropathy     Chronic gout, unspecified cause, unspecified site     Essential hypertension with goal blood pressure less than 140/90     Hyperlipidemia LDL goal <130     Persistent insomnia     Lower urinary tract symptoms (LUTS)     Long-term (current) use of anticoagulants [Z79.01]     Moderate episode of recurrent major depressive disorder (H)     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)      "Cardiac pacemaker in situ     NICM (nonischemic cardiomyopathy) (H)     CAD (coronary artery disease)     Stage 3b chronic kidney disease (H)     Lymphocytic colitis     Left knee DJD     Warfarin anticoagulation      ROS:General: POSITIVE for:, weight gain, low energy.  Appetite OK.  Gets meals once daily at his apartment.  Eats some additional ice cream.   Resp: No coughing, wheezing or shortness of breath  CV: No chest pains or palpitations  GI: POSITIVE for:, loose stools.  Takes Imodium up to 3-4 per day.   : No urinary frequency or dysuria, bladder or kidney problems  Musculoskeletal: No significant muscle or joint pains  Psychiatric: POSITIVE for:, anxiety.  Worse later in the afternoon.  Anxiety worse since surgery/anesthesia.  He had hallucinations.  Not a problem now.     OBJECTIVE:Blood pressure 122/54, pulse 76, temperature 97.5  F (36.4  C), temperature source Tympanic, resp. rate 16, height 1.626 m (5' 4\"), weight 86.6 kg (191 lb), SpO2 95 %. BMI=Body mass index is 32.79 kg/m .  GENERAL APPEARANCE ADULT: Alert, no acute distress, walks with a walker.   NECK: No adenopathy,masses or thyromegaly  RESP: lungs clear to auscultation   CV: normal rate, regular rhythm, no murmur or gallop  ABDOMEN: soft, no organomegaly, masses or tenderness  MS: He has 2+ edema lower legs extending to posterior thighs.     =====================================    Discharge Summaries  - documented in this encounter    Rani Hall APRN, CNP - 08/05/2022 8:58 AM CDT  Formatting of this note is different from the original.  Images from the original note were not included.  8/5/2022 HOSPITAL DISCHARGE SUMMARY AVERY Kahn DNP    Patient Name: Nabor Cunningham MRN: [id number]   YOB: 1935 Age: 86 year old   Primary Physician:  Heron Mayo MD Phone: [phone]   Admitting Physician:  Deya Pérez MD Admission Date:7/24/2022   Discharging Physician:  AVERY Kahn, LISBTE Discharge " Date: 08/05/22      Discharge Diagnoses:   Principal Problem:  COURTNEY (acute kidney injury) (HCC)  Active Problems:  Atrial fibrillation (HCC)  Ascending aortic aneurysm (HCC)  Anxiety disorder  Pacemaker  Paroxysmal ventricular tachycardia (HCC)  Acute on chronic systolic CHF (congestive heart failure) (HCC)  Essential hypertension  Hyponatremia  Chronic anemia  Coronary artery disease involving native coronary artery of native heart without angina pectoris  Cardiomyopathy, unspecified type (HCC)  Gastrointestinal bleed with melena, possible   Hypotension due to hypovolemia, suspected  Lipoid pneumonia (HCC)  Resolved Problems:  * No resolved hospital problems. *    Follow-Up Recommendations for Outpatient Clinician:  Recheck renal function in one week at follow up     Hospital Course:   87 yo male with PMH sig for CKD 3B, CAD, combined systolic and diastolic CHF, PM (d/t SSS, placed in 2016), anxiety, history of alcohol abuse, chronic atrial fibrillation (on warfarin), and recent left knee replacement who was brought in to the [place] emergency department 7/24/2022 by his family due to multiple falls at home with weakness, PLASENCIA and LE swelling. Admitted with + hemoccult, anemia, cardiomyopathy, FTT, mild hyponatremia (132), possible cellulitis, possible CHF exacerbation. Pt received ceftriaxone X 3 days for possible CAP, IV diuresis for possible CHF exacerbation, IV protonix for possible GIB. Warfarin held in setting of down-trending Hgb. Vitamin K 5mg po X 1 as INR continues in therapeutic range after warfarin held. Hemoglobin stabilized in range of 8.8-9.4 while holding warfarin. Echocardiogram on 7/26 with ejection fraction better than previous study (9/2019), estimated pulmonary pressures higher. CT angio chest performed on 7/26/22 and notable for lipoid pneumonia findings that correlate with patient's long-term home use of mineral oil. Diuresis was held for 48 hours and resumed at PTA home dose of 20mg po on  7/27. MOCA indicated severe impairment with +3/30, improved over course of hospitalization. Pt noted to have low UOP and COURTNEY with Cr 2.51 on 7/28. Discussed with Nephrology who indicate COURTNEY may be due to contrast induced nephropathy or ATN. Colonoscopy and upper endoscopy performed on 7/28/22 and without any findings of acute bleeding. Polyps removed and sent for pathology. Continue to trend daily labs, restrict fluids, renal diet, monitor for signs of fluid overload, record I/O. Discussed benefit/risk of resuming warfarin and family opts to continue. He has continued to make good progress, renal function improving, catheter removed, voiding without difficulty. He continues to score 6/30 on his MOCHA exam. Renal function returned to baseline. He has progressed with therapy and is up independently with walker. He has remained in stable condition. No additional changes have been made to his medication regimen and the remainder of his hospital course has been uneventful.    Follow Up Instructions:  No follow-up provider specified.    No future appointments.  Discharge Procedure Orders   PROTIME   Standing Status: Future Standing Exp. Date: 08/07/23     Basic Met Prof   Standing Status: Future Standing Exp. Date: 08/05/23     Follow-up With: Primary Care; Referral Type: Hospital Follow-up   Referral Priority: Routine Referral Type: Office Visit   Number of Visits Requested: 1 Expiration Date: 02/05/23     Discharge Diet for Patient     Order Specific Question Answer Comments   Home Diet Heart Healthy     Current Discharge Medication List     New Prescriptions   Details   melatonin 3 MG tablet      Dose: 3 mg  3 mg, Oral, AT BEDTIME      Changed Prescriptions   Details   allopurinol 100 MG tablet  Commonly known as: Zyloprim      Dose: 50 mg  Start taking on: August 6, 2022  50 mg, Oral, ONCE DAILY  What changed: how much to take    lisinopril 5 MG tablet  Commonly known as: Prinivil, Zestril      Dose: 5 mg  Start  taking on: August 6, 2022  5 mg, Oral, ONCE DAILY  What changed: how much to take    warfarin 2 MG tablet  Commonly known as: Coumadin      Take 4 mg on Friday, Saturday and Sunday. Recheck INR on Monday, August 8th. Further dosing per provider.  For: Atrial Fibrillation  What changed:     how much to take    additional instructions      Continued   Details   acetaminophen 500 MG tablet  Commonly known as: Tylenol      Dose: 500-1,000 mg  500-1,000 mg, Oral, EVERY 6 HOURS AS NEEDED, Limit acetaminophen to 4000 mg per day from all sources.     atorvaSTATin 20 MG tablet  Commonly known as: Lipitor      Dose: 20 mg  20 mg, Oral, ONCE DAILY    calcium carbonate 600 MG tablet      Dose: 600 mg  600 mg, Oral, ONCE DAILY, Take with food.     gabapentin 300 MG capsule  Commonly known as: Neurontin      Dose: 300 mg  300 mg, Oral, AT BEDTIME    Imodium A-D 2 MG tablet  Generic drug: loperamide      Dose: 2 mg  2 mg, Oral, AS NEEDED    Lasix 20 MG tablet  Generic drug: furosemide      Dose: 20 mg  20 mg, Oral, ONCE DAILY    METAMUCIL FIBER OR      Dose: 1 Capsule  1 Capsule, Oral, ONCE DAILY    metoprolol succinate 25 MG 24 hour extended-release tablet  Commonly known as: Toprol-XL      Dose: 25 mg  25 mg, Oral, ONCE DAILY, Do not crush or chew.    multiple vitamin with minerals tablet      Dose: 1 Tablet  1 Tablet, Oral, EVERY OTHER DAY    nortriptyline 25 MG capsule  Commonly known as: Pamelor      Dose: 25 mg  25 mg, Oral, 2 TIMES DAILY    Pepto-Bismol 262 MG chewable tablet  Generic drug: bismuth subsalicylate      Dose: 1 Tablet  1 Tablet, Chew, ONCE DAILY, Do not exceed 16 tablets per 24 hours.    polyvinyl alcohol-povidone PF 1.4-0.6 % Solution  Commonly known as: Refresh      Dose: 1 Drop  1 Drop, AS NEEDED    prednisoLONE acetate 1 % ophthalmic suspension  Commonly known as: Pred Forte      Dose: 1 Drop  1 Drop, Left Eye, AT BEDTIME, Shake well before administering.     tamsulosin 0.4 MG 24 hour capsule  Commonly  known as: Flomax      Dose: 0.4 mg  0.4 mg, Oral, ONCE DAILY, Capsules should be swallowed whole; do not crush, chew, or open    Vitamin B-12 CR 1000 MCG Tablet Extended Release      Dose: 1,000 mcg  1,000 mcg, Oral, ONCE DAILY

## 2022-08-19 NOTE — TELEPHONE ENCOUNTER
Spoke with patient's daughter, Lexy.  States that patient has had firm/distended abdomen since upper endoscopy/colonoscopy on 7/22/2022 (while hospitalized; no finding of acute bleeding, polyps removed and sent for pathology).   Informed Lexy that patient has not been seen by this clinic's provider since April 2021 - Lexy stated that she was not aware of this.   Lexy states that the patient denies pain, nausea/vomiting, and blood in stools. Has been eating without incident. States that he started imodium (once daily) a couple days ago as he had loose stools along with the Lasix that he was prescribed.   As patient is overdue for return visit, Lexy scheduled a video visit with provider on 8/31.   ER warnings given - inability to have bowel movement, severe abdominal pain, vomiting, bloody stools.   Daughter Lexy appreciative of phone call.     Vivian Soto RN

## 2022-08-19 NOTE — TELEPHONE ENCOUNTER
[facility] Call Center    Phone Message    May a detailed message be left on voicemail: yes     Reason for Call: Symptoms or Concerns     If patient has red-flag symptoms, warm transfer to triage line    Current symptom or concern: Hard and distended abdomen    Symptoms have been present for:  2 week(s)    Has patient previously been seen for this? No    Are there any new or worsening symptoms? No      Action Taken: Message routed to:  [facility] ([facility]    Travel Screening: Not Applicable

## 2022-08-22 NOTE — PROGRESS NOTES
ANTICOAGULATION MANAGEMENT     Nabor Cunningham 86 year old male is on warfarin with supratherapeutic INR result. (Goal INR 2.0-3.0)    Recent labs: (last 7 days)     08/22/22  1032   INR 3.8*       ASSESSMENT       Source(s): Chart Review and Home Care/Facility Nurse       Warfarin doses taken: Held for supra INR 5 days ago  recently which may be affecting INR    Diet: was taking boost in TCU but not since home and no plans to    New illness, injury, or hospitalization: Yes: recent diarrhea that has since resolved. Possible ongoing CHF    Medication/supplement changes: ferrous gluconate  started on 8-19 No interaction anticipated. Lasix increased also    Signs or symptoms of bleeding or clotting: No    Previous INR: Supratherapeutic    Additional findings: probable ongoing CHF per home care assessment and chart review       PLAN     Recommended plan for ongoing change(s) affecting INR     Dosing Instructions: hold dose then decrease your warfarin dose (11.8% change) with next INR in 1 week       Summary  As of 8/22/2022    Full warfarin instructions:  8/22: Hold; Otherwise 6 mg every Mon; 4 mg all other days   Next INR check:  8/29/2022             Telephone call with [facility] care/facility nurse who verbalizes understanding and agrees to plan    Orders given to  Homecare nurse/facility to recheck    Education provided: Please call back if any changes to your diet, medications or how you've been taking warfarin and Contact [phone]  with any changes, questions or concerns.     Plan made per [facility] anticoagulation protocol    Belia Jones RN  Anticoagulation Clinic  8/22/2022    _______________________________________________________________________     Anticoagulation Episode Summary     Current INR goal:  2.0-3.0   TTR:  56.7 % (1 y)   Target end date:  Indefinite   Send INR reminders to:  GARRICK JACKSON    Indications    Chronic atrial fibrillation (H) [I48.20]  Long-term (current) use of anticoagulants  [Z79.01] [Z79.01]           Comments:  Contact dwayne Matias with dosing information.         Anticoagulation Care Providers     Provider Role Specialty Phone number    Heron Mayo MD Referring Family Medicine [phone]

## 2022-08-23 NOTE — TELEPHONE ENCOUNTER
Order/Referral Request    Who is requesting: [facility]    Orders being requested: Home OT - Once a week for 4 weeks, [facility] ADL transfers, Fall prevention      Could we send this information to you in [employer] or would you prefer to receive a phone call?:   Patient would prefer a phone call   Okay to leave a detailed message?: No at Other phone number:  [facility]- [phone]

## 2022-08-29 NOTE — PROGRESS NOTES
ANTICOAGULATION MANAGEMENT     Nabor Cunningham 86 year old male is on warfarin with therapeutic INR result. (Goal INR 2.0-3.0)    Recent labs: (last 7 days)     08/29/22  1129   INR 2.7*       ASSESSMENT       Source(s): Chart Review and Patient/Caregiver Call       Warfarin doses taken: Warfarin taken as instructed    Diet: No new diet changes identified    New illness, injury, or hospitalization: No    Medication/supplement changes: None noted    Signs or symptoms of bleeding or clotting: No    Previous INR: Supratherapeutic    Additional findings: None       PLAN     Recommended plan for no diet, medication or health factor changes affecting INR     Dosing Instructions: Continue your current warfarin dose with next INR in 1 week       Summary  As of 8/29/2022    Full warfarin instructions:  6 mg every Mon; 4 mg all other days   Next INR check:  9/6/2022             Telephone call with [facility] home care nurse who agrees to plan and repeated back plan correctly    Orders given to  Homecare nurse/facility to recheck     Education provided: Please call back if any changes to your diet, medications or how you've been taking warfarin    Plan made per [facility] anticoagulation protocol    Nell Mckeon RN  Anticoagulation Clinic  8/29/2022    _______________________________________________________________________     Anticoagulation Episode Summary     Current INR goal:  2.0-3.0   TTR:  55.3 % (1 y)   Target end date:  Indefinite   Send INR reminders to:  GARRICK JACKSNO    Indications    Chronic atrial fibrillation (H) [I48.20]  Long-term (current) use of anticoagulants [Z79.01] [Z79.01]           Comments:  Contact dwayne Matias with dosing information.         Anticoagulation Care Providers     Provider Role Specialty Phone number    Heron Mayo MD Referring Family Medicine [phone]

## 2022-08-31 NOTE — PROGRESS NOTES
HPI:    Ashkan presents today for a telephone visit with his daughter for follow-up.  Has a history of lymphocytic colitis.  Previously treated with budesonide but caused severe constipation. - then transitioned to daily bismuth, fiber supplements and as needed imodium.  Diarrhea is stable at present.  Has been using imodium - sometimes will go a day without a bowel movement.  Patient and his daughter's main concern is abdominal distention which has been an issue since he was hospitalized in July.  Per PCP note - hospitalized for COURTNEY, CHF, heme + stool.  Was diuresed.  Also underwent EGD and colonoscopy for concern for GI bleed.  No records from hospitalization are available to me.  Patient does believe he had a CT scan while he was there.  Has continued to struggle with LE edema - weighs himself daily and titrates his diuretic as needed.  Hasn't noticed if abdominal distention is worse when his leg swelling is worse.  No abdominal pain.  No other complaints    Past Medical History:   Diagnosis Date     Bleeding from wound 9/2/2018 7/12/2019:He had prolonged bleeding after cyst removal on his back within the past year.          Past Surgical History:   Procedure Laterality Date     ANGIOGRAM  01/2019    right and left heart cath.      APPENDECTOMY      ~2013     ARTHROPLASTY KNEE Left 5/5/2022    Procedure: Left Total  Knee Arthroplasty;  Surgeon: Heron Espinoza MD;  Location: [facility] OR     CATARACT IOL, RT/LT      Past bilateral cataract surgery.      COLONOSCOPY N/A 07/03/2020    Procedure: COLONOSCOPY, WITH POLYPECTOMY AND BIOPSY;  Surgeon: Fady Boyer MD;  Location: [facility]     EYE SURGERY Left     three corneal transplants     IMPLANT PACEMAKER       INCISION AND DRAINAGE TRUNK, COMBINED N/A 09/06/2018    Procedure: COMBINED INCISION AND DRAINAGE TRUNK;  incision and cauterization of left buttock bleed.;  Surgeon: Dain Davis MD;  Location: [facility] OR     INCISION AND DRAINAGE TRUNK, COMBINED N/A  11/01/2018    Procedure:  INCISION AND DRAINAGE of Back Wound;  Surgeon: Dain Davis MD;  Location: [facility] OR     JOINT REPLACEMENT Right     right total knee.      THORACIC SURGERY      removal benign nodule       Family History   Problem Relation Age of Onset     Cancer Brother      Diabetes Brother        Social History     Tobacco Use     Smoking status: Former Smoker     Years: 20.00     Types: Cigars     Smokeless tobacco: Never Used   Substance Use Topics     Alcohol use: Not Currently        Assessment and Plan:    # Abdominal distention - per patient - began shortly after EGD and colonoscopy that was done in hospital for suspected GIB (no procedure reports available but per discharge summary was normal).  Unclear if he has had any abdominal imaging to further evaluate this (although believes that he did).  Will request procedure reports as well as any imaging.  Is possible that abdominal distention is 2/2 fluid retention from underlying CHF and CKD, however, if imaging hasn't been completed would be reasonable to do CT abdomen/pelvis for further evaluation    # lymphocytic colitis - controlled at present with bismuth, fiber and imodium.  Advised to hold imodium if he didn't have a bowel movement the day prior.      RTC as needed    Vivian Mobley DO     Phone call duration: 9 minutes

## 2022-08-31 NOTE — PROGRESS NOTES
Ashkan is a 86 year old who is being evaluated via a billable video visit.      How would you like to obtain your AVS? Negoramahart  If the video visit is dropped, the invitation should be resent by: Text to cell phone: [phone]  Will anyone else be joining your video visit? No

## 2022-08-31 NOTE — TELEPHONE ENCOUNTER
Left message for Lexy that provider had order a CT for patient.   Scheduling number provided- [phone].   Will also send [employer] message.     Vivian Soto RN

## 2022-08-31 NOTE — PATIENT INSTRUCTIONS
I will review your records from your recent hospitalization - if you didn't have any imaging of your stomach done, I may order some.    Don't take imodium if you didn't have a bowel movement the day before - this is to prevent you from getting constipated. Continue the fiber supplements and bismuth (pepto bismol)

## 2022-09-01 NOTE — PROGRESS NOTES
Nabor Cunningham is an extremely pleasant 86 year old year old male patient here today for rash on scalp. Started about a week ago. Son notes that it is getting larger in size. Seems to be tender to touch. Son notes he just was released out of the hospital after acute renal failure. They have use ketoconazole shampoo a few times but not improving. They are concerned about starting anything oral  Patient has no other skin complaints today.  Remainder of the HPI, Meds, PMH, Allergies, FH, and SH was reviewed in chart.    Past Medical History:   Diagnosis Date     Bleeding from wound 9/2/2018 7/12/2019:He had prolonged bleeding after cyst removal on his back within the past year.          Past Surgical History:   Procedure Laterality Date     ANGIOGRAM  01/2019    right and left heart cath.      APPENDECTOMY      ~2013     ARTHROPLASTY KNEE Left 5/5/2022    Procedure: Left Total  Knee Arthroplasty;  Surgeon: Heron Espinoza MD;  Location: [facility]     CATARACT IOL, RT/LT      Past bilateral cataract surgery.      COLONOSCOPY N/A 07/03/2020    Procedure: COLONOSCOPY, WITH POLYPECTOMY AND BIOPSY;  Surgeon: Fady Boyer MD;  Location: [facility]     EYE SURGERY Left     three corneal transplants     IMPLANT PACEMAKER       INCISION AND DRAINAGE TRUNK, COMBINED N/A 09/06/2018    Procedure: COMBINED INCISION AND DRAINAGE TRUNK;  incision and cauterization of left buttock bleed.;  Surgeon: Dain Davis MD;  Location: [facility]     INCISION AND DRAINAGE TRUNK, COMBINED N/A 11/01/2018    Procedure:  INCISION AND DRAINAGE of Back Wound;  Surgeon: Dain Davis MD;  Location: [facility]     JOINT REPLACEMENT Right     right total knee.      THORACIC SURGERY      removal benign nodule        Family History   Problem Relation Age of Onset     Cancer Brother      Diabetes Brother        Social History     Socioeconomic History     Marital status:      Spouse name: Not on file     Number of children: 3     Years of education: 13      Highest education level: Not on file   Occupational History     Occupation: retired   Tobacco Use     Smoking status: Former Smoker     Years: 20.00     Types: Cigars     Smokeless tobacco: Never Used   Vaping Use     Vaping Use: Never used   Substance and Sexual Activity     Alcohol use: Not Currently     Drug use: No     Sexual activity: Not Currently   Other Topics Concern     Parent/sibling w/ CABG, MI or angioplasty before 65F 55M? Not Asked   Social History Narrative    ** Merged History Encounter **          Social Determinants of Health     Financial Resource Strain: Low Risk      Difficulty of Paying Living Expenses: Not very hard   Food Insecurity: No Food Insecurity     Worried About Running Out of Food in the Last Year: Never true     Ran Out of Food in the Last Year: Never true   Transportation Needs: No Transportation Needs     Lack of Transportation (Medical): No     Lack of Transportation (Non-Medical): No   Physical Activity: Inactive     Days of Exercise per Week: 0 days     Minutes of Exercise per Session: 0 min   Stress: Not on file   Social Connections: Unknown     Frequency of Communication with Friends and Family: Twice a week     Frequency of Social Gatherings with Friends and Family: Not on file     Attends Mosque Services: Not on file     Active Member of Clubs or Organizations: Not on file     Attends Club or Organization Meetings: Not on file     Marital Status: Not on file   Intimate Partner Violence: Not on file   Housing Stability: Not on file       Outpatient Encounter Medications as of 9/1/2022   Medication Sig Dispense Refill     acetaminophen (TYLENOL) 500 MG tablet Take 500-1,000 mg by mouth every 6 hours as needed for mild pain       allopurinol (ZYLOPRIM) 100 MG tablet TAKE TWO TABLETS BY MOUTH ONCE DAILY (Patient taking differently: Take 50 mg by mouth daily) 60 tablet 5     atorvastatin (LIPITOR) 20 MG tablet TAKE ONE TABLET BY MOUTH ONCE DAILY 30 tablet 11     bismuth  subsalicylate (PEPTO BISMOL) 262 MG chewable tablet Take 1 tablet by mouth 4 times daily (before meals and nightly)       bismuth subsalicylate (PEPTO-BISMOL) 262 MG TABS Take 1 tablet by mouth daily 30 tablet 3     Calcium Carbonate (CALCIUM 600 PO) Take 600 mg by mouth daily       Ciclopirox Olamine 0.77 % SUSP Apply twice daily to affected area on scalp. 60 mL 6     cyanocobalamin (VITAMIN B-12) 1000 MCG tablet Take 1,000 mcg by mouth daily        furosemide (LASIX) 20 MG tablet Take 2 tablets (40 mg) by mouth daily 180 tablet 3     gabapentin (NEURONTIN) 300 MG capsule Take 1 capsule (300 mg) by mouth At Bedtime 90 capsule 3     lisinopril (ZESTRIL) 5 MG tablet TAKE TWO TABLETS BY MOUTH ONCE DAILY 180 tablet 1     loperamide (IMODIUM) 2 MG capsule Take 2 mg by mouth 4 times daily as needed for diarrhea       METAMUCIL FIBER PO Take 1 capsule by mouth daily       metoprolol succinate ER (TOPROL XL) 25 MG 24 hr tablet TAKE ONE TABLET BY MOUTH EVERY EVENING 90 tablet 1     multivitamin w/minerals (THERA-VIT-M) tablet Take 1 tablet by mouth Every other day 100 tablet 3     nortriptyline (PAMELOR) 25 MG capsule Take 1 capsule (25 mg) by mouth 2 times daily 60 capsule 11     polyvinyl alcohol-povidone PF (REFRESH) 1.4-0.6 % ophthalmic solution 1 drop as needed for irritation       prednisoLONE acetate (PRED FORTE) 1 % ophthalmic susp Place 1 drop Into the left eye daily   2     senna-docusate (SENOKOT-S/PERICOLACE) 8.6-50 MG tablet Take 1-2 tablets by mouth 2 times daily Take while on oral narcotics to prevent or treat constipation. 30 tablet 0     tamsulosin (FLOMAX) 0.4 MG capsule TAKE ONE CAPSULE BY MOUTH ONCE DAILY (Patient taking differently: Take 0.4 mg by mouth daily) 30 capsule 5     warfarin ANTICOAGULANT (JANTOVEN ANTICOAGULANT) 2 MG tablet Take 6 mg every Mon, Wed, Fri; 4 mg all other days or As directed by Anticoagulation clinic 215 tablet 1     Calcium-Magnesium-Zinc 333-133-5 MG TABS per tablet Take 1  tablet by mouth daily (Patient not taking: No sig reported)       ferrous gluconate (FERGON) 324 (38 Fe) MG tablet Take 1 tablet (324 mg) by mouth daily (with breakfast) (Patient not taking: Reported on 9/1/2022) 100 tablet 3     methocarbamol (ROBAXIN) 500 MG tablet Take 1 tablet (500 mg) by mouth every 6 hours as needed for muscle spasms (Patient not taking: No sig reported) 20 tablet 0     triamcinolone (KENALOG) 0.1 % external cream APPLY TOPICALLY TO AFFECTED AREA(S) TWICE DAILY AS DIRECTED (Patient not taking: No sig reported) 45 g 1     [DISCONTINUED] polyvinyl alcohol (LIQUIFILM TEARS) 1.4 % ophthalmic solution 1-2 drops (Patient not taking: Reported on 9/1/2022)       No facility-administered encounter medications on file as of 9/1/2022.             O:   NAD, WDWN, Alert & Oriented, Mood & Affect wnl, Vitals stable   Here today with his son   There were no vitals taken for this visit.   General appearance normal   Vitals stable   Alert, oriented and in no acute distress     Pustular, friable, scabbing plaque on occipital scalp      Eyes: Conjunctivae/lids:Normal     ENT: Lips: normal    MSK:Normal    Pulm: Breathing Normal    Neuro/Psych: Orientation:Alert and Orientedx3 ; Mood/Affect:normal   A/P:  1.  Bacterial folliculitis for kerion due to tinea capitis  Will check cmp.   Favor kerion.   Hair culture done.   Also did aerobic and fungal culture swab of pustules on scalp.   Continue ketoconazole shampoo.  Apply ciclopirox suspension on scalp twice daily.   Last LFTs were normal. If positive for fungus will start oral Lamisil   Will dose per crcl which was would be 1/2 (250 mg) tablets

## 2022-09-01 NOTE — NURSING NOTE
Chief Complaint   Patient presents with     Derm Problem     Spots on the back of scalp        There were no vitals filed for this visit.  Wt Readings from Last 1 Encounters:   08/19/22 86.6 kg (191 lb)       Ani Carvajal LPN .................9/1/2022

## 2022-09-01 NOTE — Clinical Note
Hi Dr. Mayo,  I just saw Bear in clinic yesterday for rash on scalp. I either think fungal kerion vs bacterial folliculitis. I have cultures pending. I did see that his creatinine is trending up again. LFTs are normal, if fungal I plan to start 125 mg of lamisil. If fungal, typically treatment is 4-6 weeks. I discussed patient with Dr. Heredia and he believes we should wait until we get culture results before starting treatment although son prefers treating right away. We should have bacterial culture completed by Monday and preliminary fungal culture results.  Sincerely, Vivian Zaldivar PA-C

## 2022-09-01 NOTE — LETTER
September 2, 2022      Ashkan Cunningham  [address] [facility] [address]        Dear ,    We are writing to inform you of your test results.    Your urine protein level is normal.         Resulted Orders   Albumin Random Urine Quantitative with Creat Ratio   Result Value Ref Range    Albumin Urine mg/L 16.2 mg/L      Comment:      The reference ranges have not been established in urine albumin. The results should be integrated into the clinical context for interpretation.    Albumin Urine mg/g Cr 16.95 0.00 - 17.00 mg/g Cr      Comment:      Microalbuminuria is defined as an albumin:creatinine ratio of 17 to 299 for males and 25 to 299 for females. A ratio of albumin:creatinine of 300 or higher is indicative of overt proteinuria.  Due to biologic variability, positive results should be confirmed by a second, first-morning random or 24-hour timed urine specimen. If there is discrepancy, a third specimen is recommended. When 2 out of 3 results are in the microalbuminuria range, this is evidence for incipient nephropathy and warrants increased efforts at glucose control, blood pressure control, and institution of therapy with an angiotensin-converting-enzyme (ACE) inhibitor (if the patient can tolerate it).      Creatinine Urine mg/dL 95.6 mg/dL      Comment:      The reference ranges have not been established in urine creatinine. The results should be integrated into the clinical context for interpretation.       If you have any questions or concerns, please call the clinic at the number listed above.       Sincerely,      Brian Norris MD

## 2022-09-01 NOTE — LETTER
9/1/2022         RE: Nabor Cunningham  [address] [facility] [address]        Dear Colleague,    Thank you for referring your patient, Nabor Cunningham, to the [facility]. Please see a copy of my visit note below.    Nabor Cunningham is an extremely pleasant 86 year old year old male patient here today for rash on scalp. Started about a week ago. Son notes that it is getting larger in size. Seems to be tender to touch. Son notes he just was released out of the hospital after acute renal failure. They have use ketoconazole shampoo a few times but not improving. They are concerned about starting anything oral  Patient has no other skin complaints today.  Remainder of the HPI, Meds, PMH, Allergies, FH, and SH was reviewed in chart.    Past Medical History:   Diagnosis Date     Bleeding from wound 9/2/2018 7/12/2019:He had prolonged bleeding after cyst removal on his back within the past year.          Past Surgical History:   Procedure Laterality Date     ANGIOGRAM  01/2019    right and left heart cath.      APPENDECTOMY      ~2013     ARTHROPLASTY KNEE Left 5/5/2022    Procedure: Left Total  Knee Arthroplasty;  Surgeon: Heron Espinoza MD;  Location: [facility]     CATARACT IOL, RT/LT      Past bilateral cataract surgery.      COLONOSCOPY N/A 07/03/2020    Procedure: COLONOSCOPY, WITH POLYPECTOMY AND BIOPSY;  Surgeon: Fady Boyer MD;  Location: [facility]     EYE SURGERY Left     three corneal transplants     IMPLANT PACEMAKER       INCISION AND DRAINAGE TRUNK, COMBINED N/A 09/06/2018    Procedure: COMBINED INCISION AND DRAINAGE TRUNK;  incision and cauterization of left buttock bleed.;  Surgeon: Dain Davis MD;  Location: [facility]     INCISION AND DRAINAGE TRUNK, COMBINED N/A 11/01/2018    Procedure:  INCISION AND DRAINAGE of Back Wound;  Surgeon: Dain Davis MD;  Location: [facility]     JOINT REPLACEMENT Right     right total knee.      THORACIC SURGERY      removal benign nodule         Family History   Problem Relation Age of Onset     Cancer Brother      Diabetes Brother        Social History     Socioeconomic History     Marital status:      Spouse name: Not on file     Number of children: 3     Years of education: 13     Highest education level: Not on file   Occupational History     Occupation: retired   Tobacco Use     Smoking status: Former Smoker     Years: 20.00     Types: Cigars     Smokeless tobacco: Never Used   Vaping Use     Vaping Use: Never used   Substance and Sexual Activity     Alcohol use: Not Currently     Drug use: No     Sexual activity: Not Currently   Other Topics Concern     Parent/sibling w/ CABG, MI or angioplasty before 65F 55M? Not Asked   Social History Narrative    ** Merged History Encounter **          Social Determinants of Health     Financial Resource Strain: Low Risk      Difficulty of Paying Living Expenses: Not very hard   Food Insecurity: No Food Insecurity     Worried About Running Out of Food in the Last Year: Never true     Ran Out of Food in the Last Year: Never true   Transportation Needs: No Transportation Needs     Lack of Transportation (Medical): No     Lack of Transportation (Non-Medical): No   Physical Activity: Inactive     Days of Exercise per Week: 0 days     Minutes of Exercise per Session: 0 min   Stress: Not on file   Social Connections: Unknown     Frequency of Communication with Friends and Family: Twice a week     Frequency of Social Gatherings with Friends and Family: Not on file     Attends Sikh Services: Not on file     Active Member of Clubs or Organizations: Not on file     Attends Club or Organization Meetings: Not on file     Marital Status: Not on file   Intimate Partner Violence: Not on file   Housing Stability: Not on file       Outpatient Encounter Medications as of 9/1/2022   Medication Sig Dispense Refill     acetaminophen (TYLENOL) 500 MG tablet Take 500-1,000 mg by mouth every 6 hours as needed for mild pain        allopurinol (ZYLOPRIM) 100 MG tablet TAKE TWO TABLETS BY MOUTH ONCE DAILY (Patient taking differently: Take 50 mg by mouth daily) 60 tablet 5     atorvastatin (LIPITOR) 20 MG tablet TAKE ONE TABLET BY MOUTH ONCE DAILY 30 tablet 11     bismuth subsalicylate (PEPTO BISMOL) 262 MG chewable tablet Take 1 tablet by mouth 4 times daily (before meals and nightly)       bismuth subsalicylate (PEPTO-BISMOL) 262 MG TABS Take 1 tablet by mouth daily 30 tablet 3     Calcium Carbonate (CALCIUM 600 PO) Take 600 mg by mouth daily       Ciclopirox Olamine 0.77 % SUSP Apply twice daily to affected area on scalp. 60 mL 6     cyanocobalamin (VITAMIN B-12) 1000 MCG tablet Take 1,000 mcg by mouth daily        furosemide (LASIX) 20 MG tablet Take 2 tablets (40 mg) by mouth daily 180 tablet 3     gabapentin (NEURONTIN) 300 MG capsule Take 1 capsule (300 mg) by mouth At Bedtime 90 capsule 3     lisinopril (ZESTRIL) 5 MG tablet TAKE TWO TABLETS BY MOUTH ONCE DAILY 180 tablet 1     loperamide (IMODIUM) 2 MG capsule Take 2 mg by mouth 4 times daily as needed for diarrhea       METAMUCIL FIBER PO Take 1 capsule by mouth daily       metoprolol succinate ER (TOPROL XL) 25 MG 24 hr tablet TAKE ONE TABLET BY MOUTH EVERY EVENING 90 tablet 1     multivitamin w/minerals (THERA-VIT-M) tablet Take 1 tablet by mouth Every other day 100 tablet 3     nortriptyline (PAMELOR) 25 MG capsule Take 1 capsule (25 mg) by mouth 2 times daily 60 capsule 11     polyvinyl alcohol-povidone PF (REFRESH) 1.4-0.6 % ophthalmic solution 1 drop as needed for irritation       prednisoLONE acetate (PRED FORTE) 1 % ophthalmic susp Place 1 drop Into the left eye daily   2     senna-docusate (SENOKOT-S/PERICOLACE) 8.6-50 MG tablet Take 1-2 tablets by mouth 2 times daily Take while on oral narcotics to prevent or treat constipation. 30 tablet 0     tamsulosin (FLOMAX) 0.4 MG capsule TAKE ONE CAPSULE BY MOUTH ONCE DAILY (Patient taking differently: Take 0.4 mg by mouth  daily) 30 capsule 5     warfarin ANTICOAGULANT (JANTOVEN ANTICOAGULANT) 2 MG tablet Take 6 mg every Mon, Wed, Fri; 4 mg all other days or As directed by Anticoagulation clinic 215 tablet 1     Calcium-Magnesium-Zinc 333-133-5 MG TABS per tablet Take 1 tablet by mouth daily (Patient not taking: No sig reported)       ferrous gluconate (FERGON) 324 (38 Fe) MG tablet Take 1 tablet (324 mg) by mouth daily (with breakfast) (Patient not taking: Reported on 9/1/2022) 100 tablet 3     methocarbamol (ROBAXIN) 500 MG tablet Take 1 tablet (500 mg) by mouth every 6 hours as needed for muscle spasms (Patient not taking: No sig reported) 20 tablet 0     triamcinolone (KENALOG) 0.1 % external cream APPLY TOPICALLY TO AFFECTED AREA(S) TWICE DAILY AS DIRECTED (Patient not taking: No sig reported) 45 g 1     [DISCONTINUED] polyvinyl alcohol (LIQUIFILM TEARS) 1.4 % ophthalmic solution 1-2 drops (Patient not taking: Reported on 9/1/2022)       No facility-administered encounter medications on file as of 9/1/2022.             O:   NAD, WDWN, Alert & Oriented, Mood & Affect wnl, Vitals stable   Here today with his son   There were no vitals taken for this visit.   General appearance normal   Vitals stable   Alert, oriented and in no acute distress     Pustular, friable, scabbing plaque on occipital scalp      Eyes: Conjunctivae/lids:Normal     ENT: Lips: normal    MSK:Normal    Pulm: Breathing Normal    Neuro/Psych: Orientation:Alert and Orientedx3 ; Mood/Affect:normal   A/P:  1.  Bacterial folliculitis for kerion due to tinea capitis  Will check cmp.   Favor kerion.   Hair culture done.   Also did aerobic and fungal culture swab of pustules on scalp.   Continue ketoconazole shampoo.  Apply ciclopirox suspension on scalp twice daily.   Last LFTs were normal. If positive for fungus will start oral Lamisil   Will dose per crcl which was would be 1/2 (250 mg) tablets       Again, thank you for allowing me to participate in the care of your  patient.        Sincerely,        Vivian Collado PA-C

## 2022-09-02 NOTE — TELEPHONE ENCOUNTER
Central Prior Authorization Team   Phone: [phone]      PA Initiation    Medication:   Insurance Company: [employer] Part D - Phone [phone] Fax [phone]  Pharmacy Filling the Rx: [facility] [address] [place] [address]  Filling Pharmacy Phone: [phone]  Filling Pharmacy Fax:    Start Date: 9/2/2022

## 2022-09-02 NOTE — TELEPHONE ENCOUNTER
Order/Referral Request    Who is requesting: [facility]    Orders being requested: Home Care PT 1 time a week for 3 weeks to work on balance to prevent falls      Okay to leave a detailed message?: Yes at Other phone number:  [phone]  [facility]

## 2022-09-02 NOTE — TELEPHONE ENCOUNTER
Central Prior Authorization Team   Phone: [phone]      PRIOR AUTHORIZATION DENIED    Medication: Ciclopirox Olamine 0.77 % SUSP--DENIED    Denial Date: 9/2/2022    Denial Rational:        Appeal Information:      PLEASE CLOSE ENCOUNTER IF NO APPEAL IS TRIED

## 2022-09-08 NOTE — PROGRESS NOTES
Dr. Galvan signed [facility] home care telephone order for CBC, BMP 8/17/222. Faxed to [phone]. Also sent to scanning. Faith Dupree RN on 9/8/2022 at 2:28 PM

## 2022-09-09 NOTE — PROGRESS NOTES
ANTICOAGULATION MANAGEMENT     Nabor Cunningham 86 year old male is on warfarin with supratherapeutic INR result. (Goal INR 2.0-3.0)    Recent labs: (last 7 days)     09/09/22  0918   INR 3.2*       ASSESSMENT       Source(s): Chart Review and Home Care/Facility Nurse       Warfarin doses taken: Warfarin taken as instructed    Diet: No new diet changes identified    New illness, injury, or hospitalization: Yes: staph infection of scalp    Medication/supplement changes: doxycycline for staph infection of scalp, 9/8-9/12/22    Signs or symptoms of bleeding or clotting: No    Previous INR: Supratherapeutic    Additional findings: None       PLAN     Recommended plan for temporary change(s) affecting INR     Dosing Instructions: partial hold then continue your current warfarin dose with next INR in 5 days       Summary  As of 9/9/2022    Full warfarin instructions:  9/9: 2 mg; Otherwise 4 mg every day   Next INR check:  9/13/2022             Telephone call with [facility] care nurse who verbalizes understanding and agrees to plan    Orders given to  Homecare nurse/facility to recheck    Education provided: Please call back if any changes to your diet, medications or how you've been taking warfarin    Plan made per [facility] anticoagulation protocol    Ronel Flores RN  Anticoagulation Clinic  9/9/2022    _______________________________________________________________________     Anticoagulation Episode Summary     Current INR goal:  2.0-3.0   TTR:  53.9 % (1 y)   Target end date:  Indefinite   Send INR reminders to:  [facility]    Indications    Chronic atrial fibrillation (H) [I48.20]  Long-term (current) use of anticoagulants [Z79.01] [Z79.01]           Comments:  Contact dwayne Matias with dosing information.         Anticoagulation Care Providers     Provider Role Specialty Phone number    Heron Mayo MD Referring Family Medicine [phone]

## 2022-09-14 NOTE — PROGRESS NOTES
ANTICOAGULATION MANAGEMENT     Nabor Cunningham 86 year old male is on warfarin with subtherapeutic INR result. (Goal INR 2.0-3.0)    Recent labs: (last 7 days)     09/14/22  1107   INR 1.7*       ASSESSMENT       Source(s): Chart Review, Patient/Caregiver Call and Home Care/Facility Nurse       Warfarin doses taken: Warfarin taken as instructed    Diet: No new diet changes identified    New illness, injury, or hospitalization: No    Medication/supplement changes: Completed Doxy on 9/12 for scalp and leg infection    Signs or symptoms of bleeding or clotting: No    Previous INR: Supratherapeutic    Additional findings: Discharged from HomeCare Today; next INR in clinic lab. Son will call back and schedule lab appt in [place]       PLAN     Recommended plan for no diet, medication or health factor changes affecting INR     Dosing Instructions: Increase your warfarin dose (7% change) with next INR in 1 week       Summary  As of 9/14/2022    Full warfarin instructions:  6 mg every Wed; 4 mg all other days   Next INR check:  9/22/2022             Telephone call with [facility] home care nurse who agrees to plan and repeated back plan correctly    Contact [phone]  to schedule and with any changes, questions or concerns.     Education provided: Please call back if any changes to your diet, medications or how you've been taking warfarin    Plan made per [facility] anticoagulation protocol    Samara Slater RN  Anticoagulation Clinic  9/14/2022    _______________________________________________________________________     Anticoagulation Episode Summary     Current INR goal:  2.0-3.0   TTR:  53.5 % (1 y)   Target end date:  Indefinite   Send INR reminders to:  [facility]    Indications    Chronic atrial fibrillation (H) [I48.20]  Long-term (current) use of anticoagulants [Z79.01] [Z79.01]           Comments:  Contact dwayne Matias with dosing information.         Anticoagulation Care Providers     Provider Role  Specialty Phone number    Heron Mayo MD Referring Family Medicine [phone]

## 2022-09-20 NOTE — TELEPHONE ENCOUNTER
Replied to [facility] message with the [facility] comments/message sent by Dr. Mobley on 9/6 (message had not been read per chart/patient message review).    Vivian Soto RN

## 2022-09-20 NOTE — TELEPHONE ENCOUNTER
Reason for Call:  Increased Fluid    Detailed comments: Per [facility] visit today.   Pt feels he has increased fluid and some SOB   Weights   9/18 194    9/20 193   today 198  Beatrice OT and Pt is wondering if this ok? Does he need medication for this?    Per Pt call Florencio Son  [phone] regarding this issue     Please Advise    .[facility]*Hospice/ [facility]  - [facility]  Nurse Beatrice     Tele # [phone]      Call taken on 9/20/2022 at 12:45 PM by Cee Michel

## 2022-09-21 NOTE — TELEPHONE ENCOUNTER
Please call.   Last clinic visit: 8/19.  Weight was 191#.  His furosemide was increased to 40mg daily on that date.   He has congestive heart failure.  This is likely cause for weight gain.   He is on ACE and beta-blocker.  PLAN: Increase furosemide to 60mg daily (three of the 20mg pills once daily.   REcheck chem8 and follow-up in 3 weeks or with continued weight gain.   VICTOR HUGO ART MD

## 2022-09-21 NOTE — TELEPHONE ENCOUNTER
Vivian Mobley, DO  You 3 minutes ago (8:54 AM)       Yes - he can stay on fiber in addition to the miralax.  He should still stop the imodium and pepto bismol though - the looser stools maybe related to overflow diarrhea which will get worse with starting the miralax but then should improve.      [employer] message sent to patient with above provider recommendations.     Vivian Soto RN

## 2022-09-21 NOTE — TELEPHONE ENCOUNTER
[facility] OT Leanne returned call. Dr Mayo's message relayed. Yesterday was her last visit with patient. Nursing saw Bear from 08/15/22-09/14/22. Physical therapy is scheduled to see him for a final visit next week. Leanne will make a note for P T to weigh patient and update care team how he is doing next week. She made a note of the lasix change as well and follow up. Clinic RN Any notified patient's son Florencio of the changes as well.   Anna Marie RG, RN

## 2022-09-21 NOTE — TELEPHONE ENCOUNTER
Left message for Leanne to call back.    Son Florencio is called and notified. Florencio asks for an updated medication list to be picked up at the , this is done.          SAURAV Nolan

## 2022-09-23 NOTE — PROGRESS NOTES
ANTICOAGULATION MANAGEMENT     Nabor Cunningham 86 year old male is on warfarin with therapeutic INR result. (Goal INR 2.0-3.0)    Recent labs: (last 7 days)     09/23/22  1122   INR 2.6*       ASSESSMENT       Source(s): Chart Review and Patient/Caregiver Call       Warfarin doses taken: Warfarin taken as instructed    Diet: No new diet changes identified    New illness, injury, or hospitalization: Yes: increased CHF symptoms of SOB and fluid retention. Also was having some issues with diarrhea but that has gotten better    Medication/supplement changes: lasix increased (no interaction). also added fiber supplement and miralax. Imodium and pepto stopped.    Signs or symptoms of bleeding or clotting: No    Previous INR: Subtherapeutic    Additional findings: None       PLAN     Recommended plan for temporary change(s) and ongoing change(s) affecting INR     Dosing Instructions: Continue your current warfarin dose with next INR in 1 week       Summary  As of 9/23/2022    Full warfarin instructions:  6 mg every Wed; 4 mg all other days   Next INR check:  9/30/2022             Telephone call with  Florencio who verbalizes understanding and agrees to plan    Lab visit scheduled    Education provided: Please call back if any changes to your diet, medications or how you've been taking warfarin and Contact [phone]  with any changes, questions or concerns.     Plan made per [facility] anticoagulation protocol    Belia Jones RN  Anticoagulation Clinic  9/23/2022    _______________________________________________________________________     Anticoagulation Episode Summary     Current INR goal:  2.0-3.0   TTR:  52.7 % (1 y)   Target end date:  Indefinite   Send INR reminders to:  [facility]    Indications    Chronic atrial fibrillation (H) [I48.20]  Long-term (current) use of anticoagulants [Z79.01] [Z79.01]           Comments:  Contact dwayne Matias with dosing information.         Anticoagulation Care Providers      Provider Role Specialty Phone number    Heron Mayo MD Referring Family Medicine [phone]

## 2022-09-30 NOTE — PROGRESS NOTES
ANTICOAGULATION MANAGEMENT     Nabor Cunningham 86 year old male is on warfarin with subtherapeutic INR result. (Goal INR 2.0-3.0)    Recent labs: (last 7 days)     09/30/22  1155   INR 1.3*       ASSESSMENT       Source(s): Chart Review and Patient/Caregiver Call       Warfarin doses taken: Missed dose(s) may be affecting INR    Diet: No new diet changes identified    New illness, injury, or hospitalization: No    Medication/supplement changes: None noted    Signs or symptoms of bleeding or clotting: No    Previous INR: Therapeutic last visit; previously outside of goal range    Additional findings: None     PLAN     Recommended plan for temporary change(s) affecting INR     Dosing Instructions: booster dose then continue your current warfarin dose with next INR in 5 days       Summary  As of 9/30/2022    Full warfarin instructions:  9/30: 8 mg; Otherwise 6 mg every Wed; 4 mg all other days   Next INR check:  10/5/2022             Telephone call with  Florencio who verbalizes understanding and agrees to plan    Lab visit scheduled - florencio scheduled for Friday    Education provided: Please call back if any changes to your diet, medications or how you've been taking warfarin and Monitoring for clotting signs and symptoms    Plan made per [facility] anticoagulation protocol    Jayde Whitt RN  Anticoagulation Clinic  9/30/2022    _______________________________________________________________________     Anticoagulation Episode Summary     Current INR goal:  2.0-3.0   TTR:  53.1 % (1 y)   Target end date:  Indefinite   Send INR reminders to:  [facility]    Indications    Chronic atrial fibrillation (H) [I48.20]  Long-term (current) use of anticoagulants [Z79.01] [Z79.01]           Comments:  Contact dwayne Matias with dosing information.         Anticoagulation Care Providers     Provider Role Specialty Phone number    Heron Mayo MD Referring Family Medicine [phone]

## 2022-10-07 NOTE — PROGRESS NOTES
ANTICOAGULATION MANAGEMENT     Nabor Cunningham 86 year old male is on warfarin with subtherapeutic INR result. (Goal INR 2.0-3.0)    Recent labs: (last 7 days)     10/07/22  1504   INR 1.9*       ASSESSMENT       Source(s): Chart Review and Patient/Caregiver Call       Warfarin doses taken: Warfarin taken as instructed    Diet: No new diet changes identified    New illness, injury, or hospitalization: No    Medication/supplement changes: None noted    Signs or symptoms of bleeding or clotting: No    Previous INR: Subtherapeutic    Additional findings: None       PLAN     Recommended plan for no diet, medication or health factor changes affecting INR     Dosing Instructions: Increase your warfarin dose (6.7% change) with next INR in 1 week       Summary  As of 10/7/2022    Full warfarin instructions:  6 mg every Mon, Fri; 4 mg all other days   Next INR check:  10/14/2022             Telephone call with  Florencio who verbalizes understanding and agrees to plan    Lab visit scheduled    Education provided: Please call back if any changes to your diet, medications or how you've been taking warfarin and Contact [phone]  with any changes, questions or concerns.     Plan made per [facility] anticoagulation protocol    Belia Jones, RN  Anticoagulation Clinic  10/7/2022    _______________________________________________________________________     Anticoagulation Episode Summary     Current INR goal:  2.0-3.0   TTR:  53.1 % (1 y)   Target end date:  Indefinite   Send INR reminders to:  [facility]    Indications    Chronic atrial fibrillation (H) [I48.20]  Long-term (current) use of anticoagulants [Z79.01] [Z79.01]           Comments:  Contact dwayne Matias with dosing information.         Anticoagulation Care Providers     Provider Role Specialty Phone number    Heron Mayo MD Referring Family Medicine [phone]

## 2022-10-10 NOTE — RESULT ENCOUNTER NOTE
Lipids stable and at goal; triglycerides much improved and nearly to goal; electrolytes stable; creatinine elevated but improved from previous; hemoglobin and hematocrit decreased but improved from previous; platelets slightly decreased from previous. Due for STEVE follow up. Message to scheduling to call pt to schedule. Results released to [employer]

## 2022-10-14 NOTE — PROGRESS NOTES
ASSESSMENT:   (R60.9) Peripheral edema  (primary encounter diagnosis)  Comment: both legs, worse right leg.   Plan: furosemide (LASIX) 80 MG tablet        Continue the furosemide at 80mg daily.  Takes four of the 20mg pills once daily until gone, then switch to the 80mg pills once daily.   REcent chem 8 last week.  No need to repeat today.     Things that help the swelling include limiting salt in the diet.  Elevate legs when possible (feet above the level of the pelvis).   Compression stockings are one of the most effective treatments.      (D64.9) Anemia, unspecified type  Comment: better than two months ago  Plan: Continue the iron pills daily.     (I10) Essential hypertension with goal blood pressure less than 140/90  Comment: doing well  Plan: No change in current treatment plan.      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   No other changes at this time.          Adarsh Luther is a 86 year old accompanied by his daughter, presenting for the following health issues:  Chief Complaint   Patient presents with     INR Followup     Medication Follow-up     Furosemide, weight check, kidneys     Rash     Back of head      8/31: Visit with GI for colitis which had been under control and some abdominal distention.  8/19: Last visit with me.  He had some weight gain so I had increased his furosemide from 20 to 40 mg daily and recommended for CBC follow-up.  8/18: Cardiology visit    Medication follow up on furosemide, would like to get kidney function and iron levels checked. Had INR done today. Rash on the back of his head, has been trying creams and Vaseline.  Wondering about diuretic dose and if he needs to continue iron.     Feeling well overall.   Some diarrhea at times.  Mostly OK.  He had taken polyethylene glycol (Miralax) due to constipation.   Takes once in a while.    Doing OK recently.     Breathing OK.  No recent changes.  NO remarkable dyspnea on exertion.    Swelling right leg more than left.  Always swollen.    He has been on furosemide 80mg daily.    Weight up a little.    Wt Readings from Last 5 Encounters:   10/14/22 89.6 kg (197 lb 9.6 oz)   08/19/22 86.6 kg (191 lb)   08/18/22 86.6 kg (191 lb)   07/20/22 84.4 kg (186 lb)   07/01/22 81.2 kg (179 lb)        Patient Active Problem List   Diagnosis     Anxiety     Alcohol abuse     Chronic atrial fibrillation (H)     Weight loss     Peripheral polyneuropathy     Chronic gout, unspecified cause, unspecified site     Essential hypertension with goal blood pressure less than 140/90     Hyperlipidemia LDL goal <130     Persistent insomnia     Lower urinary tract symptoms (LUTS)     Long-term (current) use of anticoagulants [Z79.01]     Moderate episode of recurrent major depressive disorder (H)     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  Normal energy.  No fever or chills  CV: No chest pains or palpitations  : No urinary frequency or dysuria, bladder or kidney problems, POSITIVE for:, urinary frequency, sometimes related to how much he drinks.   Drinks 7UP two small bottles a day.   Does not use much salt.     OBJECTIVE:Blood pressure 114/72, pulse 84, temperature 97.7  F (36.5  C), temperature source Tympanic, resp. rate 16, weight 89.6 kg (197 lb 9.6 oz), SpO2 98 %. BMI=Body mass index is 33.92 kg/m .  GENERAL APPEARANCE ADULT: Alert, no acute distress, seated in a wheelchair  EYES: PERRL, EOM normal, conjunctiva and lids normal  NECK: No adenopathy,masses or thyromegaly  RESP: lungs clear  to auscultation   CV: normal rate, regular rhythm, no murmur or gallop  MS: HE has 1+ edema right leg and 2+ edema left leg.

## 2022-10-14 NOTE — PROGRESS NOTES
.accreANTICOAGULATION MANAGEMENT     Nabor Cunningham 86 year old male is on warfarin with therapeutic INR result. (Goal INR 2.0-3.0)    Recent labs: (last 7 days)     10/14/22  1256   INR 2.1*       ASSESSMENT       Source(s): Chart Review and Patient/Caregiver Call       Warfarin doses taken: Warfarin taken as instructed    Diet: No new diet changes identified    New illness, injury, or hospitalization: No    Medication/supplement changes: None noted    Signs or symptoms of bleeding or clotting: No    Previous INR: Subtherapeutic    Additional findings: None       PLAN     Recommended plan for no diet, medication or health factor changes affecting INR     Dosing Instructions: Continue your current warfarin dose with next INR in 2 weeks       Summary  As of 10/14/2022    Full warfarin instructions:  6 mg every Mon, Fri; 4 mg all other days   Next INR check:  10/28/2022             Telephone call with Florencio who verbalizes understanding and agrees to plan    Lab visit scheduled    Education provided: Please call back if any changes to your diet, medications or how you've been taking warfarin and Contact [phone]  with any changes, questions or concerns.     Plan made per [facility] anticoagulation protocol    Daphne VALLE RN  Anticoagulation Clinic  10/14/2022    _______________________________________________________________________     Anticoagulation Episode Summary     Current INR goal:  2.0-3.0   TTR:  54.1 % (1 y)   Target end date:  Indefinite   Send INR reminders to:  [facility]    Indications    Chronic atrial fibrillation (H) [I48.20]  Long-term (current) use of anticoagulants [Z79.01] [Z79.01]           Comments:  Contact dwayne Matias with dosing information.         Anticoagulation Care Providers     Provider Role Specialty Phone number    Heron Mayo MD Referring Family Medicine [phone]

## 2022-10-14 NOTE — PATIENT INSTRUCTIONS
ASSESSMENT:   (R60.9) Peripheral edema  (primary encounter diagnosis)  Comment: both legs, worse right leg.   Plan: furosemide (LASIX) 80 MG tablet        Continue the furosemide at 80mg daily.  Takes four of the 20mg pills once daily until gone, then switch to the 80mg pills once daily.   REcent chem 8 last week.  No need to repeat today.     Things that help the swelling include limiting salt in the diet.  Elevate legs when possible (feet above the level of the pelvis).   Compression stockings are one of the most effective treatments.      (D64.9) Anemia, unspecified type  Comment: better than two months ago  Plan: Continue the iron pills daily.     (I10) Essential hypertension with goal blood pressure less than 140/90  Comment: doing well  Plan: No change in current treatment plan.      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   No other changes at this time.

## 2022-10-28 NOTE — TELEPHONE ENCOUNTER
Vivian Mobley, DO  You 25 minutes ago (11:17 AM)     KM  I put in stool studies and an abdominal x-ray - further recommendations pending those results   Thanks          Called patients son Florencio to update on providers recommendations per above. Instructed to  stool study containers at [facility] lab. Florencio reports that patient has an appointment at 3pm with his PCP and they will pick the containers up at that time. Writer did instruct to ensure patient is drinking plenty of water and performing good hand hygiene following bathroom use. Florencio reports that patient only drinks pop. Writer gave ER warnings, Florencio verbalized understanding.    Vivian Mccormick RN

## 2022-10-28 NOTE — TELEPHONE ENCOUNTER
Please call.   His BP is on the lower side which has been the case recently.   PLAN: He could reduce lisinopril dose to 5mg daily (now on 10mg daily)  VICTOR HUGO ART MD

## 2022-10-28 NOTE — PROGRESS NOTES
ANTICOAGULATION MANAGEMENT     Nabor Cunningham 86 year old male is on warfarin with therapeutic INR result. (Goal INR 2.0-3.0)    Recent labs: (last 7 days)     10/28/22  1454   INR 2.9*       ASSESSMENT       Source(s): Chart Review and Patient/Caregiver Call       Warfarin doses taken: Warfarin taken as instructed    Diet: No new diet changes identified    New illness, injury, or hospitalization: Yes: diarrhea several times daily for the past week    Medication/supplement changes: None noted    Signs or symptoms of bleeding or clotting: No    Previous INR: Therapeutic last visit; previously outside of goal range    Additional findings: stool studies ordered today. suspected c diff       PLAN     Recommended plan for temporary change(s) affecting INR     Dosing Instructions: Continue your current warfarin dose with next INR in 2 weeks       Summary  As of 10/28/2022    Full warfarin instructions:  6 mg every Mon, Fri; 4 mg all other days; Starting 10/28/2022   Next INR check:  11/11/2022             Telephone call with  Florencio who verbalizes understanding and agrees to plan    Lab visit scheduled    Education provided:     Please call back if any changes to your diet, medications or how you've been taking warfarin    Importance of notifying anticoagulation clinic for: changes in medications; a sooner lab recheck maybe needed and diarrhea, nausea/vomiting, reduced intake, cold/flu, and/or infections; a sooner lab recheck maybe needed    Contact [phone]  with any changes, questions or concerns.     Plan made per [facility] anticoagulation protocol    Belia Jones RN  Anticoagulation Clinic  10/28/2022    _______________________________________________________________________     Anticoagulation Episode Summary     Current INR goal:  2.0-3.0   TTR:  57.9 % (1 y)   Target end date:  Indefinite   Send INR reminders to:  [facility]    Indications    Chronic atrial fibrillation (H) [I48.20]  Long-term (current)  use of anticoagulants [Z79.01] [Z79.01]           Comments:  Contact dwayne Matias with dosing information.         Anticoagulation Care Providers     Provider Role Specialty Phone number    Heron Mayo MD Referring Family Medicine [phone]

## 2022-10-28 NOTE — TELEPHONE ENCOUNTER
"Return call made to patient to follow up on call made to report diarrhea x1 week. Spoke to patients son Florencio. Per Florencio patient reports that he has frequent loose/watery stools producing small amounts. Patient states that he had atleast 20 bowel movements in the last 24 hours and was \"up all night\". Patient still has a good appetite. He did vomit this am after eating a pancake. He has not taken his maralax for the last week due to loose stools. Is taking his metamucil BID, not taking pepto, did take 2 tabs of imodium this am. Denies blood in his stool but does have some blood with wiping due to irritation from loose stools. Per Florencio patient started taking iron 1 month ago. Patient also started eating large mount of ice cream 6 months ago, eats roughly 3 boxes of klondike bars and 3 boxes of dove bars a week. Florencio reports that other than having diarrhea patient feels fine. Writer instructed that information will be sent to provider for review and recommendations.    Vivian Mccormick RN  "

## 2022-10-28 NOTE — TELEPHONE ENCOUNTER
Nabor Cunningham is a 86 year old year old patient who comes in today for a Blood Pressure check because of son wanting to have things checked out.  Vital Signs as repeated by /52, P 78  Patient is taking medication as prescribed  Patient is tolerating medications well.  Patient is not monitoring Blood Pressure at home.  Current complaints: none  Disposition:  patient reminded to call as needed, and we will call after Dr. Mayo comments on Blood pressure check.     Beverly Cope RN

## 2022-10-28 NOTE — TELEPHONE ENCOUNTER
[facility] Call Center    Phone Message    May a detailed message be left on voicemail: yes     Reason for Call: Symptoms or Concerns     If patient has red-flag symptoms, warm transfer to triage line    Current symptom or concern: Diarrhea    Symptoms have been present for:  1 week(s)    Has patient previously been seen for this? No    Are there any new or worsening symptoms? No      Action Taken: Message routed to:  [facility] ([facility]    Travel Screening: Not Applicable

## 2022-10-28 NOTE — PROGRESS NOTES
aNbor Cunningham is a 86 year old year old patient who comes in today for a Blood Pressure check because of son wanting to have things checked out.  Vital Signs as repeated by /52, P 78  Patient is taking medication as prescribed  Patient is tolerating medications well.  Patient is not monitoring Blood Pressure at home.  Current complaints: none  Disposition:  patient reminded to call as needed, and we will call after Dr. Mayo comments on Blood pressure check.     Beverly Cope RN

## 2022-10-31 NOTE — TELEPHONE ENCOUNTER
LORETA contacted patient to inform him of the following;  Per Dr. Mboley,  Thanh Luther has c-diff - can you reach out to him/his son and let him know? I sent in an Rx for vancomycin.  Please also tell him to avoid imodium. He can hold off on the abdominal x-ray   Thanks   Vivian KAN spoke with son Florencio. Florencio verbalized to understand and denied having any questions. LORETA encouraged them to call or sent a [employer] message if they have any questions or concerns.    Rica Vasquez CMA

## 2022-11-11 NOTE — TELEPHONE ENCOUNTER
LORETA called patient in regards message below. LORETA informed patient that per Dr. Mobley, there is no need for repeat stool testing. Son, Florencio stated that he just wanted to make sure that the bacteria was completely gone. Patient stated that he was feeling better, but he was still having trouble with bowel movements. Patient stated that he doesn't suffer from abdominal pain or diarrhea any more, but when he has the need to use the restroom for number 1, he starts defecating at the same time. He expressed that it's not fecal incontinence since he doesn't feel the urge to go and he makes it to the restroom every time. He also stated that his stools are not completely formed or runny, they are somewhere in between. He would like to know if Dr. Mobley has any further recommendations to help with his symptoms. LORETA informed patient that we will forward message to Dr. Mobley and we will reach back as soon as we hear back from her. Patient and son expressed to be grateful and denied having any other questions.    Rica Vasquez CMA

## 2022-11-11 NOTE — PROGRESS NOTES
ANTICOAGULATION MANAGEMENT     Nabor Cunningham 87 year old male is on warfarin with therapeutic INR result. (Goal INR 2.0-3.0)    Recent labs: (last 7 days)     11/11/22  1255   INR 2.9*       ASSESSMENT       Source(s): Chart Review and Patient/Caregiver Call       Warfarin doses taken: Warfarin taken as instructed    Diet: No new diet changes identified    New illness, injury, or hospitalization: Yes: had c diff since last INR    Medication/supplement changes: just finished vancomycin for c diff    Signs or symptoms of bleeding or clotting: No    Previous INR: Therapeutic last 2(+) visits    Additional findings: c diff is improved       PLAN     Recommended plan for no diet, medication or health factor changes affecting INR     Dosing Instructions: Continue your current warfarin dose with next INR in 3 weeks       Summary  As of 11/11/2022    Full warfarin instructions:  6 mg every Mon, Fri; 4 mg all other days; Starting 11/11/2022   Next INR check:  12/2/2022             Telephone call with  Florencio who verbalizes understanding and agrees to plan    Lab visit scheduled    Education provided:     Please call back if any changes to your diet, medications or how you've been taking warfarin    Importance of notifying anticoagulation clinic for: changes in medications; a sooner lab recheck maybe needed and diarrhea, nausea/vomiting, reduced intake, cold/flu, and/or infections; a sooner lab recheck maybe needed    Contact [phone]  with any changes, questions or concerns.     Plan made per [facility] anticoagulation protocol    Belia Jones RN  Anticoagulation Clinic  11/11/2022    _______________________________________________________________________     Anticoagulation Episode Summary     Current INR goal:  2.0-3.0   TTR:  58.3 % (1 y)   Target end date:  Indefinite   Send INR reminders to:  [facility]    Indications    Chronic atrial fibrillation (H) [I48.20]  Long-term (current) use of anticoagulants  [Z79.01] [Z79.01]           Comments:  Contact dwayne Matias with dosing information.         Anticoagulation Care Providers     Provider Role Specialty Phone number    Heron Mayo MD Referring Family Medicine [phone]

## 2022-11-11 NOTE — TELEPHONE ENCOUNTER
LORETA called patient to inform him pf the following;  Per Dr. Mobley,  He can resume a fiber supplement if he isn't already taking - if it persists have him make a follow-up appointment - would be fine to see Erica Polanco     Patient's son stated that patient is currently taking fiber. He stated that he will give it another week and see if his dad's symptoms improve. If symptoms persist, he will call back to schedule a follow up. They denied having any other questions. LORETA provided patient with phone number to call to schedule.    Rica Vasquez CMA

## 2022-11-11 NOTE — PROGRESS NOTES
Cardiology Progress Note    Patient seen today in follow up of: cardiomyopathy  Primary cardiologist: Dr. Galvan    HPI:  Nabor Cunningham is a very pleasant 87 year old male with a history of:  1.  History of chronic atrial fibrillation, on anticoagulation with warfarin.  2.  History of symptomatic bradycardia with pacemaker since 06/2016 with intermittent 20%-40% ventricular pacing.  3.  Initially normal EF but then dropped to around 40%-50% range, likely a result of chronic AFib and pacing.  Last EF is around 45%.  4.  Coronary angiography done due to drop in EF in 01/2019, showing nonobstructive disease.  Remains on a statin.  5.  History of chronic kidney disease.  Creatinine 1.5-2.  6.  Hyperlipidemia, HDL deficiency.  7.  Mild aortic stenosis with mild ascending aortic dilatation at 4.5 cm.  8.  Falls.     Ashkan saw Dr. Galvan in August. Prior to that, he'd been hospitalized at an outside hospital with significant weakness and falls. He had worsening peripheral edema as well. He was hospitalized for a few weeks. He required IV diuresis and was anemic. Anticoagulation was stopped. He was on lasix 20 mg daily when he saw Dr. Galvan. He suggesting increasing this as needed for weight gain and symptom control. He was back on anticoagulation.     In September, he contacted Dr. Torres's office with increasing edema.  His Lasix was increased to 60 mg daily and subsequently 80 mg daily.  He is been on this dose for the last month.  His blood pressure was mildly low at the end of October and his lisinopril was decreased from 10 to 5 mg daily.    Today, he presents with his daughter for routine follow-up.  He feels he is doing relatively well.  He continues to have some mild edema however it is much improved from before.  This is not bothersome to him.  He has tried compression stockings in the past however does not want to use them and finds them difficult to wear.  He denies any dyspnea on exertion.  No orthopnea or PND.   Weight is stable at home around 195 pounds.  His hemoglobin was improving or at around 11 on last check last month.  He denies any recent bleeding.    ASSESSMENT/PLAN:  Nabor Cunningham is a very pleasant 87 year old male with chronic atrial fibrillation on anticoagulation, symptomatic bradycardia with a pacemaker in place, and a mild nonischemic cardiomyopathy with an ejection fraction around 45% and CKD who is here today for routine follow-up.    Ashkan recently had his diuretic dose escalated due to some worsening peripheral edema.  He is now taking 80 mg of Lasix.  He feels this has helped.  He has some ongoing mild peripheral edema on exam today.I do think he benefit from compression stockings however he does not want to use those.  His last basic metabolic panel was done on 10/7/2022.  His creatinine was up to 1.78.  Electrolytes are normal.  Given he has been on the higher dose of diuretic, I suggested we repeat a BMP.  He will have that done in a few weeks for his next INR check.  If he struggles in the future with worsening edema or congestive heart failure symptoms, I would suggest considering switching him from Lasix to Bumex to torsemide.  Additionally, an SGLT2 inhibitor could be a consideration for him.  I asked him to call if he has any issues.    We will continue low-dose lisinopril and metoprolol.  His lisinopril dose was recently decreased due due to systolic blood pressures in the 90s.  Blood pressure stable today and will continue these unchanged.      It was a pleasure seeing Ashkan in clinic today.  I will follow-up on his labs in a few week and make any necessary adjustments if needed.  He has an in clinic device check scheduled next week as well.  I recommended he return to see Dr. Galvan in 4 to 6 months with an echocardiogram prior. In the meantime, I asked him to contact us with questions or concerns.    Orders this Visit:  Orders Placed This Encounter   Procedures     Basic metabolic panel      No orders of the defined types were placed in this encounter.    There are no discontinued medications.    CURRENT MEDICATIONS:  Current Outpatient Medications   Medication Sig Dispense Refill     acetaminophen (TYLENOL) 500 MG tablet Take 500-1,000 mg by mouth every 6 hours as needed for mild pain       allopurinol (ZYLOPRIM) 100 MG tablet TAKE TWO TABLETS BY MOUTH ONCE DAILY (Patient taking differently: Take 50 mg by mouth daily) 60 tablet 5     atorvastatin (LIPITOR) 20 MG tablet TAKE ONE TABLET BY MOUTH ONCE DAILY 30 tablet 11     bismuth subsalicylate (PEPTO BISMOL) 262 MG chewable tablet Take 1 tablet by mouth 4 times daily (before meals and nightly)       bismuth subsalicylate (PEPTO-BISMOL) 262 MG TABS Take 1 tablet by mouth daily 30 tablet 3     Calcium Carbonate (CALCIUM 600 PO) Take 600 mg by mouth daily       Calcium-Magnesium-Zinc 333-133-5 MG TABS per tablet Take 1 tablet by mouth daily       Ciclopirox Olamine 0.77 % SUSP Apply twice daily to affected area on scalp. 60 mL 6     clindamycin (CLEOCIN T) 1 % external solution Apply twice daily to scalp 60 mL 1     cyanocobalamin (VITAMIN B-12) 1000 MCG tablet Take 1,000 mcg by mouth daily        ferrous gluconate (FERGON) 324 (38 Fe) MG tablet Take 1 tablet (324 mg) by mouth daily (with breakfast) 100 tablet 3     furosemide (LASIX) 80 MG tablet Take 1 tablet (80 mg) by mouth daily 90 tablet 3     gabapentin (NEURONTIN) 300 MG capsule Take 1 capsule (300 mg) by mouth At Bedtime 90 capsule 3     lisinopril (ZESTRIL) 5 MG tablet Take 1 tablet (5 mg) by mouth daily 180 tablet 1     loperamide (IMODIUM) 2 MG capsule Take 2 mg by mouth 4 times daily as needed for diarrhea       METAMUCIL FIBER PO Take 1 capsule by mouth daily       methocarbamol (ROBAXIN) 500 MG tablet Take 1 tablet (500 mg) by mouth every 6 hours as needed for muscle spasms 20 tablet 0     metoprolol succinate ER (TOPROL XL) 25 MG 24 hr tablet TAKE ONE TABLET BY MOUTH EVERY EVENING 90  tablet 1     multivitamin w/minerals (THERA-VIT-M) tablet Take 1 tablet by mouth Every other day 100 tablet 3     nortriptyline (PAMELOR) 25 MG capsule Take 1 capsule (25 mg) by mouth 2 times daily 60 capsule 11     polyvinyl alcohol-povidone PF (REFRESH) 1.4-0.6 % ophthalmic solution 1 drop as needed for irritation       prednisoLONE acetate (PRED FORTE) 1 % ophthalmic susp Place 1 drop Into the left eye daily   2     senna-docusate (SENOKOT-S/PERICOLACE) 8.6-50 MG tablet Take 1-2 tablets by mouth 2 times daily Take while on oral narcotics to prevent or treat constipation. 30 tablet 0     tamsulosin (FLOMAX) 0.4 MG capsule TAKE ONE CAPSULE BY MOUTH ONCE DAILY (Patient taking differently: Take 0.4 mg by mouth daily) 30 capsule 5     triamcinolone (KENALOG) 0.1 % external cream APPLY TOPICALLY TO AFFECTED AREA(S) TWICE DAILY AS DIRECTED 45 g 1     warfarin ANTICOAGULANT (JANTOVEN ANTICOAGULANT) 2 MG tablet Take 6 mg every Mon, Wed, Fri; 4 mg all other days or As directed by Anticoagulation clinic 215 tablet 1     ALLERGIES  Allergies   Allergen Reactions     Amoxicillin Hives     Penicillins Other (See Comments)     Dizzy       Trazodone Other (See Comments)     Hallucinations       PAST MEDICAL HISTORY:  Past Medical History:   Diagnosis Date     Bleeding from wound 9/2/2018 7/12/2019:He had prolonged bleeding after cyst removal on his back within the past year.        PAST SURGICAL HISTORY:  Past Surgical History:   Procedure Laterality Date     ANGIOGRAM  01/2019    right and left heart cath.      APPENDECTOMY      ~2013     ARTHROPLASTY KNEE Left 5/5/2022    Procedure: Left Total  Knee Arthroplasty;  Surgeon: Heron Espinoza MD;  Location: [facility]     CATARACT IOL, RT/LT      Past bilateral cataract surgery.      COLONOSCOPY N/A 07/03/2020    Procedure: COLONOSCOPY, WITH POLYPECTOMY AND BIOPSY;  Surgeon: Fady Boyer MD;  Location: [facility]     EYE SURGERY Left     three corneal transplants     IMPLANT  PACEMAKER       INCISION AND DRAINAGE TRUNK, COMBINED N/A 09/06/2018    Procedure: COMBINED INCISION AND DRAINAGE TRUNK;  incision and cauterization of left buttock bleed.;  Surgeon: Dain Davis MD;  Location: [facility]     INCISION AND DRAINAGE TRUNK, COMBINED N/A 11/01/2018    Procedure:  INCISION AND DRAINAGE of Back Wound;  Surgeon: Dain Davis MD;  Location: [facility]     JOINT REPLACEMENT Right     right total knee.      THORACIC SURGERY      removal benign nodule     FAMILY HISTORY:  Family History   Problem Relation Age of Onset     Cancer Brother      Diabetes Brother      SOCIAL HISTORY:  Social History     Socioeconomic History     Marital status:      Number of children: 3     Years of education: 13   Occupational History     Occupation: retired   Tobacco Use     Smoking status: Former     Types: Cigars     Smokeless tobacco: Never   Vaping Use     Vaping Use: Never used   Substance and Sexual Activity     Alcohol use: Not Currently     Drug use: No     Sexual activity: Not Currently   Social History Narrative    ** Merged History Encounter **          Social Determinants of Health     Financial Resource Strain: Low Risk      Difficulty of Paying Living Expenses: Not very hard   Food Insecurity: No Food Insecurity     Worried About Running Out of Food in the Last Year: Never true     Ran Out of Food in the Last Year: Never true   Transportation Needs: No Transportation Needs     Lack of Transportation (Medical): No     Lack of Transportation (Non-Medical): No   Physical Activity: Inactive     Days of Exercise per Week: 0 days     Minutes of Exercise per Session: 0 min   Social Connections: Unknown     Frequency of Communication with Friends and Family: Twice a week     Review of Systems:  Skin:        Eyes:       ENT:       Respiratory:  Negative for shortness of breath;dyspnea on exertion  Cardiovascular:    Positive for;lower extremity symptoms;edema  Gastroenterology:      Genitourinary:        Musculoskeletal:       Neurologic:       Psychiatric:       Heme/Lymph/Imm:       Endocrine:          Physical Exam:  Vitals: /59   Pulse 72   Temp 97.1  F (36.2  C) (Tympanic)   Wt 90.3 kg (199 lb)   SpO2 97%   BMI 34.16 kg/m     Wt Readings from Last 4 Encounters:   11/14/22 90.3 kg (199 lb)   10/14/22 89.6 kg (197 lb 9.6 oz)   08/19/22 86.6 kg (191 lb)   08/18/22 86.6 kg (191 lb)     GEN: well nourished, in no acute distress.  HEENT:  Pupils equal, round. Sclerae nonicteric.   C/V:  Regular rate and rhythm, no murmur, rub or gallop.    RESP: Respirations are unlabored. Clear to auscultation bilaterally without wheezing, rales, or rhonchi.  GI: Abdomen soft, nontender.  EXTREM: no LE edema.  NEURO: Alert and oriented, cooperative.  SKIN: Warm and dry.     Recent Lab Results:  LIPID RESULTS:  Lab Results   Component Value Date    CHOL 149 10/07/2022    CHOL 165 07/17/2020    HDL 49 10/07/2022    HDL 36 (L) 07/17/2020    LDL 67 10/07/2022    LDL 62 07/17/2020    TRIG 163 (H) 10/07/2022    TRIG 336 (H) 07/17/2020     LIVER ENZYME RESULTS:  Lab Results   Component Value Date    AST 28 09/01/2022    AST 22 06/09/2020    ALT 19 09/01/2022    ALT 20 06/09/2020     CBC RESULTS:  Lab Results   Component Value Date    WBC 6.5 10/07/2022    WBC 6.7 06/09/2020    RBC 4.12 (L) 10/07/2022    RBC 4.05 (L) 06/09/2020    HGB 11.6 (L) 10/07/2022    HGB 10.5 (L) 04/15/2021    HCT 35.4 (L) 10/07/2022    HCT 37.7 (L) 06/09/2020    MCV 86 10/07/2022    MCV 93 06/09/2020    MCH 28.2 10/07/2022    MCH 30.9 06/09/2020    MCHC 32.8 10/07/2022    MCHC 33.2 06/09/2020    RDW 15.1 (H) 10/07/2022    RDW 14.5 06/09/2020     (L) 10/07/2022     (L) 06/09/2020     BMP RESULTS:  Lab Results   Component Value Date     10/07/2022     04/15/2021    POTASSIUM 4.9 10/07/2022    POTASSIUM 4.5 08/17/2022    POTASSIUM 4.0 04/15/2021    CHLORIDE 96 (L) 10/07/2022    CHLORIDE 99 08/17/2022    CHLORIDE 103 04/15/2021     CO2 28 10/07/2022    CO2 33 (H) 08/17/2022    CO2 29 04/15/2021    ANIONGAP 13 10/07/2022    ANIONGAP 3 08/17/2022    ANIONGAP 6 04/15/2021     (H) 10/07/2022     (H) 08/17/2022    GLC 84 04/15/2021    BUN 28.4 (H) 10/07/2022    BUN 23 08/17/2022    BUN 23 04/15/2021    CR 1.78 (H) 10/07/2022    CR 1.63 (H) 04/15/2021    GFRESTIMATED 37 (L) 10/07/2022    GFRESTIMATED 38 (L) 04/15/2021    GFRESTBLACK 44 (L) 04/15/2021    ALY 9.6 10/07/2022    ALY 9.1 04/15/2021      A1C RESULTS:  No results found for: A1C  INR RESULTS:  Lab Results   Component Value Date    INR 2.9 (H) 11/11/2022    INR 2.9 (H) 10/28/2022    INR 1.7 (A) 09/14/2022    INR 3.2 (A) 09/09/2022    INR 2.20 (H) 07/02/2021    INR 2.00 (H) 06/11/2021       Yulia Mon PA-C  [facility]

## 2022-11-11 NOTE — TELEPHONE ENCOUNTER
[facility] Call Center    Phone Message    May a detailed message be left on voicemail: yes     Reason for Call: Other: Patient's son was reaching out and explained that the patient has finished taking the medication for the C diff, but they are not sure if he is supposed to do another stool sample to see if the problem has been resolved. Please call them back to discuss, thank you!     Action Taken: Message routed to:  Adult Clinics: Gastroenterology (GI) p [phone]    Travel Screening: Not Applicable

## 2022-11-14 NOTE — LETTER
11/14/2022    Heron Mayo MD  [address] [place] [id number]    RE: Nabor Cunningham       Dear Colleague,     I had the pleasure of seeing Nabor Cunningham in the [facility].    Cardiology Progress Note    Patient seen today in follow up of: cardiomyopathy  Primary cardiologist: Dr. Galvan    HPI:  Nabor Cunningham is a very pleasant 87 year old male with a history of:  1.  History of chronic atrial fibrillation, on anticoagulation with warfarin.  2.  History of symptomatic bradycardia with pacemaker since 06/2016 with intermittent 20%-40% ventricular pacing.  3.  Initially normal EF but then dropped to around 40%-50% range, likely a result of chronic AFib and pacing.  Last EF is around 45%.  4.  Coronary angiography done due to drop in EF in 01/2019, showing nonobstructive disease.  Remains on a statin.  5.  History of chronic kidney disease.  Creatinine 1.5-2.  6.  Hyperlipidemia, HDL deficiency.  7.  Mild aortic stenosis with mild ascending aortic dilatation at 4.5 cm.  8.  Falls.     Ashkan saw Dr. Galvan in August. Prior to that, he'd been hospitalized at an outside hospital with significant weakness and falls. He had worsening peripheral edema as well. He was hospitalized for a few weeks. He required IV diuresis and was anemic. Anticoagulation was stopped. He was on lasix 20 mg daily when he saw Dr. Galvan. He suggesting increasing this as needed for weight gain and symptom control. He was back on anticoagulation.     In September, he contacted Dr. Torres's office with increasing edema.  His Lasix was increased to 60 mg daily and subsequently 80 mg daily.  He is been on this dose for the last month.  His blood pressure was mildly low at the end of October and his lisinopril was decreased from 10 to 5 mg daily.    Today, he presents with his daughter for routine follow-up.  He feels he is doing relatively well.  He continues to have some mild edema however it is much improved from before.   This is not bothersome to him.  He has tried compression stockings in the past however does not want to use them and finds them difficult to wear.  He denies any dyspnea on exertion.  No orthopnea or PND.  Weight is stable at home around 195 pounds.  His hemoglobin was improving or at around 11 on last check last month.  He denies any recent bleeding.    ASSESSMENT/PLAN:  Nabor Cunnignham is a very pleasant 87 year old male with chronic atrial fibrillation on anticoagulation, symptomatic bradycardia with a pacemaker in place, and a mild nonischemic cardiomyopathy with an ejection fraction around 45% and CKD who is here today for routine follow-up.    Ashkan recently had his diuretic dose escalated due to some worsening peripheral edema.  He is now taking 80 mg of Lasix.  He feels this has helped.  He has some ongoing mild peripheral edema on exam today.I do think he benefit from compression stockings however he does not want to use those.  His last basic metabolic panel was done on 10/7/2022.  His creatinine was up to 1.78.  Electrolytes are normal.  Given he has been on the higher dose of diuretic, I suggested we repeat a BMP.  He will have that done in a few weeks for his next INR check.  If he struggles in the future with worsening edema or congestive heart failure symptoms, I would suggest considering switching him from Lasix to Bumex to torsemide.  Additionally, an SGLT2 inhibitor could be a consideration for him.  I asked him to call if he has any issues.    We will continue low-dose lisinopril and metoprolol.  His lisinopril dose was recently decreased due due to systolic blood pressures in the 90s.  Blood pressure stable today and will continue these unchanged.      It was a pleasure seeing Ashkan in clinic today.  I will follow-up on his labs in a few week and make any necessary adjustments if needed.  He has an in clinic device check scheduled next week as well.  I recommended he return to see Dr. Galvan in 4 to  6 months with an echocardiogram prior. In the meantime, I asked him to contact us with questions or concerns.    Orders this Visit:  Orders Placed This Encounter   Procedures     Basic metabolic panel     No orders of the defined types were placed in this encounter.    There are no discontinued medications.    CURRENT MEDICATIONS:  Current Outpatient Medications   Medication Sig Dispense Refill     acetaminophen (TYLENOL) 500 MG tablet Take 500-1,000 mg by mouth every 6 hours as needed for mild pain       allopurinol (ZYLOPRIM) 100 MG tablet TAKE TWO TABLETS BY MOUTH ONCE DAILY (Patient taking differently: Take 50 mg by mouth daily) 60 tablet 5     atorvastatin (LIPITOR) 20 MG tablet TAKE ONE TABLET BY MOUTH ONCE DAILY 30 tablet 11     bismuth subsalicylate (PEPTO BISMOL) 262 MG chewable tablet Take 1 tablet by mouth 4 times daily (before meals and nightly)       bismuth subsalicylate (PEPTO-BISMOL) 262 MG TABS Take 1 tablet by mouth daily 30 tablet 3     Calcium Carbonate (CALCIUM 600 PO) Take 600 mg by mouth daily       Calcium-Magnesium-Zinc 333-133-5 MG TABS per tablet Take 1 tablet by mouth daily       Ciclopirox Olamine 0.77 % SUSP Apply twice daily to affected area on scalp. 60 mL 6     clindamycin (CLEOCIN T) 1 % external solution Apply twice daily to scalp 60 mL 1     cyanocobalamin (VITAMIN B-12) 1000 MCG tablet Take 1,000 mcg by mouth daily        ferrous gluconate (FERGON) 324 (38 Fe) MG tablet Take 1 tablet (324 mg) by mouth daily (with breakfast) 100 tablet 3     furosemide (LASIX) 80 MG tablet Take 1 tablet (80 mg) by mouth daily 90 tablet 3     gabapentin (NEURONTIN) 300 MG capsule Take 1 capsule (300 mg) by mouth At Bedtime 90 capsule 3     lisinopril (ZESTRIL) 5 MG tablet Take 1 tablet (5 mg) by mouth daily 180 tablet 1     loperamide (IMODIUM) 2 MG capsule Take 2 mg by mouth 4 times daily as needed for diarrhea       METAMUCIL FIBER PO Take 1 capsule by mouth daily       methocarbamol (ROBAXIN)  500 MG tablet Take 1 tablet (500 mg) by mouth every 6 hours as needed for muscle spasms 20 tablet 0     metoprolol succinate ER (TOPROL XL) 25 MG 24 hr tablet TAKE ONE TABLET BY MOUTH EVERY EVENING 90 tablet 1     multivitamin w/minerals (THERA-VIT-M) tablet Take 1 tablet by mouth Every other day 100 tablet 3     nortriptyline (PAMELOR) 25 MG capsule Take 1 capsule (25 mg) by mouth 2 times daily 60 capsule 11     polyvinyl alcohol-povidone PF (REFRESH) 1.4-0.6 % ophthalmic solution 1 drop as needed for irritation       prednisoLONE acetate (PRED FORTE) 1 % ophthalmic susp Place 1 drop Into the left eye daily   2     senna-docusate (SENOKOT-S/PERICOLACE) 8.6-50 MG tablet Take 1-2 tablets by mouth 2 times daily Take while on oral narcotics to prevent or treat constipation. 30 tablet 0     tamsulosin (FLOMAX) 0.4 MG capsule TAKE ONE CAPSULE BY MOUTH ONCE DAILY (Patient taking differently: Take 0.4 mg by mouth daily) 30 capsule 5     triamcinolone (KENALOG) 0.1 % external cream APPLY TOPICALLY TO AFFECTED AREA(S) TWICE DAILY AS DIRECTED 45 g 1     warfarin ANTICOAGULANT (JANTOVEN ANTICOAGULANT) 2 MG tablet Take 6 mg every Mon, Wed, Fri; 4 mg all other days or As directed by Anticoagulation clinic 215 tablet 1     ALLERGIES  Allergies   Allergen Reactions     Amoxicillin Hives     Penicillins Other (See Comments)     Dizzy       Trazodone Other (See Comments)     Hallucinations       PAST MEDICAL HISTORY:  Past Medical History:   Diagnosis Date     Bleeding from wound 9/2/2018 7/12/2019:He had prolonged bleeding after cyst removal on his back within the past year.        PAST SURGICAL HISTORY:  Past Surgical History:   Procedure Laterality Date     ANGIOGRAM  01/2019    right and left heart cath.      APPENDECTOMY      ~2013     ARTHROPLASTY KNEE Left 5/5/2022    Procedure: Left Total  Knee Arthroplasty;  Surgeon: Heron Espinoza MD;  Location: [facility]     CATARACT IOL, RT/LT      Past bilateral cataract surgery.       COLONOSCOPY N/A 07/03/2020    Procedure: COLONOSCOPY, WITH POLYPECTOMY AND BIOPSY;  Surgeon: Fady Boyer MD;  Location: [facility]     EYE SURGERY Left     three corneal transplants     IMPLANT PACEMAKER       INCISION AND DRAINAGE TRUNK, COMBINED N/A 09/06/2018    Procedure: COMBINED INCISION AND DRAINAGE TRUNK;  incision and cauterization of left buttock bleed.;  Surgeon: Dain Davis MD;  Location: [facility]     INCISION AND DRAINAGE TRUNK, COMBINED N/A 11/01/2018    Procedure:  INCISION AND DRAINAGE of Back Wound;  Surgeon: Dain Davis MD;  Location: [facility]     JOINT REPLACEMENT Right     right total knee.      THORACIC SURGERY      removal benign nodule     FAMILY HISTORY:  Family History   Problem Relation Age of Onset     Cancer Brother      Diabetes Brother      SOCIAL HISTORY:  Social History     Socioeconomic History     Marital status:      Number of children: 3     Years of education: 13   Occupational History     Occupation: retired   Tobacco Use     Smoking status: Former     Types: Cigars     Smokeless tobacco: Never   Vaping Use     Vaping Use: Never used   Substance and Sexual Activity     Alcohol use: Not Currently     Drug use: No     Sexual activity: Not Currently   Social History Narrative    ** Merged History Encounter **          Social Determinants of Health     Financial Resource Strain: Low Risk      Difficulty of Paying Living Expenses: Not very hard   Food Insecurity: No Food Insecurity     Worried About Running Out of Food in the Last Year: Never true     Ran Out of Food in the Last Year: Never true   Transportation Needs: No Transportation Needs     Lack of Transportation (Medical): No     Lack of Transportation (Non-Medical): No   Physical Activity: Inactive     Days of Exercise per Week: 0 days     Minutes of Exercise per Session: 0 min   Social Connections: Unknown     Frequency of Communication with Friends and Family: Twice a week     Review of Systems:  Skin:         Eyes:       ENT:       Respiratory:  Negative for shortness of breath;dyspnea on exertion  Cardiovascular:    Positive for;lower extremity symptoms;edema  Gastroenterology:      Genitourinary:       Musculoskeletal:       Neurologic:       Psychiatric:       Heme/Lymph/Imm:       Endocrine:          Physical Exam:  Vitals: /59   Pulse 72   Temp 97.1  F (36.2  C) (Tympanic)   Wt 90.3 kg (199 lb)   SpO2 97%   BMI 34.16 kg/m     Wt Readings from Last 4 Encounters:   11/14/22 90.3 kg (199 lb)   10/14/22 89.6 kg (197 lb 9.6 oz)   08/19/22 86.6 kg (191 lb)   08/18/22 86.6 kg (191 lb)     GEN: well nourished, in no acute distress.  HEENT:  Pupils equal, round. Sclerae nonicteric.   C/V:  Regular rate and rhythm, no murmur, rub or gallop.    RESP: Respirations are unlabored. Clear to auscultation bilaterally without wheezing, rales, or rhonchi.  GI: Abdomen soft, nontender.  EXTREM: no LE edema.  NEURO: Alert and oriented, cooperative.  SKIN: Warm and dry.     Recent Lab Results:  LIPID RESULTS:  Lab Results   Component Value Date    CHOL 149 10/07/2022    CHOL 165 07/17/2020    HDL 49 10/07/2022    HDL 36 (L) 07/17/2020    LDL 67 10/07/2022    LDL 62 07/17/2020    TRIG 163 (H) 10/07/2022    TRIG 336 (H) 07/17/2020     LIVER ENZYME RESULTS:  Lab Results   Component Value Date    AST 28 09/01/2022    AST 22 06/09/2020    ALT 19 09/01/2022    ALT 20 06/09/2020     CBC RESULTS:  Lab Results   Component Value Date    WBC 6.5 10/07/2022    WBC 6.7 06/09/2020    RBC 4.12 (L) 10/07/2022    RBC 4.05 (L) 06/09/2020    HGB 11.6 (L) 10/07/2022    HGB 10.5 (L) 04/15/2021    HCT 35.4 (L) 10/07/2022    HCT 37.7 (L) 06/09/2020    MCV 86 10/07/2022    MCV 93 06/09/2020    MCH 28.2 10/07/2022    MCH 30.9 06/09/2020    MCHC 32.8 10/07/2022    MCHC 33.2 06/09/2020    RDW 15.1 (H) 10/07/2022    RDW 14.5 06/09/2020     (L) 10/07/2022     (L) 06/09/2020     BMP RESULTS:  Lab Results   Component Value Date      10/07/2022     04/15/2021    POTASSIUM 4.9 10/07/2022    POTASSIUM 4.5 08/17/2022    POTASSIUM 4.0 04/15/2021    CHLORIDE 96 (L) 10/07/2022    CHLORIDE 99 08/17/2022    CHLORIDE 103 04/15/2021    CO2 28 10/07/2022    CO2 33 (H) 08/17/2022    CO2 29 04/15/2021    ANIONGAP 13 10/07/2022    ANIONGAP 3 08/17/2022    ANIONGAP 6 04/15/2021     (H) 10/07/2022     (H) 08/17/2022    GLC 84 04/15/2021    BUN 28.4 (H) 10/07/2022    BUN 23 08/17/2022    BUN 23 04/15/2021    CR 1.78 (H) 10/07/2022    CR 1.63 (H) 04/15/2021    GFRESTIMATED 37 (L) 10/07/2022    GFRESTIMATED 38 (L) 04/15/2021    GFRESTBLACK 44 (L) 04/15/2021    ALY 9.6 10/07/2022    ALY 9.1 04/15/2021      A1C RESULTS:  No results found for: A1C  INR RESULTS:  Lab Results   Component Value Date    INR 2.9 (H) 11/11/2022    INR 2.9 (H) 10/28/2022    INR 1.7 (A) 09/14/2022    INR 3.2 (A) 09/09/2022    INR 2.20 (H) 07/02/2021    INR 2.00 (H) 06/11/2021       Yulia Mon PA-C  [facility]    Thank you for allowing me to participate in the care of your patient.      Sincerely,     Yulia Mon PA-C     [facility]  cc:   Katie Galvan MD  [address]  [place] [id number]

## 2022-11-14 NOTE — PATIENT INSTRUCTIONS
Call [facility] nurse for any questions or concerns Mon-Fri 8am-4pm:                                                 #[phone]                                       For concerns after hours:                                               #[phone], option 2     1: Medication changes: no medication changes today.  2: Plan from today: If you have weight gain, swelling or worsening shortness of breath in the future - please let me know. We can switch you from lasix to another water pill that may work a little better.  -  labs to recheck kidney function when you have your INR at [facility] on 12/2.  -  see Dr. Galvan this spring with an echocardiogram.

## 2022-11-21 NOTE — TELEPHONE ENCOUNTER
Vivian Mobley, DO  You 11 minutes ago (9:26 AM)       Also have him stop the klondike bars - would recommend a lactose free diet for now and switching to more of a BRAT diet. Would also encourage more water vs sugary drinks like 7 UP - could try something like pedialyte/gatorade though   Thanks       Vivian Mobley, DO  You 13 minutes ago (9:25 AM)       Have him do the x-ray.  I put in labs and stool studies as well.  Please see if he can get into to see either Erica or Declan sometime this week if they have availability - in person would be preferred if possible        Spoke to patient's daughter, Lexy regarding above provider response.   Informed Lexy of in-person office visit with Erica Jenkins PA-C tomorrow; appt currently on hold, Lexy will let clinic know if she will take this appt.     Per Lexy's request, will also send above information via [employer]; will also send info on BRAT diet.    Vivian Soto RN

## 2022-11-21 NOTE — TELEPHONE ENCOUNTER
[facility] Call Center    Phone Message    May a detailed message be left on voicemail: yes     Reason for Call: Symptoms or Concerns       Current symptom or concern: Ulcerative Colitis    Symptoms have been present for:  3 day(s)    Has patient previously been seen for this? Yes    By     Date: 08/31/2022    Are there any new or worsening symptoms? Yes: Patient's son was calling in because they are concerned about the patient and would like to speak with the care team. They explained that patient just finished taking medication for a viral infection but they are unsure if it went away or if there is a different problem they are facing. The patient has been going to the bathroom constantly the last few days to the point they are very concerned. Please call them back at [phone] to discuss, thank you!      Action Taken: Message routed to:  Adult Clinics: Gastroenterology (GI) p [id number]    Travel Screening: Not Applicable

## 2022-11-21 NOTE — TELEPHONE ENCOUNTER
"Spoke to patient's son, Florencio and daughter, Lexy.   Family stated that patient completed the vancomycin around 11/10 (Cdiff and elevated calpro on 10/30).   Per family's conversation with patient - stools have been \"really bad\" the last few days. Daughter stated that patient has told her that he has about 10 stools/day - feels like whenever he pees, he poops (in small amounts) - loose. No blood noted. Has been feeling cold, weak, no energy.   Family states that he has not stated he has had fever, nor that he has endorsed dizziness or vomiting. Doesn't have much of appetite; son stated that patient does receive meals on wheels once daily and he also has 3 klondike bars and whole milk in AM (son wonders if patient could have lactose intolerance). Patient drinks some water but mainly 7-up and diet 7-up.   Son reported concern of any new meds being added to regimen as patient has kidney failure and CHF (on 80 mg Lasix daily).   Writer told family that above update will be sent to provider for review and will update with provider's response. ER warnings (sever pain, dizziness, unable to eat, etc) given to family - verbalized understanding.     Vivian Soto RN    "

## 2022-11-21 NOTE — TELEPHONE ENCOUNTER
[employer] message sent to patient /family that appt for tomorrow is secured. Address to clinic provided.     Vivian Soto RN

## 2022-11-22 NOTE — NURSING NOTE
Chief Complaint   Patient presents with     Follow Up     Follow up for diarrhea.     He requests these members of his care team be copied on today's visit information: PCP    PCP: Herno Mayo    Vitals:    11/22/22 1053   BP: 103/57   BP Location: Left arm   Patient Position: Sitting   Cuff Size: Adult Regular   Pulse: 74   Temp: 97.6  F (36.4  C)   TempSrc: Oral   SpO2: 95%   Weight: 89.8 kg (198 lb)     Body mass index is 33.99 kg/m .      Medications were reconciled.      Does patient need any medication refills at today's visit? Not sure        Rica Vasquez, CMA

## 2022-11-22 NOTE — PROGRESS NOTES
GASTROENTEROLOGY FOLLOW UP CLINIC VISIT    CC/REFERRING MD:    Heron Mayo    REASON FOR VISIT:  Follow Up (Follow up for diarrhea.)      HISTORY OF PRESENT ILLNESS:    Nabor Cunningham is 87 year old male who presents for follow up of diarrhea.  He is accompanied today by his son Florencio.     He is a patient of Dr. Cooper.  He has history of lymphocytic colitis.  This was previously treated with budesonide however he developed constipation.  He has historically done well with Pepto-Bismol daily and fiber twice daily.  In last few weeks has reported worsening diarrhea symptoms.  He was evaluated with C. difficile stool test several weeks ago which was positive.  He was treated accordingly with vancomycin.  He returns today with continued symptoms of diarrhea, several times a day. He is having loose stools several times a day.  Denies abdominal pain or distention. He is eating klondike bars, dove chocolates and drinking 7 up regularly. He only has one meal a day which is provided through meals on wheels.       Bear  has a past medical history of Bleeding from wound (9/2/2018).    He  has a past surgical history that includes Incision and drainage trunk, combined (N/A, 09/06/2018); Incision and drainage trunk, combined (N/A, 11/01/2018); angiogram (01/2019); appendectomy; joint replacement (Right); Implant pacemaker; Thoracic surgery; Eye surgery (Left); cataract iol, rt/lt; Colonoscopy (N/A, 07/03/2020); and Arthroplasty knee (Left, 5/5/2022).    He  reports that he has quit smoking. His smoking use included cigars. He has never used smokeless tobacco. He reports that he does not currently use alcohol. He reports that he does not use drugs.    His family history includes Cancer in his brother; Diabetes in his brother.      ALLERGIES:     Allergies   Allergen Reactions     Amoxicillin Hives     Penicillins Other (See Comments)     Dizzy       Trazodone Other (See Comments)     Hallucinations          PERTINENT MEDICATIONS:    Current Outpatient Medications:      acetaminophen (TYLENOL) 500 MG tablet, Take 500-1,000 mg by mouth every 6 hours as needed for mild pain, Disp: , Rfl:      allopurinol (ZYLOPRIM) 100 MG tablet, TAKE TWO TABLETS BY MOUTH ONCE DAILY (Patient taking differently: Take 50 mg by mouth daily), Disp: 60 tablet, Rfl: 5     atorvastatin (LIPITOR) 20 MG tablet, TAKE ONE TABLET BY MOUTH ONCE DAILY, Disp: 30 tablet, Rfl: 11     bismuth subsalicylate (PEPTO BISMOL) 262 MG chewable tablet, Take 1 tablet by mouth 4 times daily (before meals and nightly), Disp: , Rfl:      bismuth subsalicylate (PEPTO-BISMOL) 262 MG TABS, Take 1 tablet by mouth daily, Disp: 30 tablet, Rfl: 3     Calcium Carbonate (CALCIUM 600 PO), Take 600 mg by mouth daily, Disp: , Rfl:      Calcium-Magnesium-Zinc 333-133-5 MG TABS per tablet, Take 1 tablet by mouth daily, Disp: , Rfl:      Ciclopirox Olamine 0.77 % SUSP, Apply twice daily to affected area on scalp., Disp: 60 mL, Rfl: 6     clindamycin (CLEOCIN T) 1 % external solution, Apply twice daily to scalp, Disp: 60 mL, Rfl: 1     cyanocobalamin (VITAMIN B-12) 1000 MCG tablet, Take 1,000 mcg by mouth daily , Disp: , Rfl:      ferrous gluconate (FERGON) 324 (38 Fe) MG tablet, Take 1 tablet (324 mg) by mouth daily (with breakfast), Disp: 100 tablet, Rfl: 3     furosemide (LASIX) 80 MG tablet, Take 1 tablet (80 mg) by mouth daily, Disp: 90 tablet, Rfl: 3     gabapentin (NEURONTIN) 300 MG capsule, Take 1 capsule (300 mg) by mouth At Bedtime, Disp: 90 capsule, Rfl: 3     lisinopril (ZESTRIL) 5 MG tablet, Take 1 tablet (5 mg) by mouth daily, Disp: 180 tablet, Rfl: 1     loperamide (IMODIUM) 2 MG capsule, Take 2 mg by mouth 4 times daily as needed for diarrhea, Disp: , Rfl:      METAMUCIL FIBER PO, Take 1 capsule by mouth daily, Disp: , Rfl:      methocarbamol (ROBAXIN) 500 MG tablet, Take 1 tablet (500 mg) by mouth every 6 hours as needed for muscle spasms, Disp: 20 tablet, Rfl:  0     metoprolol succinate ER (TOPROL XL) 25 MG 24 hr tablet, TAKE ONE TABLET BY MOUTH EVERY EVENING, Disp: 90 tablet, Rfl: 1     multivitamin w/minerals (THERA-VIT-M) tablet, Take 1 tablet by mouth Every other day, Disp: 100 tablet, Rfl: 3     nortriptyline (PAMELOR) 25 MG capsule, Take 1 capsule (25 mg) by mouth 2 times daily, Disp: 60 capsule, Rfl: 11     polyvinyl alcohol-povidone PF (REFRESH) 1.4-0.6 % ophthalmic solution, 1 drop as needed for irritation, Disp: , Rfl:      prednisoLONE acetate (PRED FORTE) 1 % ophthalmic susp, Place 1 drop Into the left eye daily , Disp: , Rfl: 2     senna-docusate (SENOKOT-S/PERICOLACE) 8.6-50 MG tablet, Take 1-2 tablets by mouth 2 times daily Take while on oral narcotics to prevent or treat constipation., Disp: 30 tablet, Rfl: 0     tamsulosin (FLOMAX) 0.4 MG capsule, TAKE ONE CAPSULE BY MOUTH ONCE DAILY (Patient taking differently: Take 0.4 mg by mouth daily), Disp: 30 capsule, Rfl: 5     triamcinolone (KENALOG) 0.1 % external cream, APPLY TOPICALLY TO AFFECTED AREA(S) TWICE DAILY AS DIRECTED, Disp: 45 g, Rfl: 1     warfarin ANTICOAGULANT (JANTOVEN ANTICOAGULANT) 2 MG tablet, Take 6 mg every Mon, Wed, Fri; 4 mg all other days or As directed by Anticoagulation clinic, Disp: 215 tablet, Rfl: 1      PHYSICAL EXAMINATION:  Constitutional: aaox3, cooperative, pleasant, not dyspneic/diaphoretic, no acute distress  Vitals reviewed: /57 (BP Location: Left arm, Patient Position: Sitting, Cuff Size: Adult Regular)   Pulse 74   Temp 97.6  F (36.4  C) (Oral)   Wt 89.8 kg (198 lb)   SpO2 95%   BMI 33.99 kg/m    Wt:   Wt Readings from Last 2 Encounters:   11/14/22 90.3 kg (199 lb)   10/14/22 89.6 kg (197 lb 9.6 oz)        Eyes: Sclera anicteric/injected  Respiratory: Unlabored breathing  Skin: warm, perfused, no jaundice  Psych: Normal affect  MSK: Normal gait      PERTINENT STUDIES:   Most recent CBC:  Recent Labs   Lab Test 10/07/22  1504 09/01/22  1527   WBC 6.5 6.4   HGB  11.6* 10.7*   HCT 35.4* 34.1*   * 151     Most recent hepatic panel:  Recent Labs   Lab Test 09/01/22  1527 05/19/22  1725 06/09/20  1553   ALT 19 21 20   AST 28  --  22     Most recent creatinine:  Recent Labs   Lab Test 10/07/22  1504 09/01/22  1527   CR 1.78* 2.13*       RADIOLOGY:     CT ABDOMEN AND PELVIS WITHOUT CONTRAST 9/2/2022 9:31 AM     CLINICAL HISTORY: Abdominal distention.     TECHNIQUE: CT scan of the abdomen and pelvis was performed without IV  contrast. Multiplanar reformats were obtained. Dose reduction  techniques were used.  CONTRAST: None.     COMPARISON: 9/2/2018     FINDINGS:   LOWER CHEST: In the anterior right lower lobe and lateral right lung  base, there are areas of low-density consolidation with air  bronchograms suggestive of lipoid pneumonia. Some linear calcification  is seen in the lateral portion of the consolidation that could be due  to calcification along sutures if there is been previous surgery in  this area. Some minor infiltrate is seen in the anterior aspect of the  left lower lobe that could be due to pneumonitis or inflammatory  process. Trace right pleural effusion. Cardiomegaly is noted.     HEPATOBILIARY: Normal.     PANCREAS: Normal.     SPLEEN: Normal.     ADRENAL GLANDS: Normal.     KIDNEYS/BLADDER: Normal.     BOWEL: Moderate-sized duodenal diverticulum is present. No bowel  obstruction. No colonic wall thickening or inflammatory change. Large  amount of stool seen through the majority of the colon.     LYMPH NODES: Normal.     VASCULATURE: The aorta is normal in caliber with extensive  atherosclerotic calcification. Dense calcification is seen at the  origin of both renal arteries as well.     PELVIC ORGANS: The prostate is enlarged and indents the underside of  the bladder.     OTHER: 22 mm likely sebaceous cyst is unchanged overlying the upper  sacrum.     MUSCULOSKELETAL: Degenerative changes are noted through the spine.                                                                       IMPRESSION:   1.  No acute pathology through the abdomen and pelvis.  2.  Low-attenuation consolidation centered in the posterior right  lower lobe and right middle lobe laterally most consistent with lipoid  pneumonia. Some linear calcification lateral right lung base may  represent sutural calcification if surgery has been performed.  3.  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     KAYLA BILLINGSLEY MD        ASSESSMENT/PLAN:    1. Diarrhea, unspecified type  2. Lymphocytic colitis  3. Hx of Clostridium difficile infection  4. Stage 3b chronic kidney disease (H)  - Nutrition Referral; Future      Nabor Cunningham is a 87 year old male with pmhx of CKD stage 3 who presents for follow up. He has hx of lymphocytic colitis and recently found to have C. Diff. Returns today with concerns with diarrhea. He is being retested for C. Diff. If found to be positive will retreat with vancomycin with taper. If negative then consider budesonide given history of lymphocytic colitis.  Also consider Pepto-Bismol daily and fiber twice daily as this has historically worked for him.  Lastly I will be referring him to nutritionist to provide guidance on foods to avoid and foods to incorporate in his diet. Advised him to trial BRAT diet. Avoid dairy, sugary and fried foods.     Further recommendations after work up.     45 minutes spent on the date of the encounter doing chart review, history and exam, documentation and further activities per the note      This note was created with voice recognition software, and while reviewed for accuracy, typos may remain.     Erica Jenkins PA-C  Division of Gastroenterology, Hepatology and Nutrition   [facility]

## 2022-11-22 NOTE — PATIENT INSTRUCTIONS
It was a pleasure taking care of you today.  I've included a brief summary of our discussion and care plan from today's visit below.  Please review this information with your primary care provider.  _______________________________________________________________________     My recommendations are summarized as follows:     - we will update once we have your stool results. If you have C. Diff again, we will treat you with an antibiotic.   - if C. Diff is negative then we will aim at treating your microscopic colitis (lymphocytic colitis) with either budesonide OR pepto-bismol and fiber daily.   - schedule an appointment with a nutritionist to review dietary changes that can be beneficial      ______________________________________________________________________     How do I schedule labs, imaging studies, or procedures that were ordered in clinic today?      Labs: To schedule lab appointment you can contact your local [facility] or call [phone] to schedule at any convenient [facility] location.     Procedures: If a colonoscopy, upper endoscopy, breath test, esophageal manometry, or pH impedence was ordered today, our endoscopy team will call you to schedule this. If you have not heard from our endoscopy team within a week, please call [phone] to schedule.      Imaging Studies: If you were scheduled for a CT scan, X-ray, MRI, ultrasound, HIDA scan or other imaging study, please call [phone] to have this scheduled.      Referral: If a referral to another specialty was ordered, expect a phone call or follow instructions above. If you have not heard from anyone regarding your referral in a week, please call our clinic to check the status.      Who do I call with any questions after my visit?  Please be in touch if there are any further questions that arise following today's visit.  There are multiple ways to contact your gastroenterology care team.       During business hours, you may  reach a Gastroenterology nurse at [phone]     To schedule or reschedule an appointment, please call [phone].      You can always send a secure message through [employer].  [employer] messages are answered by your nurse or doctor typically within 24 hours.  Please allow extra time on weekends and holidays.       For urgent/emergent questions after business hours, you may reach the on-call GI Fellow by contacting the [facility] at [phone].     How will I get the results of any tests ordered?    You will receive all of your results.  If you have signed up for [employer], any tests ordered at your visit will be available to you after your physician reviews them.  Typically this takes 1-2 weeks.  If there are urgent results that require a change in your care plan, your physician or nurse will call you to discuss the next steps.       What is [employer]?  [employer]One is a secure way for you to access all of your healthcare records from the [facility].  It is a web based computer program, so you can sign on to it from any location.  It also allows you to send secure messages to your care team.  I recommend signing up for [employer]One access if you have not already done so and are comfortable with using a computer.       How to I schedule a follow-up visit?  If you did not schedule a follow-up visit today, please call [phone] to schedule a follow-up office visit.      Erica Jenkins PA-C  Division of Gastroenterology, Hepatology and Nutrition   [facility]

## 2022-11-22 NOTE — LETTER
11/22/2022         RE: Nabor Cunningham  [address] [facility] [address]        Dear Colleague,    Thank you for referring your patient, Nabor Cunningham, to the [facility]. Please see a copy of my visit note below.          GASTROENTEROLOGY FOLLOW UP CLINIC VISIT    CC/REFERRING MD:    Heron Mayo    REASON FOR VISIT:  Follow Up (Follow up for diarrhea.)      HISTORY OF PRESENT ILLNESS:    Nabor Cunningham is 87 year old male who presents for follow up of diarrhea.  He is accompanied today by his son Florencio.     He is a patient of Dr. Cooper.  He has history of lymphocytic colitis.  This was previously treated with budesonide however he developed constipation.  He has historically done well with Pepto-Bismol daily and fiber twice daily.  In last few weeks has reported worsening diarrhea symptoms.  He was evaluated with C. difficile stool test several weeks ago which was positive.  He was treated accordingly with vancomycin.  He returns today with continued symptoms of diarrhea, several times a day. He is having loose stools several times a day.  Denies abdominal pain or distention. He is eating klondike bars, dove chocolates and drinking 7 up regularly. He only has one meal a day which is provided through meals on wheels.       Bear  has a past medical history of Bleeding from wound (9/2/2018).    He  has a past surgical history that includes Incision and drainage trunk, combined (N/A, 09/06/2018); Incision and drainage trunk, combined (N/A, 11/01/2018); angiogram (01/2019); appendectomy; joint replacement (Right); Implant pacemaker; Thoracic surgery; Eye surgery (Left); cataract iol, rt/lt; Colonoscopy (N/A, 07/03/2020); and Arthroplasty knee (Left, 5/5/2022).    He  reports that he has quit smoking. His smoking use included cigars. He has never used smokeless tobacco. He reports that he does not currently use alcohol. He reports that he does not use drugs.    His  family history includes Cancer in his brother; Diabetes in his brother.      ALLERGIES:     Allergies   Allergen Reactions     Amoxicillin Hives     Penicillins Other (See Comments)     Dizzy       Trazodone Other (See Comments)     Hallucinations         PERTINENT MEDICATIONS:    Current Outpatient Medications:      acetaminophen (TYLENOL) 500 MG tablet, Take 500-1,000 mg by mouth every 6 hours as needed for mild pain, Disp: , Rfl:      allopurinol (ZYLOPRIM) 100 MG tablet, TAKE TWO TABLETS BY MOUTH ONCE DAILY (Patient taking differently: Take 50 mg by mouth daily), Disp: 60 tablet, Rfl: 5     atorvastatin (LIPITOR) 20 MG tablet, TAKE ONE TABLET BY MOUTH ONCE DAILY, Disp: 30 tablet, Rfl: 11     bismuth subsalicylate (PEPTO BISMOL) 262 MG chewable tablet, Take 1 tablet by mouth 4 times daily (before meals and nightly), Disp: , Rfl:      bismuth subsalicylate (PEPTO-BISMOL) 262 MG TABS, Take 1 tablet by mouth daily, Disp: 30 tablet, Rfl: 3     Calcium Carbonate (CALCIUM 600 PO), Take 600 mg by mouth daily, Disp: , Rfl:      Calcium-Magnesium-Zinc 333-133-5 MG TABS per tablet, Take 1 tablet by mouth daily, Disp: , Rfl:      Ciclopirox Olamine 0.77 % SUSP, Apply twice daily to affected area on scalp., Disp: 60 mL, Rfl: 6     clindamycin (CLEOCIN T) 1 % external solution, Apply twice daily to scalp, Disp: 60 mL, Rfl: 1     cyanocobalamin (VITAMIN B-12) 1000 MCG tablet, Take 1,000 mcg by mouth daily , Disp: , Rfl:      ferrous gluconate (FERGON) 324 (38 Fe) MG tablet, Take 1 tablet (324 mg) by mouth daily (with breakfast), Disp: 100 tablet, Rfl: 3     furosemide (LASIX) 80 MG tablet, Take 1 tablet (80 mg) by mouth daily, Disp: 90 tablet, Rfl: 3     gabapentin (NEURONTIN) 300 MG capsule, Take 1 capsule (300 mg) by mouth At Bedtime, Disp: 90 capsule, Rfl: 3     lisinopril (ZESTRIL) 5 MG tablet, Take 1 tablet (5 mg) by mouth daily, Disp: 180 tablet, Rfl: 1     loperamide (IMODIUM) 2 MG capsule, Take 2 mg by mouth 4 times  daily as needed for diarrhea, Disp: , Rfl:      METAMUCIL FIBER PO, Take 1 capsule by mouth daily, Disp: , Rfl:      methocarbamol (ROBAXIN) 500 MG tablet, Take 1 tablet (500 mg) by mouth every 6 hours as needed for muscle spasms, Disp: 20 tablet, Rfl: 0     metoprolol succinate ER (TOPROL XL) 25 MG 24 hr tablet, TAKE ONE TABLET BY MOUTH EVERY EVENING, Disp: 90 tablet, Rfl: 1     multivitamin w/minerals (THERA-VIT-M) tablet, Take 1 tablet by mouth Every other day, Disp: 100 tablet, Rfl: 3     nortriptyline (PAMELOR) 25 MG capsule, Take 1 capsule (25 mg) by mouth 2 times daily, Disp: 60 capsule, Rfl: 11     polyvinyl alcohol-povidone PF (REFRESH) 1.4-0.6 % ophthalmic solution, 1 drop as needed for irritation, Disp: , Rfl:      prednisoLONE acetate (PRED FORTE) 1 % ophthalmic susp, Place 1 drop Into the left eye daily , Disp: , Rfl: 2     senna-docusate (SENOKOT-S/PERICOLACE) 8.6-50 MG tablet, Take 1-2 tablets by mouth 2 times daily Take while on oral narcotics to prevent or treat constipation., Disp: 30 tablet, Rfl: 0     tamsulosin (FLOMAX) 0.4 MG capsule, TAKE ONE CAPSULE BY MOUTH ONCE DAILY (Patient taking differently: Take 0.4 mg by mouth daily), Disp: 30 capsule, Rfl: 5     triamcinolone (KENALOG) 0.1 % external cream, APPLY TOPICALLY TO AFFECTED AREA(S) TWICE DAILY AS DIRECTED, Disp: 45 g, Rfl: 1     warfarin ANTICOAGULANT (JANTOVEN ANTICOAGULANT) 2 MG tablet, Take 6 mg every Mon, Wed, Fri; 4 mg all other days or As directed by Anticoagulation clinic, Disp: 215 tablet, Rfl: 1      PHYSICAL EXAMINATION:  Constitutional: aaox3, cooperative, pleasant, not dyspneic/diaphoretic, no acute distress  Vitals reviewed: /57 (BP Location: Left arm, Patient Position: Sitting, Cuff Size: Adult Regular)   Pulse 74   Temp 97.6  F (36.4  C) (Oral)   Wt 89.8 kg (198 lb)   SpO2 95%   BMI 33.99 kg/m    Wt:   Wt Readings from Last 2 Encounters:   11/14/22 90.3 kg (199 lb)   10/14/22 89.6 kg (197 lb 9.6 oz)        Eyes:  Sclera anicteric/injected  Respiratory: Unlabored breathing  Skin: warm, perfused, no jaundice  Psych: Normal affect  MSK: Normal gait      PERTINENT STUDIES:   Most recent CBC:  Recent Labs   Lab Test 10/07/22  1504 09/01/22  1527   WBC 6.5 6.4   HGB 11.6* 10.7*   HCT 35.4* 34.1*   * 151     Most recent hepatic panel:  Recent Labs   Lab Test 09/01/22  1527 05/19/22  1725 06/09/20  1553   ALT 19 21 20   AST 28  --  22     Most recent creatinine:  Recent Labs   Lab Test 10/07/22  1504 09/01/22  1527   CR 1.78* 2.13*       RADIOLOGY:     CT ABDOMEN AND PELVIS WITHOUT CONTRAST 9/2/2022 9:31 AM     CLINICAL HISTORY: Abdominal distention.     TECHNIQUE: CT scan of the abdomen and pelvis was performed without IV  contrast. Multiplanar reformats were obtained. Dose reduction  techniques were used.  CONTRAST: None.     COMPARISON: 9/2/2018     FINDINGS:   LOWER CHEST: In the anterior right lower lobe and lateral right lung  base, there are areas of low-density consolidation with air  bronchograms suggestive of lipoid pneumonia. Some linear calcification  is seen in the lateral portion of the consolidation that could be due  to calcification along sutures if there is been previous surgery in  this area. Some minor infiltrate is seen in the anterior aspect of the  left lower lobe that could be due to pneumonitis or inflammatory  process. Trace right pleural effusion. Cardiomegaly is noted.     HEPATOBILIARY: Normal.     PANCREAS: Normal.     SPLEEN: Normal.     ADRENAL GLANDS: Normal.     KIDNEYS/BLADDER: Normal.     BOWEL: Moderate-sized duodenal diverticulum is present. No bowel  obstruction. No colonic wall thickening or inflammatory change. Large  amount of stool seen through the majority of the colon.     LYMPH NODES: Normal.     VASCULATURE: The aorta is normal in caliber with extensive  atherosclerotic calcification. Dense calcification is seen at the  origin of both renal arteries as well.     PELVIC ORGANS:  The prostate is enlarged and indents the underside of  the bladder.     OTHER: 22 mm likely sebaceous cyst is unchanged overlying the upper  sacrum.     MUSCULOSKELETAL: Degenerative changes are noted through the spine.                                                                      IMPRESSION:   1.  No acute pathology through the abdomen and pelvis.  2.  Low-attenuation consolidation centered in the posterior right  lower lobe and right middle lobe laterally most consistent with lipoid  pneumonia. Some linear calcification lateral right lung base may  represent sutural calcification if surgery has been performed.  3.  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     KAYLA BILLINGSLEY MD        ASSESSMENT/PLAN:    1. Diarrhea, unspecified type  2. Lymphocytic colitis  3. Hx of Clostridium difficile infection  4. Stage 3b chronic kidney disease (H)  - Nutrition Referral; Future      Nabor Cunningham is a 87 year old male with pmhx of CKD stage 3 who presents for follow up. He has hx of lymphocytic colitis and recently found to have C. Diff. Returns today with concerns with diarrhea. He is being retested for C. Diff. If found to be positive will retreat with vancomycin with taper. If negative then consider budesonide given history of lymphocytic colitis.  Also consider Pepto-Bismol daily and fiber twice daily as this has historically worked for him.  Lastly I will be referring him to nutritionist to provide guidance on foods to avoid and foods to incorporate in his diet. Advised him to trial BRAT diet. Avoid dairy, sugary and fried foods.     Further recommendations after work up.     45 minutes spent on the date of the encounter doing chart review, history and exam, documentation and further activities per the note      This note was created with voice recognition software, and while reviewed for accuracy, typos may remain.     Erica Jenkins PA-C  Division of Gastroenterology, Hepatology and Nutrition   [facility]  [facility]           Again, thank you for allowing me to participate in the care of your patient.        Sincerely,        Erica Jenkins PA-C

## 2022-11-23 NOTE — PROGRESS NOTES
Repeat BMP ordered per Rosario Mon PAC to be done at 12/2 INR lab appt--updated appt notes and Bear's family of this plan.    Alice Law RN BSN   11:24 AM 11/23/22  CORE nurse line [facility] 8a-4p: [phone]

## 2022-11-23 NOTE — RESULT ENCOUNTER NOTE
I spoke to Ashkan's son and daughter and reviewed Rosario ANAYA's message. They reported he continues to have frequent diarrhea. Thus I instructed them he should hold lasix 11/24 and 11/25, then 11/26 resume at lower dose of 40 mg daily. CORE RN will call them next week for an update. 11/22 weight 196 lbs.  They have been in touch with GI team re: ongoing CDiff diagnosis and treatment. They are attempting to advise Ashkan of bland diet and limit his typical ice cream consumption. Reviewed strategies to increase fluid intake.

## 2022-11-23 NOTE — TELEPHONE ENCOUNTER
Prior Authorization Approval    Authorization Effective Date: 8/25/2022  Authorization Expiration Date: 11/23/2023  Medication: fidaxomicin (DIFICID) 200 MG tablet-PA APPROVED   Approved Dose/Quantity:   Reference #:     Insurance Company: Jamie Burrows - Phone [phone] Fax [phone]  Expected CoPay:       CoPay Card Available:      Foundation Assistance Needed:    Which Pharmacy is filling the prescription (Not needed for infusion/clinic administered): [facility] [place], [place] - [address] [place] [address]  Pharmacy Notified: Yes- **Instructed pharmacy to notify patient when script is ready to /ship.**  Patient Notified: Yes

## 2022-11-23 NOTE — TELEPHONE ENCOUNTER
Central Prior Authorization Team   Phone: [phone]    PA Initiation    Medication: fidaxomicin (DIFICID) 200 MG tablet  Insurance Company: [employer] - Phone [phone] Fax [phone]  Pharmacy Filling the Rx: [place], [place] - [address] [place] [address]  Filling Pharmacy Phone: [phone]  Filling Pharmacy Fax: [phone]  Start Date: 11/23/2022

## 2022-11-23 NOTE — TELEPHONE ENCOUNTER
Prior Authorization Retail Medication Request    Medication/Dose: Fidaxomicin (Dificid)  ICD code (if different than what is on RX):    Previously Tried and Failed:    Rationale:      Insurance Name:    Insurance ID:        Pharmacy Information (if different than what is on RX)  Name:    Phone:

## 2022-11-23 NOTE — TELEPHONE ENCOUNTER
Vivian Mobley DO P [facility]  Hey -   I put in fidaxomicin for him - if it doesn't look like this will be approved by the end of the day let me know and I will order PO vancomycin for him to start until we wait for the fidaxo.   His kidney function was a little elevated - I would recommend he encourage a lot of water intake - if unable to tolerate PO or noticing he is urinating less he will need to go in to be evaluated (either ER or urgent care) - he should also follow-up with PCP next week  re:kidney function     Vivian Mobley DO Dolney, Kristin, RN  I put in vanco for now - lets still try to get him dificid with the taper (but he can start vanco now so he has something over the weekend) - if the dificid doesn't eventually get approved let me know and I'll put in a vancomycin taper           Spoke to Lexy re: above provider response. Explained to Lexy that the provider will be placing orders for vancomycin knowing that a PA won't be secured by end of day for Dificid.   Writer reiterated that patient should be drinking more water and if he is unable to tolerate PO, urinating less he will need to be evaluated (either by ER or UC) and that he should follw up with PCP next re: kidney function. Lexy stated that patient had just seen PCP last week and that his kidney function is a known issue. Writer encouraged Lexy to at least reach out to PCP care team to see if he should come in/recheck labs. Lexy verbalized understanding and also requested above information to be sent to GLOG.   GLOG message sent per Lexy's request.       Vivian Soto, SAURAV

## 2022-11-23 NOTE — TELEPHONE ENCOUNTER
WIB message sent to patient/family that writer initiated a tier reduction for the Dificid and to get the vancomycin if obtaining the Dificid wasn't possible at this time.     Vivian Soto RN

## 2022-11-25 NOTE — TELEPHONE ENCOUNTER
Vivian Mobley, Vivian Resendiz, RN  Phone Number: [phone]     Is it still that much with the tier reduction or are we waiting to see if he is eligible for a tier reduction? Although there is some benefit in dificid over vancomycin for recurrence I'm not sure its worth the cost.  If he can't get the dificid he will need the 14 days of vancomycin as I previously ordered followed by a taper (I put in the order as a no print out) - if he gets the dificid he can switch to that and do that taper.     [employer] message sent to patient /family with above provider response.

## 2022-11-26 NOTE — TELEPHONE ENCOUNTER
Patient is looking for a continuation script for the Vanco, I see a script was put in but it wasn't sent to the pharmacy. Can you please send that RX to [facility].          Thank you,  Pau Montero CPhT

## 2022-11-28 NOTE — PROGRESS NOTES
"[facility] - C[facility]    -Alice Law, RN (Registered Nurse)     I spoke to Ashkan's son and daughter and reviewed Rosario ANAYA's message. They reported he continues to have frequent diarrhea. Thus I instructed them he should hold lasix 11/24 and 11/25, then 11/26 resume at lower dose of 40 mg daily. CORE RN will call them next week for an update. 11/22 weight 196 lbs.  They have been in touch with GI team re: ongoing CDiff diagnosis and treatment. They are attempting to advise Ashkan of bland diet and limit his typical ice cream consumption. Reviewed strategies to increase fluid intake.         Spoke with both Lexy and Florencio who state that Ashkan seems to be doing ok but I still running to the bathroom frequently. Florencio even state his dad is \"\".  They are still working with GI to get the right medication due to cost. Weights have remained ~196 lbs. They are continuing Bear on Lasix 40 mg daily.  Will watch for labs on Friday.     Will send update to ROSA Rodriguez .    Future Appointments   Date Time Provider Department Center   12/2/2022 12:15 PM [facility] LAB [facility]   12/6/2022  8:10 AM [place] DEVICE RN [facility] [place] MEL Miguel RN on 11/28/2022 at 11:44 AM      "

## 2022-11-28 NOTE — TELEPHONE ENCOUNTER
[employer] message sent to patient that writer has submitted for a quantity limitation override for vancomycin (high priority) since a determination has not been made for a tier reduction for the Dificid.     Vivian Soto RN

## 2022-11-28 NOTE — TELEPHONE ENCOUNTER
Spoke to Catherine from [facility].   Catherine stated that the patient's insurance only allows a quantity of 80 (vancomycin) in 135 days and per the the information she has received regarding insurance, that insurance will not fill this until April.   Catherine stated that a request for a quantity limitation override will need to be sent.   Writer informed Catherine that this will be requested/sent to PA team.     Vivian Soto RN

## 2022-11-28 NOTE — TELEPHONE ENCOUNTER
PA Initiation    Medication: vancomycin (VANCOCIN) 125 MG capsule   Insurance Company: [employer] Part D - Phone [phone] Fax [phone]  Pharmacy Filling the Rx: [facility] [place], [place] - [address] [place] [address]  Filling Pharmacy Phone: [phone]  Filling Pharmacy Fax: [phone]  Start Date: 11/28/2022

## 2022-11-28 NOTE — TELEPHONE ENCOUNTER
Prior Authorization Approval    Authorization Effective Date: 8/30/2022  Authorization Expiration Date: 11/28/2023  Medication: vancomycin (VANCOCIN) 125 MG capsule--APPROVED  Approved Dose/Quantity:   Reference #:     Insurance Company: Silver [employer] Part D - Phone [phone] Fax [phone]  Expected CoPay:       CoPay Card Available:      Foundation Assistance Needed:    Which Pharmacy is filling the prescription (Not needed for infusion/clinic administered): [facility] [address]  Pharmacy Notified: Yes  Patient Notified: Yes **Instructed pharmacy to notify patient when script is ready to /ship.**

## 2022-11-29 NOTE — TELEPHONE ENCOUNTER
Prior Authorization Not Needed/Allowed per Insurance    Medication: fidaxomicin (DIFICID) 200 MG tablet- (TIER EXCEPTION)-PA NOT NEEDED/ALLOWED   Insurance Company: [employer] - Phone [phone] Fax [phone]  Expected CoPay:      Pharmacy Filling the Rx: [facility] [address] [place] [address]  Pharmacy Notified: No  Patient Notified: No    Insurance stated that PA is Not Needed/Allowed and is dismissed per Regulation 42 $ .57 (c) (iii) of the Voluntary Medicare Prescription Drug Benefit, an enrollee cannot request an lower cost share (Tier) of a non formulary drug approved under the Medicare Part D drug benefit as noted below.

## 2022-11-29 NOTE — TELEPHONE ENCOUNTER
Vivian Mobley, Vivian Resendiz, RN  Caller: Unspecified (6 days ago,  2:53 PM)  We can try but I suspect that we won't hear back from them by the time he has completed his course - lets just proceed with the vancomycin with the taper as ordered.     Spontaneously message sent to patient/daughter with above provider response and that vancomycin qty limitation override had been approved and medication can be picked up 12/1.     Vivian Soto, SAURAV

## 2022-12-01 NOTE — TELEPHONE ENCOUNTER
Vivian Mobley, DO  You 1 hour ago (1:40 PM)     1) yes - I want him to complete a 14 day course and then start the taper   2) Yes - the first week should be 3 tablets TID followed by the taper as written      Did reach out to provider to double check #2) above. - she clarified that the first week should be 1 tablet TID.     Called pharmacy- spoke with Thomas and gave verbal order regarding provider response above.  Thomas appreciative of phone call.    Vivian Soto RN

## 2022-12-01 NOTE — TELEPHONE ENCOUNTER
[facility] Call Center    Phone Message    May a detailed message be left on voicemail: no     Reason for Call: Medication Question or concern regarding medication   Prescription Clarification  Name of Medication:vancomycin (VANCOCIN) 125 MG capsule    Prescribing Provider:Vivian Mobley DO   Pharmacy: [address]   What on the order needs clarification? [facility] pharmacy is requesting a call back to discuss the prescription's qty and directions.          Action Taken: Message routed to:  [facility]    Travel Screening: Not Applicable

## 2022-12-01 NOTE — TELEPHONE ENCOUNTER
Spoke to Thomas at [facility].  Wanted to clarify a couple things...    1) last vancomycin prescription on 11/23 - pharmacy was only able to fill 10 of 14 days now that quantity limitation override has been approved, would the provider want patient to finish remaining 4 days of vancomycin (125 mg) four times per day before beginning taper?    2) Current taper orders:  Sig - Route: Take 1 capsule (125 mg) by mouth 2 times daily for 7 days, THEN 1 capsule (125 mg) 2 times daily for 7 days, THEN 1 capsule (125 mg) daily for 7 days, THEN 1 capsule (125 mg) every other day for 14 days. - Oral    *Pharmacist wants to clarify directions - does provider want the first part of the taper to be 1 capsule THREE times daily? The first two steps are the same.     Will route to provider for review.     Vivian Soto RN

## 2022-12-02 NOTE — PROGRESS NOTES
ANTICOAGULATION MANAGEMENT     Nabor Cunningham 87 year old male is on warfarin with supratherapeutic INR result. (Goal INR 2.0-3.0)    Recent labs: (last 7 days)     12/02/22  1234   INR 3.7*       ASSESSMENT       Source(s): Chart Review and Patient/Caregiver Call       Warfarin doses taken: Warfarin taken as instructed    Diet: Trying to cut back on dairy and ice cream bars per GI recommendation may be affecting diet and INR    New illness, injury, or hospitalization: Yes: C. Diff    Medication/supplement changes: Metamucin bid started on this week No interaction anticipated    Vancomycin 125 mg qid x14 days started 11/23/22, Then taper over 35 days.    Signs or symptoms of bleeding or clotting: No    Previous INR: Therapeutic last 2(+) visits    Additional findings: None       PLAN     Recommended plan for ongoing change(s) affecting INR     Dosing Instructions: hold dose then decrease your warfarin dose (12.5% change) with next INR in 1 week       Summary  As of 12/2/2022    Full warfarin instructions:  12/2: Hold; Otherwise 4 mg every day; Starting 12/2/2022   Next INR check:  12/9/2022             Telephone call with  Bear and son Florencio who verbalizes understanding and agrees to plan and who agrees to plan and repeated back plan correctly    Lab visit scheduled    Education provided:     Goal range and lab monitoring: goal range and significance of current result, Importance of therapeutic range and Importance of following up at instructed interval    Dietary considerations: importance of notifying [facility] to changes in diet    Interaction IS anticipated between warfarin and Vanco    Symptom monitoring: monitoring for bleeding signs and symptoms and when to seek medical attention/emergency care    Contact [phone]  with any changes, questions or concerns.     Plan made per [facility] anticoagulation protocol    Pia Silverio RN  Anticoagulation  Clinic  12/2/2022    _______________________________________________________________________     Anticoagulation Episode Summary     Current INR goal:  2.0-3.0   TTR:  53.3 % (1 y)   Target end date:  Indefinite   Send INR reminders to:  [facility]    Indications    Chronic atrial fibrillation (H) [I48.20]  Long-term (current) use of anticoagulants [Z79.01] [Z79.01]           Comments:  Contact dwayne Matias with dosing information.         Anticoagulation Care Providers     Provider Role Specialty Phone number    Heron Mayo MD Referring Family Medicine [phone]

## 2022-12-05 NOTE — PROGRESS NOTES
[facility]    Repeat labs for review with slight improvement in creat. Torsemide was decreased to 40 mg daily given ongoing loose stools with CDiff. PCP has started him on oral vanco.    He has follow-up echo and cardiology visit ordered for 4/2023. Will route to Rosario Mon PAC.      Component      Latest Ref Rng & Units 10/7/2022 11/22/2022 12/2/2022   Sodium      136 - 145 mmol/L 137 137 137   Potassium      3.4 - 5.3 mmol/L  4.2    Chloride      94 - 109 mmol/L  102    Carbon Dioxide      20 - 32 mmol/L  29    Anion Gap      7 - 15 mmol/L 13 6 6 (L)   Urea Nitrogen      7 - 30 mg/dL  37 (H)    Creatinine      0.67 - 1.17 mg/dL 1.78 (H) 2.41 (H) 2.25 (H)   Calcium      8.8 - 10.2 mg/dL 9.6 9.2 9.5   Glucose      70 - 99 mg/dL  105 (H)    Alkaline Phosphatase      40 - 150 U/L  107    AST      0 - 45 U/L  20    ALT      0 - 70 U/L  19    Protein Total      6.8 - 8.8 g/dL  7.6    Albumin      3.4 - 5.0 g/dL  3.2 (L)    Bilirubin Total      0.2 - 1.3 mg/dL  0.7    GFR Estimate      >60 mL/min/1.73m2 37 (L) 26 (L) 28 (L)   Potassium      3.4 - 5.3 mmol/L 4.9  4.8   Chloride      98 - 107 mmol/L 96 (L)  98   Carbon Dioxide (CO2)      22 - 29 mmol/L 28  33 (H)   Urea Nitrogen      8.0 - 23.0 mg/dL 28.4 (H)  31.2 (H)   Glucose      70 - 99 mg/dL 113 (H)  106 (H)     Alice Law RN BSN   10:45 AM 12/05/22  CORE nurse line M-F 8a-4p: [phone]

## 2022-12-05 NOTE — PROGRESS NOTES
Reviewed. Renal function slightly improved but still worse than baseline. As long as his heart failure is stable, I would leave his lasix at the lower dose. He's been referred to nephrology in the past by Dr. Mayo but I'm not sure if he has ever seen them. It would probably be a good idea. Thanks. Rosario

## 2022-12-07 NOTE — PROGRESS NOTES
[facility] Clinic    Left detailed VM for Ashkan's daughter Lexy noting Rosario ANAYA's review of his blood work and to leave his lasix at 40 mg daily. I asked her to call if he has shortness of breath, swelling, difficulty sleeping due to breathing discomfort, and/or weight gain. CORE RN number provided.    Alice Law RN BSN   9:47 AM 12/07/22  CORE nurse line M-F 8a-4p: [phone]

## 2022-12-08 NOTE — TELEPHONE ENCOUNTER
"Requested Prescriptions   Pending Prescriptions Disp Refills    lisinopril (ZESTRIL) 5 MG tablet 180 tablet 1     Sig: Take 1 tablet (5 mg) by mouth daily       ACE Inhibitors (Including Combos) Protocol Failed - 12/5/2022  2:45 PM        Failed - Normal serum creatinine on file in past 12 months     Recent Labs   Lab Test 12/02/22  1234   CR 2.25*       Ok to refill medication if creatinine is low          Passed - Blood pressure under 140/90 in past 12 months     BP Readings from Last 3 Encounters:   11/22/22 103/57   11/14/22 109/59   10/28/22 100/52                 Passed - Recent (12 mo) or future (30 days) visit within the authorizing provider's specialty     Patient has had an office visit with the authorizing provider or a provider within the authorizing providers department within the previous 12 mos or has a future within next 30 days. See \"Patient Info\" tab in inbasket, or \"Choose Columns\" in Meds & Orders section of the refill encounter.              Passed - Medication is active on med list        Passed - Patient is age 18 or older        Passed - Normal serum potassium on file in past 12 months     Recent Labs   Lab Test 12/02/22  1234   POTASSIUM 4.8                  "

## 2022-12-09 NOTE — PROGRESS NOTES
ANTICOAGULATION MANAGEMENT     Nabor Cunningham 87 year old male is on warfarin with subtherapeutic INR result. (Goal INR 2.0-3.0)    Recent labs: (last 7 days)     12/09/22  1536   INR 1.7*       ASSESSMENT       Source(s): Chart Review and Patient/Caregiver Call       Warfarin doses taken: Held for elevated INR  recently which may be affecting INR    Diet: No new diet changes identified    New illness, injury, or hospitalization: No    Medication/supplement changes: Vancomycin started on 11/28 not expected to affect INR, but may increase risk of bleeding    Signs or symptoms of bleeding or clotting: No    Previous INR: Supratherapeutic    Additional findings: None       PLAN     Recommended plan for temporary change(s) affecting INR     Dosing Instructions: Increase your warfarin dose (7.1% change) with next INR in 10 days       Summary  As of 12/9/2022    Full warfarin instructions:  6 mg every Fri; 4 mg all other days; Starting 12/9/2022   Next INR check:  12/20/2022             Telephone call with  Florencio who verbalizes understanding and agrees to plan    Lab visit scheduled    Education provided:     Please call back if any changes to your diet, medications or how you've been taking warfarin    Interaction IS anticipated between warfarin and Vancomycin    Symptom monitoring: monitoring for bleeding signs and symptoms    Contact [phone]  with any changes, questions or concerns.     Plan made per [facility] anticoagulation protocol    Daphne VALLE RN  Anticoagulation Clinic  12/9/2022    _______________________________________________________________________     Anticoagulation Episode Summary     Current INR goal:  2.0-3.0   TTR:  52.3 % (1 y)   Target end date:  Indefinite   Send INR reminders to:  [facility] [place]    Indications    Chronic atrial fibrillation (H) [I48.20]  Long-term (current) use of anticoagulants [Z79.01] [Z79.01]           Comments:  Contact dwayne Forbesig with dosing information.          Anticoagulation Care Providers     Provider Role Specialty Phone number    Heron Mayo MD Referring Family Medicine [phone]

## 2022-12-16 NOTE — TELEPHONE ENCOUNTER
INR clinic notified of medication start and mychart was sent to family of script sent.   Rajni GARCIA RN BSN PHN  Specialty Clinics

## 2022-12-16 NOTE — TELEPHONE ENCOUNTER
Sent in doxycycline again, please let INR clinic know.   If when no rash I would still using topical clindamycin solution once daily to prevent recurrence.

## 2022-12-16 NOTE — TELEPHONE ENCOUNTER
Please see note below and advise. Do you want to culture again or treat again. Thanks  Rajni GARCIA RN BSN PHN  Specialty Clinics

## 2022-12-16 NOTE — TELEPHONE ENCOUNTER
"Reason for Call:  Other prescription    Detailed comments: Patients son calling stating patients folliculitis has come back and his scalp is bumpy with little white heads. Son states that condition had gone away after using antibiotic and \"dabber stuff\" but is now back in full swing after using tea tree shampoo. Please call to advise.  Patient should be present for call back. Pt uses [facility].    Phone Number Patient can be reached at: Other phone number:  [phone]    Best Time: any    Can we leave a detailed message on this number? YES    Call taken on 12/16/2022 at 12:44 PM by Olegario Vega      "

## 2022-12-16 NOTE — PROGRESS NOTES
3:33 PM    Patient will start on Doxycycline Monohydrate. Concurrent use of WARFARIN and DOXYCYCLINE may result in an increased risk of bleeding    Daphne Lamar RN, BSN, [facility]  Anticoagulation Clinic   [place] # [id number] [phone]

## 2022-12-16 NOTE — TELEPHONE ENCOUNTER
Spoke with pt's son and he stated pt is been having trouble with his kidney functions. Pt is currently on Vancomycin for C-diff until Jan 2. They are wondering if they should wait on the Doxycycline or skip it. Son is very concerned. Please advise.  Rajni GARCIA RN BSN PHN  Specialty Clinics

## 2022-12-18 NOTE — TELEPHONE ENCOUNTER
They can just try topicals (clindmaycin solution) first. I would also recommend to use a T-sal shampoo/ CLN shampoo or even a benzoyl peroxide wash 2-2.5% on his scalp, this can bleach towels

## 2022-12-19 NOTE — TELEPHONE ENCOUNTER
"Spoke to Son, Florencio, (Consent to communicate on file)  advised of Provider recommendations and Son stated: \"Can you hold on just a minute?\" We were then disconnected at some point.     Message left to return call. Peyton Chavez RN     "

## 2022-12-19 NOTE — TELEPHONE ENCOUNTER
Notified INR clinic that pt will not be taking the Doxycyline for now.   Rajni GARCIA RN BSN PHN  Specialty Clinics

## 2022-12-20 NOTE — TELEPHONE ENCOUNTER
"Spoke to Son, Florencio and he was not aware of My chart message which was sent on 12-16-22 since his \"Sister, Lexy, took over the My chart communication, but she doesn't always remember to tell me what's going on\"    He has picked up the Clindamycin and states: \"It is already helping his scalp..\"     I did review notes from my chart message with him as well and Son, Florencio, verbalized understanding. Peyton Chavez RN    "

## 2022-12-22 NOTE — PROGRESS NOTES
ANTICOAGULATION MANAGEMENT     Nabor Cunningham 87 year old male is on warfarin with supratherapeutic INR result. (Goal INR 2.0-3.0)    Recent labs: (last 7 days)     12/22/22  0844   INR 3.7*       ASSESSMENT       Source(s): Chart Review and Patient/Caregiver Call       Warfarin doses taken: Warfarin taken as instructed    Diet: No new diet changes identified    New illness, injury, or hospitalization: No    Medication/supplement changes: still on vancomycin for a few more weeks.  tapering down    Signs or symptoms of bleeding or clotting: No    Previous INR: Subtherapeutic    Additional findings: c diff is improving. Diet has not changed.       PLAN     Recommended plan for ongoing change(s) affecting INR     Dosing Instructions: partial hold then decrease your warfarin dose (6.7% change) with next INR in 2 weeks       Summary  As of 12/22/2022    Full warfarin instructions:  12/22: 2 mg; Otherwise 4 mg every day   Next INR check:  1/5/2023             Telephone call with  Florencio who verbalizes understanding and agrees to plan    Lab visit scheduled    Education provided:     Please call back if any changes to your diet, medications or how you've been taking warfarin    Contact [phone]  with any changes, questions or concerns.     Plan made per [facility] anticoagulation protocol    Belia Jones RN  Anticoagulation Clinic  12/22/2022    _______________________________________________________________________     Anticoagulation Episode Summary     Current INR goal:  2.0-3.0   TTR:  54.0 % (1 y)   Target end date:  Indefinite   Send INR reminders to:  [facility]    Indications    Chronic atrial fibrillation (H) [I48.20]  Long-term (current) use of anticoagulants [Z79.01] [Z79.01]           Comments:  Contact dwayne Matias with dosing information.         Anticoagulation Care Providers     Provider Role Specialty Phone number    Heron Mayo MD Referring Family Medicine [phone]

## 2023-01-01 ENCOUNTER — DOCUMENTATION ONLY (OUTPATIENT)
Dept: ANTICOAGULATION | Facility: CLINIC | Age: 88
End: 2023-01-01
Payer: MEDICARE

## 2023-01-01 ENCOUNTER — MYC MEDICAL ADVICE (OUTPATIENT)
Dept: GASTROENTEROLOGY | Facility: CLINIC | Age: 88
End: 2023-01-01
Payer: MEDICARE

## 2023-01-01 ENCOUNTER — MYC MEDICAL ADVICE (OUTPATIENT)
Dept: ANTICOAGULATION | Facility: CLINIC | Age: 88
End: 2023-01-01

## 2023-01-01 ENCOUNTER — MYC MEDICAL ADVICE (OUTPATIENT)
Dept: GASTROENTEROLOGY | Facility: CLINIC | Age: 88
End: 2023-01-01

## 2023-01-01 DIAGNOSIS — I50.43 ACUTE ON CHRONIC COMBINED SYSTOLIC AND DIASTOLIC CONGESTIVE HEART FAILURE (H): ICD-10-CM

## 2023-01-01 DIAGNOSIS — I10 ESSENTIAL HYPERTENSION WITH GOAL BLOOD PRESSURE LESS THAN 140/90: ICD-10-CM

## 2023-01-01 DIAGNOSIS — I48.20 CHRONIC ATRIAL FIBRILLATION (H): Primary | ICD-10-CM

## 2023-01-01 DIAGNOSIS — A04.72 C. DIFFICILE COLITIS: Primary | ICD-10-CM

## 2023-01-01 DIAGNOSIS — Z79.01 LONG TERM CURRENT USE OF ANTICOAGULANT THERAPY: ICD-10-CM

## 2023-01-01 RX ORDER — METOPROLOL SUCCINATE 25 MG/1
TABLET, EXTENDED RELEASE ORAL
Qty: 90 TABLET | Refills: 3 | Status: SHIPPED | OUTPATIENT
Start: 2023-01-01

## 2023-01-09 NOTE — TELEPHONE ENCOUNTER
ANTICOAGULATION     Nabor Cunningham is overdue for INR check.      Mychart sent    Kirstin Mendez RN

## 2023-01-17 NOTE — TELEPHONE ENCOUNTER
Replied to patients [employer] message that their message has been forwarded to provider for review.    Vivian Mccormick RN

## 2023-01-17 NOTE — TELEPHONE ENCOUNTER
Vivian Mobley DO [place], [place]  Phone Number: [phone]     Would recommend pushing fluids as much as possible - if unable to tolerate or significant bleeding he will need to go to the ER.  If his diarrhea doesn't resolve, he should do stool studies again for c-difficile and a calprotectin     Call to patients son Florencio who reports that patients diarrhea has slowed down this am. Patient feels that the blood id due to frequent wiping and is not in his stool. Writer did update on the above message from provider. Florencio verbalized understanding. Writer also instructed to ensure patient is washing his hands every time he uses the bathroom as well.    Vivian Mccormick RN

## 2023-01-30 NOTE — PROGRESS NOTES
ANTICOAGULATION  MANAGEMENT: Discharge Review    Nabor Cunningham chart reviewed for anticoagulation continuity of care    Hospital Admission on 1/20 - 1/30 for COVID infection, Acute hypoxemic respiratory failure.    Patient was admitted at [facility], then went to [facility], now discharging to [facility] ([facility])    Discharge disposition: [facility]    Results:    No results for input(s): INR, [id number], F2, ALMWH, AAUFH in the last 168 hours.  Anticoagulation inpatient management:     reversed with Vitamin K on 1/20   Updated calendar    Anticoagulation discharge instructions:     Warfarin dosing: [place] to manage medication   Bridging: No   INR goal change: No      Medication changes affecting anticoagulation: Yes: UTI, treated for C-diff    Additional factors affecting anticoagulation: No     PLAN     [facility] to dose patient's warfarin    Spoke with Florencio - unsure how long patient will be at facility.    Anticoagulation Calendar updated    Jayde Whitt RN

## 2023-02-17 NOTE — TELEPHONE ENCOUNTER
Replied to [employer] message that message was being forwarded to provider for review.    Vivian Mccormick RN

## 2023-02-20 NOTE — TELEPHONE ENCOUNTER
Vivian Mobley DO [place], Angie, RN  Phone Number: [phone]     It could be, although his symptoms could also be related to systemic illness and worsening renal function.  It looks like they have been tried some things while he was hospitalized like reglan and famotidine.  I would recommend they continue to discuss with his physician at the SNF if he has one - would also be reasonable to get a follow-up appointment - would be fine with me or any of the APPs.   Thanks       [employer] message sent to patient with the above recommendations from provider.  Vivian Mccormick RN

## 2023-05-24 ENCOUNTER — ANTICOAGULATION THERAPY VISIT (OUTPATIENT)
Dept: ANTICOAGULATION | Facility: CLINIC | Age: 88
End: 2023-05-24
Payer: MEDICARE

## 2023-05-24 DIAGNOSIS — Z79.01 LONG TERM CURRENT USE OF ANTICOAGULANT THERAPY: ICD-10-CM

## 2023-05-24 DIAGNOSIS — I48.20 CHRONIC ATRIAL FIBRILLATION (H): Primary | ICD-10-CM

## 2023-05-24 NOTE — PROGRESS NOTES
ANTICOAGULATION  MANAGEMENT    Nabor Cunningham is being discharged from the [facility] ([facility]).    Reason for discharge:     Anticoagulation episode resolved, ACC referral closed and Standing order discontinued        Daphne VALLE RN

## 2023-09-28 NOTE — TELEPHONE ENCOUNTER
Florencio notified of Dr Mayo's message.  Ellen Perez RN     Detail Level: Simple Detail Level: Zone Detail Level: Generalized

## 2024-07-18 NOTE — PROGRESS NOTES
"Reason For Visit: Nabor TORI Cunningham, 82 year old male, seen as outpatient to evaluate and treat left buttock wound. Referred by Dr. Boyer. Patient presents with daughter today.      History: Pt was seen by Dr. Boyer for left gluteal abscess wound on 8/27/18 at which time he had incision and debridement.  Pt had issues with bleeding from this wound requiring ED visit day after I&D and a trip to OR with Dr. Davis 9/6/18 including debridement, cautery and sutures.  Pt was discharged from [facility] inpatient care on 9/7/18 after Dr. Boyer consulted for ongoing wound edge bleeding which he treated with FloSeal and Surgicel and pressure dressing.  Pt discharged to [facility] with direction to leave bandage in place until follow-up today.    Signficant co-morbidities include Hx of ETOH abuse, a fib on coumadin, (which is on hold due to bleeding,) peripheral polyneuropathy, depression, and weight loss.    Personal/social history: Currently in a TCU stay at [facility] due to need for assistance with wound care    Objective:   Physical appearance: elderly, thin  Ambulation: independant  Current treatment plan: see above  Last changed: Friday in hospital    Wound #1 Left upper buttock  Stage/tissue depth: full thickness  Approximately 2 cm L x 4 cm W x 2 cm D  Tunneling: no  Undermining: no  Wound bed type/amount: red and moist white/gray mix with some cautery marks not yet granulating; non fluctuant  Wound Edges: attached  Periwound: induration area noted below wound, bruising of buttock below wound, some mild tape stripping medially from microfoam tape removal from pressure dressing  Drainage: moderate serosanguinous  Odor: no  Pain: some with touch, area is \"sore\"      Diet: states his appetite is ok, he is eating ok and he is getting 3 meals per day  Smoking:     Education: topical plan of care, moist wound healing, importance of good nutrition      Assessment:  Left buttock abscess wound s/p suturing, debridement and cautery due to " Can you advise in Dr. Washington absence  Leida Benoit  CMA   multiple episodes of bleeding, now in inflammatory stage of wound healing    Barriers to wound healing:   Poor nutrition: possible though diet being managed now at TCU    Plan:  Autolytic debridement/moist wound healing appropriate.  Need non-traumatic packing that will not cause bleeding with removal as has been issue in past.      Topical care:Aquacel Ag for filler and Mepilex border 6x6 cover dressing, change every 2-4 days to support autolytic debridement      Additional recommendations: Pt encouraged to eat good diet.  Dr. Boyer has written orders for activity as tolerated and resumption of coumadin on Thursday    Wound Care: Wound cleansed with Microklenz and gauze, patted dry. Wound base filled with 3 layers of Aquacel Ag. Covered with Mepilex border 6x6    Discussed plan of care with patient and daughter. Cares to be done at TCU, supplies sent for next dressing change until they are able to order more    The following discharge instructions were reviewed with and sent home with the patient:  rrigate wound with wound cleanser or saline and pat dry with gauze, (no aggressive rubbing). Cleanse surrounding skin with  or saline and dry.  Cut Aquacel Ag to fit into wound base and loosely fill to skin level taking care to not overlap onto intact skin, will take 2-3 layers.  Cover with Adhesive foam dressing, recommend silicone foam, such as Mepilex, for fragile skin.  Change every 2-3 days and as needed for drainage.    There is an extra foam dressing covering tape stripping injury, this may be healed by next dressing change - it not continue to cover to protect and heal.  This dressing is waterproof and may stay in place when showering.    Follow up in two weeks or sooner if concerns such as bleeding or infection.    Signs and symptoms of infection:  Green drainage  Foul odor  Increased pain to wound  Redness or swelling at the site of the wound  Fever      The following supplies were sent home with the  patient:   Will reassess care plan in 1 week and order patient supplies as needed    Return visit: 2 weeks    Verbal, written, & demonstrative education provided.  Face to face time (excluding procedure): approximately 40 minutes.  Procedure:   debridement dressing applied  Monica Lundberg RN, CWOCN   [phone]      Monica Lundberg RN, CWOCN [phone]

## 2024-10-02 NOTE — TELEPHONE ENCOUNTER
Patient Information     Patient Name MRN Sex Nabor Parisi [id number] Male 1935      Telephone Encounter by Jackie Gage RN at 2017  4:22 PM     Author:  Jackie Gage RN Service:  (none) Author Type:  NURS- Registered Nurse     Filed:  2017  4:24 PM Encounter Date:  2017 Status:  Signed     :  Jackie Gage RN (NURS- Registered Nurse)            Medication is not on refill protocol. Unable to complete prescription refill per RN Medication Refill Policy.................... JACKIE GAGE RN ....................  2017   4:22 PM        This is a Refill request from: [facility]  Name of Medication: Restoril 30 mg  Quantity requested: 30  Last fill date: 17  Last visit with PRAKASH PRADO was on: 03/10/2017 in [facility]  PCP:  Prakash Prado MD  Controlled Substance Agreement:  None    Diagnosis r/t this medication request: insomnia                [employer] message sent to patient.    Vamshi Clarke MD

## (undated) DEVICE — PREP SKIN SCRUB TRAY 4461A

## (undated) DEVICE — DRSG STERI STRIP 1/2X4" R1547

## (undated) DEVICE — SYR BULB IRRIG DOVER 60 ML LATEX FREE 67000

## (undated) DEVICE — DRSG GAUZE 4X4" TRAY

## (undated) DEVICE — DRSG KERLIX 4 1/2"X4YDS ROLL 6730

## (undated) DEVICE — SOL NACL 0.9% IRRIG 1000ML BOTTLE 07138-09

## (undated) DEVICE — GLOVE PROTEXIS W/NEU-THERA 7.5  2D73TE75

## (undated) DEVICE — DRAPE POUCH INSTRUMENT 3 POCKET 1018L

## (undated) DEVICE — BONE CEMENT MIXEVAC III HI VAC KIT  0206-015-000

## (undated) DEVICE — GOWN XLG DISP 9545

## (undated) DEVICE — HYDROGEN PEROXIDE 3% 16OZ D0012

## (undated) DEVICE — GOWN IMPERVIOUS SPECIALTY XLG/XLONG 32474

## (undated) DEVICE — SU STRATAFIX MONOCRYL 3-0 SPIRAL PS-2 30CM SXMP1B106

## (undated) DEVICE — PREP CHLORAPREP 26ML TINTED ORANGE  260815

## (undated) DEVICE — NDL 22GA 1.5"

## (undated) DEVICE — SUCTION MANIFOLD NEPTUNE 2 SYS 4 PORT 0702-020-000

## (undated) DEVICE — BNDG COBAN 6"X5YDS STERILE

## (undated) DEVICE — BONE CLEANING TIP INTERPULSE  0210-010-000

## (undated) DEVICE — GLOVE PROTEXIS BLUE W/NEU-THERA 7.5  2D73EB75

## (undated) DEVICE — BLADE KNIFE SURG 11 371111

## (undated) DEVICE — BLADE CLIPPER 4406

## (undated) DEVICE — SU PDO 1 STRATAFIX 36X36CM CTX TAPERPOINT SXPD2B405

## (undated) DEVICE — DECANTER VIAL 2006S

## (undated) DEVICE — GLOVE PROTEXIS W/NEU-THERA 8.0  2D73TE80

## (undated) DEVICE — SOL WATER IRRIG 1000ML BOTTLE 07139-09

## (undated) DEVICE — TUBING SUCTION 12"X1/4" N612

## (undated) DEVICE — SOL NACL 0.9% IRRIG 3000ML BAG 07972-08

## (undated) DEVICE — SUCTION TIP YANKAUER STR K87

## (undated) DEVICE — SYR 50ML LL W/O NDL 309653

## (undated) DEVICE — STOCKING SLEEVE COMPRESSION CALF MED

## (undated) DEVICE — ESU PENCIL SMOKE EVAC W/ROCKER SWITCH 0703-047-000

## (undated) DEVICE — SU VICRYL 3-0 SH 27" UND J416H

## (undated) DEVICE — SPONGE LAP 18X18" 1515

## (undated) DEVICE — HOOD T4 PROTECTIVE STERI FACE SHIELD 400-800

## (undated) DEVICE — DRSG ABDOMINAL 07 1/2X8" 7197D

## (undated) DEVICE — GLOVE PROTEXIS BLUE W/NEU-THERA 8.5  2D73EB85

## (undated) DEVICE — KIT DRAIN CLOSED WOUND SUCTION MED 400ML RESVR

## (undated) DEVICE — DRAPE SHEET REV FOLD 3/4 9349

## (undated) DEVICE — PACK LAPAROTOMY CUSTOM LAKES

## (undated) DEVICE — NDL 18GA 1.5" 305196

## (undated) DEVICE — DRSG AQUACEL AG 3.5X9.75" HYDROFIBER 412011

## (undated) DEVICE — TAPE MEDIPORE 4"X10YD 2964

## (undated) DEVICE — BLANKET BAIR HUGGER UPPER BODY 42268

## (undated) DEVICE — SUCTION TIP FLEXI CLEAR TIP DISP K62

## (undated) DEVICE — BLADE SAW SAGITTAL STRK 18X90X1.27MM HD SYS 6 6118-127-090

## (undated) DEVICE — DRAPE STERI U 1015

## (undated) DEVICE — SU VICRYL 2-0 CT-1 36" UND J945H

## (undated) DEVICE — SU VICRYL 0 CT-1 36" J946H

## (undated) DEVICE — Device

## (undated) DEVICE — GLOVE PROTEXIS W/NEU-THERA 7.0  2D73TE70

## (undated) DEVICE — SUCTION IRR SYSTEM W/TIP INTERPULSE

## (undated) RX ORDER — BUPIVACAINE HYDROCHLORIDE AND EPINEPHRINE 5; 5 MG/ML; UG/ML
INJECTION, SOLUTION EPIDURAL; INTRACAUDAL; PERINEURAL
Status: DISPENSED
Start: 2018-11-01

## (undated) RX ORDER — TRANEXAMIC ACID 650 MG/1
TABLET ORAL
Status: DISPENSED
Start: 2022-01-01

## (undated) RX ORDER — PROPOFOL 10 MG/ML
INJECTION, EMULSION INTRAVENOUS
Status: DISPENSED
Start: 2022-01-01

## (undated) RX ORDER — PHENYLEPHRINE HCL IN 0.9% NACL 1 MG/10 ML
SYRINGE (ML) INTRAVENOUS
Status: DISPENSED
Start: 2018-09-06

## (undated) RX ORDER — DEXAMETHASONE SODIUM PHOSPHATE 4 MG/ML
INJECTION, SOLUTION INTRA-ARTICULAR; INTRALESIONAL; INTRAMUSCULAR; INTRAVENOUS; SOFT TISSUE
Status: DISPENSED
Start: 2018-11-01

## (undated) RX ORDER — PROPOFOL 10 MG/ML
INJECTION, EMULSION INTRAVENOUS
Status: DISPENSED
Start: 2020-07-03

## (undated) RX ORDER — LIDOCAINE HYDROCHLORIDE 10 MG/ML
INJECTION, SOLUTION EPIDURAL; INFILTRATION; INTRACAUDAL; PERINEURAL
Status: DISPENSED
Start: 2022-01-01

## (undated) RX ORDER — ONDANSETRON 2 MG/ML
INJECTION INTRAMUSCULAR; INTRAVENOUS
Status: DISPENSED
Start: 2018-09-06

## (undated) RX ORDER — DEXAMETHASONE SODIUM PHOSPHATE 4 MG/ML
INJECTION, SOLUTION INTRA-ARTICULAR; INTRALESIONAL; INTRAMUSCULAR; INTRAVENOUS; SOFT TISSUE
Status: DISPENSED
Start: 2018-09-06

## (undated) RX ORDER — OXYCODONE HYDROCHLORIDE 5 MG/1
TABLET ORAL
Status: DISPENSED
Start: 2022-01-01

## (undated) RX ORDER — MAGNESIUM SULFATE HEPTAHYDRATE 40 MG/ML
INJECTION, SOLUTION INTRAVENOUS
Status: DISPENSED
Start: 2022-01-01

## (undated) RX ORDER — DEXAMETHASONE SODIUM PHOSPHATE 4 MG/ML
INJECTION, SOLUTION INTRA-ARTICULAR; INTRALESIONAL; INTRAMUSCULAR; INTRAVENOUS; SOFT TISSUE
Status: DISPENSED
Start: 2022-01-01

## (undated) RX ORDER — FENTANYL CITRATE 50 UG/ML
INJECTION, SOLUTION INTRAMUSCULAR; INTRAVENOUS
Status: DISPENSED
Start: 2022-01-01

## (undated) RX ORDER — LIDOCAINE HYDROCHLORIDE 10 MG/ML
INJECTION, SOLUTION EPIDURAL; INFILTRATION; INTRACAUDAL; PERINEURAL
Status: DISPENSED
Start: 2018-09-06

## (undated) RX ORDER — PROPOFOL 10 MG/ML
INJECTION, EMULSION INTRAVENOUS
Status: DISPENSED
Start: 2018-09-06

## (undated) RX ORDER — PROPOFOL 10 MG/ML
INJECTION, EMULSION INTRAVENOUS
Status: DISPENSED
Start: 2018-11-01

## (undated) RX ORDER — BUPIVACAINE HYDROCHLORIDE AND EPINEPHRINE 5; 5 MG/ML; UG/ML
INJECTION, SOLUTION EPIDURAL; INTRACAUDAL; PERINEURAL
Status: DISPENSED
Start: 2018-09-06

## (undated) RX ORDER — GABAPENTIN 100 MG/1
CAPSULE ORAL
Status: DISPENSED
Start: 2022-01-01

## (undated) RX ORDER — ACETAMINOPHEN 325 MG/1
TABLET ORAL
Status: DISPENSED
Start: 2022-01-01

## (undated) RX ORDER — ONDANSETRON 2 MG/ML
INJECTION INTRAMUSCULAR; INTRAVENOUS
Status: DISPENSED
Start: 2022-01-01

## (undated) RX ORDER — HYDROMORPHONE HCL IN WATER/PF 6 MG/30 ML
PATIENT CONTROLLED ANALGESIA SYRINGE INTRAVENOUS
Status: DISPENSED
Start: 2022-01-01

## (undated) RX ORDER — FENTANYL CITRATE 50 UG/ML
INJECTION, SOLUTION INTRAMUSCULAR; INTRAVENOUS
Status: DISPENSED
Start: 2018-11-01

## (undated) RX ORDER — FENTANYL CITRATE-0.9 % NACL/PF 10 MCG/ML
PLASTIC BAG, INJECTION (ML) INTRAVENOUS
Status: DISPENSED
Start: 2022-01-01

## (undated) RX ORDER — LIDOCAINE HYDROCHLORIDE 20 MG/ML
JELLY TOPICAL
Status: DISPENSED
Start: 2022-01-01

## (undated) RX ORDER — GLYCOPYRROLATE 0.2 MG/ML
INJECTION, SOLUTION INTRAMUSCULAR; INTRAVENOUS
Status: DISPENSED
Start: 2022-01-01

## (undated) RX ORDER — CEFAZOLIN SODIUM 2 G/100ML
INJECTION, SOLUTION INTRAVENOUS
Status: DISPENSED
Start: 2018-11-01

## (undated) RX ORDER — ONDANSETRON 2 MG/ML
INJECTION INTRAMUSCULAR; INTRAVENOUS
Status: DISPENSED
Start: 2018-11-01

## (undated) RX ORDER — FENTANYL CITRATE 50 UG/ML
INJECTION, SOLUTION INTRAMUSCULAR; INTRAVENOUS
Status: DISPENSED
Start: 2018-09-06